# Patient Record
Sex: FEMALE | Race: WHITE | NOT HISPANIC OR LATINO | Employment: OTHER | URBAN - METROPOLITAN AREA
[De-identification: names, ages, dates, MRNs, and addresses within clinical notes are randomized per-mention and may not be internally consistent; named-entity substitution may affect disease eponyms.]

---

## 2017-01-04 ENCOUNTER — APPOINTMENT (OUTPATIENT)
Dept: LAB | Facility: CLINIC | Age: 62
End: 2017-01-04
Payer: MEDICARE

## 2017-01-04 DIAGNOSIS — E11.42 TYPE 2 DIABETES MELLITUS WITH DIABETIC POLYNEUROPATHY (HCC): ICD-10-CM

## 2017-01-04 LAB
EST. AVERAGE GLUCOSE BLD GHB EST-MCNC: 120 MG/DL
HBA1C MFR BLD: 5.8 % (ref 4.2–6.3)

## 2017-01-04 PROCEDURE — 36415 COLL VENOUS BLD VENIPUNCTURE: CPT

## 2017-01-04 PROCEDURE — 83036 HEMOGLOBIN GLYCOSYLATED A1C: CPT

## 2017-02-23 ENCOUNTER — ALLSCRIPTS OFFICE VISIT (OUTPATIENT)
Dept: OTHER | Facility: OTHER | Age: 62
End: 2017-02-23

## 2017-04-03 ENCOUNTER — ALLSCRIPTS OFFICE VISIT (OUTPATIENT)
Dept: OTHER | Facility: OTHER | Age: 62
End: 2017-04-03

## 2017-05-23 ENCOUNTER — ALLSCRIPTS OFFICE VISIT (OUTPATIENT)
Dept: OTHER | Facility: OTHER | Age: 62
End: 2017-05-23

## 2017-05-23 DIAGNOSIS — M16.0 PRIMARY OSTEOARTHRITIS OF BOTH HIPS: ICD-10-CM

## 2017-05-25 ENCOUNTER — HOSPITAL ENCOUNTER (OUTPATIENT)
Dept: RADIOLOGY | Facility: HOSPITAL | Age: 62
Discharge: HOME | End: 2017-05-25
Attending: ORTHOPAEDIC SURGERY
Payer: MEDICARE

## 2017-05-25 DIAGNOSIS — M16.0 PRIMARY OSTEOARTHRITIS OF BOTH HIPS: ICD-10-CM

## 2017-05-25 PROCEDURE — 73700 CT LOWER EXTREMITY W/O DYE: CPT

## 2017-07-10 ENCOUNTER — GENERIC CONVERSION - ENCOUNTER (OUTPATIENT)
Dept: OTHER | Facility: OTHER | Age: 62
End: 2017-07-10

## 2017-08-02 ENCOUNTER — ALLSCRIPTS OFFICE VISIT (OUTPATIENT)
Dept: OTHER | Facility: OTHER | Age: 62
End: 2017-08-02

## 2017-08-04 ENCOUNTER — ALLSCRIPTS OFFICE VISIT (OUTPATIENT)
Dept: OTHER | Facility: OTHER | Age: 62
End: 2017-08-04

## 2017-08-04 DIAGNOSIS — K91.2 POSTSURGICAL MALABSORPTION, NOT ELSEWHERE CLASSIFIED: ICD-10-CM

## 2017-08-04 DIAGNOSIS — Z12.31 ENCOUNTER FOR SCREENING MAMMOGRAM FOR MALIGNANT NEOPLASM OF BREAST: ICD-10-CM

## 2017-08-04 DIAGNOSIS — D64.9 ANEMIA: ICD-10-CM

## 2017-08-09 ENCOUNTER — HOSPITAL ENCOUNTER (OUTPATIENT)
Dept: RADIOLOGY | Facility: HOSPITAL | Age: 62
Discharge: HOME | End: 2017-08-09
Payer: MEDICARE

## 2017-08-09 ENCOUNTER — TRANSCRIBE ORDERS (OUTPATIENT)
Dept: ADMINISTRATIVE | Facility: HOSPITAL | Age: 62
End: 2017-08-09

## 2017-08-09 ENCOUNTER — APPOINTMENT (OUTPATIENT)
Dept: LAB | Facility: HOSPITAL | Age: 62
End: 2017-08-09
Payer: MEDICARE

## 2017-08-09 DIAGNOSIS — Z01.818 ENCOUNTER FOR PREADMISSION TESTING: Primary | ICD-10-CM

## 2017-08-09 DIAGNOSIS — K91.2 POSTSURGICAL MALABSORPTION, NOT ELSEWHERE CLASSIFIED: ICD-10-CM

## 2017-08-09 DIAGNOSIS — Z01.818 ENCOUNTER FOR PREADMISSION TESTING: ICD-10-CM

## 2017-08-09 LAB
25(OH)D3 SERPL-MCNC: 32.2 NG/ML (ref 30–100)
BACTERIA UR QL AUTO: ABNORMAL /HPF
BILIRUB UR QL STRIP: NEGATIVE
CHOLEST SERPL-MCNC: 163 MG/DL (ref 50–200)
CLARITY UR: CLEAR
COLOR UR: YELLOW
EST. AVERAGE GLUCOSE BLD GHB EST-MCNC: 97 MG/DL
GLUCOSE UR STRIP-MCNC: NEGATIVE MG/DL
HBA1C MFR BLD: 5 % (ref 4.2–6.3)
HDLC SERPL-MCNC: 46 MG/DL (ref 40–60)
HGB UR QL STRIP.AUTO: ABNORMAL
KETONES UR STRIP-MCNC: NEGATIVE MG/DL
LDLC SERPL CALC-MCNC: 99 MG/DL (ref 0–100)
LEUKOCYTE ESTERASE UR QL STRIP: ABNORMAL
NITRITE UR QL STRIP: NEGATIVE
NON-SQ EPI CELLS URNS QL MICRO: ABNORMAL /HPF
PH UR STRIP.AUTO: 5.5 [PH] (ref 5–9)
PROT UR STRIP-MCNC: NEGATIVE MG/DL
RBC #/AREA URNS AUTO: ABNORMAL /HPF
SP GR UR STRIP.AUTO: 1.01 (ref 1–1.03)
TRIGL SERPL-MCNC: 91 MG/DL
UROBILINOGEN UR QL STRIP.AUTO: 0.2 E.U./DL
VIT B12 SERPL-MCNC: 413 PG/ML (ref 100–900)
WBC #/AREA URNS AUTO: ABNORMAL /HPF

## 2017-08-09 PROCEDURE — 82607 VITAMIN B-12: CPT

## 2017-08-09 PROCEDURE — 81001 URINALYSIS AUTO W/SCOPE: CPT | Performed by: INTERNAL MEDICINE

## 2017-08-09 PROCEDURE — 82306 VITAMIN D 25 HYDROXY: CPT

## 2017-08-09 PROCEDURE — 36415 COLL VENOUS BLD VENIPUNCTURE: CPT

## 2017-08-09 PROCEDURE — 83036 HEMOGLOBIN GLYCOSYLATED A1C: CPT

## 2017-08-09 PROCEDURE — 71020 HB CHEST X-RAY 2VW FRONTAL&LATL: CPT

## 2017-08-09 PROCEDURE — 80061 LIPID PANEL: CPT

## 2017-08-09 PROCEDURE — 84590 ASSAY OF VITAMIN A: CPT

## 2017-08-09 PROCEDURE — 84425 ASSAY OF VITAMIN B-1: CPT

## 2017-08-10 ENCOUNTER — GENERIC CONVERSION - ENCOUNTER (OUTPATIENT)
Dept: OTHER | Facility: OTHER | Age: 62
End: 2017-08-10

## 2017-08-11 LAB — VIT B1 BLD-SCNC: 187.4 NMOL/L (ref 66.5–200)

## 2017-08-13 LAB — VIT A SERPL-MCNC: 68 UG/DL (ref 26–82)

## 2017-08-15 ENCOUNTER — GENERIC CONVERSION - ENCOUNTER (OUTPATIENT)
Dept: OTHER | Facility: OTHER | Age: 62
End: 2017-08-15

## 2017-08-19 ENCOUNTER — LAB REQUISITION (OUTPATIENT)
Dept: LAB | Facility: HOSPITAL | Age: 62
End: 2017-08-19
Payer: MEDICARE

## 2017-08-19 DIAGNOSIS — N18.9 CHRONIC KIDNEY DISEASE: ICD-10-CM

## 2017-08-19 LAB
BASOPHILS # BLD AUTO: 0 THOUSANDS/ΜL (ref 0–0.1)
BASOPHILS NFR BLD AUTO: 0 % (ref 0–1)
EOSINOPHIL # BLD AUTO: 0.2 THOUSAND/ΜL (ref 0–0.61)
EOSINOPHIL NFR BLD AUTO: 3 % (ref 0–6)
ERYTHROCYTE [DISTWIDTH] IN BLOOD BY AUTOMATED COUNT: 14.6 % (ref 11.6–15.1)
HCT VFR BLD AUTO: 24.5 % (ref 37–47)
HGB BLD-MCNC: 8.1 G/DL (ref 12–16)
LYMPHOCYTES # BLD AUTO: 1.6 THOUSANDS/ΜL (ref 0.6–4.47)
LYMPHOCYTES NFR BLD AUTO: 28 % (ref 14–44)
MCH RBC QN AUTO: 28.4 PG (ref 27–31)
MCHC RBC AUTO-ENTMCNC: 33.1 G/DL (ref 31.4–37.4)
MCV RBC AUTO: 86 FL (ref 82–98)
MONOCYTES # BLD AUTO: 0.5 THOUSAND/ΜL (ref 0.17–1.22)
MONOCYTES NFR BLD AUTO: 8 % (ref 4–12)
NEUTROPHILS # BLD AUTO: 3.6 THOUSANDS/ΜL (ref 1.85–7.62)
NEUTS SEG NFR BLD AUTO: 61 % (ref 43–75)
NRBC BLD AUTO-RTO: 0 /100 WBCS
PLATELET # BLD AUTO: 202 THOUSANDS/UL (ref 130–400)
PMV BLD AUTO: 8.2 FL (ref 8.9–12.7)
RBC # BLD AUTO: 2.85 MILLION/UL (ref 4.2–5.4)
WBC # BLD AUTO: 5.9 THOUSAND/UL (ref 4.8–10.8)

## 2017-08-19 PROCEDURE — 85025 COMPLETE CBC W/AUTO DIFF WBC: CPT | Performed by: FAMILY MEDICINE

## 2017-08-31 ENCOUNTER — GENERIC CONVERSION - ENCOUNTER (OUTPATIENT)
Dept: OTHER | Facility: OTHER | Age: 62
End: 2017-08-31

## 2017-09-28 ENCOUNTER — GENERIC CONVERSION - ENCOUNTER (OUTPATIENT)
Dept: OTHER | Facility: OTHER | Age: 62
End: 2017-09-28

## 2017-09-29 ENCOUNTER — GENERIC CONVERSION - ENCOUNTER (OUTPATIENT)
Dept: OTHER | Facility: OTHER | Age: 62
End: 2017-09-29

## 2017-10-02 ENCOUNTER — GENERIC CONVERSION - ENCOUNTER (OUTPATIENT)
Dept: OTHER | Facility: OTHER | Age: 62
End: 2017-10-02

## 2017-10-06 ENCOUNTER — ALLSCRIPTS OFFICE VISIT (OUTPATIENT)
Dept: OTHER | Facility: OTHER | Age: 62
End: 2017-10-06

## 2017-10-10 ENCOUNTER — GENERIC CONVERSION - ENCOUNTER (OUTPATIENT)
Dept: OTHER | Facility: OTHER | Age: 62
End: 2017-10-10

## 2017-10-25 ENCOUNTER — GENERIC CONVERSION - ENCOUNTER (OUTPATIENT)
Dept: OTHER | Facility: OTHER | Age: 62
End: 2017-10-25

## 2017-11-08 ENCOUNTER — GENERIC CONVERSION - ENCOUNTER (OUTPATIENT)
Dept: OTHER | Facility: OTHER | Age: 62
End: 2017-11-08

## 2017-11-22 ENCOUNTER — GENERIC CONVERSION - ENCOUNTER (OUTPATIENT)
Dept: OTHER | Facility: OTHER | Age: 62
End: 2017-11-22

## 2017-12-13 ENCOUNTER — ALLSCRIPTS OFFICE VISIT (OUTPATIENT)
Dept: OTHER | Facility: OTHER | Age: 62
End: 2017-12-13

## 2017-12-15 NOTE — PROGRESS NOTES
Assessment  1  Atherosclerotic peripheral vascular disease (440 20) (I70 209)   2  Onychomycosis (110 1) (B35 1)   3  Pain in both feet (729 5) (M79 671,M79 672)    Plan   · Follow-up visit in 9 weeks Evaluation and Treatment  Follow-up  Status: Hold For -Scheduling  Requested for: 79Oxw4669   · Jetrochellehia Hoops your feet and legs daily if you have vascular disease ; Status:Complete;   Done:06Iky1963   Follow-up visit in 9 weeks Evaluation and Treatment  Follow-up  Status: Hold For - Scheduling  Requested for: 36Anx1472 Ordered; For: Onychomycosis; Ordered By: Simon Ayala  Performed:   Due: 07YRZ0403   Discussion/Summary    Discussed with patient proper foot care  Suggest emollient use daily  Daily foot checks monitor for signs of infection  The patient was counseled regarding instructions for management,-- patient and family education,-- risks and benefits of treatment options,-- importance of compliance with treatment  Chief Complaint  patient is seen on housecall patient is seen in assisted living facility  Has trouble Walking long distances and rarely leaves the facility  patient has chief complaint of thick painful nails that hurt when walking and wearing shoes  Active Problems  1  Age related osteoporosis (733 01) (M81 0)   2  Anemia (285 9) (D64 9)   3  Anxiety (300 00) (F41 9)   4  Aortic stenosis (424 1) (I35 0)   5  Atherosclerotic peripheral vascular disease (440 20) (I70 209)   6  Benign essential hypertension (401 1) (I10)   7  BMI 36 0-36 9,adult (V85 36) (Z68 36)   8  BMI 45 0-49 9, adult (V85 42) (Z68 42)   9  Breast cancer screening (V76 10) (Z12 39)   10  Candidiasis, intertrigo (112 3) (B37 2)   11  Chronic low back pain (724 2,338 29) (M54 5,G89 29)   12  Diet-controlled diabetes mellitus (250 00) (E11 9)   13  Difficulty walking (719 7) (R26 2)   14  Edema (782 3) (R60 9)   15  Encounter for preventive health examination (V70 0) (Z00 00)   16   Encounter for screening mammogram for breast cancer (V76 12) (Z12 31)   17  Hyperkalemia (276 7) (E87 5)   18  Hyperlipidemia (272 4) (E78 5)   19  Left hip pain (719 45) (M25 552)   20  Leg swelling (729 81) (M79 89)   21  Morbid or severe obesity due to excess calories (278 01) (E66 01)   22  Obesity (278 00) (E66 9)   23  Onychomycosis (110 1) (B35 1)   24  MAUREEN (obstructive sleep apnea) (327 23) (G47 33)   25  Osteoarthritis of both hips, unspecified osteoarthritis type (715 95) (M16 0)   26  Pain in both feet (729 5) (M79 671,M79 672)   27  Postgastrectomy malabsorption (579 3) (K91 2,Z90 3)   28  Post-operative pain (338 18) (G89 18)   29  Preop examination (V72 84) (Z01 818)   30  Primary osteoarthritis of left hip (715 15) (M16 12)   31  Risk for falls (V15 88) (Z91 81)   32  S/P hip replacement, left (V43 64) (Z96 642)   33  Screening for colon cancer (V76 51) (Z12 11)   34  Screening for depression (V79 0) (Z13 89)   35  Spinal stenosis (724 00) (M48 00)   36  Status post gastric bypass for obesity (V45 86) (Z98 84)   37  Systolic murmur (049 5) (M56 2)   38  Visit for screening mammogram (V76 12) (Z12 31)    Surgical History   · History of Abdominal Surgery   · History of  Section   · History of Gastric Surgery For Morbid Obesity Bypass With Juan-en-Y   · History of Tubal Ligation    The surgical history was reviewed and updated today  Family History  Mother    · Family history of Leukemia (V16 6)  Father    · Family history of Coronary Artery Disease (V17 49)   · Family history of Diabetes Mellitus (V18 0)    The family history was reviewed and updated today  Social History   · Former smoker (E76 79) (Z93 951)   · Former smoker (W58 46) (J90 724)   · Never Drank Alcohol   · Never Used Drugs  The social history was reviewed and updated today  Current Meds   1  Bariatric Fusion Oral Tablet Chewable; Therapy: (Recorded:54Ica0280) to Recorded   2  Calcium /Vitamin D TABS; Therapy: (Recorded:47Ljm3912) to Recorded   3   Ferrous Sulfate 325 (65 Fe) MG Oral Tablet; TAKE 1 TABLET DAILY WITH FOOD; Therapy: 84Iqa4089 to (Evaluate:97Mhy9964)  Requested for: 46Xuz4138; Last Rx:92Jbe8220 Ordered   4  Meloxicam 7 5 MG Oral Tablet; TAKE 1 TABLET DAILY  Requested for: 36HMO3285; Last Rx:06Oct2017 Ordered   5  Nystatin 962064 UNIT/GM External Ointment; APPLY 2-3 TIMES DAILY TO AFFECTED AREA(S); Therapy: 13SFZ6271 to (Last Rx:57Hfk3162)  Requested for: 36Cuq3747 Ordered   6  Omeprazole 20 MG Oral Capsule Delayed Release; take one capsule once daily; Therapy: 04LUG0056 to (Evaluate:30Oct2017)  Requested for: 04Apr2017; Last Rx:88Fas1700 Ordered   7  OneTouch Ultra Blue In [employer]; USE 1 STRIP TWICE DAILY; Therapy: 67OVU3491 to (Jose Valle)  Requested for: 10Few1676; Last Rx:25Gyv9527 Ordered   8  OxyCODONE HCl - 5 MG Oral Tablet; TAKE 1 TO 2 TABLETS EVERY 4 TO 6 HOURS AS NEEDED FOR PAIN; Therapy: 14Dgl5603 to (Evaluate:22Vgj2618); Last Rx:41Wjk8340 Ordered   9  Percocet TABS; Therapy: (Recorded:01Uxy5712) to Recorded    The medication list was reviewed and updated today  Allergies  1  Tramadol   2  TraMADol HCl TABS  3  No Known Latex Allergies    Physical Exam   Constitutional: no acute distress, well appearing and well nourished  Cardiovascular: abnormal dorsalis pedis pulse,-- abnormal posterior tibialis pulse,-- dependence rubor-- and-- abnormal capillary refill  Orthopedic/Biomechanical: abnormal foot type,-- hammertoe(s)-- and-- ingrown nails  Left Foot: Appearance: Normal except as noted: excessive pronation-- and-- pes planus  Second toe deformities include hammer toe  Third toe deformities include hammer toe  Forth toe deformities include hammer toe  Fifth toe deformities include hammer toe  All nails thick discolored dystrophic, positive subungual debris, positive pain on palpation  Special Tests: semirigid hammertoes 2 through 5 bilateral  Negative ulceration negative maceration negative signs of infection     Right Foot: Appearance: Normal except as noted: excessive pronation-- and-- pes planus  Second toe deformities include hammer toe  Third toe deformities include hammer toe  Forth toe deformities include hammer toe  Fifth toe deformities include hammer toe  Negative ulceration, negative maceration, negative signs of infection  Special Tests: Skin is hairless and atrophic  Plus one edema bilateral  Moderate venous stasis  No signs of infection  Neurological Exam: performed  Light touch was intact bilaterally  Vibratory sensation was intact bilaterally  Vascular Exam: performed Dorsalis pedis pulses were 1/4 bilaterally  Posterior tibial pulses were absent bilaterally  Capillary refill time was between 1-3 seconds bilaterally  Toenails: All of the toenails were elongated,-- hypertrophied,-- discolored,-- shown to have subungual debris-- and-- tender  Hyperkeratosis: present on both first toes-- and-- present on both first sub metatarsals  Procedure  Aseptic debridement and planing of nails Ã10, with nail nipper and nail , no complications      Signatures   Electronically signed by :  Kostas Ramirez DPM; Dec 13 2017  3:44PM EST                       (Author)

## 2018-01-03 ENCOUNTER — GENERIC CONVERSION - ENCOUNTER (OUTPATIENT)
Dept: PODIATRY | Facility: CLINIC | Age: 63
End: 2018-01-03

## 2018-01-05 ENCOUNTER — GENERIC CONVERSION - ENCOUNTER (OUTPATIENT)
Dept: OTHER | Facility: OTHER | Age: 63
End: 2018-01-05

## 2018-01-10 NOTE — PROGRESS NOTES
Message  Outreach phone call; no response but left message if patient would like to reschedule with RD and JENNI GONZALEZ      Active Problems    1  Acute CHF (428 0) (I50 9)   2  Anxiety (300 00) (F41 9)   3  Benign essential hypertension (401 1) (I10)   4  Chronic low back pain (724 2,338 29) (M54 5,G89 29)   5  Edema (782 3) (R60 9)   6  Hyperkalemia (276 7) (E87 5)   7  Hyperlipidemia (272 4) (E78 5)   8  Morbid or severe obesity due to excess calories (278 01) (E66 01)   9  MAUREEN (obstructive sleep apnea) (327 23) (G47 33)   10  Preop examination (V72 84) (Z01 818)   11  Screening for cardiovascular condition (V81 2) (Z13 6)   12  Screening for diabetes mellitus (V77 1) (Z13 1)   13  Systolic murmur (438 1) (E25)   14  Type 2 diabetes mellitus (250 00) (E11 9)    Current Meds   1  BD Insulin Syringe 30G X 1/2" 0 5 ML Miscellaneous; use as directed; Therapy: 14WXL8088 to (Evaluate:26Zpd7776); Last Rx:05Jan2016 Ordered   2  Clotrimazole-Betamethasone 1-0 05 % External Cream (Lotrisone); APPLY SPARINGLY   TO AFFECTED AREA(S) 3 TIMES A DAY; Therapy: 16NXD3194 to (Evaluate:58Rgh8290); Last Rx:25Ysi4083 Ordered   3  Enalapril Maleate 10 MG Oral Tablet; TAKE 1 TABLET DAILY  Requested for: 60CHX8987; Last Rx:30Sep2015 Ordered   4  FreeStyle InsuLinx Test In Vitro Strip; TEST 4 TIMES DAILY; Therapy: 49DPI4308 to (Evaluate:81Xjd9895); Last Rx:05Jan2016 Ordered   5  Furosemide 40 MG Oral Tablet; TAKE 1 TABLET DAILY AS DIRECTED; Therapy: 41QIK7293 to (Paulett Lipa)  Requested for: 06ASH2160; Last   Rx:30Sep2015 Ordered   6  Lantus 100 UNIT/ML Subcutaneous Solution; INJECT 65 UNITS SUBCUTANEOUSLY   DAILY AS    DIRECTED; Therapy: 49PAK1442 to (Evaluate:84Zgz2290)  Requested for: 15AUA0465; Last   Rx:05Jan2016 Ordered   7  Meloxicam 15 MG Oral Tablet; TAKE ONE tablet(s) DAILY; Therapy: 86SAD1878 to (Evaluate:21Jan2016)  Requested for: 22Dec2015; Last   Rx:22Dec2015 Ordered   8   MetFORMIN HCl - 500 MG Oral Tablet; take 1 tablet by mouth 2 times a day; Therapy: 77TQS4866 to (Last Rx:16Hzw3376)  Requested for: 78Hmk1941 Ordered   9  Nateglinide 120 MG Oral Tablet (Starlix); TAKE 1/2 TABLET IN THE AM,1/2 TABLET AT   NOON AND 1 TABLET AT BEDTIME; Therapy: 13RIL6943 to (Last Rx:27Luk1166)  Requested for: 91XEQ1060 Ordered   10  OneTouch Ultra Blue In Vitro Strip; TEST 4 TIMES DAILY; Therapy: 52FPS2244 to (Evaluate:19Frh5085); Last Rx:11Npq7851 Ordered   11  Percocet  MG Oral Tablet (Oxycodone-Acetaminophen); TAKE 1 TABLET 4 TIMES    DAILY AS NEEDED FOR PAIN;    Therapy: (Recorded:99Wkn9027) to Recorded    Allergies    1  Tramadol    2   No Known Latex Allergies    Signatures   Electronically signed by : ASHLYN Reyes; Jan 14 2016 10:50AM EST                       (Author)

## 2018-01-10 NOTE — PROGRESS NOTES
Discussion/Summary  Discussion Summary:   Reviewed post-operative pureed and soft foods diet with pt  Discussed gradual diet advancement and portion size progression  Discussed adequate hydration, signs and symptoms of dehydration  Discussed recommended post-operative vitamin supplementation and protein supplements  Discussed importance of regular physical activity  Provided pt with contact information for future questions/concerns  Active Problems    1  Acute CHF (428 0) (I50 9)   2  Anxiety (300 00) (F41 9)   3  Benign essential hypertension (401 1) (I10)   4  BMI 50 0-59 9, adult (V85 43) (Z68 43)   5  Chronic low back pain (724 2,338 29) (M54 5,G89 29)   6  Edema (782 3) (R60 9)   7  Hyperkalemia (276 7) (E87 5)   8  Hyperlipidemia (272 4) (E78 5)   9  Morbid or severe obesity due to excess calories (278 01) (E66 01)   10  Obesity (278 00) (E66 9)   11  MAUREEN (obstructive sleep apnea) (327 23) (G47 33)   12  Postgastrectomy malabsorption (579 3) (K91 2,Z90 3)   13  Preop examination (V72 84) (Z01 818)   14  Screening for cardiovascular condition (V81 2) (Z13 6)   15  Screening for diabetes mellitus (V77 1) (Z13 1)   16  Status post gastric bypass for obesity (V45 86) (Z98 84)   17  Systolic murmur (003 6) (Q03)   18  Type 2 diabetes mellitus (250 00) (E11 9)    Past Medical History    1  History of spinal stenosis (V13 59) (Z87 39)    Surgical History    1  History of Abdominal Surgery   2  History of  Section   3  History of Gastric Surgery For Morbid Obesity Bypass With Juan-en-Y   4  History of Tubal Ligation    Family History  Mother    1  Family history of Leukemia (V16 6)  Father    2  Family history of Coronary Artery Disease (V17 49)   3  Family history of Diabetes Mellitus (V18 0)    Social History    · Former smoker (R36 37) (V26 274)   · Never Drank Alcohol   · Never Used Drugs    Current Meds   1  Bariatric Fusion Oral Tablet Chewable; Therapy: (Recorded:64Yzr9804) to Recorded   2  Calcium /Vitamin D TABS; Therapy: (Recorded:00Xej6207) to Recorded   3  Furosemide 40 MG Oral Tablet; TAKE 1 TABLET DAILY AS DIRECTED; Therapy: 24HNH9181 to (Clearjoya Roberts)  Requested for: 00OJM5820; Last   Rx:94Eoj7860 Ordered   4  Omeprazole 20 MG Oral Capsule Delayed Release; TAKE 1 CAPSULE DAILY; Therapy: 57ARJ4623 to (Philipp Velazquez)  Requested for: 83JYP4147; Last   Rx:51Tbj3963 Ordered   5  Percocet  MG Oral Tablet (Oxycodone-Acetaminophen); TAKE 1 TABLET 4 TIMES   DAILY AS NEEDED FOR PAIN;   Therapy: (Recorded:47Yfx4665) to Recorded    Allergies    1  Tramadol    2  No Known Latex Allergies    Future Appointments    Date/Time Provider Specialty Site   08/02/2016 01:30 PM Ramón Spencer [facility] General Surgery [facility]   09/27/2016 08:30 AM Sácnhez Catherine [facility] General Surgery [facility]     Signatures   Electronically signed by : HERBERT Burciaga; Jul 28 2016 12:57PM EST                       (Author)    Electronically signed by :  JOSLYN Byrd ; Aug  4 2016 10:13AM EST

## 2018-01-10 NOTE — MISCELLANEOUS
Provider Comments  Provider Comments:     Dear Tarik Leal had a scheduled appointment at our office for 07/13/2017 that you called to cancel but did not reschedule for another day  It is very important that you follow up with us so that we can assess your physical and nutritional safety after your surgery  Please call our office at [phone] to reschedule your appointment       Sincerely,     [facility]            Signatures   Electronically signed by : Carol Bustamante, ; Jul 11 2017  8:38AM EST                       (Author)

## 2018-01-10 NOTE — PROGRESS NOTES
Message  Outreach phone  How is patient coming along with pre-op weight loss? any? Kavitha Morfin She said she spoke with RD on 7/11/2016 and her ? were answered  Remind patient to continue to work on weight loss and if she has any ? to please reach out to staff for support  [place]      Active Problems    1  Acute CHF (428 0) (I50 9)   2  Anxiety (300 00) (F41 9)   3  Benign essential hypertension (401 1) (I10)   4  BMI 50 0-59 9, adult (V85 43) (Z68 43)   5  Chronic low back pain (724 2,338 29) (M54 5,G89 29)   6  Edema (782 3) (R60 9)   7  Hyperkalemia (276 7) (E87 5)   8  Hyperlipidemia (272 4) (E78 5)   9  Morbid or severe obesity due to excess calories (278 01) (E66 01)   10  Obesity (278 00) (E66 9)   11  MAUREEN (obstructive sleep apnea) (327 23) (G47 33)   12  Preop examination (V72 84) (Z01 818)   13  Screening for cardiovascular condition (V81 2) (Z13 6)   14  Screening for diabetes mellitus (V77 1) (Z13 1)   15  Systolic murmur (899 8) (N00)   16  Type 2 diabetes mellitus (250 00) (E11 9)    Current Meds   1  Atorvastatin Calcium 40 MG Oral Tablet (Lipitor); TAKE 1 TABLET AT BEDTIME; Therapy: 32APF6465 to (Evaluate:54Rgo2107); Last Rx:02Cnu6071 Ordered   2  Clotrimazole-Betamethasone 1-0 05 % External Cream (Lotrisone); APPLY SPARINGLY   TO AFFECTED AREA(S) 3 TIMES A DAY; Therapy: 11NCB4196 to (Evaluate:86Yfz3889); Last Rx:17Kvh6187 Ordered   3  Enalapril Maleate 10 MG Oral Tablet; TAKE 1 TABLET DAILY  Requested for: 27VLQ9822;   Last TX:55KVI9653 Ordered   4  FreeStyle InsuLinx Test In Vitro Strip; TEST 4 TIMES DAILY; Therapy: 00YZI0813 to (Evaluate:38Ghj2194)  Requested for: 85HTO4684; Last   Rx:17Rkf9385 Ordered   5  Furosemide 40 MG Oral Tablet; TAKE 1 TABLET DAILY AS DIRECTED; Therapy: 90AMX5045 to (Yvonne Reyes)  Requested for: 48PEI0147; Last   Rx:85Oso2129 Ordered   6  Lantus 100 UNIT/ML Subcutaneous Solution; INJECT 65 UNITS SUBCUTANEOUSLY   DAILY AS    DIRECTED;    Therapy: 31HVV4578 to (Evaluate:59Czi8772)  Requested for: 96WXN1497; Last   Rx:05Ije5484 Ordered   7  Meloxicam 15 MG Oral Tablet; TAKE ONE tablet(s) DAILY; Therapy: 55AAD4532 to (Evaluate:21Jun2016)  Requested for: 21Ukt5001; Last   Rx:22Apr2016 Ordered   8  MetFORMIN HCl - 500 MG Oral Tablet; take 1 tablet by mouth 2 times a day; Therapy: 31HUO7890 to 21 )  Requested for: 76COY5861; Last   :00ACN3447 Ordered   9  Nateglinide 120 MG Oral Tablet (Starlix); TAKE 1/2 TABLET IN THE AM,1/2 TABLET AT   NOON AND 1 TABLET AT BEDTIME; Therapy: 08IAL2892 to (Last Rx:25Mar2016)  Requested for: 25Mar2016 Ordered   10  Omeprazole 20 MG Oral Capsule Delayed Release; TAKE 1 CAPSULE DAILY; Therapy: 31KQN1764 to ([facility])  Requested for: 05XJG0232; Last    Rx:03Ppd9892; Status: ACTIVE - Retrospective By Protocol Authorization Ordered   11  Percocet  MG Oral Tablet (Oxycodone-Acetaminophen); TAKE 1 TABLET 4 TIMES    DAILY AS NEEDED FOR PAIN;    Therapy: (Recorded:59Tar4188) to Recorded   12  Toprol XL 25 MG Oral Tablet Extended Release 24 Hour (Metoprolol Succinate ER);    TAKE 1 TABLET DAILY; Therapy: 94ZMM1864 to Recorded    Allergies    1  Tramadol    2   No Known Latex Allergies    Signatures   Electronically signed by : ASHLYN Lorenzana; Jul 12 2016 11:35AM EST                       (Author)

## 2018-01-11 NOTE — RESULT NOTES
Message   Recommend that patient review her Cholesterol/lipids with her PCP as well/otherwise mail results, blood sugar was a little high but hgA1c -report card of blood sugar control over 3 months is in the "pre-diabetic" range which has improved     Verified Results  (1) CBC/ PLT (NO DIFF) 03Oct2016 09:14AM Kenna Fregoso Order Number: [id number]     Test Name Result Flag Reference   HEMATOCRIT 44 0 %  34 8-46 1   HEMOGLOBIN 14 2 g/dL  11 5-15 4   MCHC 32 3 g/dL  31 4-37 4   MCH 26 8 pg  26 8-34 3   MCV 83 fL  82-98   PLATELET COUNT 957 Thousands/uL  149-390   RBC COUNT 5 29 Million/uL H 3 81-5 12   RDW 14 9 %  11 6-15 1   WBC COUNT 5 62 Thousand/uL  4 31-10 16   MPV 11 5 fL  8 9-12 7       Discussion/Summary  10/3/2016 labs reviewed  your total cholesterol is high at 230  Your  LDL âor bad cholesterolâ is high  at 143 and is worse than prior level of 108  Continued weight loss (IF NEEDED) can sometimes improve this level  Limiting total fats, cholesterol and saturated fats in the diet can help  Heredity also plays a part in this level  Please review these levels with your PCP    Your HDL level  or "good cholesterol" level is low at 37 and is worse than prior level of 43  This level should be higher  Exercise can sometimes improve this level  Exercise as tolerated  Hereditary factors can also cause the level to be low  PLEASE  review this level with your PCP at a routine office visit  your blood sugar, if it was fasting that day was a little high at 130-continued weight loss can help improve this as well  Your hemoglobin A 1c is a report card of your blood sugar control over the past 3 months  Your hemoglobin is in the âpre-diabetic rangeâat 5 9%-this has improved from 7 3% when it was in the diabetic range  *this new level is GOOD) Continued weight loss (IF NEEDED) can help improve this level further    Avoiding simple sugars and having more âcomplex carbohydratesâ in the diet -like vegetables, fruits and whole grains as your diet allows can also help  â   You should also review this with your PCP/Endocrinologist at a routine office visit  Your tryglicerides-which are also a fat in the blood are also high at 248-following the same advice regarding your hgA1c as above can help with this as wekk, your triglyceride level has improved from prior leve-this is GOOD  PLEASE review these levels with your PCP also      Your vitamin D level is low at 27 5    Both vitamin D and calcium are important for bone health  You need to add up the TOTAL  amount of vitamin D that is in your multivitamin/mineral (s) and what is in your calcium citrate with D together  The TOTAL  should add up to between 3487-8738 IU of vitamin D per day  You may need to add additional vitamin D3  to get to this level  Vitamin D 3  can be found over -the-counter  Your TOTAL  calcium amount for the day -which includes the calcium in  all your supplements ADDED TOGETHER should equal  1500 mg per day (up  to 1900 mg may be ok  )This includes the calcium that is in your Multivitamin/mineral(s) and what is in your calcium citrate with D   Calcium is best absorbed in divided doses of 500 mg each  Also it is best spaced 2 hours apart from any iron  Iron and calcium taken at the same time âfightâ to be absorbed, so do not take them at the same time  Please keep your routine office visit  If you do not have a scheduled routine appointment, please call the office to schedule it  Our office number is [phone]  If you have questions about your results, this will be discussed with you at your upcoming  visit  If you have gotten your most recent labs after your recent visit and have questions, please call the office  I look forward to seeing you at your next visit                            Signatures   Electronically signed by : ОЛЕГ Devlin; Oct 20 2016  5:09PM EST (Author)

## 2018-01-12 NOTE — MISCELLANEOUS
Provider Comments  Provider Comments:     Dear Lior Hadley had a scheduled appointment at our office for 11/29/2016 that you called to cancel but did not reschedule for another day  It is very important that you follow up with us so that we can assess your physical and nutritional safety after your surgery  Please call our office at [phone] to reschedule your appointment       Sincerely,     [facility]            Signatures   Electronically signed by : Robert Mcdaniel, ; Nov 28 2016  8:05AM EST                       (Author)

## 2018-01-12 NOTE — MISCELLANEOUS
Message   Recorded as Task   Date: 10/20/2016 04:59 PM, Created By: Thu Catherine   Task Name: Call Patient with results   Assigned To: Thu Catherine   Regarding Patient: Koffi Casarez, Status: Active   Comment:    Thu Catherine - 20 Oct 2016 4:59 PM     Patient Phone: [phone]      Recommend that patient review her Cholesterol/lipids with her PCP as well/otherwise mail results, blood sugar was a little high but hgA1c -report card of blood sugar control over 3 months is in the "pre-diabetic" range which has improved    Called pt with this message as per Yunier Vance      Active Problems    1  Anxiety (300 00) (F41 9)   2  Benign essential hypertension (401 1) (I10)   3  BMI 45 0-49 9, adult (V85 42) (Z68 42)   4  Breast cancer screening (V76 10) (Z12 39)   5  Chronic low back pain (724 2,338 29) (M54 5,G89 29)   6  Difficulty walking (719 7) (R26 2)   7  Edema (782 3) (R60 9)   8  Encounter for screening mammogram for breast cancer (V76 12) (Z12 31)   9  Hyperkalemia (276 7) (E87 5)   10  Hyperlipidemia (272 4) (E78 5)   11  Leg swelling (729 81) (M79 89)   12  Morbid or severe obesity due to excess calories (278 01) (E66 01)   13  Obesity (278 00) (E66 9)   14  Onychomycosis (110 1) (B35 1)   15  MAUREEN (obstructive sleep apnea) (327 23) (G47 33)   16  Postgastrectomy malabsorption (579 3) (K91 2,Z90 3)   17  Spinal stenosis (724 00) (M48 00)   18  Status post gastric bypass for obesity (V45 86) (Z98 84)   19  Systolic murmur (528 6) (Y14 5)   20  Type 2 diabetes mellitus with diabetic polyneuropathy (250 60,357 2) (E11 42)   21  Uncontrolled type 2 diabetes mellitus with hyperglycemia, without long-term current use    of insulin (250 02) (E11 65)    Current Meds   1  Bariatric Fusion Oral Tablet Chewable; Therapy: (Recorded:35Arf2109) to Recorded   2  Calcium /Vitamin D TABS; Therapy: (Recorded:12Eff1204) to Recorded   3  Omeprazole 20 MG Oral Capsule Delayed Release; TAKE 1 CAPSULE DAILY;    Therapy: 95MTA9368 to (Guzman Edwards)  Requested for: 33FBG7892; Last   Rx:34Yum5724 Ordered   4  Percocet  MG Oral Tablet (Oxycodone-Acetaminophen); TAKE 1 TABLET 4 TIMES   DAILY AS NEEDED FOR PAIN;   Therapy: (Recorded:56Fvy7272) to Recorded    Allergies    1  Tramadol   2  TraMADol HCl TABS    3   No Known Latex Allergies    Signatures   Electronically signed by : Oscar Butler LPN; Oct 25 5631 27:38SU EST                       (Author)

## 2018-01-12 NOTE — PROGRESS NOTES
Active Problems    1  Acute CHF (428 0) (I50 9)   2  Anxiety (300 00) (F41 9)   3  Benign essential hypertension (401 1) (I10)   4  Chronic low back pain (724 2,338 29) (M54 5,G89 29)   5  Edema (782 3) (R60 9)   6  Hyperkalemia (276 7) (E87 5)   7  Hyperlipidemia (272 4) (E78 5)   8  Morbid or severe obesity due to excess calories (278 01) (E66 01)   9  MAUREEN (obstructive sleep apnea) (327 23) (G47 33)   10  Preop examination (V72 84) (Z01 818)   11  Screening for cardiovascular condition (V81 2) (Z13 6)   12  Screening for diabetes mellitus (V77 1) (Z13 1)   13  Systolic murmur (342 3) (F17)   14  Type 2 diabetes mellitus (250 00) (E11 9)    Past Medical History    1  History of spinal stenosis (V13 59) (Z87 39)    Surgical History    1  History of Abdominal Surgery   2  History of  Section   3  History of Tubal Ligation    Family History  Mother    1  Family history of Leukemia (V16 6)  Father    2  Family history of Coronary Artery Disease (V17 49)   3  Family history of Diabetes Mellitus (V18 0)    Social History    · Former smoker (H27 33) (U01 784)   · Never Drank Alcohol   · Never Used Drugs    Current Meds   1  Atorvastatin Calcium 40 MG Oral Tablet (Lipitor); TAKE 1 TABLET AT BEDTIME; Therapy: 16MAT6141 to (Evaluate:18Vvp9989); Last Rx:08Owq4348 Ordered   2  BD Insulin Syringe 30G X 1/2" 0 5 ML Miscellaneous; use as directed; Therapy: 71DQM2954 to (Evaluate:76Llo2683); Last Rx:2016 Ordered   3  Clotrimazole-Betamethasone 1-0 05 % External Cream (Lotrisone); APPLY SPARINGLY   TO AFFECTED AREA(S) 3 TIMES A DAY; Therapy: 95IGM3507 to (Evaluate:34Yjq4432); Last Rx:33Bsz6925 Ordered   4  Enalapril Maleate 10 MG Oral Tablet; TAKE 1 TABLET DAILY  Requested for: 19PUG8448;   Last BW:40RMB1494 Ordered   5  FreeStyle InsuLinx Test In Vitro Strip; TEST 4 TIMES DAILY; Therapy:  to (Evaluate:2016); Last Rx:2016 Ordered   6   Furosemide 40 MG Oral Tablet; TAKE 1 TABLET DAILY AS DIRECTED; Therapy: 56BUS1364 to (Monserrat Felling)  Requested for: 57MFU5215; Last   Rx:59Nys2303 Ordered   7  Lantus 100 UNIT/ML Subcutaneous Solution; INJECT 65 UNITS SUBCUTANEOUSLY   DAILY AS    DIRECTED; Therapy: 05SCD7884 to (Merril Cone)  Requested for: 13SQX9319; Last   Rx:04Mar2016 Ordered   8  Meloxicam 15 MG Oral Tablet; TAKE ONE tablet(s) DAILY; Therapy: 80RZC5267 to (Evaluate:21Jun2016)  Requested for: 11Ugl4174; Last   Rx:40Tvb9214 Ordered   9  MetFORMIN HCl - 500 MG Oral Tablet; take 1 tablet by mouth 2 times a day; Therapy: 49LHT5573 to [phone])  Requested for: 79SUG8289; Last   ED:48UBA1147 Ordered   10  Nateglinide 120 MG Oral Tablet (Starlix); TAKE 1/2 TABLET IN THE AM,1/2 TABLET AT    NOON AND 1 TABLET AT BEDTIME; Therapy: 83FGW8116 to (Last Rx:25Mar2016)  Requested for: 25Mar2016 Ordered   11  OneTouch Ultra Blue In Vitro Strip; TEST 4 TIMES DAILY; Therapy: 05PSB3961 to (Evaluate:40Fcz0352); Last Rx:87Xxh9319 Ordered   12  Percocet  MG Oral Tablet (Oxycodone-Acetaminophen); TAKE 1 TABLET 4 TIMES    DAILY AS NEEDED FOR PAIN;    Therapy: (Recorded:33Iyq2254) to Recorded   13  Toprol XL 25 MG Oral Tablet Extended Release 24 Hour (Metoprolol Succinate ER); TAKE    1 TABLET DAILY; Therapy: 67JFQ5135 to Recorded    Allergies    1  Tramadol    2  No Known Latex Allergies     Note   Note:   Met with patient to review weight loss progress( patient lost weight)  Session focused on the following she was upset after last weigh in and on the way home thought of eating out but did not  She did not give in and took it as the opportunity to reach her weight goal  She is diligent in using phone armando to record food  In addition is making better food choices when she eats out ( frequently eats out with friends and children) and chooses foods lower in calories   I suggested she suggest to friends and family that she prefers to do something other than eating  For example, a movie, a walk at the mall  She has supportive sons who want her to lose weight and get healthy  Patient to continue to food journal with phone armando and pause to review calorie intake when eating out  NV      Signatures   Electronically signed by : ASHLYN Munoz; May 13 2016 11:44AM EST                       (Author)    Electronically signed by :  JOSLYN Munoz ; May 19 2016  1:34PM EST

## 2018-01-12 NOTE — PROGRESS NOTES
Message  Patient called and said she wants to resume  She said many personal issues kept her from proceeding  Patient will meet with JENNI and RD on 3/25/16 @10:00 and bring a 3 day food log  NV      Active Problems    1  Acute CHF (428 0) (I50 9)   2  Anxiety (300 00) (F41 9)   3  Benign essential hypertension (401 1) (I10)   4  Chronic low back pain (724 2,338 29) (M54 5,G89 29)   5  Edema (782 3) (R60 9)   6  Hyperkalemia (276 7) (E87 5)   7  Hyperlipidemia (272 4) (E78 5)   8  Morbid or severe obesity due to excess calories (278 01) (E66 01)   9  MAUREEN (obstructive sleep apnea) (327 23) (G47 33)   10  Preop examination (V72 84) (Z01 818)   11  Screening for cardiovascular condition (V81 2) (Z13 6)   12  Screening for diabetes mellitus (V77 1) (Z13 1)   13  Systolic murmur (718 4) (R31)   14  Type 2 diabetes mellitus (250 00) (E11 9)    Current Meds   1  Atorvastatin Calcium 40 MG Oral Tablet (Lipitor); TAKE 1 TABLET AT BEDTIME; Therapy: 38VUV3459 to (Evaluate:27Bzz8458); Last Rx:82Mfq8228 Ordered   2  BD Insulin Syringe 30G X 1/2" 0 5 ML Miscellaneous; use as directed; Therapy: 19OLJ1544 to (Evaluate:85Pwc4985); Last Rx:05Jan2016 Ordered   3  Clotrimazole-Betamethasone 1-0 05 % External Cream (Lotrisone); APPLY SPARINGLY   TO AFFECTED AREA(S) 3 TIMES A DAY; Therapy: 94KBL6666 to (Evaluate:97Dph7481); Last Rx:58Ohm1249 Ordered   4  Enalapril Maleate 10 MG Oral Tablet; TAKE 1 TABLET DAILY  Requested for: 30PMT4535; Last Rx:15Uaj8717 Ordered   5  FreeStyle InsuLinx Test In Vitro Strip; TEST 4 TIMES DAILY; Therapy: 51QRC9647 to (Evaluate:93Ptg4706); Last Rx:86Xca6634 Ordered   6  Furosemide 40 MG Oral Tablet; TAKE 1 TABLET DAILY AS DIRECTED; Therapy: 94OCE1109 to (Dartha Mealy)  Requested for: 50NVG2727; Last   Rx:95Niv0360 Ordered   7  Lantus 100 UNIT/ML Subcutaneous Solution; INJECT 65 UNITS SUBCUTANEOUSLY   DAILY AS    DIRECTED;    Therapy: 49OYL2831 to (Evaluate:21Rwh1318)  Requested for: 36TRC8602; Last   Rx:83Cwo2571 Ordered   8  Meloxicam 15 MG Oral Tablet; TAKE ONE tablet(s) DAILY; Therapy: 69BHV5086 to (Evaluate:24Mar2016)  Requested for: 78Dwc5703; Last   Rx:35Bji7359 Ordered   9  MetFORMIN HCl - 500 MG Oral Tablet; take 1 tablet by mouth 2 times a day; Therapy: 89ILG3096 to (Last Rx:48Cnb5623)  Requested for: 60Eze9629 Ordered   10  Nateglinide 120 MG Oral Tablet (Starlix); TAKE 1/2 TABLET IN THE AM,1/2 TABLET AT    NOON AND 1 TABLET AT BEDTIME; Therapy: 06ONV2730 to (Last Rx:59Yxi7942)  Requested for: 59Tae3023 Ordered   11  OneTouch Ultra Blue In Vitro Strip; TEST 4 TIMES DAILY; Therapy: 06MAD5943 to (Evaluate:60Zkj5196); Last Rx:24Pzv9759 Ordered   12  Percocet  MG Oral Tablet (Oxycodone-Acetaminophen); TAKE 1 TABLET 4 TIMES    DAILY AS NEEDED FOR PAIN;    Therapy: (Recorded:19Pwm0987) to Recorded   13  Toprol XL 25 MG Oral Tablet Extended Release 24 Hour (Metoprolol Succinate ER);    TAKE 1 TABLET DAILY; Therapy: 46ZMC4434 to Recorded    Allergies    1  Tramadol    2   No Known Latex Allergies    Signatures   Electronically signed by : ASHLYN Zamorano; Feb 29 2016  2:30PM EST                       (Author)

## 2018-01-13 VITALS
SYSTOLIC BLOOD PRESSURE: 132 MMHG | HEIGHT: 65 IN | HEART RATE: 74 BPM | DIASTOLIC BLOOD PRESSURE: 82 MMHG | BODY MASS INDEX: 38.74 KG/M2 | WEIGHT: 232.5 LBS

## 2018-01-13 VITALS
HEART RATE: 73 BPM | HEIGHT: 65 IN | WEIGHT: 221 LBS | DIASTOLIC BLOOD PRESSURE: 84 MMHG | BODY MASS INDEX: 36.82 KG/M2 | SYSTOLIC BLOOD PRESSURE: 140 MMHG

## 2018-01-13 VITALS
SYSTOLIC BLOOD PRESSURE: 128 MMHG | HEIGHT: 65 IN | WEIGHT: 229 LBS | OXYGEN SATURATION: 98 % | RESPIRATION RATE: 20 BRPM | DIASTOLIC BLOOD PRESSURE: 80 MMHG | TEMPERATURE: 97 F | BODY MASS INDEX: 38.15 KG/M2 | HEART RATE: 96 BPM

## 2018-01-13 NOTE — PROGRESS NOTES
Active Problems    1  Acute CHF (428 0) (I50 9)   2  Anxiety (300 00) (F41 9)   3  Benign essential hypertension (401 1) (I10)   4  BMI 50 0-59 9, adult (V85 43) (Z68 43)   5  Chronic low back pain (724 2,338 29) (M54 5,G89 29)   6  Edema (782 3) (R60 9)   7  Hyperkalemia (276 7) (E87 5)   8  Hyperlipidemia (272 4) (E78 5)   9  Morbid or severe obesity due to excess calories (278 01) (E66 01)   10  Obesity (278 00) (E66 9)   11  MAUREEN (obstructive sleep apnea) (327 23) (G47 33)   12  Preop examination (V72 84) (Z01 818)   13  Screening for cardiovascular condition (V81 2) (Z13 6)   14  Screening for diabetes mellitus (V77 1) (Z13 1)   15  Systolic murmur (101 1) (Z56)   16  Type 2 diabetes mellitus (250 00) (E11 9)    Current Meds   1  Atorvastatin Calcium 40 MG Oral Tablet (Lipitor); TAKE 1 TABLET AT BEDTIME; Therapy: 68VEO5749 to (Evaluate:68Fui8270); Last Rx:53Zuv6689 Ordered   2  Clotrimazole-Betamethasone 1-0 05 % External Cream (Lotrisone); APPLY SPARINGLY   TO AFFECTED AREA(S) 3 TIMES A DAY; Therapy: 00ARP2350 to (Evaluate:35Ruj4661); Last Rx:46Bbo1696 Ordered   3  Enalapril Maleate 10 MG Oral Tablet; TAKE 1 TABLET DAILY  Requested for: 59GGU5806;   Last BB:73ONR6129 Ordered   4  FreeStyle InsuLinx Test In Vitro Strip; TEST 4 TIMES DAILY; Therapy: 91UXL6955 to (Evaluate:40Dvc4633)  Requested for: 94BEB8315; Last   Rx:32Awg8142 Ordered   5  Furosemide 40 MG Oral Tablet; TAKE 1 TABLET DAILY AS DIRECTED; Therapy: 46DXZ9533 to (Londell [place]son)  Requested for: 58LSG0150; Last   Rx:46Edq1376 Ordered   6  Lantus 100 UNIT/ML Subcutaneous Solution; INJECT 65 UNITS SUBCUTANEOUSLY   DAILY AS    DIRECTED; Therapy: 51USA3146 to (Evaluate:61Dsk1009)  Requested for: 31AWE3126; Last   Rx:43Aiv3105 Ordered   7  Meloxicam 15 MG Oral Tablet; TAKE ONE tablet(s) DAILY; Therapy: 55NYF1924 to (Evaluate:21Jun2016)  Requested for: 22Apr2016; Last   Rx:22Apr2016 Ordered   8   MetFORMIN HCl - 500 MG Oral Tablet; take 1 tablet by mouth 2 times a day; Therapy: 80DYK5991 to [phone])  Requested for: 76WCY0741; Last   MY:14JDR1853 Ordered   9  Nateglinide 120 MG Oral Tablet (Starlix); TAKE 1/2 TABLET IN THE AM,1/2 TABLET AT   NOON AND 1 TABLET AT BEDTIME; Therapy: 31KZU3454 to (Last Rx:25Mar2016)  Requested for: 25Mar2016 Ordered   10  Omeprazole 20 MG Oral Capsule Delayed Release; TAKE 1 CAPSULE DAILY; Therapy: 05KDG4900 to (Amelia Sin)  Requested for: 75GHE3003; Last    Rx:37Fxh7175; Status: ACTIVE - Retrospective By Protocol Authorization Ordered   11  Percocet  MG Oral Tablet (Oxycodone-Acetaminophen); TAKE 1 TABLET 4 TIMES    DAILY AS NEEDED FOR PAIN;    Therapy: (Recorded:91Vtq0712) to Recorded   12  Toprol XL 25 MG Oral Tablet Extended Release 24 Hour (Metoprolol Succinate ER);    TAKE 1 TABLET DAILY; Therapy: 02REZ6809 to Recorded    Allergies    1  Tramadol    2  No Known Latex Allergies    Discussion/Summary    Pt called to review pre-op weight loss goal  Pre-op goal gxactp=031#  Pt states she weighed 320# this morning  Pt is sticking strictly to the total liquid diet: three protein drinks with 1% milk daily  Instructed pt that she can use the two cups of vegetables to help with c/o hunger  Pt with no other questions at this time        Signatures   Electronically signed by : TAVON Branham RD; Jul 11 2016  1:44PM EST                       (Author)

## 2018-01-13 NOTE — RESULT NOTES
Dear Sarita Martinez,   Your test results have returned and are listed below:      Discussion/Summary  Your results have some minor abnormalities, but nothing that is concerning  Please keep your regularly scheduled follow-up appointment  If you do not have a follow-up scheduled, please call the office to schedule a follow-up visit  If you have any questions, please don't hesitate to call the office  Sincerely,      Signatures   Electronically signed by : HERBERT Quarles; Mar 31 2016  4:21PM EST                       (Author)    Electronically signed by :  JOSLYN Garcia ; Apr 6 2016 11:51AM EST

## 2018-01-14 VITALS
HEIGHT: 65 IN | WEIGHT: 222.06 LBS | TEMPERATURE: 98.5 F | HEART RATE: 84 BPM | RESPIRATION RATE: 20 BRPM | SYSTOLIC BLOOD PRESSURE: 104 MMHG | BODY MASS INDEX: 37 KG/M2 | DIASTOLIC BLOOD PRESSURE: 60 MMHG | OXYGEN SATURATION: 98 %

## 2018-01-14 NOTE — PROGRESS NOTES
Message  Outreach phone call; what happened with today's' appointment? I forgot patient said  Appointment with RD and SW rescheduled for [email or web address]  NV      Active Problems    1  Acute CHF (428 0) (I50 9)   2  Anxiety (300 00) (F41 9)   3  Benign essential hypertension (401 1) (I10)   4  Chronic low back pain (724 2,338 29) (M54 5,G89 29)   5  Edema (782 3) (R60 9)   6  Hyperkalemia (276 7) (E87 5)   7  Hyperlipidemia (272 4) (E78 5)   8  Morbid or severe obesity due to excess calories (278 01) (E66 01)   9  MAUREEN (obstructive sleep apnea) (327 23) (G47 33)   10  Preop examination (V72 84) (Z01 818)   11  Screening for cardiovascular condition (V81 2) (Z13 6)   12  Screening for diabetes mellitus (V77 1) (Z13 1)   13  Systolic murmur (243 1) (A82)   14  Type 2 diabetes mellitus (250 00) (E11 9)    Current Meds   1  Atorvastatin Calcium 40 MG Oral Tablet (Lipitor); TAKE 1 TABLET AT BEDTIME; Therapy: 72ALW7214 to (Evaluate:22Rcg0864); Last Rx:13Mzq9284 Ordered   2  BD Insulin Syringe 30G X 1/2" 0 5 ML Miscellaneous; use as directed; Therapy: 35ZLL9864 to (Evaluate:04Apr2016); Last Rx:05Jan2016 Ordered   3  Clotrimazole-Betamethasone 1-0 05 % External Cream (Lotrisone); APPLY SPARINGLY   TO AFFECTED AREA(S) 3 TIMES A DAY; Therapy: 58ECI7444 to (Evaluate:45Qtq0968); Last Rx:72Ygx9269 Ordered   4  Enalapril Maleate 10 MG Oral Tablet; TAKE 1 TABLET DAILY  Requested for: 64HIM1869; Last Rx:53Qlw5594 Ordered   5  FreeStyle InsuLinx Test In Vitro Strip; TEST 4 TIMES DAILY; Therapy: 64TNS6996 to (Evaluate:10Aug2016); Last Rx:31Vuf1647 Ordered   6  Furosemide 40 MG Oral Tablet; TAKE 1 TABLET DAILY AS DIRECTED; Therapy: 08UDH3417 to (Cyrus Bear)  Requested for: 30YIA5554; Last   Rx:30Sep2015 Ordered   7  Lantus 100 UNIT/ML Subcutaneous Solution; INJECT 65 UNITS SUBCUTANEOUSLY   DAILY AS    DIRECTED; Therapy: 34YJJ7627 to (Nano Recio)  Requested for: 63EAJ1219; Last   Rx:04Mar2016 Ordered   8  Meloxicam 15 MG Oral Tablet; TAKE ONE tablet(s) DAILY; Therapy: 63BGZ1383 to (Evaluate:24Mar2016)  Requested for: 65Tcg4070; Last   Rx:86Iex8014 Ordered   9  MetFORMIN HCl - 500 MG Oral Tablet; take 1 tablet by mouth 2 times a day; Therapy: 59QXZ4106 to (Evaluate:18Ifo5961)  Requested for: 28Mar2016; Last   Rx:28Mar2016 Ordered   10  Nateglinide 120 MG Oral Tablet (Starlix); TAKE 1/2 TABLET IN THE AM,1/2 TABLET AT    NOON AND 1 TABLET AT BEDTIME; Therapy: 74QKM2926 to (Last Rx:25Mar2016)  Requested for: 25Mar2016 Ordered   11  OneTouch Ultra Blue In Vitro Strip; TEST 4 TIMES DAILY; Therapy: 86UEI9214 to (Evaluate:73Fqn7646); Last Rx:46Kug8254 Ordered   12  Percocet  MG Oral Tablet (Oxycodone-Acetaminophen); TAKE 1 TABLET 4 TIMES    DAILY AS NEEDED FOR PAIN;    Therapy: (Recorded:01Azt3767) to Recorded   13  Toprol XL 25 MG Oral Tablet Extended Release 24 Hour (Metoprolol Succinate ER);    TAKE 1 TABLET DAILY; Therapy: 43ZUF5199 to Recorded    Allergies    1  Tramadol    2   No Known Latex Allergies    Signatures   Electronically signed by : ASHLYN Betancourt; Apr 15 2016  2:43PM EST                       (Author)

## 2018-01-14 NOTE — MISCELLANEOUS
Message  patient called requesting lab slip prior to her visit  Advised her on new policy to give the lab slip at her visit  I explained rationale  Patient verbalized understanding  She plans to see me at her visit this week  CR      Signatures   Electronically signed by : ОЛЕГ Aguilar; Jul 10 2017 10:29AM EST                       (Author)

## 2018-01-14 NOTE — PROGRESS NOTES
Discussion/Summary  Discussion Summary:   Assess: 1# wt loss from previous visit  Net 4 9# total wt loss  Pt needs additional 30 1# wt loss yet prior to surgery  No changes in Dx or Rx noted  Pt states she has been consistently measuring all foods and journaling all foods and beverages in [employer] with a calorie goal of 1600 kcals per day  Pt is disappointed in lack of weight loss  Pt is still mostly sedentary, cannot walk long distanced due to back pain  Pt would like to go to the [facility] to swim with her friend  Diagnose: Overweight/Obesity related to positive energy balance as evidenced by BMI >30 and pt's self-reported energy intake  (Continued)  Intervene: Reviewed [employer] food journals and how to use the armando with pt  Adjusted pt's calorie/macronutrient goals for pt  Discussed partial liquid diet and protein supplement recommendations  Provided pt with samples of protein powders to try  Pt will drink a protein drink for two meals per day and eat one 400 to 500 calorie meal  Provided pt with 5 days of sample 1200 calorie menus to use as guides for her meal  Pt also c/o some leg pains when lying down at night  Provided pt with lists of foods with potassium and magnesium to try incorporating more of  Reviewed workflow with pt: Pt needs PCP letter and additional 30# wt loss  Pt verbalized understanding, no further questions at present, expect good compliance  Monitor/Evaluate: Will monitor food journals, weight, pt's reported compliance with goals at next appointment  Follow-up scheduled for: Friday, 5/13/16 at 11:00am       Active Problems    1  Acute CHF (428 0) (I50 9)   2  Anxiety (300 00) (F41 9)   3  Benign essential hypertension (401 1) (I10)   4  Chronic low back pain (724 2,338 29) (M54 5,G89 29)   5  Edema (782 3) (R60 9)   6  Hyperkalemia (276 7) (E87 5)   7  Hyperlipidemia (272 4) (E78 5)   8  Morbid or severe obesity due to excess calories (278 01) (E66 01)   9   MAUREEN (obstructive sleep apnea) (327 23) (G47 33)   10  Preop examination (V72 84) (Z01 818)   11  Screening for cardiovascular condition (V81 2) (Z13 6)   12  Screening for diabetes mellitus (V77 1) (Z13 1)   13  Systolic murmur (978 2) (P18)   14  Type 2 diabetes mellitus (250 00) (E11 9)    Past Medical History    1  History of spinal stenosis (V13 59) (Z87 39)    Surgical History    1  History of Abdominal Surgery   2  History of  Section   3  History of Tubal Ligation    Family History  Mother    1  Family history of Leukemia (V16 6)  Father    2  Family history of Coronary Artery Disease (V17 49)   3  Family history of Diabetes Mellitus (V18 0)    Social History    · Former smoker (V78 27) (X33 485)   · Never Drank Alcohol   · Never Used Drugs    Current Meds   1  Atorvastatin Calcium 40 MG Oral Tablet; TAKE 1 TABLET AT BEDTIME; Therapy: 23THJ2525 to (Evaluate:92Rwz3973); Last Rx:76Kdp2615 Ordered   2  BD Insulin Syringe 30G X 1/2" 0 5 ML Miscellaneous; use as directed; Therapy: 77NKQ9856 to (Evaluate:2016); Last Rx:2016 Ordered   3  Clotrimazole-Betamethasone 1-0 05 % External Cream; APPLY SPARINGLY TO   AFFECTED AREA(S) 3 TIMES A DAY; Therapy: 95TLW1702 to (Evaluate:38Jpe0398); Last Rx:19Iwo5463 Ordered   4  Enalapril Maleate 10 MG Oral Tablet; TAKE 1 TABLET DAILY  Requested for: 24TKM6413; Last Rx:99Egc6051 Ordered   5  FreeStyle InsuLinx Test In Vitro Strip; TEST 4 TIMES DAILY; Therapy: 46OCH0431 to (Evaluate:72Kvl7122); Last Rx:06Jqx8360 Ordered   6  Furosemide 40 MG Oral Tablet; TAKE 1 TABLET DAILY AS DIRECTED; Therapy: 34KMH6316 to (Elizabeth Mahajan)  Requested for: 79HTD5716; Last   Rx:39Atz9223 Ordered   7  Lantus 100 UNIT/ML Subcutaneous Solution; INJECT 65 UNITS SUBCUTANEOUSLY   DAILY AS    DIRECTED; Therapy: 09ZBC0977 to (Frankey Greaves)  Requested for: 39AJJ5779; Last   Rx:2016 Ordered   8  Meloxicam 15 MG Oral Tablet; TAKE ONE tablet(s) DAILY;    Therapy: 33RRG6699 to (Evaluate:21Jun2016)  Requested for: 22Apr2016; Last   Rx:22Apr2016 Ordered   9  MetFORMIN HCl - 500 MG Oral Tablet; take 1 tablet by mouth 2 times a day; Therapy: 64RZE2797 to (Evaluate:23Dec2016)  Requested for: 28Mar2016; Last   Rx:28Mar2016 Ordered   10  Nateglinide 120 MG Oral Tablet; TAKE 1/2 TABLET IN THE AM,1/2 TABLET AT NOON AND    1 TABLET AT BEDTIME; Therapy: 21SWN5763 to (Last Rx:25Mar2016)  Requested for: 25Mar2016 Ordered   11  OneTouch Ultra Blue In Vitro Strip; TEST 4 TIMES DAILY; Therapy: 03XMX2158 to (Evaluate:82Fmq6885); Last Rx:22Dec2015 Ordered   12  Percocet  MG Oral Tablet; TAKE 1 TABLET 4 TIMES DAILY AS NEEDED FOR PAIN;    Therapy: (Recorded:45Fkv0568) to Recorded   13  Toprol XL 25 MG Oral Tablet Extended Release 24 Hour; TAKE 1 TABLET DAILY; Therapy: 03MAV0399 to Recorded    Allergies    1  Tramadol    2  No Known Latex Allergies    Vitals  Signs [Data Includes: Current Encounter]   Recorded: 28Apr2016 04:32PM   Height: 5 ft 5 in  Weight: 348 lb 1 6 oz  BMI Calculated: 57 93  BSA Calculated: 2 5    Signatures   Electronically signed by : TAVON Parr RD; Apr 28 2016  4:32PM EST                       (Author)    Electronically signed by :  JOSLYN Hayward ; May  5 2016  1:23PM EST

## 2018-01-14 NOTE — PROGRESS NOTES
Discussion/Summary  Discussion Summary:   Assess: 7 2# wt loss since previous appointment  Net 12  1# wt loss  5 4# wt loss needed yet to get intermediate down for scheduling, 22 9# total wt loss needed yet prior to surgery  Pt states her insulin has decreased from 35 units per day down to 18 units per day  Pt spoke to her PCP who told her to adjust her insulin herself based on her blood sugar numbers  Pt states he blood sugars have been steady  Pt is using her hand pedlar machine daily  Pt is tracking all foods and beverages [facility]  Pt is drinknig two protein drinks per day and eating 1-2 meals per day  Pt has eliminated all soda, candy, pastries, desserts  Diagnose: Overweight/Obesity related to positive energy balance as evidenced by BMI >30 and pt's self-reported energy intake  (Continued-Improving)  Intervene: Reviewed pt's food journals and discussed macronutrient composition and goals  Discussed calorie density as well as nutrient density of foods  Discussed gradual increases in physical activity  Discussed other lean protein options to try  Goals: Pt will continue to journal all foods and beverages consumed  Pt will continue 2 protein drinks and 1-2 meals of lean protein and vegetables  Pt will begin to watch podcasts in [place]  Materials provided: Provided ot with post-operative nutrient goals sheet  Reviewed workflow with pt: Pt needs note from PCP (States she has an appointment this upcoming week), and 23# wt loss  Pt verbalized understanding, no further questions at present, expect good compliance  Monitor/Evaluate: Will monitor food journals, weight, pt's reported compliance with goals at next appointment  Follow-up scheduled for: Thursday, 6/2/16 at 11:00am       Active Problems    1  Acute CHF (428 0) (I50 9)   2  Anxiety (300 00) (F41 9)   3  Benign essential hypertension (401 1) (I10)   4  Chronic low back pain (724 2,338 29) (M54 5,G89 29)   5  Edema (782 3) (R60 9)   6  Hyperkalemia (276 7) (E87 5)   7  Hyperlipidemia (272 4) (E78 5)   8  Morbid or severe obesity due to excess calories (278 01) (E66 01)   9  MAUREEN (obstructive sleep apnea) (327 23) (G47 33)   10  Preop examination (V72 84) (Z01 818)   11  Screening for cardiovascular condition (V81 2) (Z13 6)   12  Screening for diabetes mellitus (V77 1) (Z13 1)   13  Systolic murmur (641 7) (S59)   14  Type 2 diabetes mellitus (250 00) (E11 9)    Past Medical History    1  History of spinal stenosis (V13 59) (Z87 39)    Surgical History    1  History of Abdominal Surgery   2  History of  Section   3  History of Tubal Ligation    Family History  Mother    1  Family history of Leukemia (V16 6)  Father    2  Family history of Coronary Artery Disease (V17 49)   3  Family history of Diabetes Mellitus (V18 0)    Social History    · Former smoker (P77 75) (H08 782)   · Never Drank Alcohol   · Never Used Drugs    Current Meds   1  Atorvastatin Calcium 40 MG Oral Tablet; TAKE 1 TABLET AT BEDTIME; Therapy: 94FIV3230 to (Evaluate:67Jnb5736); Last Rx:52Ual3216 Ordered   2  BD Insulin Syringe 30G X 1/2" 0 5 ML Miscellaneous; use as directed; Therapy: 60WQR9730 to (Evaluate:2016); Last Rx:2016 Ordered   3  Clotrimazole-Betamethasone 1-0 05 % External Cream; APPLY SPARINGLY TO   AFFECTED AREA(S) 3 TIMES A DAY; Therapy: 26LQU1156 to (Evaluate:66Ixd1852); Last Rx:41Skj6956 Ordered   4  Enalapril Maleate 10 MG Oral Tablet; TAKE 1 TABLET DAILY  Requested for: 26ERP4928;   Last CP:31FXV5725 Ordered   5  FreeStyle InsuLinx Test In Vitro Strip; TEST 4 TIMES DAILY; Therapy: 72EJU0168 to (Evaluate:2016); Last Rx:82Zir0122 Ordered   6  Furosemide 40 MG Oral Tablet; TAKE 1 TABLET DAILY AS DIRECTED; Therapy: 69SFS2891 to (Lucia Prajapati)  Requested for: 71RFS4853; Last   Rx:82Uli5368 Ordered   7   Lantus 100 UNIT/ML Subcutaneous Solution; INJECT 65 UNITS SUBCUTANEOUSLY   DAILY AS    DIRECTED; Therapy: 65XXM2518 to (Kayla Martinez)  Requested for: 78LDR2613; Last   Rx:04Mar2016 Ordered   8  Meloxicam 15 MG Oral Tablet; TAKE ONE tablet(s) DAILY; Therapy: 49GVC2376 to (Evaluate:21Jun2016)  Requested for: 22Apr2016; Last   Rx:22Apr2016 Ordered   9  MetFORMIN HCl - 500 MG Oral Tablet; take 1 tablet by mouth 2 times a day; Therapy: 93FVO6423 to [phone])  Requested for: 46RBB3429; Last   :39WLV8911 Ordered   10  Nateglinide 120 MG Oral Tablet; TAKE 1/2 TABLET IN THE AM,1/2 TABLET AT NOON AND    1 TABLET AT BEDTIME; Therapy: 59IIG3708 to (Last Rx:25Mar2016)  Requested for: 25Mar2016 Ordered   11  OneTouch Ultra Blue In Vitro Strip; TEST 4 TIMES DAILY; Therapy: 63CAR0910 to (Evaluate:19Wce7796); Last Rx:78Ntf0036 Ordered   12  Percocet  MG Oral Tablet; TAKE 1 TABLET 4 TIMES DAILY AS NEEDED FOR PAIN;    Therapy: (Recorded:79Zfx4084) to Recorded   13  Toprol XL 25 MG Oral Tablet Extended Release 24 Hour; TAKE 1 TABLET DAILY; Therapy: 06UIK1353 to Recorded    Allergies    1  Tramadol    2  No Known Latex Allergies    Vitals  Signs [Data Includes: Current Encounter]   Recorded: 68OIZ2315 12:04PM   Weight: 340 lb 14 4 oz  BMI Calculated: 56 73  BSA Calculated: 2 48    Future Appointments    Date/Time Provider Specialty Site   05/25/2016 10:30 AM JOSLYN Bonilla  [facility]     Signatures   Electronically signed by : TAVON Briscoe RD; May 13 2016 12:05PM EST                       (Author)    Electronically signed by :  JOSLYN Pace ; May 19 2016  1:34PM EST

## 2018-01-14 NOTE — MISCELLANEOUS
Message  7/22/2016 @ 1200 - post op follow up phone call completed  Pt is sipping liquids, using IS as instructed, reinforced importance of using IS to help prevent pneumonia  Ambulating about home without difficulty  Minimal pain, not using Percocet  Reaffirmed examples of liquid diet over the next week  Pt stated understanding about discharge instructions and medication adjustments  Pt educated on [address] [facility]  Follow up appt with surgeon scheduled for next week  Instructed to call with any additional questions or concerns  Active Problems    1  Acute CHF (428 0) (I50 9)   2  Anxiety (300 00) (F41 9)   3  Benign essential hypertension (401 1) (I10)   4  BMI 50 0-59 9, adult (V85 43) (Z68 43)   5  Chronic low back pain (724 2,338 29) (M54 5,G89 29)   6  Edema (782 3) (R60 9)   7  Hyperkalemia (276 7) (E87 5)   8  Hyperlipidemia (272 4) (E78 5)   9  Morbid or severe obesity due to excess calories (278 01) (E66 01)   10  Obesity (278 00) (E66 9)   11  MAUREEN (obstructive sleep apnea) (327 23) (G47 33)   12  Preop examination (V72 84) (Z01 818)   13  Screening for cardiovascular condition (V81 2) (Z13 6)   14  Screening for diabetes mellitus (V77 1) (Z13 1)   15  Systolic murmur (671 5) (O91)   16  Type 2 diabetes mellitus (250 00) (E11 9)    Current Meds   1  Atorvastatin Calcium 40 MG Oral Tablet (Lipitor); TAKE 1 TABLET AT BEDTIME; Therapy: 56MOY0997 to (Evaluate:89Zro6645); Last Rx:12Xcc1472 Ordered   2  Clotrimazole-Betamethasone 1-0 05 % External Cream (Lotrisone); APPLY SPARINGLY   TO AFFECTED AREA(S) 3 TIMES A DAY; Therapy: 16YGB6869 to (Evaluate:82Scp8279); Last Rx:03Vuz9389 Ordered   3  Enalapril Maleate 10 MG Oral Tablet; TAKE 1 TABLET DAILY  Requested for: 60GBG8012;   Last QS:33DHU3766 Ordered   4  FreeStyle InsuLinx Test In Vitro Strip; TEST 4 TIMES DAILY; Therapy: 98QNL6546 to (Evaluate:16Nov2016)  Requested for: 96MWU5947; Last   Rx:66Cup1544 Ordered   5   Furosemide 40 MG Oral Tablet; TAKE 1 TABLET DAILY AS DIRECTED; Therapy: 26SUQ7023 to (Dierdre Moots)  Requested for: 34ZGB6865; Last   Rx:71Bty9833 Ordered   6  Lantus 100 UNIT/ML Subcutaneous Solution; INJECT 65 UNITS SUBCUTANEOUSLY   DAILY AS    DIRECTED; Therapy: 62VJK8917 to (Evaluate:52Cat8509)  Requested for: 10EPE8380; Last   Rx:01Nmg5791 Ordered   7  Meloxicam 15 MG Oral Tablet; TAKE ONE tablet(s) DAILY; Therapy: 45SET1349 to (Evaluate:21Jun2016)  Requested for: 22Apr2016; Last   Rx:31Tcn3755 Ordered   8  MetFORMIN HCl - 500 MG Oral Tablet; take 1 tablet by mouth 2 times a day; Therapy: 09QCE5343 to [phone])  Requested for: 45ADC2580; Last   QP:17GEX6062 Ordered   9  Nateglinide 120 MG Oral Tablet (Starlix); TAKE 1/2 TABLET IN THE AM,1/2 TABLET AT   NOON AND 1 TABLET AT BEDTIME; Therapy: 38HTM8939 to (Last Rx:25Mar2016)  Requested for: 25Mar2016 Ordered   10  Omeprazole 20 MG Oral Capsule Delayed Release; TAKE 1 CAPSULE DAILY; Therapy: 53VCQ7189 to (Namita Carty)  Requested for: 21NEY1123; Last    Rx:27Vvp2911 Ordered   11  Percocet  MG Oral Tablet (Oxycodone-Acetaminophen); TAKE 1 TABLET 4 TIMES    DAILY AS NEEDED FOR PAIN;    Therapy: (Recorded:75Wup2575) to Recorded   12  Toprol XL 25 MG Oral Tablet Extended Release 24 Hour (Metoprolol Succinate ER);    TAKE 1 TABLET DAILY; Therapy: 56BMU0590 to Recorded    Allergies    1  Tramadol    2   No Known Latex Allergies    Signatures   Electronically signed by : Marlyse Romberg, ; Jul 22 2016 12:08PM EST                       (Author)

## 2018-01-15 NOTE — PROGRESS NOTES
Discussion/Summary  Discussion Summary:   Assess: 4 5# wt loss from previous visit x 2 weeks  Net total 16 5# wt loss since start of program  Pt needs additional 18 4# wt loss yet prior to surgery  Pt reports her insulin dosage has again been decreased to a total of 10 units daily  Pt reports her blood sugars have been consistently in the 120's  Pt is using [employer] smartphone [employer] to journal all foods and beverages eaten daily  Pt is also regain nutrition facts labels and nutrition information for favorite restaurants prior to ordering  Pt has eliminated most refined carbohydrates and increase fruits and vegetables  Pt typically drinks a protein drink for breakfast and eats a lunch and inner with lean proteins and vegetables  Diagnose: Overweight/Obesity related to positive energy balance as evidenced by BMI >30 and pt's self-reported energy intake  (Continued-Improving)  Intervene: Answered pre-operative questions for pt regarding protein supplements, vitamins, and post-op diet  Reviewed pt's food logs and macronutrient intake with pt  Also discussed the importance of regular physical activity and resistance training  Goals: 18 5# wt loss prior to surgery  Continue to food journal and read nutrition labels daily  Increase activity and resistance training  Pt verbalized understanding, no further questions at present, expect good compliance  Monitor/Evaluate: Will monitor food journals, weight, pt's reported compliance with goals at next appointment  Pt is scheduled for surgery  Pt will call if future questions/concerns arise  Active Problems    1  Acute CHF (428 0) (I50 9)   2  Anxiety (300 00) (F41 9)   3  Benign essential hypertension (401 1) (I10)   4  BMI 50 0-59 9, adult (V85 43) (Z68 43)   5  Chronic low back pain (724 2,338 29) (M54 5,G89 29)   6  Edema (782 3) (R60 9)   7  Hyperkalemia (276 7) (E87 5)   8  Hyperlipidemia (272 4) (E78 5)   9   Morbid or severe obesity due to excess calories (278 01) (E66 01)   10  MAUREEN (obstructive sleep apnea) (327 23) (G47 33)   11  Preop examination (V72 84) (Z01 818)   12  Screening for cardiovascular condition (V81 2) (Z13 6)   13  Screening for diabetes mellitus (V77 1) (Z13 1)   14  Systolic murmur (153 2) (S40)   15  Type 2 diabetes mellitus (250 00) (E11 9)    Past Medical History    1  History of spinal stenosis (V13 59) (Z87 39)    Surgical History    1  History of Abdominal Surgery   2  History of  Section   3  History of Tubal Ligation    Family History  Mother    1  Family history of Leukemia (V16 6)  Father    2  Family history of Coronary Artery Disease (V17 49)   3  Family history of Diabetes Mellitus (V18 0)    Social History    · Former smoker (P23 50) (N95 737)   · Never Drank Alcohol   · Never Used Drugs    Current Meds   1  Atorvastatin Calcium 40 MG Oral Tablet; TAKE 1 TABLET AT BEDTIME; Therapy: 75QKG9147 to (Evaluate:16Zqn9317); Last Rx:84Aaf7803 Ordered   2  Clotrimazole-Betamethasone 1-0 05 % External Cream; APPLY SPARINGLY TO   AFFECTED AREA(S) 3 TIMES A DAY; Therapy: 43NGC9007 to (Evaluate:16Tha4770); Last Rx:60Tjd6693 Ordered   3  Enalapril Maleate 10 MG Oral Tablet; TAKE 1 TABLET DAILY  Requested for: 31KRW7197;   Last DF:44ZXR1065 Ordered   4  FreeStyle InsuLinx Test In Vitro Strip; TEST 4 TIMES DAILY; Therapy: 45EUF1182 to (Evaluate:2016)  Requested for: 99EXC9899; Last   Rx:38Wso0025 Ordered   5  Furosemide 40 MG Oral Tablet; TAKE 1 TABLET DAILY AS DIRECTED; Therapy: 29RMX2863 to (Lucas Gandhi)  Requested for: 11LOF0752; Last   Rx:20Fhb7439 Ordered   6  Lantus 100 UNIT/ML Subcutaneous Solution; INJECT 65 UNITS SUBCUTANEOUSLY   DAILY AS    DIRECTED; Therapy: 46ONV7913 to (Evaluate:88Ndj9857)  Requested for: 83PKB6161; Last   Rx:61Tqk1578 Ordered   7  Meloxicam 15 MG Oral Tablet; TAKE ONE tablet(s) DAILY; Therapy: 91CRT0292 to (Evaluate:2016)  Requested for: 2016; Last   Rx:2016 Ordered   8  MetFORMIN HCl - 500 MG Oral Tablet; take 1 tablet by mouth 2 times a day; Therapy: 95EEX2099 to )  Requested for: 36WEU8170; Last   BM:91MFN4575 Ordered   9  Nateglinide 120 MG Oral Tablet; TAKE 1/2 TABLET IN THE AM,1/2 TABLET AT NOON AND   1 TABLET AT BEDTIME; Therapy: 81WWJ5223 to (Last Rx:25Mar2016)  Requested for: 25Mar2016 Ordered   10  Percocet  MG Oral Tablet; TAKE 1 TABLET 4 TIMES DAILY AS NEEDED FOR PAIN;    Therapy: (Recorded:57Eeo2592) to Recorded   11  Toprol XL 25 MG Oral Tablet Extended Release 24 Hour; TAKE 1 TABLET DAILY; Therapy: 95PKF1678 to Recorded    Allergies    1  Tramadol    2  No Known Latex Allergies    Vitals  Signs [Data Includes: Current Encounter]   Recorded: 02Jun2016 12:33PM   Height: 5 ft 5 in  Weight: 336 lb 4 oz  BMI Calculated: 55 96  BSA Calculated: 2 47    Future Appointments    Date/Time Provider Specialty Site   06/30/2016 01:45 PM JOSLYN Contreras  [facility]   07/28/2016 09:00 AM JOSLYN Contreras  [facility]     Signatures   Electronically signed by : TAVON Upton RD; Jun 2 2016 12:33PM EST                       (Author)    Electronically signed by :  JOSLYN Villanueva ; Kv[place]  3 2016 11:16AM EST

## 2018-01-15 NOTE — MISCELLANEOUS
Message  patient called to ask about taking vimovo (which is Naproxen and pepcid combination and is ER) Advised that since it is extended release and contains NSAID I would recommend that she avoid this medication  She is taking percocet twice daily  Advised that she should not drive when taking this  She has tried alternating tylenol with the percocet but notes she is not having improvement with her arthritis pain  She is seeing Rheumatologist later this week  I encouraged her to review her arthritis pain with him/her  Advised ideally she should avoid ER medication as absorption may be effected after bypass surgery and preferrably she should not use NSAID  Advised occasional use of NSAID while taking her ppi twice daily would be ok, but wouldn't advise this daily with risk of developing an ulcer   CR      Signatures   Electronically signed by : ОЛЕГ Patiño; Oct 24 2016 10:52AM EST                       (Author)

## 2018-01-16 NOTE — PROGRESS NOTES
Active Problems    1  Acute CHF (428 0) (I50 9)   2  Anxiety (300 00) (F41 9)   3  Benign essential hypertension (401 1) (I10)   4  Chronic low back pain (724 2,338 29) (M54 5,G89 29)   5  Edema (782 3) (R60 9)   6  Hyperkalemia (276 7) (E87 5)   7  Hyperlipidemia (272 4) (E78 5)   8  Morbid or severe obesity due to excess calories (278 01) (E66 01)   9  MAUREEN (obstructive sleep apnea) (327 23) (G47 33)   10  Preop examination (V72 84) (Z01 818)   11  Screening for cardiovascular condition (V81 2) (Z13 6)   12  Screening for diabetes mellitus (V77 1) (Z13 1)   13  Systolic murmur (453 4) (L95)   14  Type 2 diabetes mellitus (250 00) (E11 9)    Past Medical History    1  History of spinal stenosis (V13 59) (Z87 39)    Surgical History    1  History of Abdominal Surgery   2  History of  Section   3  History of Tubal Ligation    Family History    1  Family history of Leukemia (V16 6)    2  Family history of Coronary Artery Disease (V17 49)   3  Family history of Diabetes Mellitus (V18 0)    Social History    · Former smoker (A03 89) (M38 348)   · Never Drank Alcohol   · Never Used Drugs    Current Meds   1  Atorvastatin Calcium 40 MG Oral Tablet (Lipitor); TAKE 1 TABLET AT BEDTIME; Therapy: 73PCM2080 to (Evaluate:75Ost0600); Last Rx:51Msf6967 Ordered   2  BD Insulin Syringe 30G X 1/2" 0 5 ML Miscellaneous; use as directed; Therapy: 20TLE6466 to (Evaluate:20Qkk0415); Last Rx:2016 Ordered   3  Clotrimazole-Betamethasone 1-0 05 % External Cream (Lotrisone); APPLY SPARINGLY   TO AFFECTED AREA(S) 3 TIMES A DAY; Therapy: 49VVE5696 to (Evaluate:86Qvc0149); Last Rx:95Lgl3051 Ordered   4  Enalapril Maleate 10 MG Oral Tablet; TAKE 1 TABLET DAILY  Requested for: 02EOZ1157; Last Rx:43Jqw9353 Ordered   5  FreeStyle InsuLinx Test In Vitro Strip; TEST 4 TIMES DAILY; Therapy: 68RDD5888 to (Evaluate:2016); Last Rx:23Dfb8012 Ordered   6   Furosemide 40 MG Oral Tablet; TAKE 1 TABLET DAILY AS DIRECTED; Therapy: 94AZI5973 to (Edgar Cookie)  Requested for: 79GHY5042; Last   Rx:51Dxh4124 Ordered   7  Lantus 100 UNIT/ML Subcutaneous Solution; INJECT 65 UNITS SUBCUTANEOUSLY   DAILY AS    DIRECTED; Therapy: 49AEX6765 to ([place])  Requested for: 37UKF5093; Last   Rx:04Mar2016 Ordered   8  Meloxicam 15 MG Oral Tablet; TAKE ONE tablet(s) DAILY; Therapy: 64SUK8312 to (Evaluate:24Mar2016)  Requested for: 48Ivl2262; Last   Rx:95Bef7988 Ordered   9  MetFORMIN HCl - 500 MG Oral Tablet; take 1 tablet by mouth 2 times a day; Therapy: 75FUQ4771 to (Last Rx:89Ghf9373)  Requested for: 02Ltm0346 Ordered   10  Nateglinide 120 MG Oral Tablet (Starlix); TAKE 1/2 TABLET IN THE AM,1/2 TABLET AT    NOON AND 1 TABLET AT BEDTIME; Therapy: 23HLM5981 to (Last Rx:22Gil6798)  Requested for: 74Nhm4809 Ordered   11  OneTouch Ultra Blue In Vitro Strip; TEST 4 TIMES DAILY; Therapy: 80CEO4722 to (Evaluate:33Vaw8875); Last Rx:48Sji4212 Ordered   12  Percocet  MG Oral Tablet (Oxycodone-Acetaminophen); TAKE 1 TABLET 4 TIMES    DAILY AS NEEDED FOR PAIN;    Therapy: (Recorded:44Zyd4460) to Recorded   13  Toprol XL 25 MG Oral Tablet Extended Release 24 Hour (Metoprolol Succinate ER); TAKE    1 TABLET DAILY; Therapy: 98UAE3655 to Recorded    Allergies    1  Tramadol    2  No Known Latex Allergies     Note   Note:   Met with patient and discussed the following; 4 deaths since Dec  2015 including a nephew( drug over dose), and patient managed to maintin close to same weight alothough she did gain 2lbs   She was congratulated for making positive choices during this period in her life  Emphasized the importance of movement whatever ability she can and get back on track with dietary recommendations provided by MARYCHUY GONZALEZ        Signatures   Electronically signed by : ASHLYN Lim; Mar 25 2016 11:18AM EST                       (Author)    Electronically signed by :  JOSLYN Arthur ; Apr 6 2016 11:51AM EST

## 2018-01-16 NOTE — PROGRESS NOTES
Active Problems    1  Acute CHF (428 0) (I50 9)   2  Anxiety (300 00) (F41 9)   3  Benign essential hypertension (401 1) (I10)   4  BMI 50 0-59 9, adult (V85 43) (Z68 43)   5  Chronic low back pain (724 2,338 29) (M54 5,G89 29)   6  Edema (782 3) (R60 9)   7  Hyperkalemia (276 7) (E87 5)   8  Hyperlipidemia (272 4) (E78 5)   9  Morbid or severe obesity due to excess calories (278 01) (E66 01)   10  MAUREEN (obstructive sleep apnea) (327 23) (G47 33)   11  Preop examination (V72 84) (Z01 818)   12  Screening for cardiovascular condition (V81 2) (Z13 6)   13  Screening for diabetes mellitus (V77 1) (Z13 1)   14  Systolic murmur (074 7) (D04)   15  Type 2 diabetes mellitus (250 00) (E11 9)    Past Medical History    1  History of spinal stenosis (V13 59) (Z87 39)    Surgical History    1  History of Abdominal Surgery   2  History of  Section   3  History of Tubal Ligation    Family History  Mother    1  Family history of Leukemia (V16 6)  Father    2  Family history of Coronary Artery Disease (V17 49)   3  Family history of Diabetes Mellitus (V18 0)    Social History    · Former smoker (S28 28) (N85 702)   · Never Drank Alcohol   · Never Used Drugs    Current Meds   1  Atorvastatin Calcium 40 MG Oral Tablet (Lipitor); TAKE 1 TABLET AT BEDTIME; Therapy: 77KCL1664 to (Evaluate:79Eih7388); Last Rx:45Dup1668 Ordered   2  Clotrimazole-Betamethasone 1-0 05 % External Cream (Lotrisone); APPLY SPARINGLY   TO AFFECTED AREA(S) 3 TIMES A DAY; Therapy: 22NDX1310 to (Evaluate:89Vsp5279); Last Rx:21Vxi6453 Ordered   3  Enalapril Maleate 10 MG Oral Tablet; TAKE 1 TABLET DAILY  Requested for: 71FMJ3026;   Last FB:27ZRU6228 Ordered   4  FreeStyle InsuLinx Test In Vitro Strip; TEST 4 TIMES DAILY; Therapy: 50BJS3583 to (Evaluate:2016)  Requested for: 05TQY8410; Last   Rx:60Rrj5295 Ordered   5  Furosemide 40 MG Oral Tablet; TAKE 1 TABLET DAILY AS DIRECTED;    Therapy: 62DOU3440 to (Evaluate:2016)  Requested for: 21RKB1670; Last   Rx:29Eqa7235 Ordered   6  Lantus 100 UNIT/ML Subcutaneous Solution; INJECT 65 UNITS SUBCUTANEOUSLY   DAILY AS    DIRECTED; Therapy: 58NAX1056 to (Evaluate:96Zzu2790)  Requested for: 12MDL8538; Last   Rx:15Egy0066 Ordered   7  Meloxicam 15 MG Oral Tablet; TAKE ONE tablet(s) DAILY; Therapy: 41DMT4991 to (Evaluate:21Jun2016)  Requested for: 52Vwe4225; Last   Rx:35Cii9466 Ordered   8  MetFORMIN HCl - 500 MG Oral Tablet; take 1 tablet by mouth 2 times a day; Therapy: 09QVT2931 to [phone])  Requested for: 39KED8536; Last   SX:30SMV3655 Ordered   9  Nateglinide 120 MG Oral Tablet (Starlix); TAKE 1/2 TABLET IN THE AM,1/2 TABLET AT   NOON AND 1 TABLET AT BEDTIME; Therapy: 73JYD1529 to (Last Rx:25Mar2016)  Requested for: 25Mar2016 Ordered   10  Percocet  MG Oral Tablet (Oxycodone-Acetaminophen); TAKE 1 TABLET 4 TIMES    DAILY AS NEEDED FOR PAIN;    Therapy: (Recorded:42Iag8712) to Recorded   11  Toprol XL 25 MG Oral Tablet Extended Release 24 Hour (Metoprolol Succinate ER); TAKE    1 TABLET DAILY; Therapy: 96PAT1102 to Recorded    Allergies    1  Tramadol    2  No Known Latex Allergies    Vitals  Signs [Data Includes: Current Encounter]   Recorded: 02Jun2016 10:20AM   Height: 5 ft 5 in  Weight: 336 lb 4 oz  BMI Calculated: 55 96  BSA Calculated: 2 47     Note   Note:   Patient losing wait making dietary and behavioral changes  She will meet with insurance pre-cert to discuss surgery date  [place]      Signatures   Electronically signed by : ASHLYN Chase; Jun 2 2016 10:32AM EST                       (Author)    Electronically signed by :  JOSLYN Acevedo ; Jun 2 2016 11:36AM EST

## 2018-01-16 NOTE — MISCELLANEOUS
Message  Pt called office today because she is retaining fluid and her feet are very swollen  She was on a "water pill" prior to surgery and they had her stop it  She reports the name as Furosemide and says it is 40mgs  I spoke to Dr Tarun Bains and he said she should go back on it and start with 20mgs  He wants her to follow up with her PCP  She has her post op scheduled in our office tomorrow  Advised pt on what Dr Tarun Bains said and she said she would  She also mentioned she had other concerns and but she would address those with him tomorrow  Active Problems    1  Acute CHF (428 0) (I50 9)   2  Anxiety (300 00) (F41 9)   3  Benign essential hypertension (401 1) (I10)   4  BMI 50 0-59 9, adult (V85 43) (Z68 43)   5  Chronic low back pain (724 2,338 29) (M54 5,G89 29)   6  Edema (782 3) (R60 9)   7  Hyperkalemia (276 7) (E87 5)   8  Hyperlipidemia (272 4) (E78 5)   9  Morbid or severe obesity due to excess calories (278 01) (E66 01)   10  Obesity (278 00) (E66 9)   11  MAUREEN (obstructive sleep apnea) (327 23) (G47 33)   12  Preop examination (V72 84) (Z01 818)   13  Screening for cardiovascular condition (V81 2) (Z13 6)   14  Screening for diabetes mellitus (V77 1) (Z13 1)   15  Systolic murmur (098 2) (K29)   16  Type 2 diabetes mellitus (250 00) (E11 9)    Current Meds   1  Atorvastatin Calcium 40 MG Oral Tablet (Lipitor); TAKE 1 TABLET AT BEDTIME; Therapy: 81ACG4095 to (Evaluate:10Wes5844); Last Rx:69Lql1741 Ordered   2  Clotrimazole-Betamethasone 1-0 05 % External Cream (Lotrisone); APPLY SPARINGLY   TO AFFECTED AREA(S) 3 TIMES A DAY; Therapy: 08YTY7116 to (Evaluate:91Kpk7286); Last Rx:12Sgw5405 Ordered   3  Enalapril Maleate 10 MG Oral Tablet; TAKE 1 TABLET DAILY  Requested for: 78TEO7175;   Last RP:06SAL3325 Ordered   4  FreeStyle InsuLinx Test In Vitro Strip; TEST 4 TIMES DAILY; Therapy: 61TJX3318 to (Evaluate:34Jtb9881)  Requested for: 45LVQ9144; Last   Rx:68Ytp0564 Ordered   5   Furosemide 40 MG Oral Tablet; TAKE 1 TABLET DAILY AS DIRECTED; Therapy: 81ZHG0169 to (Delilah Chatterjee)  Requested for: 71CDW4619; Last   Rx:03Dow2917 Ordered   6  Lantus 100 UNIT/ML Subcutaneous Solution; INJECT 65 UNITS SUBCUTANEOUSLY   DAILY AS    DIRECTED; Therapy: 58WCJ5233 to (Evaluate:56Hzh5998)  Requested for: 40QPN4938; Last   Rx:90Amf6671 Ordered   7  Meloxicam 15 MG Oral Tablet; TAKE ONE tablet(s) DAILY; Therapy: 79WIY2810 to (Evaluate:21Jun2016)  Requested for: 59Yjq6318; Last   Rx:43Ydj5004 Ordered   8  MetFORMIN HCl - 500 MG Oral Tablet; take 1 tablet by mouth 2 times a day; Therapy: 11JZV9263 to 21 )  Requested for: 08OXY1952; Last   WI:76SEB9826 Ordered   9  Nateglinide 120 MG Oral Tablet (Starlix); TAKE 1/2 TABLET IN THE AM,1/2 TABLET AT   NOON AND 1 TABLET AT BEDTIME; Therapy: 04RWE1066 to (Last Rx:25Mar2016)  Requested for: 25Mar2016 Ordered   10  Omeprazole 20 MG Oral Capsule Delayed Release; TAKE 1 CAPSULE DAILY; Therapy: 41YXT5737 to (Carte Blanche)  Requested for: 03MKJ3871; Last    Rx:28Ltk6575 Ordered   11  Percocet  MG Oral Tablet (Oxycodone-Acetaminophen); TAKE 1 TABLET 4 TIMES    DAILY AS NEEDED FOR PAIN;    Therapy: (Recorded:83Lsv6670) to Recorded   12  Toprol XL 25 MG Oral Tablet Extended Release 24 Hour (Metoprolol Succinate ER);    TAKE 1 TABLET DAILY; Therapy: 82AUK2989 to Recorded    Allergies    1  Tramadol    2   No Known Latex Allergies    Signatures   Electronically signed by : Elaine Colorado LPN; Jul 27 9342  0:72TY EST                       (Author)

## 2018-01-17 NOTE — PROGRESS NOTES
Discussion/Summary  Discussion Summary:   Assess: Pt states she has not been active in our program for several months due to several deaths in her family  Pt was tearful during session  1 9# wt loss from previous office visit  Net 4# total wt loss  31# additional wt loss needed yet prior to surgery  No changes in Dx or Rx noted  Pt wants to increase activity, but reports significant joint pain  Diagnose: Overweight/Obesity related to positive energy balance as evidenced by BMI >30 and pt's self-reported energy intake  (Continued)  Intervene: Discussed dietary goals, pre-operative goals checklist  Discussed importance of physical activity and suggested pt purchase hand pedlar and/or resistance bands  Resistance band total body workout handout provided to pt  Discussed importance of measuring foods and adequate protein  Goals: Measure foods  Journal all foods and beverages eaten  Begin hand pedlar or resistance bands  5# wt loss by next visit  Pt verbalized understanding, no further questions at present, expect good compliance  Monitor/Evaluate: Will monitor food journals, weight, pt's reported compliance with goals at next appointment  Follow-up scheduled for: April 15 at 11:00am       Active Problems    1  Acute CHF (428 0) (I50 9)   2  Anxiety (300 00) (F41 9)   3  Benign essential hypertension (401 1) (I10)   4  Chronic low back pain (724 2,338 29) (M54 5,G89 29)   5  Edema (782 3) (R60 9)   6  Hyperkalemia (276 7) (E87 5)   7  Hyperlipidemia (272 4) (E78 5)   8  Morbid or severe obesity due to excess calories (278 01) (E66 01)   9  MAUREEN (obstructive sleep apnea) (327 23) (G47 33)   10  Preop examination (V72 84) (Z01 818)   11  Screening for cardiovascular condition (V81 2) (Z13 6)   12  Screening for diabetes mellitus (V77 1) (Z13 1)   13  Systolic murmur (125 9) (W94)   14  Type 2 diabetes mellitus (250 00) (E11 9)    Past Medical History    1   History of spinal stenosis (V13 59) (Z87 39)    Surgical History    1  History of Abdominal Surgery   2  History of  Section   3  History of Tubal Ligation    Family History    1  Family history of Leukemia (V16 6)    2  Family history of Coronary Artery Disease (V17 49)   3  Family history of Diabetes Mellitus (V18 0)    Social History    · Former smoker (Y78 61) (W56 876)   · Never Drank Alcohol   · Never Used Drugs    Current Meds   1  Atorvastatin Calcium 40 MG Oral Tablet; TAKE 1 TABLET AT BEDTIME; Therapy: 01PLS4790 to (Evaluate:68Mjk9198); Last Rx:86Dzs9335 Ordered   2  BD Insulin Syringe 30G X 1/2" 0 5 ML Miscellaneous; use as directed; Therapy: 40RLQ9393 to (Evaluate:2016); Last Rx:2016 Ordered   3  Clotrimazole-Betamethasone 1-0 05 % External Cream; APPLY SPARINGLY TO   AFFECTED AREA(S) 3 TIMES A DAY; Therapy: 34GZG6699 to (Evaluate:45Izp3530); Last Rx:38Yqr4054 Ordered   4  Enalapril Maleate 10 MG Oral Tablet; TAKE 1 TABLET DAILY  Requested for: 00GWH7038; Last Rx:93Ylg5024 Ordered   5  FreeStyle InsuLinx Test In Vitro Strip; TEST 4 TIMES DAILY; Therapy: 87LCA1633 to (Evaluate:2016); Last Rx:32Gob3404 Ordered   6  Furosemide 40 MG Oral Tablet; TAKE 1 TABLET DAILY AS DIRECTED; Therapy: 48FEE1623 to (Inessa Anaya)  Requested for: 68WOZ1822; Last   Rx:42Glz3921 Ordered   7  Lantus 100 UNIT/ML Subcutaneous Solution; INJECT 65 UNITS SUBCUTANEOUSLY   DAILY AS    DIRECTED; Therapy: 86MDA6394 to (Juma Randhawa)  Requested for: 11SKQ1707; Last   Rx:2016 Ordered   8  Meloxicam 15 MG Oral Tablet; TAKE ONE tablet(s) DAILY; Therapy: 12DTV5981 to (Evaluate:2016)  Requested for: 27Tzx6208; Last   Rx:34Dqk2950 Ordered   9  MetFORMIN HCl - 500 MG Oral Tablet; take 1 tablet by mouth 2 times a day; Therapy: 60UAE7554 to (Last Rx:25Dgw6447)  Requested for: 90Oso0908 Ordered   10  Nateglinide 120 MG Oral Tablet; TAKE 1/2 TABLET IN THE AM,1/2 TABLET AT NOON AND    1 TABLET AT BEDTIME;     Therapy: 17VHO3419 to (Last Rx:40Rhh1558)  Requested for: 27ZAW3646 Ordered   11  OneTouch Ultra Blue In Vitro Strip; TEST 4 TIMES DAILY; Therapy: 94GVD6876 to (Evaluate:12Rrj3417); Last Rx:26Eot3778 Ordered   12  Percocet  MG Oral Tablet; TAKE 1 TABLET 4 TIMES DAILY AS NEEDED FOR PAIN;    Therapy: (Recorded:73Vuq1995) to Recorded   13  Toprol XL 25 MG Oral Tablet Extended Release 24 Hour; TAKE 1 TABLET DAILY; Therapy: 86LQX7134 to Recorded    Allergies    1  Tramadol    2  No Known Latex Allergies    Vitals  Signs [Data Includes: Current Encounter]   Recorded: 26BCP6455 02:46PM   Height: 5 ft 5 in  Weight: 349 lb 1 6 oz  BMI Calculated: 58 09  BSA Calculated: 2 51    Signatures   Electronically signed by : TAVON Parr RD; Mar 25 2016  2:47PM EST                       (Author)    Electronically signed by :  JOSLYN Hayward ; Apr 6 2016 11:51AM EST

## 2018-01-17 NOTE — MISCELLANEOUS
Message  Pt called office today in tears  She said she is unable to do anything because of the pain from her arthritis  She said prior to sx she took Meloxicam and hasn't been able to now and is unable to walk, exercise, get in and out of the car, or do anything  She said her Dr gave her Gabapentin and it did nothing  She tried PT and they gave her one exercise to do and she couldn't do it  She saw pain management and they gave her a shot that lasted only 3 hours  She wanted to know if she was ever able to take Meloxicam again  I told her it is contradicted due to the fact that it can cause an ulcer  She asked about liquid Meloxicam  I explained to her he is the same thing just a different form  She asked about a natural alternative of Tumeric I explained I was unfamiliar with that  She was wondering if she could take the Meloxicam and be monitored closely and if they see signs of an ulcer she will stop  I told her that isn't always possible to know and that is taking a risk  I told pt to possibly seek advise from a Rheumatologists and they may have a better solution for her  Pt is also upset because she hasn't lost any weight lately  She is eating like she is supposed to and she is still the same weight  I suggested she follow up with the dietician for help  Active Problems    1  Anxiety (300 00) (F41 9)   2  Benign essential hypertension (401 1) (I10)   3  BMI 45 0-49 9, adult (V85 42) (Z68 42)   4  Breast cancer screening (V76 10) (Z12 39)   5  Chronic low back pain (724 2,338 29) (M54 5,G89 29)   6  Difficulty walking (719 7) (R26 2)   7  Edema (782 3) (R60 9)   8  Encounter for screening mammogram for breast cancer (V76 12) (Z12 31)   9  Hyperkalemia (276 7) (E87 5)   10  Hyperlipidemia (272 4) (E78 5)   11  Leg swelling (729 81) (M79 89)   12  Morbid or severe obesity due to excess calories (278 01) (E66 01)   13  Obesity (278 00) (E66 9)   14  Onychomycosis (110 1) (B35 1)   15   MAUREEN (obstructive sleep apnea) (327 23) (G47 33)   16  Postgastrectomy malabsorption (579 3) (K91 2,Z90 3)   17  Spinal stenosis (724 00) (M48 00)   18  Status post gastric bypass for obesity (V45 86) (Z98 84)   19  Systolic murmur (775 8) (X53)   20  Type 2 diabetes mellitus with diabetic polyneuropathy (250 60,357 2) (E11 42)   21  Uncontrolled type 2 diabetes mellitus with hyperglycemia, without long-term current use    of insulin (250 02) (E11 65)    Current Meds   1  Bariatric Fusion Oral Tablet Chewable; Therapy: (Recorded:75Ahn0671) to Recorded   2  Calcium /Vitamin D TABS; Therapy: (Recorded:10Aml2150) to Recorded   3  Gabapentin 300 MG Oral Capsule; TAKE 1 CAPSULE 3 TIMES DAILY; Therapy: 15AWW6408 to (Tina López)  Requested for: 66Aqa7406; Last   Rx:37Edy8447 Ordered   4  Omeprazole 20 MG Oral Capsule Delayed Release; TAKE 1 CAPSULE DAILY; Therapy: 80WJZ9925 to (Melissa Knight)  Requested for: 27TPG7758; Last   Rx:31Xyo8251 Ordered   5  Percocet  MG Oral Tablet (Oxycodone-Acetaminophen); TAKE 1 TABLET 4 TIMES   DAILY AS NEEDED FOR PAIN;   Therapy: (Recorded:95Yzn9241) to Recorded    Allergies    1  Tramadol   2  TraMADol HCl TABS    3   No Known Latex Allergies    Signatures   Electronically signed by : Jonelle Bailon LPN; Sep 22 0223  4:87TX EST                       (Author)

## 2018-01-17 NOTE — MISCELLANEOUS
Provider Comments  Provider Comments:   No-Show for 11:00am RD+SW F/U today  Signatures   Electronically signed by : TAVON Briscoe RD; Apr 15 2016 11:50AM EST                       (Author)    Electronically signed by :  JOSLYN Pace ; Apr 21 2016  1:31PM EST

## 2018-01-17 NOTE — MISCELLANEOUS
Message   Recorded as Task   Date: 11/08/2017 11:15 AM, Created By: Bess Felix   Task Name: Follow Up   Assigned To: Micki Carrera   Regarding Patient: Priscila Chen, Status: Active   Comment:    Anita Harris - 08 Nov 2017 11:15 AM     TASK CREATED  Please call patient to schedule 1 year f/u appointment with office  Thank you  Per assigned task, I left message for [facility] patient about scheduling an appointment at our office since patient is overdue for a follow up  Active Problems    1  Age related osteoporosis (733 01) (M81 0)   2  Anemia (285 9) (D64 9)   3  Anxiety (300 00) (F41 9)   4  Aortic stenosis (424 1) (I35 0)   5  Atherosclerotic peripheral vascular disease (440 20) (I70 209)   6  Benign essential hypertension (401 1) (I10)   7  BMI 36 0-36 9,adult (V85 36) (Z68 36)   8  BMI 45 0-49 9, adult (V85 42) (Z68 42)   9  Breast cancer screening (V76 10) (Z12 39)   10  Candidiasis, intertrigo (112 3) (B37 2)   11  Chronic low back pain (724 2,338 29) (M54 5,G89 29)   12  Diet-controlled diabetes mellitus (250 00) (E11 9)   13  Difficulty walking (719 7) (R26 2)   14  Edema (782 3) (R60 9)   15  Encounter for preventive health examination (V70 0) (Z00 00)   16  Encounter for screening mammogram for breast cancer (V76 12) (Z12 31)   17  Hyperkalemia (276 7) (E87 5)   18  Hyperlipidemia (272 4) (E78 5)   19  Left hip pain (719 45) (M25 552)   20  Leg swelling (729 81) (M79 89)   21  Morbid or severe obesity due to excess calories (278 01) (E66 01)   22  Obesity (278 00) (E66 9)   23  Onychomycosis (110 1) (B35 1)   24  MAUREEN (obstructive sleep apnea) (327 23) (G47 33)   25  Osteoarthritis of both hips, unspecified osteoarthritis type (715 95) (M16 0)   26  Pain in both feet (729 5) (M79 671,M79 672)   27  Postgastrectomy malabsorption (579 3) (K91 2,Z90 3)   28  Post-operative pain (338 18) (G89 18)   29  Preop examination (V72 84) (Z01 818)   30   Primary osteoarthritis of left hip (715 15) (M16 12)   31  Risk for falls (V15 88) (Z91 81)   32  S/P hip replacement, left (V43 64) (Z96 642)   33  Screening for colon cancer (V76 51) (Z12 11)   34  Screening for depression (V79 0) (Z13 89)   35  Spinal stenosis (724 00) (M48 00)   36  Status post gastric bypass for obesity (V45 86) (Z98 84)   37  Systolic murmur (845 0) (U63 4)   38  Visit for screening mammogram (V76 12) (Z12 31)    Current Meds   1  Bariatric Fusion Oral Tablet Chewable; Therapy: (Recorded:66Cjg8564) to Recorded   2  Calcium /Vitamin D TABS; Therapy: (Recorded:70Yzs6123) to Recorded   3  Ferrous Sulfate 325 (65 Fe) MG Oral Tablet; TAKE 1 TABLET DAILY WITH FOOD; Therapy: 40Uvs4693 to (Evaluate:78Hgs2899)  Requested for: 05Hit8761; Last   Rx:58Rzi1442 Ordered   4  Meloxicam 7 5 MG Oral Tablet; TAKE 1 TABLET DAILY  Requested for: 72JJC2331; Last   Rx:06Oct2017 Ordered   5  Nystatin 268913 UNIT/GM External Ointment; APPLY 2-3 TIMES DAILY TO AFFECTED   AREA(S); Therapy: 41JUM7392 to (Last Rx:39Nox5238)  Requested for: 89Wvp6956 Ordered   6  Omeprazole 20 MG Oral Capsule Delayed Release; take one capsule once daily; Therapy: 81WIW2148 to (Evaluate:30Oct2017)  Requested for: 07Ljm5676; Last   Rx:61Gcc5217 Ordered   7  OneTouch Ultra Blue In [employer]; USE 1 STRIP TWICE DAILY; Therapy: 37KWX7481 to (468 34 429)  Requested for: 60Wic0685; Last   Rx:50Jee1070 Ordered   8  OxyCODONE HCl - 5 MG Oral Tablet; TAKE 1 TO 2 TABLETS EVERY 4 TO 6 HOURS AS   NEEDED FOR PAIN;   Therapy: 80Dql0177 to (Evaluate:37Lqn3467); Last Rx:81Ncy6313 Ordered   9  Percocet TABS (Oxycodone-Acetaminophen); Therapy: (Recorded:41Txm4151) to Recorded    Allergies    1  Tramadol   2  TraMADol HCl TABS    3   No Known Latex Allergies    Signatures   Electronically signed by : Sheron Padilla, ; Nov 8 2017 11:56AM EST                       (Author)

## 2018-01-18 NOTE — CONSULTS
Chief Complaint  clearance for surgery hip replacement on 10/10/2017 declined flu vaccine      History of Present Illness  Pre-Op Visit (Brief): The patient is being seen for a preoperative visit and RIght total hip replacement on 10/10/17  Surgical Risk Assessment:   Prior Anesthesia: She had prior anesthesia and no prior adverse reaction to general anesthesia  Pertinent Past Medical History: MAUREEN, low serum albumin and obesity, but CAD without prior MI, no CHF, no chronic liver disease, no coagulation delay, no diabetes, does not use insulin, no thyroid disease, no seizure disorder and no renal disease  Exercise Capacity: able to walk two flights of stairs without symptoms  Lifestyle Factors: denies alcohol use, denies tobacco use and denies illegal drug use  Symptoms: no easy bleeding, no easy bruising, no heavy menses, no frequent nosebleeds, no chest pain, no cough, no dyspnea, no edema, no palpitations and no wheezing  Pertinent Family History: no pertinent family history  Living Situation: home is secure and supportive and no post-op concerns with her living situation  Review of Systems    Constitutional: no fever and no chills  Eyes: no eyesight problems  Cardiovascular: no chest pain and no palpitations  Respiratory: no cough and no shortness of breath during exertion  Gastrointestinal: no abdominal pain  Genitourinary: no dysuria  Musculoskeletal: no joint swelling  Integumentary: no rashes  Neurological: no headache, no confusion and no convulsions  Endocrine: no muscle weakness  Hematologic/Lymphatic: no tendency for easy bleeding and no tendency for easy bruising  ROS reviewed  Active Problems    1  Age related osteoporosis (733 01) (M81 0)   2  Anemia (285 9) (D64 9)   3  Anxiety (300 00) (F41 9)   4  Atherosclerotic peripheral vascular disease (440 20) (I70 209)   5  Benign essential hypertension (401 1) (I10)   6  BMI 36 0-36 9,adult (V85 36) (Z68 36)   7   BMI 45 0-49 9, adult (V85 42) (Z68 42)   8  Breast cancer screening (V76 10) (Z12 39)   9  Candidiasis, intertrigo (112 3) (B37 2)   10  Chronic low back pain (724 2,338 29) (M54 5,G89 29)   11  Diet-controlled diabetes mellitus (250 00) (E11 9)   12  Difficulty walking (719 7) (R26 2)   13  Edema (782 3) (R60 9)   14  Encounter for preventive health examination (V70 0) (Z00 00)   15  Encounter for screening mammogram for breast cancer (V76 12) (Z12 31)   16  Hyperkalemia (276 7) (E87 5)   17  Hyperlipidemia (272 4) (E78 5)   18  Left hip pain (719 45) (M25 552)   19  Leg swelling (729 81) (M79 89)   20  Morbid or severe obesity due to excess calories (278 01) (E66 01)   21  Obesity (278 00) (E66 9)   22  Onychomycosis (110 1) (B35 1)   23  MAUREEN (obstructive sleep apnea) (327 23) (G47 33)   24  Osteoarthritis of both hips, unspecified osteoarthritis type (715 95) (M16 0)   25  Pain in both feet (729 5) (M79 671,M79 672)   26  Postgastrectomy malabsorption (579 3) (K91 2,Z90 3)   27  Post-operative pain (338 18) (G89 18)   28  Preop examination (V72 84) (Z01 818)   29  Primary osteoarthritis of left hip (715 15) (M16 12)   30  Risk for falls (V15 88) (Z91 81)   31  S/P hip replacement, left (V43 64) (Z96 642)   32  Screening for colon cancer (V76 51) (Z12 11)   33  Screening for depression (V79 0) (Z13 89)   34  Spinal stenosis (724 00) (M48 00)   35  Status post gastric bypass for obesity (V45 86) (Z98 84)   36  Systolic murmur (311 4) (R18 4)   37  Visit for screening mammogram (C45 87) (Z12 31)    Past Medical History    The active problems and past medical history were reviewed and updated today  Surgical History    · History of Abdominal Surgery   · History of  Section   · History of Gastric Surgery For Morbid Obesity Bypass With Juan-en-Y   · History of Tubal Ligation    The surgical history was reviewed and updated today         Family History    · Family history of Leukemia (V16 6)    · Family history of Coronary Artery Disease (V17 49)   · Family history of Diabetes Mellitus (V18 0)    The family history was reviewed and updated today  Social History    · Former smoker (D20 04) (P91 228)   · Former smoker (D70 18) (A19 044)   · Never Drank Alcohol   · Never Used Drugs  The social history was reviewed and is unchanged  Current Meds   1  Bariatric Fusion Oral Tablet Chewable; Therapy: (Recorded:60Ojq9709) to Recorded   2  Calcium /Vitamin D TABS; Therapy: (Recorded:97Lxr2585) to Recorded   3  Ferrous Sulfate 325 (65 Fe) MG Oral Tablet; TAKE 1 TABLET DAILY WITH FOOD; Therapy: 81Kgr9024 to (Evaluate:83Ptu1657)  Requested for: 22Aop8953; Last   Rx:50Ipq2313 Ordered   4  Meloxicam 15 MG Oral Tablet; take 1 tablet by mouth once daily with food  Requested for:   22Fje7802; Last Rx:51Qkj5042 Ordered   5  Nystatin 455127 UNIT/GM External Ointment; APPLY 2-3 TIMES DAILY TO AFFECTED   AREA(S); Therapy: 71OCX6714 to (Last Rx:97Lwl0840)  Requested for: 21Jui7760 Ordered   6  Omeprazole 20 MG Oral Capsule Delayed Release; take one capsule once daily; Therapy: 15DLH2384 to (Evaluate:30Oct2017)  Requested for: 04Apr2017; Last   Rx:14Hnf8263 Ordered   7  OneTouch Ultra Blue In [employer]; USE 1 STRIP TWICE DAILY; Therapy: 16HFN8677 to (Liane Bowen)  Requested for: 87Kbg0494; Last   Rx:93Uzi8242 Ordered   8  OxyCODONE HCl - 5 MG Oral Tablet; TAKE 1 TO 2 TABLETS EVERY 4 TO 6 HOURS AS   NEEDED FOR PAIN;   Therapy: 83Gzm2409 to (Evaluate:02Fnd3941); Last Rx:87Ybk8778 Ordered   9  Percocet TABS; Therapy: (Recorded:39Lgn4465) to Recorded    The medication list was reviewed and updated today  Allergies    1  Tramadol   2  TraMADol HCl TABS    3   No Known Latex Allergies    Vitals  Signs    Heart Rate: 92  Respiration: 20  Systolic: 707  Diastolic: 60  Height: 5 ft 5 in  Weight: 224 lb   BMI Calculated: 37 28  BSA Calculated: 2 08  O2 Saturation: 99    Physical Exam    Constitutional   General appearance: No acute distress, well appearing and well nourished  Head and Face   Head and face: Normal     Palpation of the face and sinuses: No sinus tenderness  Eyes   Conjunctiva and lids: No swelling, erythema or discharge  Ears, Nose, Mouth, and Throat   Oropharynx: Normal with no erythema, edema, exudate or lesions  Neck   Neck: Supple, symmetric, trachea midline, no masses  Thyroid: Normal, no thyromegaly  Pulmonary   Respiratory effort: No increased work of breathing or signs of respiratory distress  Percussion of chest: Normal     Auscultation of lungs: Clear to auscultation  Cardiovascular   Palpation of heart: Normal PMI, no thrills  Auscultation of heart: Normal rate and rhythm, normal S1 and S2, no murmurs  Examination of extremities for edema and/or varicosities: Normal     Abdomen   Abdomen: Non-tender, no masses  Liver and spleen: No hepatomegaly or splenomegaly  Lymphatic   Palpation of lymph nodes in neck: No lymphadenopathy  Musculoskeletal using walker  Digits and nails: Normal without clubbing or cyanosis  Skin   Skin and subcutaneous tissue: Normal without rashes or lesions  Psychiatric   Judgment and insight: Normal     Mood and affect: Normal        Assessment    1  Preop examination (V72 84) (U94 305)    Discussion/Summary  Surgical Clearance: She is at a LOW risk from a cardiovascular standpoint at this time without any additional cardiac testing  Reevaluation needed, if she should present with symptoms prior to surgery/procedure  Surgical clearance faxed to Dr Radha Joseph   Patient had recent cardiology work up through [facility] and was considered cleared for surgery  I have advised patient to bring those records to our office so we can have the updated information in our charts  Possible side effects of new medications were reviewed with the patient/guardian today  The treatment plan was reviewed with the patient/guardian   The patient/guardian understands and agrees with the treatment plan      End of Encounter Meds    1  Ferrous Sulfate 325 (65 Fe) MG Oral Tablet; TAKE 1 TABLET DAILY WITH FOOD; Therapy: 04Rks0483 to (Evaluate:30Cqf0378)  Requested for: 59Eos4951; Last   Rx:35Fou6121 Ordered    2  Nystatin 541094 UNIT/GM External Ointment; APPLY 2-3 TIMES DAILY TO AFFECTED   AREA(S); Therapy: 93QMY8122 to (Last Rx:96Xnv3041)  Requested for: 76Dgk5477 Ordered    3  Meloxicam 15 MG Oral Tablet; take 1 tablet by mouth once daily with food  Requested for:   46Zcp1217; Last Rx:50Ucm5700 Ordered    4  Omeprazole 20 MG Oral Capsule Delayed Release; take one capsule once daily; Therapy: 04TYH0777 to (Evaluate:30Oct2017)  Requested for: 04Apr2017; Last   Rx:55Wuz0631 Ordered    5  OneTouch Ultra Blue In [employer]; USE 1 STRIP TWICE DAILY; Therapy: 45MLR1492 to (Sade Wynn)  Requested for: 87Pgh8598; Last   Rx:81Kug4769 Ordered    6  OxyCODONE HCl - 5 MG Oral Tablet; TAKE 1 TO 2 TABLETS EVERY 4 TO 6 HOURS AS   NEEDED FOR PAIN;   Therapy: 26Tvg3621 to (Evaluate:75Nbz5063); Last Rx:55Ejk0119 Ordered    7  Bariatric Fusion Oral Tablet Chewable; Therapy: (Recorded:45Axd1728) to Recorded   8  Calcium /Vitamin D TABS; Therapy: (Recorded:24Aiv4009) to Recorded   9  Percocet TABS (Oxycodone-Acetaminophen);    Therapy: (Recorded:45Vbm1325) to Recorded    Signatures   Electronically signed by : JOSLYN Shukla ; Oct  9 2017  9:56AM EST                       (Author)    Electronically signed by : JOSLYN [place] ; Oct  9 2017  1:16PM EST                       (Co-author)

## 2018-01-18 NOTE — PROGRESS NOTES
Active Problems    1  Acute CHF (428 0) (I50 9)   2  Anxiety (300 00) (F41 9)   3  Benign essential hypertension (401 1) (I10)   4  Chronic low back pain (724 2,338 29) (M54 5,G89 29)   5  Edema (782 3) (R60 9)   6  Hyperkalemia (276 7) (E87 5)   7  Hyperlipidemia (272 4) (E78 5)   8  Morbid or severe obesity due to excess calories (278 01) (E66 01)   9  MAUREEN (obstructive sleep apnea) (327 23) (G47 33)   10  Preop examination (V72 84) (Z01 818)   11  Screening for cardiovascular condition (V81 2) (Z13 6)   12  Screening for diabetes mellitus (V77 1) (Z13 1)   13  Systolic murmur (228 9) (R44)   14  Type 2 diabetes mellitus (250 00) (E11 9)    Past Medical History    1  History of spinal stenosis (V13 59) (Z87 39)    Surgical History    1  History of Abdominal Surgery   2  History of  Section   3  History of Tubal Ligation    Family History  Mother    1  Family history of Leukemia (V16 6)  Father    2  Family history of Coronary Artery Disease (V17 49)   3  Family history of Diabetes Mellitus (V18 0)    Social History    · Former smoker (O83 28) (O97 019)   · Never Drank Alcohol   · Never Used Drugs    Current Meds   1  Atorvastatin Calcium 40 MG Oral Tablet (Lipitor); TAKE 1 TABLET AT BEDTIME; Therapy: 81BJB6353 to (Evaluate:90Aaa0531); Last Rx:71Qph0595 Ordered   2  BD Insulin Syringe 30G X 1/2" 0 5 ML Miscellaneous; use as directed; Therapy: 11NRB5879 to (Evaluate:70Aqa7851); Last Rx:2016 Ordered   3  Clotrimazole-Betamethasone 1-0 05 % External Cream (Lotrisone); APPLY SPARINGLY   TO AFFECTED AREA(S) 3 TIMES A DAY; Therapy: 12FQW2160 to (Evaluate:13Jwy0340); Last Rx:59Xzo3762 Ordered   4  Enalapril Maleate 10 MG Oral Tablet; TAKE 1 TABLET DAILY  Requested for: 83HJB2190; Last Rx:99Ybm5669 Ordered   5  FreeStyle InsuLinx Test In Vitro Strip; TEST 4 TIMES DAILY; Therapy: 22CBN9523 to (Evaluate:2016); Last Rx:37Fnb2787 Ordered   6   Furosemide 40 MG Oral Tablet; TAKE 1 TABLET DAILY AS DIRECTED; Therapy: 29EHM5883 to (Irving Monge)  Requested for: 26LGH3185; Last   Rx:95Fcv3483 Ordered   7  Lantus 100 UNIT/ML Subcutaneous Solution; INJECT 65 UNITS SUBCUTANEOUSLY   DAILY AS    DIRECTED; Therapy: 55VFH2797 to (Lamona Kim)  Requested for: 85EPV9397; Last   Rx:04Mar2016 Ordered   8  Meloxicam 15 MG Oral Tablet; TAKE ONE tablet(s) DAILY; Therapy: 52CDH5021 to (Evaluate:21Jun2016)  Requested for: 22Apr2016; Last   Rx:25Agp6942 Ordered   9  MetFORMIN HCl - 500 MG Oral Tablet; take 1 tablet by mouth 2 times a day; Therapy: 64SFX5894 to (Evaluate:28Upt0966)  Requested for: 28Mar2016; Last   Rx:28Mar2016 Ordered   10  Nateglinide 120 MG Oral Tablet (Starlix); TAKE 1/2 TABLET IN THE AM,1/2 TABLET AT    NOON AND 1 TABLET AT BEDTIME; Therapy: 71NEY9800 to (Last Rx:25Mar2016)  Requested for: 25Mar2016 Ordered   11  OneTouch Ultra Blue In Vitro Strip; TEST 4 TIMES DAILY; Therapy: 45VQS8438 to (Evaluate:76Asq6115); Last Rx:89Ovv4278 Ordered   12  Percocet  MG Oral Tablet (Oxycodone-Acetaminophen); TAKE 1 TABLET 4 TIMES    DAILY AS NEEDED FOR PAIN;    Therapy: (Recorded:85Isb6306) to Recorded   13  Toprol XL 25 MG Oral Tablet Extended Release 24 Hour (Metoprolol Succinate ER); TAKE    1 TABLET DAILY; Therapy: 27LLX4358 to Recorded    Allergies    1  Tramadol    2  No Known Latex Allergies     Note   Note:   Met with patient to review progress with weight loss; no weight loss  Patient frustrated with inability to lose weight( cried)  I validated patient did not gain weight and acknowledged her efforts to make behavioral changes ( using myfitness pal)  Patient wants to get to surgery date and is willing to consider one meal replacement; patient will discuss with MARYCHUY GONZALEZ      Signatures   Electronically signed by : ASHLYN [place]; Apr 28 2016  2:14PM EST                       (Author)    Electronically signed by :  JOSLYN Alvarez ; May  5 2016  1:22PM EST

## 2018-01-19 ENCOUNTER — ANESTHESIA (OUTPATIENT)
Dept: PERIOP | Facility: AMBULARY SURGERY CENTER | Age: 63
End: 2018-01-19

## 2018-01-19 ENCOUNTER — ANESTHESIA EVENT (OUTPATIENT)
Dept: PERIOP | Facility: AMBULARY SURGERY CENTER | Age: 63
End: 2018-01-19

## 2018-01-19 NOTE — ANESTHESIA PREPROCEDURE EVALUATION
Review of Systems/Medical History  Patient summary reviewed  Chart reviewed      Cardiovascular  Hyperlipidemia, Hypertension , CHF ,    Pulmonary       GI/Hepatic            Endo/Other  Diabetes ,      GYN       Hematology   Musculoskeletal       Neurology   Psychology           Physical Exam    Airway    Mallampati score: II  TM Distance: >3 FB  Neck ROM: full     Dental       Cardiovascular  Rhythm: regular, Rate: normal,     Pulmonary  Breath sounds clear to auscultation,     Other Findings        Anesthesia Plan  ASA Score- 3     Anesthesia Type- IV sedation with anesthesia with ASA Monitors  Additional Monitors:   Airway Plan:         Plan Factors-    Induction- intravenous      Postoperative Plan-     Informed Consent-

## 2018-01-22 VITALS
HEIGHT: 65 IN | WEIGHT: 224 LBS | SYSTOLIC BLOOD PRESSURE: 100 MMHG | BODY MASS INDEX: 37.32 KG/M2 | RESPIRATION RATE: 20 BRPM | HEART RATE: 92 BPM | OXYGEN SATURATION: 99 % | DIASTOLIC BLOOD PRESSURE: 60 MMHG

## 2018-01-23 NOTE — MISCELLANEOUS
Message  GI Reminder Recall [facility]:   Date: 01/05/2018   Dear Ulises Pimentel:     Review of our records shows you are due for the following: Consult  Please call the following office to schedule your appointment:   [address] [place], [place], [place] [phone]  We look forward to hearing from you!      Sincerely,     [facility]      Signatures   Electronically signed by : Emanuel Hamilton, ; Jan 5 2018 10:41AM EST                       (Author)

## 2018-01-27 ENCOUNTER — APPOINTMENT (EMERGENCY)
Dept: RADIOLOGY | Facility: HOSPITAL | Age: 63
End: 2018-01-27
Payer: MEDICARE

## 2018-01-27 ENCOUNTER — HOSPITAL ENCOUNTER (EMERGENCY)
Facility: HOSPITAL | Age: 63
Discharge: HOME | End: 2018-01-27
Attending: EMERGENCY MEDICINE | Admitting: EMERGENCY MEDICINE
Payer: MEDICARE

## 2018-01-27 VITALS
SYSTOLIC BLOOD PRESSURE: 125 MMHG | OXYGEN SATURATION: 98 % | HEART RATE: 82 BPM | HEIGHT: 65 IN | TEMPERATURE: 99 F | DIASTOLIC BLOOD PRESSURE: 63 MMHG | RESPIRATION RATE: 18 BRPM | WEIGHT: 225 LBS | BODY MASS INDEX: 37.49 KG/M2

## 2018-01-27 DIAGNOSIS — R10.9 ABDOMINAL PAIN: Primary | ICD-10-CM

## 2018-01-27 LAB
ALBUMIN SERPL BCP-MCNC: 2.9 G/DL (ref 3.5–5)
ALP SERPL-CCNC: 66 U/L (ref 46–116)
ALT SERPL W P-5'-P-CCNC: 11 U/L (ref 12–78)
ANION GAP SERPL CALCULATED.3IONS-SCNC: 9 MMOL/L (ref 4–13)
APTT PPP: 30 SECONDS (ref 24–33)
AST SERPL W P-5'-P-CCNC: 12 U/L (ref 5–45)
BASOPHILS # BLD AUTO: 0 THOUSANDS/ΜL (ref 0–0.1)
BASOPHILS NFR BLD AUTO: 0 % (ref 0–1)
BILIRUB SERPL-MCNC: 0.5 MG/DL (ref 0.2–1)
BUN SERPL-MCNC: 20 MG/DL (ref 5–25)
CALCIUM SERPL-MCNC: 9.5 MG/DL (ref 8.3–10.1)
CHLORIDE SERPL-SCNC: 102 MMOL/L (ref 100–108)
CO2 SERPL-SCNC: 29 MMOL/L (ref 21–32)
CREAT SERPL-MCNC: 0.75 MG/DL (ref 0.6–1.3)
EOSINOPHIL # BLD AUTO: 0.1 THOUSAND/ΜL (ref 0–0.61)
EOSINOPHIL NFR BLD AUTO: 1 % (ref 0–6)
ERYTHROCYTE [DISTWIDTH] IN BLOOD BY AUTOMATED COUNT: 16.5 % (ref 11.6–15.1)
GFR SERPL CREATININE-BSD FRML MDRD: 86 ML/MIN/1.73SQ M
GLUCOSE SERPL-MCNC: 124 MG/DL (ref 65–140)
HCT VFR BLD AUTO: 37.3 % (ref 37–47)
HGB BLD-MCNC: 12 G/DL (ref 12–16)
INR PPP: 1.03 (ref 0.86–1.16)
LACTATE SERPL-SCNC: 0.8 MMOL/L (ref 0.5–2)
LIPASE SERPL-CCNC: 108 U/L (ref 73–393)
LYMPHOCYTES # BLD AUTO: 1.8 THOUSANDS/ΜL (ref 0.6–4.47)
LYMPHOCYTES NFR BLD AUTO: 17 % (ref 14–44)
MCH RBC QN AUTO: 23.9 PG (ref 27–31)
MCHC RBC AUTO-ENTMCNC: 32.1 G/DL (ref 31.4–37.4)
MCV RBC AUTO: 74 FL (ref 82–98)
MONOCYTES # BLD AUTO: 0.8 THOUSAND/ΜL (ref 0.17–1.22)
MONOCYTES NFR BLD AUTO: 8 % (ref 4–12)
NEUTROPHILS # BLD AUTO: 7.8 THOUSANDS/ΜL (ref 1.85–7.62)
NEUTS SEG NFR BLD AUTO: 74 % (ref 43–75)
NRBC BLD AUTO-RTO: 0 /100 WBCS
PLATELET # BLD AUTO: 330 THOUSANDS/UL (ref 130–400)
PMV BLD AUTO: 7.8 FL (ref 8.9–12.7)
POTASSIUM SERPL-SCNC: 3.9 MMOL/L (ref 3.5–5.3)
PROT SERPL-MCNC: 7.3 G/DL (ref 6.4–8.2)
PROTHROMBIN TIME: 10.8 SECONDS (ref 9.4–11.7)
RBC # BLD AUTO: 5.01 MILLION/UL (ref 4.2–5.4)
SODIUM SERPL-SCNC: 140 MMOL/L (ref 136–145)
WBC # BLD AUTO: 10.5 THOUSAND/UL (ref 4.8–10.8)

## 2018-01-27 PROCEDURE — 74177 CT ABD & PELVIS W/CONTRAST: CPT

## 2018-01-27 PROCEDURE — 96361 HYDRATE IV INFUSION ADD-ON: CPT

## 2018-01-27 PROCEDURE — 85730 THROMBOPLASTIN TIME PARTIAL: CPT | Performed by: EMERGENCY MEDICINE

## 2018-01-27 PROCEDURE — 80053 COMPREHEN METABOLIC PANEL: CPT | Performed by: EMERGENCY MEDICINE

## 2018-01-27 PROCEDURE — 83690 ASSAY OF LIPASE: CPT | Performed by: EMERGENCY MEDICINE

## 2018-01-27 PROCEDURE — 99284 EMERGENCY DEPT VISIT MOD MDM: CPT

## 2018-01-27 PROCEDURE — 36415 COLL VENOUS BLD VENIPUNCTURE: CPT | Performed by: EMERGENCY MEDICINE

## 2018-01-27 PROCEDURE — 85610 PROTHROMBIN TIME: CPT | Performed by: EMERGENCY MEDICINE

## 2018-01-27 PROCEDURE — 85025 COMPLETE CBC W/AUTO DIFF WBC: CPT | Performed by: EMERGENCY MEDICINE

## 2018-01-27 PROCEDURE — 83605 ASSAY OF LACTIC ACID: CPT | Performed by: EMERGENCY MEDICINE

## 2018-01-27 PROCEDURE — 96375 TX/PRO/DX INJ NEW DRUG ADDON: CPT

## 2018-01-27 PROCEDURE — 96374 THER/PROPH/DIAG INJ IV PUSH: CPT

## 2018-01-27 RX ORDER — ONDANSETRON 2 MG/ML
4 INJECTION INTRAMUSCULAR; INTRAVENOUS ONCE
Status: COMPLETED | OUTPATIENT
Start: 2018-01-27 | End: 2018-01-27

## 2018-01-27 RX ORDER — MORPHINE SULFATE 2 MG/ML
2 INJECTION, SOLUTION INTRAMUSCULAR; INTRAVENOUS ONCE
Status: COMPLETED | OUTPATIENT
Start: 2018-01-27 | End: 2018-01-27

## 2018-01-27 RX ADMIN — IOHEXOL 100 ML: 350 INJECTION, SOLUTION INTRAVENOUS at 20:56

## 2018-01-27 RX ADMIN — IOHEXOL 50 ML: 240 INJECTION, SOLUTION INTRATHECAL; INTRAVASCULAR; INTRAVENOUS; ORAL at 20:56

## 2018-01-27 RX ADMIN — MORPHINE SULFATE 2 MG: 2 INJECTION, SOLUTION INTRAMUSCULAR; INTRAVENOUS at 18:38

## 2018-01-27 RX ADMIN — ONDANSETRON 4 MG: 2 INJECTION INTRAMUSCULAR; INTRAVENOUS at 18:38

## 2018-01-27 RX ADMIN — SODIUM CHLORIDE 1000 ML: 0.9 INJECTION, SOLUTION INTRAVENOUS at 18:40

## 2018-01-27 NOTE — ED PROVIDER NOTES
History  Chief Complaint   Patient presents with    Abdominal Pain     Pt had gastric bipass in 2016  Patient states that every time she eats for the past two weeks she get severe abd  pain shortly after eating  Pt states its been much worse the past two days  Pt states that she has only eated yogurt the past to days  History provided by:  Patient  Abdominal Pain   Pain location:  Epigastric and periumbilical  Pain quality: aching, cramping and gnawing    Pain radiates to:  Does not radiate  Pain severity:  Moderate  Onset quality:  Gradual  Timing:  Constant  Progression:  Worsening  Chronicity:  New  Relieved by:  Nothing  Worsened by:  Nothing  Ineffective treatments:  None tried  Associated symptoms: constipation and nausea    Associated symptoms: no chest pain, no chills, no cough, no diarrhea, no dysuria, no fever, no shortness of breath, no sore throat and no vomiting    Risk factors: obesity    Risk factors comment:  Hx of gastric bypass      Prior to Admission Medications   Prescriptions Last Dose Informant Patient Reported? Taking?   oxyCODONE-acetaminophen (PERCOCET)  mg per tablet 2018 at 0800 Self Yes Yes   Sig: Take 1 tablet by mouth every 6 (six) hours as needed for moderate pain  Facility-Administered Medications: None       Past Medical History:   Diagnosis Date    Anxiety     CHF (congestive heart failure) (HCC)     Chronic lower back pain     CPAP (continuous positive airway pressure) dependence     uses nightly      Diabetes mellitus ([facility] [address] )     Type II    Hyperlipidemia     Hypertension     Morbid obesity (HCC)     Shortness of breath     with exerction - subsides quickly with rest     Sleep apnea     Spinal stenosis     Systolic murmur     Unsteady gait     uses walker    Wears dentures     Wears glasses        Past Surgical History:   Procedure Laterality Date    ABSCESS DRAINAGE      abd, (R) leg, (L) leg     SECTION      ESOPHAGOGASTRODUODENOSCOPY N/A 7/18/2016    Procedure: ESOPHAGOGASTRODUODENOSCOPY (EGD); Surgeon: Martín Kong MD;  [facility];  Service:     ESOPHAGOGASTRODUODENOSCOPY N/A 3/30/2016    Procedure: ESOPHAGOGASTRODUODENOSCOPY (EGD); Surgeon: Martín Kong MD;  [facility]; Service:     EYE SURGERY      laser eye surgery    [place] LAP GASTRIC BYPASS/SOLEDAD-EN-Y N/A 7/18/2016    Procedure: BYPASS GASTRIC  SOLEDAD-EN-Y LAPAROSCOPIC;  Surgeon: Martín Kong MD;  [facility];  Service: Bariatrics    TUBAL LIGATION         History reviewed  No pertinent family history  I have reviewed and agree with the history as documented  Social History   Substance Use Topics    Smoking status: Former Smoker     Packs/day: 1 00     Years: 25 00     Quit date: 3/30/2006    Smokeless tobacco: Never Used    Alcohol use No        Review of Systems   Constitutional: Negative for chills and fever  HENT: Negative for rhinorrhea, sore throat and trouble swallowing  Eyes: Negative for pain  Respiratory: Negative for cough, shortness of breath, wheezing and stridor  Cardiovascular: Negative for chest pain and leg swelling  Gastrointestinal: Positive for abdominal pain, constipation and nausea  Negative for diarrhea and vomiting  Endocrine: Negative for polyuria  Genitourinary: Negative for dysuria, flank pain and urgency  Musculoskeletal: Negative for joint swelling, myalgias and neck stiffness  Skin: Negative for rash  Allergic/Immunologic: Negative for immunocompromised state  Neurological: Negative for dizziness, syncope, weakness, numbness and headaches  Psychiatric/Behavioral: Negative for confusion and suicidal ideas  All other systems reviewed and are negative        Physical Exam  ED Triage Vitals [01/27/18 1829]   Temperature Pulse Respirations Blood Pressure SpO2   99 °F (37 2 °C) 92 20 114/56 97 %      Temp Source Heart Rate Source Patient Position - Orthostatic VS BP Location FiO2 (%)   Oral Monitor Lying Right arm --      Pain Score       9           Orthostatic Vital Signs  Vitals:    01/27/18 1829 01/27/18 2001   BP: 114/56 125/63   Pulse: 92 82   Patient Position - Orthostatic VS: Lying Sitting       Physical Exam   Constitutional: She is oriented to person, place, and time  She appears well-developed and well-nourished  HENT:   Head: Normocephalic and atraumatic  Eyes: EOM are normal  Pupils are equal, round, and reactive to light  Neck: Normal range of motion  Neck supple  Cardiovascular: Normal rate and regular rhythm  Exam reveals no friction rub  No murmur heard  Pulmonary/Chest: Breath sounds normal  No respiratory distress  She has no wheezes  She has no rales  Abdominal: Soft  Bowel sounds are normal  She exhibits no distension  There is tenderness in the epigastric area and periumbilical area  Musculoskeletal: Normal range of motion  She exhibits no edema or tenderness  Neurological: She is alert and oriented to person, place, and time  Skin: Skin is warm  No rash noted  Psychiatric: She has a normal mood and affect  Nursing note and vitals reviewed        ED Medications  Medications   sodium chloride 0 9 % bolus 1,000 mL (0 mL Intravenous Stopped 1/27/18 2001)   morphine injection 2 mg (2 mg Intravenous Given 1/27/18 1838)   ondansetron (ZOFRAN) injection 4 mg (4 mg Intravenous Given 1/27/18 1838)   iohexol (OMNIPAQUE) 240 MG/ML solution 50 mL (50 mL Oral Given 1/27/18 2056)   iohexol (OMNIPAQUE) 350 MG/ML injection (MULTI-DOSE) 100 mL (100 mL Intravenous Given 1/27/18 2056)       Diagnostic Studies  Results Reviewed     Procedure Component Value Units Date/Time    Protime-INR [[id number]]  (Normal) Collected:  01/27/18 1838    Lab Status:  Final result Specimen:  Blood from Arm, Left Updated:  01/27/18 1958     Protime 10 8 seconds      INR 1 03    APTT [[id number]]  (Normal) Collected:  01/27/18 1838    Lab Status:  Final result Specimen: Blood from Arm, Left Updated:  01/27/18 1958     PTT 30 seconds     Narrative: Therapeutic Heparin Range = 60-90 seconds    Lactic acid, plasma [[id number]]  (Normal) Collected:  01/27/18 1838    Lab Status:  Final result Specimen:  Blood from Arm, Left Updated:  01/27/18 1929     LACTIC ACID 0 8 mmol/L     Narrative:         Result may be elevated if tourniquet was used during collection  Comprehensive metabolic panel [[id number]]  (Abnormal) Collected:  01/27/18 1838    Lab Status:  Final result Specimen:  Blood from Arm, Left Updated:  01/27/18 1926     Sodium 140 mmol/L      Potassium 3 9 mmol/L      Chloride 102 mmol/L      CO2 29 mmol/L      Anion Gap 9 mmol/L      BUN 20 mg/dL      Creatinine 0 75 mg/dL      Glucose 124 mg/dL      Calcium 9 5 mg/dL      AST 12 U/L      ALT 11 (L) U/L      Alkaline Phosphatase 66 U/L      Total Protein 7 3 g/dL      Albumin 2 9 (L) g/dL      Total Bilirubin 0 50 mg/dL      eGFR 86 ml/min/1 73sq m     Narrative:         National Kidney Disease Education Program recommendations are as follows:  GFR calculation is accurate only with a steady state creatinine  Chronic Kidney disease less than 60 ml/min/1 73 sq  meters  Kidney failure less than 15 ml/min/1 73 sq  meters      Lipase [[id number]]  (Normal) Collected:  01/27/18 1838    Lab Status:  Final result Specimen:  Blood from Arm, Left Updated:  01/27/18 1926     Lipase 108 u/L     CBC and differential [[id number]]  (Abnormal) Collected:  01/27/18 1838    Lab Status:  Final result Specimen:  Blood from Arm, Left Updated:  01/27/18 1851     WBC 10 50 Thousand/uL      RBC 5 01 Million/uL      Hemoglobin 12 0 g/dL      Hematocrit 37 3 %      MCV 74 (L) fL      MCH 23 9 (L) pg      MCHC 32 1 g/dL      RDW 16 5 (H) %      MPV 7 8 (L) fL      Platelets 737 Thousands/uL      nRBC 0 /100 WBCs      Neutrophils Relative 74 %      Lymphocytes Relative 17 %      Monocytes Relative 8 %      Eosinophils Relative 1 %      Basophils Relative 0 %      Neutrophils Absolute 7 80 (H) Thousands/µL      Lymphocytes Absolute 1 80 Thousands/µL      Monocytes Absolute 0 80 Thousand/µL      Eosinophils Absolute 0 10 Thousand/µL      Basophils Absolute 0 00 Thousands/µL                  CT abdomen pelvis with contrast   Final Result by Luna Butler MD (01/27 2128)      1  Prior Juan-en-Y gastric bypass with distention of the excluded stomach  2   No evidence of acute abnormality in the abdomen or pelvis  Workstation performed: [id number]                    Procedures  Procedures       Phone Contacts  ED Phone Contact    ED Course  ED Course                                MDM  Number of Diagnoses or Management Options  Abdominal pain: new and requires workup  Diagnosis management comments: 22-year-old female presents emergency department history of gastric bypass past in 2006 at [facility]   The patient presents emergency department with noted epigastric pain and periumbilical pain differential diagnosis includes pancreatitis, gallbladder disease, cholecystitis, cholelithiasis, bowel obstruction, anastomosis issues with gastric bypass  Blood work is unremarkable at this point time pending CT scan with p  o  and IV contrast signed out to oncoming provider for further management evaluation  Amount and/or Complexity of Data Reviewed  Clinical lab tests: reviewed and ordered  Review and summarize past medical records: yes      CritCare Time    Disposition  Final diagnoses:   Abdominal pain     Time reflects when diagnosis was documented in both MDM as applicable and the Disposition within this note     Time User Action Codes Description Comment    1/27/2018 10:55 PM Court Cuello Add [R10 9] Abdominal pain       ED Disposition     ED Disposition Condition Comment    Discharge  [address] discharge to home/self care      Condition at discharge: Stable        Follow-up Information    None       Discharge Medication List as of 1/27/2018 10:37 PM      CONTINUE these medications which have NOT CHANGED    Details   oxyCODONE-acetaminophen (PERCOCET)  mg per tablet Take 1 tablet by mouth every 6 (six) hours as needed for moderate pain , Until Discontinued, Historical Med           No discharge procedures on file      ED Provider  Electronically Signed by           Julia Regalado DO  01/28/18 2143

## 2018-01-30 ENCOUNTER — OFFICE VISIT (OUTPATIENT)
Dept: FAMILY MEDICINE CLINIC | Facility: CLINIC | Age: 63
End: 2018-01-30
Payer: MEDICARE

## 2018-01-30 ENCOUNTER — OFFICE VISIT (OUTPATIENT)
Dept: GASTROENTEROLOGY | Facility: CLINIC | Age: 63
End: 2018-01-30
Payer: MEDICARE

## 2018-01-30 VITALS
WEIGHT: 225 LBS | SYSTOLIC BLOOD PRESSURE: 110 MMHG | OXYGEN SATURATION: 96 % | BODY MASS INDEX: 37.44 KG/M2 | DIASTOLIC BLOOD PRESSURE: 70 MMHG | HEART RATE: 91 BPM | RESPIRATION RATE: 20 BRPM

## 2018-01-30 VITALS
TEMPERATURE: 98.4 F | RESPIRATION RATE: 18 BRPM | SYSTOLIC BLOOD PRESSURE: 108 MMHG | HEIGHT: 65 IN | DIASTOLIC BLOOD PRESSURE: 82 MMHG | BODY MASS INDEX: 37.49 KG/M2 | WEIGHT: 225 LBS | HEART RATE: 73 BPM

## 2018-01-30 DIAGNOSIS — R10.13 EPIGASTRIC PAIN: Primary | ICD-10-CM

## 2018-01-30 DIAGNOSIS — Z12.11 SCREENING FOR COLON CANCER: ICD-10-CM

## 2018-01-30 PROCEDURE — 99213 OFFICE O/P EST LOW 20 MIN: CPT | Performed by: FAMILY MEDICINE

## 2018-01-30 PROCEDURE — 99214 OFFICE O/P EST MOD 30 MIN: CPT | Performed by: INTERNAL MEDICINE

## 2018-01-30 RX ORDER — LIDOCAINE 50 MG/G
PATCH TOPICAL
Refills: 0 | COMMUNITY
Start: 2018-01-15 | End: 2018-08-03 | Stop reason: ALTCHOICE

## 2018-01-30 RX ORDER — OXYCODONE HYDROCHLORIDE 10 MG/1
TABLET ORAL
COMMUNITY
Start: 2018-01-24 | End: 2018-01-31

## 2018-01-30 RX ORDER — NYSTATIN 100000 U/G
OINTMENT TOPICAL 3 TIMES DAILY
COMMUNITY
Start: 2017-08-04 | End: 2018-04-25 | Stop reason: ALTCHOICE

## 2018-01-30 RX ORDER — SUCRALFATE 1 G/1
1 TABLET ORAL 4 TIMES DAILY
Qty: 120 TABLET | Refills: 0 | Status: SHIPPED | OUTPATIENT
Start: 2018-01-30 | End: 2018-01-31

## 2018-01-30 RX ORDER — RANITIDINE 150 MG/1
150 CAPSULE ORAL 2 TIMES DAILY
Qty: 30 CAPSULE | Refills: 0 | Status: ON HOLD | OUTPATIENT
Start: 2018-01-30 | End: 2018-02-02 | Stop reason: ALTCHOICE

## 2018-01-30 NOTE — LETTER
January 30, 2018     [address]  [address] [facility] [address]    Patient: Alysa Merritt   YOB: 1955   Date of Visit: 1/30/2018       Dear Dr Radha Mroeno: Thank you for referring Luzma Bermudez to me for evaluation  Below are my notes for this consultation  If you have questions, please do not hesitate to call me  I look forward to following your patient along with you  Sincerely,        Velma Mixon MD        CC: No Recipients  Velma Mixon MD  1/30/2018  4:11 PM  Sign at close encounter  Consultation - [facility]  Hernandezwilliam Lucero Matthias [facility] 1955 58 y o  female         Chief Complaint:  Abdominal pain    HPI:  70-year-old female with history of diabetes mellitus, hypertension, chronic back pain reports having abdominal pain for about a week  Her pain is mostly in epigastric area which has been getting worse in the past few days  She went to the emergency room last week and had a CT scan and lab work done which were reported to be normal  Denies any heartburn acid reflux  She was just started on ranitidine twice daily  She denies any use of NSAIDs but normally takes Percocet for back pain from spinal stenosis  She has regular bowel movements and denies any blood or mucus in the stool  She never had colonoscopy in the past     REVIEW OF SYSTEMS: Review of Systems   Constitutional: Negative for activity change, appetite change, chills, diaphoresis, fatigue, fever and unexpected weight change  HENT: Negative for ear discharge, ear pain, facial swelling, hearing loss, nosebleeds, sore throat, tinnitus and voice change  Eyes: Negative for pain, discharge, redness, itching and visual disturbance  Respiratory: Negative for apnea, cough, chest tightness, shortness of breath and wheezing  Cardiovascular: Negative for chest pain and palpitations     Gastrointestinal:        As noted in HPI   Endocrine: Negative for cold intolerance, heat intolerance and polyuria  Genitourinary: Negative for difficulty urinating, dysuria, flank pain, hematuria and urgency  Musculoskeletal: Positive for arthralgias, back pain and gait problem  Negative for joint swelling and myalgias  Skin: Negative for rash and wound  Neurological: Negative for dizziness, tremors, seizures, speech difficulty, light-headedness, numbness and headaches  Hematological: Negative for adenopathy  Does not bruise/bleed easily  Psychiatric/Behavioral: Negative for agitation, behavioral problems and confusion  The patient is not nervous/anxious  Past Medical History:   Diagnosis Date    Anxiety     CHF (congestive heart failure) (HCC)     Chronic lower back pain     CPAP (continuous positive airway pressure) dependence     uses nightly   Diabetes mellitus ([facility] [address] )     Type II    Hyperlipidemia     Hypertension     Morbid obesity (HCC)     Shortness of breath     with exerction - subsides quickly with rest     Sleep apnea     Spinal stenosis     Systolic murmur     Unsteady gait     uses walker    Wears dentures     Wears glasses       Past Surgical History:   Procedure Laterality Date    ABSCESS DRAINAGE      abd, (R) leg, (L) leg     SECTION      ESOPHAGOGASTRODUODENOSCOPY N/A 2016    Procedure: ESOPHAGOGASTRODUODENOSCOPY (EGD); Surgeon: Abhi Martinez MD;  [facility];  Service:     ESOPHAGOGASTRODUODENOSCOPY N/A 3/30/2016    Procedure: ESOPHAGOGASTRODUODENOSCOPY (EGD); Surgeon: Abhi Martinez MD;  [facility];   Service:     EYE SURGERY      laser eye surgery    [place] LAP GASTRIC BYPASS/SOLEDAD-EN-Y N/A 2016    Procedure: BYPASS GASTRIC  SOLEDAD-EN-Y LAPAROSCOPIC;  Surgeon: Abhi Martinez MD;  [facility];  Service: Bariatrics    TUBAL LIGATION       Social History     Social History    Marital status: Single     Spouse name: N/A    Number of children: N/A    Years of education: N/A     Occupational History  Not on file  Social History Main Topics    Smoking status: Former Smoker     Packs/day: 1 00     Years: 25 00     Quit date: 3/30/2006    Smokeless tobacco: Never Used    Alcohol use No    Drug use: No      Comment: Percocet for lower back pain prn    Sexual activity: Not on file     Other Topics Concern    Not on file     Social History Narrative    No narrative on file     History reviewed  No pertinent family history  Tramadol  Current Outpatient Prescriptions   Medication Sig Dispense Refill    Calcium-Vitamin D 500-125 MG-UNIT TABS Take by mouth      lidocaine (LIDODERM) 5 % APPLY 1 PATCH TO AFFECTED AREA(S) OF LUMBAR SPINE AND RIGHT HIP EVERY 12 HOURS  0    Multiple Vitamins-Minerals (BARIATRIC FUSION) CHEW Chew      nystatin (MYCOSTATIN) ointment Apply topically 3 (three) times a day      oxyCODONE (ROXICODONE) 10 MG TABS       ranitidine (ZANTAC) 150 MG capsule Take 1 capsule (150 mg total) by mouth 2 (two) times a day 30 capsule 0    bisacodyl (DULCOLAX) 5 mg EC tablet Take 2 tablets (10 mg total) by mouth once for 1 dose 1 tablet 0    oxyCODONE-acetaminophen (PERCOCET)  mg per tablet Take 1 tablet by mouth every 6 (six) hours as needed for moderate pain   polyethylene glycol (GOLYTELY) 4000 mL solution Take 4,000 mL by mouth once for 1 dose 4000 mL 0    sucralfate (CARAFATE) 1 g tablet Take 1 tablet (1 g total) by mouth 4 (four) times a day 120 tablet 0     No current facility-administered medications for this visit  Blood pressure 108/82, pulse 73, temperature 98 4 °F (36 9 °C), temperature source Tympanic, resp  rate 18, height 5' 5" (1 651 m), weight 102 kg (225 lb), not currently breastfeeding  PHYSICAL EXAM: Physical Exam   Constitutional: She is oriented to person, place, and time  She appears well-developed and well-nourished  HENT:   Head: Normocephalic and atraumatic     Nose: Nose normal    Mouth/Throat: Oropharynx is clear and moist    Eyes: Conjunctivae are normal  Right eye exhibits no discharge  Left eye exhibits no discharge  No scleral icterus  Neck: Neck supple  No JVD present  No tracheal deviation present  No thyromegaly present  Cardiovascular: Normal rate, regular rhythm and normal heart sounds  Exam reveals no gallop and no friction rub  No murmur heard  Pulmonary/Chest: Effort normal and breath sounds normal  No respiratory distress  She has no wheezes  She has no rales  She exhibits no tenderness  Abdominal: Soft  Bowel sounds are normal  She exhibits no distension and no mass  There is tenderness  There is no rebound and no guarding  No hernia  Musculoskeletal: She exhibits no edema, tenderness or deformity  Lymphadenopathy:     She has no cervical adenopathy  Neurological: She is alert and oriented to person, place, and time  Skin: Skin is warm and dry  No rash noted  No erythema  Psychiatric: She has a normal mood and affect  Her behavior is normal  Thought content normal         Lab Results   Component Value Date    WBC 10 50 01/27/2018    HGB 12 0 01/27/2018    HCT 37 3 01/27/2018    MCV 74 (L) 01/27/2018     01/27/2018     Lab Results   Component Value Date    GLUCOSE 124 01/27/2018    CALCIUM 9 5 01/27/2018     01/27/2018    K 3 9 01/27/2018    CO2 29 01/27/2018     01/27/2018    BUN 20 01/27/2018    CREATININE 0 75 01/27/2018     Lab Results   Component Value Date    ALT 11 (L) 01/27/2018    AST 12 01/27/2018    ALKPHOS 66 01/27/2018    BILITOT 0 50 01/27/2018     Lab Results   Component Value Date    INR 1 03 01/27/2018    PROTIME 10 8 01/27/2018       Ct Abdomen Pelvis With Contrast    Result Date: 1/27/2018  Impression: 1  Prior Juan-en-Y gastric bypass with distention of the excluded stomach  2   No evidence of acute abnormality in the abdomen or pelvis   Workstation performed: [id number]       ASSESSMENT & PLAN:    Epigastric pain  Possible from peptic ulcer disease at the anastomotic area   Also consider biliary pathology  -schedule for upper endoscopy this week    -continue ranitidine twice daily and add Carafate for now  Depending on the endoscopy findings will consider proton pump inhibitors     -Patient was explained about the lifestyle and dietary modifications  Advised to avoid fatty foods, chocolates, caffeine, alcohol and any other triggering foods  Avoid eating for at least 3 hours before going to bed         -check right upper quadrant ultrasound    Screening for colon cancer  Patient never had colonoscopy in the past   She is at average risk for colon cancer screening  1   Schedule for colonoscopy  2   High-fiber diet  3   Patient was given instructions about the colonoscopy prep  4   Patient was explained about  the risks and benefits of the procedure  Risks including but not limited to bleeding, infection, perforation were explained in detail  Also explained about less than 100% sensitivity with the exam and other alternatives

## 2018-01-30 NOTE — PROGRESS NOTES
Assessment/Plan:    Epigastric abdominal pain  Symptoms likely secondary to gastritis vs peptic ulcer disease vs GERD  Physical exam showed mild epigastric tenderness, no guarding or rebound tenderness  Given patient's refusal to take Protonix, will start on Zantac 150 milligrams b i d , patient was also advised to take OTC Pepto-Bismol or Maalox for stomach discomfort/upset  Given patient's history of Juan-en-Y gastric bypass about a year and half ago, will send to GI for further workup to rule out strictures or ulcer  Patient was given a requisite for the GI referral during this visit  Diagnoses and all orders for this visit:    Epigastric pain  -     Ambulatory referral to Gastroenterology; Future  -     ranitidine (ZANTAC) 150 MG capsule; Take 1 capsule (150 mg total) by mouth 2 (two) times a day    Other orders  -     Multiple Vitamins-Minerals (BARIATRIC FUSION) CHEW; Chew  -     Calcium-Vitamin D 500-125 MG-UNIT TABS; Take by mouth  -     lidocaine (LIDODERM) 5 %; APPLY 1 PATCH TO AFFECTED AREA(S) OF LUMBAR SPINE AND RIGHT HIP EVERY 12 HOURS  -     nystatin (MYCOSTATIN) ointment; Apply topically 3 (three) times a day  -     oxyCODONE (ROXICODONE) 10 MG TABS; Earliest Fill Date: 1/24/18           Subjective:      Patient ID: Keli Gutierrez is a 58 y o  female  Patient is a 51-year-old female here for abdominal pain x7 days  Patient stated that pain is episodic, mostly located in epigastric area and sharp in nature  No precipitating factors it sometimes comes when she is hungry or after she has eaten  She feels like she has trapped gas which gave her an uncomfortable feeling  She called the office and was prescribed milk of magnesia which helped with the abdominal pain however caused her diarrhea and she stopped taking it  Patient has a history of Juan-en-Y gastric bypass in July 2016    She is worried that she might have an ulcer because she was advised not to take NSAIDs however she started taking meloxicam over a year ago and has been taking it ever since then for her severe arthritis  Patient was also on Protonix for GERD however she read that there are many side effects including dementia, so she stopped taking it  She is able to tolerate p o  diet  Denies any fever, nausea, vomiting, diarrhea  The following portions of the patient's history were reviewed and updated as appropriate: allergies, current medications, past family history, past medical history, past social history, past surgical history and problem list     Review of Systems   Constitutional: Negative  Negative for appetite change and fever  HENT: Negative  Eyes: Negative  Respiratory: Negative  Cardiovascular: Negative  Gastrointestinal: Positive for abdominal pain  Negative for abdominal distention, blood in stool, constipation, diarrhea, nausea and vomiting  Endocrine: Negative  Genitourinary: Negative  Musculoskeletal: Negative  Skin: Negative  Neurological: Negative  Psychiatric/Behavioral: Negative  Objective:     Physical Exam   Constitutional: She is oriented to person, place, and time  She appears well-developed and well-nourished  No distress  HENT:   Head: Normocephalic and atraumatic  Mouth/Throat: Oropharynx is clear and moist  No oropharyngeal exudate  Eyes: Conjunctivae and EOM are normal  Pupils are equal, round, and reactive to light  Neck: Normal range of motion  Neck supple  No thyromegaly present  Cardiovascular: Normal rate, regular rhythm and normal heart sounds  No murmur heard  Pulmonary/Chest: Effort normal  No respiratory distress  She has no wheezes  She has no rales  Abdominal: Soft  Bowel sounds are normal  She exhibits no distension and no mass  There is no tenderness  There is no rebound and no guarding  Mild tenderness in in epigastric region  Musculoskeletal: Normal range of motion  She exhibits no edema, tenderness or deformity  Neurological: She is alert and oriented to person, place, and time  She has normal reflexes  Skin: Skin is warm and dry  No rash noted  No erythema  No pallor  Psychiatric: She has a normal mood and affect

## 2018-01-30 NOTE — ASSESSMENT & PLAN NOTE
Patient never had colonoscopy in the past   She is at average risk for colon cancer screening  1   Schedule for colonoscopy  2   High-fiber diet  3   Patient was given instructions about the colonoscopy prep  4   Patient was explained about  the risks and benefits of the procedure  Risks including but not limited to bleeding, infection, perforation were explained in detail  Also explained about less than 100% sensitivity with the exam and other alternatives

## 2018-01-30 NOTE — PROGRESS NOTES
Consultation - [facility]  Lisa Jones 1955 58 y o  female         Chief Complaint:  Abdominal pain    HPI:  28-year-old female with history of diabetes mellitus, hypertension, chronic back pain reports having abdominal pain for about a week  Her pain is mostly in epigastric area which has been getting worse in the past few days  She went to the emergency room last week and had a CT scan and lab work done which were reported to be normal  Denies any heartburn acid reflux  She was just started on ranitidine twice daily  She denies any use of NSAIDs but normally takes Percocet for back pain from spinal stenosis  She has regular bowel movements and denies any blood or mucus in the stool  She never had colonoscopy in the past     REVIEW OF SYSTEMS: Review of Systems   Constitutional: Negative for activity change, appetite change, chills, diaphoresis, fatigue, fever and unexpected weight change  HENT: Negative for ear discharge, ear pain, facial swelling, hearing loss, nosebleeds, sore throat, tinnitus and voice change  Eyes: Negative for pain, discharge, redness, itching and visual disturbance  Respiratory: Negative for apnea, cough, chest tightness, shortness of breath and wheezing  Cardiovascular: Negative for chest pain and palpitations  Gastrointestinal:        As noted in HPI   Endocrine: Negative for cold intolerance, heat intolerance and polyuria  Genitourinary: Negative for difficulty urinating, dysuria, flank pain, hematuria and urgency  Musculoskeletal: Positive for arthralgias, back pain and gait problem  Negative for joint swelling and myalgias  Skin: Negative for rash and wound  Neurological: Negative for dizziness, tremors, seizures, speech difficulty, light-headedness, numbness and headaches  Hematological: Negative for adenopathy  Does not bruise/bleed easily  Psychiatric/Behavioral: Negative for agitation, behavioral problems and confusion   The patient is not nervous/anxious  Past Medical History:   Diagnosis Date    Anxiety     CHF (congestive heart failure) (HCC)     Chronic lower back pain     CPAP (continuous positive airway pressure) dependence     uses nightly   Diabetes mellitus ([address] )     Type II    Hyperlipidemia     Hypertension     Morbid obesity (HCC)     Shortness of breath     with exerction - subsides quickly with rest     Sleep apnea     Spinal stenosis     Systolic murmur     Unsteady gait     uses walker    Wears dentures     Wears glasses       Past Surgical History:   Procedure Laterality Date    ABSCESS DRAINAGE      abd, (R) leg, (L) leg     SECTION      ESOPHAGOGASTRODUODENOSCOPY N/A 2016    Procedure: ESOPHAGOGASTRODUODENOSCOPY (EGD); Surgeon: Patrick Montanez MD;  Location: [facility];  Service:     ESOPHAGOGASTRODUODENOSCOPY N/A 3/30/2016    Procedure: ESOPHAGOGASTRODUODENOSCOPY (EGD); Surgeon: Patrick Montanez MD;  Location[facility]; Service:     EYE SURGERY      laser eye surgery    MI LAP GASTRIC BYPASS/SOLEDAD-EN-Y N/A 2016    Procedure: BYPASS GASTRIC  SOLEDAD-EN-Y LAPAROSCOPIC;  Surgeon: Patrick Montanez MD;  [facility];  Service: Bariatrics    TUBAL LIGATION       Social History     Social History    Marital status: Single     Spouse name: N/A    Number of children: N/A    Years of education: N/A     Occupational History    Not on file  Social History Main Topics    Smoking status: Former Smoker     Packs/day: 1 00     Years: 25 00     Quit date: 3/30/2006    Smokeless tobacco: Never Used    Alcohol use No    Drug use: No      Comment: Percocet for lower back pain prn    Sexual activity: Not on file     Other Topics Concern    Not on file     Social History Narrative    No narrative on file     History reviewed  No pertinent family history    Tramadol  Current Outpatient Prescriptions   Medication Sig Dispense Refill    Calcium-Vitamin D 500-125 MG-UNIT TABS Take by mouth      lidocaine (LIDODERM) 5 % APPLY 1 PATCH TO AFFECTED AREA(S) OF LUMBAR SPINE AND RIGHT HIP EVERY 12 HOURS  0    Multiple Vitamins-Minerals (BARIATRIC FUSION) CHEW Chew      nystatin (MYCOSTATIN) ointment Apply topically 3 (three) times a day      oxyCODONE (ROXICODONE) 10 MG TABS       ranitidine (ZANTAC) 150 MG capsule Take 1 capsule (150 mg total) by mouth 2 (two) times a day 30 capsule 0    bisacodyl (DULCOLAX) 5 mg EC tablet Take 2 tablets (10 mg total) by mouth once for 1 dose 1 tablet 0    oxyCODONE-acetaminophen (PERCOCET)  mg per tablet Take 1 tablet by mouth every 6 (six) hours as needed for moderate pain   polyethylene glycol (GOLYTELY) 4000 mL solution Take 4,000 mL by mouth once for 1 dose 4000 mL 0    sucralfate (CARAFATE) 1 g tablet Take 1 tablet (1 g total) by mouth 4 (four) times a day 120 tablet 0     No current facility-administered medications for this visit  Blood pressure 108/82, pulse 73, temperature 98 4 °F (36 9 °C), temperature source Tympanic, resp  rate 18, height 5' 5" (1 651 m), weight 102 kg (225 lb), not currently breastfeeding  PHYSICAL EXAM: Physical Exam   Constitutional: She is oriented to person, place, and time  She appears well-developed and well-nourished  HENT:   Head: Normocephalic and atraumatic  Nose: Nose normal    Mouth/Throat: Oropharynx is clear and moist    Eyes: Conjunctivae are normal  Right eye exhibits no discharge  Left eye exhibits no discharge  No scleral icterus  Neck: Neck supple  No JVD present  No tracheal deviation present  No thyromegaly present  Cardiovascular: Normal rate, regular rhythm and normal heart sounds  Exam reveals no gallop and no friction rub  No murmur heard  Pulmonary/Chest: Effort normal and breath sounds normal  No respiratory distress  She has no wheezes  She has no rales  She exhibits no tenderness  Abdominal: Soft   Bowel sounds are normal  She exhibits no distension and no mass  There is tenderness  There is no rebound and no guarding  No hernia  Musculoskeletal: She exhibits no edema, tenderness or deformity  Lymphadenopathy:     She has no cervical adenopathy  Neurological: She is alert and oriented to person, place, and time  Skin: Skin is warm and dry  No rash noted  No erythema  Psychiatric: She has a normal mood and affect  Her behavior is normal  Thought content normal         Lab Results   Component Value Date    WBC 10 50 01/27/2018    HGB 12 0 01/27/2018    HCT 37 3 01/27/2018    MCV 74 (L) 01/27/2018     01/27/2018     Lab Results   Component Value Date    GLUCOSE 124 01/27/2018    CALCIUM 9 5 01/27/2018     01/27/2018    K 3 9 01/27/2018    CO2 29 01/27/2018     01/27/2018    BUN 20 01/27/2018    CREATININE 0 75 01/27/2018     Lab Results   Component Value Date    ALT 11 (L) 01/27/2018    AST 12 01/27/2018    ALKPHOS 66 01/27/2018    BILITOT 0 50 01/27/2018     Lab Results   Component Value Date    INR 1 03 01/27/2018    PROTIME 10 8 01/27/2018       Ct Abdomen Pelvis With Contrast    Result Date: 1/27/2018  Impression: 1  Prior Juan-en-Y gastric bypass with distention of the excluded stomach  2   No evidence of acute abnormality in the abdomen or pelvis  Workstation performed: [id number]       ASSESSMENT & PLAN:    Epigastric pain  Possible from peptic ulcer disease at the anastomotic area  Also consider biliary pathology  -schedule for upper endoscopy this week    -continue ranitidine twice daily and add Carafate for now  Depending on the endoscopy findings will consider proton pump inhibitors     -Patient was explained about the lifestyle and dietary modifications  Advised to avoid fatty foods, chocolates, caffeine, alcohol and any other triggering foods    Avoid eating for at least 3 hours before going to bed         -check right upper quadrant ultrasound    Screening for colon cancer  Patient never had colonoscopy in the past  She is at average risk for colon cancer screening  1   Schedule for colonoscopy  2   High-fiber diet  3   Patient was given instructions about the colonoscopy prep  4   Patient was explained about  the risks and benefits of the procedure  Risks including but not limited to bleeding, infection, perforation were explained in detail  Also explained about less than 100% sensitivity with the exam and other alternatives

## 2018-01-30 NOTE — ASSESSMENT & PLAN NOTE
Possible from peptic ulcer disease at the anastomotic area  Also consider biliary pathology  -schedule for upper endoscopy this week    -continue ranitidine twice daily and add Carafate for now  Depending on the endoscopy findings will consider proton pump inhibitors     -Patient was explained about the lifestyle and dietary modifications  Advised to avoid fatty foods, chocolates, caffeine, alcohol and any other triggering foods    Avoid eating for at least 3 hours before going to bed         -check right upper quadrant ultrasound

## 2018-01-30 NOTE — PATIENT INSTRUCTIONS
Abdominal Pain   AMBULATORY CARE:   Abdominal pain  can be dull, achy, or sharp  You may have pain in one area of your abdomen, or in your entire abdomen  Your pain may be caused by a condition such as constipation, food sensitivity or poisoning, infection, or a blockage  Abdominal pain can also be from a hernia, appendicitis, or an ulcer  Liver, gallbladder, or kidney conditions can also cause abdominal pain  The cause of your abdominal pain may be unknown  Seek care immediately if:   · You have new chest pain or shortness of breath  · You have pulsing pain in your upper abdomen or lower back that suddenly becomes constant  · Your pain is in the right lower abdominal area and worsens with movement  · You have a fever over 100 4°F (38°C) or shaking chills  · You are vomiting and cannot keep food or liquids down  · Your pain does not improve or gets worse over the next 8 to 12 hours  · You see blood in your vomit or bowel movements, or they look black and tarry  · Your skin or the whites of your eyes turn yellow  · You are a woman and have a large amount of vaginal bleeding that is not your monthly period  Contact your healthcare provider if:   · You have pain in your lower back  · You are a man and have pain in your testicles  · You have pain when you urinate  · You have questions or concerns about your condition or care  Treatment for abdominal pain  may include medicine to calm your stomach, prevent vomiting, or decrease pain  Follow up with your healthcare provider as directed:  Write down your questions so you remember to ask them during your visits  © 2017 [address]  Information is for End User's use only and may not be sold, redistributed or otherwise used for commercial purposes  All illustrations and images included in CareNotes® are the copyrighted property of [employer]  or Arnal[employer]    The above information is an educational aid only  It is not intended as medical advice for individual conditions or treatments  Talk to your doctor, nurse or pharmacist before following any medical regimen to see if it is safe and effective for you

## 2018-01-31 ENCOUNTER — HOSPITAL ENCOUNTER (OUTPATIENT)
Dept: RADIOLOGY | Facility: HOSPITAL | Age: 63
Discharge: HOME | End: 2018-01-31
Attending: INTERNAL MEDICINE
Payer: MEDICARE

## 2018-01-31 DIAGNOSIS — R10.13 EPIGASTRIC PAIN: ICD-10-CM

## 2018-01-31 PROCEDURE — 76705 ECHO EXAM OF ABDOMEN: CPT

## 2018-01-31 NOTE — ASSESSMENT & PLAN NOTE
Symptoms likely secondary to gastritis vs peptic ulcer disease vs GERD  Physical exam showed mild epigastric tenderness, no guarding or rebound tenderness  Given patient's refusal to take Protonix, will start on Zantac 150 milligrams b i d , patient was also advised to take OTC Pepto-Bismol or Maalox for stomach discomfort/upset  Given patient's history of Juan-en-Y gastric bypass about a year and half ago, will send to GI for further workup to rule out strictures or ulcer  Patient was given a requisite for the GI referral during this visit

## 2018-01-31 NOTE — PRE-PROCEDURE INSTRUCTIONS
Pre-Surgery Instructions:   Medication Instructions    Calcium-Vitamin D 500-125 MG-UNIT TABS Patient was instructed by Physician and understands   lidocaine (LIDODERM) 5 % Patient was instructed by Physician and understands   Multiple Vitamins-Minerals (BARIATRIC FUSION) CHEW Patient was instructed by Physician and understands   nystatin (MYCOSTATIN) ointment Patient was instructed by Physician and understands   oxyCODONE-acetaminophen (PERCOCET)  mg per tablet Patient was instructed by Physician and understands   ranitidine (ZANTAC) 150 MG capsule Patient was instructed by Physician and understands

## 2018-02-01 DIAGNOSIS — R10.13 EPIGASTRIC PAIN: Primary | ICD-10-CM

## 2018-02-01 RX ORDER — OMEPRAZOLE 20 MG/1
CAPSULE, DELAYED RELEASE ORAL
Qty: 60 CAPSULE | Refills: 0 | Status: SHIPPED | OUTPATIENT
Start: 2018-02-01 | End: 2018-02-02 | Stop reason: HOSPADM

## 2018-02-01 RX ORDER — SUCRALFATE 1 G/1
1 TABLET ORAL 4 TIMES DAILY
Qty: 120 TABLET | Refills: 0 | Status: ON HOLD | OUTPATIENT
Start: 2018-02-01 | End: 2018-03-09

## 2018-02-02 ENCOUNTER — HOSPITAL ENCOUNTER (OUTPATIENT)
Facility: AMBULARY SURGERY CENTER | Age: 63
Setting detail: OUTPATIENT SURGERY
Discharge: HOME | End: 2018-02-02
Attending: INTERNAL MEDICINE | Admitting: INTERNAL MEDICINE
Payer: MEDICARE

## 2018-02-02 ENCOUNTER — ANESTHESIA EVENT (OUTPATIENT)
Dept: GASTROENTEROLOGY | Facility: AMBULARY SURGERY CENTER | Age: 63
End: 2018-02-02
Payer: MEDICARE

## 2018-02-02 ENCOUNTER — TELEPHONE (OUTPATIENT)
Dept: BARIATRICS | Facility: CLINIC | Age: 63
End: 2018-02-02

## 2018-02-02 VITALS
TEMPERATURE: 98.6 F | RESPIRATION RATE: 18 BRPM | OXYGEN SATURATION: 99 % | HEART RATE: 62 BPM | DIASTOLIC BLOOD PRESSURE: 89 MMHG | SYSTOLIC BLOOD PRESSURE: 135 MMHG

## 2018-02-02 DIAGNOSIS — K28.9 ULCER AT SITE OF SURGICAL ANASTOMOSIS FOLLOWING BYPASS OF STOMACH: Primary | ICD-10-CM

## 2018-02-02 DIAGNOSIS — T85.898A ULCER AT SITE OF SURGICAL ANASTOMOSIS FOLLOWING BYPASS OF STOMACH: Primary | ICD-10-CM

## 2018-02-02 DIAGNOSIS — R10.13 EPIGASTRIC ABDOMINAL PAIN: ICD-10-CM

## 2018-02-02 PROCEDURE — 88341 IMHCHEM/IMCYTCHM EA ADD ANTB: CPT | Performed by: PATHOLOGY

## 2018-02-02 PROCEDURE — 88305 TISSUE EXAM BY PATHOLOGIST: CPT | Performed by: INTERNAL MEDICINE

## 2018-02-02 PROCEDURE — 88342 IMHCHEM/IMCYTCHM 1ST ANTB: CPT | Performed by: INTERNAL MEDICINE

## 2018-02-02 PROCEDURE — 88342 IMHCHEM/IMCYTCHM 1ST ANTB: CPT | Performed by: PATHOLOGY

## 2018-02-02 PROCEDURE — 43239 EGD BIOPSY SINGLE/MULTIPLE: CPT | Performed by: INTERNAL MEDICINE

## 2018-02-02 PROCEDURE — 88341 IMHCHEM/IMCYTCHM EA ADD ANTB: CPT | Performed by: INTERNAL MEDICINE

## 2018-02-02 PROCEDURE — 88305 TISSUE EXAM BY PATHOLOGIST: CPT | Performed by: PATHOLOGY

## 2018-02-02 RX ORDER — PANTOPRAZOLE SODIUM 40 MG/1
40 TABLET, DELAYED RELEASE ORAL 2 TIMES DAILY
Qty: 60 TABLET | Refills: 11 | Status: SHIPPED | OUTPATIENT
Start: 2018-02-02 | End: 2018-02-09 | Stop reason: ALTCHOICE

## 2018-02-02 RX ORDER — SODIUM CHLORIDE, SODIUM LACTATE, POTASSIUM CHLORIDE, CALCIUM CHLORIDE 600; 310; 30; 20 MG/100ML; MG/100ML; MG/100ML; MG/100ML
100 INJECTION, SOLUTION INTRAVENOUS CONTINUOUS
Status: DISCONTINUED | OUTPATIENT
Start: 2018-02-02 | End: 2018-02-02 | Stop reason: HOSPADM

## 2018-02-02 RX ORDER — PROPOFOL 10 MG/ML
INJECTION, EMULSION INTRAVENOUS AS NEEDED
Status: DISCONTINUED | OUTPATIENT
Start: 2018-02-02 | End: 2018-02-02 | Stop reason: SURG

## 2018-02-02 RX ADMIN — PROPOFOL 50 MG: 10 INJECTION, EMULSION INTRAVENOUS at 11:27

## 2018-02-02 RX ADMIN — SODIUM CHLORIDE, SODIUM LACTATE, POTASSIUM CHLORIDE, AND CALCIUM CHLORIDE 100 ML/HR: .6; .31; .03; .02 INJECTION, SOLUTION INTRAVENOUS at 11:00

## 2018-02-02 RX ADMIN — PROPOFOL 20 MG: 10 INJECTION, EMULSION INTRAVENOUS at 11:29

## 2018-02-02 RX ADMIN — PROPOFOL 20 MG: 10 INJECTION, EMULSION INTRAVENOUS at 11:28

## 2018-02-02 RX ADMIN — PROPOFOL 20 MG: 10 INJECTION, EMULSION INTRAVENOUS at 11:31

## 2018-02-02 RX ADMIN — PROPOFOL 20 MG: 10 INJECTION, EMULSION INTRAVENOUS at 11:33

## 2018-02-02 NOTE — OP NOTE
ESOPHAGOGASTRODUODENOSCOPY    PROCEDURE: EGD/ Biopsy    INDICATIONS: Abdominal pain, Epigastric    POST-OP DIAGNOSIS: See the impression below    SEDATION: Monitored anesthesia care, check anesthesia records    PHYSICAL EXAM:    Vitals:    02/02/18 1047   BP: 122/70   Pulse: 64   Resp: 20   Temp: 98 6 °F (37 °C)   SpO2: 98%    There is no height or weight on file to calculate BMI  General: NAD  Heart: S1 & S2 normal, RRR  Lungs: CTA, No rales or rhonchi  Abdomen: Soft, nontender, nondistended, good bowel sounds    CONSENT:  Informed consent was obtained for the procedure, including sedation after explaining the risks and benefits of the procedure  Risks including but not limited to bleeding, perforation, infection, aspiration were discussed in detail  Also explained about less than 100% sensitivity with the exam and other alternatives  PREPARATION:   EKG tracing, pulse oximetry, blood pressure were monitored throughout the procedure  Patient was identified by myself both verbally and by visual inspection of ID band  DESCRIPTION:   Patient was placed in the left lateral decubitus position and was sedated with the above medication  The gastroscope was introduced in to the oropharynx and the esophagus was intubated under direct visualization  Scope was passed down the esophagus, via gastroenteric anastomosis into the small bowel  A careful inspection was made as the gastroscope was withdrawn, including a retroflexed view of the stomach; findings and interventions are described below  FINDINGS:    #1  Esophagus and GEJ- stat small hiatal hernia was noted  #2  Stomach- status post gastric bypass surgery  Erythema was noted in the gastric body  Biopsies done to check for a H pylori  #3   Gastric enteric anastomosis- very large deep ulcer with necrotic appearing base was noted along with superficial ulcerations in the adjacent area      #4   Small intestine-normal mucosa         IMPRESSIONS:      As above    RECOMMENDATIONS:     Check pathology    Pantoprazole twice daily along with Carafate q i d     EGD in 3 months    COMPLICATIONS:  None; patient tolerated the procedure well            DISPOSITION: PACU           CONDITION: Stable

## 2018-02-02 NOTE — ANESTHESIA PREPROCEDURE EVALUATION
Review of Systems/Medical History      No history of anesthetic complications     Cardiovascular  No angina ,   Comment: " HX of leaky valve"  ,  Pulmonary  No shortness of breath, No recent URI ,        GI/Hepatic            Endo/Other    Obesity  morbid obesity   GYN       Hematology   Musculoskeletal  Back pain , lumbar pain,   Arthritis     Neurology   Psychology           Physical Exam    Airway    Mallampati score: II  TM Distance: >3 FB  Neck ROM: full     Dental   upper dentures,     Cardiovascular  Rhythm: regular, Rate: normal, Murmur,     Pulmonary  Breath sounds clear to auscultation,     Other Findings        Anesthesia Plan  ASA Score- 4     Anesthesia Type- IV sedation with anesthesia with ASA Monitors  Additional Monitors:   Airway Plan:         Plan Factors-    Induction- intravenous  Postoperative Plan-     Informed Consent- Anesthetic plan and risks discussed with patient              Morbid obesity (HCC)    Systolic murmur    Chronic lower back pain    Spinal stenosis    Unsteady gait uses walker   Wears dentures    Wears glasses    Arthritis    Chronic pain disorder

## 2018-02-02 NOTE — TELEPHONE ENCOUNTER
Pt called because she had an EGD today with Gi  She said "they found a large ulcer  The biggest he has ever seen " She said she was started on Protonix and Carafate  She was tols to follow up wit our office  Offered pt an appointment today but she couldn't come because she has no   She is scheduled for next week  She has not been seen in the office for follow up since 2016

## 2018-02-02 NOTE — H&P
History and Physical - [facility]  Johnny Felder 58 y o  female MRN: [id number]        HPI: 60-year-old female with history of diabetes mellitus, hypertension, chronic back pain reports having abdominal pain for about a week  Her pain is mostly in epigastric area which has been getting worse in the past few days  She went to the emergency room last week and had a CT scan and lab work done which were reported to be normal  Denies any heartburn acid reflux  She was just started on ranitidine twice daily  She denies any use of NSAIDs but normally takes Percocet for back pain from spinal stenosis  She has regular bowel movements and denies any blood or mucus in the stool  She never had colonoscopy in the past     Historical Information   Past Medical History:   Diagnosis Date    Arthritis     Chronic lower back pain     Chronic pain disorder     back pain    Morbid obesity ([address] )     Spinal stenosis     Systolic murmur     Unsteady gait     uses walker    Wears dentures     Wears glasses      Past Surgical History:   Procedure Laterality Date    ABSCESS DRAINAGE      abd, (R) leg, (L) leg     SECTION      ESOPHAGOGASTRODUODENOSCOPY N/A 2016    Procedure: ESOPHAGOGASTRODUODENOSCOPY (EGD); Surgeon: Gwendolyn Green MD;  [facility];  Service:     ESOPHAGOGASTRODUODENOSCOPY N/A 3/30/2016    Procedure: ESOPHAGOGASTRODUODENOSCOPY (EGD); Surgeon: Gwendolyn Green MD;  [facility];   Service:     EYE SURGERY      laser eye surgery    JOINT REPLACEMENT Left 2017    hip    JOINT REPLACEMENT Right 2017    hip    MO LAP GASTRIC BYPASS/SOLEDAD-EN-Y N/A 2016    Procedure: BYPASS GASTRIC  SOLEDAD-EN-Y LAPAROSCOPIC;  Surgeon: Gwendolyn Green MD;  [facility];  Service: Bariatrics    TUBAL LIGATION       Social History   History   Alcohol Use No     History   Drug Use No     Comment: Percocet for lower back pain prn     History   Smoking Status    Former Smoker    Packs/day: 1 00    Years: 25 00    Quit date: 3/30/2006   Smokeless Tobacco    Never Used     Family History   Problem Relation Age of Onset    Adopted: Yes    Leukemia Mother     Heart disease Father     No Known Problems Sister     No Known Problems Brother     Diabetes Son     No Known Problems Brother     No Known Problems Brother     No Known Problems Sister     No Known Problems Sister        Meds/Allergies     Prescriptions Prior to Admission   Medication    Calcium-Vitamin D 500-125 MG-UNIT TABS    lidocaine (LIDODERM) 5 %    Multiple Vitamins-Minerals (BARIATRIC FUSION) CHEW    nystatin (MYCOSTATIN) ointment    omeprazole (PriLOSEC) 20 mg delayed release capsule    oxyCODONE-acetaminophen (PERCOCET)  mg per tablet    sucralfate (CARAFATE) 1 g tablet       Allergies   Allergen Reactions    Tramadol Other (See Comments)     Dizzy         Objective     Blood pressure 122/70, pulse 64, temperature 98 6 °F (37 °C), temperature source Tympanic, resp  rate 20, SpO2 98 %, not currently breastfeeding      PHYSICAL EXAM:    Gen: NAD  CV: S1 & S2 normal, RRR  CHEST: Clear to auscultate  ABD: soft, NT/ND, good bowel sounds  EXT: no edema    ASSESSMENT:     Abdominal pain    PLAN:    EGD

## 2018-02-09 ENCOUNTER — OFFICE VISIT (OUTPATIENT)
Dept: BARIATRICS | Facility: CLINIC | Age: 63
End: 2018-02-09
Payer: MEDICARE

## 2018-02-09 VITALS
DIASTOLIC BLOOD PRESSURE: 88 MMHG | WEIGHT: 230.5 LBS | BODY MASS INDEX: 39.35 KG/M2 | SYSTOLIC BLOOD PRESSURE: 140 MMHG | TEMPERATURE: 98.5 F | RESPIRATION RATE: 18 BRPM | HEIGHT: 64 IN | HEART RATE: 88 BPM

## 2018-02-09 DIAGNOSIS — Z90.3 POSTGASTRECTOMY MALABSORPTION: ICD-10-CM

## 2018-02-09 DIAGNOSIS — K28.9 MARGINAL ULCER: ICD-10-CM

## 2018-02-09 DIAGNOSIS — E66.01 MORBID OBESITY DUE TO EXCESS CALORIES (HCC): Primary | ICD-10-CM

## 2018-02-09 DIAGNOSIS — Z98.84 S/P GASTRIC BYPASS: ICD-10-CM

## 2018-02-09 DIAGNOSIS — K91.2 POSTGASTRECTOMY MALABSORPTION: ICD-10-CM

## 2018-02-09 PROBLEM — R10.13 EPIGASTRIC ABDOMINAL PAIN: Status: RESOLVED | Noted: 2018-01-30 | Resolved: 2018-02-09

## 2018-02-09 PROBLEM — R10.13 EPIGASTRIC PAIN: Status: RESOLVED | Noted: 2018-01-30 | Resolved: 2018-02-09

## 2018-02-09 PROCEDURE — 99213 OFFICE O/P EST LOW 20 MIN: CPT | Performed by: SURGERY

## 2018-02-09 RX ORDER — OMEPRAZOLE 20 MG/1
20 CAPSULE, DELAYED RELEASE ORAL 2 TIMES DAILY
Qty: 60 CAPSULE | Refills: 2 | Status: SHIPPED | OUTPATIENT
Start: 2018-02-09 | End: 2018-02-20

## 2018-02-09 NOTE — PROGRESS NOTES
Assessment/Plan:      Diagnoses and all orders for this visit:    Morbid obesity due to excess calories (HCC)  -     CBC and differential; Future  -     Comprehensive metabolic panel; Future  -     Ferritin; Future  -     Folate RBC; Future  -     Lipid panel; Future  -     Vitamin B12; Future  -     Vitamin D 25 hydroxy; Future  -     Vitamin B1 (Thiamine), Serum/Plasma, LC/MS/MS; Future  -     Vitamin A; Future    Postgastrectomy malabsorption  -     CBC and differential; Future  -     Comprehensive metabolic panel; Future  -     Ferritin; Future  -     Folate RBC; Future  -     Lipid panel; Future  -     Vitamin B12; Future  -     Vitamin D 25 hydroxy; Future  -     Vitamin B1 (Thiamine), Serum/Plasma, LC/MS/MS; Future  -     Vitamin A; Future    S/P gastric bypass    Marginal ulcer  -     omeprazole (PriLOSEC) 20 mg delayed release capsule; Take 1 capsule (20 mg total) by mouth 2 (two) times a day        Patient is status post Juan-en-Y gastric bypass in July of 2016  She did pretty well thereafter, however she had weight regain due to immobility following bilateral hip replacements  She is considerably more mobile since these procedures  Notably, roughly 3 weeks ago she developed intense burning postprandial epigastric pain  She underwent an endoscopy with her gastroenterologist, Dr Sang Brizuela, who discovered a large marginal ulcer  Upon calling this office, the writer recommended the patient resume omeprazole with opening the capsule  She stated that this intervention produced immediate relief and that she has been able to tolerate most foods albeit with some discomfort since then  We will continue on twice a day omeprazole as well as subtle protection with Carafate and, if it continues to produce mild diarrhea, the patient may take Pepto-Bismol and its place   As the patient's symptoms are well controlled at this time, we will defer further follow-up until an upper endoscopy in 4 months to reassess the state of her ulcer  Additionally, since the patient has not had bariatric follow-up in some time, I will order bariatric labs at this point  Subjective:  Patient with marginal ulcer status post Juan-en-Y gastric bypass diagnosed 2 weeks ago  Symptoms presently controlled with medication  Patient ID: Robin Brown is a 58 y o  female      HPI  Per above    Review of Systems    No fever/chills  No hematemesis/dark stools  No abdominal pain presently  Good weight loss (EWL 60%)  Improved mobility and functionality following bilateral hip replacement last year    Objective:  Vitals:    02/09/18 0928   BP: 140/88   Pulse: 88   Resp: 18   Temp: 98 5 °F (36 9 °C)      Physical Exam    NAD, alert  No pallor  Normal inspiratory effort  Abdomen soft, non-tender, non-distended

## 2018-02-12 ENCOUNTER — APPOINTMENT (OUTPATIENT)
Dept: LAB | Facility: CLINIC | Age: 63
End: 2018-02-12
Payer: MEDICARE

## 2018-02-12 DIAGNOSIS — K91.2 POSTGASTRECTOMY MALABSORPTION: ICD-10-CM

## 2018-02-12 DIAGNOSIS — E66.01 MORBID OBESITY DUE TO EXCESS CALORIES (HCC): ICD-10-CM

## 2018-02-12 DIAGNOSIS — Z90.3 POSTGASTRECTOMY MALABSORPTION: ICD-10-CM

## 2018-02-12 LAB
25(OH)D3 SERPL-MCNC: 23.4 NG/ML (ref 30–100)
ALBUMIN SERPL BCP-MCNC: 2.9 G/DL (ref 3.5–5)
ALP SERPL-CCNC: 57 U/L (ref 46–116)
ALT SERPL W P-5'-P-CCNC: 13 U/L (ref 12–78)
ANION GAP SERPL CALCULATED.3IONS-SCNC: 7 MMOL/L (ref 4–13)
AST SERPL W P-5'-P-CCNC: 10 U/L (ref 5–45)
BASOPHILS # BLD AUTO: 0.02 THOUSANDS/ΜL (ref 0–0.1)
BASOPHILS NFR BLD AUTO: 0 % (ref 0–1)
BILIRUB SERPL-MCNC: 0.43 MG/DL (ref 0.2–1)
BUN SERPL-MCNC: 16 MG/DL (ref 5–25)
CALCIUM SERPL-MCNC: 9 MG/DL (ref 8.3–10.1)
CHLORIDE SERPL-SCNC: 104 MMOL/L (ref 100–108)
CHOLEST SERPL-MCNC: 195 MG/DL (ref 50–200)
CO2 SERPL-SCNC: 31 MMOL/L (ref 21–32)
CREAT SERPL-MCNC: 0.68 MG/DL (ref 0.6–1.3)
EOSINOPHIL # BLD AUTO: 0.17 THOUSAND/ΜL (ref 0–0.61)
EOSINOPHIL NFR BLD AUTO: 3 % (ref 0–6)
ERYTHROCYTE [DISTWIDTH] IN BLOOD BY AUTOMATED COUNT: 16.4 % (ref 11.6–15.1)
FERRITIN SERPL-MCNC: 23 NG/ML (ref 8–388)
GFR SERPL CREATININE-BSD FRML MDRD: 94 ML/MIN/1.73SQ M
GLUCOSE P FAST SERPL-MCNC: 85 MG/DL (ref 65–99)
HCT VFR BLD AUTO: 36.8 % (ref 34.8–46.1)
HDLC SERPL-MCNC: 53 MG/DL (ref 40–60)
HGB BLD-MCNC: 11.5 G/DL (ref 11.5–15.4)
LDLC SERPL CALC-MCNC: 114 MG/DL (ref 0–100)
LYMPHOCYTES # BLD AUTO: 1.73 THOUSANDS/ΜL (ref 0.6–4.47)
LYMPHOCYTES NFR BLD AUTO: 30 % (ref 14–44)
MCH RBC QN AUTO: 23.8 PG (ref 26.8–34.3)
MCHC RBC AUTO-ENTMCNC: 31.3 G/DL (ref 31.4–37.4)
MCV RBC AUTO: 76 FL (ref 82–98)
MONOCYTES # BLD AUTO: 0.4 THOUSAND/ΜL (ref 0.17–1.22)
MONOCYTES NFR BLD AUTO: 7 % (ref 4–12)
NEUTROPHILS # BLD AUTO: 3.38 THOUSANDS/ΜL (ref 1.85–7.62)
NEUTS SEG NFR BLD AUTO: 60 % (ref 43–75)
NRBC BLD AUTO-RTO: 0 /100 WBCS
PLATELET # BLD AUTO: 233 THOUSANDS/UL (ref 149–390)
PMV BLD AUTO: 9.7 FL (ref 8.9–12.7)
POTASSIUM SERPL-SCNC: 4.5 MMOL/L (ref 3.5–5.3)
PROT SERPL-MCNC: 6.8 G/DL (ref 6.4–8.2)
RBC # BLD AUTO: 4.84 MILLION/UL (ref 3.81–5.12)
SODIUM SERPL-SCNC: 142 MMOL/L (ref 136–145)
TRIGL SERPL-MCNC: 141 MG/DL
VIT B12 SERPL-MCNC: 619 PG/ML (ref 100–900)
WBC # BLD AUTO: 5.73 THOUSAND/UL (ref 4.31–10.16)

## 2018-02-12 PROCEDURE — 82306 VITAMIN D 25 HYDROXY: CPT

## 2018-02-12 PROCEDURE — 84590 ASSAY OF VITAMIN A: CPT

## 2018-02-12 PROCEDURE — 82607 VITAMIN B-12: CPT

## 2018-02-12 PROCEDURE — 84425 ASSAY OF VITAMIN B-1: CPT

## 2018-02-12 PROCEDURE — 80053 COMPREHEN METABOLIC PANEL: CPT

## 2018-02-12 PROCEDURE — 80061 LIPID PANEL: CPT

## 2018-02-12 PROCEDURE — 85025 COMPLETE CBC W/AUTO DIFF WBC: CPT

## 2018-02-12 PROCEDURE — 82747 ASSAY OF FOLIC ACID RBC: CPT

## 2018-02-12 PROCEDURE — 36415 COLL VENOUS BLD VENIPUNCTURE: CPT

## 2018-02-12 PROCEDURE — 82728 ASSAY OF FERRITIN: CPT

## 2018-02-14 LAB
FOLATE BLD-MCNC: 589.6 NG/ML
FOLATE RBC-MCNC: 1620 NG/ML
HCT VFR BLD AUTO: 36.4 % (ref 34–46.6)

## 2018-02-15 LAB
VIT A SERPL-MCNC: 88 UG/DL (ref 26–82)
VIT B1 BLD-SCNC: 95.5 NMOL/L (ref 66.5–200)

## 2018-02-20 DIAGNOSIS — K28.9 MARGINAL ULCER: Primary | ICD-10-CM

## 2018-02-22 RX ORDER — OMEPRAZOLE 20 MG/1
20 CAPSULE, DELAYED RELEASE ORAL 2 TIMES DAILY
Qty: 90 CAPSULE | Refills: 3 | Status: SHIPPED | OUTPATIENT
Start: 2018-02-22 | End: 2018-04-25 | Stop reason: SDUPTHER

## 2018-02-23 ENCOUNTER — TELEPHONE (OUTPATIENT)
Dept: GASTROENTEROLOGY | Facility: CLINIC | Age: 63
End: 2018-02-23

## 2018-03-06 NOTE — PRE-PROCEDURE INSTRUCTIONS
Pre-Surgery Instructions:   Medication Instructions    Calcium-Vitamin D 500-125 MG-UNIT TABS Patient was instructed by Physician and understands   lidocaine (LIDODERM) 5 % Patient was instructed by Physician and understands   Multiple Vitamins-Minerals (BARIATRIC FUSION) CHEW Patient was instructed by Physician and understands   nystatin (MYCOSTATIN) ointment Patient was instructed by Physician and understands   omeprazole (PriLOSEC) 20 mg delayed release capsule Patient was instructed by Physician and understands   oxyCODONE-acetaminophen (PERCOCET)  mg per tablet Patient was instructed by Physician and understands   sucralfate (CARAFATE) 1 g tablet Patient was instructed by Physician and understands

## 2018-03-08 ENCOUNTER — ANESTHESIA EVENT (OUTPATIENT)
Dept: GASTROENTEROLOGY | Facility: AMBULARY SURGERY CENTER | Age: 63
End: 2018-03-08
Payer: MEDICARE

## 2018-03-08 NOTE — ANESTHESIA PREPROCEDURE EVALUATION
Review of Systems/Medical History  Patient summary reviewed  Chart reviewed  No history of anesthetic complications     Cardiovascular  Valvular heart disease , aortic valve stenosis,    Pulmonary  Smoker (quit 2006) ex-smoker  ,        GI/Hepatic    GERD ,   Comment: S/p gastric bypass 2016          Endo/Other    Obesity  morbid obesity   GYN      Comment: S/p tubal     Hematology   Musculoskeletal  Back pain (chronic pain) , spinal stenosis,   Comment: S/p bilateral hip replacement Arthritis     Neurology   Psychology           Physical Exam    Airway    Mallampati score: III  TM Distance: >3 FB  Neck ROM: full     Dental   upper dentures,     Cardiovascular  Rhythm: regular, Rate: normal,     Pulmonary  Breath sounds clear to auscultation, Rales: Full upper denture and partial lower denture ,     Other Findings        Anesthesia Plan  ASA Score- 3     Anesthesia Type- IV sedation with anesthesia with ASA Monitors  Additional Monitors:   Airway Plan:         Plan Factors-    Induction- intravenous  Postoperative Plan-     Informed Consent- Anesthetic plan and risks discussed with patient  I personally reviewed this patient with the CRNA  Discussed and agreed on the Anesthesia Plan with the CRNA  Any Saldivar

## 2018-03-09 ENCOUNTER — HOSPITAL ENCOUNTER (OUTPATIENT)
Facility: AMBULARY SURGERY CENTER | Age: 63
Setting detail: OUTPATIENT SURGERY
Discharge: HOME | End: 2018-03-09
Attending: INTERNAL MEDICINE | Admitting: INTERNAL MEDICINE
Payer: MEDICARE

## 2018-03-09 ENCOUNTER — ANESTHESIA (OUTPATIENT)
Dept: GASTROENTEROLOGY | Facility: AMBULARY SURGERY CENTER | Age: 63
End: 2018-03-09
Payer: MEDICARE

## 2018-03-09 VITALS
RESPIRATION RATE: 18 BRPM | DIASTOLIC BLOOD PRESSURE: 62 MMHG | HEART RATE: 66 BPM | SYSTOLIC BLOOD PRESSURE: 103 MMHG | OXYGEN SATURATION: 96 % | TEMPERATURE: 98.1 F

## 2018-03-09 DIAGNOSIS — Z12.11 SCREEN FOR COLON CANCER: ICD-10-CM

## 2018-03-09 DIAGNOSIS — R10.13 EPIGASTRIC PAIN: ICD-10-CM

## 2018-03-09 PROCEDURE — 88305 TISSUE EXAM BY PATHOLOGIST: CPT | Performed by: PATHOLOGY

## 2018-03-09 PROCEDURE — 45380 COLONOSCOPY AND BIOPSY: CPT | Performed by: INTERNAL MEDICINE

## 2018-03-09 PROCEDURE — 45385 COLONOSCOPY W/LESION REMOVAL: CPT | Performed by: INTERNAL MEDICINE

## 2018-03-09 RX ORDER — PROPOFOL 10 MG/ML
INJECTION, EMULSION INTRAVENOUS AS NEEDED
Status: DISCONTINUED | OUTPATIENT
Start: 2018-03-09 | End: 2018-03-09 | Stop reason: SURG

## 2018-03-09 RX ORDER — SUCRALFATE 1 G/1
1 TABLET ORAL 4 TIMES DAILY
Qty: 120 TABLET | Refills: 6 | Status: SHIPPED | OUTPATIENT
Start: 2018-03-09 | End: 2018-04-26

## 2018-03-09 RX ORDER — SODIUM CHLORIDE 9 MG/ML
75 INJECTION, SOLUTION INTRAVENOUS CONTINUOUS
Status: DISCONTINUED | OUTPATIENT
Start: 2018-03-09 | End: 2018-03-09 | Stop reason: HOSPADM

## 2018-03-09 RX ADMIN — SODIUM CHLORIDE: 0.9 INJECTION, SOLUTION INTRAVENOUS at 08:37

## 2018-03-09 RX ADMIN — PROPOFOL 30 MG: 10 INJECTION, EMULSION INTRAVENOUS at 09:07

## 2018-03-09 RX ADMIN — PROPOFOL 20 MG: 10 INJECTION, EMULSION INTRAVENOUS at 09:01

## 2018-03-09 RX ADMIN — PROPOFOL 100 MG: 10 INJECTION, EMULSION INTRAVENOUS at 08:50

## 2018-03-09 RX ADMIN — PROPOFOL 50 MG: 10 INJECTION, EMULSION INTRAVENOUS at 09:04

## 2018-03-09 RX ADMIN — PROPOFOL 30 MG: 10 INJECTION, EMULSION INTRAVENOUS at 08:52

## 2018-03-09 RX ADMIN — SODIUM CHLORIDE 75 ML/HR: 0.9 INJECTION, SOLUTION INTRAVENOUS at 08:48

## 2018-03-09 RX ADMIN — PROPOFOL 30 MG: 10 INJECTION, EMULSION INTRAVENOUS at 09:00

## 2018-03-09 RX ADMIN — PROPOFOL 40 MG: 10 INJECTION, EMULSION INTRAVENOUS at 08:56

## 2018-03-09 RX ADMIN — PROPOFOL 30 MG: 10 INJECTION, EMULSION INTRAVENOUS at 09:06

## 2018-03-09 NOTE — ANESTHESIA POSTPROCEDURE EVALUATION
Post-Op Assessment Note      CV Status:  Stable    Mental Status:  Awake    Hydration Status:  Stable    PONV Controlled:  None    Airway Patency:  Patent    Post Op Vitals Reviewed: Yes          Staff: Anesthesiologist           BP 95/60 (03/09/18 0924)    Temp      Pulse 66 (03/09/18 0924)   Resp 18 (03/09/18 0924)    SpO2 98 % (03/09/18 0924)

## 2018-03-09 NOTE — OP NOTE
COLONOSCOPY    PROCEDURE: Colonoscopy/ Biopsy  Polypectomy (Cold Snare)    INDICATIONS: Screening for Colon Cancer    POST-OP DIAGNOSIS: See the impression below    SEDATION: Monitored anesthesia care, check anesthesia records    PHYSICAL EXAM:    BP (!) 77/48   Pulse 64   Temp 98 1 °F (36 7 °C) (Tympanic)   Resp 18   SpO2 94%   There is no height or weight on file to calculate BMI  General: NAD  Heart: S1 & S2 normal, RRR  Lungs: CTA, No rales or rhonchi  Abdomen: Soft, nontender, nondistended, good bowel sounds    CONSENT:  Informed consent was obtained for the procedure, including sedation after explaining the risks and benefits of the procedure  Risks including but not limited to bleeding, perforation, infection, aspiration were discussed in detail  Also explained about less than 100%$ sensitivity with the exam and other alternatives  PREPARATION:   EKG tracing, pulse oximetry, blood pressure were monitored throughout the procedure  Patient was identified by myself both verbally and by visual inspection of ID band  DESCRIPTION:   Patient was placed in the left lateral decubitus position and was sedated with the above medication  Digital rectal examination was performed  The colonoscope was introduced in to the anal canal and advanced up to cecum, which was identified by the appendiceal orifice and IC valve  A careful inspection was made as the colonoscope was withdrawn, including a retroflexed view of the rectum; findings and interventions are described below  Appropriate photodocumentation was obtained  The quality of the colonic preparation was adequate  FINDINGS:    1  Cecum and ileocecal valve-normal mucosa    2  Splenic flexure-5 mm polyp was removed with cold snare    3  Sigmoid colon-few diverticuli seen    4    Distal rectum-just above the dentate line there is a large semicircumferential ulcerated mass status post biopsies         IMPRESSIONS:      As above    RECOMMENDATIONS:    Check pathology    Check CEA    Surgery and oncology consult    Patient had a CT scan abdomen and pelvis done at the end of January  COMPLICATIONS:  None; patient tolerated the procedure well      DISPOSITION: PACU           CONDITION: Stable

## 2018-03-09 NOTE — H&P
History and Physical - [facility]  Johnny Felder 58 y o  female MRN: [id number]        HPI:  80-year-old female with history of gastric bypass surgery, large anastomotic ulcer came in for colonoscopy  She never had colonoscopy in the past   She reports having some fresh bleeding from the rectum at times  Good appetite, no recent weight loss  Historical Information   Past Medical History:   Diagnosis Date    Arthritis     Chronic lower back pain     Chronic pain disorder     back pain    GERD (gastroesophageal reflux disease)     Morbid obesity ([address] )     Spinal stenosis     Systolic murmur     Unsteady gait     uses walker    Wears dentures     Wears glasses      Past Surgical History:   Procedure Laterality Date    ABSCESS DRAINAGE      abd, (R) leg, (L) leg     SECTION      ESOPHAGOGASTRODUODENOSCOPY N/A 2016    Procedure: ESOPHAGOGASTRODUODENOSCOPY (EGD); Surgeon: Gwendolyn Green MD;  [facility];  Service:     ESOPHAGOGASTRODUODENOSCOPY N/A 3/30/2016    Procedure: ESOPHAGOGASTRODUODENOSCOPY (EGD); Surgeon: Gwendolyn Green MD;  [facility]; Service:     EYE SURGERY      laser eye surgery    JOINT REPLACEMENT Left 2017    hip    JOINT REPLACEMENT Right 2017    hip    MD ESOPHAGOGASTRODUODENOSCOPY TRANSORAL DIAGNOSTIC N/A 2018    Procedure: ESOPHAGOGASTRODUODENOSCOPY (EGD); Surgeon: Cruz Hammonds MD;  [facility]: [facility];   Service: Gastroenterology    MD LAP GASTRIC BYPASS/SOLEDAD-EN-Y N/A 2016    Procedure: BYPASS GASTRIC  SOLEDAD-EN-Y LAPAROSCOPIC;  Surgeon: Gwendolyn Green MD;  [facility];  Service: Bariatrics    TUBAL LIGATION       Social History   History   Alcohol Use No     History   Drug Use No     Comment: Percocet for lower back pain prn     History   Smoking Status    Former Smoker    Packs/day: 1 00    Years: 25 00    Quit date: 3/30/2006   Smokeless Tobacco    Never Used     Family History   Problem Relation Age of Onset    Adopted: Yes    Leukemia Mother     Heart disease Father     No Known Problems Sister     No Known Problems Brother     Diabetes Son     No Known Problems Brother     No Known Problems Brother     No Known Problems Sister     No Known Problems Sister        Meds/Allergies     Prescriptions Prior to Admission   Medication    Calcium-Vitamin D 500-125 MG-UNIT TABS    Multiple Vitamins-Minerals (BARIATRIC FUSION) CHEW    omeprazole (PriLOSEC) 20 mg delayed release capsule    oxyCODONE-acetaminophen (PERCOCET)  mg per tablet    sucralfate (CARAFATE) 1 g tablet    lidocaine (LIDODERM) 5 %    nystatin (MYCOSTATIN) ointment       Allergies   Allergen Reactions    Tramadol Other (See Comments)     Dizzy         Objective     Blood pressure 124/67, pulse 78, temperature 98 1 °F (36 7 °C), temperature source Tympanic, resp  rate 18, SpO2 98 %, not currently breastfeeding      PHYSICAL EXAM:    Gen: NAD  CV: S1 & S2 normal, RRR  CHEST: Clear to auscultate  ABD: soft, NT/ND, good bowel sounds  EXT: no edema    ASSESSMENT:     Screening for colon cancer    PLAN:    Colonoscopy

## 2018-03-12 ENCOUNTER — TRANSCRIBE ORDERS (OUTPATIENT)
Dept: LAB | Facility: CLINIC | Age: 63
End: 2018-03-12

## 2018-03-12 ENCOUNTER — APPOINTMENT (OUTPATIENT)
Dept: LAB | Facility: CLINIC | Age: 63
End: 2018-03-12
Payer: MEDICARE

## 2018-03-12 DIAGNOSIS — K62.89: Primary | ICD-10-CM

## 2018-03-12 LAB — CEA SERPL-MCNC: 22.6 NG/ML (ref 0–3)

## 2018-03-12 PROCEDURE — 36415 COLL VENOUS BLD VENIPUNCTURE: CPT

## 2018-03-12 PROCEDURE — 82378 CARCINOEMBRYONIC ANTIGEN: CPT

## 2018-03-13 ENCOUNTER — TELEPHONE (OUTPATIENT)
Dept: GASTROENTEROLOGY | Facility: AMBULARY SURGERY CENTER | Age: 63
End: 2018-03-13

## 2018-03-14 ENCOUNTER — DOCUMENTATION (OUTPATIENT)
Dept: PODIATRY | Facility: CLINIC | Age: 63
End: 2018-03-14

## 2018-03-14 DIAGNOSIS — L60.0 INGROWN TOENAIL: ICD-10-CM

## 2018-03-14 DIAGNOSIS — M79.89 LEG SWELLING: ICD-10-CM

## 2018-03-14 DIAGNOSIS — B35.1 ONYCHOMYCOSIS: Primary | ICD-10-CM

## 2018-03-14 DIAGNOSIS — E11.9 DIET-CONTROLLED DIABETES MELLITUS (HCC): ICD-10-CM

## 2018-03-14 DIAGNOSIS — M79.672 PAIN IN BOTH FEET: ICD-10-CM

## 2018-03-14 DIAGNOSIS — B35.1 ONYCHOMYCOSIS: ICD-10-CM

## 2018-03-14 DIAGNOSIS — M79.671 PAIN IN BOTH FEET: ICD-10-CM

## 2018-03-14 DIAGNOSIS — I70.209 ATHEROSCLEROTIC PERIPHERAL VASCULAR DISEASE (HCC): ICD-10-CM

## 2018-03-14 DIAGNOSIS — I70.209 ATHEROSCLEROTIC PERIPHERAL VASCULAR DISEASE (HCC): Primary | ICD-10-CM

## 2018-03-14 PROBLEM — M79.673 PAIN IN FOOT: Status: ACTIVE | Noted: 2018-03-14

## 2018-03-14 NOTE — PROGRESS NOTES
Assessment/Plan:  Wedge resection of ingrown nail right hallux tibial border applied silver nitrate and dressing  Aseptic debridement and planning of nails x10 and manually and mechanically  Discussed proper diabetic foot care daily foot checks     Diagnoses and all orders for this visit:    Onychomycosis    Atherosclerotic peripheral vascular disease ([facility] [address] )    Pain in both feet          Subjective:      Patient ID: Chela Humphrey is a 58 y o  female  patient is seen on [facility] patient is seen in assisted living facility  Has trouble Walking long distances and rarely leaves the facility  patient has recurrent ingrown nail right hallux  There has been some swelling but patient has not noticed any redness or drainage  Has been painful for the past week  Rates pain 4/10 pain scale  Patient does have occasional burning and numbness in feet mostly at night  The following portions of the patient's history were reviewed and updated as appropriate: allergies, current medications, past family history, past medical history, past social history, past surgical history and problem list     Review of Systems   Constitutional: Negative  HENT: Negative  Eyes: Negative  Respiratory: Negative  Cardiovascular: Negative  Gastrointestinal: Negative  Endocrine: Negative  Genitourinary: Negative  Musculoskeletal: Negative  Skin: Negative  Allergic/Immunologic: Negative  Neurological: Negative  Hematological: Negative  Psychiatric/Behavioral: Negative  Objective: There were no vitals taken for this visit  Physical Exam   Cardiovascular:   Pulses:       Dorsalis pedis pulses are 1+ on the right side, and 1+ on the left side  Posterior tibial pulses are 0 on the right side, and 0 on the left side  Feet:   Right Foot:   Skin Integrity: Positive for callus  Left Foot:   Skin Integrity: Positive for callus           Right Foot/Ankle   Right Foot Inspection  Skin Exam: callus and callus                            Sensory   Vibration: absent  Proprioception: intact   Monofilament testing: diminished  Vascular  Capillary refills: elevated  The right DP pulse is 1+  The right PT pulse is 0  Left Foot/Ankle  Left Foot Inspection  Skin Exam: callus                                         Sensory   Vibration: absent  Proprioception: intact  Monofilament: diminished  Vascular  Capillary refills: elevated  The left DP pulse is 1+  The left PT pulse is 0  Constitutional: no acute distress, well appearing and well nourished  Cardiovascular: abnormal dorsalis pedis pulse,-- abnormal posterior tibialis pulse,-- dependence rubor-- and-- abnormal capillary refill  Orthopedic/Biomechanical: abnormal foot type,-- hammertoe(s)-- and-- ingrown nails  Left Foot: Appearance: Normal except as noted: excessive pronation-- and-- pes planus  Second toe deformities include hammer toe  Third toe deformities include hammer toe  Forth toe deformities include hammer toe  Fifth toe deformities include hammer toe  All nails thick discolored dystrophic, positive subungual debris, positive pain on palpation  Special Tests: semirigid hammertoes 2 through 5 bilateral  Negative ulceration negative maceration negative signs of infection  Right Foot: Appearance: Normal except as noted: excessive pronation-- and-- pes planus  Second toe deformities include hammer toe  Third toe deformities include hammer toe  Forth toe deformities include hammer toe  Fifth toe deformities include hammer toe  Negative ulceration, negative maceration, negative signs of infection  Special Tests: Skin is hairless and atrophic  Plus one edema bilateral  Moderate venous stasis  No signs of infection  Neurological Exam: performed  Light touch was intact bilaterally  Vibratory sensation was intact bilaterally  Vascular Exam: performed Dorsalis pedis pulses were 1/4 bilaterally   Posterior tibial pulses were absent bilaterally  Capillary refill time was between 1-3 seconds bilaterally  Toenails: All of the toenails were elongated,-- hypertrophied,-- discolored,-- shown to have subungual debris-- and-- tender  Hyperkeratosis: present on both first toes-- and-- present on both first sub metatarsals  Ingrown right hallux tibial border  Mild paronychia  Negative erythema no exudate no signs of infection moderate swelling  Plus one edema bilateral moderate venous stasis    Decreased vibratory sensation bilateral

## 2018-03-16 ENCOUNTER — TELEPHONE (OUTPATIENT)
Dept: SURGICAL ONCOLOGY | Facility: CLINIC | Age: 63
End: 2018-03-16

## 2018-03-22 ENCOUNTER — OFFICE VISIT (OUTPATIENT)
Dept: HEMATOLOGY ONCOLOGY | Facility: MEDICAL CENTER | Age: 63
End: 2018-03-22
Payer: MEDICARE

## 2018-03-22 VITALS
SYSTOLIC BLOOD PRESSURE: 124 MMHG | TEMPERATURE: 98.7 F | HEIGHT: 65 IN | BODY MASS INDEX: 37.62 KG/M2 | RESPIRATION RATE: 18 BRPM | DIASTOLIC BLOOD PRESSURE: 76 MMHG | WEIGHT: 225.8 LBS | HEART RATE: 73 BPM

## 2018-03-22 DIAGNOSIS — C20 RECTAL CANCER (HCC): Primary | ICD-10-CM

## 2018-03-22 PROCEDURE — 99214 OFFICE O/P EST MOD 30 MIN: CPT | Performed by: INTERNAL MEDICINE

## 2018-03-22 NOTE — PROGRESS NOTES
Fiona Blanton  1955  [address] [facility] [place] [id number]  HEMATOLOGY/ONCOLOGY CONSULTATION REPORT    DISCUSSION/SUMMARY:    51-year-old female recently found to have high-grade dysplasia/intramucosal lesion arising in a tubulovillous adenoma  Patient feels relatively well and clinically there are no troubling signs  A CT scan of the abdomen/pelvis for approximately 2 months ago did not demonstrate any abnormality  The CEA level is significantly higher than what 1 would think a CEA level would be with an intramucosal lesion  Possibly the previous CEA result was spurious and should be repeated  We discussed options  Patient is to be seen by Dr Lulu Knox next week  Patient is to return here in 3 weeks but this may change depending upon the above  Patient knows to call the hematology/oncology office if there are any other questions or concerns  The below information is the NCCN guidelines version 1 2018 for pedunculated polyp or sessile polyp with invasive cancer  Adjuvant treatment options will depend upon the final pathology results  Carefully review your medication list and verify that the list is accurate and up-to-date  Please call the hematology/oncology office if there are medications missing from the list, medications on the list that you are not currently taking or if there is a dosage or instruction that is different from how you're taking that medication  Patient goals and areas of care: Follow-up with surgery  Patient is able to self-care   ______________________________________________________________________________________    Chief Complaint   Patient presents with    Consult     Recent colonoscopy demonstrating questionable intramucosal neoplasia     History of Present Illness: This is a 51-year-old female referred for the above  Previously Mrs Andrew Galvez had gastric bypass    Patient was recently seen by GI because of blood in the stools  Additional workup included a colonoscopy which demonstrated a rectal lesion  Presently patient states feeling okay but Mrs Archana Birmingham is still having irregular bowel movements (sometimes constipation, sometimes diarrhea)  Patient also sees blood in her stools occasionally  Appetite is good, weight is stable  No  or GYN issues  No fevers, chills or sweats  No headaches, blurred vision or dizziness  Generalized body aches are the same as before  Review of Systems   Constitutional: Negative  HENT: Negative  Eyes: Negative  Respiratory: Negative  Cardiovascular: Negative  Gastrointestinal: Positive for blood in stool, constipation and diarrhea  Endocrine: Negative  Genitourinary: Negative  Musculoskeletal: Positive for arthralgias  Skin: Negative  Allergic/Immunologic: Negative  Neurological: Negative  Hematological: Negative  Psychiatric/Behavioral: Negative  All other systems reviewed and are negative       Patient Active Problem List   Diagnosis    Morbid obesity due to excess calories ([facility] [address] )    Screening for colon cancer    Screen for colon cancer    Postgastrectomy malabsorption    S/P gastric bypass    Marginal ulcer    Status post bariatric surgery    Atherosclerotic peripheral vascular disease ([facility] [address] )    Diet-controlled diabetes mellitus ([facility] [address] )    Onychomycosis    Pain in foot    Rectal cancer (HCC)     Past Medical History:   Diagnosis Date    Arthritis     Chronic lower back pain     Chronic pain disorder     back pain    GERD (gastroesophageal reflux disease)     Morbid obesity ([address] )     Spinal stenosis     Systolic murmur     Unsteady gait     uses walker    Wears dentures     Wears glasses      Ob/gyn:  No recent mammogram -patient's choice, no post menopausal bleeding    Past Surgical History:   Procedure Laterality Date    ABSCESS DRAINAGE      abd, (R) leg, (L) leg     SECTION      ESOPHAGOGASTRODUODENOSCOPY N/A 2016    Procedure: ESOPHAGOGASTRODUODENOSCOPY (EGD); Surgeon: Peter Mac MD;  [facility];  Service:     ESOPHAGOGASTRODUODENOSCOPY N/A 3/30/2016    Procedure: ESOPHAGOGASTRODUODENOSCOPY (EGD); Surgeon: Peter Mac MD;  [facility]; Service:     EYE SURGERY      laser eye surgery    JOINT REPLACEMENT Left 2017    hip    JOINT REPLACEMENT Right 2017    hip    [place] COLONOSCOPY FLX DX W/COLLJ SPEC WHEN PFRMD N/A 3/9/2018    Procedure: COLONOSCOPY;  Surgeon: Alvin Madrid MD;  [facility]; Service: Gastroenterology    [place] ESOPHAGOGASTRODUODENOSCOPY TRANSORAL DIAGNOSTIC N/A 2018    Procedure: ESOPHAGOGASTRODUODENOSCOPY (EGD); Surgeon: Alvin Madrid MD;  Location: [facility]; Service: Gastroenterology    [place] LAP GASTRIC BYPASS/SOLEDAD-EN-Y N/A 2016    Procedure: BYPASS GASTRIC  SOLEDAD-EN-Y LAPAROSCOPIC;  Surgeon: Peter Mac MD;  [facility];  Service: Bariatrics    TUBAL LIGATION       Family History   Problem Relation Age of Onset    Adopted: Yes    Leukemia Mother     Heart disease Father     No Known Problems Sister     No Known Problems Brother     Diabetes Son     No Known Problems Brother     No Known Problems Brother     No Known Problems Sister     No Known Problems Sister     Family history: Mother  of leukemia (NOS), father  from heart disease (NOS), 2 children 1 with diabetes, no known familial or genetic diseases, no family history of GI malignancies    Social History     Social History    Marital status: Single     Spouse name: N/A    Number of children: N/A    Years of education: N/A     Occupational History    Not on file       Social History Main Topics    Smoking status: Former Smoker     Packs/day: 1      Years: 25 00     Quit date: 3/30/2006    Smokeless tobacco: Never Used    Alcohol use No    Drug use: No      Comment: Percocet for lower back pain prn    Sexual activity: Not on file     Other Topics Concern    Not on file     Social History Narrative    No narrative on file       Current Outpatient Prescriptions:     Calcium-Vitamin D 500-125 MG-UNIT TABS, Take by mouth, Disp: , Rfl:     lidocaine (LIDODERM) 5 %, APPLY 1 PATCH TO AFFECTED AREA(S) OF LUMBAR SPINE AND RIGHT HIP EVERY 12 HOURS, Disp: , Rfl: 0    Multiple Vitamins-Minerals (BARIATRIC FUSION) CHEW, Chew, Disp: , Rfl:     omeprazole (PriLOSEC) 20 mg delayed release capsule, Take 1 capsule (20 mg total) by mouth 2 (two) times a day, Disp: 90 capsule, Rfl: 3    oxyCODONE-acetaminophen (PERCOCET)  mg per tablet, Take 1 tablet by mouth every 6 (six) hours as needed for moderate pain , Disp: , Rfl:     sucralfate (CARAFATE) 1 g tablet, Take 1 tablet (1 g total) by mouth 4 (four) times a day for 30 days, Disp: 120 tablet, Rfl: 6    nystatin (MYCOSTATIN) ointment, Apply topically 3 (three) times a day, Disp: , Rfl:     Allergies   Allergen Reactions    Tramadol Other (See Comments)     Dizzy         Vitals:    03/22/18 1429   BP: 124/76   Pulse: 73   Resp: 18   Temp: 98 7 °F (37 1 °C)     Physical Exam   Constitutional: She is oriented to person, place, and time  She appears well-developed and well-nourished  HENT:   Head: Normocephalic and atraumatic  Right Ear: External ear normal    Left Ear: External ear normal    Nose: Nose normal    Mouth/Throat: Oropharynx is clear and moist    Eyes: Conjunctivae and EOM are normal  Pupils are equal, round, and reactive to light  Neck: Normal range of motion  Neck supple  Cardiovascular: Normal rate, regular rhythm, normal heart sounds and intact distal pulses  Pulmonary/Chest: Effort normal and breath sounds normal    Abdominal: Soft   Bowel sounds are normal    Abdomen is soft, nontender, +bowel sounds, obese, cannot palpate liver or spleen   Musculoskeletal:   Decreased range of motion in hips and knees bilaterally   Neurological: She is alert and oriented to person, place, and time  She has normal reflexes  Skin: Skin is warm  Psychiatric: She has a normal mood and affect  Her behavior is normal  Judgment and thought content normal    Extremities: 0-1+ bilateral lower extremity edema, no cords, pulses are 1+  Lymphatics:  No adenopathy in the neck, supraclavicular region, axilla and groin bilaterally    Labs:    03/12/2018 CEA = 22 6 = high  02/12/2018 BUN = 16 creatinine = 0 68 alkaline phosphatase = 57 total protein = 6 8 albumin = 2 9 total bilirubin = 0 43 AST = 10 ALT = 13 WBC = 5 7 hemoglobin = 11 5 hematocrit = 36 8 MCV = 76 RDW = 16 4 platelet = 805 neutrophil = 60% lymphocytes = 30% monocyte = 7%    Imaging    01/31/2018 ultrasound right upper quadrant    1  Layering gallbladder sludge  No acute cholecystitis or biliary ductal obstruction  2   Hepatomegaly  3   Fluid-filled stomach  1/27/18 CT scan of the abdomen/pelvis    1  Prior Juan-en-Y gastric bypass with distention of the excluded stomach  2   No evidence of acute abnormality in the abdomen or pelvis      Pathology    Case Report   Surgical Pathology Report                         Case: [id number]                                    Authorizing Provider: Anastasiia Ayala MD          Collected:           03/09/2018 0902               Ordering Location:     [facility]   Received:            03/12/2018 0904                                      Center                                                                        Pathologist:           Becky Mulligan DO                                                             Specimens:   A) - Colon, polyp splenic flexure/cold snare                                                         B) - Colon, bx distal rectal mass                                                          Final Diagnosis   A  Colon, splenic flexure polyp, biopsy:  - Tubulovillous adenoma, negative for high-grade dysplasia      B   Rectum, mass, biopsy:  - At least 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

## 2018-03-28 PROCEDURE — 88305 TISSUE EXAM BY PATHOLOGIST: CPT | Performed by: PATHOLOGY

## 2018-03-29 ENCOUNTER — LAB REQUISITION (OUTPATIENT)
Dept: LAB | Facility: HOSPITAL | Age: 63
End: 2018-03-29
Payer: MEDICARE

## 2018-03-29 ENCOUNTER — TRANSCRIBE ORDERS (OUTPATIENT)
Dept: ADMINISTRATIVE | Facility: HOSPITAL | Age: 63
End: 2018-03-29

## 2018-03-29 DIAGNOSIS — C20 MALIGNANT NEOPLASM OF RECTUM (HCC): ICD-10-CM

## 2018-03-29 DIAGNOSIS — C20 MALIGNANT NEOPLASM OF RECTUM (HCC): Primary | ICD-10-CM

## 2018-04-03 ENCOUNTER — OFFICE VISIT (OUTPATIENT)
Dept: HEMATOLOGY ONCOLOGY | Facility: MEDICAL CENTER | Age: 63
End: 2018-04-03
Payer: MEDICARE

## 2018-04-03 VITALS
HEART RATE: 74 BPM | OXYGEN SATURATION: 98 % | TEMPERATURE: 99.3 F | WEIGHT: 230.4 LBS | HEIGHT: 65 IN | SYSTOLIC BLOOD PRESSURE: 118 MMHG | BODY MASS INDEX: 38.39 KG/M2 | RESPIRATION RATE: 16 BRPM | DIASTOLIC BLOOD PRESSURE: 84 MMHG

## 2018-04-03 DIAGNOSIS — C20 RECTAL CANCER (HCC): Primary | ICD-10-CM

## 2018-04-03 PROCEDURE — 99214 OFFICE O/P EST MOD 30 MIN: CPT | Performed by: INTERNAL MEDICINE

## 2018-04-03 NOTE — PROGRESS NOTES
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  Patient feels relatively well and clinically there are no troubling signs  A CT scan of the abdomen/pelvis for approximately 2 5 months ago did not demonstrate any abnormality  The CEA level is elevated  Patient was recently seen by Dr Siddharth Garza, colorectal surgery  The plan is for patient to undergo repeat CT scans, pelvic MRI and port placement  Patient will be referred to RT for evaluation of neoadjuvant chemotherapy + radiotherapy  The treatment sequence would likely include a rest period after neoadjuvant treatment, surgery and then postoperative chemotherapy  Patient's family wishes for patient to get a 2nd opinion  Mrs Alan Blancas would like to go to the [address] [place] [address]  Finding the 2nd opinion is in process  Patient is to return in 4 weeks but this may change depending upon the above  Carefully review your medication list and verify that the list is accurate and up-to-date  Please call the hematology/oncology office if there are medications missing from the list, medications on the list that you are not currently taking or if there is a dosage or instruction that is different from how you're taking that medication  Patient goals and areas of care:  Radiation oncology evaluation  Patient is able to self-care   ______________________________________________________________________________________    Chief Complaint   Patient presents with    Follow-up     Recent diagnosis of rectal cancer, follow-up     History of Present Illness:    71-year-old female recently referred for the above  Previously Mrs Mynor Noel had gastric bypass    Patient was recently seen by GI because of blood in the stools  Additional workup included a colonoscopy which demonstrated a rectal lesion  Presently patient states feeling okay but Mrs Hussein Bunn is still having irregular bowel movements (sometimes constipation, sometimes diarrhea)  Patient also sees blood in her stools occasionally  Appetite is good, weight is stable  No  or GYN issues  No fevers, chills or sweats  No headaches, blurred vision or dizziness  Generalized body aches are the same as before  Mrs Lou Wick was told that even with neoadjuvant treatment, she will likely require an APR  Patient has been discussing issues with her family  Family wishes for patient to obtain a 2nd opinion before beginning treatment  Review of Systems   Constitutional: Negative  HENT: Negative  Eyes: Negative  Respiratory: Negative  Cardiovascular: Negative  Gastrointestinal: Positive for blood in stool, constipation and diarrhea  Endocrine: Negative  Genitourinary: Negative  Musculoskeletal: Positive for arthralgias  Skin: Negative  Allergic/Immunologic: Negative  Neurological: Negative  Hematological: Negative  Psychiatric/Behavioral: Negative  All other systems reviewed and are negative       Patient Active Problem List   Diagnosis    Morbid obesity due to excess calories ([facility] )    Screening for colon cancer    Screen for colon cancer    Postgastrectomy malabsorption    S/P gastric bypass    Marginal ulcer    Status post bariatric surgery    Atherosclerotic peripheral vascular disease ([address]    Diet-controlled diabetes mellitus ([address]    Onychomycosis    Pain in foot    Rectal cancer ([address] )     Past Medical History:   Diagnosis Date    Arthritis     Chronic lower back pain     Chronic pain disorder     back pain    GERD (gastroesophageal reflux disease)     Morbid obesity ([address]     Spinal stenosis     Systolic murmur     Unsteady gait     uses walker    Wears dentures     Wears glasses      Ob/gyn:  No recent mammogram -patient's choice, no post menopausal bleeding    Past Surgical History:   Procedure Laterality Date    ABSCESS DRAINAGE      abd, (R) leg, (L) leg     SECTION      ESOPHAGOGASTRODUODENOSCOPY N/A 2016    Procedure: ESOPHAGOGASTRODUODENOSCOPY (EGD); Surgeon: Gianni Yeung MD;  Location: [facility];  Service:     ESOPHAGOGASTRODUODENOSCOPY N/A 3/30/2016    Procedure: ESOPHAGOGASTRODUODENOSCOPY (EGD); Surgeon: Gianni Yeung MD;  Location: [facility]; Service:     EYE SURGERY      laser eye surgery    JOINT REPLACEMENT Left 2017    hip    JOINT REPLACEMENT Right 2017    hip    NH COLONOSCOPY FLX DX W/COLLJ SPEC WHEN PFRMD N/A 3/9/2018    Procedure: COLONOSCOPY;  Surgeon: Pedro Christopher MD;  Location: [place] [address] GI LAB; Service: Gastroenterology    NH ESOPHAGOGASTRODUODENOSCOPY TRANSORAL DIAGNOSTIC N/A 2018    Procedure: ESOPHAGOGASTRODUODENOSCOPY (EGD); Surgeon: Pedro Christopher MD;  Location: [facility]; Service: Gastroenterology    NH LAP GASTRIC BYPASS/SOLEDAD-EN-Y N/A 2016    Procedure: BYPASS GASTRIC  SOLEDAD-EN-Y LAPAROSCOPIC;  Surgeon: Gianni Yeung MD;  Location: [facility];  Service: Bariatrics    TUBAL LIGATION       Family History   Problem Relation Age of Onset    Adopted: Yes    Leukemia Mother     Heart disease Father     No Known Problems Sister     No Known Problems Brother     Diabetes Son     No Known Problems Brother     No Known Problems Brother     No Known Problems Sister     No Known Problems Sister     Family history: Mother  of leukemia (NOS), father  from heart disease (NOS), 2 children 1 with diabetes, no known familial or genetic diseases, no family history of GI malignancies    Social History     Social History    Marital status: Single     Spouse name: N/A    Number of children: N/A    Years of education: N/A     Occupational History    Not on file       Social History Main Topics    Smoking status: Former Smoker     Packs/day: 1 00     Years: 25 00     Quit date: 3/30/2006    Smokeless tobacco: Never Used    Alcohol use No    Drug use: No      Comment: Percocet for lower back pain prn    Sexual activity: Not on file     Other Topics Concern    Not on file     Social History Narrative    No narrative on file       Current Outpatient Prescriptions:     Calcium-Vitamin D 500-125 MG-UNIT TABS, Take by mouth, Disp: , Rfl:     lidocaine (LIDODERM) 5 %, APPLY 1 PATCH TO AFFECTED AREA(S) OF LUMBAR SPINE AND RIGHT HIP EVERY 12 HOURS, Disp: , Rfl: 0    Multiple Vitamins-Minerals (BARIATRIC FUSION) CHEW, Chew, Disp: , Rfl:     nystatin (MYCOSTATIN) ointment, Apply topically 3 (three) times a day, Disp: , Rfl:     omeprazole (PriLOSEC) 20 mg delayed release capsule, Take 1 capsule (20 mg total) by mouth 2 (two) times a day, Disp: 90 capsule, Rfl: 3    oxyCODONE-acetaminophen (PERCOCET)  mg per tablet, Take 1 tablet by mouth every 6 (six) hours as needed for moderate pain , Disp: , Rfl:     sucralfate (CARAFATE) 1 g tablet, Take 1 tablet (1 g total) by mouth 4 (four) times a day for 30 days, Disp: 120 tablet, Rfl: 6    Allergies   Allergen Reactions    Tramadol Other (See Comments)     Dizzy         Vitals:    04/03/18 1341   BP: 118/84   Pulse: 74   Resp: 16   Temp: 99 3 °F (37 4 °C)   SpO2: 98%     Physical Exam   Constitutional: She is oriented to person, place, and time  She appears well-developed and well-nourished  HENT:   Head: Normocephalic and atraumatic  Right Ear: External ear normal    Left Ear: External ear normal    Nose: Nose normal    Mouth/Throat: Oropharynx is clear and moist    Eyes: Conjunctivae and EOM are normal  Pupils are equal, round, and reactive to light  Neck: Normal range of motion  Neck supple  Cardiovascular: Normal rate, regular rhythm, normal heart sounds and intact distal pulses      Pulmonary/Chest: Effort normal and breath sounds normal    Abdominal: Soft  Bowel sounds are normal    Abdomen is soft, nontender, +bowel sounds, obese, cannot palpate liver or spleen   Musculoskeletal:   Decreased range of motion in hips and knees bilaterally   Neurological: She is alert and oriented to person, place, and time  She has normal reflexes  Skin: Skin is warm  Psychiatric: She has a normal mood and affect  Her behavior is normal  Judgment and thought content normal    Extremities: 0-1+ bilateral lower extremity edema, no cords, pulses are 1+  Lymphatics:  No adenopathy in the neck, supraclavicular region, axilla and groin bilaterally    Labs:    03/12/2018 CEA = 22 6 = high  02/12/2018 BUN = 16 creatinine = 0 68 alkaline phosphatase = 57 total protein = 6 8 albumin = 2 9 total bilirubin = 0 43 AST = 10 ALT = 13 WBC = 5 7 hemoglobin = 11 5 hematocrit = 36 8 MCV = 76 RDW = 16 4 platelet = 125 neutrophil = 60% lymphocytes = 30% monocyte = 7%    Imaging    01/31/2018 ultrasound right upper quadrant    1  Layering gallbladder sludge  No acute cholecystitis or biliary ductal obstruction  2   Hepatomegaly  3   Fluid-filled stomach  1/27/18 CT scan of the abdomen/pelvis    1  Prior Juan-en-Y gastric bypass with distention of the excluded stomach  2   No evidence of acute abnormality in the abdomen or pelvis      Pathology    Case Report   Surgical Pathology Report                         Case: [id number]                                    Authorizing Provider: Sebastian Iniguez MD      Collected:           03/28/2018 1130               Pathologist:           Clay Hammer MD             Received:            03/29/2018 0942               Specimen:    Rectum, Rectal cancer/mass biopsies                                                        Final Diagnosis   A   Rectal mass (biopsy):  - 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      Comment:   - In the context of a rectal mass, repeat biopsy and/or excision may be indicated to exclude a higher grade lesion  Electronically signed by Sami Trinidad MD on 3/30/2018 at  2:36 PM         Case Report   Surgical Pathology Report                         Case: [id number]                                    Authorizing Provider: Juan Pablo Villanueva MD          Collected:           03/09/2018 0902               Ordering Location:     [facility]   Received:            03/12/2018 0904                                      Center                                                                        Pathologist:           Becky Mulligan DO                                                             Specimens:   A) - Colon, polyp splenic flexure/cold snare                                                         B) - Colon, bx distal rectal mass                                                          Final Diagnosis   A  Colon, splenic flexure polyp, biopsy:  - Tubulovillous adenoma, negative for high-grade dysplasia      B  Rectum, mass, biopsy:  - At least high grade dysplasia/intramucosal carcinoma arising in a tubulovillous adenoma, suspicious for invasion       Intradepartmental consultation is in agreement with the above diagnosis (AT)     Interpretation performed at [facility], [address] [place] [id number]  Report faxed to Dr Lashawn Pineda on 3/13/18 @ 10:55 AM   Electronically signed by Trae Mcfarland DO on 3/13/2018 at 10:54 AM

## 2018-04-06 ENCOUNTER — HOSPITAL ENCOUNTER (OUTPATIENT)
Dept: RADIOLOGY | Facility: HOSPITAL | Age: 63
Discharge: HOME | End: 2018-04-06
Attending: COLON & RECTAL SURGERY
Payer: MEDICARE

## 2018-04-06 DIAGNOSIS — C20 MALIGNANT NEOPLASM OF RECTUM (HCC): ICD-10-CM

## 2018-04-06 PROCEDURE — 71260 CT THORAX DX C+: CPT

## 2018-04-06 PROCEDURE — 72195 MRI PELVIS W/O DYE: CPT

## 2018-04-06 RX ADMIN — IOHEXOL 85 ML: 350 INJECTION, SOLUTION INTRAVENOUS at 09:16

## 2018-04-11 ENCOUNTER — RADIATION ONCOLOGY CONSULT (OUTPATIENT)
Dept: RADIATION ONCOLOGY | Facility: HOSPITAL | Age: 63
End: 2018-04-11

## 2018-04-11 ENCOUNTER — APPOINTMENT (OUTPATIENT)
Dept: RADIATION ONCOLOGY | Facility: HOSPITAL | Age: 63
End: 2018-04-11
Attending: RADIOLOGY
Payer: MEDICARE

## 2018-04-11 VITALS
DIASTOLIC BLOOD PRESSURE: 80 MMHG | BODY MASS INDEX: 38.02 KG/M2 | HEART RATE: 85 BPM | TEMPERATURE: 98.4 F | WEIGHT: 228.2 LBS | RESPIRATION RATE: 16 BRPM | SYSTOLIC BLOOD PRESSURE: 120 MMHG | HEIGHT: 65 IN

## 2018-04-11 DIAGNOSIS — C20 RECTAL CANCER (HCC): Primary | ICD-10-CM

## 2018-04-11 PROCEDURE — 99215 OFFICE O/P EST HI 40 MIN: CPT | Performed by: RADIOLOGY

## 2018-04-11 NOTE — PROGRESS NOTES
Consultation - Radiation Oncology     EFN:[id number] : 1955  Encounter: [id number]  Patient Information: Alanna  Chief Complaint   Patient presents with    Consult     Rectal Cancer     Rectal cancer [facility])    Staging form: Colon and Rectum, AJCC 8th Edition    - Clinical: Stage IIIB (cT3, cN1a, cM0) - Signed by Lorena Llamas MD on 2018         HPI: Hussein Russ is a 58y o  year old female with past medical history significant for gastric bypass who presented with 1 month of abdominal pain associated with bright red blood per rectum  EGD demonstrated ulcer that was treated medically with resolution of symptoms  A follow-up colonoscopy on 3/9/2018 demonstrated distal rectal, largest or semi circumferential ulcerated mass  Biopsies demonstrated at least high-grade dysplasia/intramucosal carcinoma arising in a tubulovillous adenoma, suspicious for invasion  Repeat biopsy on 3/28/2018 again demonstrated at least high-grade dysplasia with focus suspicious for intramucosal carcinoma  MRI demonstrated a 5cm low rectal cancer, circumferential around the rectal wall  Anteriorly, there were multiple areas where there is transmural tumor extension into the mesorectal fat making this at least a T3c lesion  Tumor was immediately adjacent to the vagina, therefore there was concern regarding a T4 lesion  There were 2 high risk perirectal nodes, and 1 low risk perirectal nodes      Currently, the patient complains of anal leakage of thin discharge sometimes with blood  She is wearing a diaper because of this  She notes that she actually has constipation which was relieved with senna tea  She has bleeding every day, but states that this is small volume and bright red  She denies lightheadedness or dizziness  She denies any pain  She has chronic lower extremity bilateral edema at the ankles, which is stable  She denies any weight loss, nausea, or vomiting    She denies any vaginal discharge or bleeding  She denies any significant urinary symptoms including hematuria, dysuria, or urinary incontinence  She states that she still has a large abdomen from her history of morbid obesity  She notes a rash in the right groin which she is treating with clotrimazole cream and nystatin powder  She denies any other areas of rashes  She presents today for consideration for neoadjuvant concurrent chemotherapy and radiation  Historical Information      Rectal cancer ([place] [address] )    3/9/2018 Biopsy     Rectum, mass, biopsy:  - At least high grade dysplasia/intramucosal carcinoma arising in a tubulovillous adenoma, suspicious for    invasion  3/9/2018 Initial Diagnosis     Rectal cancer ([facility] )         3/28/2018 Biopsy     Final Diagnosis   A  Rectal mass (biopsy):  - 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                    Past Medical History:   Diagnosis Date    Arthritis     Chronic lower back pain     Chronic pain disorder     back pain    GERD (gastroesophageal reflux disease)     Morbid obesity ([place] [address] )     Spinal stenosis     Systolic murmur     Unsteady gait     uses walker    Wears dentures     Wears glasses      Past Surgical History:   Procedure Laterality Date    ABSCESS DRAINAGE      abd, (R) leg, (L) leg     SECTION      ESOPHAGOGASTRODUODENOSCOPY N/A 2016    Procedure: ESOPHAGOGASTRODUODENOSCOPY (EGD); Surgeon: Tad Villagomez MD;  [facility];  Service:     ESOPHAGOGASTRODUODENOSCOPY N/A 3/30/2016    Procedure: ESOPHAGOGASTRODUODENOSCOPY (EGD); Surgeon: Tad Villagomez MD;  [facility]; Service:     EYE SURGERY      laser eye surgery    JOINT REPLACEMENT Left 2017    hip    JOINT REPLACEMENT Right 2017    hip    NC COLONOSCOPY FLX DX W/COLLJ SPEC WHEN PFRMD N/A 3/9/2018    Procedure: COLONOSCOPY;  Surgeon: Juan Razo MD;  Location: [facility] GI LAB;   Service: Gastroenterology    NC ESOPHAGOGASTRODUODENOSCOPY TRANSORAL DIAGNOSTIC N/A 2/2/2018    Procedure: ESOPHAGOGASTRODUODENOSCOPY (EGD); Surgeon: Alondra Madden MD;  Location: [facility] GI LAB; Service: Gastroenterology    [place] LAP GASTRIC BYPASS/SOLEDAD-EN-Y N/A 7/18/2016    Procedure: BYPASS GASTRIC  SOLEDAD-EN-Y LAPAROSCOPIC;  Surgeon: Lyn Burris MD;  [facility];  Service: Bariatrics    TUBAL LIGATION         Family History   Problem Relation Age of Onset    Adopted:  Yes    Leukemia Mother     Heart disease Father     No Known Problems Sister     No Known Problems Brother     Diabetes Son     No Known Problems Brother     No Known Problems Brother     No Known Problems Sister     No Known Problems Sister        Social History   History   Alcohol Use No     History   Drug Use No     Comment: Percocet for lower back pain prn     History   Smoking Status    Former Smoker    Packs/day: 1 00    Years: 25 00    Quit date: 3/30/2006   Smokeless Tobacco    Never Used         Meds/Allergies     Current Outpatient Prescriptions:     Calcium-Vitamin D 500-125 MG-UNIT TABS, Take by mouth, Disp: , Rfl:     lidocaine (LIDODERM) 5 %, APPLY 1 PATCH TO AFFECTED AREA(S) OF LUMBAR SPINE AND RIGHT HIP EVERY 12 HOURS, Disp: , Rfl: 0    Multiple Vitamins-Minerals (BARIATRIC FUSION) CHEW, Chew, Disp: , Rfl:     nystatin (MYCOSTATIN) ointment, Apply topically 3 (three) times a day, Disp: , Rfl:     omeprazole (PriLOSEC) 20 mg delayed release capsule, Take 1 capsule (20 mg total) by mouth 2 (two) times a day (Patient taking differently: Take 20 mg by mouth daily  ), Disp: 90 capsule, Rfl: 3    oxyCODONE-acetaminophen (PERCOCET)  mg per tablet, Take 1 tablet by mouth every 6 (six) hours as needed for moderate pain , Disp: , Rfl:     sucralfate (CARAFATE) 1 g tablet, Take 1 tablet (1 g total) by mouth 4 (four) times a day for 30 days (Patient taking differently: Take 1 g by mouth 2 (two) times a day  ), Disp: 120 tablet, Rfl: 6  Allergies   Allergen Reactions    Tramadol Other (See Comments)     Dizzy         Review of Systems:  Review of Systems   Constitutional: Negative  HENT: Negative  Eyes: Negative  Respiratory: Negative  Cardiovascular: Negative  Gastrointestinal: Positive for anal bleeding, blood in stool and diarrhea  Leaking stools  Endocrine: Negative  Genitourinary: Negative  Nocturia 1x/night   Musculoskeletal: Positive for back pain (Spinal stenosis) and gait problem (Unsteady)  Skin: Positive for rash (Right groin area)  Allergic/Immunologic: Negative  Neurological: Positive for numbness (Bilateral fingertips)  Hematological: Bruises/bleeds easily  Psychiatric/Behavioral: Negative  Review of Systems      OBJECTIVE:   /80 (BP Location: Right arm, Patient Position: Sitting, Cuff Size: Standard)   Pulse 85   Temp 98 4 °F (36 9 °C) (Temporal)   Resp 16   Ht 5' 5" (1 651 m)   Wt 104 kg (228 lb 3 2 oz)   BMI 37 97 kg/m²   Pain Assessment:  0  Performance Status: Karnofsky: 70 - Cares for self; unable to carry on normal activity or do normal work     Physical Exam:   Physical Exam   Constitutional: She is oriented to person, place, and time  She appears well-developed and well-nourished  No distress  HENT:   Head: Normocephalic and atraumatic  Eyes: Conjunctivae are normal  Left eye exhibits no discharge  No scleral icterus  Cardiovascular: Normal rate and regular rhythm  Exam reveals no friction rub  Murmur heard  Holosystolic murmur auscultated throughout precordium   Pulmonary/Chest: Effort normal and breath sounds normal  No respiratory distress  She has no wheezes  She has no rales  Abdominal: Soft  She exhibits no distension  There is no hepatosplenomegaly  There is no tenderness  There is no CVA tenderness  Genitourinary: No erythema or bleeding in the vagina     Genitourinary Comments: Pelvic examination demonstrates redundant tissue, but normal external female genitalia  There were no suspicious lesions noted in the vagina  Recto-vaginal septum intact  Digital rectal exam demonstrates large, villous palpable mass anterior rectal wall extending from about 1 5-2cm from anal verge  Tumor was at least 5cm in length  Sphincter tone was intact  No blood on examiner's finger  Musculoskeletal: She exhibits no edema  Lymphadenopathy:     She has no cervical adenopathy  Right: No inguinal adenopathy present  Left: No inguinal adenopathy present  Neurological: She is alert and oriented to person, place, and time  Skin: Skin is warm and dry  Rash noted  Groin erythematous rash   Psychiatric: She has a normal mood and affect  Her behavior is normal    Nursing note and vitals reviewed  RESULTS    Pathology:   Recent Results (from the past 672 hour(s))   Tissue Exam    Collection Time: 03/28/18 11:30 AM   Result Value Ref Range    Case Report       Surgical Pathology Report                         Case: [id number]                                   Authorizing Provider:  Richard Gilmore MD      Collected:           03/28/2018 1130              Pathologist:           Isabela Herbert MD             Received:            03/29/2018 0923              Specimen:    Rectum, Rectal cancer/mass biopsies                                                        Final Diagnosis       A  Rectal mass (biopsy):  - 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     Comment:   - In the context of a rectal mass, repeat biopsy and/or excision may be indicated to exclude a higher grade lesion  Additional Information       All controls performed with the immunohistochemical stains reported above reacted appropriately  These tests were developed and their performance characteristics determined by [facility] or [facility]  They may not be cleared or approved by the [place]   Food and Drug Administration  The FDA has determined that such clearance or approval is not necessary  These tests are used for clinical purposes  They should not be regarded as investigational or for research  This laboratory has been approved by CLIA 88, designated as a high-complexity laboratory and is qualified to perform these tests  Gross Description       A  The specimen is received in formalin, labeled with the patient's name and medical record number, and is designated "rectal cancer/mass biopsies  The specimen consists of 2 tan soft tissue fragments measuring 0 2 and 0 3 cm  Entirely submitted  One cassette  Note: The estimated total formalin fixation time based upon information provided by the submitting clinician and the standard processing schedule is under 72 hours  MCrites       Clinical Information No colonoscopy impression available        Imaging Studies:Ct Chest W Contrast    Result Date: 4/10/2018  Narrative: CT CHEST WITH IV CONTRAST INDICATION:   C20: Malignant neoplasm of rectum  COMPARISON: None  TECHNIQUE: CT examination of the chest was performed  Axial, sagittal, and coronal 2D reformatted images were created from the source data and submitted for interpretation  Radiation dose length product (DLP) for this visit:  594 45 mGy-cm   This examination, like all CT scans performed in the [facility], was performed utilizing techniques to minimize radiation dose exposure, including the use of iterative  reconstruction and automated exposure control  IV Contrast:  85 mL of iohexol (OMNIPAQUE) FINDINGS: LUNGS:  Lungs are clear  There is no tracheal or endobronchial lesion  PLEURA:  Unremarkable  HEART/GREAT VESSELS:  There is what appears to be severe calcification of the aortic valve leaflets and calcification of the mitral annulus and heavy calcification of the coronary artery walls  The heart is normal in size  There is no pericardial effusion    The ascending thoracic aorta is 4 cm diameter which is borderline aneurysmal   This could potentially be secondary to aortic valvular stenosis given the presence of the calcium  Recommend echocardiography for further workup  MEDIASTINUM AND ILIANA:  Unremarkable  CHEST WALL AND LOWER NECK:   Unremarkable  VISUALIZED STRUCTURES IN THE UPPER ABDOMEN:  There are postop changes at the stomach and GE junction suggesting bariatric surgery  No acute upper abdominal pathology is seen  OSSEOUS STRUCTURES:  There appears to be small joint effusion at the right shoulder which might be secondary to osteoarthritis  There is also osteoarthritis and joint effusion visible at the left shoulder which is more substantial      Impression: No evidence of lung nodules or metastatic disease in the chest  Prominent aortic valvular calcification with borderline ascending thoracic aortic aneurysm  Recommend follow-up echocardiogram for further workup of suspected aortic valvular stenosis  Bilateral left greater than right degenerative arthritis of the shoulders with joint effusions noted  Evidence of prior bariatric surgery  Workstation performed: [id number]         ASSESSMENT  1  Rectal cancer [facility])  Radiation Simulation Treatment     Rectal cancer [facility])    Staging form: Colon and Rectum, AJCC 8th Edition    - Clinical: Stage IIIB (cT3, cN1a, cM0) - Signed by Lorena Llamas MD on 4/11/2018    PLAN  Orders Placed This Encounter   Procedures    Radiation Simulation Treatment          DISCUSSION/SUMMARY  Hussein Russ is a 58y o  year old female with past medical history significant for gastric bypass and gastric ulcer who was recently diagnosed with a low lying rectal tumor  Although pathology demonstrates only high-grade dysplasia/intramucosal carcinoma arising in a tubulovillous adenoma suspicious for invasion, based on clinical presentation this is more consistent with a large invasive rectal carcinoma    Therefore, I agree with recommendation to proceed with neoadjuvant concurrent chemotherapy and radiation  We would plan for a total dose of 50 4 Gy to the primary tumor  I feel that this would offer the patient significant benefit in terms of improved local regional control and possible improved probability of negative resection margins  The patient has already been informed that an APR will be performed followed by permanent colostomy  The rationale and potential benefits, as well as the risks and acute and late side effects and potential toxicities of radiation were discussed with the patient at length  Side effects discussed included, but were not limited to: Fatigue, skin erythema, hyperpigmentation, desquamation, irritative urinary symptoms, diarrhea, bowel injury, and bone marrow suppression  The patient was given the opportunity to ask questions and all questions were answered to her satisfaction  We have tentatively scheduled the patient for CT simulation this Friday  We will coordinate with Dr Darwin Diaz this office to begin treatment soon thereafter  Regarding her groin rash which is likely candidiasis, I agree with the current treatment plan which is to continue with nystatin powder  Thank you for allowing us to participate in the care of this pleasant lady  Lorena Llamas MD  8/48/5229,6:40 PM      Portions of the record may have been created with voice recognition software   Occasional wrong word or "sound a like" substitutions may have occurred due to the inherent limitations of voice recognition software   Read the chart carefully and recognize, using context, where substitutions have occurred

## 2018-04-11 NOTE — PROGRESS NOTES
Jojo Casey  1955  Ms Ashly Carpio is a 58 y o  female    Chief Complaint   Patient presents with    Consult     Rectal Cancer       No matching staging information was found for the patient  Oncology History    Previously had gastric bypass  Patient had  blood in the stools  Additional workup included a colonoscopy which demonstrated a rectal lesion  4/3/18 F/U with Dr Duarte hTorpe: Fidelia Monk is for patient to undergo repeat CT scans, pelvic MRI and port placement  Patient will be referred to RT for evaluation of neoadjuvant chemotherapy +radiotherapy  4/6/18   CT chest  MRI pelvis            Rectal cancer ([address] )    3/9/2018 Biopsy     Rectum, mass, biopsy:  - At least high grade dysplasia/intramucosal carcinoma arising in a tubulovillous adenoma, suspicious for    invasion  3/9/2018 Initial Diagnosis     Rectal cancer ([address] )         3/28/2018 Biopsy     Final Diagnosis   A   Rectal mass (biopsy):  - 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                Clinical Trial: no    Health Maintenance   Topic Date Due    HIV SCREENING  1955    Hepatitis C Screening  1955    Depression Screening PHQ-9  09/17/1967    DTaP,Tdap,and Td Vaccines (1 - Tdap) 09/17/1976    COLONOSCOPY  03/09/2028    INFLUENZA VACCINE  Addressed       Patient Active Problem List   Diagnosis    Morbid obesity due to excess calories ([address] )    Screening for colon cancer    Screen for colon cancer    Postgastrectomy malabsorption    S/P gastric bypass    Marginal ulcer    Status post bariatric surgery    Atherosclerotic peripheral vascular disease ([address] )    Diet-controlled diabetes mellitus ([address] )    Onychomycosis    Pain in foot    Rectal cancer (HCC)     Past Medical History:   Diagnosis Date    Arthritis     Chronic lower back pain     Chronic pain disorder     back pain    GERD (gastroesophageal reflux disease)     Morbid obesity ([address] )     Spinal stenosis     Systolic murmur     Unsteady gait     uses walker    Wears dentures     Wears glasses      Past Surgical History:   Procedure Laterality Date    ABSCESS DRAINAGE      abd, (R) leg, (L) leg     SECTION      ESOPHAGOGASTRODUODENOSCOPY N/A 2016    Procedure: ESOPHAGOGASTRODUODENOSCOPY (EGD); Surgeon: Jesica Baxter MD;  [facility];  Service:     ESOPHAGOGASTRODUODENOSCOPY N/A 3/30/2016    Procedure: ESOPHAGOGASTRODUODENOSCOPY (EGD); Surgeon: Jesica Baxter MD;  [facility]; Service:     EYE SURGERY      laser eye surgery    JOINT REPLACEMENT Left 2017    hip    JOINT REPLACEMENT Right 2017    hip    [place] COLONOSCOPY FLX DX W/COLLJ SPEC WHEN PFRMD N/A 3/9/2018    Procedure: COLONOSCOPY;  Surgeon: Quang Del Cid MD;  Location: [facility]; Service: Gastroenterology    [place] ESOPHAGOGASTRODUODENOSCOPY TRANSORAL DIAGNOSTIC N/A 2018    Procedure: ESOPHAGOGASTRODUODENOSCOPY (EGD); Surgeon: Quang Del Cid MD;  Location: [facility]; Service: Gastroenterology    [place] LAP GASTRIC BYPASS/SOLEDAD-EN-Y N/A 2016    Procedure: BYPASS GASTRIC  SOLEDAD-EN-Y LAPAROSCOPIC;  Surgeon: Jesica Baxter MD;  [facility];  Service: Bariatrics    TUBAL LIGATION       Family History   Problem Relation Age of Onset    Adopted: Yes    Leukemia Mother     Heart disease Father     No Known Problems Sister     No Known Problems Brother     Diabetes Son     No Known Problems Brother     No Known Problems Brother     No Known Problems Sister     No Known Problems Sister      Social History     Social History    Marital status: Single     Spouse name: N/A    Number of children: 2    Years of education: N/A     Occupational History    Not on file       Social History Main Topics    Smoking status: Former Smoker     Packs/day: 1 00     Years: 25 00     Quit date: 3/30/2006    Smokeless tobacco: Never Used    Alcohol use No    Drug use: No      Comment: Percocet for lower back pain prn    Sexual activity: Not on file     Other Topics Concern    Not on file     Social History Narrative    No narrative on file       Current Outpatient Prescriptions:     Calcium-Vitamin D 500-125 MG-UNIT TABS, Take by mouth, Disp: , Rfl:     lidocaine (LIDODERM) 5 %, APPLY 1 PATCH TO AFFECTED AREA(S) OF LUMBAR SPINE AND RIGHT HIP EVERY 12 HOURS, Disp: , Rfl: 0    Multiple Vitamins-Minerals (BARIATRIC FUSION) CHEW, Chew, Disp: , Rfl:     nystatin (MYCOSTATIN) ointment, Apply topically 3 (three) times a day, Disp: , Rfl:     omeprazole (PriLOSEC) 20 mg delayed release capsule, Take 1 capsule (20 mg total) by mouth 2 (two) times a day (Patient taking differently: Take 20 mg by mouth daily  ), Disp: 90 capsule, Rfl: 3    oxyCODONE-acetaminophen (PERCOCET)  mg per tablet, Take 1 tablet by mouth every 6 (six) hours as needed for moderate pain , Disp: , Rfl:     sucralfate (CARAFATE) 1 g tablet, Take 1 tablet (1 g total) by mouth 4 (four) times a day for 30 days (Patient taking differently: Take 1 g by mouth 2 (two) times a day  ), Disp: 120 tablet, Rfl: 6    Allergies   Allergen Reactions    Tramadol Other (See Comments)     Dizzy         Review of Systems:  Review of Systems   Constitutional: Negative  HENT: Negative  Eyes: Negative  Respiratory: Negative  Cardiovascular: Negative  Gastrointestinal: Positive for anal bleeding, blood in stool and diarrhea  Leaking stools  Endocrine: Negative  Genitourinary: Negative  Nocturia 1x/night   Musculoskeletal: Positive for back pain (Spinal stenosis) and gait problem (Unsteady)  Skin: Positive for rash (Right groin area)  Allergic/Immunologic: Negative  Neurological: Positive for numbness (Bilateral fingertips)  Hematological: Bruises/bleeds easily  Psychiatric/Behavioral: Negative          Vitals:    04/11/18 1320   BP: 120/80   BP Location: Right arm   Patient Position: Sitting   Cuff Size: Standard Pulse: 85   Resp: 16   Temp: 98 4 °F (36 9 °C)   TempSrc: Temporal   Weight: 104 kg (228 lb 3 2 oz)   Height: 5' 5" (1 651 m)         Imaging:Ct Chest W Contrast    Result Date: 4/10/2018  Narrative: CT CHEST WITH IV CONTRAST INDICATION:   C20: Malignant neoplasm of rectum  COMPARISON: None  TECHNIQUE: CT examination of the chest was performed  Axial, sagittal, and coronal 2D reformatted images were created from the source data and submitted for interpretation  Radiation dose length product (DLP) for this visit:  594 45 mGy-cm   This examination, like all CT scans performed in the [facility], was performed utilizing techniques to minimize radiation dose exposure, including the use of iterative  reconstruction and automated exposure control  IV Contrast:  85 mL of iohexol (OMNIPAQUE) FINDINGS: LUNGS:  Lungs are clear  There is no tracheal or endobronchial lesion  PLEURA:  Unremarkable  HEART/GREAT VESSELS:  There is what appears to be severe calcification of the aortic valve leaflets and calcification of the mitral annulus and heavy calcification of the coronary artery walls  The heart is normal in size  There is no pericardial effusion  The ascending thoracic aorta is 4 cm diameter which is borderline aneurysmal   This could potentially be secondary to aortic valvular stenosis given the presence of the calcium  Recommend echocardiography for further workup  MEDIASTINUM AND ILIANA:  Unremarkable  CHEST WALL AND LOWER NECK:   Unremarkable  VISUALIZED STRUCTURES IN THE UPPER ABDOMEN:  There are postop changes at the stomach and GE junction suggesting bariatric surgery  No acute upper abdominal pathology is seen  OSSEOUS STRUCTURES:  There appears to be small joint effusion at the right shoulder which might be secondary to osteoarthritis    There is also osteoarthritis and joint effusion visible at the left shoulder which is more substantial      Impression: No evidence of lung nodules or metastatic disease in the chest  Prominent aortic valvular calcification with borderline ascending thoracic aortic aneurysm  Recommend follow-up echocardiogram for further workup of suspected aortic valvular stenosis  Bilateral left greater than right degenerative arthritis of the shoulders with joint effusions noted  Evidence of prior bariatric surgery  Workstation performed: [id number]       Teaching NCI packet  RT to pelvis

## 2018-04-13 ENCOUNTER — APPOINTMENT (OUTPATIENT)
Dept: RADIATION ONCOLOGY | Facility: HOSPITAL | Age: 63
End: 2018-04-13
Attending: RADIOLOGY
Payer: MEDICARE

## 2018-04-13 ENCOUNTER — TELEPHONE (OUTPATIENT)
Dept: HEMATOLOGY ONCOLOGY | Facility: MEDICAL CENTER | Age: 63
End: 2018-04-13

## 2018-04-13 PROCEDURE — 77290 THER RAD SIMULAJ FIELD CPLX: CPT | Performed by: RADIOLOGY

## 2018-04-13 PROCEDURE — 77470 SPECIAL RADIATION TREATMENT: CPT | Performed by: RADIOLOGY

## 2018-04-13 PROCEDURE — 77334 RADIATION TREATMENT AID(S): CPT | Performed by: RADIOLOGY

## 2018-04-13 PROCEDURE — 77370 RADIATION PHYSICS CONSULT: CPT | Performed by: RADIOLOGY

## 2018-04-16 DIAGNOSIS — K28.9 MARGINAL ULCER: ICD-10-CM

## 2018-04-16 NOTE — TELEPHONE ENCOUNTER
Patient's family wanted a 2nd opinion 1st   I have asked Roxane Valadez to begin the process of getting the 2nd opinion from the surgeon in [facility]  Patient needs to understand that she she needs to make a decision  Either start treatment or wait until the 2nd opinion

## 2018-04-17 ENCOUNTER — DOCUMENTATION (OUTPATIENT)
Dept: INFUSION CENTER | Facility: CLINIC | Age: 63
End: 2018-04-17

## 2018-04-17 NOTE — SOCIAL WORK
MSW receied a Distress Thermometer dated 4/13/18, where the pt scored herself at a 0  Pt checked off nervousness and worry as problem areas  There is no additional need for MSW follow up at this time  MSW will follow up as needed or referred

## 2018-04-24 RX ORDER — OMEPRAZOLE 20 MG/1
20 CAPSULE, DELAYED RELEASE ORAL DAILY
Qty: 90 CAPSULE | Refills: 0 | OUTPATIENT
Start: 2018-04-24

## 2018-04-24 RX ORDER — OMEPRAZOLE 20 MG/1
20 CAPSULE, DELAYED RELEASE ORAL 2 TIMES DAILY
Qty: 90 CAPSULE | Refills: 0 | OUTPATIENT
Start: 2018-04-24

## 2018-04-25 ENCOUNTER — OFFICE VISIT (OUTPATIENT)
Dept: FAMILY MEDICINE CLINIC | Facility: CLINIC | Age: 63
End: 2018-04-25
Payer: MEDICARE

## 2018-04-25 VITALS
HEART RATE: 96 BPM | BODY MASS INDEX: 38.15 KG/M2 | HEIGHT: 65 IN | OXYGEN SATURATION: 98 % | DIASTOLIC BLOOD PRESSURE: 78 MMHG | TEMPERATURE: 97.8 F | RESPIRATION RATE: 18 BRPM | SYSTOLIC BLOOD PRESSURE: 110 MMHG | WEIGHT: 229 LBS

## 2018-04-25 DIAGNOSIS — C20 RECTAL CANCER (HCC): Primary | ICD-10-CM

## 2018-04-25 DIAGNOSIS — Z12.31 SCREENING MAMMOGRAM, ENCOUNTER FOR: ICD-10-CM

## 2018-04-25 DIAGNOSIS — K28.9 MARGINAL ULCER: ICD-10-CM

## 2018-04-25 DIAGNOSIS — B37.2 CANDIDIASIS, INTERTRIGO: ICD-10-CM

## 2018-04-25 DIAGNOSIS — Z23 NEED FOR INFLUENZA VACCINATION: ICD-10-CM

## 2018-04-25 DIAGNOSIS — E66.01 CLASS 2 SEVERE OBESITY DUE TO EXCESS CALORIES WITH SERIOUS COMORBIDITY AND BODY MASS INDEX (BMI) OF 38.0 TO 38.9 IN ADULT (HCC): ICD-10-CM

## 2018-04-25 PROCEDURE — 90686 IIV4 VACC NO PRSV 0.5 ML IM: CPT | Performed by: FAMILY MEDICINE

## 2018-04-25 PROCEDURE — 99214 OFFICE O/P EST MOD 30 MIN: CPT | Performed by: FAMILY MEDICINE

## 2018-04-25 PROCEDURE — G0009 ADMIN PNEUMOCOCCAL VACCINE: HCPCS | Performed by: FAMILY MEDICINE

## 2018-04-25 RX ORDER — OMEPRAZOLE 20 MG/1
20 CAPSULE, DELAYED RELEASE ORAL DAILY
Qty: 90 CAPSULE | Refills: 3 | Status: SHIPPED | OUTPATIENT
Start: 2018-04-25 | End: 2019-02-06 | Stop reason: SDUPTHER

## 2018-04-25 RX ORDER — NYSTATIN 100000 U/G
CREAM TOPICAL 2 TIMES DAILY
Qty: 30 G | Refills: 1 | Status: SHIPPED | OUTPATIENT
Start: 2018-04-25 | End: 2018-08-29

## 2018-04-25 NOTE — PROGRESS NOTES
Assessment/Plan:     Diagnoses and all orders for this visit:    Rectal cancer ([facility] )    Class 2 severe obesity due to excess calories with serious comorbidity and body mass index (BMI) of 38 0 to 38 9 in adult ([facility] )    Marginal ulcer  -     omeprazole (PriLOSEC) 20 mg delayed release capsule; Take 1 capsule (20 mg total) by mouth daily    Candidiasis, intertrigo  -     nystatin (MYCOSTATIN) cream; Apply topically 2 (two) times a day    Screening mammogram, encounter for  -     Mammo screening bilateral w cad; Future    Need for influenza vaccination  -     Cancel: FLU VACCINE GREATER THAN OR EQUAL TO 2YO PRESERVATIVE FREE IM  -     Flu Vaccine Quadrivalent greater than or equal to 4yo Preservative Free IM      I have counseled patient on compliance with medication/screening regimen and to keep scheduled visit with Dr Renate Ceballos on 4/26/18  Refills gives on prilosec and nystatin  Requisition given for mammogram  Patient will return to see me in 3 weeks  Counseled on warning signs that prompt immediate medical attention  Subjective:      Patient ID: Alyssa Guzman is a 58 y o  female  59 yo F with recent diagnosis of rectal cancer presents for follow up  Patient states she no longer wishes to seek second opinion for surgery and would like to start the chemotherapy as soon as possible  She states she is following up with Dr Renate Ceballos on 4/26/18  She continues to have loose stools with mucous and blood  She is tolerating diet  Denies weight loss, fever, chills, nausea/vomiting, anhedonia, sleep disturbances  She is coping well  Her family is very supportive  Requesting refills on prilosec and nystatin cream  Needs requisition for mammogram again, promises she will schedule appointment           The following portions of the patient's history were reviewed and updated as appropriate: allergies, current medications, past family history, past medical history, past social history, past surgical history and problem list     Review of Systems   Constitutional: Negative for chills, fatigue, fever and unexpected weight change  HENT: Negative for sore throat  Eyes: Negative for visual disturbance  Respiratory: Negative for shortness of breath  Cardiovascular: Positive for leg swelling  Negative for chest pain and palpitations  Gastrointestinal: Positive for blood in stool and diarrhea  Negative for abdominal distention, abdominal pain, anal bleeding, constipation, nausea, rectal pain and vomiting  Genitourinary: Negative for hematuria  Skin: Positive for rash  (Chronic)breast folds and inguinal canal     Neurological: Negative for dizziness, syncope, weakness and light-headedness  Hematological: Negative for adenopathy  Does not bruise/bleed easily  Psychiatric/Behavioral: Negative for confusion, dysphoric mood, self-injury, sleep disturbance and suicidal ideas  The patient is nervous/anxious  Objective:      /78   Pulse 96   Temp 97 8 °F (36 6 °C)   Resp 18   Ht 5' 5" (1 651 m)   Wt 104 kg (229 lb)   SpO2 98%   BMI 38 11 kg/m²          Physical Exam   Constitutional: She is oriented to person, place, and time  She appears well-developed and well-nourished  HENT:   Head: Normocephalic and atraumatic  Eyes: Pupils are equal, round, and reactive to light  No scleral icterus  Neck: Normal range of motion  Neck supple  No thyromegaly present  Cardiovascular: Normal rate, regular rhythm and intact distal pulses  Murmur heard  Pulmonary/Chest: Effort normal and breath sounds normal    Abdominal: Soft  Bowel sounds are normal  She exhibits no distension  There is no tenderness  Musculoskeletal: Normal range of motion  Trace edema bilateral lower extremities  Pulses 1+  No cords  Neurological: She is alert and oriented to person, place, and time  Skin: Skin is warm and dry  Rash noted     Bilateral inguinal canals: moist red patches, non-tender   Nursing note and vitals reviewed

## 2018-04-25 NOTE — TELEPHONE ENCOUNTER
Patient follows up with bariatric physician, Dr Olive Carrera, who saw patient in February 2018 and prescribed omeprazole 20 mg b i d  with 90 pills and 3 refills for marginal ulcers  Patient is not due for another refill at this time

## 2018-04-26 ENCOUNTER — OFFICE VISIT (OUTPATIENT)
Dept: HEMATOLOGY ONCOLOGY | Facility: MEDICAL CENTER | Age: 63
End: 2018-04-26
Payer: MEDICARE

## 2018-04-26 ENCOUNTER — DOCUMENTATION (OUTPATIENT)
Dept: HEMATOLOGY ONCOLOGY | Facility: CLINIC | Age: 63
End: 2018-04-26

## 2018-04-26 VITALS
DIASTOLIC BLOOD PRESSURE: 70 MMHG | SYSTOLIC BLOOD PRESSURE: 108 MMHG | TEMPERATURE: 98.7 F | BODY MASS INDEX: 38.49 KG/M2 | RESPIRATION RATE: 18 BRPM | HEIGHT: 65 IN | WEIGHT: 231 LBS | OXYGEN SATURATION: 94 % | HEART RATE: 92 BPM

## 2018-04-26 DIAGNOSIS — C20 RECTAL CANCER (HCC): Primary | ICD-10-CM

## 2018-04-26 PROCEDURE — 99214 OFFICE O/P EST MOD 30 MIN: CPT | Performed by: INTERNAL MEDICINE

## 2018-04-26 RX ORDER — SUCRALFATE 1 G/1
1 TABLET ORAL 4 TIMES DAILY
COMMUNITY
End: 2018-08-29

## 2018-04-26 RX ORDER — CAPECITABINE 150 MG/1
TABLET, FILM COATED ORAL
Qty: 20 TABLET | Refills: 1 | Status: SHIPPED | OUTPATIENT
Start: 2018-04-26 | End: 2018-08-03 | Stop reason: ALTCHOICE

## 2018-04-26 RX ORDER — CAPECITABINE 500 MG/1
TABLET, FILM COATED ORAL
Qty: 60 TABLET | Refills: 1 | Status: SHIPPED | OUTPATIENT
Start: 2018-04-26 | End: 2018-08-03 | Stop reason: ALTCHOICE

## 2018-04-26 NOTE — PROGRESS NOTES
Received notification from clinical the pt will be starting capecitabine  Her rx card was not in the system  Called pt obtained her rx information  [facility] id #[id number]    Bin [id number]     pcn meddprime    Group   [email or web address]  Submitted for auth through cover my meds  Received notification that this may not be covered under her plan  Called [facility] at 2:36 ([phone]) spoke with Nelly Reyna who stated that this drug is not covered  It may go through her part b    Notified clinical, [facility], and financial team   Received notification from [facility] that they sent it to [facility]

## 2018-04-26 NOTE — PROGRESS NOTES
[place]  1955  [address] [facility] [place] [phone]  HEMATOLOGY/ONCOLOGY CONSULTATION REPORT    DISCUSSION/SUMMARY:    58-year-old female recently found to have high-grade dysplasia/intramucosal lesion arising in a tubulovillous adenoma  Patient feels relatively well and clinically there are no troubling signs  A CT scan of the abdomen/pelvis for approximately 2 5 months ago did not demonstrate any abnormality  The CEA level is elevated  Recent MRI of the pelvis demonstrated stage IIIC (cT3 cN1) disease  We rediscussed options  Patient is to begin concurrent neoadjuvant chemotherapy + radiotherapy  NCCN guidelines 1 2018 states that for T3 N any with a clear circumferential margin by MRI, primary treatment includes chemotherapy + radiotherapy  This office is looking into securing capecitabine for the patient  Patient has already been seen by radiation oncology - just needs to be simulated  Treatment can begin as soon as RT is ready  The treatment sequence will likely include a rest period after neoadjuvant treatment, surgery and then postoperative chemotherapy  Patient's family wishes for patient to get a 2nd opinion  Mrs Rashmi Blevins would like to go to the [address] [place] [address] -this is pending for next week  Patient does not wish for any additional time to go by - wishes  to start treatment as soon as possible  Patient is to return in 2 weeks but this will likely change depending upon the above  Carefully review your medication list and verify that the list is accurate and up-to-date  Please call the hematology/oncology office if there are medications missing from the list, medications on the list that you are not currently taking or if there is a dosage or instruction that is different from how you're taking that medication      Patient goals and areas of care:  Radiation oncology follow-up  Patient is able to self-care   ______________________________________________________________________________________    Chief Complaint   Patient presents with    Follow-up     Recent diagnosis of rectal cancer     History of Present Illness:    70-year-old female recently referred for the above  Previously Mrs Gurinder Simmons had gastric bypass  Patient was recently seen by GI because of blood in the stools  Additional workup included a colonoscopy which demonstrated a rectal lesion  Presently patient states feeling okay but Mrs Gurinder Simmons is still having irregular bowel movements (sometimes constipation, sometimes diarrhea) - same as before  Patient also sees blood in her stools occasionally -same as before  Appetite is good, weight is stable  No  or GYN issues  No fevers, chills or sweats  No headaches, blurred vision or dizziness  Generalized body aches are the same as before  Mrs Geovanni Hwang was told that even with neoadjuvant treatment, she will likely require an APR  Patient has been discussing issues with her family  Family wishes for patient to obtain a 2nd opinion before beginning treatment  Review of Systems   Constitutional: Negative  HENT: Negative  Eyes: Negative  Respiratory: Negative  Cardiovascular: Negative  Gastrointestinal: Positive for blood in stool, constipation and diarrhea  Endocrine: Negative  Genitourinary: Negative  Musculoskeletal: Positive for arthralgias  Skin: Negative  Allergic/Immunologic: Negative  Neurological: Negative  Hematological: Negative  Psychiatric/Behavioral: Negative  All other systems reviewed and are negative       Patient Active Problem List   Diagnosis    Morbid obesity due to excess calories ([address] )    Screening for colon cancer    Screen for colon cancer    Postgastrectomy malabsorption    S/P gastric bypass    Marginal ulcer    Status post bariatric surgery    Atherosclerotic peripheral vascular disease ([facility] )    Diet-controlled diabetes mellitus ([facility] )    Onychomycosis    Pain in foot    Rectal cancer (HCC)     Past Medical History:   Diagnosis Date    Arthritis     Chronic lower back pain     Chronic pain disorder     back pain    GERD (gastroesophageal reflux disease)     Morbid obesity ([facility] )     Spinal stenosis     Systolic murmur     Unsteady gait     uses walker    Wears dentures     Wears glasses      Ob/gyn:  No recent mammogram -patient's choice, no post menopausal bleeding    Past Surgical History:   Procedure Laterality Date    ABSCESS DRAINAGE      abd, (R) leg, (L) leg     SECTION      ESOPHAGOGASTRODUODENOSCOPY N/A 2016    Procedure: ESOPHAGOGASTRODUODENOSCOPY (EGD); Surgeon: Samina Taylor MD;  Location: [facility];  Service:     ESOPHAGOGASTRODUODENOSCOPY N/A 3/30/2016    Procedure: ESOPHAGOGASTRODUODENOSCOPY (EGD); Surgeon: Samina Taylor MD;  Location: [facility]; Service:     EYE SURGERY      laser eye surgery    JOINT REPLACEMENT Left 2017    hip    JOINT REPLACEMENT Right 2017    hip    WI COLONOSCOPY FLX DX W/COLLJ SPEC WHEN PFRMD N/A 3/9/2018    Procedure: COLONOSCOPY;  Surgeon: Adolfo Oropeza MD;  Location: [place] [address] GI LAB; Service: Gastroenterology    WI ESOPHAGOGASTRODUODENOSCOPY TRANSORAL DIAGNOSTIC N/A 2018    Procedure: ESOPHAGOGASTRODUODENOSCOPY (EGD); Surgeon: Adolfo Oropeza MD;  Location[facility]; Service: Gastroenterology    WI LAP GASTRIC BYPASS/SOLEDAD-EN-Y N/A 2016    Procedure: BYPASS GASTRIC  SOLEDAD-EN-Y LAPAROSCOPIC;  Surgeon: Samina Taylor MD;  [facility]: [facility];  Service: Bariatrics    TUBAL LIGATION       Family History   Problem Relation Age of Onset    Adopted:  Yes    Leukemia Mother     Heart disease Father     No Known Problems Sister     No Known Problems Brother     Diabetes Son     No Known Problems Brother     No Known Problems Brother     No Known Problems Sister     No Known Problems Sister Family history: Mother  of leukemia (NOS), father  from heart disease (NOS), 2 children 1 with diabetes, no known familial or genetic diseases, no family history of GI malignancies    Social History     Social History    Marital status: Single     Spouse name: N/A    Number of children: 2    Years of education: N/A     Occupational History    Not on file  Social History Main Topics    Smoking status: Former Smoker     Packs/day: 1      Years:  00     Quit date: 3/30/2006    Smokeless tobacco: Never Used    Alcohol use No    Drug use: No      Comment: Percocet for lower back pain prn    Sexual activity: Not on file     Other Topics Concern    Not on file     Social History Narrative    No narrative on file       Current Outpatient Prescriptions:     Calcium-Vitamin D 500-125 MG-UNIT TABS, Take by mouth, Disp: , Rfl:     lidocaine (LIDODERM) 5 %, APPLY 1 PATCH TO AFFECTED AREA(S) OF LUMBAR SPINE AND RIGHT HIP EVERY 12 HOURS, Disp: , Rfl: 0    Multiple Vitamins-Minerals (BARIATRIC FUSION) CHEW, Chew, Disp: , Rfl:     nystatin (MYCOSTATIN) cream, Apply topically 2 (two) times a day, Disp: 30 g, Rfl: 1    omeprazole (PriLOSEC) 20 mg delayed release capsule, Take 1 capsule (20 mg total) by mouth daily, Disp: 90 capsule, Rfl: 3    oxyCODONE-acetaminophen (PERCOCET)  mg per tablet, Take 1 tablet by mouth every 6 (six) hours as needed for moderate pain , Disp: , Rfl:     sucralfate (CARAFATE) 1 g tablet, Take 1 g by mouth 4 (four) times a day, Disp: , Rfl:     capecitabine (XELODA) 150 MG tablet, Take one tablet daily days 1-5 along with 3 tablets of capecitabine 500mg (total dose 1650mg) twice a day, Disp: 20 tablet, Rfl: 1    capecitabine (XELODA) 500 MG tablet, Take 3 tablets daily days 1-5 along with one Capecitabine 150mg tablet (total dose 1650mg) twice daily  , Disp: 60 tablet, Rfl: 1    Allergies   Allergen Reactions    Tramadol Other (See Comments)     Dizzy Vitals:    04/26/18 1000   BP: 108/70   Pulse: 92   Resp: 18   Temp: 98 7 °F (37 1 °C)   SpO2: 94%     Physical Exam   Constitutional: She is oriented to person, place, and time  She appears well-developed and well-nourished  HENT:   Head: Normocephalic and atraumatic  Right Ear: External ear normal    Left Ear: External ear normal    Nose: Nose normal    Mouth/Throat: Oropharynx is clear and moist    Eyes: Conjunctivae and EOM are normal  Pupils are equal, round, and reactive to light  Neck: Normal range of motion  Neck supple  Cardiovascular: Normal rate, regular rhythm, normal heart sounds and intact distal pulses  Pulmonary/Chest: Effort normal and breath sounds normal    Abdominal: Soft  Bowel sounds are normal    Abdomen is soft, nontender, +bowel sounds, obese, cannot palpate liver or spleen   Musculoskeletal:   Decreased range of motion in hips and knees bilaterally   Neurological: She is alert and oriented to person, place, and time  She has normal reflexes  Skin: Skin is warm  Psychiatric: She has a normal mood and affect  Her behavior is normal  Judgment and thought content normal    Extremities: 0-1+ bilateral lower extremity edema, no cords, pulses are 1+  Lymphatics:  No adenopathy in the neck, supraclavicular region, axilla and groin bilaterally    Labs:    03/12/2018 CEA = 22 6 = high  02/12/2018 BUN = 16 creatinine = 0 68 alkaline phosphatase = 57 total protein = 6 8 albumin = 2 9 total bilirubin = 0 43 AST = 10 ALT = 13 WBC = 5 7 hemoglobin = 11 5 hematocrit = 36 8 MCV = 76 RDW = 16 4 platelet = 932 neutrophil = 60% lymphocytes = 30% monocyte = 7%    Imaging    4/6/18 MRI pelvis rectal cancer staging    Low rectal cancer, 5 cm in length, circumferential around the rectal wall  (Series 8 images 19 through 33 )  Anteriorly, there are multiple areas where there is transmural tumor extension into the mesorectal fat making this at least a T3c lesion   On Series 8 image 31, tumor also abuts the mesorectal fascia making this CRM positive  At this point it is also   immediately adjacent to the vagina, therefore this may be a T4 lesion  There are 2 high risk perirectal nodes, and 1 low risk perirectal nodes  01/31/2018 ultrasound right upper quadrant    1  Layering gallbladder sludge  No acute cholecystitis or biliary ductal obstruction  2   Hepatomegaly  3   Fluid-filled stomach  1/27/18 CT scan of the abdomen/pelvis    1  Prior Juan-en-Y gastric bypass with distention of the excluded stomach  2   No evidence of acute abnormality in the abdomen or pelvis      Pathology    Case Report   Surgical Pathology Report                         Case: [id number]                                    Authorizing Provider: Colonel Duane MD      Collected:           03/28/2018 1130               Pathologist:           Ailyn Kilpatrick MD             Received:            03/29/2018 0942               Specimen:    Rectum, Rectal cancer/mass biopsies                                                        Final Diagnosis   A  Rectal mass (biopsy):  - 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      Comment:   - In the context of a rectal mass, repeat biopsy and/or excision may be indicated to exclude a higher grade lesion      Electronically signed by Ailyn Kilpatrick MD on 3/30/2018 at  2:36 PM         Case Report   Surgical Pathology Report                         Case: [id number]                                    Authorizing Provider: Emperatriz Sullivan MD          Collected:           03/09/2018 0902               Ordering Location:     [facility]   Received:            03/12/2018 0904                                      Center                                                                        Pathologist:           Becky Mulligan DO                                                             Specimens:   A) - Colon, polyp splenic flexure/cold snare                                                         B) - Colon, bx distal rectal mass                                                          Final Diagnosis   A  Colon, splenic flexure polyp, biopsy:  - Tubulovillous adenoma, negative for high-grade dysplasia      B  Rectum, mass, biopsy:  - At least high grade dysplasia/intramucosal carcinoma arising in a tubulovillous adenoma, suspicious for invasion       Intradepartmental consultation is in agreement with the above diagnosis (AT)     Interpretation performed at [facility], [address] [address]  Report faxed to Dr Florian De La Torre on 3/13/18 @ 10:55 AM   Electronically signed by Jay Crystal DO on 3/13/2018 at 10:54 AM

## 2018-04-27 ENCOUNTER — TELEPHONE (OUTPATIENT)
Dept: HEMATOLOGY ONCOLOGY | Facility: MEDICAL CENTER | Age: 63
End: 2018-04-27

## 2018-05-01 ENCOUNTER — TELEPHONE (OUTPATIENT)
Dept: HEMATOLOGY ONCOLOGY | Facility: MEDICAL CENTER | Age: 63
End: 2018-05-01

## 2018-05-01 ENCOUNTER — APPOINTMENT (OUTPATIENT)
Dept: RADIATION ONCOLOGY | Facility: HOSPITAL | Age: 63
End: 2018-05-01
Attending: RADIOLOGY
Payer: MEDICARE

## 2018-05-01 PROCEDURE — 77300 RADIATION THERAPY DOSE PLAN: CPT | Performed by: RADIOLOGY

## 2018-05-01 PROCEDURE — 77334 RADIATION TREATMENT AID(S): CPT | Performed by: RADIOLOGY

## 2018-05-01 PROCEDURE — 77295 3-D RADIOTHERAPY PLAN: CPT | Performed by: RADIOLOGY

## 2018-05-02 NOTE — TELEPHONE ENCOUNTER
Returned call to pt  RX was sent from [address] [place] [address] Rosemarie Fletcher to [facility] Otto on 4/26  Medicare part-D plan gave a rejection this needed to be billed through Part B  Actively being worked on by Clifton Otto and financial auth team  Pt is aware, will call her with update

## 2018-05-08 ENCOUNTER — APPOINTMENT (OUTPATIENT)
Dept: LAB | Facility: CLINIC | Age: 63
End: 2018-05-08
Payer: MEDICARE

## 2018-05-08 ENCOUNTER — TRANSCRIBE ORDERS (OUTPATIENT)
Dept: HEMATOLOGY ONCOLOGY | Facility: MEDICAL CENTER | Age: 63
End: 2018-05-08

## 2018-05-08 DIAGNOSIS — D64.9 ANEMIA: ICD-10-CM

## 2018-05-08 DIAGNOSIS — C20 RECTAL CANCER (HCC): Primary | ICD-10-CM

## 2018-05-08 LAB
ALBUMIN SERPL BCP-MCNC: 3.1 G/DL (ref 3.5–5)
ALP SERPL-CCNC: 65 U/L (ref 46–116)
ALT SERPL W P-5'-P-CCNC: 13 U/L (ref 12–78)
ANION GAP SERPL CALCULATED.3IONS-SCNC: 5 MMOL/L (ref 4–13)
AST SERPL W P-5'-P-CCNC: 12 U/L (ref 5–45)
BASOPHILS # BLD AUTO: 0.02 THOUSANDS/ΜL (ref 0–0.1)
BASOPHILS NFR BLD AUTO: 0 % (ref 0–1)
BILIRUB SERPL-MCNC: 0.41 MG/DL (ref 0.2–1)
BUN SERPL-MCNC: 17 MG/DL (ref 5–25)
CALCIUM SERPL-MCNC: 9 MG/DL (ref 8.3–10.1)
CHLORIDE SERPL-SCNC: 106 MMOL/L (ref 100–108)
CO2 SERPL-SCNC: 29 MMOL/L (ref 21–32)
CREAT SERPL-MCNC: 0.62 MG/DL (ref 0.6–1.3)
EOSINOPHIL # BLD AUTO: 0.23 THOUSAND/ΜL (ref 0–0.61)
EOSINOPHIL NFR BLD AUTO: 3 % (ref 0–6)
ERYTHROCYTE [DISTWIDTH] IN BLOOD BY AUTOMATED COUNT: 15.3 % (ref 11.6–15.1)
FERRITIN SERPL-MCNC: 8 NG/ML (ref 8–388)
GFR SERPL CREATININE-BSD FRML MDRD: 97 ML/MIN/1.73SQ M
GLUCOSE SERPL-MCNC: 83 MG/DL (ref 65–140)
HCT VFR BLD AUTO: 34.9 % (ref 34.8–46.1)
HGB BLD-MCNC: 10.7 G/DL (ref 11.5–15.4)
IRON SATN MFR SERPL: 5 %
IRON SERPL-MCNC: 23 UG/DL (ref 50–170)
LYMPHOCYTES # BLD AUTO: 2.04 THOUSANDS/ΜL (ref 0.6–4.47)
LYMPHOCYTES NFR BLD AUTO: 30 % (ref 14–44)
MCH RBC QN AUTO: 22.9 PG (ref 26.8–34.3)
MCHC RBC AUTO-ENTMCNC: 30.7 G/DL (ref 31.4–37.4)
MCV RBC AUTO: 75 FL (ref 82–98)
MONOCYTES # BLD AUTO: 0.66 THOUSAND/ΜL (ref 0.17–1.22)
MONOCYTES NFR BLD AUTO: 10 % (ref 4–12)
NEUTROPHILS # BLD AUTO: 3.77 THOUSANDS/ΜL (ref 1.85–7.62)
NEUTS SEG NFR BLD AUTO: 57 % (ref 43–75)
NRBC BLD AUTO-RTO: 0 /100 WBCS
PLATELET # BLD AUTO: 208 THOUSANDS/UL (ref 149–390)
PMV BLD AUTO: 10.6 FL (ref 8.9–12.7)
POTASSIUM SERPL-SCNC: 4.3 MMOL/L (ref 3.5–5.3)
PROT SERPL-MCNC: 6.7 G/DL (ref 6.4–8.2)
RBC # BLD AUTO: 4.68 MILLION/UL (ref 3.81–5.12)
SODIUM SERPL-SCNC: 140 MMOL/L (ref 136–145)
TIBC SERPL-MCNC: 426 UG/DL (ref 250–450)
WBC # BLD AUTO: 6.74 THOUSAND/UL (ref 4.31–10.16)

## 2018-05-08 PROCEDURE — 77280 THER RAD SIMULAJ FIELD SMPL: CPT | Performed by: RADIOLOGY

## 2018-05-08 PROCEDURE — 82728 ASSAY OF FERRITIN: CPT

## 2018-05-08 PROCEDURE — 80053 COMPREHEN METABOLIC PANEL: CPT

## 2018-05-08 PROCEDURE — 85025 COMPLETE CBC W/AUTO DIFF WBC: CPT

## 2018-05-08 PROCEDURE — 83540 ASSAY OF IRON: CPT

## 2018-05-08 PROCEDURE — 83550 IRON BINDING TEST: CPT

## 2018-05-08 PROCEDURE — 36415 COLL VENOUS BLD VENIPUNCTURE: CPT

## 2018-05-10 ENCOUNTER — OFFICE VISIT (OUTPATIENT)
Dept: HEMATOLOGY ONCOLOGY | Facility: MEDICAL CENTER | Age: 63
End: 2018-05-10
Payer: MEDICARE

## 2018-05-10 VITALS
DIASTOLIC BLOOD PRESSURE: 70 MMHG | TEMPERATURE: 98.5 F | HEIGHT: 65 IN | RESPIRATION RATE: 18 BRPM | HEART RATE: 90 BPM | OXYGEN SATURATION: 99 % | BODY MASS INDEX: 38.15 KG/M2 | SYSTOLIC BLOOD PRESSURE: 110 MMHG | WEIGHT: 229 LBS

## 2018-05-10 DIAGNOSIS — C20 RECTAL CANCER (HCC): Primary | ICD-10-CM

## 2018-05-10 DIAGNOSIS — C20 MALIGNANT NEOPLASM OF RECTUM (HCC): Primary | ICD-10-CM

## 2018-05-10 PROCEDURE — 77387 GUIDANCE FOR RADJ TX DLVR: CPT | Performed by: RADIOLOGY

## 2018-05-10 PROCEDURE — 99213 OFFICE O/P EST LOW 20 MIN: CPT | Performed by: INTERNAL MEDICINE

## 2018-05-10 PROCEDURE — 77412 RADIATION TX DELIVERY LVL 3: CPT | Performed by: RADIOLOGY

## 2018-05-10 NOTE — PROGRESS NOTES
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  Patient feels relatively well and clinically there are no troubling signs  A CT scan of the abdomen/pelvis for approximately 2 5 months ago did not demonstrate any abnormality  The CEA level is elevated  Recent MRI of the pelvis demonstrated stage IIIC (cT3 cN1) disease  Patient is to begin concurrent neoadjuvant chemotherapy + radiotherapy  First radiation dose was given this morning  Patient is spending time with nursing staff today going over the specifics of capecitabine  Second opinion at [address] [place] [address] still pending  Regimen  Capecitabine 825 mg/m2 by mouth twice daily Monday through Friday (off the weekends) x 5-6 weeks with RT (dose for this patient is 1650 mg twice a day)    NCCN guidelines 1 2018 states that for T3 N any with a clear circumferential margin by MRI, primary treatment includes chemotherapy + radiotherapy  As discussed previously, the treatment sequence will likely include a rest period after neoadjuvant treatment, surgery and then postoperative chemotherapy  Mrs Rashmi Blevins is to take a dedicated iron pill monitoring for GI side effects  Hemoglobin level needs to be monitored  Patient is to return in 1 week but this will likely change depending upon the above  Carefully review your medication list and verify that the list is accurate and up-to-date  Please call the hematology/oncology office if there are medications missing from the list, medications on the list that you are not currently taking or if there is a dosage or instruction that is different from how you're taking that medication      Patient goals and areas of care: Begin concurrent treatment  Patient is able to self-care   ______________________________________________________________________________________    Chief Complaint   Patient presents with    Follow-up     Rectal cancer     History of Present Illness:    40-year-old female recently referred for the above  Previously Mrs Eh Brown had gastric bypass  Patient was recently seen by GI because of blood in the stools  Additional workup included a colonoscopy which demonstrated a rectal lesion  Presently patient states feeling okay but Mrs Eh Brown is still having irregular bowel movements (sometimes constipation, sometimes diarrhea) - same as before  Patient also sees blood in her stools occasionally -same as before  Appetite is good, weight is stable  No  or GYN issues  No fevers, chills or sweats  No headaches, blurred vision or dizziness  Generalized body aches are the same as before  Mrs Armida Rodriguez was told that even with neoadjuvant treatment, she will likely require an APR  Patient has been discussing issues with her family  Family wishes for patient to obtain a 2nd opinion before beginning treatment  Review of Systems   Constitutional: Negative  HENT: Negative  Eyes: Negative  Respiratory: Negative  Cardiovascular: Negative  Gastrointestinal: Positive for blood in stool, constipation and diarrhea  Endocrine: Negative  Genitourinary: Negative  Musculoskeletal: Positive for arthralgias  Skin: Negative  Allergic/Immunologic: Negative  Neurological: Negative  Hematological: Negative  Psychiatric/Behavioral: Negative  All other systems reviewed and are negative       Patient Active Problem List   Diagnosis    Morbid obesity due to excess calories ([facility] )    Screening for colon cancer    Screen for colon cancer    Postgastrectomy malabsorption    S/P gastric bypass    Marginal ulcer    Status post bariatric surgery    [facility]rosclerotic peripheral vascular disease ([facility] )    [facility] diabetes mellitus [facility]    Onychomycosis    Pain in foot    Rectal cancer (HCC)     Past Medical History:   Diagnosis Date    Arthritis     Chronic lower back pain     Chronic pain disorder     back pain    GERD (gastroesophageal reflux disease)     Morbid obesity ([facility]     Spinal stenosis     Systolic murmur     Unsteady gait     uses walker    Wears dentures     Wears glasses      Ob/gyn:  No recent mammogram -patient's choice, no post menopausal bleeding    Past Surgical History:   Procedure Laterality Date    ABSCESS DRAINAGE      abd, (R) leg, (L) leg     SECTION      ESOPHAGOGASTRODUODENOSCOPY N/A 2016    Procedure: ESOPHAGOGASTRODUODENOSCOPY (EGD); Surgeon: Tad Villagomez MD;  Location: [facility];  Service:     ESOPHAGOGASTRODUODENOSCOPY N/A 3/30/2016    Procedure: ESOPHAGOGASTRODUODENOSCOPY (EGD); Surgeon: Tad Villagomez MD;  Location: [facility]; Service:     EYE SURGERY      laser eye surgery    JOINT REPLACEMENT Left 2017    hip    JOINT REPLACEMENT Right 2017    hip    WY COLONOSCOPY FLX DX W/COLLJ SPEC WHEN PFRMD N/A 3/9/2018    Procedure: COLONOSCOPY;  Surgeon: Juan Razo MD;  Location: [facility]; Service: Gastroenterology    WY ESOPHAGOGASTRODUODENOSCOPY TRANSORAL DIAGNOSTIC N/A 2018    Procedure: ESOPHAGOGASTRODUODENOSCOPY (EGD); Surgeon: Juan Razo MD;  Location: [facility]; Service: Gastroenterology    WY LAP GASTRIC BYPASS/SOLEDAD-EN-Y N/A 2016    Procedure: BYPASS GASTRIC  SOLEDAD-EN-Y LAPAROSCOPIC;  Surgeon: Tad Villagomez MD;  Location[facility];  Service: Bariatrics    TUBAL LIGATION       Family History   Problem Relation Age of Onset    Adopted:  Yes    Leukemia Mother     Heart disease Father     No Known Problems Sister     No Known Problems Brother     Diabetes Son     No Known Problems Brother     No Known Problems Brother     No Known Problems Sister     No Known Problems Sister Family history: Mother  of leukemia (NOS), father  from heart disease (NOS), 2 children 1 with diabetes, no known familial or genetic diseases, no family history of GI malignancies    Social History     Social History    Marital status: Single     Spouse name: N/A    Number of children: 2    Years of education: N/A     Occupational History    Not on file  Social History Main Topics    Smoking status: Former Smoker     Packs/day: 1      Years: 25 00     Quit date: 3/30/2006    Smokeless tobacco: Never Used    Alcohol use No    Drug use: No      Comment: Percocet for lower back pain prn    Sexual activity: Not on file     Other Topics Concern    Not on file     Social History Narrative    No narrative on file       Current Outpatient Prescriptions:     Calcium-Vitamin D 500-125 MG-UNIT TABS, Take by mouth, Disp: , Rfl:     capecitabine (XELODA) 150 MG tablet, Take one tablet daily days 1-5 along with 3 tablets of capecitabine 500mg (total dose 1650mg) twice a day, Disp: 20 tablet, Rfl: 1    capecitabine (XELODA) 500 MG tablet, Take 3 tablets daily days 1-5 along with one Capecitabine 150mg tablet (total dose 1650mg) twice daily  , Disp: 60 tablet, Rfl: 1    lidocaine (LIDODERM) 5 %, APPLY 1 PATCH TO AFFECTED AREA(S) OF LUMBAR SPINE AND RIGHT HIP EVERY 12 HOURS, Disp: , Rfl: 0    Multiple Vitamins-Minerals (BARIATRIC FUSION) CHEW, Chew, Disp: , Rfl:     nystatin (MYCOSTATIN) cream, Apply topically 2 (two) times a day, Disp: 30 g, Rfl: 1    omeprazole (PriLOSEC) 20 mg delayed release capsule, Take 1 capsule (20 mg total) by mouth daily, Disp: 90 capsule, Rfl: 3    oxyCODONE-acetaminophen (PERCOCET)  mg per tablet, Take 1 tablet by mouth every 6 (six) hours as needed for moderate pain , Disp: , Rfl:     sucralfate (CARAFATE) 1 g tablet, Take 1 g by mouth 4 (four) times a day, Disp: , Rfl:     Allergies   Allergen Reactions    Tramadol Other (See Comments)     Dizzy Vitals:    05/10/18 0907   BP: 110/70   Pulse: 90   Resp: 18   Temp: 98 5 °F (36 9 °C)   SpO2: 99%     Physical Exam   Constitutional: She is oriented to person, place, and time  She appears well-developed and well-nourished  HENT:   Head: Normocephalic and atraumatic  Right Ear: External ear normal    Left Ear: External ear normal    Nose: Nose normal    Mouth/Throat: Oropharynx is clear and moist    Eyes: Conjunctivae and EOM are normal  Pupils are equal, round, and reactive to light  Neck: Normal range of motion  Neck supple  Cardiovascular: Normal rate, regular rhythm, normal heart sounds and intact distal pulses  Pulmonary/Chest: Effort normal and breath sounds normal    Abdominal: Soft  Bowel sounds are normal    Abdomen is soft, nontender, +bowel sounds, obese, cannot palpate liver or spleen   Musculoskeletal:   Decreased range of motion in hips and knees bilaterally   Neurological: She is alert and oriented to person, place, and time  She has normal reflexes  Skin: Skin is warm  Psychiatric: She has a normal mood and affect  Her behavior is normal  Judgment and thought content normal    Extremities: 0-1+ bilateral lower extremity edema, no cords, pulses are 1+  Lymphatics:  No adenopathy in the neck, supraclavicular region, axilla and groin bilaterally    Labs:    05/08/2018 WBC = 6 7 hemoglobin = 10 7 hematocrit = 35 MCV = 75 platelet = 110 BUN = 17 creatinine = 0 62 LFTs WNL  03/12/2018 CEA = 22 6 = high  02/12/2018 BUN = 16 creatinine = 0 68 alkaline phosphatase = 57 total protein = 6 8 albumin = 2 9 total bilirubin = 0 43 AST = 10 ALT = 13 WBC = 5 7 hemoglobin = 11 5 hematocrit = 36 8 MCV = 76 RDW = 16 4 platelet = 503 neutrophil = 60% lymphocytes = 30% monocyte = 7%    Imaging    4/6/18 MRI pelvis rectal cancer staging    Low rectal cancer, 5 cm in length, circumferential around the rectal wall   (Series 8 images 19 through 33 )  Anteriorly, there are multiple areas where there is transmural tumor extension into the mesorectal fat making this at least a T3c lesion  On Series 8 image 31, tumor also abuts the mesorectal fascia making this CRM positive  At this point it is also   immediately adjacent to the vagina, therefore this may be a T4 lesion  There are 2 high risk perirectal nodes, and 1 low risk perirectal nodes  01/31/2018 ultrasound right upper quadrant    1  Layering gallbladder sludge  No acute cholecystitis or biliary ductal obstruction  2   Hepatomegaly  3   Fluid-filled stomach  1/27/18 CT scan of the abdomen/pelvis    1  Prior Juan-en-Y gastric bypass with distention of the excluded stomach  2   No evidence of acute abnormality in the abdomen or pelvis      Pathology    Case Report   Surgical Pathology Report                         Case: [id number]                                    Authorizing Provider: Alexis Schmid MD      Collected:           03/28/2018 1130               Pathologist:           Deuce Méndez MD             Received:            03/29/2018 0942               Specimen:    Rectum, Rectal cancer/mass biopsies                                                        Final Diagnosis   A  Rectal mass (biopsy):  - 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      Comment:   - In the context of a rectal mass, repeat biopsy and/or excision may be indicated to exclude a higher grade lesion      Electronically signed by Deuce Méndez MD on 3/30/2018 at  2:36 PM         Case Report   Surgical Pathology Report                         Case: [id number]                                    Authorizing Provider: Bubba Velásquez MD          Collected:           03/09/2018 0902               Ordering Location:     [facility]   Received:            03/12/2018 0904                                      Center                                                                        Pathologist:           Azalea Butler DO                                                             Specimens:   A) - Colon, polyp splenic flexure/cold snare                                                         B) - Colon, bx distal rectal mass                                                          Final Diagnosis   A  Colon, splenic flexure polyp, biopsy:  - Tubulovillous adenoma, negative for high-grade dysplasia      B  Rectum, mass, biopsy:  - At least high grade dysplasia/intramucosal carcinoma arising in a tubulovillous adenoma, suspicious for invasion       Intradepartmental consultation is in agreement with the above diagnosis (AT)     Interpretation performed at [facility], [facility] [place] [id number]  Report faxed to Dr Krit Huff on 3/13/18 @ 10:55 AM   Electronically signed by Charly Levine DO on 3/13/2018 at 10:54 AM

## 2018-05-11 PROCEDURE — 77387 GUIDANCE FOR RADJ TX DLVR: CPT | Performed by: RADIOLOGY

## 2018-05-11 PROCEDURE — 77412 RADIATION TX DELIVERY LVL 3: CPT | Performed by: RADIOLOGY

## 2018-05-14 PROCEDURE — 77387 GUIDANCE FOR RADJ TX DLVR: CPT | Performed by: RADIOLOGY

## 2018-05-14 PROCEDURE — 77412 RADIATION TX DELIVERY LVL 3: CPT | Performed by: RADIOLOGY

## 2018-05-15 PROCEDURE — 77412 RADIATION TX DELIVERY LVL 3: CPT | Performed by: RADIOLOGY

## 2018-05-15 PROCEDURE — 77387 GUIDANCE FOR RADJ TX DLVR: CPT | Performed by: RADIOLOGY

## 2018-05-16 PROCEDURE — 77412 RADIATION TX DELIVERY LVL 3: CPT | Performed by: RADIOLOGY

## 2018-05-16 PROCEDURE — 77336 RADIATION PHYSICS CONSULT: CPT | Performed by: RADIOLOGY

## 2018-05-16 PROCEDURE — 77387 GUIDANCE FOR RADJ TX DLVR: CPT | Performed by: RADIOLOGY

## 2018-05-16 PROCEDURE — 77417 THER RADIOLOGY PORT IMAGE(S): CPT | Performed by: RADIOLOGY

## 2018-05-17 ENCOUNTER — APPOINTMENT (OUTPATIENT)
Dept: LAB | Facility: CLINIC | Age: 63
End: 2018-05-17
Payer: MEDICARE

## 2018-05-17 ENCOUNTER — OFFICE VISIT (OUTPATIENT)
Dept: HEMATOLOGY ONCOLOGY | Facility: MEDICAL CENTER | Age: 63
End: 2018-05-17
Payer: MEDICARE

## 2018-05-17 VITALS
HEART RATE: 83 BPM | RESPIRATION RATE: 18 BRPM | BODY MASS INDEX: 38.49 KG/M2 | WEIGHT: 231 LBS | TEMPERATURE: 98.8 F | SYSTOLIC BLOOD PRESSURE: 106 MMHG | OXYGEN SATURATION: 99 % | HEIGHT: 65 IN | DIASTOLIC BLOOD PRESSURE: 72 MMHG

## 2018-05-17 DIAGNOSIS — C20 RECTAL CANCER (HCC): Primary | ICD-10-CM

## 2018-05-17 DIAGNOSIS — C20 MALIGNANT NEOPLASM OF RECTUM (HCC): ICD-10-CM

## 2018-05-17 LAB
ALBUMIN SERPL BCP-MCNC: 3 G/DL (ref 3.5–5)
ALP SERPL-CCNC: 54 U/L (ref 46–116)
ALT SERPL W P-5'-P-CCNC: 13 U/L (ref 12–78)
ANION GAP SERPL CALCULATED.3IONS-SCNC: 5 MMOL/L (ref 4–13)
AST SERPL W P-5'-P-CCNC: 11 U/L (ref 5–45)
BASOPHILS # BLD AUTO: 0.01 THOUSANDS/ΜL (ref 0–0.1)
BASOPHILS NFR BLD AUTO: 0 % (ref 0–1)
BILIRUB SERPL-MCNC: 0.46 MG/DL (ref 0.2–1)
BUN SERPL-MCNC: 19 MG/DL (ref 5–25)
CALCIUM SERPL-MCNC: 9 MG/DL (ref 8.3–10.1)
CHLORIDE SERPL-SCNC: 106 MMOL/L (ref 100–108)
CO2 SERPL-SCNC: 29 MMOL/L (ref 21–32)
CREAT SERPL-MCNC: 0.65 MG/DL (ref 0.6–1.3)
EOSINOPHIL # BLD AUTO: 0.18 THOUSAND/ΜL (ref 0–0.61)
EOSINOPHIL NFR BLD AUTO: 4 % (ref 0–6)
ERYTHROCYTE [DISTWIDTH] IN BLOOD BY AUTOMATED COUNT: 15.4 % (ref 11.6–15.1)
GFR SERPL CREATININE-BSD FRML MDRD: 95 ML/MIN/1.73SQ M
GLUCOSE P FAST SERPL-MCNC: 84 MG/DL (ref 65–99)
HCT VFR BLD AUTO: 34.4 % (ref 34.8–46.1)
HGB BLD-MCNC: 10.6 G/DL (ref 11.5–15.4)
IMM GRANULOCYTES # BLD AUTO: 0.02 THOUSAND/UL (ref 0–0.2)
IMM GRANULOCYTES NFR BLD AUTO: 0 % (ref 0–2)
LYMPHOCYTES # BLD AUTO: 0.93 THOUSANDS/ΜL (ref 0.6–4.47)
LYMPHOCYTES NFR BLD AUTO: 21 % (ref 14–44)
MCH RBC QN AUTO: 23 PG (ref 26.8–34.3)
MCHC RBC AUTO-ENTMCNC: 30.8 G/DL (ref 31.4–37.4)
MCV RBC AUTO: 75 FL (ref 82–98)
MONOCYTES # BLD AUTO: 0.37 THOUSAND/ΜL (ref 0.17–1.22)
MONOCYTES NFR BLD AUTO: 8 % (ref 4–12)
NEUTROPHILS # BLD AUTO: 2.98 THOUSANDS/ΜL (ref 1.85–7.62)
NEUTS SEG NFR BLD AUTO: 67 % (ref 43–75)
NRBC BLD AUTO-RTO: 0 /100 WBCS
PLATELET # BLD AUTO: 181 THOUSANDS/UL (ref 149–390)
PMV BLD AUTO: 10.4 FL (ref 8.9–12.7)
POTASSIUM SERPL-SCNC: 4.1 MMOL/L (ref 3.5–5.3)
PROT SERPL-MCNC: 6.7 G/DL (ref 6.4–8.2)
RBC # BLD AUTO: 4.6 MILLION/UL (ref 3.81–5.12)
SODIUM SERPL-SCNC: 140 MMOL/L (ref 136–145)
WBC # BLD AUTO: 4.49 THOUSAND/UL (ref 4.31–10.16)

## 2018-05-17 PROCEDURE — 36415 COLL VENOUS BLD VENIPUNCTURE: CPT

## 2018-05-17 PROCEDURE — 99214 OFFICE O/P EST MOD 30 MIN: CPT | Performed by: INTERNAL MEDICINE

## 2018-05-17 PROCEDURE — 77387 GUIDANCE FOR RADJ TX DLVR: CPT | Performed by: RADIOLOGY

## 2018-05-17 PROCEDURE — 77412 RADIATION TX DELIVERY LVL 3: CPT | Performed by: RADIOLOGY

## 2018-05-17 PROCEDURE — 80053 COMPREHEN METABOLIC PANEL: CPT

## 2018-05-17 PROCEDURE — 85025 COMPLETE CBC W/AUTO DIFF WBC: CPT

## 2018-05-17 NOTE — PROGRESS NOTES
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  Patient feels relatively well and clinically there are no troubling signs  A CT scan of the abdomen/pelvis for approximately 2 5 months ago did not demonstrate any abnormality  The CEA level is elevated  Recent MRI of the pelvis demonstrated stage IIIC (cT3 cN1) disease  Patient is on neoadjuvant chemotherapy + radiotherapy - tolerating treatments fairly well  We rediscussed what to monitor for as far side effects  Regimen  Capecitabine 825 mg/m2 by mouth twice daily Monday through Friday (off the weekends) x 5-6 weeks with RT (dose for this patient is 1650 mg twice a day)    NCCN guidelines 1 2018 states that for T3 N any with a clear circumferential margin by MRI, primary treatment includes chemotherapy + radiotherapy  As discussed previously, the treatment sequence will likely include a rest period after neoadjuvant treatment, surgery and then postoperative chemotherapy  Mrs Armida Rodriguez is to continue with the iron supplement monitoring for GI side effects  Patient is to return in 1 week  Routine blood work is ongoing  Carefully review your medication list and verify that the list is accurate and up-to-date  Please call the hematology/oncology office if there are medications missing from the list, medications on the list that you are not currently taking or if there is a dosage or instruction that is different from how you're taking that medication      Patient goals and areas of care:  Begin concurrent treatment  Patient is able to self-care   ______________________________________________________________________________________    Chief Complaint   Patient presents with    Follow-up     Rectal cancer on neoadjuvant treatment     History of Present Illness:    77-year-old female recently referred for the above  Previously Mrs Noelle Turner had gastric bypass  Patient was recently seen by GI because of blood in the stools  Additional workup included a colonoscopy which demonstrated a rectal lesion  Patient started concurrent chemotherapy (Xeloda) with radiation this week  Presently patient states feeling okay but Mrs Noelle Turner states that the bowel movements are more regular with less bleeding  No pain, no  issues  Appetite is good, weight is stable  No fevers, chills or sweats  Generalized body aches are the same as before  Activities are the same as before  Mrs Hortencia Haq was told that even with neoadjuvant treatment, she will likely require an APR  Patient has been discussing issues with her family  Family wishes for patient to obtain a 2nd opinion before beginning treatment  Review of Systems   Constitutional: Negative  HENT: Negative  Eyes: Negative  Respiratory: Negative  Cardiovascular: Negative  Gastrointestinal: Positive for blood in stool, constipation and diarrhea  Endocrine: Negative  Genitourinary: Negative  Musculoskeletal: Positive for arthralgias  Skin: Negative  Allergic/Immunologic: Negative  Neurological: Negative  Hematological: Negative  Psychiatric/Behavioral: Negative  All other systems reviewed and are negative       Patient Active Problem List   Diagnosis    Morbid obesity due to excess calories ([address] )    Screening for colon cancer    Screen for colon cancer    Postgastrectomy malabsorption    S/P gastric bypass    Marginal ulcer    Status post bariatric surgery    Atherosclerotic peripheral vascular disease ([address] )    Diet-controlled diabetes mellitus ([address] )    Onychomycosis    Pain in foot    Rectal cancer ([address] )     Past Medical History:   Diagnosis Date    Arthritis     Chronic lower back pain     Chronic pain disorder     back pain    GERD (gastroesophageal reflux disease)     Morbid obesity ([address] )     Spinal stenosis     Systolic murmur     Unsteady gait     uses walker    Wears dentures     Wears glasses      Ob/gyn:  No recent mammogram -patient's choice, no post menopausal bleeding    Past Surgical History:   Procedure Laterality Date    ABSCESS DRAINAGE      abd, (R) leg, (L) leg     SECTION      ESOPHAGOGASTRODUODENOSCOPY N/A 2016    Procedure: ESOPHAGOGASTRODUODENOSCOPY (EGD); Surgeon: Margaux Iniguez MD;  Location: [facility];  Service:     ESOPHAGOGASTRODUODENOSCOPY N/A 3/30/2016    Procedure: ESOPHAGOGASTRODUODENOSCOPY (EGD); Surgeon: Margaux Iniguez MD;  Location: [facility]; Service:     EYE SURGERY      laser eye surgery    JOINT REPLACEMENT Left 2017    hip    JOINT REPLACEMENT Right 2017    hip    PA COLONOSCOPY FLX DX W/COLLJ SPEC WHEN PFRMD N/A 3/9/2018    Procedure: COLONOSCOPY;  Surgeon: Emperatriz Sullivan MD;  Location[facility]; Service: Gastroenterology    PA ESOPHAGOGASTRODUODENOSCOPY TRANSORAL DIAGNOSTIC N/A 2018    Procedure: ESOPHAGOGASTRODUODENOSCOPY (EGD); Surgeon: Emperatriz Sullivan MD;  Location: [facility]; Service: Gastroenterology    PA LAP GASTRIC BYPASS/SOLEDAD-EN-Y N/A 2016    Procedure: BYPASS GASTRIC  SOLEDAD-EN-Y LAPAROSCOPIC;  Surgeon: Margaux Iniguez MD;  Location: [facility];  Service: Bariatrics    TUBAL LIGATION       Family History   Problem Relation Age of Onset    Adopted: Yes    Leukemia Mother     Heart disease Father     No Known Problems Sister     No Known Problems Brother     Diabetes Son     No Known Problems Brother     No Known Problems Brother     No Known Problems Sister     No Known Problems Sister     Family history:   Mother  of leukemia (NOS), father  from heart disease (NOS), 2 children 1 with diabetes, no known familial or genetic diseases, no family history of GI malignancies    Social History     Social History    Marital status: Single     Spouse name: N/A    Number of children: 2    Years of education: N/A     Occupational History    Not on file  Social History Main Topics    Smoking status: Former Smoker     Packs/day: 1 00     Years: 25 00     Quit date: 3/30/2006    Smokeless tobacco: Never Used    Alcohol use No    Drug use: No      Comment: Percocet for lower back pain prn    Sexual activity: Not on file     Other Topics Concern    Not on file     Social History Narrative    No narrative on file       Current Outpatient Prescriptions:     Calcium-Vitamin D 500-125 MG-UNIT TABS, Take by mouth, Disp: , Rfl:     capecitabine (XELODA) 150 MG tablet, Take one tablet daily days 1-5 along with 3 tablets of capecitabine 500mg (total dose 1650mg) twice a day, Disp: 20 tablet, Rfl: 1    capecitabine (XELODA) 500 MG tablet, Take 3 tablets daily days 1-5 along with one Capecitabine 150mg tablet (total dose 1650mg) twice daily  , Disp: 60 tablet, Rfl: 1    diclofenac sodium (VOLTAREN) 1 %, diclofenac 1 % topical gel, Disp: , Rfl:     lidocaine (LIDODERM) 5 %, APPLY 1 PATCH TO AFFECTED AREA(S) OF LUMBAR SPINE AND RIGHT HIP EVERY 12 HOURS, Disp: , Rfl: 0    Multiple Vitamins-Minerals (BARIATRIC FUSION) CHEW, Chew, Disp: , Rfl:     nystatin (MYCOSTATIN) cream, Apply topically 2 (two) times a day, Disp: 30 g, Rfl: 1    omeprazole (PriLOSEC) 20 mg delayed release capsule, Take 1 capsule (20 mg total) by mouth daily, Disp: 90 capsule, Rfl: 3    oxyCODONE-acetaminophen (PERCOCET)  mg per tablet, Take 1 tablet by mouth every 6 (six) hours as needed for moderate pain , Disp: , Rfl:     sucralfate (CARAFATE) 1 g tablet, Take 1 g by mouth 4 (four) times a day, Disp: , Rfl:     Allergies   Allergen Reactions    Tramadol Other (See Comments)     Dizzy         Vitals:    05/17/18 1603   BP: 106/72   Pulse: 83   Resp: 18   Temp: 98 8 °F (37 1 °C)   SpO2: 99% Physical Exam   Constitutional: She is oriented to person, place, and time  She appears well-developed and well-nourished  HENT:   Head: Normocephalic and atraumatic  Right Ear: External ear normal    Left Ear: External ear normal    Nose: Nose normal    Mouth/Throat: Oropharynx is clear and moist    Eyes: Conjunctivae and EOM are normal  Pupils are equal, round, and reactive to light  Neck: Normal range of motion  Neck supple  Cardiovascular: Normal rate, regular rhythm, normal heart sounds and intact distal pulses  Pulmonary/Chest: Effort normal and breath sounds normal    Abdominal: Soft  Bowel sounds are normal    Abdomen is soft, nontender, +bowel sounds, obese, cannot palpate liver or spleen   Musculoskeletal:   Decreased range of motion in hips and knees bilaterally   Neurological: She is alert and oriented to person, place, and time  She has normal reflexes  Skin: Skin is warm  Psychiatric: She has a normal mood and affect  Her behavior is normal  Judgment and thought content normal    Extremities: 0-1+ bilateral lower extremity edema, no cords, pulses are 1+  Lymphatics:  No adenopathy in the neck, supraclavicular region, axilla and groin bilaterally    Labs:    05/17/2018 WBC = 4 4 hemoglobin = 10 6 hematocrit = 34 MCV = 75 platelet = 311 neutrophil = 67% BUN = 19 creatinine = 0 65 LFTs WNL  05/08/2018 WBC = 6 7 hemoglobin = 10 7 hematocrit = 35 MCV = 75 platelet = 341 BUN = 17 creatinine = 0 62 LFTs WNL  03/12/2018 CEA = 22 6 = high  02/12/2018 BUN = 16 creatinine = 0 68 alkaline phosphatase = 57 total protein = 6 8 albumin = 2 9 total bilirubin = 0 43 AST = 10 ALT = 13 WBC = 5 7 hemoglobin = 11 5 hematocrit = 36 8 MCV = 76 RDW = 16 4 platelet = 993 neutrophil = 60% lymphocytes = 30% monocyte = 7%    Imaging    4/6/18 MRI pelvis rectal cancer staging    Low rectal cancer, 5 cm in length, circumferential around the rectal wall   (Series 8 images 19 through 33 )  Anteriorly, there are multiple areas where there is transmural tumor extension into the mesorectal fat making this at least a T3c lesion  On Series 8 image 31, tumor also abuts the mesorectal fascia making this CRM positive  At this point it is also   immediately adjacent to the vagina, therefore this may be a T4 lesion  There are 2 high risk perirectal nodes, and 1 low risk perirectal nodes  01/31/2018 ultrasound right upper quadrant    1  Layering gallbladder sludge  No acute cholecystitis or biliary ductal obstruction  2   Hepatomegaly  3   Fluid-filled stomach  1/27/18 CT scan of the abdomen/pelvis    1  Prior Juan-en-Y gastric bypass with distention of the excluded stomach  2   No evidence of acute abnormality in the abdomen or pelvis      Pathology    Case Report   Surgical Pathology Report                         Case: [id number]                                    Authorizing Provider: Kory Reynolds MD      Collected:           03/28/2018 1130               Pathologist:           Alea Urias MD             Received:            03/29/2018 0942               Specimen:    Rectum, Rectal cancer/mass biopsies                                                        Final Diagnosis   A  Rectal mass (biopsy):  - 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      Comment:   - In the context of a rectal mass, repeat biopsy and/or excision may be indicated to exclude a higher grade lesion      Electronically signed by Alea Urias MD on 3/30/2018 at  2:36 PM         Case Report   Surgical Pathology Report                         Case: [id number]                                    Authorizing Provider: Franky Willingham MD          Collected:           03/09/2018 0902               Ordering Location:     [facility]   Received:            03/12/2018 0904                                      Center                                                                        Pathologist:           Becky Mulligan DO                                                             Specimens:   A) - Colon, polyp splenic flexure/cold snare                                                         B) - Colon, bx distal rectal mass                                                          Final Diagnosis   A  Colon, splenic flexure polyp, biopsy:  - Tubulovillous adenoma, negative for high-grade dysplasia      B  Rectum, mass, biopsy:  - At least high grade dysplasia/intramucosal carcinoma arising in a tubulovillous adenoma, suspicious for invasion       Intradepartmental consultation is in agreement with the above diagnosis (AT)     Interpretation performed at [facility], [phone] [id number]  Report faxed to Dr Kendall Shabazz on 3/13/18 @ 10:55 AM   Electronically signed by Corey Donahue DO on 3/13/2018 at 10:54 AM

## 2018-05-18 PROCEDURE — 77412 RADIATION TX DELIVERY LVL 3: CPT | Performed by: RADIOLOGY

## 2018-05-18 PROCEDURE — 77387 GUIDANCE FOR RADJ TX DLVR: CPT | Performed by: RADIOLOGY

## 2018-05-21 PROCEDURE — 77412 RADIATION TX DELIVERY LVL 3: CPT | Performed by: RADIOLOGY

## 2018-05-21 PROCEDURE — 77387 GUIDANCE FOR RADJ TX DLVR: CPT | Performed by: RADIOLOGY

## 2018-05-22 PROCEDURE — 77334 RADIATION TREATMENT AID(S): CPT | Performed by: RADIOLOGY

## 2018-05-22 PROCEDURE — 77412 RADIATION TX DELIVERY LVL 3: CPT | Performed by: RADIOLOGY

## 2018-05-22 PROCEDURE — 77307 TELETHX ISODOSE PLAN CPLX: CPT | Performed by: RADIOLOGY

## 2018-05-22 PROCEDURE — 77387 GUIDANCE FOR RADJ TX DLVR: CPT | Performed by: RADIOLOGY

## 2018-05-23 PROCEDURE — 77387 GUIDANCE FOR RADJ TX DLVR: CPT | Performed by: RADIOLOGY

## 2018-05-23 PROCEDURE — 77412 RADIATION TX DELIVERY LVL 3: CPT | Performed by: RADIOLOGY

## 2018-05-23 PROCEDURE — 77336 RADIATION PHYSICS CONSULT: CPT | Performed by: RADIOLOGY

## 2018-05-24 PROCEDURE — 77387 GUIDANCE FOR RADJ TX DLVR: CPT | Performed by: RADIOLOGY

## 2018-05-24 PROCEDURE — 77412 RADIATION TX DELIVERY LVL 3: CPT | Performed by: RADIOLOGY

## 2018-05-29 PROCEDURE — 77387 GUIDANCE FOR RADJ TX DLVR: CPT | Performed by: RADIOLOGY

## 2018-05-29 PROCEDURE — 77412 RADIATION TX DELIVERY LVL 3: CPT | Performed by: RADIOLOGY

## 2018-05-30 ENCOUNTER — APPOINTMENT (OUTPATIENT)
Dept: LAB | Facility: CLINIC | Age: 63
End: 2018-05-30
Payer: MEDICARE

## 2018-05-30 PROCEDURE — 77412 RADIATION TX DELIVERY LVL 3: CPT | Performed by: RADIOLOGY

## 2018-05-30 PROCEDURE — 77387 GUIDANCE FOR RADJ TX DLVR: CPT | Performed by: RADIOLOGY

## 2018-05-31 PROCEDURE — 77387 GUIDANCE FOR RADJ TX DLVR: CPT | Performed by: RADIOLOGY

## 2018-05-31 PROCEDURE — 77412 RADIATION TX DELIVERY LVL 3: CPT | Performed by: RADIOLOGY

## 2018-06-01 ENCOUNTER — OFFICE VISIT (OUTPATIENT)
Dept: HEMATOLOGY ONCOLOGY | Facility: MEDICAL CENTER | Age: 63
End: 2018-06-01
Payer: MEDICARE

## 2018-06-01 ENCOUNTER — APPOINTMENT (OUTPATIENT)
Dept: RADIATION ONCOLOGY | Facility: HOSPITAL | Age: 63
End: 2018-06-01
Attending: RADIOLOGY
Payer: MEDICARE

## 2018-06-01 VITALS
HEIGHT: 65 IN | RESPIRATION RATE: 18 BRPM | HEART RATE: 97 BPM | SYSTOLIC BLOOD PRESSURE: 114 MMHG | OXYGEN SATURATION: 95 % | DIASTOLIC BLOOD PRESSURE: 62 MMHG | BODY MASS INDEX: 37.99 KG/M2 | WEIGHT: 228 LBS | TEMPERATURE: 98.7 F

## 2018-06-01 DIAGNOSIS — C20 RECTAL CANCER (HCC): Primary | ICD-10-CM

## 2018-06-01 PROCEDURE — 77387 GUIDANCE FOR RADJ TX DLVR: CPT | Performed by: RADIOLOGY

## 2018-06-01 PROCEDURE — 77336 RADIATION PHYSICS CONSULT: CPT | Performed by: RADIOLOGY

## 2018-06-01 PROCEDURE — 99213 OFFICE O/P EST LOW 20 MIN: CPT | Performed by: INTERNAL MEDICINE

## 2018-06-01 PROCEDURE — 77412 RADIATION TX DELIVERY LVL 3: CPT | Performed by: RADIOLOGY

## 2018-06-01 NOTE — PROGRESS NOTES
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  Patient feels relatively well and clinically there are no troubling signs  A CT scan of the abdomen/pelvis for approximately 2 5 months ago did not demonstrate any abnormality  The CEA level is elevated  Recent MRI of the pelvis demonstrated stage IIIC (cT3 cN1) disease  Issues:    1  Rectal cancer  Patient is on neoadjuvant chemotherapy + radiotherapy - tolerating treatments fairly well (completed 15 days)  We rediscussed what to monitor for as far side effects  Patient has Imodium for the diarrhea; will continue to monitor for significant GI bleeding  Regimen  Capecitabine 825 mg/m2 by mouth twice daily Monday through Friday (off the weekends) x 5-6 weeks with RT (dose for this patient is 1650 mg twice a day)    NCCN guidelines 1 2018 states that for T3 N any with a clear circumferential margin by MRI, primary treatment includes chemotherapy + radiotherapy  As discussed previously, the treatment sequence will likely include a rest period after neoadjuvant treatment, surgery and then postoperative chemotherapy  2   Anemia  Mrs Zonia Knox is to continue with the iron supplement monitoring for GI side effects  Patient is to return in 1 week  Routine blood work is ongoing  3  Second opinion  Patient has not decided what she wants to do about the 2nd opinion in [place]  Patient is to return in 1 week  Mrs Zonia Knox knows to call if she has any other oncology questions or concerns  Carefully review your medication list and verify that the list is accurate and up-to-date   Please call the hematology/oncology office if there are medications missing from the list, medications on the list that you are not currently taking or if there is a dosage or instruction that is different from how you're taking that medication  Patient goals and areas of care:  Begin concurrent treatment  Patient is able to self-care   ______________________________________________________________________________________    Chief Complaint   Patient presents with    Follow-up     Rectal cancer on concurrent treatment     History of Present Illness:    44-year-old female recently referred for the above  Previously Mrs Asad Mckay had gastric bypass  Patient was recently seen by GI because of blood in the stools  Additional workup included a colonoscopy which demonstrated a rectal lesion  Patient is on concurrent chemotherapy (Xeloda) with radiation; has completed 15 days  Presently patient states feeling okay, about the same as before  Mrs Asad Mckay has diarrhea but states that the GI bleeding is less/better than before  At times, stools are slightly more formed  Appetite is good, weight is stable  Activities are baseline  No urinary symptoms  Generalized body aches are the same as before  No fevers, chills or sweats  Mrs Leandra Stoddard was told that even with neoadjuvant treatment, she will likely require an APR  Patient has been discussing issues with her family  Family wishes for patient to obtain a 2nd opinion before beginning treatment  Review of Systems   Constitutional: Negative  HENT: Negative  Eyes: Negative  Respiratory: Negative  Cardiovascular: Negative  Gastrointestinal: Positive for blood in stool and diarrhea  Negative for constipation  Endocrine: Negative  Genitourinary: Negative  Musculoskeletal: Positive for arthralgias  Skin: Negative  Allergic/Immunologic: Negative  Neurological: Negative  Hematological: Negative  Psychiatric/Behavioral: Negative  All other systems reviewed and are negative       Patient Active Problem List Diagnosis    Morbid obesity due to excess calories ([facility] [address] )    Screening for colon cancer    Screen for colon cancer    Postgastrectomy malabsorption    S/P gastric bypass    Marginal ulcer    Status post bariatric surgery    Atherosclerotic peripheral vascular disease ([facility] [address] )    Diet-controlled diabetes mellitus (HCC)    Onychomycosis    Pain in foot    Rectal cancer (HCC)     Past Medical History:   Diagnosis Date    Arthritis     Chronic lower back pain     Chronic pain disorder     back pain    GERD (gastroesophageal reflux disease)     Morbid obesity (HCC)     Spinal stenosis     Systolic murmur     Unsteady gait     uses walker    Wears dentures     Wears glasses      Ob/gyn:  No recent mammogram -patient's choice, no post menopausal bleeding    Past Surgical History:   Procedure Laterality Date    ABSCESS DRAINAGE      abd, (R) leg, (L) leg     SECTION      ESOPHAGOGASTRODUODENOSCOPY N/A 2016    Procedure: ESOPHAGOGASTRODUODENOSCOPY (EGD); Surgeon: Genie Cai MD;  Location: [facility];  Service:     ESOPHAGOGASTRODUODENOSCOPY N/A 3/30/2016    Procedure: ESOPHAGOGASTRODUODENOSCOPY (EGD); Surgeon: Genie Cai MD;  Location: [facility]; Service:     EYE SURGERY      laser eye surgery    JOINT REPLACEMENT Left 2017    hip    JOINT REPLACEMENT Right 2017    hip    ND COLONOSCOPY FLX DX W/COLLJ SPEC WHEN PFRMD N/A 3/9/2018    Procedure: COLONOSCOPY;  Surgeon: Skylar Mcmanus MD;  Location: [place] [address] GI LAB; Service: Gastroenterology    ND ESOPHAGOGASTRODUODENOSCOPY TRANSORAL DIAGNOSTIC N/A 2018    Procedure: ESOPHAGOGASTRODUODENOSCOPY (EGD); Surgeon: Skylar Mcmanus MD;  Location: [facility];   Service: Gastroenterology    ND LAP GASTRIC BYPASS/SOLEDAD-EN-Y N/A 2016    Procedure: BYPASS GASTRIC  SOLEDAD-EN-Y LAPAROSCOPIC;  Surgeon: Genie Cai MD;  Location: [facility];  Service: Bariatrics    TUBAL LIGATION       Family History   Problem Relation Age of Onset    Adopted: Yes    Leukemia Mother     Heart disease Father     No Known Problems Sister     No Known Problems Brother     Diabetes Son     No Known Problems Brother     No Known Problems Brother     No Known Problems Sister     No Known Problems Sister     Family history: Mother  of leukemia (NOS), father  from heart disease (NOS), 2 children 1 with diabetes, no known familial or genetic diseases, no family history of GI malignancies    Social History     Social History    Marital status: Single     Spouse name: N/A    Number of children: 2    Years of education: N/A     Occupational History    Not on file  Social History Main Topics    Smoking status: Former Smoker     Packs/day:      Years:      Quit date: 3/30/2006    Smokeless tobacco: Never Used    Alcohol use No    Drug use: No      Comment: Percocet for lower back pain prn    Sexual activity: Not on file     Other Topics Concern    Not on file     Social History Narrative    No narrative on file       Current Outpatient Prescriptions:     Calcium-Vitamin D 500-125 MG-UNIT TABS, Take by mouth, Disp: , Rfl:     capecitabine (XELODA) 150 MG tablet, Take one tablet daily days 1-5 along with 3 tablets of capecitabine 500mg (total dose 1650mg) twice a day, Disp: 20 tablet, Rfl: 1    capecitabine (XELODA) 500 MG tablet, Take 3 tablets daily days 1-5 along with one Capecitabine 150mg tablet (total dose 1650mg) twice daily  , Disp: 60 tablet, Rfl: 1    diclofenac sodium (VOLTAREN) 1 %, diclofenac 1 % topical gel, Disp: , Rfl:     lidocaine (LIDODERM) 5 %, APPLY 1 PATCH TO AFFECTED AREA(S) OF LUMBAR SPINE AND RIGHT HIP EVERY 12 HOURS, Disp: , Rfl: 0    Multiple Vitamins-Minerals (BARIATRIC FUSION) CHEW, Chew, Disp: , Rfl:     nystatin (MYCOSTATIN) cream, Apply topically 2 (two) times a day, Disp: 30 g, Rfl: 1    omeprazole (PriLOSEC) 20 mg delayed release capsule, Take 1 capsule (20 mg total) by mouth daily, Disp: 90 capsule, Rfl: 3    oxyCODONE-acetaminophen (PERCOCET)  mg per tablet, Take 1 tablet by mouth every 6 (six) hours as needed for moderate pain , Disp: , Rfl:     sucralfate (CARAFATE) 1 g tablet, Take 1 g by mouth 4 (four) times a day, Disp: , Rfl:     Allergies   Allergen Reactions    Tramadol Other (See Comments)     Dizzy         Vitals:    06/01/18 1224   BP: 114/62   Pulse: 97   Resp: 18   Temp: 98 7 °F (37 1 °C)   SpO2: 95%     Physical Exam   Constitutional: She is oriented to person, place, and time  She appears well-developed and well-nourished  HENT:   Head: Normocephalic and atraumatic  Right Ear: External ear normal    Left Ear: External ear normal    Nose: Nose normal    Mouth/Throat: Oropharynx is clear and moist    Eyes: Conjunctivae and EOM are normal  Pupils are equal, round, and reactive to light  Neck: Normal range of motion  Neck supple  Cardiovascular: Normal rate, regular rhythm, normal heart sounds and intact distal pulses  Pulmonary/Chest: Effort normal and breath sounds normal    Abdominal: Soft  Bowel sounds are normal    Abdomen is soft, nontender, +bowel sounds, obese, cannot palpate liver or spleen   Musculoskeletal:   Decreased range of motion in hips and knees bilaterally - same as before   Neurological: She is alert and oriented to person, place, and time  She has normal reflexes  Skin: Skin is warm  Psychiatric: She has a normal mood and affect   Her behavior is normal  Judgment and thought content normal    Extremities: 0-1+ bilateral lower extremity edema, no cords, pulses are 1+  Lymphatics:  No adenopathy in the neck, supraclavicular region, axilla and groin bilaterally    Labs:    05/30/2018 BUN = 11 creatinine = 0 70 LFTs WNL WBC = 4 97 hemoglobin = 10 1 hematocrit = 34 MCV = 80 RDW = 18 6 platelet = 293 neutrophil = 68%  05/17/2018 WBC = 4 4 hemoglobin = 10 6 hematocrit = 34 MCV = 75 platelet = 938 neutrophil = 67% BUN = 19 creatinine = 0 65 LFTs WNL  05/08/2018 WBC = 6 7 hemoglobin = 10 7 hematocrit = 35 MCV = 75 platelet = 105 BUN = 17 creatinine = 0 62 LFTs WNL  03/12/2018 CEA = 22 6 = high  02/12/2018 BUN = 16 creatinine = 0 68 alkaline phosphatase = 57 total protein = 6 8 albumin = 2 9 total bilirubin = 0 43 AST = 10 ALT = 13 WBC = 5 7 hemoglobin = 11 5 hematocrit = 36 8 MCV = 76 RDW = 16 4 platelet = 697 neutrophil = 60% lymphocytes = 30% monocyte = 7%    Imaging    4/6/18 MRI pelvis rectal cancer staging    Low rectal cancer, 5 cm in length, circumferential around the rectal wall  (Series 8 images 19 through 33 )  Anteriorly, there are multiple areas where there is transmural tumor extension into the mesorectal fat making this at least a T3c lesion  On Series 8 image 31, tumor also abuts the mesorectal fascia making this CRM positive  At this point it is also   immediately adjacent to the vagina, therefore this may be a T4 lesion  There are 2 high risk perirectal nodes, and 1 low risk perirectal nodes  01/31/2018 ultrasound right upper quadrant    1  Layering gallbladder sludge  No acute cholecystitis or biliary ductal obstruction  2   Hepatomegaly  3   Fluid-filled stomach  1/27/18 CT scan of the abdomen/pelvis    1  Prior Juan-en-Y gastric bypass with distention of the excluded stomach  2   No evidence of acute abnormality in the abdomen or pelvis      Pathology    Case Report   Surgical Pathology Report                         Case: [id number]                                    Authorizing Provider: Jesusita Martinez MD      Collected:           03/28/2018 1130               Pathologist:           John Grace MD             Received:            03/29/2018 0942               Specimen:    Rectum, Rectal cancer/mass biopsies                                                        Final Diagnosis   A   Rectal mass (biopsy):  - 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      Comment:   - In the context of a rectal mass, repeat biopsy and/or excision may be indicated to exclude a higher grade lesion  Electronically signed by Giuliano Van MD on 3/30/2018 at  2:36 PM         Case Report   Surgical Pathology Report                         Case: [id number]                                    Authorizing Provider: Rj Robles MD          Collected:           03/09/2018 0902               Ordering Location:     [facility]   Received:            03/12/2018 0904                                      Center                                                                        Pathologist:           Becky Mulligan DO                                                             Specimens:   A) - Colon, polyp splenic flexure/cold snare                                                         B) - Colon, bx distal rectal mass                                                          Final Diagnosis   A  Colon, splenic flexure polyp, biopsy:  - Tubulovillous adenoma, negative for high-grade dysplasia      B  Rectum, mass, biopsy:  - At least high grade dysplasia/intramucosal carcinoma arising in a tubulovillous adenoma, suspicious for invasion       Intradepartmental consultation is in agreement with the above diagnosis (AT)     Interpretation performed at [facility], [address] [address]  Report faxed to Dr Efrain Harper on 3/13/18 @ 10:55 AM   Electronically signed by Katie Layne DO on 3/13/2018 at 10:54 AM

## 2018-06-04 PROCEDURE — 77387 GUIDANCE FOR RADJ TX DLVR: CPT | Performed by: RADIOLOGY

## 2018-06-04 PROCEDURE — 77412 RADIATION TX DELIVERY LVL 3: CPT | Performed by: RADIOLOGY

## 2018-06-05 PROCEDURE — 77387 GUIDANCE FOR RADJ TX DLVR: CPT | Performed by: RADIOLOGY

## 2018-06-05 PROCEDURE — 77412 RADIATION TX DELIVERY LVL 3: CPT | Performed by: RADIOLOGY

## 2018-06-06 PROCEDURE — 77387 GUIDANCE FOR RADJ TX DLVR: CPT | Performed by: RADIOLOGY

## 2018-06-06 PROCEDURE — 77412 RADIATION TX DELIVERY LVL 3: CPT | Performed by: RADIOLOGY

## 2018-06-07 ENCOUNTER — OFFICE VISIT (OUTPATIENT)
Dept: HEMATOLOGY ONCOLOGY | Facility: MEDICAL CENTER | Age: 63
End: 2018-06-07
Payer: MEDICARE

## 2018-06-07 ENCOUNTER — APPOINTMENT (OUTPATIENT)
Dept: LAB | Facility: CLINIC | Age: 63
End: 2018-06-07
Payer: MEDICARE

## 2018-06-07 VITALS
WEIGHT: 231 LBS | OXYGEN SATURATION: 99 % | HEART RATE: 82 BPM | SYSTOLIC BLOOD PRESSURE: 92 MMHG | DIASTOLIC BLOOD PRESSURE: 60 MMHG | TEMPERATURE: 97.7 F | BODY MASS INDEX: 38.49 KG/M2 | RESPIRATION RATE: 18 BRPM | HEIGHT: 65 IN

## 2018-06-07 DIAGNOSIS — C20 RECTAL CANCER (HCC): Primary | ICD-10-CM

## 2018-06-07 PROCEDURE — 77412 RADIATION TX DELIVERY LVL 3: CPT | Performed by: RADIOLOGY

## 2018-06-07 PROCEDURE — 77387 GUIDANCE FOR RADJ TX DLVR: CPT | Performed by: RADIOLOGY

## 2018-06-07 PROCEDURE — 99213 OFFICE O/P EST LOW 20 MIN: CPT | Performed by: INTERNAL MEDICINE

## 2018-06-07 RX ORDER — OXYCODONE HYDROCHLORIDE 10 MG/1
TABLET ORAL
COMMUNITY
Start: 2018-05-21 | End: 2018-08-03 | Stop reason: ALTCHOICE

## 2018-06-07 NOTE — PROGRESS NOTES
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  Patient feels relatively well and clinically there are no troubling signs  A CT scan of the abdomen/pelvis for approximately 2 5 months ago did not demonstrate any abnormality  The CEA level is elevated  Recent MRI of the pelvis demonstrated stage IIIC (cT3 cN1) disease  Issues:    1  Rectal cancer  Patient is on neoadjuvant chemotherapy + radiotherapy - tolerating treatments fairly well (has 12 days left)  We rediscussed what to monitor for as far side effects  Patient has Imodium for the diarrhea; will continue to monitor for significant GI bleeding  Regimen  Capecitabine 825 mg/m2 by mouth twice daily Monday through Friday (off the weekends) x 5-6 weeks with RT (dose for this patient is 1650 mg twice a day)    NCCN guidelines 1 2018 states that for T3 N any with a clear circumferential margin by MRI, primary treatment includes chemotherapy + radiotherapy  As discussed previously, the treatment sequence will likely include a rest period after neoadjuvant treatment, surgery and then postoperative chemotherapy  2   Anemia  Mrs Beena Taylor is to continue with the iron supplement monitoring for GI side effects  Patient is to return in 1 week  Routine blood work is ongoing  3  Second opinion  Patient has not decided what she wants to do about the 2nd opinion in [place]  We rediscussed this  Patient needs to make a decision relatively quickly as she is moving closer to the surgical phase of her treatment  Patient is to return in 2 weeks  Mrs Beena Taylor knows to call if she has any other oncology questions or concerns      Carefully review your medication list and verify that the list is accurate and up-to-date  Please call the hematology/oncology office if there are medications missing from the list, medications on the list that you are not currently taking or if there is a dosage or instruction that is different from how you're taking that medication  Patient goals and areas of care:  Continue with concurrent treatment  Patient is able to self-care   ______________________________________________________________________________________    Chief Complaint   Patient presents with    Follow-up     Rectal cancer on neoadjuvant concurrent treatment     History of Present Illness:    43-year-old female recently referred for the above  Previously Mrs Mark Lara had gastric bypass  Patient was recently seen by GI because of blood in the stools  Additional workup included a colonoscopy which demonstrated a rectal lesion  Patient is on concurrent chemotherapy (Xeloda) with radiation; has completed 15 days  Presently patient states feeling okay, about the same as before  Mrs Mark Lara has diarrhea but states that the GI bleeding is better than before  At times, stools are slightly more formed  Appetite is good, weight is stable  Activities are baseline  No urinary symptoms  Generalized body aches are the same as before  No fevers, chills or sweats  Mrs Ashly Carpio was told that even with neoadjuvant treatment, she will likely require an APR  Patient has been discussing issues with her family  Family wishes for patient to obtain a 2nd opinion before beginning treatment  Review of Systems   Constitutional: Negative  HENT: Negative  Eyes: Negative  Respiratory: Negative  Cardiovascular: Negative  Gastrointestinal: Positive for blood in stool and diarrhea  Negative for constipation  Endocrine: Negative  Genitourinary: Negative  Musculoskeletal: Positive for arthralgias  Skin: Negative  Allergic/Immunologic: Negative  Neurological: Negative  Hematological: Negative  Psychiatric/Behavioral: Negative  All other systems reviewed and are negative  Patient Active Problem List   Diagnosis    Morbid obesity due to excess calories ([facility] [place] [facility]    Screening for colon cancer    Screen for colon cancer    Postgastrectomy malabsorption    S/P gastric bypass    Marginal ulcer    Status post bariatric surgery    Atherosclerotic peripheral vascular disease ([facility] [place] [facility]    Diet-controlled diabetes mellitus ([facility] [place] [facility] )    Onychomycosis    Pain in foot    Rectal cancer (HCC)     Past Medical History:   Diagnosis Date    Anemia     Arthritis     Cancer ([facility] [place] [facility]     rectal    Chronic lower back pain     Chronic pain disorder     back pain    Diabetes mellitus ([facility] [place] [facility]     GERD (gastroesophageal reflux disease)     Morbid obesity ([facility] [place] [facility]     Spinal stenosis     Systolic murmur     Unsteady gait     uses walker    Wears dentures     Wears glasses      Ob/gyn:  No recent mammogram -patient's choice, no post menopausal bleeding    Past Surgical History:   Procedure Laterality Date    ABSCESS DRAINAGE      abd, (R) leg, (L) leg     SECTION      ESOPHAGOGASTRODUODENOSCOPY N/A 2016    Procedure: ESOPHAGOGASTRODUODENOSCOPY (EGD); Surgeon: Jeb Neil MD;  Location: [facility];  Service:     ESOPHAGOGASTRODUODENOSCOPY N/A 3/30/2016    Procedure: ESOPHAGOGASTRODUODENOSCOPY (EGD); Surgeon: Jeb Neil MD;  Location: [facility]; Service:     EYE SURGERY      laser eye surgery    JOINT REPLACEMENT Left 2017    hip    JOINT REPLACEMENT Right 2017    hip    MD COLONOSCOPY FLX DX W/COLLJ SPEC WHEN PFRMD N/A 3/9/2018    Procedure: COLONOSCOPY;  Surgeon: Amos Henriquez MD;  Location: [facility]; Service: Gastroenterology    MD ESOPHAGOGASTRODUODENOSCOPY TRANSORAL DIAGNOSTIC N/A 2018    Procedure: ESOPHAGOGASTRODUODENOSCOPY (EGD); Surgeon: Amos Henriquez MD;  Location: [facility];   Service: Gastroenterology    MD LAP GASTRIC BYPASS/SOLEDAD-EN-Y N/A 2016    Procedure: BYPASS GASTRIC  SOLEDAD-EN-Y LAPAROSCOPIC;  Surgeon: Yazmin Lal MD;  Location: [facility];  Service: Bariatrics    TUBAL LIGATION       Family History   Problem Relation Age of Onset    Adopted: Yes    Leukemia Mother     Heart disease Father     No Known Problems Sister     No Known Problems Brother     Diabetes Son     No Known Problems Brother     No Known Problems Brother     No Known Problems Sister     No Known Problems Sister     Family history: Mother  of leukemia (NOS), father  from heart disease (NOS), 2 children 1 with diabetes, no known familial or genetic diseases, no family history of GI malignancies    Social History     Social History    Marital status: Single     Spouse name: N/A    Number of children: 2    Years of education: N/A     Occupational History    Not on file  Social History Main Topics    Smoking status: Former Smoker     Packs/day:      Years:      Quit date: 3/30/2006    Smokeless tobacco: Never Used    Alcohol use No    Drug use: No      Comment: Percocet for lower back pain prn    Sexual activity: Not on file     Other Topics Concern    Not on file     Social History Narrative    No narrative on file       Current Outpatient Prescriptions:     Calcium-Vitamin D 500-125 MG-UNIT TABS, Take by mouth, Disp: , Rfl:     capecitabine (XELODA) 150 MG tablet, Take one tablet daily days 1-5 along with 3 tablets of capecitabine 500mg (total dose 1650mg) twice a day, Disp: 20 tablet, Rfl: 1    capecitabine (XELODA) 500 MG tablet, Take 3 tablets daily days 1-5 along with one Capecitabine 150mg tablet (total dose 1650mg) twice daily  , Disp: 60 tablet, Rfl: 1    diclofenac sodium (VOLTAREN) 1 %, diclofenac 1 % topical gel, Disp: , Rfl:     lidocaine (LIDODERM) 5 %, APPLY 1 PATCH TO AFFECTED AREA(S) OF LUMBAR SPINE AND RIGHT HIP EVERY 12 HOURS, Disp: , Rfl: 0    Multiple Vitamins-Minerals (BARIATRIC FUSION) CHEW, Chew, Disp: , Rfl:     nystatin (MYCOSTATIN) cream, Apply topically 2 (two) times a day, Disp: 30 g, Rfl: 1    omeprazole (PriLOSEC) 20 mg delayed release capsule, Take 1 capsule (20 mg total) by mouth daily, Disp: 90 capsule, Rfl: 3    oxyCODONE (ROXICODONE) 10 MG TABS, , Disp: , Rfl:     oxyCODONE-acetaminophen (PERCOCET)  mg per tablet, Take 1 tablet by mouth every 6 (six) hours as needed for moderate pain , Disp: , Rfl:     sucralfate (CARAFATE) 1 g tablet, Take 1 g by mouth 4 (four) times a day, Disp: , Rfl:     Allergies   Allergen Reactions    Tramadol Other (See Comments)     Dizzy         Vitals:    06/07/18 1043   BP: 92/60   Pulse: 82   Resp: 18   Temp: 97 7 °F (36 5 °C)   SpO2: 99%     Physical Exam   Constitutional: She is oriented to person, place, and time  She appears well-developed and well-nourished  HENT:   Head: Normocephalic and atraumatic  Right Ear: External ear normal    Left Ear: External ear normal    Nose: Nose normal    Mouth/Throat: Oropharynx is clear and moist    Eyes: Conjunctivae and EOM are normal  Pupils are equal, round, and reactive to light  Neck: Normal range of motion  Neck supple  Cardiovascular: Normal rate, regular rhythm, normal heart sounds and intact distal pulses  Pulmonary/Chest: Effort normal and breath sounds normal    Abdominal: Soft  Bowel sounds are normal    Abdomen is soft, nontender, +bowel sounds, obese, cannot palpate liver or spleen   Musculoskeletal:   Decreased range of motion in hips and knees bilaterally - same as before   Neurological: She is alert and oriented to person, place, and time  She has normal reflexes  Skin: Skin is warm  Psychiatric: She has a normal mood and affect   Her behavior is normal  Judgment and thought content normal    Extremities: 0-1+ bilateral lower extremity edema, no cords, pulses are 1+  Lymphatics:  No adenopathy in the neck, supraclavicular region, axilla and groin bilaterally    Labs:    05/30/2018 BUN = 11 creatinine = 0 70 LFTs WNL WBC = 4 97 hemoglobin = 10 1 hematocrit = 34 MCV = 80 RDW = 18 6 platelet = 623 neutrophil = 68%  05/17/2018 WBC = 4 4 hemoglobin = 10 6 hematocrit = 34 MCV = 75 platelet = 810 neutrophil = 67% BUN = 19 creatinine = 0 65 LFTs WNL  05/08/2018 WBC = 6 7 hemoglobin = 10 7 hematocrit = 35 MCV = 75 platelet = 506 BUN = 17 creatinine = 0 62 LFTs WNL  03/12/2018 CEA = 22 6 = high  02/12/2018 BUN = 16 creatinine = 0 68 alkaline phosphatase = 57 total protein = 6 8 albumin = 2 9 total bilirubin = 0 43 AST = 10 ALT = 13 WBC = 5 7 hemoglobin = 11 5 hematocrit = 36 8 MCV = 76 RDW = 16 4 platelet = 527 neutrophil = 60% lymphocytes = 30% monocyte = 7%    Imaging    4/6/18 MRI pelvis rectal cancer staging    Low rectal cancer, 5 cm in length, circumferential around the rectal wall  (Series 8 images 19 through 33 )  Anteriorly, there are multiple areas where there is transmural tumor extension into the mesorectal fat making this at least a T3c lesion  On Series 8 image 31, tumor also abuts the mesorectal fascia making this CRM positive  At this point it is also   immediately adjacent to the vagina, therefore this may be a T4 lesion  There are 2 high risk perirectal nodes, and 1 low risk perirectal nodes  01/31/2018 ultrasound right upper quadrant    1  Layering gallbladder sludge  No acute cholecystitis or biliary ductal obstruction  2   Hepatomegaly  3   Fluid-filled stomach  1/27/18 CT scan of the abdomen/pelvis    1  Prior Juan-en-Y gastric bypass with distention of the excluded stomach    2   No evidence of acute abnormality in the abdomen or pelvis      Pathology    Case Report   Surgical Pathology Report                         Case: [id number]                                    Authorizing Provider: Li Trinidad MD      Collected:           03/28/2018 1130               Pathologist:           Juice Reddy MD             Received:            03/29/2018 0942               Specimen:    Rectum, Rectal cancer/mass biopsies                                                        Final Diagnosis   A  Rectal mass (biopsy):  - 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      Comment:   - In the context of a rectal mass, repeat biopsy and/or excision may be indicated to exclude a higher grade lesion  Electronically signed by Juan Morris MD on 3/30/2018 at  2:36 PM         Case Report   Surgical Pathology Report                         Case: [id number]                                    Authorizing Provider: Adolfo Oropeza MD          Collected:           03/09/2018 0902               Ordering Location:     [facility]   Received:            03/12/2018 0904                                      Center                                                                        Pathologist:           Becky Mulligan DO                                                             Specimens:   A) - Colon, polyp splenic flexure/cold snare                                                         B) - Colon, bx distal rectal mass                                                          Final Diagnosis   A  Colon, splenic flexure polyp, biopsy:  - Tubulovillous adenoma, negative for high-grade dysplasia      B  Rectum, mass, biopsy:  - At least high grade dysplasia/intramucosal carcinoma arising in a tubulovillous adenoma, suspicious for invasion       Intradepartmental consultation is in agreement with the above diagnosis (AT)     Interpretation performed at [facility], [id number]  Report faxed to Dr Marisel Stover on 3/13/18 @ 10:55 AM   Electronically signed by Swetha Sylvester DO on 3/13/2018 at 10:54 AM

## 2018-06-08 PROCEDURE — 77336 RADIATION PHYSICS CONSULT: CPT | Performed by: RADIOLOGY

## 2018-06-08 PROCEDURE — 77412 RADIATION TX DELIVERY LVL 3: CPT | Performed by: RADIOLOGY

## 2018-06-08 PROCEDURE — 77387 GUIDANCE FOR RADJ TX DLVR: CPT | Performed by: RADIOLOGY

## 2018-06-11 PROCEDURE — 77412 RADIATION TX DELIVERY LVL 3: CPT | Performed by: RADIOLOGY

## 2018-06-11 PROCEDURE — 77387 GUIDANCE FOR RADJ TX DLVR: CPT | Performed by: RADIOLOGY

## 2018-06-12 DIAGNOSIS — C20 RECTAL CANCER (HCC): Primary | ICD-10-CM

## 2018-06-12 PROCEDURE — 77412 RADIATION TX DELIVERY LVL 3: CPT | Performed by: RADIOLOGY

## 2018-06-12 PROCEDURE — 77387 GUIDANCE FOR RADJ TX DLVR: CPT | Performed by: RADIOLOGY

## 2018-06-13 ENCOUNTER — APPOINTMENT (OUTPATIENT)
Dept: LAB | Facility: CLINIC | Age: 63
End: 2018-06-13
Payer: MEDICARE

## 2018-06-13 DIAGNOSIS — C20 RECTAL CANCER (HCC): ICD-10-CM

## 2018-06-13 DIAGNOSIS — C20 MALIGNANT NEOPLASM OF RECTUM (HCC): Primary | ICD-10-CM

## 2018-06-13 LAB
ALBUMIN SERPL BCP-MCNC: 3.1 G/DL (ref 3.5–5)
ALP SERPL-CCNC: 58 U/L (ref 46–116)
ALT SERPL W P-5'-P-CCNC: 14 U/L (ref 12–78)
ANION GAP SERPL CALCULATED.3IONS-SCNC: 6 MMOL/L (ref 4–13)
AST SERPL W P-5'-P-CCNC: 13 U/L (ref 5–45)
BACTERIA UR QL AUTO: ABNORMAL /HPF
BASOPHILS # BLD AUTO: 0.01 THOUSANDS/ΜL (ref 0–0.1)
BASOPHILS NFR BLD AUTO: 0 % (ref 0–1)
BILIRUB SERPL-MCNC: 0.5 MG/DL (ref 0.2–1)
BILIRUB UR QL STRIP: NEGATIVE
BUN SERPL-MCNC: 13 MG/DL (ref 5–25)
CALCIUM SERPL-MCNC: 9 MG/DL (ref 8.3–10.1)
CHLORIDE SERPL-SCNC: 107 MMOL/L (ref 100–108)
CLARITY UR: CLEAR
CO2 SERPL-SCNC: 28 MMOL/L (ref 21–32)
COLOR UR: YELLOW
CREAT SERPL-MCNC: 0.76 MG/DL (ref 0.6–1.3)
EOSINOPHIL # BLD AUTO: 0.19 THOUSAND/ΜL (ref 0–0.61)
EOSINOPHIL NFR BLD AUTO: 4 % (ref 0–6)
ERYTHROCYTE [DISTWIDTH] IN BLOOD BY AUTOMATED COUNT: 21.3 % (ref 11.6–15.1)
GFR SERPL CREATININE-BSD FRML MDRD: 84 ML/MIN/1.73SQ M
GLUCOSE SERPL-MCNC: 69 MG/DL (ref 65–140)
GLUCOSE UR STRIP-MCNC: NEGATIVE MG/DL
HCT VFR BLD AUTO: 34.8 % (ref 34.8–46.1)
HGB BLD-MCNC: 10.4 G/DL (ref 11.5–15.4)
HGB UR QL STRIP.AUTO: NEGATIVE
HYALINE CASTS #/AREA URNS LPF: ABNORMAL /LPF
IMM GRANULOCYTES # BLD AUTO: 0.03 THOUSAND/UL (ref 0–0.2)
IMM GRANULOCYTES NFR BLD AUTO: 1 % (ref 0–2)
KETONES UR STRIP-MCNC: NEGATIVE MG/DL
LEUKOCYTE ESTERASE UR QL STRIP: ABNORMAL
LYMPHOCYTES # BLD AUTO: 0.5 THOUSANDS/ΜL (ref 0.6–4.47)
LYMPHOCYTES NFR BLD AUTO: 9 % (ref 14–44)
MCH RBC QN AUTO: 24 PG (ref 26.8–34.3)
MCHC RBC AUTO-ENTMCNC: 29.9 G/DL (ref 31.4–37.4)
MCV RBC AUTO: 80 FL (ref 82–98)
MONOCYTES # BLD AUTO: 0.42 THOUSAND/ΜL (ref 0.17–1.22)
MONOCYTES NFR BLD AUTO: 8 % (ref 4–12)
NEUTROPHILS # BLD AUTO: 4.24 THOUSANDS/ΜL (ref 1.85–7.62)
NEUTS SEG NFR BLD AUTO: 78 % (ref 43–75)
NITRITE UR QL STRIP: NEGATIVE
NON-SQ EPI CELLS URNS QL MICRO: ABNORMAL /HPF
NRBC BLD AUTO-RTO: 0 /100 WBCS
PH UR STRIP.AUTO: 6 [PH] (ref 4.5–8)
PLATELET # BLD AUTO: 162 THOUSANDS/UL (ref 149–390)
PMV BLD AUTO: 9.7 FL (ref 8.9–12.7)
POTASSIUM SERPL-SCNC: 4.4 MMOL/L (ref 3.5–5.3)
PROT SERPL-MCNC: 6.7 G/DL (ref 6.4–8.2)
PROT UR STRIP-MCNC: NEGATIVE MG/DL
RBC # BLD AUTO: 4.33 MILLION/UL (ref 3.81–5.12)
RBC #/AREA URNS AUTO: ABNORMAL /HPF
SODIUM SERPL-SCNC: 141 MMOL/L (ref 136–145)
SP GR UR STRIP.AUTO: 1.01 (ref 1–1.03)
UROBILINOGEN UR QL STRIP.AUTO: 0.2 E.U./DL
WBC # BLD AUTO: 5.39 THOUSAND/UL (ref 4.31–10.16)
WBC #/AREA URNS AUTO: ABNORMAL /HPF

## 2018-06-13 PROCEDURE — 77412 RADIATION TX DELIVERY LVL 3: CPT | Performed by: RADIOLOGY

## 2018-06-13 PROCEDURE — 36415 COLL VENOUS BLD VENIPUNCTURE: CPT

## 2018-06-13 PROCEDURE — 80053 COMPREHEN METABOLIC PANEL: CPT

## 2018-06-13 PROCEDURE — 77387 GUIDANCE FOR RADJ TX DLVR: CPT | Performed by: RADIOLOGY

## 2018-06-13 PROCEDURE — 81001 URINALYSIS AUTO W/SCOPE: CPT

## 2018-06-13 PROCEDURE — 85025 COMPLETE CBC W/AUTO DIFF WBC: CPT

## 2018-06-14 PROCEDURE — 77387 GUIDANCE FOR RADJ TX DLVR: CPT | Performed by: RADIOLOGY

## 2018-06-14 PROCEDURE — 77412 RADIATION TX DELIVERY LVL 3: CPT | Performed by: RADIOLOGY

## 2018-06-15 PROCEDURE — 77387 GUIDANCE FOR RADJ TX DLVR: CPT | Performed by: RADIOLOGY

## 2018-06-15 PROCEDURE — 77412 RADIATION TX DELIVERY LVL 3: CPT | Performed by: RADIOLOGY

## 2018-06-18 PROCEDURE — 77331 SPECIAL RADIATION DOSIMETRY: CPT | Performed by: RADIOLOGY

## 2018-06-18 PROCEDURE — 77412 RADIATION TX DELIVERY LVL 3: CPT | Performed by: RADIOLOGY

## 2018-06-18 PROCEDURE — 77336 RADIATION PHYSICS CONSULT: CPT | Performed by: RADIOLOGY

## 2018-06-18 PROCEDURE — 77280 THER RAD SIMULAJ FIELD SMPL: CPT | Performed by: RADIOLOGY

## 2018-06-19 PROCEDURE — 77412 RADIATION TX DELIVERY LVL 3: CPT | Performed by: RADIOLOGY

## 2018-06-19 PROCEDURE — 77387 GUIDANCE FOR RADJ TX DLVR: CPT | Performed by: RADIOLOGY

## 2018-06-20 ENCOUNTER — RADIATION THERAPY TREATMENT (OUTPATIENT)
Dept: RADIATION ONCOLOGY | Facility: HOSPITAL | Age: 63
End: 2018-06-20

## 2018-06-20 PROCEDURE — 77336 RADIATION PHYSICS CONSULT: CPT | Performed by: RADIOLOGY

## 2018-06-20 PROCEDURE — 77387 GUIDANCE FOR RADJ TX DLVR: CPT | Performed by: RADIOLOGY

## 2018-06-20 PROCEDURE — 77412 RADIATION TX DELIVERY LVL 3: CPT | Performed by: RADIOLOGY

## 2018-07-05 ENCOUNTER — OFFICE VISIT (OUTPATIENT)
Dept: HEMATOLOGY ONCOLOGY | Facility: MEDICAL CENTER | Age: 63
End: 2018-07-05
Payer: MEDICARE

## 2018-07-05 VITALS
SYSTOLIC BLOOD PRESSURE: 122 MMHG | BODY MASS INDEX: 37.49 KG/M2 | HEIGHT: 65 IN | RESPIRATION RATE: 18 BRPM | DIASTOLIC BLOOD PRESSURE: 70 MMHG | TEMPERATURE: 97.8 F | HEART RATE: 82 BPM | WEIGHT: 225 LBS | OXYGEN SATURATION: 96 %

## 2018-07-05 DIAGNOSIS — C20 RECTAL CANCER (HCC): Primary | ICD-10-CM

## 2018-07-05 PROCEDURE — 99214 OFFICE O/P EST MOD 30 MIN: CPT | Performed by: INTERNAL MEDICINE

## 2018-07-05 NOTE — PROGRESS NOTES
Milton Patel  1955  [address] [facility] [place] [id number]  HEMATOLOGY/ONCOLOGY CONSULTATION REPORT    DISCUSSION/SUMMARY:    70-year-old female recently found to have high-grade dysplasia/intramucosal lesion arising in a tubulovillous adenoma  Patient feels relatively well and clinically there are no troubling signs  A CT scan of the abdomen/pelvis for approximately 2 5 months ago did not demonstrate any abnormality  The CEA level is elevated  Recent MRI of the pelvis demonstrated stage IIIC (cT3 cN1) disease  Issues:    1  Rectal cancer  Patient completed neoadjuvant chemotherapy + radiotherapy - tolerated treatments very well (completed 2 weeks ago)  Bowel movements are now more normal, no bleeding  Patient will continue to monitor  Radiation oncology follow-up is scheduled for the end of July  I have also asked the patient to make a follow-up appointment with colorectal surgery  Regimen (completed)  Capecitabine 825 mg/m2 by mouth twice daily Monday through Friday (off the weekends) x 5-6 weeks with RT (dose for this patient is 1650 mg twice a day)    NCCN guidelines 1 2018 states that for T3 N any with a clear circumferential margin by MRI, primary treatment includes chemotherapy + radiotherapy  As discussed previously, the treatment sequence will likely include a rest period after neoadjuvant treatment, surgery and then postoperative chemotherapy  2   Anemia  Mrs Key Edgar is to continue with a multivitamin with a small amount of iron  We discussed what to monitor for in regard to progressive anemia  Patient will call if she sees any blood in her stools  Patient is to return in 2 months  Mrs Key Edgar knows to call if she has any other oncology questions or concerns  Carefully review your medication list and verify that the list is accurate and up-to-date   Please call the hematology/oncology office if there are medications missing from the list, medications on the list that you are not currently taking or if there is a dosage or instruction that is different from how you're taking that medication  Patient goals and areas of care:  Continue healing then proceed to surgery  Patient is able to self-care   ______________________________________________________________________________________    Chief Complaint   Patient presents with    Follow-up     Rectal adenocarcinoma recently completing neoadjuvant concurrent treatment     History of Present Illness:    60-year-old female recently referred for the above  Previously Mrs Tubbs Age had gastric bypass  Patient was recently seen by GI because of blood in the stools  Additional workup included a colonoscopy which demonstrated a rectal lesion  Patient is on concurrent chemotherapy (Xeloda) with radiation; has completed 15 days  Presently patient states feeling okay, close to baseline  Patient completed treatment 2 weeks ago  No GI bleeding, bowel movements are more formed  No diarrhea  No abdominal pain  No fevers, chills or sweats  Appetite is good  Activities have improved  Fatigue is less/better than before  Mrs Darya Ocasio was told that even with neoadjuvant treatment, she will likely require an APR  Patient has been discussing issues with her family  Family wishes for patient to obtain a 2nd opinion before beginning treatment  Review of Systems   Constitutional: Positive for fatigue  HENT: Negative  Eyes: Negative  Respiratory: Negative  Cardiovascular: Negative  Gastrointestinal: Negative for blood in stool, constipation and diarrhea  Endocrine: Negative  Genitourinary: Negative  Musculoskeletal: Positive for arthralgias  Skin: Negative  Allergic/Immunologic: Negative  Neurological: Negative  Hematological: Negative  Psychiatric/Behavioral: Negative  All other systems reviewed and are negative  Patient Active Problem List   Diagnosis    Morbid obesity due to excess calories ([facility] )    Screening for colon cancer    Screen for colon cancer    Postgastrectomy malabsorption    S/P gastric bypass    Marginal ulcer    Status post bariatric surgery    [facility]    Diet-controlled diabetes mellitus ([facility]    Onychomycosis    Pain in foot    Rectal cancer (HCC)     Past Medical History:   Diagnosis Date    Anemia     Arthritis     Cancer [facility] )     rectal    Chronic lower back pain     Chronic pain disorder     back pain    Diabetes mellitus [facility]     GERD (gastroesophageal reflux disease)     Morbid obesity ([facility] )     Spinal stenosis     Systolic murmur     Unsteady gait     uses walker    Wears dentures     Wears glasses      Ob/gyn:  No recent mammogram -patient's choice, no post menopausal bleeding    Past Surgical History:   Procedure Laterality Date    ABDOMINAL SURGERY      abscess removed from abdomen and right thigh, a hole in thigh closed by plastic surgeon    ABSCESS DRAINAGE      abd, (R) leg, (L) leg     SECTION      ESOPHAGOGASTRODUODENOSCOPY N/A 2016    Procedure: ESOPHAGOGASTRODUODENOSCOPY (EGD); Surgeon: Kirti Pierson MD;  [facility];  Service:     ESOPHAGOGASTRODUODENOSCOPY N/A 3/30/2016    Procedure: ESOPHAGOGASTRODUODENOSCOPY (EGD); Surgeon: Kirti Pierson MD;  [facility]; Service:     EYE SURGERY      laser eye surgery    JOINT REPLACEMENT Left 2017    hip    JOINT REPLACEMENT Right 2017    hip    [place] COLONOSCOPY FLX DX W/COLLJ SPEC WHEN PFRMD N/A 3/9/2018    Procedure: COLONOSCOPY;  Surgeon: Van Cai MD;  Location: [facility] GI LAB; Service: Gastroenterology    [place] ESOPHAGOGASTRODUODENOSCOPY TRANSORAL DIAGNOSTIC N/A 2018    Procedure: ESOPHAGOGASTRODUODENOSCOPY (EGD); Surgeon: Van Cai MD;  Location: [facility] GI LAB;   Service: Gastroenterology    [place] LAP GASTRIC BYPASS/SOELDAD-EN-Y N/A 2016    Procedure: BYPASS GASTRIC  SOLEDAD-EN-Y LAPAROSCOPIC;  Surgeon: Genie Cai MD;  [facility];  Service: Bariatrics    TUBAL LIGATION       Family History   Problem Relation Age of Onset    Adopted: Yes    Leukemia Mother     Heart disease Father     Coronary artery disease Father     Diabetes Father     No Known Problems Sister     No Known Problems Brother     Diabetes Son     No Known Problems Brother     No Known Problems Brother     No Known Problems Sister     No Known Problems Sister     Family history: Mother  of leukemia (NOS), father  from heart disease (NOS), 2 children 1 with diabetes, no known familial or genetic diseases, no family history of GI malignancies    Social History     Social History    Marital status: Single     Spouse name: N/A    Number of children: 2    Years of education: N/A     Occupational History    Not on file       Social History Main Topics    Smoking status: Former Smoker     Packs/day:      Years:      Quit date: 3/30/2006    Smokeless tobacco: Never Used    Alcohol use No    Drug use: No      Comment: Percocet for lower back pain prn    Sexual activity: Not on file     Other Topics Concern    Not on file     Social History Narrative    No narrative on file       Current Outpatient Prescriptions:     Calcium-Vitamin D 500-125 MG-UNIT TABS, Take by mouth, Disp: , Rfl:     diclofenac sodium (VOLTAREN) 1 %, diclofenac 1 % topical gel, Disp: , Rfl:     lidocaine (LIDODERM) 5 %, APPLY 1 PATCH TO AFFECTED AREA(S) OF LUMBAR SPINE AND RIGHT HIP EVERY 12 HOURS, Disp: , Rfl: 0    Multiple Vitamins-Minerals (BARIATRIC FUSION) CHEW, Chew, Disp: , Rfl:     nystatin (MYCOSTATIN) cream, Apply topically 2 (two) times a day, Disp: 30 g, Rfl: 1    omeprazole (PriLOSEC) 20 mg delayed release capsule, Take 1 capsule (20 mg total) by mouth daily, Disp: 90 capsule, Rfl: 3    oxyCODONE (ROXICODONE) 10 MG TABS, , Disp: , Rfl:     oxyCODONE-acetaminophen (PERCOCET)  mg per tablet, Take 1 tablet by mouth every 6 (six) hours as needed for moderate pain , Disp: , Rfl:     sucralfate (CARAFATE) 1 g tablet, Take 1 g by mouth 4 (four) times a day, Disp: , Rfl:     capecitabine (XELODA) 150 MG tablet, Take one tablet daily days 1-5 along with 3 tablets of capecitabine 500mg (total dose 1650mg) twice a day, Disp: 20 tablet, Rfl: 1    capecitabine (XELODA) 500 MG tablet, Take 3 tablets daily days 1-5 along with one Capecitabine 150mg tablet (total dose 1650mg) twice daily  , Disp: 60 tablet, Rfl: 1    Allergies   Allergen Reactions    Tramadol Other (See Comments)     Dizzy         Vitals:    07/05/18 1248   BP: 122/70   Pulse: 82   Resp: 18   Temp: 97 8 °F (36 6 °C)   SpO2: 96%     Physical Exam   Constitutional: She is oriented to person, place, and time  She appears well-developed and well-nourished  HENT:   Head: Normocephalic and atraumatic  Right Ear: External ear normal    Left Ear: External ear normal    Nose: Nose normal    Mouth/Throat: Oropharynx is clear and moist    Eyes: Conjunctivae and EOM are normal  Pupils are equal, round, and reactive to light  Neck: Normal range of motion  Neck supple  Cardiovascular: Normal rate, regular rhythm, normal heart sounds and intact distal pulses  Pulmonary/Chest: Effort normal and breath sounds normal    Abdominal: Soft  Bowel sounds are normal    Abdomen is soft, nontender, +bowel sounds, obese, cannot palpate liver or spleen   Musculoskeletal:   Decreased range of motion in hips and knees bilaterally - same as before   Neurological: She is alert and oriented to person, place, and time  She has normal reflexes  Skin: Skin is warm  Psychiatric: She has a normal mood and affect   Her behavior is normal  Judgment and thought content normal    Extremities: 0-1+ bilateral lower extremity edema, no cords, pulses are 1+  Lymphatics:  No adenopathy in the neck, supraclavicular region, axilla and groin bilaterally    Labs:    06/13/2018 WBC = 5 39 hemoglobin = 10 4 hematocrit = 35 MCV = 80 platelet = 839 BUN = 13 creatinine = 0 76 [facility]  05/30/2018 BUN = 11 creatinine = 0 70 [facility] WBC = 4 97 hemoglobin = 10 1 hematocrit = 34 MCV = 80 RDW = 18 6 platelet = 009 neutrophil = 68%  05/17/2018 WBC = 4 4 hemoglobin = 10 6 hematocrit = 34 MCV = 75 platelet = 358 neutrophil = 67% BUN = 19 creatinine = 0 65 [facility]  05/08/2018 WBC = 6 7 hemoglobin = 10 7 hematocrit = 35 MCV = 75 platelet = 296 BUN = 17 creatinine = 0 62 [facility]  03/12/2018 CEA = 22 6 = high  02/12/2018 BUN = 16 creatinine = 0 68 alkaline phosphatase = 57 total protein = 6 8 albumin = 2 9 total bilirubin = 0 43 AST = 10 ALT = 13 WBC = 5 7 hemoglobin = 11 5 hematocrit = 36 8 MCV = 76 RDW = 16 4 platelet = 584 neutrophil = 60% lymphocytes = 30% monocyte = 7%    Imaging    4/6/18 MRI pelvis rectal cancer staging    Low rectal cancer, 5 cm in length, circumferential around the rectal wall  (Series 8 images 19 through 33 )  Anteriorly, there are multiple areas where there is transmural tumor extension into the mesorectal fat making this at least a T3c lesion  On Series 8 image 31, tumor also abuts the mesorectal fascia making this CRM positive  At this point it is also   immediately adjacent to the vagina, therefore this may be a T4 lesion  There are 2 high risk perirectal nodes, and 1 low risk perirectal nodes  01/31/2018 ultrasound right upper quadrant    1  Layering gallbladder sludge  No acute cholecystitis or biliary ductal obstruction  2   Hepatomegaly  3   Fluid-filled stomach  1/27/18 CT scan of the abdomen/pelvis    1  Prior Juan-en-Y gastric bypass with distention of the excluded stomach    2   No evidence of acute abnormality in the abdomen or pelvis      Pathology    Case Report   Surgical Pathology Report                         Case: [id number]                                    Authorizing Provider: Kori Alcantara MD      Collected:           03/28/2018 1130               Pathologist:           Quin Farmer MD             Received:            03/29/2018 0942               Specimen:    Rectum, Rectal cancer/mass biopsies                                                        Final Diagnosis   A  Rectal mass (biopsy):  - 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      Comment:   - In the context of a rectal mass, repeat biopsy and/or excision may be indicated to exclude a higher grade lesion  Electronically signed by Quin Farmer MD on 3/30/2018 at  2:36 PM         Case Report   Surgical Pathology Report                         Case: [id number]                                    Authorizing Provider: Valentin Boas, MD          Collected:           03/09/2018 0902               Ordering Location:     [facility]   Received:            03/12/2018 0904                                      Center                                                                        Pathologist:           Becky Mulligan DO                                                             Specimens:   A) - Colon, polyp splenic flexure/cold snare                                                         B) - Colon, bx distal rectal mass                                                          Final Diagnosis   A  Colon, splenic flexure polyp, biopsy:  - Tubulovillous adenoma, negative for high-grade dysplasia      B  Rectum, mass, biopsy:  - At least high grade dysplasia/intramucosal carcinoma arising in a tubulovillous adenoma, suspicious for invasion       Intradepartmental consultation is in agreement with the above diagnosis (AT)     Interpretation performed at [facility], [address] [place] [address] [address] [address]  Report faxed to Dr Nevaeh Vivar on 3/13/18 @ 10:55 AM   Electronically signed by Jacquelyn Whatley DO on 3/13/2018 at 10:54 AM

## 2018-07-17 ENCOUNTER — TELEPHONE (OUTPATIENT)
Dept: OTHER | Facility: HOSPITAL | Age: 63
End: 2018-07-17

## 2018-07-17 NOTE — TELEPHONE ENCOUNTER
I received message from Dr Minnie Viveros regarding patient  Dr Vanna Garcia told him patient was seeing colorectal surgeon at [facility]  I do not see any recent visits in [facility]  Dr Vanna Garcia told her to make appointment at her office visit here in July  Please call patient and ask her when and who she is seeing for surgery  If she wants to stay local, we can arrange to see Dr Sophie Jones

## 2018-07-18 NOTE — TELEPHONE ENCOUNTER
I confirmed with Dr Ruperto Troncoso office that patient is not set up for a follow up  I attempted to scheduled the appointment for her but was told that she has had multiple cancellations and they would like to schedule the appointment directly with the patient  I left a message asking that Lenda Sermon call us and their office so she can be scheduled

## 2018-07-19 NOTE — TELEPHONE ENCOUNTER
LMOM asking that patient call me to schedule an appt with Dr Anabella Chávez or [facility]

## 2018-08-03 ENCOUNTER — RADIATION ONCOLOGY FOLLOW-UP (OUTPATIENT)
Dept: RADIATION ONCOLOGY | Facility: HOSPITAL | Age: 63
End: 2018-08-03
Attending: RADIOLOGY
Payer: MEDICARE

## 2018-08-03 ENCOUNTER — CLINICAL SUPPORT (OUTPATIENT)
Dept: RADIATION ONCOLOGY | Facility: HOSPITAL | Age: 63
End: 2018-08-03
Payer: MEDICARE

## 2018-08-03 VITALS
DIASTOLIC BLOOD PRESSURE: 64 MMHG | OXYGEN SATURATION: 97 % | HEART RATE: 68 BPM | WEIGHT: 228.4 LBS | TEMPERATURE: 97.6 F | SYSTOLIC BLOOD PRESSURE: 104 MMHG | RESPIRATION RATE: 18 BRPM | HEIGHT: 65 IN | BODY MASS INDEX: 38.05 KG/M2

## 2018-08-03 DIAGNOSIS — C20 RECTAL CANCER (HCC): Primary | ICD-10-CM

## 2018-08-03 PROCEDURE — 99215 OFFICE O/P EST HI 40 MIN: CPT | Performed by: RADIOLOGY

## 2018-08-03 NOTE — PROGRESS NOTES
Follow-up - Radiation Oncology   Martha Sharpe 1955 58 y o  female [id number]      History of Present Illness   Cancer Staging  Rectal cancer [facility])  Staging form: Colon and Rectum, AJCC 8th Edition  - Clinical: Stage IIIB (cT3, cN1, cM0) - Signed by Kathya Marquez MD on 4/11/2018      Martha Sharpe returns today for routine scheduled follow-up visit approximately 1 month status post completion neoadjuvant chemoradiation therapy for her stage IIIB rectal cancer  Overall she feels well  Her bowel movements have normalized for the most part  She denies any tenesmus  No further rectal bleeding  No dysuria  As she has been Re introducing fiber and roughage to her diet she does still have bouts of loose stool but much much improved relative to the end of treatment  No discomfort whatsoever  She is scheduled follow-up with Surgical Oncology next week  Historical Information      Rectal [facility] ([facility] )    3/9/2018 Biopsy     Rectum, mass, biopsy:  - At least high grade dysplasia/intramucosal carcinoma arising in a tubulovillous adenoma, suspicious for    invasion  3/9/2018 Initial Diagnosis     Rectal cancer ([facility] )         3/28/2018 Biopsy     Final Diagnosis   A   Rectal mass (biopsy):  - 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               5/10/2018 - 6/20/2018 Radiation     Course: C1    Plan ID Energy Fractions Dose per Fraction (cGy) Dose Correction (cGy) Total Dose Delivered (cGy) Elapsed Days   CD Pelvis 10X 3 / 3 180 0 540 2   Whole Pelvis 10X 25 / 25 180 0 4,500 36      Treatment dates:  C1: 5/10/2018 - 6/20/2018 5/2018 - 6/2018 Chemotherapy     Capecitabine 825 mg/m2 by mouth twice daily Monday through Friday (off the weekends) x 5-6 weeks with RT (dose for this patient is 1650 mg twice a day)            Past Medical History:   Diagnosis Date    Anemia     Arthritis     Cancer ([facility] )     rectal    Chronic lower back pain     Chronic pain disorder     back pain    Diabetes mellitus ([facility]     GERD (gastroesophageal reflux disease)     Morbid obesity ([facility]     Spinal stenosis     Systolic murmur     Unsteady gait     uses walker    Wears dentures     Wears glasses      Past Surgical History:   Procedure Laterality Date    ABDOMINAL SURGERY      abscess removed from abdomen and right thigh, a hole in thigh closed by plastic surgeon    ABSCESS DRAINAGE      abd, (R) leg, (L) leg     SECTION      ESOPHAGOGASTRODUODENOSCOPY N/A 2016    Procedure: ESOPHAGOGASTRODUODENOSCOPY (EGD); Surgeon: Cipriano Tolentino MD;  [facility];  Service:     ESOPHAGOGASTRODUODENOSCOPY N/A 3/30/2016    Procedure: ESOPHAGOGASTRODUODENOSCOPY (EGD); Surgeon: Cipriano Tolentino MD;  [facility]; Service:     EYE SURGERY      laser eye surgery    JOINT REPLACEMENT Left 2017    hip    JOINT REPLACEMENT Right 2017    hip    [place] COLONOSCOPY FLX DX W/COLLJ SPEC WHEN PFRMD N/A 3/9/2018    Procedure: COLONOSCOPY;  Surgeon: Erica Myles MD;  Location: [place] [address] GI LAB; Service: Gastroenterology    [place] ESOPHAGOGASTRODUODENOSCOPY TRANSORAL DIAGNOSTIC N/A 2018    Procedure: ESOPHAGOGASTRODUODENOSCOPY (EGD); Surgeon: Erica Myles MD;  Location: [facility] GI LAB;   Service: Gastroenterology    [place] LAP GASTRIC BYPASS/SOLEDAD-EN-Y N/A 2016    Procedure: BYPASS GASTRIC  SOLEDAD-EN-Y LAPAROSCOPIC;  Surgeon: Cipriano Tolentino MD;  [facility];  Service: Bariatrics    TUBAL LIGATION         Social History   History   Alcohol Use No     History   Drug Use No     Comment: Percocet for lower back pain prn     History   Smoking Status    Former Smoker    Packs/day: 1 00    Years: 25 00    Quit date: 3/30/2006   Smokeless Tobacco    Never Used         Meds/Allergies     Current Outpatient Prescriptions:     Calcium-Vitamin D 500-125 MG-UNIT TABS, Take by mouth, Disp: , Rfl:     diclofenac sodium (VOLTAREN) 1 %, diclofenac 1 % topical gel, Disp: , Rfl:     Multiple Vitamins-Minerals (BARIATRIC FUSION) CHEW, Chew, Disp: , Rfl:     nystatin (MYCOSTATIN) cream, Apply topically 2 (two) times a day, Disp: 30 g, Rfl: 1    omeprazole (PriLOSEC) 20 mg delayed release capsule, Take 1 capsule (20 mg total) by mouth daily, Disp: 90 capsule, Rfl: 3    oxyCODONE-acetaminophen (PERCOCET)  mg per tablet, Take 1 tablet by mouth every 6 (six) hours as needed for moderate pain , Disp: , Rfl:     sucralfate (CARAFATE) 1 g tablet, Take 1 g by mouth 4 (four) times a day, Disp: , Rfl:   Allergies   Allergen Reactions    Tramadol Other (See Comments)     Dizzy           Review of Systems   Review of Systems   Constitutional: Positive for fatigue (Mild)  HENT: Negative  Eyes: Negative  Respiratory: Negative  Cardiovascular: Positive for leg swelling  Gastrointestinal: Positive for diarrhea (Intermittent  Some leakage  )  Negative for abdominal distention, abdominal pain, anal bleeding, blood in stool, constipation, nausea, rectal pain and vomiting  Endocrine: Negative  Genitourinary: Negative  Musculoskeletal: Positive for arthralgias and gait problem (Uses cane to ambulate)  Skin: Positive for rash (Yeast infection in right groin)  Allergic/Immunologic: Negative  Hematological: Negative  Psychiatric/Behavioral: Negative  Screening  Tobacco  Current tobacco user: no  If yes, brief counseling provided: No    Hypertension  Hypertension screening performed: yes  Normotensive:  yes  If no, referred to PCP: n/a    Depression Screening  Screened for depression using PHQ-2: yes    Screened for depression using PHQ-9:  no  Screening positive or negative:  negative  If score >4, was any of the following actions taken?    Additional evaluation for depression, suicide risk assesment, referral to PCP or psychiatry, medication started:  n/a    Advanced Care Planning for Patients >65 years  Advanced Care Planning Discussed:  n/a  Patient named surrogate decision maker or care plan in chart: n/a        OBJECTIVE:   /64 (BP Location: Right arm, Patient Position: Sitting, Cuff Size: Large)   Pulse 68   Temp 97 6 °F (36 4 °C) (Temporal)   Resp 18   Ht 5' 5" (1 651 m)   Wt 104 kg (228 lb 6 4 oz)   SpO2 97%   BMI 38 01 kg/m²   Pain Assessment:  0  Karnofsky: 90 - Able to carry on normal activity; minor signs or symptoms of disease     Physical Exam   The patient presents today no apparent distress  Sclera anicteric  Normal respiratory effort  Normal speech  Normal affect  RESULTS    Lab Results: No results found for this or any previous visit (from the past 672 hour(s))  Imaging Studies:No results found  Assessment/Plan:  No orders of the defined types were placed in this encounter  Val Campos has done well in follow-up  She has recovered well from her recent course of chemoradiation  She will be following up with Surgical Oncology next week and will likely proceed with APR  She will potentially receive additional chemo following her surgery  She will return to our department in 6 months for routine follow-up  Tosha Lopez MD  8/3/2018,11:58 AM    Portions of the record may have been created with voice recognition software   Occasional wrong word or "sound a like" substitutions may have occurred due to the inherent limitations of voice recognition software   Read the chart carefully and recognize, using context, where substitutions have occurred

## 2018-08-03 NOTE — PROGRESS NOTES
Angie Khan  1955   Ms Noemi Doyle is a 58 y o  female       Chief Complaint   Patient presents with    Follow-up     Rectal Cancer   Cancer Staging  Rectal cancer [facility])  Staging form: Colon and Rectum, AJCC 8th Edition  - Clinical: Stage IIIB (cT3, cN1, cM0) - Signed by Lisa Cruz MD on 4/11/2018      Oncology History    7/5/18 Elva Lindsay:     Patient completed neoadjuvant chemotherapy + radiotherapy - tolerated treatments very well   Bowel movements are now more normal, no bleeding  Patient will continue to monitor  Radiation oncology follow-up is scheduled for the end of July  I have also asked the patient to make a follow-up appointment with colorectal surgery  [facility] has left several messages regarding f/u with colorectal surgery  As of now, patient is not scheduled withDr  Quintin Leos or at [facility]  Rectal cancer ([facility] )    3/9/2018 Biopsy     Rectum, mass, biopsy:  - At least high grade dysplasia/intramucosal carcinoma arising in a tubulovillous adenoma, suspicious for    invasion  3/9/2018 Initial Diagnosis     Rectal cancer ([facility] )         3/28/2018 Biopsy     Final Diagnosis   A  Rectal mass (biopsy):  - 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               5/10/2018 - 6/20/2018 Radiation     Course: C1    Plan ID Energy Fractions Dose per Fraction (cGy) Dose Correction (cGy) Total Dose Delivered (cGy) Elapsed Days   CD Pelvis 10X 3 / 3 180 0 540 2   Whole Pelvis 10X 25 / 25 180 0 4,500 36      Treatment dates:  C1: 5/10/2018 - 6/20/2018            Chemotherapy     Capecitabine 825 mg/m2 by mouth twice daily Monday through Friday (off the weekends) x 5-6 weeks with RT (dose for this patient is 1650 mg twice a day)        Clinical Trial: no    Interval History  7/5/18 Elva Lindsay:     Patient completed neoadjuvant chemotherapy + radiotherapy - tolerated treatments very well   Bowel movements are now more normal, no bleeding        8/6/18 F/U with Dr Crystal Vaughan    Screening  Tobacco  Current tobacco user: no  If yes, brief counseling provided: No    Hypertension  Hypertension screening performed: yes  Normotensive:  yes  If no, referred to PCP: n/a    Depression Screening  Screened for depression using PHQ-2: yes    Screened for depression using PHQ-9:  no  Screening positive or negative:  negative  If score >4, was any of the following actions taken?    Additional evaluation for depression, suicide risk assesment, referral to PCP or psychiatry, medication started:  [id number] [facility] for Patients >65 years  Advanced Care Planning Discussed:  n/a  Patient named surrogate decision maker or care plan in chart: n/a    [unfilled]  Health Maintenance   Topic Date Due    HIV SCREENING  1955    Hepatitis C Screening  1955    Depression Screening PHQ-9  1955    DTaP,Tdap,and Td Vaccines (1 - Tdap) 09/17/1976    Diabetic Foot Exam  07/14/2017    URINE MICROALBUMIN  08/26/2017    DM Eye Exam  09/28/2017    INFLUENZA VACCINE  09/01/2018    CRC Screening: Colonoscopy  03/09/2028       Patient Active Problem List   Diagnosis    Morbid obesity due to excess calories ([address] )    Screening for colon cancer    Screen for colon cancer    Postgastrectomy malabsorption    S/P gastric bypass    Marginal ulcer    Status post bariatric surgery    Atherosclerotic peripheral vascular disease ([address]    Diet-controlled diabetes mellitus ([address]    Onychomycosis    Pain in foot    Rectal cancer ([address] )     Past Medical History:   Diagnosis Date    Anemia     Arthritis     Cancer ([address] )     rectal    Chronic lower back pain     Chronic pain disorder     back pain    Diabetes mellitus ([address]     GERD (gastroesophageal reflux disease)     Morbid obesity ([address]     Spinal stenosis     Systolic murmur     Unsteady gait     uses walker    Wears dentures     Wears glasses      Past Surgical History:   Procedure Laterality Date  ABDOMINAL SURGERY      abscess removed from abdomen and right thigh, a hole in thigh closed by plastic surgeon    ABSCESS DRAINAGE      abd, (R) leg, (L) leg     SECTION      ESOPHAGOGASTRODUODENOSCOPY N/A 2016    Procedure: ESOPHAGOGASTRODUODENOSCOPY (EGD); Surgeon: Margaux Iniguez MD;  [facility];  Service:     ESOPHAGOGASTRODUODENOSCOPY N/A 3/30/2016    Procedure: ESOPHAGOGASTRODUODENOSCOPY (EGD); Surgeon: Margaux Iniguez MD;  [facility]; Service:     EYE SURGERY      laser eye surgery    JOINT REPLACEMENT Left 2017    hip    JOINT REPLACEMENT Right 2017    hip    NV COLONOSCOPY FLX DX W/COLLJ SPEC WHEN PFRMD N/A 3/9/2018    Procedure: COLONOSCOPY;  Surgeon: Emperatriz Sullivan MD;  Location: [facility] GI LAB; Service: Gastroenterology    NV ESOPHAGOGASTRODUODENOSCOPY TRANSORAL DIAGNOSTIC N/A 2018    Procedure: ESOPHAGOGASTRODUODENOSCOPY (EGD); Surgeon: Emperatriz Sullivan MD;  Location: [facility] GI LAB; Service: Gastroenterology    NV LAP GASTRIC BYPASS/SOLEDAD-EN-Y N/A 2016    Procedure: BYPASS GASTRIC  SOLEDAD-EN-Y LAPAROSCOPIC;  Surgeon: aMrgaux Iniguez MD;  [facility];  Service: Bariatrics    TUBAL LIGATION       Family History   Problem Relation Age of Onset    Adopted: Yes    Leukemia Mother     Heart disease Father     Coronary artery disease Father     Diabetes Father     No Known Problems Sister     No Known Problems Brother     Diabetes Son     No Known Problems Brother     No Known Problems Brother     No Known Problems Sister     No Known Problems Sister      Social History     Social History    Marital status: Single     Spouse name: N/A    Number of children: 2    Years of education: N/A     Occupational History    Not on file  Social History Main Topics    Smoking status: Former Smoker     Packs/day: 1 00     Years: 25 00     Quit date: 3/30/2006    Smokeless tobacco: Never Used    Alcohol use No    Drug use:  No Comment: Percocet for lower back pain prn    Sexual activity: Not on file     Other Topics Concern    Not on file     Social History Narrative    No narrative on file       Current Outpatient Prescriptions:     Calcium-Vitamin D 500-125 MG-UNIT TABS, Take by mouth, Disp: , Rfl:     diclofenac sodium (VOLTAREN) 1 %, diclofenac 1 % topical gel, Disp: , Rfl:     Multiple Vitamins-Minerals (BARIATRIC FUSION) CHEW, Chew, Disp: , Rfl:     nystatin (MYCOSTATIN) cream, Apply topically 2 (two) times a day, Disp: 30 g, Rfl: 1    omeprazole (PriLOSEC) 20 mg delayed release capsule, Take 1 capsule (20 mg total) by mouth daily, Disp: 90 capsule, Rfl: 3    oxyCODONE-acetaminophen (PERCOCET)  mg per tablet, Take 1 tablet by mouth every 6 (six) hours as needed for moderate pain , Disp: , Rfl:     sucralfate (CARAFATE) 1 g tablet, Take 1 g by mouth 4 (four) times a day, Disp: , Rfl:   Allergies   Allergen Reactions    Tramadol Other (See Comments)     Dizzy         Review of Systems:  Review of Systems   Constitutional: Positive for fatigue (Mild)  HENT: Negative  Eyes: Negative  Respiratory: Negative  Cardiovascular: Positive for leg swelling  Gastrointestinal: Positive for diarrhea (Intermittent  Some leakage  )  Negative for abdominal distention, abdominal pain, anal bleeding, blood in stool, constipation, nausea, rectal pain and vomiting  Endocrine: Negative  Genitourinary: Negative  Musculoskeletal: Positive for arthralgias and gait problem (Uses cane to ambulate)  Skin: Positive for rash (Yeast infection in right groin)  Allergic/Immunologic: Negative  Hematological: Negative  Psychiatric/Behavioral: Negative          Vitals:    08/03/18 1030   BP: 104/64   BP Location: Right arm   Patient Position: Sitting   Cuff Size: Large   Pulse: 68   Resp: 18   Temp: 97 6 °F (36 4 °C)   TempSrc: Temporal   SpO2: 97%   Weight: 104 kg (228 lb 6 4 oz)   Height: 5' 5" (1 651 m)       Pain Score: 0-No pain    Imaging:No results found

## 2018-08-06 ENCOUNTER — TRANSCRIBE ORDERS (OUTPATIENT)
Dept: ADMINISTRATIVE | Facility: HOSPITAL | Age: 63
End: 2018-08-06

## 2018-08-06 DIAGNOSIS — C20 MALIGNANT NEOPLASM OF RECTUM (HCC): Primary | ICD-10-CM

## 2018-08-07 DIAGNOSIS — I35.0 NONRHEUMATIC AORTIC VALVE STENOSIS: Primary | ICD-10-CM

## 2018-08-15 ENCOUNTER — HOSPITAL ENCOUNTER (OUTPATIENT)
Dept: RADIOLOGY | Facility: HOSPITAL | Age: 63
Discharge: HOME | End: 2018-08-15
Attending: COLON & RECTAL SURGERY
Payer: MEDICARE

## 2018-08-15 DIAGNOSIS — C20 MALIGNANT NEOPLASM OF RECTUM (HCC): ICD-10-CM

## 2018-08-15 PROCEDURE — 72195 MRI PELVIS W/O DYE: CPT

## 2018-08-28 ENCOUNTER — ANESTHESIA EVENT (OUTPATIENT)
Dept: PERIOP | Facility: HOSPITAL | Age: 63
DRG: 333 | End: 2018-08-28
Payer: MEDICARE

## 2018-08-29 ENCOUNTER — APPOINTMENT (OUTPATIENT)
Dept: LAB | Facility: CLINIC | Age: 63
End: 2018-08-29
Payer: MEDICARE

## 2018-08-29 ENCOUNTER — TRANSCRIBE ORDERS (OUTPATIENT)
Dept: LAB | Facility: CLINIC | Age: 63
End: 2018-08-29

## 2018-08-29 ENCOUNTER — LAB REQUISITION (OUTPATIENT)
Dept: LAB | Facility: HOSPITAL | Age: 63
End: 2018-08-29
Payer: MEDICARE

## 2018-08-29 DIAGNOSIS — I50.9 HEART FAILURE, UNSPECIFIED HF CHRONICITY, UNSPECIFIED HEART FAILURE TYPE (HCC): Primary | ICD-10-CM

## 2018-08-29 DIAGNOSIS — K57.90 DIVERTICULOSIS OF INTESTINE WITHOUT BLEEDING, UNSPECIFIED INTESTINAL TRACT LOCATION: ICD-10-CM

## 2018-08-29 DIAGNOSIS — C20 MALIGNANT NEOPLASM OF RECTUM (HCC): ICD-10-CM

## 2018-08-29 DIAGNOSIS — K28.9 ANASTOMOTIC ULCER: ICD-10-CM

## 2018-08-29 DIAGNOSIS — E13.8 DIABETES MELLITUS OF OTHER TYPE WITH COMPLICATION, UNSPECIFIED WHETHER LONG TERM INSULIN USE: ICD-10-CM

## 2018-08-29 DIAGNOSIS — K21.9 GASTROESOPHAGEAL REFLUX DISEASE, ESOPHAGITIS PRESENCE NOT SPECIFIED: ICD-10-CM

## 2018-08-29 DIAGNOSIS — G47.30 INSOMNIA WITH SLEEP APNEA: ICD-10-CM

## 2018-08-29 DIAGNOSIS — K44.9 HIATAL HERNIA: ICD-10-CM

## 2018-08-29 DIAGNOSIS — B35.1 ONYCHOMYCOSIS: ICD-10-CM

## 2018-08-29 DIAGNOSIS — G47.00 INSOMNIA WITH SLEEP APNEA: ICD-10-CM

## 2018-08-29 LAB
ABO GROUP BLD: NORMAL
BLD GP AB SCN SERPL QL: NEGATIVE
EST. AVERAGE GLUCOSE BLD GHB EST-MCNC: 105 MG/DL
HBA1C MFR BLD: 5.3 % (ref 4.2–6.3)
RH BLD: POSITIVE
SPECIMEN EXPIRATION DATE: NORMAL

## 2018-08-29 PROCEDURE — 86850 RBC ANTIBODY SCREEN: CPT | Performed by: COLON & RECTAL SURGERY

## 2018-08-29 PROCEDURE — 86900 BLOOD TYPING SEROLOGIC ABO: CPT | Performed by: COLON & RECTAL SURGERY

## 2018-08-29 PROCEDURE — 86901 BLOOD TYPING SEROLOGIC RH(D): CPT | Performed by: COLON & RECTAL SURGERY

## 2018-08-29 PROCEDURE — 83036 HEMOGLOBIN GLYCOSYLATED A1C: CPT

## 2018-08-29 PROCEDURE — 36415 COLL VENOUS BLD VENIPUNCTURE: CPT

## 2018-08-29 NOTE — PRE-PROCEDURE INSTRUCTIONS
Pre-Surgery Instructions:   Medication Instructions    Calcium-Vitamin D 500-125 MG-UNIT TABS Instructed patient per Anesthesia Guidelines   diclofenac sodium (VOLTAREN) 1 % Instructed patient per Anesthesia Guidelines   Multiple Vitamins-Minerals (BARIATRIC FUSION) CHEW Instructed patient per Anesthesia Guidelines   omeprazole (PriLOSEC) 20 mg delayed release capsule Instructed patient per Anesthesia Guidelines   oxyCODONE-acetaminophen (PERCOCET)  mg per tablet Instructed patient per Anesthesia Guidelines

## 2018-08-29 NOTE — PRE-PROCEDURE INSTRUCTIONS
Pre-Surgery Instructions:   Medication Instructions    Calcium-Vitamin D 500-125 MG-UNIT TABS Instructed patient per Anesthesia Guidelines   diclofenac sodium (VOLTAREN) 1 % Instructed patient per Anesthesia Guidelines   Multiple Vitamins-Minerals (BARIATRIC FUSION) CHEW Instructed patient per Anesthesia Guidelines   omeprazole (PriLOSEC) 20 mg delayed release capsule Instructed patient per Anesthesia Guidelines   oxyCODONE-acetaminophen (PERCOCET)  mg per tablet Instructed patient per Anesthesia Guidelines  Acetaminophen Med Class     Continue to take this medication on your normal schedule  If this is an oral medication and you take it in the morning, then you may take this medicine with a sip of water  Opioid Med Class     Continue to take this medication on your normal schedule  If this is an oral medication and you take it in the morning, then you may take this medicine with a sip of water

## 2018-08-31 ENCOUNTER — APPOINTMENT (OUTPATIENT)
Dept: LAB | Facility: CLINIC | Age: 63
End: 2018-08-31
Payer: MEDICARE

## 2018-08-31 ENCOUNTER — TRANSCRIBE ORDERS (OUTPATIENT)
Dept: LAB | Facility: CLINIC | Age: 63
End: 2018-08-31

## 2018-08-31 ENCOUNTER — OFFICE VISIT (OUTPATIENT)
Dept: CARDIOLOGY CLINIC | Facility: CLINIC | Age: 63
End: 2018-08-31
Payer: MEDICARE

## 2018-08-31 ENCOUNTER — HOSPITAL ENCOUNTER (OUTPATIENT)
Dept: NON INVASIVE DIAGNOSTICS | Facility: CLINIC | Age: 63
Discharge: HOME | End: 2018-08-31
Payer: MEDICARE

## 2018-08-31 VITALS
HEIGHT: 65 IN | DIASTOLIC BLOOD PRESSURE: 64 MMHG | HEART RATE: 67 BPM | WEIGHT: 230.9 LBS | SYSTOLIC BLOOD PRESSURE: 108 MMHG | BODY MASS INDEX: 38.47 KG/M2

## 2018-08-31 DIAGNOSIS — I35.0 NONRHEUMATIC AORTIC VALVE STENOSIS: ICD-10-CM

## 2018-08-31 DIAGNOSIS — Z98.84 S/P GASTRIC BYPASS: ICD-10-CM

## 2018-08-31 DIAGNOSIS — Z01.810 PREOP CARDIOVASCULAR EXAM: ICD-10-CM

## 2018-08-31 DIAGNOSIS — Z01.818 PRE-OP EXAM: Primary | ICD-10-CM

## 2018-08-31 DIAGNOSIS — I70.209 ATHEROSCLEROTIC PERIPHERAL VASCULAR DISEASE (HCC): ICD-10-CM

## 2018-08-31 PROCEDURE — 93306 TTE W/DOPPLER COMPLETE: CPT | Performed by: INTERNAL MEDICINE

## 2018-08-31 PROCEDURE — 93000 ELECTROCARDIOGRAM COMPLETE: CPT | Performed by: INTERNAL MEDICINE

## 2018-08-31 PROCEDURE — 93306 TTE W/DOPPLER COMPLETE: CPT

## 2018-08-31 PROCEDURE — 99204 OFFICE O/P NEW MOD 45 MIN: CPT | Performed by: INTERNAL MEDICINE

## 2018-08-31 NOTE — PROGRESS NOTES
John Montoya  1955  [address]  [address] [address] [phone]    1  Pre-op exam  POCT ECG   2  Atherosclerotic peripheral vascular disease ([address] )     3  Preop cardiovascular exam     4  S/P gastric bypass         Discussion/Summary: the patient needs surgery for rectal cancer  She has moderate to severe aortic stenosis which is stable per prior echocardiogram reports from [facility] she also has moderate mitral stenosis  She will be at moderate to high risk for postop congestive heart failure  No further testing or management will alter her cardiac risk prior to surgery  Would recommend consideration of cardiac anesthesia for the case  Also watch volume status very closely in the postoperative period  She tells me she had 2 left heart catheterizations which showed minimal coronary plaque and no obstructive lesions  She will follow up post surgery to talk about her valvular disease and surveillance  Functional capacity is about 4 Mets    Interval History:  49-year-old female with newly found rectal cancer needs to have surgery next week presents for preoperative cardiovascular risk assessment  Ambulation mainly limited by lower extremity arthritis  She denies any exertional chest pain, shortness of breath, palpitations, lightheadedness, dizziness, or syncope  She can do moderate housework she is able to walk a block to with the assistance of a cane functional capacity about 4 Mets  She has a history of 2 cardiac catheterizations that did not show obstructive coronary disease she has moderate aortic stenosis prior valve area 1 0 centimeter squared she also has moderate mitral stenosis seen on an echocardiogram this morning      Problem List     Morbid obesity due to excess calories [facility])    Screening for colon cancer    Screen for colon cancer    Overview Signed 1/30/2018  4:19 PM by Prasad Oneil     Added automatically from request for surgery [id number]         Postgastrectomy malabsorption    S/P gastric bypass    Marginal ulcer    Status post bariatric surgery    Overview Signed 2/9/2018  1:26 PM by Tracee Brewer     Added automatically from request for surgery [id number]         Atherosclerotic peripheral vascular disease ([facility] )    Diet-controlled diabetes mellitus ([facility] )    Onychomycosis    Pain in foot    Rect[facility] cancer ([facility] )    Preop cardiovascular exam        Past Medical History:   Diagnosis Date    Anemia     Arthritis     Cancer ([facility] )     rectal    Chronic lower back pain     Chronic pain disorder     back pain    Diabetes mellitus ([facility]     GERD (gastroesophageal reflux disease)     Morbid obesity ([facility] )     Spinal stenosis     Systolic murmur     Unsteady gait     uses walker    Wears dentures     Wears glasses      Social History     Social History    Marital status: Single     Spouse name: N/A    Number of children: 2    Years of education: N/A     Occupational History    Not on file  Social History Main Topics    Smoking status: Former Smoker     Packs/day: 1 00     Years: 25 00     Quit date: 3/30/2006    Smokeless tobacco: Never Used    Alcohol use No    Drug use: No      Comment: Percocet for lower back pain prn    Sexual activity: Not on file     Other Topics Concern    Not on file     Social History Narrative    No narrative on file      Family History   Problem Relation Age of Onset    Adopted:  Yes    Leukemia Mother     Heart disease Father     Coronary artery disease Father     Diabetes Father     No Known Problems Sister     No Known Problems Brother     Diabetes Son     No Known Problems Brother     No Known Problems Brother     No Known Problems Sister     No Known Problems Sister      Past Surgical History:   Procedure Laterality Date    ABDOMINAL SURGERY      abscess removed from abdomen and right thigh, a hole in thigh closed by plastic surgeon    ABSCESS DRAINAGE abd, (R) leg, (L) leg     SECTION      ESOPHAGOGASTRODUODENOSCOPY N/A 2016    Procedure: ESOPHAGOGASTRODUODENOSCOPY (EGD); Surgeon: Jesica Baxter MD;  [facility];  Service:     ESOPHAGOGASTRODUODENOSCOPY N/A 3/30/2016    Procedure: ESOPHAGOGASTRODUODENOSCOPY (EGD); Surgeon: Jesica Baxter MD;  [facility]; Service:     EYE SURGERY      laser eye surgery    JOINT REPLACEMENT Left 2017    hip    JOINT REPLACEMENT Right 2017    hip    AR COLONOSCOPY FLX DX W/COLLJ SPEC WHEN PFRMD N/A 3/9/2018    Procedure: COLONOSCOPY;  Surgeon: Quang Del Cid MD;  Location: [facility] GI LAB; Service: Gastroenterology    AR ESOPHAGOGASTRODUODENOSCOPY TRANSORAL DIAGNOSTIC N/A 2018    Procedure: ESOPHAGOGASTRODUODENOSCOPY (EGD); Surgeon: Quang Del Cid MD;  Location: [facility] GI LAB;   Service: Gastroenterology    AR LAP GASTRIC BYPASS/SOLEDAD-EN-Y N/A 2016    Procedure: BYPASS GASTRIC  SOLEDAD-EN-Y LAPAROSCOPIC;  Surgeon: Jesica Baxter MD;  [facility];  Service: Bariatrics    TUBAL LIGATION         Current Outpatient Prescriptions:     Calcium-Vitamin D 500-125 MG-UNIT TABS, Take by mouth daily  , Disp: , Rfl:     Multiple Vitamins-Minerals (BARIATRIC FUSION) CHEW, Chew daily  , Disp: , Rfl:     omeprazole (PriLOSEC) 20 mg delayed release capsule, Take 1 capsule (20 mg total) by mouth daily, Disp: 90 capsule, Rfl: 3    oxyCODONE-acetaminophen (PERCOCET)  mg per tablet, Take 1 tablet by mouth every 6 (six) hours as needed for moderate pain , Disp: , Rfl:   Allergies   Allergen Reactions    Tramadol Other (See Comments)     Dizzy         Labs:     Chemistry        Component Value Date/Time     2018 0808     10/16/2015 0910    K 4 4 2018 0808    K 4 7 10/16/2015 0910     2018 0808     10/16/2015 0910    CO2 28 2018 0808    CO2 27 10/16/2015 0910    BUN 13 2018 0808    BUN 29 (H) 10/16/2015 0910    CREATININE 0 76 06/13/2018 0808    CREATININE 1 1 10/16/2015 0910        Component Value Date/Time    CALCIUM 9 0 06/13/2018 0808    CALCIUM 9 9 10/16/2015 0910    ALKPHOS 58 06/13/2018 0808    AST 13 06/13/2018 0808    ALT 14 06/13/2018 0808            Lab Results   Component Value Date    CHOL 159 03/26/2014     Lab Results   Component Value Date    HDL 53 02/12/2018    HDL 46 08/09/2017    HDL 37 (L) 10/03/2016     Lab Results   Component Value Date    LDLCALC 114 (H) 02/12/2018    LDLCALC 99 08/09/2017    LDLCALC 143 (H) 10/03/2016     Lab Results   Component Value Date    TRIG 141 02/12/2018    TRIG 91 08/09/2017    TRIG 248 (H) 10/03/2016     No components found for: CHOLHDL    Imaging: Mri Pelvis Rectal Cancer Staging Wo Contrast    Result Date: 8/20/2018  Narrative: MRI PELVIS - WITHOUT CONTRAST (RECTAL CANCER STAGING) INDICATION:  Rectal cancer status post radiation treatment  Based on Dr Josh Guillen note from 8/3/2018, patient is approximately 1 month status post completion of neoadjuvant chemotherapy for stage III rectal cancer  COMPARISON: MRI of the pelvis from 4/6/2018  TECHNIQUE: The following pulse sequences were obtained on a 1 5 T scanner:  Sagittal 2D FIESTA fat sat,  High resolution Coronal, Sagittal, and Axial FSE T2 weighted images of the rectum, and large field of view axial T1 weighted images of the pelvis  An additional small field of view, axial oblique T2-weighted sequence was performed through the tumor, perpendicular to the long axis of the rectum at that level  IV contrast was not given  FINDINGS: TUMOR LOCATION: There has been definite tumor response to treatment of the previously seen low rectal cancer, which had been 5 cm in length and circumferential around the rectal wall  The intraluminal portions of tumor is much less bulky   However there is is still residual viable tumor extension anteriorly, with a 1 cm nodular lesion anterior to the rectum, still contacting the mesorectal fascia and possibly the vagina  (Series 6 image 28 and 29 )  T-STAGING: Based on the above, this is still at least T3c, CRM positive lesion, and if the vaginal is involved, a T4 lesion  CIRCUMFERENTIAL RESECTION MARGIN (CRM): As above, there is a region of the rectal tumor abutment of the anterior mesorectal fascia on series 6 images 28 and 29  In addition, a left anterior lateral high risk node described above also abuts the anterior mesorectal fascia (series 6 image 11 )  This continues to be a CRM positive lesion  PERIRECTAL NODES: All lymph nodes are described on series 6: High risk nodes: Image 9, right of rectum, this no has decreased in size, currently 8 mm, previously 10 mm, also has become hypointense on T2-weighted sequences, consistent with treated tumor  Image 11, left anterolateral of rectum, 14 mm currently, which had appeared smaller on axial images of previous study, but this is likely due to differences in slice selection  This is unchanged in size and it does contact the anterior mesorectal fascia  Low risk nodes: None  LEVATOR ANI, PUBORECTALIS, ANAL SPHINCTERS: Stage I (Tumor confined to bowel wall and intact outer muscle coat )            REPRODUCTIVE STRUCTURES: Age-appropriate  BLADDER:  Normal  PELVIC CAVITY:  There is trace ascites in the pelvis  OTHER BOWEL LOOPS: Unremarkable MRI appearance  OSSEOUS STRUCTURES: There are bilateral hip replacements  VASCULAR STRUCTURES:  Visualized vasculature is patent  PELVIC WALL:  Unremarkable  Impression: Although patient's low rectal cancer has demonstrated definite treatment response in terms of much decreased bulkiness of the intraluminal tumor, the extraluminal components have persisted  In particular there is still viable tumor extension anteriorly, with a 1 cm nodular lesion anterior to the rectum, still contacting the mesorectal fascia and possibly the vagina    (Series 6 image 28 and 29 )  A high risk left anterolateral perirectal lymph node also persists, contacting the mesorectal fascia (series 6 image 11 )  Therefore this is still a T3c, CRM positive lesion, and possibly T4 lesion if vaginal is still involved  Workstation performed: [id number]       ECG:    Normal sinus rhythm unremarkable tracing      Review of Systems   Constitution: Negative  HENT: Negative  Eyes: Negative  Cardiovascular: Negative  Respiratory: Negative  Endocrine: Negative  Hematologic/Lymphatic: Negative  Skin: Negative  Musculoskeletal: Negative  Gastrointestinal: Negative  Genitourinary: Negative  Neurological: Negative  Psychiatric/Behavioral: Negative  Vitals:    08/31/18 0811   BP: 108/64   Pulse: 67     Vitals:    08/31/18 0811   Weight: 105 kg (230 lb 14 4 oz)     Height: 5' 5" (165 1 cm)   Body mass index is 38 42 kg/m²      Physical Exam:  Vital signs reviewed  General appearance:  Appears stated age, alert, well appearing and in no distress  HEENT:  PERRLA, EOMI, no scleral icterus, no conjunctival pallor  NECK:  Supple, No elevated JVP, no thyromegaly, no carotid bruits  HEART:  Regular rate and rhythm, normal S1/S2, no S3/S4, no murmur or rub  LUNGS:  Clear to auscultation bilaterally  ABDOMEN:  Soft, non-tender, positive bowel sounds, no rebound or guarding, no organomegaly   EXTREMITIES:  No edema  VASCULAR:  Normal pedal pulses   SKIN: No lesions or rashes on exposed skin  NEURO:  CN II-XII intact, no focal deficits

## 2018-09-05 ENCOUNTER — ANESTHESIA EVENT (OUTPATIENT)
Dept: GASTROENTEROLOGY | Facility: HOSPITAL | Age: 63
DRG: 333 | End: 2018-09-05
Payer: MEDICARE

## 2018-09-05 ENCOUNTER — ANESTHESIA (OUTPATIENT)
Dept: GASTROENTEROLOGY | Facility: HOSPITAL | Age: 63
DRG: 333 | End: 2018-09-05
Payer: MEDICARE

## 2018-09-05 ENCOUNTER — HOSPITAL ENCOUNTER (OUTPATIENT)
Facility: HOSPITAL | Age: 63
Setting detail: OUTPATIENT SURGERY
Discharge: HOME | DRG: 333 | End: 2018-09-05
Attending: COLON & RECTAL SURGERY | Admitting: COLON & RECTAL SURGERY
Payer: MEDICARE

## 2018-09-05 ENCOUNTER — TELEPHONE (OUTPATIENT)
Dept: PREADMISSION TESTING | Facility: HOSPITAL | Age: 63
End: 2018-09-05

## 2018-09-05 VITALS
TEMPERATURE: 98.5 F | RESPIRATION RATE: 18 BRPM | HEIGHT: 65 IN | OXYGEN SATURATION: 100 % | SYSTOLIC BLOOD PRESSURE: 120 MMHG | DIASTOLIC BLOOD PRESSURE: 59 MMHG | WEIGHT: 230 LBS | HEART RATE: 66 BPM | BODY MASS INDEX: 38.32 KG/M2

## 2018-09-05 PROCEDURE — 0DJD8ZZ INSPECTION OF LOWER INTESTINAL TRACT, VIA NATURAL OR ARTIFICIAL OPENING ENDOSCOPIC: ICD-10-PCS | Performed by: COLON & RECTAL SURGERY

## 2018-09-05 RX ORDER — PROPOFOL 10 MG/ML
INJECTION, EMULSION INTRAVENOUS AS NEEDED
Status: DISCONTINUED | OUTPATIENT
Start: 2018-09-05 | End: 2018-09-05 | Stop reason: SURG

## 2018-09-05 RX ORDER — SODIUM CHLORIDE 9 MG/ML
125 INJECTION, SOLUTION INTRAVENOUS CONTINUOUS
Status: DISCONTINUED | OUTPATIENT
Start: 2018-09-05 | End: 2018-09-05 | Stop reason: HOSPADM

## 2018-09-05 RX ORDER — PROPOFOL 10 MG/ML
INJECTION, EMULSION INTRAVENOUS CONTINUOUS PRN
Status: DISCONTINUED | OUTPATIENT
Start: 2018-09-05 | End: 2018-09-05 | Stop reason: SURG

## 2018-09-05 RX ADMIN — PROPOFOL 130 MCG/KG/MIN: 10 INJECTION, EMULSION INTRAVENOUS at 12:21

## 2018-09-05 RX ADMIN — PROPOFOL 150 MG: 10 INJECTION, EMULSION INTRAVENOUS at 12:21

## 2018-09-05 RX ADMIN — SODIUM CHLORIDE 125 ML/HR: 0.9 INJECTION, SOLUTION INTRAVENOUS at 11:37

## 2018-09-05 RX ADMIN — SODIUM CHLORIDE: 0.9 INJECTION, SOLUTION INTRAVENOUS at 12:19

## 2018-09-05 NOTE — OP NOTE
**** GI/ENDOSCOPY REPORT ****     PATIENT NAME: Zoie Burden ------ VISIT ID:  Patient ID: [id number]   YOB: 1955     INTRODUCTION: Colonoscopy - A 58 female patient presents for an outpatient   Colonoscopy at [address] [place] [address]  PREVIOUS COLONOSCOPY: 2018     INDICATIONS: Surveillance, rectal cancer, postradiation, preoperative   resection  CONSENT:  The benefits, risks, and alternatives to the procedure were   discussed and informed consent was obtained from the patient  PREPARATION: EKG, pulse, pulse oximetry and blood pressure were monitored   throughout the procedure  The patient was identified by myself both   verbally and by visual inspection of ID band  MEDICATIONS: Anesthesia-check records     PROCEDURE:  The endoscope was passed without difficulty through the anus   under direct visualization and advanced to the cecum, confirmed by   appendiceal orifice and ileocecal valve  The scope was withdrawn and the   mucosa was carefully examined  The quality of the preparation was adequate   prep  Cecal Intubation Time: 5 minutes(s) Scope Withdrawal Time: 15   minutes(s)     RECTAL EXAM: Anterior based ulcerated mass 1-2cm from dentate with   possible vaginal wall involvement  FINDINGS:  Anterior based ulcerated mass 1-2cm from denate, mild radiation   changes, no major regression of tumor  Mild left sided diverticulosis  COMPLICATIONS: There were no complications  IMPRESSIONS: Anterior based ulcerated mass 1-2cm from denate, mild   radiation changes, no major regression of tumor  Mild left sided   diverticulosis  RECOMMENDATIONS: Colonoscopy recommended in 1 year  Clears, light   diet(toast/eggs) until NPO for surgery planned tomorrow       ESTIMATED BLOOD LOSS:     PATHOLOGY SPECIMENS:     PROCEDURE CODES: Colonoscopy     ICD-9 Codes:     ICD-10 Codes:     PERFORMED BY: JOSLYN Jordan  on 09/05/2018  Version 1, electronically signed by JOSLYN Eden  on   09/05/2018 at 12:54

## 2018-09-05 NOTE — H&P (VIEW-ONLY)
Aba Hilliard  1955  [address]  [address] [facility] [id number] [phone]    1  Pre-op exam  POCT ECG   2  Atherosclerotic peripheral vascular disease ([address] )     3  Preop cardiovascular exam     4  S/P gastric bypass         Discussion/Summary: the patient needs surgery for rectal cancer  She has moderate to severe aortic stenosis which is stable per prior echocardiogram reports from [facility] she also has moderate mitral stenosis  She will be at moderate to high risk for postop congestive heart failure  No further testing or management will alter her cardiac risk prior to surgery  Would recommend consideration of cardiac anesthesia for the case  Also watch volume status very closely in the postoperative period  She tells me she had 2 left heart catheterizations which showed minimal coronary plaque and no obstructive lesions  She will follow up post surgery to talk about her valvular disease and surveillance  Functional capacity is about 4 Mets    Interval History:  26-year-old female with newly found rectal cancer needs to have surgery next week presents for preoperative cardiovascular risk assessment  Ambulation mainly limited by lower extremity arthritis  She denies any exertional chest pain, shortness of breath, palpitations, lightheadedness, dizziness, or syncope  She can do moderate housework she is able to walk a block to with the assistance of a cane functional capacity about 4 Mets  She has a history of 2 cardiac catheterizations that did not show obstructive coronary disease she has moderate aortic stenosis prior valve area 1 0 centimeter squared she also has moderate mitral stenosis seen on an echocardiogram this morning      Problem List     Morbid obesity due to excess calories [facility])    Screening for colon cancer    Screen for colon cancer    Overview Signed 1/30/2018  4:19 PM by Elena Sorto     Added automatically from request for surgery [id number]         Postgastrectomy malabsorption    S/P gastric bypass    Marginal ulcer    Status post bariatric surgery    Overview Signed 2/9/2018  1:26 PM by Hailee Hines     Added automatically from request for surgery [id number]         Atherosclerotic peripheral vascular disease ([facility] )    Diet-controlled diabetes mellitus ([facility] )    Onychomycosis    Pain in foot    Rectal cancer ([facility] )    Preop cardiovascular exam        Past Medical History:   Diagnosis Date    Anemia     Arthritis     Cancer ([facility] )     rectal    Chronic lower back pain     Chronic pain disorder     back pain    Diabetes mellitus ([facility] )     GERD (gastroesophageal reflux disease)     Morbid obesity ([facility] )     Spinal stenosis     Systolic murmur     Unsteady gait     uses walker    Wears dentures     Wears glasses      Social History     Social History    Marital status: Single     Spouse name: N/A    Number of children: 2    Years of education: N/A     Occupational History    Not on file  Social History Main Topics    Smoking status: Former Smoker     Packs/day: 1 00     Years: 25 00     Quit date: 3/30/2006    Smokeless tobacco: Never Used    Alcohol use No    Drug use: No      Comment: Percocet for lower back pain prn    Sexual activity: Not on file     Other Topics Concern    Not on file     Social History Narrative    No narrative on file      Family History   Problem Relation Age of Onset    Adopted:  Yes    Leukemia Mother     Heart disease Father     Coronary artery disease Father     Diabetes Father     No Known Problems Sister     No Known Problems Brother     Diabetes Son     No Known Problems Brother     No Known Problems Brother     No Known Problems Sister     No Known Problems Sister      Past Surgical History:   Procedure Laterality Date    ABDOMINAL SURGERY      abscess removed from abdomen and right thigh, a hole in thigh closed by plastic surgeon    ABSCESS DRAINAGE abd, (R) leg, (L) leg     SECTION      ESOPHAGOGASTRODUODENOSCOPY N/A 2016    Procedure: ESOPHAGOGASTRODUODENOSCOPY (EGD); Surgeon: Mert Chiu MD;  Location: [facility];  Service:     ESOPHAGOGASTRODUODENOSCOPY N/A 3/30/2016    Procedure: ESOPHAGOGASTRODUODENOSCOPY (EGD); Surgeon: Mert Chiu MD;  Location: [facility]; Service:     EYE SURGERY      laser eye surgery    JOINT REPLACEMENT Left 2017    hip    JOINT REPLACEMENT Right 2017    hip    ID COLONOSCOPY FLX DX W/COLLJ SPEC WHEN PFRMD N/A 3/9/2018    Procedure: COLONOSCOPY;  Surgeon: Eliza Kan MD;  Location: [place] [address] GI LAB; Service: Gastroenterology    ID ESOPHAGOGASTRODUODENOSCOPY TRANSORAL DIAGNOSTIC N/A 2018    Procedure: ESOPHAGOGASTRODUODENOSCOPY (EGD); Surgeon: Eliza Kan MD;  Location: [facility] GI LAB;   Service: Gastroenterology    ID LAP GASTRIC BYPASS/SOLEDAD-EN-Y N/A 2016    Procedure: BYPASS GASTRIC  SOLEDAD-EN-Y LAPAROSCOPIC;  Surgeon: Mert Chiu MD;  Location: [facility];  Service: Bariatrics    TUBAL LIGATION         Current Outpatient Prescriptions:     Calcium-Vitamin D 500-125 MG-UNIT TABS, Take by mouth daily  , Disp: , Rfl:     Multiple Vitamins-Minerals (BARIATRIC FUSION) CHEW, Chew daily  , Disp: , Rfl:     omeprazole (PriLOSEC) 20 mg delayed release capsule, Take 1 capsule (20 mg total) by mouth daily, Disp: 90 capsule, Rfl: 3    oxyCODONE-acetaminophen (PERCOCET)  mg per tablet, Take 1 tablet by mouth every 6 (six) hours as needed for moderate pain , Disp: , Rfl:   Allergies   Allergen Reactions    Tramadol Other (See Comments)     Dizzy         Labs:     Chemistry        Component Value Date/Time     2018 0808     10/16/2015 0910    K 4 4 2018 0808    K 4 7 10/16/2015 0910     2018 0808     10/16/2015 0910    CO2 28 2018 0808    CO2 27 10/16/2015 0910    BUN 13 2018 0808    BUN 29 (H) 10/16/2015 0910    CREATININE 0 76 06/13/2018 0808    CREATININE 1 1 10/16/2015 0910        Component Value Date/Time    CALCIUM 9 0 06/13/2018 0808    CALCIUM 9 9 10/16/2015 0910    ALKPHOS 58 06/13/2018 0808    AST 13 06/13/2018 0808    ALT 14 06/13/2018 0808            Lab Results   Component Value Date    CHOL 159 03/26/2014     Lab Results   Component Value Date    HDL 53 02/12/2018    HDL 46 08/09/2017    HDL 37 (L) 10/03/2016     Lab Results   Component Value Date    LDLCALC 114 (H) 02/12/2018    LDLCALC 99 08/09/2017    LDLCALC 143 (H) 10/03/2016     Lab Results   Component Value Date    TRIG 141 02/12/2018    TRIG 91 08/09/2017    TRIG 248 (H) 10/03/2016     No components found for: CHOLHDL    Imaging: Mri Pelvis Rectal Cancer Staging Wo Contrast    Result Date: 8/20/2018  Narrative: MRI PELVIS - WITHOUT CONTRAST (RECTAL CANCER STAGING) INDICATION:  Rectal cancer status post radiation treatment  Based on Dr Ciarra Barney note from 8/3/2018, patient is approximately 1 month status post completion of neoadjuvant chemotherapy for stage III rectal cancer  COMPARISON: MRI of the pelvis from 4/6/2018  TECHNIQUE: The following pulse sequences were obtained on a 1 5 T scanner:  Sagittal 2D FIESTA fat sat,  High resolution Coronal, Sagittal, and Axial FSE T2 weighted images of the rectum, and large field of view axial T1 weighted images of the pelvis  An additional small field of view, axial oblique T2-weighted sequence was performed through the tumor, perpendicular to the long axis of the rectum at that level  IV contrast was not given  FINDINGS: TUMOR LOCATION: There has been definite tumor response to treatment of the previously seen low rectal cancer, which had been 5 cm in length and circumferential around the rectal wall  The intraluminal portions of tumor is much less bulky   However there is is still residual viable tumor extension anteriorly, with a 1 cm nodular lesion anterior to the rectum, still contacting the mesorectal fascia and possibly the vagina  (Series 6 image 28 and 29 )  T-STAGING: Based on the above, this is still at least T3c, CRM positive lesion, and if the vaginal is involved, a T4 lesion  CIRCUMFERENTIAL RESECTION MARGIN (CRM): As above, there is a region of the rectal tumor abutment of the anterior mesorectal fascia on series 6 images 28 and 29  In addition, a left anterior lateral high risk node described above also abuts the anterior mesorectal fascia (series 6 image 11 )  This continues to be a CRM positive lesion  PERIRECTAL NODES: All lymph nodes are described on series 6: High risk nodes: Image 9, right of rectum, this no has decreased in size, currently 8 mm, previously 10 mm, also has become hypointense on T2-weighted sequences, consistent with treated tumor  Image 11, left anterolateral of rectum, 14 mm currently, which had appeared smaller on axial images of previous study, but this is likely due to differences in slice selection  This is unchanged in size and it does contact the anterior mesorectal fascia  Low risk nodes: None  LEVATOR ANI, PUBORECTALIS, ANAL SPHINCTERS: Stage I (Tumor confined to bowel wall and intact outer muscle coat )            REPRODUCTIVE STRUCTURES: Age-appropriate  BLADDER:  Normal  PELVIC CAVITY:  There is trace ascites in the pelvis  OTHER BOWEL LOOPS: Unremarkable MRI appearance  OSSEOUS STRUCTURES: There are bilateral hip replacements  VASCULAR STRUCTURES:  Visualized vasculature is patent  PELVIC WALL:  Unremarkable  Impression: Although patient's low rectal cancer has demonstrated definite treatment response in terms of much decreased bulkiness of the intraluminal tumor, the extraluminal components have persisted  In particular there is still viable tumor extension anteriorly, with a 1 cm nodular lesion anterior to the rectum, still contacting the mesorectal fascia and possibly the vagina    (Series 6 image 28 and 29 )  A high risk left anterolateral perirectal lymph node also persists, contacting the mesorectal fascia (series 6 image 11 )  Therefore this is still a T3c, CRM positive lesion, and possibly T4 lesion if vaginal is still involved  Workstation performed: [id number]       ECG:    Normal sinus rhythm unremarkable tracing      Review of Systems   Constitution: Negative  HENT: Negative  Eyes: Negative  Cardiovascular: Negative  Respiratory: Negative  Endocrine: Negative  Hematologic/Lymphatic: Negative  Skin: Negative  Musculoskeletal: Negative  Gastrointestinal: Negative  Genitourinary: Negative  Neurological: Negative  Psychiatric/Behavioral: Negative  Vitals:    08/31/18 0811   BP: 108/64   Pulse: 67     Vitals:    08/31/18 0811   Weight: 105 kg (230 lb 14 4 oz)     Height: 5' 5" (165 1 cm)   Body mass index is 38 42 kg/m²      Physical Exam:  Vital signs reviewed  General appearance:  Appears stated age, alert, well appearing and in no distress  HEENT:  PERRLA, EOMI, no scleral icterus, no conjunctival pallor  NECK:  Supple, No elevated JVP, no thyromegaly, no carotid bruits  HEART:  Regular rate and rhythm, normal S1/S2, no S3/S4, no murmur or rub  LUNGS:  Clear to auscultation bilaterally  ABDOMEN:  Soft, non-tender, positive bowel sounds, no rebound or guarding, no organomegaly   EXTREMITIES:  No edema  VASCULAR:  Normal pedal pulses   SKIN: No lesions or rashes on exposed skin  NEURO:  CN II-XII intact, no focal deficits

## 2018-09-05 NOTE — ANESTHESIA POSTPROCEDURE EVALUATION
Post-Op Assessment Note      CV Status:  Stable    Hydration Status:  Euvolemic    PONV Controlled:  Controlled    Airway Patency:  Patent    Post Op Vitals Reviewed: Yes          Staff: CRNA, Anesthesiologist           BP (!) 81/49 (09/05/18 1247) (61)   Temp      Pulse 64 (09/05/18 1247)   Resp   12   SpO2 98 % (09/05/18 1247)

## 2018-09-05 NOTE — ANESTHESIA PREPROCEDURE EVALUATION
Review of Systems/Medical History  Patient summary reviewed  Chart reviewed      Cardiovascular  Valvular heart disease , aortic valve stenosis and mitral valve stenosis,    Pulmonary  Negative pulmonary ROS        GI/Hepatic    GERD , GI malignancy, Bowel prep  Comment: S/p gastric bypass     Negative  ROS        Endo/Other  Diabetes ,      GYN  Negative gynecology ROS          Hematology  Anemia ,     Musculoskeletal    Comment: S/p b/l THR Arthritis     Neurology  Negative neurology ROS      Psychology   Negative psychology ROS              Physical Exam    Airway    Mallampati score: II  TM Distance: >3 FB  Neck ROM: full     Dental   upper dentures and lower dentures,     Cardiovascular      Pulmonary  Pulmonary exam normal     Other Findings        Anesthesia Plan  ASA Score- 3     Anesthesia Type- IV sedation with anesthesia with ASA Monitors  Additional Monitors:   Airway Plan:         Plan Factors-  Patient did not smoke on day of surgery  Induction- intravenous  Postoperative Plan-     Informed Consent- Anesthetic plan and risks discussed with patient  I personally reviewed this patient with the CRNA  Discussed and agreed on the Anesthesia Plan with the CRNA  Ayesha Gordon

## 2018-09-05 NOTE — ANESTHESIA PREPROCEDURE EVALUATION
Review of Systems/Medical History  Patient summary reviewed  Chart reviewed  No history of anesthetic complications     Cardiovascular  EKG reviewed, Exercise comment: ET is 4 mets, limited by lower extremity athrititis, no exertional CP, SOB, syncope Valvular heart disease , aortic valve stenosis and mitral valve stenosis,   Comment: Aortic Stenosis severe by CHARBEL (0 97 cm2) and moderate by MG (28 mmHG) on recent TTE  Mitral Stenosis mild by MG (5 6 mmHG), no comment on valve area  Left atrial enlargement and normal BiV fxn  ,  Pulmonary  Negative pulmonary ROS   Comment: Hx of MAUREEN prior to gastric bypass     GI/Hepatic    GI bleeding , history of, PUD, GERD well controlled, GI malignancy, Bowel prep  Comment: Hx of Gastric Bypass          Endo/Other  Diabetes well controlled type 2 Diet controlled,      GYN  Negative gynecology ROS          Hematology  Anemia iron deficiency anemia,     Musculoskeletal  Negative musculoskeletal ROS Back pain , spinal stenosis,   Comment: Ambulates with cane Arthritis     Neurology  Negative neurology ROS      Psychology   Negative psychology ROS              Physical Exam    Airway    Mallampati score: II  TM Distance: >3 FB  Neck ROM: full     Dental   upper dentures and lower dentures,     Cardiovascular  Murmur,     Pulmonary      Other Findings  Front lower permanent teeth secure      Anesthesia Plan  ASA Score- 3     Anesthesia Type- general with ASA Monitors  Additional Monitors: arterial line  Airway Plan: ETT  Comment: Last EK18    Last TTE: 18     Last Cath (): normal coronaries    Preop Labs: 18  H/H: 10 6/36 2  BUN/Cr: 18/0 71  With aortic and mitral stenosis, patient high risk for intraoperative myocardial ischemia with significant hypotension or hypertension  Will require tight blood pressure control to maintain BP close to baseline, maintenance of euvolemia (hypervolemia can trigger CHF or atrial arrhythmia or pulmonary edema)      Wilmar Allen Plan Factors- Instructed to abstain from smoking on day of procedure: non-smoker    Patient did not smoke on day of surgery  Induction- intravenous  Postoperative Plan- Plan for postoperative opioid use  Planned trial extubation    Informed Consent- Anesthetic plan and risks discussed with patient

## 2018-09-06 ENCOUNTER — HOSPITAL ENCOUNTER (INPATIENT)
Facility: HOSPITAL | Age: 63
LOS: 6 days | Discharge: HOME HEALTH | DRG: 333 | End: 2018-09-12
Attending: COLON & RECTAL SURGERY | Admitting: COLON & RECTAL SURGERY
Payer: MEDICARE

## 2018-09-06 ENCOUNTER — ANESTHESIA (OUTPATIENT)
Dept: PERIOP | Facility: HOSPITAL | Age: 63
DRG: 333 | End: 2018-09-06
Payer: MEDICARE

## 2018-09-06 DIAGNOSIS — Z43.3 COLOSTOMY CARE (HCC): ICD-10-CM

## 2018-09-06 DIAGNOSIS — I35.0 AORTIC VALVE STENOSIS, ETIOLOGY OF CARDIAC VALVE DISEASE UNSPECIFIED: Primary | ICD-10-CM

## 2018-09-06 DIAGNOSIS — C20 RECTAL CANCER (HCC): ICD-10-CM

## 2018-09-06 DIAGNOSIS — C20 MALIGNANT NEOPLASM OF RECTUM (HCC): ICD-10-CM

## 2018-09-06 PROBLEM — Z12.11 SCREENING FOR COLON CANCER: Status: RESOLVED | Noted: 2018-01-30 | Resolved: 2018-09-06

## 2018-09-06 PROBLEM — Z01.810 PREOP CARDIOVASCULAR EXAM: Status: RESOLVED | Noted: 2018-08-31 | Resolved: 2018-09-06

## 2018-09-06 PROBLEM — Z12.11 SCREEN FOR COLON CANCER: Status: RESOLVED | Noted: 2018-01-30 | Resolved: 2018-09-06

## 2018-09-06 LAB
ABO GROUP BLD: NORMAL
ANION GAP SERPL CALCULATED.3IONS-SCNC: 9 MMOL/L (ref 4–13)
ANION GAP SERPL CALCULATED.3IONS-SCNC: 9 MMOL/L (ref 4–13)
ARTERIAL PATENCY WRIST A: YES
BASE EXCESS BLDA CALC-SCNC: -3.4 MMOL/L
BASE EXCESS BLDA CALC-SCNC: -5.4 MMOL/L
BLD GP AB SCN SERPL QL: NEGATIVE
BODY TEMPERATURE: 99.2 DEGREES FEHRENHEIT
BUN SERPL-MCNC: 12 MG/DL (ref 5–25)
BUN SERPL-MCNC: 13 MG/DL (ref 5–25)
CALCIUM SERPL-MCNC: 7.6 MG/DL (ref 8.3–10.1)
CALCIUM SERPL-MCNC: 7.7 MG/DL (ref 8.3–10.1)
CEA SERPL-MCNC: 1 NG/ML (ref 0–3)
CHLORIDE SERPL-SCNC: 110 MMOL/L (ref 100–108)
CHLORIDE SERPL-SCNC: 110 MMOL/L (ref 100–108)
CO2 SERPL-SCNC: 22 MMOL/L (ref 21–32)
CO2 SERPL-SCNC: 24 MMOL/L (ref 21–32)
CREAT SERPL-MCNC: 0.62 MG/DL (ref 0.6–1.3)
CREAT SERPL-MCNC: 0.76 MG/DL (ref 0.6–1.3)
ERYTHROCYTE [DISTWIDTH] IN BLOOD BY AUTOMATED COUNT: 15.9 % (ref 11.6–15.1)
GFR SERPL CREATININE-BSD FRML MDRD: 84 ML/MIN/1.73SQ M
GFR SERPL CREATININE-BSD FRML MDRD: 97 ML/MIN/1.73SQ M
GLUCOSE SERPL-MCNC: 172 MG/DL (ref 65–140)
GLUCOSE SERPL-MCNC: 194 MG/DL (ref 65–140)
GLUCOSE SERPL-MCNC: 210 MG/DL (ref 65–140)
GLUCOSE SERPL-MCNC: 210 MG/DL (ref 65–140)
HCO3 BLDA-SCNC: 20.2 MMOL/L (ref 22–28)
HCO3 BLDA-SCNC: 22.7 MMOL/L (ref 22–28)
HCT VFR BLD AUTO: 17.4 % (ref 34.8–46.1)
HCT VFR BLD AUTO: 28.4 % (ref 34.8–46.1)
HCT VFR BLD AUTO: 29.9 % (ref 34.8–46.1)
HGB BLD-MCNC: 5 G/DL (ref 11.5–15.4)
HGB BLD-MCNC: 8.6 G/DL (ref 11.5–15.4)
HGB BLD-MCNC: 8.9 G/DL (ref 11.5–15.4)
LACTATE SERPL-SCNC: 1.2 MMOL/L (ref 0.5–2)
MCH RBC QN AUTO: 24.7 PG (ref 26.8–34.3)
MCHC RBC AUTO-ENTMCNC: 30.3 G/DL (ref 31.4–37.4)
MCV RBC AUTO: 82 FL (ref 82–98)
NASAL CANNULA: 2
NASAL CANNULA: 2
O2 CT BLDA-SCNC: 12.9 ML/DL (ref 16–23)
O2 CT BLDA-SCNC: 13.2 ML/DL (ref 16–23)
OXYHGB MFR BLDA: 96.8 % (ref 94–97)
OXYHGB MFR BLDA: 96.9 % (ref 94–97)
PCO2 BLDA: 40.2 MM HG (ref 36–44)
PCO2 BLDA: 45.5 MM HG (ref 36–44)
PCO2 TEMP ADJ BLDA: 40.7 MM HG (ref 36–44)
PH BLD: 7.32 [PH] (ref 7.35–7.45)
PH BLDA: 7.32 [PH] (ref 7.35–7.45)
PH BLDA: 7.32 [PH] (ref 7.35–7.45)
PLATELET # BLD AUTO: 188 THOUSANDS/UL (ref 149–390)
PLATELET # BLD AUTO: 218 THOUSANDS/UL (ref 149–390)
PMV BLD AUTO: 10.5 FL (ref 8.9–12.7)
PMV BLD AUTO: 10.6 FL (ref 8.9–12.7)
PO2 BLD: 134 MM HG (ref 75–129)
PO2 BLDA: 130.3 MM HG (ref 75–129)
PO2 BLDA: 132.1 MM HG (ref 75–129)
POTASSIUM SERPL-SCNC: 4 MMOL/L (ref 3.5–5.3)
POTASSIUM SERPL-SCNC: 4 MMOL/L (ref 3.5–5.3)
RBC # BLD AUTO: 3.48 MILLION/UL (ref 3.81–5.12)
RH BLD: POSITIVE
SODIUM SERPL-SCNC: 141 MMOL/L (ref 136–145)
SODIUM SERPL-SCNC: 143 MMOL/L (ref 136–145)
SPECIMEN EXPIRATION DATE: NORMAL
SPECIMEN SOURCE: ABNORMAL
SPECIMEN SOURCE: ABNORMAL
WBC # BLD AUTO: 7.3 THOUSAND/UL (ref 4.31–10.16)

## 2018-09-06 PROCEDURE — 88342 IMHCHEM/IMCYTCHM 1ST ANTB: CPT | Performed by: PATHOLOGY

## 2018-09-06 PROCEDURE — 86923 COMPATIBILITY TEST ELECTRIC: CPT

## 2018-09-06 PROCEDURE — 0UT94ZZ RESECTION OF UTERUS, PERCUTANEOUS ENDOSCOPIC APPROACH: ICD-10-PCS | Performed by: COLON & RECTAL SURGERY

## 2018-09-06 PROCEDURE — 82378 CARCINOEMBRYONIC ANTIGEN: CPT | Performed by: COLON & RECTAL SURGERY

## 2018-09-06 PROCEDURE — 82805 BLOOD GASES W/O2 SATURATION: CPT | Performed by: EMERGENCY MEDICINE

## 2018-09-06 PROCEDURE — 88309 TISSUE EXAM BY PATHOLOGIST: CPT | Performed by: PATHOLOGY

## 2018-09-06 PROCEDURE — 99221 1ST HOSP IP/OBS SF/LOW 40: CPT | Performed by: SURGERY

## 2018-09-06 PROCEDURE — 88305 TISSUE EXAM BY PATHOLOGIST: CPT | Performed by: PATHOLOGY

## 2018-09-06 PROCEDURE — 85027 COMPLETE CBC AUTOMATED: CPT | Performed by: ORTHOPAEDIC SURGERY

## 2018-09-06 PROCEDURE — 82948 REAGENT STRIP/BLOOD GLUCOSE: CPT

## 2018-09-06 PROCEDURE — 80048 BASIC METABOLIC PNL TOTAL CA: CPT | Performed by: ORTHOPAEDIC SURGERY

## 2018-09-06 PROCEDURE — 58200 EXTENSIVE HYSTERECTOMY: CPT | Performed by: OBSTETRICS & GYNECOLOGY

## 2018-09-06 PROCEDURE — 88341 IMHCHEM/IMCYTCHM EA ADD ANTB: CPT | Performed by: PATHOLOGY

## 2018-09-06 PROCEDURE — 86901 BLOOD TYPING SEROLOGIC RH(D): CPT | Performed by: COLON & RECTAL SURGERY

## 2018-09-06 PROCEDURE — 85027 COMPLETE CBC AUTOMATED: CPT | Performed by: SURGERY

## 2018-09-06 PROCEDURE — 86900 BLOOD TYPING SEROLOGIC ABO: CPT | Performed by: COLON & RECTAL SURGERY

## 2018-09-06 PROCEDURE — 85014 HEMATOCRIT: CPT | Performed by: EMERGENCY MEDICINE

## 2018-09-06 PROCEDURE — 86850 RBC ANTIBODY SCREEN: CPT | Performed by: COLON & RECTAL SURGERY

## 2018-09-06 PROCEDURE — 85049 AUTOMATED PLATELET COUNT: CPT | Performed by: ORTHOPAEDIC SURGERY

## 2018-09-06 PROCEDURE — 85007 BL SMEAR W/DIFF WBC COUNT: CPT | Performed by: SURGERY

## 2018-09-06 PROCEDURE — 80048 BASIC METABOLIC PNL TOTAL CA: CPT | Performed by: EMERGENCY MEDICINE

## 2018-09-06 PROCEDURE — 85018 HEMOGLOBIN: CPT | Performed by: EMERGENCY MEDICINE

## 2018-09-06 PROCEDURE — 83605 ASSAY OF LACTIC ACID: CPT | Performed by: EMERGENCY MEDICINE

## 2018-09-06 PROCEDURE — 52332 CYSTOSCOPY AND TREATMENT: CPT | Performed by: OBSTETRICS & GYNECOLOGY

## 2018-09-06 PROCEDURE — 0DBP4ZZ EXCISION OF RECTUM, PERCUTANEOUS ENDOSCOPIC APPROACH: ICD-10-PCS | Performed by: COLON & RECTAL SURGERY

## 2018-09-06 PROCEDURE — 0UT90ZL RESECTION OF UTERUS, SUPRACERVICAL, OPEN APPROACH: ICD-10-PCS | Performed by: COLON & RECTAL SURGERY

## 2018-09-06 RX ORDER — DIPHENHYDRAMINE HYDROCHLORIDE 50 MG/ML
25 INJECTION INTRAMUSCULAR; INTRAVENOUS EVERY 6 HOURS PRN
Status: DISCONTINUED | OUTPATIENT
Start: 2018-09-06 | End: 2018-09-12 | Stop reason: HOSPADM

## 2018-09-06 RX ORDER — SODIUM CHLORIDE, SODIUM LACTATE, POTASSIUM CHLORIDE, CALCIUM CHLORIDE 600; 310; 30; 20 MG/100ML; MG/100ML; MG/100ML; MG/100ML
INJECTION, SOLUTION INTRAVENOUS CONTINUOUS PRN
Status: DISCONTINUED | OUTPATIENT
Start: 2018-09-06 | End: 2018-09-06 | Stop reason: SURG

## 2018-09-06 RX ORDER — ONDANSETRON 2 MG/ML
4 INJECTION INTRAMUSCULAR; INTRAVENOUS EVERY 4 HOURS PRN
Status: DISCONTINUED | OUTPATIENT
Start: 2018-09-06 | End: 2018-09-12 | Stop reason: HOSPADM

## 2018-09-06 RX ORDER — SODIUM CHLORIDE 9 MG/ML
INJECTION, SOLUTION INTRAVENOUS CONTINUOUS PRN
Status: DISCONTINUED | OUTPATIENT
Start: 2018-09-06 | End: 2018-09-06 | Stop reason: SURG

## 2018-09-06 RX ORDER — SODIUM CHLORIDE, SODIUM LACTATE, POTASSIUM CHLORIDE, CALCIUM CHLORIDE 600; 310; 30; 20 MG/100ML; MG/100ML; MG/100ML; MG/100ML
75 INJECTION, SOLUTION INTRAVENOUS CONTINUOUS
Status: DISCONTINUED | OUTPATIENT
Start: 2018-09-06 | End: 2018-09-07

## 2018-09-06 RX ORDER — MEPERIDINE HYDROCHLORIDE 25 MG/ML
12.5 INJECTION INTRAMUSCULAR; INTRAVENOUS; SUBCUTANEOUS
Status: DISCONTINUED | OUTPATIENT
Start: 2018-09-06 | End: 2018-09-06 | Stop reason: HOSPADM

## 2018-09-06 RX ORDER — DIPHENHYDRAMINE HYDROCHLORIDE 50 MG/ML
12.5 INJECTION INTRAMUSCULAR; INTRAVENOUS ONCE AS NEEDED
Status: DISCONTINUED | OUTPATIENT
Start: 2018-09-06 | End: 2018-09-06

## 2018-09-06 RX ORDER — HEPARIN SODIUM 5000 [USP'U]/ML
5000 INJECTION, SOLUTION INTRAVENOUS; SUBCUTANEOUS ONCE
Status: COMPLETED | OUTPATIENT
Start: 2018-09-06 | End: 2018-09-06

## 2018-09-06 RX ORDER — MAGNESIUM HYDROXIDE 1200 MG/15ML
LIQUID ORAL AS NEEDED
Status: DISCONTINUED | OUTPATIENT
Start: 2018-09-06 | End: 2018-09-06 | Stop reason: HOSPADM

## 2018-09-06 RX ORDER — ALBUMIN, HUMAN INJ 5% 5 %
SOLUTION INTRAVENOUS CONTINUOUS PRN
Status: DISCONTINUED | OUTPATIENT
Start: 2018-09-06 | End: 2018-09-06 | Stop reason: SURG

## 2018-09-06 RX ORDER — SODIUM CHLORIDE, SODIUM LACTATE, POTASSIUM CHLORIDE, CALCIUM CHLORIDE 600; 310; 30; 20 MG/100ML; MG/100ML; MG/100ML; MG/100ML
125 INJECTION, SOLUTION INTRAVENOUS CONTINUOUS
Status: DISCONTINUED | OUTPATIENT
Start: 2018-09-06 | End: 2018-09-07

## 2018-09-06 RX ORDER — PANTOPRAZOLE SODIUM 40 MG/1
40 TABLET, DELAYED RELEASE ORAL
Status: DISCONTINUED | OUTPATIENT
Start: 2018-09-07 | End: 2018-09-12 | Stop reason: HOSPADM

## 2018-09-06 RX ORDER — ONDANSETRON 2 MG/ML
INJECTION INTRAMUSCULAR; INTRAVENOUS AS NEEDED
Status: DISCONTINUED | OUTPATIENT
Start: 2018-09-06 | End: 2018-09-06 | Stop reason: SURG

## 2018-09-06 RX ORDER — SODIUM CHLORIDE, SODIUM GLUCONATE, SODIUM ACETATE, POTASSIUM CHLORIDE, MAGNESIUM CHLORIDE, SODIUM PHOSPHATE, DIBASIC, AND POTASSIUM PHOSPHATE .53; .5; .37; .037; .03; .012; .00082 G/100ML; G/100ML; G/100ML; G/100ML; G/100ML; G/100ML; G/100ML
500 INJECTION, SOLUTION INTRAVENOUS ONCE
Status: COMPLETED | OUTPATIENT
Start: 2018-09-06 | End: 2018-09-07

## 2018-09-06 RX ORDER — METOCLOPRAMIDE HYDROCHLORIDE 5 MG/ML
10 INJECTION INTRAMUSCULAR; INTRAVENOUS EVERY 6 HOURS PRN
Status: DISCONTINUED | OUTPATIENT
Start: 2018-09-06 | End: 2018-09-12 | Stop reason: HOSPADM

## 2018-09-06 RX ORDER — LABETALOL HYDROCHLORIDE 5 MG/ML
INJECTION, SOLUTION INTRAVENOUS AS NEEDED
Status: DISCONTINUED | OUTPATIENT
Start: 2018-09-06 | End: 2018-09-06 | Stop reason: SURG

## 2018-09-06 RX ORDER — LIDOCAINE HYDROCHLORIDE 10 MG/ML
INJECTION, SOLUTION INFILTRATION; PERINEURAL AS NEEDED
Status: DISCONTINUED | OUTPATIENT
Start: 2018-09-06 | End: 2018-09-06 | Stop reason: SURG

## 2018-09-06 RX ORDER — ROCURONIUM BROMIDE 10 MG/ML
INJECTION, SOLUTION INTRAVENOUS AS NEEDED
Status: DISCONTINUED | OUTPATIENT
Start: 2018-09-06 | End: 2018-09-06 | Stop reason: SURG

## 2018-09-06 RX ORDER — PROPOFOL 10 MG/ML
INJECTION, EMULSION INTRAVENOUS AS NEEDED
Status: DISCONTINUED | OUTPATIENT
Start: 2018-09-06 | End: 2018-09-06 | Stop reason: SURG

## 2018-09-06 RX ORDER — MIDAZOLAM HYDROCHLORIDE 1 MG/ML
INJECTION INTRAMUSCULAR; INTRAVENOUS AS NEEDED
Status: DISCONTINUED | OUTPATIENT
Start: 2018-09-06 | End: 2018-09-06 | Stop reason: SURG

## 2018-09-06 RX ORDER — SUCCINYLCHOLINE CHLORIDE 20 MG/ML
INJECTION INTRAMUSCULAR; INTRAVENOUS AS NEEDED
Status: DISCONTINUED | OUTPATIENT
Start: 2018-09-06 | End: 2018-09-06 | Stop reason: SURG

## 2018-09-06 RX ORDER — GLYCOPYRROLATE 0.2 MG/ML
INJECTION INTRAMUSCULAR; INTRAVENOUS AS NEEDED
Status: DISCONTINUED | OUTPATIENT
Start: 2018-09-06 | End: 2018-09-06 | Stop reason: SURG

## 2018-09-06 RX ORDER — HEPARIN SODIUM 5000 [USP'U]/ML
5000 INJECTION, SOLUTION INTRAVENOUS; SUBCUTANEOUS EVERY 8 HOURS SCHEDULED
Status: DISCONTINUED | OUTPATIENT
Start: 2018-09-06 | End: 2018-09-07

## 2018-09-06 RX ORDER — DIPHENHYDRAMINE HYDROCHLORIDE 50 MG/ML
INJECTION INTRAMUSCULAR; INTRAVENOUS AS NEEDED
Status: DISCONTINUED | OUTPATIENT
Start: 2018-09-06 | End: 2018-09-06 | Stop reason: SURG

## 2018-09-06 RX ORDER — FENTANYL CITRATE 50 UG/ML
INJECTION, SOLUTION INTRAMUSCULAR; INTRAVENOUS AS NEEDED
Status: DISCONTINUED | OUTPATIENT
Start: 2018-09-06 | End: 2018-09-06 | Stop reason: SURG

## 2018-09-06 RX ORDER — ONDANSETRON 2 MG/ML
4 INJECTION INTRAMUSCULAR; INTRAVENOUS ONCE AS NEEDED
Status: DISCONTINUED | OUTPATIENT
Start: 2018-09-06 | End: 2018-09-06

## 2018-09-06 RX ADMIN — HEPARIN SODIUM 5000 UNITS: 5000 INJECTION, SOLUTION INTRAVENOUS; SUBCUTANEOUS at 22:10

## 2018-09-06 RX ADMIN — FENTANYL CITRATE 25 MCG: 50 INJECTION, SOLUTION INTRAMUSCULAR; INTRAVENOUS at 13:59

## 2018-09-06 RX ADMIN — ROCURONIUM BROMIDE 20 MG: 10 INJECTION INTRAVENOUS at 09:45

## 2018-09-06 RX ADMIN — ONDANSETRON 4 MG: 2 INJECTION INTRAMUSCULAR; INTRAVENOUS at 13:42

## 2018-09-06 RX ADMIN — HYDROMORPHONE HYDROCHLORIDE 0.2 MG: 1 INJECTION, SOLUTION INTRAMUSCULAR; INTRAVENOUS; SUBCUTANEOUS at 15:36

## 2018-09-06 RX ADMIN — SODIUM CHLORIDE: 0.9 INJECTION, SOLUTION INTRAVENOUS at 08:20

## 2018-09-06 RX ADMIN — SODIUM CHLORIDE: 0.9 INJECTION, SOLUTION INTRAVENOUS at 11:15

## 2018-09-06 RX ADMIN — LABETALOL HYDROCHLORIDE 2.5 MG: 5 INJECTION, SOLUTION INTRAVENOUS at 10:36

## 2018-09-06 RX ADMIN — DIPHENHYDRAMINE HYDROCHLORIDE 12.5 MG: 50 INJECTION, SOLUTION INTRAMUSCULAR; INTRAVENOUS at 13:03

## 2018-09-06 RX ADMIN — SODIUM CHLORIDE, SODIUM GLUCONATE, SODIUM ACETATE, POTASSIUM CHLORIDE, MAGNESIUM CHLORIDE, SODIUM PHOSPHATE, DIBASIC, AND POTASSIUM PHOSPHATE 500 ML: .53; .5; .37; .037; .03; .012; .00082 INJECTION, SOLUTION INTRAVENOUS at 22:34

## 2018-09-06 RX ADMIN — ROCURONIUM BROMIDE 10 MG: 10 INJECTION INTRAVENOUS at 12:17

## 2018-09-06 RX ADMIN — METRONIDAZOLE 500 MG: 500 INJECTION, SOLUTION INTRAVENOUS at 09:00

## 2018-09-06 RX ADMIN — GLYCOPYRROLATE 0.4 MG: 0.2 INJECTION, SOLUTION INTRAMUSCULAR; INTRAVENOUS at 13:53

## 2018-09-06 RX ADMIN — ROCURONIUM BROMIDE 20 MG: 10 INJECTION INTRAVENOUS at 10:09

## 2018-09-06 RX ADMIN — HEPARIN SODIUM 5000 UNITS: 5000 INJECTION, SOLUTION INTRAVENOUS; SUBCUTANEOUS at 09:03

## 2018-09-06 RX ADMIN — SODIUM CHLORIDE, SODIUM LACTATE, POTASSIUM CHLORIDE, AND CALCIUM CHLORIDE: .6; .31; .03; .02 INJECTION, SOLUTION INTRAVENOUS at 07:55

## 2018-09-06 RX ADMIN — HEPARIN SODIUM 244808 UNITS: 5000 INJECTION, SOLUTION INTRAVENOUS; SUBCUTANEOUS at 09:44

## 2018-09-06 RX ADMIN — CEFAZOLIN SODIUM 2000 MG: 2 SOLUTION INTRAVENOUS at 08:31

## 2018-09-06 RX ADMIN — MIDAZOLAM 2 MG: 1 INJECTION INTRAMUSCULAR; INTRAVENOUS at 08:06

## 2018-09-06 RX ADMIN — HYDROMORPHONE HYDROCHLORIDE 1 MG: 1 INJECTION, SOLUTION INTRAMUSCULAR; INTRAVENOUS; SUBCUTANEOUS at 10:04

## 2018-09-06 RX ADMIN — ONDANSETRON 4 MG: 2 INJECTION INTRAMUSCULAR; INTRAVENOUS at 20:27

## 2018-09-06 RX ADMIN — PROPOFOL 120 MG: 10 INJECTION, EMULSION INTRAVENOUS at 08:14

## 2018-09-06 RX ADMIN — HYDROMORPHONE HYDROCHLORIDE 0.2 MG: 1 INJECTION, SOLUTION INTRAMUSCULAR; INTRAVENOUS; SUBCUTANEOUS at 15:51

## 2018-09-06 RX ADMIN — ALBUMIN (HUMAN): 12.5 SOLUTION INTRAVENOUS at 12:50

## 2018-09-06 RX ADMIN — ONDANSETRON 4 MG: 2 INJECTION INTRAMUSCULAR; INTRAVENOUS at 14:59

## 2018-09-06 RX ADMIN — SUCCINYLCHOLINE CHLORIDE 100 MG: 20 INJECTION, SOLUTION INTRAMUSCULAR; INTRAVENOUS at 08:14

## 2018-09-06 RX ADMIN — METOCLOPRAMIDE 10 MG: 5 INJECTION, SOLUTION INTRAMUSCULAR; INTRAVENOUS at 21:12

## 2018-09-06 RX ADMIN — NEOSTIGMINE METHYLSULFATE 5 MG: 1 INJECTION, SOLUTION INTRAMUSCULAR; INTRAVENOUS; SUBCUTANEOUS at 13:53

## 2018-09-06 RX ADMIN — SODIUM CHLORIDE, SODIUM LACTATE, POTASSIUM CHLORIDE, AND CALCIUM CHLORIDE: .6; .31; .03; .02 INJECTION, SOLUTION INTRAVENOUS at 12:31

## 2018-09-06 RX ADMIN — SODIUM CHLORIDE, SODIUM LACTATE, POTASSIUM CHLORIDE, AND CALCIUM CHLORIDE 125 ML/HR: .6; .31; .03; .02 INJECTION, SOLUTION INTRAVENOUS at 23:38

## 2018-09-06 RX ADMIN — FENTANYL CITRATE 25 MCG: 50 INJECTION, SOLUTION INTRAMUSCULAR; INTRAVENOUS at 14:33

## 2018-09-06 RX ADMIN — FENTANYL CITRATE 50 MCG: 50 INJECTION, SOLUTION INTRAMUSCULAR; INTRAVENOUS at 10:20

## 2018-09-06 RX ADMIN — HEPARIN SODIUM 5000 UNITS: 5000 INJECTION, SOLUTION INTRAVENOUS; SUBCUTANEOUS at 17:17

## 2018-09-06 RX ADMIN — FENTANYL CITRATE 100 MCG: 50 INJECTION, SOLUTION INTRAMUSCULAR; INTRAVENOUS at 08:13

## 2018-09-06 RX ADMIN — ROCURONIUM BROMIDE 30 MG: 10 INJECTION INTRAVENOUS at 08:55

## 2018-09-06 RX ADMIN — LABETALOL HYDROCHLORIDE 2.5 MG: 5 INJECTION, SOLUTION INTRAVENOUS at 09:42

## 2018-09-06 RX ADMIN — Medication: at 16:01

## 2018-09-06 RX ADMIN — LIDOCAINE HYDROCHLORIDE 70 MG: 10 INJECTION, SOLUTION INFILTRATION; PERINEURAL at 08:13

## 2018-09-06 RX ADMIN — FENTANYL CITRATE 50 MCG: 50 INJECTION, SOLUTION INTRAMUSCULAR; INTRAVENOUS at 09:23

## 2018-09-06 RX ADMIN — SODIUM CHLORIDE, SODIUM LACTATE, POTASSIUM CHLORIDE, AND CALCIUM CHLORIDE 125 ML/HR: .6; .31; .03; .02 INJECTION, SOLUTION INTRAVENOUS at 16:04

## 2018-09-06 RX ADMIN — HYDROMORPHONE HYDROCHLORIDE 0.2 MG: 1 INJECTION, SOLUTION INTRAMUSCULAR; INTRAVENOUS; SUBCUTANEOUS at 16:42

## 2018-09-06 RX ADMIN — HYDROMORPHONE HYDROCHLORIDE 0.2 MG: 1 INJECTION, SOLUTION INTRAMUSCULAR; INTRAVENOUS; SUBCUTANEOUS at 21:12

## 2018-09-06 RX ADMIN — CEFAZOLIN SODIUM 2000 MG: 2 SOLUTION INTRAVENOUS at 12:30

## 2018-09-06 RX ADMIN — FENTANYL CITRATE 50 MCG: 50 INJECTION, SOLUTION INTRAMUSCULAR; INTRAVENOUS at 09:19

## 2018-09-06 RX ADMIN — PHENYLEPHRINE HYDROCHLORIDE 60 MCG/MIN: 10 INJECTION INTRAVENOUS at 15:21

## 2018-09-06 RX ADMIN — PHENYLEPHRINE HYDROCHLORIDE 10 MCG/MIN: 10 INJECTION INTRAVENOUS at 08:25

## 2018-09-06 RX ADMIN — ROCURONIUM BROMIDE 30 MG: 10 INJECTION INTRAVENOUS at 10:55

## 2018-09-06 NOTE — OP NOTE
OPERATIVE REPORT  PATIENT NAME: Aba Hilliard    :  1955  MRN: [id number]  Pt Location: [facility] OR ROOM 09    SURGERY DATE: 2018    Surgeon(s) and Role:  Panel 1:     * Therese Mo MD - Primary     * Yaz Meredith MD - Assisting     * Boyd Leon MD -Cosurgeon    Panel 2:     * Justine Alves MD - Primary    Preop Diagnosis:  Malignant neoplasm of rectum ([address] ) [C20]    Post-Op Diagnosis Codes:     * Malignant neoplasm of rectum ([address] ) [C20]    Procedure(s) (LRB):  PROCTECTOMY (N/A)  PERMANENT END COLOSTOMY (N/A)  LAPAROSCOPIC HAND ASSIST ABDOMINOPERINEAL RESECTION,  POSTERIOR VAGINECTOMY, OMENTECTOMY (N/A)  RADICAL HYSTERECTOMY TOTAL ABDOMINAL (ALEXANDRA)  BSO (N/A)  CYSTOSCOPY (N/A)  Diagnostic laparoscopy    Specimen(s):  ID Type Source Tests Collected by Time Destination   1 : EPIPLOIC NODULE Tissue Soft Tissue, Other TISSUE EXAM Therese Mo MD 2018 1010    2 : enbloc rectum, sigmoid, anus, uterus, bso, cervix, posterior vaginal wall Tissue Rectum TISSUE EXAM Therese Mo MD 2018 1206    3 :  Tissue Omentum TISSUE EXAM Therese Mo MD 2018 1326        Estimated Blood Loss:   150 mL    Drains:  Closed/Suction Drain Left Abdomen Bulb 19 Fr  (Active)   Number of days: 0       Colostomy Descending/sigmoid LLQ (Active)   Number of days: 0       Urethral Catheter Non-latex 18 Fr  (Active)   Number of days: 0       [REMOVED] Urethral Catheter Non-latex 16 Fr  (Removed)   Number of days: 0     Anesthesia Type:   General    Operative Indications:  Malignant neoplasm of rectum ([address] ) [C20]    Operative Findings:  -Abdominoperineal resection, laparoscopic hand assist, end colostomy  -Cul de sac nodules requiring ALEXANDRA/BSO for clearance, epiploic nodule and omental nodules taken with omentectomy suspect Stage IV disease pending pathology      Complications:   None     Procedure and Technique:  Artis Alpers is a 65yo female with locally advanced rectal cancer within 1-2cm of dentate line with anterior vaginal circumferential radial margin + on postradiation MRI, status post neoadjuvant chemoradiation  We discussed laparoscopic hand assist abdominoperineal resection, possible posterior vaginectomy, and indicated procedures, permanent end colostomy, and risks including not limited to bleeding, infection, risks of anesthesia, open surgery, DVT/PE, heart attack, stroke, death, damage to local structures including ureter and duodenum, reoperation, perineal wound complications requiring return to OR for further procedures  She understood these risks, signed informed consent, wished to proceed  She was brought to the operating room where general endotracheal anesthesia was induced without event  Judge was placed under sterile conditions  She was placed in modified lithotomy position with attention to joints and bony extremities  Arms were tucked with foam  She received 5000 units of subcutaneous heparin prior to operation, Venodynes were on and running throughout the procedure  She received cefazolin and Flagyl prior to skin incision  She was chlorhexidine sterile prepped abdomen and betadine prepped perineum and after appropriate drying time Ioban and sterile draped  After timeout was taken per protocol procedure began with RUQ Opti View 5 mm laparoscopic trocar placement, visualizing abdominal wall structures until peritoneal cavity was entered and 15 mm CO2 pneumoperitoneum introduced  Diagnostic laparoscopy revealed normal visualized liver, peritoneal nodularity in cul de sac     3 additional 5 mm trocars were placed in the right lower quadrant, LLQ, infraumbilical midline  Laparoscopic medial lateral dissection was begun with taking superior rectal artery between triple burns of the EnSeal energy device after ensuring no ureter involvement, presacral space was entered and posterior dissection was begun   Laterally there was some dense adhesion to the left sided pelvic wall near the adnexa, there were nodules in the cul de sac  At this point the midline trocar was incised for 8 cm for GelPort placement to continue with hand assistance given these findings  Fascia was incised and through midline rectus posterior fascia was approached and entered  Jae wound retractor portion of GelPort was immediately placed to protect the wound  Sigmoid colon was then mobilized off the white line of Toldt and up to the descending colon and splenic flexure junction  Left ureter was visualized in its length after dissecting free from the pelvic sidewall fibrosis  The entire colon was medialized in this fashion with Enseal energy device with hand assistance  The sigmoid colon was raised cephalad showing the pedicle  This was taken in a high ligation at the inferior mesenteric artery takeoff between triple burns of the EnSeal energy device, 5 mm Ligaclip was used for small branch perforators  I proceeded with the presacral dissection in the areolar plane  Enseal energy device was used to create the dissection in the pelvis and continue in the presacral plane to the levator plate  Laterally this was also joined  All this dissection was undertaken in the "Holy" areolar plane avoiding posterior nerves and sacral vessels while completely excising the mesorectum in standard fashion  Given the cul de sac nodules with concern for disease and involvement of adnexa, I asked Dr Leon London to perform ALEXANDRA/BSO for removal with specimen as he has dictated  At this point the GelPort was removed and the colon was delivered into the field  [address] blue load stapler was taken across the descending sigmoid junction      Dr Rosalinda Pinto in a separately dictated procedure was required as co-surgeon for skills relevant to specialty for the perineal portion as dictated,this met Dr Love Mahmood dissection from below where the additional lateral attachments were taken until completely free and he was able to remove the specimen from the perineal side prior to closure  The pelvis was hemostatic after 2 small bleeders were cauterized in the midline  Given bloody urine at end of procedure Dr Mariam Brannon performed cysto/stents to show continuity of ureters on visualization/palpation  While he completed his perineal portion of the procedure and closure after removing the anal canal with the abdominal perineal excision I continued with stoma formation  The previously marked site was incised with circular paddle of skin  The abdominal wall was straightened and this was incised through the subcutaneous tissues  Arm[employer] retractors were used to expose the anterior fascia which was incised in a cruciate fashion  Rectus muscle was split and posterior fascia was entered similarly  This allowed 2 fingerbreadths  Jean Sicilian was used to grasp the descending colon insuring no twist and easily brought through without tension  Remaining 5mm LLQ site was utilized to place a 19 round drain with assistance of a laparoscopic grasper and secured with a 3-0 nylon  This drain was placed deep into the presacral dissection in the pelvis and on attempts to cover with omentum in the pelvis omental nodules were discovered suspicious for metastatic disease, and omentectomy was performed with EnSeal energy device, Ligaclips used for any hemostasis  Irrigation was undertaken through the abdomen and perineum  Gloves were changed and fascia was closed with #1 PDS from both sides  Skin was irrigated after additional glove change and closed with 4-0 Monocryl and Histoacryl glue  Colostomy was then matured in standard fashion with Soniya colostomy technique with 4 3-0 Vicryl quadrant sutures followed by multiple interrupted in between sutures to create a full seal  This flipped with a nice bud and stoma appliance was placed  Patient was awakened/extubated and returned to PACU in stable condition      All sponge needle instrument/RF Wand counts were correct I was present and scrubbed for the entirety of the procedure    I was present for the entire procedure and A co-surgeon was required because of skills and techniques relevant to speciality    Patient Disposition:  PACU     SIGNATURE: Ben Hall MD  DATE: September 6, 2018  TIME: 2:39 PM

## 2018-09-06 NOTE — ANESTHESIA POSTPROCEDURE EVALUATION
Post-Op Assessment Note      CV Status:  Stable    Mental Status:  Alert and awake    Hydration Status:  Euvolemic    PONV Controlled:  Controlled    Airway Patency:  Patent    Post Op Vitals Reviewed: Yes          Staff: Anesthesiologist       Comments: requiring phenylephrine drip at 60 mcg/min to maintain MAPS in 80s          BP   120/61   Temp (!) (P) 97 1 °F (36 2 °C) (09/06/18 1443)    Pulse (P) 71 (09/06/18 1443)   Resp (P) 20 (09/06/18 1443)    SpO2

## 2018-09-06 NOTE — OP NOTE
OPERATIVE REPORT  PATIENT NAME: Sandy Delvalle    :  1955  MRN: [id number]  Pt Location: [facility] OR ROOM 09    SURGERY DATE: 2018    Surgeon(s) and Role:  Panel 1:     * Mony Sorenson MD - Primary     * Fabiola Nolan MD - Assisting     * Lonnie Estrella MD - Assisting    Panel 2:     * Pradeep Zimmer MD - Primary    Preop Diagnosis:  Malignant neoplasm of rectum ([facility] ) [C20]    Post-Op Diagnosis Codes:     * Malignant neoplasm of rectum ([address] ) [C20]    Procedure(s) (LRB):  PROCTECTOMY (N/A)  PERMANENT END COLOSTOMY (N/A)  LAPAROSCOPIC HYBRID ABDOMINOPERINEAL RESECTION, POSSIBLE POSTERIOR VAGINECTOMY, AND OTHE INDICATED PROCEDURE (N/A)  HYSTERECTOMY LAPAROSCOPIC TOTAL ([address]) (N/A)    Specimen(s):  ID Type Source Tests Collected by Time Destination   1 : EPIPLOIC NODULE Tissue Soft Tissue, Other TISSUE EXAM Mony Sorenson MD 2018 1010    2 : enbloc rectum, sigmoid, anus, uterus, bso, cervix, posterior vaginal wall Tissue Rectum TISSUE EXAM Mony Sorenson MD 2018 1206        Estimated Blood Loss:   50 mL    Drains:  Urethral Catheter Non-latex 16 Fr  (Active)   Number of days: 0       Anesthesia Type:   General    Operative Indications:  Malignant neoplasm of rectum ([facility] ) [C20]      Operative Findings:    Complications:   None    Procedure and Technique:  Patient under surgery by Dr Mony Sorenson undergoing laparoscopic abdominal perineal resection with EN bloc hysterectomy per post for perineal dissection with posterior vaginal wall EN bloc resection by myself  The abdominal part was completed  Perineal part was prepped by Betadine solution and prepared for resection  She was draped in sterile usual manner  Elliptical incision bilaterally for cylindrical excision of the anal canal to the rectum was made  This incision was carried down into the skin rectal fossa and continued up into the pelvic area to the levator muscles laterally and posteriorly    Anteriorly the incision was not connected but rather extended into the vagina with excision of the posterior wall of the vagina EN bloc with the rectum due to knowledge of the extension by MRI prior and after the radiation therapy  The levator muscles were transected with electrocautery and the at the pelvis was entered posteriorly anterior to the coccyx and through this opening into the pelvis as the dissection was completed from abdominal part the levator muscle was transected laterally on both sides completely as the anterior segment of the resection was reached  By transecting the posterior vaginal wall to mid vagina EN bloc with the the tract transection was completed and the 2 lines of incision were joined together in the proximal vagina  Pelvic area was entered and the resection was completed  The rectum sigmoid colon anus EN bloc with the uterus as well with the posterior vaginal wall attached to the rectum were removed through the perineum  The pelvic cavity was irrigated clean with saline solution through the abdomen draining through the perineal opening  Hemostasis was well affected  The levator muscle were closed using 0 Vicryl suture, as well the posterior vaginal wall was repaired using 0 Vicryl suture to the fourchette  The perineum was closed using 2 0 Vicryl suture     A co-surgeon was required because of skills and techniques relevant to speciality    Patient Disposition:  PACU     SIGNATURE: Julio Cesar Waldron MD  DATE: September 6, 2018  TIME: 12:23 PM

## 2018-09-06 NOTE — INTERVAL H&P NOTE
H&P reviewed  After examining the patient I find no changes in the patients condition since the H&P had been written  As discussed at office visit, prepped for abdominoperineal resection, possible posterior vaginectomy/indicated procedures, risks reviewed, all questions answered

## 2018-09-06 NOTE — PROGRESS NOTES
Transfer Acceptance- Critical Care   Lisa Garcia 58 y o  female MRN: [id number]  Unit/Bed#: OR [place] Encounter: [id number]    Assessment: Patient is 58year old female pmh aortic stenosis, mitral stenosis, and gastric bypass presenting for decreased urine output following APR with colostomy placement, hysterectomy, and bilateral oopherectomy for rectal cancer       Plan:          Neuro:  Patient AAOx3, no acute findings                 CV:   -Aortic and mitral stenosis: patient denies exertional chest pain, stable preop based on echocardiogram, risk for post-op CHF  -Patient likely volume depleted, will resuscitate gently under monitoring                 Lung:  -no respiratory distress                 GI:   -Rectal cancer-s/p laproscopic APR, colostomy in place, will monitor for bowel function  -NPO, will progress to clears tomorrow  -Drain in place, output 70 cc sanguinis fluid, will continue to monitor, return to OR if excessive bleeding                 FEN:   - 0 4 cc/kg/hr                :   -Judge in place, moderate hematuria, UOP 0 4 cc/kg/hr  -Continue to monitor while resuscitating                 ID:   -Ancef for surgery, otherwise no abnormalities                 Heme:   -Hb 8 6 postop, 10 6 on 8/31  -Continue to trend H/H                 Endo:  -Hyperglycemic to 194, continue to monitor, consider sliding scale to control below 180 if persistently elevated                            Msk/Skin:   -Drain in place in left abdomen, laparoscopic ports closed                 Disposition: Level 1 Stepdown      Physical Exam:     Neuro: AAOx3, full upper and lower extremity strength and sensation    Cards: Systolic murmur in right second interspace, RRR, extremities warm and well-perfused, no PADMINI    Pulm: No pulm distress, patient on 2L NC sat'ing 100 percent, CTAB    Abd: Diffusely tender, drain in place in left abdomen, laparascopic port closed    Musk/Sk: Full strength and sensation in upper and lower extremities, no PADMINI     Vitals:    18 1545 18 1600 18 1615 18 1630   BP: 95/60 105/59  114/68   Pulse: 76 72 74 78   Resp: (!) 11 16 12 20   Temp:       TempSrc:       SpO2: 100% 100% 100% 100%   Weight:       Height:         Arterial Line BP: 120/58  Arterial Line MAP (mmHg): 80 mmHg    Temperature:   Temp (24hrs), Av 5 °F (36 4 °C), Min:97 1 °F (36 2 °C), Max:97 9 °F (36 6 °C)    Current: Temperature: (!) 97 1 °F (36 2 °C)    Weights:   IBW: 57 kg    Body mass index is 38 27 kg/m²  Weight (last 2 days)     Date/Time   Weight    18 0650  104 (230)              Hemodynamic Monitoring:  Arterial line in place    Non-Invasive/Invasive Ventilation Settings:  Respiratory    Lab Data (Last 4 hours)    None         O2/Vent Data (Last 4 hours)    None              No results found for: PHART, BKO3IQR, PO2ART, WBQ2RAJ, O1XCBTNR, BEART, SOURCE  SpO2: SpO2: 100 %    Intake and Outputs:  I/O       701 -  07 07 -  07 07 -  0700    I V  (mL/kg)   3400 (32 7)    IV Piggyback   250    Total Intake(mL/kg)   3650 (35 1)    Urine (mL/kg/hr)   500 (0 5)    Drains   80    Blood   150    Total Output     730    Net     +2920               UOP: 0 4cc/kg/hour   Nutrition:        Diet Orders            Start     Ordered    18 0000  Diet NPO  Diet effective midnight     Question:  Diet Type  Answer:  NPO    18 0624    18 1636  Diet NPO  Diet effective now     Question Answer Comment   Diet Type NPO    RD to adjust diet per protocol?  Yes        18 1635          Labs:     Results from last 7 days  Lab Units 18  1506 18  1224   WBC Thousand/uL 7 30 4 65   HEMOGLOBIN g/dL 8 6* 10 6*   HEMATOCRIT % 28 4* 36 2   PLATELETS Thousands/uL 218 167   NEUTROS PCT %  --  67   MONOS PCT %  --  11      Results from last 7 days  Lab Units 18  1506 18  1224   SODIUM mmol/L 141 140   POTASSIUM mmol/L 4 0 4 5   CHLORIDE mmol/L 110* 104 CO2 mmol/L 22 29   BUN mg/dL 12 18   CREATININE mg/dL 0 76 0 71   CALCIUM mg/dL 7 6* 9 4   ALK PHOS U/L  --  60   ALT U/L  --  14   AST U/L  --  13                        0  Lab Value Date/Time   TROPONINI <0 04 03/26/2014 0527       Micro:  No results found for: Arman Deems, WOUNDCULT, SPUTUMCULTUR    Allergies:    Allergies   Allergen Reactions    Tramadol Other (See Comments)     Dizzy         Medications:   Scheduled Meds:  Current Facility-Administered Medications:  diphenhydrAMINE 25 mg Intravenous Q6H PRN Shannan Wall MD    heparin (porcine) 5,000 Units Subcutaneous [facility] Shannan Wall MD    HYDROmorphone  Intravenous Continuous Shannan Wall MD    HYDROmorphone 0 2 mg Intravenous Q10 Min PRN Erin Lan MD    HYDROmorphone 0 4 mg Intravenous Q10 Min PRN Erin Lan MD    lactated ringers 125 mL/hr Intravenous Continuous Maulik Kaufman MD    lactated ringers 75 mL/hr Intravenous Continuous Erin Lan MD    lactated ringers 125 mL/hr Intravenous Continuous Shannan Wall MD Last Rate: 125 mL/hr (09/06/18 1604)   meperidine 12 5 mg Intravenous Q10 Min PRN Erin Lan MD    naloxone 0 04 mg Intravenous Q1MIN PRN Shannan Wall MD    ondansetron 4 mg Intravenous Q4H PRN Shannan Wall MD    phenylephrine (LURDES-SYNEPHRINE) 50 mg (STANDARD CONCENTRATION) in sodium chloride 0 9% 250 mL  mcg/min Intravenous Titrated Erin Lan MD Last Rate: 70 mcg/min (09/06/18 1549)     Continuous Infusions:  HYDROmorphone     lactated ringers 125 mL/hr    lactated ringers 75 mL/hr    lactated ringers 125 mL/hr Last Rate: 125 mL/hr (09/06/18 1604)   phenylephrine (LURDES-SYNEPHRINE) 50 mg (STANDARD CONCENTRATION) in sodium chloride 0 9% 250 mL  mcg/min Last Rate: 70 mcg/min (09/06/18 1549)     PRN Meds:    diphenhydrAMINE 25 mg Q6H PRN   HYDROmorphone 0 2 mg Q10 Min PRN   HYDROmorphone 0 4 mg Q10 Min PRN   meperidine 12 5 mg Q10 Min PRN   naloxone 0 04 mg Q1MIN PRN   ondansetron 4 mg Q4H PRN       VTE Pharmacologic Prophylaxis: held to monitor for post-op bleeding  VTE Mechanical Prophylaxis: sequential compression device    Invasive lines and devices: Invasive Devices     Peripheral Intravenous Line            Peripheral IV 09/06/18 Left Hand less than 1 day    Peripheral IV 09/06/18 Right Hand less than 1 day          Arterial Line            Arterial Line 09/06/18 Left Radial less than 1 day          Drain            Closed/Suction Drain Left Abdomen Bulb 19 Fr  less than 1 day    Colostomy Descending/sigmoid LLQ less than 1 day    Urethral Catheter Non-latex 18 Fr  less than 1 day                   Counseling / Coordination of Care  Total Critical Care time spent 30 minutes excluding procedures, teaching and family updates  Code Status: Level 1 - Full Code     Portions of the record may have been created with voice recognition software  Occasional wrong word or "sound a like" substitutions may have occurred due to the inherent limitations of voice recognition software  Read the chart carefully and recognize, using context, where substitutions have occurred       Bebe Gannon MD

## 2018-09-06 NOTE — H&P (VIEW-ONLY)
Norris Rios  1955  [address]  [address] [facility] [address] [phone]    1  Pre-op exam  POCT ECG   2  Atherosclerotic peripheral vascular disease ([address] )     3  Preop cardiovascular exam     4  S/P gastric bypass         Discussion/Summary: the patient needs surgery for rectal cancer  She has moderate to severe aortic stenosis which is stable per prior echocardiogram reports from [facility] she also has moderate mitral stenosis  She will be at moderate to high risk for postop congestive heart failure  No further testing or management will alter her cardiac risk prior to surgery  Would recommend consideration of cardiac anesthesia for the case  Also watch volume status very closely in the postoperative period  She tells me she had 2 left heart catheterizations which showed minimal coronary plaque and no obstructive lesions  She will follow up post surgery to talk about her valvular disease and surveillance  Functional capacity is about 4 Mets    Interval History:  22-year-old female with newly found rectal cancer needs to have surgery next week presents for preoperative cardiovascular risk assessment  Ambulation mainly limited by lower extremity arthritis  She denies any exertional chest pain, shortness of breath, palpitations, lightheadedness, dizziness, or syncope  She can do moderate housework she is able to walk a block to with the assistance of a cane functional capacity about 4 Mets  She has a history of 2 cardiac catheterizations that did not show obstructive coronary disease she has moderate aortic stenosis prior valve area 1 0 centimeter squared she also has moderate mitral stenosis seen on an echocardiogram this morning      Problem List     Morbid obesity due to excess calories [facility])    Screening for colon cancer    Screen for colon cancer    Overview Signed 1/30/2018  4:19 PM by Erica Fernandez     Added automatically from request for surgery [id number]         Postgastrectomy malabsorption    S/P gastric bypass    Marginal ulcer    Status post bariatric surgery    Overview Signed 2/9/2018  1:26 PM by Gaston Jones     Added automatically from request for surgery [id number]         Atherosclerotic peripheral vascular disease ([facility] )    Diet-controlled diabetes mellitus ([facility] )    Onychomycosis    Pain in foot    Rectal cancer ([facility] )    Preop cardiovascular exam        Past Medical History:   Diagnosis Date    Anemia     Arthritis     Cancer ([facility] )     rectal    Chronic lower back pain     Chronic pain disorder     back pain    Diabetes mellitus ([facility] )     GERD (gastroesophageal reflux disease)     Morbid obesity ([facility] )     Spinal stenosis     Systolic murmur     Unsteady gait     uses walker    Wears dentures     Wears glasses      Social History     Social History    Marital status: Single     Spouse name: N/A    Number of children: 2    Years of education: N/A     Occupational History    Not on file  Social History Main Topics    Smoking status: Former Smoker     Packs/day: 1 00     Years: 25 00     Quit date: 3/30/2006    Smokeless tobacco: Never Used    Alcohol use No    Drug use: No      Comment: Percocet for lower back pain prn    Sexual activity: Not on file     Other Topics Concern    Not on file     Social History Narrative    No narrative on file      Family History   Problem Relation Age of Onset    Adopted:  Yes    Leukemia Mother     Heart disease Father     Coronary artery disease Father     Diabetes Father     No Known Problems Sister     No Known Problems Brother     Diabetes Son     No Known Problems Brother     No Known Problems Brother     No Known Problems Sister     No Known Problems Sister      Past Surgical History:   Procedure Laterality Date    ABDOMINAL SURGERY      abscess removed from abdomen and right thigh, a hole in thigh closed by plastic surgeon    ABSCESS DRAINAGE abd, (R) leg, (L) leg     SECTION      ESOPHAGOGASTRODUODENOSCOPY N/A 2016    Procedure: ESOPHAGOGASTRODUODENOSCOPY (EGD); Surgeon: Jesica Baxter MD;  Location: [facility];  Service:     ESOPHAGOGASTRODUODENOSCOPY N/A 3/30/2016    Procedure: ESOPHAGOGASTRODUODENOSCOPY (EGD); Surgeon: Jesica Baxter MD;  Location: [facility]; Service:     EYE SURGERY      laser eye surgery    JOINT REPLACEMENT Left 2017    hip    JOINT REPLACEMENT Right 2017    hip    IN COLONOSCOPY FLX DX W/COLLJ SPEC WHEN PFRMD N/A 3/9/2018    Procedure: COLONOSCOPY;  Surgeon: Quang Del Cid MD;  Location: [facility] GI LAB; Service: Gastroenterology    IN ESOPHAGOGASTRODUODENOSCOPY TRANSORAL DIAGNOSTIC N/A 2018    Procedure: ESOPHAGOGASTRODUODENOSCOPY (EGD); Surgeon: Quang Del Cid MD;  Location: [facility] GI LAB;   Service: Gastroenterology    IN LAP GASTRIC BYPASS/SOLEDAD-EN-Y N/A 2016    Procedure: BYPASS GASTRIC  SOLEDAD-EN-Y LAPAROSCOPIC;  Surgeon: Jesica Baxter MD;  Location: [facility];  Service: Bariatrics    TUBAL LIGATION         Current Outpatient Prescriptions:     Calcium-Vitamin D 500-125 MG-UNIT TABS, Take by mouth daily  , Disp: , Rfl:     Multiple Vitamins-Minerals (BARIATRIC FUSION) CHEW, Chew daily  , Disp: , Rfl:     omeprazole (PriLOSEC) 20 mg delayed release capsule, Take 1 capsule (20 mg total) by mouth daily, Disp: 90 capsule, Rfl: 3    oxyCODONE-acetaminophen (PERCOCET)  mg per tablet, Take 1 tablet by mouth every 6 (six) hours as needed for moderate pain , Disp: , Rfl:   Allergies   Allergen Reactions    Tramadol Other (See Comments)     Dizzy         Labs:     Chemistry        Component Value Date/Time     2018 0808     10/16/2015 0910    K 4 4 2018 0808    K 4 7 10/16/2015 0910     2018 0808     10/16/2015 0910    CO2 28 2018 0808    CO2 27 10/16/2015 0910    BUN 13 2018 0808    BUN 29 (H) 10/16/2015 0910    CREATININE 0 76 06/13/2018 0808    CREATININE 1 1 10/16/2015 0910        Component Value Date/Time    CALCIUM 9 0 06/13/2018 0808    CALCIUM 9 9 10/16/2015 0910    ALKPHOS 58 06/13/2018 0808    AST 13 06/13/2018 0808    ALT 14 06/13/2018 0808            Lab Results   Component Value Date    CHOL 159 03/26/2014     Lab Results   Component Value Date    HDL 53 02/12/2018    HDL 46 08/09/2017    HDL 37 (L) 10/03/2016     Lab Results   Component Value Date    LDLCALC 114 (H) 02/12/2018    LDLCALC 99 08/09/2017    LDLCALC 143 (H) 10/03/2016     Lab Results   Component Value Date    TRIG 141 02/12/2018    TRIG 91 08/09/2017    TRIG 248 (H) 10/03/2016     No components found for: CHOLHDL    Imaging: Mri Pelvis Rectal Cancer Staging Wo Contrast    Result Date: 8/20/2018  Narrative: MRI PELVIS - WITHOUT CONTRAST (RECTAL CANCER STAGING) INDICATION:  Rectal cancer status post radiation treatment  Based on Dr Yousif Belle note from 8/3/2018, patient is approximately 1 month status post completion of neoadjuvant chemotherapy for stage III rectal cancer  COMPARISON: MRI of the pelvis from 4/6/2018  TECHNIQUE: The following pulse sequences were obtained on a 1 5 T scanner:  Sagittal 2D FIESTA fat sat,  High resolution Coronal, Sagittal, and Axial FSE T2 weighted images of the rectum, and large field of view axial T1 weighted images of the pelvis  An additional small field of view, axial oblique T2-weighted sequence was performed through the tumor, perpendicular to the long axis of the rectum at that level  IV contrast was not given  FINDINGS: TUMOR LOCATION: There has been definite tumor response to treatment of the previously seen low rectal cancer, which had been 5 cm in length and circumferential around the rectal wall  The intraluminal portions of tumor is much less bulky   However there is is still residual viable tumor extension anteriorly, with a 1 cm nodular lesion anterior to the rectum, still contacting the mesorectal fascia and possibly the vagina  (Series 6 image 28 and 29 )  T-STAGING: Based on the above, this is still at least T3c, CRM positive lesion, and if the vaginal is involved, a T4 lesion  CIRCUMFERENTIAL RESECTION MARGIN (CRM): As above, there is a region of the rectal tumor abutment of the anterior mesorectal fascia on series 6 images 28 and 29  In addition, a left anterior lateral high risk node described above also abuts the anterior mesorectal fascia (series 6 image 11 )  This continues to be a CRM positive lesion  PERIRECTAL NODES: All lymph nodes are described on series 6: High risk nodes: Image 9, right of rectum, this no has decreased in size, currently 8 mm, previously 10 mm, also has become hypointense on T2-weighted sequences, consistent with treated tumor  Image 11, left anterolateral of rectum, 14 mm currently, which had appeared smaller on axial images of previous study, but this is likely due to differences in slice selection  This is unchanged in size and it does contact the anterior mesorectal fascia  Low risk nodes: None  LEVATOR ANI, PUBORECTALIS, ANAL SPHINCTERS: Stage I (Tumor confined to bowel wall and intact outer muscle coat )            REPRODUCTIVE STRUCTURES: Age-appropriate  BLADDER:  Normal  PELVIC CAVITY:  There is trace ascites in the pelvis  OTHER BOWEL LOOPS: Unremarkable MRI appearance  OSSEOUS STRUCTURES: There are bilateral hip replacements  VASCULAR STRUCTURES:  Visualized vasculature is patent  PELVIC WALL:  Unremarkable  Impression: Although patient's low rectal cancer has demonstrated definite treatment response in terms of much decreased bulkiness of the intraluminal tumor, the extraluminal components have persisted  In particular there is still viable tumor extension anteriorly, with a 1 cm nodular lesion anterior to the rectum, still contacting the mesorectal fascia and possibly the vagina    (Series 6 image 28 and 29 )  A high risk left anterolateral perirectal lymph node also persists, contacting the mesorectal fascia (series 6 image 11 )  Therefore this is still a T3c, CRM positive lesion, and possibly T4 lesion if vaginal is still involved  Workstation performed: [id number]       ECG:    Normal sinus rhythm unremarkable tracing      Review of Systems   Constitution: Negative  HENT: Negative  Eyes: Negative  Cardiovascular: Negative  Respiratory: Negative  Endocrine: Negative  Hematologic/Lymphatic: Negative  Skin: Negative  Musculoskeletal: Negative  Gastrointestinal: Negative  Genitourinary: Negative  Neurological: Negative  Psychiatric/Behavioral: Negative  Vitals:    08/31/18 0811   BP: 108/64   Pulse: 67     Vitals:    08/31/18 0811   Weight: 105 kg (230 lb 14 4 oz)     Height: 5' 5" (165 1 cm)   Body mass index is 38 42 kg/m²      Physical Exam:  Vital signs reviewed  General appearance:  Appears stated age, alert, well appearing and in no distress  HEENT:  PERRLA, EOMI, no scleral icterus, no conjunctival pallor  NECK:  Supple, No elevated JVP, no thyromegaly, no carotid bruits  HEART:  Regular rate and rhythm, normal S1/S2, no S3/S4, no murmur or rub  LUNGS:  Clear to auscultation bilaterally  ABDOMEN:  Soft, non-tender, positive bowel sounds, no rebound or guarding, no organomegaly   EXTREMITIES:  No edema  VASCULAR:  Normal pedal pulses   SKIN: No lesions or rashes on exposed skin  NEURO:  CN II-XII intact, no focal deficits

## 2018-09-07 ENCOUNTER — APPOINTMENT (INPATIENT)
Dept: NON INVASIVE DIAGNOSTICS | Facility: HOSPITAL | Age: 63
DRG: 333 | End: 2018-09-07
Payer: MEDICARE

## 2018-09-07 LAB
ANION GAP SERPL CALCULATED.3IONS-SCNC: 5 MMOL/L (ref 4–13)
ANION GAP SERPL CALCULATED.3IONS-SCNC: 5 MMOL/L (ref 4–13)
ATRIAL RATE: 89 BPM
BASOPHILS # BLD AUTO: 0.01 THOUSANDS/ΜL (ref 0–0.1)
BASOPHILS # BLD AUTO: 0.01 THOUSANDS/ΜL (ref 0–0.1)
BASOPHILS # BLD MANUAL: 0 THOUSAND/UL (ref 0–0.1)
BASOPHILS NFR BLD AUTO: 0 % (ref 0–1)
BASOPHILS NFR BLD AUTO: 0 % (ref 0–1)
BASOPHILS NFR MAR MANUAL: 0 % (ref 0–1)
BUN SERPL-MCNC: 13 MG/DL (ref 5–25)
BUN SERPL-MCNC: 13 MG/DL (ref 5–25)
CALCIUM SERPL-MCNC: 7.6 MG/DL (ref 8.3–10.1)
CALCIUM SERPL-MCNC: 8.1 MG/DL (ref 8.3–10.1)
CHLORIDE SERPL-SCNC: 106 MMOL/L (ref 100–108)
CHLORIDE SERPL-SCNC: 110 MMOL/L (ref 100–108)
CO2 SERPL-SCNC: 25 MMOL/L (ref 21–32)
CO2 SERPL-SCNC: 25 MMOL/L (ref 21–32)
CREAT SERPL-MCNC: 0.56 MG/DL (ref 0.6–1.3)
CREAT SERPL-MCNC: 0.68 MG/DL (ref 0.6–1.3)
EOSINOPHIL # BLD AUTO: 0 THOUSAND/ΜL (ref 0–0.61)
EOSINOPHIL # BLD AUTO: 0.01 THOUSAND/ΜL (ref 0–0.61)
EOSINOPHIL # BLD MANUAL: 0 THOUSAND/UL (ref 0–0.4)
EOSINOPHIL NFR BLD AUTO: 0 % (ref 0–6)
EOSINOPHIL NFR BLD AUTO: 0 % (ref 0–6)
EOSINOPHIL NFR BLD MANUAL: 0 % (ref 0–6)
ERYTHROCYTE [DISTWIDTH] IN BLOOD BY AUTOMATED COUNT: 15.9 % (ref 11.6–15.1)
ERYTHROCYTE [DISTWIDTH] IN BLOOD BY AUTOMATED COUNT: 16.2 % (ref 11.6–15.1)
GFR SERPL CREATININE-BSD FRML MDRD: 100 ML/MIN/1.73SQ M
GFR SERPL CREATININE-BSD FRML MDRD: 94 ML/MIN/1.73SQ M
GIANT PLATELETS BLD QL SMEAR: PRESENT
GLUCOSE SERPL-MCNC: 142 MG/DL (ref 65–140)
GLUCOSE SERPL-MCNC: 171 MG/DL (ref 65–140)
GLUCOSE SERPL-MCNC: 173 MG/DL (ref 65–140)
HCT VFR BLD AUTO: 20.9 % (ref 34.8–46.1)
HCT VFR BLD AUTO: 25.7 % (ref 34.8–46.1)
HCT VFR BLD AUTO: 25.8 % (ref 34.8–46.1)
HCT VFR BLD AUTO: 27 % (ref 34.8–46.1)
HGB BLD-MCNC: 6.2 G/DL (ref 11.5–15.4)
HGB BLD-MCNC: 7.5 G/DL (ref 11.5–15.4)
HGB BLD-MCNC: 7.8 G/DL (ref 11.5–15.4)
HGB BLD-MCNC: 8.1 G/DL (ref 11.5–15.4)
IMM GRANULOCYTES # BLD AUTO: 0.02 THOUSAND/UL (ref 0–0.2)
IMM GRANULOCYTES # BLD AUTO: 0.03 THOUSAND/UL (ref 0–0.2)
IMM GRANULOCYTES NFR BLD AUTO: 0 % (ref 0–2)
IMM GRANULOCYTES NFR BLD AUTO: 1 % (ref 0–2)
INR PPP: 1.24 (ref 0.86–1.17)
LYMPHOCYTES # BLD AUTO: 0.27 THOUSAND/UL (ref 0.6–4.47)
LYMPHOCYTES # BLD AUTO: 0.46 THOUSANDS/ΜL (ref 0.6–4.47)
LYMPHOCYTES # BLD AUTO: 0.52 THOUSANDS/ΜL (ref 0.6–4.47)
LYMPHOCYTES # BLD AUTO: 3 % (ref 14–44)
LYMPHOCYTES NFR BLD AUTO: 4 % (ref 14–44)
LYMPHOCYTES NFR BLD AUTO: 9 % (ref 14–44)
MAGNESIUM SERPL-MCNC: 1.8 MG/DL (ref 1.6–2.6)
MCH RBC QN AUTO: 23.9 PG (ref 26.8–34.3)
MCH RBC QN AUTO: 24.1 PG (ref 26.8–34.3)
MCH RBC QN AUTO: 24.5 PG (ref 26.8–34.3)
MCH RBC QN AUTO: 24.7 PG (ref 26.8–34.3)
MCHC RBC AUTO-ENTMCNC: 29.2 G/DL (ref 31.4–37.4)
MCHC RBC AUTO-ENTMCNC: 29.7 G/DL (ref 31.4–37.4)
MCHC RBC AUTO-ENTMCNC: 30 G/DL (ref 31.4–37.4)
MCHC RBC AUTO-ENTMCNC: 30.2 G/DL (ref 31.4–37.4)
MCV RBC AUTO: 81 FL (ref 82–98)
MCV RBC AUTO: 82 FL (ref 82–98)
METAMYELOCYTES NFR BLD MANUAL: 1 % (ref 0–1)
MONOCYTES # BLD AUTO: 0.27 THOUSAND/UL (ref 0–1.22)
MONOCYTES # BLD AUTO: 0.51 THOUSAND/ΜL (ref 0.17–1.22)
MONOCYTES # BLD AUTO: 0.71 THOUSAND/ΜL (ref 0.17–1.22)
MONOCYTES NFR BLD AUTO: 7 % (ref 4–12)
MONOCYTES NFR BLD AUTO: 9 % (ref 4–12)
MONOCYTES NFR BLD: 3 % (ref 4–12)
NEUTROPHILS # BLD AUTO: 4.62 THOUSANDS/ΜL (ref 1.85–7.62)
NEUTROPHILS # BLD AUTO: 9.24 THOUSANDS/ΜL (ref 1.85–7.62)
NEUTROPHILS # BLD MANUAL: 8.32 THOUSAND/UL (ref 1.85–7.62)
NEUTS BAND NFR BLD MANUAL: 4 % (ref 0–8)
NEUTS SEG NFR BLD AUTO: 81 % (ref 43–75)
NEUTS SEG NFR BLD AUTO: 89 % (ref 43–75)
NEUTS SEG NFR BLD AUTO: 89 % (ref 43–75)
NRBC BLD AUTO-RTO: 0 /100 WBCS
P AXIS: 46 DEGREES
PLATELET # BLD AUTO: 113 THOUSANDS/UL (ref 149–390)
PLATELET # BLD AUTO: 160 THOUSANDS/UL (ref 149–390)
PLATELET # BLD AUTO: 162 THOUSANDS/UL (ref 149–390)
PLATELET # BLD AUTO: 185 THOUSANDS/UL (ref 149–390)
PLATELET BLD QL SMEAR: ADEQUATE
PMV BLD AUTO: 10.2 FL (ref 8.9–12.7)
PMV BLD AUTO: 10.4 FL (ref 8.9–12.7)
PMV BLD AUTO: 10.8 FL (ref 8.9–12.7)
PMV BLD AUTO: 11 FL (ref 8.9–12.7)
POTASSIUM SERPL-SCNC: 3.9 MMOL/L (ref 3.5–5.3)
POTASSIUM SERPL-SCNC: 4.3 MMOL/L (ref 3.5–5.3)
PR INTERVAL: 154 MS
PROTHROMBIN TIME: 15.7 SECONDS (ref 11.8–14.2)
QRS AXIS: -3 DEGREES
QRSD INTERVAL: 94 MS
QT INTERVAL: 346 MS
QTC INTERVAL: 420 MS
RBC # BLD AUTO: 2.57 MILLION/UL (ref 3.81–5.12)
RBC # BLD AUTO: 3.14 MILLION/UL (ref 3.81–5.12)
RBC # BLD AUTO: 3.16 MILLION/UL (ref 3.81–5.12)
RBC # BLD AUTO: 3.31 MILLION/UL (ref 3.81–5.12)
SODIUM SERPL-SCNC: 136 MMOL/L (ref 136–145)
SODIUM SERPL-SCNC: 140 MMOL/L (ref 136–145)
T WAVE AXIS: 54 DEGREES
VENTRICULAR RATE: 89 BPM
WBC # BLD AUTO: 10.44 THOUSAND/UL (ref 4.31–10.16)
WBC # BLD AUTO: 11.02 THOUSAND/UL (ref 4.31–10.16)
WBC # BLD AUTO: 5.7 THOUSAND/UL (ref 4.31–10.16)
WBC # BLD AUTO: 8.95 THOUSAND/UL (ref 4.31–10.16)

## 2018-09-07 PROCEDURE — 93308 TTE F-UP OR LMTD: CPT | Performed by: INTERNAL MEDICINE

## 2018-09-07 PROCEDURE — 93321 DOPPLER ECHO F-UP/LMTD STD: CPT | Performed by: INTERNAL MEDICINE

## 2018-09-07 PROCEDURE — 93005 ELECTROCARDIOGRAM TRACING: CPT

## 2018-09-07 PROCEDURE — 85027 COMPLETE CBC AUTOMATED: CPT | Performed by: ORTHOPAEDIC SURGERY

## 2018-09-07 PROCEDURE — 85025 COMPLETE CBC W/AUTO DIFF WBC: CPT | Performed by: SURGERY

## 2018-09-07 PROCEDURE — 85610 PROTHROMBIN TIME: CPT | Performed by: SURGERY

## 2018-09-07 PROCEDURE — 93325 DOPPLER ECHO COLOR FLOW MAPG: CPT | Performed by: INTERNAL MEDICINE

## 2018-09-07 PROCEDURE — P9021 RED BLOOD CELLS UNIT: HCPCS

## 2018-09-07 PROCEDURE — 82948 REAGENT STRIP/BLOOD GLUCOSE: CPT

## 2018-09-07 PROCEDURE — 99222 1ST HOSP IP/OBS MODERATE 55: CPT | Performed by: INTERNAL MEDICINE

## 2018-09-07 PROCEDURE — 84260 ASSAY OF SEROTONIN: CPT | Performed by: SURGERY

## 2018-09-07 PROCEDURE — 99024 POSTOP FOLLOW-UP VISIT: CPT | Performed by: OBSTETRICS & GYNECOLOGY

## 2018-09-07 PROCEDURE — 80048 BASIC METABOLIC PNL TOTAL CA: CPT | Performed by: ORTHOPAEDIC SURGERY

## 2018-09-07 PROCEDURE — 82542 COL CHROMOTOGRAPHY QUAL/QUAN: CPT | Performed by: SURGERY

## 2018-09-07 PROCEDURE — 83735 ASSAY OF MAGNESIUM: CPT | Performed by: ORTHOPAEDIC SURGERY

## 2018-09-07 PROCEDURE — 80048 BASIC METABOLIC PNL TOTAL CA: CPT | Performed by: SURGERY

## 2018-09-07 PROCEDURE — 93010 ELECTROCARDIOGRAM REPORT: CPT | Performed by: INTERNAL MEDICINE

## 2018-09-07 PROCEDURE — 86022 PLATELET ANTIBODIES: CPT | Performed by: SURGERY

## 2018-09-07 PROCEDURE — 93306 TTE W/DOPPLER COMPLETE: CPT

## 2018-09-07 RX ORDER — SODIUM CHLORIDE, SODIUM GLUCONATE, SODIUM ACETATE, POTASSIUM CHLORIDE, MAGNESIUM CHLORIDE, SODIUM PHOSPHATE, DIBASIC, AND POTASSIUM PHOSPHATE .53; .5; .37; .037; .03; .012; .00082 G/100ML; G/100ML; G/100ML; G/100ML; G/100ML; G/100ML; G/100ML
500 INJECTION, SOLUTION INTRAVENOUS ONCE
Status: DISCONTINUED | OUTPATIENT
Start: 2018-09-07 | End: 2018-09-12 | Stop reason: HOSPADM

## 2018-09-07 RX ORDER — MAGNESIUM SULFATE HEPTAHYDRATE 40 MG/ML
2 INJECTION, SOLUTION INTRAVENOUS ONCE
Status: COMPLETED | OUTPATIENT
Start: 2018-09-07 | End: 2018-09-07

## 2018-09-07 RX ORDER — SODIUM CHLORIDE, SODIUM GLUCONATE, SODIUM ACETATE, POTASSIUM CHLORIDE, MAGNESIUM CHLORIDE, SODIUM PHOSPHATE, DIBASIC, AND POTASSIUM PHOSPHATE .53; .5; .37; .037; .03; .012; .00082 G/100ML; G/100ML; G/100ML; G/100ML; G/100ML; G/100ML; G/100ML
500 INJECTION, SOLUTION INTRAVENOUS ONCE
Status: COMPLETED | OUTPATIENT
Start: 2018-09-07 | End: 2018-09-07

## 2018-09-07 RX ORDER — FUROSEMIDE 10 MG/ML
20 INJECTION INTRAMUSCULAR; INTRAVENOUS ONCE
Status: DISCONTINUED | OUTPATIENT
Start: 2018-09-07 | End: 2018-09-07

## 2018-09-07 RX ADMIN — METOCLOPRAMIDE 10 MG: 5 INJECTION, SOLUTION INTRAMUSCULAR; INTRAVENOUS at 08:53

## 2018-09-07 RX ADMIN — POTASSIUM CHLORIDE 20 MEQ: 149 INJECTION, SOLUTION, CONCENTRATE INTRAVENOUS at 13:38

## 2018-09-07 RX ADMIN — PHENYLEPHRINE HYDROCHLORIDE 80 MCG/MIN: 10 INJECTION INTRAVENOUS at 03:37

## 2018-09-07 RX ADMIN — HEPARIN SODIUM 5000 UNITS: 5000 INJECTION, SOLUTION INTRAVENOUS; SUBCUTANEOUS at 05:21

## 2018-09-07 RX ADMIN — SODIUM CHLORIDE, SODIUM LACTATE, POTASSIUM CHLORIDE, AND CALCIUM CHLORIDE 125 ML/HR: .6; .31; .03; .02 INJECTION, SOLUTION INTRAVENOUS at 07:53

## 2018-09-07 RX ADMIN — PANTOPRAZOLE SODIUM 40 MG: 40 TABLET, DELAYED RELEASE ORAL at 05:21

## 2018-09-07 RX ADMIN — SODIUM CHLORIDE, SODIUM GLUCONATE, SODIUM ACETATE, POTASSIUM CHLORIDE, MAGNESIUM CHLORIDE, SODIUM PHOSPHATE, DIBASIC, AND POTASSIUM PHOSPHATE 500 ML: .53; .5; .37; .037; .03; .012; .00082 INJECTION, SOLUTION INTRAVENOUS at 13:38

## 2018-09-07 RX ADMIN — POTASSIUM CHLORIDE 20 MEQ: 149 INJECTION, SOLUTION, CONCENTRATE INTRAVENOUS at 11:05

## 2018-09-07 RX ADMIN — MAGNESIUM SULFATE HEPTAHYDRATE 2 G: 40 INJECTION, SOLUTION INTRAVENOUS at 11:04

## 2018-09-07 RX ADMIN — SODIUM CHLORIDE 1000 ML: 0.9 INJECTION, SOLUTION INTRAVENOUS at 02:01

## 2018-09-07 RX ADMIN — SODIUM CHLORIDE, SODIUM GLUCONATE, SODIUM ACETATE, POTASSIUM CHLORIDE, MAGNESIUM CHLORIDE, SODIUM PHOSPHATE, DIBASIC, AND POTASSIUM PHOSPHATE 500 ML: .53; .5; .37; .037; .03; .012; .00082 INJECTION, SOLUTION INTRAVENOUS at 08:45

## 2018-09-07 NOTE — PROGRESS NOTES
Called by RN after Hgb level drawn at 10:43pm came back 5 0 from 8 9 level 3 hours earlier  STAT CBC ordered to re-evaluate Hgb level  2u PRBC ordered and put on hold if needed for transfusion  STAT CBC was drawn at 11:28pm with Hgb of 8 1  Will order 3am CBC and continue to monitor pt's hemodynamic status throughout the night       Pa Ramirez MD  PGY-1, General Surgery

## 2018-09-07 NOTE — PROGRESS NOTES
GYN ONCOLOGY POSTOPERATIVE FOLLOW UP    I saw patient this afternoon and discussed with her intraoperative findings as well as my involvement in her surgery yesterday  I discussed indication and procedure in detail  All questions were answered to her satisfaction  Postoperative follow up and oncologic management per Colorectal Surgery team      I will remain available as needed for questions or issues related to the gynecologic portion of the surgery      Jose Manuel Mcguire MD, Anders Mcgraw Dose  9/7/2018  2:41 PM

## 2018-09-07 NOTE — SOCIAL WORK
CM met with pt to discuss DCP and cm role  Pt lives alone in 1st floor apt  No jaziel  Prior to admission pt used a rollator with ambulation and was independent with ADLS  No prior hx of VNA, hx of rehab at [facility] and [facility] ([facility])    Pt's preference for pharmacy is [facility] in [place]  Pt has Medicare A&B no rx coverage  Pt denies any hx of drug/ETOH or inpt psych stays  Patient/caregiver received discharge checklist  Content reviewed  Patient/caregiver encouraged to participate in discharge plan of care prior to discharge home  CM reviewed d/c planning process including the following: identifying help at home, patient preference for d/c planning needs, Discharge Lounge, [facility] Meds to Bed program, availability of treatment team to discuss questions or concerns patient and/or family may have regarding understanding medications and recognizing signs and symptoms once discharged  CM also encouraged patient to follow up with all recommended appointments after discharge  Patient advised of importance for patient and family to participate in managing patients medical well being

## 2018-09-07 NOTE — CONSULTS
Consultation - Cardiology   Lisa rAora Mention 58 y o  female MRN: [id number]  Unit/Bed#: [facility] 521-01 Encounter: [id number]  09/07/18  4:05 PM      Assessment:  Principal Problem:    Rectal cancer ([facility] )  Active Problems: Morbid obesity due to excess calories (HCC)    S/P gastric bypass    Status post bariatric surgery    Diet-controlled diabetes mellitus ([facility] )      Plan:    1  Postoperative hypotension  Likely combination of perioperative fluid loss, relative anemia (Hgb dropped from 10 6 preop to currently 7 5), and use of pain medications (dilaudid PCA) to adequately control pain  She is not showing any signs of end organ hypoperfusion  Extremities are warm to touch, she is mentating appropriately, and urinating, and creatinine normal  Her BP when seen in office by Dr Lidia Joe 8/31/18 was 108/64, so she is not far from her baseline  I do not think pressors are required at this time, continue IVF today, if creatinine remains stable and she is eating adequately, may be able to stop IVF 9/8      2  Moderate MS/AS  Normal LV function  Following with Dr Lidia Joe  Was not symptomatic at baseline, but does not do much activity due to arthritis to assess  May need some diuresis after IVF stopped if develops significant LE swelling or SOB, but currently appears euvolemic despite IVF  About net 6 L positive currently  3  DM  Diet controlled as outpt  History of Present Illness   Physician Requesting Consult: Kori Alcantara MD  Reason for Consult / Principal Problem: Hypotension    HPI: Catrachita Pavon is a 58 y  o female with a known history of moderate AS/moderate MS, morbid obesity s/p bariatric surgery, and DM, who was admitted electively for laparascopic abdominoperineal resection, radical hysterectomy/BSO, end colostomy on 9/6/18 for rectal cancer  Postoperatively she was hypotensive requiring phenylephrine drip at 60 mcg/min to maintain MAPs in 80s  This was able to be weaned off   However, BP remains in 80-90s systolic mostly by Angela  She denies any current CP or SOB  Repeat echo done this admission shows preserved LV function with moderate MS/AS, no change from one done 8/31/18  She is on IVF, but reports she is tolerating clear liquid diet thus far  Making adequate urine  On Dilaudid PCA  She reports in past after surgeries she has been pretty swollen due to IVF  Consults    Review of Systems:    Review of Systems   Constitution: Negative for chills, decreased appetite, diaphoresis, fever, weakness, malaise/fatigue, night sweats, weight gain and weight loss  HENT: Negative for ear pain, hearing loss, hoarse voice, nosebleeds, sore throat and tinnitus  Eyes: Negative for blurred vision and pain  Cardiovascular: Negative  Negative for chest pain, claudication, cyanosis, dyspnea on exertion, irregular heartbeat, leg swelling, near-syncope, orthopnea, palpitations, paroxysmal nocturnal dyspnea and syncope  Respiratory: Negative for cough, hemoptysis, shortness of breath, sleep disturbances due to breathing, snoring, sputum production and wheezing  Hematologic/Lymphatic: Negative for adenopathy and bleeding problem  Does not bruise/bleed easily  Skin: Negative for color change, dry skin, flushing, itching, poor wound healing and rash  Musculoskeletal: Positive for back pain  Negative for arthritis, falls, joint pain, muscle cramps, muscle weakness, myalgias and neck pain  Gastrointestinal: Negative for abdominal pain, constipation, diarrhea, dysphagia, heartburn, hematemesis, hematochezia, melena, nausea and vomiting  Genitourinary: Negative for dysuria, frequency, hematuria, hesitancy, non-menstrual bleeding and urgency  Neurological: Negative for excessive daytime sleepiness, dizziness, focal weakness, headaches, light-headedness, loss of balance, numbness, paresthesias, tremors and vertigo  Psychiatric/Behavioral: Negative for altered mental status, depression and memory loss   The patient does not have insomnia and is not nervous/anxious  Allergic/Immunologic: Negative for environmental allergies and persistent infections  Historical Information   Past Medical History:   Diagnosis Date    Anemia     Arthritis     [facility]     rectal    Chronic lower back pain     Chronic pain disorder     back pain    Diabetes mellitus ([facility] )     GERD (gastroesophageal reflux disease)     Morbid obesity ([facility] )     Spinal stenosis     Systolic murmur     Unsteady gait     uses walker    Wears dentures     Wears glasses      Past Surgical History:   Procedure Laterality Date    ABDOMINAL PERINEAL BOWEL RESECTION W/ ILEOANAL POUCH N/A 2018    Procedure: LAPAROSCOPIC HAND ASSIST ABDOMINOPERINEAL RESECTION,  POSTERIOR VAGINECTOMY, OMENTECTOMY;  Surgeon: Li Trinidad MD;  [facility];  Service: Colorectal    ABDOMINAL SURGERY      abscess removed from abdomen and right thigh, a hole in thigh closed by plastic surgeon    ABSCESS DRAINAGE      abd, (R) leg, (L) leg     SECTION       SECTION      CYSTOSCOPY N/A 2018    Procedure: CYSTOSCOPY;  Surgeon: Darris Sandhoff, MD;  [facility];  Service: Gynecology Oncology    ESOPHAGOGASTRODUODENOSCOPY N/A 2016    Procedure: ESOPHAGOGASTRODUODENOSCOPY (EGD); Surgeon: Jil Puri MD;  [facility];  Service:     ESOPHAGOGASTRODUODENOSCOPY N/A 3/30/2016    Procedure: ESOPHAGOGASTRODUODENOSCOPY (EGD); Surgeon: Jil Puri MD;  [facility]; Service:     EYE SURGERY      laser eye surgery    HYSTERECTOMY N/A 2018    Procedure: RADICAL HYSTERECTOMY TOTAL ABDOMINAL (ALEXANDRA)   BSO;  Surgeon: Darris Sandhoff, MD;  [facility];  Service: Gynecology Oncology    JOINT REPLACEMENT Left 2017    hip    JOINT REPLACEMENT Right 2017    hip    IN COLONOSCOPY FLX DX W/COLLJ SPEC WHEN PFRMD N/A 3/9/2018    Procedure: COLONOSCOPY;  Surgeon: Leesa Barrow MD;  Location: [facility] GI LAB; Service: Gastroenterology    AR COLONOSCOPY FLX DX W/COLLJ [address] WHEN PFRMD N/A 9/5/2018    Procedure: COLONOSCOPY;  Surgeon: Jonelle Alcaraz MD;  [facility]; Service: Colorectal    AR ESOPHAGOGASTRODUODENOSCOPY TRANSORAL DIAGNOSTIC N/A 2/2/2018    Procedure: ESOPHAGOGASTRODUODENOSCOPY (EGD); Surgeon: Mendoza Castillo MD;  Location: [facility]; Service: Gastroenterology    AR LAP GASTRIC BYPASS/SOLEDAD-EN-Y N/A 7/18/2016    Procedure: BYPASS GASTRIC  SOLEDAD-EN-Y LAPAROSCOPIC;  Surgeon: Josie Booth MD;  [facility];  Service: Bariatrics    AR LAP, SURG COLOSTOMY N/A 9/6/2018    Procedure: PERMANENT END COLOSTOMY;  Surgeon: Jonelle Alcaraz MD;  [facility];  Service: Colorectal    AR LAP, SURG PROCTECTOMY W COLOSTOMY N/A 9/6/2018    Procedure: PROCTECTOMY;  Surgeon: Jonelle Alcaraz MD;  [facility];  Service: Colorectal    TUBAL LIGATION       History   Alcohol Use No     History   Drug Use    Types: Oxycodone     Comment: Percocet for lower back pain prn     History   Smoking Status    Former Smoker    Packs/day: 1 00    Years: 25 00    Quit date: 3/30/2006   Smokeless Tobacco    Never Used       Family History:   Family History   Problem Relation Age of Onset    Adopted: Yes    Leukemia Mother     Heart disease Father     Coronary artery disease Father     Diabetes Father     No Known Problems Sister     No Known Problems Brother     Diabetes Son     No Known Problems Brother     No Known Problems Brother     No Known Problems Sister     No Known Problems Sister        Meds/Allergies   PTA meds:   Prior to Admission Medications   Prescriptions Last Dose Informant Patient Reported? Taking?    Calcium-Vitamin D 500-125 MG-UNIT TABS 8/30/2018 Self Yes Yes   Sig: Take by mouth daily     Multiple Vitamins-Minerals (BARIATRIC FUSION) CHEW 8/30/2018 Self Yes Yes   Sig: Chew daily     omeprazole (PriLOSEC) 20 mg delayed release capsule 9/4/2018 Self No Yes   Sig: Take 1 capsule (20 mg total) by mouth daily   oxyCODONE-acetaminophen (PERCOCET)  mg per tablet 9/6/2018 at 0600 Self Yes Yes   Sig: Take 1 tablet by mouth every 6 (six) hours as needed for moderate pain  Facility-Administered Medications: None     Allergies   Allergen Reactions    Tramadol Other (See Comments)     Dizzy         Objective   Vitals: Blood pressure 98/60, pulse 91, temperature 98 8 °F (37 1 °C), temperature source Oral, resp  rate 18, height 5' 5" (1 651 m), weight 105 kg (231 lb 14 8 oz), SpO2 95 %, not currently breastfeeding , Body mass index is 38 59 kg/m² , Orthostatic Blood Pressures      Most Recent Value   Blood Pressure  98/60 filed at 09/07/2018 1500   Patient Position - Orthostatic VS  Lying filed at 09/07/2018 4520          Systolic (61PNW), VBI:526 , Min:86 , JXF:077     Diastolic (69VEK), PFH:24, Min:44, Max:82        Intake/Output Summary (Last 24 hours) at 09/07/18 1605  Last data filed at 09/07/18 1500   Gross per 24 hour   Intake          4606 78 ml   Output             1670 ml   Net          2936 78 ml       Invasive Devices     Peripheral Intravenous Line            Peripheral IV 09/06/18 Left Hand 1 day    Peripheral IV 09/06/18 Right Hand 1 day          Arterial Line            Arterial Line 09/06/18 Left Radial 1 day          Drain            Closed/Suction Drain Left Abdomen Bulb 19 Fr  1 day    Colostomy Descending/sigmoid LLQ 1 day    Urethral Catheter Non-latex 18 Fr  1 day                    Physical Exam:  GEN: Awake and alert, in no acute distress  HEENT: Sclera anicteric, conjunctivae pink, mucous membranes moist   NECK: Supple, no carotid bruits, no significant JVD  HEART: Regular rhythm, III/VI systolic murmur, no clicks, gallops or rubs  LUNGS: Clear to auscultation anteriorly bilaterally; no wheezes, rales, or rhonchi   ABDOMEN: distended, mildly tender, decreased bowel sounds     EXTREMITIES: Skin warm and well perfused, no clubbing, cyanosis, or edema  NEURO: No focal findings  SKIN: Normal without suspicious lesions on exposed skin      Lab Results:     Troponins:       CBC with diff:   Results from last 7 days  Lab Units 18  0439 18  0248 18  2220 18  1957 18  1506   WBC Thousand/uL 11 02* 10 44* 8 95  --   --   --  7 30   HEMOGLOBIN g/dL 7 5* 7 8* 8 1* 5 0*  --  8 9* 8 6*   HEMATOCRIT % 25 7* 25 8* 27 0* 17 4*  --  29 9* 28 4*   MCV fL 82 82 82  --   --   --  82   PLATELETS Thousands/uL 185 160 162  --  188  --  218   MCH pg 23 9* 24 7* 24 5*  --   --   --  24 7*   MCHC g/dL 29 2* 30 2* 30 0*  --   --   --  30 3*   RDW % 15 9* 15 9* 15 9*  --   --   --  15 9*   MPV fL 11 0 10 2 10 4  --  10 6  --  10 5   NRBC AUTO /100 WBCs  --  0 0  --   --   --   --          CMP:   Results from last 7 days  Lab Units 18  1506   SODIUM mmol/L 140 143 141   POTASSIUM mmol/L 3 9 4 0 4 0   CHLORIDE mmol/L 110* 110* 110*   CO2 mmol/L 25 24 22   BUN mg/dL 13 13 12   CREATININE mg/dL 0 68 0 62 0 76   CALCIUM mg/dL 8 1* 7 7* 7 6*   EGFR ml/min/1 73sq m 94 97 84       Magnesium:   Results from last 7 days  Lab Units 18  0439   MAGNESIUM mg/dL 1 8       Coags:       TSH:       Lipid Profile:       Hgb A1c:       Cardiac testing:   Results for orders placed during the hospital encounter of 18   Echo complete with contrast if indicated    Narrative [address]  [facility] [address] [facility]  [phone]    Transthoracic Echocardiogram  Limited 2D, M-mode, Doppler, and Color Doppler    Study date:  07-Sep-2018    Patient: Iva Alejandre  MR number: [id number]  Account number: [de-identified]  : [id number]  Age: 58 years  Gender: Female  Status: Inpatient  Location: Bedside  Height: 65 in  Weight: 231 lb  BP: 88/ 44 mmHg    Indications: Limited for Function    Diagnoses: I95 9 - Hypotension, unspecified    Sonographer:  MAEVE Hampton  Primary Physician:  Danielle Greenberg MD  Group:  [facility]  Cardiology Fellow:  Asim Campos MD  Interpreting Physician:  Lucila Whalen MD    SUMMARY    PROCEDURE INFORMATION:  This was a technically difficult study  LEFT VENTRICLE:  Systolic function was normal  Ejection fraction was estimated to be 65 %  RIGHT VENTRICLE:  The size was normal   Systolic function was normal     LEFT ATRIUM:  The atrium was dilated  RIGHT ATRIUM:  The atrium was dilated  MITRAL VALVE:  There was moderate to marked annular calcification  There was moderate calcification of the valve  Transmitral velocity was increased due to valvular stenosis  There was moderate stenosis related to calcification  There was mild regurgitation  Mean transmitral gradient was 7 mmHg  Mitral valve area by pressure half-time was 1 2 cm squared  AORTIC VALVE:  The valve was trileaflet  Leaflets exhibited marked calcification and markedly reduced cuspal separation  COMPARISONS:  There has been no significant interval change  Comparison was made with the previous study of 31-Aug-2018  HISTORY: PRIOR HISTORY: AS, MS, Obesity, Cancer, Anemia    PROCEDURE: The procedure was performed at the bedside  This was a stat study  The transthoracic approach was used  The study included limited 2D imaging, M-mode, limited spectral Doppler, and color Doppler  The heart rate was 83 bpm, at  the start of the study  Images were obtained from the parasternal, apical, and subcostal acoustic windows  Echocardiographic views were limited due to poor acoustic window availability and lung interference  This was a technically  difficult study  LEFT VENTRICLE: Size was normal  Systolic function was normal  Ejection fraction was estimated to be 65 %   Although no diagnostic regional wall motion abnormality was identified, this possibility cannot be completely excluded on the basis  of this study  DOPPLER: The study was not technically sufficient to allow evaluation of LV diastolic function  RIGHT VENTRICLE: The size was normal  Systolic function was normal     LEFT ATRIUM: The atrium was dilated  RIGHT ATRIUM: The atrium was dilated  MITRAL VALVE: There was moderate to marked annular calcification  There was moderate calcification of the valve  There was normal leaflet separation  DOPPLER: Transmitral velocity was increased due to valvular stenosis  There was moderate  stenosis related to calcification  There was mild regurgitation  AORTIC VALVE: The valve was trileaflet  Leaflets exhibited marked calcification and markedly reduced cuspal separation  PERICARDIUM: There was no pericardial effusion  The pericardium was normal in appearance  MEASUREMENT TABLES    DOPPLER MEASUREMENTS  Mitral valve   (Reference normals)  Peak gradient   18 mmHg   (--)  Mean gradient   7 mmHg   (--)  Pressure half-time   184 ms   (--)  Valve area, PHT   1 2 cm squared   (--)    SYSTEM MEASUREMENT TABLES    2D mode  LVEDV MOD BP: 81 9 cm3  LVESV MOD (BP): 29 5 cm3  Left Ventricular Ejection Fraction; Method of Disks, Biplane; 2D mode;: 64 %  SV (BP): 52 4 cm3    Apical four chamber  LV MOD Diam; Recent value; End Diastole (A4C): 3 29 cm  LV MOD Diam; Recent value; End Diastole (A4C): 4 88 cm  LV MOD Diam; Recent value; End Diastole (A4C): 4 95 cm  LV MOD Diam; Recent value; End Diastole (A4C): 4 98 cm  LV MOD Diam; Recent value; End Diastole (A4C): 4 22 cm  LV MOD Diam; Recent value; End Diastole (A4C): 4 32 cm  LV MOD Diam; Recent value; End Diastole (A4C): 4 34 cm  LV MOD Diam; Recent value; End Diastole (A4C): 4 05 cm  LV MOD Diam; Recent value; End Diastole (A4C): 3 68 cm  LV MOD Diam; Recent value; End Diastole (A4C): 1 96 cm  LV MOD Diam; Recent value; End Diastole (A4C): 2 58 cm  LV MOD Diam; Recent value; End Diastole (A4C): 3 04 cm  LV MOD Diam; Recent value;  End Diastole (A4C): 3 46 cm  LV MOD Diam; Recent value; End Diastole (A4C): 3 88 cm  LV MOD Diam; Recent value; End Diastole (A4C): 4 19 cm  LV MOD Diam; Recent value; End Diastole (A4C): 4 42 cm  LV MOD Diam; Recent value; End Diastole (A4C): 4 19 cm  LV MOD Diam; Recent value; End Diastole (A4C): 4 34 cm  LV MOD Diam; Recent value; End Diastole (A4C): 4 69 cm  LV MOD Diam; Recent value; End Diastole (A4C): 4 91 cm  LV MOD Diam; Recent value; End Systole (A4C): 2 18 cm  LV MOD Diam; Recent value; End Systole (A4C): 3 8 cm  LV MOD Diam; Recent value; End Systole (A4C): 3 63 cm  LV MOD Diam; Recent value; End Systole (A4C): 2 82 cm  LV MOD Diam; Recent value; End Systole (A4C): 2 33 cm  LV MOD Diam; Recent value; End Systole (A4C): 1 01 cm  LV MOD Diam; Recent value; End Systole (A4C): 1 35 cm  LV MOD Diam; Recent value; End Systole (A4C): 1 59 cm  LV MOD Diam; Recent value; End Systole (A4C): 1 76 cm  LV MOD Diam; Recent value; End Systole (A4C): 1 94 cm  LV MOD Diam; Recent value; End Systole (A4C): 2 08 cm  LV MOD Diam; Recent value; End Systole (A4C): 2 11 cm  LV MOD Diam; Recent value; End Systole (A4C): 2 23 cm  LV MOD Diam; Recent value; End Systole (A4C): 2 48 cm  LV MOD Diam; Recent value; End Systole (A4C): 3 33 cm  LV MOD Diam; Recent value; End Systole (A4C): 3 77 cm  LV MOD Diam; Recent value; End Systole (A4C): 2 01 cm  LV MOD Diam; Recent value; End Systole (A4C): 2 21 cm  LV MOD Diam; Recent value; End Systole (A4C): 2 21 cm  LV MOD Diam; Recent value; End Systole (A4C): 2 21 cm  LVEF MOD A4C: 70 8 %  Left Ventricle diastolic major axis; Most recent value chosen; Method of Disks, Single Plane; 2D mode; Apical four chamber;: 7 73 cm  Left Ventricle systolic major axis; Most recent value chosen; Method of Disks, Single Plane; 2D mode; Apical four chamber;: 6 22 cm  Left Ventricular Diastolic Area; Most recent value chosen; Method of Disks, Single Plane; 2D mode;  Apical four chamber;: 3090 mm2  Left Ventricular End Diastolic Volume; Most recent value chosen; Method of Disks, Single Plane; 2D mode; Apical four chamber;: 102 cm3  Left Ventricular End Systolic Volume; Most recent value chosen; Method of Disks, Single Plane; 2D mode; Apical four chamber;: 29 8 cm3  Left Ventricular Systolic Area; Most recent value chosen; Method of Disks, Single Plane; 2D mode; Apical four chamber;: 1480 mm2  SV MOD A4C: 72 2 cm3    Apical two chamber  LV MOD Diam; Recent value; End Diastole (A2C): 2 08 cm  LV MOD Diam; Recent value; End Diastole (A2C): 4 36 cm  LV MOD Diam; Recent value; End Diastole (A2C): 4 36 cm  LV MOD Diam; Recent value; End Diastole (A2C): 3 94 cm  LV MOD Diam; Recent value; End Diastole (A2C): 3 59 cm  LV MOD Diam; Recent value; End Diastole (A2C): 3 49 cm  LV MOD Diam; Recent value; End Diastole (A2C): 1 5 cm  LV MOD Diam; Recent value; End Diastole (A2C): 1 92 cm  LV MOD Diam; Recent value; End Diastole (A2C): 2 34 cm  LV MOD Diam; Recent value; End Diastole (A2C): 2 69 cm  LV MOD Diam; Recent value; End Diastole (A2C): 2 95 cm  LV MOD Diam; Recent value; End Diastole (A2C): 3 21 cm  LV MOD Diam; Recent value; End Diastole (A2C): 3 3 cm  LV MOD Diam; Recent value; End Diastole (A2C): 3 4 cm  LV MOD Diam; Recent value; End Diastole (A2C): 3 43 cm  LV MOD Diam; Recent value; End Diastole (A2C): 1 03 cm  LV MOD Diam; Recent value; End Diastole (A2C): 3 52 cm  LV MOD Diam; Recent value; End Diastole (A2C): 3 75 cm  LV MOD Diam; Recent value; End Diastole (A2C): 4 2 cm  LV MOD Diam; Recent value; End Diastole (A2C): 4 39 cm  LV MOD Diam; Recent value; End Systole (A2C): 1 52 cm  LV MOD Diam; Recent value; End Systole (A2C): 3 13 cm  LV MOD Diam; Recent value; End Systole (A2C): 2 88 cm  LV MOD Diam; Recent value; End Systole (A2C): 0 76 cm  LV MOD Diam; Recent value; End Systole (A2C): 1 08 cm  LV MOD Diam; Recent value; End Systole (A2C): 1 3 cm  LV MOD Diam; Recent value;  End Systole (A2C): 1 46 cm  LV MOD Diam; Recent value; End Systole (A2C): 1 61 cm  LV MOD Diam; Recent value; End Systole (A2C): 1 74 cm  LV MOD Diam; Recent value; End Systole (A2C): 1 87 cm  LV MOD Diam; Recent value; End Systole (A2C): 1 9 cm  LV MOD Diam; Recent value; End Systole (A2C): 2 03 cm  LV MOD Diam; Recent value; End Systole (A2C): 2 15 cm  LV MOD Diam; Recent value; End Systole (A2C): 2 34 cm  LV MOD Diam; Recent value; End Systole (A2C): 2 53 cm  LV MOD Diam; Recent value; End Systole (A2C): 2 69 cm  LV MOD Diam; Recent value; End Systole (A2C): 3 04 cm  LV MOD Diam; Recent value; End Systole (A2C): 3 2 cm  LV MOD Diam; Recent value; End Systole (A2C): 3 1 cm  LV MOD Diam; Recent value; End Systole (A2C): 3 2 cm  LVEF MOD A2C: 57 3 %  Left Ventricle diastolic major axis; Most recent value chosen; Method of Disks, Single Plane; 2D mode; Apical two chamber;: 7 56 cm  Left Ventricle systolic major axis; Most recent value chosen; Method of Disks, Single Plane; 2D mode; Apical two chamber;: 6 68 cm  Left Ventricular Diastolic Area; Most recent value chosen; Method of Disks, Single Plane; 2D mode; Apical two chamber;: 2410 mm2  Left Ventricular End Diastolic Volume; Most recent value chosen; Method of Disks, Single Plane; 2D mode; Apical two chamber;: 65 1 cm3  Left Ventricular End Systolic Volume; Most recent value chosen; Method of Disks, Single Plane; 2D mode; Apical two chamber;: 27 7 cm3  Left Ventricular Systolic Area; Most recent value chosen; Method of Disks, Single Plane; 2D mode; Apical two chamber;: 1460 mm2  SV MOD A2C: 37 3 cm3    M mode  Tricuspid Annular Plane Systolic Excursion; Mean; Mean value chosen; Tricuspid Annulus; M mode;: 3 43 cm  Tricuspid Annular Plane Systolic Excursion; Tricuspid Annulus; M mode;: 3 43 cm    Unspecified Scan Mode  DT; Antegrade Flow: 408 ms  DT; Mean; Antegrade Flow: 408 ms  MV A Noman: 1880 mm/s  MV E Noman: 1560 mm/s  MV E/A Ratio: 0 8  MV Peak A Noman: 1880 mm/s  MV Peak E Noman; Mean;  Antegrade Flow: 1560 mm/s    [facility]    Prepared and electronically signed by    Raysa Jaramillo MD  Signed 07-Sep-2018 15:38:39             EKG: personally reviewed, SR, HR in 80s, nonspecific T wave changes

## 2018-09-07 NOTE — PROGRESS NOTES
Progress Note - General Surgery   Shital Gibbs 58 y o  female MRN: [id number]  Unit/Bed#: [facility] 521-01 Encounter: [id number]    Assessment:  59 yo female with h/o Morbid Obesity s/p RNY GB, Chronic Back Pain, GERD, Aortic Stenosis, who presented with rectal cancer now 1 Day Post-Op s/p Lap hand assisted, APR, ALEXANDRA-BSO, posterior vaginectomy, and end colostomy  Plan:  - D/C del real  - Consider CLD  - Hold SQH, continue SCDs  - Pain control with PCA  - OOBTC  - Dispo: Pending return of bowel function     Subjective/Objective   Chief Complaint: Abd Pain, IV site pain    Subjective: Overnight patient had a misleading hgb of 5 0 and recheck was 8 1  Patient having some pain in her abdomen that is fairly well controlled with the PCA  Denies chest pain or SOB  Denies nausea  Objective:   Blood pressure 113/58, pulse 68, temperature 98 °F (36 7 °C), temperature source Oral, resp  rate 18, height 5' 5" (1 651 m), weight 105 kg (231 lb 14 8 oz), SpO2 100 %, not currently breastfeeding  ,Body mass index is 38 59 kg/m²        Intake/Output Summary (Last 24 hours) at 09/07/18 8254  Last data filed at 09/07/18 3315   Gross per 24 hour   Intake          7838 16 ml   Output             1935 ml   Net          5903 16 ml       Invasive Devices     Peripheral Intravenous Line            Peripheral IV 09/06/18 Left Hand less than 1 day    Peripheral IV 09/06/18 Right Hand less than 1 day          Arterial Line            Arterial Line 09/06/18 Left Radial less than 1 day          Drain            Closed/Suction Drain Left Abdomen Bulb 19 Fr  less than 1 day    Colostomy Descending/sigmoid LLQ less than 1 day    Urethral Catheter Non-latex 18 Fr  less than 1 day                Physical Exam:  Gen: NAD, A&O, Comfortable in Bed  Chest: Normal work of breathing, no resp distress  Abd: S, ND, approp ttp, drain sanguinous, incisions CDI, ostomy slightly dusky but appears viable, no gas in bag  Ext: No edema  Skin: warm, dry, intact      Lab, Imaging and other studies:  CBC:   Lab Results   Component Value Date    WBC 11 02 (H) 09/07/2018    HGB 7 5 (L) 09/07/2018    HCT 25 7 (L) 09/07/2018    MCV 82 09/07/2018     09/07/2018    MCH 23 9 (L) 09/07/2018    MCHC 29 2 (L) 09/07/2018    RDW 15 9 (H) 09/07/2018    MPV 11 0 09/07/2018    NRBC 0 09/07/2018     VTE Pharmacologic Prophylaxis: Sequential compression device (Venodyne)   VTE Mechanical Prophylaxis: sequential compression device

## 2018-09-07 NOTE — PLAN OF CARE
Problem: Potential for Falls  Goal: Patient will remain free of falls  INTERVENTIONS:  - Assess patient frequently for physical needs  -  Identify cognitive and physical deficits and behaviors that affect risk of falls    -  [place] fall precautions as indicated by assessment   - Educate patient/family on patient safety including physical limitations  - Instruct patient to call for assistance with activity based on assessment  - Modify environment to reduce risk of injury  - Consider OT/PT consult to assist with strengthening/mobility   Outcome: Progressing      Problem: Prexisting or High Potential for Compromised Skin Integrity  Goal: Skin integrity is maintained or improved  INTERVENTIONS:  - Identify patients at risk for skin breakdown  - Assess and monitor skin integrity  - Assess and monitor nutrition and hydration status  - Monitor labs (i e  albumin)  - Assess for incontinence   - Turn and reposition patient  - Assist with mobility/ambulation  - Relieve pressure over bony prominences  - Avoid friction and shearing  - Provide appropriate hygiene as needed including keeping skin clean and dry  - Evaluate need for skin moisturizer/barrier cream  - Collaborate with interdisciplinary team (i e  Nutrition, Rehabilitation, etc )   - Patient/family teaching   Outcome: Progressing      Problem: PAIN - ADULT  Goal: Verbalizes/displays adequate comfort level or baseline comfort level  Interventions:  - Encourage patient to monitor pain and request assistance  - Assess pain using appropriate pain scale  - Administer analgesics based on type and severity of pain and evaluate response  - Implement non-pharmacological measures as appropriate and evaluate response  - Consider cultural and social influences on pain and pain management  - Notify physician/advanced practitioner if interventions unsuccessful or patient reports new pain  Outcome: Progressing      Problem: INFECTION - ADULT  Goal: Absence or prevention of progression during hospitalization  INTERVENTIONS:  - Assess and monitor for signs and symptoms of infection  - Monitor lab/diagnostic results  - Monitor all insertion sites, i e  indwelling lines, tubes, and drains  - Monitor endotracheal (as able) and nasal secretions for changes in amount and color  - [place] appropriate cooling/warming therapies per order  - Administer medications as ordered  - Instruct and encourage patient and family to use good hand hygiene technique  - Identify and instruct in appropriate isolation precautions for identified infection/condition  Outcome: Progressing    Goal: Absence of fever/infection during neutropenic period  INTERVENTIONS:  - Monitor WBC  - Implement neutropenic guidelines  Outcome: Progressing      Problem: GASTROINTESTINAL - ADULT  Goal: Minimal or absence of nausea and/or vomiting  INTERVENTIONS:  - Administer IV fluids as ordered to ensure adequate hydration  - Maintain NPO status until nausea and vomiting are resolved  - Nasogastric tube as ordered  - Administer ordered antiemetic medications as needed  - Provide nonpharmacologic comfort measures as appropriate  - Advance diet as tolerated, if ordered  - Nutrition services referral to assist patient with adequate nutrition and appropriate food choices  Outcome: Progressing    Goal: Establish and maintain optimal ostomy function  INTERVENTIONS:  - Assess bowel function  - Encourage oral fluids to ensure adequate hydration  - Administer IV fluids as ordered to ensure adequate hydration  - Administer ordered medications as needed  - Encourage mobilization and activity  - Nutrition services referral to assist patient with appropriate food choices  - Assess stoma site  Outcome: Progressing      Problem: GENITOURINARY - ADULT  Goal: Urinary catheter remains patent  INTERVENTIONS:  - Assess patency of urinary catheter  - If patient has a chronic del real, consider changing catheter if non-functioning  - Follow guidelines for intermittent irrigation of non-functioning urinary catheter  Outcome: Progressing      Problem: SKIN/TISSUE INTEGRITY - ADULT  Goal: Skin integrity remains intact  INTERVENTIONS  - Identify patients at risk for skin breakdown  - Assess and monitor skin integrity  - Assess and monitor nutrition and hydration status  - Monitor labs (i e  albumin)  - Assess for incontinence   - Turn and reposition patient  - Assist with mobility/ambulation  - Relieve pressure over bony prominences  - Avoid friction and shearing  - Provide appropriate hygiene as needed including keeping skin clean and dry  - Evaluate need for skin moisturizer/barrier cream  - Collaborate with interdisciplinary team (i e  Nutrition, Rehabilitation, etc )   - Patient/family teaching  Outcome: Progressing    Goal: Incision(s), wounds(s) or drain site(s) healing without S/S of infection  INTERVENTIONS  - Assess and document risk factors for skin impairment   - Assess and document dressing, incision, wound bed, drain sites and surrounding tissue  - Initiate Nutrition services consult and/or wound management as needed  Outcome: Progressing      Problem: HEMATOLOGIC - ADULT  Goal: Maintains hematologic stability  INTERVENTIONS  - Assess for signs and symptoms of bleeding or hemorrhage  - Monitor labs  - Administer supportive blood products/factors as ordered and appropriate  Outcome: Progressing

## 2018-09-07 NOTE — PROGRESS NOTES
Post op check - Colorectal Surgery  Eleni Griffiths 58 y o  female MRN: [id number]  Unit/Bed#: [facility] 521-01 Encounter: [id number]    Assessment:  59 y/o F w/ hx of rectal cancer, s/p proctectomy, permanent end colostomy, Laparoscopic hand assist abdominoperineal resection, posterior vaginectomy, omentectomy, ALEXANDRA/BSO, cystoscopy    Plan:  --NPO  --Monitor hemodynamic status  --Follow H/H   --Dilaudid PCA  --IS  --OOB, ambulate  --SQH, SCDs    Subjective/Objective     Subjective:     Post-operatively, pt with estimated 410cc of dark red output through L HAL  Hgb being monitored closely, with 2u  PRBC on hold for transfusion if necessary  Pt completely asymptomatic, denies any complaints  Objective:     Vitals: Blood pressure 113/60, pulse 101, temperature 98 1 °F (36 7 °C), temperature source Oral, resp  rate 18, height 5' 5" (1 651 m), weight 110 kg (242 lb 15 2 oz), SpO2 100 %, not currently breastfeeding  ,Body mass index is 40 43 kg/m²  I/O       09/05 0701 - 09/06 0700 09/06 0701 - 09/07 0700    I V  (mL/kg)  5745 3 (52 2)    IV Piggyback  250    Total Intake(mL/kg)  5995 3 (54 5)    Urine (mL/kg/hr)  810 (0 3)    Drains  640    Blood  150    Total Output   1600    Net   +4395 3                Physical Exam:  GEN: NAD  HEENT: MMM  CV: RRR  Lung: normal effort  Ab: Soft, + incisional tenderness, incision sites c/d/i, ostomy site pink, healthy, w/ blood-tinged output; L HAL drain w/ dark red output  Extrem: No CCE  Neuro:  A+Ox3, motor and sensation grossly intact      Lab, Imaging and other studies:   CBC with diff:   Lab Results   Component Value Date    WBC 8 95 09/06/2018    HGB 8 1 (L) 09/06/2018    HCT 27 0 (L) 09/06/2018    MCV 82 09/06/2018     09/06/2018    MCH 24 5 (L) 09/06/2018    MCHC 30 0 (L) 09/06/2018    RDW 15 9 (H) 09/06/2018    MPV 10 4 09/06/2018    NRBC 0 09/06/2018   , BMP/CMP:   Lab Results   Component Value Date     09/06/2018    K 4 0 09/06/2018     (H) 09/06/2018    CO2 24 09/06/2018    BUN 13 09/06/2018    CREATININE 0 62 09/06/2018    CALCIUM 7 7 (L) 09/06/2018    EGFR 97 09/06/2018   , Magnesium: No results found for: MAG, Coags: No results found for: PT, PTT, INR, Blood Culture: No results found for: BLOODCX, Urine Culture: No results found for: URINECX, Wound Culure: No results found for: WOUNDCULT  VTE Pharmacologic Prophylaxis: Heparin  VTE Mechanical Prophylaxis: sequential compression device

## 2018-09-07 NOTE — PLAN OF CARE
Problem: Potential for Falls  Goal: Patient will remain free of falls  INTERVENTIONS:  - Assess patient frequently for physical needs  -  Identify cognitive and physical deficits and behaviors that affect risk of falls    -  [place] fall precautions as indicated by assessment   - Educate patient/family on patient safety including physical limitations  - Instruct patient to call for assistance with activity based on assessment  - Modify environment to reduce risk of injury  - Consider OT/PT consult to assist with strengthening/mobility   Outcome: Progressing      Problem: Prexisting or High Potential for Compromised Skin Integrity  Goal: Skin integrity is maintained or improved  INTERVENTIONS:  - Identify patients at risk for skin breakdown  - Assess and monitor skin integrity  - Assess and monitor nutrition and hydration status  - Monitor labs (i e  albumin)  - Assess for incontinence   - Turn and reposition patient  - Assist with mobility/ambulation  - Relieve pressure over bony prominences  - Avoid friction and shearing  - Provide appropriate hygiene as needed including keeping skin clean and dry  - Evaluate need for skin moisturizer/barrier cream  - Collaborate with interdisciplinary team (i e  Nutrition, Rehabilitation, etc )   - Patient/family teaching   Outcome: Progressing      Problem: PAIN - ADULT  Goal: Verbalizes/displays adequate comfort level or baseline comfort level  Interventions:  - Encourage patient to monitor pain and request assistance  - Assess pain using appropriate pain scale  - Administer analgesics based on type and severity of pain and evaluate response  - Implement non-pharmacological measures as appropriate and evaluate response  - Consider cultural and social influences on pain and pain management  - Notify physician/advanced practitioner if interventions unsuccessful or patient reports new pain   Outcome: Progressing      Problem: INFECTION - ADULT  Goal: Absence or prevention of progression during hospitalization  INTERVENTIONS:  - Assess and monitor for signs and symptoms of infection  - Monitor lab/diagnostic results  - Monitor all insertion sites, i e  indwelling lines, tubes, and drains  - Monitor endotracheal (as able) and nasal secretions for changes in amount and color  - [place] appropriate cooling/warming therapies per order  - Administer medications as ordered  - Instruct and encourage patient and family to use good hand hygiene technique  - Identify and instruct in appropriate isolation precautions for identified infection/condition   Outcome: Progressing    Goal: Absence of fever/infection during neutropenic period  INTERVENTIONS:  - Monitor WBC  - Implement neutropenic guidelines   Outcome: Progressing      Problem: GASTROINTESTINAL - ADULT  Goal: Minimal or absence of nausea and/or vomiting  INTERVENTIONS:  - Administer IV fluids as ordered to ensure adequate hydration  - Maintain NPO status until nausea and vomiting are resolved  - Nasogastric tube as ordered  - Administer ordered antiemetic medications as needed  - Provide nonpharmacologic comfort measures as appropriate  - Advance diet as tolerated, if ordered  - Nutrition services referral to assist patient with adequate nutrition and appropriate food choices   Outcome: Progressing    Goal: Establish and maintain optimal ostomy function  INTERVENTIONS:  - Assess bowel function  - Encourage oral fluids to ensure adequate hydration  - Administer IV fluids as ordered to ensure adequate hydration  - Administer ordered medications as needed  - Encourage mobilization and activity  - Nutrition services referral to assist patient with appropriate food choices  - Assess stoma site   Outcome: Progressing      Problem: GENITOURINARY - ADULT  Goal: Urinary catheter remains patent  INTERVENTIONS:  - Assess patency of urinary catheter  - If patient has a chronic del real, consider changing catheter if non-functioning  - Follow guidelines for intermittent irrigation of non-functioning urinary catheter   Outcome: Progressing      Problem: SKIN/TISSUE INTEGRITY - ADULT  Goal: Skin integrity remains intact  INTERVENTIONS  - Identify patients at risk for skin breakdown  - Assess and monitor skin integrity  - Assess and monitor nutrition and hydration status  - Monitor labs (i e  albumin)  - Assess for incontinence   - Turn and reposition patient  - Assist with mobility/ambulation  - Relieve pressure over bony prominences  - Avoid friction and shearing  - Provide appropriate hygiene as needed including keeping skin clean and dry  - Evaluate need for skin moisturizer/barrier cream  - Collaborate with interdisciplinary team (i e  Nutrition, Rehabilitation, etc )   - Patient/family teaching   Outcome: Progressing    Goal: Incision(s), wounds(s) or drain site(s) healing without S/S of infection  INTERVENTIONS  - Assess and document risk factors for skin impairment   - Assess and document dressing, incision, wound bed, drain sites and surrounding tissue  - Initiate Nutrition services consult and/or wound management as needed   Outcome: Progressing      Problem: HEMATOLOGIC - ADULT  Goal: Maintains hematologic stability  INTERVENTIONS  - Assess for signs and symptoms of bleeding or hemorrhage  - Monitor labs  - Administer supportive blood products/factors as ordered and appropriate   Outcome: Progressing

## 2018-09-07 NOTE — OP NOTE
OPERATIVE REPORT  PATIENT NAME: Christoph Hendricks    :  1955  MRN: [id number]  Pt Location: BE OR ROOM 09    SURGERY DATE: 2018    Surgeon(s) and Role:  Panel 1:     * Keaton Ribeiro MD - Primary     * Sue Leonard MD - Kati Billy MD - Kati Billy MD - Assisting    Panel 2:     * Andrés Hunt MD - Primary    Preop Diagnosis:  Malignant neoplasm of rectum ([address] ) [C20]    Post-Op Diagnosis Codes:     * Malignant neoplasm of rectum ([address] ) [C20]    Procedure(s) (LRB):  PROCTECTOMY (N/A)  PERMANENT END COLOSTOMY (N/A)  LAPAROSCOPIC HAND ASSIST ABDOMINOPERINEAL RESECTION,  POSTERIOR VAGINECTOMY, OMENTECTOMY (N/A)  RADICAL HYSTERECTOMY TOTAL ABDOMINAL (ALEXANDRA)  BSO (N/A)  CYSTOSCOPY (N/A)    Specimen(s):  ID Type Source Tests Collected by Time Destination   1 : EPIPLOIC NODULE Tissue Soft Tissue, Other TISSUE EXAM Keaton Ribeiro MD 2018 1010    2 : enbloc rectum, sigmoid, anus, uterus, bso, cervix, posterior vaginal wall Tissue Rectum TISSUE EXAM Keaton Ribeiro MD 2018 1206    3 :  Tissue Omentum TISSUE EXAM Keaton Ribeiro MD 2018 1326        Estimated Blood Loss:   150 mL    Drains:  Closed/Suction Drain Left Abdomen Bulb 19 Fr  (Active)   Site Description Unable to view 2018  8:10 PM   Dressing Status Clean;Dry; Intact 2018  8:10 PM   Drainage Appearance Bloody 2018  8:10 PM   Status To bulb suction 2018  8:10 PM   Output (mL) 30 mL 2018  8:10 PM   Number of days: 0       Colostomy Descending/sigmoid LLQ (Active)   Stomal Appliance 1 piece 2018  8:10 PM   Stoma Assessment Budded 2018  8:10 PM   Stoma Shape Round 2018  8:10 PM   Peristomal Assessment Clean; Intact 2018  8:10 PM   Number of days: 0       Urethral Catheter Non-latex 18 Fr   (Active)   Site Assessment Clean;Skin intact 2018  8:10 PM   Collection Container Standard drainage bag 2018  8:10 PM   Securement Method Securing device (Describe) 9/6/2018  8:10 PM   Output (mL) 75 mL 9/6/2018  8:10 PM   Number of days: 0       [REMOVED] Urethral Catheter Non-latex 16 Fr  (Removed)   Number of days: 0       Anesthesia Type:   General    Operative Indications:    Intraoperative consultation obtained from colorectal service to assist in completion of and bloc radical hysterectomy bilateral salpingo-oophorectomy for excision of all gross visible posterior cul-de-sac/posterior uterine tumor in this patient with rectal cancer status post neoadjuvant radiotherapy now undergoing definitive abdominoperineal resection  Operative Findings:    1  Posterior cul-de-sac small tumor implants involving bilateral   uterosacral ligaments  2  Grossly normal right fallopian tube and ovary  3  Enlarged left ovary with dense adhesions to left pelvic sidewall peritoneum and retroperitoneal fat  4  Gross hematuria noted during the case  Cystoscopy performed and demonstrated normal bladder epithelium with no lesions or injuries  Bilateral ureteral orifices cannulated with 5 [place] open-ended ureteral catheter which is advanced in a retrograde fashion to 20 cm on both sides  Simultaneous abdominal examination  Confirmed localization ofureteral catheters into proximal ureters which were in continuity and with no evidence of lesions or injuries  Complications:   None    Procedure and Technique:   I was consulted by the colorectal service intraoperatively due to above-mentioned findings  Informed consent had been obtained by their service  Prior to my arrival the splenic flexure had been mobilized and the descending colon divided  The mesorectum had been completely mobilized, the ureters identified and the perineal phase of the operation initiated by the colorectal service prior to my arrival      we extended the midline hand port site inferiorly to obtain good exposure  An Jae wound retractor was placed    The uterus was grabbed at the cornua and kept under traction throughout the procedure  The peritoneum lateral to the ovarian vessels on both sides was divided and the retroperitoneal spaces entered  The ureters were identified  The ovarian vessels were skeletonized cauterized and divided with the EnSeal device  Adhesions from the left ovary to the left pelvic sidewall were divided sharply  Dense adhesions from the bladder peritoneal reflection to the anterior aspect of the uterus, presumably associated with prior history of  sections and recent radiotherapy were identified and divided sharply  The round ligaments were cauterized and divided  The parametria medial to the ureter was sharply skeletonized cauterized and divided with the EnSeal device  At this point the sponge stick was placed in the vagina and anterior colpotomy was made distal to the level of the cervix  In a retrograde fashion the base of the parametria was clamped divided and suture ligated using 0 Vicryl keeping the ureters lateralized out of harm's ways  At this point a posterior colpotomy was made and the rectovaginal septum was entered  The uterus, cervix, bilateral tubes and ovaries, parametria and posterior cul-de-sac peritoneum were excised EN bloc  At this point the vaginal cuff was closed using several figure-of-eight stitches of 0 Vicryl  The colorectal surgeons proceeded with complete mobilization of the distal rectum and perirectal attachments and the abdominal perineal resection was completed and the specimen delivered  at this point in given report from the anesthesia team about gross hematuria I performed cystoscopy  Using a 25 Citizen of [place] cystoscope with 30 degree lens the bladder was examined and the above-mentioned findings were noted  Five [place] ureteral catheter was passed in a retrograde fashion through the ureteral orifice on either side to approximately 20 cm    See multi needs abdominal palpation revealed the catheter to be in good position and the ureters in good continuity without disruptions, lesions or injuries  At the completion of cystoscopy the bladder was drained with a new indwelling 18 [place] catheter  The procedure was now turned to the colorectal surgery team for completion and closure  Please see their note for details  Final count was performed by the colorectal service prior to closure       I was present for the entire rad hysterectomy, BSO and cystoscopy portions of the procedure    Patient Disposition:  PACU     SIGNATURE: Rosendo Kramer MD, Ferol Gamble  DATE: September 6, 2018  TIME: 9:00 PM

## 2018-09-07 NOTE — CASE MANAGEMENT
Initial Clinical Review  SCHEDULED INPATIENT PROCEDURE 18 / Ester Reilly [id number] /  MEDICARE IS PRIMARY    Age/Sex: 58 y o  female    OPERATIVE REPORT  PATIENT NAME: Rob Faith    :  1955  MRN: [id number]  Pt Location: [facility] OR ROOM 09     SURGERY DATE: 2018     Preop Diagnosis:  Malignant neoplasm of rectum ([address] ) [C20]     Post-Op Diagnosis Codes:     * Malignant neoplasm of rectum (HCC) [C20]     Procedure(s) (LRB):  PROCTECTOMY (N/A)  PERMANENT END COLOSTOMY (N/A)  LAPAROSCOPIC HAND ASSIST ABDOMINOPERINEAL RESECTION,  POSTERIOR VAGINECTOMY, OMENTECTOMY (N/A)  RADICAL HYSTERECTOMY TOTAL ABDOMINAL (ALEXANDRA)  BSO (N/A)  CYSTOSCOPY (N/A)  Diagnostic laparoscopy     Anesthesia Type: General     Operative Indications:  Malignant neoplasm of rectum ([facility] ) [C20]     Operative Findings:  -Abdominoperineal resection, laparoscopic hand assist, end colostomy  -Cul de sac nodules requiring ALEXANDRA/BSO for clearance, epiploic nodule and omental nodules taken with omentectomy suspect Stage IV disease pending pathology      Complications: None           Admission Orders: Date/Time/Statement:   18 AT 1448 LEVEL 1 STEPDOWN POST OP    18 1447  Inpatient Admission Once     Transfer Service: Oncology-Surgical       Question Answer Comment   Admitting Physician Amadeo Mcclure    Level of Care Level 1 Stepdown    Estimated length of stay More than 2 Midnights    Certification I certify that inpatient services are medically necessary for this patient for a duration of greater than two midnights  See H&P and MD Progress Notes for additional information about the patient's course of treatment  18 1448             Vital Signs: BP (!) 101/48   Pulse 86   Temp 98 4 °F (36 9 °C) (Oral)   Resp 18   Ht 5' 5" (1 651 m)   Wt 105 kg (231 lb 14 8 oz)   SpO2 99%   BMI 38 59 kg/m²        Diet:   NPO POST OP       Diet Orders            Start     Ordered    18 0958  Diet Surgical; Clear Liquid;  No Carbonation Diet effective now     Question Answer Comment   Diet Type Surgical    Surgical Clear Liquid    Other Restriction(s): No Carbonation    RD to adjust diet per protocol? Yes        09/07/18 1006        Continuous IV Infusions:  IVF lactated ringers infusion  at 125 cc/hr   HYDROmorphone   PCA 0 2 mg q5mins prn given x 2       Mobility:   Up as Tolerated    DVT Prophylaxis:  SCD      Scheduled Meds:  Current Facility-Administered Medications:  diphenhydrAMINE 25 mg Intravenous Q6H PRN Fabiola Nolan MD    HYDROmorphone  Intravenous Continuous Fabiola Nolan MD    HYDROmorphone 0 2 mg Intravenous Q3H PRN Candance Netter, MD    metoclopramide 10 mg Intravenous Q6H PRN Candance Netter, MD    naloxone 0 04 mg Intravenous Q1MIN PRN Fabiola Nolan MD    ondansetron 4 mg Intravenous Q4H PRN Fabiola Nolan MD    pantoprazole 40 mg Oral Early Morning Fabiola Nolan MD    potassium chloride 20 mEq Intravenous Q2H Hiwot Balderas PA-C Last Rate: 20 mEq (09/07/18 1105)       PRN Meds:    diphenhydrAMINE    IV HYDROmorphone 0 2 mg q3hrs prn given x 1    IV metoclopramide 10 mg q6hrs prn given x 2    naloxone    IV tondansetron 4 mg q4hrs prn given x 2           Colorectal  Progress Notes  POD # 1 Date of Service: 9/7/2018  6:23 AM     Assessment:  57 yo female with h/o Morbid Obesity s/p RNY GB, Chronic Back Pain, GERD, Aortic Stenosis, who presented with rectal cancer now 1 Day Post-Op s/p Lap hand assisted, APR, ALEXANDRA-BSO, posterior vaginectomy, and end colostomy      Plan:  - D/C del real  - Consider CLD  - Hold SQH, continue SCDs  - Pain control with PCA  - OOBTC  - Dispo: Pending return of bowel function         Subjective/Objective     Chief Complaint: Abd Pain, IV site pain     Subjective: Overnight patient had a misleading hgb of 5 0 and recheck was 8 1  Patient having some pain in her abdomen that is fairly well controlled with the PCA  Denies chest pain or SOB   Denies nausea      Objective:   Blood pressure 113/58, pulse 68, temperature 98 °F (36 7 °C), temperature source Oral, resp  rate 18, height 5' 5" (1 651 m), weight 105 kg (231 lb 14 8 oz), SpO2 100 %, not currently breastfeeding  ,Body mass index is 38 59 kg/m²         Intake/Output Summary (Last 24 hours) at 09/07/18 1579  Last data filed at 09/07/18 4700    Gross per 24 hour   Intake          7838 16 ml   Output             1935 ml   Net          5903 16 ml             Invasive Devices            Peripheral Intravenous Line                     Peripheral IV 09/06/18 Left Hand less than 1 day      Peripheral IV 09/06/18 Right Hand less than 1 day                   Arterial Line               Arterial Line 09/06/18 Left Radial less than 1 day                   Drain                     Closed/Suction Drain Left Abdomen Bulb 19 Fr  less than 1 day      Colostomy Descending/sigmoid LLQ less than 1 day      Urethral Catheter Non-latex 18 Fr  less than 1 day                     Physical Exam:  Gen: NAD, A&O, Comfortable in Bed  Chest: Normal work of breathing, no resp distress  Abd: S, ND, approp ttp, drain sanguinous, incisions CDI, ostomy slightly dusky but appears viable, no gas in bag  Ext: No edema  Skin: warm, dry, intact      Lab, Imaging and other studies:  CBC:         Lab Results   Component Value Date     WBC 11 02 (H) 09/07/2018     HGB 7 5 (L) 09/07/2018     HCT 25 7 (L) 09/07/2018     MCV 82 09/07/2018      09/07/2018     MCH 23 9 (L) 09/07/2018     MCHC 29 2 (L) 09/07/2018     RDW 15 9 (H) 09/07/2018     MPV 11 0 09/07/2018     NRBC 0 09/07/2018     VTE Pharmacologic Prophylaxis: Sequential compression device (Venodyne)   VTE Mechanical Prophylaxis: sequential compression device

## 2018-09-07 NOTE — PROGRESS NOTES
Spoke with René regarding the pt's blood pressure  Lalo gtt stopped at approx 0930  SBP 86 on A-line and 88 manually  Will evaluate at bedside to determine plan of care

## 2018-09-07 NOTE — CONSULTS
Assessment --Patient seen post-op day #1   The patient is in the bed for the assessment of the stoma   The stoma is on the left quadrant with 1 piece appliance in place   Noted creased areas in the semi  sitting position   Patient had gastric bypass in the past and has some loose skin in this area   Noted bloody drainage in the bag with moist red budded stoma measuring 1 3/4 inch by 1 5/8 inch   No bag change to be done today   Placed ostomy supplies in the patient 's room for teaching for ostomy care   The patient was given colostomy literature pre-op   Plan -- Continue with ostomy care and teaching   Will follow                                    Janette Elizabeth RN BSN Jennifer Chávez

## 2018-09-08 PROBLEM — D62 ACUTE BLOOD LOSS ANEMIA: Status: ACTIVE | Noted: 2018-09-08

## 2018-09-08 PROBLEM — G89.18 POSTOPERATIVE PAIN: Status: ACTIVE | Noted: 2018-09-08

## 2018-09-08 LAB
ABO GROUP BLD BPU: NORMAL
ABO GROUP BLD BPU: NORMAL
ANION GAP SERPL CALCULATED.3IONS-SCNC: 6 MMOL/L (ref 4–13)
BASOPHILS # BLD AUTO: 0.01 THOUSANDS/ΜL (ref 0–0.1)
BASOPHILS NFR BLD AUTO: 0 % (ref 0–1)
BPU ID: NORMAL
BPU ID: NORMAL
BUN SERPL-MCNC: 9 MG/DL (ref 5–25)
CALCIUM SERPL-MCNC: 7.9 MG/DL (ref 8.3–10.1)
CHLORIDE SERPL-SCNC: 106 MMOL/L (ref 100–108)
CO2 SERPL-SCNC: 26 MMOL/L (ref 21–32)
CREAT SERPL-MCNC: 0.45 MG/DL (ref 0.6–1.3)
EOSINOPHIL # BLD AUTO: 0.06 THOUSAND/ΜL (ref 0–0.61)
EOSINOPHIL NFR BLD AUTO: 1 % (ref 0–6)
ERYTHROCYTE [DISTWIDTH] IN BLOOD BY AUTOMATED COUNT: 15.3 % (ref 11.6–15.1)
GFR SERPL CREATININE-BSD FRML MDRD: 108 ML/MIN/1.73SQ M
GLUCOSE SERPL-MCNC: 96 MG/DL (ref 65–140)
HCT VFR BLD AUTO: 24.5 % (ref 34.8–46.1)
HCT VFR BLD AUTO: 24.9 % (ref 34.8–46.1)
HGB BLD-MCNC: 7.7 G/DL (ref 11.5–15.4)
HGB BLD-MCNC: 7.8 G/DL (ref 11.5–15.4)
IMM GRANULOCYTES # BLD AUTO: 0.02 THOUSAND/UL (ref 0–0.2)
IMM GRANULOCYTES NFR BLD AUTO: 0 % (ref 0–2)
LYMPHOCYTES # BLD AUTO: 0.36 THOUSANDS/ΜL (ref 0.6–4.47)
LYMPHOCYTES NFR BLD AUTO: 7 % (ref 14–44)
MAGNESIUM SERPL-MCNC: 2.1 MG/DL (ref 1.6–2.6)
MCH RBC QN AUTO: 25.2 PG (ref 26.8–34.3)
MCHC RBC AUTO-ENTMCNC: 30.9 G/DL (ref 31.4–37.4)
MCV RBC AUTO: 81 FL (ref 82–98)
MONOCYTES # BLD AUTO: 0.39 THOUSAND/ΜL (ref 0.17–1.22)
MONOCYTES NFR BLD AUTO: 8 % (ref 4–12)
NEUTROPHILS # BLD AUTO: 4.28 THOUSANDS/ΜL (ref 1.85–7.62)
NEUTS SEG NFR BLD AUTO: 84 % (ref 43–75)
NRBC BLD AUTO-RTO: 0 /100 WBCS
PHOSPHATE SERPL-MCNC: 1.7 MG/DL (ref 2.3–4.1)
PLATELET # BLD AUTO: 107 THOUSANDS/UL (ref 149–390)
PMV BLD AUTO: 10.7 FL (ref 8.9–12.7)
POTASSIUM SERPL-SCNC: 3.9 MMOL/L (ref 3.5–5.3)
RBC # BLD AUTO: 3.06 MILLION/UL (ref 3.81–5.12)
SODIUM SERPL-SCNC: 138 MMOL/L (ref 136–145)
UNIT DISPENSE STATUS: NORMAL
UNIT DISPENSE STATUS: NORMAL
UNIT PRODUCT CODE: NORMAL
UNIT PRODUCT CODE: NORMAL
UNIT RH: NORMAL
UNIT RH: NORMAL
WBC # BLD AUTO: 5.12 THOUSAND/UL (ref 4.31–10.16)

## 2018-09-08 PROCEDURE — 85025 COMPLETE CBC W/AUTO DIFF WBC: CPT | Performed by: PHYSICIAN ASSISTANT

## 2018-09-08 PROCEDURE — 83735 ASSAY OF MAGNESIUM: CPT | Performed by: PHYSICIAN ASSISTANT

## 2018-09-08 PROCEDURE — 85014 HEMATOCRIT: CPT | Performed by: SURGERY

## 2018-09-08 PROCEDURE — 84100 ASSAY OF PHOSPHORUS: CPT | Performed by: PHYSICIAN ASSISTANT

## 2018-09-08 PROCEDURE — G8979 MOBILITY GOAL STATUS: HCPCS

## 2018-09-08 PROCEDURE — 97163 PT EVAL HIGH COMPLEX 45 MIN: CPT

## 2018-09-08 PROCEDURE — 99232 SBSQ HOSP IP/OBS MODERATE 35: CPT | Performed by: INTERNAL MEDICINE

## 2018-09-08 PROCEDURE — 87081 CULTURE SCREEN ONLY: CPT | Performed by: COLON & RECTAL SURGERY

## 2018-09-08 PROCEDURE — 80048 BASIC METABOLIC PNL TOTAL CA: CPT | Performed by: PHYSICIAN ASSISTANT

## 2018-09-08 PROCEDURE — 85018 HEMOGLOBIN: CPT | Performed by: SURGERY

## 2018-09-08 PROCEDURE — G8978 MOBILITY CURRENT STATUS: HCPCS

## 2018-09-08 RX ORDER — DEXTROSE, SODIUM CHLORIDE, AND POTASSIUM CHLORIDE 5; .45; .15 G/100ML; G/100ML; G/100ML
75 INJECTION INTRAVENOUS CONTINUOUS
Status: DISCONTINUED | OUTPATIENT
Start: 2018-09-08 | End: 2018-09-09

## 2018-09-08 RX ADMIN — DEXTROSE, SODIUM CHLORIDE, AND POTASSIUM CHLORIDE 75 ML/HR: 5; .45; .15 INJECTION INTRAVENOUS at 08:54

## 2018-09-08 RX ADMIN — DEXTROSE, SODIUM CHLORIDE, AND POTASSIUM CHLORIDE 75 ML/HR: 5; .45; .15 INJECTION INTRAVENOUS at 23:22

## 2018-09-08 RX ADMIN — POTASSIUM CHLORIDE 20 MEQ: 149 INJECTION, SOLUTION, CONCENTRATE INTRAVENOUS at 12:04

## 2018-09-08 RX ADMIN — METOCLOPRAMIDE 10 MG: 5 INJECTION, SOLUTION INTRAMUSCULAR; INTRAVENOUS at 13:06

## 2018-09-08 RX ADMIN — PANTOPRAZOLE SODIUM 40 MG: 40 TABLET, DELAYED RELEASE ORAL at 05:15

## 2018-09-08 RX ADMIN — Medication: at 09:30

## 2018-09-08 RX ADMIN — POTASSIUM CHLORIDE 20 MEQ: 149 INJECTION, SOLUTION, CONCENTRATE INTRAVENOUS at 09:15

## 2018-09-08 NOTE — PROGRESS NOTES
Spoke with Ken Sx Resident Slime Rosado, pt DTV at [phone], pt unable to go, bladder scan was 204ml but difficult to obtain so unsure of accuracy  Instruction to wait two hours and see if she is able to void  Will continue to monitor/assess

## 2018-09-08 NOTE — PHYSICAL THERAPY NOTE
Physical Therapy Evaluation:    2 forms of pt ID verified:name,birthdate and pt ID lorrie    Patient's Name: Sunshine Villela    Admitting Diagnosis  Malignant neoplasm of rectum ([facility] ) [C20]    Problem List  Patient Active Problem List   Diagnosis    Morbid obesity due to excess calories ([address] )    Postgastrectomy malabsorption    S/P gastric bypass    Marginal ulcer    Status post bariatric surgery    Atherosclerotic peripheral vascular disease ([facility] )    Diet-controlled diabetes mellitus ([facility] )    Onychomycosis    Pain in foot    Rectal cancer ([address] )    Postoperative pain    Acute blood loss anemia       Past Medical History  Past Medical History:   Diagnosis Date    Anemia     Arthritis     Cancer ([address] )     rectal    Chronic lower back pain     Chronic pain disorder     back pain    Diabetes mellitus ([address] )     GERD (gastroesophageal reflux disease)     Morbid obesity ([facility] )     Spinal stenosis     Systolic murmur     Unsteady gait     uses walker    Wears dentures     Wears glasses        Past Surgical History  Past Surgical History:   Procedure Laterality Date    ABDOMINAL PERINEAL BOWEL RESECTION W/ ILEOANAL POUCH N/A 2018    Procedure: LAPAROSCOPIC HAND ASSIST ABDOMINOPERINEAL RESECTION,  POSTERIOR VAGINECTOMY, OMENTECTOMY;  Surgeon: Devyn Johnson MD;  Location: [facility] MAIN OR;  Service: Colorectal    ABDOMINAL SURGERY      abscess removed from abdomen and right thigh, a hole in thigh closed by plastic surgeon    ABSCESS DRAINAGE      abd, (R) leg, (L) leg     SECTION       SECTION      CYSTOSCOPY N/A 2018    Procedure: CYSTOSCOPY;  Surgeon: Julio Purvis MD;  Location: [facility];  Service: Gynecology Oncology    ESOPHAGOGASTRODUODENOSCOPY N/A 2016    Procedure: ESOPHAGOGASTRODUODENOSCOPY (EGD);   Surgeon: Arcelia Burris MD;  Location: [facility];  Service:     ESOPHAGOGASTRODUODENOSCOPY N/A 3/30/2016    Procedure: ESOPHAGOGASTRODUODENOSCOPY (EGD); Surgeon: Jesica Baxter MD;  Location: [facility]; Service:     EYE SURGERY      laser eye surgery    HYSTERECTOMY N/A 9/6/2018    Procedure: RADICAL HYSTERECTOMY TOTAL ABDOMINAL (ALEXANDRA)  BSO;  Surgeon: Emerita Hall MD;  Location: [facility];  Service: Gynecology Oncology    JOINT REPLACEMENT Left 2017    hip    JOINT REPLACEMENT Right 2017    hip    NM COLONOSCOPY FLX DX W/COLLJ SPEC WHEN PFRMD N/A 3/9/2018    Procedure: COLONOSCOPY;  Surgeon: Quang Del Cid MD;  Location: [facility]; Service: Gastroenterology    NM COLONOSCOPY FLX DX W/COLLJ [facility] WHEN PFRMD N/A 9/5/2018    Procedure: COLONOSCOPY;  Surgeon: Arabella Betancourt MD;  Location: [facility] GI LAB; Service: Colorectal    NM ESOPHAGOGASTRODUODENOSCOPY TRANSORAL DIAGNOSTIC N/A 2/2/2018    Procedure: ESOPHAGOGASTRODUODENOSCOPY (EGD); Surgeon: Quang Del Cid MD;  Location: [facility] GI LAB; Service: Gastroenterology    NM LAP GASTRIC BYPASS/SOLEDAD-EN-Y N/A 7/18/2016    Procedure: BYPASS GASTRIC  SOLEDAD-EN-Y LAPAROSCOPIC;  Surgeon: Jesica Baxter MD;  [facility];  Service: Bariatrics    NM LAP, SURG COLOSTOMY N/A 9/6/2018    Procedure: PERMANENT END COLOSTOMY;  Surgeon: Arabella Betancourt MD;  Location: [facility];  Service: Colorectal    NM LAP, SURG PROCTECTOMY W COLOSTOMY N/A 9/6/2018    Procedure: PROCTECTOMY;  Surgeon: Arabella Betancourt MD;  Location: [facility];  Service: Colorectal    TUBAL LIGATION            09/08/18 1010   Note Type   Note type Eval only   Pain Assessment   Pain Assessment 0-10   Pain Score 6   Pain Type Acute pain;Surgical pain   Pain Location Abdomen;Back   Pain Orientation Mid;Lower;Bilateral   Hospital Pain Intervention(s) Repositioned; Ambulation/increased activity; Elevated; Emotional support; Environmental changes; Rest   Home Living   Type of Home Apartment   Home Layout One level;Elevator   Home Equipment Cane;Walker  (rollator,SPC;mainly use of [facility] for mobility)   Additional Comments pt reports being completely I PTA,lives alone in 1st floor apt,use of personal DME PTA,(+)drive,pt reports being completely I PTA   Prior Function   Level of [place] Independent with ADLs and functional mobility  (per pt PTA)   Lives With Alone   Receives Help From Friend(s)  (as needed per pt PTA)   ADL Assistance Independent   IADLs Independent   Falls in the last 6 months 0   Restrictions/Precautions   Other Precautions Pain; Fall Risk;O2;Telemetry;Multiple lines  (large body habitus)   General   Additional Pertinent History malignant neoplasm of rectum,rectal CA;s/p hysterectomy, resection   Family/Caregiver Present No   Cognition   Overall Cognitive Status WFL   Arousal/Participation Cooperative   Orientation Level Oriented X4   Following Commands Follows one step commands with increased time or repetition  (2* slow mobility,larger body habitus and pain)   RLE Assessment   RLE Assessment (3+/5 grossly throughout)   LLE Assessment   LLE Assessment (3+/5 grossly throughout)   Coordination   Movements are Fluid and Coordinated 0   Coordination and Movement Description slow mobility,pain,dec BLE step length,unsteady and ataxic gait pattern   Sensation WFL   Light Touch   RLE Light Touch Grossly intact   LLE Light Touch Grossly intact   Bed Mobility   Supine to Sit 3  Moderate assistance   Additional items Assist x 1;HOB elevated; Bedrails; Increased time required;Verbal cues;LE management   Transfers   Sit to Stand 3  Moderate assistance   Additional items Assist x 1;Bedrails; Increased time required;Verbal cues   Stand to Sit 2  Maximal assistance   Additional items Assist x 1; Armrests; Increased time required;Verbal cues  (for safety,education and control descent)   Stand pivot 3  Moderate assistance   Additional items Assist x 1; Increased time required;Verbal cues   Ambulation/Elevation   Gait pattern Poor UE support; Improper Weight shift; Antalgic; Forward Flexion; Wide NANCY;Shuffling; Inconsistent asuncion; Foward flexed; Short stride; Ataxia; Step to;Excessively slow   Gait Assistance 3  Moderate assist   Additional items Assist x 1;Verbal cues; Tactile cues   Assistive Device Rolling walker   Distance 5 steps bed->chair with use of RW on tile surface;limitd mobility and gait distance 2* inc pain,reports of dizziness and weakness   Balance   Static Sitting (at EOB fair and in chair good)   Dynamic Sitting Poor   Static Standing Poor   Dynamic Standing Poor   Ambulatory Poor   Endurance Deficit   Endurance Deficit Yes   Endurance Deficit Description weakness,pain,recent surgical procedure,fatigue   Activity Tolerance   Activity Tolerance Patient limited by fatigue;Patient limited by pain  (poor)   Nurse Made Aware yes (chinedu and Jon)   Assessment   Prognosis Good   Problem List Decreased strength; Impaired balance;Decreased endurance;Decreased mobility;Obesity; Decreased skin integrity;Pain   Assessment Pt is a 57 y/o female admitted to [facility] 2* rectal CA and malignant neoplasm of rectum, s/p resection and hysterectomy  Pt lives alone in 1st floor apt,no GALDINO,use of personal DME PTA,reports being completely I PTA,(+)drive  pt currently is not at functional mobility baseline,multiple lines,cont telemetry monitoring,pain,IV medicine management,ongoing medical care,recent surgical procedure and needs Ax1 for mobility  Pt demonstrates maximal to moderate deficits during functional mobility and gait including dec endurance,dec balance,dec BLE strength,inc pain,slow mobility and asuncion,ataxic and unsteady gait pattern and needs maxAx1 for transfers,modAx1 for gait and BM  pt would cont to benefit from skilled inpt PT services to maximize functional independence     Barriers to Discharge Decreased caregiver support  (lives alone)   Goals   Patient Goals to sit in a better chair   STG Expiration Date 09/18/18   Short Term Goal #1 in 7-10 days:pt will be able to ambulate >150 feet with use of least restrictive DME on various surfaces minAx1 and chair follow to A to return to PLOF,activity tolerance;45mins/45mins,inc balance 1/2 grade to dec fall risk,inc BLE strength 1/2 grade to A to inc balance,strength,endurance,mobility and dec pain,BM and transfers S level of A to and from various surfaces to A to return to PLOF,I with BLE ther ex HEP in various positions to A to inc balance,strength and endurance   Treatment Day 0   Plan   Treatment/Interventions Functional transfer training;LE strengthening/ROM; Therapeutic exercise; Endurance training;Patient/family training;Equipment eval/education; Bed mobility;Gait training;Spoke to nursing   PT Frequency (3-5x/week)   Recommendation   Recommendation Other (Comment)  (inpt rhb recommended upon D/C)   Equipment Recommended Walker  (currently use of RW for mobility)   Barthel Index   Feeding 10   Bathing 0   Grooming Score 0   Dressing Score 0   Bladder Score 10   Bowels Score 10   Toilet Use Score 5   Transfers (Bed/Chair) Score 5   Mobility (Level Surface) Score 0   Stairs Score 0   Barthel Index Score 40   Skilled PT recommended while in hospital and upon DC to progress pt toward treatment goals       Everett Piña, PT, DPT

## 2018-09-08 NOTE — PLAN OF CARE
Problem: Potential for Falls  Goal: Patient will remain free of falls  INTERVENTIONS:  - Assess patient frequently for physical needs  -  Identify cognitive and physical deficits and behaviors that affect risk of falls    -  [place] fall precautions as indicated by assessment   - Educate patient/family on patient safety including physical limitations  - Instruct patient to call for assistance with activity based on assessment  - Modify environment to reduce risk of injury  - Consider OT/PT consult to assist with strengthening/mobility   Outcome: Progressing      Problem: Prexisting or High Potential for Compromised Skin Integrity  Goal: Skin integrity is maintained or improved  INTERVENTIONS:  - Identify patients at risk for skin breakdown  - Assess and monitor skin integrity  - Assess and monitor nutrition and hydration status  - Monitor labs (i e  albumin)  - Assess for incontinence   - Turn and reposition patient  - Assist with mobility/ambulation  - Relieve pressure over bony prominences  - Avoid friction and shearing  - Provide appropriate hygiene as needed including keeping skin clean and dry  - Evaluate need for skin moisturizer/barrier cream  - Collaborate with interdisciplinary team (i e  Nutrition, Rehabilitation, etc )   - Patient/family teaching   Outcome: Progressing      Problem: PAIN - ADULT  Goal: Verbalizes/displays adequate comfort level or baseline comfort level  Interventions:  - Encourage patient to monitor pain and request assistance  - Assess pain using appropriate pain scale  - Administer analgesics based on type and severity of pain and evaluate response  - Implement non-pharmacological measures as appropriate and evaluate response  - Consider cultural and social influences on pain and pain management  - Notify physician/advanced practitioner if interventions unsuccessful or patient reports new pain   Outcome: Progressing      Problem: INFECTION - ADULT  Goal: Absence or prevention of progression during hospitalization  INTERVENTIONS:  - Assess and monitor for signs and symptoms of infection  - Monitor lab/diagnostic results  - Monitor all insertion sites, i e  indwelling lines, tubes, and drains  - Monitor endotracheal (as able) and nasal secretions for changes in amount and color  - [place] appropriate cooling/warming therapies per order  - Administer medications as ordered  - Instruct and encourage patient and family to use good hand hygiene technique  - Identify and instruct in appropriate isolation precautions for identified infection/condition   Outcome: Progressing    Goal: Absence of fever/infection during neutropenic period  INTERVENTIONS:  - Monitor WBC  - Implement neutropenic guidelines   Outcome: Progressing      Problem: GASTROINTESTINAL - ADULT  Goal: Minimal or absence of nausea and/or vomiting  INTERVENTIONS:  - Administer IV fluids as ordered to ensure adequate hydration  - Maintain NPO status until nausea and vomiting are resolved  - Nasogastric tube as ordered  - Administer ordered antiemetic medications as needed  - Provide nonpharmacologic comfort measures as appropriate  - Advance diet as tolerated, if ordered  - Nutrition services referral to assist patient with adequate nutrition and appropriate food choices   Outcome: Progressing    Goal: Establish and maintain optimal ostomy function  INTERVENTIONS:  - Assess bowel function  - Encourage oral fluids to ensure adequate hydration  - Administer IV fluids as ordered to ensure adequate hydration  - Administer ordered medications as needed  - Encourage mobilization and activity  - Nutrition services referral to assist patient with appropriate food choices  - Assess stoma site   Outcome: Progressing      Problem: GENITOURINARY - ADULT  Goal: Urinary catheter remains patent  INTERVENTIONS:  - Assess patency of urinary catheter  - If patient has a chronic del real, consider changing catheter if non-functioning  - Follow guidelines for intermittent irrigation of non-functioning urinary catheter   Outcome: Progressing      Problem: SKIN/TISSUE INTEGRITY - ADULT  Goal: Skin integrity remains intact  INTERVENTIONS  - Identify patients at risk for skin breakdown  - Assess and monitor skin integrity  - Assess and monitor nutrition and hydration status  - Monitor labs (i e  albumin)  - Assess for incontinence   - Turn and reposition patient  - Assist with mobility/ambulation  - Relieve pressure over bony prominences  - Avoid friction and shearing  - Provide appropriate hygiene as needed including keeping skin clean and dry  - Evaluate need for skin moisturizer/barrier cream  - Collaborate with interdisciplinary team (i e  Nutrition, Rehabilitation, etc )   - Patient/family teaching   Outcome: Progressing    Goal: Incision(s), wounds(s) or drain site(s) healing without S/S of infection  INTERVENTIONS  - Assess and document risk factors for skin impairment   - Assess and document dressing, incision, wound bed, drain sites and surrounding tissue  - Initiate Nutrition services consult and/or wound management as needed   Outcome: Progressing      Problem: HEMATOLOGIC - ADULT  Goal: Maintains hematologic stability  INTERVENTIONS  - Assess for signs and symptoms of bleeding or hemorrhage  - Monitor labs  - Administer supportive blood products/factors as ordered and appropriate   Outcome: Progressing      Problem: DISCHARGE PLANNING - CARE MANAGEMENT  Goal: Discharge to post-acute care or home with appropriate resources  INTERVENTIONS:  - Conduct assessment to determine patient/family and health care team treatment goals, and need for post-acute services based on payer coverage, community resources, and patient preferences, and barriers to discharge  - Address psychosocial, clinical, and financial barriers to discharge as identified in assessment in conjunction with the patient/family and health care team  - Arrange appropriate level of post-acute services according to patient's   needs and preference and payer coverage in collaboration with the physician and health care team  - Communicate with and update the patient/family, physician, and health care team regarding progress on the discharge plan  - Arrange appropriate transportation to post-acute venues   Outcome: Progressing

## 2018-09-08 NOTE — PROGRESS NOTES
Progress Note - Cardiology   Lacie Quezada 58 y o  female MRN: [id number]  Encounter: [id number]  09/08/18  11:48 AM        Assessment/Plan:    1  Postoperative hypotension  Likely combination of perioperative fluid loss, relative anemia, and use of pain medications (dilaudid PCA) to adequately control pain  She is not showing any signs of end organ hypoperfusion  Extremities are warm to touch, she is mentating appropriately, and urinating, and creatinine normal  Her BP when seen in office by Dr Lisa Constantino 8/31/18 was 108/64, so she is not far from her baseline  Jeffery Blend from cardiac standpoint to d/c A line if appropriate from surgical standpoint       2  Moderate MS/AS  Normal LV function  Following with Dr Lisa Constantino  Was not symptomatic at baseline, but does not do much activity due to arthritis to assess  May need some diuresis after IVF stopped if develops significant LE swelling or SOB, but currently appears euvolemic despite IVF  About net 5 L positive currently, and weight about 7 lbs above baseline       3  DM  Diet controlled as outpt  Subjective/Objective   Chief Complaint: No chief complaint on file  Subjective: 58 y  o female with a known history of moderate AS/moderate MS, morbid obesity s/p bariatric surgery, and DM, who was admitted electively for laparascopic abdominoperineal resection, radical hysterectomy/BSO, end colostomy on 9/6/18 for rectal cancer  Postoperatively she was hypotensive requiring phenylephrine drip at 60 mcg/min to maintain MAPs in 80s  This was able to be weaned off  However, BP remains in 81-03F systolic mostly by [place]  Repeat echo done this admission shows preserved LV function with moderate MS/AS, no change from one done 8/31/18  She was transfused 2 U PRBCs 9/7 for postop acute blood loss anemia       She denies any current CP or SOB  She is on IVF at lower dose than previously, but reports she is tolerating clear liquid diet thus far  Making adequate urine   On Dilaudid PCA  Having a lot of back pain and discomfort in perineal area while sitting in chair  She reports in past after surgeries she has been pretty swollen due to IVF, but thus far has not noted any significant swelling         Patient Active Problem List   Diagnosis    Morbid obesity due to excess calories (HCC)    Postgastrectomy malabsorption    S/P gastric bypass    Marginal ulcer    Status post bariatric surgery    [facility]    Diet-controlled diabetes mellitus ([facility]    Onychomycosis    Pain in foot    Rectal cancer (HCC)    Postoperative pain    Acute blood loss anemia     Past Medical History:   Diagnosis Date    Anemia     Arthritis     Cancer (HCC)     rectal    Chronic lower back pain     Chronic pain disorder     back pain    Diabetes mellitus ([facility]     GERD (gastroesophageal reflux disease)     Morbid obesity ([facility]     Spinal stenosis     Systolic murmur     Unsteady gait     uses walker    Wears dentures     Wears glasses        Allergies   Allergen Reactions    Tramadol Other (See Comments)     Dizzy         Current Facility-Administered Medications   Medication Dose Route Frequency Provider Last Rate Last Dose    dextrose 5 % and sodium chloride 0 45 % with KCl 20 mEq/L infusion  75 mL/hr Intravenous Continuous Melanie Holt 75 mL/hr at 09/08/18 0854 75 mL/hr at 09/08/18 0854    diphenhydrAMINE (BENADRYL) injection 25 mg  25 mg Intravenous Q6H PRN Yolande Black MD        HYDROmorphone (DILAUDID) 1 mg/mL 50 mL PCA   Intravenous Continuous Yolande Black MD        HYDROmorphone (DILAUDID) injection 0 2 mg  0 2 mg Intravenous Q3H PRN Maurilio Earl MD   0 2 mg at 09/06/18 2112    metoclopramide (REGLAN) injection 10 mg  10 mg Intravenous Q6H PRN Maurilio Earl MD   10 mg at 09/07/18 0853    multi-electrolyte (ISOLYTE-S PH 7 4) bolus 500 mL  500 mL Intravenous Once Minor Lucero MD        naloxone [place] [facility]) 0 04 mg/mL syringe 0 04 mg  0 04 mg Intravenous Q1MIN PRN Law Samuel MD        ondansetron [facility]) injection 4 mg  4 mg Intravenous Q4H PRN Law Samuel MD   4 mg at 09/06/18 2027    pantoprazole (PROTONIX) EC tablet 40 mg  40 mg Oral Early Morning Law Samuel MD   40 mg at 09/08/18 0515    potassium chloride 20 mEq in dextrose 5 % 100 mL IVPB  20 mEq Intravenous Q2H Nick Jacobsen MD 40 mL/hr at 09/08/18 0947 20 mEq at 09/08/18 0947       Vitals: BP (!) 86/48 (BP Location: Right arm)   Pulse 75   Temp 97 7 °F (36 5 °C) (Oral)   Resp 20   Ht 5' 5" (1 651 m)   Wt 108 kg (237 lb 14 oz)   SpO2 95%   BMI 39 58 kg/m²     Intake/Output Summary (Last 24 hours) at 09/08/18 1148  Last data filed at 09/08/18 0930   Gross per 24 hour   Intake             1744 ml   Output             2785 ml   Net            -1041 ml     Wt Readings from Last 3 Encounters:   09/08/18 108 kg (237 lb 14 oz)   09/05/18 104 kg (230 lb)   08/31/18 105 kg (230 lb 14 4 oz)       Body mass index is 39 58 kg/m²  ,     Vitals:    09/08/18 0212 09/08/18 0346 09/08/18 0703 09/08/18 1139   BP: 92/53 (!) 98/44 99/58 (!) 86/48   Pulse: 76 78 76 75   Patient Position - Orthostatic VS: Lying  Lying Sitting       Physical Exam:     GEN: Awake, alert, mildly uncomfortable and fatigued, in no acute distress  HEENT: Sclera anicteric, conjunctivae pink, mucous membranes moist   NECK: Supple, no carotid bruits, no significant JVD  HEART: Regular rhythm, normal S1 and S2, no murmurs, clicks, gallops or rubs  LUNGS: Clear to auscultation anteriorly bilaterally; no wheezes, rales, or rhonchi   ABDOMEN: Mildly distended, mildly decreased bowel sounds  EXTREMITIES: Skin warm and well perfused, no clubbing, cyanosis, or edema  NEURO: No focal findings  SKIN: Normal without suspicious lesions on exposed skin        Lab Results:     BMP:  Results from last 7 days  Lab Units 09/08/18  0514 09/07/18  1537 09/07/18  0439 09/06/18  2243 09/06/18  1506   SODIUM mmol/L 138 136 140 143 141   POTASSIUM mmol/L 3 9 4 3 3 9 4 0 4 0   CHLORIDE mmol/L 106 106 110* 110* 110*   CO2 mmol/L 26 25 25 24 22   BUN mg/dL 9 13 13 13 12   CREATININE mg/dL 0 45* 0 56* 0 68 0 62 0 76   CALCIUM mg/dL 7 9* 7 6* 8 1* 7 7* 7 6*       CBC:   Results from last 7 days  Lab Units 09/08/18  0514 09/08/18  0235 09/07/18  1537 09/07/18  0439 09/07/18  0248 09/06/18  2328 09/06/18  2243 09/06/18  2220  09/06/18  1506   WBC Thousand/uL 5 12  --  5 70 11 02* 10 44* 8 95  --   --   --  7 30   HEMOGLOBIN g/dL 7 7* 7 8* 6 2* 7 5* 7 8* 8 1* 5 0*  --   < > 8 6*   HEMATOCRIT % 24 9* 24 5* 20 9* 25 7* 25 8* 27 0* 17 4*  --   < > 28 4*   MCV fL 81*  --  81* 82 82 82  --   --   --  82   PLATELETS Thousands/uL 107*  --  113* 185 160 162  --  188  --  218   MCH pg 25 2*  --  24 1* 23 9* 24 7* 24 5*  --   --   --  24 7*   MCHC g/dL 30 9*  --  29 7* 29 2* 30 2* 30 0*  --   --   --  30 3*   RDW % 15 3*  --  16 2* 15 9* 15 9* 15 9*  --   --   --  15 9*   MPV fL 10 7  --  10 8 11 0 10 2 10 4  --  10 6  --  10 5   NRBC AUTO /100 WBCs 0  --  0  --  0 0  --   --   --   --    < > = values in this interval not displayed  Results from last 7 days  Lab Units 09/08/18 0514 09/07/18  0439   MAGNESIUM mg/dL 2 1 1 8       INR:     Results from last 7 days  Lab Units 09/07/18  1537   INR  1 24*       Lipid Profile:   Lab Results   Component Value Date    CHOL 159 03/26/2014     Lab Results   Component Value Date    HDL 53 02/12/2018    HDL 46 08/09/2017    HDL 37 (L) 10/03/2016     Lab Results   Component Value Date    LDLCALC 114 (H) 02/12/2018    LDLCALC 99 08/09/2017    LDLCALC 143 (H) 10/03/2016     Lab Results   Component Value Date    TRIG 141 02/12/2018    TRIG 91 08/09/2017    TRIG 248 (H) 10/03/2016         Hgb A1c:         Telemetry: personally reviewed, SR, no events

## 2018-09-08 NOTE — ASSESSMENT & PLAN NOTE
S/p lap hand assisted APR, ALEXANDRA/BSO, posterior vaginectomy, end colostomy  Continue ostomy care  Continue drain on discharge  Dry gauze dressing to perineal wound

## 2018-09-08 NOTE — PROGRESS NOTES
Progress Note - Colorectal Surgery   [facility] 58 y o  female MRN: [id number]  Unit/Bed#: [facility] 521-01 Encounter: [id number]    Assessment/Plan:  58 y o  female with rectal Ca     Acute blood loss anemia   Assessment & Plan    S/p 2 u pRBC 9/8  Trend hemoglobin  Maintain drain        Postoperative pain   Assessment & Plan    Continue PCA  OOB today        * Rectal cancer ([address] )   Assessment & Plan    S/p lap hand assisted APR, ALEXANDRA/BSO, posterior vaginectomy, end colostomy  Await return of bowel function  Continue ostomy care            Subjective/Objective     Subjective: Patient reports dizziness since surgery  Reports pain at sacrum and posterior incision site  Has trouble getting out of bed  Objective:     Vitals: Blood pressure 92/53, pulse 76, temperature (!) 97 3 °F (36 3 °C), temperature source Oral, resp  rate 18, height 5' 5" (1 651 m), weight 108 kg (237 lb 14 oz), SpO2 99 %, not currently breastfeeding  ,Body mass index is 39 58 kg/m²  I/O       09/06 0701 - 09/07 0700 09/07 0701 - 09/08 0700    P  O   720    I V  (mL/kg) 6588 2 (62 7) 1633 8 (15 1)    IV Piggyback 1250     Total Intake(mL/kg) 7838 2 (74 6) 2353 8 (21 8)    Urine (mL/kg/hr) 1035 (0 4) 2325 (0 9)    Drains 800 235    Stool  0    Blood 150     Total Output 1985 2560    Net +5853 2 -206 2                Physical Exam:  GEN: NAD  HEENT: MMM  CV: RRR  Lung: Normal effort  Ab: Soft, ostomy pink with minimal liquid output, HAL serosang, bottom wound C/D/I  Extrem: No CCE  Neuro:  A+Ox3    Lab, Imaging and other studies:   CBC with diff:   Lab Results   Component Value Date    WBC 5 12 09/08/2018    HGB 7 7 (L) 09/08/2018    HCT 24 9 (L) 09/08/2018    MCV 81 (L) 09/08/2018     (L) 09/08/2018    MCH 25 2 (L) 09/08/2018    MCHC 30 9 (L) 09/08/2018    RDW 15 3 (H) 09/08/2018    MPV 10 7 09/08/2018    NRBC 0 09/08/2018   , BMP/CMP:   Lab Results   Component Value Date     09/08/2018    K 3 9 09/08/2018     09/08/2018    CO2 26 09/08/2018    BUN 9 09/08/2018    CREATININE 0 45 (L) 09/08/2018    CALCIUM 7 9 (L) 09/08/2018    EGFR 108 09/08/2018   , Magnesium: No results found for: MAG  VTE Pharmacologic Prophylaxis: Sequential compression device (Venodyne)   VTE Mechanical Prophylaxis: sequential compression device

## 2018-09-08 NOTE — PLAN OF CARE
Problem: PHYSICAL THERAPY ADULT  Goal: Performs mobility at highest level of function for planned discharge setting  See evaluation for individualized goals  Treatment/Interventions: Functional transfer training, LE strengthening/ROM, Therapeutic exercise, Endurance training, Patient/family training, Equipment eval/education, Bed mobility, Gait training, Spoke to nursing  Equipment Recommended: [place] Raisin (currently use of RW for mobility)       See flowsheet documentation for full assessment, interventions and recommendations  Prognosis: Good  Problem List: Decreased strength, Impaired balance, Decreased endurance, Decreased mobility, Obesity, Decreased skin integrity, Pain  Assessment: Pt is a 57 y/o female admitted to [facility]* rectal CA and malignant neoplasm of rectum, s/p resection and hysterectomy  Pt lives alone in 1st floor apt,no GALDINO,use of personal DME PTA,reports being completely I PTA,(+)drive  pt currently is not at functional mobility baseline,multiple lines,cont telemetry monitoring,pain,IV medicine management,ongoing medical care,recent surgical procedure and needs Ax1 for mobility  Pt demonstrates maximal to moderate deficits during functional mobility and gait including dec endurance,dec balance,dec BLE strength,inc pain,slow mobility and asuncion,ataxic and unsteady gait pattern and needs maxAx1 for transfers,modAx1 for gait and BM  pt would cont to benefit from skilled inpt PT services to maximize functional independence  Barriers to Discharge: Decreased caregiver support (lives alone)     Recommendation: Other (Comment) (inpt rhb recommended upon D/C)          See flowsheet documentation for full assessment

## 2018-09-09 LAB
ANION GAP SERPL CALCULATED.3IONS-SCNC: 7 MMOL/L (ref 4–13)
BASOPHILS # BLD AUTO: 0.01 THOUSANDS/ΜL (ref 0–0.1)
BASOPHILS NFR BLD AUTO: 0 % (ref 0–1)
BUN SERPL-MCNC: 7 MG/DL (ref 5–25)
CALCIUM SERPL-MCNC: 7.9 MG/DL (ref 8.3–10.1)
CHLORIDE SERPL-SCNC: 105 MMOL/L (ref 100–108)
CO2 SERPL-SCNC: 24 MMOL/L (ref 21–32)
CREAT SERPL-MCNC: 0.47 MG/DL (ref 0.6–1.3)
EOSINOPHIL # BLD AUTO: 0.12 THOUSAND/ΜL (ref 0–0.61)
EOSINOPHIL NFR BLD AUTO: 2 % (ref 0–6)
ERYTHROCYTE [DISTWIDTH] IN BLOOD BY AUTOMATED COUNT: 15.4 % (ref 11.6–15.1)
GFR SERPL CREATININE-BSD FRML MDRD: 106 ML/MIN/1.73SQ M
GLUCOSE SERPL-MCNC: 123 MG/DL (ref 65–140)
HCT VFR BLD AUTO: 28 % (ref 34.8–46.1)
HGB BLD-MCNC: 8.4 G/DL (ref 11.5–15.4)
IMM GRANULOCYTES # BLD AUTO: 0.04 THOUSAND/UL (ref 0–0.2)
IMM GRANULOCYTES NFR BLD AUTO: 1 % (ref 0–2)
LYMPHOCYTES # BLD AUTO: 0.48 THOUSANDS/ΜL (ref 0.6–4.47)
LYMPHOCYTES NFR BLD AUTO: 9 % (ref 14–44)
MAGNESIUM SERPL-MCNC: 1.9 MG/DL (ref 1.6–2.6)
MCH RBC QN AUTO: 24.8 PG (ref 26.8–34.3)
MCHC RBC AUTO-ENTMCNC: 30 G/DL (ref 31.4–37.4)
MCV RBC AUTO: 83 FL (ref 82–98)
MONOCYTES # BLD AUTO: 0.4 THOUSAND/ΜL (ref 0.17–1.22)
MONOCYTES NFR BLD AUTO: 7 % (ref 4–12)
MRSA NOSE QL CULT: NORMAL
NEUTROPHILS # BLD AUTO: 4.53 THOUSANDS/ΜL (ref 1.85–7.62)
NEUTS SEG NFR BLD AUTO: 81 % (ref 43–75)
NRBC BLD AUTO-RTO: 0 /100 WBCS
PLATELET # BLD AUTO: 136 THOUSANDS/UL (ref 149–390)
PMV BLD AUTO: 11 FL (ref 8.9–12.7)
POTASSIUM SERPL-SCNC: 4.1 MMOL/L (ref 3.5–5.3)
RBC # BLD AUTO: 3.39 MILLION/UL (ref 3.81–5.12)
SODIUM SERPL-SCNC: 136 MMOL/L (ref 136–145)
WBC # BLD AUTO: 5.58 THOUSAND/UL (ref 4.31–10.16)

## 2018-09-09 PROCEDURE — G8987 SELF CARE CURRENT STATUS: HCPCS

## 2018-09-09 PROCEDURE — G8988 SELF CARE GOAL STATUS: HCPCS

## 2018-09-09 PROCEDURE — 97166 OT EVAL MOD COMPLEX 45 MIN: CPT

## 2018-09-09 PROCEDURE — 83735 ASSAY OF MAGNESIUM: CPT | Performed by: SURGERY

## 2018-09-09 PROCEDURE — 99232 SBSQ HOSP IP/OBS MODERATE 35: CPT | Performed by: INTERNAL MEDICINE

## 2018-09-09 PROCEDURE — 85025 COMPLETE CBC W/AUTO DIFF WBC: CPT | Performed by: SURGERY

## 2018-09-09 PROCEDURE — 80048 BASIC METABOLIC PNL TOTAL CA: CPT | Performed by: SURGERY

## 2018-09-09 RX ORDER — HEPARIN SODIUM 5000 [USP'U]/ML
5000 INJECTION, SOLUTION INTRAVENOUS; SUBCUTANEOUS EVERY 8 HOURS SCHEDULED
Status: DISCONTINUED | OUTPATIENT
Start: 2018-09-09 | End: 2018-09-12 | Stop reason: HOSPADM

## 2018-09-09 RX ORDER — OXYCODONE HYDROCHLORIDE 10 MG/1
10 TABLET ORAL EVERY 4 HOURS PRN
Status: DISCONTINUED | OUTPATIENT
Start: 2018-09-09 | End: 2018-09-10

## 2018-09-09 RX ORDER — OXYCODONE HYDROCHLORIDE 5 MG/1
5 TABLET ORAL EVERY 4 HOURS PRN
Status: DISCONTINUED | OUTPATIENT
Start: 2018-09-09 | End: 2018-09-10

## 2018-09-09 RX ADMIN — PANTOPRAZOLE SODIUM 40 MG: 40 TABLET, DELAYED RELEASE ORAL at 05:12

## 2018-09-09 RX ADMIN — HEPARIN SODIUM 5000 UNITS: 5000 INJECTION, SOLUTION INTRAVENOUS; SUBCUTANEOUS at 14:19

## 2018-09-09 RX ADMIN — OXYCODONE HYDROCHLORIDE 10 MG: 10 TABLET ORAL at 21:16

## 2018-09-09 RX ADMIN — HEPARIN SODIUM 5000 UNITS: 5000 INJECTION, SOLUTION INTRAVENOUS; SUBCUTANEOUS at 08:07

## 2018-09-09 RX ADMIN — HEPARIN SODIUM 5000 UNITS: 5000 INJECTION, SOLUTION INTRAVENOUS; SUBCUTANEOUS at 21:16

## 2018-09-09 RX ADMIN — OXYCODONE HYDROCHLORIDE 10 MG: 10 TABLET ORAL at 16:56

## 2018-09-09 RX ADMIN — OXYCODONE HYDROCHLORIDE 10 MG: 10 TABLET ORAL at 12:32

## 2018-09-09 RX ADMIN — OXYCODONE HYDROCHLORIDE 10 MG: 10 TABLET ORAL at 08:07

## 2018-09-09 NOTE — OCCUPATIONAL THERAPY NOTE
[address] Evaluation     Patient Name: William Chaves Date: 2018  Problem List  Patient Active Problem List   Diagnosis    Morbid obesity due to excess calories [facility]    Postgastrectomy malabsorption    S/P gastric bypass    Marginal ulcer    Status post bariatric surgery    Atherosclerotic peripheral vascular disease ([facility]    Diet-controlled diabetes mellitus ([facility]    Onychomycosis    Pain in foot    Rectal cancer ([facility]    Postoperative pain    Acute blood loss anemia     Past Medical History  Past Medical History:   Diagnosis Date    Anemia     Arthritis     Cancer ([facility] )     rectal    Chronic lower back pain     Chronic pain disorder     back pain    Diabetes mellitus ([facility]     GERD (gastroesophageal reflux disease)     Morbid obesity ([facility]     Spinal stenosis     Systolic murmur     Unsteady gait     uses walker    Wears dentures     Wears glasses      Past Surgical History  Past Surgical History:   Procedure Laterality Date    ABDOMINAL PERINEAL BOWEL RESECTION W/ ILEOANAL POUCH N/A 2018    Procedure: LAPAROSCOPIC HAND ASSIST ABDOMINOPERINEAL RESECTION,  POSTERIOR VAGINECTOMY, OMENTECTOMY;  Surgeon: Greg Longoria MD;  [facility];  Service: Colorectal    ABDOMINAL SURGERY      abscess removed from abdomen and right thigh, a hole in thigh closed by plastic surgeon    ABSCESS DRAINAGE      abd, (R) leg, (L) leg     SECTION       SECTION      CYSTOSCOPY N/A 2018    Procedure: CYSTOSCOPY;  Surgeon: Patria Alegria MD;  [facility];  Service: Gynecology Oncology    ESOPHAGOGASTRODUODENOSCOPY N/A 2016    Procedure: ESOPHAGOGASTRODUODENOSCOPY (EGD); Surgeon: Jatin Buck MD;  [facility];  Service:     ESOPHAGOGASTRODUODENOSCOPY N/A 3/30/2016    Procedure: ESOPHAGOGASTRODUODENOSCOPY (EGD); Surgeon: Jatin Buck MD;  [facility];   Service:     EYE SURGERY      laser eye surgery    HYSTERECTOMY N/A 9/6/2018    Procedure: RADICAL HYSTERECTOMY TOTAL ABDOMINAL (ALEXANDRA)  BSO;  Surgeon: Kenton Tinajero MD;  [facility];  Service: Gynecology Oncology    JOINT REPLACEMENT Left 2017    hip    JOINT REPLACEMENT Right 2017    hip    [place] COLONOSCOPY FLX DX W/COLLJ SPEC WHEN PFRMD N/A 3/9/2018    Procedure: COLONOSCOPY;  Surgeon: Alex Quiñonez MD;  Location: [facility] GI LAB; Service: Gastroenterology    [place] COLONOSCOPY FLX DX W/COLLJ [facility] WHEN PFRMD N/A 9/5/2018    Procedure: COLONOSCOPY;  Surgeon: Yobani Gottlieb MD;  Location:  GI LAB; Service: Colorectal    [place] ESOPHAGOGASTRODUODENOSCOPY TRANSORAL DIAGNOSTIC N/A 2/2/2018    Procedure: ESOPHAGOGASTRODUODENOSCOPY (EGD); Surgeon: Alex Quiñonez MD;  Location: [facility] GI LAB; Service: Gastroenterology    [place] LAP GASTRIC BYPASS/SOLEDAD-EN-Y N/A 7/18/2016    Procedure: BYPASS GASTRIC  SOLEDAD-EN-Y LAPAROSCOPIC;  Surgeon: Vicente Reddy MD;  [facility];  Service: Bariatrics    [place] LAP, SURG COLOSTOMY N/A 9/6/2018    Procedure: PERMANENT END COLOSTOMY;  Surgeon: Yobani Gottlieb MD;  [facility];  Service: Colorectal    [place] LAP, SURG PROCTECTOMY W COLOSTOMY N/A 9/6/2018    Procedure: PROCTECTOMY;  Surgeon: Yobani Gottlieb MD;  [facility];  Service: Colorectal    TUBAL LIGATION           09/09/18 0952   Note Type   Note type Eval/Treat   Restrictions/Precautions   Weight Bearing Precautions Per Order No   Other Precautions Pain; Fall Risk   Pain Assessment   Pain Assessment 0-10   Pain Score 5   Hospital Pain Intervention(s) Ambulation/increased activity   Response to Interventions tolerated   Home Living   Type of Home Apartment   Home Layout One level   Bathroom Shower/Tub Walk-in shower   Bathroom Toilet Raised   Bathroom Equipment Built-in shower seat;Grab bars in shower;Grab bars around toilet   Bathroom Accessibility Accessible via wheelchair   [address] aid;Reacher  (Rollator) Additional Comments Pt lives in a handicap accessible apt building for disabled and elderly   Prior Function   Level of [place] Independent with ADLs and functional mobility   Lives With Alone   Receives Help From Friend(s)   ADL Assistance Independent   IADLs Independent   Falls in the last 6 months 0   Lifestyle   Autonomy Pt I w/ ADLs and IADLs  Uses RW or SPC for mobility  (+)    Reciprocal Relationships Pt lives alone, however her friends and family can assist if needed   Service to Others Not working   Intrinsic Gratification Pt active PTA- enjoys assisting elederly in her building and spending time w/ grandchildren   Psychosocial   Psychosocial (WDL) WDL   ADL   Where Assessed Chair   Eating Assistance 5  Supervision/Setup   Eating Deficit Setup   Grooming Assistance 5  Supervision/Setup   UB Bathing Assistance 5  Supervision/Setup   LB Bathing Assistance 3  Moderate Assistance   [address] 5  Supervision/Setup    [facility] 3  Moderate [address]  5  Supervision/Setup   Toileting Deficit Bedside commode   Functional Assistance 4  Minimal Assistance   Bed Mobility   Additional Comments Pt seated OOB in [address] to Stand 4  Minimal assistance   Additional items Armrests   Stand to Sit 4  Minimal assistance   Additional items Armrests   Functional Mobility   Additional items Rolling walker   Balance   Static Sitting Good   Dynamic Sitting Fair +   Static Standing Fair   Dynamic Standing Fair   Activity Tolerance   Activity Tolerance Patient tolerated treatment well;Patient limited by fatigue   Nurse Made Aware Okay to see per RN, Orin Angelucci Assessment   RUE Assessment WFL   LUE Assessment   LUE Assessment WFL   Hand Function   Gross Motor Coordination Functional   Fine Motor Coordination Functional   Cognition   Overall Cognitive Status WFL   Arousal/Participation Alert; Responsive; Cooperative   Attention Within functional limits Orientation Level Oriented X4   Memory Within functional limits   Following Commands Follows all commands and directions without difficulty   Comments Pt pleasant and cooperative  She has good insight and safety awareness   Assessment   Limitation Decreased ADL status; Decreased endurance;Decreased self-care trans;Decreased high-level ADLs   Prognosis Good   Assessment Pt is a 58 y o  female who was admitted to [facility] on 9/6/2018 with Rectal cancer ([facility] )  Pt is s/p "Lap hand assisted, APR, ALEXANDRA-BSO, posterior vaginectomy, and end colostomy " Pt's comorbidities include h/o of B/L hip replacements, anemia, arthritis, chronic low back pain, DM, GERD, obesity, and spinal stenosis  At baseline pt was I w/ ADLs and IADLs  She uses a Rollator or cane from mobility  (+)   Pt lives alone in a handicap accessible apt complex  Her son lives close by  Currently pt requires S for UB ADLs, mod A for LB ADLs, and S-min A for functional mobility/transfers w/ RW  Pt currently presents with impairments in the following categories -limited home support, difficulty performing ADLS, difficulty performing IADLS, activity tolerance, endurance, standing balance/tolerance and sitting balance/tolerance  These impairments, as well as pt's fatigue, pain, decreased caregiver support and risk for falls  limit pt's ability to safely engage in all baseline areas of occupation, including bathing, dressing, toileting, functional mobility/transfers and leisure activities  From OT standpoint, recommend home w/ increased support and home OT vs STR pending progress upon D/C  Pt denying need for rehab and wants to d/c home  OT will continue to follow to address the below stated goals  Goals   Patient Goals to go home   LTG Time Frame 7-10   Long Term Goal see below goals    Plan   Treatment Interventions ADL retraining;Functional transfer training; Endurance training;Patient/family training;Equipment evaluation/education; Compensatory technique education; Energy conservation; Activityengagement   Goal Expiration Date 09/19/18   OT Frequency 3-5x/wk   Recommendation   OT Discharge Recommendation Home OT  (vs STR pending progress)   Barthel Index   Feeding 10   Bathing 0   Grooming Score 5   Dressing Score 5   Bladder Score 0  (del real)   Bowels Score 10   Toilet Use Score 5   Transfers (Bed/Chair) Score 10   Mobility (Level Surface) Score 10   Stairs Score 0   Barthel Index Score 55   Modified [place] Scale   Modified [place] Scale 4     LTG (7-10 DAYS)  Pt will perform all ADL's at mod I level with G balance and DME/AE as needed      Pt will perform functional transfers, including toilet transfer, at mod I level w/ use of DME/AE as needed       Pt will perform functional mobility at a mod I level w/ use of DME and G balance      Pt will demonstrate good carry over of RW safety and energy conservation techniques      Pt will demonstrate good carry over of pt/family education and training w/ 100% attention to task to assist w/ safe d/c planning      Pt will improve activity tolerance to G for 30 min treatment session      Pt will improve standing balance to G for 8-10 minutes of purposeful activity w/ G endurance          Ap Goel OTR/L

## 2018-09-09 NOTE — PROGRESS NOTES
Progress Note - General Surgery   Rob Faith 58 y o  female MRN: [id number]  Unit/Bed#: [facility] 521-01 Encounter: [id number]    Assessment:  59 yo F w/ rectal Ca s/p Lap hand assisted, APR, ALEXANDRA-BSO, posterior vaginectomy, and end colostomy  + Flatus  Hb 8 4 s/p 2 u pRBC  Pressures stable and WNL over past 24 hrs    Plan:  Regular diet  D/C IVF  Monitor ostomy for stool output  Will downgrade from SD1  D/C Angela- pressures stable  Place del real for urinary retention  Start PO pain meds, wean PCA  Will discuss restarting SQH - s/p 2 units transfusion    Subjective/Objective   Chief Complaint: None    Subjective: Only complaint is her urinary problems- feels like she has to go but can't  Was just bladder scanned for 400, was straight cathed twice overnight for >500 each time  Some lower abdominal pain present but well controlled w/ PCA  No N/V  Ostomy passing flatus  Ate pudding and crackers yesterday  Objective:    Blood pressure 106/64, pulse 84, temperature 98 8 °F (37 1 °C), resp  rate 19, height 5' 5" (1 651 m), weight 109 kg (239 lb 6 7 oz), SpO2 95 %, not currently breastfeeding  ,Body mass index is 39 84 kg/m²  Intake/Output Summary (Last 24 hours) at 09/09/18 0635  Last data filed at 09/09/18 3026   Gross per 24 hour   Intake          1228 35 ml   Output             3315 ml   Net         -2086 65 ml       Invasive Devices     Peripheral Intravenous Line            Peripheral IV 09/07/18 Left Antecubital 1 day          Arterial Line            Arterial Line 09/06/18 Left Radial 2 days          Drain            Closed/Suction Drain Left Abdomen Bulb 19 Fr  2 days    Colostomy Descending/sigmoid LLQ 2 days                Physical Exam:   Gen: A&O, NAD  Cardio: RRR  Lungs: CTAB  Abd: Soft, non distended, minimally tender in lower abdomen  HAL serosanginous  Ostomy w/ gas, no stool  Pink stoma  Rectum: Perineal incision c/d/i, no drainage or fluctuance   Some tenderness to palpation      Lab, Imaging and other studies:  I have personally reviewed pertinent lab results    , CBC:   Lab Results   Component Value Date    WBC 5 58 09/09/2018    HGB 8 4 (L) 09/09/2018    HCT 28 0 (L) 09/09/2018    MCV 83 09/09/2018     (L) 09/09/2018    MCH 24 8 (L) 09/09/2018    MCHC 30 0 (L) 09/09/2018    RDW 15 4 (H) 09/09/2018    MPV 11 0 09/09/2018    NRBC 0 09/09/2018   , CMP: No results found for: NA, K, CL, CO2, ANIONGAP, BUN, CREATININE, GLUCOSE, CALCIUM, AST, ALT, ALKPHOS, PROT, ALBUMIN, BILITOT, EGFR, Coagulation: No results found for: PT, INR, APTT  VTE Pharmacologic Prophylaxis: SQH  VTE Mechanical Prophylaxis: sequential compression device

## 2018-09-09 NOTE — PROGRESS NOTES
MD/ RN rounding completed with White Sx Dr Laura Mcdaniel  All questions and concerns addressed  Plan of care discussed

## 2018-09-09 NOTE — PLAN OF CARE
Problem: OCCUPATIONAL THERAPY ADULT  Goal: Performs self-care activities at highest level of function for planned discharge setting  See evaluation for individualized goals  Treatment Interventions: ADL retraining, Functional transfer training, Endurance training, Patient/family training, Equipment evaluation/education, Compensatory technique education, Energy conservation, Activityengagement          See flowsheet documentation for full assessment, interventions and recommendations  Limitation: Decreased ADL status, Decreased endurance, Decreased self-care trans, Decreased high-level ADLs  Prognosis: Good  Assessment: Pt is a 58 y o  female who was admitted to [facility] on 9/6/2018 with Rectal cancer ([facility] [address] )  Pt is s/p "Lap hand assisted, APR, ALEXANDRA-BSO, posterior vaginectomy, and end colostomy " Pt's comorbidities include h/o of B/L hip replacements, anemia, arthritis, chronic low back pain, DM, GERD, obesity, and spinal stenosis  At baseline pt was I w/ ADLs and IADLs  She uses a Rollator or cane from mobility  (+)   Pt lives alone in a handicap accessible apt complex  Her son lives close by  Currently pt requires S for UB ADLs, mod A for LB ADLs, and S-min A for functional mobility/transfers w/ RW  Pt currently presents with impairments in the following categories -limited home support, difficulty performing ADLS, difficulty performing IADLS, activity tolerance, endurance, standing balance/tolerance and sitting balance/tolerance  These impairments, as well as pt's fatigue, pain, decreased caregiver support and risk for falls  limit pt's ability to safely engage in all baseline areas of occupation, including bathing, dressing, toileting, functional mobility/transfers and leisure activities  From OT standpoint, recommend home w/ increased support and home OT vs STR pending progress upon D/C  Pt denying need for rehab and wants to d/c home   OT will continue to follow to address the below stated goals       OT Discharge Recommendation: Home OT (vs STR pending progress)

## 2018-09-09 NOTE — PROGRESS NOTES
Progress Note - Cardiology   Nellie Clinchco 58 y o  female MRN: [id number]  Encounter: [id number]  09/09/18  10:25 AM        Assessment/Plan:    1  Postoperative hypotension  Likely combination of perioperative fluid loss, relative anemia, and use of pain medications  She is not showing any signs of end organ hypoperfusion  Extremities are warm to touch, she is mentating appropriately, and urinating, and creatinine normal  Her BP when seen in office by Dr Camryn Jaimes 8/31/18 was 108/64  Now off Dilaudid PCA and A line removed  IVF also stopped, which I agree with       2  Moderate MS/AS  Normal LV function  Following with Dr Camryn Jaimes  Was not symptomatic at baseline, but does not do much activity due to arthritis to assess  May need some diuresis if develops significant LE swelling or SOB, but currently denies any significant symptoms, continue to monitor  About net 4 L positive currently, and weight about 9 lbs above baseline       3  DM  Diet controlled as outpt  Subjective/Objective   Chief Complaint: No chief complaint on file  Subjective: 58 y  o female with a known history of moderate AS/moderate MS, morbid obesity s/p bariatric surgery, and DM, who was admitted electively for laparascopic abdominoperineal resection, radical hysterectomy/BSO, end colostomy on 9/6/18 for rectal cancer  Postoperatively she was hypotensive requiring phenylephrine drip at 60 mcg/min to maintain MAPs in 80s  This was able to be weaned off  However, BP remains in 94-65E systolic mostly by [place]  Repeat echo done this admission shows preserved LV function with moderate MS/AS, no change from one done 8/31/18  She was transfused 2 U PRBCs 9/7 for postop acute blood loss anemia       She denies any current CP or SOB  IVF and Dilaudid PCA have been discontinued  She reports she was having issues with urinary retention, now Judge catheter has been placed  She is tolerating regular diet now   She reports in past after surgeries she has been pretty swollen due to IVF, but thus far has not noted any significant swelling         Patient Active Problem List   Diagnosis    Morbid obesity due to excess calories (HCC)    Postgastrectomy malabsorption    S/P gastric bypass    Marginal ulcer    Status post bariatric surgery    Atherosclerotic peripheral vascular disease ([facility] [address] )    Diet-controlled diabetes mellitus ([facility] [address] )    Onychomycosis    Pain in foot    Rectal cancer (HCC)    Postoperative pain    Acute blood loss anemia     Past Medical History:   Diagnosis Date    Anemia     Arthritis     Cancer (HCC)     rectal    Chronic lower back pain     Chronic pain disorder     back pain    Diabetes mellitus ([facility] [address] )     GERD (gastroesophageal reflux disease)     Morbid obesity ([facility] [address] )     Spinal stenosis     Systolic murmur     Unsteady gait     uses walker    Wears dentures     Wears glasses        Allergies   Allergen Reactions    Tramadol Other (See Comments)     Dizzy         Current Facility-Administered Medications   Medication Dose Route Frequency Provider Last Rate Last Dose    diphenhydrAMINE (BENADRYL) injection 25 mg  25 mg Intravenous Q6H PRN Sue Leonard MD        heparin (porcine) subcutaneous injection 5,000 Units  5,000 Units Subcutaneous Q8H [address] Melanie Holt   5,000 Units at 09/09/18 0807    HYDROmorphone (DILAUDID) injection 0 5 mg  0 5 mg Intravenous Q3H PRN Sharan Quispe MD        metoclopramide (REGLAN) injection 10 mg  10 mg Intravenous Q6H PRN Betty Saleem MD   10 mg at 09/08/18 1306    multi-electrolyte (ISOLYTE-S PH 7 4) bolus 500 mL  500 mL Intravenous Once Francisco Hicks MD        naloxone [facility]) 0 04 mg/mL syringe 0 04 mg  0 04 mg Intravenous Q1MIN PRN Sue Leonard MD        ondansetron [facility]) injection 4 mg  4 mg Intravenous Q4H PRN Sue Leonard MD   4 mg at 09/06/18 2027    oxyCODONE (ROXICODONE) immediate release tablet 10 mg  10 mg Oral Q4H PRN Sharan Quispe MD   10 mg at 09/09/18 0807    oxyCODONE (ROXICODONE) IR tablet 5 mg  5 mg Oral Q4H PRN Brunilda Cruz MD        pantoprazole (PROTONIX) EC tablet 40 mg  40 mg Oral Early Morning Fabiolanita Nolan MD   40 mg at 09/09/18 4194       Vitals: /62   Pulse 86   Temp 98 4 °F (36 9 °C) (Oral)   Resp 18   Ht 5' 5" (1 651 m)   Wt 109 kg (239 lb 6 7 oz)   SpO2 99%   BMI 39 84 kg/m²     Intake/Output Summary (Last 24 hours) at 09/09/18 1025  Last data filed at 09/09/18 0901   Gross per 24 hour   Intake          1441 65 ml   Output             3240 ml   Net         -1798 35 ml     Wt Readings from Last 3 Encounters:   09/09/18 109 kg (239 lb 6 7 oz)   09/05/18 104 kg (230 lb)   08/31/18 105 kg (230 lb 14 4 oz)       Body mass index is 39 84 kg/m²  ,     Vitals:    09/08/18 2252 09/09/18 0227 09/09/18 0807 09/09/18 0900   BP: 117/59 106/64 (!) 82/58 102/62   Pulse: 86 84 86    Patient Position - Orthostatic VS:  Lying         Physical Exam:     GEN: Awake, alert, in no acute distress  HEENT: Sclera anicteric, conjunctivae pink, mucous membranes moist   NECK: Supple, no carotid bruits, no significant JVD  HEART: Regular rhythm, normal S1 and S2, II/VI systolic murmur, no clicks, gallops or rubs  LUNGS: Clear to auscultation anteriorly bilaterally; no wheezes, rales, or rhonchi   ABDOMEN: Softer, with bowel sounds present, nontender  Slightly distended  EXTREMITIES: Skin warm and well perfused, no clubbing, cyanosis, trace pretibial edema  NEURO: No focal findings  SKIN: Normal without suspicious lesions on exposed skin        Lab Results:     BMP:    Results from last 7 days  Lab Units 09/09/18  0535 09/08/18  0514 09/07/18  1537 09/07/18  0439 09/06/18  2243 09/06/18  1506   SODIUM mmol/L 136 138 136 140 143 141   POTASSIUM mmol/L 4 1 3 9 4 3 3 9 4 0 4 0   CHLORIDE mmol/L 105 106 106 110* 110* 110*   CO2 mmol/L 24 26 25 25 24 22   BUN mg/dL 7 9 13 13 13 12   CREATININE mg/dL 0 47* 0 45* 0 56* 0 68 0 62 0 76   CALCIUM mg/dL 7 9* 7 9* 7 6* 8 1* 7 7* 7 6*       CBC:   Results from last 7 days  Lab Units 09/09/18  0535 09/08/18  0514 09/08/18  0235 09/07/18  1537 09/07/18  0439 09/07/18  0248 09/06/18  2328  09/06/18  2220  09/06/18  1506   WBC Thousand/uL 5 58 5 12  --  5 70 11 02* 10 44* 8 95  --   --   --  7 30   HEMOGLOBIN g/dL 8 4* 7 7* 7 8* 6 2* 7 5* 7 8* 8 1*  < >  --   < > 8 6*   HEMATOCRIT % 28 0* 24 9* 24 5* 20 9* 25 7* 25 8* 27 0*  < >  --   < > 28 4*   MCV fL 83 81*  --  81* 82 82 82  --   --   --  82   PLATELETS Thousands/uL 136* 107*  --  113* 185 160 162  --  188  --  218   MCH pg 24 8* 25 2*  --  24 1* 23 9* 24 7* 24 5*  --   --   --  24 7*   MCHC g/dL 30 0* 30 9*  --  29 7* 29 2* 30 2* 30 0*  --   --   --  30 3*   RDW % 15 4* 15 3*  --  16 2* 15 9* 15 9* 15 9*  --   --   --  15 9*   MPV fL 11 0 10 7  --  10 8 11 0 10 2 10 4  --  10 6  --  10 5   NRBC AUTO /100 WBCs 0 0  --  0  --  0 0  --   --   --   --    < > = values in this interval not displayed             Results from last 7 days  Lab Units 09/09/18  0535 09/08/18  0514 09/07/18  0439   MAGNESIUM mg/dL 1 9 2 1 1 8       INR:     Results from last 7 days  Lab Units 09/07/18  1537   INR  1 24*       Lipid Profile:   Lab Results   Component Value Date    CHOL 159 03/26/2014     Lab Results   Component Value Date    HDL 53 02/12/2018    HDL 46 08/09/2017    HDL 37 (L) 10/03/2016     Lab Results   Component Value Date    LDLCALC 114 (H) 02/12/2018    LDLCALC 99 08/09/2017    LDLCALC 143 (H) 10/03/2016     Lab Results   Component Value Date    TRIG 141 02/12/2018    TRIG 91 08/09/2017    TRIG 248 (H) 10/03/2016         Hgb A1c:         Telemetry: not on tele

## 2018-09-09 NOTE — PROGRESS NOTES
Pt very uncomfortable, feeling as though she needs to void but unable to  To be her 3rd straight cath  Ana Maria Rose doing rounds stated she'll place order for del real instead

## 2018-09-10 LAB
ABO GROUP BLD BPU: NORMAL
ABO GROUP BLD BPU: NORMAL
ANION GAP SERPL CALCULATED.3IONS-SCNC: 6 MMOL/L (ref 4–13)
BASOPHILS # BLD AUTO: 0.02 THOUSANDS/ΜL (ref 0–0.1)
BASOPHILS NFR BLD AUTO: 1 % (ref 0–1)
BPU ID: NORMAL
BPU ID: NORMAL
BUN SERPL-MCNC: 5 MG/DL (ref 5–25)
CALCIUM SERPL-MCNC: 8.1 MG/DL (ref 8.3–10.1)
CHLORIDE SERPL-SCNC: 105 MMOL/L (ref 100–108)
CO2 SERPL-SCNC: 27 MMOL/L (ref 21–32)
CREAT SERPL-MCNC: 0.49 MG/DL (ref 0.6–1.3)
EOSINOPHIL # BLD AUTO: 0.13 THOUSAND/ΜL (ref 0–0.61)
EOSINOPHIL NFR BLD AUTO: 3 % (ref 0–6)
ERYTHROCYTE [DISTWIDTH] IN BLOOD BY AUTOMATED COUNT: 15.4 % (ref 11.6–15.1)
GFR SERPL CREATININE-BSD FRML MDRD: 105 ML/MIN/1.73SQ M
GLUCOSE SERPL-MCNC: 81 MG/DL (ref 65–140)
HCT VFR BLD AUTO: 27.3 % (ref 34.8–46.1)
HGB BLD-MCNC: 8.1 G/DL (ref 11.5–15.4)
IMM GRANULOCYTES # BLD AUTO: 0.06 THOUSAND/UL (ref 0–0.2)
IMM GRANULOCYTES NFR BLD AUTO: 1 % (ref 0–2)
LYMPHOCYTES # BLD AUTO: 0.39 THOUSANDS/ΜL (ref 0.6–4.47)
LYMPHOCYTES NFR BLD AUTO: 9 % (ref 14–44)
MCH RBC QN AUTO: 24.6 PG (ref 26.8–34.3)
MCHC RBC AUTO-ENTMCNC: 29.7 G/DL (ref 31.4–37.4)
MCV RBC AUTO: 83 FL (ref 82–98)
MONOCYTES # BLD AUTO: 0.52 THOUSAND/ΜL (ref 0.17–1.22)
MONOCYTES NFR BLD AUTO: 12 % (ref 4–12)
NEUTROPHILS # BLD AUTO: 3.06 THOUSANDS/ΜL (ref 1.85–7.62)
NEUTS SEG NFR BLD AUTO: 74 % (ref 43–75)
NRBC BLD AUTO-RTO: 0 /100 WBCS
PF4 HEPARIN CMPLX AB SER-ACNC: 0.13 OD (ref 0–0.4)
PLATELET # BLD AUTO: 142 THOUSANDS/UL (ref 149–390)
PMV BLD AUTO: 10.7 FL (ref 8.9–12.7)
POTASSIUM SERPL-SCNC: 3.9 MMOL/L (ref 3.5–5.3)
RBC # BLD AUTO: 3.29 MILLION/UL (ref 3.81–5.12)
SODIUM SERPL-SCNC: 138 MMOL/L (ref 136–145)
UNIT DISPENSE STATUS: NORMAL
UNIT DISPENSE STATUS: NORMAL
UNIT PRODUCT CODE: NORMAL
UNIT PRODUCT CODE: NORMAL
UNIT RH: NORMAL
UNIT RH: NORMAL
WBC # BLD AUTO: 4.18 THOUSAND/UL (ref 4.31–10.16)

## 2018-09-10 PROCEDURE — 85025 COMPLETE CBC W/AUTO DIFF WBC: CPT | Performed by: SURGERY

## 2018-09-10 PROCEDURE — 97110 THERAPEUTIC EXERCISES: CPT

## 2018-09-10 PROCEDURE — 97116 GAIT TRAINING THERAPY: CPT

## 2018-09-10 PROCEDURE — 97530 THERAPEUTIC ACTIVITIES: CPT

## 2018-09-10 PROCEDURE — 99232 SBSQ HOSP IP/OBS MODERATE 35: CPT | Performed by: INTERNAL MEDICINE

## 2018-09-10 PROCEDURE — 80048 BASIC METABOLIC PNL TOTAL CA: CPT | Performed by: SURGERY

## 2018-09-10 PROCEDURE — 97535 SELF CARE MNGMENT TRAINING: CPT

## 2018-09-10 RX ORDER — POTASSIUM CHLORIDE 20 MEQ/1
40 TABLET, EXTENDED RELEASE ORAL ONCE
Status: COMPLETED | OUTPATIENT
Start: 2018-09-10 | End: 2018-09-10

## 2018-09-10 RX ORDER — TAMSULOSIN HYDROCHLORIDE 0.4 MG/1
0.4 CAPSULE ORAL
Status: DISCONTINUED | OUTPATIENT
Start: 2018-09-10 | End: 2018-09-11

## 2018-09-10 RX ORDER — ACETAMINOPHEN 325 MG/1
650 TABLET ORAL EVERY 8 HOURS SCHEDULED
Status: DISCONTINUED | OUTPATIENT
Start: 2018-09-10 | End: 2018-09-12 | Stop reason: HOSPADM

## 2018-09-10 RX ORDER — OXYCODONE HYDROCHLORIDE 10 MG/1
10 TABLET ORAL EVERY 4 HOURS PRN
Status: DISCONTINUED | OUTPATIENT
Start: 2018-09-10 | End: 2018-09-12 | Stop reason: HOSPADM

## 2018-09-10 RX ADMIN — HEPARIN SODIUM 5000 UNITS: 5000 INJECTION, SOLUTION INTRAVENOUS; SUBCUTANEOUS at 13:15

## 2018-09-10 RX ADMIN — ACETAMINOPHEN 650 MG: 325 TABLET, FILM COATED ORAL at 22:15

## 2018-09-10 RX ADMIN — OXYCODONE HYDROCHLORIDE 10 MG: 10 TABLET ORAL at 15:09

## 2018-09-10 RX ADMIN — ACETAMINOPHEN 650 MG: 325 TABLET, FILM COATED ORAL at 13:15

## 2018-09-10 RX ADMIN — TAMSULOSIN HYDROCHLORIDE 0.4 MG: 0.4 CAPSULE ORAL at 17:36

## 2018-09-10 RX ADMIN — METOCLOPRAMIDE 10 MG: 5 INJECTION, SOLUTION INTRAMUSCULAR; INTRAVENOUS at 13:15

## 2018-09-10 RX ADMIN — HEPARIN SODIUM 5000 UNITS: 5000 INJECTION, SOLUTION INTRAVENOUS; SUBCUTANEOUS at 22:15

## 2018-09-10 RX ADMIN — OXYCODONE HYDROCHLORIDE 10 MG: 10 TABLET ORAL at 23:31

## 2018-09-10 RX ADMIN — HEPARIN SODIUM 5000 UNITS: 5000 INJECTION, SOLUTION INTRAVENOUS; SUBCUTANEOUS at 05:04

## 2018-09-10 RX ADMIN — OXYCODONE HYDROCHLORIDE 10 MG: 10 TABLET ORAL at 05:08

## 2018-09-10 RX ADMIN — PANTOPRAZOLE SODIUM 40 MG: 40 TABLET, DELAYED RELEASE ORAL at 05:08

## 2018-09-10 RX ADMIN — OXYCODONE HYDROCHLORIDE 10 MG: 10 TABLET ORAL at 09:47

## 2018-09-10 RX ADMIN — HYDROMORPHONE HYDROCHLORIDE 0.5 MG: 1 INJECTION, SOLUTION INTRAMUSCULAR; INTRAVENOUS; SUBCUTANEOUS at 07:31

## 2018-09-10 RX ADMIN — POTASSIUM CHLORIDE 40 MEQ: 1500 TABLET, EXTENDED RELEASE ORAL at 09:43

## 2018-09-10 RX ADMIN — ACETAMINOPHEN 650 MG: 325 TABLET, FILM COATED ORAL at 07:32

## 2018-09-10 NOTE — PLAN OF CARE
DISCHARGE PLANNING - CARE MANAGEMENT     Discharge to post-acute care or home with appropriate resources Progressing        GASTROINTESTINAL - ADULT     Minimal or absence of nausea and/or vomiting Progressing     Establish and maintain optimal ostomy function Progressing        GENITOURINARY - ADULT     Urinary catheter remains patent Progressing        HEMATOLOGIC - ADULT     Maintains hematologic stability Progressing        INFECTION - ADULT     Absence or prevention of progression during hospitalization Progressing     Absence of fever/infection during neutropenic period Progressing        PAIN - ADULT     Verbalizes/displays adequate comfort level or baseline comfort level Progressing        Potential for Falls     Patient will remain free of falls Progressing        Prexisting or High Potential for Compromised Skin Integrity     Skin integrity is maintained or improved Progressing        SKIN/TISSUE INTEGRITY - ADULT     Skin integrity remains intact Progressing     Incision(s), wounds(s) or drain site(s) healing without S/S of infection Progressing

## 2018-09-10 NOTE — CASE MANAGEMENT
Continued Stay Review    Date/POD#: 9/10/18    Vital Signs: BP 90/54   Pulse 70   Temp 98 °F (36 7 °C) (Oral)   Resp 18   Ht 5' 5" (1 651 m)   Wt 106 kg (233 lb 11 oz)   SpO2 100%   BMI 38 89 kg/m²     Medication:   Scheduled Meds:   Current Facility-Administered Medications:  acetaminophen 650 mg Oral Q8H ANSON   diphenhydrAMINE 25 mg Intravenous Q6H PRN   heparin (porcine) 5,000 Units Subcutaneous Q8H [facility]   HYDROmorphone 1 mg Intravenous Q3H PRN   metoclopramide 10 mg Intravenous Q6H PRN   multi-electrolyte 500 mL Intravenous Once   naloxone 0 04 mg Intravenous Q1MIN PRN   ondansetron 4 mg Intravenous Q4H PRN   oxyCODONE 10 mg Oral Q4H PRN   oxyCODONE 15 mg Oral Q4H PRN   pantoprazole 40 mg Oral Early Morning   tamsulosin 0 4 mg Oral Daily With Dinner     PRN Meds: diphenhydrAMINE    HYDROmorphone x1 today     Metoclopramide x1    naloxone    ondansetron    oxyCODONE x3 today     Abnormal Labs/Diagnostic Results:     Creat 0 49  Rashad 8 1  WBC 4 18  H/H 8 1/27 3  Platelets 617    Age/Sex: 58 y o  female     Assessment/Plan:     9/10  Progress Note - Regular diet, monitor ostomy for output, cont del real, had 2 u PRBCs on 9/8  Pt needs to ambulate and participate with PT/OT  Nu out 9/11  Start Flomax today       Discharge Plan: TBD

## 2018-09-10 NOTE — PROGRESS NOTES
Cardiology Progress Note - Case Sheikh 58 y o  female MRN: [id number]    Unit/Bed#: [facility] 521-01 Encounter: [id number]      Assessment:  Principal Problem:    Rectal cancer [facility])  Active Problems: Morbid obesity due to excess calories (HCC)    S/P gastric bypass    Status post bariatric surgery    Diet-controlled diabetes mellitus ([address] )    Postoperative pain    Acute blood loss anemia      Plan:  Patient is comfortable this morning  She has no chest pain or significant dyspnea  She is lying flat in bed comfortably  Patient's BMP today is comparable to yesterday's value  Her hemoglobin is 8 1  Blood pressure is on the low side but apparently is like this at baseline  I have made no change in her medical regimen  Subjective:   Patient seen and examined  No significant events overnight   negative  Objective:     Vitals: Blood pressure 103/58, pulse 80, temperature 98 1 °F (36 7 °C), temperature source Oral, resp   rate 20, height 5' 5" (1 651 m), weight 106 kg (233 lb 11 oz), SpO2 96 %, not currently breastfeeding , Body mass index is 38 89 kg/m² , Orthostatic Blood Pressures      Most Recent Value   Blood Pressure  103/58 filed at 09/09/2018 2249   Patient Position - Orthostatic VS  Lying filed at 09/09/2018 2249      ,      Intake/Output Summary (Last 24 hours) at 09/10/18 0740  Last data filed at 09/10/18 0500   Gross per 24 hour   Intake           1242 2 ml   Output             3655 ml   Net          -2412 8 ml           Physical Exam:    GEN: Case Sheikh appears well, alert and oriented x 3, pleasant and cooperative   NECK: supple, no carotid bruits, no JVD or HJR  HEART: normal rate, regular rhythm, normal S1 and reduced S2 with a mid peaking systolic murmur heard at the right upper sternal border  LUNGS: clear to auscultation bilaterally; no wheezes, rales, or rhonchi     EXTREMITIES: peripheral pulses normal; no clubbing, cyanosis, or edema  SKIN: warm and well perfused, no suspicious lesions on exposed skin    Labs & Results:    Admission on 09/06/2018   No results displayed because visit has over 200 results  Mri Pelvis Rectal Cancer Staging Wo Contrast    Result Date: 8/20/2018  Narrative: MRI PELVIS - WITHOUT CONTRAST (RECTAL CANCER STAGING) INDICATION:  Rectal cancer status post radiation treatment  Based on Dr Bipin Gibbs note from 8/3/2018, patient is approximately 1 month status post completion of neoadjuvant chemotherapy for stage III rectal cancer  COMPARISON: MRI of the pelvis from 4/6/2018  TECHNIQUE: The following pulse sequences were obtained on a 1 5 T scanner:  Sagittal 2D FIESTA fat sat,  High resolution Coronal, Sagittal, and Axial FSE T2 weighted images of the rectum, and large field of view axial T1 weighted images of the pelvis  An additional small field of view, axial oblique T2-weighted sequence was performed through the tumor, perpendicular to the long axis of the rectum at that level  IV contrast was not given  FINDINGS: TUMOR LOCATION: There has been definite tumor response to treatment of the previously seen low rectal cancer, which had been 5 cm in length and circumferential around the rectal wall  The intraluminal portions of tumor is much less bulky  However there is is still residual viable tumor extension anteriorly, with a 1 cm nodular lesion anterior to the rectum, still contacting the mesorectal fascia and possibly the vagina  (Series 6 image 28 and 29 )  T-STAGING: Based on the above, this is still at least T3c, CRM positive lesion, and if the vaginal is involved, a T4 lesion  CIRCUMFERENTIAL RESECTION MARGIN (CRM): As above, there is a region of the rectal tumor abutment of the anterior mesorectal fascia on series 6 images 28 and 29  In addition, a left anterior lateral high risk node described above also abuts the anterior mesorectal fascia (series 6 image 11 )  This continues to be a CRM positive lesion   PERIRECTAL NODES: All lymph nodes are described on series 6: High risk nodes: Image 9, right of rectum, this no has decreased in size, currently 8 mm, previously 10 mm, also has become hypointense on T2-weighted sequences, consistent with treated tumor  Image 11, left anterolateral of rectum, 14 mm currently, which had appeared smaller on axial images of previous study, but this is likely due to differences in slice selection  This is unchanged in size and it does contact the anterior mesorectal fascia  Low risk nodes: None  LEVATOR ANI, PUBORECTALIS, ANAL SPHINCTERS: Stage I (Tumor confined to bowel wall and intact outer muscle coat )            REPRODUCTIVE STRUCTURES: Age-appropriate  BLADDER:  Normal  PELVIC CAVITY:  There is trace ascites in the pelvis  OTHER BOWEL LOOPS: Unremarkable MRI appearance  OSSEOUS STRUCTURES: There are bilateral hip replacements  VASCULAR STRUCTURES:  Visualized vasculature is patent  PELVIC WALL:  Unremarkable  Impression: Although patient's low rectal cancer has demonstrated definite treatment response in terms of much decreased bulkiness of the intraluminal tumor, the extraluminal components have persisted  In particular there is still viable tumor extension anteriorly, with a 1 cm nodular lesion anterior to the rectum, still contacting the mesorectal fascia and possibly the vagina  (Series 6 image 28 and 29 )  A high risk left anterolateral perirectal lymph node also persists, contacting the mesorectal fascia (series 6 image 11 )  Therefore this is still a T3c, CRM positive lesion, and possibly T4 lesion if vaginal is still involved  Workstation performed: [id number]       EKG personally reviewed by Louisa Mckeon MD      Counseling / Coordination of Care  Total floor / unit time spent today 30 minutes  Greater than 50% of total time was spent with the patient and / or family counseling and / or coordination of care

## 2018-09-10 NOTE — PLAN OF CARE
Problem: OCCUPATIONAL THERAPY ADULT  Goal: Performs self-care activities at highest level of function for planned discharge setting  See evaluation for individualized goals  Treatment Interventions: ADL retraining, Functional transfer training, Endurance training, Patient/family training, Equipment evaluation/education, Compensatory technique education, Energy conservation, Activityengagement          See flowsheet documentation for full assessment, interventions and recommendations  Outcome: Progressing  Limitation: Decreased ADL status, Decreased endurance, Decreased self-care trans, Decreased high-level ADLs  Prognosis: Good  Assessment: Patient participated in skilled OT with focus on ADL skills, dressing, bathing, transfer skills, activity endurance  Patient presents seated EOB motivated to engage in OT  Patient performed func mob from EOB to sink within room area to wash her hair  Patient was able to complete with supervision to [address]  Patient stood approx 5-6 minutes for this task with an increase in amount of complaints related to being "nauseous"  Patient required intermittent vc's to insure use of RW even for short distances and to plan steps of task to decrease impulsivity  Patient reports that her set up at home is "handicap accessible"  Patient performed at levels of assist as documented secondary to decreased endurance  Patient would benefit from Home OT vs STR depending on continued progress        OT Discharge Recommendation: Home OT (vs STR pending progress)  OT - OK to Discharge:  (when medically cleared)   [facility]

## 2018-09-10 NOTE — PROGRESS NOTES
Patient needs to walk and work with PT/OT    Patient needs to be OOB and ambulating    Will start Flomax 04 mg orally tonight    To remove del real catheter  @ 0600 tomorrow 9/11/18    Spoke with Shahab figueroa, for possible discharge tomorrow vs Wednesday

## 2018-09-10 NOTE — PROGRESS NOTES
Progress Note - General Surgery   Case Sheikh 58 y o  female MRN: [id number]  Unit/Bed#: [facility] 521-01 Encounter: [id number]    Assessment:  57 yo F w/ rectal Ca s/p Lap hand assisted, APR, ALEXANDRA-BSO, posterior vaginectomy, and end colostomy  Plan:  Regular diet  Monitor ostomy for stool/gas output  Continue del real for urinary retention  Pain control as needed  Continue SQH  Trend Daily HGB   - s/p 2 units pRBC 9/8     Subjective/Objective   Chief Complaint: None    Subjective: Nothing acute overnight  Patient felt "terrible" this morning  Having increased pain since her PCA was discontinued over the weekend  Asked if she could use a donut to sit on because of how tender her sutures are  Tolerated diet yesterday but states her appetite is not great and she just eats little amounts now that she is s/p RNY GB  Afebrile    Objective:  Blood pressure 103/58, pulse 80, temperature 98 1 °F (36 7 °C), temperature source Oral, resp  rate 20, height 5' 5" (1 651 m), weight 106 kg (233 lb 11 oz), SpO2 96 %, not currently breastfeeding  ,Body mass index is 38 89 kg/m²  Intake/Output Summary (Last 24 hours) at 09/10/18 6064  Last data filed at 09/10/18 0500   Gross per 24 hour   Intake           1242 2 ml   Output             4575 ml   Net          -3332 8 ml       Invasive Devices     Peripheral Intravenous Line            Peripheral IV 09/07/18 Left Antecubital 2 days          Drain            Closed/Suction Drain Left Abdomen Bulb 19 Fr  3 days    Colostomy Descending/sigmoid LLQ 3 days    Urethral Catheter 18 Fr  1 day                Physical Exam:   Gen: A&O, NAD  Cardio: RRR  Lungs: CTAB  Abd: Soft, non distended, non tender  HAL serosanginous  Ostomy w/o gas, no stool  Pink stoma  Rectum: Perineal incision c/d/i, no drainage or fluctuance  Some tenderness to palpation      Lab, Imaging and other studies:  I have personally reviewed pertinent lab results    , CBC:   No results found for: WBC, HGB, HCT, MCV, PLT, ADJUSTEDWBC, MCH, MCHC, RDW, MPV, NRBC, CMP: No results found for: NA, K, CL, CO2, ANIONGAP, BUN, CREATININE, GLUCOSE, CALCIUM, AST, ALT, ALKPHOS, PROT, ALBUMIN, BILITOT, EGFR, Coagulation: No results found for: PT, INR, APTT  VTE Pharmacologic Prophylaxis: SQH  VTE Mechanical Prophylaxis: sequential compression device

## 2018-09-10 NOTE — PHYSICAL THERAPY NOTE
PHYSICAL THERAPY TREATMENT     09/10/18 1347   Pain Assessment   Pain Assessment No/denies pain   Pain Score No Pain  (REPORTS "NAUSEA ONLY" )   Restrictions/Precautions   Weight Bearing Precautions Per Order No   Other Precautions Pain;O2;Multiple lines  (KELLEY AND HAL)   General   Chart Reviewed Yes   Response to Previous Treatment Patient reporting fatigue but able to participate  Family/Caregiver Present (friend prsent for conclusion of session )   Cognition   Overall Cognitive Status WFL   Arousal/Participation Alert   Attention Within functional limits   Orientation Level Oriented X4   Memory Within functional limits   Following Commands Follows all commands and directions without difficulty   Subjective   Subjective "Im not going to rehab- I dont care what you or the doctor says"   Bed Mobility   Supine to Sit 5  Supervision   Additional items Increased time required   Sit to Supine 5  Supervision   Additional items Increased time required   Transfers   Sit to Stand 5  Supervision   Stand to Sit 5  Supervision   Stand pivot 5  Supervision  (using rollator )   Ambulation/Elevation   Gait pattern Short stride; Foward flexed   Gait Assistance 5  Supervision   Additional items Verbal cues   Assistive Device (rollator )   Distance 250'x2   Balance   Static Sitting Good   Dynamic Sitting Good   Static Standing Fair +   Dynamic Standing Fair +   Ambulatory Fair +  (w/ UE support on rollator)   Endurance Deficit   Endurance Deficit Yes   Activity Tolerance   Activity Tolerance Patient limited by fatigue   Nurse Made Aware yes- cleared for session    Exercises   Knee AROM Long Arc Quad Sitting;15 reps;Right;Left   Ankle Pumps Sitting;25 reps;Bilateral  (x2)   Marching Standing;15 reps   Balance training  sit< stands x 4 from varied surf in room w/ S- pt noted to lock breaks appropriately    Assessment   Prognosis Good   Problem List Decreased strength;Decreased endurance; Impaired balance;Obesity;Pain;Decreased skin integrity   Assessment Pt was seen for extended session of PT w/ emphasis on functional mobility training/ gait progression/ safety education in prep for d/c home  Pt verbalizing "I'm not going to rehab and you cannot make me"   Pt fully aware that she will need to demo indep levels of mobility for safe d/c to home alone and was motivated to "show PT what I can do" and "I'll prove those doctors wrong "  Pt was able to demonstrated bed skills including supine<>sit from flat bed surface w/ S and increased time; Sit<>stands from varied surfaces w/ S (cues for slowing down); and S ambulation >250' x2 w/ rollator and w/o LOB- pt demonstrates safe locking of brakes on rollator and places rollator against walls for safety and stabiltiy w/o need for cues for PT  Pt friend who was present for conclusion of session reports pt will have assist of many friends on d/c for groceries and transport  Pt toelrates therex well and w/o complain of pain  Pt up in chair post session and was encouraged to cont ambulation w/ therapies and nursing/ restorative staff at min 3x daily  PT is recommending home w/ PT w/ ongoing use of rollator on d/c (changed from IE); increased assit of friends on d/c- pt again verbalizing "would have refused to go anywhere but home anyway "  PT to continue to follow while in acute setting for goals as stated  Barriers to Discharge Inaccessible home environment;Decreased caregiver support   Goals   Patient Goals go home    STG Expiration Date 09/18/18  (goals updated )   Short Term Goal #2 ambualtion >350' w/ rollator and MI for safe home and community ambulation; all functional xfers w/ MI for safe return to home environment; S w/ curb step negotation for community access  PT for ongoing d/c planning and DME needs to facilitate safe d/c to home environment    Plan   Treatment/Interventions Functional transfer training;LE strengthening/ROM; Elevations; Therapeutic exercise; Endurance training;Patient/family training;Equipment eval/education; Bed mobility;Gait training;Spoke to nursing;Spoke to case management   PT Frequency (3-5x/wk )   Recommendation   Recommendation Home PT  (changed from IE- CM made aware of same )   Equipment Recommended (has rollator for d/c home )   PT - OK to Discharge Yes  (when medically cleared)       Lashay Leone, PT

## 2018-09-10 NOTE — PROGRESS NOTES
Lm REYES notified that patient prefers to stay on [facility] instead of transferring to [facility]  She will inform Dr Brad Correa

## 2018-09-10 NOTE — PLAN OF CARE
Problem: PHYSICAL THERAPY ADULT  Goal: Performs mobility at highest level of function for planned discharge setting  See evaluation for individualized goals  Treatment/Interventions: Functional transfer training, LE strengthening/ROM, Therapeutic exercise, Endurance training, Patient/family training, Equipment eval/education, Bed mobility, Gait training, Spoke to nursing  Equipment Recommended: Demetris Haq (currently use of RW for mobility)       See flowsheet documentation for full assessment, interventions and recommendations  Outcome: Progressing  Prognosis: Good  Problem List: Decreased strength, Decreased endurance, Impaired balance, Obesity, Pain, Decreased skin integrity  Assessment: Pt was seen for extended session of PT w/ emphasis on functional mobility training/ gait progression/ safety education in prep for d/c home  Pt verbalizing "I'm not going to rehab and you cannot make me"   Pt fully aware that she will need to demo indep levels of mobility for safe d/c to home alone and was motivated to "show PT what I can do" and "I'll prove those doctors wrong "  Pt was able to demonstrated bed skills including supine<>sit from flat bed surface w/ S and increased time; Sit<>stands from varied surfaces w/ S (cues for slowing down); and S ambulation >250' x2 w/ rollator and w/o LOB- pt demonstrates safe locking of brakes on rollator and places rollator against walls for safety and stabiltiy w/o need for cues for PT  Pt friend who was present for conclusion of session reports pt will have assist of many friends on d/c for groceries and transport  Pt toelrates therex well and w/o complain of pain  Pt up in chair post session and was encouraged to cont ambulation w/ therapies and nursing/ restorative staff at min 3x daily   PT is recommending home w/ PT w/ ongoing use of rollator on d/c (changed from IE); increased assit of friends on d/c- pt again verbalizing "would have refused to go anywhere but home anyway "  PT to continue to follow while in acute setting for goals as stated  Barriers to Discharge: Inaccessible home environment, Decreased caregiver support     Recommendation: (S) Home PT (changed from IE- CM made aware of same )     PT - OK to Discharge: Yes (when medically cleared)    See flowsheet documentation for full assessment

## 2018-09-10 NOTE — OCCUPATIONAL THERAPY NOTE
Occupational Therapy Treatment Note:       09/10/18 1149   Pain Assessment   Pain Assessment 0-10   Pain Score 5   Pain Type Surgical pain   Pain Location Abdomen;Rectum   ADL   Where Assessed Other (Comment)  (EOB, standing at sink, sitting in recliner)   Eating Assistance 6  Modified independent   Eating Deficit Setup   Grooming Assistance 5  Supervision/Setup   Grooming Deficit Setup; Increased time to complete;Wash/dry hands; Wash/dry face;Brushing hair   UB Bathing Assistance 4  Minimal Assistance   UB Bathing Deficit Setup; Increased time to complete   UB Bathing Comments (assist with back area)   LB Bathing Assistance 4  Minimal Assistance   LB Bathing Deficit Setup; Increased time to complete; Buttocks   UB Dressing Assistance 5  Supervision/Setup   UB Dressing Deficit Setup; Increased time to complete   LB Dressing Assistance 5  Supervision/Setup   LB Dressing Deficit Setup; Increased time to complete   Transfers   Sit to Stand 5  Supervision   Additional items Assist x 1; Armrests; Increased time required;Verbal cues  (CGA)   Stand to Sit 5  Supervision   Additional items Assist x 1; Armrests; Increased time required;Verbal cues  (CGA )   Functional Mobility   Functional Mobility 5  Supervision   Additional Comments (within room bed <>sink)   Additional items Rolling walker   Cognition   Overall Cognitive Status WFL   Arousal/Participation Alert; Cooperative   Attention Within functional limits   Orientation Level Oriented X4   Memory Within functional limits   Following Commands Follows all commands and directions without difficulty   Comments intermittent vc's to plan, min impulsivity   Activity Tolerance   Activity Tolerance Patient limited by fatigue  (and "nausea")   Medical Staff Made Aware ok to see per RN [facility]   Assessment   Assessment Patient participated in skilled OT with focus on ADL skills, dressing, bathing, transfer skills, activity endurance  Patient presents seated EOB motivated to engage in OT  Patient performed func mob from EOB to sink within room area to wash her hair  Patient was able to complete with supervision to Michelet Saldana  Patient stood approx 5-6 minutes for this task with an increase in amount of complaints related to being "nauseous"  Patient required intermittent vc's to insure use of RW even for short distances and to plan steps of task to decrease impulsivity  Patient reports that her set up at home is "handicap accessible"  Patient performed at levels of assist as documented secondary to decreased endurance  Patient would benefit from Home OT vs STR depending on continued progress  Plan   Treatment Interventions ADL retraining;Functional transfer training; Endurance training   Goal Expiration Date 09/19/18   Treatment Day 1   OT Frequency 3-5x/wk   Recommendation   OT Discharge Recommendation Home OT  (vs STR pending progress)   OT - OK to Discharge (when medically cleared)   Barthel Index   Feeding 10   Bathing 0   Grooming Score 5   Dressing Score 5   Bladder Score 0   Bowels Score 10   Toilet Use Score 5   Transfers (Bed/Chair) Score 10   Mobility (Level Surface) Score 10   Stairs Score 0   Barthel Index Score 55   Modified [place] Scale   Modified [place] Scale 4   BUSHRA eMdrano

## 2018-09-11 ENCOUNTER — TELEPHONE (OUTPATIENT)
Dept: FAMILY MEDICINE CLINIC | Facility: CLINIC | Age: 63
End: 2018-09-11

## 2018-09-11 LAB
ANION GAP SERPL CALCULATED.3IONS-SCNC: 8 MMOL/L (ref 4–13)
BASOPHILS # BLD AUTO: 0.02 THOUSANDS/ΜL (ref 0–0.1)
BASOPHILS NFR BLD AUTO: 0 % (ref 0–1)
BUN SERPL-MCNC: 7 MG/DL (ref 5–25)
CALCIUM SERPL-MCNC: 8.4 MG/DL (ref 8.3–10.1)
CHLORIDE SERPL-SCNC: 103 MMOL/L (ref 100–108)
CO2 SERPL-SCNC: 27 MMOL/L (ref 21–32)
CREAT SERPL-MCNC: 0.5 MG/DL (ref 0.6–1.3)
EOSINOPHIL # BLD AUTO: 0.13 THOUSAND/ΜL (ref 0–0.61)
EOSINOPHIL NFR BLD AUTO: 2 % (ref 0–6)
ERYTHROCYTE [DISTWIDTH] IN BLOOD BY AUTOMATED COUNT: 15.4 % (ref 11.6–15.1)
GFR SERPL CREATININE-BSD FRML MDRD: 104 ML/MIN/1.73SQ M
GLUCOSE SERPL-MCNC: 96 MG/DL (ref 65–140)
HCT VFR BLD AUTO: 25.4 % (ref 34.8–46.1)
HGB BLD-MCNC: 7.7 G/DL (ref 11.5–15.4)
IMM GRANULOCYTES # BLD AUTO: 0.1 THOUSAND/UL (ref 0–0.2)
IMM GRANULOCYTES NFR BLD AUTO: 2 % (ref 0–2)
LYMPHOCYTES # BLD AUTO: 0.46 THOUSANDS/ΜL (ref 0.6–4.47)
LYMPHOCYTES NFR BLD AUTO: 8 % (ref 14–44)
MAGNESIUM SERPL-MCNC: 1.8 MG/DL (ref 1.6–2.6)
MCH RBC QN AUTO: 25.1 PG (ref 26.8–34.3)
MCHC RBC AUTO-ENTMCNC: 30.3 G/DL (ref 31.4–37.4)
MCV RBC AUTO: 83 FL (ref 82–98)
MONOCYTES # BLD AUTO: 0.8 THOUSAND/ΜL (ref 0.17–1.22)
MONOCYTES NFR BLD AUTO: 14 % (ref 4–12)
NEUTROPHILS # BLD AUTO: 4.17 THOUSANDS/ΜL (ref 1.85–7.62)
NEUTS SEG NFR BLD AUTO: 74 % (ref 43–75)
NRBC BLD AUTO-RTO: 0 /100 WBCS
PLATELET # BLD AUTO: 163 THOUSANDS/UL (ref 149–390)
PMV BLD AUTO: 10.6 FL (ref 8.9–12.7)
POTASSIUM SERPL-SCNC: 4.2 MMOL/L (ref 3.5–5.3)
RBC # BLD AUTO: 3.07 MILLION/UL (ref 3.81–5.12)
SODIUM SERPL-SCNC: 138 MMOL/L (ref 136–145)
SRA .2 IU/ML UFH SER-ACNC: <1 % (ref 0–20)
SRA 100IU/ML UFH SER-ACNC: <1 % (ref 0–20)
SRA UFH SER-IMP: NORMAL
WBC # BLD AUTO: 5.68 THOUSAND/UL (ref 4.31–10.16)

## 2018-09-11 PROCEDURE — 97116 GAIT TRAINING THERAPY: CPT

## 2018-09-11 PROCEDURE — 97530 THERAPEUTIC ACTIVITIES: CPT

## 2018-09-11 PROCEDURE — 99232 SBSQ HOSP IP/OBS MODERATE 35: CPT | Performed by: INTERNAL MEDICINE

## 2018-09-11 PROCEDURE — 85025 COMPLETE CBC W/AUTO DIFF WBC: CPT | Performed by: SURGERY

## 2018-09-11 PROCEDURE — 80048 BASIC METABOLIC PNL TOTAL CA: CPT | Performed by: SURGERY

## 2018-09-11 PROCEDURE — 83735 ASSAY OF MAGNESIUM: CPT | Performed by: PHYSICIAN ASSISTANT

## 2018-09-11 PROCEDURE — G8980 MOBILITY D/C STATUS: HCPCS

## 2018-09-11 RX ORDER — MAGNESIUM SULFATE 1 G/100ML
1 INJECTION INTRAVENOUS ONCE
Status: COMPLETED | OUTPATIENT
Start: 2018-09-11 | End: 2018-09-11

## 2018-09-11 RX ADMIN — OXYCODONE HYDROCHLORIDE 10 MG: 10 TABLET ORAL at 22:02

## 2018-09-11 RX ADMIN — ACETAMINOPHEN 650 MG: 325 TABLET, FILM COATED ORAL at 06:21

## 2018-09-11 RX ADMIN — HEPARIN SODIUM 5000 UNITS: 5000 INJECTION, SOLUTION INTRAVENOUS; SUBCUTANEOUS at 13:56

## 2018-09-11 RX ADMIN — HEPARIN SODIUM 5000 UNITS: 5000 INJECTION, SOLUTION INTRAVENOUS; SUBCUTANEOUS at 06:21

## 2018-09-11 RX ADMIN — OXYCODONE HYDROCHLORIDE 10 MG: 10 TABLET ORAL at 08:35

## 2018-09-11 RX ADMIN — OXYCODONE HYDROCHLORIDE 10 MG: 10 TABLET ORAL at 04:09

## 2018-09-11 RX ADMIN — OXYCODONE HYDROCHLORIDE 10 MG: 10 TABLET ORAL at 12:09

## 2018-09-11 RX ADMIN — ACETAMINOPHEN 650 MG: 325 TABLET, FILM COATED ORAL at 13:56

## 2018-09-11 RX ADMIN — MAGNESIUM SULFATE HEPTAHYDRATE 1 G: 1 INJECTION, SOLUTION INTRAVENOUS at 09:44

## 2018-09-11 RX ADMIN — HEPARIN SODIUM 5000 UNITS: 5000 INJECTION, SOLUTION INTRAVENOUS; SUBCUTANEOUS at 21:41

## 2018-09-11 RX ADMIN — PANTOPRAZOLE SODIUM 40 MG: 40 TABLET, DELAYED RELEASE ORAL at 06:20

## 2018-09-11 RX ADMIN — ACETAMINOPHEN 650 MG: 325 TABLET, FILM COATED ORAL at 21:41

## 2018-09-11 NOTE — PROGRESS NOTES
Cardiology Progress Note - Rob Faith 58 y o  female MRN: [id number]    Unit/Bed#: [facility] 521-01 Encounter: [id number]      Assessment:  Principal Problem:    Rectal cancer [facility])  Active Problems: Morbid obesity due to excess calories (HCC)    S/P gastric bypass    Status post bariatric surgery    Diet-controlled diabetes mellitus ([address] )    Postoperative pain    Acute blood loss anemia      Plan:  Patient is comfortable this morning  She has no chest pain or significant dyspnea  Her renal function is stable  Her potassium is 4 2  She is postop day five  She is being considered for physical therapy at the time of discharge planning  I have made no change in her medical regimen  Subjective:   Patient seen and examined  No significant events overnight   negative  Objective:     Vitals: Blood pressure 128/83, pulse 84, temperature 98 8 °F (37 1 °C), temperature source Oral, resp   rate 20, height 5' 5" (1 651 m), weight 106 kg (233 lb 11 oz), SpO2 96 %, not currently breastfeeding , Body mass index is 38 89 kg/m² , Orthostatic Blood Pressures      Most Recent Value   Blood Pressure  128/83 filed at 09/10/2018 2234   Patient Position - Orthostatic VS  Sitting filed at 09/10/2018 2234      ,      Intake/Output Summary (Last 24 hours) at 09/11/18 0716  Last data filed at 09/11/18 5475   Gross per 24 hour   Intake             1710 ml   Output             3166 ml   Net            -1456 ml             Physical Exam:    GEN: Rob Faith appears well, alert and oriented x 3, pleasant and cooperative   NECK: supple, no carotid bruits, no JVD or HJR  HEART: normal rate, regular rhythm, normal S1 and reduced S2 with a mid peaking systolic murmur heard at the right upper sternal border  LUNGS: clear to auscultation bilaterally; no wheezes, rales, or rhonchi   ABDOMEN: normal bowel sounds, soft, no tenderness, no distention  EXTREMITIES: peripheral pulses normal; no clubbing, cyanosis, or edema  SKIN: warm and well perfused, no suspicious lesions on exposed skin    Labs & Results:    Admission on 09/06/2018   No results displayed because visit has over 200 results  Mri Pelvis Rectal Cancer Staging Wo Contrast    Result Date: 8/20/2018  Narrative: MRI PELVIS - WITHOUT CONTRAST (RECTAL CANCER STAGING) INDICATION:  Rectal cancer status post radiation treatment  Based on Dr Veronica Espinosa note from 8/3/2018, patient is approximately 1 month status post completion of neoadjuvant chemotherapy for stage III rectal cancer  COMPARISON: MRI of the pelvis from 4/6/2018  TECHNIQUE: The following pulse sequences were obtained on a 1 5 T scanner:  Sagittal 2D FIESTA fat sat,  High resolution Coronal, Sagittal, and Axial FSE T2 weighted images of the rectum, and large field of view axial T1 weighted images of the pelvis  An additional small field of view, axial oblique T2-weighted sequence was performed through the tumor, perpendicular to the long axis of the rectum at that level  IV contrast was not given  FINDINGS: TUMOR LOCATION: There has been definite tumor response to treatment of the previously seen low rectal cancer, which had been 5 cm in length and circumferential around the rectal wall  The intraluminal portions of tumor is much less bulky  However there is is still residual viable tumor extension anteriorly, with a 1 cm nodular lesion anterior to the rectum, still contacting the mesorectal fascia and possibly the vagina  (Series 6 image 28 and 29 )  T-STAGING: Based on the above, this is still at least T3c, CRM positive lesion, and if the vaginal is involved, a T4 lesion  CIRCUMFERENTIAL RESECTION MARGIN (CRM): As above, there is a region of the rectal tumor abutment of the anterior mesorectal fascia on series 6 images 28 and 29  In addition, a left anterior lateral high risk node described above also abuts the anterior mesorectal fascia (series 6 image 11 )  This continues to be a CRM positive lesion   PERIRECTAL NODES: All lymph nodes are described on series 6: High risk nodes: Image 9, right of rectum, this no has decreased in size, currently 8 mm, previously 10 mm, also has become hypointense on T2-weighted sequences, consistent with treated tumor  Image 11, left anterolateral of rectum, 14 mm currently, which had appeared smaller on axial images of previous study, but this is likely due to differences in slice selection  This is unchanged in size and it does contact the anterior mesorectal fascia  Low risk nodes: None  LEVATOR ANI, PUBORECTALIS, ANAL SPHINCTERS: Stage I (Tumor confined to bowel wall and intact outer muscle coat )            REPRODUCTIVE STRUCTURES: Age-appropriate  BLADDER:  Normal  PELVIC CAVITY:  There is trace ascites in the pelvis  OTHER BOWEL LOOPS: Unremarkable MRI appearance  OSSEOUS STRUCTURES: There are bilateral hip replacements  VASCULAR STRUCTURES:  Visualized vasculature is patent  PELVIC WALL:  Unremarkable  Impression: Although patient's low rectal cancer has demonstrated definite treatment response in terms of much decreased bulkiness of the intraluminal tumor, the extraluminal components have persisted  In particular there is still viable tumor extension anteriorly, with a 1 cm nodular lesion anterior to the rectum, still contacting the mesorectal fascia and possibly the vagina  (Series 6 image 28 and 29 )  A high risk left anterolateral perirectal lymph node also persists, contacting the mesorectal fascia (series 6 image 11 )  Therefore this is still a T3c, CRM positive lesion, and possibly T4 lesion if vaginal is still involved  Workstation performed: [id number]       EKG personally reviewed by Jennifer Ramirez MD      Counseling / Coordination of Care  Total floor / unit time spent today 30 minutes  Greater than 50% of total time was spent with the patient and / or family counseling and / or coordination of care

## 2018-09-11 NOTE — PROGRESS NOTES
Progress Note - General Surgery   Aba Hilliard 58 y o  female MRN: [id number]  Unit/Bed#: [facility] 521-01 Encounter: [id number]    Assessment:  59 yo F w/ rectal Ca s/p Lap hand assisted, APR, ALEXANDRA-BSO, posterior vaginectomy, and end colostomy  Plan:  Regular diet  Monitor ostomy for stool/gas output  Continue del real for urinary retention  Pain control as needed  Continue SQH  Trend Daily HGB   - s/p 2 units pRBC 9/8   PT/OT: Recommending home PT/OT    Dispo: Pending return of bowel function      Subjective/Objective   Chief Complaint: None    Subjective: Urine retention yesterday  Flowmax started and del real replaced  Del Real came out overnight and patient has been voiding spontaneously multiple times without issue  Objective:  Blood pressure 128/83, pulse 84, temperature 98 8 °F (37 1 °C), temperature source Oral, resp  rate 20, height 5' 5" (1 651 m), weight 106 kg (233 lb 11 oz), SpO2 96 %, not currently breastfeeding  ,Body mass index is 38 89 kg/m²  Intake/Output Summary (Last 24 hours) at 09/11/18 0543  Last data filed at 09/11/18 0452   Gross per 24 hour   Intake             1510 ml   Output             2706 ml   Net            -1196 ml       Invasive Devices     Peripheral Intravenous Line            Peripheral IV 09/10/18 Left Wrist less than 1 day          Drain            Closed/Suction Drain Left Abdomen Bulb 19 Fr  4 days    Colostomy Descending/sigmoid LLQ 4 days                Physical Exam:   Gen: A&O, NAD  Cardio: RRR  Lungs: CTAB  Abd: Soft, non distended, non tender  HAL serosanginous  Ostomy w/o gas, no stool  Pink stoma  Rectum: Perineal incision c/d/i, no drainage or fluctuance  Some tenderness to palpation      Lab, Imaging and other studies:  I have personally reviewed pertinent lab results    , CBC:   No results found for: WBC, HGB, HCT, MCV, PLT, ADJUSTEDWBC, MCH, MCHC, RDW, MPV, NRBC, CMP: No results found for: NA, K, CL, CO2, ANIONGAP, BUN, CREATININE, GLUCOSE, CALCIUM, AST, ALT, ALKPHOS, PROT, ALBUMIN, BILITOT, EGFR, Coagulation: No results found for: PT, INR, APTT  VTE Pharmacologic Prophylaxis: SQH  VTE Mechanical Prophylaxis: sequential compression device

## 2018-09-11 NOTE — TELEPHONE ENCOUNTER
/DR Yvette Brown [facility] VERBAL ORDERS FOR PT TO HAVE IN HOME NURSING  SHE IS BEING DISCHARGED FROM HOSPITAL TOMORROW

## 2018-09-11 NOTE — PLAN OF CARE
Problem: PAIN - ADULT  Goal: Verbalizes/displays adequate comfort level or baseline comfort level  Interventions:  - Encourage patient to monitor pain and request assistance  - Assess pain using appropriate pain scale  - Administer analgesics based on type and severity of pain and evaluate response  - Implement non-pharmacological measures as appropriate and evaluate response  - Consider cultural and social influences on pain and pain management  - Notify physician/advanced practitioner if interventions unsuccessful or patient reports new pain   Outcome: Progressing      Problem: INFECTION - ADULT  Goal: Absence or prevention of progression during hospitalization  INTERVENTIONS:  - Assess and monitor for signs and symptoms of infection  - Monitor lab/diagnostic results  - Monitor all insertion sites, i e  indwelling lines, tubes, and drains  - Monitor endotracheal (as able) and nasal secretions for changes in amount and color  - [place] appropriate cooling/warming therapies per order  - Administer medications as ordered  - Instruct and encourage patient and family to use good hand hygiene technique  - Identify and instruct in appropriate isolation precautions for identified infection/condition   Outcome: Progressing    Goal: Absence of fever/infection during neutropenic period  INTERVENTIONS:  - Monitor WBC  - Implement neutropenic guidelines   Outcome: Progressing      Problem: GASTROINTESTINAL - ADULT  Goal: Minimal or absence of nausea and/or vomiting  INTERVENTIONS:  - Administer IV fluids as ordered to ensure adequate hydration  - Maintain NPO status until nausea and vomiting are resolved  - Nasogastric tube as ordered  - Administer ordered antiemetic medications as needed  - Provide nonpharmacologic comfort measures as appropriate  - Advance diet as tolerated, if ordered  - Nutrition services referral to assist patient with adequate nutrition and appropriate food choices   Outcome: Progressing    Goal: Establish and maintain optimal ostomy function  INTERVENTIONS:  - Assess bowel function  - Encourage oral fluids to ensure adequate hydration  - Administer IV fluids as ordered to ensure adequate hydration  - Administer ordered medications as needed  - Encourage mobilization and activity  - Nutrition services referral to assist patient with appropriate food choices  - Assess stoma site   Outcome: Progressing      Problem: GENITOURINARY - ADULT  Goal: Urinary catheter remains patent  INTERVENTIONS:  - Assess patency of urinary catheter  - If patient has a chronic del real, consider changing catheter if non-functioning  - Follow guidelines for intermittent irrigation of non-functioning urinary catheter   Outcome: Progressing      Problem: SKIN/TISSUE INTEGRITY - ADULT  Goal: Skin integrity remains intact  INTERVENTIONS  - Identify patients at risk for skin breakdown  - Assess and monitor skin integrity  - Assess and monitor nutrition and hydration status  - Monitor labs (i e  albumin)  - Assess for incontinence   - Turn and reposition patient  - Assist with mobility/ambulation  - Relieve pressure over bony prominences  - Avoid friction and shearing  - Provide appropriate hygiene as needed including keeping skin clean and dry  - Evaluate need for skin moisturizer/barrier cream  - Collaborate with interdisciplinary team (i e  Nutrition, Rehabilitation, etc )   - Patient/family teaching   Outcome: Progressing    Goal: Incision(s), wounds(s) or drain site(s) healing without S/S of infection  INTERVENTIONS  - Assess and document risk factors for skin impairment   - Assess and document dressing, incision, wound bed, drain sites and surrounding tissue  - Initiate Nutrition services consult and/or wound management as needed   Outcome: Progressing      Problem: HEMATOLOGIC - ADULT  Goal: Maintains hematologic stability  INTERVENTIONS  - Assess for signs and symptoms of bleeding or hemorrhage  - Monitor labs  - Administer supportive blood products/factors as ordered and appropriate   Outcome: Progressing      Problem: DISCHARGE PLANNING - CARE MANAGEMENT  Goal: Discharge to post-acute care or home with appropriate resources  INTERVENTIONS:  - Conduct assessment to determine patient/family and health care team treatment goals, and need for post-acute services based on payer coverage, community resources, and patient preferences, and barriers to discharge  - Address psychosocial, clinical, and financial barriers to discharge as identified in assessment in conjunction with the patient/family and health care team  - Arrange appropriate level of post-acute services according to patient's   needs and preference and payer coverage in collaboration with the physician and health care team  - Communicate with and update the patient/family, physician, and health care team regarding progress on the discharge plan  - Arrange appropriate transportation to post-acute venues   Outcome: Progressing

## 2018-09-11 NOTE — PLAN OF CARE
Problem: PHYSICAL THERAPY ADULT  Goal: Performs mobility at highest level of function for planned discharge setting  See evaluation for individualized goals  Treatment/Interventions: Functional transfer training, LE strengthening/ROM, Therapeutic exercise, Endurance training, Patient/family training, Equipment eval/education, Bed mobility, Gait training, Spoke to nursing  Equipment Recommended: Gabriela Galicia (currently use of RW for mobility)       See flowsheet documentation for full assessment, interventions and recommendations  Outcome: Adequate for Discharge  Prognosis: Good  Problem List: Decreased endurance, Impaired balance, Obesity, Pain, Decreased skin integrity  Assessment: Pt very pleasant and cooperative  She reports she has been mobilizing well in her room  Pt mod I sit<>stand and ambulation of 150 ft, 100 ft, and 50 ft  She requires seated rest breaks on rollator seat  Encouraged pt to continue ambulating with nursing and restorative staff  She does not require acute skilled PT at this time if she continues mobilizing to maintain current functional status -- pt verbalizes understanding  D/C acute skilled PT at this time  Pt may benefit from therapy after d/c: she stated she might prefer OP PT if she is allowed to drive, otherwise HHPT services  Barriers to Discharge: Inaccessible home environment, Decreased caregiver support     Recommendation: Other (Comment) (HHPT vs OPPT (pt unsure if she will be allowed to drive))     PT - OK to Discharge: Yes (when medically stable)    See flowsheet documentation for full assessment

## 2018-09-11 NOTE — PHYSICAL THERAPY NOTE
PHYSICAL THERAPY NOTE      Patient Name: Norris Rios  OMYCQ'L Date: 9/11/2018 09/11/18 1841   Pain Assessment   Pain Assessment 0-10   Pain Score 6   Pain Type Surgical pain   Pain Location Rectum   Restrictions/Precautions   Weight Bearing Precautions Per Order No   Other Precautions Pain; Fall Risk   General   Chart Reviewed Yes   Response to Previous Treatment Patient with no complaints from previous session  Family/Caregiver Present No   Cognition   Overall Cognitive Status WFL   Arousal/Participation Alert; Cooperative;Responsive   Attention Within functional limits   Orientation Level Oriented X4   Memory Within functional limits   Following Commands Follows all commands and directions without difficulty   Subjective   Subjective "I'm doing okay "   Bed Mobility   Additional Comments Pt was found seated on EOB at start of visit   Transfers   Sit to Stand 6  Modified independent   Additional items Increased time required   Stand to Sit 6  Modified independent   Additional items Increased time required   Ambulation/Elevation   Gait pattern Forward Flexion; Short stride   Gait Assistance 6  Modified independent   Assistive Device Other (Comment)  (Rollator)   Distance 150', 100', 50'  (seated rest breaks on seat of rollator)   Stair Management Assistance Not tested  (pt reports she avoids stairs and curbs)   Balance   Static Sitting Normal   Dynamic Sitting Good   Static Standing Fair +   Dynamic Standing Fair   Ambulatory Fair   Endurance Deficit   Endurance Deficit Yes   Endurance Deficit Description pain, deconditioning   Activity Tolerance   Activity Tolerance Patient limited by pain; Patient limited by fatigue   Nurse Made Aware yes   Assessment   Prognosis Good   Problem List Decreased endurance; Impaired balance;Obesity;Pain;Decreased skin integrity   Assessment Pt very pleasant and cooperative  She reports she has been mobilizing well in her room   Pt mod I sit<>stand and ambulation of 150 ft, 100 ft, and 50 ft  She requires seated rest breaks on rollator seat  Encouraged pt to continue ambulating with nursing and restorative staff  She does not require acute skilled PT at this time if she continues mobilizing to maintain current functional status -- pt verbalizes understanding  D/C acute skilled PT at this time  Pt may benefit from therapy after d/c: she stated she might prefer OP PT if she is allowed to drive, otherwise HHPT services      Goals   Patient Goals to go home   STG Expiration Date 09/18/18   Treatment Day 2   Plan   Treatment/Interventions (d/c PT)   Progress Discontinue PT   PT Frequency (d/c PT)   Recommendation   Recommendation Other (Comment)  (HHPT vs OPPT (pt unsure if she will be allowed to drive))   PT - OK to Discharge Yes  (when medically stable)     Tamie Dorado, PT

## 2018-09-11 NOTE — WOUND OSTOMY CARE
Progress Note- General Climes 58 y o  female  [id number]  [facility] 521-01        Assessment:  Patient seen for ostomy care and teaching this morning, she awake, alert, sitting up in bed with no complaints of signs of discomfort  Leaky two piece pouch in place, (+) flatus and effluent note in pouch, patient very receptive to teaching and states observing nurse closely during change for learning  Stoma is budded, red, moist and oval measuring 1 1/2 in  X 2 in with intact mucocutaneous junction and peristomal skin  Stoma sits within a a concaved area making pouching complex and challenging  Teaching started with showing how to empty and clean pouch, cleaning stoma and skin, measuring stoma and rationale, healing process and how to complete full change pouch  Teaching not only verbal but hands using sample pouch and step by step explanation while completing full pouch change  Patient asking questions and emotional at time, breaking down tearfully and expressing how overwhelmed she feels  Therapeutic communication provided, patients' concerns validated and encouraged verbalizing her concerns, fear, needs  Pouch changed using one piece pouching system after filling empty (concaved area) with strip paste  Pouch noted with good seal, patient encouraged in participating emptying pouch as soon as her mobility improves  Patient provided with ostomy supplies for discharge home, we will order samples for home delivery once discharge  Plan: We will continue following patient with ostomy care and teaching  Vitals:    09/10/18 2234   BP: 128/83   Pulse: 84   Resp: 20   Temp: 98 8 °F (37 1 °C)   SpO2: 96%     Incision 09/06/18 Abdomen Other (Comment); Mid (Active)   Incision Description Clean;Dry; Intact 9/10/2018  7:45 PM   Kely-wound Assessment Clean;Dry; Intact 9/10/2018  7:45 PM   Closure Hystocryl 9/10/2018  7:45 PM   Drainage Amount None 9/10/2018  7:45 PM   Dressing Open to air 9/10/2018  7:45 PM Patient Tolerance Tolerated well 9/9/2018 12:00 AM   Number of days: 5     Closed/Suction Drain Left Abdomen Bulb 19 Fr  (Active)   Site Description Healing 9/10/2018  7:20 AM   Dressing Status Clean;Dry; Intact 9/10/2018  7:20 AM   Drainage Appearance Serosanguineous 9/10/2018 11:17 AM   Status To bulb suction 9/10/2018  7:20 AM   Output (mL) 10 mL 9/11/2018  6:29 AM   Number of days: 5       Colostomy Descending/sigmoid LLQ (Active)   Stomal Appliance 1 piece 9/10/2018  7:20 AM   Stoma Assessment Budded;Pink;Red 9/10/2018  1:28 PM   Stoma Shape Oval 9/10/2018  1:28 PM   Peristomal Assessment Clean; Intact 9/10/2018  1:28 PM   Output (mL) 0 mL 9/10/2018  1:28 PM   Number of days: 5         Wound care to continue following, please call ext [phone] with questions or concerns      Cuca Whatley, RN, BSN, [employer]

## 2018-09-11 NOTE — SOCIAL WORK
Met with the patient to discuss discharge needs and [facility] followup  She has never had [facility] before  [facility] referral made to [facility] as patient choice

## 2018-09-12 VITALS
TEMPERATURE: 98.4 F | WEIGHT: 231.48 LBS | BODY MASS INDEX: 38.57 KG/M2 | HEIGHT: 65 IN | RESPIRATION RATE: 16 BRPM | SYSTOLIC BLOOD PRESSURE: 110 MMHG | DIASTOLIC BLOOD PRESSURE: 57 MMHG | HEART RATE: 80 BPM | OXYGEN SATURATION: 97 %

## 2018-09-12 PROBLEM — Z43.3 COLOSTOMY CARE (HCC): Status: ACTIVE | Noted: 2018-09-12

## 2018-09-12 LAB — SEROTONIN PLAS-MCNC: 51 NG/ML (ref 0–420)

## 2018-09-12 PROCEDURE — 99232 SBSQ HOSP IP/OBS MODERATE 35: CPT | Performed by: INTERNAL MEDICINE

## 2018-09-12 RX ADMIN — PANTOPRAZOLE SODIUM 40 MG: 40 TABLET, DELAYED RELEASE ORAL at 06:38

## 2018-09-12 RX ADMIN — ACETAMINOPHEN 650 MG: 325 TABLET, FILM COATED ORAL at 06:37

## 2018-09-12 RX ADMIN — HEPARIN SODIUM 5000 UNITS: 5000 INJECTION, SOLUTION INTRAVENOUS; SUBCUTANEOUS at 06:38

## 2018-09-12 RX ADMIN — OXYCODONE HYDROCHLORIDE 15 MG: 10 TABLET ORAL at 06:44

## 2018-09-12 RX ADMIN — OXYCODONE HYDROCHLORIDE 15 MG: 10 TABLET ORAL at 02:30

## 2018-09-12 NOTE — PROGRESS NOTES
Progress Note - Colorectal surgery   [place] Mcadams 58 y o  female MRN: [id number]  Unit/Bed#: [facility] 521-01 Encounter: [id number]    Assessment/Plan:  58 y o  female with rectal Ca     Acute blood loss anemia   Assessment & Plan    S/p 2 u pRBC 9/8  Hgb remains 7 7-8 1        Postoperative pain   Assessment & Plan    Controlled  OOB, ambulate        * Rectal cancer ([address] )   Assessment & Plan    S/p lap hand assisted APR, ALEXANDRA/BSO, posterior vaginectomy, end colostomy  Continue ostomy care  Continue drain on discharge  Dry gauze dressing to perineal wound            Subjective/Objective     Subjective: Patient reports continued perineal pain  Would like more ostomy teaching  Has been ambulating  Objective:     Vitals: Blood pressure 90/54, pulse 82, temperature 98 3 °F (36 8 °C), resp  rate 18, height 5' 5" (1 651 m), weight 105 kg (231 lb 7 7 oz), SpO2 92 %, not currently breastfeeding  ,Body mass index is 38 52 kg/m²  I/O       09/10 0701 - 09/11 0700 09/11 0701 - 09/12 0700    P  O  1710 1410    Total Intake(mL/kg) 1710 (16 1) 1410 (13 4)    Urine (mL/kg/hr) 2850 (1 1) 2000 (0 8)    Drains 316 20    Stool 0 0    Total Output 3166 2020    Net -1456 -705                Physical Exam:  GEN: NAD  HEENT: MMM  CV: RRR  Lung: Normal effort  Ab: Soft, NT/ND, HAL serosang  Perineal wound with moderate serous drainage from posterior aspect  Extrem: No CCE  Neuro:  A+Ox3    Lab, Imaging and other studies: CBC with diff: No results found for: WBC, HGB, HCT, MCV, PLT, ADJUSTEDWBC, MCH, MCHC, RDW, MPV, NRBC, BMP/CMP: No results found for: NA, K, CL, CO2, ANIONGAP, BUN, CREATININE, GLUCOSE, CALCIUM, AST, ALT, ALKPHOS, PROT, ALBUMIN, BILITOT, EGFR, Magnesium: No results found for: MAG  VTE Pharmacologic Prophylaxis: Heparin  VTE Mechanical Prophylaxis: sequential compression device

## 2018-09-12 NOTE — DISCHARGE INSTRUCTIONS
1  Routine colostomy care/teaching  2  PT/OT eval and treat  3  Patient to keep perineal wound clean and dry  4  Patient is not to use perineal dressing for urinary incontinence  5  Dry perineal abdominal pad at all times  6   To be ambulating throughout the day

## 2018-09-12 NOTE — DISCHARGE SUMMARY
Discharge Summary - Colorectal Surgery   Lisa Cervantes 58 y o  female MRN: [id number]  Unit/Bed#: [address] 521-01 Encounter: [id number]        Admitting Diagnosis: Rectal cancer    Admit Date: 9/6/18    Discharge Diagnosis: Same    Discharge Date: 9/12/18    HPI: This is a 59 yo woman found to have rectal cancer after being worked up for bloody bowel movements  Patient has received nereyda adjuvant chemo therapy prior to this surgical procedure  Patient was cleared by her cardiologist for the procedure  Her abdomen has been marked for the end colostomy  Patient has aortic stenosis, s/p gastric bypass, chronic back pain, morbid obesity  Procedures Performed:   9/6/18 Laparoscopic hand assisted abdominal perineal resection, total abdominal hysterectomy, bilateral salpingo oophorectomy, posterior vaginectomy, end colostomy - Dr Taqueria Rausch, Dr Alvin Andino Course: Patient has done well post operatively  She typically has a low blood pressure and was on a nereyda drip post operatively  [facility] cardiology was consult to assist us for post operative care  Patient was able to start a clear liquid diet post operative and slowly progress to a house diet that she is on at home  Pain control was an issue and Oxycodone 10 was ordered for moderate pain and 15 mg for severe pain  Her del real was removed by post operative day 2 and patient was unable to void on her own  Flomax was started and a del real was reinserted after one straight cath since the urinary retention did not resolve  After another day, the del real was removed and patient was able to urinate well on her own and complained the flomax was causing her to become urge incontinent  Flomax was then stopped  Patient has had colostomy teaching teaching and care  Her colostomy was changed just prior to discharge  The latanya garces drain was also removed prior to discharge  Patient's abdominal wound is clean and dry, her perineal wound has some serosanguinous drainage  The sutures are all intact  Patient was just prescribed Oxycodone 10 mg by her pain doctor and 120 pills were just dispensed on 8/17/18  Patient has been an in patient here for 6 days  Patient has a script in her bag for another 120 pills of Oxycodone 10 mg to be filled 9/15/18  Patient states she has no pain pills remaining of the 120 just dispensed  Explained to her that she should have some and she said she will get her pain doctor to prescribe her more  No scripts were given on discharge  Patient was cleared by PT/OT for home with [facility]/nursing/PT/OT  Case mgt has set this up for the patient  Patient will see Dr Bev Johnson in 2 weeks for a post operative check and perineal wound check  She will follow with Dr Junie Fernandez , her cardiologist in 1 week  Discharge instructions were given to the patient  Pathology pending    Complications: None      Condition at Discharge: good     Discharge instructions/Information to patient and family:   See after visit summary for information provided to patient and family  Provisions for Follow-Up Care:  See after visit summary for information related to follow-up care and any pertinent home health orders  Disposition: Home with [facility]NA    Planned Readmission: No    Discharge Statement   I spent 60 minutes discharging the patient  This time was spent on the day of discharge  I had direct contact with the patient on the day of discharge  Additional documentation is required if more than 30 minutes were spent on discharge  Discharge Medications:  See after visit summary for reconciled discharge medications provided to patient and family

## 2018-09-12 NOTE — NURSING NOTE
Pt  Seen and discharged by surgery  Discharge instructions and medication list reviewed with pt  Verbalized understanding of same  Wound care completed ostomy education with pt , given appropriate supplies to take home   Will f/u with [facility]NA

## 2018-09-12 NOTE — SOCIAL WORK
Cm spoke with [facility] from [facility] they will be able to accept pt for nursing and home PT

## 2018-09-12 NOTE — PROGRESS NOTES
Cardiology Progress Note - Juli Ferrara 58 y o  female MRN: [id number]    Unit/Bed#: [facility] 521-01 Encounter: [id number]      Assessment:  Principal Problem:    Rectal cancer [facility])  Active Problems: Morbid obesity due to excess calories (HCC)    S/P gastric bypass    Status post bariatric surgery    Diet-controlled diabetes mellitus ([address] )    Postoperative pain    Acute blood loss anemia      Plan:  Patient is comfortable this morning  She has no chest pain or significant dyspnea  She is in the process of discharge planning per the patient  She intends to follow in our office with Dr Yue Lira in reference to her mitral and aortic valve disease  I agree with her current medical regimen  Subjective:   Patient seen and examined  No significant events overnight   negative  Objective:     Vitals: Blood pressure 110/57, pulse 80, temperature 98 4 °F (36 9 °C), resp   rate 16, height 5' 5" (1 651 m), weight 105 kg (231 lb 7 7 oz), SpO2 97 %, not currently breastfeeding , Body mass index is 38 52 kg/m² , Orthostatic Blood Pressures      Most Recent Value   Blood Pressure  110/57 filed at 09/12/2018 0701   Patient Position - Orthostatic VS  Lying filed at 09/11/2018 2300      ,      Intake/Output Summary (Last 24 hours) at 09/12/18 0853  Last data filed at 09/12/18 0600   Gross per 24 hour   Intake             1410 ml   Output             2020 ml   Net             -610 ml             Physical Exam:    GEN: Juli Ferrara appears well, alert and oriented x 3, pleasant and cooperative   NECK: supple, no carotid bruits, no JVD or HJR  HEART: normal rate, regular rhythm, normal S1 and reduced S2 with a mid to late peaking systolic murmur heard at the right upper sternal border   LUNGS: clear to auscultation bilaterally; no wheezes, rales, or rhonchi   ABDOMEN: normal bowel sounds, soft, no tenderness, no distention  EXTREMITIES: peripheral pulses normal; no clubbing, cyanosis, or edema  SKIN: warm and well perfused, no suspicious lesions on exposed skin    Labs & Results:    Admission on 09/06/2018   No results displayed because visit has over 200 results  Mri Pelvis Rectal Cancer Staging Wo Contrast    Result Date: 8/20/2018  Narrative: MRI PELVIS - WITHOUT CONTRAST (RECTAL CANCER STAGING) INDICATION:  Rectal cancer status post radiation treatment  Based on Dr Miri Landaverde note from 8/3/2018, patient is approximately 1 month status post completion of neoadjuvant chemotherapy for stage III rectal cancer  COMPARISON: MRI of the pelvis from 4/6/2018  TECHNIQUE: The following pulse sequences were obtained on a 1 5 T scanner:  Sagittal 2D FIESTA fat sat,  High resolution Coronal, Sagittal, and Axial FSE T2 weighted images of the rectum, and large field of view axial T1 weighted images of the pelvis  An additional small field of view, axial oblique T2-weighted sequence was performed through the tumor, perpendicular to the long axis of the rectum at that level  IV contrast was not given  FINDINGS: TUMOR LOCATION: There has been definite tumor response to treatment of the previously seen low rectal cancer, which had been 5 cm in length and circumferential around the rectal wall  The intraluminal portions of tumor is much less bulky  However there is is still residual viable tumor extension anteriorly, with a 1 cm nodular lesion anterior to the rectum, still contacting the mesorectal fascia and possibly the vagina  (Series 6 image 28 and 29 )  T-STAGING: Based on the above, this is still at least T3c, CRM positive lesion, and if the vaginal is involved, a T4 lesion  CIRCUMFERENTIAL RESECTION MARGIN (CRM): As above, there is a region of the rectal tumor abutment of the anterior mesorectal fascia on series 6 images 28 and 29  In addition, a left anterior lateral high risk node described above also abuts the anterior mesorectal fascia (series 6 image 11 )  This continues to be a CRM positive lesion   PERIRECTAL NODES: All lymph nodes are described on series 6: High risk nodes: Image 9, right of rectum, this no has decreased in size, currently 8 mm, previously 10 mm, also has become hypointense on T2-weighted sequences, consistent with treated tumor  Image 11, left anterolateral of rectum, 14 mm currently, which had appeared smaller on axial images of previous study, but this is likely due to differences in slice selection  This is unchanged in size and it does contact the anterior mesorectal fascia  Low risk nodes: None  LEVATOR ANI, PUBORECTALIS, ANAL SPHINCTERS: Stage I (Tumor confined to bowel wall and intact outer muscle coat )            REPRODUCTIVE STRUCTURES: Age-appropriate  BLADDER:  Normal  PELVIC CAVITY:  There is trace ascites in the pelvis  OTHER BOWEL LOOPS: Unremarkable MRI appearance  OSSEOUS STRUCTURES: There are bilateral hip replacements  VASCULAR STRUCTURES:  Visualized vasculature is patent  PELVIC WALL:  Unremarkable  Impression: Although patient's low rectal cancer has demonstrated definite treatment response in terms of much decreased bulkiness of the intraluminal tumor, the extraluminal components have persisted  In particular there is still viable tumor extension anteriorly, with a 1 cm nodular lesion anterior to the rectum, still contacting the mesorectal fascia and possibly the vagina  (Series 6 image 28 and 29 )  A high risk left anterolateral perirectal lymph node also persists, contacting the mesorectal fascia (series 6 image 11 )  Therefore this is still a T3c, CRM positive lesion, and possibly T4 lesion if vaginal is still involved  Workstation performed: [id number]       EKG personally reviewed by Wilda Ramos MD      Counseling / Coordination of Care  Total floor / unit time spent today 30 minutes  Greater than 50% of total time was spent with the patient and / or family counseling and / or coordination of care

## 2018-09-12 NOTE — WOUND OSTOMY CARE
Progress Note- Ostomy  Martha Dell 58 y o  female  [id number]  [facility] 521-01        Assessment: Patient was seen today for ostomy care and teaching   The patient was sitting at the edge of the bed for the teaching   The patient was able to remove the 1 piece appliance and cleanse the bruce stomal skin , prep the peristomal skin with 3 m no sting and assist with applying the 1 piece bag with a 4 inch ophelia on the bag   She is able to open and close the bag and demonstrate how to empty   Reviewed measuring of the stoma   Stoma moist red budded with lateral crease at 3 and 9 o'clock   Stoma is measuring 1 3/4 inch top to bottom and 2 inch side to side   The 4 inch ophelia was placed on the entire back of the 1 piece for adhesion and to fill in the 3 and 9 o'clock crease   Reviewed the literature that was given and reviewed the 2 piece moldable with a belt   Patient given supplies for discharge and contact information   Offered information on the ostomy support group held at [facility]        Plan:   Patient being discharged to home with VNA services to follow   Objective:      Vitals:    09/12/18 0701   BP: 110/57   Pulse: 80   Resp: 16   Temp: 98 4 °F (36 9 °C)   SpO2: 97%             Colostomy Descending/sigmoid LLQ (Active)   Stomal Appliance 1 piece 9/11/2018  9:00 PM   Stoma Assessment Pink;Budded 9/11/2018  9:00 PM   Stoma Shape Budded 9/11/2018  9:00 PM   Peristomal Assessment Clean; Intact 9/11/2018  9:00 PM   Treatment Bag change 9/11/2018  9:00 PM   Output (mL) 0 mL 9/11/2018  6:20 PM   Number of days: 2018 [facility]

## 2018-09-19 ENCOUNTER — OFFICE VISIT (OUTPATIENT)
Dept: HEMATOLOGY ONCOLOGY | Facility: MEDICAL CENTER | Age: 63
End: 2018-09-19
Payer: MEDICARE

## 2018-09-19 VITALS
SYSTOLIC BLOOD PRESSURE: 130 MMHG | WEIGHT: 224 LBS | OXYGEN SATURATION: 98 % | HEART RATE: 89 BPM | HEIGHT: 65 IN | DIASTOLIC BLOOD PRESSURE: 80 MMHG | TEMPERATURE: 99.4 F | BODY MASS INDEX: 37.32 KG/M2 | RESPIRATION RATE: 18 BRPM

## 2018-09-19 DIAGNOSIS — C20 RECTAL CANCER (HCC): Primary | ICD-10-CM

## 2018-09-19 PROCEDURE — 99215 OFFICE O/P EST HI 40 MIN: CPT | Performed by: INTERNAL MEDICINE

## 2018-09-19 RX ORDER — OXYCODONE HYDROCHLORIDE 10 MG/1
10 TABLET ORAL EVERY 6 HOURS PRN
COMMUNITY
Start: 2018-09-15

## 2018-09-19 RX ORDER — METRONIDAZOLE 500 MG/1
TABLET ORAL
COMMUNITY
Start: 2018-08-09 | End: 2018-12-17 | Stop reason: ALTCHOICE

## 2018-09-19 NOTE — PROGRESS NOTES
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  Patient feels relatively well and clinically there are no troubling signs  A CT scan of the abdomen/pelvis for approximately 2 5 months ago did not demonstrate any abnormality  The CEA level was elevated  Recent MRI of the pelvis demonstrated stage IIIC (cT3 cN1) disease  Issues:    1  Rectal cancer  Patient completed neoadjuvant chemotherapy + radiotherapy - tolerated treatments very well  Patient also underwent APR recently and is healing well  Surgical follow-up is pending  Patient will also follow up with GYN Oncology once (specifics regarding the need for ALEXANDRA/BSO are not presently available)  Final pathology results are still pending also  Patient is being followed by an ostomy nurse; no pain control issues  Regimen (completed)  Capecitabine 825 mg/m2 by mouth twice daily Monday through Friday (off the weekends) x 5-6 weeks with RT (dose for this patient is 1650 mg twice a day)    NCCN guidelines 1 2018 states that for T3 N any with a clear circumferential margin by MRI, primary treatment includes chemotherapy + radiotherapy  As discussed previously, the treatment sequence will likely include a rest period after surgery and then complete the 6 months of perioperative treatment  2   Anemia  Mrs Tammy Carnes can continue with a multivitamin with iron monitoring for GI side effects  A repeat CBC has been requested  We rediscussed what to monitor for in regard to progressive anemia  3  Pain control  Not an issue at this time  4  Cardiac issues  Patient has valvular problems, specifics are not presently available  Patient should return to her cardiologist and discuss treatment options    Oncology can work around Cardiology if an invasive procedure is needed  Patient is to return in 2 weeks  Mrs Bob Chapman knows to call if she has any other oncology questions or concerns  Carefully review your medication list and verify that the list is accurate and up-to-date  Please call the hematology/oncology office if there are medications missing from the list, medications on the list that you are not currently taking or if there is a dosage or instruction that is different from how you're taking that medication  Patient goals and areas of care: Follow up with surgery  Patient is able to self-care   ______________________________________________________________________________________    Chief Complaint   Patient presents with    Follow-up     Rectal cancer status post neoadjuvant concurrent treatment and status post APR     History of Present Illness:    51-year-old female previously referred for the above  Previously Mrs Lucio Lange had gastric bypass  Patient was recently seen by GI because of blood in the stools  Additional workup included a colonoscopy which demonstrated a rectal lesion  Patient recently completed concurrent chemotherapy (Xeloda) with radiation  Mrs Bob Chapman subsequently went onto an APR approximately 2 weeks ago  Although the specifics are not entirely clear, patient required ALEXANDRA/BSO with the procedure  Final pathology results are still pending  Presently patient states feeling okay, actually pretty good from major surgery 2 weeks ago  No problems with the colostomy  Appetite is good, no GI or  bleeding  No pain control issues  No fevers, chills or sweats  Activities is still limited, patient has walk with a cane for years  Fatigue is improving every day  Review of Systems   Constitutional: Positive for fatigue  HENT: Negative  Eyes: Negative  Respiratory: Negative  Cardiovascular: Negative      Gastrointestinal: Negative for blood in stool, constipation and diarrhea  Endocrine: Negative  Genitourinary: Negative  Musculoskeletal: Positive for arthralgias  Skin: Negative  Allergic/Immunologic: Negative  Neurological: Negative  Hematological: Negative  Psychiatric/Behavioral: Negative  All other systems reviewed and are negative       Patient Active Problem List   Diagnosis    Morbid obesity due to excess calories (HCC)    Postgastrectomy malabsorption    S/P gastric bypass    Marginal ulcer    Status post bariatric surgery    [facility]rosclerotic peripheral vascular disease [facility]    Diet-controlled diabetes mellitus ([facility]    Onychomycosis    Pain in foot    Rectal cancer (HCC)    Postoperative pain    Acute blood loss anemia    Colostomy care [facility])     Past Medical History:   Diagnosis Date    Anemia     Arthritis     Cancer (HCC)     rectal    Chronic lower back pain     Chronic pain disorder     back pain    Diabetes mellitus [facility]     GERD (gastroesophageal reflux disease)     History of MRSA infection     Morbid obesity ([facility] )     Spinal stenosis     Systolic murmur     Unsteady gait     uses walker    Wears dentures     Wears glasses      Ob/gyn:  No recent mammogram -patient's choice, no post menopausal bleeding    Past Surgical History:   Procedure Laterality Date    ABDOMINAL PERINEAL BOWEL RESECTION W/ ILEOANAL POUCH N/A 2018    Procedure: LAPAROSCOPIC HAND ASSIST ABDOMINOPERINEAL RESECTION,  POSTERIOR VAGINECTOMY, OMENTECTOMY;  Surgeon: Li Trinidad MD;  [facility];  Service: Colorectal    ABDOMINAL SURGERY      abscess removed from abdomen and right thigh, a hole in thigh closed by plastic surgeon    ABSCESS DRAINAGE      abd, (R) leg, (L) leg     SECTION       SECTION      CYSTOSCOPY N/A 2018    Procedure: CYSTOSCOPY;  Surgeon: Darris Sandhoff, MD;  [facility];  Service: Gynecology Oncology    ESOPHAGOGASTRODUODENOSCOPY N/A 2016    Procedure: ESOPHAGOGASTRODUODENOSCOPY (EGD); Surgeon: Sonam Keene MD;  [facility];  Service:     ESOPHAGOGASTRODUODENOSCOPY N/A 3/30/2016    Procedure: ESOPHAGOGASTRODUODENOSCOPY (EGD); Surgeon: Sonam Keene MD;  [facility]; Service:     EYE SURGERY      laser eye surgery    HYSTERECTOMY N/A 9/6/2018    Procedure: RADICAL HYSTERECTOMY TOTAL ABDOMINAL (ALEXANDRA)  BSO;  Surgeon: Justine Alves MD;  [facility];  Service: Gynecology Oncology    JOINT REPLACEMENT Left 2017    hip    JOINT REPLACEMENT Right 2017    hip    NV COLONOSCOPY FLX DX W/COLLJ SPEC WHEN PFRMD N/A 3/9/2018    Procedure: COLONOSCOPY;  Surgeon: Rj Robles MD;  Location: [facility]; Service: Gastroenterology    NV COLONOSCOPY FLX DX W/COLLJ [facility] WHEN PFRMD N/A 9/5/2018    Procedure: COLONOSCOPY;  Surgeon: Therese Mo MD;  Location:  GI LAB; Service: Colorectal    NV ESOPHAGOGASTRODUODENOSCOPY TRANSORAL DIAGNOSTIC N/A 2/2/2018    Procedure: ESOPHAGOGASTRODUODENOSCOPY (EGD); Surgeon: Rj Robles MD;  Location: [facility] GI LAB; Service: Gastroenterology    NV LAP GASTRIC BYPASS/SOLEDAD-EN-Y N/A 7/18/2016    Procedure: BYPASS GASTRIC  SOLEDAD-EN-Y LAPAROSCOPIC;  Surgeon: Sonam Keene MD;  [facility];  Service: Bariatrics    NV LAP, SURG COLOSTOMY N/A 9/6/2018    Procedure: PERMANENT END COLOSTOMY;  Surgeon: Therese Mo MD;  [facility];  Service: Colorectal    NV LAP, SURG PROCTECTOMY W COLOSTOMY N/A 9/6/2018    Procedure: PROCTECTOMY;  Surgeon: Therese Mo MD;  [facility];  Service: Colorectal    TUBAL LIGATION       Family History   Problem Relation Age of Onset    Adopted:  Yes    Leukemia Mother     Heart disease Father     Coronary artery disease Father     Diabetes Father     No Known Problems Sister     No Known Problems Brother     Diabetes Son     No Known Problems Brother     No Known Problems Brother     No Known Problems Sister     No Known Problems Sister     Family history: Mother  of leukemia (NOS), father  from heart disease (NOS), 2 children 1 with diabetes, no known familial or genetic diseases, no family history of GI malignancies    Social History     Social History    Marital status: Single     Spouse name: N/A    Number of children: 2    Years of education: N/A     Occupational History    Not on file  Social History Main Topics    Smoking status: Former Smoker     Packs/day: 1 00     Years: 25 00     Quit date: 3/30/2006    Smokeless tobacco: Never Used    Alcohol use No    Drug use: Yes     Types: Oxycodone      Comment: Percocet for lower back pain prn    Sexual activity: Not on file     Other Topics Concern    Not on file     Social History Narrative    No narrative on file       Current Outpatient Prescriptions:     Calcium-Vitamin D 500-125 MG-UNIT TABS, Take by mouth daily  , Disp: , Rfl:     Multiple Vitamins-Minerals (BARIATRIC FUSION) CHEW, Chew daily  , Disp: , Rfl:     omeprazole (PriLOSEC) 20 mg delayed release capsule, Take 1 capsule (20 mg total) by mouth daily, Disp: 90 capsule, Rfl: 3    oxyCODONE (ROXICODONE) 10 MG TABS, , Disp: , Rfl:     metroNIDAZOLE (FLAGYL) 500 mg tablet, , Disp: , Rfl:     oxyCODONE-acetaminophen (PERCOCET)  mg per tablet, Take 1 tablet by mouth every 6 (six) hours as needed for moderate pain , Disp: , Rfl:     Allergies   Allergen Reactions    Tramadol Other (See Comments)     Dizzy         Vitals:    18 0926   BP: 130/80   Pulse: 89   Resp: 18   Temp: 99 4 °F (37 4 °C)   SpO2: 98%     Physical Exam   Constitutional: She is oriented to person, place, and time  She appears well-developed and well-nourished  HENT:   Head: Normocephalic and atraumatic     Right Ear: External ear normal    Left Ear: External ear normal    Nose: Nose normal    Mouth/Throat: Oropharynx is clear and moist    Eyes: Conjunctivae and EOM are normal  Pupils are equal, round, and reactive to light  Neck: Normal range of motion  Neck supple  Cardiovascular: Normal rate, regular rhythm, normal heart sounds and intact distal pulses  Pulmonary/Chest: Effort normal and breath sounds normal    Abdominal: Soft  Bowel sounds are normal    Abdomen is soft, nontender, +bowel sounds, obese, cannot palpate liver or spleen, new left lower quadrant colostomy in place   Musculoskeletal:   Decreased range of motion in hips and knees bilaterally - same as before   Neurological: She is alert and oriented to person, place, and time  She has normal reflexes  Skin: Skin is warm  Warm, moist, slightly pale, scattered upper extremity purpura, no petechiae or ecchymoses   Psychiatric: She has a normal mood and affect  Her behavior is normal  Judgment and thought content normal    Extremities: 0-1+ bilateral lower extremity edema, no cords, pulses are 1+  Lymphatics:  No adenopathy in the neck, supraclavicular region, axilla and groin bilaterally    Labs:    09/11/2018 WBC = 5 6 hemoglobin = 7 7 hematocrit = 25 4 MCV = 83 platelet = 118 BUN = 7 creatinine = 0 50    06/13/2018 WBC = 5 39 hemoglobin = 10 4 hematocrit = 35 MCV = 80 platelet = 123 BUN = 13 creatinine = 0 76 [facility]  05/30/2018 BUN = 11 creatinine = 0 70 [facility] WBC = 4 97 hemoglobin = 10 1 hematocrit = 34 MCV = 80 RDW = 18 6 platelet = 747 neutrophil = 68%  05/17/2018 WBC = 4 4 hemoglobin = 10 6 hematocrit = 34 MCV = 75 platelet = 137 neutrophil = 67% BUN = 19 creatinine = 0 65 [facility]  05/08/2018 WBC = 6 7 hemoglobin = 10 7 hematocrit = 35 MCV = 75 platelet = 934 BUN = 17 creatinine = 0 62 [facility]  03/12/2018 CEA = 22 6 = high    Imaging    4/6/18 MRI pelvis rectal cancer staging    Low rectal cancer, 5 cm in length, circumferential around the rectal wall  (Series 8 images 19 through 33 )  Anteriorly, there are multiple areas where there is transmural tumor extension into the mesorectal fat making this at least a T3c lesion   On Series 8 image 31, tumor also abuts the mesorectal fascia making this CRM positive  At this point it is also   immediately adjacent to the vagina, therefore this may be a T4 lesion  There are 2 high risk perirectal nodes, and 1 low risk perirectal nodes  01/31/2018 ultrasound right upper quadrant    1  Layering gallbladder sludge  No acute cholecystitis or biliary ductal obstruction  2   Hepatomegaly  3   Fluid-filled stomach  1/27/18 CT scan of the abdomen/pelvis    1  Prior Juan-en-Y gastric bypass with distention of the excluded stomach  2   No evidence of acute abnormality in the abdomen or pelvis      Pathology    09/06/2018 final pathology report from the APR is still pending  Report is only preliminary  Case Report   Surgical Pathology Report                         Case: [id number]                                    Authorizing Provider: Therese Mo MD      Collected:           03/28/2018 1130               Pathologist:           Giuliano Van MD             Received:            03/29/2018 0942               Specimen:    Rectum, Rectal cancer/mass biopsies                                                        Final Diagnosis   A  Rectal mass (biopsy):  - 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      Comment:   - In the context of a rectal mass, repeat biopsy and/or excision may be indicated to exclude a higher grade lesion      Electronically signed by Giuliano Van MD on 3/30/2018 at  2:36 PM         Case Report   Surgical Pathology Report                         Case: [id number]                                    Authorizing Provider: Rj Robles MD          Collected:           03/09/2018 0902               Ordering Location:     [facility]   Received:            03/12/2018 0904                                      Center                                                                        Pathologist:           Katie Layne DO                                                             Specimens:   A) - Colon, polyp splenic flexure/cold snare                                                         B) - Colon, bx distal rectal mass                                                          Final Diagnosis   A  Colon, splenic flexure polyp, biopsy:  - Tubulovillous adenoma, negative for high-grade dysplasia      B  Rectum, mass, biopsy:  - At least high grade dysplasia/intramucosal carcinoma arising in a tubulovillous adenoma, suspicious for invasion       Intradepartmental consultation is in agreement with the above diagnosis (AT)     Interpretation performed at [facility], [address] [facility] [address]  Report faxed to Dr Felisha Rosales on 3/13/18 @ 10:55 AM   Electronically signed by Jer Almanza DO on 3/13/2018 at 10:54 AM

## 2018-09-20 NOTE — PROGRESS NOTES
Please call Sindi Bryson, encouraging pathology result, negative lymph nodes and margins(after chemoradiation therapy), the additional nodules that were found are being sent for additional studies  I have copied Dr Hope Oviedo her oncologist as well as Dilcia [facility] oncology nurse navigator on this note as she will need post treatment tumor board discussion

## 2018-09-27 ENCOUNTER — DOCUMENTATION (OUTPATIENT)
Dept: HEMATOLOGY ONCOLOGY | Facility: CLINIC | Age: 63
End: 2018-09-27

## 2018-09-27 NOTE — PROGRESS NOTES
GI Oncology Nurse Navigator Note    Spoke to Sindi Bryson, set up an appointment for her with Dr Luzma Banegas prior to her appointment with Dr Francis Castillo, asked her if this appointment would work for her and she confirmed it would, gave her date, time and location of appointment (10/3, 12:00, [address], [address] [place]), pt verbalized understanding, she asked about having some urine leakage, informed her Dr Luzma Banegas will be able to answer questions about that for her, also stated she is having some leakage from her rectum, instructed her to direct that question for Dr Melanie Vazquez at her appointment with him tomorrow, asked her if it would be alright if I joined her for that appointment to meet her, she stated that would be fine, will continue to follow up with patient        Assessment:    Potential Barriers:    Care Coordination:reviewed    Communication/Education:reviewed    Cultural/Congregation/Spiritual:    Health Promotion:reviewed    Insurance/Medical Costs:    Logistical:    Psychosocial/Distress/Behavioral:    Work/School:    Interventions:    Referrals:  Dr Luzma Banegas      Comments:   Will continue to follow up with patient

## 2018-09-28 ENCOUNTER — DOCUMENTATION (OUTPATIENT)
Dept: HEMATOLOGY ONCOLOGY | Facility: CLINIC | Age: 63
End: 2018-09-28

## 2018-09-28 NOTE — PROGRESS NOTES
GI Oncology Nurse Navigator Visit    Met Lisa at her visit with Dr José Cochran, introduced myself and gave her my card, she was tearful and upset as she explained to me that she was having issues with her colostomy and the appliance, she said it does not adhere appropriately, leaks often and she said she is changing it every day and sometimes twice per day, she said the ostomy nurse comes once a week but she feels it is not enough, she showed me the site and it was slightly leaking, there are fatty folds in the area, suggested if she lays flat and puts the appliance on while she is flat so there is no skin folds it may help the appliance stick, she is willing to try this, also suggested asking the home nurse to help her apply it until they can find a way to make it work better, she is also agreeable to this, suggested calling the company she deals with and asking if they would be willing to send her samples of different bags so she can try them and maybe find one that works better for her, she said she would do this as well, told her I will call her next week to see if any of these suggestions worked and we can look into other options if not, offered emotional support as she spoke of some of her feelings, instructed her to call with any questions or concerns, will help to coordinate her care     Assessment:        Potential Barriers:    Care Coordination:in progress    Communication/Education:colostomy care, teaching    Cultural/Baptist/Spiritual:    Health Promotion:reviewed    Insurance/Medical Costs:    Logistical:    Psychosocial/Distress/Behavioral: emotional support    Work/School:    Interventions:    Referrals:  Wound care      Comments:   Will continue to follow up with patient

## 2018-10-01 ENCOUNTER — TELEPHONE (OUTPATIENT)
Dept: HEMATOLOGY ONCOLOGY | Facility: CLINIC | Age: 63
End: 2018-10-01

## 2018-10-02 ENCOUNTER — DOCUMENTATION (OUTPATIENT)
Dept: HEMATOLOGY ONCOLOGY | Facility: CLINIC | Age: 63
End: 2018-10-02

## 2018-10-03 ENCOUNTER — OFFICE VISIT (OUTPATIENT)
Dept: GYNECOLOGIC ONCOLOGY | Facility: CLINIC | Age: 63
End: 2018-10-03
Payer: MEDICARE

## 2018-10-03 ENCOUNTER — APPOINTMENT (OUTPATIENT)
Dept: LAB | Facility: CLINIC | Age: 63
End: 2018-10-03
Payer: MEDICARE

## 2018-10-03 ENCOUNTER — DOCUMENTATION (OUTPATIENT)
Dept: HEMATOLOGY ONCOLOGY | Facility: CLINIC | Age: 63
End: 2018-10-03

## 2018-10-03 ENCOUNTER — TREATMENT (OUTPATIENT)
Dept: PODIATRY | Facility: CLINIC | Age: 63
End: 2018-10-03
Payer: MEDICARE

## 2018-10-03 VITALS
HEIGHT: 67 IN | DIASTOLIC BLOOD PRESSURE: 62 MMHG | WEIGHT: 217 LBS | TEMPERATURE: 98.7 F | BODY MASS INDEX: 34.06 KG/M2 | RESPIRATION RATE: 17 BRPM | SYSTOLIC BLOOD PRESSURE: 104 MMHG | HEART RATE: 70 BPM

## 2018-10-03 DIAGNOSIS — M79.672 PAIN IN BOTH FEET: ICD-10-CM

## 2018-10-03 DIAGNOSIS — C56.9 MALIGNANT NEOPLASM OF OVARY, UNSPECIFIED LATERALITY (HCC): ICD-10-CM

## 2018-10-03 DIAGNOSIS — B35.1 ONYCHOMYCOSIS: Primary | ICD-10-CM

## 2018-10-03 DIAGNOSIS — M79.671 PAIN IN BOTH FEET: ICD-10-CM

## 2018-10-03 DIAGNOSIS — I70.209 ATHEROSCLEROTIC PERIPHERAL VASCULAR DISEASE (HCC): ICD-10-CM

## 2018-10-03 DIAGNOSIS — C56.9 MALIGNANT NEOPLASM OF OVARY, UNSPECIFIED LATERALITY (HCC): Primary | ICD-10-CM

## 2018-10-03 DIAGNOSIS — C20 RECTAL CANCER (HCC): ICD-10-CM

## 2018-10-03 DIAGNOSIS — E11.9 DIET-CONTROLLED DIABETES MELLITUS (HCC): ICD-10-CM

## 2018-10-03 LAB
BASOPHILS # BLD AUTO: 0.02 THOUSANDS/ΜL (ref 0–0.1)
BASOPHILS NFR BLD AUTO: 0 % (ref 0–1)
EOSINOPHIL # BLD AUTO: 0.15 THOUSAND/ΜL (ref 0–0.61)
EOSINOPHIL NFR BLD AUTO: 3 % (ref 0–6)
ERYTHROCYTE [DISTWIDTH] IN BLOOD BY AUTOMATED COUNT: 15.7 % (ref 11.6–15.1)
HCT VFR BLD AUTO: 30.7 % (ref 34.8–46.1)
HGB BLD-MCNC: 8.7 G/DL (ref 11.5–15.4)
IMM GRANULOCYTES # BLD AUTO: 0.11 THOUSAND/UL (ref 0–0.2)
IMM GRANULOCYTES NFR BLD AUTO: 2 % (ref 0–2)
LYMPHOCYTES # BLD AUTO: 0.7 THOUSANDS/ΜL (ref 0.6–4.47)
LYMPHOCYTES NFR BLD AUTO: 12 % (ref 14–44)
MCH RBC QN AUTO: 22.8 PG (ref 26.8–34.3)
MCHC RBC AUTO-ENTMCNC: 28.3 G/DL (ref 31.4–37.4)
MCV RBC AUTO: 80 FL (ref 82–98)
MONOCYTES # BLD AUTO: 0.59 THOUSAND/ΜL (ref 0.17–1.22)
MONOCYTES NFR BLD AUTO: 10 % (ref 4–12)
NEUTROPHILS # BLD AUTO: 4.29 THOUSANDS/ΜL (ref 1.85–7.62)
NEUTS SEG NFR BLD AUTO: 73 % (ref 43–75)
NRBC BLD AUTO-RTO: 0 /100 WBCS
PLATELET # BLD AUTO: 306 THOUSANDS/UL (ref 149–390)
PMV BLD AUTO: 10 FL (ref 8.9–12.7)
RBC # BLD AUTO: 3.82 MILLION/UL (ref 3.81–5.12)
WBC # BLD AUTO: 5.86 THOUSAND/UL (ref 4.31–10.16)

## 2018-10-03 PROCEDURE — 99215 OFFICE O/P EST HI 40 MIN: CPT | Performed by: OBSTETRICS & GYNECOLOGY

## 2018-10-03 PROCEDURE — 36415 COLL VENOUS BLD VENIPUNCTURE: CPT

## 2018-10-03 PROCEDURE — 11721 DEBRIDE NAIL 6 OR MORE: CPT | Performed by: PODIATRIST

## 2018-10-03 PROCEDURE — 86304 IMMUNOASSAY TUMOR CA 125: CPT

## 2018-10-03 PROCEDURE — 85025 COMPLETE CBC W/AUTO DIFF WBC: CPT

## 2018-10-03 RX ORDER — ANASTROZOLE 1 MG/1
1 TABLET ORAL DAILY
Qty: 90 TABLET | Refills: 1 | Status: SHIPPED | OUTPATIENT
Start: 2018-10-03 | End: 2019-05-14 | Stop reason: SDUPTHER

## 2018-10-03 NOTE — PATIENT INSTRUCTIONS
-   Initiate anastrozole if okayed by Dr Nicolás Ayers appointment for CT scan and laboratory testing

## 2018-10-03 NOTE — ASSESSMENT & PLAN NOTE
-   Status post neoadjuvant chemo radiation followed by complete resection via abdominal perineal resection with end colostomy  Patient is scheduled to see medical oncology (Dr Magan Humphrey) in the near future  Management per medical oncology and colorectal surgery

## 2018-10-03 NOTE — ASSESSMENT & PLAN NOTE
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, grossly visible)  This was identified at the time of joint surgery for resection of rectal cancer  Patient underwent radical hysterectomy, bilateral salpingo-oophorectomy, omentectomy with no evidence of gross visible residual disease  I have discussed with patient natural history and biologic implications of low-grade nature of this tumor  Cytotoxic chemotherapy is not that effective and in the setting of her rectal cancer will not be recommended at this time  Alternatively, I discussed with her benefits of aromatase inhibitors  I have recommended   Anastrozole 1 mg p  O  Daily to be initiated as soon as possible if okayed by her medical oncologist as patient will also be receiving cytotoxic chemotherapy directed to her rectal cancer  I will order a CT scan of the chest, abdomen and pelvis to obtain a baseline at this time  Will check  now prior to initiation of treatment  I plan to see her back in 6 months with pre visit

## 2018-10-03 NOTE — PROGRESS NOTES
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, grossly visible)  This was identified at the time of joint surgery for resection of rectal cancer  Patient underwent radical hysterectomy, bilateral salpingo-oophorectomy, omentectomy with no evidence of gross visible residual disease  I have discussed with patient natural history and biologic implications of low-grade nature of this tumor  Cytotoxic chemotherapy is not that effective and in the setting of her rectal cancer will not be recommended at this time  Alternatively, I discussed with her benefits of aromatase inhibitors  I have recommended   Anastrozole 1 mg p  O  Daily to be initiated as soon as possible if okayed by her medical oncologist as patient will also be receiving cytotoxic chemotherapy directed to her rectal cancer  I will order a CT scan of the chest, abdomen and pelvis to obtain a baseline at this time  Will check  now prior to initiation of treatment  I plan to see her back in 6 months with pre visit   I spent approximately 45 minute in the care of this patient  More than 50% of time was devoted to face-to-face counseling regarding surgical pathology findings, biology of low-grade serous carcinoma of the ovary, coordination of care and treatment recommendations  CHIEF COMPLAINT:       Postoperative follow-up, counseling regarding low-grade serous ovarian cancer and treatment recommendations        Problem:  Cancer Staging  Ovarian cancer [facility])  Staging form: Ovary, Fallopian Tube, Primary Peritoneal, AJCC 8th Edition  - Clinical stage from 9/6/2018: FIGO Stage IIIC, calculated as Stage Unknown (cT3c(m), cNX, cM0) - Signed by Calvin Montejo MD on 10/3/2018    Rectal cancer [facility])  Staging form: Colon and Rectum, AJCC 8th Edition  - Clinical: Stage IIIB (cT3, cN1, cM0) - Signed by Hanna Sumner MD on 4/11/2018      Previous therapy:     Rectal cancer ([facility] [address] )    3/9/2018 Biopsy     Rectum, mass, biopsy:  - At least high grade dysplasia/intramucosal carcinoma arising in a tubulovillous adenoma, suspicious for    invasion  3/9/2018 Initial Diagnosis     Rectal cancer ([facility] [address] )         3/28/2018 Biopsy     Final Diagnosis   A  Rectal mass (biopsy):  - 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               5/10/2018 - 6/20/2018 Radiation     Course: C1    Plan ID Energy Fractions Dose per Fraction (cGy) Dose Correction (cGy) Total Dose Delivered (cGy) Elapsed Days   CD Pelvis 10X 3 / 3 180 0 540 2   Whole Pelvis 10X 25 / 25 180 0 4,500 36      Treatment dates:  C1: 5/10/2018 - 6/20/2018 5/2018 - 6/2018 Chemotherapy     Capecitabine 825 mg/m2 by mouth twice daily Monday through Friday (off the weekends) x 5-6 weeks with RT (dose for this patient is 1650 mg twice a day)           Ovarian cancer ([facility] [address] )    9/6/2018 Initial Diagnosis     Ovarian cancer ([facility] [address] ): Incidental diagnosis at the time of laparotomy for abdominal perineal resection/proctectomy due to rectal cancer  Stage IIIC  No gross residual disease noted at the time of surgery  Low-grade serous histologic type  Patient ID: Nan Cali is a 61 y o  female  HPI    Patient known to me from her surgery for rectal cancer performed on September 6, 2018    At that time, was consulted to assist colorectal surgery with resection of uterus, cervix, posterior cul-de-sac peritoneum and bilateral tubes and ovaries due to involvement from suspected metastatic rectal cancer  I proceeded with EN bloc radical hysterectomy and bilateral salpingo-oophorectomy  At the completion of the surgery, colorectal surgeon noted omental nodules and perform a gastrocolic omentectomy  This was all thought at the time to represent metastatic rectal cancer  Final pathology demonstrated low-grade serous carcinoma of the ovary/peritoneum  Patient presents today for consultation and management recommendations  She had neoadjuvant chemo radiation for rectal cancer and is scheduled to see medical oncology tomorrow for consideration of postoperative adjuvant chemotherapy  Denies vaginal bleeding, drainage or discharge  Not sexually active  Having some issues with colostomy (difficulty maintaining bag sealed)  She has some degree of alopecia  No neuropathy  Normal bladder function  Some ongoing wound healing issues at the perineum  Review of Systems    As above  Twelve point review of systems is otherwise unremarkable  Current Outpatient Prescriptions   Medication Sig Dispense Refill    Calcium-Vitamin D 500-125 MG-UNIT TABS Take by mouth daily        metroNIDAZOLE (FLAGYL) 500 mg tablet       Multiple Vitamins-Minerals (BARIATRIC FUSION) CHEW Chew daily        omeprazole (PriLOSEC) 20 mg delayed release capsule Take 1 capsule (20 mg total) by mouth daily 90 capsule 3    oxyCODONE (ROXICODONE) 10 MG TABS       oxyCODONE-acetaminophen (PERCOCET)  mg per tablet Take 1 tablet by mouth every 6 (six) hours as needed for moderate pain  No current facility-administered medications for this visit          Allergies   Allergen Reactions    Tramadol Other (See Comments)     Dizzy         Past Medical History:   Diagnosis Date    Anemia     Arthritis     Cancer ([facility] [address] )     rectal    Chronic lower back pain     Chronic pain disorder     back pain    Diabetes mellitus ([facility] [address] )     GERD (gastroesophageal reflux disease)     History of MRSA infection     Morbid obesity ([facility] [address] )     Spinal stenosis     Systolic murmur     Unsteady gait     uses walker    Wears dentures     Wears glasses        Past Surgical History:   Procedure Laterality Date    ABDOMINAL PERINEAL BOWEL RESECTION W/ ILEOANAL POUCH N/A 2018    Procedure: LAPAROSCOPIC HAND ASSIST ABDOMINOPERINEAL RESECTION,  POSTERIOR VAGINECTOMY, OMENTECTOMY;  Surgeon: Rory Vargas MD;  [facility];  Service: Colorectal    ABDOMINAL SURGERY      abscess removed from abdomen and right thigh, a hole in thigh closed by plastic surgeon    ABSCESS DRAINAGE      abd, (R) leg, (L) leg     SECTION       SECTION      CYSTOSCOPY N/A 2018    Procedure: CYSTOSCOPY;  Surgeon: Jasmine Estrella MD;  [facility];  Service: Gynecology Oncology    ESOPHAGOGASTRODUODENOSCOPY N/A 2016    Procedure: ESOPHAGOGASTRODUODENOSCOPY (EGD); Surgeon: Emery Garcia MD;  [facility];  Service:     ESOPHAGOGASTRODUODENOSCOPY N/A 3/30/2016    Procedure: ESOPHAGOGASTRODUODENOSCOPY (EGD); Surgeon: Emery Garcia MD;  [facility]; Service:     EYE SURGERY      laser eye surgery    HYSTERECTOMY N/A 2018    Procedure: RADICAL HYSTERECTOMY TOTAL ABDOMINAL (ALEXANDRA)  BSO;  Surgeon: Jasmine Estrella MD;  [facility];  Service: Gynecology Oncology    JOINT REPLACEMENT Left 2017    hip    JOINT REPLACEMENT Right 2017    hip    NM COLONOSCOPY FLX DX W/COLLJ SPEC WHEN PFRMD N/A 3/9/2018    Procedure: COLONOSCOPY;  Surgeon: Allie Kraus MD;  Location: [place] [address] GI LAB; Service: Gastroenterology    NM COLONOSCOPY FLX DX W/COLLJ [facility] WHEN PFRMD N/A 2018    Procedure: COLONOSCOPY;  Surgeon: Rory Vargas MD;  [facility]; Service: Colorectal    NM ESOPHAGOGASTRODUODENOSCOPY TRANSORAL DIAGNOSTIC N/A 2018    Procedure: ESOPHAGOGASTRODUODENOSCOPY (EGD);   Surgeon: Allie Kraus MD;  Location: [facility] GI LAB;  Service: Gastroenterology    IA LAP GASTRIC BYPASS/SOLEDAD-EN-Y N/A 7/18/2016    Procedure: BYPASS GASTRIC  SOLEDAD-EN-Y LAPAROSCOPIC;  Surgeon: Sarita Queen MD;  [facility];  Service: Bariatrics    IA LAP, SURG COLOSTOMY N/A 9/6/2018    Procedure: PERMANENT END COLOSTOMY;  Surgeon: Enrique Celis MD;  [facility];  Service: Colorectal    IA LAP, SURG PROCTECTOMY W COLOSTOMY N/A 9/6/2018    Procedure: PROCTECTOMY;  Surgeon: Enrique Celis MD;  [facility];  Service: Colorectal    TUBAL LIGATION         OB History     No data available          Family History   Problem Relation Age of Onset    Adopted: Yes    Leukemia Mother     Heart disease Father     Coronary artery disease Father     Diabetes Father     No Known Problems Sister     No Known Problems Brother     Diabetes Son     No Known Problems Brother     No Known Problems Brother     No Known Problems Sister     No Known Problems Sister        The following portions of the patient's history were reviewed and updated as appropriate: allergies, current medications, past family history, past medical history, past social history, past surgical history and problem list       Objective:    Blood pressure 104/62, pulse 70, temperature 98 7 °F (37 1 °C), temperature source Oral, resp  rate 17, height 5' 7" (1 702 m), weight 98 4 kg (217 lb), not currently breastfeeding  Body mass index is 33 99 kg/m²  Physical Exam   Constitutional: She is oriented to person, place, and time  She appears well-developed and well-nourished  HENT:   Head: Normocephalic and atraumatic  Neck: Normal range of motion  Neck supple  No thyromegaly present  Cardiovascular: Normal rate and regular rhythm  Murmur (  Harsh holosystolic murmur) heard  Pulmonary/Chest: Effort normal and breath sounds normal  No respiratory distress  She has no wheezes  Abdominal: Soft  She exhibits no distension and no mass  There is no rebound  Left lower quadrant colostomy bag with yellow soft stool  No hernias  Infraumbilical laparotomy scar and laparoscopy scars well-healed  Genitourinary:   Genitourinary Comments:  Deferred  Musculoskeletal: Normal range of motion  She exhibits no edema  Lymphadenopathy:     She has no cervical adenopathy  Neurological: She is alert and oriented to person, place, and time  Skin: Skin is warm and dry  No rash noted  Psychiatric: She has a normal mood and affect  Her behavior is normal    Vitals reviewed  Case Report     Case Report   Surgical Pathology Report                         Case: [id number]                                    Authorizing Provider: Elaine Parham MD      Collected:           09/06/2018 1010               Ordering Location:     [address]      Received:            09/06/2018 [facility]                                      Hospital Operating Room                                                       Pathologist:           Fatuma Celis MD                                                         Specimens:   A) - Soft Tissue, Other, EPIPLOIC NODULE                                                             B) - Rectum, enbloc rectum, sigmoid, anus, uterus, bso, cervix, posterior vaginal                    wall                                                                                                 C) - Omentum                                                                               Addendum   Additional immunohistochemical stains performed with appropriate controls on a selected portion of serous carcinoma involving omental tissue (block C1) show the following results:  Estrogen receptor: Positive  Progesterone receptor: Negative   Addendum electronically signed by Fatuma Celis MD on 9/27/2018 at  1:40 PM   Final Diagnosis   A    Soft tissue, epiploic nodule, biopsy:  -  Portion of nodular fibroadipose tissue with fat necrosis and dystrophic calcifications  -  Negative for metastatic carcinoma  -  Immunohistochemical stain performed with appropriate control for keratin AE1/3 is negative for epithelial elements, supporting the diagnosis      B  Rectum, sigmoid colon, anus, uterus,cervix, bilateral ovaries and fallopian tubes and posterior vaginal wall; en bloc resection:  -  Invasive moderately differentiated adenocarcinoma of rectum (see first synoptic report)  -  Low grade serous carcinoma involving left ovary, left fallopian tube, uterine serosa and colonic serosa (see second synoptic report)  -  Separate portion of colon (consistent with sigmoid) with diverticulosis coli showing focal serosal implant of low-grade serous carcinoma  -  Uterus showing benign inactive/atrophic endometrium with metaplastic change, benign endometrial polyp and rare cytologic atypia consistent with radiation effect  -  Cervix with mild glandular atypia, favor reactive (see note)  -  Benign uterine myometrium with one intramural benign leiomyoma, negative for atypia or malignancy  -  Benign anal skin and dentate line with mild reactive change, negative for involvement by carcinoma  -  Benign vaginal wall mucosa with thinning, chronic inflammation, and surface erosion most suggestive of prior therapy effect, negative for involvement by carcinoma  -  27 benign pericolorectal lymph nodes identified, negative for metastatic carcinoma      C  Omentum, omentectomy:  -   Low grade serous carcinoma         COLORECTAL CARCINOMA TUMOR STAGING SUMMARY (includes specimen A-C of this case):  1  Specimen identification:     - Specimen:  Rectum, sigmoid colon, anus, uterus,cervix, bilateral ovaries and fallopian tubes and posterior vaginal wall   2   Tumor:     - Tumor site:  Rectum     - Tumor size:  Up to 3 3 cm     - Macroscopic tumor perforation:  Not identified     - Tumor location:  Below the peritoneal reflection        - Macroscopic intactness of mesorectum:  Intact - Histologic Type:   Adenocarcinoma      - Histologic Grade: Moderately differentiated (G2)     - Microscopic Tumor Extension (ypT3): Tumor invades through muscularis propria into pericolorectal soft tissues     - Tumor budding (only needed in polyps, Stage I or II cancers):  Not identified   3  Margins (specify distance for nearest radial margin on RECTAL tumors only):     - Proximal Margin:  Negative for carcinoma     - Distal Margin:  Negative for carcinoma     - Circumferential (Radial) or Mesenteric Margin:  Negative for carcinoma (0 6cm)     - Other Margins:  Vaginal margin negative for carcinoma   4  Treatment effect:  Present; Residual cancer with evident tumor regression, but more than single cells or rare small groups of cancer cells (partial response, score 2)   5  Lymph-vascular invasion:  Not identified   6  Perineural invasion:  Not identified   7  Tumor deposits: Not identified   8  Type of polyp in which invasive carcinoma arose:  Tubular adenoma   9  Regional lymph nodes (pN0):  27 benign pericolorectal lymph node sample, negative for metastatic carcinoma  10  Additional pathologic findings:  Diverticulosis coli  11  Ancillary Studies:  *  Immunohistochemical stains performed with appropriate controls show the primary rectal tumor to be positive for CK20 and CDX-2 and negative for CK 7, PAX-8, p53 (wild type expression), ER and WT-1, supporting a diagnosis of primary colorectal origin  12  8th Ed AJCC Stage:  at least Stage  IIA - ypT3, pN0, G2         Primary Tumor of the Ovary/Fallopian Tube/Peritoneum, Staging Summary (includes Specimen A to C of this case):  1  Procedure:  Hysterectomy, bilateral salpingo-oophorectomy, omentectomy  2  Hysterectomy type:  Radical hysterectomy  3  Specimen integrity:      - Right ovary:  Intact      - Left ovary:  Intact       - Right fallopian tube: Intact      - Left fallopian tube: Intact  4   Tumor site:  Ovarian and fallopian tube surfaces and uterine and colonic serosa  5  Ovarian surface involvement:  Present  6  Fallopian tube surface involvement:  Present  7  Tumor size:  Largest focus measures 1 8 cm (uterine serosa) (see note)  8  Histologic type:  Low-grade serous carcinoma   9  Histologic grade:  Low grade  10  Implant:  Present  11  Other tissue/organ involvement:  Bilateral ovaries and fallopian tubes, uterine serosa, sigmoid colon serosa, and omentum  12  Largest extrapelvic peritoneal focus: 2 5 cm (macroscopically identidfied)    - Specify site:  Omentum  13  Peritoneal/ascetic fluid:  Not performed   14  Pleural fluid:  Not performed  15  Treatment effect:  No known prior therapy  16  Regional lymph nodes:    - No regional uterine lymph nodes submitted or found    - 27 benign pericolorectal lymph nodes sampled, negative for metastatic carcinoma  17  Pathologic stage classification (pTNM, AJCC 8th edition): pT3c, pNX, Low Grade  18  FIGO stage (2015 FIGO cancer report): IIIC  19  Additional pathologic findings:  N/A  20  Ancillary studies:  Immunohistochemical stains performed with appropriate controls show the tumor cells to be positive for CK7, PAX-8, ER, and WT-1 with wild-type expression of p53 and negative for CK20 and CDX-2 supporting the diagnosis  Electronically signed by Ronal Shah MD on 9/25/2018 at 12:54 PM   Preliminary result electronically signed by Ronal Shah MD on 9/13/2018 at 11:02 AM   Note   The bilateral ovaries and fallopian tubes, uterine serosa, sigmoid colon serosa, and omentum show involvement by low grade serous carcinoma with prominent psammomatous calcifications  Immunohistochemical stains performed with appropriate controls show the epithelium to be positive for CK7,  PAX-8, ER and WT-1 with wild type p53 expression and negative for CK20 and CDX-2    The morphologic and immunophenotypic findings are distinctly different from the patient's primary rectal carcinoma and are most consistent with a serous carcinoma of mullerian/peritoneal origin  The presence of tumor foci in multiple serosal surface is and multiple small lesions on ovarian and fallopian tube sections without a dominant nodule may suggest a primary peritoneal origin, though dissemination from a small adnexal primary cannot be entirely excluded      External expert consultation was obtained to assist in further classification of this process and is included below      The preliminary findings were communicated to Glory with Dr Jose Hammond office on 9/13/18 at  [id number]  The final findings were communicated to Glory with Dr Jose Hammond office on 9/25/18 at [address]  Diagnosis:  1  ENBLOC Rectum  Sigmoid, Anus, Uterus, BSO, Cervix, Posterior Vaginal Wall ([id number]/A54, B77, B78, B85, B88, B89, B91, 9/6/2018, 7 H&E and 7 Immuno slides): - Low grade serous carcinoma involving left ovary, left fallopian tube, uterine serosa and colonic serosa (see note)  - Cervix with glandular and squamous atypia, consistent with reactive changes  - Unremarkable colonic mucosa  2  Omentum ([id number]/C2, C3, 9/6/2018, 2 H&E slides): - Low grade serous carcinoma with extensive psammomatous calcifications  Note: The tumor is composed of extensive psammomatous calcifications associated with a low grade serous neoplasm that shows invasion of underlying structures  The tumor in the omentum shows broad inverted macropapillary pattern of invasion (see reference)  Submitted immunohistochemical stains show that the serous carcinoma is positive for CK7, WT1 (focal), ER, TX and PAX8,while negative for CK20 and CDX2  The findings support the diagnosis  Reference:  Ivy Ocampo AK, Maicol RICCI, Skye TA, Low-grade Serous Carcinomas of the Ovary: Clinicopathologic Analysis of 52 Invasive Cases and Identification of a Possible Noninvasive Intermediate Lesion, Am J Surg pathol, 2016 Sep;40(9):1165-76      Mandy Balderas MD Additional Information   All controls performed with the immunohistochemical stains reported above reacted appropriately  These tests were developed and their performance characteristics determined by [facility] or [facility]  They may not be cleared or approved by the [place]  Food and Drug Administration  The FDA has determined that such clearance or approval is not necessary  These tests are used for clinical purposes  They should not be regarded as investigational or for research  This laboratory has been approved by [place] [address], designated as a high-complexity laboratory and is qualified to perform these tests  Interpretation performed at [facility], [address] [id number]         Gross Description   A  The specimen is received in formalin, labeled with the patient's name and hospital number, and is designated "epiploic nodule  It consists of an 8 0 x 4 0 x 1 0 cm irregular portion of tan-yellow, lobulated fatty tissue that is centrally rubbery, slightly firm and bulging  The central, bulging area is inked black, and the specimen is sectioned revealing a 2 1 x 2 0 x 1 6 cm well-circumscribed, gray yellow, partially cystic nodule  The cystic spaces contain pale yellow, transparent watery fluid  The almost encapsulated portion of the outer surface of the nodule comes within less than 0 1 cm of the black inked outer surface  The remaining cut surfaces are yellow and coarsely lobulated, with no additional nodules or lesions noted  A representative section is submitted in cassette A1      B  The specimen is received in formalin, labeled with the patient's name and hospital number, and is designated "on block rectum, sigmoid, anus, uterus, BSO, cervix    It consists of a 22 0 cm in length by 4 0 cm in average diameter portion of rectum, and a 3 0 cm in length by 3 0 cm in diameter portion of sigmoid colon stapled shut at the proximal resection margin, and ending at the anal orifice  A blue surgical suture is present, closing the anal orifice  A circumferential ring of tan-gray, wrinkled anal skin is present, ranging from 1 0-1 5 cm in width  The tan-yellow, lobulated mesorectum is nearly complete, and a 6 5 cm in length by 1 5 cm in average width strip of perineal to vaginal mucosa is noted on the anterior surface of the distal rectum, extending from the anal skin area  The perineal and vaginal mucosa is tan to pink gray, smooth to wrinkled, and glistening  The serosa of the proximal rectum is tan-pink, smooth, glistening and unremarkable  The anterior mesorectum is inked orange, the posterior mesorectum inked black, and the peritonealized rectal surface green  The specimen is opened revealing a 3 3 x 2 5 cm very ill-defined, tan pink, slightly depressed, flattened, indurated lesion at the anterior to posterior rectum, below the peritoneal reflection line, 2 9 cm from the dentate line, and 5 9 cm from the nearest anal skin margin  The mass is greater than 15 0 cm from both the proximal and vascular pedicle margins  Upon sectioning, the lesion appears to remain superficial, with the cut surface of the underlying rectal wall displaying focal microcystic areas  The lesion comes within 1 8 cm of the nearest black inked posterior and 1 5 cm of the nearest orange inked anterior circumferential rectal margins, and within 2 5 cm of the vaginal mucosa  The vaginal mucosa does not grossly appear involved by the mass, however, the underlying fibrous tissue is somewhat dense and rubbery  The dentate line, anal mucosa, and anal skin also appear free from the lesion  The remaining rectal mucosa is tan to pink purple, largely flat with focal irregular folds  The dentate line is distinct, and undulating  The anal mucosa is glistening, pink-white, with multiple glistening, polypoid projections measuring up to 1 5 x 0 8 x 0 2 cm       The proximal half of the rectum and sigmoid colon display a wall measuring up to 1 1 cm thick, lined by tan-pink, glistening mucosa with the usual folds  A probe patent diverticulum is noted  No areas of perforation or additional lesions are identified  A thorough search of the attached fatty, focally hemorrhagic tissue yields a small amount of possible lymph nodes in the mesorectum, measuring up to 0 7 cm in greatest dimension  Two roughly well-circumscribed, gray-white, calcified, nodular areas are noted at the periphery of the mesorectum, measuring 0 4 and 0 9 cm in greatest dimension  Additional possible lymph nodes measuring up to 0 4 cm in greatest dimension are noted in the fatty tissue further away from the lesion      Also in the specimen container is an 11 0 cm in length by 3 0 cm in average diameter, unoriented portion of apparent large bowel stapled shut at both resection margins, and displaying a moderate to large amount of attached yellow, lobulated fatty tissue  The serosa is tan-pink, smooth and glistening  The the specimen is opened revealing a wall measuring up to 0 7 cm thick, lined by tan-pink, glistening mucosa with the usual folds  Multiple probe patent diverticuli measuring up to 1 0 cm in greatest dimension beneath the muscularis layer are noted  No areas of perforation or apparent lesions are identified  A 0 8 cm in greatest dimension gray-white, calcified nodule is noted outside the muscularis layer, but apparently beneath the serosal membrane  A small number of possible lymph nodes measuring up to 0 5 cm in greatest dimension are noted within the fatty tissue  The largest of these is at the periphery of the fat, and markedly calcified      Also in the specimen container is a 140 g (fixed weight) uterus with attached cervix and bilateral adnexa    Irregular and ragged portions of tan-yellow, lobulated fatty tissue are attached to the uterus, cervix, and bilateral adnexa, measuring up to 5 5 cm in greatest dimension  The uterus is vaguely heart shaped, with a smooth, glistening serosa posteriorly  The anterior serosa displays fibrous adhesions and a small (2 0 cm) area of attached fatty tissue near the right fundus  A 1 0 x 0 8 x 0 3 cm gray-white, glistening, polypoid area is noted at the lateral aspect of the right anterior lower uterine segment  An up to 2 5 cm in diameter area of tan-pink, glistening, smooth to wrinkled ectocervical tissue surrounds a patent os  The anterior half is inked blue and the posterior half black  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 lined by a tan-pink, glistening endometrium averaging 0 1 cm thick  The left and right cornus average 3 0 cm in length by 0 8 cm in width  A 1 3 x 0 6 x 0 2 cm tan-pink, glistening, superficial polyp is identified at the end of the left cornu, with possible attachment sites on both the anterior and posterior halves  The endocervical canal is 4 0 cm in length, striated, with a 0 3 cm in greatest dimension, colorless, translucent mucinous filled polypoid projection on the anterior half  Sectioning the cervix reveals focal cystic spaces measuring up to 0 4 cm in greatest dimension, filled with colorless, translucent gelatinous material   Sectioning the nodular area previously described on the lateral, right anterior uterine body reveals a partially calcified, apparent tortuous blood vessel  Sectioning the area of attached fatty tissue and fibrous adhesions at the right anterior fundus revealed a 1 1 cm in greatest dimension irregular, gray-pink, gritty, calcified nodule  A 0 8 cm in greatest dimension similar appearing, partially calcified nodule is noted within the tissue attached near the right posterior cervix  The tan-pink, moderately trabeculated myometrium measures up to 2 1 cm thick, and contains a 0 3 cm in greatest dimension well-circumscribed, white, whorled, rubbery intramural nodule    The 3 9 x 2 0 x 1 9 cm tan to pink white, apparent intact left ovary is partially obscured by adherent fatty and fibrous tissue, and a 7 5 cm in length segment of tortuous, fimbriated, pink purple fallopian tube displaying a small amount of unilocular, clear fluid-filled paratubal cystic structures measuring up to 0 6 cm in greatest dimension, near the fimbriated end  Multifocal gray-white, gritty, granular to multinodular, partially calcified areas measuring up to 1 8 cm in greatest dimension are noted on the surface of the ovary and attached tissue  The ovary, with the adherent fallopian tube and soft tissue is inked black  Sectioning the ovary reveals a tan-white to gray-pink, slightly irregular cut surface with focal unilocular, smooth lined cystic spaces measuring up to 0 6 cm in greatest dimension  The fallopian tube, which is closely associated with the ovary, measures up to 0 8 cm in diameter and displays an unremarkable cut surface  The 3 8 x 1 7 x 1 5 cm intact, tan-white, smooth to bulging, glistening right ovary is sectioned revealing a dense, pale tan white, largely homogeneous cut surface  A small amount of unilocular, clear fluid-filled cystic structures are noted within the attached soft tissue, measuring up to 0 6 cm in greatest dimension  A 6 5 cm in length portion of pink purple, glistening, fimbriated fallopian tube ranging from 0 5 x 1 0 cm in diameter is closely associated with the ovary  The fimbriae are vaguely enlarged and swollen  The fallopian tube displays an unremarkable cut surface  Also in the specimen container is a 1 0 x 0 6 x 0 5 cm irregular and partially ragged portion of a gray-white, bilobed, gritty, calcified nodule with a moderate amount of attached yellow, lobulated fatty tissue     Gross photographs are taken, and representative sections are submitted as follows:     B1:  Shaved proximal margin  B2:  Shaved vascular pedicle margin  B3-B4:  Nearest lesion to vaginal mucosa  B5-B20:  Remainder of rectal lesion  B21-B44:  Remainder of vaginal mucosa (B21-B35:  perpendicular sections, B36-B 44:  Longitudinal sections)  B45:  Polypoid area of anal mucosa  B46-B47:  Unremarkable dentate line  B48:  Diverticulum  B49-B50:  Unremarkable rectum and sigmoid  B51-B53:  Bisected mesorectal lymph nodes (one node per block)  B54:  Intact calcified nodule from periphery of mesorectum following decalcification in Decal II  B55:  Sectioned calcified nodule from periphery of mesorectum following decalcification in Decal II  B56-B57:  Multiple intact possible lymph nodes per block  B58-B65:  Sectioned possible lymph nodes (one node per block)     B66-B67:  Shaved margins of additional segment of bowel  B68-B69:  Diverticuli from additional segment of bowel  B70:  Unremarkable additional segment of bowel  B71:  Calcified nodule at serosa of additional segment of bowel following decalcification in Decal II  B72:  Calcified possible lymph node from additional segment of bowel following decalcification in Decal II  B73-B76:  Sectioned lymph nodes from additional segment of bowel (one node per block)     B77:   Anterior cervix with polypoid projection  B78:  Posterior cervix  B79:  Partially calcified tortuous blood vessel from lateral, right anterior uterine body  B80:  Anterior and posterior full-thickness endomyometrium from right cornu  B81:  Possible polyp attachment site with detached portion of polyp from left anterior cornu  B82:  Myometrium and serosa underlying tissue in B81  B83:  Full-thickness posterior endomyometrium from left cornu with possible polyp attachment site  B84:  Full-thickness endomyometrium at junction of left and right cornu, posterior half  B85:  Calcified nodule in soft tissue adherent to the right anterior uterine fundus following decalcification in Decal II  B86:  Intramural nodule  B87:  Calcified nodule from right posterior paracervical tissue  B88-B90:  Left ovary with associated left fallopian tube  B91:  Left fallopian tube fimbriae and nodular area on surface of left ovary  B92-B93:  Right ovary with associated fallopian tube  B94:  Right fallopian tube  B95:  Bilobed, calcified nodule from separate tissue following decalcification in Decal II     At the request of the pathologist, the specimen is re-examined for possible additional lymph nodes  15 densities suggestive of lymph nodes ranging in size from 0 1-0 4 cm each are identified upon re-examination  The densities are entirely submitted for histologic examination as follows:    [id number]:  2 densities suggestive of lymph nodes in each cassette  B103:  Single density suggestive of lymph node     Note: The estimated total formalin fixation time based upon information provided by the submitting clinician and the standard processing schedule is over 72 hours  MCrites                                                                                                                                                                          C  The specimen is received in formalin, labeled with the patient's name and hospital number, and is designated "omentum  It consists of a 29 0 x 14 0 x 2 0 cm sheet like portion of tan-yellow, lobulated fatty tissue consistent with portion of omentum  Multiple locations are palpably firm and indurated  These regions are inked black  Sectioning revealed multiple ill-defined, bright white-yellow multi focally gray-pink, friable, gritty and partially calcified nodules ranging from 0 5-2 5 cm in greatest dimension  These areas come within less than 0 1 cm of the black inked outer surface  The remaining cut surfaces are yellow, coarsely lobular, focally dense with numerous vascular structures    Representative sections of three different nodular areas are submitted in cassette C1 through C3 with C3 following decalcification in Decal II      Note: The estimated total formalin fixation time based uposhouldn information provided by the submitting clinician and the standard processing schedule is under 72 hours      Mohini Shore MD, [place], [facility]  10/3/2018  1:16 PM

## 2018-10-03 NOTE — LETTER
October 3, 2018     Sunshine Anderson MD  [address] [place] [id number]    Patient: Narciso Meigs   YOB: 1955   Date of Visit: 10/3/2018       Dear Dr Tru Barksdale: Thank you for referring Priscilla Diaz to me for evaluation  Below are my notes for this consultation  If you have questions, please do not hesitate to call me  I look forward to following your patient along with you  Sincerely,        Eduardo Grove MD        CC: MD Ronal Castillo MD Jenita Silber, MD  10/3/2018  1:16 PM  Sign at close encounter  Assessment/Plan:    Problem List Items Addressed This Visit        Digestive    Rectal cancer [facility]) - Primary     -   Status post neoadjuvant chemo radiation followed by complete resection via abdominal perineal resection with end colostomy  Patient is scheduled to see medical oncology (Dr Magan Humphrey) in the near future  Management per medical oncology and colorectal surgery  Genitourinary    Ovarian cancer ([facility] [address] )     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, grossly visible)  This was identified at the time of joint surgery for resection of rectal cancer  Patient underwent radical hysterectomy, bilateral salpingo-oophorectomy, omentectomy with no evidence of gross visible residual disease  I have discussed with patient natural history and biologic implications of low-grade nature of this tumor  Cytotoxic chemotherapy is not that effective and in the setting of her rectal cancer will not be recommended at this time  Alternatively, I discussed with her benefits of aromatase inhibitors  I have recommended   Anastrozole 1 mg p  O  Daily to be initiated as soon as possible if okayed by her medical oncologist as patient will also be receiving cytotoxic chemotherapy directed to her rectal cancer    I will order a CT scan of the chest, abdomen and pelvis to obtain a baseline at this time   Will check  now prior to initiation of treatment  I plan to see her back in 6 months with pre visit   CHIEF COMPLAINT:       Postoperative follow-up, counseling regarding low-grade serous ovarian cancer and treatment recommendations  Problem:  Cancer Staging  Ovarian cancer [facility])  Staging form: Ovary, Fallopian Tube, Primary Peritoneal, AJCC 8th Edition  - Clinical stage from 9/6/2018: FIGO Stage IIIC, calculated as Stage Unknown (cT3c(m), cNX, cM0) - Signed by Lisa Sánchez MD on 10/3/2018    Rectal cancer [facility])  Staging form: Colon and Rectum, AJCC 8th Edition  - Clinical: Stage IIIB (cT3, cN1, cM0) - Signed by Andrea Khan MD on 4/11/2018      Previous therapy:     Rectal cancer ([facility] [address] )    3/9/2018 Biopsy     Rectum, mass, biopsy:  - At least high grade dysplasia/intramucosal carcinoma arising in a tubulovillous adenoma, suspicious for    invasion  3/9/2018 Initial Diagnosis     Rectal cancer ([facility] [address] )         3/28/2018 Biopsy     Final Diagnosis   A  Rectal mass (biopsy):  - 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               5/10/2018 - 6/20/2018 Radiation     Course: C1    Plan ID Energy Fractions Dose per Fraction (cGy) Dose Correction (cGy) Total Dose Delivered (cGy) Elapsed Days   CD Pelvis 10X 3 / 3 180 0 540 2   Whole Pelvis 10X 25 / 25 180 0 4,500 36      Treatment dates:  C1: 5/10/2018 - 6/20/2018 5/2018 - 6/2018 Chemotherapy     Capecitabine 825 mg/m2 by mouth twice daily Monday through Friday (off the weekends) x 5-6 weeks with RT (dose for this patient is 1650 mg twice a day)           Ovarian cancer ([address] )    9/6/2018 Initial Diagnosis     Ovarian cancer ([address] ): Incidental diagnosis at the time of laparotomy for abdominal perineal resection/proctectomy due to rectal cancer  Stage IIIC  No gross residual disease noted at the time of surgery  Low-grade serous histologic type                Patient ID: Tra Trinh is a 61 y o  female  HPI    Patient known to me from her surgery for rectal cancer performed on September 6, 2018  At that time, was consulted to assist colorectal surgery with resection of uterus, cervix, posterior cul-de-sac peritoneum and bilateral tubes and ovaries due to involvement from suspected metastatic rectal cancer  I proceeded with EN bloc radical hysterectomy and bilateral salpingo-oophorectomy  At the completion of the surgery, colorectal surgeon noted omental nodules and perform a gastrocolic omentectomy  This was all thought at the time to represent metastatic rectal cancer  Final pathology demonstrated low-grade serous carcinoma of the ovary/peritoneum  Patient presents today for consultation and management recommendations  She had neoadjuvant chemo radiation for rectal cancer and is scheduled to see medical oncology tomorrow for consideration of postoperative adjuvant chemotherapy  Denies vaginal bleeding, drainage or discharge  Not sexually active  Having some issues with colostomy (difficulty maintaining bag sealed)  She has some degree of alopecia  No neuropathy  Normal bladder function  Some ongoing wound healing issues at the perineum  Review of Systems    As above  Twelve point review of systems is otherwise unremarkable  Current Outpatient Prescriptions   Medication Sig Dispense Refill    Calcium-Vitamin D 500-125 MG-UNIT TABS Take by mouth daily        metroNIDAZOLE (FLAGYL) 500 mg tablet       Multiple Vitamins-Minerals (BARIATRIC FUSION) CHEW Chew daily        omeprazole (PriLOSEC) 20 mg delayed release capsule Take 1 capsule (20 mg total) by mouth daily 90 capsule 3    oxyCODONE (ROXICODONE) 10 MG TABS       oxyCODONE-acetaminophen (PERCOCET)  mg per tablet Take 1 tablet by mouth every 6 (six) hours as needed for moderate pain  No current facility-administered medications for this visit          Allergies   Allergen Reactions  Tramadol Other (See Comments)     Dizzy         Past Medical History:   Diagnosis Date    Anemia     Arthritis     Cancer ([facility] )     rectal    Chronic lower back pain     Chronic pain disorder     back pain    Diabetes mellitus ([facility] )     GERD (gastroesophageal reflux disease)     History of MRSA infection     Morbid obesity ([facility] )     Spinal stenosis     Systolic murmur     Unsteady gait     uses walker    Wears dentures     Wears glasses        Past Surgical History:   Procedure Laterality Date    ABDOMINAL PERINEAL BOWEL RESECTION W/ ILEOANAL POUCH N/A 2018    Procedure: LAPAROSCOPIC HAND ASSIST ABDOMINOPERINEAL RESECTION,  POSTERIOR VAGINECTOMY, OMENTECTOMY;  Surgeon: Radha Alonzo MD;  [facility];  Service: Colorectal    ABDOMINAL SURGERY      abscess removed from abdomen and right thigh, a hole in thigh closed by plastic surgeon    ABSCESS DRAINAGE      abd, (R) leg, (L) leg     SECTION       SECTION      CYSTOSCOPY N/A 2018    Procedure: CYSTOSCOPY;  Surgeon: Reginald Esparza MD;  [facility];  Service: Gynecology Oncology    ESOPHAGOGASTRODUODENOSCOPY N/A 2016    Procedure: ESOPHAGOGASTRODUODENOSCOPY (EGD); Surgeon: Jane Butler MD;  [facility];  Service:     ESOPHAGOGASTRODUODENOSCOPY N/A 3/30/2016    Procedure: ESOPHAGOGASTRODUODENOSCOPY (EGD); Surgeon: Jane Butler MD;  [facility]; Service:     EYE SURGERY      laser eye surgery    HYSTERECTOMY N/A 2018    Procedure: RADICAL HYSTERECTOMY TOTAL ABDOMINAL (ALEXANDRA)  BSO;  Surgeon: Reginald Esparza MD;  [facility];  Service: Gynecology Oncology    JOINT REPLACEMENT Left 2017    hip    JOINT REPLACEMENT Right 2017    hip    AR COLONOSCOPY FLX DX W/COLLJ SPEC WHEN PFRMD N/A 3/9/2018    Procedure: COLONOSCOPY;  Surgeon: Lucio Han MD;  Location: [place] [address] GI LAB;   Service: Gastroenterology    AR COLONOSCOPY FLX DX W/COLLJ SPEC WHEN PFRMD N/A 9/5/2018    Procedure: COLONOSCOPY;  Surgeon: Sunshine Anderson MD;  [facility]; Service: Colorectal    NH ESOPHAGOGASTRODUODENOSCOPY TRANSORAL DIAGNOSTIC N/A 2/2/2018    Procedure: ESOPHAGOGASTRODUODENOSCOPY (EGD); Surgeon: Blanka Rodríguez MD;  Location: [facility] GI LAB; Service: Gastroenterology    NH LAP GASTRIC BYPASS/SOLEDAD-EN-Y N/A 7/18/2016    Procedure: BYPASS GASTRIC  SOLEDAD-EN-Y LAPAROSCOPIC;  Surgeon: Dominique Lan MD;  [facility];  Service: Bariatrics    NH LAP, SURG COLOSTOMY N/A 9/6/2018    Procedure: PERMANENT END COLOSTOMY;  Surgeon: Sunshine Anderson MD;  [facility];  Service: Colorectal    NH LAP, SURG PROCTECTOMY W COLOSTOMY N/A 9/6/2018    Procedure: PROCTECTOMY;  Surgeon: Sunshine Anderson MD;  [facility];  Service: Colorectal    TUBAL LIGATION         OB History     No data available          Family History   Problem Relation Age of Onset    Adopted: Yes    Leukemia Mother     Heart disease Father     Coronary artery disease Father     Diabetes Father     No Known Problems Sister     No Known Problems Brother     Diabetes Son     No Known Problems Brother     No Known Problems Brother     No Known Problems Sister     No Known Problems Sister        The following portions of the patient's history were reviewed and updated as appropriate: allergies, current medications, past family history, past medical history, past social history, past surgical history and problem list       Objective:    Blood pressure 104/62, pulse 70, temperature 98 7 °F (37 1 °C), temperature source Oral, resp  rate 17, height 5' 7" (1 702 m), weight 98 4 kg (217 lb), not currently breastfeeding  Body mass index is 33 99 kg/m²  Physical Exam   Constitutional: She is oriented to person, place, and time  She appears well-developed and well-nourished  HENT:   Head: Normocephalic and atraumatic  Neck: Normal range of motion  Neck supple   No thyromegaly present  Cardiovascular: Normal rate and regular rhythm  Murmur (  Harsh holosystolic murmur) heard  Pulmonary/Chest: Effort normal and breath sounds normal  No respiratory distress  She has no wheezes  Abdominal: Soft  She exhibits no distension and no mass  There is no rebound  Genitourinary:   Genitourinary Comments:  Deferred  Musculoskeletal: Normal range of motion  She exhibits no edema  Lymphadenopathy:     She has no cervical adenopathy  Neurological: She is alert and oriented to person, place, and time  Skin: Skin is warm and dry  No rash noted  Psychiatric: She has a normal mood and affect  Her behavior is normal    Vitals reviewed      Case Report     Case Report   Surgical Pathology Report                         Case: [id number]                                    Authorizing Provider: Nigel Walker MD      Collected:           09/06/2018 1010               Ordering Location:     [address]      Received:            09/06/2018 [facility]                                      Hospital Operating Room                                                       Pathologist:           Fatuma Celis MD                                                         Specimens:   A) - Soft Tissue, Other, EPIPLOIC NODULE                                                             B) - Rectum, enbloc rectum, sigmoid, anus, uterus, bso, cervix, posterior vaginal                    wall                                                                                                 C) - Omentum                                                                               Addendum   Additional immunohistochemical stains performed with appropriate controls on a selected portion of serous carcinoma involving omental tissue (block C1) show the following results:  Estrogen receptor: Positive  Progesterone receptor: Negative   Addendum electronically signed by Fatuma Celis MD on 9/27/2018 at  1:40 PM   Final Diagnosis   A  Soft tissue, epiploic nodule, biopsy:  -  Portion of nodular fibroadipose tissue with fat necrosis and dystrophic calcifications  -  Negative for metastatic carcinoma  -  Immunohistochemical stain performed with appropriate control for keratin AE1/3 is negative for epithelial elements, supporting the diagnosis      B  Rectum, sigmoid colon, anus, uterus,cervix, bilateral ovaries and fallopian tubes and posterior vaginal wall; en bloc resection:  -  Invasive moderately differentiated adenocarcinoma of rectum (see first synoptic report)  -  Low grade serous carcinoma involving left ovary, left fallopian tube, uterine serosa and colonic serosa (see second synoptic report)  -  Separate portion of colon (consistent with sigmoid) with diverticulosis coli showing focal serosal implant of low-grade serous carcinoma  -  Uterus showing benign inactive/atrophic endometrium with metaplastic change, benign endometrial polyp and rare cytologic atypia consistent with radiation effect  -  Cervix with mild glandular atypia, favor reactive (see note)  -  Benign uterine myometrium with one intramural benign leiomyoma, negative for atypia or malignancy  -  Benign anal skin and dentate line with mild reactive change, negative for involvement by carcinoma  -  Benign vaginal wall mucosa with thinning, chronic inflammation, and surface erosion most suggestive of prior therapy effect, negative for involvement by carcinoma  -  27 benign pericolorectal lymph nodes identified, negative for metastatic carcinoma      C  Omentum, omentectomy:  -   Low grade serous carcinoma         COLORECTAL CARCINOMA TUMOR STAGING SUMMARY (includes specimen A-C of this case):  1  Specimen identification:     - Specimen:  Rectum, sigmoid colon, anus, uterus,cervix, bilateral ovaries and fallopian tubes and posterior vaginal wall   2   Tumor:     - Tumor site:  Rectum     - Tumor size:  Up to 3 3 cm     - Macroscopic tumor perforation:  Not identified     - Tumor location:  Below the peritoneal reflection        - Macroscopic intactness of mesorectum:  Intact     - Histologic Type:   Adenocarcinoma      - Histologic Grade: Moderately differentiated (G2)     - Microscopic Tumor Extension (ypT3): Tumor invades through muscularis propria into pericolorectal soft tissues     - Tumor budding (only needed in polyps, Stage I or II cancers):  Not identified   3  Margins (specify distance for nearest radial margin on RECTAL tumors only):     - Proximal Margin:  Negative for carcinoma     - Distal Margin:  Negative for carcinoma     - Circumferential (Radial) or Mesenteric Margin:  Negative for carcinoma (0 6cm)     - Other Margins:  Vaginal margin negative for carcinoma   4  Treatment effect:  Present; Residual cancer with evident tumor regression, but more than single cells or rare small groups of cancer cells (partial response, score 2)   5  Lymph-vascular invasion:  Not identified   6  Perineural invasion:  Not identified   7  Tumor deposits: Not identified   8  Type of polyp in which invasive carcinoma arose:  Tubular adenoma   9  Regional lymph nodes (pN0):  27 benign pericolorectal lymph node sample, negative for metastatic carcinoma  10  Additional pathologic findings:  Diverticulosis coli  11  Ancillary Studies:  *  Immunohistochemical stains performed with appropriate controls show the primary rectal tumor to be positive for CK20 and CDX-2 and negative for CK 7, PAX-8, p53 (wild type expression), ER and WT-1, supporting a diagnosis of primary colorectal origin  12  8th Ed AJCC Stage:  at least Stage  IIA - ypT3, pN0, G2         Primary Tumor of the Ovary/Fallopian Tube/Peritoneum, Staging Summary (includes Specimen A to C of this case):  1  Procedure:  Hysterectomy, bilateral salpingo-oophorectomy, omentectomy  2  Hysterectomy type:  Radical hysterectomy  3   Specimen integrity:      - Right ovary:  Intact      - Left ovary:  Intact       - Right fallopian tube: Intact      - Left fallopian tube: Intact  4  Tumor site:  Ovarian and fallopian tube surfaces and uterine and colonic serosa  5  Ovarian surface involvement:  Present  6  Fallopian tube surface involvement:  Present  7  Tumor size:  Largest focus measures 1 8 cm (uterine serosa) (see note)  8  Histologic type:  Low-grade serous carcinoma   9  Histologic grade:  Low grade  10  Implant:  Present  11  Other tissue/organ involvement:  Bilateral ovaries and fallopian tubes, uterine serosa, sigmoid colon serosa, and omentum  12  Largest extrapelvic peritoneal focus: 2 5 cm (macroscopically identidfied)    - Specify site:  Omentum  13  Peritoneal/ascetic fluid:  Not performed   14  Pleural fluid:  Not performed  15  Treatment effect:  No known prior therapy  16  Regional lymph nodes:    - No regional uterine lymph nodes submitted or found    - 27 benign pericolorectal lymph nodes sampled, negative for metastatic carcinoma  17  Pathologic stage classification (pTNM, AJCC 8th edition): pT3c, pNX, Low Grade  18  FIGO stage (2015 FIGO cancer report): IIIC  19  Additional pathologic findings:  N/A  20  Ancillary studies:  Immunohistochemical stains performed with appropriate controls show the tumor cells to be positive for CK7, PAX-8, ER, and WT-1 with wild-type expression of p53 and negative for CK20 and CDX-2 supporting the diagnosis  Electronically signed by Leigh Williamson MD on 9/25/2018 at 12:54 PM   Preliminary result electronically signed by Leigh Williamson MD on 9/13/2018 at 11:02 AM   Note   The bilateral ovaries and fallopian tubes, uterine serosa, sigmoid colon serosa, and omentum show involvement by low grade serous carcinoma with prominent psammomatous calcifications  Immunohistochemical stains performed with appropriate controls show the epithelium to be positive for CK7,  PAX-8, ER and WT-1 with wild type p53 expression and negative for CK20 and CDX-2  The morphologic and immunophenotypic findings are distinctly different from the patient's primary rectal carcinoma and are most consistent with a serous carcinoma of mullerian/peritoneal origin  The presence of tumor foci in multiple serosal surface is and multiple small lesions on ovarian and fallopian tube sections without a dominant nodule may suggest a primary peritoneal origin, though dissemination from a small adnexal primary cannot be entirely excluded      External expert consultation was obtained to assist in further classification of this process and is included below      The preliminary findings were communicated to [facility] with Dr Shannen Boyd office on 9/13/18 at  [id number]  The final findings were communicated to [facility] with Dr Shannen Boyd office on 9/25/18 at [address]  Diagnosis:  1  ENBLOC Rectum  Sigmoid, Anus, Uterus, BSO, Cervix, Posterior Vaginal Wall ([id number], B77, B78, B85, B88, B89, B91, 9/6/2018, 7 H&E and 7 Immuno slides): - Low grade serous carcinoma involving left ovary, left fallopian tube, uterine serosa and colonic serosa (see note)  - Cervix with glandular and squamous atypia, consistent with reactive changes  - Unremarkable colonic mucosa  2  Omentum ([id number]/C2, C3, 9/6/2018, 2 H&E slides): - Low grade serous carcinoma with extensive psammomatous calcifications  Note: The tumor is composed of extensive psammomatous calcifications associated with a low grade serous neoplasm that shows invasion of underlying structures  The tumor in the omentum shows broad inverted macropapillary pattern of invasion (see reference)  Submitted immunohistochemical stains show that the serous carcinoma is positive for CK7, WT1 (focal), ER, [place] and PAX8,while negative for CK20 and CDX2  The findings support the diagnosis  Reference:  Aden Marquez AK, Maicol MISHRAK, Skye TA, Low-grade Serous Carcinomas of the Ovary: Clinicopathologic Analysis of 52 Invasive Cases and Identification of a Possible Noninvasive Intermediate Lesion, Am J Surg pathol, 2016 Sep;40(9):4510-02      Sarah Patel MD      Additional Information   All controls performed with the immunohistochemical stains reported above reacted appropriately  These tests were developed and their performance characteristics determined by [facility] or [facility]  They may not be cleared or approved by the [place]  Food and Drug Administration  The FDA has determined that such clearance or approval is not necessary  These tests are used for clinical purposes  They should not be regarded as investigational or for research  This laboratory has been approved by [place] 88, designated as a high-complexity laboratory and is qualified to perform these tests  Interpretation performed at [facility], [place] [id number]         Gross Description   A  The specimen is received in formalin, labeled with the patient's name and hospital number, and is designated "epiploic nodule  It consists of an 8 0 x 4 0 x 1 0 cm irregular portion of tan-yellow, lobulated fatty tissue that is centrally rubbery, slightly firm and bulging  The central, bulging area is inked black, and the specimen is sectioned revealing a 2 1 x 2 0 x 1 6 cm well-circumscribed, gray yellow, partially cystic nodule  The cystic spaces contain pale yellow, transparent watery fluid  The almost encapsulated portion of the outer surface of the nodule comes within less than 0 1 cm of the black inked outer surface  The remaining cut surfaces are yellow and coarsely lobulated, with no additional nodules or lesions noted  A representative section is submitted in cassette A1      B  The specimen is received in formalin, labeled with the patient's name and hospital number, and is designated "on block rectum, sigmoid, anus, uterus, BSO, cervix    It consists of a 22 0 cm in length by 4 0 cm in average diameter portion of rectum, and a 3 0 cm in length by 3 0 cm in diameter portion of sigmoid colon stapled shut at the proximal resection margin, and ending at the anal orifice  A blue surgical suture is present, closing the anal orifice  A circumferential ring of tan-gray, wrinkled anal skin is present, ranging from 1 0-1 5 cm in width  The tan-yellow, lobulated mesorectum is nearly complete, and a 6 5 cm in length by 1 5 cm in average width strip of perineal to vaginal mucosa is noted on the anterior surface of the distal rectum, extending from the anal skin area  The perineal and vaginal mucosa is tan to pink gray, smooth to wrinkled, and glistening  The serosa of the proximal rectum is tan-pink, smooth, glistening and unremarkable  The anterior mesorectum is inked orange, the posterior mesorectum inked black, and the peritonealized rectal surface green  The specimen is opened revealing a 3 3 x 2 5 cm very ill-defined, tan pink, slightly depressed, flattened, indurated lesion at the anterior to posterior rectum, below the peritoneal reflection line, 2 9 cm from the dentate line, and 5 9 cm from the nearest anal skin margin  The mass is greater than 15 0 cm from both the proximal and vascular pedicle margins  Upon sectioning, the lesion appears to remain superficial, with the cut surface of the underlying rectal wall displaying focal microcystic areas  The lesion comes within 1 8 cm of the nearest black inked posterior and 1 5 cm of the nearest orange inked anterior circumferential rectal margins, and within 2 5 cm of the vaginal mucosa  The vaginal mucosa does not grossly appear involved by the mass, however, the underlying fibrous tissue is somewhat dense and rubbery  The dentate line, anal mucosa, and anal skin also appear free from the lesion  The remaining rectal mucosa is tan to pink purple, largely flat with focal irregular folds  The dentate line is distinct, and undulating    The anal mucosa is glistening, pink-white, with multiple glistening, polypoid projections measuring up to 1 5 x 0 8 x 0 2 cm  The proximal half of the rectum and sigmoid colon display a wall measuring up to 1 1 cm thick, lined by tan-pink, glistening mucosa with the usual folds  A probe patent diverticulum is noted  No areas of perforation or additional lesions are identified  A thorough search of the attached fatty, focally hemorrhagic tissue yields a small amount of possible lymph nodes in the mesorectum, measuring up to 0 7 cm in greatest dimension  Two roughly well-circumscribed, gray-white, calcified, nodular areas are noted at the periphery of the mesorectum, measuring 0 4 and 0 9 cm in greatest dimension  Additional possible lymph nodes measuring up to 0 4 cm in greatest dimension are noted in the fatty tissue further away from the lesion      Also in the specimen container is an 11 0 cm in length by 3 0 cm in average diameter, unoriented portion of apparent large bowel stapled shut at both resection margins, and displaying a moderate to large amount of attached yellow, lobulated fatty tissue  The serosa is tan-pink, smooth and glistening  The the specimen is opened revealing a wall measuring up to 0 7 cm thick, lined by tan-pink, glistening mucosa with the usual folds  Multiple probe patent diverticuli measuring up to 1 0 cm in greatest dimension beneath the muscularis layer are noted  No areas of perforation or apparent lesions are identified  A 0 8 cm in greatest dimension gray-white, calcified nodule is noted outside the muscularis layer, but apparently beneath the serosal membrane  A small number of possible lymph nodes measuring up to 0 5 cm in greatest dimension are noted within the fatty tissue  The largest of these is at the periphery of the fat, and markedly calcified      Also in the specimen container is a 140 g (fixed weight) uterus with attached cervix and bilateral adnexa    Irregular and ragged portions of tan-yellow, lobulated fatty tissue are attached to the uterus, cervix, and bilateral adnexa, measuring up to 5 5 cm in greatest dimension  The uterus is vaguely heart shaped, with a smooth, glistening serosa posteriorly  The anterior serosa displays fibrous adhesions and a small (2 0 cm) area of attached fatty tissue near the right fundus  A 1 0 x 0 8 x 0 3 cm gray-white, glistening, polypoid area is noted at the lateral aspect of the right anterior lower uterine segment  An up to 2 5 cm in diameter area of tan-pink, glistening, smooth to wrinkled ectocervical tissue surrounds a patent os  The anterior half is inked blue and the posterior half black  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 lined by a tan-pink, glistening endometrium averaging 0 1 cm thick  The left and right cornus average 3 0 cm in length by 0 8 cm in width  A 1 3 x 0 6 x 0 2 cm tan-pink, glistening, superficial polyp is identified at the end of the left cornu, with possible attachment sites on both the anterior and posterior halves  The endocervical canal is 4 0 cm in length, striated, with a 0 3 cm in greatest dimension, colorless, translucent mucinous filled polypoid projection on the anterior half  Sectioning the cervix reveals focal cystic spaces measuring up to 0 4 cm in greatest dimension, filled with colorless, translucent gelatinous material   Sectioning the nodular area previously described on the lateral, right anterior uterine body reveals a partially calcified, apparent tortuous blood vessel  Sectioning the area of attached fatty tissue and fibrous adhesions at the right anterior fundus revealed a 1 1 cm in greatest dimension irregular, gray-pink, gritty, calcified nodule  A 0 8 cm in greatest dimension similar appearing, partially calcified nodule is noted within the tissue attached near the right posterior cervix   The tan-pink, moderately trabeculated myometrium measures up to 2 1 cm thick, and contains a 0 3 cm in greatest dimension well-circumscribed, white, whorled, rubbery intramural nodule  The 3 9 x 2 0 x 1 9 cm tan to pink white, apparent intact left ovary is partially obscured by adherent fatty and fibrous tissue, and a 7 5 cm in length segment of tortuous, fimbriated, pink purple fallopian tube displaying a small amount of unilocular, clear fluid-filled paratubal cystic structures measuring up to 0 6 cm in greatest dimension, near the fimbriated end  Multifocal gray-white, gritty, granular to multinodular, partially calcified areas measuring up to 1 8 cm in greatest dimension are noted on the surface of the ovary and attached tissue  The ovary, with the adherent fallopian tube and soft tissue is inked black  Sectioning the ovary reveals a tan-white to gray-pink, slightly irregular cut surface with focal unilocular, smooth lined cystic spaces measuring up to 0 6 cm in greatest dimension  The fallopian tube, which is closely associated with the ovary, measures up to 0 8 cm in diameter and displays an unremarkable cut surface  The 3 8 x 1 7 x 1 5 cm intact, tan-white, smooth to bulging, glistening right ovary is sectioned revealing a dense, pale tan white, largely homogeneous cut surface  A small amount of unilocular, clear fluid-filled cystic structures are noted within the attached soft tissue, measuring up to 0 6 cm in greatest dimension  A 6 5 cm in length portion of pink purple, glistening, fimbriated fallopian tube ranging from 0 5 x 1 0 cm in diameter is closely associated with the ovary  The fimbriae are vaguely enlarged and swollen  The fallopian tube displays an unremarkable cut surface  Also in the specimen container is a 1 0 x 0 6 x 0 5 cm irregular and partially ragged portion of a gray-white, bilobed, gritty, calcified nodule with a moderate amount of attached yellow, lobulated fatty tissue     Gross photographs are taken, and representative sections are submitted as follows:     B1:  Shaved proximal margin  B2:  Shaved vascular pedicle margin  B3-B4:  Nearest lesion to vaginal mucosa  B5-B20:  Remainder of rectal lesion  B21-B44:  Remainder of vaginal mucosa (B21-B35:  perpendicular sections, B36-B 44:  Longitudinal sections)  B45:  Polypoid area of anal mucosa  B46-B47:  Unremarkable dentate line  B48:  Diverticulum  B49-B50:  Unremarkable rectum and sigmoid  B51-B53:  Bisected mesorectal lymph nodes (one node per block)  B54:  Intact calcified nodule from periphery of mesorectum following decalcification in Decal II  B55:  Sectioned calcified nodule from periphery of mesorectum following decalcification in Decal II  B56-B57:  Multiple intact possible lymph nodes per block  B58-B65:  Sectioned possible lymph nodes (one node per block)     B66-B67:  Shaved margins of additional segment of bowel  B68-B69:  Diverticuli from additional segment of bowel  B70:  Unremarkable additional segment of bowel  B71:  Calcified nodule at serosa of additional segment of bowel following decalcification in Decal II  B72:  Calcified possible lymph node from additional segment of bowel following decalcification in Decal II  B73-B76:  Sectioned lymph nodes from additional segment of bowel (one node per block)     B77:   Anterior cervix with polypoid projection  B78:  Posterior cervix  B79:  Partially calcified tortuous blood vessel from lateral, right anterior uterine body  B80:  Anterior and posterior full-thickness endomyometrium from right cornu  B81:  Possible polyp attachment site with detached portion of polyp from left anterior cornu  B82:  Myometrium and serosa underlying tissue in B81  B83:  Full-thickness posterior endomyometrium from left cornu with possible polyp attachment site  B84:  Full-thickness endomyometrium at junction of left and right cornu, posterior half  B85:  Calcified nodule in soft tissue adherent to the right anterior uterine fundus following decalcification in Decal II  B86:  Intramural nodule  B87:  Calcified nodule from right posterior paracervical tissue  B88-B90:  Left ovary with associated left fallopian tube  B91:  Left fallopian tube fimbriae and nodular area on surface of left ovary  B92-B93:  Right ovary with associated fallopian tube  B94:  Right fallopian tube  B95:  Bilobed, calcified nodule from separate tissue following decalcification in Decal II     At the request of the pathologist, the specimen is re-examined for possible additional lymph nodes  15 densities suggestive of lymph nodes ranging in size from 0 1-0 4 cm each are identified upon re-examination  The densities are entirely submitted for histologic examination as follows:    [id number]:  2 densities suggestive of lymph nodes in each cassette  B103:  Single density suggestive of lymph node     Note: The estimated total formalin fixation time based upon information provided by the submitting clinician and the standard processing schedule is over 72 hours  MCrites                                                                                                                                                                          C  The specimen is received in formalin, labeled with the patient's name and hospital number, and is designated "omentum  It consists of a 29 0 x 14 0 x 2 0 cm sheet like portion of tan-yellow, lobulated fatty tissue consistent with portion of omentum  Multiple locations are palpably firm and indurated  These regions are inked black  Sectioning revealed multiple ill-defined, bright white-yellow multi focally gray-pink, friable, gritty and partially calcified nodules ranging from 0 5-2 5 cm in greatest dimension  These areas come within less than 0 1 cm of the black inked outer surface  The remaining cut surfaces are yellow, coarsely lobular, focally dense with numerous vascular structures    Representative sections of three different nodular areas are submitted in cassette C1 through C3 with C3 following decalcification in Decal II      Note: The estimated total formalin fixation time based uposhouldn information provided by the submitting clinician and the standard processing schedule is under 72 hours      Mohini Metzger MD, [address] [place] [address], [facility]  10/3/2018  1:16 PM

## 2018-10-03 NOTE — PROGRESS NOTES
GI Oncology Nurse Navigator Note    Spoke to [employer] today regarding colostomy concerns, asked her if there was any improvement when she applied her appliance while she was laying flat and she said not really, she said she received things in the mail from the company to better adhere the bags that she will try, also told her to call the [email or web address] 800 number and inform them of the problem she is having and see if they have sample bags they can offer to help solve this problem she is having, they offer support to the patients and may be able to help her, informed her if they cannot help to call back and let me know and I have another person I can reach out to and will see if they can help, she was satisfied with this answer and stated she will call them and see what they have to offer, instructed her to call with any other questions or concerns, will continue to follow up with her to ensure her issues are resolved

## 2018-10-04 ENCOUNTER — APPOINTMENT (OUTPATIENT)
Dept: WOUND CARE | Facility: HOSPITAL | Age: 63
End: 2018-10-04
Payer: MEDICARE

## 2018-10-04 LAB — CANCER AG125 SERPL-ACNC: 9.6 U/ML (ref 0–30)

## 2018-10-04 PROCEDURE — 99213 OFFICE O/P EST LOW 20 MIN: CPT

## 2018-10-05 ENCOUNTER — OFFICE VISIT (OUTPATIENT)
Dept: HEMATOLOGY ONCOLOGY | Facility: MEDICAL CENTER | Age: 63
End: 2018-10-05
Payer: MEDICARE

## 2018-10-05 VITALS
SYSTOLIC BLOOD PRESSURE: 130 MMHG | OXYGEN SATURATION: 97 % | HEART RATE: 60 BPM | WEIGHT: 218 LBS | HEIGHT: 67 IN | DIASTOLIC BLOOD PRESSURE: 80 MMHG | BODY MASS INDEX: 34.21 KG/M2 | RESPIRATION RATE: 18 BRPM | TEMPERATURE: 97.8 F

## 2018-10-05 DIAGNOSIS — C20 RECTAL CANCER (HCC): Primary | ICD-10-CM

## 2018-10-05 PROCEDURE — 99215 OFFICE O/P EST HI 40 MIN: CPT | Performed by: INTERNAL MEDICINE

## 2018-10-05 NOTE — PROGRESS NOTES
Jimmy Jackson  1955  [facility] [address] [place] [id number]    DISCUSSION/SUMMARY:    45-year-old female recently found to have high-grade dysplasia/intramucosal lesion arising in a tubulovillous adenoma  Patient feels +/-  Issues:    1  Rectal cancer  Final stage is IIA (ypT3 pN0 G2)  Patient now has wound issues  Wound care is ongoing, patient is working on finding someone who can change the packing every day  NCCN guidelines 3 2018 state that for patients with ypT3 lesions, sequence is neoadjuvant concurrent treatment (completed), primary treatment/surgery (completed) and then adjuvant treatment for total of 6 months of perioperative treatment  Because of the wound, postoperative treatment is on hold  When patient is better and ready and cleared by surgery, we will discuss FOLFOX vs  CAPOX  2  Ovarian cancer, IIIC  Patient was found to have an incidental low-grade serous ovarian carcinoma with omental nodules greater than 2 cm grossly visible  Patient has been seen by Dr Tahira Russell and is to begin anastrozole (NCCN 2 56395 low-grade serous, stage II-IV tumors, treatment options include primary hormonal therapy (category 2B) - includes anastrozole  From an oncology standpoint, patient can start now  Gyn oncology follow-up is scheduled    3  Pain control  Patient is taking a lot of Percocet  Mrs Romain Gallego is followed by pain    Patient is to return to specialist   Apparently in the past, there was consideration to starting OxyContin  This may be a good time to try long-acting oxycodone  4   Anemia in the past   Mrs Romain Gallego can continue with a multivitamin with iron monitoring for GI side effects  We rediscussed what to monitor for in regard to progressive anemia  A CBC will be reordered on the next office  5  Cardiac issues    Patient has valvular problems, specifics are not presently available  Patient should return to her cardiologist and discuss treatment options  Invasive cardiac procedures now become more complicated as patient has this wound care issue  Patient is to return in 4 weeks  Mrs Vanessa Cristobal knows to call if she has any other oncology questions or concerns  Carefully review your medication list and verify that the list is accurate and up-to-date  Please call the hematology/oncology office if there are medications missing from the list, medications on the list that you are not currently taking or if there is a dosage or instruction that is different from how you're taking that medication  Patient goals and areas of care: Follow up with surgery  Patient is able to self-care   ______________________________________________________________________________________    Chief Complaint   Patient presents with    Follow-up     Rectal cancer     History of Present Illness:    22-year-old female previously referred for the above  Previously Mrs Hermleindo Unger had gastric bypass  Patient was recently seen by GI because of blood in the stools  Additional workup included a colonoscopy which demonstrated a rectal lesion  Patient recently completed concurrent chemotherapy (Xeloda) with radiation  Mrs Vanessa Cristobal subsequently went onto an APR approximately 2 weeks ago  Final pathology results are listed below; patient was also found to have IIIC low-grade serous ovarian carcinoma  Patient now has wound care issues  Packing is being performed each day  Mrs Vanessa Cristobal is taking a lot of Percocet  No fevers, chills or sweats  No urinary issues  Patient states that she is having some issues with the colostomy bag, leaking  Appetite is okay, activities are limited  Review of Systems   Constitutional: Positive for fatigue  HENT: Negative  Eyes: Negative  Respiratory: Negative  Cardiovascular: Negative      Gastrointestinal: Negative for blood in stool, constipation and diarrhea  Endocrine: Negative  Genitourinary: Negative  Musculoskeletal: Positive for arthralgias  Skin: Negative  Allergic/Immunologic: Negative  Neurological: Negative  Hematological: Negative  Psychiatric/Behavioral: Negative  All other systems reviewed and are negative       Patient Active Problem List   Diagnosis    Morbid obesity due to excess calories (HCC)    Postgastrectomy malabsorption    S/P gastric bypass    Marginal ulcer    Status post bariatric surgery    Atherosclerotic peripheral vascular disease ([place] [address] )    Diet-controlled diabetes mellitus ([facility])    Onychomycosis    Pain in foot    Rectal cancer ([facility])    Postoperative pain    Acute blood loss anemia    Colostomy care ([place] [address] )    Ovarian cancer ([place] [address] )     Past Medical History:   Diagnosis Date    Anemia     Arthritis     Cancer (HCC)     rectal    Chronic lower back pain     Chronic pain disorder     back pain    Diabetes mellitus ([place] [address] )     GERD (gastroesophageal reflux disease)     History of MRSA infection     Morbid obesity ([place] [address] )     Spinal stenosis     Systolic murmur     Unsteady gait     uses walker    Wears dentures     Wears glasses      Ob/gyn:  No recent mammogram -patient's choice, no post menopausal bleeding    Past Surgical History:   Procedure Laterality Date    ABDOMINAL PERINEAL BOWEL RESECTION W/ ILEOANAL POUCH N/A 2018    Procedure: LAPAROSCOPIC HAND ASSIST ABDOMINOPERINEAL RESECTION,  POSTERIOR VAGINECTOMY, OMENTECTOMY;  Surgeon: Tutu Tierney MD;  [facility];  Service: Colorectal    ABDOMINAL SURGERY      abscess removed from abdomen and right thigh, a hole in thigh closed by plastic surgeon    ABSCESS DRAINAGE      abd, (R) leg, (L) leg     SECTION       SECTION      CYSTOSCOPY N/A 2018    Procedure: CYSTOSCOPY;  Surgeon: Jarad Davila MD;  [facility];  Service: Gynecology Oncology    ESOPHAGOGASTRODUODENOSCOPY N/A 7/18/2016    Procedure: ESOPHAGOGASTRODUODENOSCOPY (EGD); Surgeon: Natalie Mijares MD;  [facility];  Service:     ESOPHAGOGASTRODUODENOSCOPY N/A 3/30/2016    Procedure: ESOPHAGOGASTRODUODENOSCOPY (EGD); Surgeon: Natalie Mijares MD;  [facility]; Service:     EYE SURGERY      laser eye surgery    HYSTERECTOMY N/A 9/6/2018    Procedure: RADICAL HYSTERECTOMY TOTAL ABDOMINAL (ALEXANDRA)  BSO;  Surgeon: Jace Sidhu MD;  [facility];  Service: Gynecology Oncology    JOINT REPLACEMENT Left 2017    hip    JOINT REPLACEMENT Right 2017    hip    NE COLONOSCOPY FLX DX W/COLLJ SPEC WHEN PFRMD N/A 3/9/2018    Procedure: COLONOSCOPY;  Surgeon: Beryl Harden MD;  Location: [place] [address] GI LAB; Service: Gastroenterology    NE COLONOSCOPY FLX DX W/COLLJ [facility] WHEN PFRMD N/A 9/5/2018    Procedure: COLONOSCOPY;  Surgeon: Helena Tapia MD;  Location:  GI LAB; Service: Colorectal    NE ESOPHAGOGASTRODUODENOSCOPY TRANSORAL DIAGNOSTIC N/A 2/2/2018    Procedure: ESOPHAGOGASTRODUODENOSCOPY (EGD); Surgeon: Beryl Harden MD;  Location: [facility] GI LAB; Service: Gastroenterology    NE LAP GASTRIC BYPASS/SOLEDAD-EN-Y N/A 7/18/2016    Procedure: BYPASS GASTRIC  SOLEDAD-EN-Y LAPAROSCOPIC;  Surgeon: Natalie Mijares MD;  [facility];  Service: Bariatrics    NE LAP, SURG COLOSTOMY N/A 9/6/2018    Procedure: PERMANENT END COLOSTOMY;  Surgeon: Helena Tapia MD;  [facility];  Service: Colorectal    NE LAP, SURG PROCTECTOMY W COLOSTOMY N/A 9/6/2018    Procedure: PROCTECTOMY;  Surgeon: Helena Tapia MD;  [facility];  Service: Colorectal    TUBAL LIGATION       Family History   Problem Relation Age of Onset    Adopted:  Yes    Leukemia Mother     Heart disease Father     Coronary artery disease Father     Diabetes Father     No Known Problems Sister     No Known Problems Brother     Diabetes Son     No Known Problems Brother     No Known Problems Brother     No Known Problems Sister     No Known Problems Sister     Family history: Mother  of leukemia (NOS), father  from heart disease (NOS), 2 children 1 with diabetes, no known familial or genetic diseases, no family history of GI malignancies    Social History     Social History    Marital status: Single     Spouse name: N/A    Number of children: 2    Years of education: N/A     Occupational History    Not on file  Social History Main Topics    Smoking status: Former Smoker     Packs/day: 1 00     Years: 25 00     Quit date: 3/30/2006    Smokeless tobacco: Never Used    Alcohol use No    Drug use: Yes     Types: Oxycodone      Comment: Percocet for lower back pain prn    Sexual activity: Not on file     Other Topics Concern    Not on file     Social History Narrative    No narrative on file       Current Outpatient Prescriptions:     anastrozole (ARIMIDEX) 1 mg tablet, Take 1 tablet (1 mg total) by mouth daily, Disp: 90 tablet, Rfl: 1    Calcium-Vitamin D 500-125 MG-UNIT TABS, Take by mouth daily  , Disp: , Rfl:     metroNIDAZOLE (FLAGYL) 500 mg tablet, , Disp: , Rfl:     Multiple Vitamins-Minerals (BARIATRIC FUSION) CHEW, Chew daily  , Disp: , Rfl:     omeprazole (PriLOSEC) 20 mg delayed release capsule, Take 1 capsule (20 mg total) by mouth daily, Disp: 90 capsule, Rfl: 3    oxyCODONE (ROXICODONE) 10 MG TABS, , Disp: , Rfl:     oxyCODONE-acetaminophen (PERCOCET)  mg per tablet, Take 1 tablet by mouth every 6 (six) hours as needed for moderate pain , Disp: , Rfl:     Allergies   Allergen Reactions    Tramadol Other (See Comments)     Dizzy         Vitals:    10/05/18 0832   BP: 130/80   Pulse: 60   Resp: 18   Temp: 97 8 °F (36 6 °C)   SpO2: 97%     Physical Exam   Constitutional: She is oriented to person, place, and time  She appears well-developed and well-nourished  HENT:   Head: Normocephalic and atraumatic     Right Ear: External ear normal    Left Ear: External ear normal  Nose: Nose normal    Mouth/Throat: Oropharynx is clear and moist    Eyes: Pupils are equal, round, and reactive to light  Conjunctivae and EOM are normal    Neck: Normal range of motion  Neck supple  Cardiovascular: Normal rate, regular rhythm, normal heart sounds and intact distal pulses  Pulmonary/Chest: Effort normal and breath sounds normal    Abdominal: Soft  Bowel sounds are normal    Abdomen is soft, nontender, +bowel sounds, obese, cannot palpate liver or spleen, new left lower quadrant colostomy in place   Musculoskeletal:   Decreased range of motion in hips and knees bilaterally - same as before   Neurological: She is alert and oriented to person, place, and time  She has normal reflexes  Skin: Skin is warm  Warm, moist, slightly pale, scattered upper extremity purpura, no petechiae or ecchymoses   Psychiatric: She has a normal mood and affect   Her behavior is normal  Judgment and thought content normal    Extremities: 0-1+ bilateral lower extremity edema, no cords, pulses are 1+  Lymphatics:  No adenopathy in the neck, supraclavicular region, axilla and groin bilaterally  Rectal wound deferred    Labs:    10/03/2018 WBC = 5 8 hemoglobin = 8 7 hematocrit = 31 MCV = 80 platelet = 334 CA -576 = 9 6    09/11/2018 WBC = 5 6 hemoglobin = 7 7 hematocrit = 25 4 MCV = 83 platelet = 176 BUN = 7 creatinine = 0 50  06/13/2018 WBC = 5 39 hemoglobin = 10 4 hematocrit = 35 MCV = 80 platelet = 887 BUN = 13 creatinine = 0 76 [facility]  05/30/2018 BUN = 11 creatinine = 0 70 [facility] WBC = 4 97 hemoglobin = 10 1 hematocrit = 34 MCV = 80 RDW = 18 6 platelet = 586 neutrophil = 68%  05/17/2018 WBC = 4 4 hemoglobin = 10 6 hematocrit = 34 MCV = 75 platelet = 961 neutrophil = 67% BUN = 19 creatinine = 0 65 [facility]  05/08/2018 WBC = 6 7 hemoglobin = 10 7 hematocrit = 35 MCV = 75 platelet = 395 BUN = 17 creatinine = 0 62 [facility]  03/12/2018 CEA = 22 6 = high    Imaging    4/6/18 MRI pelvis rectal cancer staging    Low rectal cancer, 5 cm in length, circumferential around the rectal wall  (Series 8 images 19 through 33 )  Anteriorly, there are multiple areas where there is transmural tumor extension into the mesorectal fat making this at least a T3c lesion  On Series 8 image 31, tumor also abuts the mesorectal fascia making this CRM positive  At this point it is also   immediately adjacent to the vagina, therefore this may be a T4 lesion  There are 2 high risk perirectal nodes, and 1 low risk perirectal nodes  01/31/2018 ultrasound right upper quadrant    1  Layering gallbladder sludge  No acute cholecystitis or biliary ductal obstruction  2   Hepatomegaly  3   Fluid-filled stomach  1/27/18 CT scan of the abdomen/pelvis    1  Prior Juan-en-Y gastric bypass with distention of the excluded stomach    2   No evidence of acute abnormality in the abdomen or pelvis      Pathology    Case Report   Surgical Pathology Report                         Case: [id number]                                    Authorizing Provider: Musa Dubose MD      Collected:           09/06/2018 1010               Ordering Location:     [address]      Received:            09/06/2018 [facility]                                      Hospital Operating Room                                                       Pathologist:           Ronal Shah MD                                                         Specimens:   A) - Soft Tissue, Other, EPIPLOIC NODULE                                                             B) - Rectum, enbloc rectum, sigmoid, anus, uterus, bso, cervix, posterior vaginal                    wall                                                                                                 C) - Omentum                                                                               Addendum   Additional immunohistochemical stains performed with appropriate controls on a selected portion of serous carcinoma involving omental tissue (block C1) show the following results:  Estrogen receptor: Positive  Progesterone receptor: Negative   Addendum electronically signed by Ranjit Ellis MD on 9/27/2018 at  1:40 PM   Final Diagnosis   A  Soft tissue, epiploic nodule, biopsy:  -  Portion of nodular fibroadipose tissue with fat necrosis and dystrophic calcifications  -  Negative for metastatic carcinoma  -  Immunohistochemical stain performed with appropriate control for keratin AE1/3 is negative for epithelial elements, supporting the diagnosis      B  Rectum, sigmoid colon, anus, uterus,cervix, bilateral ovaries and fallopian tubes and posterior vaginal wall; en bloc resection:  -  Invasive moderately differentiated adenocarcinoma of rectum (see first synoptic report)  -  Low grade serous carcinoma involving left ovary, left fallopian tube, uterine serosa and colonic serosa (see second synoptic report)  -  Separate portion of colon (consistent with sigmoid) with diverticulosis coli showing focal serosal implant of low-grade serous carcinoma  -  Uterus showing benign inactive/atrophic endometrium with metaplastic change, benign endometrial polyp and rare cytologic atypia consistent with radiation effect  -  Cervix with mild glandular atypia, favor reactive (see note)  -  Benign uterine myometrium with one intramural benign leiomyoma, negative for atypia or malignancy  -  Benign anal skin and dentate line with mild reactive change, negative for involvement by carcinoma  -  Benign vaginal wall mucosa with thinning, chronic inflammation, and surface erosion most suggestive of prior therapy effect, negative for involvement by carcinoma  -  27 benign pericolorectal lymph nodes identified, negative for metastatic carcinoma      C  Omentum, omentectomy:  -   Low grade serous carcinoma         COLORECTAL CARCINOMA TUMOR STAGING SUMMARY (includes specimen A-C of this case):  1   Specimen identification:     - Specimen:  Rectum, sigmoid colon, anus, uterus,cervix, bilateral ovaries and fallopian tubes and posterior vaginal wall   2  Tumor:     - Tumor site:  Rectum     - Tumor size:  Up to 3 3 cm     - Macroscopic tumor perforation:  Not identified     - Tumor location:  Below the peritoneal reflection        - Macroscopic intactness of mesorectum:  Intact     - Histologic Type:   Adenocarcinoma      - Histologic Grade: Moderately differentiated (G2)     - Microscopic Tumor Extension (ypT3): Tumor invades through muscularis propria into pericolorectal soft tissues     - Tumor budding (only needed in polyps, Stage I or II cancers):  Not identified   3  Margins (specify distance for nearest radial margin on RECTAL tumors only):     - Proximal Margin:  Negative for carcinoma     - Distal Margin:  Negative for carcinoma     - Circumferential (Radial) or Mesenteric Margin:  Negative for carcinoma (0 6cm)     - Other Margins:  Vaginal margin negative for carcinoma   4  Treatment effect:  Present; Residual cancer with evident tumor regression, but more than single cells or rare small groups of cancer cells (partial response, score 2)   5  Lymph-vascular invasion:  Not identified   6  Perineural invasion:  Not identified   7  Tumor deposits: Not identified   8  Type of polyp in which invasive carcinoma arose:  Tubular adenoma   9  Regional lymph nodes (pN0):  27 benign pericolorectal lymph node sample, negative for metastatic carcinoma  10  Additional pathologic findings:  Diverticulosis coli  11  Ancillary Studies:  *  Immunohistochemical stains performed with appropriate controls show the primary rectal tumor to be positive for CK20 and CDX-2 and negative for CK 7, PAX-8, p53 (wild type expression), ER and WT-1, supporting a diagnosis of primary colorectal origin    12  8th Ed AJCC Stage:  at least Stage  IIA - ypT3, pN0, G2         Primary Tumor of the Ovary/Fallopian Tube/Peritoneum, Staging Summary (includes Specimen A to C of this case): 1  Procedure:  Hysterectomy, bilateral salpingo-oophorectomy, omentectomy  2  Hysterectomy type:  Radical hysterectomy  3  Specimen integrity:      - Right ovary:  Intact      - Left ovary:  Intact       - Right fallopian tube: Intact      - Left fallopian tube: Intact  4  Tumor site:  Ovarian and fallopian tube surfaces and uterine and colonic serosa  5  Ovarian surface involvement:  Present  6  Fallopian tube surface involvement:  Present  7  Tumor size:  Largest focus measures 1 8 cm (uterine serosa) (see note)  8  Histologic type:  Low-grade serous carcinoma   9  Histologic grade:  Low grade  10  Implant:  Present  11  Other tissue/organ involvement:  Bilateral ovaries and fallopian tubes, uterine serosa, sigmoid colon serosa, and omentum  12  Largest extrapelvic peritoneal focus: 2 5 cm (macroscopically identidfied)    - Specify site:  Omentum  13  Peritoneal/ascetic fluid:  Not performed   14  Pleural fluid:  Not performed  15  Treatment effect:  No known prior therapy  16  Regional lymph nodes:    - No regional uterine lymph nodes submitted or found    - 27 benign pericolorectal lymph nodes sampled, negative for metastatic carcinoma  17  Pathologic stage classification (pTNM, AJCC 8th edition): pT3c, pNX, Low Grade  18  FIGO stage (2015 FIGO cancer report): IIIC  19  Additional pathologic findings:  N/A  20  Ancillary studies:  Immunohistochemical stains performed with appropriate controls show the tumor cells to be positive for CK7, PAX-8, ER, and WT-1 with wild-type expression of p53 and negative for CK20 and CDX-2 supporting the diagnosis     Electronically signed by Nolan Dallas MD on 9/25/2018 at 12:54 PM                  Case Report   Surgical Pathology Report                         Case: [id number]                                    Authorizing Provider: Kareem Quinteros MD      Collected:           03/28/2018 1130               Pathologist:           Jeb Pierce MD             Received:            03/29/2018 0942               Specimen:    Rectum, Rectal cancer/mass biopsies                                                        Final Diagnosis   A  Rectal mass (biopsy):  - 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      Comment:   - In the context of a rectal mass, repeat biopsy and/or excision may be indicated to exclude a higher grade lesion  Electronically signed by Jackie Hager MD on 3/30/2018 at  2:36 PM         Case Report   Surgical Pathology Report                         Case: [id number]                                    Authorizing Provider: Marlon Zavala MD          Collected:           03/09/2018 0902               Ordering Location:     [facility]   Received:            03/12/2018 0904                                      Center                                                                        Pathologist:           Becky Mulligan DO                                                             Specimens:   A) - Colon, polyp splenic flexure/cold snare                                                         B) - Colon, bx distal rectal mass                                                          Final Diagnosis   A  Colon, splenic flexure polyp, biopsy:  - Tubulovillous adenoma, negative for high-grade dysplasia      B  Rectum, mass, biopsy:  - At least high grade dysplasia/intramucosal carcinoma arising in a tubulovillous adenoma, suspicious for invasion       Intradepartmental consultation is in agreement with the above diagnosis (AT)     Interpretation performed at [facility], [address] [place] [id number]  Report faxed to Dr Peter Lay on 3/13/18 @ 10:55 AM   Electronically signed by Alix Richmond DO on 3/13/2018 at 10:54 AM

## 2018-10-11 ENCOUNTER — APPOINTMENT (OUTPATIENT)
Dept: WOUND CARE | Facility: HOSPITAL | Age: 63
End: 2018-10-11
Payer: MEDICARE

## 2018-10-11 PROCEDURE — 99213 OFFICE O/P EST LOW 20 MIN: CPT

## 2018-10-22 ENCOUNTER — APPOINTMENT (OUTPATIENT)
Dept: WOUND CARE | Facility: HOSPITAL | Age: 63
End: 2018-10-22
Payer: MEDICARE

## 2018-10-22 PROCEDURE — 99212 OFFICE O/P EST SF 10 MIN: CPT

## 2018-11-01 ENCOUNTER — TRANSCRIBE ORDERS (OUTPATIENT)
Dept: ADMINISTRATIVE | Facility: HOSPITAL | Age: 63
End: 2018-11-01

## 2018-11-01 ENCOUNTER — HOSPITAL ENCOUNTER (OUTPATIENT)
Dept: RADIOLOGY | Facility: HOSPITAL | Age: 63
Discharge: HOME | End: 2018-11-01
Attending: OBSTETRICS & GYNECOLOGY
Payer: MEDICARE

## 2018-11-01 DIAGNOSIS — C56.9 MALIGNANT NEOPLASM OF OVARY, UNSPECIFIED LATERALITY (HCC): ICD-10-CM

## 2018-11-01 PROCEDURE — 74177 CT ABD & PELVIS W/CONTRAST: CPT

## 2018-11-01 PROCEDURE — 71260 CT THORAX DX C+: CPT

## 2018-11-01 RX ADMIN — IOHEXOL 100 ML: 350 INJECTION, SOLUTION INTRAVENOUS at 07:53

## 2018-11-02 ENCOUNTER — OFFICE VISIT (OUTPATIENT)
Dept: HEMATOLOGY ONCOLOGY | Facility: MEDICAL CENTER | Age: 63
End: 2018-11-02
Payer: MEDICARE

## 2018-11-02 VITALS
WEIGHT: 218 LBS | HEART RATE: 91 BPM | TEMPERATURE: 97.9 F | DIASTOLIC BLOOD PRESSURE: 60 MMHG | OXYGEN SATURATION: 97 % | HEIGHT: 67 IN | SYSTOLIC BLOOD PRESSURE: 100 MMHG | RESPIRATION RATE: 18 BRPM | BODY MASS INDEX: 34.21 KG/M2

## 2018-11-02 DIAGNOSIS — C20 RECTAL CANCER (HCC): Primary | ICD-10-CM

## 2018-11-02 PROCEDURE — 99214 OFFICE O/P EST MOD 30 MIN: CPT | Performed by: INTERNAL MEDICINE

## 2018-11-02 RX ORDER — GABAPENTIN 100 MG/1
CAPSULE ORAL
COMMUNITY
Start: 2018-10-17 | End: 2018-12-17 | Stop reason: ALTCHOICE

## 2018-11-02 NOTE — PROGRESS NOTES
Celeste Phillips  1955  [facility] [place] [id number]    DISCUSSION/SUMMARY:    79-year-old female recently found to have high-grade dysplasia/intramucosal lesion arising in a tubulovillous adenoma  Patient feels +/-  Issues:    1  Rectal cancer  Final stage is IIA (ypT3 pN0 G2)  Patient now has wound issues  Wound care is ongoing  I discussed the case with surgery earlier this week  Patient needs approximately another month of wound care/rest   Postoperative treatment can begin afterwards - CAPOX (patient does not want a port)  NCCN guidelines 3 2018 state that for patients with ypT3 lesions, sequence is neoadjuvant concurrent treatment (completed), primary treatment/surgery (completed) and then adjuvant treatment for total of 6 months of perioperative treatment  Because of the wound, postoperative treatment is on hold  2  Ovarian cancer, IIIC  Patient was found to have an incidental low-grade serous ovarian carcinoma with omental nodules greater than 2 cm grossly visible  Patient has been seen by Dr Kojo Omer and is on anastrozole (NCCN 2 33959 low-grade serous, stage II-IV tumors, treatment options include primary hormonal therapy (category 2B) - includes anastrozole  Gyn oncology follow-up is scheduled    3  Pain control  Mrs Sincere Reyez is followed by pain    Patient is to return to specialist   Gabapentin was recently started  4   Anemia in the past   Mrs Sincere Reyez can continue with a multivitamin with iron monitoring for GI side effects  We rediscussed what to monitor for in regard to progressive anemia  A CBC will be reordered on the next office  5  Cardiac issues  Patient has valvular problems, specifics are not presently available  Patient should return to her cardiologist and discuss treatment options     Invasive cardiac procedures now become more complicated as patient has this wound care issue  Patient is to return in 3 weeks  Mrs Mono Velazquez knows to call if she has any other oncology questions or concerns  Carefully review your medication list and verify that the list is accurate and up-to-date  Please call the hematology/oncology office if there are medications missing from the list, medications on the list that you are not currently taking or if there is a dosage or instruction that is different from how you're taking that medication  Patient goals and areas of care: Follow up with surgery  Patient is able to self-care   ______________________________________________________________________________________    Chief Complaint   Patient presents with    Follow-up     Rectal cancer     History of Present Illness:    60-year-old female previously referred for the above  Previously Mrs Paul Caruso had gastric bypass  Patient was recently seen by GI because of blood in the stools  Additional workup included a colonoscopy which demonstrated a rectal lesion  Patient recently completed concurrent chemotherapy (Xeloda) with radiation  Mrs Mono Velazquez subsequently went onto an APR approximately 2 weeks ago  Final pathology results are listed below; patient was also found to have IIIC low-grade serous ovarian carcinoma  Patient Is continue with wound care  Patient has more fatigue after beginning the Arimidex  Appetite is okay, no GI bleeding  No urinary issues  Pain is controlled; patient is seeing a pain specialist   No fevers, chills or sweats  Activities are less than before  Review of Systems   Constitutional: Positive for fatigue  HENT: Negative  Eyes: Negative  Respiratory: Negative  Cardiovascular: Negative  Gastrointestinal: Negative for blood in stool, constipation and diarrhea  Endocrine: Negative  Genitourinary: Negative  Musculoskeletal: Positive for arthralgias  Skin: Negative  Allergic/Immunologic: Negative  Neurological: Negative  Hematological: Negative  Psychiatric/Behavioral: Negative  All other systems reviewed and are negative  Patient Active Problem List   Diagnosis    Morbid obesity due to excess calories (HCC)    Postgastrectomy malabsorption    S/P gastric bypass    Marginal ulcer    Status post bariatric surgery    Atherosclerotic peripheral vascular disease ([place] [address] )    Diet-controlled diabetes mellitus (HCC)    Onychomycosis    Pain in foot    Rectal cancer (HCC)    Postoperative pain    Acute blood loss anemia    Colostomy care ([place] [address] )    Ovarian cancer ([place] [address] )     Past Medical History:   Diagnosis Date    Anemia     Arthritis     Cancer (HCC)     rectal    Chronic lower back pain     Chronic pain disorder     back pain    Diabetes mellitus ([place] [address] )     GERD (gastroesophageal reflux disease)     History of MRSA infection     Morbid obesity ([place] [address] )     Spinal stenosis     Systolic murmur     Unsteady gait     uses walker    Wears dentures     Wears glasses      Ob/gyn:  No recent mammogram -patient's choice, no post menopausal bleeding    Past Surgical History:   Procedure Laterality Date    ABDOMINAL PERINEAL BOWEL RESECTION W/ ILEOANAL POUCH N/A 2018    Procedure: LAPAROSCOPIC HAND ASSIST ABDOMINOPERINEAL RESECTION,  POSTERIOR VAGINECTOMY, OMENTECTOMY;  Surgeon: Pierce Stone MD;  [facility];  Service: Colorectal    ABDOMINAL SURGERY      abscess removed from abdomen and right thigh, a hole in thigh closed by plastic surgeon    ABSCESS DRAINAGE      abd, (R) leg, (L) leg     SECTION       SECTION      CYSTOSCOPY N/A 2018    Procedure: CYSTOSCOPY;  Surgeon: Rinku Caputo MD;  [facility];  Service: Gynecology Oncology    ESOPHAGOGASTRODUODENOSCOPY N/A 2016    Procedure: ESOPHAGOGASTRODUODENOSCOPY (EGD);   Surgeon: Twan James MD;  [facility];  Service:     ESOPHAGOGASTRODUODENOSCOPY N/A 3/30/2016    Procedure: ESOPHAGOGASTRODUODENOSCOPY (EGD); Surgeon: Anshul Burkett MD;  [facility]; Service:     EYE SURGERY      laser eye surgery    HYSTERECTOMY N/A 2018    Procedure: RADICAL HYSTERECTOMY TOTAL ABDOMINAL (ALEXANDRA)  BSO;  Surgeon: Bharath Ford MD;  [facility];  Service: Gynecology Oncology    JOINT REPLACEMENT Left 2017    hip    JOINT REPLACEMENT Right 2017    hip    NV COLONOSCOPY FLX DX W/COLLJ SPEC WHEN PFRMD N/A 3/9/2018    Procedure: COLONOSCOPY;  Surgeon: Gonsalo Ferris MD;  Location: [place] [address] GI LAB; Service: Gastroenterology    NV COLONOSCOPY FLX DX W/COLLJ [facility] WHEN PFRMD N/A 2018    Procedure: COLONOSCOPY;  Surgeon: Stephy Jin MD;  Location:  GI LAB; Service: Colorectal    NV ESOPHAGOGASTRODUODENOSCOPY TRANSORAL DIAGNOSTIC N/A 2018    Procedure: ESOPHAGOGASTRODUODENOSCOPY (EGD); Surgeon: Gonsalo Ferris MD;  [facility]; Service: Gastroenterology    NV LAP GASTRIC BYPASS/SOLEDAD-EN-Y N/A 2016    Procedure: BYPASS GASTRIC  SOLEDAD-EN-Y LAPAROSCOPIC;  Surgeon: Anshul Burkett MD;  [facility];  Service: Bariatrics    NV LAP, SURG COLOSTOMY N/A 2018    Procedure: PERMANENT END COLOSTOMY;  Surgeon: Stephy Jin MD;  [facility];  Service: Colorectal    NV LAP, SURG PROCTECTOMY W COLOSTOMY N/A 2018    Procedure: PROCTECTOMY;  Surgeon: Stephy Jin MD;  [facility];  Service: Colorectal    TUBAL LIGATION       Family History   Problem Relation Age of Onset    Adopted: Yes    Leukemia Mother     Heart disease Father     Coronary artery disease Father     Diabetes Father     No Known Problems Sister     No Known Problems Brother     Diabetes Son     No Known Problems Brother     No Known Problems Brother     No Known Problems Sister     No Known Problems Sister     Family history:   Mother  of leukemia (NOS), father  from heart disease (NOS), 2 children 1 with diabetes, no known familial or genetic diseases, no family history of GI malignancies    Social History     Social History    Marital status: Single     Spouse name: N/A    Number of children: 2    Years of education: N/A     Occupational History    Not on file  Social History Main Topics    Smoking status: Former Smoker     Packs/day: 1 00     Years: 25 00     Quit date: 3/30/2006    Smokeless tobacco: Never Used    Alcohol use No    Drug use: Yes     Types: Oxycodone      Comment: Percocet for lower back pain prn    Sexual activity: Not on file     Other Topics Concern    Not on file     Social History Narrative    No narrative on file       Current Outpatient Prescriptions:     anastrozole (ARIMIDEX) 1 mg tablet, Take 1 tablet (1 mg total) by mouth daily, Disp: 90 tablet, Rfl: 1    Calcium-Vitamin D 500-125 MG-UNIT TABS, Take by mouth daily  , Disp: , Rfl:     gabapentin (NEURONTIN) 100 mg capsule, , Disp: , Rfl:     Multiple Vitamins-Minerals (BARIATRIC FUSION) CHEW, Chew daily  , Disp: , Rfl:     omeprazole (PriLOSEC) 20 mg delayed release capsule, Take 1 capsule (20 mg total) by mouth daily, Disp: 90 capsule, Rfl: 3    oxyCODONE (ROXICODONE) 10 MG TABS, , Disp: , Rfl:     metroNIDAZOLE (FLAGYL) 500 mg tablet, , Disp: , Rfl:     oxyCODONE-acetaminophen (PERCOCET)  mg per tablet, Take 1 tablet by mouth every 6 (six) hours as needed for moderate pain , Disp: , Rfl:   No current facility-administered medications for this visit  Allergies   Allergen Reactions    Tramadol Other (See Comments)     Dizzy         Vitals:    11/02/18 0843   BP: 100/60   Pulse: 91   Resp: 18   Temp: 97 9 °F (36 6 °C)   SpO2: 97%     Physical Exam   Constitutional: She is oriented to person, place, and time  She appears well-developed and well-nourished  HENT:   Head: Normocephalic and atraumatic     Right Ear: External ear normal    Left Ear: External ear normal    Nose: Nose normal    Mouth/Throat: Oropharynx is clear and moist    Eyes: Pupils are equal, round, and reactive to light  Conjunctivae and EOM are normal    Neck: Normal range of motion  Neck supple  Cardiovascular: Normal rate, regular rhythm, normal heart sounds and intact distal pulses  Pulmonary/Chest: Effort normal and breath sounds normal    Abdominal: Soft  Bowel sounds are normal    Abdomen is soft, nontender, +bowel sounds, obese, cannot palpate liver or spleen, new left lower quadrant colostomy in place   Musculoskeletal:   Decreased range of motion in hips and knees bilaterally - same as before   Neurological: She is alert and oriented to person, place, and time  She has normal reflexes  Skin: Skin is warm  Warm, moist, slightly pale, scattered upper extremity purpura, no petechiae or ecchymoses   Psychiatric: She has a normal mood and affect   Her behavior is normal  Judgment and thought content normal    Extremities: 0-1+ bilateral lower extremity edema, no cords, pulses are 1+  Lymphatics:  No adenopathy in the neck, supraclavicular region, axilla and groin bilaterally  Rectal wound deferred    Labs:    10/03/2018 WBC = 5 8 hemoglobin = 8 7 hematocrit = 31 MCV = 80 platelet = 102 CA -285 = 9 6    09/11/2018 WBC = 5 6 hemoglobin = 7 7 hematocrit = 25 4 MCV = 83 platelet = 205 BUN = 7 creatinine = 0 50  06/13/2018 WBC = 5 39 hemoglobin = 10 4 hematocrit = 35 MCV = 80 platelet = 308 BUN = 13 creatinine = 0 76 [facility]  05/30/2018 BUN = 11 creatinine = 0 70 [facility] WBC = 4 97 hemoglobin = 10 1 hematocrit = 34 MCV = 80 RDW = 18 6 platelet = 630 neutrophil = 68%  05/17/2018 WBC = 4 4 hemoglobin = 10 6 hematocrit = 34 MCV = 75 platelet = 926 neutrophil = 67% BUN = 19 creatinine = 0 65 [facility]  05/08/2018 WBC = 6 7 hemoglobin = 10 7 hematocrit = 35 MCV = 75 platelet = 848 BUN = 17 creatinine = 0 62 [facility]  03/12/2018 CEA = 22 6 = high    Imaging    4/6/18 MRI pelvis rectal cancer staging    Low rectal cancer, 5 cm in length, circumferential around the rectal wall  (Series 8 images 19 through 33 )  Anteriorly, there are multiple areas where there is transmural tumor extension into the mesorectal fat making this at least a T3c lesion  On Series 8 image 31, tumor also abuts the mesorectal fascia making this CRM positive  At this point it is also   immediately adjacent to the vagina, therefore this may be a T4 lesion  There are 2 high risk perirectal nodes, and 1 low risk perirectal nodes  01/31/2018 ultrasound right upper quadrant    1  Layering gallbladder sludge  No acute cholecystitis or biliary ductal obstruction  2   Hepatomegaly  3   Fluid-filled stomach  1/27/18 CT scan of the abdomen/pelvis    1  Prior Juan-en-Y gastric bypass with distention of the excluded stomach    2   No evidence of acute abnormality in the abdomen or pelvis      Pathology    Case Report   Surgical Pathology Report                         Case: [id number]                                    Authorizing Provider: Tutu Tierney MD      Collected:           09/06/2018 1010               Ordering Location:     [address]      Received:            09/06/2018 [facility]                                      Hospital Operating Room                                                       Pathologist:           Joss Adams MD                                                         Specimens:   A) - Soft Tissue, Other, EPIPLOIC NODULE                                                             B) - Rectum, enbloc rectum, sigmoid, anus, uterus, bso, cervix, posterior vaginal                    wall                                                                                                 C) - Omentum                                                                               Addendum   Additional immunohistochemical stains performed with appropriate controls on a selected portion of serous carcinoma involving omental tissue (block C1) show the following results:  Estrogen receptor: Positive  Progesterone receptor: Negative   Addendum electronically signed by Tammy Wills MD on 9/27/2018 at  1:40 PM   Final Diagnosis   A  Soft tissue, epiploic nodule, biopsy:  -  Portion of nodular fibroadipose tissue with fat necrosis and dystrophic calcifications  -  Negative for metastatic carcinoma  -  Immunohistochemical stain performed with appropriate control for keratin AE1/3 is negative for epithelial elements, supporting the diagnosis      B  Rectum, sigmoid colon, anus, uterus,cervix, bilateral ovaries and fallopian tubes and posterior vaginal wall; en bloc resection:  -  Invasive moderately differentiated adenocarcinoma of rectum (see first synoptic report)  -  Low grade serous carcinoma involving left ovary, left fallopian tube, uterine serosa and colonic serosa (see second synoptic report)  -  Separate portion of colon (consistent with sigmoid) with diverticulosis coli showing focal serosal implant of low-grade serous carcinoma  -  Uterus showing benign inactive/atrophic endometrium with metaplastic change, benign endometrial polyp and rare cytologic atypia consistent with radiation effect  -  Cervix with mild glandular atypia, favor reactive (see note)  -  Benign uterine myometrium with one intramural benign leiomyoma, negative for atypia or malignancy  -  Benign anal skin and dentate line with mild reactive change, negative for involvement by carcinoma  -  Benign vaginal wall mucosa with thinning, chronic inflammation, and surface erosion most suggestive of prior therapy effect, negative for involvement by carcinoma  -  27 benign pericolorectal lymph nodes identified, negative for metastatic carcinoma      C  Omentum, omentectomy:  -   Low grade serous carcinoma         COLORECTAL CARCINOMA TUMOR STAGING SUMMARY (includes specimen A-C of this case):  1   Specimen identification:     - Specimen:  Rectum, sigmoid colon, anus, uterus,cervix, bilateral ovaries and fallopian tubes and posterior vaginal wall   2  Tumor:     - Tumor site:  Rectum     - Tumor size:  Up to 3 3 cm     - Macroscopic tumor perforation:  Not identified     - Tumor location:  Below the peritoneal reflection        - Macroscopic intactness of mesorectum:  Intact     - Histologic Type:   Adenocarcinoma      - Histologic Grade: Moderately differentiated (G2)     - Microscopic Tumor Extension (ypT3): Tumor invades through muscularis propria into pericolorectal soft tissues     - Tumor budding (only needed in polyps, Stage I or II cancers):  Not identified   3  Margins (specify distance for nearest radial margin on RECTAL tumors only):     - Proximal Margin:  Negative for carcinoma     - Distal Margin:  Negative for carcinoma     - Circumferential (Radial) or Mesenteric Margin:  Negative for carcinoma (0 6cm)     - Other Margins:  Vaginal margin negative for carcinoma   4  Treatment effect:  Present; Residual cancer with evident tumor regression, but more than single cells or rare small groups of cancer cells (partial response, score 2)   5  Lymph-vascular invasion:  Not identified   6  Perineural invasion:  Not identified   7  Tumor deposits: Not identified   8  Type of polyp in which invasive carcinoma arose:  Tubular adenoma   9  Regional lymph nodes (pN0):  27 benign pericolorectal lymph node sample, negative for metastatic carcinoma  10  Additional pathologic findings:  Diverticulosis coli  11  Ancillary Studies:  *  Immunohistochemical stains performed with appropriate controls show the primary rectal tumor to be positive for CK20 and CDX-2 and negative for CK 7, PAX-8, p53 (wild type expression), ER and WT-1, supporting a diagnosis of primary colorectal origin  12  8th Ed AJCC Stage:  at least Stage  IIA - ypT3, pN0, G2         Primary Tumor of the Ovary/Fallopian Tube/Peritoneum, Staging Summary (includes Specimen A to C of this case):  1   Procedure: Hysterectomy, bilateral salpingo-oophorectomy, omentectomy  2  Hysterectomy type:  Radical hysterectomy  3  Specimen integrity:      - Right ovary:  Intact      - Left ovary:  Intact       - Right fallopian tube: Intact      - Left fallopian tube: Intact  4  Tumor site:  Ovarian and fallopian tube surfaces and uterine and colonic serosa  5  Ovarian surface involvement:  Present  6  Fallopian tube surface involvement:  Present  7  Tumor size:  Largest focus measures 1 8 cm (uterine serosa) (see note)  8  Histologic type:  Low-grade serous carcinoma   9  Histologic grade:  Low grade  10  Implant:  Present  11  Other tissue/organ involvement:  Bilateral ovaries and fallopian tubes, uterine serosa, sigmoid colon serosa, and omentum  12  Largest extrapelvic peritoneal focus: 2 5 cm (macroscopically identidfied)    - Specify site:  Omentum  13  Peritoneal/ascetic fluid:  Not performed   14  Pleural fluid:  Not performed  15  Treatment effect:  No known prior therapy  16  Regional lymph nodes:    - No regional uterine lymph nodes submitted or found    - 27 benign pericolorectal lymph nodes sampled, negative for metastatic carcinoma  17  Pathologic stage classification (pTNM, AJCC 8th edition): pT3c, pNX, Low Grade  18  FIGO stage (2015 FIGO cancer report): IIIC  19  Additional pathologic findings:  N/A  20  Ancillary studies:  Immunohistochemical stains performed with appropriate controls show the tumor cells to be positive for CK7, PAX-8, ER, and WT-1 with wild-type expression of p53 and negative for CK20 and CDX-2 supporting the diagnosis     Electronically signed by Gema Corona MD on 9/25/2018 at 12:54 PM                  Case Report   Surgical Pathology Report                         Case: [id number]                                    Authorizing Provider: Jeanine Groves MD      Collected:           03/28/2018 1130               Pathologist:           Juan José Cross MD             Received:            03/29/2018 0942               Specimen:    Rectum, Rectal cancer/mass biopsies                                                        Final Diagnosis   A  Rectal mass (biopsy):  - 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      Comment:   - In the context of a rectal mass, repeat biopsy and/or excision may be indicated to exclude a higher grade lesion  Electronically signed by Romeo Villagran MD on 3/30/2018 at  2:36 PM         Case Report   Surgical Pathology Report                         Case: [id number]                                    Authorizing Provider: Anastasiia Ayala MD          Collected:           03/09/2018 0902               Ordering Location:     [facility]   Received:            03/12/2018 0904                                      Center                                                                        Pathologist:           Becky Mulligan DO                                                             Specimens:   A) - Colon, polyp splenic flexure/cold snare                                                         B) - Colon, bx distal rectal mass                                                          Final Diagnosis   A  Colon, splenic flexure polyp, biopsy:  - Tubulovillous adenoma, negative for high-grade dysplasia      B  Rectum, mass, biopsy:  - At least high grade dysplasia/intramucosal carcinoma arising in a tubulovillous adenoma, suspicious for invasion       Intradepartmental consultation is in agreement with the above diagnosis (AT)     Interpretation performed at [facility], [address] [address]  Report faxed to Dr Swetha Kerr on 3/13/18 @ 10:55 AM   Electronically signed by Matthew Becerril DO on 3/13/2018 at 10:54 AM

## 2018-11-05 ENCOUNTER — OFFICE VISIT (OUTPATIENT)
Dept: FAMILY MEDICINE CLINIC | Facility: CLINIC | Age: 63
End: 2018-11-05
Payer: MEDICARE

## 2018-11-05 VITALS
TEMPERATURE: 98.9 F | HEIGHT: 67 IN | DIASTOLIC BLOOD PRESSURE: 70 MMHG | WEIGHT: 220 LBS | SYSTOLIC BLOOD PRESSURE: 108 MMHG | OXYGEN SATURATION: 98 % | HEART RATE: 105 BPM | BODY MASS INDEX: 34.53 KG/M2

## 2018-11-05 DIAGNOSIS — I35.0 SEVERE AORTIC STENOSIS BY PRIOR ECHOCARDIOGRAM: ICD-10-CM

## 2018-11-05 DIAGNOSIS — I05.0 MODERATE MITRAL STENOSIS: ICD-10-CM

## 2018-11-05 DIAGNOSIS — R53.83 FATIGUE, UNSPECIFIED TYPE: ICD-10-CM

## 2018-11-05 DIAGNOSIS — E61.1 LOW SERUM IRON: ICD-10-CM

## 2018-11-05 DIAGNOSIS — D50.9 IRON DEFICIENCY ANEMIA, UNSPECIFIED IRON DEFICIENCY ANEMIA TYPE: ICD-10-CM

## 2018-11-05 DIAGNOSIS — R60.0 BILATERAL LOWER EXTREMITY EDEMA: Primary | ICD-10-CM

## 2018-11-05 PROCEDURE — 99213 OFFICE O/P EST LOW 20 MIN: CPT | Performed by: FAMILY MEDICINE

## 2018-11-05 RX ORDER — FERROUS SULFATE TAB EC 324 MG (65 MG FE EQUIVALENT) 324 (65 FE) MG
324 TABLET DELAYED RESPONSE ORAL
Qty: 90 TABLET | Refills: 0 | Status: ON HOLD | OUTPATIENT
Start: 2018-11-05 | End: 2021-08-21 | Stop reason: SDUPTHER

## 2018-11-05 RX ORDER — AMILORIDE HYDROCHLORIDE 5 MG/1
5 TABLET ORAL DAILY
Qty: 90 TABLET | Refills: 0 | Status: SHIPPED | OUTPATIENT
Start: 2018-11-05 | End: 2018-12-17 | Stop reason: ALTCHOICE

## 2018-11-05 NOTE — PROGRESS NOTES
Zoe Menjivar is a 61 y o  female who presents for evaluation of edema in both ankles and feet  The edema has been moderate  Onset of symptoms was 2 weeks ago, and patient reports symptoms have gradually worsened since that time  The edema is present all day  The patient states she has experienced similar symptoms years ago in the past, but they had resolved after her gastric bypass and subsequent weight loss  The swelling has been aggravated by nothing that the patient can identigy  The swelling has been relieved by elevation of involved area  Associated factors include: venous insufficiency  Cardiac risk factors: obesity (BMI >= 30 kg/m2) and sedentary lifestyle  History of both colorectal cancer and ovarian cancer  She recently underwent colorectal surgery for that cancer  She states that she initially felt surprisingly well after surgery  However, for the past 2 weeks she has been feeling fatigued  Was told by her surgeon that with chemo / radiation and major surgery it would be normal to feel fatigued  Patient was previously diagnosed with hypertension, diabetes, and sleep apnea  However, she no longer has these conditions after successful weight loss after her gastric bypass procedure some time ago  She was on diuretics in the past, years ago  This was prescribed just for LE edema, as she has no known history of CHF or liver disease  Of note, the patient does have a history of nonspecific "valvular problems" as per prior notes  TTE 9/7/18 significant for normal EF of 65%, dilation of left and right atria with moderate mitral stenosis, and marked aortic stenosis  Aortic stenosis is stable as per serial echocardiograms  Has a history of 2 prior cardiac catheterizations which did not show obstruction CAD  Also has history of peripheral vascular disease  Patient is also noted to have a history of anemia which may be contributing to her fatigue symptoms   Last Hb has been in the 8 range, and it has stayed in that range for at least several months now  Patient had an iron panel done in May of this year, which shows normal TIBC and Ferritin but low Iron  The patient otherwise feels well  Denies any wheezing or wetness in the lungs  Denies shortness of breath, chest pain, palpitations, headache, visual changes, dizziness    The following portions of the patient's history were reviewed and updated as appropriate: allergies, current medications, past family history, past medical history, past social history, past surgical history and problem list     Review of Systems  Pertinent items are noted in HPI       Objective     /70   Pulse 105   Temp 98 9 °F (37 2 °C)   Ht 5' 7" (1 702 m)   Wt 99 8 kg (220 lb)   SpO2 98%   BMI 34 46 kg/m²   General appearance: alert and oriented, in no acute distress and cooperative  Neck: no carotid bruit, no JVD and supple, symmetrical, trachea midline  Lungs: clear to auscultation bilaterally  Heart: regular rate and rhythm, S1, S2 normal and (+) Murmur  Abdomen: soft, non-tender; bowel sounds normal; no masses,  no organomegaly  Extremities: edema 2+ pitting bilaterally  Pulses: 2+ and symmetric  Skin: Skin color, texture, turgor normal  No rashes or lesions  Neurologic: Grossly normal         Assessment/Plan     Lower Extremity Edema - Etiology Unclear    · Patient does have history of severe aortic stenosis and moderate mitral stenosis, however recent echo shows no evidence of either systolic or diastolic heart failure  Lung sounds are also completely clear on exam    · Possibly related to patient's peripheral vascular disease  · Will prescribe low dose amiloride 5 mg po qd for now  · Follow up with cardiology at patient's earliest convenience  · Recommendations: decrease sodium in the diet, elevate feet above the level of the heart whenever possible, use of compression stockings, weight loss and start amiloride as noted above    · The patient was also instructed to call IMMEDIATELY (i e , day or night) if any cardiopulmonary symptoms occur, especially chest pain, shortness of breath, dyspnea on exertion, paroxysmal nocturnal dyspnea, or orthopnea, and these were explained  Fatigue  Low serum Iron  Anemia - Iron deficiency vs chronic disease    · Etiology of patient's fatigue is likely multifactorial  Patient did recently have surgery and has undergone chemotherapy and radiation  Patient also noted to be anemic, likely due to chronic disease, however patient also had iron panel done in May of this year which showed low serum iron, though other values were normal   · Will start diuretic therapy for now, as noted above  · Will start ferrous sulfate supplements for low serum iron  · Follow up with cardiology as noted above  Follow up in office 4-6 weeks  At that time we can repeat Hb and possibly iron panels  All patient questions & concerns were addressed  The patient agrees with her treatment plan  RTO as above  Melodyino Friday, DO 11/05/18  5:02 PM    Some portions of this record may have been generated with voice recognition software  There may be translation, syntax, or grammatical errors  Occasional wrong word or "sound-a-like" substitutions may have occurred due to the inherent limitations of the voice recognition software  Read the chart carefully and recognize, using context, where substations may have occurred   If you have any questions, please contact the dictating provider for clarification or correction, as needed

## 2018-11-08 ENCOUNTER — APPOINTMENT (OUTPATIENT)
Dept: WOUND CARE | Facility: HOSPITAL | Age: 63
End: 2018-11-08
Payer: MEDICARE

## 2018-11-08 PROCEDURE — 99213 OFFICE O/P EST LOW 20 MIN: CPT

## 2018-11-09 ENCOUNTER — TELEPHONE (OUTPATIENT)
Dept: FAMILY MEDICINE CLINIC | Facility: CLINIC | Age: 63
End: 2018-11-09

## 2018-11-21 NOTE — PROGRESS NOTES
Assessment/Plan:    Aseptic debridement and planning of nails x10 and manually and mechanically    Discussed proper diabetic foot care daily foot checks     Diagnoses and all orders for this visit:    Onychomycosis    Atherosclerotic peripheral vascular disease ([facility] [address] )    Pain in both feet    Diet-controlled diabetes mellitus ([facility] [address] )          Subjective:      Patient ID: Jimmy Jackson is a 61 y o  female  patient is seen on housecall patient is seen in assisted living facility  Has trouble Walking long distances and rarely leaves the facility  patient has thick painful nails that hurt when walking wearing shoes  Recurrent ingrown nails bilateral great toes  Patient has not noticed any redness or signs of infection  The following portions of the patient's history were reviewed and updated as appropriate: allergies, current medications, past family history, past medical history, past social history, past surgical history and problem list     Review of Systems   Constitutional: Negative  HENT: Negative  Eyes: Negative  Respiratory: Negative  Cardiovascular: Negative  Gastrointestinal: Negative  Endocrine: Negative  Genitourinary: Negative  Musculoskeletal: Negative  Skin: Negative  Allergic/Immunologic: Negative  Neurological: Negative  Hematological: Negative  Psychiatric/Behavioral: Negative  Objective: There were no vitals taken for this visit  Physical Exam   Cardiovascular:   Pulses:       Dorsalis pedis pulses are 1+ on the right side, and 1+ on the left side  Posterior tibial pulses are 0 on the right side, and 0 on the left side  Feet:   Right Foot:   Skin Integrity: Positive for callus  Left Foot:   Skin Integrity: Positive for callus           Right Foot/Ankle   Right Foot Inspection  Skin Exam: callus and callus                            Sensory   Vibration: absent  Proprioception: intact   Monofilament testing: diminished  Vascular  Capillary refills: elevated  The right DP pulse is 1+  The right PT pulse is 0  Left Foot/Ankle  Left Foot Inspection  Skin Exam: callus                                         Sensory   Vibration: absent  Proprioception: intact  Monofilament: diminished  Vascular  Capillary refills: elevated  The left DP pulse is 1+  The left PT pulse is 0  Constitutional: no acute distress, well appearing and well nourished  Cardiovascular: abnormal dorsalis pedis pulse,-- abnormal posterior tibialis pulse,-- dependence rubor-- and-- abnormal capillary refill  Orthopedic/Biomechanical: abnormal foot type,-- hammertoe(s)-- and-- ingrown nails  Left Foot: Appearance: Normal except as noted: excessive pronation-- and-- pes planus  Second toe deformities include hammer toe  Third toe deformities include hammer toe  Forth toe deformities include hammer toe  Fifth toe deformities include hammer toe  All nails thick discolored dystrophic, positive subungual debris, positive pain on palpation  Special Tests: semirigid hammertoes 2 through 5 bilateral  Negative ulceration negative maceration negative signs of infection  Right Foot: Appearance: Normal except as noted: excessive pronation-- and-- pes planus  Second toe deformities include hammer toe  Third toe deformities include hammer toe  Forth toe deformities include hammer toe  Fifth toe deformities include hammer toe  Negative ulceration, negative maceration, negative signs of infection  Special Tests: Skin is hairless and atrophic  Plus one edema bilateral  Moderate venous stasis  No signs of infection  Neurological Exam: performed  Light touch was intact bilaterally  Vibratory sensation was intact bilaterally  Vascular Exam: performed Dorsalis pedis pulses were 1/4 bilaterally  Posterior tibial pulses were absent bilaterally  Capillary refill time was between 1-3 seconds bilaterally         All nails thick discolored dystrophic positive subungual debris positive pain on palpation  Ingrown nail bilateral hallux  No erythema no signs of infection  Plus one edema bilateral feet and ankles moderate venous stasis    Negative erythema no signs of open wound

## 2018-11-26 ENCOUNTER — APPOINTMENT (OUTPATIENT)
Dept: WOUND CARE | Facility: HOSPITAL | Age: 63
End: 2018-11-26
Payer: MEDICARE

## 2018-11-26 PROCEDURE — 99213 OFFICE O/P EST LOW 20 MIN: CPT

## 2018-11-27 ENCOUNTER — OFFICE VISIT (OUTPATIENT)
Dept: HEMATOLOGY ONCOLOGY | Facility: MEDICAL CENTER | Age: 63
End: 2018-11-27
Payer: MEDICARE

## 2018-11-27 VITALS
SYSTOLIC BLOOD PRESSURE: 112 MMHG | TEMPERATURE: 98.2 F | WEIGHT: 221 LBS | DIASTOLIC BLOOD PRESSURE: 60 MMHG | HEART RATE: 75 BPM | OXYGEN SATURATION: 96 % | BODY MASS INDEX: 34.69 KG/M2 | RESPIRATION RATE: 18 BRPM | HEIGHT: 67 IN

## 2018-11-27 DIAGNOSIS — C20 RECTAL CANCER (HCC): Primary | ICD-10-CM

## 2018-11-27 PROCEDURE — 99214 OFFICE O/P EST MOD 30 MIN: CPT | Performed by: INTERNAL MEDICINE

## 2018-11-27 NOTE — PROGRESS NOTES
Sharyn Khanna  1955  [facility]  [address] [place] [id number]    DISCUSSION/SUMMARY:    77-year-old female recently found to have high-grade dysplasia/intramucosal lesion arising in a tubulovillous adenoma  Patient feels +/-  Issues:    1  Rectal cancer  Final stage is IIA (ypT3 pN0 G2)  Recently patient has been receiving wound care treatment  Wound is better but not completely healed (examination deferred)  Patient has an appointment with Dr Ju Yip next week, December 5th  If he feels that the wound is healed sufficiently, patient can begin chemo  If not, the postoperative chemotherapy will likely be canceled (window has closed)  Apparently patient was recently offered HBO - Mrs Marilyn Weber refused secondary to claustrophobia  If patient does begin postoperative treatment, she will be treated with CAPOX (patient does not want a port)  NCCN guidelines 3 2018 state that for patients with ypT3 lesions, sequence is neoadjuvant concurrent treatment (completed), primary treatment/surgery (completed) and then adjuvant treatment for total of 6 months of perioperative treatment  Because of the wound, postoperative treatment is on hold  2  Ovarian cancer, IIIC  As discussed previously, patient was found to have an incidental low-grade serous ovarian carcinoma with omental nodules greater than 2 cm grossly visible  Patient is followed by GYN Oncology and is on anastrozole (NCCN 2 75794 low-grade serous, stage II-IV tumors, treatment options include primary hormonal therapy (category 2B)  3  Pain control  Mrs Marilyn Weber states that her pain has improved/lessened over the past few weeks  Patient self discontinued the gabapentin  Patient has Percocet at home if needed  4   Anemia in the past   Mrs Marilyn Weber can continue with a multivitamin with iron monitoring for GI side effects   We rediscussed what to monitor for in regard to progressive anemia  Repeat blood work has been ordered  5  Cardiac issues  Patient has valvular problems, specifics are not presently available  Patient should return to her cardiologist and discuss treatment options  Invasive cardiac procedures now become more complicated as patient has this wound care issue  Patient is to return in 2 weeks  Mrs Mervin Severe knows to call if she has any other oncology questions or concerns  Carefully review your medication list and verify that the list is accurate and up-to-date  Please call the hematology/oncology office if there are medications missing from the list, medications on the list that you are not currently taking or if there is a dosage or instruction that is different from how you're taking that medication  Patient goals and areas of care: Follow up with surgery  Barriers to care: Slow healing wound  Patient is able to self-care   ______________________________________________________________________________________    Chief Complaint   Patient presents with    Follow-up     Rectal cancer     History of Present Illness:    66-year-old female previously referred for the above  Previously Mrs Brittnee Baker had gastric bypass  Patient was recently seen by GI because of blood in the stools  Additional workup included a colonoscopy which demonstrated a rectal lesion  Patient recently completed concurrent chemotherapy (Xeloda) with radiation  Mrs Mervin Severe subsequently went onto an APR  Final pathology results are listed below; patient was also found to have IIIC low-grade serous ovarian carcinoma  Postoperatively, patient had issues with slow wound closure  Mrs Mervin Severe states feeling okay, better than before  Fatigue is less/better than before  Pain is less/better, patient self discontinued the Neurontin  Patient continues with wound care  Wound has not completely healed    Patient apparently was offered hyperbaric oxygen but she refused because of claustrophobia  Patient is better tolerating the Arimidex  Appetite is okay, no colostomy issues or  issues  No GYN issues  No fevers, chills or sweats  Activities are closer to baseline  Review of Systems   Constitutional: Positive for fatigue  HENT: Negative  Eyes: Negative  Respiratory: Negative  Cardiovascular: Negative  Gastrointestinal: Negative for blood in stool, constipation and diarrhea  Endocrine: Negative  Genitourinary: Negative  Musculoskeletal: Positive for arthralgias  Skin: Negative  Allergic/Immunologic: Negative  Neurological: Negative  Hematological: Negative  Psychiatric/Behavioral: Negative  All other systems reviewed and are negative       Patient Active Problem List   Diagnosis    Morbid obesity due to excess calories (HCC)    Postgastrectomy malabsorption    S/P gastric bypass    Marginal ulcer    Status post bariatric surgery    [facility]rosclerotic peripheral vascular disease ([facility] )    Diet-controlled diabetes mellitus ([facility]    Onychomycosis    Pain in foot    [facility]al cancer ([facility]    Postoperative pain    Acute blood loss anemia    Colostomy care ([facility]    Ovarian cancer ([facility] )     Past Medical History:   Diagnosis Date    Anemia     Arthritis     Cancer ([facility] )     rectal    Chronic lower back pain     Chronic pain disorder     back pain    Diabetes mellitus ([facility]     GERD (gastroesophageal reflux disease)     History of MRSA infection 0719/2016    Morbid obesity ([facility] )     Spinal stenosis     Systolic murmur     Unsteady gait     uses walker    Wears dentures     Wears glasses      Ob/gyn:  No recent mammogram -patient's choice, no post menopausal bleeding    Past Surgical History:   Procedure Laterality Date    ABDOMINAL PERINEAL BOWEL RESECTION W/ ILEOANAL POUCH N/A 9/6/2018    Procedure: LAPAROSCOPIC HAND ASSIST ABDOMINOPERINEAL RESECTION,  POSTERIOR VAGINECTOMY, OMENTECTOMY;  Surgeon: Pasha Vickers Mar Pantoja MD;  [facility];  Service: Colorectal    ABDOMINAL SURGERY      abscess removed from abdomen and right thigh, a hole in thigh closed by plastic surgeon    ABSCESS DRAINAGE      abd, (R) leg, (L) leg     SECTION       SECTION      CYSTOSCOPY N/A 2018    Procedure: Nery Cavanaugh;  Surgeon: Ariane Villa MD;  [facility];  Service: Gynecology Oncology    ESOPHAGOGASTRODUODENOSCOPY N/A 2016    Procedure: ESOPHAGOGASTRODUODENOSCOPY (EGD); Surgeon: Ransom Lanes, MD;  [facility];  Service:     ESOPHAGOGASTRODUODENOSCOPY N/A 3/30/2016    Procedure: ESOPHAGOGASTRODUODENOSCOPY (EGD); Surgeon: Ransom Lanes, MD;  [facility]; Service:     EYE SURGERY      laser eye surgery    HYSTERECTOMY N/A 2018    Procedure: RADICAL HYSTERECTOMY TOTAL ABDOMINAL (ALEXANDRA)  BSO;  Surgeon: Ariane Villa MD;  [facility];  Service: Gynecology Oncology    JOINT REPLACEMENT Left 2017    hip    JOINT REPLACEMENT Right 2017    hip    MA COLONOSCOPY FLX DX W/COLLJ SPEC WHEN PFRMD N/A 3/9/2018    Procedure: COLONOSCOPY;  Surgeon: Samantha Valenzuela MD;  Location: [place] [address] GI LAB; Service: Gastroenterology    MA COLONOSCOPY FLX DX W/COLLJ [facility] WHEN PFRMD N/A 2018    Procedure: COLONOSCOPY;  Surgeon: Todd Carrera MD;  [facility]; Service: Colorectal    MA ESOPHAGOGASTRODUODENOSCOPY TRANSORAL DIAGNOSTIC N/A 2018    Procedure: ESOPHAGOGASTRODUODENOSCOPY (EGD); Surgeon: Samantha Valenzuela MD;  Location: [facility];   Service: Gastroenterology    MA LAP GASTRIC BYPASS/SOLEDAD-EN-Y N/A 2016    Procedure: BYPASS GASTRIC  SOLEDAD-EN-Y LAPAROSCOPIC;  Surgeon: Ransom Lanes, MD;  [facility];  Service: Bariatrics    MA LAP, SURG COLOSTOMY N/A 2018    Procedure: PERMANENT END COLOSTOMY;  Surgeon: Todd Carrera MD;  [facility];  Service: Colorectal    MA LAP, SURG PROCTECTOMY W COLOSTOMY N/A 2018 Procedure: PROCTECTOMY;  Surgeon: Dwight Lara MD;  [facility];  Service: Colorectal    TUBAL LIGATION       Family History   Problem Relation Age of Onset    Adopted: Yes    Leukemia Mother     Heart disease Father     Coronary artery disease Father     Diabetes Father     No Known Problems Sister     No Known Problems Brother     Diabetes Son     No Known Problems Brother     No Known Problems Brother     No Known Problems Sister     No Known Problems Sister     Family history: Mother  of leukemia (NOS), father  from heart disease (NOS), 2 children 1 with diabetes, no known familial or genetic diseases, no family history of GI malignancies    Social History     Social History    Marital status: Single     Spouse name: N/A    Number of children: 2    Years of education: N/A     Occupational History    Not on file       Social History Main Topics    Smoking status: Former Smoker     Packs/day:      Years:      Quit date: 3/30/2006    Smokeless tobacco: Never Used    Alcohol use No    Drug use: Yes     Types: Oxycodone      Comment: Percocet for lower back pain prn    Sexual activity: Not on file     Other Topics Concern    Not on file     Social History Narrative    No narrative on file       Current Outpatient Prescriptions:     AMILoride 5 mg tablet, Take 1 tablet (5 mg total) by mouth daily, Disp: 90 tablet, Rfl: 0    anastrozole (ARIMIDEX) 1 mg tablet, Take 1 tablet (1 mg total) by mouth daily, Disp: 90 tablet, Rfl: 1    Calcium-Vitamin D 500-125 MG-UNIT TABS, Take by mouth daily  , Disp: , Rfl:     ferrous sulfate 324 (65 Fe) mg, Take 1 tablet (324 mg total) by mouth daily before breakfast, Disp: 90 tablet, Rfl: 0    gabapentin (NEURONTIN) 100 mg capsule, , Disp: , Rfl:     metroNIDAZOLE (FLAGYL) 500 mg tablet, , Disp: , Rfl:     Multiple Vitamins-Minerals (BARIATRIC FUSION) CHEW, Chew daily  , Disp: , Rfl:     omeprazole (PriLOSEC) 20 mg delayed release capsule, Take 1 capsule (20 mg total) by mouth daily, Disp: 90 capsule, Rfl: 3    oxyCODONE (ROXICODONE) 10 MG TABS, , Disp: , Rfl:     oxyCODONE-acetaminophen (PERCOCET)  mg per tablet, Take 1 tablet by mouth every 6 (six) hours as needed for moderate pain , Disp: , Rfl:     Allergies   Allergen Reactions    Tramadol Other (See Comments)     Dizzy         Vitals:    11/27/18 0835   BP: 112/60   Pulse: 75   Resp: 18   Temp: 98 2 °F (36 8 °C)   SpO2: 96%     Physical Exam   Constitutional: She is oriented to person, place, and time  She appears well-developed and well-nourished  Well-nourished female, no respiratory distress   HENT:   Head: Normocephalic and atraumatic  Right Ear: External ear normal    Left Ear: External ear normal    Nose: Nose normal    Mouth/Throat: Oropharynx is clear and moist    Eyes: Pupils are equal, round, and reactive to light  Conjunctivae and EOM are normal    Neck: Normal range of motion  Neck supple  Supple, no JVD   Cardiovascular: Normal rate, regular rhythm, normal heart sounds and intact distal pulses  Pulmonary/Chest: Effort normal and breath sounds normal    Good air entry bilaterally, clear   Abdominal: Soft  Bowel sounds are normal    Abdomen is soft, nontender, +bowel sounds, obese, cannot palpate liver or spleen, left lower quadrant colostomy in place   Musculoskeletal:   Upper extremity motor and sensory equal bilaterally, good range of motion, lower extremities with decreased motor in the hips and knees, same as before   Neurological: She is alert and oriented to person, place, and time  She has normal reflexes  Skin: Skin is warm  Relatively good color, warm, moist, no petechiae or ecchymoses   Psychiatric: She has a normal mood and affect   Her behavior is normal  Judgment and thought content normal    Extremities: 0-1+ bilateral lower extremity edema, no cords, pulses are 1+  Lymphatics:  No adenopathy in the neck, supraclavicular region, axilla and groin bilaterally  Rectal wound deferred    Labs:    10/03/2018 WBC = 5 8 hemoglobin = 8 7 hematocrit = 31 MCV = 80 platelet = 593 CA -387 = 9 6    09/11/2018 WBC = 5 6 hemoglobin = 7 7 hematocrit = 25 4 MCV = 83 platelet = 733 BUN = 7 creatinine = 0 50  06/13/2018 WBC = 5 39 hemoglobin = 10 4 hematocrit = 35 MCV = 80 platelet = 405 BUN = 13 creatinine = 0 76 [facility]  05/30/2018 BUN = 11 creatinine = 0 70 [facility] WBC = 4 97 hemoglobin = 10 1 hematocrit = 34 MCV = 80 RDW = 18 6 platelet = 684 neutrophil = 68%  05/17/2018 WBC = 4 4 hemoglobin = 10 6 hematocrit = 34 MCV = 75 platelet = 321 neutrophil = 67% BUN = 19 creatinine = 0 65 [facility]  05/08/2018 WBC = 6 7 hemoglobin = 10 7 hematocrit = 35 MCV = 75 platelet = 362 BUN = 17 creatinine = 0 62 [facility]  03/12/2018 CEA = 22 6 = high    Imaging    4/6/18 MRI pelvis rectal cancer staging    Low rectal cancer, 5 cm in length, circumferential around the rectal wall  (Series 8 images 19 through 33 )  Anteriorly, there are multiple areas where there is transmural tumor extension into the mesorectal fat making this at least a T3c lesion  On Series 8 image 31, tumor also abuts the mesorectal fascia making this CRM positive  At this point it is also   immediately adjacent to the vagina, therefore this may be a T4 lesion  There are 2 high risk perirectal nodes, and 1 low risk perirectal nodes  01/31/2018 ultrasound right upper quadrant    1  Layering gallbladder sludge  No acute cholecystitis or biliary ductal obstruction  2   Hepatomegaly  3   Fluid-filled stomach  1/27/18 CT scan of the abdomen/pelvis    1  Prior Juan-en-Y gastric bypass with distention of the excluded stomach    2   No evidence of acute abnormality in the abdomen or pelvis      Pathology    Case Report   Surgical Pathology Report                         Case: [id number]                                    Authorizing Provider: Jeanien Groves MD      Collected:           09/06/2018 1010               Ordering Location:     [address] [place] [address] Road      Received:            09/06/2018 1448                                      Hospital Operating Room                                                       Pathologist:           Tammy Wills MD                                                         Specimens:   A) - Soft Tissue, Other, EPIPLOIC NODULE                                                             B) - Rectum, enbloc rectum, sigmoid, anus, uterus, bso, cervix, posterior vaginal                    wall                                                                                                 C) - Omentum                                                                               Addendum   Additional immunohistochemical stains performed with appropriate controls on a selected portion of serous carcinoma involving omental tissue (block C1) show the following results:  Estrogen receptor: Positive  Progesterone receptor: Negative   Addendum electronically signed by Tammy Wills MD on 9/27/2018 at  1:40 PM   Final Diagnosis   A  Soft tissue, epiploic nodule, biopsy:  -  Portion of nodular fibroadipose tissue with fat necrosis and dystrophic calcifications  -  Negative for metastatic carcinoma  -  Immunohistochemical stain performed with appropriate control for keratin AE1/3 is negative for epithelial elements, supporting the diagnosis      B  Rectum, sigmoid colon, anus, uterus,cervix, bilateral ovaries and fallopian tubes and posterior vaginal wall; en bloc resection:  -  Invasive moderately differentiated adenocarcinoma of rectum (see first synoptic report)  -  Low grade serous carcinoma involving left ovary, left fallopian tube, uterine serosa and colonic serosa (see second synoptic report)  -  Separate portion of colon (consistent with sigmoid) with diverticulosis coli showing focal serosal implant of low-grade serous carcinoma    -  Uterus showing benign inactive/atrophic endometrium with metaplastic change, benign endometrial polyp and rare cytologic atypia consistent with radiation effect  -  Cervix with mild glandular atypia, favor reactive (see note)  -  Benign uterine myometrium with one intramural benign leiomyoma, negative for atypia or malignancy  -  Benign anal skin and dentate line with mild reactive change, negative for involvement by carcinoma  -  Benign vaginal wall mucosa with thinning, chronic inflammation, and surface erosion most suggestive of prior therapy effect, negative for involvement by carcinoma  -  27 benign pericolorectal lymph nodes identified, negative for metastatic carcinoma      C  Omentum, omentectomy:  -   Low grade serous carcinoma         COLORECTAL CARCINOMA TUMOR STAGING SUMMARY (includes specimen A-C of this case):  1  Specimen identification:     - Specimen:  Rectum, sigmoid colon, anus, uterus,cervix, bilateral ovaries and fallopian tubes and posterior vaginal wall   2  Tumor:     - Tumor site:  Rectum     - Tumor size:  Up to 3 3 cm     - Macroscopic tumor perforation:  Not identified     - Tumor location:  Below the peritoneal reflection        - Macroscopic intactness of mesorectum:  Intact     - Histologic Type:   Adenocarcinoma      - Histologic Grade: Moderately differentiated (G2)     - Microscopic Tumor Extension (ypT3): Tumor invades through muscularis propria into pericolorectal soft tissues     - Tumor budding (only needed in polyps, Stage I or II cancers):  Not identified   3  Margins (specify distance for nearest radial margin on RECTAL tumors only):     - Proximal Margin:  Negative for carcinoma     - Distal Margin:  Negative for carcinoma     - Circumferential (Radial) or Mesenteric Margin:  Negative for carcinoma (0 6cm)     - Other Margins:  Vaginal margin negative for carcinoma   4  Treatment effect:  Present;   Residual cancer with evident tumor regression, but more than single cells or rare small groups of cancer cells (partial response, score 2)   5  Lymph-vascular invasion:  Not identified   6  Perineural invasion:  Not identified   7  Tumor deposits: Not identified   8  Type of polyp in which invasive carcinoma arose:  Tubular adenoma   9  Regional lymph nodes (pN0):  27 benign pericolorectal lymph node sample, negative for metastatic carcinoma  10  Additional pathologic findings:  Diverticulosis coli  11  Ancillary Studies:  *  Immunohistochemical stains performed with appropriate controls show the primary rectal tumor to be positive for CK20 and CDX-2 and negative for CK 7, PAX-8, p53 (wild type expression), ER and WT-1, supporting a diagnosis of primary colorectal origin  12  8th Ed AJCC Stage:  at least Stage  IIA - ypT3, pN0, G2         Primary Tumor of the Ovary/Fallopian Tube/Peritoneum, Staging Summary (includes Specimen A to C of this case):  1  Procedure:  Hysterectomy, bilateral salpingo-oophorectomy, omentectomy  2  Hysterectomy type:  Radical hysterectomy  3  Specimen integrity:      - Right ovary:  Intact      - Left ovary:  Intact       - Right fallopian tube: Intact      - Left fallopian tube: Intact  4  Tumor site:  Ovarian and fallopian tube surfaces and uterine and colonic serosa  5  Ovarian surface involvement:  Present  6  Fallopian tube surface involvement:  Present  7  Tumor size:  Largest focus measures 1 8 cm (uterine serosa) (see note)  8  Histologic type:  Low-grade serous carcinoma   9  Histologic grade:  Low grade  10  Implant:  Present  11  Other tissue/organ involvement:  Bilateral ovaries and fallopian tubes, uterine serosa, sigmoid colon serosa, and omentum  12  Largest extrapelvic peritoneal focus: 2 5 cm (macroscopically identidfied)    - Specify site:  Omentum  13  Peritoneal/ascetic fluid:  Not performed   14  Pleural fluid:  Not performed  15  Treatment effect:  No known prior therapy  16   Regional lymph nodes:    - No regional uterine lymph nodes submitted or found    - 27 benign pericolorectal lymph nodes sampled, negative for metastatic carcinoma  17  Pathologic stage classification (pTNM, AJCC 8th edition): pT3c, pNX, Low Grade  18  FIGO stage (2015 FIGO cancer report): IIIC  19  Additional pathologic findings:  N/A  20  Ancillary studies:  Immunohistochemical stains performed with appropriate controls show the tumor cells to be positive for CK7, PAX-8, ER, and WT-1 with wild-type expression of p53 and negative for CK20 and CDX-2 supporting the diagnosis  Electronically signed by Rhona Lebron MD on 9/25/2018 at 12:54 PM                  Case Report   Surgical Pathology Report                         Case: [id number]                                    Authorizing Provider: Mckenna Villalobos MD      Collected:           03/28/2018 1130               Pathologist:           Lulu Pandey MD             Received:            03/29/2018 0942               Specimen:    Rectum, Rectal cancer/mass biopsies                                                        Final Diagnosis   A  Rectal mass (biopsy):  - 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      Comment:   - In the context of a rectal mass, repeat biopsy and/or excision may be indicated to exclude a higher grade lesion      Electronically signed by Lulu Pandey MD on 3/30/2018 at  2:36 PM         Case Report   Surgical Pathology Report                         Case: [id number]                                    Authorizing Provider: Yvon San MD          Collected:           03/09/2018 0902               Ordering Location:     [facility]   Received:            03/12/2018 0904                                      Center                                                                        Pathologist:           Becky Mulligan DO                                                             Specimens:   A) - Colon, polyp splenic flexure/cold snare                                                         B) - Colon, bx distal rectal mass                                                          Final Diagnosis   A  Colon, splenic flexure polyp, biopsy:  - Tubulovillous adenoma, negative for high-grade dysplasia      B  Rectum, mass, biopsy:  - At least high grade dysplasia/intramucosal carcinoma arising in a tubulovillous adenoma, suspicious for invasion       Intradepartmental consultation is in agreement with the above diagnosis (AT)     Interpretation performed at [facility], [address] [facility] [address]  Report faxed to Dr Janet Mejias on 3/13/18 @ 10:55 AM   Electronically signed by Reginald Maldonado DO on 3/13/2018 at 10:54 AM

## 2018-12-06 DIAGNOSIS — C20 RECTAL CANCER (HCC): Primary | ICD-10-CM

## 2018-12-06 RX ORDER — CAPECITABINE 500 MG/1
TABLET, FILM COATED ORAL
Qty: 180 TABLET | Refills: 2 | Status: SHIPPED | OUTPATIENT
Start: 2018-12-06 | End: 2019-02-25 | Stop reason: ALTCHOICE

## 2018-12-11 ENCOUNTER — APPOINTMENT (OUTPATIENT)
Dept: LAB | Facility: CLINIC | Age: 63
End: 2018-12-11
Payer: MEDICARE

## 2018-12-11 DIAGNOSIS — C20 RECTAL CANCER (HCC): ICD-10-CM

## 2018-12-11 LAB
ALBUMIN SERPL BCP-MCNC: 2.9 G/DL (ref 3.5–5)
ALP SERPL-CCNC: 66 U/L (ref 46–116)
ALT SERPL W P-5'-P-CCNC: 12 U/L (ref 12–78)
ANION GAP SERPL CALCULATED.3IONS-SCNC: 5 MMOL/L (ref 4–13)
AST SERPL W P-5'-P-CCNC: 10 U/L (ref 5–45)
BASOPHILS # BLD AUTO: 0.04 THOUSANDS/ΜL (ref 0–0.1)
BASOPHILS NFR BLD AUTO: 1 % (ref 0–1)
BILIRUB SERPL-MCNC: 0.36 MG/DL (ref 0.2–1)
BUN SERPL-MCNC: 13 MG/DL (ref 5–25)
CALCIUM SERPL-MCNC: 9.6 MG/DL (ref 8.3–10.1)
CHLORIDE SERPL-SCNC: 104 MMOL/L (ref 100–108)
CO2 SERPL-SCNC: 28 MMOL/L (ref 21–32)
CREAT SERPL-MCNC: 0.69 MG/DL (ref 0.6–1.3)
EOSINOPHIL # BLD AUTO: 0.12 THOUSAND/ΜL (ref 0–0.61)
EOSINOPHIL NFR BLD AUTO: 2 % (ref 0–6)
ERYTHROCYTE [DISTWIDTH] IN BLOOD BY AUTOMATED COUNT: 17.2 % (ref 11.6–15.1)
GFR SERPL CREATININE-BSD FRML MDRD: 93 ML/MIN/1.73SQ M
GLUCOSE SERPL-MCNC: 129 MG/DL (ref 65–140)
HCT VFR BLD AUTO: 34.3 % (ref 34.8–46.1)
HGB BLD-MCNC: 9.7 G/DL (ref 11.5–15.4)
IMM GRANULOCYTES # BLD AUTO: 0.02 THOUSAND/UL (ref 0–0.2)
IMM GRANULOCYTES NFR BLD AUTO: 0 % (ref 0–2)
LYMPHOCYTES # BLD AUTO: 1.12 THOUSANDS/ΜL (ref 0.6–4.47)
LYMPHOCYTES NFR BLD AUTO: 21 % (ref 14–44)
MCH RBC QN AUTO: 21 PG (ref 26.8–34.3)
MCHC RBC AUTO-ENTMCNC: 28.3 G/DL (ref 31.4–37.4)
MCV RBC AUTO: 74 FL (ref 82–98)
MONOCYTES # BLD AUTO: 0.45 THOUSAND/ΜL (ref 0.17–1.22)
MONOCYTES NFR BLD AUTO: 9 % (ref 4–12)
NEUTROPHILS # BLD AUTO: 3.48 THOUSANDS/ΜL (ref 1.85–7.62)
NEUTS SEG NFR BLD AUTO: 67 % (ref 43–75)
NRBC BLD AUTO-RTO: 0 /100 WBCS
PLATELET # BLD AUTO: 205 THOUSANDS/UL (ref 149–390)
PMV BLD AUTO: 10.6 FL (ref 8.9–12.7)
POTASSIUM SERPL-SCNC: 4.2 MMOL/L (ref 3.5–5.3)
PROT SERPL-MCNC: 6.7 G/DL (ref 6.4–8.2)
RBC # BLD AUTO: 4.63 MILLION/UL (ref 3.81–5.12)
SODIUM SERPL-SCNC: 137 MMOL/L (ref 136–145)
WBC # BLD AUTO: 5.23 THOUSAND/UL (ref 4.31–10.16)

## 2018-12-11 PROCEDURE — 85025 COMPLETE CBC W/AUTO DIFF WBC: CPT

## 2018-12-11 PROCEDURE — 80053 COMPREHEN METABOLIC PANEL: CPT

## 2018-12-11 PROCEDURE — 36415 COLL VENOUS BLD VENIPUNCTURE: CPT

## 2018-12-12 ENCOUNTER — OFFICE VISIT (OUTPATIENT)
Dept: PODIATRY | Facility: CLINIC | Age: 63
End: 2018-12-12
Payer: MEDICARE

## 2018-12-12 ENCOUNTER — OFFICE VISIT (OUTPATIENT)
Dept: HEMATOLOGY ONCOLOGY | Facility: MEDICAL CENTER | Age: 63
End: 2018-12-12
Payer: MEDICARE

## 2018-12-12 VITALS
OXYGEN SATURATION: 98 % | TEMPERATURE: 98.8 F | RESPIRATION RATE: 18 BRPM | BODY MASS INDEX: 34.69 KG/M2 | HEART RATE: 91 BPM | WEIGHT: 221 LBS | HEIGHT: 67 IN | DIASTOLIC BLOOD PRESSURE: 74 MMHG | SYSTOLIC BLOOD PRESSURE: 110 MMHG

## 2018-12-12 DIAGNOSIS — M79.671 PAIN IN BOTH FEET: ICD-10-CM

## 2018-12-12 DIAGNOSIS — L60.0 INGROWN TOENAIL: ICD-10-CM

## 2018-12-12 DIAGNOSIS — M79.672 PAIN IN BOTH FEET: ICD-10-CM

## 2018-12-12 DIAGNOSIS — M20.42 HAMMER TOES OF BOTH FEET: ICD-10-CM

## 2018-12-12 DIAGNOSIS — I70.209 ATHEROSCLEROTIC PERIPHERAL VASCULAR DISEASE (HCC): ICD-10-CM

## 2018-12-12 DIAGNOSIS — C20 RECTAL CANCER (HCC): Primary | ICD-10-CM

## 2018-12-12 DIAGNOSIS — M20.41 HAMMER TOES OF BOTH FEET: ICD-10-CM

## 2018-12-12 DIAGNOSIS — B35.1 ONYCHOMYCOSIS: Primary | ICD-10-CM

## 2018-12-12 PROCEDURE — 11721 DEBRIDE NAIL 6 OR MORE: CPT | Performed by: PODIATRIST

## 2018-12-12 PROCEDURE — 99214 OFFICE O/P EST MOD 30 MIN: CPT | Performed by: INTERNAL MEDICINE

## 2018-12-12 NOTE — PROGRESS NOTES
Meagan Lee  1955  [address] [facility] [address] [facility] [address]    DISCUSSION/SUMMARY:    28-year-old female recently found to have high-grade dysplasia/intramucosal lesion arising in a tubulovillous adenoma  Patient feels +/-  Issues:    1  Rectal cancer  Final stage is IIA (ypT3 pN0 G2)  Presently Mrs Jesus Snow feels okay, clinically there are no troubling signs other than the wound  Wound care is ongoing  Patient will follow up with Dr Jeannine Grewal as directed  Patient understands that the wound needs to be monitored clearly now that she use beginning chemotherapy  Apparently patient was recently offered HBO - Mrs Jesus Snow refused secondary to claustrophobia  NCCN guidelines 3 2018 state that for patients with ypT3 lesions, sequence is neoadjuvant concurrent treatment (completed), primary treatment/surgery (completed) and then adjuvant treatment for total of 6 months of perioperative treatment  [place] Regimen  Oxaliplatin 130 mg/m2 IV (75% on first cycle)  Xeloda 1000 mg/m2 p o  twice a day  Cycle = 3 weeks    Cari RODRIGUEZ, Elma T, Nico J, et al  Phase III trial of capecitabine plus oxaliplatin as adjuvant therapy for stage III colon cancer: a planned safety analysis in 1,864 patients  J Clin Oncol  9933;88:343-142  Sina MISHRA, Malissa Abraham J, et al  Capecitabine Plus Oxaliplatin Compared With Fluorouracil and Folinic Acid As Adjuvant Therapy for Stage III Colon Cancer  J Clin Oncol  5985;11:9229-6332      2  Ovarian cancer, IIIC  As discussed previously, patient was found to have an incidental low-grade serous ovarian carcinoma with omental nodules greater than 2 cm grossly visible  Patient is followed by GYN Oncology and is on anastrozole (NCCN 2 05854 low-grade serous, stage II-IV tumors, treatment options include primary hormonal therapy (category 2B)  3  Pain control    Patient denies any pain control issues at this time  Mrs Fausto Zhong has Percocet at home if needed  4  Anemia  Etiology is most likely multifactorial (prior chemotherapy, wound, etc)  Hemoglobin level is +/-; this needs to be monitored especially now that patient is to begin chemotherapy  The low MCV and high RDW would be consistent with iron deficiency anemia  Mrs Fausto Zhong can continue with a multivitamin with iron monitoring for GI side effects  We rediscussed what to monitor for in regard to progressive anemia  Possibly IV iron will be needed in the future, iron panel will be rechecked in the future  5  Cardiac issues  Patient has valvular problems, specifics are not presently available  Patient should return to her cardiologist and discuss treatment options  Invasive cardiac procedures now become more complicated as patient has this wound care issue  Patient is to return in 3 weeks  Mrs Fausto Zhong knows to call if she has any other oncology questions or concerns  Carefully review your medication list and verify that the list is accurate and up-to-date  Please call the hematology/oncology office if there are medications missing from the list, medications on the list that you are not currently taking or if there is a dosage or instruction that is different from how you're taking that medication  Patient goals and areas of care:   Begin chemotherapy, monitor hemoglobin level  Barriers to care: Slow healing wound  Patient is able to self-care   ___________________________________________________________________________________________________    Chief Complaint   Patient presents with    Follow-up     Rectal cancer     History of Present Illness:    26-year-old female previously referred for the above  Previously Mrs Aylin Hartman had gastric bypass  Patient was recently seen by GI because of blood in the stools  Additional workup included a colonoscopy which demonstrated a rectal lesion    Patient recently completed concurrent chemotherapy (Xeloda) with radiation  Mrs Evelia Rios subsequently went onto an APR  Final pathology results are listed below; patient was also found to have IIIC low-grade serous ovarian carcinoma  Postoperatively, patient had issues with slow wound closure  Mrs Evelia Rios states feeling okay, about the same as before  Fatigue is slightly less/better than before  Activities are slightly better  Pain is controlled  Patient continues with wound care  No bleeding  No other GI or  bleeding  No problems with the colostomy  Appetite is good, weight is stable  No pain control issues  No shortness of breath or dyspnea on exertion  No headaches or dizziness  No problems with the Arimidex  No fevers or signs of infection  Review of Systems   Constitutional: Positive for fatigue  HENT: Negative  Eyes: Negative  Respiratory: Negative  Cardiovascular: Negative  Gastrointestinal: Negative for blood in stool, constipation and diarrhea  Endocrine: Negative  Genitourinary: Negative  Musculoskeletal: Positive for arthralgias  Skin: Negative  Allergic/Immunologic: Negative  Neurological: Negative  Hematological: Negative  Psychiatric/Behavioral: Negative  All other systems reviewed and are negative       Patient Active Problem List   Diagnosis    Morbid obesity due to excess calories (HCC)    Postgastrectomy malabsorption    S/P gastric bypass    Marginal ulcer    Status post bariatric surgery    Atherosclerotic peripheral vascular disease [facility]    Diet-controlled diabetes mellitus ([facility]    Onychomycosis    Pain in foot    Rectal cancer (HCC)    Postoperative pain    Acute blood loss anemia    Colostomy care ([facility]    Ovarian cancer ([facility] )     Past Medical History:   Diagnosis Date    Anemia     Arthritis     Cancer ([facility]     rectal    Chronic lower back pain     Chronic pain disorder     back pain    Diabetes mellitus ([facility] [facility]     GERD (gastroesophageal reflux disease)     History of MRSA infection     Morbid obesity ([address] )     Spinal stenosis     Systolic murmur     Unsteady gait     uses walker    Wears dentures     Wears glasses      Ob/gyn:  No recent mammogram -patient's choice, no post menopausal bleeding    Past Surgical History:   Procedure Laterality Date    ABDOMINAL PERINEAL BOWEL RESECTION W/ ILEOANAL POUCH N/A 2018    Procedure: LAPAROSCOPIC HAND ASSIST ABDOMINOPERINEAL RESECTION,  POSTERIOR VAGINECTOMY, OMENTECTOMY;  Surgeon: Helena Tapia MD;  [facility];  Service: Colorectal    ABDOMINAL SURGERY      abscess removed from abdomen and right thigh, a hole in thigh closed by plastic surgeon    ABSCESS DRAINAGE      abd, (R) leg, (L) leg     SECTION       SECTION      CYSTOSCOPY N/A 2018    Procedure: CYSTOSCOPY;  Surgeon: Jace Sidhu MD;  [facility];  Service: Gynecology Oncology    ESOPHAGOGASTRODUODENOSCOPY N/A 2016    Procedure: ESOPHAGOGASTRODUODENOSCOPY (EGD); Surgeon: Natalie Mijares MD;  [facility];  Service:     ESOPHAGOGASTRODUODENOSCOPY N/A 3/30/2016    Procedure: ESOPHAGOGASTRODUODENOSCOPY (EGD); Surgeon: Natalie Mijares MD;  [facility]; Service:     EYE SURGERY      laser eye surgery    HYSTERECTOMY N/A 2018    Procedure: RADICAL HYSTERECTOMY TOTAL ABDOMINAL (ALEXANDRA)  BSO;  Surgeon: Jace Sidhu MD;  [facility];  Service: Gynecology Oncology    JOINT REPLACEMENT Left 2017    hip    JOINT REPLACEMENT Right 2017    hip    ID COLONOSCOPY FLX DX W/COLLJ SPEC WHEN PFRMD N/A 3/9/2018    Procedure: COLONOSCOPY;  Surgeon: Beryl Harden MD;  Location: [facility]; Service: Gastroenterology    ID COLONOSCOPY FLX DX W/COLLJ [facility] WHEN PFRMD N/A 2018    Procedure: COLONOSCOPY;  Surgeon: Helena Tapia MD;  [facility];   Service: Colorectal    ID ESOPHAGOGASTRODUODENOSCOPY TRANSORAL DIAGNOSTIC N/A 2018 Procedure: ESOPHAGOGASTRODUODENOSCOPY (EGD); Surgeon: Gonsalo Ferris MD;  Location: [facility] GI LAB; Service: Gastroenterology    SC LAP GASTRIC BYPASS/SOLEDAD-EN-Y N/A 2016    Procedure: BYPASS GASTRIC  SOLEDAD-EN-Y LAPAROSCOPIC;  Surgeon: Anshul Burkett MD;  [facility];  Service: Bariatrics    SC LAP, SURG COLOSTOMY N/A 2018    Procedure: PERMANENT END COLOSTOMY;  Surgeon: Stephy Jin MD;  [facility];  Service: Colorectal    SC LAP, SURG PROCTECTOMY W COLOSTOMY N/A 2018    Procedure: PROCTECTOMY;  Surgeon: Stephy Jin MD;  [facility];  Service: Colorectal    TUBAL LIGATION       Family History   Problem Relation Age of Onset    Adopted: Yes    Leukemia Mother     Heart disease Father     Coronary artery disease Father     Diabetes Father     No Known Problems Sister     No Known Problems Brother     Diabetes Son     No Known Problems Brother     No Known Problems Brother     No Known Problems Sister     No Known Problems Sister     Family history: Mother  of leukemia (NOS), father  from heart disease (NOS), 2 children 1 with diabetes, no known familial or genetic diseases, no family history of GI malignancies    Social History     Social History    Marital status: Single     Spouse name: N/A    Number of children: 2    Years of education: N/A     Occupational History    Not on file       Social History Main Topics    Smoking status: Former Smoker     Packs/day: 1 00     Years: 25 00     Quit date: 3/30/2006    Smokeless tobacco: Never Used    Alcohol use No    Drug use: Yes     Types: Oxycodone      Comment: Percocet for lower back pain prn    Sexual activity: Not on file     Other Topics Concern    Not on file     Social History Narrative    No narrative on file       Current Outpatient Prescriptions:     anastrozole (ARIMIDEX) 1 mg tablet, Take 1 tablet (1 mg total) by mouth daily, Disp: 90 tablet, Rfl: 1   Calcium-Vitamin D 500-125 MG-UNIT TABS, Take by mouth daily  , Disp: , Rfl:     capecitabine (XELODA) 500 MG tablet, Take 3 tablets (total dose of 1500mg) twice a day, Disp: 180 tablet, Rfl: 2    ferrous sulfate 324 (65 Fe) mg, Take 1 tablet (324 mg total) by mouth daily before breakfast, Disp: 90 tablet, Rfl: 0    Multiple Vitamins-Minerals (BARIATRIC FUSION) CHEW, Chew daily  , Disp: , Rfl:     omeprazole (PriLOSEC) 20 mg delayed release capsule, Take 1 capsule (20 mg total) by mouth daily, Disp: 90 capsule, Rfl: 3    oxyCODONE (ROXICODONE) 10 MG TABS, , Disp: , Rfl:     AMILoride 5 mg tablet, Take 1 tablet (5 mg total) by mouth daily (Patient not taking: Reported on 12/12/2018 ), Disp: 90 tablet, Rfl: 0    gabapentin (NEURONTIN) 100 mg capsule, , Disp: , Rfl:     metroNIDAZOLE (FLAGYL) 500 mg tablet, , Disp: , Rfl:     oxyCODONE-acetaminophen (PERCOCET)  mg per tablet, Take 1 tablet by mouth every 6 (six) hours as needed for moderate pain , Disp: , Rfl:     Allergies   Allergen Reactions    Tramadol Other (See Comments)     Dizzy         Vitals:    12/12/18 1058   BP: 110/74   Pulse: 91   Resp: 18   Temp: 98 8 °F (37 1 °C)   SpO2: 98%     Physical Exam   Constitutional: She is oriented to person, place, and time  She appears well-developed and well-nourished  Well-nourished female, no respiratory distress   HENT:   Head: Normocephalic and atraumatic  Right Ear: External ear normal    Left Ear: External ear normal    Nose: Nose normal    Mouth/Throat: Oropharynx is clear and moist    Eyes: Pupils are equal, round, and reactive to light  Conjunctivae and EOM are normal    Neck: Normal range of motion  Neck supple  Cardiovascular: Normal rate, regular rhythm, normal heart sounds and intact distal pulses  Pulmonary/Chest: Effort normal and breath sounds normal    Few scattered rhonchi, otherwise good air entry bilaterally   Abdominal: Soft   Bowel sounds are normal    Left lower quadrant colostomy in place  Stool in back, nontender, +bowel sounds, well-healed suture lines, obese, cannot palpate liver or spleen   Musculoskeletal:   No pain or tenderness with palpation of joints, muscles or bones, good range of motion in upper extremities, decreased range of motion in lower extremities but equal   Neurological: She is alert and oriented to person, place, and time  She has normal reflexes  Skin: Skin is warm  Warm, moist, good color, no petechiae or ecchymoses   Psychiatric: She has a normal mood and affect  Her behavior is normal  Judgment and thought content normal    Extremities:   No lower extremity edema, no cords, pulses are 1+  Lymphatics:  No adenopathy in the neck, supraclavicular region, axilla and groin bilaterally  Rectal wound deferred    Labs:    12/11/2018 WBC = 5 2 hemoglobin = 9 7 hematocrit = 34 MCV = 74 RDW = 17 2 platelet = 670    43/85/5583 WBC = 5 8 hemoglobin = 8 7 hematocrit = 31 MCV = 80 platelet = 948 CA -752 = 9 6  09/11/2018 WBC = 5 6 hemoglobin = 7 7 hematocrit = 25 4 MCV = 83 platelet = 286 BUN = 7 creatinine = 0 50  06/13/2018 WBC = 5 39 hemoglobin = 10 4 hematocrit = 35 MCV = 80 platelet = 099 BUN = 13 creatinine = 0 76 [facility]  05/30/2018 BUN = 11 creatinine = 0 70 [facility] WBC = 4 97 hemoglobin = 10 1 hematocrit = 34 MCV = 80 RDW = 18 6 platelet = 464 neutrophil = 68%  05/17/2018 WBC = 4 4 hemoglobin = 10 6 hematocrit = 34 MCV = 75 platelet = 828 neutrophil = 67% BUN = 19 creatinine = 0 65 [facility]  05/08/2018 WBC = 6 7 hemoglobin = 10 7 hematocrit = 35 MCV = 75 platelet = 486 BUN = 17 creatinine = 0 62 [facility]  03/12/2018 CEA = 22 6 = high    Imaging    4/6/18 MRI pelvis rectal cancer staging    Low rectal cancer, 5 cm in length, circumferential around the rectal wall  (Series 8 images 19 through 33 )  Anteriorly, there are multiple areas where there is transmural tumor extension into the mesorectal fat making this at least a T3c lesion   On Series 8 image 31, tumor also abuts the mesorectal fascia making this CRM positive  At this point it is also   immediately adjacent to the vagina, therefore this may be a T4 lesion  There are 2 high risk perirectal nodes, and 1 low risk perirectal nodes  01/31/2018 ultrasound right upper quadrant    1  Layering gallbladder sludge  No acute cholecystitis or biliary ductal obstruction  2   Hepatomegaly  3   Fluid-filled stomach  1/27/18 CT scan of the abdomen/pelvis    1  Prior Juan-en-Y gastric bypass with distention of the excluded stomach  2   No evidence of acute abnormality in the abdomen or pelvis      Pathology    Case Report   Surgical Pathology Report                         Case: [id number]                                    Authorizing Provider: Oswald Payton MD      Collected:           09/06/2018 1010               Ordering Location:     [address]      Received:            09/06/2018 [facility]                                      Hospital Operating Room                                                       Pathologist:           Julia Doty MD                                                         Specimens:   A) - Soft Tissue, Other, EPIPLOIC NODULE                                                             B) - Rectum, enbloc rectum, sigmoid, anus, uterus, bso, cervix, posterior vaginal                    wall                                                                                                 C) - Omentum                                                                               Addendum   Additional immunohistochemical stains performed with appropriate controls on a selected portion of serous carcinoma involving omental tissue (block C1) show the following results:  Estrogen receptor: Positive  Progesterone receptor: Negative   Addendum electronically signed by Julia Doty MD on 9/27/2018 at  1:40 PM   Final Diagnosis   A    Soft tissue, epiploic nodule, biopsy:  - Portion of nodular fibroadipose tissue with fat necrosis and dystrophic calcifications  -  Negative for metastatic carcinoma  -  Immunohistochemical stain performed with appropriate control for keratin AE1/3 is negative for epithelial elements, supporting the diagnosis      B  Rectum, sigmoid colon, anus, uterus,cervix, bilateral ovaries and fallopian tubes and posterior vaginal wall; en bloc resection:  -  Invasive moderately differentiated adenocarcinoma of rectum (see first synoptic report)  -  Low grade serous carcinoma involving left ovary, left fallopian tube, uterine serosa and colonic serosa (see second synoptic report)  -  Separate portion of colon (consistent with sigmoid) with diverticulosis coli showing focal serosal implant of low-grade serous carcinoma  -  Uterus showing benign inactive/atrophic endometrium with metaplastic change, benign endometrial polyp and rare cytologic atypia consistent with radiation effect  -  Cervix with mild glandular atypia, favor reactive (see note)  -  Benign uterine myometrium with one intramural benign leiomyoma, negative for atypia or malignancy  -  Benign anal skin and dentate line with mild reactive change, negative for involvement by carcinoma  -  Benign vaginal wall mucosa with thinning, chronic inflammation, and surface erosion most suggestive of prior therapy effect, negative for involvement by carcinoma  -  27 benign pericolorectal lymph nodes identified, negative for metastatic carcinoma      C  Omentum, omentectomy:  -   Low grade serous carcinoma         COLORECTAL CARCINOMA TUMOR STAGING SUMMARY (includes specimen A-C of this case):  1  Specimen identification:     - Specimen:  Rectum, sigmoid colon, anus, uterus,cervix, bilateral ovaries and fallopian tubes and posterior vaginal wall   2   Tumor:     - Tumor site:  Rectum     - Tumor size:  Up to 3 3 cm     - Macroscopic tumor perforation:  Not identified     - Tumor location:  Below the peritoneal reflection        - Macroscopic intactness of mesorectum:  Intact     - Histologic Type:   Adenocarcinoma      - Histologic Grade: Moderately differentiated (G2)     - Microscopic Tumor Extension (ypT3): Tumor invades through muscularis propria into pericolorectal soft tissues     - Tumor budding (only needed in polyps, Stage I or II cancers):  Not identified   3  Margins (specify distance for nearest radial margin on RECTAL tumors only):     - Proximal Margin:  Negative for carcinoma     - Distal Margin:  Negative for carcinoma     - Circumferential (Radial) or Mesenteric Margin:  Negative for carcinoma (0 6cm)     - Other Margins:  Vaginal margin negative for carcinoma   4  Treatment effect:  Present; Residual cancer with evident tumor regression, but more than single cells or rare small groups of cancer cells (partial response, score 2)   5  Lymph-vascular invasion:  Not identified   6  Perineural invasion:  Not identified   7  Tumor deposits: Not identified   8  Type of polyp in which invasive carcinoma arose:  Tubular adenoma   9  Regional lymph nodes (pN0):  27 benign pericolorectal lymph node sample, negative for metastatic carcinoma  10  Additional pathologic findings:  Diverticulosis coli  11  Ancillary Studies:  *  Immunohistochemical stains performed with appropriate controls show the primary rectal tumor to be positive for CK20 and CDX-2 and negative for CK 7, PAX-8, p53 (wild type expression), ER and WT-1, supporting a diagnosis of primary colorectal origin  12  8th Ed AJCC Stage:  at least Stage  IIA - ypT3, pN0, G2         Primary Tumor of the Ovary/Fallopian Tube/Peritoneum, Staging Summary (includes Specimen A to C of this case):  1  Procedure:  Hysterectomy, bilateral salpingo-oophorectomy, omentectomy  2  Hysterectomy type:  Radical hysterectomy  3  Specimen integrity:      - Right ovary:  Intact      - Left ovary:  Intact       - Right fallopian tube:   Intact      - Left fallopian tube:  Intact  4  Tumor site:  Ovarian and fallopian tube surfaces and uterine and colonic serosa  5  Ovarian surface involvement:  Present  6  Fallopian tube surface involvement:  Present  7  Tumor size:  Largest focus measures 1 8 cm (uterine serosa) (see note)  8  Histologic type:  Low-grade serous carcinoma   9  Histologic grade:  Low grade  10  Implant:  Present  11  Other tissue/organ involvement:  Bilateral ovaries and fallopian tubes, uterine serosa, sigmoid colon serosa, and omentum  12  Largest extrapelvic peritoneal focus: 2 5 cm (macroscopically identidfied)    - Specify site:  Omentum  13  Peritoneal/ascetic fluid:  Not performed   14  Pleural fluid:  Not performed  15  Treatment effect:  No known prior therapy  16  Regional lymph nodes:    - No regional uterine lymph nodes submitted or found    - 27 benign pericolorectal lymph nodes sampled, negative for metastatic carcinoma  17  Pathologic stage classification (pTNM, AJCC 8th edition): pT3c, pNX, Low Grade  18  FIGO stage (2015 FIGO cancer report): IIIC  19  Additional pathologic findings:  N/A  20  Ancillary studies:  Immunohistochemical stains performed with appropriate controls show the tumor cells to be positive for CK7, PAX-8, ER, and WT-1 with wild-type expression of p53 and negative for CK20 and CDX-2 supporting the diagnosis  Electronically signed by Jami Apgar, MD on 2018 at 12:54 PM                  Case Report   Surgical Pathology Report                         Case: [id number]                                    Authorizing Provider: Enrique Celis MD      Collected:           2018 1130               Pathologist:           Wu Blakely MD             Received:            2018 0942               Specimen:    Rectum, Rectal cancer/mass biopsies                                                        Final Diagnosis   A   Rectal mass (biopsy):  - 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      Comment:   - In the context of a rectal mass, repeat biopsy and/or excision may be indicated to exclude a higher grade lesion  Electronically signed by Jeb Pierce MD on 3/30/2018 at  2:36 PM         Case Report   Surgical Pathology Report                         Case: [id number]                                    Authorizing Provider: Earl Fritz MD          Collected:           03/09/2018 0902               Ordering Location:     [facility]   Received:            03/12/2018 0904                                      Center                                                                        Pathologist:           Becky Mulligan DO                                                             Specimens:   A) - Colon, polyp splenic flexure/cold snare                                                         B) - Colon, bx distal rectal mass                                                          Final Diagnosis   A  Colon, splenic flexure polyp, biopsy:  - Tubulovillous adenoma, negative for high-grade dysplasia      B  Rectum, mass, biopsy:  - At least high grade dysplasia/intramucosal carcinoma arising in a tubulovillous adenoma, suspicious for invasion       Intradepartmental consultation is in agreement with the above diagnosis (AT)     Interpretation performed at [facility], [place] [id number]  Report faxed to Dr Suzi Recio on 3/13/18 @ 10:55 AM   Electronically signed by Uriah Lundy DO on 3/13/2018 at 10:54 AM

## 2018-12-14 RX ORDER — SODIUM CHLORIDE 9 MG/ML
20 INJECTION, SOLUTION INTRAVENOUS ONCE
Status: DISCONTINUED | OUTPATIENT
Start: 2018-12-17 | End: 2018-12-17

## 2018-12-17 ENCOUNTER — HOSPITAL ENCOUNTER (OUTPATIENT)
Dept: INFUSION CENTER | Facility: HOSPITAL | Age: 63
Discharge: HOME | End: 2018-12-17
Payer: MEDICARE

## 2018-12-17 VITALS
HEIGHT: 67 IN | SYSTOLIC BLOOD PRESSURE: 117 MMHG | HEART RATE: 80 BPM | OXYGEN SATURATION: 100 % | DIASTOLIC BLOOD PRESSURE: 68 MMHG | TEMPERATURE: 98.6 F | WEIGHT: 220.02 LBS | RESPIRATION RATE: 18 BRPM | BODY MASS INDEX: 34.53 KG/M2

## 2018-12-17 DIAGNOSIS — R11.0 NAUSEA: Primary | ICD-10-CM

## 2018-12-17 PROCEDURE — 96367 TX/PROPH/DG ADDL SEQ IV INF: CPT

## 2018-12-17 PROCEDURE — 96415 CHEMO IV INFUSION ADDL HR: CPT

## 2018-12-17 PROCEDURE — 96413 CHEMO IV INFUSION 1 HR: CPT

## 2018-12-17 RX ORDER — ONDANSETRON 8 MG/1
8 TABLET, ORALLY DISINTEGRATING ORAL EVERY 8 HOURS PRN
Qty: 30 TABLET | Refills: 0 | Status: SHIPPED | OUTPATIENT
Start: 2018-12-17 | End: 2019-05-29 | Stop reason: ALTCHOICE

## 2018-12-17 RX ORDER — DEXTROSE MONOHYDRATE 50 MG/ML
20 INJECTION, SOLUTION INTRAVENOUS ONCE
Status: COMPLETED | OUTPATIENT
Start: 2018-12-17 | End: 2018-12-17

## 2018-12-17 RX ADMIN — DEXTROSE 20 ML/HR: 50 INJECTION, SOLUTION INTRAVENOUS at 08:21

## 2018-12-17 RX ADMIN — DEXAMETHASONE SODIUM PHOSPHATE: 10 INJECTION, SOLUTION INTRAMUSCULAR; INTRAVENOUS at 08:23

## 2018-12-17 RX ADMIN — OXALIPLATIN 205 MG: 5 INJECTION, SOLUTION, CONCENTRATE INTRAVENOUS at 09:22

## 2018-12-19 NOTE — PROGRESS NOTES
Assessment/Plan:    Aseptic debridement and planning of nails x10 and manually and mechanically    Discussed proper diabetic foot care daily foot checks     Diagnoses and all orders for this visit:    Onychomycosis    Pain in both feet    Atherosclerotic peripheral vascular disease (HCC)    Ingrown toenail    Hammer toes of both feet          Subjective:      Patient ID: Chela Humphrey is a 61 y o  female  patient is seen on housecall patient is seen in assisted living facility  Has trouble Walking long distances and rarely leaves the facility    patient has been under going chemotherapy has ingrown nail of bilateral great toes  Has not noticed any redness or signs of infection  Nails are extremely long  Patient does painful contracted hammertoes bilateral   Pain when walking standing  Patient has not noticed any redness or drainage        The following portions of the patient's history were reviewed and updated as appropriate: allergies, current medications, past family history, past medical history, past social history, past surgical history and problem list     Review of Systems   Constitutional: Negative  HENT: Negative  Eyes: Negative  Respiratory: Negative  Cardiovascular: Negative  Gastrointestinal: Negative  Endocrine: Negative  Genitourinary: Negative  Musculoskeletal: Negative  Skin: Negative  Allergic/Immunologic: Negative  Neurological: Negative  Hematological: Negative  Psychiatric/Behavioral: Negative  Objective: There were no vitals taken for this visit  Physical Exam   Cardiovascular:   Pulses:       Dorsalis pedis pulses are 1+ on the right side, and 1+ on the left side  Posterior tibial pulses are 0 on the right side, and 0 on the left side  Feet:   Right Foot:   Skin Integrity: Positive for callus  Left Foot:   Skin Integrity: Positive for callus  Patient's shoes and socks removed  Right Foot/Ankle   Right Foot Inspection  Skin Exam: callus and callus                            Sensory   Vibration: absent  Proprioception: intact   Monofilament testing: diminished  Vascular  Capillary refills: elevated  The right DP pulse is 1+  The right PT pulse is 0  Left Foot/Ankle  Left Foot Inspection  Skin Exam: callus                                         Sensory   Vibration: absent  Proprioception: intact  Monofilament: diminished  Vascular  Capillary refills: elevated  The left DP pulse is 1+  The left PT pulse is 0  Assign Risk Category:  Deformity present; Loss of protective sensation; Constitutional: no acute distress, well appearing and well nourished  Cardiovascular: abnormal dorsalis pedis pulse,-- abnormal posterior tibialis pulse,-- dependence rubor-- and-- abnormal capillary refill  Orthopedic/Biomechanical: abnormal foot type,-- hammertoe(s)-- and-- ingrown nails  Left Foot: Appearance: Normal except as noted: excessive pronation-- and-- pes planus  Second toe deformities include hammer toe  Third toe deformities include hammer toe  Forth toe deformities include hammer toe  Fifth toe deformities include hammer toe  All nails thick discolored dystrophic, positive subungual debris, positive pain on palpation  Special Tests: semirigid hammertoes 2 through 5 bilateral  Negative ulceration negative maceration negative signs of infection  Right Foot: Appearance: Normal except as noted: excessive pronation-- and-- pes planus  Second toe deformities include hammer toe  Third toe deformities include hammer toe  Forth toe deformities include hammer toe  Fifth toe deformities include hammer toe  Negative ulceration, negative maceration, negative signs of infection  Special Tests: Skin is hairless and atrophic  Plus one edema bilateral  Moderate venous stasis  No signs of infection  Neurological Exam: performed  Light touch was intact bilaterally  Vibratory sensation was intact bilaterally     Vascular Exam: performed Dorsalis pedis pulses were 1/4 bilaterally  Posterior tibial pulses were absent bilaterally  Capillary refill time was between 1-3 seconds bilaterally  All nails thick discolored dystrophic positive subungual debris positive pain on palpation  Ingrown nail bilateral hallux  Negative erythema no signs of infection    Rigidly contracted hammertoes bilateral   Decreased vibratory sensation bilateral  Negative Tinel sign tarsal tunnel

## 2018-12-20 ENCOUNTER — TELEPHONE (OUTPATIENT)
Dept: HEMATOLOGY ONCOLOGY | Facility: MEDICAL CENTER | Age: 63
End: 2018-12-20

## 2018-12-20 NOTE — TELEPHONE ENCOUNTER
I called and explained to Paty Reynolds that the numbness in her fingers is a side effect of the Oxaliplatin and it is aggravated by the cold  She stated that she is extremely fatigued and it is worrisome for her  I discussed her symptoms with Dr Jessica Ocampo  Patient is to hold Xeloda until Monday and call the office with an update on how she is feeling  She will then restart the [place] and finish her cycle

## 2018-12-24 ENCOUNTER — TELEPHONE (OUTPATIENT)
Dept: HEMATOLOGY ONCOLOGY | Facility: MEDICAL CENTER | Age: 63
End: 2018-12-24

## 2018-12-24 NOTE — TELEPHONE ENCOUNTER
Spoke with patient  She states they of infusion, last Monday, she did not sleep for the whole night  Then Tuesday night she had difficulty sleeping  She states that her mind is racing and she began to felt delirious  Diarrhea also had started Tuesday evening  For the 1st few days she was taking up to 8 Imodium per day as instructed by Dr Jessica Ocampo  She states that today her diarrhea is much more controlled  Also most recent days she had only been taking up to 4 per day  She also states that on Tuesday she began to have some chest discomfort in the middle to left area of her chest   When she discontinued medication on Thursday her above symptoms of delirium and chest discomfort subsided  They are now are not present  Diarrhea is much better  She is afraid to restart medication  As instructed by Dr Jessica Ocampo, patient hold medication until Wednesday  She will call us back on Wednesday to provide enough day  Discussed that likely she will need dose reduction

## 2018-12-26 ENCOUNTER — TELEPHONE (OUTPATIENT)
Dept: HEMATOLOGY ONCOLOGY | Facility: MEDICAL CENTER | Age: 63
End: 2018-12-26

## 2018-12-26 NOTE — TELEPHONE ENCOUNTER
I called the patient to make sure that she is doing okay  She is feeling well she said  She is aware that she can call us if she is not feeling okay

## 2018-12-27 NOTE — TELEPHONE ENCOUNTER
Spoke with patient  She was agreeable to try 1000 mg BID instead of 1500 mg BID  She will restart tomorrow, 12/28/18  She will discontinue and call if she experiences adverse effects  She is scheduled for follow up next week with Dr Dayana Payne

## 2018-12-27 NOTE — TELEPHONE ENCOUNTER
Patient reports that she is feeling much better  She states that she is still having loose stool and has been taking one imodium a day  She is not sure if it is diarrhea or if this will be her new "normal" with the colostomy being in place now  She is apprehensive about restarting the Xeloda  Should she start again or wait to discuss it with Dr Priyanka Ovalles at her follow up visit on 01/02/19?

## 2019-01-02 ENCOUNTER — OFFICE VISIT (OUTPATIENT)
Dept: HEMATOLOGY ONCOLOGY | Facility: MEDICAL CENTER | Age: 64
End: 2019-01-02
Payer: MEDICARE

## 2019-01-02 VITALS
BODY MASS INDEX: 37.15 KG/M2 | DIASTOLIC BLOOD PRESSURE: 68 MMHG | WEIGHT: 223 LBS | SYSTOLIC BLOOD PRESSURE: 122 MMHG | OXYGEN SATURATION: 96 % | TEMPERATURE: 97.7 F | HEART RATE: 88 BPM | RESPIRATION RATE: 18 BRPM | HEIGHT: 65 IN

## 2019-01-02 DIAGNOSIS — C20 RECTAL CANCER (HCC): Primary | ICD-10-CM

## 2019-01-02 PROCEDURE — 99214 OFFICE O/P EST MOD 30 MIN: CPT | Performed by: INTERNAL MEDICINE

## 2019-01-02 NOTE — PROGRESS NOTES
Cammy Maciel  1955  [facility] [place] [id number]    DISCUSSION/SUMMARY:    60-year-old female recently found to have high-grade dysplasia/intramucosal lesion arising in a tubulovillous adenoma  Issues:    1  Rectal cancer  Final stage is IIA (ypT3 pN0 G2)  Patient completed approximately 50% of the 1st post operative dose of CapeOX - delays from chemotherapy related diarrhea, nausea, chest pains  Rectal wound has also not healed  Mrs Angelia Mckenzie does not wish to continue with chemotherapy  We discussed this at length including the fact that the adjuvant treatments may be the difference between cure and eventual recurrence  Patient states that she understands this very clearly but believes that her quality of life at this time is not worth continue with the chemotherapy  The plan is for patient to return in 6 weeks with blood work before and to repeat scans in 3 months  We discussed what to monitor for in regard to GI issues, bleeding, ostomy issues, weight loss, worsening rectal discharge etc    Patient will follow up with Colorectal surgery and Radiation Oncology as directed  NCCN guidelines 3 2018 state that for patients with ypT3 lesions, sequence is neoadjuvant concurrent treatment (completed), primary treatment/surgery (completed) and then adjuvant treatment for total of 6 months of perioperative treatment  CapeOX Regimen (discontinued)  Oxaliplatin 130 mg/m2 IV   Xeloda 1000 mg/m2 p o  twice a day  Cycle = 3 weeks    Cari RODRIGUEZ, Elma T, Nico J, et al  Phase III trial of capecitabine plus oxaliplatin as adjuvant therapy for stage III colon cancer: a planned safety analysis in 1,864 patients  J Clin Oncol  7943;41:650-812  Sina MISHRA, Johny MISHRA, et al  Capecitabine Plus Oxaliplatin Compared With Fluorouracil and Folinic Acid As Adjuvant Therapy for Stage III Colon Cancer   J Clin Oncol  2011;29:6649-2497      2  Ovarian cancer, IIIC  As discussed previously, patient was found to have an incidental low-grade serous ovarian carcinoma with omental nodules greater than 2 cm grossly visible  Patient is followed by GYN Oncology and is on anastrozole (NCCN 2 [id number] low-grade serous, stage II-IV tumors, treatment options include primary hormonal therapy (category 2B)  3  Pain control  Patient denies any pain control issues at this time  Mrs Ramon Enrique has Percocet at home if needed  4  Anemia  Etiology for the anemia is likely multifactorial but clearly is due to the chemotherapy, wounds, prior treatments, radiation etc   As above, repeat blood work has been requested  We discussed what to monitor for in regard to progressive anemia  5  Cardiac issues  Patient has valvular problems, specifics are not presently available  Because of the recent issue of palpitations during the chemotherapy, I have asked Mrs Reardon to return to her cardiologist for evaluation  Patient is to return in 6 weeks  Mrs Ramon Enrique knows to call if she has any other oncology questions or concerns  Carefully review your medication list and verify that the list is accurate and up-to-date  Please call the hematology/oncology office if there are medications missing from the list, medications on the list that you are not currently taking or if there is a dosage or instruction that is different from how you're taking that medication  Patient goals and areas of care:  Rectal cancer surveillance  Barriers to care:   None  Patient is able to self-care   ___________________________________________________________________________________________________    Chief Complaint   Patient presents with    Follow-up     Rectal cancer     History of Present Illness:    77-year-old female previously referred for the above  Previously Mrs Andrew Galvez had gastric bypass    Patient was recently seen by GI because of blood in the stools  Additional workup included a colonoscopy which demonstrated a rectal lesion  Patient recently completed concurrent chemotherapy (Xeloda) with radiation  Mrs Love aHdley subsequently went onto an APR  Final pathology results are listed below; patient was also found to have IIIC low-grade serous ovarian carcinoma  Postoperatively, patient had issues with slow wound closure  Mrs Love Hadley recently started post surgery CapeOX  Patient had significant issues with diarrhea, fatigue, decreased appetite and chest palpitations/uncomfortable feeling while on the Xeloda  The chemotherapy was held at 1 time and then reduced  When patient resumed, the issues returns  Patient has been off treatment for approximately 1 +week  Mrs Love Hadley states feeling okay, better than before  No rectal discharge or bleeding  No pain control issues  Appetite is returning  Weight is about the same as before  Activities are limited but slightly better than before  No GI issues including bleeding or diarrhea  No abdominal pain  No numbness or tingling  The prior uncomfortable feeling in the chest while taking the Xeloda has gone  No fevers, chills or sweats  No headaches, blurred vision or dizziness  Review of Systems   Constitutional: Positive for fatigue  HENT: Negative  Eyes: Negative  Respiratory: Negative  Cardiovascular: Negative  Gastrointestinal: Negative for blood in stool, constipation and diarrhea  Endocrine: Negative  Genitourinary: Negative  Musculoskeletal: Positive for arthralgias  Skin: Negative  Allergic/Immunologic: Negative  Neurological: Negative  Hematological: Negative  Psychiatric/Behavioral: Negative  All other systems reviewed and are negative       Patient Active Problem List   Diagnosis    Morbid obesity due to excess calories (HCC)    Postgastrectomy malabsorption    S/P gastric bypass    Marginal ulcer    Status post bariatric surgery    Atherosclerotic peripheral vascular disease ([place] [address] )    Diet-controlled diabetes mellitus (HCC)    Onychomycosis    Pain in foot    Rectal cancer (HCC)    Postoperative pain    Acute blood loss anemia    Colostomy care ([place] [address] )    Ovarian cancer ([place] [address] )     Past Medical History:   Diagnosis Date    Anemia     Arthritis     Cancer ([place] [address] )     rectal    Chronic lower back pain     Chronic pain disorder     back pain    Diabetes mellitus ([place] [address] )     GERD (gastroesophageal reflux disease)     History of MRSA infection     Morbid obesity ([place] [address] )     Spinal stenosis     Systolic murmur     Unsteady gait     uses walker    Wears dentures     Wears glasses      Ob/gyn:  No recent mammogram -patient's choice, no post menopausal bleeding    Past Surgical History:   Procedure Laterality Date    ABDOMINAL PERINEAL BOWEL RESECTION W/ ILEOANAL POUCH N/A 2018    Procedure: LAPAROSCOPIC HAND ASSIST ABDOMINOPERINEAL RESECTION,  POSTERIOR VAGINECTOMY, OMENTECTOMY;  Surgeon: Mckenna Villalobos MD;  [facility];  Service: Colorectal    ABDOMINAL SURGERY      abscess removed from abdomen and right thigh, a hole in thigh closed by plastic surgeon    ABSCESS DRAINAGE      abd, (R) leg, (L) leg     SECTION       SECTION      CYSTOSCOPY N/A 2018    Procedure: CYSTOSCOPY;  Surgeon: Amanda Johnson MD;  [facility];  Service: Gynecology Oncology    ESOPHAGOGASTRODUODENOSCOPY N/A 2016    Procedure: ESOPHAGOGASTRODUODENOSCOPY (EGD); Surgeon: Carlos Eduardo Bennett MD;  [facility];  Service:     ESOPHAGOGASTRODUODENOSCOPY N/A 3/30/2016    Procedure: ESOPHAGOGASTRODUODENOSCOPY (EGD); Surgeon: Carlos Eduardo Bennett MD;  [facility]; Service:     EYE SURGERY      laser eye surgery    HYSTERECTOMY N/A 2018    Procedure: RADICAL HYSTERECTOMY TOTAL ABDOMINAL (ALEXANDRA)   BSO;  Surgeon: Amanda Johnson MD;  [facility];  Service: Gynecology Oncology    JOINT REPLACEMENT Left 2017    hip    JOINT REPLACEMENT Right 2017    hip    NV COLONOSCOPY FLX DX W/COLLJ SPEC WHEN PFRMD N/A 3/9/2018    Procedure: COLONOSCOPY;  Surgeon: Gonsalo Ferris MD;  [facility]; Service: Gastroenterology    NV COLONOSCOPY FLX DX W/COLLJ [facility] WHEN PFRMD N/A 2018    Procedure: COLONOSCOPY;  Surgeon: Stephy Jin MD;  [facility]; Service: Colorectal    NV ESOPHAGOGASTRODUODENOSCOPY TRANSORAL DIAGNOSTIC N/A 2018    Procedure: ESOPHAGOGASTRODUODENOSCOPY (EGD); Surgeon: Gonsalo Ferris MD;  Location: [facility]; Service: Gastroenterology    NV LAP GASTRIC BYPASS/SOLEDAD-EN-Y N/A 2016    Procedure: BYPASS GASTRIC  SOLEDAD-EN-Y LAPAROSCOPIC;  Surgeon: Anshul Burkett MD;  [facility];  Service: Bariatrics    NV LAP, SURG COLOSTOMY N/A 2018    Procedure: PERMANENT END COLOSTOMY;  Surgeon: Stephy Jin MD;  [facility];  Service: Colorectal    NV LAP, SURG PROCTECTOMY W COLOSTOMY N/A 2018    Procedure: PROCTECTOMY;  Surgeon: Stephy Jin MD;  [facility];  Service: Colorectal    TUBAL LIGATION       Family History   Problem Relation Age of Onset    Adopted: Yes    Leukemia Mother     Heart disease Father     Coronary artery disease Father     Diabetes Father     No Known Problems Sister     No Known Problems Brother     Diabetes Son     No Known Problems Brother     No Known Problems Brother     No Known Problems Sister     No Known Problems Sister     Family history: Mother  of leukemia (NOS), father  from heart disease (NOS), 2 children 1 with diabetes, no known familial or genetic diseases, no family history of GI malignancies    Social History     Social History    Marital status: Single     Spouse name: N/A    Number of children: 2    Years of education: N/A     Occupational History    Not on file       Social History Main Topics    Smoking status: Former Smoker     Packs/day: 1 00 Years: 25 00     Quit date: 3/30/2006    Smokeless tobacco: Never Used    Alcohol use No    Drug use: Yes     Types: Oxycodone      Comment: Percocet for lower back pain prn    Sexual activity: Not on file     Other Topics Concern    Not on file     Social History Narrative    No narrative on file       Current Outpatient Prescriptions:     anastrozole (ARIMIDEX) 1 mg tablet, Take 1 tablet (1 mg total) by mouth daily, Disp: 90 tablet, Rfl: 1    Calcium-Vitamin D 500-125 MG-UNIT TABS, Take by mouth daily  , Disp: , Rfl:     capecitabine (XELODA) 500 MG tablet, Take 3 tablets (total dose of 1500mg) twice a day, Disp: 180 tablet, Rfl: 2    ferrous sulfate 324 (65 Fe) mg, Take 1 tablet (324 mg total) by mouth daily before breakfast, Disp: 90 tablet, Rfl: 0    Multiple Vitamins-Minerals (BARIATRIC FUSION) CHEW, Chew daily  , Disp: , Rfl:     omeprazole (PriLOSEC) 20 mg delayed release capsule, Take 1 capsule (20 mg total) by mouth daily, Disp: 90 capsule, Rfl: 3    ondansetron (ZOFRAN-ODT) 8 mg disintegrating tablet, Take 1 tablet (8 mg total) by mouth every 8 (eight) hours as needed for nausea or vomiting, Disp: 30 tablet, Rfl: 0    oxyCODONE (ROXICODONE) 10 MG TABS, , Disp: , Rfl:     Allergies   Allergen Reactions    Tramadol Other (See Comments)     Dizzy         Vitals:    01/02/19 0930   BP: 122/68   Pulse: 88   Resp: 18   Temp: 97 7 °F (36 5 °C)   SpO2: 96%     Physical Exam   Constitutional: She is oriented to person, place, and time  She appears well-developed and well-nourished  Well-nourished female, no respiratory distress   HENT:   Head: Normocephalic and atraumatic  Right Ear: External ear normal    Left Ear: External ear normal    Nose: Nose normal    Mouth/Throat: Oropharynx is clear and moist    Eyes: Pupils are equal, round, and reactive to light  Conjunctivae and EOM are normal    Neck: Normal range of motion  Neck supple     Supple, no JVD   Cardiovascular: Normal rate, regular rhythm, normal heart sounds and intact distal pulses  S1, S2 regular rate and rhythm   Pulmonary/Chest: Effort normal and breath sounds normal    Clear bilaterally, good air entry bilaterally   Abdominal: Soft  Bowel sounds are normal    Left lower colostomy bag in place, nontender, +bowel sounds, no rigidity or rebound, cannot palpate liver or spleen   Musculoskeletal:   Decreased range of motion in hips, unchanged from before, upper extremities with good range of motion, no pain or tenderness with palpation of joints, muscles or bones   Neurological: She is alert and oriented to person, place, and time  She has normal reflexes  Skin: Skin is warm  Warm, moist, good color, no petechiae or ecchymoses   Psychiatric: She has a normal mood and affect   Her behavior is normal  Judgment and thought content normal    Extremities:   No lower extremity edema, no cords, pulses are 1+  Lymphatics:  No adenopathy in the neck, supraclavicular region, axilla and groin bilaterally  Rectal wound deferred    Labs    12/11/2018 WBC = 5 2 hemoglobin = 9 7 hematocrit = 34 MCV = 74 RDW = 17 2 platelet = 570  25/77/4539 WBC = 5 8 hemoglobin = 8 7 hematocrit = 31 MCV = 80 platelet = 913 CA -880 = 9 6  09/11/2018 WBC = 5 6 hemoglobin = 7 7 hematocrit = 25 4 MCV = 83 platelet = 960 BUN = 7 creatinine = 0 50  06/13/2018 WBC = 5 39 hemoglobin = 10 4 hematocrit = 35 MCV = 80 platelet = 787 BUN = 13 creatinine = 0 76 Ts [facility]  05/30/2018 BUN = 11 creatinine = 0 70 [facility] WBC = 4 97 hemoglobin = 10 1 hematocrit = 34 MCV = 80 RDW = 18 6 platelet = 200 neutrophil = 68%  05/17/2018 WBC = 4 4 hemoglobin = 10 6 hematocrit = 34 MCV = 75 platelet = 058 neutrophil = 67% BUN = 19 creatinine = 0 65 LFTs [facility]  05/08/2018 WBC = 6 7 hemoglobin = 10 7 hematocrit = 35 MCV = 75 platelet = 756 BUN = 17 creatinine = 0 62 [facility]  03/12/2018 CEA = 22 6 = high    Imaging    4/6/18 MRI pelvis rectal cancer staging    Low rectal cancer, 5 cm in length, circumferential around the rectal wall  (Series 8 images 19 through 33 )  Anteriorly, there are multiple areas where there is transmural tumor extension into the mesorectal fat making this at least a T3c lesion  On Series 8 image 31, tumor also abuts the mesorectal fascia making this CRM positive  At this point it is also   immediately adjacent to the vagina, therefore this may be a T4 lesion  There are 2 high risk perirectal nodes, and 1 low risk perirectal nodes  01/31/2018 ultrasound right upper quadrant    1  Layering gallbladder sludge  No acute cholecystitis or biliary ductal obstruction  2   Hepatomegaly  3   Fluid-filled stomach  1/27/18 CT scan of the abdomen/pelvis    1  Prior Juan-en-Y gastric bypass with distention of the excluded stomach    2   No evidence of acute abnormality in the abdomen or pelvis      Pathology    Case Report   Surgical Pathology Report                         Case: [id number]                                    Authorizing Provider: Tutu Tierney MD      Collected:           09/06/2018 1010               Ordering Location:     [address]      Received:            09/06/2018 [facility]                                      Hospital Operating Room                                                       Pathologist:           Joss Adams MD                                                         Specimens:   A) - Soft Tissue, Other, EPIPLOIC NODULE                                                             B) - Rectum, enbloc rectum, sigmoid, anus, uterus, bso, cervix, posterior vaginal                    wall                                                                                                 C) - Omentum                                                                               Addendum   Additional immunohistochemical stains performed with appropriate controls on a selected portion of serous carcinoma involving omental tissue (block C1) show the following results:  Estrogen receptor: Positive  Progesterone receptor: Negative   Addendum electronically signed by Rhona Lebron MD on 9/27/2018 at  1:40 PM   Final Diagnosis   A  Soft tissue, epiploic nodule, biopsy:  -  Portion of nodular fibroadipose tissue with fat necrosis and dystrophic calcifications  -  Negative for metastatic carcinoma  -  Immunohistochemical stain performed with appropriate control for keratin AE1/3 is negative for epithelial elements, supporting the diagnosis      B  Rectum, sigmoid colon, anus, uterus,cervix, bilateral ovaries and fallopian tubes and posterior vaginal wall; en bloc resection:  -  Invasive moderately differentiated adenocarcinoma of rectum (see first synoptic report)  -  Low grade serous carcinoma involving left ovary, left fallopian tube, uterine serosa and colonic serosa (see second synoptic report)  -  Separate portion of colon (consistent with sigmoid) with diverticulosis coli showing focal serosal implant of low-grade serous carcinoma  -  Uterus showing benign inactive/atrophic endometrium with metaplastic change, benign endometrial polyp and rare cytologic atypia consistent with radiation effect  -  Cervix with mild glandular atypia, favor reactive (see note)  -  Benign uterine myometrium with one intramural benign leiomyoma, negative for atypia or malignancy  -  Benign anal skin and dentate line with mild reactive change, negative for involvement by carcinoma  -  Benign vaginal wall mucosa with thinning, chronic inflammation, and surface erosion most suggestive of prior therapy effect, negative for involvement by carcinoma  -  27 benign pericolorectal lymph nodes identified, negative for metastatic carcinoma      C  Omentum, omentectomy:  -   Low grade serous carcinoma         COLORECTAL CARCINOMA TUMOR STAGING SUMMARY (includes specimen A-C of this case):  1   Specimen identification:     - Specimen:  Rectum, sigmoid colon, anus, uterus,cervix, bilateral ovaries and fallopian tubes and posterior vaginal wall   2  Tumor:     - Tumor site:  Rectum     - Tumor size:  Up to 3 3 cm     - Macroscopic tumor perforation:  Not identified     - Tumor location:  Below the peritoneal reflection        - Macroscopic intactness of mesorectum:  Intact     - Histologic Type:   Adenocarcinoma      - Histologic Grade: Moderately differentiated (G2)     - Microscopic Tumor Extension (ypT3): Tumor invades through muscularis propria into pericolorectal soft tissues     - Tumor budding (only needed in polyps, Stage I or II cancers):  Not identified   3  Margins (specify distance for nearest radial margin on RECTAL tumors only):     - Proximal Margin:  Negative for carcinoma     - Distal Margin:  Negative for carcinoma     - Circumferential (Radial) or Mesenteric Margin:  Negative for carcinoma (0 6cm)     - Other Margins:  Vaginal margin negative for carcinoma   4  Treatment effect:  Present; Residual cancer with evident tumor regression, but more than single cells or rare small groups of cancer cells (partial response, score 2)   5  Lymph-vascular invasion:  Not identified   6  Perineural invasion:  Not identified   7  Tumor deposits: Not identified   8  Type of polyp in which invasive carcinoma arose:  Tubular adenoma   9  Regional lymph nodes (pN0):  27 benign pericolorectal lymph node sample, negative for metastatic carcinoma  10  Additional pathologic findings:  Diverticulosis coli  11  Ancillary Studies:  *  Immunohistochemical stains performed with appropriate controls show the primary rectal tumor to be positive for CK20 and CDX-2 and negative for CK 7, PAX-8, p53 (wild type expression), ER and WT-1, supporting a diagnosis of primary colorectal origin  12  8th Ed AJCC Stage:  at least Stage  IIA - ypT3, pN0, G2         Primary Tumor of the Ovary/Fallopian Tube/Peritoneum, Staging Summary (includes Specimen A to C of this case):  1   Procedure: Hysterectomy, bilateral salpingo-oophorectomy, omentectomy  2  Hysterectomy type:  Radical hysterectomy  3  Specimen integrity:      - Right ovary:  Intact      - Left ovary:  Intact       - Right fallopian tube: Intact      - Left fallopian tube: Intact  4  Tumor site:  Ovarian and fallopian tube surfaces and uterine and colonic serosa  5  Ovarian surface involvement:  Present  6  Fallopian tube surface involvement:  Present  7  Tumor size:  Largest focus measures 1 8 cm (uterine serosa) (see note)  8  Histologic type:  Low-grade serous carcinoma   9  Histologic grade:  Low grade  10  Implant:  Present  11  Other tissue/organ involvement:  Bilateral ovaries and fallopian tubes, uterine serosa, sigmoid colon serosa, and omentum  12  Largest extrapelvic peritoneal focus: 2 5 cm (macroscopically identidfied)    - Specify site:  Omentum  13  Peritoneal/ascetic fluid:  Not performed   14  Pleural fluid:  Not performed  15  Treatment effect:  No known prior therapy  16  Regional lymph nodes:    - No regional uterine lymph nodes submitted or found    - 27 benign pericolorectal lymph nodes sampled, negative for metastatic carcinoma  17  Pathologic stage classification (pTNM, AJCC 8th edition): pT3c, pNX, Low Grade  18  FIGO stage (2015 FIGO cancer report): IIIC  19  Additional pathologic findings:  N/A  20  Ancillary studies:  Immunohistochemical stains performed with appropriate controls show the tumor cells to be positive for CK7, PAX-8, ER, and WT-1 with wild-type expression of p53 and negative for CK20 and CDX-2 supporting the diagnosis     Electronically signed by Ailin Blue MD on 9/25/2018 at 12:54 PM                  Case Report   Surgical Pathology Report                         Case: [id number]                                    Authorizing Provider: Omar Orourke MD      Collected:           03/28/2018 1130               Pathologist:           Theron Mas MD             Received:            03/29/2018 0942               Specimen:    Rectum, Rectal cancer/mass biopsies                                                        Final Diagnosis   A  Rectal mass (biopsy):  - 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      Comment:   - In the context of a rectal mass, repeat biopsy and/or excision may be indicated to exclude a higher grade lesion  Electronically signed by Angelina Peña MD on 3/30/2018 at  2:36 PM         Case Report   Surgical Pathology Report                         Case: [id number]                                    Authorizing Provider: Marylin Hill MD          Collected:           03/09/2018 0902               Ordering Location:     [facility]   Received:            03/12/2018 0904                                      Center                                                                        Pathologist:           Becky Mulligan DO                                                             Specimens:   A) - Colon, polyp splenic flexure/cold snare                                                         B) - Colon, bx distal rectal mass                                                          Final Diagnosis   A  Colon, splenic flexure polyp, biopsy:  - Tubulovillous adenoma, negative for high-grade dysplasia      B  Rectum, mass, biopsy:  - At least high grade dysplasia/intramucosal carcinoma arising in a tubulovillous adenoma, suspicious for invasion       Intradepartmental consultation is in agreement with the above diagnosis (AT)     Interpretation performed at [facility], [place] [id number]  Report faxed to Dr Dorene Avila on 3/13/18 @ 10:55 AM   Electronically signed by Tra Huynh DO on 3/13/2018 at 10:54 AM

## 2019-02-05 PROBLEM — Z98.84 STATUS POST BARIATRIC SURGERY: Status: RESOLVED | Noted: 2018-02-09 | Resolved: 2019-02-05

## 2019-02-05 NOTE — ASSESSMENT & PLAN NOTE
-s/p posterior vaginal resection, ALEXANDRA, BSO in September 2018  -On anastrozole   -Closely monitored by Hem and GYN Onc

## 2019-02-05 NOTE — ASSESSMENT & PLAN NOTE
-At risk for malabsorption of vitamins/minerals secondary to malabsorption and restriction of intake from bariatric surgery  -NOT Currently taking adequate postop bariatric surgery vitamin supplementation: Bariatric Fusion x1 daily, calcium citrate unknown amount with Vitamin D, iron 65mg iron, biotin   -Last set of bariatric labs completed February 2018 and showed mildly elevated vitamin A, low Vitamin D (23 4)  -Next set of bariatric labs ordered for approximately 2 weeks   -Advised the patient that she is not taking the appropriate vitamin supplements but we will discuss changes after labs and ultimately will defer to oncology for supplement regimen safety  -Patient received education about the importance of adhering to a lifelong supplementation regimen to avoid vitamin/mineral deficiencies

## 2019-02-05 NOTE — PROGRESS NOTES
Assessment/Plan:    S/P gastric bypass  -s/p Juan-En-Y Gastric Bypass with Dr Terese High on 07/18/16     -EWL is 61% - she lost 123 7lbs s/p surgery; noted the patient is 1lbs less than her last visit with us 1 year ago  She reports losing about 15lbs s/p chemo/radiation and colorectal surgery but has since regained this weight  Per diet recall she is struggling with meal pattern, drinks excessive diet soda an limited water, and grazes on unhealthy choices  She is ready to get back on track with healthy eating and agrees to keep food records and work on some dietary changes before f/u with RD  Lengthy dietary education today  · Tolerating a regular diet-no, intolerance to certain foods, especially raw vegetables d/t colostomy   · Eating at least 60 grams of protein per day-yes, most days - see diet recall  · Following 30/60 minute rule with liquids-no  · Drinking at least 64 ounces of fluid per day-no  · Drinking carbonated beverages-yes  · Sufficient exercise-no very limited mobility   · Using NSAIDs regularly-no  · Using nicotine-no  · Using alcohol-no      Marginal ulcer  -Was last seen by our practice on 02/09/18 by Dr Eric Thompson after an EGD with her GI doctor Dr Lis Lima and found to have large marginal ulcer   This was related to her taking high dose NSAIDS at the time for hip pain before her BL THR - she no longer uses NSAIDS, no nicotine, or alcohol use  -Resolved with PPI BID and carafate  -Still takes omeprazole 20mg daily and reports some mild epigastric irritation if misses too many days without the PPI   -Advised she slowly wean off PPI if able and use H2 blocker prn    Postgastrectomy malabsorption  -At risk for malabsorption of vitamins/minerals secondary to malabsorption and restriction of intake from bariatric surgery  -NOT Currently taking adequate postop bariatric surgery vitamin supplementation: Bariatric Fusion x1 daily, calcium citrate unknown amount with Vitamin D, iron 65mg iron, biotin -Last set of bariatric labs completed February 2018 and showed mildly elevated vitamin A, low Vitamin D (23 4)  -Next set of bariatric labs ordered for approximately 2 weeks   -Advised the patient that she is not taking the appropriate vitamin supplements but we will discuss changes after labs and ultimately will defer to oncology for supplement regimen safety  -Patient received education about the importance of adhering to a lifelong supplementation regimen to avoid vitamin/mineral deficiencies     Rectal cancer ([facility] [address] )  -s/p complete resection via abdominal perineal resection with end colostomy and posterior vaginectomy September 2018  -Per last oncology note she finished 50% of the first post-op dose and stopped d/t side effects    Ovarian cancer ([facility] [address] )  -s/p posterior vaginal resection, ALEXANDRA, BSO in September 2018  -On anastrozole   -Closely monitored by Hem and GYN Onc       Diagnoses and all orders for this visit:    S/P gastric bypass  -     Copper Level; Future  -     Ferritin; Future  -     Folate; Future  -     Iron; Future  -     Iron Saturation %; Future  -     PTH, intact; Future  -     Vitamin A; Future  -     Vitamin B1, whole blood; Future  -     Vitamin B12; Future  -     Vitamin D 25 hydroxy; Future  -     Zinc; Future  -     Lipid panel; Future    Marginal ulcer  -     omeprazole (PriLOSEC) 20 mg delayed release capsule; Take 1 capsule (20 mg total) by mouth daily    Postgastrectomy malabsorption  -     Copper Level; Future  -     Ferritin; Future  -     Folate; Future  -     Iron; Future  -     Iron Saturation %; Future  -     PTH, intact; Future  -     Vitamin A; Future  -     Vitamin B1, whole blood; Future  -     Vitamin B12; Future  -     Vitamin D 25 hydroxy; Future  -     Zinc; Future  -     Lipid panel; Future    Malignant neoplasm of ovary, unspecified laterality (HCC)    Rectal cancer (HCC)          Subjective:      Patient ID: Gato Klein is a 61 y o  female      -s/p Juan-En-Y Gastric Bypass with Dr Juan Shepard on 07/18/16  Was last seen in the office February 2018 for marginal ulcer - at that visit she had lost 122 5 total pounds s/p surgery  Presents to the office today for routine follow up and wanting to get back on track with healthy eating s/p extensive colorectal and GYN surgery d/t cancer  No longer on chemotherapy  Initial: 353lbs (58 7 BMI)  Current: 229 4lbs  EWL: 61%  John: About 214lbs per patient report  Current BMI is Body mass index is 39 38 kg/m²  Diet Recall:   B - Protein shake with milk and coffee  Snack - apple and 2TBS PB  L - vegetable and chicken or ground turkey soup, crackers  Snacks rest of afternoon - cheese and crackers, tuna fish and crackers, cookies, chips, sometimes shrimp  D - protein shake (whey protein isolate - 20g protein)    Fluids: diet soda - about 12-24oz  Daily, water- 32oz , milk - 2cups Fairlife milk daily     The following portions of the patient's history were reviewed and updated as appropriate: allergies, current medications, past family history, past medical history, past social history, past surgical history and problem list     Review of Systems   Constitutional: Positive for unexpected weight change (reports about 15lbs weight gain in last few months)  Negative for chills and fever  HENT: Negative for trouble swallowing  Respiratory: Negative for cough and shortness of breath  Cardiovascular: Positive for leg swelling (if she drinks soda)  Negative for chest pain and palpitations  Gastrointestinal: Positive for nausea (if she tries to eat eggs or salad or spinach)  Negative for abdominal pain, constipation, diarrhea and vomiting  Ostomy bag drains loose stool   Musculoskeletal: Positive for gait problem (walks with walker)  Neurological: Negative for dizziness     Psychiatric/Behavioral:        Denies anxiety and depression         Objective:      /80 (BP Location: Left arm, Patient Position: Sitting, Cuff Size: Large)   Pulse 79   Temp 98 8 °F (37 1 °C) (Tympanic)   Resp 16   Ht 5' 4" (1 626 m)   Wt 104 kg (229 lb 6 4 oz) Comment: pt was not able to take shoes off  BMI 39 38 kg/m²          Physical Exam   Constitutional: She is oriented to person, place, and time  She appears well-developed and well-nourished  No distress  HENT:   Head: Normocephalic and atraumatic  Eyes: Pupils are equal, round, and reactive to light  No scleral icterus  Cardiovascular: Normal rate and regular rhythm  Murmur heard  Pulmonary/Chest: Effort normal and breath sounds normal  No respiratory distress  Abdominal: Soft  Bowel sounds are normal  She exhibits no distension  There is no tenderness  No incisional hernias appreciated  Ostomy back intact and draining with scant soft light brown stool   Musculoskeletal: She exhibits edema (BL LE trace)  Antalgic gait, walks with walker assistance     Neurological: She is alert and oriented to person, place, and time  Skin: Skin is warm and dry  Tenting on back of hands   Psychiatric: She has a normal mood and affect  Nursing note and vitals reviewed  BARRIERS: limited mobility, walks with walker, colostomy bag and limited tolerance of high fiber foods    GOALS:   · Continued/Maintain healthy weight loss with good nutrition intakes  · Adequate hydration with at least 64oz  fluid intake  · Normal vitamin and mineral levels  · Exercise as tolerated  · Stop taking biotin  · Try slowly weaning omeprazole and alternating omeprazole with over the counter Zantac  · Decrease and eliminate when able all soda and increase water intake  · Keep food records    · Follow-up in 3 months  We kindly ask that your arrive 15 minutes before your scheduled appointment time with your provider to allow our staff to room you, get your vital signs and update your chart  · Follow diet as discussed  · Get lab work done in the next 2 weeks  You have been given a lab slip today  Please call the office if you need a replacement  It is recommended to check with your insurance BEFORE getting labs done to make sure they are covered by your policy  Also, please check with your PCP and other providers before getting labs to avoid duplicate labs  Make sure to HOLD any multivitamins that may contain biotin and any biotin supplements FOR 5 DAYS before any labs since it can affect the results  · Follow vitamin and mineral recommendations as reviewed with you  · Call our office if you have any problems with abdominal pain especially associated with fever, chills, nausea, vomiting or any other concerns  · All  Post-bariatric surgery patients should be aware that very small quantities of any alcohol can cause impairment and it is very possible not to feel the effect  The effect can be in the system for several hours  It is also a stomach irritant  · It is advised to AVOID alcohol, Nonsteroidal antiinflammatory drugs (NSAIDS) and nicotine of all forms   Any of these can cause stomach irritation/pain

## 2019-02-05 NOTE — ASSESSMENT & PLAN NOTE
-Was last seen by our practice on 02/09/18 by Dr Nerissa Ornelas after an EGD with her GI doctor Dr Viridiana Whatley and found to have large marginal ulcer   This was related to her taking high dose NSAIDS at the time for hip pain before her BL THR - she no longer uses NSAIDS, no nicotine, or alcohol use  -Resolved with PPI BID and carafate  -Still takes omeprazole 20mg daily and reports some mild epigastric irritation if misses too many days without the PPI   -Advised she slowly wean off PPI if able and use H2 blocker prn

## 2019-02-05 NOTE — ASSESSMENT & PLAN NOTE
-s/p Juan-En-Y Gastric Bypass with Dr Jodee Larson on 07/18/16     -EWL is 61% - she lost 123 7lbs s/p surgery; noted the patient is 1lbs less than her last visit with us 1 year ago  She reports losing about 15lbs s/p chemo/radiation and colorectal surgery but has since regained this weight  Per diet recall she is struggling with meal pattern, drinks excessive diet soda an limited water, and grazes on unhealthy choices  She is ready to get back on track with healthy eating and agrees to keep food records and work on some dietary changes before f/u with RD  Lengthy dietary education today       · Tolerating a regular diet-no, intolerance to certain foods, especially raw vegetables d/t colostomy   · Eating at least 60 grams of protein per day-yes, most days - see diet recall  · Following 30/60 minute rule with liquids-no  · Drinking at least 64 ounces of fluid per day-no  · Drinking carbonated beverages-yes  · Sufficient exercise-no very limited mobility   · Using NSAIDs regularly-no  · Using nicotine-no  · Using alcohol-no

## 2019-02-05 NOTE — ASSESSMENT & PLAN NOTE
-s/p complete resection via abdominal perineal resection with end colostomy and posterior vaginectomy September 2018  -Per last oncology note she finished 50% of the first post-op dose and stopped d/t side effects

## 2019-02-06 ENCOUNTER — OFFICE VISIT (OUTPATIENT)
Dept: BARIATRICS | Facility: CLINIC | Age: 64
End: 2019-02-06
Payer: MEDICARE

## 2019-02-06 VITALS
HEIGHT: 64 IN | BODY MASS INDEX: 39.16 KG/M2 | WEIGHT: 229.4 LBS | HEART RATE: 79 BPM | DIASTOLIC BLOOD PRESSURE: 80 MMHG | TEMPERATURE: 98.8 F | SYSTOLIC BLOOD PRESSURE: 120 MMHG | RESPIRATION RATE: 16 BRPM

## 2019-02-06 DIAGNOSIS — Z98.84 S/P GASTRIC BYPASS: Primary | ICD-10-CM

## 2019-02-06 DIAGNOSIS — K28.9 MARGINAL ULCER: ICD-10-CM

## 2019-02-06 DIAGNOSIS — Z90.3 POSTGASTRECTOMY MALABSORPTION: ICD-10-CM

## 2019-02-06 DIAGNOSIS — C56.9 MALIGNANT NEOPLASM OF OVARY, UNSPECIFIED LATERALITY (HCC): ICD-10-CM

## 2019-02-06 DIAGNOSIS — C20 RECTAL CANCER (HCC): ICD-10-CM

## 2019-02-06 DIAGNOSIS — K91.2 POSTGASTRECTOMY MALABSORPTION: ICD-10-CM

## 2019-02-06 PROCEDURE — 99214 OFFICE O/P EST MOD 30 MIN: CPT | Performed by: PHYSICIAN ASSISTANT

## 2019-02-06 RX ORDER — OMEPRAZOLE 20 MG/1
20 CAPSULE, DELAYED RELEASE ORAL DAILY
Qty: 30 CAPSULE | Refills: 3 | Status: SHIPPED | OUTPATIENT
Start: 2019-02-06 | End: 2019-07-01 | Stop reason: SDUPTHER

## 2019-02-06 NOTE — PATIENT INSTRUCTIONS
GOALS:   · Continued/Maintain healthy weight loss with good nutrition intakes  · Adequate hydration with at least 64oz  fluid intake  · Normal vitamin and mineral levels  · Exercise as tolerated  · Stop taking biotin  · Try slowly weaning omeprazole and alternating omeprazole with over the counter Zantac  · Decrease and eliminate when able all soda and increase water intake    · Follow-up in 3 months  We kindly ask that your arrive 15 minutes before your scheduled appointment time with your provider to allow our staff to room you, get your vital signs and update your chart  · Follow diet as discussed  · Get lab work done in the next 2 weeks  You have been given a lab slip today  Please call the office if you need a replacement  It is recommended to check with your insurance BEFORE getting labs done to make sure they are covered by your policy  Also, please check with your PCP and other providers before getting labs to avoid duplicate labs  Make sure to HOLD any multivitamins that may contain biotin and any biotin supplements FOR 5 DAYS before any labs since it can affect the results  · Follow vitamin and mineral recommendations as reviewed with you  · Call our office if you have any problems with abdominal pain especially associated with fever, chills, nausea, vomiting or any other concerns  · All  Post-bariatric surgery patients should be aware that very small quantities of any alcohol can cause impairment and it is very possible not to feel the effect  The effect can be in the system for several hours  It is also a stomach irritant  · It is advised to AVOID alcohol, Nonsteroidal antiinflammatory drugs (NSAIDS) and nicotine of all forms   Any of these can cause stomach irritation/pain  Pt's BP is 140/84 HR 73 on recheck. Will continue to monitor closely.

## 2019-02-08 ENCOUNTER — APPOINTMENT (OUTPATIENT)
Dept: LAB | Facility: CLINIC | Age: 64
End: 2019-02-08
Payer: MEDICARE

## 2019-02-08 DIAGNOSIS — C20 RECTAL CANCER (HCC): ICD-10-CM

## 2019-02-08 DIAGNOSIS — Z90.3 POSTGASTRECTOMY MALABSORPTION: ICD-10-CM

## 2019-02-08 DIAGNOSIS — K91.2 POSTGASTRECTOMY MALABSORPTION: ICD-10-CM

## 2019-02-08 DIAGNOSIS — Z98.84 S/P GASTRIC BYPASS: ICD-10-CM

## 2019-02-08 LAB
25(OH)D3 SERPL-MCNC: 31 NG/ML (ref 30–100)
CHOLEST SERPL-MCNC: 198 MG/DL (ref 50–200)
FERRITIN SERPL-MCNC: 13 NG/ML (ref 8–388)
FOLATE SERPL-MCNC: >20 NG/ML (ref 3.1–17.5)
HDLC SERPL-MCNC: 54 MG/DL (ref 40–60)
IRON SATN MFR SERPL: 8 %
IRON SERPL-MCNC: 36 UG/DL (ref 50–170)
LDLC SERPL CALC-MCNC: 117 MG/DL (ref 0–100)
NONHDLC SERPL-MCNC: 144 MG/DL
PTH-INTACT SERPL-MCNC: 62.1 PG/ML (ref 18.4–80.1)
TIBC SERPL-MCNC: 438 UG/DL (ref 250–450)
TRIGL SERPL-MCNC: 134 MG/DL
VIT B12 SERPL-MCNC: 390 PG/ML (ref 100–900)

## 2019-02-08 PROCEDURE — 82746 ASSAY OF FOLIC ACID SERUM: CPT

## 2019-02-08 PROCEDURE — 83540 ASSAY OF IRON: CPT

## 2019-02-08 PROCEDURE — 83550 IRON BINDING TEST: CPT

## 2019-02-08 PROCEDURE — 82607 VITAMIN B-12: CPT

## 2019-02-08 PROCEDURE — 82306 VITAMIN D 25 HYDROXY: CPT

## 2019-02-08 PROCEDURE — 83970 ASSAY OF PARATHORMONE: CPT

## 2019-02-08 PROCEDURE — 82525 ASSAY OF COPPER: CPT

## 2019-02-08 PROCEDURE — 80061 LIPID PANEL: CPT

## 2019-02-08 PROCEDURE — 84425 ASSAY OF VITAMIN B-1: CPT

## 2019-02-08 PROCEDURE — 84630 ASSAY OF ZINC: CPT

## 2019-02-08 PROCEDURE — 84590 ASSAY OF VITAMIN A: CPT

## 2019-02-08 PROCEDURE — 82728 ASSAY OF FERRITIN: CPT

## 2019-02-12 LAB
COPPER SERPL-MCNC: 109 UG/DL (ref 72–166)
ZINC SERPL-MCNC: 66 UG/DL (ref 56–134)

## 2019-02-13 LAB
VIT A SERPL-MCNC: 55.1 UG/DL (ref 22–69.5)
VIT B1 BLD-SCNC: 136.7 NMOL/L (ref 66.5–200)

## 2019-02-19 ENCOUNTER — OFFICE VISIT (OUTPATIENT)
Dept: BARIATRICS | Facility: CLINIC | Age: 64
End: 2019-02-19

## 2019-02-19 VITALS — BODY MASS INDEX: 39.27 KG/M2 | WEIGHT: 230 LBS | HEIGHT: 64 IN

## 2019-02-19 DIAGNOSIS — E66.9 OBESITY, CLASS II, BMI 35-39.9: Primary | ICD-10-CM

## 2019-02-19 PROCEDURE — RECHECK

## 2019-02-19 NOTE — PROGRESS NOTES
Bariatric Follow Up Nutrition Note      Type of surgery  Gastric bypass: laparoscopic  Surgery Date: 16  2 5 years post-op  Surgeon: Dr Phylicia Flowers  61 y o   female  Height 5' 4" (1 626 m), weight 104 kg (230 lb), not currently breastfeeding  Body mass index is 39 48 kg/m²  Weight on Day of Weight Loss Surgery: 309 4  Initial weight:  353lbs  Weight in (lb) to have BMI = 25: 150lbs  Pre-Op Excess Wt: 203lbs from initial  Post-Op Wt Loss: 123#/ 60 5% EBWL from initial weight    Review of History and Medications   Past Medical History:   Diagnosis Date    Anemia     Arthritis     Cancer ([facility] )     rectal    Chronic lower back pain     Chronic pain disorder     back pain    Diabetes mellitus ([place] [address] )     GERD (gastroesophageal reflux disease)     History of MRSA infection     Morbid obesity ([facility] )     Spinal stenosis     Systolic murmur     Unsteady gait     uses walker    Wears dentures     Wears glasses      Past Surgical History:   Procedure Laterality Date    ABDOMINAL PERINEAL BOWEL RESECTION W/ ILEOANAL POUCH N/A 2018    Procedure: LAPAROSCOPIC HAND ASSIST ABDOMINOPERINEAL RESECTION,  POSTERIOR VAGINECTOMY, OMENTECTOMY;  Surgeon: Alina Lopez MD;  [facility];  Service: Colorectal    ABDOMINAL SURGERY      abscess removed from abdomen and right thigh, a hole in thigh closed by plastic surgeon    ABSCESS DRAINAGE      abd, (R) leg, (L) leg     SECTION       SECTION      CYSTOSCOPY N/A 2018    Procedure: CYSTOSCOPY;  Surgeon: Hermelindo Roy MD;  [facility];  Service: Gynecology Oncology    ESOPHAGOGASTRODUODENOSCOPY N/A 2016    Procedure: ESOPHAGOGASTRODUODENOSCOPY (EGD); Surgeon: Kati Hurtado MD;  [facility];  Service:     ESOPHAGOGASTRODUODENOSCOPY N/A 3/30/2016    Procedure: ESOPHAGOGASTRODUODENOSCOPY (EGD);   Surgeon: Kati Hurtado MD;  [facility]; Service:     EYE SURGERY      laser eye surgery    HYSTERECTOMY N/A 9/6/2018    Procedure: RADICAL HYSTERECTOMY TOTAL ABDOMINAL (ALEXANDRA)  BSO;  Surgeon: Lele Shaver MD;  [facility];  Service: Gynecology Oncology    JOINT REPLACEMENT Left 2017    hip    JOINT REPLACEMENT Right 2017    hip    [place] COLONOSCOPY FLX DX W/COLLJ SPEC WHEN PFRMD N/A 3/9/2018    Procedure: COLONOSCOPY;  Surgeon: Amber Ray MD;  Location: [facility]; Service: Gastroenterology    [place] COLONOSCOPY FLX DX W/COLLJ [facility] WHEN PFRMD N/A 9/5/2018    Procedure: COLONOSCOPY;  Surgeon: Garo Figueroa MD;  [facility]; Service: Colorectal    [place] ESOPHAGOGASTRODUODENOSCOPY TRANSORAL DIAGNOSTIC N/A 2/2/2018    Procedure: ESOPHAGOGASTRODUODENOSCOPY (EGD); Surgeon: Amber Ray MD;  Location: [facility] GI [facility];   Service: Gastroenterology    [place] LAP GASTRIC BYPASS/SOLEDAD-EN-Y N/A 7/18/2016    Procedure: BYPASS GASTRIC  SOLEDAD-EN-Y LAPAROSCOPIC;  Surgeon: Indiana Payton MD;  [facility];  Service: Bariatrics    [place] LAP, SURG COLOSTOMY N/A 9/6/2018    Procedure: PERMANENT END COLOSTOMY;  Surgeon: Garo Figueroa MD;  [facility];  Service: Colorectal    [place] LAP, SURG PROCTECTOMY W COLOSTOMY N/A 9/6/2018    Procedure: PROCTECTOMY;  Surgeon: Garo Figueroa MD;  [facility];  Service: Colorectal    TUBAL LIGATION       Social History     Socioeconomic History    Marital status: Single     Spouse name: Not on file    Number of children: 2    Years of education: Not on file    Highest education level: Not on file   Occupational History    Not on file   Social Needs    Financial resource strain: Not on file    Food insecurity:     Worry: Not on file     Inability: Not on file    Transportation needs:     Medical: Not on file     Non-medical: Not on file   Tobacco Use    Smoking status: Former Smoker     Packs/day: 1 00     Years: 25 00     Pack years: 25 00     Last attempt to quit: 3/30/2006     Years since quittin 9    Smokeless tobacco: Never Used   Substance and Sexual Activity    Alcohol use: No    Drug use: Yes     Types: Oxycodone     Comment: Percocet for lower back pain prn    Sexual activity: Not on file   Lifestyle    Physical activity:     Days per week: Not on file     Minutes per session: Not on file    Stress: Not on file   Relationships    Social connections:     Talks on phone: Not on file     Gets together: Not on file     Attends [employer] service: Not on file     Active member of club or organization: Not on file     Attends meetings of clubs or organizations: Not on file     Relationship status: Not on file    Intimate partner violence:     Fear of current or ex partner: Not on file     Emotionally abused: Not on file     Physically abused: Not on file     Forced sexual activity: Not on file   Other Topics Concern    Not on file   Social History Narrative    Not on file       Current Outpatient Medications:     anastrozole (ARIMIDEX) 1 mg tablet, Take 1 tablet (1 mg total) by mouth daily, Disp: 90 tablet, Rfl: 1    Calcium-Vitamin D 500-125 MG-UNIT TABS, Take 1 tablet by mouth 2 (two) times a day  , Disp: , Rfl:     capecitabine (XELODA) 500 MG tablet, Take 3 tablets (total dose of 1500mg) twice a day (Patient not taking: Reported on 2019 ), Disp: 180 tablet, Rfl: 2    ferrous sulfate 324 (65 Fe) mg, Take 1 tablet (324 mg total) by mouth daily before breakfast, Disp: 90 tablet, Rfl: 0    Multiple Vitamins-Minerals (BARIATRIC FUSION) CHEW, Chew 1 tablet daily  , Disp: , Rfl:     omeprazole (PriLOSEC) 20 mg delayed release capsule, Take 1 capsule (20 mg total) by mouth daily, Disp: 30 capsule, Rfl: 3    ondansetron (ZOFRAN-ODT) 8 mg disintegrating tablet, Take 1 tablet (8 mg total) by mouth every 8 (eight) hours as needed for nausea or vomiting, Disp: 30 tablet, Rfl: 0    oxyCODONE (ROXICODONE) 10 MG TABS, Take 10 mg by mouth every 6 (six) hours as needed  , Disp: , Rfl:     Food Intake and Lifestyle Assessment   Food Intake Assessment completed via 24 hour recall  Breakfast: 8am-Decaf coffee with 6oz fairlife milk and 1 scoop protein powder  Snack: -   Lunch: grilled cheese (2 slices of cheese) and 2 slices of thin bread with butter to grill it  Snack: apple and 2tbsp PB, pretzels (10 mini), garlic ring bologna 2 slices with 10 crackers  Dinner: 8 pieces of penne and 2 mini meatballs  Snack: varies  Admits to a lot of mindless eating, especially of sweets  Beverage intake: water, skim milk and decaf coffee/tea  Diet texture/stage: regular  Protein supplement: [employer] whey protein isolate 1 scoop = 25gm protein  Estimated protein intake per day: 55gm  Estimated fluid intake per day: 20oz decaf coffee, 16oz water  Meals eaten away from home: not often  Typical meal pattern: 2-3 meals per day and grazes on snacks through the day  Eating Behaviors: Frequent snacking/ grazing, Mindless eating and Craves sweet foods, not meeting protein needs    Food allergies or intolerances: none  Cultural or Mosque considerations: none    Physical Assessment  Nutrition Related Findings  Pt now with ostomy therefore has some food intolerances related to that:  Raw veggies, broccoli cooked any way, high fat foods    Physical Activity  Types of exercise: None  Not assessed this visit  Current physical limitations: walks with a walking when walking longer periods, but typically walks around the house without it  Psychosocial Assessment   Support systems: children  Socioeconomic factors: none    Nutrition Diagnosis  Diagnosis: Overweight / Obesity (NC-3 3)  Related to: Physical inactivity and Excessive energy intake  As Evidenced by: BMI >25     Interventions and Teaching   Patient educated on post-op nutrition guidelines  Patient educated and handouts provided    Adequate hydration  Exercise  Protein supplements  Possible problems with poor eating habits  Intuitive eating  Techniques for self monitoring and keeping daily food journal  Vitamin / Mineral supplementation of Multivitamin with minerals, Calcium and Iron:  -Pt is taking 1 Bariatric Fusion chewable multi per day  Advised her the recommendation is to take 4 per day, but would like her to review it with her oncologist before increasing     -Taking 2 chewable Bariatric Advantage calcium citrate per day  Provided her with samples of the chewy bites which are more palatable  Advised to take 1 3x/day for a total of 1500mg calcium per day  -Pt taking 324mg Ferrous Sulfate    Reviewed blood work with pt and advised her that her Albumin was 3 0, but the goal is between 3 5-5  Stressed the importance of protein  Also her iron was low  With the increase in her multi to 4 chewables per day she would get 45mg of iron and she is also taking 324mg of ferrous sulfate  Advised, again, to review with oncologist before making the change  Education provided to: patient    Barriers to learning: No barriers identified    Readiness to change: preparation    Comprehension: demonstrated understanding     Expected Compliance: good    Evaluation / Monitoring  Eating pattern as discussed Tolerance of nutrition prescription Body weight Physical activity     Pt is 2 5 years s/p RYGB with Dr Sherren Kinds on 07/18/16  Was last seen in the [facility] office February 2018 for marginal ulcer - at that visit she had lost 122 5 total pounds s/p surgery  Pt in 2/6/2019 to [facility] office for routine follow up and wanting to get back on track with healthy eating s/p extensive colorectal and GYN surgery d/t cancer  No longer on chemotherapy  Pt now with a colostomy  Pt admits that she "ate whatever she wanted during chemo"  Pt is still in the habit of snacking, particularly on sweets, is not meeting protein needs and is no longer keeping a food log  Since pt's Albumin was low focused on meeting 80-90gm of protein per day    She does have a scoop of protien powder with 6oz of fairlife milk in her decaf coffee every morning which starts her out with 33gm of protein per day  Did some menu planning suggesting to have a little something with protein about every 3hrs   (8am, 11am, 2pm, 5pm, evening snack)  Provided with a handout of suggested protein snacks and a list of protein foods to refer to    Pt also plans on going back to keeping a food log             Goals  Food journal  Eat 3 meals per day and 1-2 protein based snacks per day to help her meet 80-90gm of protein  Eliminate mindless snacking and adhere to suggested meal schedule     Time Spent:   1 Hour

## 2019-02-21 ENCOUNTER — TELEPHONE (OUTPATIENT)
Dept: BARIATRICS | Facility: CLINIC | Age: 64
End: 2019-02-21

## 2019-02-21 DIAGNOSIS — E60 LOW ZINC LEVEL: ICD-10-CM

## 2019-02-21 DIAGNOSIS — E61.1 LOW IRON: ICD-10-CM

## 2019-02-21 DIAGNOSIS — K91.2 POSTSURGICAL MALABSORPTION: ICD-10-CM

## 2019-02-21 DIAGNOSIS — E53.8 VITAMIN B12 DEFICIENCY: Primary | ICD-10-CM

## 2019-02-21 DIAGNOSIS — R79.0 LOW IRON STORES: ICD-10-CM

## 2019-02-21 NOTE — TELEPHONE ENCOUNTER
Spoke with the patient and she will confirm any supplement changes with her oncologist, especially Zinc and iron  See lab letter and RD notes for full detail

## 2019-02-25 ENCOUNTER — RADIATION ONCOLOGY FOLLOW-UP (OUTPATIENT)
Dept: RADIATION ONCOLOGY | Facility: HOSPITAL | Age: 64
End: 2019-02-25
Attending: RADIOLOGY
Payer: MEDICARE

## 2019-02-25 ENCOUNTER — CLINICAL SUPPORT (OUTPATIENT)
Dept: RADIATION ONCOLOGY | Facility: HOSPITAL | Age: 64
End: 2019-02-25
Attending: RADIOLOGY

## 2019-02-25 VITALS
RESPIRATION RATE: 16 BRPM | OXYGEN SATURATION: 98 % | SYSTOLIC BLOOD PRESSURE: 110 MMHG | HEIGHT: 64 IN | WEIGHT: 232.6 LBS | DIASTOLIC BLOOD PRESSURE: 60 MMHG | BODY MASS INDEX: 39.71 KG/M2 | HEART RATE: 69 BPM | TEMPERATURE: 98.3 F

## 2019-02-25 DIAGNOSIS — C20 RECTAL CANCER (HCC): Primary | ICD-10-CM

## 2019-02-25 PROCEDURE — 99215 OFFICE O/P EST HI 40 MIN: CPT | Performed by: RADIOLOGY

## 2019-02-25 NOTE — PROGRESS NOTES
[facility]  1955   Ms Vanessa Cristobal is a 61 y o  female       Chief Complaint   Patient presents with    Follow-up    Rectal Cancer       Cancer Staging  Ovarian cancer ([facility] [address] )  Staging form: Ovary, Fallopian Tube, Primary Peritoneal, AJCC 8th Edition  - Clinical stage from 9/6/2018: FIGO Stage IIIC, calculated as Stage Unknown (cT3c(m), cNX, cM0) - Signed by Ilan Ramesh MD on 10/3/2018    Rectal cancer [facility])  Staging form: Colon and Rectum, AJCC 8th Edition  - Clinical: Stage IIIB (cT3, cN1, cM0) - Signed by Sang Gary MD on 4/11/2018      Oncology History    Last seen on 8/3/18 post pelvic/rectal radiation completed on 6/20/18 9/6/18  Proctectomy, permanent end colostomy, AP resection, posterior vaginectomy, omentectomy, radical hysterectomy, BSO, cyctoscopy  Tumor site:  Rectum     - Tumor size:  Up to 3 3 cm     - Histologic Type:   Adenocarcinoma      - Histologic Grade: Moderately differentiated (G2)     - Microscopic Tumor Extension (ypT3): Tumor invades through muscularis propria into pericolorectal soft tissues  Margins (specify distance for nearest radial margin on RECTAL tumors only):     - Proximal Margin:  Negative for carcinoma     - Distal Margin:  Negative for carcinoma     - Circumferential (Radial) or Mesenteric Margin:  Negative for carcinoma (0 6cm)     - Other Margins:  Vaginal margin negative for carcinoma  Treatment effect:  Present;   Residual cancer with evident tumor regression, but more than single cells or rare small groups of cancer cells (partial response, score 2)    AND  ALEXANDRA, BSO  Tumor site:  Ovarian and fallopian tube surfaces and uterine and colonic serosa  Ovarian surface involvement:  Present  Fallopian tube surface involvement:  Present  Tumor size:  Largest focus measures 1 8 cm (uterine serosa) (see note)   Histologic type:  Low-grade serous carcinoma   Implant:  Present  Other tissue/organ involvement:  Bilateral ovaries and fallopian tubes, uterine serosa, sigmoid colon serosa, and omentum  Largest extrapelvic peritoneal focus: 2 5 cm (macroscopically identidfied)    - Specify site:  Omentum    10/3/19 Dr Sveta Johnson  Start anastrozole daily    12/5/18 Dr Shara Guadarrama  -Wound with good healthy/granulation, nearly healed     PLAN:  -Discussed with patient and by phone with Dr Ju Morales, as she in at end of window of timing we both agreed reasonable to attempt chemo start as this an open sinus tract at this point  12/12/18 Dr Ju Morales  CapeOX Regimen  Oxaliplatin 130 mg/m2 IV (75% on first cycle)  Xeloda 1000 mg/m2 p o  twice a day  Cycle = 3 weeks    12/17/18 Chemotherapy initiated    1/2/19 Dr Samina Chaparro   Patient completed approximately 50% of the 1st post operative dose of CapeOX - delays from chemotherapy related diarrhea, nausea, chest pains  Rectal wound has also not healed  Mrs Lambert Gains does not wish to continue with chemotherapy  1/16/19 Jackelyn Whitney follow up  PLAN:  -To re see wound care for possible VAC therapy to sinus tract  -Continue oncology followup for surveillance labs/imaging  -MUST see her cardiologist in followup to valvular disease especially with chest pains on chemotherapy requiring discontinuation and dizziness she complains of   -3months followup also needs CT scheduled  3/14/19  Cardiology FU    4/10/19  Indy Sanders (GYN ONC)          Rectal cancer [facility])    3/9/2018 Initial Diagnosis     Rectal cancer ([facility] [address] )         3/9/2018 Biopsy     Rectum, mass, biopsy:  - At least high grade dysplasia/intramucosal carcinoma arising in a tubulovillous adenoma, suspicious for    invasion  3/28/2018 Biopsy     Final Diagnosis   A  Rectal mass (biopsy):  - 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               5/10/2018 - 6/20/2018 Radiation      a dose of 4500 cGy in 25 fractions to the pelvis  An additional 540 cGy in 3 fractions was delivered to the primary rectal tumor with margin      Dr Deni Miller 5/2018 - 6/2018 Chemotherapy     Capecitabine 825 mg/m2 by mouth twice daily Monday through Friday (off the weekends) x 5-6 weeks with RT (dose for this patient is 1650 mg twice a day)         9/6/2018 Surgery     Proctectomy, permanent end colostomy, AP resection, posterior vaginectomy, omentectomy, radical hysterectomy, BSO, cyctoscopy  Tumor site:  Rectum     - Tumor size:  Up to 3 3 cm     - Histologic Type:   Adenocarcinoma      - Histologic Grade: Moderately differentiated (G2)     - Microscopic Tumor Extension (ypT3): Tumor invades through muscularis propria into pericolorectal soft tissues  Margins (specify distance for nearest radial margin on RECTAL tumors only):     - Proximal Margin:  Negative for carcinoma     - Distal Margin:  Negative for carcinoma     - Circumferential (Radial) or Mesenteric Margin:  Negative for carcinoma (0 6cm)     - Other Margins:  Vaginal margin negative for carcinoma  Treatment effect:  Present; Residual cancer with evident tumor regression, but more than single cells or rare small groups of cancer cells (partial response, score 2)         9/6/2018 -  Cancer Staged      Stage  IIA - ypT3, pN0, G2          12/17/2018 - 12/2018 Chemotherapy     PLAN:    CapeOX Regimen x 6 months  Oxaliplatin 130 mg/m2 IV (75% on first cycle)  Xeloda 1000 mg/m2 p o  twice a day  Cycle = 3 weeks    1/2/19 Patient completed approximately 50% of the 1st post operative dose of CapeOX - delays from chemotherapy related diarrhea, nausea, chest pains  Rectal wound has also not healed  Mrs Ashwin Vivas does not wish to continue with chemotherapy  Ovarian cancer ([facility] [address] )    9/6/2018 Initial Diagnosis     Ovarian cancer ([facility] [address] ): Incidental diagnosis at the time of laparotomy for abdominal perineal resection/proctectomy due to rectal cancer  Stage IIIC  No gross residual disease noted at the time of surgery  Low-grade serous histologic type           9/6/2018 Surgery     ALEXANDRA, BSO  Tumor site: Ovarian and fallopian tube surfaces and uterine and colonic serosa  Ovarian surface involvement:  Present  Fallopian tube surface involvement:  Present  Tumor size:  Largest focus measures 1 8 cm (uterine serosa) (see note)   Histologic type:  Low-grade serous carcinoma   Implant:  Present  Other tissue/organ involvement:  Bilateral ovaries and fallopian tubes, uterine serosa, sigmoid colon serosa, and omentum  Largest extrapelvic peritoneal focus: 2 5 cm (macroscopically identidfied)    - Specify site:  Omentum         9/6/2018 -  Cancer Staged     Pathologic stage classification (pTNM, AJCC 8th edition): pT3c, pNX, Low Grade  18  FIGO stage (2015 FIGO cancer report): IIIC          Hormone Therapy     anastrozole            Clinical Trial: no    Interval History:  Last seen on 8/3/18 post pelvic/rectal radiation completed on 6/20/18 9/6/18  Proctectomy, permanent end colostomy, AP resection, posterior vaginectomy, omentectomy, radical hysterectomy, BSO, cyctoscopy  Tumor site:  Rectum     - Tumor size:  Up to 3 3 cm     - Histologic Type:   Adenocarcinoma      - Histologic Grade: Moderately differentiated (G2)     - Microscopic Tumor Extension (ypT3): Tumor invades through muscularis propria into pericolorectal soft tissues  Margins (specify distance for nearest radial margin on RECTAL tumors only):     - Proximal Margin:  Negative for carcinoma     - Distal Margin:  Negative for carcinoma     - Circumferential (Radial) or Mesenteric Margin:  Negative for carcinoma (0 6cm)     - Other Margins:  Vaginal margin negative for carcinoma  Treatment effect:  Present;   Residual cancer with evident tumor regression, but more than single cells or rare small groups of cancer cells (partial response, score 2)    AND  ALEXANDRA, BSO  Tumor site:  Ovarian and fallopian tube surfaces and uterine and colonic serosa  Ovarian surface involvement:  Present  Fallopian tube surface involvement:  Present  Tumor size:  Largest focus measures 1 8 cm (uterine serosa) (see note)   Histologic type:  Low-grade serous carcinoma   Implant:  Present  Other tissue/organ involvement:  Bilateral ovaries and fallopian tubes, uterine serosa, sigmoid colon serosa, and omentum  Largest extrapelvic peritoneal focus: 2 5 cm (macroscopically identidfied)    - Specify site:  Omentum    10/3/19 Dr Santana Griffin  Start anastrozole daily    12/5/18 Dr Jimmy Gross  -Wound with good healthy/granulation, nearly healed     PLAN:  -Discussed with patient and by phone with Dr Braden Keyes, as she in at end of window of timing we both agreed reasonable to attempt chemo start as this an open sinus tract at this point  12/12/18 Dr Braden Keyes  CapeOX Regimen  Oxaliplatin 130 mg/m2 IV (75% on first cycle)  Xeloda 1000 mg/m2 p o  twice a day  Cycle = 3 weeks    12/17/18 Chemotherapy initiated    1/2/19 Dr Seng Benoit   Patient completed approximately 50% of the 1st post operative dose of CapeOX - delays from chemotherapy related diarrhea, nausea, chest pains  Rectal wound has also not healed  Mrs Nel Alejandre does not wish to continue with chemotherapy  1/16/19 Elaine Parham follow up  PLAN:  -To re see wound care for possible VAC therapy to sinus tract  -Continue oncology followup for surveillance labs/imaging  -MUST see her cardiologist in followup to valvular disease especially with chest pains on chemotherapy requiring discontinuation and dizziness she complains of   -3months followup also needs CT scheduled  3/14/19  Cardiology FU    4/10/19  Rena Mello (GYN ONC)    Screening  Tobacco  Current tobacco user: no  If yes, brief counseling provided: NA    Hypertension  Hypertension screening performed: yes  Normotensive:  no  If no, referred to PCP: no    Depression Screening  Screened for depression using PHQ-2: yes    Screened for depression using PHQ-9:  no  Screening positive or negative:  negative  If score >4, was any of the following actions taken?    Additional evaluation for depression, suicide risk assesment, referral to PCP or psychiatry, medication started:  [id number] [facility]65 [facility]  Advanced Care Planning Discussed:  no  Patient named surrogate decision maker or care plan in chart: yes    Health Maintenance   Topic Date Due    Hepatitis C Screening  1955    Medicare Annual Wellness Visit (AWV)  1955    BMI: Followup Plan  09/17/1973    HEPATITIS B VACCINES (1 of 3 - Risk 3-dose series) 09/17/1974    DTaP,Tdap,and Td Vaccines (1 - Tdap) 09/17/1976    Pneumococcal PPSV23 Highest Risk Adult (2 of 3 - PCV13) 08/12/2014    Diabetic Foot Exam  07/14/2017    URINE MICROALBUMIN  08/26/2017    DM Eye Exam  09/28/2017    INFLUENZA VACCINE  07/01/2018    HEMOGLOBIN A1C  02/28/2019    Depression Screening PHQ  08/03/2019    CRC Screening: Colonoscopy  09/05/2019    BMI: Adult  02/19/2020       Patient Active Problem List   Diagnosis    Morbid obesity due to excess calories (HCC)    Postgastrectomy malabsorption    S/P gastric bypass    Marginal ulcer    Atherosclerotic peripheral vascular disease ([address] )    Diet-controlled diabetes mellitus (HCC)    Onychomycosis    Pain in foot    Rectal cancer (HCC)    Postoperative pain    Acute blood loss anemia    Colostomy care ([facility] [address] )    Ovarian cancer ([address] )     Past Medical History:   Diagnosis Date    Anemia     Arthritis     Cancer ([facility] [address] )     rectal    Chronic lower back pain     Chronic pain disorder     back pain    Diabetes mellitus ([address] )     GERD (gastroesophageal reflux disease)     History of MRSA infection 0719/2016    Morbid obesity ([facility] [address] )     Spinal stenosis     Systolic murmur     Unsteady gait     uses walker    Wears dentures     Wears glasses      Past Surgical History:   Procedure Laterality Date    ABDOMINAL PERINEAL BOWEL RESECTION W/ ILEOANAL POUCH N/A 9/6/2018    Procedure: LAPAROSCOPIC HAND ASSIST ABDOMINOPERINEAL RESECTION,  POSTERIOR VAGINECTOMY, OMENTECTOMY;  Surgeon: Radha Alonzo MD;  [facility];  Service: Colorectal    ABDOMINAL SURGERY      abscess removed from abdomen and right thigh, a hole in thigh closed by plastic surgeon    ABSCESS DRAINAGE      abd, (R) leg, (L) leg     SECTION       SECTION      CYSTOSCOPY N/A 2018    Procedure: [facility];  Surgeon: Reginald Esparza MD;  [facility];  Service: Gynecology Oncology    ESOPHAGOGASTRODUODENOSCOPY N/A 2016    Procedure: ESOPHAGOGASTRODUODENOSCOPY (EGD); Surgeon: Jane Butler MD;  [facility];  Service:     ESOPHAGOGASTRODUODENOSCOPY N/A 3/30/2016    Procedure: ESOPHAGOGASTRODUODENOSCOPY (EGD); Surgeon: Jane Butler MD;  [facility]; Service:     EYE SURGERY      laser eye surgery    HYSTERECTOMY N/A 2018    Procedure: RADICAL HYSTERECTOMY TOTAL ABDOMINAL (ALEXANDRA)  BSO;  Surgeon: Reginald Esparza MD;  [facility];  Service: Gynecology Oncology    JOINT REPLACEMENT Left 2017    hip    JOINT REPLACEMENT Right 2017    hip    SC COLONOSCOPY FLX DX W/COLLJ SPEC WHEN PFRMD N/A 3/9/2018    Procedure: COLONOSCOPY;  Surgeon: Lucio Han MD;  Location: [facility] GI LAB; Service: Gastroenterology    SC COLONOSCOPY FLX DX W/COLLJ [facility] WHEN PFRMD N/A 2018    Procedure: COLONOSCOPY;  Surgeon: Radha Alonzo MD;  [facility]; Service: Colorectal    SC ESOPHAGOGASTRODUODENOSCOPY TRANSORAL DIAGNOSTIC N/A 2018    Procedure: ESOPHAGOGASTRODUODENOSCOPY (EGD); Surgeon: Lucio Han MD;  Location: [facility] GI LAB;   Service: Gastroenterology    SC LAP GASTRIC BYPASS/SOLEDAD-EN-Y N/A 2016    Procedure: BYPASS GASTRIC  SOLEDAD-EN-Y LAPAROSCOPIC;  Surgeon: Jane Butler MD;  [facility];  Service: Bariatrics    SC LAP, SURG COLOSTOMY N/A 2018    Procedure: PERMANENT END COLOSTOMY;  Surgeon: Radha Alonzo MD;  [facility];  Service: Colorectal    SC LAP, SURG PROCTECTOMY W COLOSTOMY N/A 2018    Procedure: PROCTECTOMY;  Surgeon: Stephy Jin MD;  [facility];  Service: Colorectal    TUBAL LIGATION       Family History   Adopted: Yes   Problem Relation Age of Onset    Leukemia Mother     Heart disease Father     Coronary artery disease Father     Diabetes Father     No Known Problems Sister     No Known Problems Brother     Diabetes Son     No Known Problems Brother     No Known Problems Brother     No Known Problems Sister     No Known Problems Sister      Social History     Socioeconomic History    Marital status: Single     Spouse name: Not on file    Number of children: 2    Years of education: Not on file    Highest education level: Not on file   Occupational History    Not on file   Social Needs    Financial resource strain: Not on file    Food insecurity:     Worry: Not on file     Inability: Not on file    Transportation needs:     Medical: Not on file     Non-medical: Not on file   Tobacco Use    Smoking status: Former Smoker     Packs/day: 1      Years: 25 00     Pack years: 25 00     Last attempt to quit: 3/30/2006     Years since quittin 9    Smokeless tobacco: Never Used   Substance and Sexual Activity    Alcohol use: No    Drug use: Yes     Types: Oxycodone     Comment: Percocet for lower back pain prn    Sexual activity: Not on file   Lifestyle    Physical activity:     Days per week: Not on file     Minutes per session: Not on file    Stress: Not on file   Relationships    Social connections:     Talks on phone: Not on file     Gets together: Not on file     Attends Bahai service: Not on file     Active member of club or organization: Not on file     Attends meetings of clubs or organizations: Not on file     Relationship status: Not on file    Intimate partner violence:     Fear of current or ex partner: Not on file     Emotionally abused: Not on file     Physically abused: Not on file     Forced sexual activity: Not on file   Other Topics Concern    Not on file   Social History Narrative    Not on file       Current Outpatient Medications:     anastrozole (ARIMIDEX) 1 mg tablet, Take 1 tablet (1 mg total) by mouth daily, Disp: 90 tablet, Rfl: 1    Calcium-Vitamin D 500-125 MG-UNIT TABS, Take 1 tablet by mouth 2 (two) times a day  , Disp: , Rfl:     ferrous sulfate 324 (65 Fe) mg, Take 1 tablet (324 mg total) by mouth daily before breakfast, Disp: 90 tablet, Rfl: 0    Multiple Vitamins-Minerals (BARIATRIC FUSION) CHEW, Chew 1 tablet daily  , Disp: , Rfl:     omeprazole (PriLOSEC) 20 mg delayed release capsule, Take 1 capsule (20 mg total) by mouth daily, Disp: 30 capsule, Rfl: 3    ondansetron (ZOFRAN-ODT) 8 mg disintegrating tablet, Take 1 tablet (8 mg total) by mouth every 8 (eight) hours as needed for nausea or vomiting, Disp: 30 tablet, Rfl: 0    oxyCODONE (ROXICODONE) 10 MG TABS, Take 10 mg by mouth every 6 (six) hours as needed  , Disp: , Rfl:   Allergies   Allergen Reactions    Tramadol Other (See Comments)     Dizzy         Review of Systems:  Review of Systems   Constitutional: Negative  HENT: Negative  Eyes: Negative  Respiratory: Negative  Cardiovascular: Negative  Gastrointestinal: Negative  Colostomy   Endocrine: Negative  Genitourinary: Positive for vaginal discharge (daily notices small amt light brown discharge)  Some incontinence since surgery; wears pad     Some urinary retention  Nocturia x2  Musculoskeletal: Positive for arthralgias and back pain  Allergic/Immunologic: Negative  Neurological: Negative  Hematological: Negative  Psychiatric/Behavioral: Negative  Vitals:    02/25/19 1126   BP: 110/60   Pulse: 69   Resp: 16   Temp: 98 3 °F (36 8 °C)   SpO2: 98%   Weight: 106 kg (232 lb 9 6 oz)   Height: 5' 4" (1 626 m)       Pain Score: 0-No pain    Imaging:No results found      Teaching

## 2019-02-25 NOTE — PROGRESS NOTES
Follow-up - Radiation Oncology   Lorena Menendez 1955 61 y o  female [id number]      History of Present Illness   Cancer Staging  Ovarian cancer [facility])  Staging form: Ovary, Fallopian Tube, Primary Peritoneal, AJCC 8th Edition  - Clinical stage from 9/6/2018: FIGO Stage IIIC, calculated as Stage Unknown (cT3c(m), cNX, cM0) - Signed by Sp Daniel MD on 10/3/2018    Rectal cancer [facility])  Staging form: Colon and Rectum, AJCC 8th Edition  - Clinical: Stage IIIB (cT3, cN1, cM0) - Signed by Maribel Shah MD on 4/11/2018      Lorena Menendez returns today for routine scheduled follow-up visit  Since the time of her last visit, she underwent surgical resection of her locally advanced rectal cancer  Intraoperatively it was apparent that she also had a stage IIIC ovarian low-grade serous carcinoma which was resected  She is following with Gynecologic Oncology and is being treated with Anastrozole  She attempted adjuvant chemotherapy with Medical Oncology but was unable to tolerate this  Overall she feels well, with her main complaint being a nonhealing perineal wound which is actively draining  She has not been seeing wound care  She was referred for hyperbaric oxygen but was hesitant to proceed with this as she is claustrophobic and has difficulty lying flat  Interval History:  Last seen on 8/3/18 post pelvic/rectal radiation completed on 6/20/18 9/6/18  Proctectomy, permanent end colostomy, AP resection, posterior vaginectomy, omentectomy, radical hysterectomy, BSO, cyctoscopy  Tumor site:  Rectum     - Tumor size:  Up to 3 3 cm     - Histologic Type:   Adenocarcinoma      - Histologic Grade: Moderately differentiated (G2)     - Microscopic Tumor Extension (ypT3):   Tumor invades through muscularis propria into pericolorectal soft tissues  Margins (specify distance for nearest radial margin on RECTAL tumors only):     - Proximal Margin:  Negative for carcinoma     - Distal Margin:  Negative for carcinoma - Circumferential (Radial) or Mesenteric Margin:  Negative for carcinoma (0 6cm)     - Other Margins:  Vaginal margin negative for carcinoma  Treatment effect:  Present; Residual cancer with evident tumor regression, but more than single cells or rare small groups of cancer cells (partial response, score 2)    AND  ALEXANDRA, BSO  Tumor site:  Ovarian and fallopian tube surfaces and uterine and colonic serosa  Ovarian surface involvement:  Present  Fallopian tube surface involvement:  Present  Tumor size:  Largest focus measures 1 8 cm (uterine serosa) (see note)   Histologic type:  Low-grade serous carcinoma   Implant:  Present  Other tissue/organ involvement:  Bilateral ovaries and fallopian tubes, uterine serosa, sigmoid colon serosa, and omentum  Largest extrapelvic peritoneal focus: 2 5 cm (macroscopically identidfied)    - Specify site:  Omentum    10/3/19 Dr Val Moncada  Start anastrozole daily    12/5/18 Dr Timmy Whitley  -Wound with good healthy/granulation, nearly healed     PLAN:  -Discussed with patient and by phone with Dr Melburn Canavan, as she in at end of window of timing we both agreed reasonable to attempt chemo start as this an open sinus tract at this point  12/12/18 Dr Melburn Canavan  CapeOX Regimen  Oxaliplatin 130 mg/m2 IV (75% on first cycle)  Xeloda 1000 mg/m2 p o  twice a day  Cycle = 3 weeks    12/17/18 Chemotherapy initiated    1/2/19 Dr Nena Menard   Patient completed approximately 50% of the 1st post operative dose of CapeOX - delays from chemotherapy related diarrhea, nausea, chest pains  Rectal wound has also not healed  Mrs Ramon Enrique does not wish to continue with chemotherapy        1/16/19 Nitin Sellers follow up  PLAN:  -To re see wound care for possible VAC therapy to sinus tract  -Continue oncology followup for surveillance labs/imaging  -MUST see her cardiologist in followup to valvular disease especially with chest pains on chemotherapy requiring discontinuation and dizziness she complains of   -3months followup also needs CT scheduled  3/14/19  Cardiology FU    4/10/19  Roseanna Chen (GYN ONC)    Screening  Tobacco  Current tobacco user: no  If yes, brief counseling provided: NA    Hypertension  Hypertension screening performed: yes  Normotensive:  no  If no, referred to PCP: no    Depression Screening  Screened for depression using PHQ-2: yes    Screened for depression using PHQ-9:  no  Screening positive or negative:  negative  If score >4, was any of the following actions taken? Additional evaluation for depression, suicide risk assesment, referral to PCP or psychiatry, medication started:  n/a    Advanced Care Planning for Patients >65 years  Advanced Care Planning Discussed:  no  Patient named surrogate decision maker or care plan in chart: yes        Historical Information      Rectal cancer ([facility] [facility] )    3/9/2018 Initial Diagnosis     Rectal cancer ([facility] [address] )         3/9/2018 Biopsy     Rectum, mass, biopsy:  - At least high grade dysplasia/intramucosal carcinoma arising in a tubulovillous adenoma, suspicious for    invasion  3/28/2018 Biopsy     Final Diagnosis   A  Rectal mass (biopsy):  - 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               5/10/2018 - 6/20/2018 Radiation      a dose of 4500 cGy in 25 fractions to the pelvis  An additional 540 cGy in 3 fractions was delivered to the primary rectal tumor with margin  Dr Katya Silva           5/2018 - 6/2018 Chemotherapy     Capecitabine 825 mg/m2 by mouth twice daily Monday through Friday (off the weekends) x 5-6 weeks with RT (dose for this patient is 1650 mg twice a day)         9/6/2018 Surgery     Proctectomy, permanent end colostomy, AP resection, posterior vaginectomy, omentectomy, radical hysterectomy, BSO, cyctoscopy  Tumor site:  Rectum     - Tumor size:  Up to 3 3 cm     - Histologic Type:   Adenocarcinoma      - Histologic Grade:   Moderately differentiated (G2)     - Microscopic Tumor Extension (ypT3): Tumor invades through muscularis propria into pericolorectal soft tissues  Margins (specify distance for nearest radial margin on RECTAL tumors only):     - Proximal Margin:  Negative for carcinoma     - Distal Margin:  Negative for carcinoma     - Circumferential (Radial) or Mesenteric Margin:  Negative for carcinoma (0 6cm)     - Other Margins:  Vaginal margin negative for carcinoma  Treatment effect:  Present; Residual cancer with evident tumor regression, but more than single cells or rare small groups of cancer cells (partial response, score 2)         9/6/2018 -  Cancer Staged      Stage  IIA - ypT3, pN0, G2          12/17/2018 - 12/2018 Chemotherapy     PLAN:    CapeOX Regimen x 6 months  Oxaliplatin 130 mg/m2 IV (75% on first cycle)  Xeloda 1000 mg/m2 p o  twice a day  Cycle = 3 weeks    1/2/19 Patient completed approximately 50% of the 1st post operative dose of CapeOX - delays from chemotherapy related diarrhea, nausea, chest pains  Rectal wound has also not healed  Mrs Renate Grove does not wish to continue with chemotherapy  Ovarian cancer ([facility] [address] )    9/6/2018 Initial Diagnosis     Ovarian cancer ([facility] [address] ): Incidental diagnosis at the time of laparotomy for abdominal perineal resection/proctectomy due to rectal cancer  Stage IIIC  No gross residual disease noted at the time of surgery  Low-grade serous histologic type           9/6/2018 Surgery     ALEXANDRA, BSO  Tumor site:  Ovarian and fallopian tube surfaces and uterine and colonic serosa  Ovarian surface involvement:  Present  Fallopian tube surface involvement:  Present  Tumor size:  Largest focus measures 1 8 cm (uterine serosa) (see note)   Histologic type:  Low-grade serous carcinoma   Implant:  Present  Other tissue/organ involvement:  Bilateral ovaries and fallopian tubes, uterine serosa, sigmoid colon serosa, and omentum  Largest extrapelvic peritoneal focus: 2 5 cm (macroscopically identidfied)    - Specify site: Omentum         2018 -  Cancer Staged     Pathologic stage classification (pTNM, AJCC 8th edition): pT3c, pNX, Low Grade  18  FIGO stage (2015 FIGO cancer report): IIIC          Hormone Therapy     anastrozole            Past Medical History:   Diagnosis Date    Anemia     Arthritis     Cancer ([facility] )     rectal    Chronic lower back pain     Chronic pain disorder     back pain    Diabetes mellitus ([facility] )     GERD (gastroesophageal reflux disease)     History of MRSA infection     Morbid obesity ([facility] )     Spinal stenosis     Systolic murmur     Unsteady gait     uses walker    Wears dentures     Wears glasses      Past Surgical History:   Procedure Laterality Date    ABDOMINAL PERINEAL BOWEL RESECTION W/ ILEOANAL POUCH N/A 2018    Procedure: LAPAROSCOPIC HAND ASSIST ABDOMINOPERINEAL RESECTION,  POSTERIOR VAGINECTOMY, OMENTECTOMY;  Surgeon: Stefan Hunt MD;  [facility];  Service: Colorectal    ABDOMINAL SURGERY      abscess removed from abdomen and right thigh, a hole in thigh closed by plastic surgeon    ABSCESS DRAINAGE      abd, (R) leg, (L) leg     SECTION       SECTION      CYSTOSCOPY N/A 2018    Procedure: CYSTOSCOPY;  Surgeon: Dominga Matt MD;  [facility];  Service: Gynecology Oncology    ESOPHAGOGASTRODUODENOSCOPY N/A 2016    Procedure: ESOPHAGOGASTRODUODENOSCOPY (EGD); Surgeon: Jocelyn Alexander MD;  [facility];  Service:     ESOPHAGOGASTRODUODENOSCOPY N/A 3/30/2016    Procedure: ESOPHAGOGASTRODUODENOSCOPY (EGD); Surgeon: Jocelyn Alexander MD;  [facility]; Service:     EYE SURGERY      laser eye surgery    HYSTERECTOMY N/A 2018    Procedure: RADICAL HYSTERECTOMY TOTAL ABDOMINAL (ALEXANDRA)   BSO;  Surgeon: Dominga Matt MD;  [facility];  Service: Gynecology Oncology    JOINT REPLACEMENT Left 2017    hip    JOINT REPLACEMENT Right 2017    hip    NC COLONOSCOPY FLX DX W/COLLJ SPEC WHEN PFRMD N/A 3/9/2018    Procedure: COLONOSCOPY;  Surgeon: Antoni Alexis MD;  Location: [facility]; Service: Gastroenterology    [place] COLONOSCOPY FLX DX W/[facility] WHEN PFRMD N/A 2018    Procedure: COLONOSCOPY;  Surgeon: Uli Phillips MD;  [facility]; Service: Colorectal    NJ ESOPHAGOGASTRODUODENOSCOPY TRANSORAL DIAGNOSTIC N/A 2018    Procedure: ESOPHAGOGASTRODUODENOSCOPY (EGD); Surgeon: Antoni Alexis MD;  Location: [facility];   Service: Gastroenterology    [place] LAP GASTRIC BYPASS/SOLEDAD-EN-Y N/A 2016    Procedure: BYPASS GASTRIC  SOLEDAD-EN-Y LAPAROSCOPIC;  Surgeon: Main Anaya MD;  [facility];  Service: Bariatrics    [place] LAP, SURG COLOSTOMY N/A 2018    Procedure: PERMANENT END COLOSTOMY;  Surgeon: Uli Phillips MD;  [facility];  Service: Colorectal    [place] LAP, SURG PROCTECTOMY W COLOSTOMY N/A 2018    Procedure: PROCTECTOMY;  Surgeon: Uli Phillips MD;  [facility];  Service: Colorectal    TUBAL LIGATION         Social History   Social History     Substance and Sexual Activity   Alcohol Use No     Social History     Substance and Sexual Activity   Drug Use Yes    Types: Oxycodone    Comment: Percocet for lower back pain prn     Social History     Tobacco Use   Smoking Status Former Smoker    Packs/day: 1 00    Years: 25 00    Pack years: 25 00    Last attempt to quit: 3/30/2006    Years since quittin 9   Smokeless Tobacco Never Used         Meds/Allergies     Current Outpatient Medications:     anastrozole (ARIMIDEX) 1 mg tablet, Take 1 tablet (1 mg total) by mouth daily, Disp: 90 tablet, Rfl: 1    Calcium-Vitamin D 500-125 MG-UNIT TABS, Take 1 tablet by mouth 2 (two) times a day  , Disp: , Rfl:     ferrous sulfate 324 (65 Fe) mg, Take 1 tablet (324 mg total) by mouth daily before breakfast, Disp: 90 tablet, Rfl: 0    Multiple Vitamins-Minerals (BARIATRIC FUSION) CHEW, Chew 1 tablet daily  , Disp: , Rfl:    omeprazole (PriLOSEC) 20 mg delayed release capsule, Take 1 capsule (20 mg total) by mouth daily, Disp: 30 capsule, Rfl: 3    ondansetron (ZOFRAN-ODT) 8 mg disintegrating tablet, Take 1 tablet (8 mg total) by mouth every 8 (eight) hours as needed for nausea or vomiting, Disp: 30 tablet, Rfl: 0    oxyCODONE (ROXICODONE) 10 MG TABS, Take 10 mg by mouth every 6 (six) hours as needed  , Disp: , Rfl:   Allergies   Allergen Reactions    Tramadol Other (See Comments)     Dizzy           Review of Systems   Review of Systems   Constitutional: Negative  HENT: Negative  Eyes: Negative  Respiratory: Negative  Cardiovascular: Negative  Gastrointestinal: Negative  Colostomy   Endocrine: Negative  Genitourinary: Positive for vaginal discharge (daily notices small amt light brown discharge)  Some incontinence since surgery; wears pad     Some urinary retention  Nocturia x2  Musculoskeletal: Positive for arthralgias and back pain  Allergic/Immunologic: Negative  Neurological: Negative  Hematological: Negative  Psychiatric/Behavioral: Negative  OBJECTIVE:   /60   Pulse 69   Temp 98 3 °F (36 8 °C)   Resp 16   Ht 5' 4" (1 626 m)   Wt 106 kg (232 lb 9 6 oz)   SpO2 98%   BMI 39 93 kg/m²   Pain Assessment:  0  Karnofsky: 90 - Able to carry on normal activity; minor signs or symptoms of disease     Physical Exam   The patient presents today in no apparent distress  Sclera anicteric  No cervical or supraclavicular lymphadenopathy  Normal respiratory effort  A colostomy is in place and functioning well  The patient does have a perineal wound measuring approximately 2 x 2 cm appearing rather deep been actively draining  No signs of infection  No significant swelling of the lower extremities          RESULTS    Lab Results:   Recent Results (from the past 672 hour(s))   CBC and differential    Collection Time: 02/08/19  8:41 AM   Result Value Ref Range    WBC 3 87 (L) 4 31 - 10 16 Thousand/uL    RBC 4 93 3 81 - 5 12 Million/uL    Hemoglobin 11 4 (L) 11 5 - 15 4 g/dL    Hematocrit 38 8 34 8 - 46 1 %    MCV 79 (L) 82 - 98 fL    MCH 23 1 (L) 26 8 - 34 3 pg    MCHC 29 4 (L) 31 4 - 37 4 g/dL    RDW 22 8 (H) 11 6 - 15 1 %    MPV 10 3 8 9 - 12 7 fL    Platelets 992 479 - 974 Thousands/uL    nRBC 0 /100 WBCs    Neutrophils Relative 62 43 - 75 %    Immat GRANS % 1 0 - 2 %    Lymphocytes Relative 23 14 - 44 %    Monocytes Relative 10 4 - 12 %    Eosinophils Relative 3 0 - 6 %    Basophils Relative 1 0 - 1 %    Neutrophils Absolute 2 44 1 85 - 7 62 Thousands/µL    Immature Grans Absolute 0 02 0 00 - 0 20 Thousand/uL    Lymphocytes Absolute 0 89 0 60 - 4 47 Thousands/µL    Monocytes Absolute 0 38 0 17 - 1 22 Thousand/µL    Eosinophils Absolute 0 11 0 00 - 0 61 Thousand/µL    Basophils Absolute 0 03 0 00 - 0 10 Thousands/µL   Comprehensive metabolic panel    Collection Time: 02/08/19  8:41 AM   Result Value Ref Range    Sodium 140 136 - 145 mmol/L    Potassium 4 1 3 5 - 5 3 mmol/L    Chloride 106 100 - 108 mmol/L    CO2 29 21 - 32 mmol/L    ANION GAP 5 4 - 13 mmol/L    BUN 14 5 - 25 mg/dL    Creatinine 0 61 0 60 - 1 30 mg/dL    Glucose, Fasting 91 65 - 99 mg/dL    Calcium 9 0 8 3 - 10 1 mg/dL    AST 12 5 - 45 U/L    ALT 12 12 - 78 U/L    Alkaline Phosphatase 67 46 - 116 U/L    Total Protein 6 8 6 4 - 8 2 g/dL    Albumin 3 0 (L) 3 5 - 5 0 g/dL    Total Bilirubin 0 40 0 20 - 1 00 mg/dL    eGFR 97 ml/min/1 73sq m   Copper Level    Collection Time: 02/08/19  8:41 AM   Result Value Ref Range    Copper 109 72 - 166 ug/dL   Ferritin    Collection Time: 02/08/19  8:41 AM   Result Value Ref Range    Ferritin 13 8 - 388 ng/mL   Folate    Collection Time: 02/08/19  8:41 AM   Result Value Ref Range    Folate >20 0 (H) 3 1 - 17 5 ng/mL   Iron Saturation %    Collection Time: 02/08/19  8:41 AM   Result Value Ref Range    Iron Saturation 8 %    TIBC 438 250 - 450 ug/dL    Iron 36 (L) 50 - 170 ug/dL PTH, intact    Collection Time: 02/08/19  8:41 AM   Result Value Ref Range    PTH 62 1 18 4 - 80 1 pg/mL   Vitamin A    Collection Time: 02/08/19  8:41 AM   Result Value Ref Range    Vitamin A 55 1 22 0 - 69 5 ug/dL   Vitamin B1, whole blood    Collection Time: 02/08/19  8:41 AM   Result Value Ref Range    Vitamin B1, Whole Blood 136 7 66 5 - 200 0 nmol/L   Vitamin B12    Collection Time: 02/08/19  8:41 AM   Result Value Ref Range    Vitamin B-12 390 100 - 900 pg/mL   Vitamin D 25 hydroxy    Collection Time: 02/08/19  8:41 AM   Result Value Ref Range    Vit D, 25-Hydroxy 31 0 30 0 - 100 0 ng/mL   Zinc    Collection Time: 02/08/19  8:41 AM   Result Value Ref Range    Zinc 66 56 - 134 ug/dL   Lipid panel    Collection Time: 02/08/19  8:41 AM   Result Value Ref Range    Cholesterol 198 50 - 200 mg/dL    Triglycerides 134 <=150 mg/dL    HDL, Direct 54 40 - 60 mg/dL    LDL Calculated 117 (H) 0 - 100 mg/dL    Non-HDL-Chol (CHOL-HDL) 144 mg/dl       Imaging Studies:No results found  Assessment/Plan:  No orders of the defined types were placed in this encounter  Semaj Mena returns today for routine follow-up having undergone proctectomy, permanent and colostomy, AP resection, posterior vaginectomy, omentectomy, radical hysterectomy, and BSO  The surgery was to address her known stage III rectal cancer, but intraoperatively she was also found to have 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  She remains on anastrozole under the care of Gynecologic Oncology, but unfortunately was unable to tolerate adjuvant systemic therapy  Her main issue at this time is a persistent perineal wound  Following discussion, the patient is willing to return to wound care and will also consider attempting hyperbaric oxygen therapy  We will help arrange the wound care appointment for her and she will return to our department in 6 months    She will continue to follow closely both Medical and Gynecologic Oncology  Sebas Rodríguez MD  2/25/2019,12:45 PM    Portions of the record may have been created with voice recognition software   Occasional wrong word or "sound a like" substitutions may have occurred due to the inherent limitations of voice recognition software   Read the chart carefully and recognize, using context, where substitutions have occurred

## 2019-03-04 PROBLEM — Z98.84 STATUS POST BARIATRIC SURGERY: Status: ACTIVE | Noted: 2018-02-09

## 2019-03-05 ENCOUNTER — APPOINTMENT (OUTPATIENT)
Dept: WOUND CARE | Facility: HOSPITAL | Age: 64
End: 2019-03-05
Payer: MEDICARE

## 2019-03-05 PROCEDURE — 99213 OFFICE O/P EST LOW 20 MIN: CPT

## 2019-03-06 ENCOUNTER — TELEPHONE (OUTPATIENT)
Dept: FAMILY MEDICINE CLINIC | Facility: CLINIC | Age: 64
End: 2019-03-06

## 2019-03-06 ENCOUNTER — OFFICE VISIT (OUTPATIENT)
Dept: FAMILY MEDICINE CLINIC | Facility: CLINIC | Age: 64
End: 2019-03-06
Payer: MEDICARE

## 2019-03-06 VITALS
DIASTOLIC BLOOD PRESSURE: 80 MMHG | TEMPERATURE: 101.9 F | HEART RATE: 105 BPM | BODY MASS INDEX: 39.78 KG/M2 | HEIGHT: 64 IN | WEIGHT: 233 LBS | SYSTOLIC BLOOD PRESSURE: 126 MMHG

## 2019-03-06 DIAGNOSIS — N12 PYELONEPHRITIS: Primary | ICD-10-CM

## 2019-03-06 PROBLEM — M79.673 PAIN IN FOOT: Status: RESOLVED | Noted: 2018-03-14 | Resolved: 2019-03-06

## 2019-03-06 PROBLEM — G89.18 POSTOPERATIVE PAIN: Status: RESOLVED | Noted: 2018-09-08 | Resolved: 2019-03-06

## 2019-03-06 PROBLEM — E11.9 DIET-CONTROLLED DIABETES MELLITUS (HCC): Status: RESOLVED | Noted: 2017-04-03 | Resolved: 2019-03-06

## 2019-03-06 PROBLEM — D62 ACUTE BLOOD LOSS ANEMIA: Status: RESOLVED | Noted: 2018-09-08 | Resolved: 2019-03-06

## 2019-03-06 LAB
SL AMB  POCT GLUCOSE, UA: ABNORMAL
SL AMB LEUKOCYTE ESTERASE,UA: 500
SL AMB POCT BILIRUBIN,UA: 1
SL AMB POCT BLOOD,UA: 250
SL AMB POCT CLARITY,UA: ABNORMAL
SL AMB POCT COLOR,UA: ABNORMAL
SL AMB POCT KETONES,UA: ABNORMAL
SL AMB POCT NITRITE,UA: ABNORMAL
SL AMB POCT PH,UA: 6
SL AMB POCT SPECIFIC GRAVITY,UA: ABNORMAL
SL AMB POCT URINE PROTEIN: ABNORMAL
SL AMB POCT UROBILINOGEN: 1

## 2019-03-06 PROCEDURE — 99213 OFFICE O/P EST LOW 20 MIN: CPT | Performed by: FAMILY MEDICINE

## 2019-03-06 PROCEDURE — 87077 CULTURE AEROBIC IDENTIFY: CPT | Performed by: STUDENT IN AN ORGANIZED HEALTH CARE EDUCATION/TRAINING PROGRAM

## 2019-03-06 PROCEDURE — 81003 URINALYSIS AUTO W/O SCOPE: CPT | Performed by: FAMILY MEDICINE

## 2019-03-06 PROCEDURE — 87186 SC STD MICRODIL/AGAR DIL: CPT | Performed by: STUDENT IN AN ORGANIZED HEALTH CARE EDUCATION/TRAINING PROGRAM

## 2019-03-06 PROCEDURE — 87086 URINE CULTURE/COLONY COUNT: CPT | Performed by: STUDENT IN AN ORGANIZED HEALTH CARE EDUCATION/TRAINING PROGRAM

## 2019-03-06 RX ORDER — CEPHALEXIN 500 MG/1
500 CAPSULE ORAL EVERY 12 HOURS SCHEDULED
Qty: 20 CAPSULE | Refills: 0 | Status: SHIPPED | OUTPATIENT
Start: 2019-03-06 | End: 2019-03-16

## 2019-03-06 NOTE — TELEPHONE ENCOUNTER
S/w Pt, C/o Chills, right sided back pain, urination in pants  This has been going on since last night  Pt does not have a thermometer so she is unable to take her temp, but thinks she has a fever and would like a call back       [phone]

## 2019-03-09 DIAGNOSIS — N39.0 E. COLI UTI: Primary | ICD-10-CM

## 2019-03-09 DIAGNOSIS — B96.20 E. COLI UTI: Primary | ICD-10-CM

## 2019-03-09 LAB — BACTERIA UR CULT: ABNORMAL

## 2019-03-09 RX ORDER — LEVOFLOXACIN 750 MG/1
750 TABLET ORAL EVERY 24 HOURS
Qty: 10 TABLET | Refills: 0 | Status: SHIPPED | OUTPATIENT
Start: 2019-03-09 | End: 2019-03-19

## 2019-03-11 PROBLEM — N12 PYELONEPHRITIS: Status: ACTIVE | Noted: 2019-03-11

## 2019-03-11 NOTE — ASSESSMENT & PLAN NOTE
Reviewed antibiotic gram for our area, will rx keflex pending urine culture results   Tylenol q4hr PRN fever

## 2019-03-13 ENCOUNTER — TELEPHONE (OUTPATIENT)
Dept: FAMILY MEDICINE CLINIC | Facility: CLINIC | Age: 64
End: 2019-03-13

## 2019-03-13 NOTE — TELEPHONE ENCOUNTER
S/w Pt, states she was seen in 3/6/19 for Kidney pain  Rx (Keflex 500mg) was called into the pharmacy, and then received a call from our office 3/9/19 and was told to stop the Rx  (Levaquin 750 mg) was called into the pharmacy and was told to take as prescribed  Pt states she is still having pain and would like a call back        [phone]

## 2019-03-13 NOTE — TELEPHONE ENCOUNTER
Explained to patient it may take a few days for antibiotics to work  She is drinking plenty of fluids and keeping watch for fevers  Advised OTC pain meds and to call us Friday AM if still not feeling well    Patient said she would give antibiotics a few more days

## 2019-03-26 ENCOUNTER — APPOINTMENT (OUTPATIENT)
Dept: LAB | Facility: CLINIC | Age: 64
End: 2019-03-26
Payer: MEDICARE

## 2019-03-26 LAB
ALBUMIN SERPL BCP-MCNC: 2.9 G/DL (ref 3.5–5)
ALP SERPL-CCNC: 69 U/L (ref 46–116)
ALT SERPL W P-5'-P-CCNC: 11 U/L (ref 12–78)
ANION GAP SERPL CALCULATED.3IONS-SCNC: 3 MMOL/L (ref 4–13)
AST SERPL W P-5'-P-CCNC: 11 U/L (ref 5–45)
BILIRUB SERPL-MCNC: 0.32 MG/DL (ref 0.2–1)
BUN SERPL-MCNC: 15 MG/DL (ref 5–25)
CALCIUM SERPL-MCNC: 8.9 MG/DL (ref 8.3–10.1)
CHLORIDE SERPL-SCNC: 105 MMOL/L (ref 100–108)
CO2 SERPL-SCNC: 29 MMOL/L (ref 21–32)
CREAT SERPL-MCNC: 0.76 MG/DL (ref 0.6–1.3)
GFR SERPL CREATININE-BSD FRML MDRD: 84 ML/MIN/1.73SQ M
GLUCOSE SERPL-MCNC: 107 MG/DL (ref 65–140)
POTASSIUM SERPL-SCNC: 4.6 MMOL/L (ref 3.5–5.3)
PROT SERPL-MCNC: 6.8 G/DL (ref 6.4–8.2)
SODIUM SERPL-SCNC: 137 MMOL/L (ref 136–145)

## 2019-03-26 PROCEDURE — 80053 COMPREHEN METABOLIC PANEL: CPT | Performed by: INTERNAL MEDICINE

## 2019-03-26 PROCEDURE — 36415 COLL VENOUS BLD VENIPUNCTURE: CPT | Performed by: INTERNAL MEDICINE

## 2019-03-29 ENCOUNTER — OFFICE VISIT (OUTPATIENT)
Dept: HEMATOLOGY ONCOLOGY | Facility: MEDICAL CENTER | Age: 64
End: 2019-03-29
Payer: MEDICARE

## 2019-03-29 VITALS
TEMPERATURE: 98.6 F | DIASTOLIC BLOOD PRESSURE: 70 MMHG | BODY MASS INDEX: 39.44 KG/M2 | HEIGHT: 64 IN | SYSTOLIC BLOOD PRESSURE: 110 MMHG | RESPIRATION RATE: 18 BRPM | WEIGHT: 231 LBS | OXYGEN SATURATION: 98 % | HEART RATE: 73 BPM

## 2019-03-29 DIAGNOSIS — C20 RECTAL CANCER (HCC): Primary | ICD-10-CM

## 2019-03-29 PROCEDURE — 99213 OFFICE O/P EST LOW 20 MIN: CPT | Performed by: INTERNAL MEDICINE

## 2019-03-29 NOTE — PROGRESS NOTES
Narciso Meigs  1955  [facility] [place] [id number]    DISCUSSION/SUMMARY:    27-year-old female recently found to have high-grade dysplasia/intramucosal lesion arising in a tubulovillous adenoma  Issues:    Rectal cancer  Final stage is IIA (ypT3 pN0 G2)  Patient received neoadjuvant concurrent treatment and then underwent resection  Mrs Ashwin Vivas had postoperative complications with wound healing and fistula formation  Mrs Ashwin Vivas states feeling okay, about the same as before  Patient continues with wound care, the fistula has not completely healed  As before, there is no plans for any additional adjuvant chemotherapy/treatment  Patient is to continue on surveillance  Mrs Ashwin Vivas is to go for repeat CT scans and blood work/tumor marker next week  At this time, patient is to return in 3 months but this may change depending upon the above  Fistula  Patient will continue with wound care and with Dr Tru Barksdale  As discussed previously, patient refused HBO - claustrophobia issues  Ovarian cancer, IIIC  As discussed previously, patient was found to have an incidental low-grade serous ovarian carcinoma with omental nodules greater than 2 cm grossly visible  Patient is followed by GYN Oncology and is on anastrozole (NCCN 2 [id number] low-grade serous, stage II-IV tumors, treatment options include primary hormonal therapy (category 2B)  Pain control - not an issue recently  Patient will continue to monitor  Anemia  Etiology is most likely multifactorial (prior chemotherapy, wound, etc)  Clinically the patient is color is okay/pretty good  Fatigue is less/better than before  Repeat CBC has been scheduled for next week  We rediscussed what patient needs to monitor for in regard to progressive anemia  Cardiac issues  Patient has valvular problems, specifics are not presently available    Patient is following with her cardiologist     Patient is to return in 3 months  Mrs Liliya Barlow knows to call if she has any other oncology questions or concerns  Carefully review your medication list and verify that the list is accurate and up-to-date  Please call the hematology/oncology office if there are medications missing from the list, medications on the list that you are not currently taking or if there is a dosage or instruction that is different from how you're taking that medication  Patient goals and areas of care:   CT scans, wound care  Barriers to care: Slow healing wound  Patient is able to self-care   ___________________________________________________________________________________________________    Chief Complaint   Patient presents with    Follow-up     Rectal cancer on surveillance     History of Present Illness:    59-year-old female previously referred for the above  Previously Mrs Alexia Mera had gastric bypass  Patient was recently seen by GI because of blood in the stools  Additional workup included a colonoscopy which demonstrated a rectal lesion  Patient recently completed concurrent chemotherapy (Xeloda) with radiation  Mrs Liliya Barlow subsequently went onto an APR  Final pathology results are listed below; patient was also found to have IIIC low-grade serous ovarian carcinoma  Postoperatively, patient had issues with slow wound closure  Mrs Liliya Barlow states feeling okay, about the same as before  Fatigue is slightly less/better than before  Activities are slightly better  Pain is controlled  Patient continues with wound care  No bleeding  No other GI or  bleeding  No problems with the colostomy  Appetite is good, weight is stable  No pain control issues  No shortness of breath or dyspnea on exertion  No headaches or dizziness  No problems with the Arimidex  No fevers or signs of infection  Review of Systems   Constitutional: Positive for fatigue  HENT: Negative      Eyes: Negative  Respiratory: Negative  Cardiovascular: Negative  Gastrointestinal: Negative for blood in stool, constipation and diarrhea  Endocrine: Negative  Genitourinary: Negative  Musculoskeletal: Positive for arthralgias  Skin: Negative  Allergic/Immunologic: Negative  Neurological: Negative  Hematological: Negative  Psychiatric/Behavioral: Negative  All other systems reviewed and are negative       Patient Active Problem List   Diagnosis    Morbid obesity due to excess calories (HCC)    Postgastrectomy malabsorption    S/P gastric bypass    Marginal ulcer    Atherosclerotic peripheral vascular disease ([place] [address] )    Onychomycosis    Rectal cancer ([place] [address] )    Colostomy care ([place] [address] )    Ovarian cancer ([place] [address] )    Status post bariatric surgery    Pyelonephritis     Past Medical History:   Diagnosis Date    Anemia     Arthritis     Cancer ([place] [address] )     rectal    Chronic lower back pain     Chronic pain disorder     back pain    Diabetes mellitus ([place] [address] )     GERD (gastroesophageal reflux disease)     History of MRSA infection     Morbid obesity ([place] [address] )     Spinal stenosis     Systolic murmur     Unsteady gait     uses walker    Wears dentures     Wears glasses      Ob/gyn:  No recent mammogram -patient's choice, no post menopausal bleeding    Past Surgical History:   Procedure Laterality Date    ABDOMINAL PERINEAL BOWEL RESECTION W/ ILEOANAL POUCH N/A 2018    Procedure: LAPAROSCOPIC HAND ASSIST ABDOMINOPERINEAL RESECTION,  POSTERIOR VAGINECTOMY, OMENTECTOMY;  Surgeon: Harika Gil MD;  [facility];  Service: Colorectal    ABDOMINAL SURGERY      abscess removed from abdomen and right thigh, a hole in thigh closed by plastic surgeon    ABSCESS DRAINAGE      abd, (R) leg, (L) leg     SECTION       SECTION      CYSTOSCOPY N/A 2018    Procedure: CYSTOSCOPY;  Surgeon: Dolores Kearns MD;  [facility];  Service: Gynecology Oncology    ESOPHAGOGASTRODUODENOSCOPY N/A 7/18/2016    Procedure: ESOPHAGOGASTRODUODENOSCOPY (EGD); Surgeon: Sarita Queen MD;  [facility];  Service:     ESOPHAGOGASTRODUODENOSCOPY N/A 3/30/2016    Procedure: ESOPHAGOGASTRODUODENOSCOPY (EGD); Surgeon: Sarita Queen MD;  [facility]; Service:     EYE SURGERY      laser eye surgery    HYSTERECTOMY N/A 9/6/2018    Procedure: RADICAL HYSTERECTOMY TOTAL ABDOMINAL (ALEXANDRA)  BSO;  Surgeon: Silverio Mendez MD;  [facility];  Service: Gynecology Oncology    JOINT REPLACEMENT Left 2017    hip    JOINT REPLACEMENT Right 2017    hip    PA COLONOSCOPY FLX DX W/COLLJ SPEC WHEN PFRMD N/A 3/9/2018    Procedure: COLONOSCOPY;  Surgeon: Andrei Vieira MD;  Location[facility]; Service: Gastroenterology    PA COLONOSCOPY FLX DX W/COLLJ [facility] WHEN PFRMD N/A 9/5/2018    Procedure: COLONOSCOPY;  Surgeon: Enrique Celis MD;  Location:  GI LAB; Service: Colorectal    PA ESOPHAGOGASTRODUODENOSCOPY TRANSORAL DIAGNOSTIC N/A 2/2/2018    Procedure: ESOPHAGOGASTRODUODENOSCOPY (EGD); Surgeon: Andrei Vieira MD;  Location[facility];   Service: Gastroenterology    PA LAP GASTRIC BYPASS/SOLEDAD-EN-Y N/A 7/18/2016    Procedure: BYPASS GASTRIC  SOLEDAD-EN-Y LAPAROSCOPIC;  Surgeon: Sarita Queen MD;  [facility];  Service: Bariatrics    PA LAP, SURG COLOSTOMY N/A 9/6/2018    Procedure: PERMANENT END COLOSTOMY;  Surgeon: Enrique Celis MD;  [facility];  Service: Colorectal    PA LAP, SURG PROCTECTOMY W COLOSTOMY N/A 9/6/2018    Procedure: PROCTECTOMY;  Surgeon: Enrique Celis MD;  [facility];  Service: Colorectal    TUBAL LIGATION       Family History   Adopted: Yes   Problem Relation Age of Onset    Leukemia Mother     Heart disease Father     Coronary artery disease Father     Diabetes Father     No Known Problems Sister     No Known Problems Brother     Diabetes Son     No Known Problems Brother  No Known Problems Brother     No Known Problems Sister     No Known Problems Sister     Family history:   Mother  of leukemia (NOS), father  from heart disease (NOS), 2 children 1 with diabetes, no known familial or genetic diseases, no family history of GI malignancies    Social History     Socioeconomic History    Marital status: Single     Spouse name: Not on file    Number of children: 2    Years of education: Not on file    Highest education level: Not on file   Occupational History    Not on file   Social Needs    Financial resource strain: Not on file    Food insecurity:     Worry: Not on file     Inability: Not on file    Transportation needs:     Medical: Not on file     Non-medical: Not on file   Tobacco Use    Smoking status: Former Smoker     Packs/day: 1 00     Years: 25 00     Pack years: 25 00     Last attempt to quit: 3/30/2006     Years since quittin 0    Smokeless tobacco: Never Used   Substance and Sexual Activity    Alcohol use: No    Drug use: Yes     Types: Oxycodone     Comment: Percocet for lower back pain prn    Sexual activity: Not on file   Lifestyle    Physical activity:     Days per week: Not on file     Minutes per session: Not on file    Stress: Not on file   Relationships    Social connections:     Talks on phone: Not on file     Gets together: Not on file     Attends Orthodoxy service: Not on file     Active member of club or organization: Not on file     Attends meetings of clubs or organizations: Not on file     Relationship status: Not on file    Intimate partner violence:     Fear of current or ex partner: Not on file     Emotionally abused: Not on file     Physically abused: Not on file     Forced sexual activity: Not on file   Other Topics Concern    Not on file   Social History Narrative    Not on file       Current Outpatient Medications:     anastrozole (ARIMIDEX) 1 mg tablet, Take 1 tablet (1 mg total) by mouth daily, Disp: 90 tablet, Rfl: 1    Calcium-Vitamin D 500-125 MG-UNIT TABS, Take 1 tablet by mouth 2 (two) times a day  , Disp: , Rfl:     ferrous sulfate 324 (65 Fe) mg, Take 1 tablet (324 mg total) by mouth daily before breakfast, Disp: 90 tablet, Rfl: 0    Multiple Vitamins-Minerals (BARIATRIC FUSION) CHEW, Chew 1 tablet daily  , Disp: , Rfl:     omeprazole (PriLOSEC) 20 mg delayed release capsule, Take 1 capsule (20 mg total) by mouth daily, Disp: 30 capsule, Rfl: 3    ondansetron (ZOFRAN-ODT) 8 mg disintegrating tablet, Take 1 tablet (8 mg total) by mouth every 8 (eight) hours as needed for nausea or vomiting, Disp: 30 tablet, Rfl: 0    oxyCODONE (ROXICODONE) 10 MG TABS, Take 10 mg by mouth every 6 (six) hours as needed  , Disp: , Rfl:     Allergies   Allergen Reactions    Tramadol Other (See Comments)     Dizzy         Vitals:    03/29/19 1154   BP: 110/70   Pulse: 73   Resp: 18   Temp: 98 6 °F (37 °C)   SpO2: 98%     Physical Exam   Constitutional: She is oriented to person, place, and time  She appears well-developed and well-nourished  Well-nourished female, no respiratory distress   HENT:   Head: Normocephalic and atraumatic  Right Ear: External ear normal    Left Ear: External ear normal    Nose: Nose normal    Mouth/Throat: Oropharynx is clear and moist    Eyes: Pupils are equal, round, and reactive to light  Conjunctivae and EOM are normal    Neck: Normal range of motion  Neck supple  Cardiovascular: Normal rate, regular rhythm, normal heart sounds and intact distal pulses  Pulmonary/Chest: Effort normal and breath sounds normal    Few scattered rhonchi, otherwise good air entry bilaterally   Abdominal: Soft  Bowel sounds are normal    Left lower quadrant colostomy in place    Stool in back, nontender, +bowel sounds, well-healed suture lines, obese, cannot palpate liver or spleen   Musculoskeletal:   No pain or tenderness with palpation of joints, muscles or bones, good range of motion in upper extremities, decreased range of motion in lower extremities but equal   Neurological: She is alert and oriented to person, place, and time  She has normal reflexes  Skin: Skin is warm  Warm, moist, good color, no petechiae or ecchymoses   Psychiatric: She has a normal mood and affect  Her behavior is normal  Judgment and thought content normal    Extremities:   No lower extremity edema, no cords, pulses are 1+  Lymphatics:  No adenopathy in the neck, supraclavicular region, axilla and groin bilaterally  Rectal wound deferred    Labs    03/26/2019 BUN = 15 creatinine = 0 76 calcium = 8 9 AST = 11 ALT = 11 alkaline phosphatase = 69 total bilirubin = 0 32    12/11/2018 WBC = 5 2 hemoglobin = 9 7 hematocrit = 34 MCV = 74 RDW = 17 2 platelet = 692  93/30/0070 WBC = 5 8 hemoglobin = 8 7 hematocrit = 31 MCV = 80 platelet = 253 CA -378 = 9 6  09/11/2018 WBC = 5 6 hemoglobin = 7 7 hematocrit = 25 4 MCV = 83 platelet = 483 BUN = 7 creatinine = 0 50  06/13/2018 WBC = 5 39 hemoglobin = 10 4 hematocrit = 35 MCV = 80 platelet = 889 BUN = 13 creatinine = 0 76 [facility]  05/30/2018 BUN = 11 creatinine = 0 70 [facility] WBC = 4 97 hemoglobin = 10 1 hematocrit = 34 MCV = 80 RDW = 18 6 platelet = 074 neutrophil = 68%  05/17/2018 WBC = 4 4 hemoglobin = 10 6 hematocrit = 34 MCV = 75 platelet = 608 neutrophil = 67% BUN = 19 creatinine = 0 65 [facility]  05/08/2018 WBC = 6 7 hemoglobin = 10 7 hematocrit = 35 MCV = 75 platelet = 904 BUN = 17 creatinine = 0 62 [facility]  03/12/2018 CEA = 22 6 = high    Imaging    4/6/18 MRI pelvis rectal cancer staging    Low rectal cancer, 5 cm in length, circumferential around the rectal wall  (Series 8 images 19 through 33 )  Anteriorly, there are multiple areas where there is transmural tumor extension into the mesorectal fat making this at least a T3c lesion  On Series 8 image 31, tumor also abuts the mesorectal fascia making this CRM positive    At this point it is also   immediately adjacent to the vagina, therefore this may be a T4 lesion  There are 2 high risk perirectal nodes, and 1 low risk perirectal nodes  01/31/2018 ultrasound right upper quadrant    1  Layering gallbladder sludge  No acute cholecystitis or biliary ductal obstruction  2   Hepatomegaly  3   Fluid-filled stomach  1/27/18 CT scan of the abdomen/pelvis    1  Prior Juan-en-Y gastric bypass with distention of the excluded stomach  2   No evidence of acute abnormality in the abdomen or pelvis      Pathology    Case Report   Surgical Pathology Report                         Case: [id number]                                    Authorizing Provider: Uli Phillips MD      Collected:           09/06/2018 1010               Ordering Location:     [address]      Received:            09/06/2018 [facility]                                      Hospital Operating Room                                                       Pathologist:           Ailin Blue MD                                                         Specimens:   A) - Soft Tissue, Other, EPIPLOIC NODULE                                                             B) - Rectum, enbloc rectum, sigmoid, anus, uterus, bso, cervix, posterior vaginal                    wall                                                                                                 C) - Omentum                                                                               Addendum   Additional immunohistochemical stains performed with appropriate controls on a selected portion of serous carcinoma involving omental tissue (block C1) show the following results:  Estrogen receptor: Positive  Progesterone receptor: Negative   Addendum electronically signed by Ailin Blue MD on 9/27/2018 at  1:40 PM   Final Diagnosis   A  Soft tissue, epiploic nodule, biopsy:  -  Portion of nodular fibroadipose tissue with fat necrosis and dystrophic calcifications  -  Negative for metastatic carcinoma    - Immunohistochemical stain performed with appropriate control for keratin AE1/3 is negative for epithelial elements, supporting the diagnosis      B  Rectum, sigmoid colon, anus, uterus,cervix, bilateral ovaries and fallopian tubes and posterior vaginal wall; en bloc resection:  -  Invasive moderately differentiated adenocarcinoma of rectum (see first synoptic report)  -  Low grade serous carcinoma involving left ovary, left fallopian tube, uterine serosa and colonic serosa (see second synoptic report)  -  Separate portion of colon (consistent with sigmoid) with diverticulosis coli showing focal serosal implant of low-grade serous carcinoma  -  Uterus showing benign inactive/atrophic endometrium with metaplastic change, benign endometrial polyp and rare cytologic atypia consistent with radiation effect  -  Cervix with mild glandular atypia, favor reactive (see note)  -  Benign uterine myometrium with one intramural benign leiomyoma, negative for atypia or malignancy  -  Benign anal skin and dentate line with mild reactive change, negative for involvement by carcinoma  -  Benign vaginal wall mucosa with thinning, chronic inflammation, and surface erosion most suggestive of prior therapy effect, negative for involvement by carcinoma  -  27 benign pericolorectal lymph nodes identified, negative for metastatic carcinoma      C  Omentum, omentectomy:  -   Low grade serous carcinoma         COLORECTAL CARCINOMA TUMOR STAGING SUMMARY (includes specimen A-C of this case):  1  Specimen identification:     - Specimen:  Rectum, sigmoid colon, anus, uterus,cervix, bilateral ovaries and fallopian tubes and posterior vaginal wall   2   Tumor:     - Tumor site:  Rectum     - Tumor size:  Up to 3 3 cm     - Macroscopic tumor perforation:  Not identified     - Tumor location:  Below the peritoneal reflection        - Macroscopic intactness of mesorectum:  Intact     - Histologic Type:   Adenocarcinoma      - Histologic Grade: Moderately differentiated (G2)     - Microscopic Tumor Extension (ypT3): Tumor invades through muscularis propria into pericolorectal soft tissues     - Tumor budding (only needed in polyps, Stage I or II cancers):  Not identified   3  Margins (specify distance for nearest radial margin on RECTAL tumors only):     - Proximal Margin:  Negative for carcinoma     - Distal Margin:  Negative for carcinoma     - Circumferential (Radial) or Mesenteric Margin:  Negative for carcinoma (0 6cm)     - Other Margins:  Vaginal margin negative for carcinoma   4  Treatment effect:  Present; Residual cancer with evident tumor regression, but more than single cells or rare small groups of cancer cells (partial response, score 2)   5  Lymph-vascular invasion:  Not identified   6  Perineural invasion:  Not identified   7  Tumor deposits: Not identified   8  Type of polyp in which invasive carcinoma arose:  Tubular adenoma   9  Regional lymph nodes (pN0):  27 benign pericolorectal lymph node sample, negative for metastatic carcinoma  10  Additional pathologic findings:  Diverticulosis coli  11  Ancillary Studies:  *  Immunohistochemical stains performed with appropriate controls show the primary rectal tumor to be positive for CK20 and CDX-2 and negative for CK 7, PAX-8, p53 (wild type expression), ER and WT-1, supporting a diagnosis of primary colorectal origin  12  8th Ed AJCC Stage:  at least Stage  IIA - ypT3, pN0, G2         Primary Tumor of the Ovary/Fallopian Tube/Peritoneum, Staging Summary (includes Specimen A to C of this case):  1  Procedure:  Hysterectomy, bilateral salpingo-oophorectomy, omentectomy  2  Hysterectomy type:  Radical hysterectomy  3  Specimen integrity:      - Right ovary:  Intact      - Left ovary:  Intact       - Right fallopian tube: Intact      - Left fallopian tube: Intact  4  Tumor site:  Ovarian and fallopian tube surfaces and uterine and colonic serosa  5   Ovarian surface involvement: Present  6  Fallopian tube surface involvement:  Present  7  Tumor size:  Largest focus measures 1 8 cm (uterine serosa) (see note)  8  Histologic type:  Low-grade serous carcinoma   9  Histologic grade:  Low grade  10  Implant:  Present  11  Other tissue/organ involvement:  Bilateral ovaries and fallopian tubes, uterine serosa, sigmoid colon serosa, and omentum  12  Largest extrapelvic peritoneal focus: 2 5 cm (macroscopically identidfied)    - Specify site:  Omentum  13  Peritoneal/ascetic fluid:  Not performed   14  Pleural fluid:  Not performed  15  Treatment effect:  No known prior therapy  16  Regional lymph nodes:    - No regional uterine lymph nodes submitted or found    - 27 benign pericolorectal lymph nodes sampled, negative for metastatic carcinoma  17  Pathologic stage classification (pTNM, AJCC 8th edition): pT3c, pNX, Low Grade  18  FIGO stage (2015 FIGO cancer report): IIIC  19  Additional pathologic findings:  N/A  20  Ancillary studies:  Immunohistochemical stains performed with appropriate controls show the tumor cells to be positive for CK7, PAX-8, ER, and WT-1 with wild-type expression of p53 and negative for CK20 and CDX-2 supporting the diagnosis  Electronically signed by Katina Melvin MD on 9/25/2018 at 12:54 PM                  Case Report   Surgical Pathology Report                         Case: [id number]                                    Authorizing Provider: Jackelyn Whitney MD      Collected:           03/28/2018 1130               Pathologist:           Angelina Peña MD             Received:            03/29/2018 0942               Specimen:    Rectum, Rectal cancer/mass biopsies                                                        Final Diagnosis   A   Rectal mass (biopsy):  - 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      Comment:   - In the context of a rectal mass, repeat biopsy and/or excision may be indicated to exclude a higher grade lesion  Electronically signed by Socrates Ford MD on 3/30/2018 at  2:36 PM         Case Report   Surgical Pathology Report                         Case: [id number]                                    Authorizing Provider: Yasemin Mejias MD          Collected:           03/09/2018 0902               Ordering Location:     [facility]   Received:            03/12/2018 0904                                      Center                                                                        Pathologist:           Becky Mulligan DO                                                             Specimens:   A) - Colon, polyp splenic flexure/cold snare                                                         B) - Colon, bx distal rectal mass                                                          Final Diagnosis   A  Colon, splenic flexure polyp, biopsy:  - Tubulovillous adenoma, negative for high-grade dysplasia      B  Rectum, mass, biopsy:  - At least high grade dysplasia/intramucosal carcinoma arising in a tubulovillous adenoma, suspicious for invasion       Intradepartmental consultation is in agreement with the above diagnosis (AT)     Interpretation performed at [facility], [facility] [place] [address] [id number]  Report faxed to Dr Jayda Cody on 3/13/18 @ 10:55 AM   Electronically signed by Jose Troncoso DO on 3/13/2018 at 10:54 AM

## 2019-04-01 ENCOUNTER — TRANSCRIBE ORDERS (OUTPATIENT)
Dept: ADMINISTRATIVE | Facility: HOSPITAL | Age: 64
End: 2019-04-01

## 2019-04-01 ENCOUNTER — HOSPITAL ENCOUNTER (OUTPATIENT)
Dept: RADIOLOGY | Facility: HOSPITAL | Age: 64
Discharge: HOME | End: 2019-04-01
Attending: INTERNAL MEDICINE
Payer: MEDICARE

## 2019-04-01 DIAGNOSIS — C20 RECTAL CANCER (HCC): ICD-10-CM

## 2019-04-01 PROCEDURE — 71260 CT THORAX DX C+: CPT

## 2019-04-01 PROCEDURE — 74177 CT ABD & PELVIS W/CONTRAST: CPT

## 2019-04-01 RX ADMIN — IOHEXOL 100 ML: 350 INJECTION, SOLUTION INTRAVENOUS at 08:36

## 2019-04-05 DIAGNOSIS — C56.9 MALIGNANT NEOPLASM OF OVARY, UNSPECIFIED LATERALITY (HCC): Primary | ICD-10-CM

## 2019-04-11 ENCOUNTER — TELEPHONE (OUTPATIENT)
Dept: GYNECOLOGIC ONCOLOGY | Facility: CLINIC | Age: 64
End: 2019-04-11

## 2019-05-09 ENCOUNTER — TELEPHONE (OUTPATIENT)
Dept: HEMATOLOGY ONCOLOGY | Facility: CLINIC | Age: 64
End: 2019-05-09

## 2019-05-14 DIAGNOSIS — C56.9 MALIGNANT NEOPLASM OF OVARY, UNSPECIFIED LATERALITY (HCC): ICD-10-CM

## 2019-05-15 RX ORDER — ANASTROZOLE 1 MG/1
1 TABLET ORAL DAILY
Qty: 90 TABLET | Refills: 1 | Status: SHIPPED | OUTPATIENT
Start: 2019-05-15 | End: 2019-08-24 | Stop reason: SDUPTHER

## 2019-05-22 ENCOUNTER — OFFICE VISIT (OUTPATIENT)
Dept: PODIATRY | Facility: CLINIC | Age: 64
End: 2019-05-22
Payer: MEDICARE

## 2019-05-22 DIAGNOSIS — M79.671 PAIN IN BOTH FEET: ICD-10-CM

## 2019-05-22 DIAGNOSIS — I70.209 ATHEROSCLEROTIC PERIPHERAL VASCULAR DISEASE (HCC): Primary | ICD-10-CM

## 2019-05-22 DIAGNOSIS — B35.1 ONYCHOMYCOSIS: ICD-10-CM

## 2019-05-22 DIAGNOSIS — M79.672 PAIN IN BOTH FEET: ICD-10-CM

## 2019-05-22 DIAGNOSIS — L60.0 INGROWN TOENAIL: ICD-10-CM

## 2019-05-22 PROCEDURE — 11721 DEBRIDE NAIL 6 OR MORE: CPT | Performed by: PODIATRIST

## 2019-05-28 ENCOUNTER — APPOINTMENT (OUTPATIENT)
Dept: LAB | Facility: CLINIC | Age: 64
End: 2019-05-28
Payer: MEDICARE

## 2019-05-28 DIAGNOSIS — C56.9 MALIGNANT NEOPLASM OF OVARY, UNSPECIFIED LATERALITY (HCC): ICD-10-CM

## 2019-05-28 LAB — CANCER AG125 SERPL-ACNC: 4.2 U/ML (ref 0–30)

## 2019-05-28 PROCEDURE — 36415 COLL VENOUS BLD VENIPUNCTURE: CPT

## 2019-05-28 PROCEDURE — 86304 IMMUNOASSAY TUMOR CA 125: CPT

## 2019-05-29 ENCOUNTER — OFFICE VISIT (OUTPATIENT)
Dept: GYNECOLOGIC ONCOLOGY | Facility: CLINIC | Age: 64
End: 2019-05-29
Payer: MEDICARE

## 2019-05-29 VITALS
HEIGHT: 64 IN | WEIGHT: 238 LBS | SYSTOLIC BLOOD PRESSURE: 132 MMHG | HEART RATE: 70 BPM | RESPIRATION RATE: 16 BRPM | BODY MASS INDEX: 40.63 KG/M2 | DIASTOLIC BLOOD PRESSURE: 78 MMHG

## 2019-05-29 DIAGNOSIS — Z12.39 ENCOUNTER FOR OTHER SCREENING FOR MALIGNANT NEOPLASM OF BREAST: ICD-10-CM

## 2019-05-29 DIAGNOSIS — Z85.43 ENCOUNTER FOR FOLLOW-UP SURVEILLANCE OF OVARIAN CANCER: ICD-10-CM

## 2019-05-29 DIAGNOSIS — N93.9 VAGINAL BLEEDING: Primary | ICD-10-CM

## 2019-05-29 DIAGNOSIS — Z08 ENCOUNTER FOR FOLLOW-UP SURVEILLANCE OF OVARIAN CANCER: ICD-10-CM

## 2019-05-29 DIAGNOSIS — Z85.048 HISTORY OF RECTAL CANCER: ICD-10-CM

## 2019-05-29 DIAGNOSIS — B37.2 CANDIDAL INTERTRIGO: ICD-10-CM

## 2019-05-29 PROCEDURE — 88305 TISSUE EXAM BY PATHOLOGIST: CPT | Performed by: PATHOLOGY

## 2019-05-29 PROCEDURE — 99214 OFFICE O/P EST MOD 30 MIN: CPT | Performed by: NURSE PRACTITIONER

## 2019-05-29 RX ORDER — NYSTATIN 100000 [USP'U]/G
POWDER TOPICAL 4 TIMES DAILY
Qty: 30 G | Refills: 1 | Status: SHIPPED | OUTPATIENT
Start: 2019-05-29 | End: 2019-11-25 | Stop reason: SDUPTHER

## 2019-06-05 ENCOUNTER — HOSPITAL ENCOUNTER (OUTPATIENT)
Dept: RADIOLOGY | Facility: HOSPITAL | Age: 64
Discharge: HOME | End: 2019-06-05
Payer: MEDICARE

## 2019-06-05 VITALS — HEIGHT: 64 IN | BODY MASS INDEX: 40.63 KG/M2 | WEIGHT: 238 LBS

## 2019-06-05 DIAGNOSIS — Z12.39 ENCOUNTER FOR OTHER SCREENING FOR MALIGNANT NEOPLASM OF BREAST: ICD-10-CM

## 2019-06-05 PROCEDURE — 77067 SCR MAMMO BI INCL CAD: CPT

## 2019-07-01 DIAGNOSIS — K28.9 MARGINAL ULCER: ICD-10-CM

## 2019-07-01 RX ORDER — OMEPRAZOLE 20 MG/1
20 CAPSULE, DELAYED RELEASE ORAL DAILY
Qty: 90 CAPSULE | Refills: 3 | Status: SHIPPED | OUTPATIENT
Start: 2019-07-01 | End: 2020-07-15

## 2019-07-02 ENCOUNTER — OFFICE VISIT (OUTPATIENT)
Dept: HEMATOLOGY ONCOLOGY | Facility: MEDICAL CENTER | Age: 64
End: 2019-07-02
Payer: MEDICARE

## 2019-07-02 VITALS
HEIGHT: 64 IN | SYSTOLIC BLOOD PRESSURE: 112 MMHG | HEART RATE: 77 BPM | TEMPERATURE: 98.5 F | RESPIRATION RATE: 18 BRPM | BODY MASS INDEX: 40.97 KG/M2 | DIASTOLIC BLOOD PRESSURE: 70 MMHG | WEIGHT: 240 LBS | OXYGEN SATURATION: 97 %

## 2019-07-02 DIAGNOSIS — Z85.048 HISTORY OF RECTAL CANCER: Primary | ICD-10-CM

## 2019-07-02 PROCEDURE — 99213 OFFICE O/P EST LOW 20 MIN: CPT | Performed by: INTERNAL MEDICINE

## 2019-07-02 NOTE — PROGRESS NOTES
Stuart Sandoval  1955  [facility] [id number]    DISCUSSION/SUMMARY:    69-year-old female previously found to have high-grade dysplasia/intramucosal lesion arising in a tubulovillous adenoma  Issues:    Rectal cancer  Final stage is IIA (ypT3 pN0 G2)  Patient received neoadjuvant concurrent treatment and then underwent resection  Mrs Tani Hicks had postoperative complications with wound healing and fistula formation - eventually patient improved  Presently Mrs Reardon states feeling okay, about the same as before  Recent CT scans did not demonstrate any evidence for disease progression/recurrence  The plan is to continue with surveillance  Patient is to return in 4 months with blood work before  Fistula  Fistula is less/better than before  Patient follows up with Colorectal surgery as directed  Ovarian cancer, IIIC  As discussed previously, at the time of surgery, patient was found to have an incidental low-grade serous ovarian carcinoma with omental nodules greater than 2 cm grossly visible  Patient is followed by GYN Oncology and was started on anastrozole (NCCN 2 [id number] low-grade serous, stage II-IV tumors, treatment options include primary hormonal therapy (category 2B)  Pain control - not an issue recently  Patient will continue to monitor  Anemia  Etiology is most likely multifactorial (prior chemotherapy, wound, etc)  Patient's color looks quite good presently  Repeat blood work has been ordered for the next office visit  Cardiac issues  Patient has valvular problems, specifics are not presently available  Patient is following with her cardiologist     Patient is to return in 4 months  Mrs Tani Hicks knows to call if she has any other oncology questions or concerns  Carefully review your medication list and verify that the list is accurate and up-to-date   Please call the hematology/oncology office if there are medications missing from the list, medications on the list that you are not currently taking or if there is a dosage or instruction that is different from how you're taking that medication  Patient goals and areas of care:   Rectal cancer surveillance  Barriers to care: Slow healing wound  Patient is able to self-care   ___________________________________________________________________________________________________    Chief Complaint   Patient presents with    Follow-up     Rectal cancer on surveillance     History of Present Illness:    63-year-old female previously referred for the above  Previously Mrs Gilbert Crockett had gastric bypass  Patient was recently seen by GI because of blood in the stools  Additional workup included a colonoscopy which demonstrated a rectal lesion  Patient recently completed concurrent chemotherapy (Xeloda) with radiation  Mrs Edmundo Recinos subsequently went onto an APR  Final pathology results are listed below; patient was also found to have IIIC low-grade serous ovarian carcinoma  Postoperatively, patient had issues with slow wound closure  Mrs Edmundo Recinos states feeling okay, about the same as before  Fatigue is basically gone  Activities are close to baseline  No pain control issues  No problems with the colostomy  No problems with the wound, very minimal discharge  No bleeding  No other GI,  or GYN issues  Appetite is good, weight is stable  No shortness of breath or dyspnea on exertion  No headaches or dizziness  No problems with the Arimidex  Generalized body aches are the same as before  Review of Systems   Constitutional: Positive for fatigue  HENT: Negative  Eyes: Negative  Respiratory: Negative  Cardiovascular: Negative  Gastrointestinal: Negative for blood in stool, constipation and diarrhea  Endocrine: Negative  Genitourinary: Negative  Musculoskeletal: Positive for arthralgias  Skin: Negative  Allergic/Immunologic: Negative  Neurological: Negative  Hematological: Negative  Psychiatric/Behavioral: Negative  All other systems reviewed and are negative       Patient Active Problem List   Diagnosis    Morbid obesity due to excess calories (HCC)    Postgastrectomy malabsorption    S/P gastric bypass    Marginal ulcer    Atherosclerotic peripheral vascular disease ([facility] )    Onychomycosis    History of rectal cancer    Colostomy care ([place] [address] )    History of ovarian cancer    Status post bariatric surgery    Pyelonephritis    History of ovarian cancer    Candidal intertrigo    Vaginal bleeding    Encounter for other screening for malignant neoplasm of breast     Past Medical History:   Diagnosis Date    Anemia     Arthritis     Cancer ([facility] )     rectal    Chronic lower back pain     Chronic pain disorder     back pain    Colon cancer ([place] [address] ) 2018    Diabetes mellitus ([place] [address] )     Endometrial cancer ([place] [address] ) 2018    GERD (gastroesophageal reflux disease)     History of chemotherapy     History of MRSA infection     Morbid obesity ([facility] )     Ovarian cancer ([facility] ) 2018    Rectal cancer ([place] [address] )     Spinal stenosis     Systolic murmur     Unsteady gait     uses walker    Wears dentures     Wears glasses      Ob/gyn:  No recent mammogram -patient's choice, no post menopausal bleeding    Past Surgical History:   Procedure Laterality Date    ABDOMINAL PERINEAL BOWEL RESECTION W/ ILEOANAL POUCH N/A 2018    Procedure: LAPAROSCOPIC HAND ASSIST ABDOMINOPERINEAL RESECTION,  POSTERIOR VAGINECTOMY, OMENTECTOMY;  Surgeon: Therese Mo MD;  [facility];  Service: Colorectal    ABDOMINAL SURGERY      abscess removed from abdomen and right thigh, a hole in thigh closed by plastic surgeon    ABSCESS DRAINAGE      abd, (R) leg, (L) leg     SECTION       SECTION      CYSTOSCOPY N/A 2018    Procedure: CYSTOSCOPY;  Surgeon: Justine Alves MD; [facility];  Service: Gynecology Oncology    ESOPHAGOGASTRODUODENOSCOPY N/A 7/18/2016    Procedure: ESOPHAGOGASTRODUODENOSCOPY (EGD); Surgeon: Kirti Pierson MD;  [facility];  Service:     ESOPHAGOGASTRODUODENOSCOPY N/A 3/30/2016    Procedure: ESOPHAGOGASTRODUODENOSCOPY (EGD); Surgeon: Kirti Pierson MD;  [facility]; Service:     EYE SURGERY      laser eye surgery    HYSTERECTOMY N/A 9/6/2018    Procedure: RADICAL HYSTERECTOMY TOTAL ABDOMINAL (ALEXANDRA)  BSO;  Surgeon: Alli Ray MD;  [facility];  Service: Gynecology Oncology    JOINT REPLACEMENT Left 2017    hip    JOINT REPLACEMENT Right 2017    hip    OOPHORECTOMY Bilateral     ME COLONOSCOPY FLX DX W/COLLJ SPEC WHEN PFRMD N/A 3/9/2018    Procedure: COLONOSCOPY;  Surgeon: Van Cai MD;  Location: [place] [address] GI LAB; Service: Gastroenterology    ME COLONOSCOPY FLX DX W/COLLJ [address] WHEN PFRMD N/A 9/5/2018    Procedure: COLONOSCOPY;  Surgeon: Mohini Parker MD;  [facility]; Service: Colorectal    ME ESOPHAGOGASTRODUODENOSCOPY TRANSORAL DIAGNOSTIC N/A 2/2/2018    Procedure: ESOPHAGOGASTRODUODENOSCOPY (EGD); Surgeon: Van Cai MD;  [facility];   Service: Gastroenterology    ME LAP GASTRIC BYPASS/SOLEDAD-EN-Y N/A 7/18/2016    Procedure: BYPASS GASTRIC  SOLEDAD-EN-Y LAPAROSCOPIC;  Surgeon: Kirti Pierson MD;  [facility];  Service: Bariatrics    ME LAP, SURG COLOSTOMY N/A 9/6/2018    Procedure: PERMANENT END COLOSTOMY;  Surgeon: Mohini Parker MD;  [facility];  Service: Colorectal    ME LAP, SURG PROCTECTOMY W COLOSTOMY N/A 9/6/2018    Procedure: PROCTECTOMY;  Surgeon: Mohini Parker MD;  [facility];  Service: Colorectal    TUBAL LIGATION       Family History   Adopted: Yes   Problem Relation Age of Onset    Leukemia Mother     Heart disease Father     Coronary artery disease Father     Diabetes Father     No Known Problems Sister     No Known Problems Brother     Diabetes Son     No Known Problems Brother     No Known Problems Brother     No Known Problems Sister     No Known Problems Sister     Family history:   Mother  of leukemia (NOS), father  from heart disease (NOS), 2 children 1 with diabetes, no known familial or genetic diseases, no family history of GI malignancies    Social History     Socioeconomic History    Marital status: Single     Spouse name: Not on file    Number of children: 2    Years of education: Not on file    Highest education level: Not on file   Occupational History    Not on file   Social Needs    Financial resource strain: Not on file    Food insecurity:     Worry: Not on file     Inability: Not on file    Transportation needs:     Medical: Not on file     Non-medical: Not on file   Tobacco Use    Smoking status: Former Smoker     Packs/day:      Years: 25      Pack years: 25      Last attempt to quit: 3/30/2006     Years since quittin 2    Smokeless tobacco: Never Used   Substance and Sexual Activity    Alcohol use: No    Drug use: Yes     Types: Oxycodone     Comment: Percocet for lower back pain prn    Sexual activity: Not on file   Lifestyle    Physical activity:     Days per week: Not on file     Minutes per session: Not on file    Stress: Not on file   Relationships    Social connections:     Talks on phone: Not on file     Gets together: Not on file     Attends Congregational service: Not on file     Active member of club or organization: Not on file     Attends meetings of clubs or organizations: Not on file     Relationship status: Not on file    Intimate partner violence:     Fear of current or ex partner: Not on file     Emotionally abused: Not on file     Physically abused: Not on file     Forced sexual activity: Not on file   Other Topics Concern    Not on file   Social History Narrative    Not on file       Current Outpatient Medications:     anastrozole (ARIMIDEX) 1 mg tablet, Take 1 tablet (1 mg total) by mouth daily, Disp: 90 tablet, Rfl: 1    Calcium-Vitamin D 500-125 MG-UNIT TABS, Take 1 tablet by mouth 2 (two) times a day  , Disp: , Rfl:     ferrous sulfate 324 (65 Fe) mg, Take 1 tablet (324 mg total) by mouth daily before breakfast, Disp: 90 tablet, Rfl: 0    Multiple Vitamins-Minerals (BARIATRIC FUSION) CHEW, Chew 1 tablet daily  , Disp: , Rfl:     nystatin (MYCOSTATIN) powder, Apply topically 4 (four) times a day, Disp: 30 g, Rfl: 1    omeprazole (PriLOSEC) 20 mg delayed release capsule, Take 1 capsule (20 mg total) by mouth daily, Disp: 90 capsule, Rfl: 3    oxyCODONE (ROXICODONE) 10 MG TABS, Take 10 mg by mouth every 6 (six) hours as needed  , Disp: , Rfl:     Allergies   Allergen Reactions    Tramadol Other (See Comments)     Dizzy         Vitals:    07/02/19 0838   BP: 112/70   Pulse: 77   Resp: 18   Temp: 98 5 °F (36 9 °C)   SpO2: 97%     Physical Exam   Constitutional: She is oriented to person, place, and time  She appears well-developed and well-nourished  Well-nourished female, no respiratory distress   HENT:   Head: Normocephalic and atraumatic  Right Ear: External ear normal    Left Ear: External ear normal    Nose: Nose normal    Mouth/Throat: Oropharynx is clear and moist    Eyes: Pupils are equal, round, and reactive to light  Conjunctivae and EOM are normal    Neck: Normal range of motion  Neck supple  Cardiovascular: Normal rate, regular rhythm, normal heart sounds and intact distal pulses  Pulmonary/Chest: Effort normal and breath sounds normal    Few scattered rhonchi, otherwise good air entry bilaterally   Abdominal: Soft  Bowel sounds are normal    Left lower quadrant colostomy in place    Stool in back, nontender, +bowel sounds, well-healed suture lines, obese, cannot palpate liver or spleen   Musculoskeletal:   No pain or tenderness with palpation of joints, muscles or bones, good range of motion in upper extremities, decreased range of motion in lower extremities but equal   Neurological: She is alert and oriented to person, place, and time  She has normal reflexes  Skin: Skin is warm  Warm, moist, good color, no petechiae or ecchymoses   Psychiatric: She has a normal mood and affect  Her behavior is normal  Judgment and thought content normal    Extremities:   0-1 +bilateral lower extremity edema, no cords, pulses are 1+  Lymphatics:  No adenopathy in the neck, supraclavicular region, axilla and groin bilaterally  Rectal wound deferred    Labs    05/28/2019  = 4 2    03/26/2019 BUN = 15 creatinine = 0 76 calcium = 8 9 AST = 11 ALT = 11 alkaline phosphatase = 69 total bilirubin = 0 32    12/11/2018 WBC = 5 2 hemoglobin = 9 7 hematocrit = 34 MCV = 74 RDW = 17 2 platelet = 283  45/18/2769 WBC = 5 8 hemoglobin = 8 7 hematocrit = 31 MCV = 80 platelet = 508 CA -609 = 9 6  09/11/2018 WBC = 5 6 hemoglobin = 7 7 hematocrit = 25 4 MCV = 83 platelet = 429 BUN = 7 creatinine = 0 50  06/13/2018 WBC = 5 39 hemoglobin = 10 4 hematocrit = 35 MCV = 80 platelet = 234 BUN = 13 creatinine = 0 76 [facility]  05/30/2018 BUN = 11 creatinine = 0 70 [facility] WBC = 4 97 hemoglobin = 10 1 hematocrit = 34 MCV = 80 RDW = 18 6 platelet = 954 neutrophil = 68%  05/17/2018 WBC = 4 4 hemoglobin = 10 6 hematocrit = 34 MCV = 75 platelet = 561 neutrophil = 67% BUN = 19 creatinine = 0 65 [facility]  05/08/2018 WBC = 6 7 hemoglobin = 10 7 hematocrit = 35 MCV = 75 platelet = 107 BUN = 17 creatinine = 0 62 [facility]  03/12/2018 CEA = 22 6 = high    Imaging    04/01/2019 CT scan of the chest and abdomen pelvis    No CT evidence of metastatic disease within the chest abdomen or pelvis  Stable postoperative changes  4/6/18 MRI pelvis rectal cancer staging    Low rectal cancer, 5 cm in length, circumferential around the rectal wall   (Series 8 images 19 through 33 )  Anteriorly, there are multiple areas where there is transmural tumor extension into the mesorectal fat making this at least a T3c lesion  On Series 8 image 31, tumor also abuts the mesorectal fascia making this CRM positive  At this point it is also   immediately adjacent to the vagina, therefore this may be a T4 lesion  There are 2 high risk perirectal nodes, and 1 low risk perirectal nodes  01/31/2018 ultrasound right upper quadrant    1  Layering gallbladder sludge  No acute cholecystitis or biliary ductal obstruction  2   Hepatomegaly  3   Fluid-filled stomach  1/27/18 CT scan of the abdomen/pelvis    1  Prior Juan-en-Y gastric bypass with distention of the excluded stomach  2   No evidence of acute abnormality in the abdomen or pelvis      Pathology    Case Report   Surgical Pathology Report                         Case: [id number]                                    Authorizing Provider: Meryle Armour, CRNP       Collected:           05/29/2019 0922               Ordering Location:     [facility] Received:            05/29/2019 0922                                      Oncology [place]                                                            Pathologist:           Sue Vang MD                                                                Specimen:    Vaginal, vaginal polyp                                                                     Final Diagnosis   A   Vagina, polyp (biopsy):  - Acutely and chronically inflamed granulation tissue    Electronically signed by Sue Vang MD on 5/30/2019 at  1:18 PM         Case Report   Surgical Pathology Report                         Case: [id number]                                    Authorizing Provider: Sindi Cary MD      Collected:           09/06/2018 1010               Ordering Location:     [address]      Received:            09/06/2018 [facility]                                      Hospital Operating Room                                                       Pathologist:           Osmar Last MD                                                         Specimens:   A) - Soft Tissue, Other, EPIPLOIC NODULE                                                             B) - Rectum, enbloc rectum, sigmoid, anus, uterus, bso, cervix, posterior vaginal                    wall                                                                                                 C) - Omentum                                                                               Addendum   Additional immunohistochemical stains performed with appropriate controls on a selected portion of serous carcinoma involving omental tissue (block C1) show the following results:  Estrogen receptor: Positive  Progesterone receptor: Negative   Addendum electronically signed by Stefania Colón MD on 9/27/2018 at  1:40 PM   Final Diagnosis   A  Soft tissue, epiploic nodule, biopsy:  -  Portion of nodular fibroadipose tissue with fat necrosis and dystrophic calcifications  -  Negative for metastatic carcinoma  -  Immunohistochemical stain performed with appropriate control for keratin AE1/3 is negative for epithelial elements, supporting the diagnosis      B  Rectum, sigmoid colon, anus, uterus,cervix, bilateral ovaries and fallopian tubes and posterior vaginal wall; en bloc resection:  -  Invasive moderately differentiated adenocarcinoma of rectum (see first synoptic report)  -  Low grade serous carcinoma involving left ovary, left fallopian tube, uterine serosa and colonic serosa (see second synoptic report)  -  Separate portion of colon (consistent with sigmoid) with diverticulosis coli showing focal serosal implant of low-grade serous carcinoma  -  Uterus showing benign inactive/atrophic endometrium with metaplastic change, benign endometrial polyp and rare cytologic atypia consistent with radiation effect  -  Cervix with mild glandular atypia, favor reactive (see note)    -  Benign uterine myometrium with one intramural benign leiomyoma, negative for atypia or malignancy  -  Benign anal skin and dentate line with mild reactive change, negative for involvement by carcinoma  -  Benign vaginal wall mucosa with thinning, chronic inflammation, and surface erosion most suggestive of prior therapy effect, negative for involvement by carcinoma  -  27 benign pericolorectal lymph nodes identified, negative for metastatic carcinoma      C  Omentum, omentectomy:  -   Low grade serous carcinoma         COLORECTAL CARCINOMA TUMOR STAGING SUMMARY (includes specimen A-C of this case):  1  Specimen identification:     - Specimen:  Rectum, sigmoid colon, anus, uterus,cervix, bilateral ovaries and fallopian tubes and posterior vaginal wall   2  Tumor:     - Tumor site:  Rectum     - Tumor size:  Up to 3 3 cm     - Macroscopic tumor perforation:  Not identified     - Tumor location:  Below the peritoneal reflection        - Macroscopic intactness of mesorectum:  Intact     - Histologic Type:   Adenocarcinoma      - Histologic Grade: Moderately differentiated (G2)     - Microscopic Tumor Extension (ypT3): Tumor invades through muscularis propria into pericolorectal soft tissues     - Tumor budding (only needed in polyps, Stage I or II cancers):  Not identified   3  Margins (specify distance for nearest radial margin on RECTAL tumors only):     - Proximal Margin:  Negative for carcinoma     - Distal Margin:  Negative for carcinoma     - Circumferential (Radial) or Mesenteric Margin:  Negative for carcinoma (0 6cm)     - Other Margins:  Vaginal margin negative for carcinoma   4  Treatment effect:  Present; Residual cancer with evident tumor regression, but more than single cells or rare small groups of cancer cells (partial response, score 2)   5  Lymph-vascular invasion:  Not identified   6  Perineural invasion:  Not identified   7  Tumor deposits: Not identified   8  Type of polyp in which invasive carcinoma arose:  Tubular adenoma   9   Regional lymph nodes (pN0):  27 benign pericolorectal lymph node sample, negative for metastatic carcinoma  10  Additional pathologic findings:  Diverticulosis coli  11  Ancillary Studies:  *  Immunohistochemical stains performed with appropriate controls show the primary rectal tumor to be positive for CK20 and CDX-2 and negative for CK 7, PAX-8, p53 (wild type expression), ER and WT-1, supporting a diagnosis of primary colorectal origin  12  8th Ed AJCC Stage:  at least Stage  IIA - ypT3, pN0, G2         Primary Tumor of the Ovary/Fallopian Tube/Peritoneum, Staging Summary (includes Specimen A to C of this case):  1  Procedure:  Hysterectomy, bilateral salpingo-oophorectomy, omentectomy  2  Hysterectomy type:  Radical hysterectomy  3  Specimen integrity:      - Right ovary:  Intact      - Left ovary:  Intact       - Right fallopian tube: Intact      - Left fallopian tube: Intact  4  Tumor site:  Ovarian and fallopian tube surfaces and uterine and colonic serosa  5  Ovarian surface involvement:  Present  6  Fallopian tube surface involvement:  Present  7  Tumor size:  Largest focus measures 1 8 cm (uterine serosa) (see note)  8  Histologic type:  Low-grade serous carcinoma   9  Histologic grade:  Low grade  10  Implant:  Present  11  Other tissue/organ involvement:  Bilateral ovaries and fallopian tubes, uterine serosa, sigmoid colon serosa, and omentum  12  Largest extrapelvic peritoneal focus: 2 5 cm (macroscopically identidfied)    - Specify site:  Omentum  13  Peritoneal/ascetic fluid:  Not performed   14  Pleural fluid:  Not performed  15  Treatment effect:  No known prior therapy  16  Regional lymph nodes:    - No regional uterine lymph nodes submitted or found    - 27 benign pericolorectal lymph nodes sampled, negative for metastatic carcinoma  17  Pathologic stage classification (pTNM, AJCC 8th edition): pT3c, pNX, Low Grade  18  FIGO stage (2015 FIGO cancer report): IIIC  19  Additional pathologic findings:  N/A  20   Ancillary studies: Immunohistochemical stains performed with appropriate controls show the tumor cells to be positive for CK7, PAX-8, ER, and WT-1 with wild-type expression of p53 and negative for CK20 and CDX-2 supporting the diagnosis  Electronically signed by Sarita Urena MD on 9/25/2018 at 12:54 PM                  Case Report   Surgical Pathology Report                         Case: [id number]                                    Authorizing Provider: Fransico Mcbride MD      Collected:           03/28/2018 1130               Pathologist:           Quin Farmer MD             Received:            03/29/2018 0942               Specimen:    Rectum, Rectal cancer/mass biopsies                                                        Final Diagnosis   A  Rectal mass (biopsy):  - 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      Comment:   - In the context of a rectal mass, repeat biopsy and/or excision may be indicated to exclude a higher grade lesion  Electronically signed by Quin Farmer MD on 3/30/2018 at  2:36 PM         Case Report   Surgical Pathology Report                         Case: [id number]                                    Authorizing Provider: Amos Henriquez MD          Collected:           03/09/2018 0902               Ordering Location:     [facility]   Received:            03/12/2018 0904                                      Center                                                                        Pathologist:           Becky Mulligan DO                                                             Specimens:   A) - Colon, polyp splenic flexure/cold snare                                                         B) - Colon, bx distal rectal mass                                                          Final Diagnosis   A  Colon, splenic flexure polyp, biopsy:  - Tubulovillous adenoma, negative for high-grade dysplasia      B   Rectum, mass, biopsy:  - At least high grade dysplasia/intramucosal carcinoma arising in a tubulovillous adenoma, suspicious for invasion       Intradepartmental consultation is in agreement with the above diagnosis (AT)     Interpretation performed at [facility], [address] [address]  Report faxed to Dr Vinita Bronson on 3/13/18 @ 10:55 AM   Electronically signed by Sigrid Sibley DO on 3/13/2018 at 10:54 AM

## 2019-08-24 DIAGNOSIS — C56.9 MALIGNANT NEOPLASM OF OVARY, UNSPECIFIED LATERALITY (HCC): ICD-10-CM

## 2019-08-26 RX ORDER — ANASTROZOLE 1 MG/1
TABLET ORAL
Qty: 90 TABLET | Refills: 2 | Status: SHIPPED | OUTPATIENT
Start: 2019-08-26 | End: 2019-12-12 | Stop reason: SDUPTHER

## 2019-09-03 ENCOUNTER — APPOINTMENT (EMERGENCY)
Dept: RADIOLOGY | Facility: HOSPITAL | Age: 64
End: 2019-09-03
Payer: MEDICARE

## 2019-09-03 ENCOUNTER — HOSPITAL ENCOUNTER (OUTPATIENT)
Facility: HOSPITAL | Age: 64
Setting detail: OBSERVATION
Discharge: HOME | End: 2019-09-03
Attending: EMERGENCY MEDICINE | Admitting: FAMILY MEDICINE
Payer: MEDICARE

## 2019-09-03 VITALS
DIASTOLIC BLOOD PRESSURE: 79 MMHG | WEIGHT: 244 LBS | SYSTOLIC BLOOD PRESSURE: 126 MMHG | RESPIRATION RATE: 22 BRPM | HEART RATE: 62 BPM | TEMPERATURE: 97.9 F | OXYGEN SATURATION: 97 % | BODY MASS INDEX: 41.88 KG/M2

## 2019-09-03 DIAGNOSIS — R07.9 CHEST PAIN: Primary | ICD-10-CM

## 2019-09-03 LAB
ALBUMIN SERPL BCP-MCNC: 3.1 G/DL (ref 3.5–5)
ALP SERPL-CCNC: 61 U/L (ref 46–116)
ALT SERPL W P-5'-P-CCNC: 11 U/L (ref 12–78)
ANION GAP SERPL CALCULATED.3IONS-SCNC: 8 MMOL/L (ref 4–13)
APTT PPP: 30 SECONDS (ref 25–32)
AST SERPL W P-5'-P-CCNC: 11 U/L (ref 5–45)
ATRIAL RATE: 63 BPM
BACTERIA UR QL AUTO: ABNORMAL /HPF
BASOPHILS # BLD AUTO: 0.03 THOUSANDS/ΜL (ref 0–0.1)
BASOPHILS NFR BLD AUTO: 1 % (ref 0–1)
BILIRUB SERPL-MCNC: 0.6 MG/DL (ref 0.2–1)
BILIRUB UR QL STRIP: NEGATIVE
BUN SERPL-MCNC: 19 MG/DL (ref 5–25)
CALCIUM SERPL-MCNC: 9.1 MG/DL (ref 8.3–10.1)
CHLORIDE SERPL-SCNC: 103 MMOL/L (ref 100–108)
CLARITY UR: CLEAR
CO2 SERPL-SCNC: 27 MMOL/L (ref 21–32)
COLOR UR: ABNORMAL
CREAT SERPL-MCNC: 0.81 MG/DL (ref 0.6–1.3)
DEPRECATED D DIMER PPP: 400 NG/ML (FEU) (ref 190–520)
EOSINOPHIL # BLD AUTO: 0.11 THOUSAND/ΜL (ref 0–0.61)
EOSINOPHIL NFR BLD AUTO: 2 % (ref 0–6)
ERYTHROCYTE [DISTWIDTH] IN BLOOD BY AUTOMATED COUNT: 13.6 % (ref 11.6–15.1)
GFR SERPL CREATININE-BSD FRML MDRD: 78 ML/MIN/1.73SQ M
GLUCOSE SERPL-MCNC: 89 MG/DL (ref 65–140)
GLUCOSE UR STRIP-MCNC: NEGATIVE MG/DL
HCT VFR BLD AUTO: 45.1 % (ref 34.8–46.1)
HGB BLD-MCNC: 14.1 G/DL (ref 11.5–15.4)
HGB UR QL STRIP.AUTO: NEGATIVE
IMM GRANULOCYTES # BLD AUTO: 0.04 THOUSAND/UL (ref 0–0.2)
IMM GRANULOCYTES NFR BLD AUTO: 1 % (ref 0–2)
INR PPP: 0.93 (ref 0.91–1.09)
KETONES UR STRIP-MCNC: NEGATIVE MG/DL
LEUKOCYTE ESTERASE UR QL STRIP: ABNORMAL
LYMPHOCYTES # BLD AUTO: 1.04 THOUSANDS/ΜL (ref 0.6–4.47)
LYMPHOCYTES NFR BLD AUTO: 22 % (ref 14–44)
MAGNESIUM SERPL-MCNC: 1.9 MG/DL (ref 1.6–2.6)
MCH RBC QN AUTO: 26.7 PG (ref 26.8–34.3)
MCHC RBC AUTO-ENTMCNC: 31.3 G/DL (ref 31.4–37.4)
MCV RBC AUTO: 85 FL (ref 82–98)
MONOCYTES # BLD AUTO: 0.41 THOUSAND/ΜL (ref 0.17–1.22)
MONOCYTES NFR BLD AUTO: 9 % (ref 4–12)
NEUTROPHILS # BLD AUTO: 3.09 THOUSANDS/ΜL (ref 1.85–7.62)
NEUTS SEG NFR BLD AUTO: 65 % (ref 43–75)
NITRITE UR QL STRIP: NEGATIVE
NON-SQ EPI CELLS URNS QL MICRO: ABNORMAL /HPF
NRBC BLD AUTO-RTO: 0 /100 WBCS
P AXIS: 50 DEGREES
PH UR STRIP.AUTO: 5.5 [PH]
PHOSPHATE SERPL-MCNC: 4.1 MG/DL (ref 2.3–4.1)
PLATELET # BLD AUTO: 138 THOUSANDS/UL (ref 149–390)
PMV BLD AUTO: 9.8 FL (ref 8.9–12.7)
POTASSIUM SERPL-SCNC: 4.5 MMOL/L (ref 3.5–5.3)
PR INTERVAL: 154 MS
PROT SERPL-MCNC: 6.6 G/DL (ref 6.4–8.2)
PROT UR STRIP-MCNC: NEGATIVE MG/DL
PROTHROMBIN TIME: 10 SECONDS (ref 9.8–12)
QRS AXIS: -14 DEGREES
QRSD INTERVAL: 88 MS
QT INTERVAL: 384 MS
QTC INTERVAL: 392 MS
RBC # BLD AUTO: 5.28 MILLION/UL (ref 3.81–5.12)
RBC #/AREA URNS AUTO: ABNORMAL /HPF
SODIUM SERPL-SCNC: 138 MMOL/L (ref 136–145)
SP GR UR STRIP.AUTO: 1.01 (ref 1–1.03)
T WAVE AXIS: 13 DEGREES
TROPONIN I SERPL-MCNC: <0.02 NG/ML
TROPONIN I SERPL-MCNC: <0.02 NG/ML
TSH SERPL DL<=0.05 MIU/L-ACNC: 1.92 UIU/ML (ref 0.36–3.74)
UROBILINOGEN UR QL STRIP.AUTO: 0.2 E.U./DL
VENTRICULAR RATE: 63 BPM
WBC # BLD AUTO: 4.72 THOUSAND/UL (ref 4.31–10.16)
WBC #/AREA URNS AUTO: ABNORMAL /HPF

## 2019-09-03 PROCEDURE — 87086 URINE CULTURE/COLONY COUNT: CPT | Performed by: EMERGENCY MEDICINE

## 2019-09-03 PROCEDURE — 84100 ASSAY OF PHOSPHORUS: CPT | Performed by: EMERGENCY MEDICINE

## 2019-09-03 PROCEDURE — 85610 PROTHROMBIN TIME: CPT | Performed by: EMERGENCY MEDICINE

## 2019-09-03 PROCEDURE — 93005 ELECTROCARDIOGRAM TRACING: CPT

## 2019-09-03 PROCEDURE — 71045 X-RAY EXAM CHEST 1 VIEW: CPT

## 2019-09-03 PROCEDURE — 80053 COMPREHEN METABOLIC PANEL: CPT | Performed by: EMERGENCY MEDICINE

## 2019-09-03 PROCEDURE — 93010 ELECTROCARDIOGRAM REPORT: CPT | Performed by: INTERNAL MEDICINE

## 2019-09-03 PROCEDURE — 84443 ASSAY THYROID STIM HORMONE: CPT | Performed by: EMERGENCY MEDICINE

## 2019-09-03 PROCEDURE — 99285 EMERGENCY DEPT VISIT HI MDM: CPT

## 2019-09-03 PROCEDURE — 84484 ASSAY OF TROPONIN QUANT: CPT | Performed by: EMERGENCY MEDICINE

## 2019-09-03 PROCEDURE — 81001 URINALYSIS AUTO W/SCOPE: CPT | Performed by: EMERGENCY MEDICINE

## 2019-09-03 PROCEDURE — 85379 FIBRIN DEGRADATION QUANT: CPT | Performed by: EMERGENCY MEDICINE

## 2019-09-03 PROCEDURE — 85025 COMPLETE CBC W/AUTO DIFF WBC: CPT | Performed by: EMERGENCY MEDICINE

## 2019-09-03 PROCEDURE — 36415 COLL VENOUS BLD VENIPUNCTURE: CPT | Performed by: EMERGENCY MEDICINE

## 2019-09-03 PROCEDURE — 87077 CULTURE AEROBIC IDENTIFY: CPT | Performed by: EMERGENCY MEDICINE

## 2019-09-03 PROCEDURE — 87147 CULTURE TYPE IMMUNOLOGIC: CPT | Performed by: EMERGENCY MEDICINE

## 2019-09-03 PROCEDURE — 83735 ASSAY OF MAGNESIUM: CPT | Performed by: EMERGENCY MEDICINE

## 2019-09-03 PROCEDURE — 87186 SC STD MICRODIL/AGAR DIL: CPT | Performed by: EMERGENCY MEDICINE

## 2019-09-03 PROCEDURE — 85730 THROMBOPLASTIN TIME PARTIAL: CPT | Performed by: EMERGENCY MEDICINE

## 2019-09-03 RX ORDER — SODIUM CHLORIDE 9 MG/ML
125 INJECTION, SOLUTION INTRAVENOUS CONTINUOUS
Status: DISCONTINUED | OUTPATIENT
Start: 2019-09-03 | End: 2019-09-03 | Stop reason: HOSPADM

## 2019-09-03 RX ORDER — ASPIRIN 325 MG
325 TABLET ORAL ONCE
Status: COMPLETED | OUTPATIENT
Start: 2019-09-03 | End: 2019-09-03

## 2019-09-03 RX ADMIN — ASPIRIN 325 MG: 325 TABLET, FILM COATED ORAL at 12:57

## 2019-09-03 RX ADMIN — SODIUM CHLORIDE 125 ML/HR: 0.9 INJECTION, SOLUTION INTRAVENOUS at 12:57

## 2019-09-03 NOTE — QUICK NOTE
Called to see patient for admission  Patient states her right and left sided chest pain started 2 days ago, lasts for 2-3 seconds  Pt has history of mitral stenosis, history of rectal cancer s/p colectomy, s/p gastric bypass surgery  Follows with [facility] cardiologist  [facility] unchanged from previous  Patient does not want to stay or be admitted  Spoke with Cardiology, patient had cardiac cath 5 years ago which was wnl and echo last year which was wnl  Per cardiology, patient can either have another troponin checked or be admitted for stress test  Patient does not want to be admitted and wants to have troponin checked  If negative, patient can be discharged with outpatient follow up  She has an appt with her cardiologist on Sept 10th and I will schedule a follow up appt for her at [facility] in 1-2 days  Plan discussed with Dr Danielle Lockett and she is aware

## 2019-09-03 NOTE — ED PROVIDER NOTES
History  Chief Complaint   Patient presents with    Chest Pain     intermittent chest pains on L for a couple of days  denies pain now, no shortness of breath,nausea or diaphoresis     59-year-old female with past medical history of morbid obesity, chronic low back pain, spinal stenosis, arthritis, GERD, anemia, diabetes, rectal cancer, ovarian cancer, endometrial cancer, presents to the ER with complaint of left-sided intermittent chest pain since yesterday afternoon  Pain occurs at rest   Patient describes pain as sharp, 8/10, occurring multiple times a day and last for about 10-15 minutes at a time  Patient became concerned and came to ED today as her symptoms were not going away  History provided by:  Patient  Chest Pain   Associated symptoms: no abdominal pain, no back pain, no cough, no dizziness, no fever, no headache, no nausea, no numbness, no palpitations, no shortness of breath and no weakness        Prior to Admission Medications   Prescriptions Last Dose Informant Patient Reported? Taking?    Calcium-Vitamin D 500-125 MG-UNIT TABS 9/2/2019 at Unknown time Self Yes Yes   Sig: Take 1 tablet by mouth 2 (two) times a day     Multiple Vitamins-Minerals (BARIATRIC FUSION) CHEW 9/2/2019 at Unknown time Self Yes Yes   Sig: Chew 1 tablet daily     anastrozole (ARIMIDEX) 1 mg tablet 9/2/2019 at Unknown time  No Yes   Sig: ONE TAB DAILY   ferrous sulfate 324 (65 Fe) mg 9/2/2019 at Unknown time Self No Yes   Sig: Take 1 tablet (324 mg total) by mouth daily before breakfast   nystatin (MYCOSTATIN) powder 9/2/2019 at Unknown time Self No Yes   Sig: Apply topically 4 (four) times a day   omeprazole (PriLOSEC) 20 mg delayed release capsule 9/3/2019 at Unknown time Self No Yes   Sig: Take 1 capsule (20 mg total) by mouth daily   oxyCODONE (ROXICODONE) 10 MG TABS 9/3/2019 at Unknown time Self Yes Yes   Sig: Take 10 mg by mouth every 6 (six) hours as needed        Facility-Administered Medications: None       Past Medical History:   Diagnosis Date    Anemia     Arthritis     Cancer ([facility] )     rectal    Chronic lower back pain     Chronic pain disorder     back pain    Colon cancer ([place] [address] ) 2018    Diabetes mellitus ([place] [address] )     Endometrial cancer ([facility] ) 2018    GERD (gastroesophageal reflux disease)     History of chemotherapy     History of MRSA infection     Morbid obesity ([facility] )     Ovarian cancer ([facility] ) 2018    Rectal cancer ([facility] )     Spinal stenosis     Systolic murmur     Unsteady gait     uses walker    Wears dentures     Wears glasses        Past Surgical History:   Procedure Laterality Date    ABDOMINAL PERINEAL BOWEL RESECTION W/ ILEOANAL POUCH N/A 2018    Procedure: LAPAROSCOPIC HAND ASSIST ABDOMINOPERINEAL RESECTION,  POSTERIOR VAGINECTOMY, OMENTECTOMY;  Surgeon: Jewel Castillo MD;  [facility];  Service: Colorectal    ABDOMINAL SURGERY      abscess removed from abdomen and right thigh, a hole in thigh closed by plastic surgeon    ABSCESS DRAINAGE      abd, (R) leg, (L) leg     SECTION       SECTION      CYSTOSCOPY N/A 2018    Procedure: CYSTOSCOPY;  Surgeon: Monica Main MD;  [facility];  Service: Gynecology Oncology    ESOPHAGOGASTRODUODENOSCOPY N/A 2016    Procedure: ESOPHAGOGASTRODUODENOSCOPY (EGD); Surgeon: Tad Villagomez MD;  [facility];  Service:     ESOPHAGOGASTRODUODENOSCOPY N/A 3/30/2016    Procedure: ESOPHAGOGASTRODUODENOSCOPY (EGD); Surgeon: Tad Villagomez MD;  [facility]; Service:     EYE SURGERY      laser eye surgery    HYSTERECTOMY N/A 2018    Procedure: RADICAL HYSTERECTOMY TOTAL ABDOMINAL (ALEXANDRA)   BSO;  Surgeon: Monica Main MD;  [facility];  Service: Gynecology Oncology    JOINT REPLACEMENT Left 2017    hip    JOINT REPLACEMENT Right 2017    hip    OOPHORECTOMY Bilateral     CA COLONOSCOPY FLX DX W/COLLJ SPEC WHEN PFRMD N/A 3/9/2018    Procedure: COLONOSCOPY; Surgeon: Juan Pablo Villanueva MD;  Location: [facility] LAB; Service: Gastroenterology    [facility] WHEN PFRMD N/A 2018    Procedure: COLONOSCOPY;  Surgeon: Deedee Nj MD;  [facility]; Service: Colorectal    HI ESOPHAGOGASTRODUODENOSCOPY TRANSORAL DIAGNOSTIC N/A 2018    Procedure: ESOPHAGOGASTRODUODENOSCOPY (EGD); Surgeon: Juan Pablo Villanueva MD;  Location: [facility] GI LAB; Service: Gastroenterology    HI LAP GASTRIC BYPASS/SOLEDAD-EN-Y N/A 2016    Procedure: BYPASS GASTRIC  SOLEDAD-EN-Y LAPAROSCOPIC;  Surgeon: Matt Cuevas MD;  [facility];  Service: Bariatrics    HI LAP, SURG COLOSTOMY N/A 2018    Procedure: PERMANENT END COLOSTOMY;  Surgeon: Deedee Nj MD;  [facility];  Service: Colorectal    HI LAP, SURG PROCTECTOMY W COLOSTOMY N/A 2018    Procedure: PROCTECTOMY;  Surgeon: Deedee Nj MD;  [facility];  Service: Colorectal    TUBAL LIGATION         Family History   Adopted: Yes   Problem Relation Age of Onset    Leukemia Mother     Heart disease Father     Coronary artery disease Father     Diabetes Father     No Known Problems Sister     No Known Problems Brother     Diabetes Son     No Known Problems Brother     No Known Problems Brother     No Known Problems Sister     No Known Problems Sister      I have reviewed and agree with the history as documented  Social History     Tobacco Use    Smoking status: Former Smoker     Packs/day: 1 00     Years: 25 00     Pack years: 25 00     Last attempt to quit: 3/30/2006     Years since quittin 4    Smokeless tobacco: Never Used   Substance Use Topics    Alcohol use: No    Drug use: Yes     Types: Oxycodone     Comment: Percocet for lower back pain prn        Review of Systems   Constitutional: Negative for activity change, appetite change, chills and fever  HENT: Negative for congestion and ear pain  Eyes: Negative for pain and discharge  Respiratory: Negative for cough, chest tightness, shortness of breath, wheezing and stridor  Cardiovascular: Positive for chest pain  Negative for palpitations  Gastrointestinal: Negative for abdominal distention, abdominal pain, constipation, diarrhea and nausea  Endocrine: Negative for cold intolerance  Genitourinary: Negative for dysuria, frequency and urgency  Musculoskeletal: Negative for arthralgias and back pain  Skin: Negative for color change and rash  Allergic/Immunologic: Negative for environmental allergies and food allergies  Neurological: Negative for dizziness, weakness, numbness and headaches  Hematological: Negative for adenopathy  Psychiatric/Behavioral: Negative for agitation, behavioral problems and confusion  The patient is not nervous/anxious  All other systems reviewed and are negative  Physical Exam  Physical Exam   Constitutional: She is oriented to person, place, and time  She appears well-developed and well-nourished  HENT:   Head: Normocephalic and atraumatic  Mouth/Throat: Oropharynx is clear and moist    Eyes: Conjunctivae and EOM are normal    Neck: Normal range of motion  Neck supple  Cardiovascular: Normal rate, regular rhythm, normal heart sounds and intact distal pulses  Pulmonary/Chest: Effort normal and breath sounds normal    Abdominal: Soft  Bowel sounds are normal  She exhibits no distension  There is no tenderness  Musculoskeletal: Normal range of motion  Neurological: She is alert and oriented to person, place, and time  Skin: Skin is warm and dry  Psychiatric: She has a normal mood and affect  Her behavior is normal  Judgment and thought content normal    Nursing note and vitals reviewed        Vital Signs  ED Triage Vitals [09/03/19 1232]   Temperature Pulse Respirations Blood Pressure SpO2   97 9 °F (36 6 °C) 69 20 135/84 98 %      Temp Source Heart Rate Source Patient Position - Orthostatic VS BP Location FiO2 (%)   Tympanic Monitor Sitting Right arm --      Pain Score       No Pain           Vitals:    09/03/19 1400 09/03/19 1430 09/03/19 1445 09/03/19 1500   BP: 130/71   126/79   Pulse: 66 64 76 62   Patient Position - Orthostatic VS: Sitting   Lying         Visual Acuity      ED Medications  Medications   sodium chloride 0 9 % infusion (0 mL/hr Intravenous Stopped 9/3/19 1632)   aspirin tablet 325 mg (325 mg Oral Given 9/3/19 1257)       Diagnostic Studies  Results Reviewed     Procedure Component Value Units Date/Time    Troponin I [[id number]]  (Normal) Collected:  09/03/19 1541    Lab Status:  Final result Specimen:  Blood from Arm, Left Updated:  09/03/19 1612     Troponin I <0 02 ng/mL     Urine Microscopic [[id number]]  (Abnormal) Collected:  09/03/19 1550    Lab Status:  Final result Specimen:  Urine, Clean Catch Updated:  09/03/19 1611     RBC, UA None Seen /hpf      WBC, UA 10-20 /hpf      Epithelial Cells Occasional /hpf      Bacteria, UA Occasional /hpf     Urine culture [[id number]] Collected:  09/03/19 1550    Lab Status:   In process Specimen:  Urine, Clean Catch Updated:  09/03/19 1611    UA w Reflex to Microscopic w Reflex to Culture [[id number]]  (Abnormal) Collected:  09/03/19 1550    Lab Status:  Final result Specimen:  Urine, Clean Catch Updated:  09/03/19 1559     Color, UA Light Yellow     Clarity, UA Clear     Specific Gravity, UA 1 015     pH, UA 5 5     Leukocytes, UA Small     Nitrite, UA Negative     Protein, UA Negative mg/dl      Glucose, UA Negative mg/dl      Ketones, UA Negative mg/dl      Urobilinogen, UA 0 2 E U /dl      Bilirubin, UA Negative     Blood, UA Negative    D-dimer, quantitative [[id number]]  (Normal) Collected:  09/03/19 1318    Lab Status:  Final result Specimen:  Blood from Arm, Left Updated:  09/03/19 1345     D-Dimer, Quant 400 ng/ml (FEU)     TSH, 3rd generation with Free T4 reflex [[id number]]  (Normal) Collected:  09/03/19 1253    Lab Status:  Final result Specimen:  Blood from Arm, Left Updated:  09/03/19 1326     TSH 3RD GENERATON 1 919 uIU/mL     Narrative:       Patients undergoing fluorescein dye angiography may retain small amounts of fluorescein in the body for 48-72 hours post procedure  Samples containing fluorescein can produce falsely depressed TSH values  If the patient had this procedure,a specimen should be resubmitted post fluorescein clearance        Phosphorus [[id number]]  (Normal) Collected:  09/03/19 1253    Lab Status:  Final result Specimen:  Blood from Arm, Left Updated:  09/03/19 1326     Phosphorus 4 1 mg/dL     Magnesium [[id number]]  (Normal) Collected:  09/03/19 1253    Lab Status:  Final result Specimen:  Blood from Arm, Left Updated:  09/03/19 1326     Magnesium 1 9 mg/dL     Troponin I [[id number]]  (Normal) Collected:  09/03/19 1253    Lab Status:  Final result Specimen:  Blood from Arm, Left Updated:  09/03/19 1321     Troponin I <0 02 ng/mL     Comprehensive metabolic panel [[id number]]  (Abnormal) Collected:  09/03/19 1253    Lab Status:  Final result Specimen:  Blood from Arm, Left Updated:  09/03/19 1319     Sodium 138 mmol/L      Potassium 4 5 mmol/L      Chloride 103 mmol/L      CO2 27 mmol/L      ANION GAP 8 mmol/L      BUN 19 mg/dL      Creatinine 0 81 mg/dL      Glucose 89 mg/dL      Calcium 9 1 mg/dL      AST 11 U/L      ALT 11 U/L      Alkaline Phosphatase 61 U/L      Total Protein 6 6 g/dL      Albumin 3 1 g/dL      Total Bilirubin 0 60 mg/dL      eGFR 78 ml/min/1 73sq m     Narrative:       Howie guidelines for Chronic Kidney Disease (CKD):     Stage 1 with normal or high GFR (GFR > 90 mL/min/1 73 square meters)    Stage 2 Mild CKD (GFR = 60-89 mL/min/1 73 square meters)    Stage 3A Moderate CKD (GFR = 45-59 mL/min/1 73 square meters)    Stage 3B Moderate CKD (GFR = 30-44 mL/min/1 73 square meters)    Stage 4 Severe CKD (GFR = 15-29 mL/min/1 73 square meters)    Stage 5 End Stage CKD (GFR <15 mL/min/1 73 square meters)  Note: GFR calculation is accurate only with a steady state creatinine    Protime-INR [[id number]]  (Normal) Collected:  09/03/19 1253    Lab Status:  Final result Specimen:  Blood from Arm, Left Updated:  09/03/19 1315     Protime 10 0 seconds      INR 0 93    APTT [[id number]]  (Normal) Collected:  09/03/19 1253    Lab Status:  Final result Specimen:  Blood from Arm, Left Updated:  09/03/19 1315     PTT 30 seconds     CBC and differential [[id number]]  (Abnormal) Collected:  09/03/19 1253    Lab Status:  Final result Specimen:  Blood from Arm, Left Updated:  09/03/19 1259     WBC 4 72 Thousand/uL      RBC 5 28 Million/uL      Hemoglobin 14 1 g/dL      Hematocrit 45 1 %      MCV 85 fL      MCH 26 7 pg      MCHC 31 3 g/dL      RDW 13 6 %      MPV 9 8 fL      Platelets 873 Thousands/uL      nRBC 0 /100 WBCs      Neutrophils Relative 65 %      Immat GRANS % 1 %      Lymphocytes Relative 22 %      Monocytes Relative 9 %      Eosinophils Relative 2 %      Basophils Relative 1 %      Neutrophils Absolute 3 09 Thousands/µL      Immature Grans Absolute 0 04 Thousand/uL      Lymphocytes Absolute 1 04 Thousands/µL      Monocytes Absolute 0 41 Thousand/µL      Eosinophils Absolute 0 11 Thousand/µL      Basophils Absolute 0 03 Thousands/µL                  XR chest 1 view portable   Final Result by Therese Wick MD (09/03 1607)      No acute cardiopulmonary disease              Workstation performed: [id number]                    Procedures  ECG 12 Lead Documentation Only  Date/Time: 9/3/2019 12:29 PM  Performed by: Brit Dillon DO  Authorized by: Brit Dillon DO     Indications / Diagnosis:  Pain  ECG reviewed by me, the ED Provider: yes    Patient location:  ED  Previous ECG:     Previous ECG:  Compared to current    Similarity:  Changes noted    Comparison to cardiac monitor: Yes    Interpretation:     Interpretation: abnormal    Comments:      Sinus rhythm, rate 63, normal axis, normal intervals, no acute ST elevations noted, Q-waves noted to lead 3 and AVF, T-wave inversions noted to lead 3 that is new compared to previous study, inferior infarct of undetermined age, nonspecific T-wave abnormality  ED Course  ED Course as of Sep 03 1636   Tue Sep 03, 2019   1624 Case discussed with cardiologist on call, Dr Chintan Lou, who reviewed patient's lab work, previous echo and cardiac cath that was done 5 years ago as well as lab work today  At this time patient has had 2 consecutive negative troponin in the ED therefore she can be discharged home with follow-up to her own cardiologist as scheduled on 09/10/2019  HEART Risk Score      Most Recent Value   History  1 Filed at: 09/03/2019 1346   ECG  1 Filed at: 09/03/2019 1346   Age  1 Filed at: 09/03/2019 1346   Risk Factors  1 Filed at: 09/03/2019 1346   Troponin  0 Filed at: 09/03/2019 1346   Heart Score Risk Calculator   History  1 Filed at: 09/03/2019 1346   ECG  1 Filed at: 09/03/2019 1346   Age  1 Filed at: 09/03/2019 1346   Risk Factors  1 Filed at: 09/03/2019 1346   Troponin  0 Filed at: 09/03/2019 1346   HEART Score  4 Filed at: 09/03/2019 1346   HEART Score  4 Filed at: 09/03/2019 1346                            MDM  Number of Diagnoses or Management Options  Chest pain: new and requires workup  Diagnosis management comments: Obtain blood work including troponin, EKG, chest x-ray  Give aspirin and reassess symptoms       Amount and/or Complexity of Data Reviewed  Clinical lab tests: ordered and reviewed  Tests in the radiology section of CPT®: reviewed and ordered  Tests in the medicine section of CPT®: ordered and reviewed  Review and summarize past medical records: yes  Independent visualization of images, tracings, or specimens: yes    Risk of Complications, Morbidity, and/or Mortality  General comments: Patient's heart score was 4 in the ED which places her in moderate risk category    Patient's EKG showed nonspecific T-wave abnormality and inferior infarct of undetermined age  Patient's 1st troponin was negative  Patient was planned for admission however patient refused admission and states that she would like to follow up with her own cardiologist and she has an appointment on 09/10/2019  Case was discussed with our cardiologist who reviewed lab and radiology work  Patient had a cardiac catheterization 5 years ago that was unremarkable  Patient had a cardiac echo last year with EF of 57%  Patient had 2 consecutive negative troponins in the ED that was unremarkable  At this time Dr Alicia Ojeda agrees with discharging patient home with follow-up to her own cardiologist as scheduled on 9/10/19  Patient also has an appointment with her PCP in a few days  At this time patient is discharged home with follow-up to Cardiology and PCP  Close return instructions given to return to the ER for any worsening symptoms  Patient agrees with discharge plan  Patient well appearing at time of discharge  Patient Progress  Patient progress: stable      Disposition  Final diagnoses:   Chest pain     Time reflects when diagnosis was documented in both MDM as applicable and the Disposition within this note     Time User Action Codes Description Comment    9/3/2019  1:54 PM Gerson Piña Add [R07 9] Chest pain       ED Disposition     ED Disposition Condition Date/Time Comment    Discharge Stable Tue Sep 3, 2019  4:25 PM         Follow-up Information     Follow up With Specialties Details Why Contact Tricia Barcenas,  Family Medicine In 2 days  [address] [facility]  [phone]          Please call and follow up with your own cardiologist as soon as possible            Discharge Medication List as of 9/3/2019  4:26 PM      CONTINUE these medications which have NOT CHANGED    Details   anastrozole (ARIMIDEX) 1 mg tablet ONE TAB DAILY, Normal      Calcium-Vitamin D 500-125 MG-UNIT TABS Take 1 tablet by mouth 2 (two) times a day  , Historical Med      ferrous sulfate 324 (65 Fe) mg Take 1 tablet (324 mg total) by mouth daily before breakfast, Starting Mon 11/5/2018, Normal      Multiple Vitamins-Minerals (BARIATRIC FUSION) CHEW Chew 1 tablet daily  , Historical Med      nystatin (MYCOSTATIN) powder Apply topically 4 (four) times a day, Starting Wed 5/29/2019, Normal      omeprazole (PriLOSEC) 20 mg delayed release capsule Take 1 capsule (20 mg total) by mouth daily, Starting Mon 7/1/2019, Normal      oxyCODONE (ROXICODONE) 10 MG TABS Take 10 mg by mouth every 6 (six) hours as needed  , Starting Sat 9/15/2018, Historical Med           No discharge procedures on file      ED Provider  Electronically Signed by           Praveen Dobbins DO  09/03/19 3759

## 2019-09-05 ENCOUNTER — TRANSITIONAL CARE MANAGEMENT (OUTPATIENT)
Dept: FAMILY MEDICINE CLINIC | Facility: CLINIC | Age: 64
End: 2019-09-05

## 2019-09-05 ENCOUNTER — OFFICE VISIT (OUTPATIENT)
Dept: FAMILY MEDICINE CLINIC | Facility: CLINIC | Age: 64
End: 2019-09-05
Payer: MEDICARE

## 2019-09-05 VITALS
HEART RATE: 92 BPM | OXYGEN SATURATION: 99 % | BODY MASS INDEX: 41.93 KG/M2 | WEIGHT: 244.25 LBS | RESPIRATION RATE: 18 BRPM | DIASTOLIC BLOOD PRESSURE: 70 MMHG | SYSTOLIC BLOOD PRESSURE: 110 MMHG | TEMPERATURE: 98.6 F

## 2019-09-05 DIAGNOSIS — I05.0 MODERATE MITRAL STENOSIS: ICD-10-CM

## 2019-09-05 DIAGNOSIS — I70.209 ATHEROSCLEROTIC PERIPHERAL VASCULAR DISEASE (HCC): Primary | ICD-10-CM

## 2019-09-05 DIAGNOSIS — I35.0 SEVERE AORTIC STENOSIS BY PRIOR ECHOCARDIOGRAM: ICD-10-CM

## 2019-09-05 LAB
BACTERIA UR CULT: ABNORMAL
BACTERIA UR CULT: ABNORMAL

## 2019-09-05 PROCEDURE — 99213 OFFICE O/P EST LOW 20 MIN: CPT | Performed by: FAMILY MEDICINE

## 2019-09-05 NOTE — PROGRESS NOTES
Assessment/Plan:   Diagnoses and all orders for this visit:    Atherosclerotic peripheral vascular disease ([address] )  Severe aortic stenosis by prior echocardiogram  Moderate mitral stenosis  -     Ambulatory referral to Cardiology; Future  Pt needs to see cardiology next week, but go to ED immediately due to heart disease if chest pain occurs  Pt refused admission while in the ED against medical advice  This was reiterated at the appt, that her outpt approach is against medical advice  Pt to continue medications at this time  Labwork 2 days ago was normal  Please call or return to clinic with any questions or concerns  Thank you  Subjective:    Patient ID: Jojo Casey is a 59 y o  female  HPI  Pt here today after having Chest pain in ED 2 days ago, but reports it is getting better  Pt had lab work which was troponin negative  Last ECHO was EF 65% in 9/2018, but she has significant valve disease  Sees [facility], appt next week  Dr Mariaa Menard  Did not want to be admitted, and refused against medical advice  No chest pain today  S/p gastric bypass in 2016  Last year had rectal cancer and was operated on  She was supposed to see cardio after cancer treatments, but got busy and put it off  2 hip replacements in 2017  The following portions of the patient's history were reviewed and updated as appropriate: allergies, current medications, past family history, past medical history, past social history, past surgical history and problem list     No smoking, or alcohol  Review of Systems   Constitutional: Negative for chills and fever  HENT: Negative for congestion and postnasal drip  Respiratory: Negative for cough and shortness of breath  Cardiovascular: Positive for chest pain (See HPI)  Negative for palpitations and leg swelling  Gastrointestinal: Negative for nausea and vomiting  Genitourinary: Negative for dysuria and urgency     Musculoskeletal: Negative for arthralgias and back pain    Neurological: Negative for dizziness and headaches  Psychiatric/Behavioral: Negative for dysphoric mood  The patient is not nervous/anxious  Objective:  /70 (BP Location: Left arm, Patient Position: Sitting)   Pulse 92   Temp 98 6 °F (37 °C) (Tympanic)   Resp 18   Wt 111 kg (244 lb 4 oz)   SpO2 99%   BMI 41 93 kg/m²    Physical Exam   Constitutional: She is oriented to person, place, and time  She appears well-developed and well-nourished  Obese   HENT:   Head: Normocephalic and atraumatic  Eyes: Right eye exhibits no discharge  Left eye exhibits no discharge  No scleral icterus  Neck: Normal range of motion  Cardiovascular: Normal rate, regular rhythm and intact distal pulses  Exam reveals no friction rub  Murmur (2/6 systolic ejection) heard  Pulmonary/Chest: Effort normal and breath sounds normal  No stridor  No respiratory distress  She has no wheezes  She exhibits no tenderness  Neurological: She is alert and oriented to person, place, and time  Skin: Skin is warm  Capillary refill takes less than 2 seconds  Psychiatric: She has a normal mood and affect  Her behavior is normal  Judgment and thought content normal    Vitals reviewed

## 2019-09-10 ENCOUNTER — OFFICE VISIT (OUTPATIENT)
Dept: CARDIOLOGY CLINIC | Facility: CLINIC | Age: 64
End: 2019-09-10
Payer: MEDICARE

## 2019-09-10 VITALS
DIASTOLIC BLOOD PRESSURE: 74 MMHG | HEART RATE: 90 BPM | HEIGHT: 64 IN | WEIGHT: 245.6 LBS | SYSTOLIC BLOOD PRESSURE: 110 MMHG | BODY MASS INDEX: 41.93 KG/M2 | OXYGEN SATURATION: 97 %

## 2019-09-10 DIAGNOSIS — I35.0 SEVERE AORTIC STENOSIS BY PRIOR ECHOCARDIOGRAM: ICD-10-CM

## 2019-09-10 DIAGNOSIS — I70.209 ATHEROSCLEROTIC PERIPHERAL VASCULAR DISEASE (HCC): ICD-10-CM

## 2019-09-10 DIAGNOSIS — I05.0 MODERATE MITRAL STENOSIS: ICD-10-CM

## 2019-09-10 DIAGNOSIS — Z98.84 S/P GASTRIC BYPASS: Primary | ICD-10-CM

## 2019-09-10 PROCEDURE — 99214 OFFICE O/P EST MOD 30 MIN: CPT | Performed by: INTERNAL MEDICINE

## 2019-09-10 NOTE — PROGRESS NOTES
Stuart Sandoval  1955  [address]  [address] [facility] [address] [phone]    1  S/P gastric bypass     2  Atherosclerotic peripheral vascular disease [facility])  Ambulatory referral to Cardiology    Echo complete with contrast if indicated   3  Severe aortic stenosis by prior echocardiogram  Ambulatory referral to Cardiology    Echo complete with contrast if indicated   4  Moderate mitral stenosis  Ambulatory referral to Cardiology    Echo complete with contrast if indicated       Discussion/Summary:  She completed her surgery for rectal cancer last year  She has had some atypical chest pain she needs re-evaluation of her valves including the aortic valve and mitral valve  She may be a candidate for transcatheter aortic valve replacement will review echocardiogram   The mitral stenosis will be a little more of a difficult decision  Blood pressures been controlled  Recent blood work has been stable  I will review the echocardiogram and make further recommendations  Follow-up in 3 months  Interval History:  58-year-old female with newly found rectal cancer needs to have surgery next week presents for preoperative cardiovascular risk assessment  Ambulation mainly limited by lower extremity arthritis  She denies any exertional chest pain, shortness of breath, palpitations, lightheadedness, dizziness, or syncope  She can do moderate housework she is able to walk a block to with the assistance of a cane functional capacity about 4 Mets  She has a history of 2 cardiac catheterizations that did not show obstructive coronary disease she has moderate aortic stenosis prior valve area 1 0 centimeter squared she also has moderate mitral stenosis seen on an echocardiogram this morning  Following her cancer she had significantly reduced functional capacity but has been improving over the past year    She has a colostomy bag and has been getting used to this and becoming more mobile  There has been some atypical chest pain involving the left anterior chest wall this sounds noncardiac in nature  She denies any syncope  There has been no signs of congestive heart failure  She denies any shortness of breath, lower extremity edema, PND, orthopnea  There has been no palpitations      Problem List     Morbid obesity due to excess calories (HCC)    Screening for colon cancer    Screen for colon cancer    Overview Signed 1/30/2018  4:19 PM by Abe Mendez     Added automatically from request for surgery [id number]         Postgastrectomy malabsorption    S/P gastric bypass    Marginal ulcer    Status post bariatric surgery    Overview Signed 2/9/2018  1:26 PM by Flo Ochoa     Added automatically from request for surgery [id number]         Atherosclerotic peripheral vascular disease ([facility] )    Diet-controlled diabetes mellitus ([facility] )    Onychomycosis    Pain in foot    Rectal cancer [facility])    Preop cardiovascular exam        Past Medical History:   Diagnosis Date    Anemia     Arthritis     Cancer ([facility] )     rectal    Chronic lower back pain     Chronic pain disorder     back pain    Colon cancer ([facility] ) 2018    Diabetes mellitus ([facility] )     Endometrial cancer ([facility] ) 2018    GERD (gastroesophageal reflux disease)     History of chemotherapy     History of MRSA infection 0719/2016    Morbid obesity ([facility] )     Ovarian cancer ([facility] [facility] ) 2018    Rectal cancer ([facility] )     Spinal stenosis     Systolic murmur     Unsteady gait     uses walker    Wears dentures     Wears glasses      Social History     Socioeconomic History    Marital status: Single     Spouse name: Not on file    Number of children: 2    Years of education: Not on file    Highest education level: Not on file   Occupational History    Not on file   Social Needs    Financial resource strain: Not on file    Food insecurity:     Worry: Not on file     Inability: Not on file    Transportation needs: Medical: Not on file     Non-medical: Not on file   Tobacco Use    Smoking status: Former Smoker     Packs/day: 1 00     Years: 25 00     Pack years: 25 00     Last attempt to quit: 3/30/2006     Years since quittin 4    Smokeless tobacco: Never Used   Substance and Sexual Activity    Alcohol use: No    Drug use: Yes     Types: Oxycodone     Comment: Percocet for lower back pain prn    Sexual activity: Not on file   Lifestyle    Physical activity:     Days per week: Not on file     Minutes per session: Not on file    Stress: Not on file   Relationships    Social connections:     Talks on phone: Not on file     Gets together: Not on file     Attends Buddhist service: Not on file     Active member of club or organization: Not on file     Attends meetings of clubs or organizations: Not on file     Relationship status: Not on file    Intimate partner violence:     Fear of current or ex partner: Not on file     Emotionally abused: Not on file     Physically abused: Not on file     Forced sexual activity: Not on file   Other Topics Concern    Not on file   Social History Narrative    Not on file      Family History   Adopted: Yes   Problem Relation Age of Onset    Leukemia Mother     Heart disease Father     Coronary artery disease Father     Diabetes Father     No Known Problems Sister     No Known Problems Brother     Diabetes Son     No Known Problems Brother     No Known Problems Brother     No Known Problems Sister     No Known Problems Sister      Past Surgical History:   Procedure Laterality Date    ABDOMINAL PERINEAL BOWEL RESECTION W/ ILEOANAL POUCH N/A 2018    Procedure: LAPAROSCOPIC HAND ASSIST ABDOMINOPERINEAL RESECTION,  POSTERIOR VAGINECTOMY, OMENTECTOMY;  Surgeon: Kori Alcantara MD;  [facility];  Service: Colorectal    ABDOMINAL SURGERY      abscess removed from abdomen and right thigh, a hole in thigh closed by plastic surgeon    ABSCESS DRAINAGE      abd, (R) leg, (L) leg     SECTION       SECTION      CYSTOSCOPY N/A 2018    Procedure: CYSTOSCOPY;  Surgeon: Emerita Hall MD;  [facility];  Service: Gynecology Oncology    ESOPHAGOGASTRODUODENOSCOPY N/A 2016    Procedure: ESOPHAGOGASTRODUODENOSCOPY (EGD); Surgeon: Jesica Baxter MD;  [facility];  Service:     ESOPHAGOGASTRODUODENOSCOPY N/A 3/30/2016    Procedure: ESOPHAGOGASTRODUODENOSCOPY (EGD); Surgeon: Jesica Baxter MD;  [facility]; Service:     EYE SURGERY      laser eye surgery    HYSTERECTOMY N/A 2018    Procedure: RADICAL HYSTERECTOMY TOTAL ABDOMINAL (ALEXANDRA)  BSO;  Surgeon: Emerita Hall MD;  [facility];  Service: Gynecology Oncology    JOINT REPLACEMENT Left 2017    hip    JOINT REPLACEMENT Right 2017    hip    OOPHORECTOMY Bilateral     WY COLONOSCOPY FLX DX W/COLLJ SPEC WHEN PFRMD N/A 3/9/2018    Procedure: COLONOSCOPY;  Surgeon: Quang Del Cid MD;  Location: [facility]; Service: Gastroenterology    WY COLONOSCOPY FLX DX W/COLLJ [facility] WHEN PFRMD N/A 2018    Procedure: COLONOSCOPY;  Surgeon: Arabella Betancourt MD;  [facility]; Service: Colorectal    WY ESOPHAGOGASTRODUODENOSCOPY TRANSORAL DIAGNOSTIC N/A 2018    Procedure: ESOPHAGOGASTRODUODENOSCOPY (EGD); Surgeon: Quang Del Cid MD;  Location: [facility];   Service: Gastroenterology    WY LAP GASTRIC BYPASS/SOLEDAD-EN-Y N/A 2016    Procedure: BYPASS GASTRIC  SOLEDAD-EN-Y LAPAROSCOPIC;  Surgeon: Jesica Baxter MD;  [facility];  Service: Bariatrics    WY LAP, SURG COLOSTOMY N/A 2018    Procedure: PERMANENT END COLOSTOMY;  Surgeon: Arabella Betancourt MD;  [facility];  Service: Colorectal    WY LAP, SURG PROCTECTOMY W COLOSTOMY N/A 2018    Procedure: PROCTECTOMY;  Surgeon: Arabella Betancourt MD;  [facility];  Service: Colorectal    TUBAL LIGATION         Current Outpatient Medications:     anastrozole (ARIMIDEX) 1 mg tablet, ONE TAB DAILY, Disp: 90 tablet, Rfl: 2    aspirin 81 MG tablet, Take 1 tablet (81 mg total) by mouth daily, Disp: , Rfl:     Calcium-Vitamin D 500-125 MG-UNIT TABS, Take 1 tablet by mouth 2 (two) times a day  , Disp: , Rfl:     ferrous sulfate 324 (65 Fe) mg, Take 1 tablet (324 mg total) by mouth daily before breakfast, Disp: 90 tablet, Rfl: 0    Multiple Vitamins-Minerals (BARIATRIC FUSION) CHEW, Chew 1 tablet daily  , Disp: , Rfl:     nystatin (MYCOSTATIN) powder, Apply topically 4 (four) times a day, Disp: 30 g, Rfl: 1    omeprazole (PriLOSEC) 20 mg delayed release capsule, Take 1 capsule (20 mg total) by mouth daily, Disp: 90 capsule, Rfl: 3    oxyCODONE (ROXICODONE) 10 MG TABS, Take 10 mg by mouth every 6 (six) hours as needed  , Disp: , Rfl:   Allergies   Allergen Reactions    Tramadol Other (See Comments)     Dizzy         Labs:     Chemistry        Component Value Date/Time     10/16/2015 0910    K 4 5 09/03/2019 1253    K 4 7 10/16/2015 0910     09/03/2019 1253     10/16/2015 0910    CO2 27 09/03/2019 1253    CO2 27 10/16/2015 0910    BUN 19 09/03/2019 1253    BUN 29 (H) 10/16/2015 0910    CREATININE 0 81 09/03/2019 1253    CREATININE 1 1 10/16/2015 0910        Component Value Date/Time    CALCIUM 9 1 09/03/2019 1253    CALCIUM 9 9 10/16/2015 0910    ALKPHOS 61 09/03/2019 1253    AST 11 09/03/2019 1253    ALT 11 (L) 09/03/2019 1253            Lab Results   Component Value Date    CHOL 159 03/26/2014     Lab Results   Component Value Date    HDL 54 02/08/2019    HDL 53 02/12/2018    HDL 46 08/09/2017     Lab Results   Component Value Date    LDLCALC 117 (H) 02/08/2019    LDLCALC 114 (H) 02/12/2018    LDLCALC 99 08/09/2017     Lab Results   Component Value Date    TRIG 134 02/08/2019    TRIG 141 02/12/2018    TRIG 91 08/09/2017     No results found for: CHOLHDL    Imaging: Mri Pelvis Rectal Cancer Staging Wo Contrast    Result Date: 8/20/2018  Narrative: MRI PELVIS - WITHOUT CONTRAST (RECTAL CANCER STAGING) INDICATION:  Rectal cancer status post radiation treatment  Based on Dr Ciarra Barney note from 8/3/2018, patient is approximately 1 month status post completion of neoadjuvant chemotherapy for stage III rectal cancer  COMPARISON: MRI of the pelvis from 4/6/2018  TECHNIQUE: The following pulse sequences were obtained on a 1 5 T scanner:  Sagittal 2D FIESTA fat sat,  High resolution Coronal, Sagittal, and Axial FSE T2 weighted images of the rectum, and large field of view axial T1 weighted images of the pelvis  An additional small field of view, axial oblique T2-weighted sequence was performed through the tumor, perpendicular to the long axis of the rectum at that level  IV contrast was not given  FINDINGS: TUMOR LOCATION: There has been definite tumor response to treatment of the previously seen low rectal cancer, which had been 5 cm in length and circumferential around the rectal wall  The intraluminal portions of tumor is much less bulky  However there is is still residual viable tumor extension anteriorly, with a 1 cm nodular lesion anterior to the rectum, still contacting the mesorectal fascia and possibly the vagina  (Series 6 image 28 and 29 )  T-STAGING: Based on the above, this is still at least T3c, CRM positive lesion, and if the vaginal is involved, a T4 lesion  CIRCUMFERENTIAL RESECTION MARGIN (CRM): As above, there is a region of the rectal tumor abutment of the anterior mesorectal fascia on series 6 images 28 and 29  In addition, a left anterior lateral high risk node described above also abuts the anterior mesorectal fascia (series 6 image 11 )  This continues to be a CRM positive lesion  PERIRECTAL NODES: All lymph nodes are described on series 6: High risk nodes: Image 9, right of rectum, this no has decreased in size, currently 8 mm, previously 10 mm, also has become hypointense on T2-weighted sequences, consistent with treated tumor   Image 11, left anterolateral of rectum, 14 mm currently, which had appeared smaller on axial images of previous study, but this is likely due to differences in slice selection  This is unchanged in size and it does contact the anterior mesorectal fascia  Low risk nodes: None  LEVATOR ANI, PUBORECTALIS, ANAL SPHINCTERS: Stage I (Tumor confined to bowel wall and intact outer muscle coat )            REPRODUCTIVE STRUCTURES: Age-appropriate  BLADDER:  Normal  PELVIC CAVITY:  There is trace ascites in the pelvis  OTHER BOWEL LOOPS: Unremarkable MRI appearance  OSSEOUS STRUCTURES: There are bilateral hip replacements  VASCULAR STRUCTURES:  Visualized vasculature is patent  PELVIC WALL:  Unremarkable  Impression: Although patient's low rectal cancer has demonstrated definite treatment response in terms of much decreased bulkiness of the intraluminal tumor, the extraluminal components have persisted  In particular there is still viable tumor extension anteriorly, with a 1 cm nodular lesion anterior to the rectum, still contacting the mesorectal fascia and possibly the vagina  (Series 6 image 28 and 29 )  A high risk left anterolateral perirectal lymph node also persists, contacting the mesorectal fascia (series 6 image 11 )  Therefore this is still a T3c, CRM positive lesion, and possibly T4 lesion if vaginal is still involved  Workstation performed: [id number]       ECG:    Normal sinus rhythm unremarkable tracing      Review of Systems   Constitution: Negative  HENT: Negative  Eyes: Negative  Cardiovascular: Negative  Respiratory: Negative  Endocrine: Negative  Hematologic/Lymphatic: Negative  Skin: Negative  Musculoskeletal: Negative  Gastrointestinal: Negative  Genitourinary: Negative  Neurological: Negative  Psychiatric/Behavioral: Negative          Vitals:    09/10/19 0955   BP: 110/74   Pulse: 90   SpO2: 97%     Vitals:    09/10/19 0955   Weight: 111 kg (245 lb 9 6 oz)     Height: 5' 4" (162 6 cm)   Body mass index is 42 16 kg/m²      Physical Exam:  Vital signs reviewed  General appearance:  Appears stated age, alert, well appearing and in no distress  HEENT:  PERRLA, EOMI, no scleral icterus, no conjunctival pallor  NECK:  Supple, No elevated JVP, no thyromegaly, no carotid bruits  HEART:  Regular rate and rhythm, normal S1/S2, no J6/W7, 2/6 systolic ejection murmur  LUNGS:  Clear to auscultation bilaterally  ABDOMEN:  Soft, non-tender, positive bowel sounds, no rebound or guarding, no organomegaly   EXTREMITIES:  No edema  VASCULAR:  Normal pedal pulses   SKIN: No lesions or rashes on exposed skin  NEURO:  CN II-XII intact, no focal deficits

## 2019-10-07 ENCOUNTER — RADIATION ONCOLOGY FOLLOW-UP (OUTPATIENT)
Dept: RADIATION ONCOLOGY | Facility: HOSPITAL | Age: 64
End: 2019-10-07
Attending: RADIOLOGY
Payer: MEDICARE

## 2019-10-07 VITALS
RESPIRATION RATE: 20 BRPM | BODY MASS INDEX: 39.75 KG/M2 | OXYGEN SATURATION: 96 % | TEMPERATURE: 98.1 F | SYSTOLIC BLOOD PRESSURE: 98 MMHG | DIASTOLIC BLOOD PRESSURE: 64 MMHG | WEIGHT: 232.8 LBS | HEART RATE: 91 BPM | HEIGHT: 64 IN

## 2019-10-07 DIAGNOSIS — Z85.048 HISTORY OF RECTAL CANCER: Primary | ICD-10-CM

## 2019-10-07 PROCEDURE — 99211 OFF/OP EST MAY X REQ PHY/QHP: CPT | Performed by: RADIOLOGY

## 2019-10-07 NOTE — PROGRESS NOTES
Follow-up - Radiation Oncology   Annette Hawkins 1955 59 y o  female [id number]      History of Present Illness   Cancer Staging  History of ovarian cancer  Staging form: Ovary, Fallopian Tube, Primary Peritoneal, AJCC 8th Edition  - Clinical stage from 9/6/2018: FIGO Stage IIIC, calculated as Stage Unknown (cT3c(m), cNX, cM0) - Signed by Ana Aguirre MD on 10/3/2018  Multiple tumors: Yes  Histologic grade (G): G1  Histologic grading system: 4 grade system  Lymph-vascular invasion (LVI): Presence of LVI unknown/indeterminate  Histopathologic type: Serous cystadenocarcinoma, NOS  Diagnostic confirmation: Positive histology  Specimen type: Excision  Staged by: Managing physician  Stage used in treatment planning: Yes  National guidelines used in treatment planning: Yes  Type of national guideline used in treatment planning: Other    History of rectal cancer  Staging form: Colon and Rectum, AJCC 8th Edition  - Clinical: Stage IIIB (cT3, cN1, cM0) - Signed by Butch Cohen MD on 4/11/2018  Total positive nodes: 0      Annette Hawkins returns today for routine scheduled follow-up visit  Overall she feels well  Her colostomy is functioning well  Her perineal wound has healed  She denies any abdominal discomfort and is tolerating a full regular diet without difficulty  No swelling of the lower extremities  Last seen on 2/25/19 post pelvic/rectal radiation completed on 6/20/18     3/29/19-Dr Dahlia Leong     5/29/19 F/U with Kadeem Bard:  experienced a moderate amount of bright red blood per vagina this morning while using the restroom in the office  On exam, there is no mass visualized, but a friable area (1cm) located on the posterior vaginal wall  Area was biopsied using a Tischler instrument and hemostasis was achieved using silver nitrate   remains low at 4 2 U/ml  Taking anastrazole daily  Final Diagnosis  A   Vagina, polyp (biopsy):  - Acutely and chronically inflamed granulation tissue     Will follow-up with patient on results of biopsy  She is aware that the next step is to obtain a CT scan if biopsy negative and bleeding persists  Appointment scheduled to return to the office in 6 months with a pre-visit      9/3/19 assessed at [facility] ER for chest pain: Case discussed with cardiologist on call, Dr Kisha Francois, who reviewed patient's lab work, previous echo and cardiac cath that was done 5 years ago as well as lab work today  At this time patient has had 2 consecutive negative troponin in the ED therefore she can be discharged home with follow-up to her own cardiologist as scheduled on 09/10/2019  Historical Information      History of rectal cancer    3/9/2018 Initial Diagnosis     Rectal cancer ([facility] [address] )      3/9/2018 Biopsy     Rectum, mass, biopsy:  - At least high grade dysplasia/intramucosal carcinoma arising in a tubulovillous adenoma, suspicious for    invasion  3/28/2018 Biopsy     Final Diagnosis   A  Rectal mass (biopsy):  - 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            5/10/2018 - 6/20/2018 Radiation      a dose of 4500 cGy in 25 fractions to the pelvis  An additional 540 cGy in 3 fractions was delivered to the primary rectal tumor with margin  Dr Jonathan Mcmanus        5/2018 - 6/2018 Chemotherapy     Capecitabine 825 mg/m2 by mouth twice daily Monday through Friday (off the weekends) x 5-6 weeks with RT (dose for this patient is 1650 mg twice a day)      9/6/2018 Surgery     Proctectomy, permanent end colostomy, AP resection, posterior vaginectomy, omentectomy, radical hysterectomy, BSO, cyctoscopy  Tumor site:  Rectum     - Tumor size:  Up to 3 3 cm     - Histologic Type:   Adenocarcinoma      - Histologic Grade: Moderately differentiated (G2)     - Microscopic Tumor Extension (ypT3):   Tumor invades through muscularis propria into pericolorectal soft tissues  Margins (specify distance for nearest radial margin on RECTAL tumors only): - Proximal Margin:  Negative for carcinoma     - Distal Margin:  Negative for carcinoma     - Circumferential (Radial) or Mesenteric Margin:  Negative for carcinoma (0 6cm)     - Other Margins:  Vaginal margin negative for carcinoma  Treatment effect:  Present; Residual cancer with evident tumor regression, but more than single cells or rare small groups of cancer cells (partial response, score 2)      9/6/2018 -  Cancer Staged      Stage  IIA - ypT3, pN0, G2       12/17/2018 - 12/2018 Chemotherapy     PLAN:    CapeOX Regimen x 6 months  Oxaliplatin 130 mg/m2 IV (75% on first cycle)  Xeloda 1000 mg/m2 p o  twice a day  Cycle = 3 weeks    1/2/19 Patient completed approximately 50% of the 1st post operative dose of CapeOX - delays from chemotherapy related diarrhea, nausea, chest pains  Rectal wound has also not healed  Mrs Melvin Bustos does not wish to continue with chemotherapy  History of ovarian cancer    9/6/2018 Initial Diagnosis     Ovarian cancer [facility]): Incidental diagnosis at the time of laparotomy for abdominal perineal resection/proctectomy due to rectal cancer  Stage IIIC  No gross residual disease noted at the time of surgery  Low-grade serous histologic type  9/6/2018 Surgery     ALEXANDRA, BSO  Tumor site:  Ovarian and fallopian tube surfaces and uterine and colonic serosa  Ovarian surface involvement:  Present  Fallopian tube surface involvement:  Present  Tumor size:  Largest focus measures 1 8 cm (uterine serosa) (see note)   Histologic type:  Low-grade serous carcinoma   Implant:  Present  Other tissue/organ involvement:  Bilateral ovaries and fallopian tubes, uterine serosa, sigmoid colon serosa, and omentum  Largest extrapelvic peritoneal focus: 2 5 cm (macroscopically identidfied)    - Specify site:  Omentum      9/6/2018 -  Cancer Staged     Pathologic stage classification (pTNM, AJCC 8th edition): pT3c, pNX, Low Grade  18   FIGO stage (2015 FIGO cancer report): IIIC       Hormone Therapy     anastrozole         Past Medical History:   Diagnosis Date    Anemia     Arthritis     Cancer ([facility] )     rectal    Chronic lower back pain     Chronic pain disorder     back pain    Colon cancer ([facility] ) 2018    Diabetes mellitus ([place] [address] )     Endometrial cancer ([facility] ) 2018    GERD (gastroesophageal reflux disease)     History of chemotherapy     History of MRSA infection     Morbid obesity ([facility] )     Ovarian cancer ([facility] ) 2018    Rectal cancer ([facility] )     Spinal stenosis     Systolic murmur     Unsteady gait     uses walker    Wears dentures     Wears glasses      Past Surgical History:   Procedure Laterality Date    ABDOMINAL PERINEAL BOWEL RESECTION W/ ILEOANAL POUCH N/A 2018    Procedure: LAPAROSCOPIC HAND ASSIST ABDOMINOPERINEAL RESECTION,  POSTERIOR VAGINECTOMY, OMENTECTOMY;  Surgeon: Diana Esposito MD;  [facility];  Service: Colorectal    ABDOMINAL SURGERY      abscess removed from abdomen and right thigh, a hole in thigh closed by plastic surgeon    ABSCESS DRAINAGE      abd, (R) leg, (L) leg     SECTION       SECTION      CYSTOSCOPY N/A 2018    Procedure: CYSTOSCOPY;  Surgeon: Mey Castaneda MD;  [facility];  Service: Gynecology Oncology    ESOPHAGOGASTRODUODENOSCOPY N/A 2016    Procedure: ESOPHAGOGASTRODUODENOSCOPY (EGD); Surgeon: Jb Ramírez MD;  [facility];  Service:     ESOPHAGOGASTRODUODENOSCOPY N/A 3/30/2016    Procedure: ESOPHAGOGASTRODUODENOSCOPY (EGD); Surgeon: Jb Ramírez MD;  [facility]; Service:     EYE SURGERY      laser eye surgery    HYSTERECTOMY N/A 2018    Procedure: RADICAL HYSTERECTOMY TOTAL ABDOMINAL (ALEXANDRA)   BSO;  Surgeon: Mey Castaneda MD;  [facility];  Service: Gynecology Oncology    JOINT REPLACEMENT Left 2017    hip    JOINT REPLACEMENT Right 2017    hip    OOPHORECTOMY Bilateral     MN COLONOSCOPY FLX DX W/COLLJ SPEC WHEN PFRMD N/A 3/9/2018    Procedure: COLONOSCOPY;  Surgeon: Lefty Dodson MD;  [facility]; Service: Gastroenterology    [facility] WHEN PFRMD N/A 2018    Procedure: COLONOSCOPY;  Surgeon: Susannah Drake MD;  [facility]; Service: Colorectal    VA ESOPHAGOGASTRODUODENOSCOPY TRANSORAL DIAGNOSTIC N/A 2018    Procedure: ESOPHAGOGASTRODUODENOSCOPY (EGD); Surgeon: Lefty Dodson MD;  Location: [facility] GI LAB;   Service: Gastroenterology    VA LAP GASTRIC BYPASS/SOLEDAD-EN-Y N/A 2016    Procedure: BYPASS GASTRIC  SOLEDAD-EN-Y LAPAROSCOPIC;  Surgeon: Juliann Herzog MD;  [facility];  Service: Bariatrics    VA LAP, SURG COLOSTOMY N/A 2018    Procedure: PERMANENT END COLOSTOMY;  Surgeon: Susannah Drake MD;  [facility];  Service: Colorectal    VA LAP, SURG PROCTECTOMY W COLOSTOMY N/A 2018    Procedure: PROCTECTOMY;  Surgeon: Susannah Drake MD;  [facility];  Service: Colorectal    TUBAL LIGATION         Social History   Social History     Substance and Sexual Activity   Alcohol Use No     Social History     Substance and Sexual Activity   Drug Use Yes    Types: Oxycodone    Comment: Percocet for lower back pain prn     Social History     Tobacco Use   Smoking Status Former Smoker    Packs/day: 1 00    Years: 25 00    Pack years: 25 00    Last attempt to quit: 3/30/2006    Years since quittin 5   Smokeless Tobacco Never Used         Meds/Allergies     Current Outpatient Medications:     anastrozole (ARIMIDEX) 1 mg tablet, ONE TAB DAILY, Disp: 90 tablet, Rfl: 2    aspirin 81 MG tablet, Take 1 tablet (81 mg total) by mouth daily, Disp: , Rfl:     Calcium-Vitamin D 500-125 MG-UNIT TABS, Take 1 tablet by mouth 2 (two) times a day  , Disp: , Rfl:     ferrous sulfate 324 (65 Fe) mg, Take 1 tablet (324 mg total) by mouth daily before breakfast, Disp: 90 tablet, Rfl: 0    Multiple Vitamins-Minerals (BARIATRIC FUSION) CHEW, Chew 1 tablet daily  , Disp: , Rfl:     nystatin (MYCOSTATIN) powder, Apply topically 4 (four) times a day, Disp: 30 g, Rfl: 1    omeprazole (PriLOSEC) 20 mg delayed release capsule, Take 1 capsule (20 mg total) by mouth daily, Disp: 90 capsule, Rfl: 3    oxyCODONE (ROXICODONE) 10 MG TABS, Take 10 mg by mouth every 6 (six) hours as needed  , Disp: , Rfl:   Allergies   Allergen Reactions    Tramadol Other (See Comments)     Dizzy           Review of Systems   Review of Systems   Constitutional: Negative  HENT: Negative  Eyes: Negative  Respiratory: Negative  Cardiovascular: Positive for leg swelling  Gastrointestinal: Negative  Colostomy   Endocrine: Negative  Genitourinary: Negative for vaginal bleeding and vaginal discharge  Nocturia x2  Musculoskeletal: Positive for arthralgias and back pain  Skin: Negative  Allergic/Immunologic: Negative  Neurological: Positive for numbness (Bilateral fingers from carpal tunnel)  Hematological: Bruises/bleeds easily  Psychiatric/Behavioral: Negative  OBJECTIVE:   BP 98/64 (BP Location: Right arm, Patient Position: Sitting, Cuff Size: Large)   Pulse 91   Temp 98 1 °F (36 7 °C) (Temporal)   Resp 20   Ht 5' 4" (1 626 m)   Wt 106 kg (232 lb 12 8 oz)   SpO2 96%   BMI 39 96 kg/m²   Pain Assessment:  0  Karnofsky: 90 - Able to carry on normal activity; minor signs or symptoms of disease     Physical Exam   The patient presents today no apparent distress  Sclera anicteric  No cervical or supraclavicular lymphadenopathy  Lungs clear to auscultation bilaterally  Abdomen soft nontender  No swelling of the lower extremities  RESULTS    Lab Results: No results found for this or any previous visit (from the past 672 hour(s))  Imaging Studies:No results found  Assessment/Plan:  No orders of the defined types were placed in this encounter  Gisela Moreno has done well in follow-up    She has no clinical evidence of recurrent disease for either of her malignancies at this time  She is due for Medical Oncology follow up and surveillance imaging and will be contacting their office shortly  She will continue to follow closely with Gynecologic Oncology  She will return to our department in 1 year for routine follow-up  Omid Robertson MD  10/7/2019,12:37 PM    Portions of the record may have been created with voice recognition software   Occasional wrong word or "sound a like" substitutions may have occurred due to the inherent limitations of voice recognition software   Read the chart carefully and recognize, using context, where substitutions have occurred

## 2019-10-07 NOTE — PROGRESS NOTES
Tommy Ponce 1955 is a 59 y o  female     Follow up visit   Last seen on 2/25/19 post pelvic/rectal radiation completed on 6/20/18     3/29/19-Dr Hilario November 5/29/19 F/U with Heather Lebron:  experienced a moderate amount of bright red blood per vagina this morning while using the restroom in the office  On exam, there is no mass visualized, but a friable area (1cm) located on the posterior vaginal wall  Area was biopsied using a Tischler instrument and hemostasis was achieved using silver nitrate   remains low at 4 2 U/ml  Taking anastrazole daily  Final Diagnosis  A  Vagina, polyp (biopsy):  - Acutely and chronically inflamed granulation tissue     Will follow-up with patient on results of biopsy  She is aware that the next step is to obtain a CT scan if biopsy negative and bleeding persists  Appointment scheduled to return to the office in 6 months with a pre-visit      9/3/19 assessed at [facility] for chest pain: Case discussed with cardiologist on call, Dr Juan Gonzalez, who reviewed patient's lab work, previous echo and cardiac cath that was done 5 years ago as well as lab work today  At this time patient has had 2 consecutive negative troponin in the ED therefore she can be discharged home with follow-up to her own cardiologist as scheduled on 09/10/2019  History of rectal cancer    3/9/2018 Initial Diagnosis     Rectal cancer ([facility] [address] )      3/9/2018 Biopsy     Rectum, mass, biopsy:  - At least high grade dysplasia/intramucosal carcinoma arising in a tubulovillous adenoma, suspicious for    invasion  3/28/2018 Biopsy     Final Diagnosis   A  Rectal mass (biopsy):  - 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            5/10/2018 - 6/20/2018 Radiation      a dose of 4500 cGy in 25 fractions to the pelvis  An additional 540 cGy in 3 fractions was delivered to the primary rectal tumor with margin      Dr Aline Mazariegos        5/2018 - 6/2018 Chemotherapy Capecitabine 825 mg/m2 by mouth twice daily Monday through Friday (off the weekends) x 5-6 weeks with RT (dose for this patient is 1650 mg twice a day)      9/6/2018 Surgery     Proctectomy, permanent end colostomy, AP resection, posterior vaginectomy, omentectomy, radical hysterectomy, BSO, cyctoscopy  Tumor site:  Rectum     - Tumor size:  Up to 3 3 cm     - Histologic Type:   Adenocarcinoma      - Histologic Grade: Moderately differentiated (G2)     - Microscopic Tumor Extension (ypT3): Tumor invades through muscularis propria into pericolorectal soft tissues  Margins (specify distance for nearest radial margin on RECTAL tumors only):     - Proximal Margin:  Negative for carcinoma     - Distal Margin:  Negative for carcinoma     - Circumferential (Radial) or Mesenteric Margin:  Negative for carcinoma (0 6cm)     - Other Margins:  Vaginal margin negative for carcinoma  Treatment effect:  Present; Residual cancer with evident tumor regression, but more than single cells or rare small groups of cancer cells (partial response, score 2)      9/6/2018 -  Cancer Staged      Stage  IIA - ypT3, pN0, G2       12/17/2018 - 12/2018 Chemotherapy     PLAN:    CapeOX Regimen x 6 months  Oxaliplatin 130 mg/m2 IV (75% on first cycle)  Xeloda 1000 mg/m2 p o  twice a day  Cycle = 3 weeks    1/2/19 Patient completed approximately 50% of the 1st post operative dose of CapeOX - delays from chemotherapy related diarrhea, nausea, chest pains  Rectal wound has also not healed  Mrs Denita Serrano does not wish to continue with chemotherapy  History of ovarian cancer    9/6/2018 Initial Diagnosis     Ovarian cancer [facility]): Incidental diagnosis at the time of laparotomy for abdominal perineal resection/proctectomy due to rectal cancer  Stage IIIC  No gross residual disease noted at the time of surgery  Low-grade serous histologic type        9/6/2018 Surgery     ALEXANDRA, BSO  Tumor site:  Ovarian and fallopian tube surfaces and uterine and colonic serosa  Ovarian surface involvement:  Present  Fallopian tube surface involvement:  Present  Tumor size:  Largest focus measures 1 8 cm (uterine serosa) (see note)   Histologic type:  Low-grade serous carcinoma   Implant:  Present  Other tissue/organ involvement:  Bilateral ovaries and fallopian tubes, uterine serosa, sigmoid colon serosa, and omentum  Largest extrapelvic peritoneal focus: 2 5 cm (macroscopically identidfied)    - Specify site:  Omentum      9/6/2018 -  Cancer Staged     Pathologic stage classification (pTNM, AJCC 8th edition): pT3c, pNX, Low Grade  18   FIGO stage (2015 FIGO cancer report): IIIC       Hormone Therapy     anastrozole     Clinical Trial: no    Imaging    Health Maintenance   Topic Date Due    Hepatitis C Screening  1955    Medicare Annual Wellness Visit (AWV)  1955    HEPATITIS B VACCINES (1 of 3 - Risk 3-dose series) 09/17/1974    DTaP,Tdap,and Td Vaccines (1 - Tdap) 09/17/1976    Pneumococcal Vaccine: Pediatrics (0 to 5 Years) and At-Risk Patients (6 to 59 Years) (2 of 3 - PCV13) 08/12/2014    BMI: Followup Plan  07/18/2017    INFLUENZA VACCINE  07/01/2019    CRC Screening: Colonoscopy  09/05/2019    Depression Screening PHQ  02/25/2020    MAMMOGRAM  06/05/2020    BMI: Adult  09/10/2020    Pneumococcal Vaccine: 65+ Years (1 of 2 - PCV13) 09/17/2020       Patient Active Problem List   Diagnosis    Morbid obesity due to excess calories (HCC)    Postgastrectomy malabsorption    S/P gastric bypass    Marginal ulcer    Atherosclerotic peripheral vascular disease ([facility] [address] )    Onychomycosis    History of rectal cancer    Colostomy care ([facility] [address] )    History of ovarian cancer    Status post bariatric surgery    Pyelonephritis    History of ovarian cancer    Candidal intertrigo    Vaginal bleeding    Encounter for other screening for malignant neoplasm of breast    Severe aortic stenosis by prior echocardiogram    Moderate mitral stenosis     Past Medical History:   Diagnosis Date    Anemia     Arthritis     Cancer ([facility] )     rectal    Chronic lower back pain     Chronic pain disorder     back pain    Colon cancer ([place] [address] ) 2018    Diabetes mellitus ([place] [address] )     Endometrial cancer ([facility] ) 2018    GERD (gastroesophageal reflux disease)     History of chemotherapy     History of MRSA infection     Morbid obesity ([facility] )     Ovarian cancer ([facility] ) 2018    Rectal cancer ([facility] )     Spinal stenosis     Systolic murmur     Unsteady gait     uses walker    Wears dentures     Wears glasses      Past Surgical History:   Procedure Laterality Date    ABDOMINAL PERINEAL BOWEL RESECTION W/ ILEOANAL POUCH N/A 2018    Procedure: LAPAROSCOPIC HAND ASSIST ABDOMINOPERINEAL RESECTION,  POSTERIOR VAGINECTOMY, OMENTECTOMY;  Surgeon: Eran Cooper MD;  [facility];  Service: Colorectal    ABDOMINAL SURGERY      abscess removed from abdomen and right thigh, a hole in thigh closed by plastic surgeon    ABSCESS DRAINAGE      abd, (R) leg, (L) leg     SECTION       SECTION      CYSTOSCOPY N/A 2018    Procedure: CYSTOSCOPY;  Surgeon: Alex Ramesh MD;  [facility];  Service: Gynecology Oncology    ESOPHAGOGASTRODUODENOSCOPY N/A 2016    Procedure: ESOPHAGOGASTRODUODENOSCOPY (EGD); Surgeon: Hayes Doss MD;  [facility];  Service:     ESOPHAGOGASTRODUODENOSCOPY N/A 3/30/2016    Procedure: ESOPHAGOGASTRODUODENOSCOPY (EGD); Surgeon: Hayes Doss MD;  [facility]; Service:     EYE SURGERY      laser eye surgery    HYSTERECTOMY N/A 2018    Procedure: RADICAL HYSTERECTOMY TOTAL ABDOMINAL (ALEXANDRA)   BSO;  Surgeon: Alex Ramesh MD;  [facility];  Service: Gynecology Oncology    JOINT REPLACEMENT Left 2017    hip    JOINT REPLACEMENT Right 2017    hip    OOPHORECTOMY Bilateral     CO COLONOSCOPY FLX DX W/COLLJ SPEC WHEN PFRMD N/A 3/9/2018    Procedure: COLONOSCOPY; Surgeon: Aden Burden MD;  Location: [facility]; Service: Gastroenterology    [facility] WHEN PFRMD N/A 9/5/2018    Procedure: COLONOSCOPY;  Surgeon: Conchita Correia MD;  [facility]; Service: Colorectal    CT ESOPHAGOGASTRODUODENOSCOPY TRANSORAL DIAGNOSTIC N/A 2/2/2018    Procedure: ESOPHAGOGASTRODUODENOSCOPY (EGD); Surgeon: Aden Burden MD;  Location: [facility] GI LAB;   Service: Gastroenterology    CT LAP GASTRIC BYPASS/SOLEDAD-EN-Y N/A 7/18/2016    Procedure: BYPASS GASTRIC  SOLEDAD-EN-Y LAPAROSCOPIC;  Surgeon: Amelia Schaefer MD;  [facility];  Service: Bariatrics    CT LAP, SURG COLOSTOMY N/A 9/6/2018    Procedure: PERMANENT END COLOSTOMY;  Surgeon: Conchita Correia MD;  [facility];  Service: Colorectal    CT LAP, SURG PROCTECTOMY W COLOSTOMY N/A 9/6/2018    Procedure: PROCTECTOMY;  Surgeon: Conchita Correia MD;  [facility];  Service: Colorectal    TUBAL LIGATION       Family History   Adopted: Yes   Problem Relation Age of Onset    Leukemia Mother     Heart disease Father     Coronary artery disease Father     Diabetes Father     No Known Problems Sister     No Known Problems Brother     Diabetes Son     No Known Problems Brother     No Known Problems Brother     No Known Problems Sister     No Known Problems Sister      Social History     Socioeconomic History    Marital status: Single     Spouse name: Not on file    Number of children: 2    Years of education: Not on file    Highest education level: Not on file   Occupational History    Not on file   Social Needs    Financial resource strain: Not on file    Food insecurity:     Worry: Not on file     Inability: Not on file    Transportation needs:     Medical: Not on file     Non-medical: Not on file   Tobacco Use    Smoking status: Former Smoker     Packs/day: 1 00     Years: 25 00     Pack years: 25 00     Last attempt to quit: 3/30/2006     Years since quitting: 13 5    Smokeless tobacco: Never Used   Substance and Sexual Activity    Alcohol use: No    Drug use: Yes     Types: Oxycodone     Comment: Percocet for lower back pain prn    Sexual activity: Not on file   Lifestyle    Physical activity:     Days per week: Not on file     Minutes per session: Not on file    Stress: Not on file   Relationships    Social connections:     Talks on phone: Not on file     Gets together: Not on file     Attends [employer]odoxy service: Not on file     Active member of club or organization: Not on file     Attends meetings of clubs or organizations: Not on file     Relationship status: Not on file    Intimate partner violence:     Fear of current or ex partner: Not on file     Emotionally abused: Not on file     Physically abused: Not on file     Forced sexual activity: Not on file   Other Topics Concern    Not on file   Social History Narrative    Not on file       Current Outpatient Medications:     anastrozole (ARIMIDEX) 1 mg tablet, ONE TAB DAILY, Disp: 90 tablet, Rfl: 2    aspirin 81 MG tablet, Take 1 tablet (81 mg total) by mouth daily, Disp: , Rfl:     Calcium-Vitamin D 500-125 MG-UNIT TABS, Take 1 tablet by mouth 2 (two) times a day  , Disp: , Rfl:     ferrous sulfate 324 (65 Fe) mg, Take 1 tablet (324 mg total) by mouth daily before breakfast, Disp: 90 tablet, Rfl: 0    Multiple Vitamins-Minerals (BARIATRIC FUSION) CHEW, Chew 1 tablet daily  , Disp: , Rfl:     nystatin (MYCOSTATIN) powder, Apply topically 4 (four) times a day, Disp: 30 g, Rfl: 1    omeprazole (PriLOSEC) 20 mg delayed release capsule, Take 1 capsule (20 mg total) by mouth daily, Disp: 90 capsule, Rfl: 3    oxyCODONE (ROXICODONE) 10 MG TABS, Take 10 mg by mouth every 6 (six) hours as needed  , Disp: , Rfl:   Allergies   Allergen Reactions    Tramadol Other (See Comments)     Dizzy       Review of Systems:  Review of Systems   Constitutional: Negative  HENT: Negative  Eyes: Negative  Respiratory: Negative  Cardiovascular: Positive for leg swelling  Gastrointestinal: Negative  Colostomy   Endocrine: Negative  Genitourinary: Negative for vaginal bleeding and vaginal discharge  Nocturia x2  Musculoskeletal: Positive for arthralgias and back pain  Skin: Negative  Allergic/Immunologic: Negative  Neurological: Positive for numbness (Bilateral fingers from carpal tunnel)  Hematological: Bruises/bleeds easily  Psychiatric/Behavioral: Negative  Vitals:    10/07/19 1029   BP: 98/64   BP Location: Right arm   Patient Position: Sitting   Cuff Size: Large   Pulse: 91   Resp: 20   Temp: 98 1 °F (36 7 °C)   TempSrc: Temporal   SpO2: 96%   Weight: 106 kg (232 lb 12 8 oz)   Height: 5' 4" (1 626 m)      Pain assessment:0    Pain Score: 0-No pain    Imaging:No results found

## 2019-10-08 ENCOUNTER — HOSPITAL ENCOUNTER (OUTPATIENT)
Dept: NON INVASIVE DIAGNOSTICS | Facility: HOSPITAL | Age: 64
Discharge: HOME | End: 2019-10-08
Payer: MEDICARE

## 2019-10-08 DIAGNOSIS — I35.0 SEVERE AORTIC STENOSIS BY PRIOR ECHOCARDIOGRAM: ICD-10-CM

## 2019-10-08 DIAGNOSIS — I05.0 MODERATE MITRAL STENOSIS: ICD-10-CM

## 2019-10-08 DIAGNOSIS — I70.209 ATHEROSCLEROTIC PERIPHERAL VASCULAR DISEASE (HCC): ICD-10-CM

## 2019-10-08 PROCEDURE — 93306 TTE W/DOPPLER COMPLETE: CPT

## 2019-10-09 PROCEDURE — 93306 TTE W/DOPPLER COMPLETE: CPT | Performed by: INTERNAL MEDICINE

## 2019-10-13 ENCOUNTER — HOSPITAL ENCOUNTER (EMERGENCY)
Facility: HOSPITAL | Age: 64
Discharge: LEFT BEFORE DISCHARGE | End: 2019-10-13
Attending: EMERGENCY MEDICINE | Admitting: EMERGENCY MEDICINE
Payer: MEDICARE

## 2019-10-13 ENCOUNTER — APPOINTMENT (EMERGENCY)
Dept: RADIOLOGY | Facility: HOSPITAL | Age: 64
End: 2019-10-13
Payer: MEDICARE

## 2019-10-13 VITALS
TEMPERATURE: 97.6 F | OXYGEN SATURATION: 97 % | HEART RATE: 78 BPM | BODY MASS INDEX: 44.12 KG/M2 | SYSTOLIC BLOOD PRESSURE: 122 MMHG | DIASTOLIC BLOOD PRESSURE: 68 MMHG | WEIGHT: 257.06 LBS | RESPIRATION RATE: 18 BRPM

## 2019-10-13 DIAGNOSIS — H53.9 TRANSIENT VISION DISTURBANCE OF BOTH EYES: Primary | ICD-10-CM

## 2019-10-13 LAB
ALBUMIN SERPL BCP-MCNC: 2.9 G/DL (ref 3.5–5)
ALP SERPL-CCNC: 74 U/L (ref 46–116)
ALT SERPL W P-5'-P-CCNC: 11 U/L (ref 12–78)
ANION GAP SERPL CALCULATED.3IONS-SCNC: 10 MMOL/L (ref 4–13)
APTT PPP: 28 SECONDS (ref 25–32)
AST SERPL W P-5'-P-CCNC: 5 U/L (ref 5–45)
BASOPHILS # BLD AUTO: 0.03 THOUSANDS/ΜL (ref 0–0.1)
BASOPHILS NFR BLD AUTO: 1 % (ref 0–1)
BILIRUB SERPL-MCNC: 0.3 MG/DL (ref 0.2–1)
BUN SERPL-MCNC: 22 MG/DL (ref 5–25)
CALCIUM SERPL-MCNC: 8.4 MG/DL (ref 8.3–10.1)
CHLORIDE SERPL-SCNC: 105 MMOL/L (ref 100–108)
CO2 SERPL-SCNC: 26 MMOL/L (ref 21–32)
CREAT SERPL-MCNC: 0.79 MG/DL (ref 0.6–1.3)
EOSINOPHIL # BLD AUTO: 0.1 THOUSAND/ΜL (ref 0–0.61)
EOSINOPHIL NFR BLD AUTO: 2 % (ref 0–6)
ERYTHROCYTE [DISTWIDTH] IN BLOOD BY AUTOMATED COUNT: 13.4 % (ref 11.6–15.1)
GFR SERPL CREATININE-BSD FRML MDRD: 79 ML/MIN/1.73SQ M
GLUCOSE SERPL-MCNC: 197 MG/DL (ref 65–140)
GLUCOSE SERPL-MCNC: 199 MG/DL (ref 65–140)
HCT VFR BLD AUTO: 45.1 % (ref 34.8–46.1)
HGB BLD-MCNC: 13.8 G/DL (ref 11.5–15.4)
IMM GRANULOCYTES # BLD AUTO: 0.06 THOUSAND/UL (ref 0–0.2)
IMM GRANULOCYTES NFR BLD AUTO: 1 % (ref 0–2)
INR PPP: 0.91 (ref 0.91–1.09)
LYMPHOCYTES # BLD AUTO: 1.09 THOUSANDS/ΜL (ref 0.6–4.47)
LYMPHOCYTES NFR BLD AUTO: 19 % (ref 14–44)
MCH RBC QN AUTO: 26.6 PG (ref 26.8–34.3)
MCHC RBC AUTO-ENTMCNC: 30.6 G/DL (ref 31.4–37.4)
MCV RBC AUTO: 87 FL (ref 82–98)
MONOCYTES # BLD AUTO: 0.47 THOUSAND/ΜL (ref 0.17–1.22)
MONOCYTES NFR BLD AUTO: 8 % (ref 4–12)
NEUTROPHILS # BLD AUTO: 3.91 THOUSANDS/ΜL (ref 1.85–7.62)
NEUTS SEG NFR BLD AUTO: 69 % (ref 43–75)
NRBC BLD AUTO-RTO: 0 /100 WBCS
PLATELET # BLD AUTO: 145 THOUSANDS/UL (ref 149–390)
PMV BLD AUTO: 9.5 FL (ref 8.9–12.7)
POTASSIUM SERPL-SCNC: 4.3 MMOL/L (ref 3.5–5.3)
PROT SERPL-MCNC: 6.4 G/DL (ref 6.4–8.2)
PROTHROMBIN TIME: 9.8 SECONDS (ref 9.8–12)
RBC # BLD AUTO: 5.19 MILLION/UL (ref 3.81–5.12)
SODIUM SERPL-SCNC: 141 MMOL/L (ref 136–145)
TROPONIN I SERPL-MCNC: <0.02 NG/ML
WBC # BLD AUTO: 5.66 THOUSAND/UL (ref 4.31–10.16)

## 2019-10-13 PROCEDURE — 82948 REAGENT STRIP/BLOOD GLUCOSE: CPT

## 2019-10-13 PROCEDURE — 93005 ELECTROCARDIOGRAM TRACING: CPT

## 2019-10-13 PROCEDURE — 71045 X-RAY EXAM CHEST 1 VIEW: CPT

## 2019-10-13 PROCEDURE — 70498 CT ANGIOGRAPHY NECK: CPT

## 2019-10-13 PROCEDURE — 70496 CT ANGIOGRAPHY HEAD: CPT

## 2019-10-13 PROCEDURE — 85025 COMPLETE CBC W/AUTO DIFF WBC: CPT | Performed by: EMERGENCY MEDICINE

## 2019-10-13 PROCEDURE — 99285 EMERGENCY DEPT VISIT HI MDM: CPT

## 2019-10-13 PROCEDURE — 84484 ASSAY OF TROPONIN QUANT: CPT | Performed by: EMERGENCY MEDICINE

## 2019-10-13 PROCEDURE — 80053 COMPREHEN METABOLIC PANEL: CPT | Performed by: EMERGENCY MEDICINE

## 2019-10-13 PROCEDURE — 85730 THROMBOPLASTIN TIME PARTIAL: CPT | Performed by: EMERGENCY MEDICINE

## 2019-10-13 PROCEDURE — 85610 PROTHROMBIN TIME: CPT | Performed by: EMERGENCY MEDICINE

## 2019-10-13 PROCEDURE — 36415 COLL VENOUS BLD VENIPUNCTURE: CPT | Performed by: EMERGENCY MEDICINE

## 2019-10-13 RX ADMIN — IOHEXOL 85 ML: 350 INJECTION, SOLUTION INTRAVENOUS at 14:57

## 2019-10-13 NOTE — ED NOTES
Dr Don Boyle at bedside evaluating patient for possible stroke alert     Malissa 63, RN  10/13/19 0528

## 2019-10-13 NOTE — STROKE DOCUMENTATION
Pt reports no difficulty seeing from either eye at this time  Pt able to see from all viual fields at this time

## 2019-10-13 NOTE — STROKE DOCUMENTATION
Pt reports vision is "weird again", reporting that vision to left upper/lateral vision feels like something is covering that area

## 2019-10-13 NOTE — STROKE DOCUMENTATION
Dr Negrita Bryant discusses with pt need to stay in hospital for observation and f/u MRI tomorrow  Pt verbalizes understanding, and refuses admission to hospital   Dr Negrita Bryant aware

## 2019-10-13 NOTE — ED PROVIDER NOTES
History  Chief Complaint   Patient presents with    Loss of Vision     patient states about 45 minutes prior to arrival patient states per peripheral vision (b/l but left on than right) is blurry and the upper perpipheral vision is gone b/l    STROKE Alert     HPI    This is a 22-year-old female that presents today with vision loss  Patient states about 45 minutes prior to arrival she had acute onset of blurry vision in her upper peripheral patient is gone  States no history of stroke in the past   States she did have risk factors but after gastric bypass surgery her diabetes, hypertension, hyperlipidemia improved  Patient states centrally her vision is fine  States also she is feeling dizzy  Denies any nausea vomiting  No headaches  No trauma  61 year female that presents today with acute onset of vision loss  Will speak with Neurology do a stroke alert  Prior to Admission Medications   Prescriptions Last Dose Informant Patient Reported? Taking?    Calcium-Vitamin D 500-125 MG-UNIT TABS  Self Yes No   Sig: Take 1 tablet by mouth 2 (two) times a day     Multiple Vitamins-Minerals (BARIATRIC FUSION) CHEW  Self Yes No   Sig: Chew 1 tablet daily     anastrozole (ARIMIDEX) 1 mg tablet  Self No No   Sig: ONE TAB DAILY   aspirin 81 MG tablet  Self No No   Sig: Take 1 tablet (81 mg total) by mouth daily   ferrous sulfate 324 (65 Fe) mg  Self No No   Sig: Take 1 tablet (324 mg total) by mouth daily before breakfast   nystatin (MYCOSTATIN) powder  Self No No   Sig: Apply topically 4 (four) times a day   omeprazole (PriLOSEC) 20 mg delayed release capsule  Self No No   Sig: Take 1 capsule (20 mg total) by mouth daily   oxyCODONE (ROXICODONE) 10 MG TABS  Self Yes No   Sig: Take 10 mg by mouth every 6 (six) hours as needed        Facility-Administered Medications: None       Past Medical History:   Diagnosis Date    Anemia     Arthritis     Cancer (HCC)     rectal    Chronic lower back pain     Chronic pain disorder     back pain    [facility] cancer ([facility] ) 2018    Diabetes mellitus ([place] [address] )     Endometrial cancer ([place] [address] ) 2018    GERD (gastroesophageal reflux disease)     History of chemotherapy     History of MRSA infection     Morbid obesity ([facility] )     Ovarian cancer ([facility] ) 2018    Rectal cancer ([facility] )     Spinal stenosis     Systolic murmur     Unsteady gait     uses walker    Wears dentures     Wears glasses        Past Surgical History:   Procedure Laterality Date    ABDOMINAL PERINEAL BOWEL RESECTION W/ ILEOANAL POUCH N/A 2018    Procedure: LAPAROSCOPIC HAND ASSIST ABDOMINOPERINEAL RESECTION,  POSTERIOR VAGINECTOMY, OMENTECTOMY;  Surgeon: Nicholas Baxter MD;  [facility];  Service: Colorectal    ABDOMINAL SURGERY      abscess removed from abdomen and right thigh, a hole in thigh closed by plastic surgeon    ABSCESS DRAINAGE      abd, (R) leg, (L) leg     SECTION       SECTION      CYSTOSCOPY N/A 2018    Procedure: CYSTOSCOPY;  Surgeon: Lorena Diop MD;  [facility];  Service: Gynecology Oncology    ESOPHAGOGASTRODUODENOSCOPY N/A 2016    Procedure: ESOPHAGOGASTRODUODENOSCOPY (EGD); Surgeon: Joelle Herzog MD;  [facility];  Service:     ESOPHAGOGASTRODUODENOSCOPY N/A 3/30/2016    Procedure: ESOPHAGOGASTRODUODENOSCOPY (EGD); Surgeon: Joelle Herzog MD;  [facility]; Service:     EYE SURGERY      laser eye surgery    HYSTERECTOMY N/A 2018    Procedure: RADICAL HYSTERECTOMY TOTAL ABDOMINAL (ALEXANDRA)  BSO;  Surgeon: Lorena Diop MD;  [facility];  Service: Gynecology Oncology    JOINT REPLACEMENT Left 2017    hip    JOINT REPLACEMENT Right 2017    hip    OOPHORECTOMY Bilateral     MI COLONOSCOPY FLX DX W/COLLJ SPEC WHEN PFRMD N/A 3/9/2018    Procedure: COLONOSCOPY;  Surgeon: Desiree Ochoa MD;  Location: [place] [address] GI LAB;   Service: Gastroenterology    MI COLONOSCOPY FLX DX W/COLLJ SPEC WHEN PFRMD N/A 2018    Procedure: COLONOSCOPY;  Surgeon: Yunier Aguilar MD;  [facility]; Service: Colorectal    LA ESOPHAGOGASTRODUODENOSCOPY TRANSORAL DIAGNOSTIC N/A 2018    Procedure: ESOPHAGOGASTRODUODENOSCOPY (EGD); Surgeon: Yadira Del iCd MD;  Location: [facility]; Service: Gastroenterology    LA LAP GASTRIC BYPASS/SOLEDAD-EN-Y N/A 2016    Procedure: BYPASS GASTRIC  SOLEDAD-EN-Y LAPAROSCOPIC;  Surgeon: Sherry Gillespie MD;  [facility];  Service: Bariatrics    LA LAP, SURG COLOSTOMY N/A 2018    Procedure: PERMANENT END COLOSTOMY;  Surgeon: Yunier Aguilar MD;  [facility];  Service: Colorectal    LA LAP, SURG PROCTECTOMY W COLOSTOMY N/A 2018    Procedure: PROCTECTOMY;  Surgeon: Yunier Aguilar MD;  [facility];  Service: Colorectal    TUBAL LIGATION         Family History   Adopted: Yes   Problem Relation Age of Onset    Leukemia Mother     Heart disease Father     Coronary artery disease Father     Diabetes Father     No Known Problems Sister     No Known Problems Brother     Diabetes Son     No Known Problems Brother     No Known Problems Brother     No Known Problems Sister     No Known Problems Sister      I have reviewed and agree with the history as documented  Social History     Tobacco Use    Smoking status: Former Smoker     Packs/day: 1 00     Years: 25 00     Pack years: 25 00     Last attempt to quit: 3/30/2006     Years since quittin 5    Smokeless tobacco: Never Used   Substance Use Topics    Alcohol use: No    Drug use: Yes     Types: Oxycodone     Comment: Percocet for lower back pain prn        Review of Systems   Constitutional: Negative  Negative for diaphoresis and fever  HENT: Negative  Eyes: Positive for visual disturbance  Respiratory: Negative  Negative for cough, shortness of breath and wheezing  Cardiovascular: Negative  Negative for chest pain, palpitations and leg swelling     Gastrointestinal: Negative for abdominal distention, abdominal pain, nausea and vomiting  Genitourinary: Negative  Musculoskeletal: Negative  Skin: Negative  Neurological: Positive for dizziness  Psychiatric/Behavioral: Negative  All other systems reviewed and are negative  Physical Exam  Physical Exam   Constitutional: She is oriented to person, place, and time  She appears well-developed and well-nourished  No distress  HENT:   Head: Normocephalic and atraumatic  Eyes: Pupils are equal, round, and reactive to light  EOM are normal    Neck: Normal range of motion  Neck supple  Cardiovascular: Normal rate, regular rhythm and normal heart sounds  No murmur heard  Pulmonary/Chest: Effort normal and breath sounds normal    Abdominal: Soft  Bowel sounds are normal  She exhibits no distension  There is no tenderness  There is no rebound and no guarding  Musculoskeletal: Normal range of motion  Neurological: She is alert and oriented to person, place, and time  Normal motor and sensory exam  Normal cranial nerve exam  Vision: bitemporal upper quadrant vision loss, bitemporal lower quadrant blurry vision    Skin: Skin is warm and dry  She is not diaphoretic  Psychiatric: She has a normal mood and affect  Vitals reviewed        Vital Signs  ED Triage Vitals [10/13/19 1433]   Temperature Pulse Respirations Blood Pressure SpO2   (!) 97 2 °F (36 2 °C) 94 18 123/96 98 %      Temp Source Heart Rate Source Patient Position - Orthostatic VS BP Location FiO2 (%)   Tympanic Monitor Sitting Right arm --      Pain Score       No Pain           Vitals:    10/13/19 1500 10/13/19 1515 10/13/19 1530 10/13/19 1545   BP: 136/71 114/60 124/64 122/68   Pulse: 81  75 78   Patient Position - Orthostatic VS: Lying Lying  Lying         Visual Acuity  Visual Acuity      Most Recent Value   L Pupil Size (mm)  2   R Pupil Size (mm)  2          ED Medications  Medications   iohexol (OMNIPAQUE) 350 MG/ML injection (MULTI-DOSE) 85 mL (85 mL Intravenous Given 10/13/19 1457)       Diagnostic Studies  Results Reviewed     Procedure Component Value Units Date/Time    Comprehensive metabolic panel [[id number]]  (Abnormal) Collected:  10/13/19 1438    Lab Status:  Final result Specimen:  Blood from Arm, Right Updated:  10/13/19 1526     Sodium 141 mmol/L      Potassium 4 3 mmol/L      Chloride 105 mmol/L      CO2 26 mmol/L      ANION GAP 10 mmol/L      BUN 22 mg/dL      Creatinine 0 79 mg/dL      Glucose 197 mg/dL      Calcium 8 4 mg/dL      AST 5 U/L      ALT 11 U/L      Alkaline Phosphatase 74 U/L      Total Protein 6 4 g/dL      Albumin 2 9 g/dL      Total Bilirubin 0 30 mg/dL      eGFR 79 ml/min/1 73sq m     Narrative:       [place] guidelines for Chronic Kidney Disease (CKD):     Stage 1 with normal or high GFR (GFR > 90 mL/min/1 73 square meters)    Stage 2 Mild CKD (GFR = 60-89 mL/min/1 73 square meters)    Stage 3A Moderate CKD (GFR = 45-59 mL/min/1 73 square meters)    Stage 3B Moderate CKD (GFR = 30-44 mL/min/1 73 square meters)    Stage 4 Severe CKD (GFR = 15-29 mL/min/1 73 square meters)    Stage 5 End Stage CKD (GFR <15 mL/min/1 73 square meters)  Note: GFR calculation is accurate only with a steady state creatinine    Troponin I [[id number]]  (Normal) Collected:  10/13/19 1438    Lab Status:  Final result Specimen:  Blood from Arm, Right Updated:  10/13/19 1503     Troponin I <0 02 ng/mL     Protime-INR [[id number]]  (Normal) Collected:  10/13/19 1438    Lab Status:  Final result Specimen:  Blood from Arm, Right Updated:  10/13/19 1500     Protime 9 8 seconds      INR 0 91    APTT [[id number]]  (Normal) Collected:  10/13/19 1438    Lab Status:  Final result Specimen:  Blood from Arm, Right Updated:  10/13/19 1500     PTT 28 seconds     CBC and differential [[id number]]  (Abnormal) Collected:  10/13/19 1438    Lab Status:  Final result Specimen:  Blood from Arm, Right Updated:  10/13/19 1444     WBC 5 66 Thousand/uL      RBC 5 19 Million/uL      Hemoglobin 13 8 g/dL      Hematocrit 45 1 %      MCV 87 fL      MCH 26 6 pg      MCHC 30 6 g/dL      RDW 13 4 %      MPV 9 5 fL      Platelets 838 Thousands/uL      nRBC 0 /100 WBCs      Neutrophils Relative 69 %      Immat GRANS % 1 %      Lymphocytes Relative 19 %      Monocytes Relative 8 %      Eosinophils Relative 2 %      Basophils Relative 1 %      Neutrophils Absolute 3 91 Thousands/µL      Immature Grans Absolute 0 06 Thousand/uL      Lymphocytes Absolute 1 09 Thousands/µL      Monocytes Absolute 0 47 Thousand/µL      Eosinophils Absolute 0 10 Thousand/µL      Basophils Absolute 0 03 Thousands/µL     Fingerstick Glucose (POCT) [[id number]]  (Abnormal) Collected:  10/13/19 1430    Lab Status:  Final result Updated:  10/13/19 1432     POC Glucose 199 mg/dl                  CTA stroke alert (head/neck)   Final Result by Gladys Jefferson MD (10/13 8596)      1  No intracranial large vessel occlusion/critical stenosis  Mild intracranial vasculature atherosclerotic disease  2   Possible tiny, 1-2 mm, aneurysm at the junction of A1 and A2 segments of right KEVAN      3   Bilateral cervical carotid bifurcation atherosclerotic disease without significant stenosis  Findings were directly discussed with Juan J Brennan on 10/13/2019 3:06 PM                      Workstation performed: [id number]         CT stroke alert brain   Final Result by Gladys Jefferson MD (10/13 1620)      No acute intracranial CT abnormality            Findings were directly discussed with Juan J Brennan on 10/13/2019 3:06 PM       Workstation performed: [id number]         XR chest 1 view    (Results Pending)              Procedures  Procedures       ED Course  ED Course as of Oct 13 1918   Sun Oct 13, 2019   1439 Just spoke with the neurologist who will call me back after looking at the images        1458 Patient vision came back       1459 Procedure Note: EKG  Date/Time: 10/13/19 3:01 PM Performed by: Valery Roberts by: Renard Rodriges   Indications / Diagnosis: stroke alert  ECG reviewed by me, the ED Provider: yes   The EKG demonstrates:  Rhythm: normal sinus  Intervals: normal intervals  Axis: normal axis  QRS/Blocks: normal QRS  ST Changes:No acute ST Changes, no STD/GALDINO         1505 Vision loss again bitemporal upper quadrant hemianopsia       1506 Radiologist states negative scan and possible small aneursym       1542 Neurology recommended MRI       3220 I had a lengthy discussion with patient about CT scan and incidental finding of possible aneursym  Discussed with patient the conversation with neurology, Dr Gayle Goins, and recommended MRi of brain and orbits  Patient states she does nto want to stay in the hospital  States she does not want to stay for MRI  Discussed risks with her and she is adamant she wants to go home  States she will come back if symptoms worsen  Currently symptms have resolved  No vision losss  Discussed return precautions  Advised to get MRI and ophthalmology                                  MDM  Number of Diagnoses or Management Options  Transient vision disturbance of both eyes:   Diagnosis management comments: 59 year female that presents with loss of vision  Stroke workup was done  Patient does not want stay  [facility]    patient is clinically sober, free from distracting injury and appears to have intact insight, judgment, and reason  In my opinion, this patient has the capacity to make decisions  The patient is presenting with vision loss  I am concerned that this may be stroke  The patient has verbalized understanding of my concerns  I have told the patient that she may have stroke  I have discussed the need for inpatient admission for further evaluation and work up  I have told the patient that if he/she leaves against medical advice, the patients condition may worsen, he/she may become critically ill, disabled and die       I have offered to give the patient admission  I have offered to admit the patient for vision loss  I have offered to perform imaging and Ct scan  Patient expresses verbal understanding of risks involved and is of sound mind to make informed medical decisions  I have asked the patient to return as soon as possible to complete his/her evaluation and to return right away if symptoms worsens  Disposition  Final diagnoses:   Transient vision disturbance of both eyes     Time reflects when diagnosis was documented in both MDM as applicable and the Disposition within this note     Time User Action Codes Description Comment    10/13/2019  3:52 PM Joce West Add [H53 9] Transient vision disturbance of both eyes       ED Disposition     ED Disposition Condition Date/Time Comment    [facility] Oct 13, 2019  3:53 PM Date: 10/13/2019  Patient: Augusta Brizuela  Admitted: 10/13/2019  2:27 PM  Attending Provider: Connor Gaming MD    Augusta Brizuela or her authorized caregiver has made the decision for the patient to leave the emergency department against the advice of  her attending physician  She or her authorized caregiver has been informed and understands the inherent risks, including death,   She or her authorized caregiver has decided to accept the responsibility for this decision  Augusta Brizuela and all [facility] parties have been advised that she may return for further evaluation or treatment  Her condition at time of discharge was stable    Augusta Brizuela had current vital signs as follows:  /68   Pulse 78   Temp 97 6 °F (36 4 °C) (Tympanic)    Resp 18   Wt 117 kg (257 lb 0 9 oz)         Follow-up Information     Follow up With Specialties Details Why [address]  Schedule an appointment as soon as possible for a visit   [address] [facility]    Darlene Smith MD Ophthalmology Schedule an appointment as soon as possible for a visit   [address] [place] [address] [address] [place] [address] Leighann Tavares  [phone]            Discharge Medication List as of 10/13/2019  3:53 PM      CONTINUE these medications which have NOT CHANGED    Details   anastrozole (ARIMIDEX) 1 mg tablet ONE TAB DAILY, Normal      aspirin 81 MG tablet Take 1 tablet (81 mg total) by mouth daily, Starting Thu 9/5/2019, OTC      Calcium-Vitamin D 500-125 MG-UNIT TABS Take 1 tablet by mouth 2 (two) times a day  , Historical Med      ferrous sulfate 324 (65 Fe) mg Take 1 tablet (324 mg total) by mouth daily before breakfast, Starting Mon 11/5/2018, Normal      Multiple Vitamins-Minerals (BARIATRIC FUSION) CHEW Chew 1 tablet daily  , Historical Med      nystatin (MYCOSTATIN) powder Apply topically 4 (four) times a day, Starting Wed 5/29/2019, Normal      omeprazole (PriLOSEC) 20 mg delayed release capsule Take 1 capsule (20 mg total) by mouth daily, Starting Mon 7/1/2019, Normal      oxyCODONE (ROXICODONE) 10 MG TABS Take 10 mg by mouth every 6 (six) hours as needed  , Starting Sat 9/15/2018, Historical Med           No discharge procedures on file      ED Provider  Electronically Signed by           Erika Young MD  10/13/19 4830

## 2019-10-13 NOTE — QUICK NOTE
Stroke Alert    Paged 2:35pm  Responded 2:37pm    58 y/o F with DM and ovarian cancer that presents with LKN 1:45pm of visual disturbance and vertigo  NIHSS 2 for the visual changes, which is described as bitemporal hemianopia  No weakness, numbness, speech changes, facial asymmetry, gaze deviation, or other deficit noted  HCT and CTA reviewed - no evidence of acute ischemia/hemorrhage and no significant vascular abnormality  tPA deferred due to low NIHSS and low suspicion for stroke given the distribution of her visual change, which localizes more to the optic chiasm and potentially to pituitary pathology  Recommend MRI brain and orbits with and without contrast to further clarify an underlying etiology for her visual changes  Consider ophthalmology consultation

## 2019-10-13 NOTE — DISCHARGE INSTRUCTIONS
You need to get MRI of the brain and orbits for the vision loss, also recommend see Ophthalmology appointment  Please return if any worsening symptoms

## 2019-10-13 NOTE — STROKE DOCUMENTATION
Pt reports vision is "coming and going" in rigth upper lateral and left upper lateral fields of vision  Pt reports L>R

## 2019-10-14 LAB
ATRIAL RATE: 82 BPM
P AXIS: 47 DEGREES
PR INTERVAL: 178 MS
QRS AXIS: -32 DEGREES
QRSD INTERVAL: 88 MS
QT INTERVAL: 356 MS
QTC INTERVAL: 415 MS
T WAVE AXIS: 39 DEGREES
VENTRICULAR RATE: 82 BPM

## 2019-10-14 PROCEDURE — 93010 ELECTROCARDIOGRAM REPORT: CPT | Performed by: INTERNAL MEDICINE

## 2019-10-15 ENCOUNTER — OFFICE VISIT (OUTPATIENT)
Dept: FAMILY MEDICINE CLINIC | Facility: CLINIC | Age: 64
End: 2019-10-15
Payer: MEDICARE

## 2019-10-15 VITALS
OXYGEN SATURATION: 98 % | TEMPERATURE: 98 F | WEIGHT: 248.31 LBS | DIASTOLIC BLOOD PRESSURE: 82 MMHG | SYSTOLIC BLOOD PRESSURE: 124 MMHG | BODY MASS INDEX: 42.62 KG/M2 | HEART RATE: 84 BPM

## 2019-10-15 DIAGNOSIS — H53.47 BILATERAL HEMIANOPIA: Primary | ICD-10-CM

## 2019-10-15 DIAGNOSIS — E66.01 MORBID OBESITY DUE TO EXCESS CALORIES (HCC): ICD-10-CM

## 2019-10-15 LAB — SL AMB POCT HEMOGLOBIN AIC: 5.1 (ref ?–6.5)

## 2019-10-15 PROCEDURE — 99213 OFFICE O/P EST LOW 20 MIN: CPT | Performed by: FAMILY MEDICINE

## 2019-10-15 PROCEDURE — 83036 HEMOGLOBIN GLYCOSYLATED A1C: CPT | Performed by: FAMILY MEDICINE

## 2019-10-15 NOTE — PROGRESS NOTES
Assessment/Plan:   Diagnoses and all orders for this visit:    Bilateral hemianopia  -     MRI brain and orbits wo and w contrast; Future  -     Ambulatory referral to Neurology; Future  -    Pt to have this f/u with by Neuro, but can also call her with results  Pt told specifically that we recommend she go to the Er for adm if this recurs  She agreed  She otherwise can have this study done to track what may have happened  Possibly in the optic chiasm  Morbid obesity due to excess calories (HCC)  -     POCT hemoglobin A1c    : 5 1 she has gained weight since her gastric surgery and had DM2 in the past  Pt needs new a1c to r/o DM2 reoccurrence  Please call or return to clinic with any questions or concerns  Thank you  Subjective:    Patient ID: Harris Ruelas is a 59 y o  female  HPI  Patient here today for follow-up of the emergency department  Patient was driven in by her son after having symptoms of blurred vision and dizziness  Her vision was blurry on the outside, while her medial lesion wounds normal who patient is not have his wound  She reports it resolved with lying flat in CT head in ED  Pt has had it resolve while standing also had she has no idea what caused this  Typically  her blood pressure is not irregular  In ED BP was 123/96, and in radiology dept last week less than 100 sbp  Patient was told to staying in the mid dominant runoff performed  Patient refused adamantly and understood her risks while leaving  She does not like to be in the hospital   She does wear glasses, and they are up to date  She was told to have an MRI performed  The following portions of the patient's history were reviewed and updated as appropriate: allergies, current medications, past family history, past medical history, past social history and past surgical history  Review of Systems   Constitutional: Negative for chills and fever  HENT: Negative for congestion and rhinorrhea      Eyes: Positive for photophobia and visual disturbance  Negative for pain and redness  Respiratory: Negative for cough and shortness of breath  Cardiovascular: Negative for chest pain and leg swelling  Gastrointestinal: Negative for diarrhea, nausea and vomiting  Neurological: Positive for dizziness  Negative for facial asymmetry, weakness, light-headedness, numbness and headaches  Objective:  /82 (BP Location: Left arm, Patient Position: Sitting, Cuff Size: Adult)   Pulse 84   Temp 98 °F (36 7 °C) (Tympanic)   Wt 113 kg (248 lb 5 oz)   SpO2 98%   BMI 42 62 kg/m²    Physical Exam   Constitutional: She is oriented to person, place, and time  She appears well-developed and well-nourished  No distress  HENT:   Head: Normocephalic and atraumatic  Right Ear: External ear normal    Left Ear: External ear normal    Nose: Nose normal    Mouth/Throat: Oropharynx is clear and moist  No oropharyngeal exudate  Eyes: Pupils are equal, round, and reactive to light  Conjunctivae and EOM are normal  Right eye exhibits no discharge  Left eye exhibits no discharge  No scleral icterus  Neck: Normal range of motion  Cardiovascular: Normal rate, regular rhythm, normal heart sounds and intact distal pulses  Exam reveals no friction rub  No murmur heard  Pulmonary/Chest: Effort normal and breath sounds normal  No stridor  No respiratory distress  Neurological: She is alert and oriented to person, place, and time  No cranial nerve deficit  Skin: Skin is warm  Psychiatric: She has a normal mood and affect  Thought content normal    Vitals reviewed  Portions of the record may have been created with voice recognition software  Occasional wrong word or "sound alike" substitutions may have occurred due to the inherent limitations of voice recognition software  Please review the chart carefully and recognize, using context, where substitutions/typographical errors may have occurred

## 2019-10-16 ENCOUNTER — TELEPHONE (OUTPATIENT)
Dept: NEUROLOGY | Facility: CLINIC | Age: 64
End: 2019-10-16

## 2019-11-04 ENCOUNTER — DOCUMENTATION (OUTPATIENT)
Dept: HEMATOLOGY ONCOLOGY | Facility: MEDICAL CENTER | Age: 64
End: 2019-11-04

## 2019-11-04 ENCOUNTER — APPOINTMENT (OUTPATIENT)
Dept: LAB | Facility: CLINIC | Age: 64
End: 2019-11-04
Payer: MEDICARE

## 2019-11-04 DIAGNOSIS — Z08 ENCOUNTER FOR FOLLOW-UP SURVEILLANCE OF OVARIAN CANCER: ICD-10-CM

## 2019-11-04 DIAGNOSIS — Z85.43 ENCOUNTER FOR FOLLOW-UP SURVEILLANCE OF OVARIAN CANCER: ICD-10-CM

## 2019-11-04 DIAGNOSIS — Z85.048 HISTORY OF RECTAL CANCER: ICD-10-CM

## 2019-11-04 LAB
ALBUMIN SERPL BCP-MCNC: 3.3 G/DL (ref 3.5–5)
ALP SERPL-CCNC: 70 U/L (ref 46–116)
ALT SERPL W P-5'-P-CCNC: 10 U/L (ref 12–78)
ANION GAP SERPL CALCULATED.3IONS-SCNC: 4 MMOL/L (ref 4–13)
AST SERPL W P-5'-P-CCNC: 9 U/L (ref 5–45)
BASOPHILS # BLD AUTO: 0.04 THOUSANDS/ΜL (ref 0–0.1)
BASOPHILS NFR BLD AUTO: 1 % (ref 0–1)
BILIRUB SERPL-MCNC: 0.53 MG/DL (ref 0.2–1)
BUN SERPL-MCNC: 17 MG/DL (ref 5–25)
CALCIUM SERPL-MCNC: 9.2 MG/DL (ref 8.3–10.1)
CANCER AG125 SERPL-ACNC: 3 U/ML (ref 0–30)
CEA SERPL-MCNC: <0.5 NG/ML (ref 0–3)
CHLORIDE SERPL-SCNC: 107 MMOL/L (ref 100–108)
CO2 SERPL-SCNC: 29 MMOL/L (ref 21–32)
CREAT SERPL-MCNC: 0.79 MG/DL (ref 0.6–1.3)
EOSINOPHIL # BLD AUTO: 0.11 THOUSAND/ΜL (ref 0–0.61)
EOSINOPHIL NFR BLD AUTO: 2 % (ref 0–6)
ERYTHROCYTE [DISTWIDTH] IN BLOOD BY AUTOMATED COUNT: 14 % (ref 11.6–15.1)
GFR SERPL CREATININE-BSD FRML MDRD: 79 ML/MIN/1.73SQ M
GLUCOSE P FAST SERPL-MCNC: 87 MG/DL (ref 65–99)
HCT VFR BLD AUTO: 43.9 % (ref 34.8–46.1)
HGB BLD-MCNC: 13.3 G/DL (ref 11.5–15.4)
IMM GRANULOCYTES # BLD AUTO: 0.06 THOUSAND/UL (ref 0–0.2)
IMM GRANULOCYTES NFR BLD AUTO: 1 % (ref 0–2)
LYMPHOCYTES # BLD AUTO: 0.79 THOUSANDS/ΜL (ref 0.6–4.47)
LYMPHOCYTES NFR BLD AUTO: 17 % (ref 14–44)
MCH RBC QN AUTO: 26.3 PG (ref 26.8–34.3)
MCHC RBC AUTO-ENTMCNC: 30.3 G/DL (ref 31.4–37.4)
MCV RBC AUTO: 87 FL (ref 82–98)
MONOCYTES # BLD AUTO: 0.57 THOUSAND/ΜL (ref 0.17–1.22)
MONOCYTES NFR BLD AUTO: 12 % (ref 4–12)
NEUTROPHILS # BLD AUTO: 3.22 THOUSANDS/ΜL (ref 1.85–7.62)
NEUTS SEG NFR BLD AUTO: 67 % (ref 43–75)
NRBC BLD AUTO-RTO: 0 /100 WBCS
PLATELET # BLD AUTO: 159 THOUSANDS/UL (ref 149–390)
PMV BLD AUTO: 9.6 FL (ref 8.9–12.7)
POTASSIUM SERPL-SCNC: 4.7 MMOL/L (ref 3.5–5.3)
PROT SERPL-MCNC: 6.9 G/DL (ref 6.4–8.2)
RBC # BLD AUTO: 5.06 MILLION/UL (ref 3.81–5.12)
SODIUM SERPL-SCNC: 140 MMOL/L (ref 136–145)
WBC # BLD AUTO: 4.79 THOUSAND/UL (ref 4.31–10.16)

## 2019-11-04 PROCEDURE — 85025 COMPLETE CBC W/AUTO DIFF WBC: CPT

## 2019-11-04 PROCEDURE — 36415 COLL VENOUS BLD VENIPUNCTURE: CPT

## 2019-11-04 PROCEDURE — 82378 CARCINOEMBRYONIC ANTIGEN: CPT

## 2019-11-04 PROCEDURE — 80053 COMPREHEN METABOLIC PANEL: CPT

## 2019-11-04 PROCEDURE — 86304 IMMUNOASSAY TUMOR CA 125: CPT

## 2019-11-05 ENCOUNTER — OFFICE VISIT (OUTPATIENT)
Dept: HEMATOLOGY ONCOLOGY | Facility: MEDICAL CENTER | Age: 64
End: 2019-11-05
Payer: MEDICARE

## 2019-11-05 VITALS
HEIGHT: 64 IN | TEMPERATURE: 99 F | WEIGHT: 252 LBS | RESPIRATION RATE: 16 BRPM | DIASTOLIC BLOOD PRESSURE: 78 MMHG | OXYGEN SATURATION: 98 % | BODY MASS INDEX: 43.02 KG/M2 | HEART RATE: 81 BPM | SYSTOLIC BLOOD PRESSURE: 120 MMHG

## 2019-11-05 DIAGNOSIS — Z85.048 HISTORY OF RECTAL CANCER: Primary | ICD-10-CM

## 2019-11-05 DIAGNOSIS — Z85.43 HISTORY OF OVARIAN CANCER: ICD-10-CM

## 2019-11-05 PROCEDURE — 99213 OFFICE O/P EST LOW 20 MIN: CPT | Performed by: INTERNAL MEDICINE

## 2019-11-05 NOTE — PROGRESS NOTES
Lety Parra  1955  [facility] [place] [id number] [phone]    DISCUSSION/SUMMARY:    70-year-old female previously found to have high-grade dysplasia/intramucosal lesion arising in a tubulovillous adenoma  Issues:    Rectal cancer  Final stage is IIA (ypT3 pN0 G2)  Patient received neoadjuvant concurrent treatment and then underwent resection  Mrs Sherri Orozco had postoperative complications with wound healing and fistula formation - eventually patient improved  Presently Mrs Reardon states feeling well, baseline; clinically there are no concerning findings  Recent blood work was good/acceptable; the last set of scans were also good  The plan is to continue with surveillance  Patient is to return in mid February 20/20 with repeat scans and blood work before  Fistula  Fistula is minimal, has healed almost entirely  Patient follows up with Colorectal surgery as directed  Ovarian cancer, IIIC  As discussed previously, at the time of surgery, patient was found to have an incidental low-grade serous ovarian carcinoma with omental nodules greater than 2 cm grossly visible  Patient is followed by GYN Oncology and is on anastrozole (NCCN 2 [id number] low-grade serous, stage II-IV tumors, treatment options include primary hormonal therapy (category 2B)  Cardiac issues  Patient has valvular problems, specifics are not presently available  Patient is following with her cardiologist     Temporary loss of peripheral vision  Patient is being evaluated by Neurology and Ophthalmology  Patient is to return in in approximately 4 months  Mrs Sherri Orozco knows to call if she has any other oncology questions or concerns  Carefully review your medication list and verify that the list is accurate and up-to-date   Please call the hematology/oncology office if there are medications missing from the list, medications on the list that you are not currently taking or if there is a dosage or instruction that is different from how you're taking that medication  Patient goals and areas of care:   Rectal cancer surveillance, follow-up with GYN Oncology  Barriers to care:  None  Patient is able to self-care   ___________________________________________________________________________________________________    Chief Complaint   Patient presents with    Follow-up     Rectal cancer, ovarian cancer     History of Present Illness:    71-year-old female previously referred for the above  Previously Mrs Regla Echols had gastric bypass  Patient was recently seen by GI because of blood in the stools  Additional workup included a colonoscopy which demonstrated a rectal lesion  Patient recently completed concurrent chemotherapy (Xeloda) with radiation  Mrs Rosalio Salvador subsequently went onto an APR  Final pathology results are listed below; patient was also found to have IIIC low-grade serous ovarian carcinoma  Postoperatively, patient had issues with slow wound closure  Mrs Rosalio Salvador states feeling well, baseline  Activities are baseline  No pain control issues  No problems with colostomy, no GI bleeding  No  or GYN issues  Patient occasionally sees a very small amount of discharge from the original wound  No chest pain or pressure  No shortness of breath or dyspnea on exertion  Generalized body aches are the same as before  Mrs Rosailo Salvador recently had approximately 1 week of loss of peripheral vision in both eyes, would come and go  Patient is presently being evaluated by Neurology and Ophthalmology  Review of Systems   Constitutional: Positive for fatigue  HENT: Negative  Eyes: Negative  Respiratory: Negative  Cardiovascular: Negative  Gastrointestinal: Negative for blood in stool, constipation and diarrhea  Endocrine: Negative  Genitourinary: Negative  Musculoskeletal: Positive for arthralgias  Skin: Negative  Allergic/Immunologic: Negative  Neurological: Negative  Hematological: Negative  Psychiatric/Behavioral: Negative  All other systems reviewed and are negative       Patient Active Problem List   Diagnosis    Morbid obesity due to excess calories (HCC)    Postgastrectomy malabsorption    S/P gastric bypass    Marginal ulcer    Atherosclerotic peripheral vascular disease ([facility] )    Onychomycosis    History of rectal cancer    Colostomy care ([place] [address] )    History of ovarian cancer    Status post bariatric surgery    Pyelonephritis    History of ovarian cancer    Candidal intertrigo    Vaginal bleeding    Encounter for other screening for malignant neoplasm of breast    Severe aortic stenosis by prior echocardiogram    Moderate mitral stenosis     Past Medical History:   Diagnosis Date    Anemia     Arthritis     Cancer ([facility] )     rectal    Chronic lower back pain     Chronic pain disorder     back pain    Colon cancer ([place] [address] ) 2018    Diabetes mellitus ([place] [address] )     Endometrial cancer ([place] [address] ) 2018    GERD (gastroesophageal reflux disease)     History of chemotherapy     History of MRSA infection     Morbid obesity ([facility] )     Ovarian cancer ([facility] ) 2018    Rectal cancer ([facility] )     Spinal stenosis     Systolic murmur     Unsteady gait     uses walker    Wears dentures     Wears glasses      Ob/gyn:  No recent mammogram -patient's choice, no post menopausal bleeding    Past Surgical History:   Procedure Laterality Date    ABDOMINAL PERINEAL BOWEL RESECTION W/ ILEOANAL POUCH N/A 2018    Procedure: LAPAROSCOPIC HAND ASSIST ABDOMINOPERINEAL RESECTION,  POSTERIOR VAGINECTOMY, OMENTECTOMY;  Surgeon: Jaiden Kramer MD;  [facility];  Service: Colorectal    ABDOMINAL SURGERY      abscess removed from abdomen and right thigh, a hole in thigh closed by plastic surgeon    ABSCESS DRAINAGE      abd, (R) leg, (L) leg     SECTION       SECTION      CYSTOSCOPY N/A 9/6/2018    Procedure: CYSTOSCOPY;  Surgeon: Beth Butt MD;  [facility];  Service: Gynecology Oncology    ESOPHAGOGASTRODUODENOSCOPY N/A 7/18/2016    Procedure: ESOPHAGOGASTRODUODENOSCOPY (EGD); Surgeon: Juliann Herzog MD;  [facility];  Service:     ESOPHAGOGASTRODUODENOSCOPY N/A 3/30/2016    Procedure: ESOPHAGOGASTRODUODENOSCOPY (EGD); Surgeon: Juliann Herzog MD;  [facility]; Service:     EYE SURGERY      laser eye surgery    HYSTERECTOMY N/A 9/6/2018    Procedure: RADICAL HYSTERECTOMY TOTAL ABDOMINAL (ALEXANDRA)  BSO;  Surgeon: Beth Butt MD;  [facility];  Service: Gynecology Oncology    JOINT REPLACEMENT Left 2017    hip    JOINT REPLACEMENT Right 2017    hip    OOPHORECTOMY Bilateral     FL COLONOSCOPY FLX DX W/COLLJ SPEC WHEN PFRMD N/A 3/9/2018    Procedure: COLONOSCOPY;  Surgeon: Lefty Dodson MD;  Location: [facility]; Service: Gastroenterology    FL COLONOSCOPY FLX DX W/COLLJ [facility] WHEN PFRMD N/A 9/5/2018    Procedure: COLONOSCOPY;  Surgeon: Susannah Drake MD;  [facility]; Service: Colorectal    FL ESOPHAGOGASTRODUODENOSCOPY TRANSORAL DIAGNOSTIC N/A 2/2/2018    Procedure: ESOPHAGOGASTRODUODENOSCOPY (EGD); Surgeon: Lefty Dodson MD;  Location: [facility];   Service: Gastroenterology    FL LAP GASTRIC BYPASS/SOLEDAD-EN-Y N/A 7/18/2016    Procedure: BYPASS GASTRIC  SOLEDAD-EN-Y LAPAROSCOPIC;  Surgeon: Juliann Herzog MD;  [facility];  Service: Bariatrics    FL LAP, SURG COLOSTOMY N/A 9/6/2018    Procedure: PERMANENT END COLOSTOMY;  Surgeon: Susannah Drake MD;  [facility];  Service: Colorectal    FL LAP, SURG PROCTECTOMY W COLOSTOMY N/A 9/6/2018    Procedure: PROCTECTOMY;  Surgeon: Susannah Drake MD;  [facility];  Service: Colorectal    TUBAL LIGATION       Family History   Adopted: Yes   Problem Relation Age of Onset    Leukemia Mother     Heart disease Father     Coronary artery disease Father     Diabetes Father     No Known Problems Sister     No Known Problems Brother     Diabetes Son     No Known Problems Brother     No Known Problems Brother     No Known Problems Sister     No Known Problems Sister     Family history:   Mother  of leukemia (NOS), father  from heart disease (NOS), 2 children 1 with diabetes, no known familial or genetic diseases, no family history of GI malignancies    Social History     Socioeconomic History    Marital status: Single     Spouse name: Not on file    Number of children: 2    Years of education: Not on file    Highest education level: Not on file   Occupational History    Not on file   Social Needs    Financial resource strain: Not on file    Food insecurity:     Worry: Not on file     Inability: Not on file    Transportation needs:     Medical: Not on file     Non-medical: Not on file   Tobacco Use    Smoking status: Former Smoker     Packs/day:      Years: 25      Pack years: 25      Last attempt to quit: 3/30/2006     Years since quittin 6    Smokeless tobacco: Never Used   Substance and Sexual Activity    Alcohol use: No    Drug use: Yes     Types: Oxycodone     Comment: Percocet for lower back pain prn    Sexual activity: Not on file   Lifestyle    Physical activity:     Days per week: Not on file     Minutes per session: Not on file    Stress: Not on file   Relationships    Social connections:     Talks on phone: Not on file     Gets together: Not on file     Attends Jew service: Not on file     Active member of club or organization: Not on file     Attends meetings of clubs or organizations: Not on file     Relationship status: Not on file    Intimate partner violence:     Fear of current or ex partner: Not on file     Emotionally abused: Not on file     Physically abused: Not on file     Forced sexual activity: Not on file   Other Topics Concern    Not on file   Social History Narrative    Not on file       Current Outpatient Medications:     anastrozole (ARIMIDEX) 1 mg tablet, ONE TAB DAILY, Disp: 90 tablet, Rfl: 2    aspirin 81 MG tablet, Take 1 tablet (81 mg total) by mouth daily, Disp: , Rfl:     Calcium-Vitamin D 500-125 MG-UNIT TABS, Take 1 tablet by mouth 2 (two) times a day  , Disp: , Rfl:     ferrous sulfate 324 (65 Fe) mg, Take 1 tablet (324 mg total) by mouth daily before breakfast, Disp: 90 tablet, Rfl: 0    Multiple Vitamins-Minerals (BARIATRIC FUSION) CHEW, Chew 1 tablet daily  , Disp: , Rfl:     nystatin (MYCOSTATIN) powder, Apply topically 4 (four) times a day, Disp: 30 g, Rfl: 1    omeprazole (PriLOSEC) 20 mg delayed release capsule, Take 1 capsule (20 mg total) by mouth daily, Disp: 90 capsule, Rfl: 3    oxyCODONE (ROXICODONE) 10 MG TABS, Take 10 mg by mouth every 6 (six) hours as needed  , Disp: , Rfl:     Allergies   Allergen Reactions    Tramadol Other (See Comments)     Dizzy         Vitals:    11/05/19 0845   BP: 120/78   Pulse: 81   Resp: 16   Temp: 99 °F (37 2 °C)   SpO2: 98%     Physical Exam   Constitutional: She is oriented to person, place, and time  She appears well-developed and well-nourished  Well-nourished female, no respiratory distress   HENT:   Head: Normocephalic and atraumatic  Right Ear: External ear normal    Left Ear: External ear normal    Nose: Nose normal    Mouth/Throat: Oropharynx is clear and moist    Eyes: Pupils are equal, round, and reactive to light  Conjunctivae and EOM are normal    Neck: Normal range of motion  Neck supple  Cardiovascular: Normal rate, regular rhythm, normal heart sounds and intact distal pulses  Pulmonary/Chest: Effort normal and breath sounds normal    Few scattered rhonchi, otherwise good air entry bilaterally   Abdominal: Soft  Bowel sounds are normal    Left lower quadrant colostomy in place    Stool in back, nontender, +bowel sounds, well-healed suture lines, obese, cannot palpate liver or spleen Musculoskeletal:   No pain or tenderness with palpation of joints, muscles or bones, good range of motion in upper extremities, decreased range of motion in lower extremities but equal   Neurological: She is alert and oriented to person, place, and time  She has normal reflexes  Skin: Skin is warm  Warm, moist, good color, no petechiae or ecchymoses   Psychiatric: She has a normal mood and affect   Her behavior is normal  Judgment and thought content normal    Extremities:  1 +bilateral lower extremity edema, no cords, pulses are 1+  Lymphatics:  No adenopathy in the neck, supraclavicular region, axilla and groin bilaterally  Rectal wound deferred    Labs    11/04/2019 WBC = 4 7 hemoglobin = 13 3 hematocrit = 43 MCV = 87 platelet = 585 neutrophil = 67% BUN = 17 creatinine = 0 79 [facility]  = 3 0 CEA < 0 5    05/28/2019  = 4 2  03/26/2019 BUN = 15 creatinine = 0 76 calcium = 8 9 AST = 11 ALT = 11 alkaline phosphatase = 69 total bilirubin = 0 32  12/11/2018 WBC = 5 2 hemoglobin = 9 7 hematocrit = 34 MCV = 74 RDW = 17 2 platelet = 808  59/35/9293 WBC = 5 8 hemoglobin = 8 7 hematocrit = 31 MCV = 80 platelet = 650 CA -634 = 9 6  09/11/2018 WBC = 5 6 hemoglobin = 7 7 hematocrit = 25 4 MCV = 83 platelet = 130 BUN = 7 creatinine = 0 50  06/13/2018 WBC = 5 39 hemoglobin = 10 4 hematocrit = 35 MCV = 80 platelet = 620 BUN = 13 creatinine = 0 76 [facility]  05/30/2018 BUN = 11 creatinine = 0 70 [facility] WBC = 4 97 hemoglobin = 10 1 hematocrit = 34 MCV = 80 RDW = 18 6 platelet = 843 neutrophil = 68%  05/17/2018 WBC = 4 4 hemoglobin = 10 6 hematocrit = 34 MCV = 75 platelet = 792 neutrophil = 67% BUN = 19 creatinine = 0 65 [facility]  05/08/2018 WBC = 6 7 hemoglobin = 10 7 hematocrit = 35 MCV = 75 platelet = 512 BUN = 17 creatinine = 0 62 [facility]  03/12/2018 CEA = 22 6 = high    Imaging    04/01/2019 CT scan of the chest and abdomen pelvis    No CT evidence of metastatic disease within the chest abdomen or pelvis  Stable postoperative changes  4/6/18 MRI pelvis rectal cancer staging    Low rectal cancer, 5 cm in length, circumferential around the rectal wall  (Series 8 images 19 through 33 )  Anteriorly, there are multiple areas where there is transmural tumor extension into the mesorectal fat making this at least a T3c lesion  On Series 8 image 31, tumor also abuts the mesorectal fascia making this CRM positive  At this point it is also   immediately adjacent to the vagina, therefore this may be a T4 lesion  There are 2 high risk perirectal nodes, and 1 low risk perirectal nodes  01/31/2018 ultrasound right upper quadrant    1  Layering gallbladder sludge  No acute cholecystitis or biliary ductal obstruction  2   Hepatomegaly  3   Fluid-filled stomach  1/27/18 CT scan of the abdomen/pelvis    1  Prior Juan-en-Y gastric bypass with distention of the excluded stomach  2   No evidence of acute abnormality in the abdomen or pelvis      Pathology    Case Report   Surgical Pathology Report                         Case: [id number]                                    Authorizing Provider: GEORGETTE Howard       Collected:           05/29/2019 0922               Ordering Location:     [facility] Received:            05/29/2019 0922                                      Oncology [place]                                                            Pathologist:           Suzy De Guzman MD                                                                Specimen:    Vaginal, vaginal polyp                                                                     Final Diagnosis   A   Vagina, polyp (biopsy):  - Acutely and chronically inflamed granulation tissue    Electronically signed by Suzy De Guzman MD on 5/30/2019 at  1:18 PM         Case Report   Surgical Pathology Report                         Case: [id number]                                    Authorizing Provider: Benjamin Avalos MD      Collected:           09/06/2018 1010               Ordering Location:     [address]      Received:            09/06/2018 [facility]                                      Hospital Operating Room                                                       Pathologist:           Chery Broussard MD                                                         Specimens:   A) - Soft Tissue, Other, EPIPLOIC NODULE                                                             B) - Rectum, enbloc rectum, sigmoid, anus, uterus, bso, cervix, posterior vaginal                    wall                                                                                                 C) - Omentum                                                                               Addendum   Additional immunohistochemical stains performed with appropriate controls on a selected portion of serous carcinoma involving omental tissue (block C1) show the following results:  Estrogen receptor: Positive  Progesterone receptor: Negative   Addendum electronically signed by Chery Broussard MD on 9/27/2018 at  1:40 PM   Final Diagnosis   A  Soft tissue, epiploic nodule, biopsy:  -  Portion of nodular fibroadipose tissue with fat necrosis and dystrophic calcifications  -  Negative for metastatic carcinoma  -  Immunohistochemical stain performed with appropriate control for keratin AE1/3 is negative for epithelial elements, supporting the diagnosis      B  Rectum, sigmoid colon, anus, uterus,cervix, bilateral ovaries and fallopian tubes and posterior vaginal wall; en bloc resection:  -  Invasive moderately differentiated adenocarcinoma of rectum (see first synoptic report)  -  Low grade serous carcinoma involving left ovary, left fallopian tube, uterine serosa and colonic serosa (see second synoptic report)  -  Separate portion of colon (consistent with sigmoid) with diverticulosis coli showing focal serosal implant of low-grade serous carcinoma    - Uterus showing benign inactive/atrophic endometrium with metaplastic change, benign endometrial polyp and rare cytologic atypia consistent with radiation effect  -  Cervix with mild glandular atypia, favor reactive (see note)  -  Benign uterine myometrium with one intramural benign leiomyoma, negative for atypia or malignancy  -  Benign anal skin and dentate line with mild reactive change, negative for involvement by carcinoma  -  Benign vaginal wall mucosa with thinning, chronic inflammation, and surface erosion most suggestive of prior therapy effect, negative for involvement by carcinoma  -  27 benign pericolorectal lymph nodes identified, negative for metastatic carcinoma      C  Omentum, omentectomy:  -   Low grade serous carcinoma         COLORECTAL CARCINOMA TUMOR STAGING SUMMARY (includes specimen A-C of this case):  1  Specimen identification:     - Specimen:  Rectum, sigmoid colon, anus, uterus,cervix, bilateral ovaries and fallopian tubes and posterior vaginal wall   2  Tumor:     - Tumor site:  Rectum     - Tumor size:  Up to 3 3 cm     - Macroscopic tumor perforation:  Not identified     - Tumor location:  Below the peritoneal reflection        - Macroscopic intactness of mesorectum:  Intact     - Histologic Type:   Adenocarcinoma      - Histologic Grade: Moderately differentiated (G2)     - Microscopic Tumor Extension (ypT3): Tumor invades through muscularis propria into pericolorectal soft tissues     - Tumor budding (only needed in polyps, Stage I or II cancers):  Not identified   3  Margins (specify distance for nearest radial margin on RECTAL tumors only):     - Proximal Margin:  Negative for carcinoma     - Distal Margin:  Negative for carcinoma     - Circumferential (Radial) or Mesenteric Margin:  Negative for carcinoma (0 6cm)     - Other Margins:  Vaginal margin negative for carcinoma   4  Treatment effect:  Present;   Residual cancer with evident tumor regression, but more than single cells or rare small groups of cancer cells (partial response, score 2)   5  Lymph-vascular invasion:  Not identified   6  Perineural invasion:  Not identified   7  Tumor deposits: Not identified   8  Type of polyp in which invasive carcinoma arose:  Tubular adenoma   9  Regional lymph nodes (pN0):  27 benign pericolorectal lymph node sample, negative for metastatic carcinoma  10  Additional pathologic findings:  Diverticulosis coli  11  Ancillary Studies:  *  Immunohistochemical stains performed with appropriate controls show the primary rectal tumor to be positive for CK20 and CDX-2 and negative for CK 7, PAX-8, p53 (wild type expression), ER and WT-1, supporting a diagnosis of primary colorectal origin  12  8th Ed AJCC Stage:  at least Stage  IIA - ypT3, pN0, G2         Primary Tumor of the Ovary/Fallopian Tube/Peritoneum, Staging Summary (includes Specimen A to C of this case):  1  Procedure:  Hysterectomy, bilateral salpingo-oophorectomy, omentectomy  2  Hysterectomy type:  Radical hysterectomy  3  Specimen integrity:      - Right ovary:  Intact      - Left ovary:  Intact       - Right fallopian tube: Intact      - Left fallopian tube: Intact  4  Tumor site:  Ovarian and fallopian tube surfaces and uterine and colonic serosa  5  Ovarian surface involvement:  Present  6  Fallopian tube surface involvement:  Present  7  Tumor size:  Largest focus measures 1 8 cm (uterine serosa) (see note)  8  Histologic type:  Low-grade serous carcinoma   9  Histologic grade:  Low grade  10  Implant:  Present  11  Other tissue/organ involvement:  Bilateral ovaries and fallopian tubes, uterine serosa, sigmoid colon serosa, and omentum  12  Largest extrapelvic peritoneal focus: 2 5 cm (macroscopically identidfied)    - Specify site:  Omentum  13  Peritoneal/ascetic fluid:  Not performed   14  Pleural fluid:  Not performed  15  Treatment effect:  No known prior therapy  16   Regional lymph nodes:    - No regional uterine lymph nodes submitted or found    - 27 benign pericolorectal lymph nodes sampled, negative for metastatic carcinoma  17  Pathologic stage classification (pTNM, AJCC 8th edition): pT3c, pNX, Low Grade  18  FIGO stage (2015 FIGO cancer report): IIIC  19  Additional pathologic findings:  N/A  20  Ancillary studies:  Immunohistochemical stains performed with appropriate controls show the tumor cells to be positive for CK7, PAX-8, ER, and WT-1 with wild-type expression of p53 and negative for CK20 and CDX-2 supporting the diagnosis  Electronically signed by Gisell Edwards MD on 9/25/2018 at 12:54 PM                  Case Report   Surgical Pathology Report                         Case: [id number]                                    Authorizing Provider: Jaiden Kramer MD      Collected:           03/28/2018 1130               Pathologist:           Kathleen Colunga MD             Received:            03/29/2018 0942               Specimen:    Rectum, Rectal cancer/mass biopsies                                                        Final Diagnosis   A  Rectal mass (biopsy):  - 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      Comment:   - In the context of a rectal mass, repeat biopsy and/or excision may be indicated to exclude a higher grade lesion      Electronically signed by Kathleen Colunga MD on 3/30/2018 at  2:36 PM         Case Report   Surgical Pathology Report                         Case: [id number]                                    Authorizing Provider: Dayana Sotomayor MD          Collected:           03/09/2018 0902               Ordering Location:     [facility]   Received:            03/12/2018 0904                                      Center                                                                        Pathologist:           Lauro Gray DO                                                             Specimens:   A) - Colon, polyp splenic flexure/cold snare                                                         B) - Colon, bx distal rectal mass                                                          Final Diagnosis   A  Colon, splenic flexure polyp, biopsy:  - Tubulovillous adenoma, negative for high-grade dysplasia      B  Rectum, mass, biopsy:  - At least high grade dysplasia/intramucosal carcinoma arising in a tubulovillous adenoma, suspicious for invasion       Intradepartmental consultation is in agreement with the above diagnosis (AT)     Interpretation performed at [facility], [address] [address]  Report faxed to Dr Mel Marin on 3/13/18 @ 10:55 AM   Electronically signed by Tim Castro DO on 3/13/2018 at 10:54 AM

## 2019-11-08 ENCOUNTER — OFFICE VISIT (OUTPATIENT)
Dept: FAMILY MEDICINE CLINIC | Facility: CLINIC | Age: 64
End: 2019-11-08
Payer: MEDICARE

## 2019-11-08 VITALS
WEIGHT: 253.3 LBS | DIASTOLIC BLOOD PRESSURE: 70 MMHG | RESPIRATION RATE: 18 BRPM | TEMPERATURE: 98 F | HEIGHT: 64 IN | HEART RATE: 92 BPM | SYSTOLIC BLOOD PRESSURE: 104 MMHG | OXYGEN SATURATION: 97 % | BODY MASS INDEX: 43.24 KG/M2

## 2019-11-08 DIAGNOSIS — R39.198 DIFFICULTY URINATING: Primary | ICD-10-CM

## 2019-11-08 LAB
SL AMB  POCT GLUCOSE, UA: ABNORMAL
SL AMB LEUKOCYTE ESTERASE,UA: 75
SL AMB POCT BILIRUBIN,UA: ABNORMAL
SL AMB POCT BLOOD,UA: ABNORMAL
SL AMB POCT CLARITY,UA: CLEAR
SL AMB POCT COLOR,UA: YELLOW
SL AMB POCT KETONES,UA: ABNORMAL
SL AMB POCT NITRITE,UA: ABNORMAL
SL AMB POCT PH,UA: 5
SL AMB POCT SPECIFIC GRAVITY,UA: 1.02
SL AMB POCT URINE PROTEIN: ABNORMAL
SL AMB POCT UROBILINOGEN: ABNORMAL

## 2019-11-08 PROCEDURE — 99213 OFFICE O/P EST LOW 20 MIN: CPT | Performed by: FAMILY MEDICINE

## 2019-11-08 PROCEDURE — 81002 URINALYSIS NONAUTO W/O SCOPE: CPT | Performed by: FAMILY MEDICINE

## 2019-11-08 PROCEDURE — 87086 URINE CULTURE/COLONY COUNT: CPT | Performed by: STUDENT IN AN ORGANIZED HEALTH CARE EDUCATION/TRAINING PROGRAM

## 2019-11-08 RX ORDER — SULFAMETHOXAZOLE AND TRIMETHOPRIM 800; 160 MG/1; MG/1
1 TABLET ORAL 2 TIMES DAILY
Qty: 6 TABLET | Refills: 0 | Status: SHIPPED | OUTPATIENT
Start: 2019-11-08 | End: 2019-11-11

## 2019-11-08 NOTE — PROGRESS NOTES
[address]  [place], [place], 19     Topher Benjamin MRN: [id number] : 1955 Age: 59 y o  Assessment/Plan        1  Difficulty urinating         Presents with urinary symptoms, some decreased output/hesitancy though she was able to urinate normally last night  Unable to provide specimen in clinic this morning, provided sterile sample cup and will have her return sample to office this afternoon for testing, with consideration for antibiotic treatment versus urology referral if there is concern for urinary retention  Strongly encouraged to have previously ordered MRI done though she states all her visual symptoms with concern for stroke/TIA have now resolved  She is agreeable and will reschedule the MRI  Topher Benjamin acknowledged understanding of treatment plan, all questions answered  Plan discussed with attending physician Dr Susi Herrera  Miles Mark is a 59 y o  female  Presents with two days of difficulty urinating  States that she has been drinking her normal amount of liquids (big coffee in the morning, 2-3 bottles of water a day), she feels some pressure like she has to go, but when she tries to urinate it's just a trickle, "maybe a tablespoon"  Last night when she got up to empty her colostomy bag was able to urinate a normal amount  Has not been able to urinate yet this morning  Prior kidney infection x1 earlier this year, was treated outpatient  That infection felt more weak, had a fall, has no symptoms like this today  History of colon resection and hysterectomy with colostomy last September  States she was in the hospital for about a week and did have some issues with urinary retention and required straight cath on a few occasions but did not have del real  Regarding recent ED visit for possible stroke, states her symptoms have completely resolved  She is somewhat claustrophobic but does plan to get it done        The following portions of the patient's history were reviewed and updated as appropriate: allergies, current medications, past family history, past medical history, past social history, past surgical history and problem list     Review of Systems   Constitutional: Negative for chills and fever  Gastrointestinal: Negative for abdominal pain, blood in stool, constipation, diarrhea, nausea and vomiting  Genitourinary: Positive for decreased urine volume and difficulty urinating  Negative for dysuria, flank pain, frequency, hematuria and urgency  Current Outpatient Medications   Medication Sig Dispense Refill    anastrozole (ARIMIDEX) 1 mg tablet ONE TAB DAILY 90 tablet 2    aspirin 81 MG tablet Take 1 tablet (81 mg total) by mouth daily      Calcium-Vitamin D 500-125 MG-UNIT TABS Take 1 tablet by mouth 2 (two) times a day        ferrous sulfate 324 (65 Fe) mg Take 1 tablet (324 mg total) by mouth daily before breakfast 90 tablet 0    Multiple Vitamins-Minerals (BARIATRIC FUSION) CHEW Chew 1 tablet daily        nystatin (MYCOSTATIN) powder Apply topically 4 (four) times a day 30 g 1    omeprazole (PriLOSEC) 20 mg delayed release capsule Take 1 capsule (20 mg total) by mouth daily 90 capsule 3    oxyCODONE (ROXICODONE) 10 MG TABS Take 10 mg by mouth every 6 (six) hours as needed         No current facility-administered medications for this visit  Objective      /70 (BP Location: Left arm, Patient Position: Sitting, Cuff Size: Adult)   Pulse 92   Temp 98 °F (36 7 °C) (Tympanic)   Resp 18   Ht 5' 4" (1 626 m)   Wt 115 kg (253 lb 4 8 oz)   SpO2 97%   BMI 43 48 kg/m²     Physical Exam   Constitutional: She is oriented to person, place, and time  She appears well-developed and well-nourished  HENT:   Head: Normocephalic and atraumatic  Eyes: Conjunctivae are normal    Cardiovascular: Normal rate and regular rhythm  Murmur heard    Pulmonary/Chest: Effort normal and breath sounds normal  No respiratory distress  Abdominal: Soft  Bowel sounds are normal  She exhibits no distension (obese)  There is no tenderness  Obese  Colostomy site with bag in place, small amount of liquid stool, no signs of increased irritation or infection around ostomy  Unable to palpate bladder, no apparent suprapubic tenderness  Neurological: She is alert and oriented to person, place, and time  Skin: Skin is warm and dry  Vitals reviewed  Some portions of this record may have been generated with voice recognition software  There may be translation, syntax, or grammatical errors  Occasional wrong word or "sound-a-like" substitutions may have occurred due to the inherent limitations of the voice recognition software  Read the chart carefully and recognize, using context, where substations may have occurred  If you have any questions, please contact the dictating provider for clarification or correction, as needed

## 2019-11-08 NOTE — PROGRESS NOTES
Follow up from visit this morning  Patient was able to urinate normally twice since visit  POCT UA +leuks, will send for culture and start on Bactrim empirically given her symptoms, follow up culture results and adjust as needed

## 2019-11-09 LAB — BACTERIA UR CULT: NORMAL

## 2019-11-25 DIAGNOSIS — B37.2 CANDIDAL INTERTRIGO: ICD-10-CM

## 2019-11-25 RX ORDER — NYSTATIN 100000 [USP'U]/G
POWDER TOPICAL
Qty: 30 G | Refills: 1 | Status: SHIPPED | OUTPATIENT
Start: 2019-11-25 | End: 2020-06-10 | Stop reason: SDUPTHER

## 2019-12-05 ENCOUNTER — TELEPHONE (OUTPATIENT)
Dept: GYNECOLOGIC ONCOLOGY | Facility: CLINIC | Age: 64
End: 2019-12-05

## 2019-12-05 NOTE — TELEPHONE ENCOUNTER
Patient cancelled 12/6/19 appt via automated reminder call  LM for patient to call back and reschedule   was done 11/4/19

## 2019-12-12 DIAGNOSIS — C56.9 MALIGNANT NEOPLASM OF OVARY, UNSPECIFIED LATERALITY (HCC): ICD-10-CM

## 2019-12-12 RX ORDER — ANASTROZOLE 1 MG/1
1 TABLET ORAL DAILY
Qty: 90 TABLET | Refills: 2 | Status: SHIPPED | OUTPATIENT
Start: 2019-12-12 | End: 2021-08-21 | Stop reason: HOSPADM

## 2020-01-06 ENCOUNTER — TELEPHONE (OUTPATIENT)
Dept: FAMILY MEDICINE CLINIC | Facility: CLINIC | Age: 65
End: 2020-01-06

## 2020-01-06 NOTE — TELEPHONE ENCOUNTER
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

## 2020-01-15 ENCOUNTER — TELEPHONE (OUTPATIENT)
Dept: GYNECOLOGIC ONCOLOGY | Facility: HOSPITAL | Age: 65
End: 2020-01-15

## 2020-01-27 ENCOUNTER — OFFICE VISIT (OUTPATIENT)
Dept: FAMILY MEDICINE CLINIC | Facility: CLINIC | Age: 65
End: 2020-01-27
Payer: MEDICARE

## 2020-01-27 ENCOUNTER — APPOINTMENT (OUTPATIENT)
Dept: LAB | Facility: CLINIC | Age: 65
End: 2020-01-27
Payer: MEDICARE

## 2020-01-27 VITALS
HEART RATE: 85 BPM | DIASTOLIC BLOOD PRESSURE: 80 MMHG | RESPIRATION RATE: 18 BRPM | OXYGEN SATURATION: 98 % | SYSTOLIC BLOOD PRESSURE: 128 MMHG | HEIGHT: 64 IN | WEIGHT: 257 LBS | BODY MASS INDEX: 43.87 KG/M2 | TEMPERATURE: 98.4 F

## 2020-01-27 DIAGNOSIS — R42 DIZZINESS: ICD-10-CM

## 2020-01-27 DIAGNOSIS — Z85.048 HISTORY OF RECTAL CANCER: ICD-10-CM

## 2020-01-27 DIAGNOSIS — R42 DIZZINESS: Primary | ICD-10-CM

## 2020-01-27 LAB
ALBUMIN SERPL BCP-MCNC: 3.2 G/DL (ref 3.5–5)
ALP SERPL-CCNC: 70 U/L (ref 46–116)
ALT SERPL W P-5'-P-CCNC: 15 U/L (ref 12–78)
ANION GAP SERPL CALCULATED.3IONS-SCNC: 5 MMOL/L (ref 4–13)
AST SERPL W P-5'-P-CCNC: 9 U/L (ref 5–45)
BASOPHILS # BLD AUTO: 0.04 THOUSANDS/ΜL (ref 0–0.1)
BASOPHILS NFR BLD AUTO: 1 % (ref 0–1)
BILIRUB SERPL-MCNC: 0.6 MG/DL (ref 0.2–1)
BUN SERPL-MCNC: 19 MG/DL (ref 5–25)
CALCIUM SERPL-MCNC: 9.5 MG/DL (ref 8.3–10.1)
CANCER AG125 SERPL-ACNC: 4.2 U/ML (ref 0–30)
CEA SERPL-MCNC: 0.8 NG/ML (ref 0–3)
CHLORIDE SERPL-SCNC: 107 MMOL/L (ref 100–108)
CO2 SERPL-SCNC: 29 MMOL/L (ref 21–32)
CREAT SERPL-MCNC: 0.76 MG/DL (ref 0.6–1.3)
EOSINOPHIL # BLD AUTO: 0.05 THOUSAND/ΜL (ref 0–0.61)
EOSINOPHIL NFR BLD AUTO: 1 % (ref 0–6)
ERYTHROCYTE [DISTWIDTH] IN BLOOD BY AUTOMATED COUNT: 14 % (ref 11.6–15.1)
GFR SERPL CREATININE-BSD FRML MDRD: 83 ML/MIN/1.73SQ M
GLUCOSE P FAST SERPL-MCNC: 92 MG/DL (ref 65–99)
HCT VFR BLD AUTO: 45.1 % (ref 34.8–46.1)
HGB BLD-MCNC: 13.8 G/DL (ref 11.5–15.4)
IMM GRANULOCYTES # BLD AUTO: 0.05 THOUSAND/UL (ref 0–0.2)
IMM GRANULOCYTES NFR BLD AUTO: 1 % (ref 0–2)
LYMPHOCYTES # BLD AUTO: 0.96 THOUSANDS/ΜL (ref 0.6–4.47)
LYMPHOCYTES NFR BLD AUTO: 16 % (ref 14–44)
MCH RBC QN AUTO: 26.1 PG (ref 26.8–34.3)
MCHC RBC AUTO-ENTMCNC: 30.6 G/DL (ref 31.4–37.4)
MCV RBC AUTO: 85 FL (ref 82–98)
MONOCYTES # BLD AUTO: 0.49 THOUSAND/ΜL (ref 0.17–1.22)
MONOCYTES NFR BLD AUTO: 8 % (ref 4–12)
NEUTROPHILS # BLD AUTO: 4.45 THOUSANDS/ΜL (ref 1.85–7.62)
NEUTS SEG NFR BLD AUTO: 73 % (ref 43–75)
NRBC BLD AUTO-RTO: 0 /100 WBCS
PLATELET # BLD AUTO: 165 THOUSANDS/UL (ref 149–390)
PMV BLD AUTO: 10.1 FL (ref 8.9–12.7)
POTASSIUM SERPL-SCNC: 4.6 MMOL/L (ref 3.5–5.3)
PROT SERPL-MCNC: 7.1 G/DL (ref 6.4–8.2)
RBC # BLD AUTO: 5.29 MILLION/UL (ref 3.81–5.12)
SL AMB POCT GLUCOSE BLD: 93
SL AMB POCT HGB: 15
SODIUM SERPL-SCNC: 141 MMOL/L (ref 136–145)
WBC # BLD AUTO: 6.04 THOUSAND/UL (ref 4.31–10.16)

## 2020-01-27 PROCEDURE — 99213 OFFICE O/P EST LOW 20 MIN: CPT | Performed by: NURSE PRACTITIONER

## 2020-01-27 PROCEDURE — 82948 REAGENT STRIP/BLOOD GLUCOSE: CPT | Performed by: NURSE PRACTITIONER

## 2020-01-27 PROCEDURE — 80053 COMPREHEN METABOLIC PANEL: CPT

## 2020-01-27 PROCEDURE — 86304 IMMUNOASSAY TUMOR CA 125: CPT

## 2020-01-27 PROCEDURE — 36415 COLL VENOUS BLD VENIPUNCTURE: CPT

## 2020-01-27 PROCEDURE — 82378 CARCINOEMBRYONIC ANTIGEN: CPT

## 2020-01-27 PROCEDURE — 85025 COMPLETE CBC W/AUTO DIFF WBC: CPT

## 2020-01-27 PROCEDURE — 85018 HEMOGLOBIN: CPT | Performed by: NURSE PRACTITIONER

## 2020-01-27 NOTE — PROGRESS NOTES
Assessment/Plan:  1  Patient verbalize that she will be following up making an appointment for to get an MRI as will see the neurologist this week  2  Follow-up on Friday for recheck  3  Follow-up condition changes or worsens  4  Encourage patient to drink enough fluid  Diagnoses and all orders for this visit:    Dizziness  -     CBC and differential; Future  -     Basic metabolic panel; Future  -     POCT blood glucose  -     POCT hemoglobin fingerstick          Subjective:      Patient ID: Harris Ruelas is a 59 y o  female  80-year-old female presents with dizziness on and off for couple months  Reports the dizziness happens occasionally when she is lying down  Denies getting up too fast   Reports that this happens at least twice a week  Patient has extensive cardiac history  Denies any shortness of breath/chest pain/palpitations  Is going to see the cardiologist next week  Reports that she had some vision changes a couple months ago and 1 was supposed to get MRI of the head and see the neurologist   Patient reports she has not been able to do that  Denies any unilateral weakness  Denies history of stroke  Patient reports that she did mention this dizziness to the GI doctor and he told her that she may be dehydrated  Patient reports that she does not drink enough fluid  Denies any URI symptoms  The following portions of the patient's history were reviewed and updated as appropriate: allergies and current medications  Review of Systems   Constitutional: Negative  Respiratory: Negative  Cardiovascular: Negative  Neurological: Positive for dizziness  Objective:      /80 (BP Location: Left arm, Patient Position: Sitting, Cuff Size: Standard)   Pulse 85   Temp 98 4 °F (36 9 °C) (Tympanic)   Resp 18   Ht 5' 4" (1 626 m)   Wt 117 kg (257 lb)   SpO2 98%   BMI 44 11 kg/m²          Physical Exam   Constitutional: She is oriented to person, place, and time   She appears well-developed and well-nourished  Cardiovascular: Normal rate and regular rhythm  Pulmonary/Chest: Effort normal and breath sounds normal    Neurological: She is alert and oriented to person, place, and time  No cranial nerve deficit     Negative Romberg

## 2020-01-29 ENCOUNTER — HOSPITAL ENCOUNTER (OUTPATIENT)
Dept: MRI IMAGING | Facility: HOSPITAL | Age: 65
Discharge: HOME | End: 2020-01-29
Payer: MEDICARE

## 2020-01-29 DIAGNOSIS — H53.47 BILATERAL HEMIANOPIA: ICD-10-CM

## 2020-01-29 PROCEDURE — 70540 MRI ORBIT/FACE/NECK W/O DYE: CPT

## 2020-01-29 PROCEDURE — 70551 MRI BRAIN STEM W/O DYE: CPT

## 2020-02-03 ENCOUNTER — OFFICE VISIT (OUTPATIENT)
Dept: CARDIOLOGY CLINIC | Facility: CLINIC | Age: 65
End: 2020-02-03
Payer: MEDICARE

## 2020-02-03 VITALS
OXYGEN SATURATION: 98 % | DIASTOLIC BLOOD PRESSURE: 80 MMHG | HEART RATE: 92 BPM | WEIGHT: 256 LBS | BODY MASS INDEX: 43.71 KG/M2 | HEIGHT: 64 IN | SYSTOLIC BLOOD PRESSURE: 132 MMHG

## 2020-02-03 DIAGNOSIS — I05.0 MODERATE MITRAL STENOSIS: ICD-10-CM

## 2020-02-03 DIAGNOSIS — E66.01 MORBID OBESITY DUE TO EXCESS CALORIES (HCC): ICD-10-CM

## 2020-02-03 DIAGNOSIS — I35.0 SEVERE AORTIC STENOSIS BY PRIOR ECHOCARDIOGRAM: Primary | ICD-10-CM

## 2020-02-03 DIAGNOSIS — I70.209 ATHEROSCLEROTIC PERIPHERAL VASCULAR DISEASE (HCC): ICD-10-CM

## 2020-02-03 DIAGNOSIS — Z98.84 S/P GASTRIC BYPASS: ICD-10-CM

## 2020-02-03 PROCEDURE — 1036F TOBACCO NON-USER: CPT | Performed by: INTERNAL MEDICINE

## 2020-02-03 PROCEDURE — 3079F DIAST BP 80-89 MM HG: CPT | Performed by: INTERNAL MEDICINE

## 2020-02-03 PROCEDURE — 3008F BODY MASS INDEX DOCD: CPT | Performed by: INTERNAL MEDICINE

## 2020-02-03 PROCEDURE — 3075F SYST BP GE 130 - 139MM HG: CPT | Performed by: INTERNAL MEDICINE

## 2020-02-03 PROCEDURE — 99214 OFFICE O/P EST MOD 30 MIN: CPT | Performed by: INTERNAL MEDICINE

## 2020-02-03 RX ORDER — ROSUVASTATIN CALCIUM 5 MG/1
5 TABLET, COATED ORAL DAILY
Qty: 30 TABLET | Refills: 11 | Status: SHIPPED | OUTPATIENT
Start: 2020-02-03 | End: 2021-04-08

## 2020-02-03 NOTE — PROGRESS NOTES
Melchor Sandhoff  1955  [address]  [address] [facility] [address] [phone]    1  Severe aortic stenosis by prior echocardiogram  Echo complete with contrast if indicated    rosuvastatin (CRESTOR) 5 mg tablet   2  Moderate mitral stenosis  Echo complete with contrast if indicated    rosuvastatin (CRESTOR) 5 mg tablet   3  Atherosclerotic peripheral vascular disease (HCC)  rosuvastatin (CRESTOR) 5 mg tablet   4  S/P gastric bypass     5  Morbid obesity due to excess calories ([address] )         Discussion/Summary:  Overall she has been doing well with no significant symptoms  Aortic valve has remained unchanged with a valve area close to 1 centimeter squared  Mitral stenosis is moderate  Will continue surveillance echoes every 6 months if the aortic valve progresses will consider evaluation for replacement  Blood pressure has been controlled  Lipids have been a little elevated I recommended she take Crestor 5 mg a day she had been on Lipitor in the past but stopped the medication  Continue with diet and exercise I will follow up with her in 6 months  Interval History:  22-year-old female with newly found rectal cancer needs to have surgery next week presents for preoperative cardiovascular risk assessment  Ambulation mainly limited by lower extremity arthritis  She denies any exertional chest pain, shortness of breath, palpitations, lightheadedness, dizziness, or syncope  She can do moderate housework she is able to walk a block to with the assistance of a cane functional capacity about 4 Mets  She has a history of 2 cardiac catheterizations that did not show obstructive coronary disease she has moderate aortic stenosis prior valve area 1 0 centimeter squared she also has moderate mitral stenosis seen on an echocardiogram this morning  Overall she has been doing well    Denies any chest pain, shortness of breath, palpitations, lightheadedness, dizziness, or syncope  There has been no lower extremity edema, PND, orthopnea  Overall she is quite active  There has been no lower extremity edema, PND, orthopnea      Problem List     Morbid obesity due to excess calories (HCC)    Screening for colon cancer    Screen for colon cancer    Overview Signed 1/30/2018  4:19 PM by Valorie Sagastume     Added automatically from request for surgery [id number]         Postgastrectomy malabsorption    S/P gastric bypass    Marginal ulcer    Status post bariatric surgery    Overview Signed 2/9/2018  1:26 PM by Coby Drake     Added automatically from request for surgery [id number]         Atherosclerotic peripheral vascular disease ([facility] )    Diet-controlled diabetes mellitus ([facility] )    Onychomycosis    Pain in foot    Rectal cancer [facility])    Preop cardiovascular exam        Past Medical History:   Diagnosis Date    Anemia     Arthritis     Cancer ([facility] )     rectal    Chronic lower back pain     Chronic pain disorder     back pain    Colon cancer ([facility] ) 2018    Diabetes mellitus ([facility] )     Endometrial cancer ([facility] ) 2018    GERD (gastroesophageal reflux disease)     History of chemotherapy     History of MRSA infection 0719/2016    Morbid obesity ([facility] )     Ovarian cancer ([address] ) 2018    Rectal cancer ([facility] )     Spinal stenosis     Systolic murmur     Unsteady gait     uses walker    Wears dentures     Wears glasses      Social History     Socioeconomic History    Marital status: Single     Spouse name: Not on file    Number of children: 2    Years of education: Not on file    Highest education level: Not on file   Occupational History    Not on file   Social Needs    Financial resource strain: Not on file    Food insecurity:     Worry: Not on file     Inability: Not on file    Transportation needs:     Medical: Not on file     Non-medical: Not on file   Tobacco Use    Smoking status: Former Smoker     Packs/day: 1 00     Years: 25 00     Pack years: 25 00     Last attempt to quit: 3/30/2006     Years since quittin 8    Smokeless tobacco: Never Used   Substance and Sexual Activity    Alcohol use: No    Drug use: Yes     Types: Oxycodone     Comment: Percocet for lower back pain prn    Sexual activity: Not on file   Lifestyle    Physical activity:     Days per week: Not on file     Minutes per session: Not on file    Stress: Not on file   Relationships    Social connections:     Talks on phone: Not on file     Gets together: Not on file     Attends Sabianism service: Not on file     Active member of club or organization: Not on file     Attends meetings of clubs or organizations: Not on file     Relationship status: Not on file    Intimate partner violence:     Fear of current or ex partner: Not on file     Emotionally abused: Not on file     Physically abused: Not on file     Forced sexual activity: Not on file   Other Topics Concern    Not on file   Social History Narrative    Not on file      Family History   Adopted: Yes   Problem Relation Age of Onset    Leukemia Mother     Heart disease Father     Coronary artery disease Father     Diabetes Father     No Known Problems Sister     No Known Problems Brother     Diabetes Son     No Known Problems Brother     No Known Problems Brother     No Known Problems Sister     No Known Problems Sister      Past Surgical History:   Procedure Laterality Date    ABDOMINAL PERINEAL BOWEL RESECTION W/ ILEOANAL POUCH N/A 2018    Procedure: LAPAROSCOPIC HAND ASSIST ABDOMINOPERINEAL RESECTION,  POSTERIOR VAGINECTOMY, OMENTECTOMY;  Surgeon: Shaina Horton MD;  Location:  MAIN OR;  Service: Colorectal    ABDOMINAL SURGERY      abscess removed from abdomen and right thigh, a hole in thigh closed by plastic surgeon    ABSCESS DRAINAGE      abd, (R) leg, (L) leg     SECTION       SECTION      CYSTOSCOPY N/A 2018    Procedure: CYSTOSCOPY;  Surgeon: Melodie Johns MD;  [facility];  Service: Gynecology Oncology    ESOPHAGOGASTRODUODENOSCOPY N/A 7/18/2016    Procedure: ESOPHAGOGASTRODUODENOSCOPY (EGD); Surgeon: Danielle Rice MD;  Location: [facility];  Service:     ESOPHAGOGASTRODUODENOSCOPY N/A 3/30/2016    Procedure: ESOPHAGOGASTRODUODENOSCOPY (EGD); Surgeon: Danielle Rice MD;  [facility]; Service:     EYE SURGERY      laser eye surgery    HYSTERECTOMY N/A 9/6/2018    Procedure: RADICAL HYSTERECTOMY TOTAL ABDOMINAL (ALEXANDRA)  BSO;  Surgeon: Ana Aguirre MD;  [facility];  Service: Gynecology Oncology    JOINT REPLACEMENT Left 2017    hip    JOINT REPLACEMENT Right 2017    hip    OOPHORECTOMY Bilateral     UT COLONOSCOPY FLX DX W/COLLJ SPEC WHEN PFRMD N/A 3/9/2018    Procedure: COLONOSCOPY;  Surgeon: Sam Matta MD;  Location: [facility] GI LAB; Service: Gastroenterology    UT COLONOSCOPY FLX DX W/COLLJ [facility] WHEN PFRMD N/A 9/5/2018    Procedure: COLONOSCOPY;  Surgeon: Andie Mason MD;  [facility]; Service: Colorectal    UT ESOPHAGOGASTRODUODENOSCOPY TRANSORAL DIAGNOSTIC N/A 2/2/2018    Procedure: ESOPHAGOGASTRODUODENOSCOPY (EGD); Surgeon: Sam Matta MD;  Location: [facility] GI LAB;   Service: Gastroenterology    UT LAP GASTRIC BYPASS/SOLEDAD-EN-Y N/A 7/18/2016    Procedure: BYPASS GASTRIC  SOLEDAD-EN-Y LAPAROSCOPIC;  Surgeon: Danielle Rice MD;  [facility];  Service: Bariatrics    UT LAP, SURG COLOSTOMY N/A 9/6/2018    Procedure: PERMANENT END COLOSTOMY;  Surgeon: Andie Mason MD;  [facility];  Service: Colorectal    UT LAP, SURG PROCTECTOMY W COLOSTOMY N/A 9/6/2018    Procedure: PROCTECTOMY;  Surgeon: Andie Mason MD;  [facility];  Service: Colorectal    TUBAL LIGATION         Current Outpatient Medications:     anastrozole (ARIMIDEX) 1 mg tablet, Take 1 tablet (1 mg total) by mouth daily, Disp: 90 tablet, Rfl: 2    aspirin 81 MG tablet, Take 1 tablet (81 mg total) by mouth daily, Disp: , Rfl:     Calcium-Vitamin D 500-125 MG-UNIT TABS, Take 1 tablet by mouth 2 (two) times a day  , Disp: , Rfl:     ferrous sulfate 324 (65 Fe) mg, Take 1 tablet (324 mg total) by mouth daily before breakfast, Disp: 90 tablet, Rfl: 0    Multiple Vitamins-Minerals (BARIATRIC FUSION) CHEW, Chew 1 tablet daily  , Disp: , Rfl:     nystatin (MYCOSTATIN) powder, APPLY TOPICALLY TO AFFECTED AREA(S) 4 TIMES DAILY AS DIRECTED, Disp: 30 g, Rfl: 1    omeprazole (PriLOSEC) 20 mg delayed release capsule, Take 1 capsule (20 mg total) by mouth daily, Disp: 90 capsule, Rfl: 3    oxyCODONE (ROXICODONE) 10 MG TABS, Take 10 mg by mouth every 6 (six) hours as needed  , Disp: , Rfl:     rosuvastatin (CRESTOR) 5 mg tablet, Take 1 tablet (5 mg total) by mouth daily, Disp: 30 tablet, Rfl: 11  Allergies   Allergen Reactions    Tramadol Other (See Comments)     Dizzy         Labs:     Chemistry        Component Value Date/Time     10/16/2015 0910    K 4 6 01/27/2020 1017    K 4 7 10/16/2015 0910     01/27/2020 1017     10/16/2015 0910    CO2 29 01/27/2020 1017    CO2 27 10/16/2015 0910    BUN 19 01/27/2020 1017    BUN 29 (H) 10/16/2015 0910    CREATININE 0 76 01/27/2020 1017    CREATININE 1 1 10/16/2015 0910        Component Value Date/Time    CALCIUM 9 5 01/27/2020 1017    CALCIUM 9 9 10/16/2015 0910    ALKPHOS 70 01/27/2020 1017    AST 9 01/27/2020 1017    ALT 15 01/27/2020 1017            Lab Results   Component Value Date    CHOL 159 03/26/2014     Lab Results   Component Value Date    HDL 54 02/08/2019    HDL 53 02/12/2018    HDL 46 08/09/2017     Lab Results   Component Value Date    LDLCALC 117 (H) 02/08/2019    LDLCALC 114 (H) 02/12/2018    LDLCALC 99 08/09/2017     Lab Results   Component Value Date    TRIG 134 02/08/2019    TRIG 141 02/12/2018    TRIG 91 08/09/2017     No results found for: CHOLHDL    Imaging: Mri Pelvis Rectal Cancer Staging Wo Contrast    Result Date: 8/20/2018  Narrative: MRI PELVIS - WITHOUT CONTRAST (RECTAL CANCER STAGING) INDICATION:  Rectal cancer status post radiation treatment  Based on Dr Helena Hernandez note from 8/3/2018, patient is approximately 1 month status post completion of neoadjuvant chemotherapy for stage III rectal cancer  COMPARISON: MRI of the pelvis from 4/6/2018  TECHNIQUE: The following pulse sequences were obtained on a 1 5 T scanner:  Sagittal 2D FIESTA fat sat,  High resolution Coronal, Sagittal, and Axial FSE T2 weighted images of the rectum, and large field of view axial T1 weighted images of the pelvis  An additional small field of view, axial oblique T2-weighted sequence was performed through the tumor, perpendicular to the long axis of the rectum at that level  IV contrast was not given  FINDINGS: TUMOR LOCATION: There has been definite tumor response to treatment of the previously seen low rectal cancer, which had been 5 cm in length and circumferential around the rectal wall  The intraluminal portions of tumor is much less bulky  However there is is still residual viable tumor extension anteriorly, with a 1 cm nodular lesion anterior to the rectum, still contacting the mesorectal fascia and possibly the vagina  (Series 6 image 28 and 29 )  T-STAGING: Based on the above, this is still at least T3c, CRM positive lesion, and if the vaginal is involved, a T4 lesion  CIRCUMFERENTIAL RESECTION MARGIN (CRM): As above, there is a region of the rectal tumor abutment of the anterior mesorectal fascia on series 6 images 28 and 29  In addition, a left anterior lateral high risk node described above also abuts the anterior mesorectal fascia (series 6 image 11 )  This continues to be a CRM positive lesion  PERIRECTAL NODES: All lymph nodes are described on series 6: High risk nodes: Image 9, right of rectum, this no has decreased in size, currently 8 mm, previously 10 mm, also has become hypointense on T2-weighted sequences, consistent with treated tumor   Image 11, left anterolateral of rectum, 14 mm currently, which had appeared smaller on axial images of previous study, but this is likely due to differences in slice selection  This is unchanged in size and it does contact the anterior mesorectal fascia  Low risk nodes: None  LEVATOR ANI, PUBORECTALIS, ANAL SPHINCTERS: Stage I (Tumor confined to bowel wall and intact outer muscle coat )            REPRODUCTIVE STRUCTURES: Age-appropriate  BLADDER:  Normal  PELVIC CAVITY:  There is trace ascites in the pelvis  OTHER BOWEL LOOPS: Unremarkable MRI appearance  OSSEOUS STRUCTURES: There are bilateral hip replacements  VASCULAR STRUCTURES:  Visualized vasculature is patent  PELVIC WALL:  Unremarkable  Impression: Although patient's low rectal cancer has demonstrated definite treatment response in terms of much decreased bulkiness of the intraluminal tumor, the extraluminal components have persisted  In particular there is still viable tumor extension anteriorly, with a 1 cm nodular lesion anterior to the rectum, still contacting the mesorectal fascia and possibly the vagina  (Series 6 image 28 and 29 )  A high risk left anterolateral perirectal lymph node also persists, contacting the mesorectal fascia (series 6 image 11 )  Therefore this is still a T3c, CRM positive lesion, and possibly T4 lesion if vaginal is still involved  Workstation performed: [id number]       ECG:    Normal sinus rhythm unremarkable tracing      Review of Systems   Constitution: Negative  HENT: Negative  Eyes: Negative  Cardiovascular: Negative  Respiratory: Negative  Endocrine: Negative  Hematologic/Lymphatic: Negative  Skin: Negative  Musculoskeletal: Negative  Gastrointestinal: Negative  Genitourinary: Negative  Neurological: Negative  Psychiatric/Behavioral: Negative          Vitals:    02/03/20 0906   BP: 132/80   Pulse: 92   SpO2: 98%     Vitals:    02/03/20 0906   Weight: 116 kg (256 lb)     Height: 5' 4" (162 6 cm) Body mass index is 43 94 kg/m²  Physical Exam:  Vital signs reviewed  General:  Alert and cooperative, appears stated age, no acute distress  HEENT:  PERRLA, EOMI, no scleral icterus, no conjunctival pallor  Neck:  No lymphadenopathy, no thyromegaly, no carotid bruits, no elevated JVP  Heart:  Regular rate and rhythm, normal S1/S2, no L0/R9, 2/6 systolic ejection murmur, rubs or gallops  PMI nondisplaced  Lungs:  Clear to auscultation bilaterally, no wheezes rales or rhonchi  Abdomen:  Soft, non-tender, positive bowel sounds, no rebound or guarding,   no organomegaly   Extremities:  Normal range of motion    No clubbing, cyanosis or edema   Vascular:  2+ pedal pulses  Skin:  No rashes or lesions on exposed skin  Neurologic:  Cranial nerves II-XII grossly intact without focal deficits

## 2020-02-06 ENCOUNTER — HOSPITAL ENCOUNTER (OUTPATIENT)
Dept: RADIOLOGY | Facility: HOSPITAL | Age: 65
Discharge: HOME | End: 2020-02-06
Attending: INTERNAL MEDICINE
Payer: MEDICARE

## 2020-02-06 DIAGNOSIS — Z85.048 HISTORY OF RECTAL CANCER: ICD-10-CM

## 2020-02-06 PROCEDURE — 71260 CT THORAX DX C+: CPT

## 2020-02-06 PROCEDURE — 74177 CT ABD & PELVIS W/CONTRAST: CPT

## 2020-02-06 RX ADMIN — IOHEXOL 100 ML: 350 INJECTION, SOLUTION INTRAVENOUS at 09:03

## 2020-02-13 ENCOUNTER — OFFICE VISIT (OUTPATIENT)
Dept: HEMATOLOGY ONCOLOGY | Facility: MEDICAL CENTER | Age: 65
End: 2020-02-13
Payer: MEDICARE

## 2020-02-13 VITALS
SYSTOLIC BLOOD PRESSURE: 132 MMHG | DIASTOLIC BLOOD PRESSURE: 72 MMHG | BODY MASS INDEX: 44.05 KG/M2 | TEMPERATURE: 98.1 F | RESPIRATION RATE: 18 BRPM | HEIGHT: 64 IN | HEART RATE: 81 BPM | WEIGHT: 258 LBS | OXYGEN SATURATION: 90 %

## 2020-02-13 DIAGNOSIS — Z85.048 HISTORY OF RECTAL CANCER: Primary | ICD-10-CM

## 2020-02-13 PROCEDURE — 3075F SYST BP GE 130 - 139MM HG: CPT | Performed by: INTERNAL MEDICINE

## 2020-02-13 PROCEDURE — 3078F DIAST BP <80 MM HG: CPT | Performed by: INTERNAL MEDICINE

## 2020-02-13 PROCEDURE — 99214 OFFICE O/P EST MOD 30 MIN: CPT | Performed by: INTERNAL MEDICINE

## 2020-02-13 PROCEDURE — 1036F TOBACCO NON-USER: CPT | Performed by: INTERNAL MEDICINE

## 2020-02-13 PROCEDURE — 3008F BODY MASS INDEX DOCD: CPT | Performed by: INTERNAL MEDICINE

## 2020-02-13 NOTE — PROGRESS NOTES
Palmer Soria  1955  [facility] [address] [place] [id number]    DISCUSSION/SUMMARY:    27-year-old female previously found to have high-grade dysplasia/intramucosal lesion arising in a tubulovillous adenoma  Issues:    Rectal cancer  Final stage is IIA (ypT3 pN0 G2)  Patient received neoadjuvant concurrent treatment and then underwent resection  Mrs Leopoldo Dane had postoperative complications with wound healing and fistula formation - eventually resolved  Presently Mrs Reardon states feeling well and clinically there are no concerning oncology findings  Recent blood work was good/acceptable, CEA level was within normal limits  The most recent CT scans are also good; no evidence of recurrence  The plan is to continue with surveillance  Patient is to return in 6 months with blood work before  Patient will also follow up with Colorectal surgery as directed  Ovarian cancer, IIIC  As discussed previously, at the time of surgery, patient was found to have an incidental low-grade serous ovarian carcinoma with omental nodules greater than 2 cm grossly visible  Patient is followed by GYN Oncology and is on anastrozole (NCCN 2 82059 low-grade serous, stage II-IV tumors, treatment options include primary hormonal therapy (category 2B)  Genetics  As above, patient was recently diagnosed with 2 malignancies  Patient is adopted but states that she knows some of her family members, many of which have had multiple malignancies including many GI malignancies  Patient has 2 sons in good health, 3 grandchildren  This office will look in to whether or not patient can have 1 of the genetic panels performed  Cardiac issues  Patient has valvular problems - being followed by Cardiology  Prior temporary loss of peripheral vision  This has not been an issue recently    Patient will follow up with Neurology and Ophthalmology as directed  Patient is to return in in approximately 6 months  Mrs Breann Herrera knows to call if she has any other oncology questions or concerns  Carefully review your medication list and verify that the list is accurate and up-to-date  Please call the hematology/oncology office if there are medications missing from the list, medications on the list that you are not currently taking or if there is a dosage or instruction that is different from how you're taking that medication  Patient goals and areas of care:   Rectal cancer surveillance, follow-up with GYN Oncology  Barriers to care:  None  Patient is able to self-care   ___________________________________________________________________________________________________    Chief Complaint   Patient presents with    Follow-up     Rectal cancer, on surveillance     History of Present Illness:    80-year-old female previously referred for the above  Previously Mrs Leah Meneses had gastric bypass  Patient was seen by GI (late summer 2018) because of blood in the stools  Additional workup included a colonoscopy which demonstrated a rectal lesion  Patient recently completed concurrent chemotherapy (Xeloda) with radiation  Mrs Breann Herrera subsequently went onto an APR  Final pathology results are listed below; patient was also found to have IIIC low-grade serous ovarian carcinoma  Postoperatively, patient had issues with slow wound closure  After treatment, patient has been on surveillance  Mrs Breann Herrera continues to feel well, baseline  No problems with the colostomy  No pain control issues  Appetite is good, weight is stable  No GI bleeding, diarrhea or constipation, no abdominal pain  No  or gyn issues  No chest pain or pressure, no headaches, blurred vision or dizziness  During the APR, patient was found to have incidental stage IIIC low-grade serous ovarian cancer (gyn oncology was called into the OR)    Patient is followed by Dr Yenny Kennedy and is on Arimidex  No problems with the Arimidex  Review of Systems   Constitutional: Negative for fatigue  HENT: Negative  Eyes: Negative  Respiratory: Negative  Cardiovascular: Negative  Gastrointestinal: Negative for blood in stool, constipation and diarrhea  Endocrine: Negative  Genitourinary: Negative  Musculoskeletal: Positive for arthralgias  Skin: Negative  Allergic/Immunologic: Negative  Neurological: Negative  Hematological: Negative  Psychiatric/Behavioral: Negative  All other systems reviewed and are negative       Patient Active Problem List   Diagnosis    Morbid obesity due to excess calories (HCC)    Postgastrectomy malabsorption    S/P gastric bypass    Marginal ulcer    Atherosclerotic peripheral vascular disease ([place] [address] )    Onychomycosis    History of rectal cancer    Colostomy care ([place] [address] )    History of ovarian cancer    Status post bariatric surgery    Pyelonephritis    History of ovarian cancer    Candidal intertrigo    Vaginal bleeding    Encounter for other screening for malignant neoplasm of breast    Severe aortic stenosis by prior echocardiogram    Moderate mitral stenosis     Past Medical History:   Diagnosis Date    Anemia     Arthritis     Cancer ([facility] )     rectal    Chronic lower back pain     Chronic pain disorder     back pain    Colon cancer ([place] [address] ) 2018    Diabetes mellitus ([place] [address] )     Endometrial cancer ([place] [address] ) 2018    GERD (gastroesophageal reflux disease)     History of chemotherapy     History of MRSA infection 0719/2016    Morbid obesity ([facility] )     Ovarian cancer ([facility] ) 2018    [facility]     Spinal stenosis     Systolic murmur     Unsteady gait     uses walker    Wears dentures     Wears glasses      Ob/gyn:  No recent mammogram -patient's choice, no post menopausal bleeding    Past Surgical History:   Procedure Laterality Date    ABDOMINAL PERINEAL BOWEL RESECTION W/ ILEOANAL POUCH N/A 9/6/2018    Procedure: LAPAROSCOPIC HAND ASSIST ABDOMINOPERINEAL RESECTION,  POSTERIOR VAGINECTOMY, OMENTECTOMY;  Surgeon: Froy Linder MD;  [facility];  Service: Colorectal    ABDOMINAL SURGERY      abscess removed from abdomen and right thigh, a hole in thigh closed by plastic surgeon    ABSCESS DRAINAGE      abd, (R) leg, (L) leg     SECTION       SECTION      CYSTOSCOPY N/A 2018    Procedure: Radha Orange;  Surgeon: Carole Reese MD;  [facility];  Service: Gynecology Oncology    ESOPHAGOGASTRODUODENOSCOPY N/A 2016    Procedure: ESOPHAGOGASTRODUODENOSCOPY (EGD); Surgeon: Gretta Cavazos MD;  [facility];  Service:     ESOPHAGOGASTRODUODENOSCOPY N/A 3/30/2016    Procedure: ESOPHAGOGASTRODUODENOSCOPY (EGD); Surgeon: Gretta Cavazos MD;  [facility]; Service:     EYE SURGERY      laser eye surgery    HYSTERECTOMY N/A 2018    Procedure: RADICAL HYSTERECTOMY TOTAL ABDOMINAL (ALEXANDRA)  BSO;  Surgeon: Carole Reese MD;  [facility];  Service: Gynecology Oncology    JOINT REPLACEMENT Left 2017    hip    JOINT REPLACEMENT Right 2017    hip    OOPHORECTOMY Bilateral     ND COLONOSCOPY FLX DX W/COLLJ SPEC WHEN PFRMD N/A 3/9/2018    Procedure: COLONOSCOPY;  Surgeon: Antionette Phillips MD;  Location: [place] [address] GI LAB; Service: Gastroenterology    ND COLONOSCOPY FLX DX W/COLLJ [facility] WHEN PFRMD N/A 2018    Procedure: COLONOSCOPY;  Surgeon: Froy Linder MD;  [facility]; Service: Colorectal    ND ESOPHAGOGASTRODUODENOSCOPY TRANSORAL DIAGNOSTIC N/A 2018    Procedure: ESOPHAGOGASTRODUODENOSCOPY (EGD); Surgeon: Antionette Phillips MD;  Location: [facility] LAB;   Service: Gastroenterology    ND LAP GASTRIC BYPASS/SOLEDAD-EN-Y N/A 2016    Procedure: BYPASS GASTRIC  SOLEDAD-EN-Y LAPAROSCOPIC;  Surgeon: Gretta Cavazos MD;  [facility];  Service: Bariatrics    ND LAP, SURG COLOSTOMY N/A 2018    Procedure: PERMANENT END COLOSTOMY; Surgeon: Sam Nicolas MD;  [facility];  Service: Colorectal    CA LAP, SURG PROCTECTOMY W COLOSTOMY N/A 2018    Procedure: PROCTECTOMY;  Surgeon: Sam Nicolas MD;  [facility];  Service: Colorectal    TUBAL LIGATION       Family History   Adopted: Yes   Problem Relation Age of Onset    Leukemia Mother     Heart disease Father     Coronary artery disease Father     Diabetes Father     No Known Problems Sister     No Known Problems Brother     Diabetes Son     No Known Problems Brother     No Known Problems Brother     No Known Problems Sister     No Known Problems Sister     Family history:   Mother  of leukemia (NOS), father  from heart disease (NOS), 2 children 1 with diabetes, no known familial or genetic diseases, no family history of GI malignancies    Social History     Socioeconomic History    Marital status: Single     Spouse name: Not on file    Number of children: 2    Years of education: Not on file    Highest education level: Not on file   Occupational History    Not on file   Social Needs    Financial resource strain: Not on file    Food insecurity:     Worry: Not on file     Inability: Not on file    Transportation needs:     Medical: Not on file     Non-medical: Not on file   Tobacco Use    Smoking status: Former Smoker     Packs/day: 1 00     Years: 25 00     Pack years: 25 00     Last attempt to quit: 3/30/2006     Years since quittin 8    Smokeless tobacco: Never Used   Substance and Sexual Activity    Alcohol use: No    Drug use: Yes     Types: Oxycodone     Comment: Percocet for lower back pain prn    Sexual activity: Not on file   Lifestyle    Physical activity:     Days per week: Not on file     Minutes per session: Not on file    Stress: Not on file   Relationships    Social connections:     Talks on phone: Not on file     Gets together: Not on file     Attends Confucianist service: Not on file     Active member of club or organization: Not on file     Attends meetings of clubs or organizations: Not on file     Relationship status: Not on file    Intimate partner violence:     Fear of current or ex partner: Not on file     Emotionally abused: Not on file     Physically abused: Not on file     Forced sexual activity: Not on file   Other Topics Concern    Not on file   Social History Narrative    Not on file       Current Outpatient Medications:     anastrozole (ARIMIDEX) 1 mg tablet, Take 1 tablet (1 mg total) by mouth daily, Disp: 90 tablet, Rfl: 2    aspirin 81 MG tablet, Take 1 tablet (81 mg total) by mouth daily, Disp: , Rfl:     Calcium-Vitamin D 500-125 MG-UNIT TABS, Take 1 tablet by mouth 2 (two) times a day  , Disp: , Rfl:     ferrous sulfate 324 (65 Fe) mg, Take 1 tablet (324 mg total) by mouth daily before breakfast, Disp: 90 tablet, Rfl: 0    Multiple Vitamins-Minerals (BARIATRIC FUSION) CHEW, Chew 1 tablet daily  , Disp: , Rfl:     nystatin (MYCOSTATIN) powder, APPLY TOPICALLY TO AFFECTED AREA(S) 4 TIMES DAILY AS DIRECTED, Disp: 30 g, Rfl: 1    omeprazole (PriLOSEC) 20 mg delayed release capsule, Take 1 capsule (20 mg total) by mouth daily, Disp: 90 capsule, Rfl: 3    oxyCODONE (ROXICODONE) 10 MG TABS, Take 10 mg by mouth every 6 (six) hours as needed  , Disp: , Rfl:     rosuvastatin (CRESTOR) 5 mg tablet, Take 1 tablet (5 mg total) by mouth daily, Disp: 30 tablet, Rfl: 11    Allergies   Allergen Reactions    Tramadol Other (See Comments)     Dizzy         Vitals:    02/13/20 0834   BP: 132/72   Pulse: 81   Resp: 18   Temp: 98 1 °F (36 7 °C)   SpO2: 90%     Physical Exam   Constitutional: She is oriented to person, place, and time  She appears well-developed and well-nourished  Well-nourished female, no respiratory distress   HENT:   Head: Normocephalic and atraumatic     Right Ear: External ear normal    Left Ear: External ear normal    Nose: Nose normal    Mouth/Throat: Oropharynx is clear and moist    Eyes: Pupils are equal, round, and reactive to light  Conjunctivae and EOM are normal    Neck: Normal range of motion  Neck supple  Cardiovascular: Normal rate, regular rhythm, normal heart sounds and intact distal pulses  Pulmonary/Chest: Effort normal and breath sounds normal    Few scattered rhonchi, otherwise good air entry bilaterally   Abdominal: Soft  Bowel sounds are normal    Left lower quadrant colostomy in place  Stool in back, nontender, +bowel sounds, well-healed suture lines, obese, cannot palpate liver or spleen   Musculoskeletal:   No pain or tenderness with palpation of joints, muscles or bones, good range of motion in upper extremities, decreased range of motion in lower extremities but equal   Neurological: She is alert and oriented to person, place, and time  She has normal reflexes  Skin: Skin is warm  Warm, moist, good color, no petechiae or ecchymoses   Psychiatric: She has a normal mood and affect   Her behavior is normal  Judgment and thought content normal    Extremities:  1 +bilateral lower extremity edema, no cords, pulses are 1+  Lymphatics:  No adenopathy in the neck, supraclavicular region, axilla and groin bilaterally  Rectal wound deferred    Labs    01/27/2020 WBC = 6 04 hemoglobin = 13 8 hematocrit = 45 platelet = 112 neutrophil = 73% BUN = 19 creatinine = 0 76 calcium = 9 5 LFTs WNL CEA = 0 8    11/04/2019 WBC = 4 7 hemoglobin = 13 3 hematocrit = 43 MCV = 87 platelet = 745 neutrophil = 67% BUN = 17 creatinine = 0 79 LFTs WNL  = 3 0 CEA < 0 5  05/28/2019  = 4 2  03/26/2019 BUN = 15 creatinine = 0 76 calcium = 8 9 AST = 11 ALT = 11 alkaline phosphatase = 69 total bilirubin = 0 32  12/11/2018 WBC = 5 2 hemoglobin = 9 7 hematocrit = 34 MCV = 74 RDW = 17 2 platelet = 822  41/31/0020 WBC = 5 8 hemoglobin = 8 7 hematocrit = 31 MCV = 80 platelet = 833 CA -139 = 9 6  09/11/2018 WBC = 5 6 hemoglobin = 7 7 hematocrit = 25 4 MCV = 83 platelet = 178 BUN = 7 creatinine = 0 50  03/12/2018 CEA = 22 6 = high    Imaging    02/06/2020 CT scan of the chest and abdomen pelvis  Impression stated no evidence of metastatic disease, large left lower quadrant parastomal hernia containing multiple nonobstructed bowel loops  04/01/2019 CT scan of the chest and abdomen pelvis    No CT evidence of metastatic disease within the chest abdomen or pelvis  Stable postoperative changes  4/6/18 MRI pelvis rectal cancer staging    Low rectal cancer, 5 cm in length, circumferential around the rectal wall  (Series 8 images 19 through 33 )  Anteriorly, there are multiple areas where there is transmural tumor extension into the mesorectal fat making this at least a T3c lesion  On Series 8 image 31, tumor also abuts the mesorectal fascia making this CRM positive  At this point it is also   immediately adjacent to the vagina, therefore this may be a T4 lesion  There are 2 high risk perirectal nodes, and 1 low risk perirectal nodes  01/31/2018 ultrasound right upper quadrant    1  Layering gallbladder sludge  No acute cholecystitis or biliary ductal obstruction  2   Hepatomegaly  3   Fluid-filled stomach  1/27/18 CT scan of the abdomen/pelvis    1  Prior Juan-en-Y gastric bypass with distention of the excluded stomach    2   No evidence of acute abnormality in the abdomen or pelvis      Pathology    Case Report   Surgical Pathology Report                         Case: [id number]                                    Authorizing Provider: GEORGETTE Quinonez       Collected:           05/29/2019 0922               Ordering Location:     [facility] Received:            05/29/2019 0922                                      [facility]                                                            Pathologist:           Jorge Farfan MD                                                                Specimen:    Vaginal, vaginal polyp                                                                     Final Diagnosis   A  Vagina, polyp (biopsy):  - Acutely and chronically inflamed granulation tissue    Electronically signed by David Pena MD on 5/30/2019 at  1:18 PM         Case Report   Surgical Pathology Report                         Case: [id number]                                    Authorizing Provider: Kortney Saleh MD      Collected:           09/06/2018 1010               Ordering Location:     [address]      Received:            09/06/2018 [facility]                                      Hospital Operating Room                                                       Pathologist:           Sherlyn Monsalve MD                                                         Specimens:   A) - Soft Tissue, Other, EPIPLOIC NODULE                                                             B) - Rectum, enbloc rectum, sigmoid, anus, uterus, bso, cervix, posterior vaginal                    wall                                                                                                 C) - Omentum                                                                               Addendum   Additional immunohistochemical stains performed with appropriate controls on a selected portion of serous carcinoma involving omental tissue (block C1) show the following results:  Estrogen receptor: Positive  Progesterone receptor: Negative   Addendum electronically signed by Sherlyn Monsalve MD on 9/27/2018 at  1:40 PM   Final Diagnosis   A  Soft tissue, epiploic nodule, biopsy:  -  Portion of nodular fibroadipose tissue with fat necrosis and dystrophic calcifications  -  Negative for metastatic carcinoma  -  Immunohistochemical stain performed with appropriate control for keratin AE1/3 is negative for epithelial elements, supporting the diagnosis      B    Rectum, sigmoid colon, anus, uterus,cervix, bilateral ovaries and fallopian tubes and posterior vaginal wall; en bloc resection:  -  Invasive moderately differentiated adenocarcinoma of rectum (see first synoptic report)  -  Low grade serous carcinoma involving left ovary, left fallopian tube, uterine serosa and colonic serosa (see second synoptic report)  -  Separate portion of colon (consistent with sigmoid) with diverticulosis coli showing focal serosal implant of low-grade serous carcinoma  -  Uterus showing benign inactive/atrophic endometrium with metaplastic change, benign endometrial polyp and rare cytologic atypia consistent with radiation effect  -  Cervix with mild glandular atypia, favor reactive (see note)  -  Benign uterine myometrium with one intramural benign leiomyoma, negative for atypia or malignancy  -  Benign anal skin and dentate line with mild reactive change, negative for involvement by carcinoma  -  Benign vaginal wall mucosa with thinning, chronic inflammation, and surface erosion most suggestive of prior therapy effect, negative for involvement by carcinoma  -  27 benign pericolorectal lymph nodes identified, negative for metastatic carcinoma      C  Omentum, omentectomy:  -   Low grade serous carcinoma         COLORECTAL CARCINOMA TUMOR STAGING SUMMARY (includes specimen A-C of this case):  1  Specimen identification:     - Specimen:  Rectum, sigmoid colon, anus, uterus,cervix, bilateral ovaries and fallopian tubes and posterior vaginal wall   2  Tumor:     - Tumor site:  Rectum     - Tumor size:  Up to 3 3 cm     - Macroscopic tumor perforation:  Not identified     - Tumor location:  Below the peritoneal reflection        - Macroscopic intactness of mesorectum:  Intact     - Histologic Type:   Adenocarcinoma      - Histologic Grade: Moderately differentiated (G2)     - Microscopic Tumor Extension (ypT3): Tumor invades through muscularis propria into pericolorectal soft tissues     - Tumor budding (only needed in polyps, Stage I or II cancers):  Not identified   3   Margins (specify distance for nearest radial margin on RECTAL tumors only):     - Proximal Margin:  Negative for carcinoma     - Distal Margin:  Negative for carcinoma     - Circumferential (Radial) or Mesenteric Margin:  Negative for carcinoma (0 6cm)     - Other Margins:  Vaginal margin negative for carcinoma   4  Treatment effect:  Present; Residual cancer with evident tumor regression, but more than single cells or rare small groups of cancer cells (partial response, score 2)   5  Lymph-vascular invasion:  Not identified   6  Perineural invasion:  Not identified   7  Tumor deposits: Not identified   8  Type of polyp in which invasive carcinoma arose:  Tubular adenoma   9  Regional lymph nodes (pN0):  27 benign pericolorectal lymph node sample, negative for metastatic carcinoma  10  Additional pathologic findings:  Diverticulosis coli  11  Ancillary Studies:  *  Immunohistochemical stains performed with appropriate controls show the primary rectal tumor to be positive for CK20 and CDX-2 and negative for CK 7, PAX-8, p53 (wild type expression), ER and WT-1, supporting a diagnosis of primary colorectal origin  12  8th Ed AJCC Stage:  at least Stage  IIA - ypT3, pN0, G2         Primary Tumor of the Ovary/Fallopian Tube/Peritoneum, Staging Summary (includes Specimen A to C of this case):  1  Procedure:  Hysterectomy, bilateral salpingo-oophorectomy, omentectomy  2  Hysterectomy type:  Radical hysterectomy  3  Specimen integrity:      - Right ovary:  Intact      - Left ovary:  Intact       - Right fallopian tube: Intact      - Left fallopian tube: Intact  4  Tumor site:  Ovarian and fallopian tube surfaces and uterine and colonic serosa  5  Ovarian surface involvement:  Present  6  Fallopian tube surface involvement:  Present  7  Tumor size:  Largest focus measures 1 8 cm (uterine serosa) (see note)  8  Histologic type:  Low-grade serous carcinoma   9  Histologic grade:  Low grade  10  Implant:  Present  11   Other tissue/organ involvement:  Bilateral ovaries and fallopian tubes, uterine serosa, sigmoid colon serosa, and omentum  12  Largest extrapelvic peritoneal focus: 2 5 cm (macroscopically identidfied)    - Specify site:  Omentum  13  Peritoneal/ascetic fluid:  Not performed   14  Pleural fluid:  Not performed  15  Treatment effect:  No known prior therapy  16  Regional lymph nodes:    - No regional uterine lymph nodes submitted or found    - 27 benign pericolorectal lymph nodes sampled, negative for metastatic carcinoma  17  Pathologic stage classification (pTNM, AJCC 8th edition): pT3c, pNX, Low Grade  18  FIGO stage (2015 FIGO cancer report): IIIC  19  Additional pathologic findings:  N/A  20  Ancillary studies:  Immunohistochemical stains performed with appropriate controls show the tumor cells to be positive for CK7, PAX-8, ER, and WT-1 with wild-type expression of p53 and negative for CK20 and CDX-2 supporting the diagnosis  Electronically signed by Benny Cheung MD on 9/25/2018 at 12:54 PM                  Case Report   Surgical Pathology Report                         Case: [id number]                                    Authorizing Provider: Chloe Lou MD      Collected:           03/28/2018 1130               Pathologist:           César Pendleton MD             Received:            03/29/2018 0942               Specimen:    Rectum, Rectal cancer/mass biopsies                                                        Final Diagnosis   A  Rectal mass (biopsy):  - 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      Comment:   - In the context of a rectal mass, repeat biopsy and/or excision may be indicated to exclude a higher grade lesion      Electronically signed by César Pendleton MD on 3/30/2018 at  2:36 PM         Case Report   Surgical Pathology Report                         Case: [id number]                                    Authorizing Provider: Steph Bhatt MD          Collected:           03/09/2018 0902             Ordering Location:     [facility]   Received:            03/12/2018 0904                                      Center                                                                        Pathologist:           Becky Mulligan DO                                                             Specimens:   A) - Colon, polyp splenic flexure/cold snare                                                         B) - Colon, bx distal rectal mass                                                          Final Diagnosis   A  Colon, splenic flexure polyp, biopsy:  - Tubulovillous adenoma, negative for high-grade dysplasia      B  Rectum, mass, biopsy:  - At least high grade dysplasia/intramucosal carcinoma arising in a tubulovillous adenoma, suspicious for invasion       Intradepartmental consultation is in agreement with the above diagnosis (AT)     Interpretation performed at [facility], [address] [place] [id number]  Report faxed to Dr Josue Bowser on 3/13/18 @ 10:55 AM   Electronically signed by Lore Farfan DO on 3/13/2018 at 10:54 AM

## 2020-02-24 ENCOUNTER — HOSPITAL ENCOUNTER (INPATIENT)
Facility: HOSPITAL | Age: 65
LOS: 1 days | Discharge: HOME | DRG: 309 | End: 2020-02-25
Attending: EMERGENCY MEDICINE | Admitting: FAMILY MEDICINE
Payer: MEDICARE

## 2020-02-24 ENCOUNTER — APPOINTMENT (INPATIENT)
Dept: NON INVASIVE DIAGNOSTICS | Facility: HOSPITAL | Age: 65
DRG: 309 | End: 2020-02-24
Payer: MEDICARE

## 2020-02-24 ENCOUNTER — APPOINTMENT (EMERGENCY)
Dept: RADIOLOGY | Facility: HOSPITAL | Age: 65
DRG: 309 | End: 2020-02-24
Payer: MEDICARE

## 2020-02-24 DIAGNOSIS — I48.91 ATRIAL FIBRILLATION WITH RAPID VENTRICULAR RESPONSE (HCC): Primary | ICD-10-CM

## 2020-02-24 PROBLEM — E78.5 HYPERLIPIDEMIA: Status: ACTIVE | Noted: 2020-02-24

## 2020-02-24 LAB
ANION GAP SERPL CALCULATED.3IONS-SCNC: 9 MMOL/L (ref 4–13)
BASOPHILS # BLD AUTO: 0.03 THOUSANDS/ΜL (ref 0–0.1)
BASOPHILS NFR BLD AUTO: 1 % (ref 0–1)
BUN SERPL-MCNC: 16 MG/DL (ref 5–25)
CALCIUM SERPL-MCNC: 9.3 MG/DL (ref 8.3–10.1)
CHLORIDE SERPL-SCNC: 105 MMOL/L (ref 100–108)
CO2 SERPL-SCNC: 27 MMOL/L (ref 21–32)
CREAT SERPL-MCNC: 0.74 MG/DL (ref 0.6–1.3)
EOSINOPHIL # BLD AUTO: 0.11 THOUSAND/ΜL (ref 0–0.61)
EOSINOPHIL NFR BLD AUTO: 2 % (ref 0–6)
ERYTHROCYTE [DISTWIDTH] IN BLOOD BY AUTOMATED COUNT: 14 % (ref 11.6–15.1)
GFR SERPL CREATININE-BSD FRML MDRD: 86 ML/MIN/1.73SQ M
GLUCOSE SERPL-MCNC: 114 MG/DL (ref 65–140)
HCT VFR BLD AUTO: 45.4 % (ref 34.8–46.1)
HGB BLD-MCNC: 14.3 G/DL (ref 11.5–15.4)
IMM GRANULOCYTES # BLD AUTO: 0.05 THOUSAND/UL (ref 0–0.2)
IMM GRANULOCYTES NFR BLD AUTO: 1 % (ref 0–2)
LYMPHOCYTES # BLD AUTO: 1 THOUSANDS/ΜL (ref 0.6–4.47)
LYMPHOCYTES NFR BLD AUTO: 20 % (ref 14–44)
MAGNESIUM SERPL-MCNC: 1.9 MG/DL (ref 1.6–2.6)
MCH RBC QN AUTO: 26.3 PG (ref 26.8–34.3)
MCHC RBC AUTO-ENTMCNC: 31.5 G/DL (ref 31.4–37.4)
MCV RBC AUTO: 84 FL (ref 82–98)
MONOCYTES # BLD AUTO: 0.4 THOUSAND/ΜL (ref 0.17–1.22)
MONOCYTES NFR BLD AUTO: 8 % (ref 4–12)
NEUTROPHILS # BLD AUTO: 3.45 THOUSANDS/ΜL (ref 1.85–7.62)
NEUTS SEG NFR BLD AUTO: 68 % (ref 43–75)
NRBC BLD AUTO-RTO: 0 /100 WBCS
NT-PROBNP SERPL-MCNC: 799 PG/ML
PLATELET # BLD AUTO: 162 THOUSANDS/UL (ref 149–390)
PMV BLD AUTO: 9.8 FL (ref 8.9–12.7)
POTASSIUM SERPL-SCNC: 4.2 MMOL/L (ref 3.5–5.3)
RBC # BLD AUTO: 5.43 MILLION/UL (ref 3.81–5.12)
SODIUM SERPL-SCNC: 141 MMOL/L (ref 136–145)
TROPONIN I SERPL-MCNC: <0.02 NG/ML
TSH SERPL DL<=0.05 MIU/L-ACNC: 1.4 UIU/ML (ref 0.36–3.74)
WBC # BLD AUTO: 5.04 THOUSAND/UL (ref 4.31–10.16)

## 2020-02-24 PROCEDURE — 99285 EMERGENCY DEPT VISIT HI MDM: CPT | Performed by: EMERGENCY MEDICINE

## 2020-02-24 PROCEDURE — 85025 COMPLETE CBC W/AUTO DIFF WBC: CPT | Performed by: EMERGENCY MEDICINE

## 2020-02-24 PROCEDURE — 99223 1ST HOSP IP/OBS HIGH 75: CPT | Performed by: FAMILY MEDICINE

## 2020-02-24 PROCEDURE — 80048 BASIC METABOLIC PNL TOTAL CA: CPT | Performed by: EMERGENCY MEDICINE

## 2020-02-24 PROCEDURE — 99285 EMERGENCY DEPT VISIT HI MDM: CPT

## 2020-02-24 PROCEDURE — 96375 TX/PRO/DX INJ NEW DRUG ADDON: CPT

## 2020-02-24 PROCEDURE — 93005 ELECTROCARDIOGRAM TRACING: CPT

## 2020-02-24 PROCEDURE — 83880 ASSAY OF NATRIURETIC PEPTIDE: CPT | Performed by: EMERGENCY MEDICINE

## 2020-02-24 PROCEDURE — 36415 COLL VENOUS BLD VENIPUNCTURE: CPT | Performed by: EMERGENCY MEDICINE

## 2020-02-24 PROCEDURE — 93306 TTE W/DOPPLER COMPLETE: CPT

## 2020-02-24 PROCEDURE — 87081 CULTURE SCREEN ONLY: CPT | Performed by: STUDENT IN AN ORGANIZED HEALTH CARE EDUCATION/TRAINING PROGRAM

## 2020-02-24 PROCEDURE — 83735 ASSAY OF MAGNESIUM: CPT | Performed by: EMERGENCY MEDICINE

## 2020-02-24 PROCEDURE — 84443 ASSAY THYROID STIM HORMONE: CPT | Performed by: EMERGENCY MEDICINE

## 2020-02-24 PROCEDURE — 96374 THER/PROPH/DIAG INJ IV PUSH: CPT

## 2020-02-24 PROCEDURE — 99222 1ST HOSP IP/OBS MODERATE 55: CPT | Performed by: INTERNAL MEDICINE

## 2020-02-24 PROCEDURE — 71045 X-RAY EXAM CHEST 1 VIEW: CPT

## 2020-02-24 PROCEDURE — 84484 ASSAY OF TROPONIN QUANT: CPT | Performed by: EMERGENCY MEDICINE

## 2020-02-24 RX ORDER — OXYCODONE HYDROCHLORIDE 10 MG/1
10 TABLET ORAL EVERY 6 HOURS PRN
Status: DISCONTINUED | OUTPATIENT
Start: 2020-02-24 | End: 2020-02-25

## 2020-02-24 RX ORDER — MULTIVIT WITH IRON,MINERALS
15 LIQUID (ML) ORAL DAILY
Status: DISCONTINUED | OUTPATIENT
Start: 2020-02-24 | End: 2020-02-25 | Stop reason: HOSPADM

## 2020-02-24 RX ORDER — FERROUS SULFATE 325(65) MG
325 TABLET ORAL
Status: DISCONTINUED | OUTPATIENT
Start: 2020-02-25 | End: 2020-02-25 | Stop reason: HOSPADM

## 2020-02-24 RX ORDER — DILTIAZEM HYDROCHLORIDE 5 MG/ML
15 INJECTION INTRAVENOUS ONCE
Status: COMPLETED | OUTPATIENT
Start: 2020-02-24 | End: 2020-02-24

## 2020-02-24 RX ORDER — ADENOSINE 3 MG/ML
6 INJECTION INTRAVENOUS ONCE
Status: DISCONTINUED | OUTPATIENT
Start: 2020-02-24 | End: 2020-02-24

## 2020-02-24 RX ORDER — ACETAMINOPHEN 325 MG/1
650 TABLET ORAL EVERY 6 HOURS PRN
Status: DISCONTINUED | OUTPATIENT
Start: 2020-02-24 | End: 2020-02-25 | Stop reason: HOSPADM

## 2020-02-24 RX ORDER — PRAVASTATIN SODIUM 40 MG
40 TABLET ORAL
Status: DISCONTINUED | OUTPATIENT
Start: 2020-02-24 | End: 2020-02-25 | Stop reason: HOSPADM

## 2020-02-24 RX ORDER — DILTIAZEM HYDROCHLORIDE 5 MG/ML
INJECTION INTRAVENOUS
Status: DISPENSED
Start: 2020-02-24 | End: 2020-02-24

## 2020-02-24 RX ORDER — NYSTATIN 100000 [USP'U]/G
POWDER TOPICAL 3 TIMES DAILY
Status: DISCONTINUED | OUTPATIENT
Start: 2020-02-24 | End: 2020-02-25 | Stop reason: HOSPADM

## 2020-02-24 RX ORDER — ADENOSINE 3 MG/ML
12 INJECTION INTRAVENOUS ONCE
Status: COMPLETED | OUTPATIENT
Start: 2020-02-24 | End: 2020-02-24

## 2020-02-24 RX ORDER — PANTOPRAZOLE SODIUM 40 MG/1
40 TABLET, DELAYED RELEASE ORAL
Status: DISCONTINUED | OUTPATIENT
Start: 2020-02-25 | End: 2020-02-25 | Stop reason: HOSPADM

## 2020-02-24 RX ORDER — CALCIUM CARBONATE 500(1250)
1 TABLET ORAL
Status: DISCONTINUED | OUTPATIENT
Start: 2020-02-25 | End: 2020-02-25 | Stop reason: HOSPADM

## 2020-02-24 RX ORDER — ANASTROZOLE 1 MG/1
1 TABLET ORAL DAILY
Status: DISCONTINUED | OUTPATIENT
Start: 2020-02-25 | End: 2020-02-25 | Stop reason: HOSPADM

## 2020-02-24 RX ORDER — ADENOSINE 3 MG/ML
6 INJECTION INTRAVENOUS ONCE
Status: COMPLETED | OUTPATIENT
Start: 2020-02-24 | End: 2020-02-24

## 2020-02-24 RX ADMIN — SODIUM CHLORIDE 250 ML: 0.9 INJECTION, SOLUTION INTRAVENOUS at 09:22

## 2020-02-24 RX ADMIN — MULTIVITAMIN 15 ML: LIQUID ORAL at 16:55

## 2020-02-24 RX ADMIN — NYSTATIN: 100000 POWDER TOPICAL at 16:57

## 2020-02-24 RX ADMIN — OXYCODONE HYDROCHLORIDE 10 MG: 10 TABLET ORAL at 21:34

## 2020-02-24 RX ADMIN — DEXTROSE 150 MG: 50 INJECTION, SOLUTION INTRAVENOUS at 12:12

## 2020-02-24 RX ADMIN — ADENOSINE 12 MG: 3 INJECTION, SOLUTION INTRAVENOUS at 09:31

## 2020-02-24 RX ADMIN — ENOXAPARIN SODIUM 120 MG: 120 INJECTION SUBCUTANEOUS at 23:26

## 2020-02-24 RX ADMIN — ADENOSINE 6 MG: 3 INJECTION INTRAVENOUS at 09:28

## 2020-02-24 RX ADMIN — SODIUM CHLORIDE 250 ML: 0.9 INJECTION, SOLUTION INTRAVENOUS at 09:55

## 2020-02-24 RX ADMIN — AMIODARONE HYDROCHLORIDE 1 MG/MIN: 50 INJECTION, SOLUTION INTRAVENOUS at 16:57

## 2020-02-24 RX ADMIN — ENOXAPARIN SODIUM 120 MG: 120 INJECTION SUBCUTANEOUS at 11:11

## 2020-02-24 RX ADMIN — PRAVASTATIN SODIUM 40 MG: 40 TABLET ORAL at 16:54

## 2020-02-24 RX ADMIN — AMIODARONE HYDROCHLORIDE 0.5 MG/MIN: 50 INJECTION, SOLUTION INTRAVENOUS at 22:02

## 2020-02-24 RX ADMIN — DILTIAZEM HYDROCHLORIDE 15 MG: 5 INJECTION INTRAVENOUS at 09:53

## 2020-02-24 NOTE — ED NOTES
Pt  BP 80 / doppler/ Dr Ochoa Sang aware   NSS infusing as ordered     Uli Schwartz, RN  02/24/20 6615

## 2020-02-24 NOTE — ASSESSMENT & PLAN NOTE
Last lipid panel 02/08/2019 below  - Continue home equivalent of Crestor 5 mg p o  Daily, pravastatin 40 mg p o  Daily   Ref   Range 2/8/2019 08:41   Cholesterol Latest Ref Range: 50 - 200 mg/dL 198   Triglycerides Latest Ref Range: <=150 mg/dL 134   HDL Latest Ref Range: 40 - 60 mg/dL 54   Non-HDL Cholesterol Latest Units: mg/dl 144   LDL Direct Latest Ref Range: 0 - 100 mg/dL 117 (H)

## 2020-02-24 NOTE — ASSESSMENT & PLAN NOTE
Patient with new onset AFib with RVR  Status post 18 mg IV adenosine, Cardizem 15 mg IV in ED    Spontaneously converted to sinus rhythm in ED    - Cardiology following  - Slow amiodarone load avoid hypotension  - S/p amiodarone 150 mg IV x1  - Therapeutic Lovenox 120 mg SQ q 12 hours  - Cardiac diet

## 2020-02-24 NOTE — ASSESSMENT & PLAN NOTE
Status post proctectomy; permanent end colostomy; laparoscopic hand assist abdominoperineal resection, posterior vaginectomy, omentectomy, radical total abdominal hysterectomy on 9/6/2018  Follows with Dr Yolande Yun and Dr Gina Montanez, gynecology oncology outpatient    Ostomy bag in place   - Rectal cancer -  Final stage is IIA (ypT3 pN0 G2)   --- Recent CT scan 02/06/2020 negative, CEA within normal limits

## 2020-02-24 NOTE — ASSESSMENT & PLAN NOTE
BMI 43 29 S/p Juan-En-Y Gastric Bypass with Dr Joanna Boast on 07/18/16   - Cardiac diet  - Nutrition consult

## 2020-02-24 NOTE — CONSULTS
ER CARDIOLOGY CONSULTATION  Topher Benjamin 59 y o  female MRN: [id number]  Unit/Bed#: ED CT1 Encounter: [id number]      History of Present Illness   PCP: Art Carrillo DO  Date of Admission:  2/24/2020  Date of Consultation: 02/24/20  Physician Requesting Consult: Noemi Loya MD    Reason for Consult / Principal Problem: Afib with RVR    Assessment/Plan   Afib with RVR- new onset  Spontaneous conversion in ER after given adenosine  Hx of moderate mitral stenosis + severe aortic stenosis  Recent TIA/stroke alert 10/2019  [id number] Dm2-1, prior TIA-2, women-1, age-1  Recommend anticoagulation initiation-therapeutic lovenox   Also plan for amiodarone load to suppress further afib occurrence- short term therapy  Given recent stroke like episode/afib plan for valve workup as outpatient  Consideration for TAVR assessment given multiple co-morbidities including prior rectal cancer? Previous hx of sleep disordered breathing not on cpap  Denies significant alcohol usage  Severe aortic stenosis/ moderate mitral stenosis- recheck echo 2d  Consideration for valve replaced as outpatient  Will discuss with outpatient cardiologist  We will need to avoid hypotension  Plan for slow amiodarone load  Discussed with patient the long-term risk with amiodarone of lung, liver, thyroid, pulmonary and corneal toxicity- given her her underlying significant valvular disease the benefits justify the risk  Diet-controlled diabetes mellitus    Mild cad- last cath  Currently on aspirin + rosuvastatin    Post gastrectomy malabsorption/history of gastric bypass    History of rectal cancer IIa s/p neoadjuvant therapy    Ovarian cancer IIIc s/p chemotherapy    MRI with chronic microangiopathic ischemic disease    MAUREEN- previous hx not on cpap  Consider sleep screen tonight      HPI: Topher Benjamin is a 59y o  year old female who presents with severe palpitations and dizziness on arrival found to be in SVT   She was given 6 and 12mg of adenosine noted to have underlying atrial fibrillation  No prior hx of atrial fibrillation  Reports recent hospital visit in October for TIA/loss of vision  Historical Information   Past Medical History:   Diagnosis Date    Anemia     Arthritis     Cancer ([facility] )     rectal    Chronic lower back pain     Chronic pain disorder     back pain    Colon cancer ([place] [address] ) 2018    Diabetes mellitus ([place] [address] )     Endometrial cancer ([place] [address] ) 2018    GERD (gastroesophageal reflux disease)     History of chemotherapy     History of MRSA infection     Morbid obesity ([place] [address] )     Ovarian cancer ([place] [address] ) 2018    Rectal cancer ([place] [address] )     Spinal stenosis     Systolic murmur     Unsteady gait     uses walker    Wears dentures     Wears glasses      Past Surgical History:   Procedure Laterality Date    ABDOMINAL PERINEAL BOWEL RESECTION W/ ILEOANAL POUCH N/A 2018    Procedure: LAPAROSCOPIC HAND ASSIST ABDOMINOPERINEAL RESECTION,  POSTERIOR VAGINECTOMY, OMENTECTOMY;  Surgeon: Jaiden Kramer MD;  [facility];  Service: Colorectal    ABDOMINAL SURGERY      abscess removed from abdomen and right thigh, a hole in thigh closed by plastic surgeon    ABSCESS DRAINAGE      abd, (R) leg, (L) leg     SECTION       SECTION      CYSTOSCOPY N/A 2018    Procedure: CYSTOSCOPY;  Surgeon: Florian Amaya MD;  [facility];  Service: Gynecology Oncology    ESOPHAGOGASTRODUODENOSCOPY N/A 2016    Procedure: ESOPHAGOGASTRODUODENOSCOPY (EGD); Surgeon: Miky Oliveira MD;  [facility];  Service:     ESOPHAGOGASTRODUODENOSCOPY N/A 3/30/2016    Procedure: ESOPHAGOGASTRODUODENOSCOPY (EGD); Surgeon: Miky Oliveira MD;  [facility]; Service:     EYE SURGERY      laser eye surgery    HYSTERECTOMY N/A 2018    Procedure: RADICAL HYSTERECTOMY TOTAL ABDOMINAL (ALEXANDRA)   BSO;  Surgeon: Florian Amaya MD;  [facility];  Service: Gynecology Oncology    JOINT REPLACEMENT Left 2017    hip    JOINT REPLACEMENT Right 2017    hip    OOPHORECTOMY Bilateral     KS COLONOSCOPY FLX DX W/COLLJ SPEC WHEN PFRMD N/A 3/9/2018    Procedure: COLONOSCOPY;  Surgeon: Tim Hernandez MD;  Location: [place] [address] GI LAB; Service: Gastroenterology    KS COLONOSCOPY FLX DX W/COLLJ [facility] WHEN PFRMD N/A 2018    Procedure: COLONOSCOPY;  Surgeon: Priscilla Beverly MD;  [facility]; Service: Colorectal    KS ESOPHAGOGASTRODUODENOSCOPY TRANSORAL DIAGNOSTIC N/A 2018    Procedure: ESOPHAGOGASTRODUODENOSCOPY (EGD); Surgeon: Tim Hernandez MD;  Location: [facility];   Service: Gastroenterology    KS LAP GASTRIC BYPASS/SOLEDAD-EN-Y N/A 2016    Procedure: BYPASS GASTRIC  SOLEDAD-EN-Y LAPAROSCOPIC;  Surgeon: Agueda Chakraborty MD;  [facility];  Service: Bariatrics    KS LAP, SURG COLOSTOMY N/A 2018    Procedure: PERMANENT END COLOSTOMY;  Surgeon: Priscilla Beverly MD;  [facility];  Service: Colorectal    KS LAP, SURG PROCTECTOMY W COLOSTOMY N/A 2018    Procedure: PROCTECTOMY;  Surgeon: Priscilla Bevelry MD;  [facility];  Service: Colorectal    TUBAL LIGATION       Social History     Substance and Sexual Activity   Alcohol Use No     Social History     Substance and Sexual Activity   Drug Use Yes    Types: Oxycodone    Comment: Percocet for lower back pain prn     Social History     Tobacco Use   Smoking Status Former Smoker    Packs/day: 1 00    Years: 25 00    Pack years: 25 00    Last attempt to quit: 3/30/2006    Years since quittin 9   Smokeless Tobacco Never Used     Family History   Adopted: Yes   Problem Relation Age of Onset    Leukemia Mother     Heart disease Father     Coronary artery disease Father     Diabetes Father     No Known Problems Sister     No Known Problems Brother     Diabetes Son     No Known Problems Brother     No Known Problems Brother     No Known Problems Sister     No Known Problems Sister Meds/Allergies   Prior to Admission medications    Medication Sig Start Date End Date Taking?  Authorizing Provider   anastrozole (ARIMIDEX) 1 mg tablet Take 1 tablet (1 mg total) by mouth daily 12/12/19  Yes Jorge Ryder,    Calcium-Vitamin D 500-125 MG-UNIT TABS Take 1 tablet by mouth 2 (two) times a day     Yes Historical Provider, MD   ferrous sulfate 324 (65 Fe) mg Take 1 tablet (324 mg total) by mouth daily before breakfast 11/5/18  Yes Berhane Duron,    Multiple Vitamins-Minerals (BARIATRIC FUSION) CHEW Chew 1 tablet daily     Yes Historical Provider, MD   nystatin (MYCOSTATIN) powder APPLY TOPICALLY TO AFFECTED AREA(S) 4 TIMES DAILY AS DIRECTED 11/25/19  Yes Tracy Dolan PA-C   omeprazole (PriLOSEC) 20 mg delayed release capsule Take 1 capsule (20 mg total) by mouth daily 7/1/19  Yes Kristofer Schmid MD   oxyCODONE (ROXICODONE) 10 MG TABS Take 10 mg by mouth every 6 (six) hours as needed   9/15/18  Yes Historical Provider, MD   rosuvastatin (CRESTOR) 5 mg tablet Take 1 tablet (5 mg total) by mouth daily 2/3/20  Yes Graeme Narvaez DO   aspirin 81 MG tablet Take 1 tablet (81 mg total) by mouth daily  Patient not taking: Reported on 2/24/2020 9/5/19   Jorge Ryder DO     Current Facility-Administered Medications   Medication Dose Route Frequency Provider Last Rate Last Dose    amiodarone 150 mg in dextrose 5 % 100 mL IV bolus  150 mg Intravenous Once Ines Lubin MD        enoxaparin (LOVENOX) subcutaneous injection 120 mg  1 mg/kg Subcutaneous Once Ines Lubin MD         Current Outpatient Medications   Medication Sig Dispense Refill    anastrozole (ARIMIDEX) 1 mg tablet Take 1 tablet (1 mg total) by mouth daily 90 tablet 2    Calcium-Vitamin D 500-125 MG-UNIT TABS Take 1 tablet by mouth 2 (two) times a day        ferrous sulfate 324 (65 Fe) mg Take 1 tablet (324 mg total) by mouth daily before breakfast 90 tablet 0    Multiple Vitamins-Minerals (BARIATRIC FUSION) CHEW Chew 1 tablet daily        nystatin (MYCOSTATIN) powder APPLY TOPICALLY TO AFFECTED AREA(S) 4 TIMES DAILY AS DIRECTED 30 g 1    omeprazole (PriLOSEC) 20 mg delayed release capsule Take 1 capsule (20 mg total) by mouth daily 90 capsule 3    oxyCODONE (ROXICODONE) 10 MG TABS Take 10 mg by mouth every 6 (six) hours as needed        rosuvastatin (CRESTOR) 5 mg tablet Take 1 tablet (5 mg total) by mouth daily 30 tablet 11    aspirin 81 MG tablet Take 1 tablet (81 mg total) by mouth daily (Patient not taking: Reported on 2/24/2020)       Allergies   Allergen Reactions    Tramadol Other (See Comments)     Dizzy         Review of systems  CONSTITUTIONAL:  As per hpi  HEENT:  Eyes:  No visual loss, blurred vision, double vision or yellow sclerae  Ears, Nose, Throat:  No hearing loss, sneezing, congestion, runny nose or sore throat  SKIN:  No rash or itching  CARDIOVASCULAR:  As per hpi  RESPIRATORY:  As per hpi  GASTROINTESTINAL:  No anorexia, nausea, vomiting or diarrhea  No abdominal pain or blood  GENITOURINARY:  Burning on urination  No flank pain  NEUROLOGICAL:  No headache, dizziness, syncope, paralysis, ataxia, numbness or tingling in the extremities  No change in bowel or bladder control  MUSCULOSKELETAL:  No muscle, back pain, joint pain or stiffness  HEMATOLOGIC:  No anemia, bleeding or bruising  LYMPHATICS:  No enlarged nodes  No history of splenectomy  PSYCHIATRIC:  No active suicidal or homicidal ideation  ENDOCRINOLOGIC:  No reports of sweating, cold or heat intolerance  No polyuria or polydipsia  ALLERGIES:  No history of asthma, hives, eczema or rhinitis  More than 10 systems reviewed and otherwise noncontributory  Objective   Vitals: Blood pressure 116/77, pulse 80, temperature 98 8 °F (37 1 °C), temperature source Oral, resp  rate 20, height 5' 5" (1 651 m), weight 118 kg (260 lb 2 3 oz), SpO2 100 %, not currently breastfeeding    Blood pressure 116/77, pulse 80, temperature 98 8 °F (37 1 °C), temperature source Oral, resp  rate 20, height 5' 5" (1 651 m), weight 118 kg (260 lb 2 3 oz), SpO2 100 %, not currently breastfeeding  Body mass index is 43 29 kg/m²  BP Readings from Last 3 Encounters:   02/24/20 116/77   02/13/20 132/72   02/03/20 132/80     Orthostatic Blood Pressures      Most Recent Value   Blood Pressure  116/77 filed at 02/24/2020 1045   Patient Position - Orthostatic VS  Lying filed at 02/24/2020 0490          Intake/Output Summary (Last 24 hours) at 2/24/2020 1103  Last data filed at 2/24/2020 1007  Gross per 24 hour   Intake 500 ml   Output    Net 500 ml     Invasive Devices     Peripheral Intravenous Line            Peripheral IV 02/24/20 Left Antecubital less than 1 day          Drain            Colostomy Descending/sigmoid  days                Physical Exam   Constitutional: She is oriented to person, place, and time  No distress  HENT:   Head: Normocephalic and atraumatic  Right Ear: External ear normal    Left Ear: External ear normal    Nose: Nose normal    Mouth/Throat: No oropharyngeal exudate  Eyes: Pupils are equal, round, and reactive to light  Conjunctivae are normal  Right eye exhibits no discharge  Left eye exhibits no discharge  No scleral icterus  Neck: Normal range of motion  No JVD present  No tracheal deviation present  No thyromegaly present  Cardiovascular: Normal rate, regular rhythm and intact distal pulses  Exam reveals gallop  Exam reveals no friction rub  Murmur heard  Pulmonary/Chest: Effort normal and breath sounds normal  No stridor  No respiratory distress  She has no wheezes  She has no rales  She exhibits no tenderness  Abdominal: Soft  Bowel sounds are normal  She exhibits distension  She exhibits no mass  There is no tenderness  There is no rebound and no guarding  Genitourinary:   Genitourinary Comments: No CVA tenderness   Musculoskeletal: She exhibits edema and deformity  She exhibits no tenderness     Neurological: She is alert and oriented to person, place, and time  She displays normal reflexes  She exhibits normal muscle tone  Skin: Skin is warm and dry  No rash noted  She is not diaphoretic  No erythema  Psychiatric: She has a normal mood and affect  Her behavior is normal  Judgment and thought content normal        Lab Results:  I Have Reviewed All Lab Data Below:  Results from last 7 days   Lab Units 02/24/20  0956   WBC Thousand/uL 5 04   HEMOGLOBIN g/dL 14 3   HEMATOCRIT % 45 4   PLATELETS Thousands/uL 162   NEUTROS PCT % 68   LYMPHS PCT % 20   MONOS PCT % 8   EOS PCT % 2     Results from last 7 days   Lab Units 02/24/20  0956   POTASSIUM mmol/L 4 2   CHLORIDE mmol/L 105   CO2 mmol/L 27   BUN mg/dL 16   CREATININE mg/dL 0 74   CALCIUM mg/dL 9 3     Troponins:   Results from last 7 days   Lab Units 02/24/20  0956   TROPONIN I ng/mL <0 02       CBC with diff:   Results from last 7 days   Lab Units 02/24/20  0956   WBC Thousand/uL 5 04   HEMOGLOBIN g/dL 14 3   HEMATOCRIT % 45 4   MCV fL 84   PLATELETS Thousands/uL 162   MCH pg 26 3*   MCHC g/dL 31 5   RDW % 14 0   MPV fL 9 8   NRBC AUTO /100 WBCs 0       CMP:   Results from last 7 days   Lab Units 02/24/20  0956   SODIUM mmol/L 141   POTASSIUM mmol/L 4 2   CHLORIDE mmol/L 105   CO2 mmol/L 27   ANION GAP mmol/L 9   BUN mg/dL 16   CREATININE mg/dL 0 74   CALCIUM mg/dL 9 3   EGFR ml/min/1 73sq m 86   GLUCOSE RANDOM mg/dL 114     Magnesium:   Results from last 7 days   Lab Units 02/24/20  0957   MAGNESIUM mg/dL 1 9       NT-proBNP:   Recent Labs     02/24/20  0956   NTBNP 799*        Coags:       Troponins:   Results from last 7 days   Lab Units 02/24/20  0956   TROPONIN I ng/mL <0 02       Lipid Profile:                              Lab Results   Component Value Date    CHOLESTEROL 198 02/08/2019    HDL 54 02/08/2019    LDLCALC 117 (H) 02/08/2019    TRIG 134 02/08/2019       TSH:    Results from last 7 days   Lab Units 02/24/20  0956   TSH 3RD GENERATON uIU/mL 1 397       Hgb A1c:       Imaging: I have personally reviewed pertinent films in PACS    Cardiac testing:   Results for orders placed during the hospital encounter of 10/08/19   Echo complete with contrast if indicated    Narrative [address]  [place] [address] [phone]    Transthoracic Echocardiogram  2D, M-mode, Doppler, and Color Doppler    Study date:  08-Oct-2019    Patient: Karlo French  MR number: [id number]  Account number: [de-identified]  : 1955  Age: 59 years  Gender: Female  Status: Outpatient  Location: Echo lab  Height: 64 in  Weight: 244 4 lb  BP: 93/ 63 mmHg    Indications: Aortic Stenosis and Mitral stenosis  Diagnoses: I73 9 - Peripheral vascular disease, unspecified    Sonographer:  MAEVE Gallego  Primary Physician:  Naheed Waterman MD  Referring Physician:  Jeanie Hernandes DO  Group:  [id number]  Interpreting Physician:  Melanie Mendoza MD    SUMMARY    LEFT VENTRICLE:  Systolic function was normal  Ejection fraction was estimated in the range of 55 % to 60 % to be 55 %  There were no regional wall motion abnormalities  Wall thickness was mildly increased  There was mild concentric hypertrophy  Doppler parameters were consistent with abnormal left ventricular relaxation (grade 1 diastolic dysfunction)  LEFT ATRIUM:  The atrium was moderately dilated  RIGHT ATRIUM:  The atrium was mildly dilated  MITRAL VALVE:  There was moderate to marked annular calcification  There was moderately reduced leaflet separation  There was moderately restricted mobility  Transmitral velocity was increased due to valvular stenosis  There was at least mild to moderate stenosis  mean gradient about 6 mm of hg  There was trace regurgitation  AORTIC VALVE:  The valve was trileaflet   Leaflets exhibited mildly to moderately increased thickness, mild to moderate calcification, and moderately reduced cuspal separation  Transaortic velocity was increased due to valvular stenosis  There was moderate to severe stenosis  CHARBEL 1 cm2  peak gradient 40 and mean 20 mm of hg    TRICUSPID VALVE:  There was mild regurgitation  Estimated peak PA pressure was 30 mmHg  PULMONIC VALVE:  There was trace regurgitation  COMPARISONS:  Comparison was made with the previous study of 07-Sep-2018  Ejection fraction has not changed  Aortic stenosis has worsened  Mitral stenosis has not changed  HISTORY: PRIOR HISTORY: Colon Cancer, Ovarian Cancer,Endometrial Cancer,Diabetes, Gastroesophageal Reflux Disease,Chemotherapy,MRSA Infection  PROCEDURE: The procedure was performed in the echo lab  This was a routine study  The transthoracic approach was used  The study included complete 2D imaging, M-mode, complete spectral Doppler, and color Doppler  The heart rate was 70 bpm,  at the start of the study  Images were obtained from the parasternal, apical, subcostal, and suprasternal notch acoustic windows  Echocardiographic views were limited due to restricted patient mobility, poor patient compliance, poor  acoustic window availability, and decreased penetration  This was a technically difficult study  LEFT VENTRICLE: Size was normal  Systolic function was normal  Ejection fraction was estimated in the range of 55 % to 60 % to be 55 %  There were no regional wall motion abnormalities  Wall thickness was mildly increased  There was mild  concentric hypertrophy  DOPPLER: Doppler parameters were consistent with abnormal left ventricular relaxation (grade 1 diastolic dysfunction)  RIGHT VENTRICLE: The size was normal  Systolic function was normal     LEFT ATRIUM: The atrium was moderately dilated  RIGHT ATRIUM: The atrium was mildly dilated  MITRAL VALVE: There was moderate to marked annular calcification  There was mild-moderate diffuse thickening  There was mild-moderate calcification   There was moderately reduced leaflet separation  There was moderately restricted mobility  DOPPLER: Transmitral velocity was increased due to valvular stenosis  There was at least mild to moderate stenosis  mean gradient about 6 mm of hg There was trace regurgitation  AORTIC VALVE: The valve was trileaflet  Leaflets exhibited mildly to moderately increased thickness, mild to moderate calcification, and moderately reduced cuspal separation  DOPPLER: Transaortic velocity was increased due to valvular  stenosis  There was moderate to severe stenosis  CHARBEL 1 cm2  peak gradient 40 and mean 20 mm of hg There was no significant regurgitation  TRICUSPID VALVE: DOPPLER: There was mild regurgitation  Estimated peak PA pressure was 30 mmHg  PULMONIC VALVE: DOPPLER: There was trace regurgitation  PERICARDIUM: There was no thickening or calcification  There was no pericardial effusion  AORTA: The root exhibited normal size  SYSTEMIC VEINS: IVC: The inferior vena cava was normal in size  Respirophasic changes were normal     SYSTEM MEASUREMENT TABLES    2D mode  AoR Diam 2D: 3 1 cm  LA Diam (2D): 4 4 cm  LA/Ao (2D): 1 42  FS (2D Teich): 20 6 %  IVSd (2D): 1 23 cm  LVDEV: 52 6 cmï¾³  LVEDV MOD BP: 140 cmï¾³  LVESV: 30 1 cmï¾³  LVIDd(2D): 3 55 cm  LVISd (2D): 2 82 cm  LVOT Area 2D: 3 14 cmï¾²  LVPWd (2D): 1 31 cm  SV (Teich): 22 5 cmï¾³    Apical four chamber  LVEF A4C: 37 %    Apical two chamber  LVEF A2C: 53 %    Unspecified Scan Mode  CHARBEL Cont Eq (Peak Noman): 0 98 cmï¾²  LVOT Diam : 2 cm  LVOT Vmax: 879 mm/s  LVOT Vmax; Mean: 923 mm/s  Peak Grad ; Mean: 3 mm[Hg]  Flow Radius; Antegrade Flow: 1 3 cm  Flow Radius; Mean; Antegrade Flow: 1 3 cm  MV Peak A Noman: 1550 mm/s  MV Peak E Noman   Mean: 1100 mm/s  MVA (PHT): 1 57 cmï¾²  PHT: 140 ms  Max P mm[Hg]  V Max: 2250 mm/s  Vmax: 2330 mm/s  RA Area: 22 8 cmï¾²  RA Volume: 72 cmï¾³  TAPSE: 2 9 cm    [employer] Accredited Echocardiography Laboratory    Prepared and electronically signed by    Ailyn Gaona MD  Signed 09-Oct-2019 10:10:04       EKG: afib converted to NSR    Counseling / Coordination of Care Time: 65 minutes  Greater than 50% of total time spent on patient counseling and coordination of care  Afib in setting of severe valvular disease  Code Status: Prior  Collaboration of Care: Were Recommendations Directly Discussed with ER Treatment Team? - Yes     Casper Fernando MD [facility]    "This note has been constructed using a voice recognition system  Therefore there may be syntax, spelling, and/or grammatical errors   Please call if you have any questions  "

## 2020-02-24 NOTE — ASSESSMENT & PLAN NOTE
Patient with echo in 10/1919 shown moderate to severe stenosis and mild-to-moderate mitral stenosis  Follows with Dr Carmela Husain, cardiology  Last seen on 02/03/2020  Current management is surveillance with echo q6 months with consideration of valve replacement      - Cardiology following  - Slow amiodarone load to avoid hypotension  - Patient with soft pressures overnight, low of 96/52 - stable this am at 116/71  - Echo read pending  - Consideration for possible valve replacement as an outpatient

## 2020-02-24 NOTE — ASSESSMENT & PLAN NOTE
Ovarian cancer, IIIC -  Low grade serous ovarian pT3c, pNX, Low Grade FIGO stage (2015 FIGO cancer report): IIIC  Discovered at the time of surgery listed above under rectal cancer, patient was found to have an incidental low-grade serous ovarian carcinoma with omental nodules greater than 2 cm grossly visible  Follows with Dr Mg Sykes outpatient  Currently asymptomatic  - Continue Arimidex 1 mg p o   Daily

## 2020-02-24 NOTE — H&P
H&P Exam - Elise Sor 59 y o  female MRN: [id number]    Unit/Bed#: [address] [place] [address] Encounter: [id number]     51-year-old female with past medical history of new onset atrial fibrillation, severe aortic stenosis and moderate mitral stenosis, rectal and ovarian cancer status post proctectomy; permanent end colostomy; laparoscopic hand assist abdominoperineal resection, posterior vaginectomy, omentectomy, radical total abdominal hysterectomy on 9/6/2018, obesity status post Juan-en-Y 07/18/2016 who presents to the ED with rapid heartbeat/heart palpitations  Patient will be admitted under the service of Dr Jerri Dillon as inpatient status  Length of stay anticipated to be greater than 2 midnights  Assessment:  * Atrial fibrillation with rapid ventricular response [facility])  Assessment & Plan  Patient with new onset AFib with RVR  Status post 18 mg IV Fidencio in ED  Spontaneously converted to sinus rhythm in ED     - Cardiology following  - Slow amiodarone load avoid hypotension  - S/p amiodarone 150 mg IV x1  - Therapeutic Lovenox 120 mg SQ q 12 hours  - Cardiac diet    Severe aortic stenosis by prior echocardiogram  Assessment & Plan  Patient with echo in 10/1919 shown moderate to severe stenosis and mild-to-moderate mitral stenosis  Follows with Dr Escobar Najera, cardiology  Last seen on 02/03/2020  Current management is surveillance with echo q6 months with consideration of valve replacement  - Cardiology following  - Echo ordered  - Consideration for possible valve replacement as an outpatient    Hyperlipidemia  Assessment & Plan  Last lipid panel 02/08/2019 below  - Continue home equivalent of Crestor 5 mg p o  Daily, pravastatin 40 mg p o  Daily   Ref   Range 2/8/2019 08:41   Cholesterol Latest Ref Range: 50 - 200 mg/dL 198   Triglycerides Latest Ref Range: <=150 mg/dL 134   HDL Latest Ref Range: 40 - 60 mg/dL 54   Non-HDL Cholesterol Latest Units: mg/dl 144   LDL Direct Latest Ref Range: 0 - 100 mg/dL 117 (H) Moderate mitral stenosis  Assessment & Plan  See above plan for aortic stenosis    Candidal intertrigo  Assessment & Plan  - Continue nystatin powder    History of ovarian cancer  Assessment & Plan  Ovarian cancer, IIIC -  Low grade serous ovarian pT3c, pNX, Low Grade FIGO stage (2015 FIGO cancer report): IIIC  Discovered at the time of surgery listed above under rectal cancer, patient was found to have an incidental low-grade serous ovarian carcinoma with omental nodules greater than 2 cm grossly visible  Follows with Dr Augustine Cortes outpatient  Currently asymptomatic  - Continue Arimidex 1 mg p o  Daily    History of rectal cancer  Assessment & Plan  Status post proctectomy; permanent end colostomy; laparoscopic hand assist abdominoperineal resection, posterior vaginectomy, omentectomy, radical total abdominal hysterectomy on 9/6/2018  Follows with Dr Severo Lerner and Dr Augustine Cortes, gynecology oncology outpatient  Ostomy bag in place   - Rectal cancer -  Final stage is IIA (ypT3 pN0 G2)   --- Recent CT scan 02/06/2020 negative, CEA within normal limits    S/P gastric bypass  Assessment & Plan  S/p Juan-En-Y Gastric Bypass with Dr Wiley Garcia on 07/18/16  - no NSAIDs  - Continue home supplements    Morbid obesity due to excess calories (HCC)  Assessment & Plan  BMI 43 29 S/p Juan-En-Y Gastric Bypass with Dr Wiley Garcia on 07/18/16   - Cardiac diet  - Nutrition consult    Global:  Cardiac diet / Therapeutic SQL / Replete electrolytes p r n      History of Present Illness   60-year-old female with past medical history of new onset atrial fibrillation, severe aortic stenosis and moderate mitral stenosis, rectal and ovarian cancer status post proctectomy; permanent end colostomy; laparoscopic hand assist abdominoperineal resection, posterior vaginectomy, omentectomy, radical total abdominal hysterectomy on 9/6/2018, obesity status post Juan-en-Y 07/18/2016 presents to the ED with rapid heartbeat/heart palpitations  She reports sitting in the car when she being as in his her heart beating fast   She reports she had cup of coffee that she thought may have been regular instead of her normal decaf  She states symptoms do not resolve so she subsequently presented to the emergency department  Denies any chest pain, shortness of breath, nausea, vomiting, diarrhea, numbness, tingling, blurry vision, headache  In the ED she was found to be in SVT with HR 130s to 160s  Vagal maneuver was tried by ED practitioner without relief  She was subsequent given 18 mg IV adenosine which did slower heart rate  Subsequent EKG showed atrial flutter and ultimately atrial fibrillation  Cardiology was consulted  Patient subsequently converted to normal sinus rhythm prior to being admitted to the floor  She received dose of amiodarone on the floor per cardiology recommendations  Currently stable  ED Course:  Normal saline 250 mL IV x2, adenosine 6 mg IV x1, adenosine 12 mg IV x1, Cardizem 50 mg IV x1, Lovenox 120 mg IV x1    Review of Systems   Constitutional: Negative for chills, fever and unexpected weight change  HENT: Negative for congestion, rhinorrhea and sore throat  Respiratory: Negative for cough, shortness of breath and wheezing  Cardiovascular: Positive for palpitations  Negative for chest pain  Gastrointestinal: Negative for abdominal pain, constipation, diarrhea, nausea and vomiting  Endocrine: Negative for polydipsia and polyuria  Genitourinary: Negative for difficulty urinating, dysuria, vaginal bleeding and vaginal discharge  Musculoskeletal: Negative for arthralgias and myalgias  Skin: Negative for rash  Neurological: Negative for dizziness, numbness and headaches  Psychiatric/Behavioral: Negative for dysphoric mood and sleep disturbance  The patient is not nervous/anxious          Historical Information   Past Medical History:   Diagnosis Date    Anemia     Arthritis     Cancer ([facility] [address] ) rectal    Chronic lower back pain     Chronic pain disorder     back pain    Colon cancer ([facility] ) 2018    Diabetes mellitus ([facility] )     Endometrial cancer ([facility] ) 2018    GERD (gastroesophageal reflux disease)     History of chemotherapy     History of MRSA infection     [facility]     Ovar[facility] cancer [facility] ) 2018    Rectal cancer ([facility] )     Spinal stenosis     Systolic murmur     Unsteady gait     uses walker    Wears dentures     Wears glasses      Past Surgical History:   Procedure Laterality Date    ABDOMINAL PERINEAL BOWEL RESECTION W/ ILEOANAL POUCH N/A 2018    Procedure: LAPAROSCOPIC HAND ASSIST ABDOMINOPERINEAL RESECTION,  POSTERIOR VAGINECTOMY, OMENTECTOMY;  Surgeon: Xavier Hudson MD;  [facility];  Service: Colorectal    ABDOMINAL SURGERY      abscess removed from abdomen and right thigh, a hole in thigh closed by plastic surgeon    ABSCESS DRAINAGE      abd, (R) leg, (L) leg     SECTION       SECTION      CYSTOSCOPY N/A 2018    Procedure: CYSTOSCOPY;  Surgeon: Min Menjivar MD;  [facility];  Service: Gynecology Oncology    ESOPHAGOGASTRODUODENOSCOPY N/A 2016    Procedure: ESOPHAGOGASTRODUODENOSCOPY (EGD); Surgeon: Chi Khanna MD;  [facility];  Service:     ESOPHAGOGASTRODUODENOSCOPY N/A 3/30/2016    Procedure: ESOPHAGOGASTRODUODENOSCOPY (EGD); Surgeon: Chi Khanna MD;  [facility]; Service:     EYE SURGERY      laser eye surgery    HYSTERECTOMY N/A 2018    Procedure: RADICAL HYSTERECTOMY TOTAL ABDOMINAL (ALEXANDRA)  BSO;  Surgeon: Min Menjivar MD;  [facility];  Service: Gynecology Oncology    JOINT REPLACEMENT Left 2017    hip    JOINT REPLACEMENT Right 2017    hip    OOPHORECTOMY Bilateral     SC COLONOSCOPY FLX DX W/COLLJ SPEC WHEN PFRMD N/A 3/9/2018    Procedure: COLONOSCOPY;  Surgeon: Darinel Gresham MD;  Location: [facility];   Service: Gastroenterology   Oriana Edwards NV COLONOSCOPY FLX DX W/COLLJ SPEC WHEN PFRMD N/A 2018    Procedure: COLONOSCOPY;  Surgeon: Cat Tamayo MD;  [facility]; Service: Colorectal    NV ESOPHAGOGASTRODUODENOSCOPY TRANSORAL DIAGNOSTIC N/A 2018    Procedure: ESOPHAGOGASTRODUODENOSCOPY (EGD); Surgeon: Angel Perez MD;  Location: [facility] GI LAB; Service: Gastroenterology    NV LAP GASTRIC BYPASS/SOLEDAD-EN-Y N/A 2016    Procedure: BYPASS GASTRIC  SOLEDAD-EN-Y LAPAROSCOPIC;  Surgeon: Zachary Garnica MD;  [facility];  Service: Bariatrics    NV LAP, SURG COLOSTOMY N/A 2018    Procedure: PERMANENT END COLOSTOMY;  Surgeon: Cat Tamayo MD;  [facility];  Service: Colorectal    NV LAP, SURG PROCTECTOMY W COLOSTOMY N/A 2018    Procedure: PROCTECTOMY;  Surgeon: Cat Tamayo MD;  [facility];  Service: Colorectal    TUBAL LIGATION       Social History   Social History     Substance and Sexual Activity   Alcohol Use No     Social History     Substance and Sexual Activity   Drug Use Yes    Types: Oxycodone    Comment: Percocet for lower back pain prn     Social History     Tobacco Use   Smoking Status Former Smoker    Packs/day: 1 00    Years: 25 00    Pack years: 25 00    Last attempt to quit: 3/30/2006    Years since quittin 9   Smokeless Tobacco Never Used     E-Cigarette Use: Never User     E-Cigarette/Vaping Substances       Family History:   Family History   Adopted: Yes   Problem Relation Age of Onset    Leukemia Mother     Heart disease Father     Coronary artery disease Father     Diabetes Father     No Known Problems Sister     No Known Problems Brother     Diabetes Son     No Known Problems Brother     No Known Problems Brother     No Known Problems Sister     No Known Problems Sister        Meds/Allergies   PTA meds:   Prior to Admission Medications   Prescriptions Last Dose Informant Patient Reported? Taking?    Calcium-Vitamin D 500-125 MG-UNIT TABS 2/23/2020 at Unknown time Self Yes Yes   Sig: Take 1 tablet by mouth 2 (two) times a day     Multiple Vitamins-Minerals (BARIATRIC FUSION) CHEW 2/23/2020 at Unknown time Self Yes Yes   Sig: Chew 1 tablet daily     anastrozole (ARIMIDEX) 1 mg tablet 2/24/2020 at Unknown time Self No Yes   Sig: Take 1 tablet (1 mg total) by mouth daily   aspirin 81 MG tablet Not Taking at Unknown time Self No No   Sig: Take 1 tablet (81 mg total) by mouth daily   Patient not taking: Reported on 2/24/2020   ferrous sulfate 324 (65 Fe) mg 2/23/2020 at Unknown time Self No Yes   Sig: Take 1 tablet (324 mg total) by mouth daily before breakfast   nystatin (MYCOSTATIN) powder 2/23/2020 at Unknown time Self No Yes   Sig: APPLY TOPICALLY TO AFFECTED AREA(S) 4 TIMES DAILY AS DIRECTED   omeprazole (PriLOSEC) 20 mg delayed release capsule 2/24/2020 at Unknown time Self No Yes   Sig: Take 1 capsule (20 mg total) by mouth daily   oxyCODONE (ROXICODONE) 10 MG TABS 2/23/2020 at Unknown time Self Yes Yes   Sig: Take 10 mg by mouth every 6 (six) hours as needed     rosuvastatin (CRESTOR) 5 mg tablet Past Week at Unknown time Self No Yes   Sig: Take 1 tablet (5 mg total) by mouth daily      Facility-Administered Medications: None     Allergies   Allergen Reactions    Tramadol Other (See Comments)     Dizzy         Objective   First Vitals:   Blood Pressure: 169/81 (02/24/20 0903)  Pulse: (!) 138 (02/24/20 0903)  Temperature: 98 8 °F (37 1 °C) (02/24/20 0903)  Temp Source: Oral (02/24/20 0903)  Respirations: 22 (02/24/20 0903)  Height: 5' 5" (165 1 cm) (02/24/20 0901)  Weight - Scale: 118 kg (260 lb 2 3 oz) (02/24/20 0901)  SpO2: 100 % (02/24/20 0903)    Current Vitals:   Blood Pressure: 106/60 (02/24/20 1218)  Pulse: 80 (02/24/20 1218)  Temperature: 98 8 °F (37 1 °C) (02/24/20 0903)  Temp Source: Oral (02/24/20 0903)  Respirations: 20 (02/24/20 1200)  Height: 5' 5" (165 1 cm) (02/24/20 0901)  Weight - Scale: 118 kg (260 lb 2 3 oz) (02/24/20 0901)  SpO2: 98 % (02/24/20 1100)      Intake/Output Summary (Last 24 hours) at 2/24/2020 1715  Last data filed at 2/24/2020 1007  Gross per 24 hour   Intake 500 ml   Output    Net 500 ml       Invasive Devices     Peripheral Intravenous Line            Peripheral IV 02/24/20 Left Antecubital less than 1 day          Drain            Colostomy Descending/sigmoid  days                Physical Exam   Constitutional: She is oriented to person, place, and time  She appears well-developed and well-nourished  HENT:   Head: Normocephalic and atraumatic  Right Ear: External ear normal    Left Ear: External ear normal    Eyes: Pupils are equal, round, and reactive to light  Conjunctivae and EOM are normal    Cardiovascular: Normal rate, regular rhythm and normal pulses  Murmur (3/6 SIOBHAN at [facility]) heard  Pulmonary/Chest: Effort normal and breath sounds normal  She has no wheezes  Abdominal: Soft  Bowel sounds are normal  She exhibits distension  There is no tenderness  Neurological: She is alert and oriented to person, place, and time  She has normal strength  No sensory deficit  Skin: Skin is warm and dry  Psychiatric: She has a normal mood and affect   Thought content normal        Lab Results:   Results for orders placed or performed during the hospital encounter of 02/24/20   CBC and differential   Result Value Ref Range    WBC 5 04 4 31 - 10 16 Thousand/uL    RBC 5 43 (H) 3 81 - 5 12 Million/uL    Hemoglobin 14 3 11 5 - 15 4 g/dL    Hematocrit 45 4 34 8 - 46 1 %    MCV 84 82 - 98 fL    MCH 26 3 (L) 26 8 - 34 3 pg    MCHC 31 5 31 4 - 37 4 g/dL    RDW 14 0 11 6 - 15 1 %    MPV 9 8 8 9 - 12 7 fL    Platelets 558 116 - 659 Thousands/uL    nRBC 0 /100 WBCs    Neutrophils Relative 68 43 - 75 %    Immat GRANS % 1 0 - 2 %    Lymphocytes Relative 20 14 - 44 %    Monocytes Relative 8 4 - 12 %    Eosinophils Relative 2 0 - 6 %    Basophils Relative 1 0 - 1 %    Neutrophils Absolute 3 45 1 85 - 7 62 Thousands/µL Immature Grans Absolute 0 05 0 00 - 0 20 Thousand/uL    Lymphocytes Absolute 1 00 0 60 - 4 47 Thousands/µL    Monocytes Absolute 0 40 0 17 - 1 22 Thousand/µL    Eosinophils Absolute 0 11 0 00 - 0 61 Thousand/µL    Basophils Absolute 0 03 0 00 - 0 10 Thousands/µL   Basic metabolic panel   Result Value Ref Range    Sodium 141 136 - 145 mmol/L    Potassium 4 2 3 5 - 5 3 mmol/L    Chloride 105 100 - 108 mmol/L    CO2 27 21 - 32 mmol/L    ANION GAP 9 4 - 13 mmol/L    BUN 16 5 - 25 mg/dL    Creatinine 0 74 0 60 - 1 30 mg/dL    Glucose 114 65 - 140 mg/dL    Calcium 9 3 8 3 - 10 1 mg/dL    eGFR 86 ml/min/1 73sq m   Troponin I   Result Value Ref Range    Troponin I <0 02 <=0 04 ng/mL   NT-BNP PRO   Result Value Ref Range    NT-proBNP 799 (H) <125 pg/mL   TSH, 3rd generation with Free T4 reflex   Result Value Ref Range    TSH 3RD GENERATON 1 397 0 358 - 3 740 uIU/mL   Magnesium   Result Value Ref Range    Magnesium 1 9 1 6 - 2 6 mg/dL     Imaging:   XR chest 1 view portable   Final Result      No acute cardiopulmonary disease  Workstation performed: [id number]           EKG, Pathology, and Other Studies:  SVT, Atrial flutter, atrial fibrillation converted to sinus rhythm    Code Status: Level 1 - Full Code  Advance Directive and Living Will:      Power of :    POLST:      Counseling / Coordination of Care: Total floor / unit time spent today 30 minutes  Discussed assessment and plan with senior resident and attending on call

## 2020-02-24 NOTE — ASSESSMENT & PLAN NOTE
S/p Juan-En-Y Gastric Bypass with Dr Haris Cowan on 07/18/16  - no NSAIDs  - Continue home supplements

## 2020-02-24 NOTE — ED PROVIDER NOTES
History  Chief Complaint   Patient presents with    Rapid Heart Rate     pt c/o feeling like "my heart is pounding & going fast" x 1 hour after she drank her coffee  HPI    This is a 59 year female that presents with rapid heart rate  Patient states this happened an hour prior to arrival   Patient states she drinks some caffeine and felt her heart pounding  Denies any chest pain or shortness of breath  No prior history of palpitations  Patient does have history of severe aortic stenosis  No fevers or chills  Patient is well-appearing currently  With normal pressure  Heart rate has been ranging between 130 to 160s  59 year female that presents with the tachycardia  Initial EKG suspicious for SVT  Tried vagal maneuver with no relief  Tried adenosine 6 mg and then 12 mg which did bring the heart rate down temporarily underlying rhythm is atrial flutter  Will give her Cardizem and reassessed in speak with Cardiology    Prior to Admission Medications   Prescriptions Last Dose Informant Patient Reported? Taking?    Calcium-Vitamin D 500-125 MG-UNIT TABS 2/23/2020 at Unknown time Self Yes Yes   Sig: Take 1 tablet by mouth 2 (two) times a day     Multiple Vitamins-Minerals (BARIATRIC FUSION) CHEW 2/23/2020 at Unknown time Self Yes Yes   Sig: Chew 1 tablet daily     anastrozole (ARIMIDEX) 1 mg tablet 2/24/2020 at Unknown time Self No Yes   Sig: Take 1 tablet (1 mg total) by mouth daily   aspirin 81 MG tablet Not Taking at Unknown time Self No No   Sig: Take 1 tablet (81 mg total) by mouth daily   Patient not taking: Reported on 2/24/2020   ferrous sulfate 324 (65 Fe) mg 2/23/2020 at Unknown time Self No Yes   Sig: Take 1 tablet (324 mg total) by mouth daily before breakfast   nystatin (MYCOSTATIN) powder 2/23/2020 at Unknown time Self No Yes   Sig: APPLY TOPICALLY TO AFFECTED AREA(S) 4 TIMES DAILY AS DIRECTED   omeprazole (PriLOSEC) 20 mg delayed release capsule 2/24/2020 at Unknown time Self No Yes   Sig: Take 1 capsule (20 mg total) by mouth daily   oxyCODONE (ROXICODONE) 10 MG TABS 2020 at Unknown time Self Yes Yes   Sig: Take 10 mg by mouth every 6 (six) hours as needed     rosuvastatin (CRESTOR) 5 mg tablet Past Week at Unknown time Self No Yes   Sig: Take 1 tablet (5 mg total) by mouth daily      Facility-Administered Medications: None       Past Medical History:   Diagnosis Date    Anemia     Arthritis     Cancer (HCC)     rectal    Chronic lower back pain     Chronic pain disorder     back pain    [place] [address] [place] 2018    Diabetes mellitus ([place] [address] )     Endometrial cancer ([place] [address] ) 2018    GERD (gastroesophageal reflux disease)     History of chemotherapy     History of MRSA infection     Morbid obesity ([place] [address] )     Ovarian cancer ([place] [address] ) 2018    Rectal cancer ([place] [address] )     Spinal stenosis     Systolic murmur     Unsteady gait     uses walker    Wears dentures     Wears glasses        Past Surgical History:   Procedure Laterality Date    ABDOMINAL PERINEAL BOWEL RESECTION W/ ILEOANAL POUCH N/A 2018    Procedure: LAPAROSCOPIC HAND ASSIST ABDOMINOPERINEAL RESECTION,  POSTERIOR VAGINECTOMY, OMENTECTOMY;  Surgeon: Sam Nicolas MD;  [facility];  Service: Colorectal    ABDOMINAL SURGERY      abscess removed from abdomen and right thigh, a hole in thigh closed by plastic surgeon    ABSCESS DRAINAGE      abd, (R) leg, (L) leg     SECTION       SECTION      CYSTOSCOPY N/A 2018    Procedure: CYSTOSCOPY;  Surgeon: Glenys Siddiqui MD;  [facility];  Service: Gynecology Oncology    ESOPHAGOGASTRODUODENOSCOPY N/A 2016    Procedure: ESOPHAGOGASTRODUODENOSCOPY (EGD); Surgeon: Suleman Govea MD;  [facility];  Service:     ESOPHAGOGASTRODUODENOSCOPY N/A 3/30/2016    Procedure: ESOPHAGOGASTRODUODENOSCOPY (EGD); Surgeon: Suleman Govea MD;  [facility];   Service:     EYE SURGERY      laser eye surgery    HYSTERECTOMY N/A 2018    Procedure: RADICAL HYSTERECTOMY TOTAL ABDOMINAL (ALEXANDRA)  BSO;  Surgeon: Samantha Song MD;  [facility];  Service: Gynecology Oncology    JOINT REPLACEMENT Left 2017    hip    JOINT REPLACEMENT Right 2017    hip    OOPHORECTOMY Bilateral     OK COLONOSCOPY FLX DX W/COLLJ SPEC WHEN PFRMD N/A 3/9/2018    Procedure: COLONOSCOPY;  Surgeon: Cynthia Wynn MD;  Location: [facility]; Service: Gastroenterology    OK COLONOSCOPY FLX DX W/COLLJ [facility] WHEN PFRMD N/A 2018    Procedure: COLONOSCOPY;  Surgeon: Barbara Starkey MD;  Location:  GI LAB; Service: Colorectal    OK ESOPHAGOGASTRODUODENOSCOPY TRANSORAL DIAGNOSTIC N/A 2018    Procedure: ESOPHAGOGASTRODUODENOSCOPY (EGD); Surgeon: Cynthia Wynn MD;  Location: [facility]; Service: Gastroenterology    OK LAP GASTRIC BYPASS/SOLEDAD-EN-Y N/A 2016    Procedure: BYPASS GASTRIC  SOLEDAD-EN-Y LAPAROSCOPIC;  Surgeon: Dee Crowe MD;  [facility];  Service: Bariatrics    OK LAP, SURG COLOSTOMY N/A 2018    Procedure: PERMANENT END COLOSTOMY;  Surgeon: Barbara Starkey MD;  [facility];  Service: Colorectal    OK LAP, SURG PROCTECTOMY W COLOSTOMY N/A 2018    Procedure: PROCTECTOMY;  Surgeon: Barbara Starkey MD;  [facility];  Service: Colorectal    TUBAL LIGATION         Family History   Adopted: Yes   Problem Relation Age of Onset    Leukemia Mother     Heart disease Father     Coronary artery disease Father     Diabetes Father     No Known Problems Sister     No Known Problems Brother     Diabetes Son     No Known Problems Brother     No Known Problems Brother     No Known Problems Sister     No Known Problems Sister      I have reviewed and agree with the history as documented      Social History     Tobacco Use    Smoking status: Former Smoker     Packs/day: 1 00     Years: 25 00     Pack years: 25 00     Last attempt to quit: 3/30/2006     Years since quittin 9    Smokeless tobacco: Never Used   Substance Use Topics    Alcohol use: Not Currently    Drug use: Not Currently     Types: Oxycodone     Comment: Percocet for lower back pain prn       Review of Systems   Constitutional: Negative  Negative for diaphoresis and fever  HENT: Negative  Respiratory: Negative  Negative for cough, shortness of breath and wheezing  Cardiovascular: Positive for palpitations  Negative for chest pain and leg swelling  Gastrointestinal: Negative for abdominal distention, abdominal pain, nausea and vomiting  Genitourinary: Negative  Musculoskeletal: Negative  Skin: Negative  Neurological: Negative  Psychiatric/Behavioral: Negative  All other systems reviewed and are negative  Physical Exam  Physical Exam   Constitutional: She is oriented to person, place, and time  She appears well-developed and well-nourished  No distress  HENT:   Head: Normocephalic and atraumatic  Eyes: Pupils are equal, round, and reactive to light  EOM are normal    Neck: Normal range of motion  Neck supple  Cardiovascular: Regular rhythm and normal heart sounds  No murmur heard  Regular tachycardia SVT    Pulmonary/Chest: Effort normal and breath sounds normal  No stridor  No respiratory distress  She has no wheezes  Abdominal: Soft  Bowel sounds are normal  She exhibits no distension  There is no tenderness  There is no rebound and no guarding  Musculoskeletal: Normal range of motion  Neurological: She is alert and oriented to person, place, and time  Skin: Skin is warm and dry  She is not diaphoretic  Psychiatric: She has a normal mood and affect  Vitals reviewed        Vital Signs  ED Triage Vitals   Temperature Pulse Respirations Blood Pressure SpO2   02/24/20 0903 02/24/20 0903 02/24/20 0903 02/24/20 0903 02/24/20 0903   98 8 °F (37 1 °C) (!) 138 22 169/81 100 %      Temp Source Heart Rate Source Patient Position - Orthostatic VS BP Location FiO2 (%)   02/24/20 5168 02/24/20 0903 02/24/20 0903 02/24/20 0903 --   Oral Monitor Lying Left arm       Pain Score       02/24/20 0901       No Pain           Vitals:    02/25/20 0323 02/25/20 0505 02/25/20 0835 02/25/20 1128   BP: 102/62 116/71 104/62 116/58   Pulse: 56 62 71 58   Patient Position - Orthostatic VS: Lying Lying Sitting Sitting         Visual Acuity      ED Medications  Medications   amiodarone (CORDARONE) 900 mg in dextrose 5 % 500 mL infusion (0 mg/min Intravenous Stopped 2/24/20 2201)   sodium chloride 0 9 % bolus 250 mL (0 mL Intravenous Stopped 2/24/20 0935)   adenosine (ADENOCARD) injection 6 mg (6 mg Intravenous Given 2/24/20 0928)   diltiazem (CARDIZEM) injection 15 mg (15 mg Intravenous Given 2/24/20 0953)   sodium chloride 0 9 % bolus 250 mL (0 mL Intravenous Stopped 2/24/20 1007)   adenosine (ADENOCARD) injection 12 mg (12 mg Intravenous Given 2/24/20 0931)   amiodarone 150 mg in dextrose 5 % 100 mL IV bolus (0 mg Intravenous Stopped 2/24/20 1222)   enoxaparin (LOVENOX) subcutaneous injection 120 mg (120 mg Subcutaneous Given 2/24/20 1111)       Diagnostic Studies  Results Reviewed     Procedure Component Value Units Date/Time    Basic metabolic panel [[id number]] Collected:  02/24/20 0956    Lab Status:  Final result Specimen:  Blood from Arm, Left Updated:  02/24/20 1028     Sodium 141 mmol/L      Potassium 4 2 mmol/L      Chloride 105 mmol/L      CO2 27 mmol/L      ANION GAP 9 mmol/L      BUN 16 mg/dL      Creatinine 0 74 mg/dL      Glucose 114 mg/dL      Calcium 9 3 mg/dL      eGFR 86 ml/min/1 73sq m     Narrative:       [place] guidelines for Chronic Kidney Disease (CKD):     Stage 1 with normal or high GFR (GFR > 90 mL/min/1 73 square meters)    Stage 2 Mild CKD (GFR = 60-89 mL/min/1 73 square meters)    Stage 3A Moderate CKD (GFR = 45-59 mL/min/1 73 square meters)    Stage 3B Moderate CKD (GFR = 30-44 mL/min/1 73 square meters)    Stage 4 Severe CKD (GFR = 15-29 mL/min/1 73 square meters)    Stage 5 End Stage CKD (GFR <15 mL/min/1 73 square meters)  Note: GFR calculation is accurate only with a steady state creatinine    NT-BNP PRO [[id number]]  (Abnormal) Collected:  02/24/20 0956    Lab Status:  Final result Specimen:  Blood from Arm, Left Updated:  02/24/20 1028     NT-proBNP 799 pg/mL     TSH, 3rd generation with Free T4 reflex [[id number]]  (Normal) Collected:  02/24/20 0956    Lab Status:  Final result Specimen:  Blood from Arm, Left Updated:  02/24/20 1028     TSH 3RD GENERATON 1 397 uIU/mL     Narrative:       Patients undergoing fluorescein dye angiography may retain small amounts of fluorescein in the body for 48-72 hours post procedure  Samples containing fluorescein can produce falsely depressed TSH values  If the patient had this procedure,a specimen should be resubmitted post fluorescein clearance        Troponin I [[id number]]  (Normal) Collected:  02/24/20 0956    Lab Status:  Final result Specimen:  Blood from Arm, Left Updated:  02/24/20 1025     Troponin I <0 02 ng/mL     Magnesium [[id number]]  (Normal) Collected:  02/24/20 0957    Lab Status:  Final result Specimen:  Blood from Arm, Left Updated:  02/24/20 1016     Magnesium 1 9 mg/dL     CBC and differential [[id number]]  (Abnormal) Collected:  02/24/20 0956    Lab Status:  Final result Specimen:  Blood from Arm, Left Updated:  02/24/20 1004     WBC 5 04 Thousand/uL      RBC 5 43 Million/uL      Hemoglobin 14 3 g/dL      Hematocrit 45 4 %      MCV 84 fL      MCH 26 3 pg      MCHC 31 5 g/dL      RDW 14 0 %      MPV 9 8 fL      Platelets 070 Thousands/uL      nRBC 0 /100 WBCs      Neutrophils Relative 68 %      Immat GRANS % 1 %      Lymphocytes Relative 20 %      Monocytes Relative 8 %      Eosinophils Relative 2 %      Basophils Relative 1 %      Neutrophils Absolute 3 45 Thousands/µL      Immature Grans Absolute 0 05 Thousand/uL      Lymphocytes Absolute 1 00 Thousands/µL      Monocytes Absolute 0 40 Thousand/µL      Eosinophils Absolute 0 11 Thousand/µL      Basophils Absolute 0 03 Thousands/µL                  XR chest 1 view portable   Final Result by Ladonna Adler MD (02/24 1036)      No acute cardiopulmonary disease  Workstation performed: [id number]                    Procedures  Procedures         ED Course  ED Course as of Feb 26 0944   Mon Feb 24, 2020   2815  initial adenosine given 6 mg with no effect  Repeat dose of 12 given which showed patient is underlying atrial flutter rhythm  Patient's heart rate still in the 140s to 150s  Will give her Cardizem  [id number] Spoke with Cardiology will be coming down to see the patient                                  MDM      Disposition  Final diagnoses:   Atrial fibrillation with rapid ventricular response ([address] )     Time reflects when diagnosis was documented in both MDM as applicable and the Disposition within this note     Time User Action Codes Description Comment    2/24/2020 10:59 AM Joce West Add [I48 [id number]] Atrial fibrillation with rapid ventricular response [facility])       ED Disposition     ED Disposition Condition Date/Time Comment    Admit Stable Mon Feb 24, 2020 10:59 AM Case was discussed with medicine and the patient's admission status was agreed to be Admission Status: inpatient status to the service of Dr Gunjan Murphy           Follow-up Information     Follow up With Specialties Details Why Contact Info Additional Obie Guzman 68, DO Cardiology, Multidisciplinary Schedule an appointment as soon as possible for a visit in 2 week(s) Hospital follow up PAF,  [facility]  [phone]       [id number] Final on 3/3/2020 At 11:15am w/ Dr Trevor Norton [facility]  [facility] [address] [address]  [address] [facility], [facility] [place], [place], [facility]          Discharge Medication List as of 2/25/2020  1:17 PM      START taking these medications    Details   !! amiodarone 200 mg tablet Take 1 tablet (200 mg total) by mouth 2 (two) times a day with meals for 14 days, Starting Tue 2/25/2020, Until Tue 3/10/2020, Normal      !! amiodarone 200 mg tablet Take 1 tablet (200 mg total) by mouth daily with breakfast, Starting Tue 2/25/2020, Normal      apixaban (ELIQUIS) 5 mg Take 1 tablet (5 mg total) by mouth 2 (two) times a day, Starting Tue 2/25/2020, Normal       !! - Potential duplicate medications found  Please discuss with provider  CONTINUE these medications which have NOT CHANGED    Details   anastrozole (ARIMIDEX) 1 mg tablet Take 1 tablet (1 mg total) by mouth daily, Starting Thu 12/12/2019, Normal      aspirin 81 MG tablet Take 1 tablet (81 mg total) by mouth daily, Starting Thu 9/5/2019, OTC      Calcium-Vitamin D 500-125 MG-UNIT TABS Take 1 tablet by mouth 2 (two) times a day  , Historical Med      ferrous sulfate 324 (65 Fe) mg Take 1 tablet (324 mg total) by mouth daily before breakfast, Starting Mon 11/5/2018, Normal      Multiple Vitamins-Minerals (BARIATRIC FUSION) CHEW Chew 1 tablet daily  , Historical Med      nystatin (MYCOSTATIN) powder APPLY TOPICALLY TO AFFECTED AREA(S) 4 TIMES DAILY AS DIRECTED, Normal      omeprazole (PriLOSEC) 20 mg delayed release capsule Take 1 capsule (20 mg total) by mouth daily, Starting Mon 7/1/2019, Normal      oxyCODONE (ROXICODONE) 10 MG TABS Take 10 mg by mouth every 6 (six) hours as needed  , Starting Sat 9/15/2018, Historical Med      rosuvastatin (CRESTOR) 5 mg tablet Take 1 tablet (5 mg total) by mouth daily, Starting Mon 2/3/2020, Normal           No discharge procedures on file      PDMP Review     None          ED Provider  Electronically Signed by           Kaia Quinn MD  02/26/20 0034

## 2020-02-25 ENCOUNTER — TRANSITIONAL CARE MANAGEMENT (OUTPATIENT)
Dept: FAMILY MEDICINE CLINIC | Facility: CLINIC | Age: 65
End: 2020-02-25

## 2020-02-25 VITALS
DIASTOLIC BLOOD PRESSURE: 58 MMHG | WEIGHT: 260.14 LBS | HEART RATE: 58 BPM | BODY MASS INDEX: 43.34 KG/M2 | OXYGEN SATURATION: 91 % | TEMPERATURE: 98.2 F | RESPIRATION RATE: 18 BRPM | SYSTOLIC BLOOD PRESSURE: 116 MMHG | HEIGHT: 65 IN

## 2020-02-25 LAB
ANION GAP SERPL CALCULATED.3IONS-SCNC: 5 MMOL/L (ref 4–13)
BASOPHILS # BLD AUTO: 0.03 THOUSANDS/ΜL (ref 0–0.1)
BASOPHILS NFR BLD AUTO: 1 % (ref 0–1)
BUN SERPL-MCNC: 17 MG/DL (ref 5–25)
CALCIUM SERPL-MCNC: 8.7 MG/DL (ref 8.3–10.1)
CHLORIDE SERPL-SCNC: 104 MMOL/L (ref 100–108)
CO2 SERPL-SCNC: 28 MMOL/L (ref 21–32)
CREAT SERPL-MCNC: 0.73 MG/DL (ref 0.6–1.3)
EOSINOPHIL # BLD AUTO: 0.14 THOUSAND/ΜL (ref 0–0.61)
EOSINOPHIL NFR BLD AUTO: 3 % (ref 0–6)
ERYTHROCYTE [DISTWIDTH] IN BLOOD BY AUTOMATED COUNT: 14 % (ref 11.6–15.1)
GFR SERPL CREATININE-BSD FRML MDRD: 87 ML/MIN/1.73SQ M
GLUCOSE SERPL-MCNC: 86 MG/DL (ref 65–140)
HCT VFR BLD AUTO: 40.1 % (ref 34.8–46.1)
HGB BLD-MCNC: 12.6 G/DL (ref 11.5–15.4)
IMM GRANULOCYTES # BLD AUTO: 0.06 THOUSAND/UL (ref 0–0.2)
IMM GRANULOCYTES NFR BLD AUTO: 1 % (ref 0–2)
LYMPHOCYTES # BLD AUTO: 0.9 THOUSANDS/ΜL (ref 0.6–4.47)
LYMPHOCYTES NFR BLD AUTO: 21 % (ref 14–44)
MAGNESIUM SERPL-MCNC: 2.1 MG/DL (ref 1.6–2.6)
MCH RBC QN AUTO: 26.5 PG (ref 26.8–34.3)
MCHC RBC AUTO-ENTMCNC: 31.4 G/DL (ref 31.4–37.4)
MCV RBC AUTO: 84 FL (ref 82–98)
MONOCYTES # BLD AUTO: 0.49 THOUSAND/ΜL (ref 0.17–1.22)
MONOCYTES NFR BLD AUTO: 12 % (ref 4–12)
NEUTROPHILS # BLD AUTO: 2.65 THOUSANDS/ΜL (ref 1.85–7.62)
NEUTS SEG NFR BLD AUTO: 62 % (ref 43–75)
NRBC BLD AUTO-RTO: 0 /100 WBCS
PHOSPHATE SERPL-MCNC: 4.3 MG/DL (ref 2.3–4.1)
PLATELET # BLD AUTO: 144 THOUSANDS/UL (ref 149–390)
PMV BLD AUTO: 9.1 FL (ref 8.9–12.7)
POTASSIUM SERPL-SCNC: 3.9 MMOL/L (ref 3.5–5.3)
RBC # BLD AUTO: 4.76 MILLION/UL (ref 3.81–5.12)
SODIUM SERPL-SCNC: 137 MMOL/L (ref 136–145)
WBC # BLD AUTO: 4.27 THOUSAND/UL (ref 4.31–10.16)

## 2020-02-25 PROCEDURE — 99238 HOSP IP/OBS DSCHRG MGMT 30/<: CPT | Performed by: FAMILY MEDICINE

## 2020-02-25 PROCEDURE — 83735 ASSAY OF MAGNESIUM: CPT | Performed by: STUDENT IN AN ORGANIZED HEALTH CARE EDUCATION/TRAINING PROGRAM

## 2020-02-25 PROCEDURE — 80048 BASIC METABOLIC PNL TOTAL CA: CPT | Performed by: STUDENT IN AN ORGANIZED HEALTH CARE EDUCATION/TRAINING PROGRAM

## 2020-02-25 PROCEDURE — 99232 SBSQ HOSP IP/OBS MODERATE 35: CPT | Performed by: INTERNAL MEDICINE

## 2020-02-25 PROCEDURE — 93306 TTE W/DOPPLER COMPLETE: CPT | Performed by: INTERNAL MEDICINE

## 2020-02-25 PROCEDURE — 84100 ASSAY OF PHOSPHORUS: CPT | Performed by: STUDENT IN AN ORGANIZED HEALTH CARE EDUCATION/TRAINING PROGRAM

## 2020-02-25 PROCEDURE — 85025 COMPLETE CBC W/AUTO DIFF WBC: CPT | Performed by: STUDENT IN AN ORGANIZED HEALTH CARE EDUCATION/TRAINING PROGRAM

## 2020-02-25 RX ORDER — AMIODARONE HYDROCHLORIDE 200 MG/1
200 TABLET ORAL
Qty: 90 EACH | Refills: 3 | Status: SHIPPED | OUTPATIENT
Start: 2020-02-25 | End: 2020-03-11 | Stop reason: SDUPTHER

## 2020-02-25 RX ORDER — AMIODARONE HYDROCHLORIDE 200 MG/1
200 TABLET ORAL 2 TIMES DAILY WITH MEALS
Status: DISCONTINUED | OUTPATIENT
Start: 2020-02-25 | End: 2020-02-25 | Stop reason: HOSPADM

## 2020-02-25 RX ORDER — AMIODARONE HYDROCHLORIDE 200 MG/1
200 TABLET ORAL
Status: DISCONTINUED | OUTPATIENT
Start: 2020-03-11 | End: 2020-02-25 | Stop reason: HOSPADM

## 2020-02-25 RX ORDER — AMIODARONE HYDROCHLORIDE 200 MG/1
200 TABLET ORAL 2 TIMES DAILY WITH MEALS
Qty: 28 TABLET | Refills: 0 | Status: SHIPPED | OUTPATIENT
Start: 2020-02-25 | End: 2020-02-28 | Stop reason: SDUPTHER

## 2020-02-25 RX ADMIN — APIXABAN 5 MG: 5 TABLET, FILM COATED ORAL at 12:38

## 2020-02-25 RX ADMIN — NYSTATIN: 100000 POWDER TOPICAL at 08:55

## 2020-02-25 RX ADMIN — ANASTROZOLE 1 MG: 1 TABLET, COATED ORAL at 08:52

## 2020-02-25 RX ADMIN — PANTOPRAZOLE SODIUM 40 MG: 40 TABLET, DELAYED RELEASE ORAL at 05:19

## 2020-02-25 RX ADMIN — MULTIVITAMIN 15 ML: LIQUID ORAL at 08:53

## 2020-02-25 RX ADMIN — FERROUS SULFATE TAB 325 MG (65 MG ELEMENTAL FE) 325 MG: 325 (65 FE) TAB at 08:51

## 2020-02-25 RX ADMIN — ENOXAPARIN SODIUM 120 MG: 120 INJECTION SUBCUTANEOUS at 08:55

## 2020-02-25 RX ADMIN — CALCIUM 1 TABLET: 500 TABLET ORAL at 08:51

## 2020-02-25 NOTE — DISCHARGE INSTRUCTIONS
Amiodarone: You got 2 different prescriptions  One is for 2 tabs daily for the next 14 days  The other is for 200 mg daily, which you will start after you complete the 14 day prescription  A-fib (Atrial Fibrillation)   WHAT YOU NEED TO KNOW:   A-fib may come and go, or it may be a long-term condition  A-fib can cause blood clots, stroke, or heart failure  These conditions may become life-threatening  It is important to treat and manage a-fib to help prevent a blood clot, stroke, or heart failure  DISCHARGE INSTRUCTIONS:   Call 911 for any of the following:   · You have any of the following signs of a heart attack:      ¨ Squeezing, pressure, or pain in your chest that lasts longer than 5 minutes or returns    ¨ Discomfort or pain in your back, neck, jaw, stomach, or arm     ¨ Trouble breathing    ¨ Nausea or vomiting    ¨ Lightheadedness or a sudden cold sweat, especially with chest pain or trouble breathing    · You have any of the following signs of a stroke:      ¨ Numbness or drooping on one side of your face     ¨ Weakness in an arm or leg    ¨ Confusion or difficulty speaking    ¨ Dizziness, a severe headache, or vision loss  Seek care immediately if:  You have any of the following signs of a blood clot:  · You feel lightheaded, are short of breath, and have chest pain  · You cough up blood  · You have swelling, redness, pain, or warmth in your arm or leg  Contact your cardiologist or healthcare provider if:   · Your heart rate is higher than your healthcare provider said it should be  · You have new or worsening swelling in your legs, feet, ankles, or abdomen  · You are short of breath, even at rest      · You have questions or concerns about your condition or care  Medicines: You may need any of the following:  · Heart medicines  help control your heart rate and rhythm  You may need more than one medicine to treat your symptoms  · Blood thinners    help prevent blood clots  Examples of blood thinners include heparin and warfarin  Clots can cause strokes, heart attacks, and death  The following are general safety guidelines to follow while you are taking a blood thinner:    ¨ Watch for bleeding and bruising while you take blood thinners  Watch for bleeding from your gums or nose  Watch for blood in your urine and bowel movements  Use a soft washcloth on your skin, and a soft toothbrush to brush your teeth  This can keep your skin and gums from bleeding  If you shave, use an electric shaver  Do not play contact sports  ¨ Tell your dentist and other healthcare providers that you take anticoagulants  Wear a bracelet or necklace that says you take this medicine  ¨ Do not start or stop any medicines unless your healthcare provider tells you to  Many medicines cannot be used with blood thinners  ¨ Tell your healthcare provider right away if you forget to take the medicine, or if you take too much  ¨ Warfarin  is a blood thinner that you may need to take  The following are things you should be aware of if you take warfarin  § Foods and medicines can affect the amount of warfarin in your blood  Do not make major changes to your diet while you take warfarin  Warfarin works best when you eat about the same amount of vitamin K every day  Vitamin K is found in green leafy vegetables and certain other foods  Ask for more information about what to eat when you are taking warfarin  § You will need to see your healthcare provider for follow-up visits when you are on warfarin  You will need regular blood tests  These tests are used to decide how much medicine you need  · Antiplatelets , such as aspirin, help prevent blood clots  Take your antiplatelet medicine exactly as directed  These medicines make it more likely for you to bleed or bruise  If you are told to take aspirin, do not take acetaminophen or ibuprofen instead  · Take your medicine as directed    Contact your healthcare provider if you think your medicine is not helping or if you have side effects  Tell him or her if you are allergic to any medicine  Keep a list of the medicines, vitamins, and herbs you take  Include the amounts, and when and why you take them  Bring the list or the pill bottles to follow-up visits  Carry your medicine list with you in case of an emergency  Follow up with your cardiologist as directed: You will need regular blood tests and monitoring  Write down your questions so you remember to ask them during your visits  Manage A-fib:   · Know your target heart rate  Learn how to take your pulse and monitor your heart rate  · Manage other health conditions  This includes high blood pressure, sleep apnea, thyroid disease, diabetes, and other heart conditions  Take medicine as directed and follow your treatment plan  · Limit or do not drink alcohol  Alcohol can make a-fib hard to manage  Ask your healthcare provider if it is safe for you to drink alcohol  A drink of alcohol is 12 ounces of beer, 5 ounces of wine, or 1½ ounces of liquor  · Do not smoke  Nicotine and other chemicals in cigarettes and cigars can cause heart and lung damage  Ask your healthcare provider for information if you currently smoke and need help to quit  E-cigarettes or smokeless tobacco still contain nicotine  Talk to your healthcare provider before you use these products  · Eat heart-healthy foods  Heart healthy foods will help keep your cholesterol low  These include fruits, vegetables, whole-grain breads, low-fat dairy products, beans, lean meats, and fish  Replace butter and margarine with heart-healthy oils such as olive oil and canola oil  · Maintain a healthy weight  Ask your healthcare provider how much you should weigh  Ask him to help you create a weight loss plan if you are overweight  · Exercise for 30 minutes  most days of the week   Ask your healthcare provider about the best exercise plan for you  © 2017 [facility] Information is for End User's use only and may not be sold, redistributed or otherwise used for commercial purposes  All illustrations and images included in CareNotes® are the copyrighted property of A D A M [employer] Inc  or [employer]  The above information is an  only  It is not intended as medical advice for individual conditions or treatments  Talk to your doctor, nurse or pharmacist before following any medical regimen to see if it is safe and effective for you

## 2020-02-25 NOTE — PROGRESS NOTES
Progress Note - Cardiology   [facility]     Katharine Abbasi 59 y o  female MRN: [id number]  : 1955  Unit/Bed#: [address] [place] [address] Encounter: [id number]    Assessment and Plan:   1  New onset atrial fibrillation with rapid ventricular response:  Patient converted in the emergency room after receiving adenosine  Admitted for amiodarone load  Recommend amiodarone 200 mg twice a day for 14 days and then 200 mg daily thereafter  CHADS2 Vasc score = 5 or 7 2% chance of stroke per year  Recommend Eliquis 5 mg b i d   Patient follows with Dr Cary Downing  Recommend following up with him regarding her severe aortic stenosis and paroxysmal atrial fibrillation  2  Severe aortic stenosis with moderate mitral valve stenosis:  Patient to follow-up with primary cardiologist for further evaluation  3  History of rectal an ovarian cancer    Subjective / Objective:   Patient seen and examined  Patient had overall has been maintaining sinus rhythm, bout of 8 beats of AFib noted last evening  No complaints of chest discomfort  Patient on IV amiodarone which load should complete at 4:00 p m  Milka Robles Patient upset wanting to go home  Discussed with Dr William Hawkins, will transition patient to oral amiodarone  Vitals: Blood pressure 104/62, pulse 71, temperature 99 °F (37 2 °C), temperature source Oral, resp  rate 18, height 5' 5" (1 651 m), weight 118 kg (260 lb 2 3 oz), SpO2 94 %, not currently breastfeeding  Vitals:    20 0901   Weight: 118 kg (260 lb 2 3 oz)     Body mass index is 43 29 kg/m²  BP Readings from Last 3 Encounters:   20 104/62   20 132/72   20 132/80     Orthostatic Blood Pressures      Most Recent Value   Blood Pressure  104/62 filed at 2020 0835   Patient Position - Orthostatic VS  Sitting filed at 2020 0835        I/O       701 -  07 -  07 -  0700    P  O   400     I V  (mL/kg)  10 (0 1)     IV Piggyback 500     Total Intake(mL/kg)  910 (7 7)     Urine (mL/kg/hr)  800     Total Output  800     Net  +110                Invasive Devices     Peripheral Intravenous Line            Peripheral IV 02/24/20 Left Antecubital 1 day          Drain            Colostomy Descending/sigmoid  days                  Intake/Output Summary (Last 24 hours) at 2/25/2020 1045  Last data filed at 2/25/2020 0607  Gross per 24 hour   Intake 410 ml   Output 800 ml   Net -390 ml         Physical Exam:   Physical Exam   Constitutional: She is oriented to person, place, and time  She appears well-developed and well-nourished  No distress  HENT:   Head: Normocephalic  Right Ear: External ear normal    Left Ear: External ear normal    Eyes: Pupils are equal, round, and reactive to light  Conjunctivae are normal  Right eye exhibits no discharge  Left eye exhibits no discharge  No scleral icterus  Neck: Normal range of motion  Neck supple  No JVD present  No thyromegaly present  Cardiovascular: Normal rate, regular rhythm, intact distal pulses and normal pulses  No extrasystoles are present  Murmur heard  Systolic murmur is present with a grade of 2/6  Holosystolic   Pulmonary/Chest: Effort normal and breath sounds normal  No respiratory distress  She has no wheezes  She has no rales  Abdominal: Soft  Bowel sounds are normal  She exhibits no distension and no mass  Musculoskeletal: She exhibits no edema  Neurological: She is alert and oriented to person, place, and time  Skin: Skin is warm and dry  Capillary refill takes less than 2 seconds  She is not diaphoretic  Psychiatric: She has a normal mood and affect  Her behavior is normal  Judgment and thought content normal    Nursing note and vitals reviewed                Medications/ Allergies:     Current Facility-Administered Medications:  acetaminophen 650 mg Oral Q6H PRN Slime Gaines DO   amiodarone 200 mg Oral BID With Meals GEORGETTE Seo   Followed by [START ON 3/11/2020] amiodarone 200 mg Oral Daily With Breakfast GEORGETTE Petty   anastrozole 1 mg Oral Daily Jenny Rich, DO   apixaban 5 mg Oral BID GEORGETTE Petty   calcium carbonate 1 tablet Oral Daily With Breakfast Jenny Rich, DO   ferrous sulfate 325 mg Oral Daily With Breakfast Jenny Rich, DO   multivitamin with iron-minerals 15 mL Oral Daily Jenny Rich, DO   nystatin  Topical TID Jenny Rich, DO   pantoprazole 40 mg Oral Early Morning Jenny Rich, DO   pravastatin 40 mg Oral Daily With Justine Severance, DO       acetaminophen 650 mg Q6H PRN     Allergies   Allergen Reactions    Tramadol Other (See Comments)     Dizzy         VTE Pharmacologic Prophylaxis:   Sequential compression device (Venodyne)     Labs:   Troponins:  Results from last 7 days   Lab Units 02/24/20  0956   TROPONIN I ng/mL <0 02     CBC with diff:  Results from last 7 days   Lab Units 02/25/20  0621 02/24/20  0956   WBC Thousand/uL 4 27* 5 04   HEMOGLOBIN g/dL 12 6 14 3   HEMATOCRIT % 40 1 45 4   MCV fL 84 84   PLATELETS Thousands/uL 144* 162   MCH pg 26 5* 26 3*   MCHC g/dL 31 4 31 5   RDW % 14 0 14 0   MPV fL 9 1 9 8   NRBC AUTO /100 WBCs 0 0     CMP:  Results from last 7 days   Lab Units 02/25/20  0621 02/24/20  0956   SODIUM mmol/L 137 141   POTASSIUM mmol/L 3 9 4 2   CHLORIDE mmol/L 104 105   CO2 mmol/L 28 27   ANION GAP mmol/L 5 9   BUN mg/dL 17 16   CREATININE mg/dL 0 73 0 74   CALCIUM mg/dL 8 7 9 3   EGFR ml/min/1 73sq m 87 86     Magnesium:  Results from last 7 days   Lab Units 02/25/20  0621 02/24/20  0957   MAGNESIUM mg/dL 2 1 1 9     TSH:  Results from last 7 days   Lab Units 02/24/20  0956   TSH 3RD GENERATON uIU/mL 1 397     NT-proBNP:   Recent Labs     02/24/20  0956   NTBNP 799*        Imaging & Testing   I have personally reviewed pertinent reports  Xr Chest 1 View Portable    Result Date: 2/24/2020  Narrative: CHEST INDICATION:   palpitations  COMPARISON:  Chest radiograph from 10/13/2019 and chest CT from 2020  EXAM PERFORMED/VIEWS:  XR CHEST PORTABLE FINDINGS: Widened mediastinum due to rotation and portable supine projection  The lungs are clear  No pneumothorax or pleural effusion  Osseous structures appear within normal limits for patient age  Impression: No acute cardiopulmonary disease  Workstation performed: [id number]        EKG / Monitor: Personally reviewed  Sinus rhythm    Cardiac testing:   Results for orders placed during the hospital encounter of 20   Echo complete with contrast if indicated    Nicholas Truong 39  [address] [facility]  [phone]    Transthoracic Echocardiogram  2D, M-mode, Doppler, and Color Doppler    Study date:  2020    Patient: Angelica Aldridge  MR number: [id number]  Account number: [de-identified]  : 1955  Age: 59 years  Gender: Female  Status: Inpatient  Location: Bedside  Height: 65 in  Weight: 259 4 lb  BP: 132/ 83 mmHg    Indications: AS, MS    Diagnoses: 394 9 - MITRAL VALVE DIS NEC/NOS, 424 1 - AORTIC VALVE DISORDER    Sonographer:  MAEVE Navarro  Primary Physician:  Jelly Palomo MD  Referring Physician:  Rolo Packer MD  Group:  [facility]  Interpreting Physician:  Rolo Packer MD    SUMMARY    SUMMARY:  Compared to prior echo, LVEF remains normal  aortic valve velocities were more prominent and aortic valve stenosis severe  mitral stenosis is mild  LEFT VENTRICLE:  Systolic function was normal  Ejection fraction was estimated in the range of 55 % to 60 % to be 55 %  There were no regional wall motion abnormalities  Wall thickness was mildly to moderately increased measuring 1 3cm at lateral and septal walls  LEFT ATRIUM:  The atrium was moderately dilated  RIGHT ATRIUM:  The atrium was mildly dilated  MITRAL VALVE:  There was moderate to marked annular calcification    There was mild stenosis with mean gradient near 4-5mmHg    AORTIC VALVE:  Leaflets exhibited moderately increased thickness, marked calcification, markedly reduced cuspal separation, and sclerosis  Transaortic velocity was increased due to valvular stenosis  There was severe stenosis by Doppler measurements at right sternal border    HISTORY: PRIOR HISTORY: Obesity, Gastric Bypass, Rectal Cancer, Ovarian Cancer    PROCEDURE: The procedure was performed at the bedside  This was a routine study  The transthoracic approach was used  The study included complete 2D imaging, M-mode, complete spectral Doppler, and color Doppler  The heart rate was 72 bpm,  at the start of the study  Echocardiographic views were limited due to poor patient compliance and poor acoustic window availability  This was a technically difficult study  LEFT VENTRICLE: Size was normal  Systolic function was normal  Ejection fraction was estimated in the range of 55 % to 60 % to be 55 %  There were no regional wall motion abnormalities  Wall thickness was mildly to moderately increased  measuring 1 3cm at lateral and septal walls  No evidence of apical thrombus  DOPPLER: The study was not technically sufficient to allow evaluation of LV diastolic function  RIGHT VENTRICLE: The size was normal  Systolic function was normal  Wall thickness was normal     LEFT ATRIUM: The atrium was moderately dilated  RIGHT ATRIUM: The atrium was mildly dilated  MITRAL VALVE: There was moderate to marked annular calcification  There was mildly reduced leaflet separation  DOPPLER: Transmitral velocity was minimally increased  There was mild stenosis with mean gradient near 4-5mmHg There was no  significant regurgitation  AORTIC VALVE: Leaflets exhibited moderately increased thickness, marked calcification, markedly reduced cuspal separation, and sclerosis  DOPPLER: Transaortic velocity was increased due to valvular stenosis   There was severe stenosis by  Doppler measurements at right sternal border There was no significant regurgitation  TRICUSPID VALVE: The valve structure was normal  There was normal leaflet separation  DOPPLER: The transtricuspid velocity was within the normal range  There was no evidence for stenosis  There was no significant regurgitation  PULMONIC VALVE: Leaflets exhibited normal thickness, no calcification, and normal cuspal separation  DOPPLER: The transpulmonic velocity was within the normal range  There was no significant regurgitation  PERICARDIUM: There was no pericardial effusion  The pericardium was normal in appearance  AORTA: The root exhibited normal size  SYSTEMIC VEINS: IVC: The inferior vena cava was normal in size  MEASUREMENT TABLES    DOPPLER MEASUREMENTS  Aortic valve   (Reference normals)  Peak noman   457 cm/s   (--)  Peak gradient   84 mmHg   (--)  Valve area   0 71 cmï¾²   (--)    SYSTEM MEASUREMENT TABLES    2D mode  AoR Diam 2D: 3 4 cm  LA Diam (2D): 4 cm  LA/Ao (2D): 1 18  FS (2D Teich): 27 2 %  IVSd (2D): 1 29 cm  LVDEV: 108 cmï¾³  LVESV: 50 9 cmï¾³  LVIDd(2D): 4 81 cm  LVISd (2D): 3 5 cm  LVOT Area 2D: 3 14 cmï¾²  LVPWd (2D): 1 33 cm  SV (Teich): 57 1 cmï¾³    Unspecified Scan Mode  CHARBEL Cont Eq (Peak Noman): 0 76 cmï¾²  CHARBEL Cont Eq (VTI): 0 76 cmï¾²  LVOT (VTI): 24 3 cm  LVOT Diam : 2 cm  LVOT Vmax: 1030 mm/s  LVOT Vmax; Mean: 1030 mm/s  Peak Grad ; Mean: 4 mm[Hg]  SV (LVOT): 76 cmï¾³  VTI;Mean: 2 mm[Hg]  CHARBEL Cont Eq (VTI): 1 38 cmï¾²  MV Peak A Noman: 1410 mm/s  MV Peak E Noman  Mean: 1190 mm/s  MVA (PHT): 1 63 cmï¾²  PHT: 132 ms  Max P mm[Hg]  V Max: 2570 mm/s  Vmax: 2570 mm/s  RA Area: 18 2 cmï¾²  RA Volume: 51 8 cmï¾³  TAPSE: 3 3 cm    [employer] Accredited Echocardiography Laboratory    Prepared and electronically signed by    Iris Guillen MD  Signed [id number] 10:29:59         GEORGETTE Seo        "This note has been constructed using a voice recognition system  Therefore there may be syntax, spelling, and/or grammatical errors   Please call if you have any questions  "

## 2020-02-25 NOTE — PLAN OF CARE
Problem: Potential for Falls  Goal: Patient will remain free of falls  Description  INTERVENTIONS:  - Assess patient frequently for physical needs  -  Identify cognitive and physical deficits and behaviors that affect risk of falls  -  [place] fall precautions as indicated by assessment   - Educate patient/family on patient safety including physical limitations  - Instruct patient to call for assistance with activity based on assessment  - Modify environment to reduce risk of injury     Outcome: Progressing     Problem: DISCHARGE PLANNING  Goal: Discharge to home or other facility with appropriate resources  Description  INTERVENTIONS:  - Identify barriers to discharge w/patient and caregiver  - Arrange for needed discharge resources and transportation as appropriate  - Identify discharge learning needs (meds, wound care, etc )  - Arrange for interpretive services to assist at discharge as needed     Outcome: Progressing     Problem: Knowledge Deficit  Goal: Patient/family/caregiver demonstrates understanding of disease process, treatment plan, medications, and discharge instructions  Description  Complete learning assessment and assess knowledge base    Interventions:  - Provide teaching at level of understanding  - Provide teaching via preferred learning methods  Outcome: Progressing     Problem: CARDIOVASCULAR - ADULT  Goal: Maintains optimal cardiac output and hemodynamic stability  Description  INTERVENTIONS:  - Monitor I/O, vital signs and rhythm  - Monitor for S/S and trends of decreased cardiac output  - Assess quality of pulses, skin color and temperature  - Assess for signs of decreased coronary artery perfusion  - Instruct patient to report change in severity of symptoms   Outcome: Progressing  Goal: Absence of cardiac dysrhythmias or at baseline rhythm  Description  INTERVENTIONS:  - Continuous cardiac monitoring, vital signs, obtain 12 lead EKG if ordered  - Administer antiarrhythmic and heart rate control medications as ordered  - Monitor electrolytes and administer replacement therapy as ordered  Outcome: Progressing     Problem: GASTROINTESTINAL - ADULT  Goal: Establish and maintain optimal ostomy function  Description  INTERVENTIONS:  - Assess bowel function  - Encourage oral fluids to ensure adequate hydration  - Administer IV fluids if ordered to ensure adequate hydration   - Encourage mobilization and activity  - Assess stoma site     Outcome: Progressing

## 2020-02-26 LAB
ATRIAL RATE: 150 BPM
ATRIAL RATE: 250 BPM
MRSA NOSE QL CULT: NORMAL
QRS AXIS: -24 DEGREES
QRS AXIS: -27 DEGREES
QRSD INTERVAL: 84 MS
QRSD INTERVAL: 88 MS
QT INTERVAL: 278 MS
QT INTERVAL: 332 MS
QTC INTERVAL: 417 MS
QTC INTERVAL: 431 MS
T WAVE AXIS: 38 DEGREES
T WAVE AXIS: 92 DEGREES
VENTRICULAR RATE: 145 BPM
VENTRICULAR RATE: 95 BPM

## 2020-02-26 PROCEDURE — 93010 ELECTROCARDIOGRAM REPORT: CPT | Performed by: INTERNAL MEDICINE

## 2020-02-26 NOTE — DISCHARGE SUMMARY
[facility] Discharge Summary - Medical Lisa Lopez 59 y o  female MRN: [id number]    [place] [address]  Room / Bed: [facility] Encounter: [id number]    BRIEF OVERVIEW  Admitting Provider: Jas Dc MD  Discharge Provider: Jas Dc MD    Discharge To: Home    Outpatient Follow-Up:   1  Vilma Gonzales, [address] [place] [address] Cardiology, Thursday Feb 27, 2020 3:00 PM  (Assume this is Dr Jaydon Bailey office, but double check)  2  DO James Rizvi CFP, March 3, 2020 11:15 a m    3  Schedule appointment with Dr Emily Velarde, cardiology    Things to address at first follow up visit:   1  Ask about palpitation, rapid heartbeat, suspected episodes of Afib  2  Compliance with Eliquis (5 mg daily) and amiodarone (200 mg bid x 14 days followed by 200 mg daily)  3  Follow-up with cardiology about aortic valve replacement    Labs and results pending at discharge:  None    Admission Date: 2/24/2020     Discharge Date: 2/25/2020  2:00 PM    Primary Discharge Diagnosis  Principal Problem:    Atrial fibrillation with rapid ventricular response (HCC)  Active Problems:    Severe aortic stenosis by prior echocardiogram    Morbid obesity due to excess calories (HCC)    S/P gastric bypass    History of rectal cancer    History of ovarian cancer    Candidal intertrigo    Moderate mitral stenosis    Hyperlipidemia  Resolved Problems:    * No resolved hospital problems   *      Secondary Discharge Diagnoses  None  Consulting Providers   Cardiology - Dr Juan Pablo Duvall    Procedures Performed/Pertinent Test results  Results for orders placed or performed during the hospital encounter of 02/24/20   MRSA culture   Result Value Ref Range    MRSA Culture Only       No Methicillin Resistant Staphlyococcus aureus (MRSA) isolated   CBC and differential   Result Value Ref Range    WBC 5 04 4 31 - 10 16 Thousand/uL    RBC 5 43 (H) 3 81 - 5 12 Million/uL    Hemoglobin 14 3 11 5 - 15 4 g/dL Hematocrit 45 4 34 8 - 46 1 %    MCV 84 82 - 98 fL    MCH 26 3 (L) 26 8 - 34 3 pg    MCHC 31 5 31 4 - 37 4 g/dL    RDW 14 0 11 6 - 15 1 %    MPV 9 8 8 9 - 12 7 fL    Platelets 974 913 - 541 Thousands/uL    nRBC 0 /100 WBCs    Neutrophils Relative 68 43 - 75 %    Immat GRANS % 1 0 - 2 %    Lymphocytes Relative 20 14 - 44 %    Monocytes Relative 8 4 - 12 %    Eosinophils Relative 2 0 - 6 %    Basophils Relative 1 0 - 1 %    Neutrophils Absolute 3 45 1 85 - 7 62 Thousands/µL    Immature Grans Absolute 0 05 0 00 - 0 20 Thousand/uL    Lymphocytes Absolute 1 00 0 60 - 4 47 Thousands/µL    Monocytes Absolute 0 40 0 17 - 1 22 Thousand/µL    Eosinophils Absolute 0 11 0 00 - 0 61 Thousand/µL    Basophils Absolute 0 03 0 00 - 0 10 Thousands/µL   Basic metabolic panel   Result Value Ref Range    Sodium 141 136 - 145 mmol/L    Potassium 4 2 3 5 - 5 3 mmol/L    Chloride 105 100 - 108 mmol/L    CO2 27 21 - 32 mmol/L    ANION GAP 9 4 - 13 mmol/L    BUN 16 5 - 25 mg/dL    Creatinine 0 74 0 60 - 1 30 mg/dL    Glucose 114 65 - 140 mg/dL    Calcium 9 3 8 3 - 10 1 mg/dL    eGFR 86 ml/min/1 73sq m   Troponin I   Result Value Ref Range    Troponin I <0 02 <=0 04 ng/mL   NT-BNP PRO   Result Value Ref Range    NT-proBNP 799 (H) <125 pg/mL   TSH, 3rd generation with Free T4 reflex   Result Value Ref Range    TSH 3RD GENERATON 1 397 0 358 - 3 740 uIU/mL   Magnesium   Result Value Ref Range    Magnesium 1 9 1 6 - 2 6 mg/dL   Basic metabolic panel   Result Value Ref Range    Sodium 137 136 - 145 mmol/L    Potassium 3 9 3 5 - 5 3 mmol/L    Chloride 104 100 - 108 mmol/L    CO2 28 21 - 32 mmol/L    ANION GAP 5 4 - 13 mmol/L    BUN 17 5 - 25 mg/dL    Creatinine 0 73 0 60 - 1 30 mg/dL    Glucose 86 65 - 140 mg/dL    Calcium 8 7 8 3 - 10 1 mg/dL    eGFR 87 ml/min/1 73sq m   Magnesium   Result Value Ref Range    Magnesium 2 1 1 6 - 2 6 mg/dL   Phosphorus   Result Value Ref Range    Phosphorus 4 3 (H) 2 3 - 4 1 mg/dL   CBC and differential   Result Value Ref Range    WBC 4 27 (L) 4 31 - 10 16 Thousand/uL    RBC 4 76 3 81 - 5 12 Million/uL    Hemoglobin 12 6 11 5 - 15 4 g/dL    Hematocrit 40 1 34 8 - 46 1 %    MCV 84 82 - 98 fL    MCH 26 5 (L) 26 8 - 34 3 pg    MCHC 31 4 31 4 - 37 4 g/dL    RDW 14 0 11 6 - 15 1 %    MPV 9 1 8 9 - 12 7 fL    Platelets 204 (L) 954 - 390 Thousands/uL    nRBC 0 /100 WBCs    Neutrophils Relative 62 43 - 75 %    Immat GRANS % 1 0 - 2 %    Lymphocytes Relative 21 14 - 44 %    Monocytes Relative 12 4 - 12 %    Eosinophils Relative 3 0 - 6 %    Basophils Relative 1 0 - 1 %    Neutrophils Absolute 2 65 1 85 - 7 62 Thousands/µL    Immature Grans Absolute 0 06 0 00 - 0 20 Thousand/uL    Lymphocytes Absolute 0 90 0 60 - 4 47 Thousands/µL    Monocytes Absolute 0 49 0 17 - 1 22 Thousand/µL    Eosinophils Absolute 0 14 0 00 - 0 61 Thousand/µL    Basophils Absolute 0 03 0 00 - 0 10 Thousands/µL   ECG 12 lead   Result Value Ref Range    Ventricular Rate 145 BPM    Atrial Rate 150 BPM    NJ Interval  ms    QRSD Interval 84 ms    QT Interval 278 ms    QTC Interval 431 ms    P Axis  degrees    QRS Axis -24 degrees    T Wave Axis 92 degrees   ECG 12 lead   Result Value Ref Range    Ventricular Rate 95 BPM    Atrial Rate 250 BPM    NJ Interval  ms    QRSD Interval 88 ms    QT Interval 332 ms    QTC Interval 417 ms    P Axis  degrees    QRS Axis -27 degrees    T Wave Axis 38 degrees     XR chest 1 view portable   Final Result      No acute cardiopulmonary disease              Workstation performed: [id number]              HPI  72-year-old female with past medical history of new onset atrial fibrillation, severe aortic stenosis and moderate mitral stenosis, rectal and ovarian cancer status post proctectomy; permanent end colostomy; laparoscopic hand assist abdominoperineal resection, posterior vaginectomy, omentectomy, radical total abdominal hysterectomy on 9/6/2018, obesity status post Juan-en-Y 07/18/2016 presents to the ED with rapid heartbeat/heart palpitations  She reports sitting in the car when she being as in his her heart beating fast   She reports she had cup of coffee that she thought may have been regular instead of her normal decaf  She states symptoms do not resolve so she subsequently presented to the emergency department  Denies any chest pain, shortness of breath, nausea, vomiting, diarrhea, numbness, tingling, blurry vision, headache  In the ED she was found to be in SVT with HR 130s to 160s  Vagal maneuver was tried by ED practitioner without relief  She was subsequent given 18 mg IV adenosine which did slower heart rate  Subsequent EKG showed atrial flutter and ultimately atrial fibrillation  Cardiology was consulted  Patient subsequently converted to normal sinus rhythm prior to being admitted to the floor  She received dose of amiodarone on the floor per cardiology recommendations  Currently stable  Hospital Course  Patient was admitted for new onset atrial fibrillation  Cardiology was consulted and patient was given bolus of amiodarone and then started on slow amiodarone load  She was placed on telemetry and monitor without any acute events and no recurrence of atrial fibrillation  On day 2  Of admission she was transition to p o  Amiodarone and discharged in stable condition  She was instructed to follow-up with her cardiologist, Dr Tiffany Cheung at [facility], for further discussion of possible aortic valve replacement in the setting of severe aortic stenosis  Also instructed to continue all home meds and start p o  Amiodarone 200 mg twice daily for 14 days followed by 200 mg daily and Eliquis 5 mg daily        Physical Exam at Discharge  General appearance: alert and oriented, in no acute distress  Lungs: clear to auscultation bilaterally  Heart: regular rate and rhythm, S1, S2 normal, no murmur, click, rub or gallop  Abdomen: soft, non-tender; bowel sounds normal; no masses,  no organomegaly  Extremities: extremities normal, warm and well-perfused; no cyanosis, clubbing, or edema    Medications    * amiodarone 200 mg tablet   Take 1 tablet (200 mg total) by mouth 2 (two) times a day with meals for 14 days              * amiodarone 200 mg tablet   Take 1 tablet (200 mg total) by mouth daily with breakfast   Refills: 3         anastrozole 1 mg tablet   Commonly known as: ARIMIDEX   Take 1 tablet (1 mg total) by mouth daily   Refills: 2         apixaban 5 mg   Commonly known as: ELIQUIS   Take 1 tablet (5 mg total) by mouth 2 (two) times a day   Refills: 3         aspirin 81 MG tablet   Take 1 tablet (81 mg total) by mouth daily   Refills: 0         Bariatric Fusion Chew   Chew 1 tablet daily   Refills: 0         Calcium-Vitamin D 500-125 MG-UNIT Tabs   Take 1 tablet by mouth 2 (two) times a day   Refills: 0         ferrous sulfate 324 (65 Fe) mg   Take 1 tablet (324 mg total) by mouth daily before breakfast   Refills: 0         nystatin powder   Commonly known as: MYCOSTATIN   APPLY TOPICALLY TO AFFECTED AREA(S) 4 TIMES DAILY AS DIRECTED   Refills: 1         omeprazole 20 mg delayed release capsule   Commonly known as: PriLOSEC   Take 1 capsule (20 mg total) by mouth daily   Refills: 3         oxyCODONE 10 MG Tabs   Commonly known as: ROXICODONE   Take 10 mg by mouth every 6 (six) hours as needed   Refills: 0         rosuvastatin 5 mg tablet   Commonly known as: CRESTOR   Take 1 tablet (5 mg total) by mouth daily   Refills: 11               Allergies  Allergies   Allergen Reactions    Tramadol Other (See Comments)     Dizzy         Diet restrictions: As tolerated  Activity restrictions: As tolerated  Code Status: Prior  Advance Directive and Living Will: <no information>  Power of :    POLST:      Discharge Condition: stable      Discharge  Statement   I spent 30 minutes discharging the patient  This time was spent on the day of discharge  I had direct contact with the patient on the day of discharge  Additional documentation is required if more than 30 minutes were spent on discharge

## 2020-02-27 NOTE — PHYSICIAN ADVISOR
Current patient class: Inpatient  The patient is currently on Hospital Day: 2 at [address] [place] [address]      The patient was admitted to the hospital  on 2/24/20 at [phone] for the following diagnosis:  Rapid heart rate [R00 0]  Atrial fibrillation with rapid ventricular response ([address] ) [I48 91]     After review of the relevant documentation, labs, vital signs and test results, the patient is a provider liable case and is most appropriate for OBSERVATION STATUS  In this particular case the patient was admitted to the hospital as an inpatient  The patient however fails to satisfy the 2 midnight benchmark and closer scrutiny of the case is warranted  After review of the patient presentation and relevant labs the patient was most appropriate for observation status on admission  Given that this patient has already been discharged prior to this review they become a provider liable case  Rationale is as follows: The patient is a 59 yrs   Female who presented to the ED at 2/24/2020  8:57 AM with a chief complaint of Rapid Heart Rate (pt c/o feeling like "my heart is pounding & going fast" x 1 hour after she drank her coffee )     Patient admitted with a report of rapid heart rate with palpitations  She was given 18 mg of adenosine which did slow her heart rate down and she was noted to be in new onset A  Fib  She was given IV amiodarone and then started on slow oral loading  She was evaluated by Cardiology and they agreed with the use of amiodarone and preferred to continue the IV route  However, the patient wished to go home and Cardiology was OK with oral amiodarone    With the patient being sufficiently stable to be discharged on oral amiodarone after a one night hospital stay, an OBSERVATION class would be considered appropriate    The patients vitals on arrival were ED Triage Vitals   Temperature Pulse Respirations Blood Pressure SpO2   02/24/20 0903 02/24/20 0903 02/24/20 0903 02/24/20 2065 20 09   98 8 °F (37 1 °C) (!) 138 22 169/81 100 %      Temp Source Heart Rate Source Patient Position - Orthostatic VS BP Location FiO2 (%)   20 0903 20 0903 20 0903 20 09 --   Oral Monitor Lying Left arm       Pain Score       20 0901       No Pain           Past Medical History:   Diagnosis Date    Anemia     Arthritis     Cancer (HCC)     rectal    Chronic lower back pain     Chronic pain disorder     back pain    Colon cancer ([facility] ) 2018    Diabetes mellitus ([place] [address] )     Endometrial cancer ([place] [address] ) 2018    GERD (gastroesophageal reflux disease)     History of chemotherapy     History of MRSA infection     Morbid obesity ([place] [address] )     Ovarian cancer ([place] [address] ) 2018    Rectal cancer ([place] [address] )     Spinal stenosis     Systolic murmur     Unsteady gait     uses walker    Wears dentures     Wears glasses      Past Surgical History:   Procedure Laterality Date    ABDOMINAL PERINEAL BOWEL RESECTION W/ ILEOANAL POUCH N/A 2018    Procedure: LAPAROSCOPIC HAND ASSIST ABDOMINOPERINEAL RESECTION,  POSTERIOR VAGINECTOMY, OMENTECTOMY;  Surgeon: Barbara Starkey MD;  [facility];  Service: Colorectal    ABDOMINAL SURGERY      abscess removed from abdomen and right thigh, a hole in thigh closed by plastic surgeon    ABSCESS DRAINAGE      abd, (R) leg, (L) leg     SECTION       SECTION      CYSTOSCOPY N/A 2018    Procedure: CYSTOSCOPY;  Surgeon: Samantha Song MD;  [facility];  Service: Gynecology Oncology    ESOPHAGOGASTRODUODENOSCOPY N/A 2016    Procedure: ESOPHAGOGASTRODUODENOSCOPY (EGD); Surgeon: Dee Crowe MD;  [facility];  Service:     ESOPHAGOGASTRODUODENOSCOPY N/A 3/30/2016    Procedure: ESOPHAGOGASTRODUODENOSCOPY (EGD); Surgeon: Dee Crowe MD;  [facility];   Service:     EYE SURGERY      laser eye surgery    HYSTERECTOMY N/A 2018    Procedure: RADICAL HYSTERECTOMY TOTAL ABDOMINAL (ALEXANDRA)  BSO;  Surgeon: Ej Escalera MD;  [facility];  Service: Gynecology Oncology    JOINT REPLACEMENT Left 2017    hip    JOINT REPLACEMENT Right 2017    hip    OOPHORECTOMY Bilateral     MO COLONOSCOPY FLX DX W/COLLJ SPEC WHEN PFRMD N/A 3/9/2018    Procedure: COLONOSCOPY;  Surgeon: Jacki Johnson MD;  Location: [facility] GI LAB; Service: Gastroenterology    MO COLONOSCOPY FLX DX W/COLLJ [facility] WHEN PFRMD N/A 9/5/2018    Procedure: COLONOSCOPY;  Surgeon: Freedom Oswald MD;  [facility]; Service: Colorectal    MO ESOPHAGOGASTRODUODENOSCOPY TRANSORAL DIAGNOSTIC N/A 2/2/2018    Procedure: ESOPHAGOGASTRODUODENOSCOPY (EGD); Surgeon: Jacki Johnson MD;  [facility]; Service: Gastroenterology    MO LAP GASTRIC BYPASS/SOLEDAD-EN-Y N/A 7/18/2016    Procedure: BYPASS GASTRIC  SOLEDAD-EN-Y LAPAROSCOPIC;  Surgeon: Lourdes Batista MD;  [facility];  Service: Bariatrics    MO LAP, SURG COLOSTOMY N/A 9/6/2018    Procedure: PERMANENT END COLOSTOMY;  Surgeon: Freedom Oswald MD;  [facility];  Service: Colorectal    MO LAP, SURG PROCTECTOMY W COLOSTOMY N/A 9/6/2018    Procedure: PROCTECTOMY;  Surgeon: Freedom Oswald MD;  [facility];  Service: Colorectal    TUBAL LIGATION             Consults have been placed to:   IP CONSULT TO CARDIOLOGY    Vitals:    02/25/20 0323 02/25/20 0505 02/25/20 0835 02/25/20 1128   BP: 102/62 116/71 104/62 116/58   BP Location: Right arm Right arm Right arm Right arm   Pulse: 56 62 71 58   Resp:  18 18 18   Temp:   99 °F (37 2 °C) 98 2 °F (36 8 °C)   TempSrc:   Oral Oral   SpO2:  95% 94% 91%   Weight:       Height:           Most recent labs:    Recent Labs     02/25/20  0621   WBC 4 27*   HGB 12 6   HCT 40 1   *   K 3 9   CALCIUM 8 7   BUN 17   CREATININE 0 73       Scheduled Meds:  Continuous Infusions:  No current facility-administered medications for this encounter  PRN Meds:      Surgical procedures (if appropriate):

## 2020-02-28 ENCOUNTER — OFFICE VISIT (OUTPATIENT)
Dept: CARDIOLOGY CLINIC | Facility: CLINIC | Age: 65
End: 2020-02-28
Payer: MEDICARE

## 2020-02-28 VITALS
HEART RATE: 70 BPM | BODY MASS INDEX: 43.15 KG/M2 | WEIGHT: 259 LBS | HEIGHT: 65 IN | OXYGEN SATURATION: 97 % | DIASTOLIC BLOOD PRESSURE: 64 MMHG | SYSTOLIC BLOOD PRESSURE: 108 MMHG

## 2020-02-28 DIAGNOSIS — I05.0 MODERATE MITRAL STENOSIS: ICD-10-CM

## 2020-02-28 DIAGNOSIS — I35.0 SEVERE AORTIC STENOSIS BY PRIOR ECHOCARDIOGRAM: ICD-10-CM

## 2020-02-28 DIAGNOSIS — I48.91 ATRIAL FIBRILLATION WITH RAPID VENTRICULAR RESPONSE (HCC): Primary | ICD-10-CM

## 2020-02-28 DIAGNOSIS — Z98.84 STATUS POST BARIATRIC SURGERY: ICD-10-CM

## 2020-02-28 DIAGNOSIS — E78.5 HYPERLIPIDEMIA, UNSPECIFIED HYPERLIPIDEMIA TYPE: ICD-10-CM

## 2020-02-28 DIAGNOSIS — E66.01 MORBID OBESITY DUE TO EXCESS CALORIES (HCC): ICD-10-CM

## 2020-02-28 DIAGNOSIS — I70.209 ATHEROSCLEROTIC PERIPHERAL VASCULAR DISEASE (HCC): ICD-10-CM

## 2020-02-28 PROCEDURE — 99214 OFFICE O/P EST MOD 30 MIN: CPT | Performed by: INTERNAL MEDICINE

## 2020-02-28 PROCEDURE — 1036F TOBACCO NON-USER: CPT | Performed by: INTERNAL MEDICINE

## 2020-02-28 PROCEDURE — 93000 ELECTROCARDIOGRAM COMPLETE: CPT | Performed by: INTERNAL MEDICINE

## 2020-02-28 PROCEDURE — 1111F DSCHRG MED/CURRENT MED MERGE: CPT | Performed by: INTERNAL MEDICINE

## 2020-02-28 PROCEDURE — 3008F BODY MASS INDEX DOCD: CPT | Performed by: INTERNAL MEDICINE

## 2020-02-28 PROCEDURE — 3074F SYST BP LT 130 MM HG: CPT | Performed by: INTERNAL MEDICINE

## 2020-02-28 PROCEDURE — 3078F DIAST BP <80 MM HG: CPT | Performed by: INTERNAL MEDICINE

## 2020-02-28 NOTE — PROGRESS NOTES
Viridiana Matt  1955  [address]  [address] [facility] [address] [phone]    1  Atrial fibrillation with rapid ventricular response (HCC)  POCT ECG   2  Severe aortic stenosis by prior echocardiogram  Ambulatory referral to Cardiovascular Surgery   3  Moderate mitral stenosis     4  Atherosclerotic peripheral vascular disease ([address] )     5  Hyperlipidemia, unspecified hyperlipidemia type     6  Status post bariatric surgery     7  Morbid obesity due to excess calories [facility])         Discussion/Summary:  Today as a post hospital visit  She had an episode of atrial fibrillation with spontaneous conversion back to sinus rhythm  Repeat echocardiogram in the hospital showed aortic stenosis has worsened to severe  I have recommended evaluation for transcatheter aortic valve replacement  She also has mild to moderate mitral stenosis  For now she shows no signs decompensated heart failure  She is maintaining sinus rhythm continue amiodarone for suppression  Continue Eliquis 5 mg b i d  For thromboembolic protection  I will follow up with her after her meeting with CT surgery  Interval History:  70-year-old female with newly found rectal cancer needs to have surgery next week presents for preoperative cardiovascular risk assessment  Ambulation mainly limited by lower extremity arthritis  She denies any exertional chest pain, shortness of breath, palpitations, lightheadedness, dizziness, or syncope  She can do moderate housework she is able to walk a block to with the assistance of a cane functional capacity about 4 Mets  She has a history of 2 cardiac catheterizations that did not show obstructive coronary disease she has moderate aortic stenosis prior valve area 1 0 centimeter squared she also has moderate mitral stenosis seen on an echocardiogram this morning  Following our last visit she developed an episode of atrial fibrillation    She was seen in the emergency department and had spontaneously converted  An echocardiogram which I had scheduled for March was done and confirmed that aortic stenosis is progressing and is now into the severe range  She denies any chest pain or anginal sounding symptoms  Breathing has been stable now that she is maintaining sinus rhythm  There has been no lower extremity edema, PND, orthopnea      Problem List     Morbid obesity due to excess calories (HCC)    Screening for colon cancer    Screen for colon cancer    Overview Signed 1/30/2018  4:19 PM by Fabiana Kamara     Added automatically from request for surgery [id number]         Postgastrectomy malabsorption    S/P gastric bypass    Marginal ulcer    Status post bariatric surgery    Overview Signed 2/9/2018  1:26 PM by Patria Barker     Added automatically from request for surgery [id number]         Atherosclerotic peripheral vascular disease ([facility] )    Diet-controlled diabetes mellitus ([facility] )    Onychomycosis    Pain in foot    Rectal cancer [facility])    Preop cardiovascular exam        Past Medical History:   Diagnosis Date    Anemia     Arthritis     Cancer ([facility] )     rectal    Chronic lower back pain     Chronic pain disorder     back pain    Colon cancer ([facility] ) 2018    Diabetes mellitus ([facility] )     Endometrial cancer ([address] ) 2018    GERD (gastroesophageal reflux disease)     History of chemotherapy     History of MRSA infection 0719/2016    Morbid obesity ([facility] )     Ovarian cancer ([facility] ) 2018    Rectal cancer ([facility] )     Spinal stenosis     Systolic murmur     Unsteady gait     uses walker    Wears dentures     Wears glasses      Social History     Socioeconomic History    Marital status: Single     Spouse name: Not on file    Number of children: 2    Years of education: Not on file    Highest education level: Not on file   Occupational History    Not on file   Social Needs    Financial resource strain: Not on file    Food insecurity:     Worry: Not on file Inability: Not on file    Transportation needs:     Medical: Not on file     Non-medical: Not on file   Tobacco Use    Smoking status: Former Smoker     Packs/day: 1 00     Years: 25 00     Pack years: 25 00     Last attempt to quit: 3/30/2006     Years since quittin 9    Smokeless tobacco: Never Used   Substance and Sexual Activity    Alcohol use: Not Currently    Drug use: Not Currently     Types: Oxycodone     Comment: Percocet for lower back pain prn    Sexual activity: Not Currently   Lifestyle    Physical activity:     Days per week: Not on file     Minutes per session: Not on file    Stress: Not on file   Relationships    Social connections:     Talks on phone: Not on file     Gets together: Not on file     Attends Quaker service: Not on file     Active member of club or organization: Not on file     Attends meetings of clubs or organizations: Not on file     Relationship status: Not on file    Intimate partner violence:     Fear of current or ex partner: Not on file     Emotionally abused: Not on file     Physically abused: Not on file     Forced sexual activity: Not on file   Other Topics Concern    Not on file   Social History Narrative    Not on file      Family History   Adopted: Yes   Problem Relation Age of Onset    Leukemia Mother     Heart disease Father     Coronary artery disease Father     Diabetes Father     No Known Problems Sister     No Known Problems Brother     Diabetes Son     No Known Problems Brother     No Known Problems Brother     No Known Problems Sister     No Known Problems Sister      Past Surgical History:   Procedure Laterality Date    ABDOMINAL PERINEAL BOWEL RESECTION W/ ILEOANAL POUCH N/A 2018    Procedure: LAPAROSCOPIC HAND ASSIST ABDOMINOPERINEAL RESECTION,  POSTERIOR VAGINECTOMY, OMENTECTOMY;  Surgeon: Jaiden Kramer MD;  [facility];  Service: Colorectal    ABDOMINAL SURGERY      abscess removed from abdomen and right thigh, a hole in thigh closed by plastic surgeon    ABSCESS DRAINAGE      abd, (R) leg, (L) leg     SECTION       SECTION      CYSTOSCOPY N/A 2018    Procedure: Sharon Pope;  Surgeon: Judie Enriquez MD;  [facility];  Service: Gynecology Oncology    ESOPHAGOGASTRODUODENOSCOPY N/A 2016    Procedure: ESOPHAGOGASTRODUODENOSCOPY (EGD); Surgeon: Gianni Sanders MD;  [facility];  Service:     ESOPHAGOGASTRODUODENOSCOPY N/A 3/30/2016    Procedure: ESOPHAGOGASTRODUODENOSCOPY (EGD); Surgeon: Gianni Sanders MD;  [facility]; Service:     EYE SURGERY      laser eye surgery    HYSTERECTOMY N/A 2018    Procedure: RADICAL HYSTERECTOMY TOTAL ABDOMINAL (ALEXANDRA)  BSO;  Surgeon: Judie Enriquez MD;  [facility];  Service: Gynecology Oncology    JOINT REPLACEMENT Left 2017    hip    JOINT REPLACEMENT Right 2017    hip    OOPHORECTOMY Bilateral     IN COLONOSCOPY FLX DX W/COLLJ SPEC WHEN PFRMD N/A 3/9/2018    Procedure: COLONOSCOPY;  Surgeon: Andrey Cowan MD;  Location: [place] [address] GI LAB; Service: Gastroenterology    IN COLONOSCOPY FLX DX W/COLLJ [facility] WHEN PFRMD N/A 2018    Procedure: COLONOSCOPY;  Surgeon: Jessica Talamantes MD;  [facility]; Service: Colorectal    IN ESOPHAGOGASTRODUODENOSCOPY TRANSORAL DIAGNOSTIC N/A 2018    Procedure: ESOPHAGOGASTRODUODENOSCOPY (EGD); Surgeon: Andrey Cowan MD;  Location: [facility] GI LAB;   Service: Gastroenterology    IN LAP GASTRIC BYPASS/SOLEDAD-EN-Y N/A 2016    Procedure: BYPASS GASTRIC  SOLEDAD-EN-Y LAPAROSCOPIC;  Surgeon: Gianni Sanders MD;  [facility];  Service: Bariatrics    IN LAP, SURG COLOSTOMY N/A 2018    Procedure: PERMANENT END COLOSTOMY;  Surgeon: Jessica Talamantes MD;  [facility];  Service: Colorectal    IN LAP, SURG PROCTECTOMY W COLOSTOMY N/A 2018    Procedure: PROCTECTOMY;  Surgeon: Jessica Talamantes MD;  [facility];  Service: Colorectal  TUBAL LIGATION         Current Outpatient Medications:     amiodarone 200 mg tablet, Take 1 tablet (200 mg total) by mouth daily with breakfast, Disp: 90 each, Rfl: 3    anastrozole (ARIMIDEX) 1 mg tablet, Take 1 tablet (1 mg total) by mouth daily, Disp: 90 tablet, Rfl: 2    apixaban (ELIQUIS) 5 mg, Take 1 tablet (5 mg total) by mouth 2 (two) times a day, Disp: 180 each, Rfl: 3    aspirin 81 MG tablet, Take 1 tablet (81 mg total) by mouth daily (Patient not taking: Reported on 2/24/2020), Disp: , Rfl:     Calcium-Vitamin D 500-125 MG-UNIT TABS, Take 1 tablet by mouth 2 (two) times a day  , Disp: , Rfl:     ferrous sulfate 324 (65 Fe) mg, Take 1 tablet (324 mg total) by mouth daily before breakfast, Disp: 90 tablet, Rfl: 0    Multiple Vitamins-Minerals (BARIATRIC FUSION) CHEW, Chew 1 tablet daily  , Disp: , Rfl:     nystatin (MYCOSTATIN) powder, APPLY TOPICALLY TO AFFECTED AREA(S) 4 TIMES DAILY AS DIRECTED, Disp: 30 g, Rfl: 1    omeprazole (PriLOSEC) 20 mg delayed release capsule, Take 1 capsule (20 mg total) by mouth daily, Disp: 90 capsule, Rfl: 3    oxyCODONE (ROXICODONE) 10 MG TABS, Take 10 mg by mouth every 6 (six) hours as needed  , Disp: , Rfl:     rosuvastatin (CRESTOR) 5 mg tablet, Take 1 tablet (5 mg total) by mouth daily, Disp: 30 tablet, Rfl: 11  Allergies   Allergen Reactions    Tramadol Other (See Comments)     Dizzy         Labs:     Chemistry        Component Value Date/Time     10/16/2015 0910    K 3 9 02/25/2020 0621    K 4 7 10/16/2015 0910     02/25/2020 0621     10/16/2015 0910    CO2 28 02/25/2020 0621    CO2 27 10/16/2015 0910    BUN 17 02/25/2020 0621    BUN 29 (H) 10/16/2015 0910    CREATININE 0 73 02/25/2020 0621    CREATININE 1 1 10/16/2015 0910        Component Value Date/Time    CALCIUM 8 7 02/25/2020 0621    CALCIUM 9 9 10/16/2015 0910    ALKPHOS 70 01/27/2020 1017    AST 9 01/27/2020 1017    ALT 15 01/27/2020 1017            Lab Results   Component Value Date    CHOL 159 03/26/2014     Lab Results   Component Value Date    HDL 54 02/08/2019    HDL 53 02/12/2018    HDL 46 08/09/2017     Lab Results   Component Value Date    LDLCALC 117 (H) 02/08/2019    LDLCALC 114 (H) 02/12/2018    LDLCALC 99 08/09/2017     Lab Results   Component Value Date    TRIG 134 02/08/2019    TRIG 141 02/12/2018    TRIG 91 08/09/2017     No results found for: CHOLHDL    Imaging: Mri Pelvis Rectal Cancer Staging Wo Contrast    Result Date: 8/20/2018  Narrative: MRI PELVIS - WITHOUT CONTRAST (RECTAL CANCER STAGING) INDICATION:  Rectal cancer status post radiation treatment  Based on Dr Peggy Cruz note from 8/3/2018, patient is approximately 1 month status post completion of neoadjuvant chemotherapy for stage III rectal cancer  COMPARISON: MRI of the pelvis from 4/6/2018  TECHNIQUE: The following pulse sequences were obtained on a 1 5 T scanner:  Sagittal 2D FIESTA fat sat,  High resolution Coronal, Sagittal, and Axial FSE T2 weighted images of the rectum, and large field of view axial T1 weighted images of the pelvis  An additional small field of view, axial oblique T2-weighted sequence was performed through the tumor, perpendicular to the long axis of the rectum at that level  IV contrast was not given  FINDINGS: TUMOR LOCATION: There has been definite tumor response to treatment of the previously seen low rectal cancer, which had been 5 cm in length and circumferential around the rectal wall  The intraluminal portions of tumor is much less bulky  However there is is still residual viable tumor extension anteriorly, with a 1 cm nodular lesion anterior to the rectum, still contacting the mesorectal fascia and possibly the vagina  (Series 6 image 28 and 29 )  T-STAGING: Based on the above, this is still at least T3c, CRM positive lesion, and if the vaginal is involved, a T4 lesion   CIRCUMFERENTIAL RESECTION MARGIN (CRM): As above, there is a region of the rectal tumor abutment of the anterior mesorectal fascia on series 6 images 28 and 29  In addition, a left anterior lateral high risk node described above also abuts the anterior mesorectal fascia (series 6 image 11 )  This continues to be a CRM positive lesion  PERIRECTAL NODES: All lymph nodes are described on series 6: High risk nodes: Image 9, right of rectum, this no has decreased in size, currently 8 mm, previously 10 mm, also has become hypointense on T2-weighted sequences, consistent with treated tumor  Image 11, left anterolateral of rectum, 14 mm currently, which had appeared smaller on axial images of previous study, but this is likely due to differences in slice selection  This is unchanged in size and it does contact the anterior mesorectal fascia  Low risk nodes: None  LEVATOR ANI, PUBORECTALIS, ANAL SPHINCTERS: Stage I (Tumor confined to bowel wall and intact outer muscle coat )            REPRODUCTIVE STRUCTURES: Age-appropriate  BLADDER:  Normal  PELVIC CAVITY:  There is trace ascites in the pelvis  OTHER BOWEL LOOPS: Unremarkable MRI appearance  OSSEOUS STRUCTURES: There are bilateral hip replacements  VASCULAR STRUCTURES:  Visualized vasculature is patent  PELVIC WALL:  Unremarkable  Impression: Although patient's low rectal cancer has demonstrated definite treatment response in terms of much decreased bulkiness of the intraluminal tumor, the extraluminal components have persisted  In particular there is still viable tumor extension anteriorly, with a 1 cm nodular lesion anterior to the rectum, still contacting the mesorectal fascia and possibly the vagina  (Series 6 image 28 and 29 )  A high risk left anterolateral perirectal lymph node also persists, contacting the mesorectal fascia (series 6 image 11 )  Therefore this is still a T3c, CRM positive lesion, and possibly T4 lesion if vaginal is still involved   Workstation performed: [id number]       ECG:   Normal sinus rhythm      Review of Systems   Constitution: Negative  HENT: Negative  Eyes: Negative  Cardiovascular: Negative  Respiratory: Negative  Endocrine: Negative  Hematologic/Lymphatic: Negative  Skin: Negative  Musculoskeletal: Negative  Gastrointestinal: Negative  Genitourinary: Negative  Neurological: Negative  Psychiatric/Behavioral: Negative  Vitals:    02/28/20 1542   BP: 108/64   Pulse: 70   SpO2: 97%     Vitals:    02/28/20 1542   Weight: 117 kg (259 lb)     Height: 5' 5" (165 1 cm)   Body mass index is 43 1 kg/m²  Physical Exam:  Vital signs reviewed  General:  Alert and cooperative, appears stated age, no acute distress  HEENT:  PERRLA, EOMI, no scleral icterus, no conjunctival pallor  Neck:  No lymphadenopathy, no thyromegaly, no carotid bruits, no elevated JVP  Heart:  Regular rate and rhythm, normal S1/S2, no O6/G2, 2/6 systolic ejection murmur, rubs or gallops  PMI nondisplaced  Lungs:  Clear to auscultation bilaterally, no wheezes rales or rhonchi  Abdomen:  Soft, non-tender, positive bowel sounds, no rebound or guarding,   no organomegaly   Extremities:  Normal range of motion    No clubbing, cyanosis or edema   Vascular:  2+ pedal pulses  Skin:  No rashes or lesions on exposed skin  Neurologic:  Cranial nerves II-XII grossly intact without focal deficits

## 2020-03-03 ENCOUNTER — OFFICE VISIT (OUTPATIENT)
Dept: FAMILY MEDICINE CLINIC | Facility: CLINIC | Age: 65
End: 2020-03-03
Payer: MEDICARE

## 2020-03-03 VITALS
DIASTOLIC BLOOD PRESSURE: 80 MMHG | BODY MASS INDEX: 43.49 KG/M2 | HEART RATE: 82 BPM | RESPIRATION RATE: 16 BRPM | TEMPERATURE: 98.2 F | SYSTOLIC BLOOD PRESSURE: 122 MMHG | OXYGEN SATURATION: 97 % | HEIGHT: 65 IN | WEIGHT: 261 LBS

## 2020-03-03 DIAGNOSIS — I48.91 ATRIAL FIBRILLATION WITH RAPID VENTRICULAR RESPONSE (HCC): ICD-10-CM

## 2020-03-03 DIAGNOSIS — I35.0 SEVERE AORTIC STENOSIS BY PRIOR ECHOCARDIOGRAM: Primary | ICD-10-CM

## 2020-03-03 PROCEDURE — 99495 TRANSJ CARE MGMT MOD F2F 14D: CPT | Performed by: FAMILY MEDICINE

## 2020-03-03 NOTE — PROGRESS NOTES
Assessment/Plan:  1  Severe aortic stenosis by prior echocardiogram  - while admitted at the hospital a new echocardiogram found that she had now severe aortic stenosis, which would require a TAVR procedure  Patient is looking further into this with the cardiac surgeons, and is thinking she will go ahead and have this done  Cardiology can clear her for this procedure as needed  2  Atrial fibrillation with rapid ventricular response ([address] )  - patient admitted to the hospital for AFib with RVR  Patient was then medically cardioverted with intravenous medications  She was discharged on amiodarone b i d  x2 weeks, before returning to daily dosing  She has a follow-up with cardiology tomorrow  Medications reviewed  _______________________________________________________  Subjective:    Patient ID: Luciano Diallo is a 59 y o  female  HPI  Luciano Diallo is a 79-year-old female with a history of atrial fibrillation who felt her Heart racing and went straight to ER  She was not feeling SOB, or BERG, but she felt palpitations  She has a longstanding history of aortic stenosis which was moderate  Aortic stenosis worse to the point of needing surgery  Meets with cardiac surgeons tomorrow  Considering TAVR  Past she was medically cardioverted, and has had No palpitations since  Taking amiodarone BID for 2 weeks, eliquis BID, crestor  No longer taking her ASA  TCM Call (since 2/6/2020)     Date and time call was made  2/25/2020  6:26 PM    Hospital care reviewed  Records reviewed    Patient was hospitialized at  [facility]    Date of Admission  02/24/20    Date of discharge  02/25/20    Diagnosis  AFIB    Disposition  Home    Were the patients medications reviewed and updated  Yes    Current Symptoms  None      TCM Call (since 2/6/2020)     Post hospital issues  None    Should patient be enrolled in anticoag monitoring? No    Scheduled for follow up?   Yes    Patients specialists  Cardiologist Cardiologist name  montes    Did you obtain your prescribed medications  Yes    Do you need help managing your prescriptions or medications  No    Is transportation to your appointment needed  No    I have advised the patient to call PCP with any new or worsening symptoms  Margie Cherelle lpn         The following portions of the patient's history were reviewed and updated as appropriate: allergies, current medications, past medical history and problem list     Review of Systems   Constitutional: Negative for appetite change, chills and fever  HENT: Negative for congestion and rhinorrhea  Respiratory: Negative for cough and shortness of breath  Cardiovascular: Negative for chest pain, palpitations and leg swelling  Gastrointestinal: Negative for constipation, diarrhea, nausea and vomiting  Musculoskeletal: Positive for back pain (Chronic)  Negative for gait problem  Objective:  Vitals:    03/03/20 1107   BP: 122/80   Pulse: 82   Resp: 16   Temp: 98 2 °F (36 8 °C)   SpO2: 97%      Physical Exam   Constitutional: She is oriented to person, place, and time  She appears well-developed and well-nourished  HENT:   Head: Normocephalic and atraumatic  Eyes: Right eye exhibits no discharge  Left eye exhibits no discharge  No scleral icterus  Neck: Normal range of motion  Neck supple  Cardiovascular: Normal rate, regular rhythm, normal heart sounds and intact distal pulses  Exam reveals no friction rub  No murmur heard  Pulmonary/Chest: Effort normal and breath sounds normal  No stridor  No respiratory distress  She has no wheezes  Neurological: She is alert and oriented to person, place, and time  Skin: Skin is warm  Psychiatric: She has a normal mood and affect  Thought content normal    Vitals reviewed  Portions of the record may have been created with voice recognition software   Occasional wrong word or "sound alike" substitutions may have occurred due to the inherent limitations of voice recognition software  Please review the chart carefully and recognize, using context, where substitutions/typographical errors may have occurred

## 2020-03-04 ENCOUNTER — OFFICE VISIT (OUTPATIENT)
Dept: CARDIAC SURGERY | Facility: CLINIC | Age: 65
End: 2020-03-04
Payer: MEDICARE

## 2020-03-04 VITALS
WEIGHT: 262 LBS | DIASTOLIC BLOOD PRESSURE: 70 MMHG | RESPIRATION RATE: 12 BRPM | HEIGHT: 65 IN | OXYGEN SATURATION: 99 % | HEART RATE: 64 BPM | SYSTOLIC BLOOD PRESSURE: 126 MMHG | BODY MASS INDEX: 43.65 KG/M2 | TEMPERATURE: 98.1 F

## 2020-03-04 DIAGNOSIS — I35.0 AORTIC STENOSIS, SEVERE: Primary | ICD-10-CM

## 2020-03-04 PROCEDURE — 99204 OFFICE O/P NEW MOD 45 MIN: CPT | Performed by: NURSE PRACTITIONER

## 2020-03-04 NOTE — PROGRESS NOTES
Consultation - Cardiothoracic Surgery   Lisa Walker 59 y o  female MRN: [id number]    Physician Requesting Consult: Dr Iva Keyes    Reason for Consult / Principal Problem: Aortic stenosis, Non-Rheumatic    History of Present Illness: Mylene Lazar is a 59y o  year old female who presents for initial outpatient surgical consultation for symptomatic severe aortic stenosis  PMHx is notable for mitral stenosis, HLD, PVD, morbid obesity s/p Juan-En-Y (2/2016), Rectal cancer (IIA), ovarian and uterine cancer (IIIC) s/p protectomy/permamnent colostomy/abdominal perineal resection/posterior vaginectomy/omentectomy/radical ALEXANDRA-BSO (9/2018) with adjuvant chemo, chronic rectal fistula, spinal stenosis & DJD s/p b/l THR's  Patient states she was told she had a heart murmur at the time of her bariatric stenosis  She has been followed by cardiology with serial echocardiograms and in 2018, echo revealed moderate to severe AS  At that time is when she was diagnosed with cancer and underwent surgical resection and chemo  She reports she was asymptomatic  More recently she was admitted to the hospital 2/24/20 with new onset Afib with RVR  Echocardiogram during that hospitalization demonstrated progression of her stenosis to a severe range with an CHARBEL 0 71 cm2 and peak velocity 457 cm/s  Upon interview today, patient denies BERG or SOB  She admits to intermittent lightheadedness and occasional chest heaviness  She denies weight gain, edema, PND or orthopnea  She lives in an appt and is independent with her ADL's using a walker or cane  She states she does not walk much due to her spinal stenosis and prior THRs  Patient quit smoking and drinking in 2005  She has a full upper denture and partial lower plate with a few remaining lower teeth  She has not been to the dentist in 6 years       Past Medical History:  Past Medical History:   Diagnosis Date    Anemia     Arthritis     Cancer ([address] )     rectal    Chronic lower back pain     Chronic pain disorder     back pain    Colon cancer ([facility] ) 2018    Diabetes mellitus ([place] [address] )     Endometrial cancer ([place] [address] ) 2018    GERD (gastroesophageal reflux disease)     History of chemotherapy     History of MRSA infection     Morbid obesity ([facility] )     Ovarian cancer ([facility] ) 2018    Rectal cancer ([place] [address] )     Spinal stenosis     Systolic murmur     Unsteady gait     uses walker    Wears dentures     Wears glasses          Past Surgical History:   Past Surgical History:   Procedure Laterality Date    ABDOMINAL PERINEAL BOWEL RESECTION W/ ILEOANAL POUCH N/A 2018    Procedure: LAPAROSCOPIC HAND ASSIST ABDOMINOPERINEAL RESECTION,  POSTERIOR VAGINECTOMY, OMENTECTOMY;  Surgeon: Melchor Marquez MD;  [facility];  Service: Colorectal    ABDOMINAL SURGERY      abscess removed from abdomen and right thigh, a hole in thigh closed by plastic surgeon    ABSCESS DRAINAGE      abd, (R) leg, (L) leg     SECTION       SECTION      CYSTOSCOPY N/A 2018    Procedure: CYSTOSCOPY;  Surgeon: Santa Prabhakar MD;  [facility];  Service: Gynecology Oncology    ESOPHAGOGASTRODUODENOSCOPY N/A 2016    Procedure: ESOPHAGOGASTRODUODENOSCOPY (EGD); Surgeon: Ryan Montgomery MD;  [facility];  Service:     ESOPHAGOGASTRODUODENOSCOPY N/A 3/30/2016    Procedure: ESOPHAGOGASTRODUODENOSCOPY (EGD); Surgeon: Ryan Montgomery MD;  [facility]; Service:     EYE SURGERY      laser eye surgery    HYSTERECTOMY N/A 2018    Procedure: RADICAL HYSTERECTOMY TOTAL ABDOMINAL (ALEXANDRA)  BSO;  Surgeon: Santa Prabhakar MD;  [facility];  Service: Gynecology Oncology    JOINT REPLACEMENT Left 2017    hip    JOINT REPLACEMENT Right 2017    hip    OOPHORECTOMY Bilateral     VA COLONOSCOPY FLX DX W/COLLJ SPEC WHEN PFRMD N/A 3/9/2018    Procedure: COLONOSCOPY;  Surgeon: Ash Judd MD;  Location: [facility];   Service: Gastroenterology    VA COLONOSCOPY FLX DX W/COLLJ SPEC WHEN PFRMD N/A 2018    Procedure: COLONOSCOPY;  Surgeon: Usama Toledo MD;  [facility]; Service: Colorectal    WA ESOPHAGOGASTRODUODENOSCOPY TRANSORAL DIAGNOSTIC N/A 2018    Procedure: ESOPHAGOGASTRODUODENOSCOPY (EGD); Surgeon: Evelyne Miller MD;  Location: [facility]; Service: Gastroenterology    WA LAP GASTRIC BYPASS/SOLEDAD-EN-Y N/A 2016    Procedure: BYPASS GASTRIC  SOLEDAD-EN-Y LAPAROSCOPIC;  Surgeon: Christie Hernandez MD;  [facility];  Service: Bariatrics    WA LAP, SURG COLOSTOMY N/A 2018    Procedure: PERMANENT END COLOSTOMY;  Surgeon: Usama Toledo MD;  [facility];  Service: Colorectal    WA LAP, SURG PROCTECTOMY W COLOSTOMY N/A 2018    Procedure: PROCTECTOMY;  Surgeon: Usama Toledo MD;  [facility];  Service: Colorectal    TUBAL LIGATION           Family History:  Family History   Adopted: Yes   Problem Relation Age of Onset    Leukemia Mother     Heart disease Father     Coronary artery disease Father     Diabetes Father     No Known Problems Sister     No Known Problems Brother     Diabetes Son     No Known Problems Brother     No Known Problems Brother     No Known Problems Sister     No Known Problems Sister          Social History:    Social History     Substance and Sexual Activity   Alcohol Use Not Currently     Social History     Substance and Sexual Activity   Drug Use Not Currently    Types: Oxycodone    Comment: Percocet for lower back pain prn     Social History     Tobacco Use   Smoking Status Former Smoker    Packs/day: 1 00    Years: 25 00    Pack years: 25 00    Last attempt to quit: 3/30/2006    Years since quittin 9   Smokeless Tobacco Never Used         Home Medications:   Prior to Admission medications    Medication Sig Start Date End Date Taking?  Authorizing Provider   amiodarone 200 mg tablet Take 1 tablet (200 mg total) by mouth daily with breakfast 2/25/20  Yes Reggie Green, DO   anastrozole (ARIMIDEX) 1 mg tablet Take 1 tablet (1 mg total) by mouth daily 12/12/19  Yes Keesha Cooper, DO   apixaban (ELIQUIS) 5 mg Take 1 tablet (5 mg total) by mouth 2 (two) times a day 2/25/20  Yes Reggie Green, DO   BIOTIN PO Take 1 tablet by mouth daily   Yes Historical Provider, MD   Calcium-Vitamin D 500-125 MG-UNIT TABS Take 1 tablet by mouth 2 (two) times a day     Yes Historical Provider, MD   ferrous sulfate 324 (65 Fe) mg Take 1 tablet (324 mg total) by mouth daily before breakfast 11/5/18  Yes Reyna Valentine, DO   Multiple Vitamins-Minerals (BARIATRIC FUSION) CHEW Chew 1 tablet daily     Yes Historical Provider, MD   nystatin (MYCOSTATIN) powder APPLY TOPICALLY TO AFFECTED AREA(S) 4 TIMES DAILY AS DIRECTED 11/25/19  Yes Emily Dolan PA-C   omeprazole (PriLOSEC) 20 mg delayed release capsule Take 1 capsule (20 mg total) by mouth daily 7/1/19  Yes Nadeen Cardona MD   oxyCODONE (ROXICODONE) 10 MG TABS Take 10 mg by mouth every 6 (six) hours as needed   9/15/18  Yes Historical Provider, MD   rosuvastatin (CRESTOR) 5 mg tablet Take 1 tablet (5 mg total) by mouth daily 2/3/20  Yes Graeme Narvaez DO       Allergies: Allergies   Allergen Reactions    Tramadol Other (See Comments)     Dizzy         Review of Systems:  Review of Systems - History obtained from chart review and the patient  General ROS: negative  Psychological ROS: negative  Ophthalmic ROS: positive for - uses glasses  ENT ROS: negative  Hematological and Lymphatic ROS: negative for - bleeding problems, blood clots, bruising or jaundice  Respiratory ROS: no cough, shortness of breath, or wheezing  Cardiovascular ROS: no chest pain or dyspnea on exertion  Gastrointestinal ROS: colostomy functioning normally  No black, tarry or bloody stool  No abdominal pain   Chronic rectal fistula with drainage  Genito-Urinary ROS: no dysuria, trouble voiding, or hematuria  Musculoskeletal ROS: positive for - gait disturbance, joint pain and back pain  Neurological ROS: positive for - lightheadedness  negative for - TIA or CVA symptoms  Dermatological ROS: negative    Vital Signs:     Vitals:    03/04/20 1400 03/04/20 1416   BP: 122/70 126/70   BP Location: Left arm Right arm   Cuff Size: Adult Adult   Pulse: 64    Resp: 12    Temp: 98 1 °F (36 7 °C)    TempSrc: Tympanic    SpO2: 99%    Weight: 119 kg (262 lb)    Height: 5' 5" (1 651 m)        Physical Exam:    General: Alert, oriented, well developed, no acute distress  HEENT/NECK:  PERRLA  No jugular venous distention  Cardiac:Irregular rhythm, III/VI harsh systolic murmur RUSB   Carotid arteries: 1+ pulses, delayed upstrokes  Pulmonary:  Breath sounds clear bilaterally  Abdomen:  Non-tender, Non-distended  Positive bowel sounds  Upper extremities: 2+ radial pulses; brisk capillary refill  Lower extremities: Extremities warm/dry  PT/DP pulses 1+ bilaterally  Trace edema B/L  Neuro: Alert and oriented X 3  Sensation is grossly intact  No focal deficits  Musculoskeletal: MAEE, ambulates with cane  Skin: Warm/Dry, without rashes or lesions  Lab Results:   Lab Results   Component Value Date    WBC 4 27 (L) 02/25/2020    HGB 12 6 02/25/2020    HCT 40 1 02/25/2020    MCV 84 02/25/2020     (L) 02/25/2020     Lab Results   Component Value Date    SODIUM 137 02/25/2020    K 3 9 02/25/2020     02/25/2020    CO2 28 02/25/2020    BUN 17 02/25/2020    CREATININE 0 73 02/25/2020    GLUC 86 02/25/2020    CALCIUM 8 7 02/25/2020     Lab Results   Component Value Date    HGBA1C 5 1 10/15/2019     Lab Results   Component Value Date    TROPONINI <0 02 02/24/2020       Imaging Studies:     Echocardiogram:2/24/20  LEFT VENTRICLE:  Systolic function was normal  Ejection fraction was estimated in the range of 55 % to 60 % to be 55 %  There were no regional wall motion abnormalities    Wall thickness was mildly to moderately increased measuring 1 3cm at lateral and septal walls      LEFT ATRIUM:  The atrium was moderately dilated      RIGHT ATRIUM:  The atrium was mildly dilated      MITRAL VALVE:  There was moderate to marked annular calcification  There was mild stenosis with mean gradient near 4-5mmHg     AORTIC VALVE:  Leaflets exhibited moderately increased thickness, marked calcification, markedly reduced cuspal separation, and sclerosis  Transaortic velocity was increased due to valvular stenosis  There was severe stenosis by Doppler measurements at right sternal border     I have personally reviewed pertinent films in PACS    TAVR evaluation Assessment:     [address]: II    5 Meter Walk: 6 sec, 6 sec, 7 sec    STS risk score (preliminary): 2 2%    KCCQ-12 completed    Assessment:  Patient Active Problem List    Diagnosis Date Noted    Atrial fibrillation with rapid ventricular response [facility] ) 02/24/2020    Hyperlipidemia 02/24/2020    Severe aortic stenosis by prior echocardiogram 09/10/2019    Moderate mitral stenosis 09/10/2019    History of ovarian cancer 05/29/2019    Candidal intertrigo 05/29/2019    Vaginal bleeding 05/29/2019    Encounter for other screening for malignant neoplasm of breast 05/29/2019    Pyelonephritis 03/11/2019    History of ovarian cancer 10/03/2018    Colostomy care ([facility] ) 09/12/2018    History of rectal cancer 03/22/2018    S/P gastric bypass 02/09/2018    Marginal ulcer 02/09/2018    Status post bariatric surgery 02/09/2018    Atherosclerotic peripheral vascular disease ([facility] ) 05/26/2017    Postgastrectomy malabsorption 07/27/2016    Morbid obesity due to excess calories ([facility] ) 07/19/2016    Onychomycosis 07/14/2016     Severe aortic stenosis; Ongoing TAVR workup    Plan:    Alejandra Johnson has severe symptomatic aortic stenosis   Based on her comobidities, she will undergo the following testing for transcatheter aortic valve replacement: Gated CTA of the chest/abdomen/pelvis, cardiac catheterization, dental clearance, pulmonary function tests with ABG, and carotid artery ultrasound  Once these studies have been completed, Harris Ruelas will follow up in our office to review the results and to be evaluated by a second surgeon to confirm the suitability of proceeding with transcatheter aortic valve replacment  Harris Ruelas was comfortable with our recommendations, and their questions were answered to their satisfaction  Thank you for allowing us to participate in the care of this patient  The patient recently had a screening colonoscopy in 2018  Therefore GI referral is not indicated at this time       SIGNATURE: GEORGETTE Stout  DATE: March 4, 2020  TIME: 2:57 PM

## 2020-03-04 NOTE — LETTER
March 4, 2020     Cleo Nicolas [address] [place] [address]    Patient: Alex Gamez   YOB: 1955   Date of Visit: 3/4/2020       Dear Dr Moran Part: Thank you for referring Jose Lindquist to me for evaluation  Below are my notes for this consultation  If you have questions, please do not hesitate to call me  I look forward to following your patient along with you  Sincerely,        Ezra Pryor DO        CC: DO Alok Byrnes, [facility]  3/4/2020  3:22 PM  Attested  Consultation - Cardiothoracic Surgery   Lisa Murillo 59 y o  female MRN: [id number]    Physician Requesting Consult: Dr Luisa Blanc    Reason for Consult / Principal Problem: Aortic stenosis, Non-Rheumatic    History of Present Illness: Alex Gamez is a 59y o  year old female who presents for initial outpatient surgical consultation for symptomatic severe aortic stenosis  PMHx is notable for mitral stenosis, HLD, PVD, morbid obesity s/p Juan-En-Y (2/2016), Rectal cancer (IIA), ovarian and uterine cancer (IIIC) s/p protectomy/permamnent colostomy/abdominal perineal resection/posterior vaginectomy/omentectomy/radical ALEXANDRA-BSO (9/2018) with adjuvant chemo, chronic rectal fistula, spinal stenosis & DJD s/p b/l THR's  Patient states she was told she had a heart murmur at the time of her bariatric stenosis  She has been followed by cardiology with serial echocardiograms and in 2018, echo revealed moderate to severe AS  At that time is when she was diagnosed with cancer and underwent surgical resection and chemo  She reports she was asymptomatic  More recently she was admitted to the hospital 2/24/20 with new onset Afib with RVR  Echocardiogram during that hospitalization demonstrated progression of her stenosis to a severe range with an CHARBEL 0 71 cm2 and peak velocity 457 cm/s  Upon interview today, patient denies BERG or SOB   She admits to intermittent lightheadedness and occasional chest heaviness  She denies weight gain, edema, PND or orthopnea  She lives in an appt and is independent with her ADL's using a walker or cane  She states she does not walk much due to her spinal stenosis and prior THRs  Patient quit smoking and drinking in   She has a full upper denture and partial lower plate with a few remaining lower teeth  She has not been to the dentist in 6 years  Past Medical History:  Past Medical History:   Diagnosis Date    Anemia     Arthritis     Cancer ([place] [address] )     rectal    Chronic lower back pain     Chronic pain disorder     back pain    Colon cancer ([place] [address] ) 2018    Diabetes mellitus ([place] [address] )     Endometrial cancer ([place] [address] ) 2018    GERD (gastroesophageal reflux disease)     History of chemotherapy     History of MRSA infection     Morbid obesity ([place] [address] )     Ovarian cancer ([place] [address] ) 2018    Rectal cancer ([place] [address] )     Spinal stenosis     Systolic murmur     Unsteady gait     uses walker    Wears dentures     Wears glasses          Past Surgical History:   Past Surgical History:   Procedure Laterality Date    ABDOMINAL PERINEAL BOWEL RESECTION W/ ILEOANAL POUCH N/A 2018    Procedure: LAPAROSCOPIC HAND ASSIST ABDOMINOPERINEAL RESECTION,  POSTERIOR VAGINECTOMY, OMENTECTOMY;  Surgeon: Nicholas Edwards MD;  [facility];  Service: Colorectal    ABDOMINAL SURGERY      abscess removed from abdomen and right thigh, a hole in thigh closed by plastic surgeon    ABSCESS DRAINAGE      abd, (R) leg, (L) leg     SECTION       SECTION      CYSTOSCOPY N/A 2018    Procedure: CYSTOSCOPY;  Surgeon: Harsh Lenz MD;  [facility];  Service: Gynecology Oncology    ESOPHAGOGASTRODUODENOSCOPY N/A 2016    Procedure: ESOPHAGOGASTRODUODENOSCOPY (EGD); Surgeon: Gregoria Pettit MD;  [facility];  Service:     ESOPHAGOGASTRODUODENOSCOPY N/A 3/30/2016    Procedure: ESOPHAGOGASTRODUODENOSCOPY (EGD);   Surgeon: Gregoria Pettit MD;  [facility]; Service:     EYE SURGERY      laser eye surgery    HYSTERECTOMY N/A 9/6/2018    Procedure: RADICAL HYSTERECTOMY TOTAL ABDOMINAL (ALEXANDRA)  BSO;  Surgeon: Joe Diego MD;  [facility];  Service: Gynecology Oncology    JOINT REPLACEMENT Left 2017    hip    JOINT REPLACEMENT Right 2017    hip    OOPHORECTOMY Bilateral     TX COLONOSCOPY FLX DX W/COLLJ SPEC WHEN PFRMD N/A 3/9/2018    Procedure: COLONOSCOPY;  Surgeon: Stella Graham MD;  Location: [place] [address] GI LAB; Service: Gastroenterology    TX COLONOSCOPY FLX DX W/COLLJ [facility] WHEN PFRMD N/A 9/5/2018    Procedure: COLONOSCOPY;  Surgeon: Nora Guerra MD;  Location:  GI LAB; Service: Colorectal    TX ESOPHAGOGASTRODUODENOSCOPY TRANSORAL DIAGNOSTIC N/A 2/2/2018    Procedure: ESOPHAGOGASTRODUODENOSCOPY (EGD); Surgeon: Stella Graham MD;  Location: [facility] GI LAB;   Service: Gastroenterology    TX LAP GASTRIC BYPASS/SOLEDAD-EN-Y N/A 7/18/2016    Procedure: BYPASS GASTRIC  SOLEDAD-EN-Y LAPAROSCOPIC;  Surgeon: Cheo Cortez MD;  [facility];  Service: Bariatrics    TX LAP, SURG COLOSTOMY N/A 9/6/2018    Procedure: PERMANENT END COLOSTOMY;  Surgeon: Nora Guerra MD;  [facility];  Service: Colorectal    TX LAP, SURG PROCTECTOMY W COLOSTOMY N/A 9/6/2018    Procedure: PROCTECTOMY;  Surgeon: Nora Guerra MD;  [facility];  Service: Colorectal    TUBAL LIGATION           Family History:  Family History   Adopted: Yes   Problem Relation Age of Onset    Leukemia Mother     Heart disease Father     Coronary artery disease Father     Diabetes Father     No Known Problems Sister     No Known Problems Brother     Diabetes Son     No Known Problems Brother     No Known Problems Brother     No Known Problems Sister     No Known Problems Sister          Social History:    Social History     Substance and Sexual Activity   Alcohol Use Not Currently     Social History     Substance and Sexual Activity   Drug Use Not Currently    Types: Oxycodone    Comment: Percocet for lower back pain prn     Social History     Tobacco Use   Smoking Status Former Smoker    Packs/day:     Years: 25 00    Pack years: 25     Last attempt to quit: 3/30/2006    Years since quittin 9   Smokeless Tobacco Never Used         Home Medications:   Prior to Admission medications    Medication Sig Start Date End Date Taking? Authorizing Provider   amiodarone 200 mg tablet Take 1 tablet (200 mg total) by mouth daily with breakfast 20  Yes Sharda Casey DO   anastrozole (ARIMIDEX) 1 mg tablet Take 1 tablet (1 mg total) by mouth daily 19  Yes Leana Owusu DO   apixaban (ELIQUIS) 5 mg Take 1 tablet (5 mg total) by mouth 2 (two) times a day 20  Yes Sharda Casey DO   BIOTIN PO Take 1 tablet by mouth daily   Yes Historical Provider, MD   Calcium-Vitamin D 500-125 MG-UNIT TABS Take 1 tablet by mouth 2 (two) times a day     Yes Historical Provider, MD   ferrous sulfate 324 (65 Fe) mg Take 1 tablet (324 mg total) by mouth daily before breakfast 18  Yes Eliazar Crew, DO   Multiple Vitamins-Minerals (BARIATRIC FUSION) CHEW Chew 1 tablet daily     Yes Historical Provider, MD   nystatin (MYCOSTATIN) powder APPLY TOPICALLY TO AFFECTED AREA(S) 4 TIMES DAILY AS DIRECTED 19  Yes Warren Dolan PA-C   omeprazole (PriLOSEC) 20 mg delayed release capsule Take 1 capsule (20 mg total) by mouth daily 19  Yes Jorge Gore MD   oxyCODONE (ROXICODONE) 10 MG TABS Take 10 mg by mouth every 6 (six) hours as needed   9/15/18  Yes Historical Provider, MD   rosuvastatin (CRESTOR) 5 mg tablet Take 1 tablet (5 mg total) by mouth daily 2/3/20  Yes Graeme Narvaez DO       Allergies:   Allergies   Allergen Reactions    Tramadol Other (See Comments)     Dizzy         Review of Systems:  Review of Systems - History obtained from chart review and the patient  General ROS: negative  Psychological ROS: negative  Ophthalmic ROS: positive for - uses glasses  ENT ROS: negative  Hematological and Lymphatic ROS: negative for - bleeding problems, blood clots, bruising or jaundice  Respiratory ROS: no cough, shortness of breath, or wheezing  Cardiovascular ROS: no chest pain or dyspnea on exertion  Gastrointestinal ROS: colostomy functioning normally  No black, tarry or bloody stool  No abdominal pain  Chronic rectal fistula with drainage  Genito-Urinary ROS: no dysuria, trouble voiding, or hematuria  Musculoskeletal ROS: positive for - gait disturbance, joint pain and back pain  Neurological ROS: positive for - lightheadedness  negative for - TIA or CVA symptoms  Dermatological ROS: negative    Vital Signs:     Vitals:    03/04/20 1400 03/04/20 1416   BP: 122/70 126/70   BP Location: Left arm Right arm   Cuff Size: Adult Adult   Pulse: 64    Resp: 12    Temp: 98 1 °F (36 7 °C)    TempSrc: Tympanic    SpO2: 99%    Weight: 119 kg (262 lb)    Height: 5' 5" (1 651 m)        Physical Exam:    General: Alert, oriented, well developed, no acute distress  HEENT/NECK:  PERRLA  No jugular venous distention  Cardiac:Irregular rhythm, III/VI harsh systolic murmur RUSB   Carotid arteries: 1+ pulses, delayed upstrokes  Pulmonary:  Breath sounds clear bilaterally  Abdomen:  Non-tender, Non-distended  Positive bowel sounds  Upper extremities: 2+ radial pulses; brisk capillary refill  Lower extremities: Extremities warm/dry  PT/DP pulses 1+ bilaterally  Trace edema B/L  Neuro: Alert and oriented X 3  Sensation is grossly intact  No focal deficits  Musculoskeletal: MAEE, ambulates with cane  Skin: Warm/Dry, without rashes or lesions        Lab Results:   Lab Results   Component Value Date    WBC 4 27 (L) 02/25/2020    HGB 12 6 02/25/2020    HCT 40 1 02/25/2020    MCV 84 02/25/2020     (L) 02/25/2020     Lab Results   Component Value Date    SODIUM 137 02/25/2020    K 3 9 02/25/2020     02/25/2020    CO2 28 02/25/2020    BUN 17 02/25/2020    CREATININE 0 73 02/25/2020    GLUC 86 02/25/2020    CALCIUM 8 7 02/25/2020     Lab Results   Component Value Date    HGBA1C 5 1 10/15/2019     Lab Results   Component Value Date    TROPONINI <0 02 02/24/2020       Imaging Studies:     Echocardiogram:2/24/20  LEFT VENTRICLE:  Systolic function was normal  Ejection fraction was estimated in the range of 55 % to 60 % to be 55 %  There were no regional wall motion abnormalities  Wall thickness was mildly to moderately increased measuring 1 3cm at lateral and septal walls      LEFT ATRIUM:  The atrium was moderately dilated      RIGHT ATRIUM:  The atrium was mildly dilated      MITRAL VALVE:  There was moderate to marked annular calcification  There was mild stenosis with mean gradient near 4-5mmHg     AORTIC VALVE:  Leaflets exhibited moderately increased thickness, marked calcification, markedly reduced cuspal separation, and sclerosis  Transaortic velocity was increased due to valvular stenosis    There was severe stenosis by Doppler measurements at right sternal border     I have personally reviewed pertinent films in PACS    TAVR evaluation Assessment:     Isabel Fairbanks 122: II    5 Meter Walk: 6 sec, 6 sec, 7 sec    STS risk score (preliminary): 2 2%    KCCQ-12 completed    Assessment:  Patient Active Problem List    Diagnosis Date Noted    Atrial fibrillation with rapid ventricular response ([facility] ) 02/24/2020    Hyperlipidemia 02/24/2020    Severe aortic stenosis by prior echocardiogram 09/10/2019    Moderate mitral stenosis 09/10/2019    History of ovarian cancer 05/29/2019    Candidal intertrigo 05/29/2019    Vaginal bleeding 05/29/2019    Encounter for other screening for malignant neoplasm of breast 05/29/2019    Pyelonephritis 03/11/2019    History of ovarian cancer 10/03/2018    Colostomy care ([facility] ) 09/12/2018    History of rectal cancer 03/22/2018    S/P gastric bypass 02/09/2018    Marginal ulcer 02/09/2018    Status post bariatric surgery 02/09/2018    Atherosclerotic peripheral vascular disease [facility] ) 05/26/2017    Postgastrectomy malabsorption 07/27/2016    Morbid obesity due to excess calories ([facility] ) 07/19/2016    Onychomycosis 07/14/2016     Severe aortic stenosis; Ongoing TAVR workup    Plan:    Mariela Maria has severe symptomatic aortic stenosis  Based on her comobidities, she will undergo the following testing for transcatheter aortic valve replacement: Gated CTA of the chest/abdomen/pelvis, cardiac catheterization, dental clearance, pulmonary function tests with ABG, and carotid artery ultrasound  Once these studies have been completed, Mariela Maria will follow up in our office to review the results and to be evaluated by a second surgeon to confirm the suitability of proceeding with transcatheter aortic valve replacment  Mariela Maria was comfortable with our recommendations, and their questions were answered to their satisfaction  Thank you for allowing us to participate in the care of this patient  The patient recently had a screening colonoscopy in 2018  Therefore GI referral is not indicated at this time  SIGNATURE: GEORGETTE Bassett  DATE: March 4, 2020  TIME: 2:57 PM  Attestation signed by Milagro Stover DO at 3/4/2020  3:42 PM:  The patient was seen and examined, and I agree with the midlevel's history, physical exam, assessment and plan with the following additions:    Cruz Mark was seen in the office today for evaluation of her severe aortic stenosis  She is symptomatic  Her echocardiogram was reviewed by myself personally and discussed further  I recommended transcatheter aortic valve replacement    The risks, benefits, and alternatives to TAVR been discussed in detail with her and she wishes to proceed with TAVR

## 2020-03-05 ENCOUNTER — TELEPHONE (OUTPATIENT)
Dept: CARDIOLOGY CLINIC | Facility: CLINIC | Age: 65
End: 2020-03-05

## 2020-03-05 NOTE — TELEPHONE ENCOUNTER
Pt is scheduled on 03/20/20 for a R+LHC with Dr Byron Li at [facility]  Pt aware of instructions  Pt has Medicare as the primary insurance

## 2020-03-11 DIAGNOSIS — I48.91 ATRIAL FIBRILLATION WITH RAPID VENTRICULAR RESPONSE (HCC): ICD-10-CM

## 2020-03-11 RX ORDER — AMIODARONE HYDROCHLORIDE 200 MG/1
200 TABLET ORAL
Qty: 90 TABLET | Refills: 3 | Status: SHIPPED | OUTPATIENT
Start: 2020-03-11 | End: 2020-10-07 | Stop reason: SDUPTHER

## 2020-03-16 ENCOUNTER — APPOINTMENT (OUTPATIENT)
Dept: PULMONOLOGY | Facility: HOSPITAL | Age: 65
End: 2020-03-16
Payer: MEDICARE

## 2020-03-16 ENCOUNTER — HOSPITAL ENCOUNTER (OUTPATIENT)
Dept: RADIOLOGY | Facility: HOSPITAL | Age: 65
Discharge: HOME | End: 2020-03-16
Payer: MEDICARE

## 2020-03-16 ENCOUNTER — HOSPITAL ENCOUNTER (OUTPATIENT)
Dept: PULMONOLOGY | Facility: HOSPITAL | Age: 65
Discharge: HOME | End: 2020-03-16
Payer: MEDICARE

## 2020-03-16 ENCOUNTER — HOSPITAL ENCOUNTER (OUTPATIENT)
Dept: NON INVASIVE DIAGNOSTICS | Facility: HOSPITAL | Age: 65
Discharge: HOME | End: 2020-03-16
Payer: MEDICARE

## 2020-03-16 ENCOUNTER — TRANSCRIBE ORDERS (OUTPATIENT)
Dept: RADIOLOGY | Facility: HOSPITAL | Age: 65
End: 2020-03-16

## 2020-03-16 DIAGNOSIS — I35.0 AORTIC STENOSIS, SEVERE: ICD-10-CM

## 2020-03-16 PROCEDURE — 75572 CT HRT W/3D IMAGE: CPT

## 2020-03-16 PROCEDURE — 94726 PLETHYSMOGRAPHY LUNG VOLUMES: CPT | Performed by: INTERNAL MEDICINE

## 2020-03-16 PROCEDURE — 94760 N-INVAS EAR/PLS OXIMETRY 1: CPT

## 2020-03-16 PROCEDURE — 94060 EVALUATION OF WHEEZING: CPT

## 2020-03-16 PROCEDURE — 94060 EVALUATION OF WHEEZING: CPT | Performed by: INTERNAL MEDICINE

## 2020-03-16 PROCEDURE — 94729 DIFFUSING CAPACITY: CPT

## 2020-03-16 PROCEDURE — 94726 PLETHYSMOGRAPHY LUNG VOLUMES: CPT

## 2020-03-16 PROCEDURE — 93880 EXTRACRANIAL BILAT STUDY: CPT

## 2020-03-16 PROCEDURE — 94729 DIFFUSING CAPACITY: CPT | Performed by: INTERNAL MEDICINE

## 2020-03-16 PROCEDURE — 74174 CTA ABD&PLVS W/CONTRAST: CPT

## 2020-03-16 PROCEDURE — 93880 EXTRACRANIAL BILAT STUDY: CPT | Performed by: SURGERY

## 2020-03-16 RX ORDER — ALBUTEROL SULFATE 2.5 MG/3ML
2.5 SOLUTION RESPIRATORY (INHALATION) ONCE
Status: DISCONTINUED | OUTPATIENT
Start: 2020-03-16 | End: 2020-03-20 | Stop reason: HOSPADM

## 2020-03-16 RX ADMIN — IODIXANOL 120 ML: 320 INJECTION, SOLUTION INTRAVASCULAR at 08:08

## 2020-03-19 RX ORDER — ASPIRIN 81 MG/1
324 TABLET, CHEWABLE ORAL ONCE
Status: CANCELLED | OUTPATIENT
Start: 2020-03-19 | End: 2020-03-19

## 2020-03-19 RX ORDER — SODIUM CHLORIDE 9 MG/ML
100 INJECTION, SOLUTION INTRAVENOUS CONTINUOUS
Status: CANCELLED | OUTPATIENT
Start: 2020-03-19

## 2020-03-19 RX ORDER — CLOPIDOGREL BISULFATE 75 MG/1
600 TABLET ORAL ONCE
Status: CANCELLED | OUTPATIENT
Start: 2020-03-19 | End: 2020-03-19

## 2020-03-20 ENCOUNTER — TELEPHONE (OUTPATIENT)
Dept: FAMILY MEDICINE CLINIC | Facility: CLINIC | Age: 65
End: 2020-03-20

## 2020-03-20 PROCEDURE — NC001 PR NO CHARGE

## 2020-03-20 NOTE — TELEPHONE ENCOUNTER
Do you have a fever of 100 5 and above?: today 98 6  Do you have a cough?: yes dry cough  Do you have shortness of breath?: denies    Have you visited [place]?: denies  [place]?: denies  [place]?: denies  [place] denies  [place] denies    Have you visited [place]?: denies  [place]?: denies  [place]?: denies    Have you visited [facility]:? denies  [place], [place]?: denies  [address] [facility]?: denies  [facility]?:denies     Have you been exposed to someone who has been diagnosed with coronavirus?: yes March 8th    Do you commute daily to and from [place] or [place] for work?: [place]      Patient can be reached at [phone]

## 2020-03-20 NOTE — TELEPHONE ENCOUNTER
COVID-19 Virtual Visit     This virtual check-in was done via telephone  Encounter provider Tiffani Chand LPN    Provider located at [address] [place] [id number]    Recent Visits  No visits were found meeting these conditions  Showing recent visits within past 7 days and meeting all other requirements     Today's Visits  Date Type Provider Dept   03/20/20 Telephone [facility] 26 today's visits and meeting all other requirements     Future Appointments  Date Type Provider Dept   03/20/20 Telephone [facility] 26 future appointments within next 150 days and meeting all other requirements        Patient agrees to participate in a virtual check in via telephone or video visit instead of presenting to the office to address urgent/immediate medical needs  Patient is aware this is a billable service  After connecting through telephone, the patient was identified by name and date of birth  Katharine Abbasi was informed that this was a telemedicine visit and that the exam was being conducted confidentially over secure lines  My office door was closed  No one else was in the room  Katharine Abbasi acknowledged consent and understanding of privacy and security of the telemedicine visit  I informed the patient that I have reviewed her record in Epic and presented the opportunity for her to ask any questions regarding the visit today  The patient agreed to participate  Katharine Abbasi is a 59 y o  female who is concerned about COVID-19  She reports cough  She has not traveled outside the [place]  within the last 14 days    She has had contact with a person who is under investigation for or who is positive for COVID-19 within the last 14 days  She has not been hospitalized recently for fever and/or lower respiratory symptoms      Past Medical History:   Diagnosis Date    Anemia     Arthritis     Cancer ([facility] )     rectal    Chronic lower back pain     Chronic pain disorder     back pain    Colon cancer ([place] [address] ) 2018    Diabetes mellitus ([place] [address] )     Endometrial cancer ([place] [address] ) 2018    GERD (gastroesophageal reflux disease)     History of chemotherapy     History of MRSA infection     Morbid obesity ([facility] )     Ovarian cancer ([facility] ) 2018    Rectal cancer ([place] [address] )     Spinal stenosis     Systolic murmur     Unsteady gait     uses walker    Wears dentures     Wears glasses        Past Surgical History:   Procedure Laterality Date    ABDOMINAL PERINEAL BOWEL RESECTION W/ ILEOANAL POUCH N/A 2018    Procedure: LAPAROSCOPIC HAND ASSIST ABDOMINOPERINEAL RESECTION,  POSTERIOR VAGINECTOMY, OMENTECTOMY;  Surgeon: Cat Tamayo MD;  [facility];  Service: Colorectal    ABDOMINAL SURGERY      abscess removed from abdomen and right thigh, a hole in thigh closed by plastic surgeon    ABSCESS DRAINAGE      abd, (R) leg, (L) leg     SECTION       SECTION      CYSTOSCOPY N/A 2018    Procedure: CYSTOSCOPY;  Surgeon: Per Simeon MD;  [facility];  Service: Gynecology Oncology    ESOPHAGOGASTRODUODENOSCOPY N/A 2016    Procedure: ESOPHAGOGASTRODUODENOSCOPY (EGD); Surgeon: Zachary Garnica MD;  [facility];  Service:     ESOPHAGOGASTRODUODENOSCOPY N/A 3/30/2016    Procedure: ESOPHAGOGASTRODUODENOSCOPY (EGD); Surgeon: Zachary Garnica MD;  [facility]; Service:     EYE SURGERY      laser eye surgery    HYSTERECTOMY N/A 2018    Procedure: RADICAL HYSTERECTOMY TOTAL ABDOMINAL (ALEXANDRA)   BSO;  Surgeon: Per Simeon MD;  [facility];  Service: Gynecology Oncology    JOINT REPLACEMENT Left 2017    hip    JOINT REPLACEMENT Right 2017    hip    OOPHORECTOMY Bilateral     MN COLONOSCOPY FLX DX W/COLLJ SPEC WHEN PFRMD N/A 3/9/2018    Procedure: COLONOSCOPY;  Surgeon: Angel Perez MD;  Location: [facility] GI LAB; Service: Gastroenterology    GA COLONOSCOPY FLX DX W/COLLJ [facility] WHEN PFRMD N/A 9/5/2018    Procedure: COLONOSCOPY;  Surgeon: Rohit Lopez MD;  [facility]; Service: Colorectal    GA ESOPHAGOGASTRODUODENOSCOPY TRANSORAL DIAGNOSTIC N/A 2/2/2018    Procedure: ESOPHAGOGASTRODUODENOSCOPY (EGD); Surgeon: Jose Mcfarland MD;  Location: [facility] GI LAB;   Service: Gastroenterology    GA LAP GASTRIC BYPASS/SOLEDAD-EN-Y N/A 7/18/2016    Procedure: BYPASS GASTRIC  SOLEDAD-EN-Y LAPAROSCOPIC;  Surgeon: Layne Bella MD;  [facility];  Service: Bariatrics    GA LAP, SURG COLOSTOMY N/A 9/6/2018    Procedure: PERMANENT END COLOSTOMY;  Surgeon: Rohit Lopez MD;  [facility];  Service: Colorectal    GA LAP, SURG PROCTECTOMY W COLOSTOMY N/A 9/6/2018    Procedure: PROCTECTOMY;  Surgeon: Rohit Lopez MD;  [facility];  Service: Colorectal    TUBAL LIGATION         Current Outpatient Medications   Medication Sig Dispense Refill    amiodarone 200 mg tablet Take 1 tablet (200 mg total) by mouth daily with breakfast 90 tablet 3    anastrozole (ARIMIDEX) 1 mg tablet Take 1 tablet (1 mg total) by mouth daily 90 tablet 2    apixaban (ELIQUIS) 5 mg Take 1 tablet (5 mg total) by mouth 2 (two) times a day 180 tablet 2    BIOTIN PO Take 1 tablet by mouth daily      Calcium-Vitamin D 500-125 MG-UNIT TABS Take 1 tablet by mouth 2 (two) times a day        ferrous sulfate 324 (65 Fe) mg Take 1 tablet (324 mg total) by mouth daily before breakfast 90 tablet 0    Multiple Vitamins-Minerals (BARIATRIC FUSION) CHEW Chew 1 tablet daily        nystatin (MYCOSTATIN) powder APPLY TOPICALLY TO AFFECTED AREA(S) 4 TIMES DAILY AS DIRECTED 30 g 1    omeprazole (PriLOSEC) 20 mg delayed release capsule Take 1 capsule (20 mg total) by mouth daily 90 capsule 3    oxyCODONE (ROXICODONE) 10 MG TABS Take 10 mg by mouth every 6 (six) hours as needed        rosuvastatin (CRESTOR) 5 mg tablet Take 1 tablet (5 mg total) by mouth daily 30 tablet 11     No current facility-administered medications for this visit  Allergies   Allergen Reactions    Tramadol Other (See Comments)     Dizzy         Video Exam    Not video     Disposition:      Patient only has dry cough told to treat symptoms stay at home and call if symptoms  Worsen   If she SOB or developes fever call

## 2020-04-06 ENCOUNTER — HOSPITAL ENCOUNTER (EMERGENCY)
Facility: HOSPITAL | Age: 65
Discharge: HOME | End: 2020-04-06
Attending: EMERGENCY MEDICINE | Admitting: EMERGENCY MEDICINE
Payer: MEDICARE

## 2020-04-06 ENCOUNTER — APPOINTMENT (EMERGENCY)
Dept: RADIOLOGY | Facility: HOSPITAL | Age: 65
End: 2020-04-06
Payer: MEDICARE

## 2020-04-06 VITALS
SYSTOLIC BLOOD PRESSURE: 126 MMHG | RESPIRATION RATE: 19 BRPM | BODY MASS INDEX: 42.6 KG/M2 | DIASTOLIC BLOOD PRESSURE: 70 MMHG | TEMPERATURE: 98.7 F | WEIGHT: 256 LBS | HEART RATE: 66 BPM | OXYGEN SATURATION: 96 %

## 2020-04-06 DIAGNOSIS — R07.9 CHEST PAIN: Primary | ICD-10-CM

## 2020-04-06 DIAGNOSIS — I35.0 AORTIC STENOSIS: ICD-10-CM

## 2020-04-06 LAB
ALBUMIN SERPL BCP-MCNC: 2.9 G/DL (ref 3.5–5)
ALP SERPL-CCNC: 56 U/L (ref 46–116)
ALT SERPL W P-5'-P-CCNC: 13 U/L (ref 12–78)
ANION GAP SERPL CALCULATED.3IONS-SCNC: 9 MMOL/L (ref 4–13)
APTT PPP: 30 SECONDS (ref 23–37)
AST SERPL W P-5'-P-CCNC: 14 U/L (ref 5–45)
BASOPHILS # BLD MANUAL: 0 THOUSAND/UL (ref 0–0.1)
BASOPHILS NFR MAR MANUAL: 0 % (ref 0–1)
BILIRUB SERPL-MCNC: 0.4 MG/DL (ref 0.2–1)
BUN SERPL-MCNC: 17 MG/DL (ref 5–25)
CALCIUM SERPL-MCNC: 9.1 MG/DL (ref 8.3–10.1)
CHLORIDE SERPL-SCNC: 102 MMOL/L (ref 100–108)
CO2 SERPL-SCNC: 25 MMOL/L (ref 21–32)
CREAT SERPL-MCNC: 0.89 MG/DL (ref 0.6–1.3)
EOSINOPHIL # BLD MANUAL: 0.06 THOUSAND/UL (ref 0–0.4)
EOSINOPHIL NFR BLD MANUAL: 1 % (ref 0–6)
ERYTHROCYTE [DISTWIDTH] IN BLOOD BY AUTOMATED COUNT: 13.7 % (ref 11.6–15.1)
GFR SERPL CREATININE-BSD FRML MDRD: 69 ML/MIN/1.73SQ M
GLUCOSE SERPL-MCNC: 86 MG/DL (ref 65–140)
HCT VFR BLD AUTO: 39.9 % (ref 34.8–46.1)
HGB BLD-MCNC: 12.3 G/DL (ref 11.5–15.4)
INR PPP: 1.02 (ref 0.84–1.19)
LYMPHOCYTES # BLD AUTO: 1.1 THOUSAND/UL (ref 0.6–4.47)
LYMPHOCYTES # BLD AUTO: 19 % (ref 14–44)
MCH RBC QN AUTO: 25.9 PG (ref 26.8–34.3)
MCHC RBC AUTO-ENTMCNC: 30.8 G/DL (ref 31.4–37.4)
MCV RBC AUTO: 84 FL (ref 82–98)
METAMYELOCYTES NFR BLD MANUAL: 2 % (ref 0–1)
MONOCYTES # BLD AUTO: 0.35 THOUSAND/UL (ref 0–1.22)
MONOCYTES NFR BLD: 6 % (ref 4–12)
NEUTROPHILS # BLD MANUAL: 4.18 THOUSAND/UL (ref 1.85–7.62)
NEUTS BAND NFR BLD MANUAL: 6 % (ref 0–8)
NEUTS SEG NFR BLD AUTO: 66 % (ref 43–75)
NRBC BLD AUTO-RTO: 0 /100 WBCS
NT-PROBNP SERPL-MCNC: 936 PG/ML
PLATELET # BLD AUTO: 240 THOUSANDS/UL (ref 149–390)
PLATELET BLD QL SMEAR: ADEQUATE
PMV BLD AUTO: 9.6 FL (ref 8.9–12.7)
POTASSIUM SERPL-SCNC: 4.6 MMOL/L (ref 3.5–5.3)
PROT SERPL-MCNC: 6.7 G/DL (ref 6.4–8.2)
PROTHROMBIN TIME: 13.8 SECONDS (ref 11.6–14.5)
RBC # BLD AUTO: 4.74 MILLION/UL (ref 3.81–5.12)
RBC MORPH BLD: NORMAL
SODIUM SERPL-SCNC: 136 MMOL/L (ref 136–145)
TOTAL CELLS COUNTED SPEC: 100
TROPONIN I SERPL-MCNC: <0.02 NG/ML
WBC # BLD AUTO: 5.8 THOUSAND/UL (ref 4.31–10.16)

## 2020-04-06 PROCEDURE — 71045 X-RAY EXAM CHEST 1 VIEW: CPT

## 2020-04-06 PROCEDURE — 99284 EMERGENCY DEPT VISIT MOD MDM: CPT | Performed by: EMERGENCY MEDICINE

## 2020-04-06 PROCEDURE — 84484 ASSAY OF TROPONIN QUANT: CPT | Performed by: EMERGENCY MEDICINE

## 2020-04-06 PROCEDURE — 85027 COMPLETE CBC AUTOMATED: CPT | Performed by: EMERGENCY MEDICINE

## 2020-04-06 PROCEDURE — 93005 ELECTROCARDIOGRAM TRACING: CPT

## 2020-04-06 PROCEDURE — 85610 PROTHROMBIN TIME: CPT | Performed by: EMERGENCY MEDICINE

## 2020-04-06 PROCEDURE — 85730 THROMBOPLASTIN TIME PARTIAL: CPT | Performed by: EMERGENCY MEDICINE

## 2020-04-06 PROCEDURE — 83880 ASSAY OF NATRIURETIC PEPTIDE: CPT | Performed by: EMERGENCY MEDICINE

## 2020-04-06 PROCEDURE — 36415 COLL VENOUS BLD VENIPUNCTURE: CPT | Performed by: EMERGENCY MEDICINE

## 2020-04-06 PROCEDURE — 80053 COMPREHEN METABOLIC PANEL: CPT | Performed by: EMERGENCY MEDICINE

## 2020-04-06 PROCEDURE — 99285 EMERGENCY DEPT VISIT HI MDM: CPT

## 2020-04-06 PROCEDURE — 85007 BL SMEAR W/DIFF WBC COUNT: CPT | Performed by: EMERGENCY MEDICINE

## 2020-04-08 LAB
ATRIAL RATE: 63 BPM
P AXIS: 49 DEGREES
PR INTERVAL: 168 MS
QRS AXIS: -23 DEGREES
QRSD INTERVAL: 88 MS
QT INTERVAL: 404 MS
QTC INTERVAL: 413 MS
T WAVE AXIS: 35 DEGREES
VENTRICULAR RATE: 63 BPM

## 2020-04-08 PROCEDURE — 93010 ELECTROCARDIOGRAM REPORT: CPT | Performed by: INTERNAL MEDICINE

## 2020-05-08 ENCOUNTER — TELEMEDICINE (OUTPATIENT)
Dept: FAMILY MEDICINE CLINIC | Facility: CLINIC | Age: 65
End: 2020-05-08
Payer: MEDICARE

## 2020-05-08 DIAGNOSIS — S13.9XXA NECK SPRAIN, INITIAL ENCOUNTER: ICD-10-CM

## 2020-05-08 DIAGNOSIS — S46.811A STRAIN OF LEVATOR SCAPULAE MUSCLE, RIGHT, INITIAL ENCOUNTER: Primary | ICD-10-CM

## 2020-05-08 PROCEDURE — 99214 OFFICE O/P EST MOD 30 MIN: CPT | Performed by: FAMILY MEDICINE

## 2020-05-08 RX ORDER — CYCLOBENZAPRINE HCL 5 MG
5 TABLET ORAL 3 TIMES DAILY PRN
Qty: 30 TABLET | Refills: 0 | Status: SHIPPED | OUTPATIENT
Start: 2020-05-08 | End: 2020-11-07

## 2020-06-10 DIAGNOSIS — B37.2 CANDIDAL INTERTRIGO: ICD-10-CM

## 2020-06-10 RX ORDER — NYSTATIN 100000 [USP'U]/G
POWDER TOPICAL 2 TIMES DAILY PRN
Qty: 30 G | Refills: 1 | Status: SHIPPED | OUTPATIENT
Start: 2020-06-10 | End: 2020-09-03 | Stop reason: HOSPADM

## 2020-07-08 DIAGNOSIS — K28.9 MARGINAL ULCER: ICD-10-CM

## 2020-07-15 ENCOUNTER — TELEPHONE (OUTPATIENT)
Dept: CARDIOLOGY CLINIC | Facility: CLINIC | Age: 65
End: 2020-07-15

## 2020-07-15 DIAGNOSIS — I35.0 AORTIC STENOSIS, SEVERE: Primary | ICD-10-CM

## 2020-07-15 NOTE — TELEPHONE ENCOUNTER
COVID Pre-Visit Screening     1  Is this a family member screening? NO  2  Have you traveled outside of your state in the past 2 weeks? No  3  Do you presently have a fever or flu-like symptoms? NO  4  Do you have symptoms of an upper respiratory infection like runny nose, sore throat, or cough? NO  5  Are you suffering from new headache that you have not had in the past?  NO  6  Do you have/have you experienced any new shortness of breath recently? NO  7  Do you have any new diarrhea, nausea or vomiting? /NO  8  Have you been in contact with anyone who has been sick or diagnosed with COVID-19? NO  9  Do you have any new loss of taste or smell? NO  10  Are you able to wear a mask without a valve for the entire visit?  NO

## 2020-07-15 NOTE — TELEPHONE ENCOUNTER
PT is scheduled on 07/27/20 for a R+LHC at [facility] with Dr Debbie Salcedo  Pt is aware of instructions  Pt has Medicare as the primary insurance

## 2020-07-16 RX ORDER — OMEPRAZOLE 20 MG/1
20 CAPSULE, DELAYED RELEASE ORAL DAILY
Qty: 90 CAPSULE | Refills: 0 | Status: SHIPPED | OUTPATIENT
Start: 2020-07-16 | End: 2020-10-13 | Stop reason: SDUPTHER

## 2020-07-23 ENCOUNTER — APPOINTMENT (OUTPATIENT)
Dept: LAB | Facility: CLINIC | Age: 65
End: 2020-07-23
Payer: MEDICARE

## 2020-07-23 DIAGNOSIS — I35.0 AORTIC STENOSIS, SEVERE: ICD-10-CM

## 2020-07-23 LAB
ALBUMIN SERPL BCP-MCNC: 3.1 G/DL (ref 3.5–5)
ALP SERPL-CCNC: 64 U/L (ref 46–116)
ALT SERPL W P-5'-P-CCNC: 13 U/L (ref 12–78)
ANION GAP SERPL CALCULATED.3IONS-SCNC: 6 MMOL/L (ref 4–13)
AST SERPL W P-5'-P-CCNC: 10 U/L (ref 5–45)
BASOPHILS # BLD AUTO: 0.03 THOUSANDS/ΜL (ref 0–0.1)
BASOPHILS NFR BLD AUTO: 1 % (ref 0–1)
BILIRUB SERPL-MCNC: 0.56 MG/DL (ref 0.2–1)
BUN SERPL-MCNC: 16 MG/DL (ref 5–25)
CALCIUM SERPL-MCNC: 9.5 MG/DL (ref 8.3–10.1)
CHLORIDE SERPL-SCNC: 108 MMOL/L (ref 100–108)
CO2 SERPL-SCNC: 29 MMOL/L (ref 21–32)
CREAT SERPL-MCNC: 0.84 MG/DL (ref 0.6–1.3)
EOSINOPHIL # BLD AUTO: 0.07 THOUSAND/ΜL (ref 0–0.61)
EOSINOPHIL NFR BLD AUTO: 2 % (ref 0–6)
ERYTHROCYTE [DISTWIDTH] IN BLOOD BY AUTOMATED COUNT: 14.6 % (ref 11.6–15.1)
GFR SERPL CREATININE-BSD FRML MDRD: 74 ML/MIN/1.73SQ M
GLUCOSE P FAST SERPL-MCNC: 93 MG/DL (ref 65–99)
HCT VFR BLD AUTO: 43.3 % (ref 34.8–46.1)
HGB BLD-MCNC: 13.3 G/DL (ref 11.5–15.4)
IMM GRANULOCYTES # BLD AUTO: 0.04 THOUSAND/UL (ref 0–0.2)
IMM GRANULOCYTES NFR BLD AUTO: 1 % (ref 0–2)
LYMPHOCYTES # BLD AUTO: 0.84 THOUSANDS/ΜL (ref 0.6–4.47)
LYMPHOCYTES NFR BLD AUTO: 20 % (ref 14–44)
MCH RBC QN AUTO: 25.9 PG (ref 26.8–34.3)
MCHC RBC AUTO-ENTMCNC: 30.7 G/DL (ref 31.4–37.4)
MCV RBC AUTO: 84 FL (ref 82–98)
MONOCYTES # BLD AUTO: 0.41 THOUSAND/ΜL (ref 0.17–1.22)
MONOCYTES NFR BLD AUTO: 10 % (ref 4–12)
NEUTROPHILS # BLD AUTO: 2.9 THOUSANDS/ΜL (ref 1.85–7.62)
NEUTS SEG NFR BLD AUTO: 66 % (ref 43–75)
NRBC BLD AUTO-RTO: 0 /100 WBCS
PLATELET # BLD AUTO: 148 THOUSANDS/UL (ref 149–390)
PMV BLD AUTO: 10.3 FL (ref 8.9–12.7)
POTASSIUM SERPL-SCNC: 4.5 MMOL/L (ref 3.5–5.3)
PROT SERPL-MCNC: 6.7 G/DL (ref 6.4–8.2)
RBC # BLD AUTO: 5.13 MILLION/UL (ref 3.81–5.12)
SODIUM SERPL-SCNC: 143 MMOL/L (ref 136–145)
WBC # BLD AUTO: 4.29 THOUSAND/UL (ref 4.31–10.16)

## 2020-07-23 PROCEDURE — 36415 COLL VENOUS BLD VENIPUNCTURE: CPT

## 2020-07-23 PROCEDURE — 80053 COMPREHEN METABOLIC PANEL: CPT

## 2020-07-23 PROCEDURE — 85025 COMPLETE CBC W/AUTO DIFF WBC: CPT

## 2020-07-24 ENCOUNTER — TELEPHONE (OUTPATIENT)
Dept: INPATIENT UNIT | Facility: HOSPITAL | Age: 65
End: 2020-07-24

## 2020-07-24 RX ORDER — SODIUM CHLORIDE 9 MG/ML
125 INJECTION, SOLUTION INTRAVENOUS CONTINUOUS
Status: CANCELLED | OUTPATIENT
Start: 2020-07-24

## 2020-07-24 RX ORDER — ASPIRIN 81 MG/1
324 TABLET, CHEWABLE ORAL ONCE
Status: CANCELLED | OUTPATIENT
Start: 2020-07-24 | End: 2020-07-24

## 2020-07-24 NOTE — H&P (VIEW-ONLY)
Cardiology   Lucio Healy 59 y o  female MRN: [id number]  Unit/Bed#:  Encounter: [id number]      Cardiologist:  Dr Zev Cazares  1  Symptomatic severe aortic stenosis   --Follows with CT surgery as an outpatient  --Currently undergoing preoperative workup for TAVR procedure  2  Mild mitral stenosis  3  Paroxysmal atrial fibrillation; on amiodarone 200 mg daily, and apixaban 5 mg b i d   4  Dyslipidemia; on rosuvastatin 5 mg daily  5  PVD  6  Iron deficiency; on ferrous sulfate 324 mg daily    Plan  -Proceed with left heart catheterization for preoperative workup      History of Present Illness     HPI:  Lucio Healy is a 59 y o  female who presents with a medical history of preserved LV function, symptomatic severe aortic stenosis, mild mitral stenosis, paroxysmal atrial fibrillation (on Eliquis), hyperlipidemia, PVD, morbid obesity status post Juan-en-Y, rectal CA, ovarian/uterine CA status post prox tech tummy/permanent colostomy/abdominal peroneal wrist section/posterior vaginectomy/omentectomy/radical TH A-BSO with adjunct chemotherapy, and former tobacco use  She follows with Dr Andrew Olivas with [facility]  She was diagnosed with a heart murmur dating back to her Juan-en-Y procedure in 2016  She had been followed by cardiology with sterile echocardiograms and in 2018 echo demonstrated moderate to severe AS, but was asymptomatic  More recently patient was hospitalized back in February of 2020 with new onset atrial fibrillation with RVR  A 2D TTE at that time demonstrated progression of her aortic stenosis to the severe range  She has been symptomatic with complaints intermittent lightheadedness, and occasional chest pressure/heaviness  She was recommended to undergo CT surgery evaluation for aortic valve surgery  After meeting with CT surgery as an outpatient back in March of 2020, it was decided to proceed with TAVR procedure      As part of preoperative workup, patient now presents to the [place] [id number] on 2020 for an elective [facility] procedure  Historical Information   Past Medical History:   Diagnosis Date    Anemia     Arthritis     Cancer ([facility] )     rectal    Chronic lower back pain     Chronic pain disorder     back pain    Colon cancer ([facility] ) 2018    Diabetes mellitus ([facility]     Endometrial cancer ([facility] ) 2018    GERD (gastroesophageal reflux disease)     History of chemotherapy     History of MRSA infection     Morbid obesity ([facility]     Ovarian cancer ([facility] ) 2018    Rectal cancer ([facility]     Spinal stenosis     Systolic murmur     Unsteady gait     uses walker    Wears dentures     Wears glasses      Past Surgical History:   Procedure Laterality Date    ABDOMINAL PERINEAL BOWEL RESECTION W/ ILEOANAL POUCH N/A 2018    Procedure: LAPAROSCOPIC HAND ASSIST ABDOMINOPERINEAL RESECTION,  POSTERIOR VAGINECTOMY, OMENTECTOMY;  Surgeon: Sunshine Anderson MD;  [facility];  Service: Colorectal    ABDOMINAL SURGERY      abscess removed from abdomen and right thigh, a hole in thigh closed by plastic surgeon    ABSCESS DRAINAGE      abd, (R) leg, (L) leg     SECTION       SECTION      CYSTOSCOPY N/A 2018    Procedure: CYSTOSCOPY;  Surgeon: Eduardo Grove MD;  [facility];  Service: Gynecology Oncology    ESOPHAGOGASTRODUODENOSCOPY N/A 2016    Procedure: ESOPHAGOGASTRODUODENOSCOPY (EGD); Surgeon: Dominique Lan MD;  [facility];  Service:     ESOPHAGOGASTRODUODENOSCOPY N/A 3/30/2016    Procedure: ESOPHAGOGASTRODUODENOSCOPY (EGD); Surgeon: Dominique Lan MD;  [facility]; Service:     EYE SURGERY      laser eye surgery    HYSTERECTOMY N/A 2018    Procedure: RADICAL HYSTERECTOMY TOTAL ABDOMINAL (ALEXANDRA)   BSO;  Surgeon: Eduardo Grove MD;  [facility];  Service: Gynecology Oncology    JOINT REPLACEMENT Left 2017    hip    JOINT REPLACEMENT Right 2017    hip  OOPHORECTOMY Bilateral     MS COLONOSCOPY FLX DX W/COLLJ SPEC WHEN PFRMD N/A 3/9/2018    Procedure: COLONOSCOPY;  Surgeon: Lucio Han MD;  Location: [facility] GI LAB; Service: Gastroenterology    MS COLONOSCOPY FLX DX W/COLLJ [facility] WHEN PFRMD N/A 2018    Procedure: COLONOSCOPY;  Surgeon: Radha Alonzo MD;  [facility]; Service: Colorectal    MS ESOPHAGOGASTRODUODENOSCOPY TRANSORAL DIAGNOSTIC N/A 2018    Procedure: ESOPHAGOGASTRODUODENOSCOPY (EGD); Surgeon: Lucio Han MD;  Location: [facility];   Service: Gastroenterology    MS LAP GASTRIC BYPASS/SOLEDAD-EN-Y N/A 2016    Procedure: BYPASS GASTRIC  SOLEDAD-EN-Y LAPAROSCOPIC;  Surgeon: Jane Butler MD;  [facility];  Service: Bariatrics    MS LAP, SURG COLOSTOMY N/A 2018    Procedure: PERMANENT END COLOSTOMY;  Surgeon: Radha Alonzo MD;  [facility];  Service: Colorectal    MS LAP, SURG PROCTECTOMY W COLOSTOMY N/A 2018    Procedure: PROCTECTOMY;  Surgeon: Radha Alonzo MD;  [facility];  Service: Colorectal    TUBAL LIGATION       Social History   Social History     Substance and Sexual Activity   Alcohol Use Not Currently     Social History     Substance and Sexual Activity   Drug Use Not Currently    Types: Oxycodone    Comment: Percocet for lower back pain prn     Social History     Tobacco Use   Smoking Status Former Smoker    Packs/day: 1 00    Years: 25 00    Pack years: 25 00    Last attempt to quit: 3/30/2006    Years since quittin 3   Smokeless Tobacco Never Used     Family History:   Family History   Adopted: Yes   Problem Relation Age of Onset    Leukemia Mother     Heart disease Father     Coronary artery disease Father     Diabetes Father     No Known Problems Sister     No Known Problems Brother     Diabetes Son     No Known Problems Brother     No Known Problems Brother     No Known Problems Sister     No Known Problems Sister Meds/Allergies   all medications and allergies reviewed  Allergies   Allergen Reactions    Tramadol Other (See Comments)     Dizzy         Objective   Vitals: not currently breastfeeding  [unfilled]    Invasive Devices     Drain            Colostomy Descending/sigmoid  days                Review of Systems:  Review of Systems   Constitutional: Negative for chills and fever  Respiratory: Negative for chest tightness and shortness of breath  Cardiovascular: Negative for chest pain, palpitations and leg swelling  Gastrointestinal: Negative for abdominal pain  Genitourinary: Negative for difficulty urinating and dysuria  Musculoskeletal: Negative for arthralgias and myalgias  Neurological: Negative for dizziness, light-headedness and headaches  All other systems reviewed and are negative  Physical Exam   Constitutional: She is oriented to person, place, and time  She appears well-developed and well-nourished  HENT:   Head: Normocephalic and atraumatic  Mouth/Throat: Oropharynx is clear and moist    Eyes: No scleral icterus  Neck: No JVD present  Cardiovascular: Normal rate, regular rhythm and intact distal pulses  Murmur heard  Pulmonary/Chest: Effort normal and breath sounds normal  She has no wheezes  She has no rales  Abdominal: Soft  Bowel sounds are normal  There is no tenderness  Musculoskeletal: Normal range of motion  She exhibits no edema  Neurological: She is alert and oriented to person, place, and time  Skin: Skin is warm and dry  Capillary refill takes less than 2 seconds  Psychiatric: She has a normal mood and affect  Nursing note and vitals reviewed  Lab Results: I have personally reviewed pertinent lab results  Imaging: I have personally reviewed pertinent reports        Code Status:LVL 1 Full Code    Epic/ Allscripts/Care Everywhere records reviewed: Yes    ** Please Note: Fluency DirectDictation voice to text software may have been used in the creation of this document   **

## 2020-07-24 NOTE — H&P
Cardiology   Sharyn Khanna 59 y o  female MRN: [id number]  Unit/Bed#:  Encounter: [id number]      Cardiologist:  Dr Michael Ledezma  1  Symptomatic severe aortic stenosis   --Follows with CT surgery as an outpatient  --Currently undergoing preoperative workup for TAVR procedure  2  Mild mitral stenosis  3  Paroxysmal atrial fibrillation; on amiodarone 200 mg daily, and apixaban 5 mg b i d   4  Dyslipidemia; on rosuvastatin 5 mg daily  5  PVD  6  Iron deficiency; on ferrous sulfate 324 mg daily    Plan  -Proceed with left heart catheterization for preoperative workup      History of Present Illness     HPI:  Sharyn Khanna is a 59 y o  female who presents with a medical history of preserved LV function, symptomatic severe aortic stenosis, mild mitral stenosis, paroxysmal atrial fibrillation (on Eliquis), hyperlipidemia, PVD, morbid obesity status post Juan-en-Y, rectal CA, ovarian/uterine CA status post prox tech tummy/permanent colostomy/abdominal peroneal wrist section/posterior vaginectomy/omentectomy/radical TH A-BSO with adjunct chemotherapy, and former tobacco use  She follows with Dr Kelsie Alvarado with [facility]  She was diagnosed with a heart murmur dating back to her Juan-en-Y procedure in 2016  She had been followed by cardiology with sterile echocardiograms and in 2018 echo demonstrated moderate to severe AS, but was asymptomatic  More recently patient was hospitalized back in February of 2020 with new onset atrial fibrillation with RVR  A 2D TTE at that time demonstrated progression of her aortic stenosis to the severe range  She has been symptomatic with complaints intermittent lightheadedness, and occasional chest pressure/heaviness  She was recommended to undergo CT surgery evaluation for aortic valve surgery  After meeting with CT surgery as an outpatient back in March of 2020, it was decided to proceed with TAVR procedure      As part of preoperative workup, patient now presents to the [place] [id number] on 2020 for an elective C procedure  Historical Information   Past Medical History:   Diagnosis Date    Anemia     Arthritis     Cancer [facility] )     rectal    Chronic lower back pain     Chronic pain disorder     back pain    Colon cancer ([facility] ) 2018    Diabetes mellitus ([facility] )     Endometrial cancer ([facility] ) 2018    GERD (gastroesophageal reflux disease)     History of chemotherapy     History of MRSA infection     Morbid obesity ([facility] )     Ovarian cancer ([facility] ) 2018    Rectal cancer ([facility] )     Spinal stenosis     Systolic murmur     Unsteady gait     uses walker    Wears dentures     Wears glasses      Past Surgical History:   Procedure Laterality Date    ABDOMINAL PERINEAL BOWEL RESECTION W/ ILEOANAL POUCH N/A 2018    Procedure: LAPAROSCOPIC HAND ASSIST ABDOMINOPERINEAL RESECTION,  POSTERIOR VAGINECTOMY, OMENTECTOMY;  Surgeon: Todd Carrera MD;  [facility];  Service: Colorectal    ABDOMINAL SURGERY      abscess removed from abdomen and right thigh, a hole in thigh closed by plastic surgeon    ABSCESS DRAINAGE      abd, (R) leg, (L) leg     SECTION       SECTION      CYSTOSCOPY N/A 2018    Procedure: CYSTOSCOPY;  Surgeon: Ariane Villa MD;  [facility];  Service: Gynecology Oncology    ESOPHAGOGASTRODUODENOSCOPY N/A 2016    Procedure: ESOPHAGOGASTRODUODENOSCOPY (EGD); Surgeon: Ransom Lanes, MD;  [facility];  Service:     ESOPHAGOGASTRODUODENOSCOPY N/A 3/30/2016    Procedure: ESOPHAGOGASTRODUODENOSCOPY (EGD); Surgeon: Ransom Lanes, MD;  [facility]; Service:     EYE SURGERY      laser eye surgery    HYSTERECTOMY N/A 2018    Procedure: RADICAL HYSTERECTOMY TOTAL ABDOMINAL (ALEXANDRA)   BSO;  Surgeon: Ariane Villa MD;  [facility];  Service: Gynecology Oncology    JOINT REPLACEMENT Left 2017    hip    JOINT REPLACEMENT Right 2017    hip  OOPHORECTOMY Bilateral     LA COLONOSCOPY FLX DX W/COLLJ SPEC WHEN PFRMD N/A 3/9/2018    Procedure: COLONOSCOPY;  Surgeon: Inna Acevedo MD;  Location: [facility] LAB; Service: Gastroenterology    LA COLONOSCOPY FLX DX W/COLLJ [facility] WHEN PFRMD N/A 2018    Procedure: COLONOSCOPY;  Surgeon: Rambo Brennan MD;  [facility]; Service: Colorectal    LA ESOPHAGOGASTRODUODENOSCOPY TRANSORAL DIAGNOSTIC N/A 2018    Procedure: ESOPHAGOGASTRODUODENOSCOPY (EGD); Surgeon: Inna Acevedo MD;  Location: [facility];   Service: Gastroenterology    LA LAP GASTRIC BYPASS/SOLEDAD-EN-Y N/A 2016    Procedure: BYPASS GASTRIC  SOLEDAD-EN-Y LAPAROSCOPIC;  Surgeon: Trevin Easton MD;  [facility];  Service: Bariatrics    LA LAP, SURG COLOSTOMY N/A 2018    Procedure: PERMANENT END COLOSTOMY;  Surgeon: Rambo Brennan MD;  [facility];  Service: Colorectal    LA LAP, SURG PROCTECTOMY W COLOSTOMY N/A 2018    Procedure: PROCTECTOMY;  Surgeon: Rambo Brennan MD;  [facility];  Service: Colorectal    TUBAL LIGATION       Social History   Social History     Substance and Sexual Activity   Alcohol Use Not Currently     Social History     Substance and Sexual Activity   Drug Use Not Currently    Types: Oxycodone    Comment: Percocet for lower back pain prn     Social History     Tobacco Use   Smoking Status Former Smoker    Packs/day: 1 00    Years: 25 00    Pack years: 25 00    Last attempt to quit: 3/30/2006    Years since quittin 3   Smokeless Tobacco Never Used     Family History:   Family History   Adopted: Yes   Problem Relation Age of Onset    Leukemia Mother     Heart disease Father     Coronary artery disease Father     Diabetes Father     No Known Problems Sister     No Known Problems Brother     Diabetes Son     No Known Problems Brother     No Known Problems Brother     No Known Problems Sister     No Known Problems Sister Meds/Allergies   all medications and allergies reviewed  Allergies   Allergen Reactions    Tramadol Other (See Comments)     Dizzy         Objective   Vitals: not currently breastfeeding  [unfilled]    Invasive Devices     Drain            Colostomy Descending/sigmoid  days                Review of Systems:  Review of Systems   Constitutional: Negative for chills and fever  Respiratory: Negative for chest tightness and shortness of breath  Cardiovascular: Negative for chest pain, palpitations and leg swelling  Gastrointestinal: Negative for abdominal pain  Genitourinary: Negative for difficulty urinating and dysuria  Musculoskeletal: Negative for arthralgias and myalgias  Neurological: Negative for dizziness, light-headedness and headaches  All other systems reviewed and are negative  Physical Exam   Constitutional: She is oriented to person, place, and time  She appears well-developed and well-nourished  HENT:   Head: Normocephalic and atraumatic  Mouth/Throat: Oropharynx is clear and moist    Eyes: No scleral icterus  Neck: No JVD present  Cardiovascular: Normal rate, regular rhythm and intact distal pulses  Murmur heard  Pulmonary/Chest: Effort normal and breath sounds normal  She has no wheezes  She has no rales  Abdominal: Soft  Bowel sounds are normal  There is no tenderness  Musculoskeletal: Normal range of motion  She exhibits no edema  Neurological: She is alert and oriented to person, place, and time  Skin: Skin is warm and dry  Capillary refill takes less than 2 seconds  Psychiatric: She has a normal mood and affect  Nursing note and vitals reviewed  Lab Results: I have personally reviewed pertinent lab results  Imaging: I have personally reviewed pertinent reports        Code Status:LVL 1 Full Code    Epic/ Allscripts/Care Everywhere records reviewed: Yes    ** Please Note: Fluency DirectDictation voice to text software may have been used in the creation of this document   **

## 2020-07-27 ENCOUNTER — HOSPITAL ENCOUNTER (OUTPATIENT)
Dept: NON INVASIVE DIAGNOSTICS | Facility: HOSPITAL | Age: 65
Discharge: HOME | End: 2020-07-27
Attending: INTERNAL MEDICINE | Admitting: INTERNAL MEDICINE
Payer: MEDICARE

## 2020-07-27 VITALS
RESPIRATION RATE: 18 BRPM | HEIGHT: 65 IN | SYSTOLIC BLOOD PRESSURE: 172 MMHG | BODY MASS INDEX: 43.32 KG/M2 | OXYGEN SATURATION: 98 % | DIASTOLIC BLOOD PRESSURE: 74 MMHG | HEART RATE: 64 BPM | TEMPERATURE: 97.9 F | WEIGHT: 260 LBS

## 2020-07-27 DIAGNOSIS — I35.0 AORTIC STENOSIS, SEVERE: ICD-10-CM

## 2020-07-27 LAB
ATRIAL RATE: 63 BPM
P AXIS: 62 DEGREES
PR INTERVAL: 170 MS
QRS AXIS: -33 DEGREES
QRSD INTERVAL: 94 MS
QT INTERVAL: 412 MS
QTC INTERVAL: 421 MS
T WAVE AXIS: 58 DEGREES
VENTRICULAR RATE: 63 BPM

## 2020-07-27 PROCEDURE — 93454 CORONARY ARTERY ANGIO S&I: CPT | Performed by: NURSE PRACTITIONER

## 2020-07-27 PROCEDURE — 99152 MOD SED SAME PHYS/QHP 5/>YRS: CPT | Performed by: INTERNAL MEDICINE

## 2020-07-27 PROCEDURE — C1894 INTRO/SHEATH, NON-LASER: HCPCS | Performed by: NURSE PRACTITIONER

## 2020-07-27 PROCEDURE — C1769 GUIDE WIRE: HCPCS | Performed by: NURSE PRACTITIONER

## 2020-07-27 PROCEDURE — 99153 MOD SED SAME PHYS/QHP EA: CPT | Performed by: NURSE PRACTITIONER

## 2020-07-27 PROCEDURE — 93005 ELECTROCARDIOGRAM TRACING: CPT

## 2020-07-27 PROCEDURE — 99152 MOD SED SAME PHYS/QHP 5/>YRS: CPT | Performed by: NURSE PRACTITIONER

## 2020-07-27 PROCEDURE — 93010 ELECTROCARDIOGRAM REPORT: CPT | Performed by: INTERNAL MEDICINE

## 2020-07-27 PROCEDURE — 93454 CORONARY ARTERY ANGIO S&I: CPT | Performed by: INTERNAL MEDICINE

## 2020-07-27 RX ORDER — HEPARIN SODIUM 1000 [USP'U]/ML
INJECTION, SOLUTION INTRAVENOUS; SUBCUTANEOUS CODE/TRAUMA/SEDATION MEDICATION
Status: COMPLETED | OUTPATIENT
Start: 2020-07-27 | End: 2020-07-27

## 2020-07-27 RX ORDER — ACETAMINOPHEN 325 MG/1
650 TABLET ORAL EVERY 4 HOURS PRN
Status: DISCONTINUED | OUTPATIENT
Start: 2020-07-27 | End: 2020-07-27 | Stop reason: HOSPADM

## 2020-07-27 RX ORDER — FENTANYL CITRATE 50 UG/ML
INJECTION, SOLUTION INTRAMUSCULAR; INTRAVENOUS CODE/TRAUMA/SEDATION MEDICATION
Status: COMPLETED | OUTPATIENT
Start: 2020-07-27 | End: 2020-07-27

## 2020-07-27 RX ORDER — ONDANSETRON 2 MG/ML
4 INJECTION INTRAMUSCULAR; INTRAVENOUS EVERY 6 HOURS PRN
Status: DISCONTINUED | OUTPATIENT
Start: 2020-07-27 | End: 2020-07-27 | Stop reason: HOSPADM

## 2020-07-27 RX ORDER — SODIUM CHLORIDE 9 MG/ML
100 INJECTION, SOLUTION INTRAVENOUS CONTINUOUS
Status: DISCONTINUED | OUTPATIENT
Start: 2020-07-27 | End: 2020-07-27 | Stop reason: HOSPADM

## 2020-07-27 RX ORDER — MIDAZOLAM HYDROCHLORIDE 2 MG/2ML
INJECTION, SOLUTION INTRAMUSCULAR; INTRAVENOUS CODE/TRAUMA/SEDATION MEDICATION
Status: COMPLETED | OUTPATIENT
Start: 2020-07-27 | End: 2020-07-27

## 2020-07-27 RX ORDER — LIDOCAINE HYDROCHLORIDE 10 MG/ML
INJECTION, SOLUTION EPIDURAL; INFILTRATION; INTRACAUDAL; PERINEURAL CODE/TRAUMA/SEDATION MEDICATION
Status: COMPLETED | OUTPATIENT
Start: 2020-07-27 | End: 2020-07-27

## 2020-07-27 RX ORDER — ASPIRIN 81 MG/1
324 TABLET, CHEWABLE ORAL ONCE
Status: COMPLETED | OUTPATIENT
Start: 2020-07-27 | End: 2020-07-27

## 2020-07-27 RX ORDER — VERAPAMIL HYDROCHLORIDE 2.5 MG/ML
INJECTION, SOLUTION INTRAVENOUS CODE/TRAUMA/SEDATION MEDICATION
Status: COMPLETED | OUTPATIENT
Start: 2020-07-27 | End: 2020-07-27

## 2020-07-27 RX ORDER — NITROGLYCERIN 20 MG/100ML
INJECTION INTRAVENOUS CODE/TRAUMA/SEDATION MEDICATION
Status: COMPLETED | OUTPATIENT
Start: 2020-07-27 | End: 2020-07-27

## 2020-07-27 RX ORDER — SODIUM CHLORIDE 9 MG/ML
125 INJECTION, SOLUTION INTRAVENOUS CONTINUOUS
Status: DISCONTINUED | OUTPATIENT
Start: 2020-07-27 | End: 2020-07-27 | Stop reason: HOSPADM

## 2020-07-27 RX ADMIN — IOHEXOL 80 ML: 350 INJECTION, SOLUTION INTRAVENOUS at 09:48

## 2020-07-27 RX ADMIN — MIDAZOLAM 1 MG: 1 INJECTION INTRAMUSCULAR; INTRAVENOUS at 09:32

## 2020-07-27 RX ADMIN — LIDOCAINE HYDROCHLORIDE 2 ML: 10 INJECTION, SOLUTION EPIDURAL; INFILTRATION; INTRACAUDAL; PERINEURAL at 09:32

## 2020-07-27 RX ADMIN — SODIUM CHLORIDE 125 ML/HR: 0.9 INJECTION, SOLUTION INTRAVENOUS at 08:20

## 2020-07-27 RX ADMIN — MIDAZOLAM 1 MG: 1 INJECTION INTRAMUSCULAR; INTRAVENOUS at 09:43

## 2020-07-27 RX ADMIN — FENTANYL CITRATE 50 MCG: 50 INJECTION, SOLUTION INTRAMUSCULAR; INTRAVENOUS at 09:42

## 2020-07-27 RX ADMIN — ASPIRIN 81 MG 324 MG: 81 TABLET ORAL at 08:20

## 2020-07-27 RX ADMIN — FENTANYL CITRATE 50 MCG: 50 INJECTION, SOLUTION INTRAMUSCULAR; INTRAVENOUS at 09:32

## 2020-07-27 RX ADMIN — Medication 200 MCG: at 09:37

## 2020-07-27 RX ADMIN — HEPARIN SODIUM 4000 UNITS: 1000 INJECTION INTRAVENOUS; SUBCUTANEOUS at 09:37

## 2020-07-27 RX ADMIN — VERAPAMIL HYDROCHLORIDE 2.5 MG: 2.5 INJECTION INTRAVENOUS at 09:37

## 2020-07-27 NOTE — PROGRESS NOTES
Assessment/Plan:    Pyelonephritis  Reviewed antibiotic gram for our area, will rx keflex pending urine culture results  Tylenol q4hr PRN fever       Diagnoses and all orders for this visit:    Pyelonephritis  -     cephalexin (KEFLEX) 500 mg capsule; Take 1 capsule (500 mg total) by mouth every 12 (twelve) hours for 10 days  -     CBC and differential; Future  -     Basic metabolic panel; Future  -     Cancel: Urine culture  -     POCT urine dip auto non-scope  -     Urine culture          Subjective:      Patient ID: Johnny Felder is a 61 y o  female  Pt here for complaints of fevers, chills, diaphoresis, bilateral low back pain x24 hours  Pt also having episodes of urge incontinence as she cannot get to the bathroom on time  No numbness or tingling noted in her legs  Has not taken anything for the fevers  Of note, patient has dx of rectal and ovarian cancer, not on treatment currently  Follows with Dr Braden Keyes  The following portions of the patient's history were reviewed and updated as appropriate: allergies, current medications, past family history, past medical history, past social history, past surgical history and problem list     Review of Systems   Constitutional: Positive for chills, diaphoresis, fatigue and fever  Genitourinary: Positive for dysuria, frequency and urgency  Musculoskeletal: Positive for back pain  Objective:      /80   Pulse 105   Temp (!) 101 9 °F (38 8 °C)   Ht 5' 4" (1 626 m)   Wt 106 kg (233 lb)   BMI 39 99 kg/m²          Physical Exam   Constitutional: She is oriented to person, place, and time  She appears well-developed and well-nourished  febrile   HENT:   Head: Normocephalic and atraumatic  Cardiovascular: Tachycardia present  Pulmonary/Chest: Effort normal and breath sounds normal    Abdominal: Soft  Bowel sounds are normal    Musculoskeletal:   + CVA bilaterally   Neurological: She is alert and oriented to person, place, and time     Skin: Patient reports she has been having chest heaviness and heartburn the last 2 nights.  She takes omeprazole 40 mg daily.  She states she has been following the cardiac diet and had not eaten anything spicy or fried.    /65 HR 76      Patient also requested refill on her metoprolol  mg daily.  Refill sent.   Skin is warm  She is diaphoretic

## 2020-07-27 NOTE — DISCHARGE INSTRUCTIONS
1  Please see the post cardiac catheterization dishcarge instructions  No heavy lifting, greater than 10 lbs  or strenuous  activity for 48 hrs  2 Remove band aid tomorrow  Shower and wash area- wrist gently with soap and water- beginning tomorrow  Rinse and pat dry  Apply new water seal band aid  Repeat this process for 5 days  No powders, creams lotions or antibiotic ointments  for 5 days  No tub baths, hot tubs or swimming for 5 days  3  Please call our office ([phone]) if you have any fever, redness, swelling, discharge from your wrist access site  4 No driving for 1 day    ================================================================================================================================================Left Heart Catheterization   WHAT YOU NEED TO KNOW:   A left heart catheterization is a procedure to look at your heart and its arteries  You may need this procedure if you have chest pain, heart disease, or your heart is not working as it should  DISCHARGE INSTRUCTIONS:   Follow up with your healthcare provider as directed:  Write down your questions so you remember to ask them during your visits  Limit activity as directed:   · Avoid unnecessary stair climbing for 48 hours, if a catheter was put in your groin  · Do not place pressure on your arm, hand, or wrist, if the catheter was placed in your wrist  Avoid pushing, pulling, or heavy lifting with that arm  · If you need to cough, support the area where the catheter was inserted with your hand  · Ask your healthcare provider how long you need to limit movement and avoid certain activities  · You may feel like resting more after your procedure  Slowly start to do more each day  Rest when you feel it is needed  Drink liquids as directed:  Liquids help flush the dye used for your procedure out of your body  Ask your healthcare provider how much liquid to drink each day, and which liquids to drink   Some foods, such as soup and fruit, also provide liquid  Wound care:  Ask your healthcare provider about how to care for your incision wound  Ask when you can get into a tub, shower, or pool  Contact your healthcare provider if:   · You have a fever  · The skin around your wound is red, swollen, or has pus coming from it  · You have trouble breathing, or your skin is itchy, swollen, or has a rash  · You have questions or concerns about your condition or care  Seek care immediately or call 911 if:   · The area where the catheter was placed is swollen and filled with blood or is bleeding  · The leg or arm used for the procedure becomes numb or turns white or blue  · You feel lightheaded, short of breath, and have chest pain  · You cough up blood  · You have any of the following signs of a heart attack:      ¨ Squeezing, pressure, or pain in your chest that lasts longer than 5 minutes or returns    ¨ Discomfort or pain in your back, neck, jaw, stomach, or arm     ¨ Trouble breathing    ¨ Nausea or vomiting    ¨ Lightheadedness or a sudden cold sweat, especially with chest pain or trouble breathing    · Your arm or leg feels warm, tender, and painful  It may look swollen and red  · You have any of the following signs of a stroke:     ¨ Part of your face droops or is numb    ¨ Weakness in an arm or leg    ¨ Confusion or difficulty speaking    ¨ Dizziness, a severe headache, or vision loss  © 2017 [employer] Information is for End User's use only and may not be sold, redistributed or otherwise used for commercial purposes  All illustrations and images included in CareNotes® are the copyrighted property of [employer]  or Arnaldo Carter  The above information is an  only  It is not intended as medical advice for individual conditions or treatments   Talk to your doctor, nurse or pharmacist before following any medical regimen to see if it is safe and effective for you

## 2020-07-27 NOTE — INTERVAL H&P NOTE
Update: (This section must be completed if the H&P was completed greater than 24 hrs to procedure or admission)    H&P reviewed  After examining the patient, I find no changed to the H&P since it had been written  Patient re-evaluated   Accept as history and physical     Margarito Aguilar MD/July 27, 2020/9:10 AM

## 2020-07-29 ENCOUNTER — OFFICE VISIT (OUTPATIENT)
Dept: CARDIAC SURGERY | Facility: CLINIC | Age: 65
End: 2020-07-29
Payer: MEDICARE

## 2020-07-29 VITALS
WEIGHT: 270 LBS | HEIGHT: 65 IN | SYSTOLIC BLOOD PRESSURE: 110 MMHG | BODY MASS INDEX: 44.98 KG/M2 | RESPIRATION RATE: 18 BRPM | DIASTOLIC BLOOD PRESSURE: 70 MMHG | HEART RATE: 71 BPM | TEMPERATURE: 97.9 F

## 2020-07-29 DIAGNOSIS — Z11.59 SCREENING FOR VIRAL DISEASE: ICD-10-CM

## 2020-07-29 DIAGNOSIS — I35.0 AORTIC STENOSIS, SEVERE: Primary | ICD-10-CM

## 2020-07-29 DIAGNOSIS — I35.0 AORTIC STENOSIS, SEVERE: ICD-10-CM

## 2020-07-29 DIAGNOSIS — Z01.818 PREOP TESTING: ICD-10-CM

## 2020-07-29 PROCEDURE — 3008F BODY MASS INDEX DOCD: CPT | Performed by: NURSE PRACTITIONER

## 2020-07-29 PROCEDURE — 3074F SYST BP LT 130 MM HG: CPT | Performed by: NURSE PRACTITIONER

## 2020-07-29 PROCEDURE — 3078F DIAST BP <80 MM HG: CPT | Performed by: NURSE PRACTITIONER

## 2020-07-29 PROCEDURE — 99215 OFFICE O/P EST HI 40 MIN: CPT | Performed by: NURSE PRACTITIONER

## 2020-07-29 PROCEDURE — U0003 INFECTIOUS AGENT DETECTION BY NUCLEIC ACID (DNA OR RNA); SEVERE ACUTE RESPIRATORY SYNDROME CORONAVIRUS 2 (SARS-COV-2) (CORONAVIRUS DISEASE [COVID-19]), AMPLIFIED PROBE TECHNIQUE, MAKING USE OF HIGH THROUGHPUT TECHNOLOGIES AS DESCRIBED BY CMS-2020-01-R: HCPCS | Performed by: NURSE PRACTITIONER

## 2020-07-29 PROCEDURE — 1036F TOBACCO NON-USER: CPT | Performed by: NURSE PRACTITIONER

## 2020-07-29 RX ORDER — CHLORHEXIDINE GLUCONATE 0.12 MG/ML
15 RINSE ORAL ONCE
Status: CANCELLED | OUTPATIENT
Start: 2020-07-29 | End: 2020-07-29

## 2020-07-29 NOTE — PROGRESS NOTES
Consultation - Cardiothoracic Surgery   Lisa Ward 59 y o  female MRN: [id number]    Physician Requesting Consult: Dr Rory Coe    Reason for Consult / Principal Problem: Aortic stenosis, Non-Rheumatic    History of Present Illness: Camym Maciel is a 59y o  year old female who was previously evaluated in our office by Francies Homans, D O  for transcatheter aortic valve replacement on 3/4/2020  During this initial consultation, arrangements were made for the following studies to be completed: Gated CTA of the chest/abdomen/pelvis, 3D THO, cardiac catheterization, dental clearance, pulmonary function tests with ABG, and carotid artery ultrasound  Cammy Maciel now presents to review the results of these tests to confirm the suitability of proceeding with transcatheter aortic valve replacment  She had waited to return to our office to schedule her procedure due to the [place] pandemic  In review, Cammy Maciel is a 59y o  year old female with symptomatic severe aortic stenosis  PMHx is notable for mitral stenosis, HLD, PVD, morbid obesity s/p Juan-En-Y (2/2016), Rectal cancer (IIA), ovarian and uterine cancer (IIIC) s/p protectomy/permamnent colostomy/abdominal perineal resection/posterior vaginectomy/omentectomy/radical ALEXANDRA-BSO (9/2018) with adjuvant chemo, chronic rectal fistula, spinal stenosis & DJD s/p b/l THR's  Patient states she was told she had a heart murmur at the time of her bariatric surgery  She has been followed by cardiology with serial echocardiograms and in 2018, echo revealed moderate to severe AS  At that time is when she was diagnosed with cancer and underwent surgical resection and chemo  She reports she was asymptomatic  She was admitted to the hospital 2/24/20 with new onset Afib with RVR   Echocardiogram during that hospitalization demonstrated progression of her stenosis to a severe range with an CHARBEL 0 71 cm2 and peak velocity 457 cm/s      Upon interview today, patient reports an increase in BERG  She denies chest pain,  Lightheadedness, weight gain, edema, PND or orthopnea  She lives in an appt and is independent with her ADL's using a walker or cane  She states she does not walk much due to her spinal stenosis and prior THRs  Patient quit smoking and drinking in   She has a full upper denture and partial lower plate with a few remaining lower teeth  She has no dental issues  Past Medical History:  Past Medical History:   Diagnosis Date    Anemia     Arthritis     Cancer ([place] [address] )     rectal    Chronic lower back pain     Chronic pain disorder     back pain    Colon cancer ([place] [address] ) 2018    Diabetes mellitus ([place] [address] )     Endometrial cancer ([place] [address] ) 2018    GERD (gastroesophageal reflux disease)     History of chemotherapy     History of MRSA infection     Morbid obesity ([place] [address] )     Ovarian cancer ([place] [address] ) 2018    Rectal cancer ([place] [address] )     Spinal stenosis     Systolic murmur     Unsteady gait     uses walker    Wears dentures     Wears glasses          Past Surgical History:   Past Surgical History:   Procedure Laterality Date    ABDOMINAL PERINEAL BOWEL RESECTION W/ ILEOANAL POUCH N/A 2018    Procedure: LAPAROSCOPIC HAND ASSIST ABDOMINOPERINEAL RESECTION,  POSTERIOR VAGINECTOMY, OMENTECTOMY;  Surgeon: Brooke Humphrey MD;  [facility];  Service: Colorectal    ABDOMINAL SURGERY      abscess removed from abdomen and right thigh, a hole in thigh closed by plastic surgeon    ABSCESS DRAINAGE      abd, (R) leg, (L) leg     SECTION       SECTION      CYSTOSCOPY N/A 2018    Procedure: CYSTOSCOPY;  Surgeon: Luciana Tavares MD;  [facility];  Service: Gynecology Oncology    ESOPHAGOGASTRODUODENOSCOPY N/A 2016    Procedure: ESOPHAGOGASTRODUODENOSCOPY (EGD);   Surgeon: Preet Phillips MD;  [facility];  Service:     ESOPHAGOGASTRODUODENOSCOPY N/A 3/30/2016    Procedure: ESOPHAGOGASTRODUODENOSCOPY (EGD); Surgeon: Emery Garcia MD;  [facility]; Service:     EYE SURGERY      laser eye surgery    HYSTERECTOMY N/A 9/6/2018    Procedure: RADICAL HYSTERECTOMY TOTAL ABDOMINAL (ALEXANDRA)  BSO;  Surgeon: Jasmine Estrella MD;  [facility];  Service: Gynecology Oncology    JOINT REPLACEMENT Left 2017    hip    JOINT REPLACEMENT Right 2017    hip    OOPHORECTOMY Bilateral     [place] COLONOSCOPY FLX DX W/COLLJ SPEC WHEN PFRMD N/A 3/9/2018    Procedure: COLONOSCOPY;  Surgeon: Allie Kraus MD;  [facility]; Service: Gastroenterology    [place] COLONOSCOPY FLX DX W/COLLJ [facility] WHEN PFRMD N/A 9/5/2018    Procedure: COLONOSCOPY;  Surgeon: Rory Vargas MD;  [facility]; Service: Colorectal    [place] ESOPHAGOGASTRODUODENOSCOPY TRANSORAL DIAGNOSTIC N/A 2/2/2018    Procedure: ESOPHAGOGASTRODUODENOSCOPY (EGD); Surgeon: Allie Kraus MD;  Location: [facility];   Service: Gastroenterology    [place] LAP GASTRIC BYPASS/SOLEDAD-EN-Y N/A 7/18/2016    Procedure: BYPASS GASTRIC  SOLEDAD-EN-Y LAPAROSCOPIC;  Surgeon: Emery Garcia MD;  [facility];  Service: Bariatrics    [place] LAP, SURG COLOSTOMY N/A 9/6/2018    Procedure: PERMANENT END COLOSTOMY;  Surgeon: Rory Vargas MD;  [facility];  Service: Colorectal    [place] LAP, SURG PROCTECTOMY W COLOSTOMY N/A 9/6/2018    Procedure: PROCTECTOMY;  Surgeon: Rory Vargas MD;  [facility];  Service: Colorectal    TUBAL LIGATION           Family History:  Family History   Adopted: Yes   Problem Relation Age of Onset    Leukemia Mother     Heart disease Father     Coronary artery disease Father     Diabetes Father     No Known Problems Sister     No Known Problems Brother     Diabetes Son     No Known Problems Brother     No Known Problems Brother     No Known Problems Sister     No Known Problems Sister          Social History:    Social History     Substance and Sexual Activity   Alcohol Use Not Currently     Social History     Substance and Sexual Activity   Drug Use Not Currently    Types: Oxycodone    Comment: Percocet for lower back pain prn     Social History     Tobacco Use   Smoking Status Former Smoker    Packs/day: 1 00    Years: 25 00    Pack years: 25 00    Last attempt to quit: 3/30/2006    Years since quittin 3   Smokeless Tobacco Never Used       Home Medications:   Prior to Admission medications    Medication Sig Start Date End Date Taking?  Authorizing Provider   amiodarone 200 mg tablet Take 1 tablet (200 mg total) by mouth daily with breakfast 3/11/20  Yes Graeme Narvaez DO   anastrozole (ARIMIDEX) 1 mg tablet Take 1 tablet (1 mg total) by mouth daily 19  Yes Chucky Bullard DO   apixaban (ELIQUIS) 5 mg Take 1 tablet (5 mg total) by mouth 2 (two) times a day 3/11/20  Yes Graeme Narvaez DO   BIOTIN PO Take 1 tablet by mouth daily   Yes Historical Provider, MD   Calcium-Vitamin D 500-125 MG-UNIT TABS Take 1 tablet by mouth 2 (two) times a day     Yes Historical Provider, MD   cyclobenzaprine (FLEXERIL) 5 mg tablet Take 1 tablet (5 mg total) by mouth 3 (three) times a day as needed for muscle spasms for up to 10 days Do not take before driving  Yes Mikal Jaeger MD   ferrous sulfate 324 (65 Fe) mg Take 1 tablet (324 mg total) by mouth daily before breakfast 18  Yes Iraj Meter, DO   Multiple Vitamins-Minerals (BARIATRIC FUSION) CHEW Chew 1 tablet daily     Yes Historical Provider, MD   nystatin (MYCOSTATIN) powder Apply topically 2 (two) times a day as needed (rash) 6/10/20  Yes Chucky Bullard DO   omeprazole (PriLOSEC) 20 mg delayed release capsule TAKE 1 CAPSULE (20 MG TOTAL) BY MOUTH DAILY 20  Yes Adelina Wells DO   oxyCODONE (ROXICODONE) 10 MG TABS Take 10 mg by mouth every 6 (six) hours as needed   9/15/18  Yes Historical Provider, MD   rosuvastatin (CRESTOR) 5 mg tablet Take 1 tablet (5 mg total) by mouth daily 2/3/20  Yes Tin Edwards DO   mupirocin (BACTROBAN) 2 % ointment Apply 1 application topically 2 (two) times a day for 5 days Apply to each nostril twice daily for five days before your operation  7/29/20 8/3/20  GEORGETTE Fowler       Allergies: Allergies   Allergen Reactions    Tramadol Other (See Comments)     Dizzy         Review of Systems:  Review of Systems - History obtained from chart review and the patient  General ROS: negative  Psychological ROS: negative  Ophthalmic ROS: positive for - uses glasses  ENT ROS: negative  Allergy and Immunology ROS: negative  Hematological and Lymphatic ROS: negative for - bleeding problems, blood clots, bruising or jaundice  Endocrine ROS: negative  Respiratory ROS: no cough, shortness of breath, or wheezing  Cardiovascular ROS: positive for - murmur and dyspnea on exertion  negative for - chest pain, edema, loss of consciousness, orthopnea or paroxysmal nocturnal dyspnea  Gastrointestinal ROS: no abdominal pain, colostomy functioning normally  No black or bloody stools  Chronic rectal fistula with drainage  Genito-Urinary ROS: no dysuria, trouble voiding, or hematuria  Musculoskeletal ROS: positive for gait disturbance, joint pain and back pain  Neurological ROS: no TIA or stroke symptoms  Dermatological ROS: negative    Vital Signs:     Vitals:    07/29/20 1500   BP: 110/70   BP Location: Left arm   Patient Position: Sitting   Cuff Size: Large   Pulse: 71   Resp: 18   Temp: 97 9 °F (36 6 °C)   TempSrc: Tympanic   Weight: 122 kg (270 lb)   Height: 5' 5" (1 651 m)       Physical Exam:    General: Alert, oriented, obese, no acute distress  HEENT/NECK:  PERRLA  No jugular venous distention  Cardiac:Regular rate and rhythm, III/VI harsh systolic murmur RUSB  Carotids: 1+ pulses, delayed upstrokes, cardiac murmur radiates to neck   Pulmonary:  Breath sounds clear bilaterally  Abdomen:  Non-tender, Non-distended  Positive bowel sounds    Upper extremities: 2+ radial pulses; brisk capillary refill  Lower extremities: Extremities warm/dry  2+ femoral pulses; PT/DP pulses 1+ bilaterally  Trace edema B/L  Neuro: Alert and oriented X 3  Sensation is grossly intact  No focal deficits  Musculoskeletal: MAEE, stable gait  Skin: Warm/Dry, without rashes or lesions  Lab Results:     Results from last 7 days   Lab Units 07/23/20  0950   WBC Thousand/uL 4 29*   HEMOGLOBIN g/dL 13 3   HEMATOCRIT % 43 3   PLATELETS Thousands/uL 148*     Results from last 7 days   Lab Units 07/23/20  0950   POTASSIUM mmol/L 4 5   CHLORIDE mmol/L 108   CO2 mmol/L 29   BUN mg/dL 16   CREATININE mg/dL 0 84   CALCIUM mg/dL 9 5         Lab Results   Component Value Date    HGBA1C 5 1 10/15/2019     Lab Results   Component Value Date    TROPONINI <0 02 04/06/2020       Imaging Studies:     Echocardiogram: 2/24/20  LEFT VENTRICLE:  Systolic function was normal  Ejection fraction was estimated in the range of 55 % to 60 % to be 55 %  There were no regional wall motion abnormalities  Wall thickness was mildly to moderately increased measuring 1 3cm at lateral and septal walls      LEFT ATRIUM:  The atrium was moderately dilated      RIGHT ATRIUM:  The atrium was mildly dilated      MITRAL VALVE:  There was moderate to marked annular calcification  There was mild stenosis with mean gradient near 4-5mmHg     AORTIC VALVE:  Leaflets exhibited moderately increased thickness, marked calcification, markedly reduced cuspal separation, and sclerosis  Transaortic velocity was increased due to valvular stenosis    There was severe stenosis by Doppler measurements at right sternal border    Gated CTA of the chest/abdomen/pelvis: 3/16/20  VASCULAR STRUCTURES:       Annulus: diameter 27 x 22 mm      area: 489 sq mm    Annulus to LCA: 13 mm    Annulus to RCA: 14 mm    Minimal diameter right iliofemoral segment: 7 mm    Minimal diameter left iliofemoral segment: 7 mm    Cardiac catheterization: 7/27/20  CORONARY VESSELS:   --  The coronary circulation is right dominant  --  Left main: The vessel was large sized  Angiography showed mild atherosclerosis  --  LAD: The vessel was medium to large sized and moderately tortuous  Angiography showed moderate atherosclerosis  --  Circumflex: The vessel was medium to large sized  --  Ostial circumflex: There was a 50 % stenosis  --  Distal circumflex: There was a tubular 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  --  RCA: The vessel was large sized (dominant) and moderately calcified  --  Mid RCA: There was a 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  Pulmonary function tests: 3/16/20  Results:  FEV1/FVC Ratio: 79 % (99% predicted)  Forced Vital Capacity: 2 71 L    86 % predicted  FEV1: 2 14 L     87 % predicted  After administration of bronchodilator FEV1: 2 26 (+5%)     Lung volumes by body plethysmography: Total Lung Capacity 89 % predicted Residual volume 87 % predicted  RV/TLC ratio 97%     DLCO corrected for patients hemoglobin level: 91 %    Carotid artery ultrasound: 3/16/20  RIGHT:  There is <50% stenosis noted in the internal carotid artery  Plaque is  heterogenous, calcified and irregular  Vertebral artery flow is antegrade  There is no significant subclavian artery  disease  LEFT:  There is <50% stenosis noted in the internal carotid artery  Plaque is  heterogenous, calcified and irregular  Vertebral artery flow is antegrade  There is no significant subclavian artery  Disease      I have personally reviewed pertinent films in PACS    TAVR evaluation Assessment:     5 Meter Walk Test:      Attempt 1: 6 sec   Attempt 2: 6 sec   Attempt 3: 7 sec    STS Risk Score: 2 2%    NYHC: II    KCCQ-12 completed    Assessment:  Patient Active Problem List    Diagnosis Date Noted    Atrial fibrillation with rapid ventricular response ([address] ) 02/24/2020    Hyperlipidemia 02/24/2020    Aortic stenosis, severe 09/10/2019    Moderate mitral stenosis 09/10/2019    History of ovarian cancer 05/29/2019    Candidal intertrigo 05/29/2019    Vaginal bleeding 05/29/2019    Encounter for other screening for malignant neoplasm of breast 05/29/2019    Pyelonephritis 03/11/2019    History of ovarian cancer 10/03/2018    [facility]ostomy care ([facility] ) 09/12/2018    History of rectal cancer 03/22/2018    S/P gastric bypass 02/09/2018    Marginal ulcer 02/09/2018    Status post bariatric surgery 02/09/2018    Atherosclerotic peripheral vascular disease ([facility] ) 05/26/2017    Postgastrectomy malabsorption 07/27/2016    Morbid obesity due to excess calories ([place] [id number] ) 07/19/2016    Onychomycosis 07/14/2016     Severe aortic stenosis; TAVR workup completed    Plan:    Meagan Lee has symptomatic severe aortic stenosis  She has undergone testing for transcatheter aortic valve replacement  The results of these studies have been interpreted by the multidisciplinary TAVR team who have determined the patient to be Intermediate risk for open aortic valve replacement based on other pre-existing comobidities not reflected in the STS risk score  The risks, benefits, and alternatives to TAVR were discussed in detail with the patient today  They understand and wish to proceed with transfemoral transcatheter aortic valve replacement  Informed consent was obtained and a date for the intervention has been set  Meagan Lee was comfortable with our recommendations, and their questions were answered to their satisfaction  The following preoperative instructions were provided at the conclusion of their consultation:     1  You will receive a phone call from the hospital between 2:00 PM and 8:00 PM the day prior to surgery to confirm arrival time and location  For surgery on Mondays, you will receive a call on Friday  2  Do not drink or eat anything after midnight the night before surgery  That includes no water, candy, gum, lozenges, Lifesavers, etc  We recommend you not eat any salty or fatty snack food, consume alcohol or smoke the night before surgery    3  Continue taking aspirin but only 81 mg daily  4  If you take Coumadin and/or Plavix, discontinue it 5 days before surgery  5  If you are diabetic, do not take any of your diabetic pills the morning of surgery  If you take Lantus insulin, you may take it at your regularly scheduled time the day before surgery  Do not take any other insulins the morning of surgery  6  The 2 nights before surgery, take a shower each night using the special antiseptic soap or soap sponges you received from the office or hospital  Olivera Room your hair with regular shampoo and rinse completely before using the antiseptic sponges  Use the sponge to wash from your neck down, with special attention to the armpits and groin area  Do not use any other soap or cleanser on your skin  Do not use lotions, powder, deodorant or perfume of any kind on your skin after you shower  Use clean bed linens and wear clean pajamas after your shower  7  You will be prescribed Mupirocin nasal ointment  Apply to both nostrils twice a day for 5 days prior to surgery  8  Do not take a shower the morning of her surgery; you'll be given a special" bath" at the hospital   9  Notify the CT surgery office if you develop a cold, sore throat, cough, fever or other health issues before your surgery  10  Other medication changes included the following: STOP ELIQUIS 5 DAYS BEFORE SURGERY   STOP BIOTIN AND MULTIVITAMIN NOW     SIGNATURE: GEORGETTE Cook  DATE: July 29, 2020  TIME: 3:31 PM

## 2020-07-29 NOTE — LETTER
July 29, 2020     Brie Galvez[address] [place] [address]    Patient: Meagan Lee   YOB: 1955   Date of Visit: 7/29/2020       Dear Dr Daniela Andino: Thank you for referring Purnima Gore to me for evaluation  Below are my notes for this consultation  If you have questions, please do not hesitate to call me  I look forward to following your patient along with you  Sincerely,        Aylin Dinero DO        CC: Elena Werner, GEORGETTE Landis  7/29/2020  3:49 PM  Attested  Consultation - Cardiothoracic Surgery   Lisa Chilel 59 y o  female MRN: [id number]    Physician Requesting Consult: Dr Daniela Andino    Reason for Consult / Principal Problem: Aortic stenosis, Non-Rheumatic    History of Present Illness: Meagan Lee is a 59y o  year old female who was previously evaluated in our office by ROSA Hernandez  for transcatheter aortic valve replacement on 3/4/2020  During this initial consultation, arrangements were made for the following studies to be completed: Gated CTA of the chest/abdomen/pelvis, 3D THO, cardiac catheterization, dental clearance, pulmonary function tests with ABG, and carotid artery ultrasound  Meagan Lee now presents to review the results of these tests to confirm the suitability of proceeding with transcatheter aortic valve replacment  She had waited to return to our office to schedule her procedure due to the [place] pandemic  In review, Meagan Lee is a 59y o  year old female w ith symptomatic severe aortic stenosis  PMHx is notable for mitral stenosis, HLD, PVD, morbid obesity s/p Juan-En-Y (2/2016), Rectal cancer (IIA), ovarian and uterine cancer (IIIC) s/p protectomy/permamnent colostomy/abdominal perineal resection/posterior vaginectomy/omentectomy/radical ALEXANDRA-BSO (9/2018) with adjuvant chemo, chronic rectal fistula, spinal stenosis & DJD s/p b/l THR's     Patient states she was told she had a heart murmur at the time of her bariatric surgery  She has been followed by cardiology with serial echocardiograms and in 2018, echo revealed moderate to severe AS  At that time is when she was diagnosed with cancer and underwent surgical resection and chemo  She reports she was asymptomatic  She was admitted to the hospital 2/24/20 with new onset Afib with RVR  Echocardiogram during that hospitalization demonstrated progression of her stenosis to a severe range with an CHARBEL 0 71 cm2 and peak velocity 457 cm/s      Upon interview today, patient reports an increase in BERG  She denies chest pain,  Lightheadedness, weight gain, edema, PND or orthopnea  She lives in an appt and is independent with her ADL's using a walker or cane  She states she does not walk much due to her spinal stenosis and prior THRs  Patient quit smoking and drinking in 2005  She has a full upper denture and partial lower plate with a few remaining lower teeth  She has no dental issues      Past Medical History:  Past Medical History:   Diagnosis Date    Anemia     Arthritis     Cancer ([facility] )     rectal    Chronic lower back pain     Chronic pain disorder     back pain    Colon cancer ([place] [address] ) 2018    Diabetes mellitus ([place] [address] )     Endometrial cancer ([facility] ) 2018    GERD (gastroesophageal reflux disease)     History of chemotherapy     History of MRSA infection 0719/2016    Morbid obesity ([facility] )     Ovarian cancer ([facility] ) 2018    Rectal cancer ([facility] )     Spinal stenosis     Systolic murmur     Unsteady gait     uses walker    Wears dentures     Wears glasses          Past Surgical History:   Past Surgical History:   Procedure Laterality Date    ABDOMINAL PERINEAL BOWEL RESECTION W/ ILEOANAL POUCH N/A 9/6/2018    Procedure: LAPAROSCOPIC HAND ASSIST ABDOMINOPERINEAL RESECTION,  POSTERIOR VAGINECTOMY, OMENTECTOMY;  Surgeon: Jimmy Nelson MD;  [facility];  Service: Colorectal    ABDOMINAL SURGERY      abscess removed from abdomen and right thigh, a hole in thigh closed by plastic surgeon    ABSCESS DRAINAGE      abd, (R) leg, (L) leg     SECTION       SECTION      CYSTOSCOPY N/A 2018    Procedure: Susa Ovens;  Surgeon: Josy Aquino MD;  [facility];  Service: Gynecology Oncology    ESOPHAGOGASTRODUODENOSCOPY N/A 2016    Procedure: ESOPHAGOGASTRODUODENOSCOPY (EGD); Surgeon: Ara Lechuga MD;  [facility];  Service:     ESOPHAGOGASTRODUODENOSCOPY N/A 3/30/2016    Procedure: ESOPHAGOGASTRODUODENOSCOPY (EGD); Surgeon: Ara Lechuga MD;  [facility]; Service:     EYE SURGERY      laser eye surgery    HYSTERECTOMY N/A 2018    Procedure: RADICAL HYSTERECTOMY TOTAL ABDOMINAL (ALEXANDRA)  BSO;  Surgeon: Josy Aquino MD;  [facility];  Service: Gynecology Oncology    JOINT REPLACEMENT Left 2017    hip    JOINT REPLACEMENT Right 2017    hip    OOPHORECTOMY Bilateral     IN COLONOSCOPY FLX DX W/COLLJ SPEC WHEN PFRMD N/A 3/9/2018    Procedure: COLONOSCOPY;  Surgeon: Rayne Garibay MD;  Location: [place] [address] GI LAB; Service: Gastroenterology    IN COLONOSCOPY FLX DX W/COLLJ [facility] WHEN PFRMD N/A 2018    Procedure: COLONOSCOPY;  Surgeon: Everett Edwards MD;  [facility]; Service: Colorectal    IN ESOPHAGOGASTRODUODENOSCOPY TRANSORAL DIAGNOSTIC N/A 2018    Procedure: ESOPHAGOGASTRODUODENOSCOPY (EGD); Surgeon: Rayne Garibay MD;  Location: [facility] GI [facility];   Service: Gastroenterology    IN LAP GASTRIC BYPASS/SOLEDAD-EN-Y N/A 2016    Procedure: BYPASS GASTRIC  SOLEDAD-EN-Y LAPAROSCOPIC;  Surgeon: Ara Lechuga MD;  [facility];  Service: Bariatrics    IN LAP, SURG COLOSTOMY N/A 2018    Procedure: PERMANENT END COLOSTOMY;  Surgeon: Everett Edwards MD;  [facility];  Service: Colorectal    IN LAP, SURG PROCTECTOMY W COLOSTOMY N/A 2018    Procedure: PROCTECTOMY;  Surgeon: Everett Edwards MD;  [facility];  Service: Colorectal  TUBAL LIGATION           Family History:  Family History   Adopted: Yes   Problem Relation Age of Onset    Leukemia Mother     Heart disease Father     Coronary artery disease Father     Diabetes Father     No Known Problems Sister     No Known Problems Brother     Diabetes Son     No Known Problems Brother     No Known Problems Brother     No Known Problems Sister     No Known Problems Sister          Social History:    Social History     Substance and Sexual Activity   Alcohol Use Not Currently     Social History     Substance and Sexual Activity   Drug Use Not Currently    Types: Oxycodone    Comment: Percocet for lower back pain prn     Social History     Tobacco Use   Smoking Status Former Smoker    Packs/day:     Years:     Pack years:     Last attempt to quit: 3/30/2006    Years since quittin 3   Smokeless Tobacco Never Used       Home Medications:   Prior to Admission medications    Medication Sig Start Date End Date Taking?  Authorizing Provider   amiodarone 200 mg tablet Take 1 tablet (200 mg total) by mouth daily with breakfast 3/11/20  Yes Graeme Narvaez DO   anastrozole (ARIMIDEX) 1 mg tablet Take 1 tablet (1 mg total) by mouth daily 19  Yes Joanna Zheng,    apixaban (ELIQUIS) 5 mg Take 1 tablet (5 mg total) by mouth 2 (two) times a day 3/11/20  Yes Graeme Narvaez DO   BIOTIN PO Take 1 tablet by mouth daily   Yes Historical Provider, MD   Calcium-Vitamin D 500-125 MG-UNIT TABS Take 1 tablet by mouth 2 (two) times a day     Yes Historical Provider, MD   cyclobenzaprine (FLEXERIL) 5 mg tablet Take 1 tablet (5 mg total) by mouth 3 (three) times a day as needed for muscle spasms for up to 10 days Do not take before driving 9/7/41 3/17/91 Yes Mikal Jaeger MD   ferrous sulfate 324 (65 Fe) mg Take 1 tablet (324 mg total) by mouth daily before breakfast 18  Yes Barbara Duran, DO   Multiple Vitamins-Minerals (BARIATRIC FUSION) CHEW Chew 1 tablet daily     Yes Historical Provider, MD   nystatin (MYCOSTATIN) powder Apply topically 2 (two) times a day as needed (rash) 6/10/20  Yes Greg Brian,    omeprazole (PriLOSEC) 20 mg delayed release capsule TAKE 1 CAPSULE (20 MG TOTAL) BY MOUTH DAILY 7/16/20  Yes DO Wayne   oxyCODONE (ROXICODONE) 10 MG TABS Take 10 mg by mouth every 6 (six) hours as needed   9/15/18  Yes Historical Provider, MD   rosuvastatin (CRESTOR) 5 mg tablet Take 1 tablet (5 mg total) by mouth daily 2/3/20  Yes Graeme Narvaez,    mupirocin (BACTROBAN) 2 % ointment Apply 1 application topically 2 (two) times a day for 5 days Apply to each nostril twice daily for five days before your operation  7/29/20 8/3/20  GEORGETTE Gandhi       Allergies: Allergies   Allergen Reactions    Tramadol Other (See Comments)     Dizzy         Review of Systems:  Review of Systems - History obtained from chart review and the patient  General ROS: negative  Psychological ROS: negative  Ophthalmic ROS: positive for - uses glasses  ENT ROS: negative  Allergy and Immunology ROS: negative  Hematological and Lymphatic ROS: negative for - bleeding problems, blood clots, bruising or jaundice  Endocrine ROS: negative  Respiratory ROS: no cough, shortness of breath, or wheezing  Cardiovascular ROS: positive for - murmur and dyspnea on exertion  negative for - chest pain, edema, loss of consciousness, orthopnea or paroxysmal nocturnal dyspnea  Gastrointestinal ROS: no abdominal pain, colostomy functioning normally  No black or bloody stools   Chronic rectal fistula with drainage  Genito-Urinary ROS: no dysuria, trouble voiding, or hematuria  Musculoskeletal ROS: positive for gait disturbance, joint pain and back pain  Neurological ROS: no TIA or stroke symptoms  Dermatological ROS: negative    Vital Signs:     Vitals:    07/29/20 1500   BP: 110/70   BP Location: Left arm   Patient Position: Sitting   Cuff Size: Large   Pulse: 71   Resp: 18 Temp: 97 9 °F (36 6 °C)   TempSrc: Tympanic   Weight: 122 kg (270 lb)   Height: 5' 5" (1 651 m)       Physical Exam:    General: Alert, oriented, obese, no acute distress  HEENT/NECK:  PERRLA  No jugular venous distention  Cardiac:Regular rate and rhythm, III/VI harsh systolic murmur RUSB  Carotids: 1+ pulses, delayed upstrokes, cardiac murmur radiates to neck   Pulmonary:  Breath sounds clear bilaterally  Abdomen:  Non-tender, Non-distended  Positive bowel sounds  Upper extremities: 2+ radial pulses; brisk capillary refill  Lower extremities: Extremities warm/dry  2+ femoral pulses; PT/DP pulses 1+ bilaterally  Trace edema B/L  Neuro: Alert and oriented X 3  Sensation is grossly intact  No focal deficits  Musculoskeletal: MAEE, stable gait  Skin: Warm/Dry, without rashes or lesions  Lab Results:     Results from last 7 days   Lab Units 07/23/20  0950   WBC Thousand/uL 4 29*   HEMOGLOBIN g/dL 13 3   HEMATOCRIT % 43 3   PLATELETS Thousands/uL 148*     Results from last 7 days   Lab Units 07/23/20  0950   POTASSIUM mmol/L 4 5   CHLORIDE mmol/L 108   CO2 mmol/L 29   BUN mg/dL 16   CREATININE mg/dL 0 84   CALCIUM mg/dL 9 5         Lab Results   Component Value Date    HGBA1C 5 1 10/15/2019     Lab Results   Component Value Date    TROPONINI <0 02 04/06/2020       Imaging Studies:     Echocardiogram: 2/24/20  LEFT VENTRICLE:  Systolic function was normal  Ejection fraction was estimated in the range of 55 % to 60 % to be 55 %  There were no regional wall motion abnormalities  Wall thickness was mildly to moderately increased measuring 1 3cm at lateral and septal walls      LEFT ATRIUM:  The atrium was moderately dilated      RIGHT ATRIUM:  The atrium was mildly dilated      MITRAL VALVE:  There was moderate to marked annular calcification    There was mild stenosis with mean gradient near 4-5mmHg     AORTIC VALVE:  Leaflets exhibited moderately increased thickness, marked calcification, markedly reduced cuspal separation, and sclerosis  Transaortic velocity was increased due to valvular stenosis  There was severe stenosis by Doppler measurements at right sternal border    Gated CTA of the chest/abdomen/pelvis: 3/16/20  VASCULAR STRUCTURES:       Annulus: diameter 27 x 22 mm      area: 489 sq mm    Annulus to LCA: 13 mm    Annulus to RCA: 14 mm    Minimal diameter right iliofemoral segment: 7 mm    Minimal diameter left iliofemoral segment: 7 mm    Cardiac catheterization: 7/27/20  CORONARY VESSELS:   --  The coronary circulation is right dominant  --  Left main: The vessel was large sized  Angiography showed mild atherosclerosis  --  LAD: The vessel was medium to large sized and moderately tortuous  Angiography showed moderate atherosclerosis  --  Circumflex: The vessel was medium to large sized  --  Ostial circumflex: There was a 50 % stenosis  --  Distal circumflex: There was a tubular 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  --  RCA: The vessel was large sized (dominant) and moderately calcified  --  Mid RCA: There was a 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  Pulmonary function tests: 3/16/20  Results:  FEV1/FVC Ratio: 79 % (99% predicted)  Forced Vital Capacity: 2 71 L    86 % predicted  FEV1: 2 14 L     87 % predicted  After administration of bronchodilator FEV1: 2 26 (+5%)     Lung volumes by body plethysmography: Total Lung Capacity 89 % predicted Residual volume 87 % predicted  RV/TLC ratio 97%     DLCO corrected for patients hemoglobin level: 91 %    Carotid artery ultrasound: 3/16/20  RIGHT:  There is <50% stenosis noted in the internal carotid artery  Plaque is  heterogenous, calcified and irregular  Vertebral artery flow is antegrade  There is no significant subclavian artery  disease  LEFT:  There is <50% stenosis noted in the internal carotid artery  Plaque is  heterogenous, calcified and irregular  Vertebral artery flow is antegrade  There is no significant subclavian artery  Disease      I have personally reviewed pertinent films in PACS    TAVR evaluation Assessment:     5 Meter Walk Test:      Attempt 1: 6 sec   Attempt 2: 6 sec   Attempt 3: 7 sec    STS Risk Score: 2 2%    [facility]: II    KCCQ-12 completed    Assessment:  Patient Active Problem List    Diagnosis Date Noted    [facility] response [facility] ) 02/24/2020    Hyperlipidemia 02/24/2020    Aortic stenosis, severe 09/10/2019    Moderate mitral stenosis 09/10/2019    History of ovarian cancer 05/29/2019    Candidal intertrigo 05/29/2019    Vaginal bleeding 05/29/2019    Encounter for other screening for malignant neoplasm of breast 05/29/2019    Pyelonephritis 03/11/2019    History of ovarian cancer 10/03/2018    Colostomy care ([facility] ) 09/12/2018    History of rectal cancer 03/22/2018    S/P gastric bypass 02/09/2018    Marginal ulcer 02/09/2018    Status post bariatric surgery 02/09/2018    [facility]rosclerotic peripheral vascular disease [facility] ) 05/26/2017    Postgastrectomy malabsorption 07/27/2016    Morbid obesity due to excess calories ([facility] ) 07/19/2016    Onychomycosis 07/14/2016     Severe aortic stenosis; TAVR workup completed    Plan:    Giuliano Santana has symptomatic severe aortic stenosis  She has undergone testing for transcatheter aortic valve replacement  The results of these studies have been interpreted by the multidisciplinary TAVR team who have determined the patient to be Intermediate risk for open aortic valve replacement based on other pre-existing comobidities not reflected in the STS risk score  The risks, benefits, and alternatives to TAVR were discussed in detail with the patient today  They understand and wish to proceed with transfemoral transcatheter aortic valve replacement  Informed consent was obtained and a date for the intervention has been set  Giuliano Santana was comfortable with our recommendations, and their questions were answered to their satisfaction        The following preoperative instructions were provided at the conclusion of their consultation:     1  You will receive a phone call from the hospital between 2:00 PM and 8:00 PM the day prior to surgery to confirm arrival time and location  For surgery on Mondays, you will receive a call on Friday  2  Do not drink or eat anything after midnight the night before surgery  That includes no water, candy, gum, lozenges, Lifesavers, etc  We recommend you not eat any salty or fatty snack food, consume alcohol or smoke the night before surgery  3  Continue taking aspirin but only 81 mg daily  4  If you take Coumadin and/or Plavix, discontinue it 5 days before surgery  5  If you are diabetic, do not take any of your diabetic pills the morning of surgery  If you take Lantus insulin, you may take it at your regularly scheduled time the day before surgery  Do not take any other insulins the morning of surgery  6  The 2 nights before surgery, take a shower each night using the special antiseptic soap or soap sponges you received from the office or hospital  Scar Songster your hair with regular shampoo and rinse completely before using the antiseptic sponges  Use the sponge to wash from your neck down, with special attention to the armpits and groin area  Do not use any other soap or cleanser on your skin  Do not use lotions, powder, deodorant or perfume of any kind on your skin after you shower  Use clean bed linens and wear clean pajamas after your shower  7  You will be prescribed Mupirocin nasal ointment  Apply to both nostrils twice a day for 5 days prior to surgery  8  Do not take a shower the morning of her surgery; you'll be given a special" bath" at the hospital   9  Notify the CT surgery office if you develop a cold, sore throat, cough, fever or other health issues before your surgery  10  Other medication changes included the following: STOP ELIQUIS 5 DAYS BEFORE SURGERY   STOP BIOTIN AND MULTIVITAMIN NOW     SIGNATURE: GEORGETTE Menjivar  DATE: July 29, 2020  TIME: 3:31 PM  Attestation signed by Shayne Celeste DO at 7/29/2020  4:01 PM:  The patient was seen and examined, and I agree with the midlevel's history, physical exam, assessment and plan with the following additions:    Nelly Wadsworth was seen in the office today after completion of her preoperative TAVR testing  I reviewed her CT scan for sizing and access  She is a suitable candidate for transfemoral TAVR    The risks, benefits, and alternatives TAVR been discussed in detail with her and she wishes to proceed

## 2020-07-29 NOTE — H&P
Consultation - Cardiothoracic Surgery   Lisa Garcia 59 y o  female MRN: [id number]    Physician Requesting Consult: Dr Adeola Mir    Reason for Consult / Principal Problem: Aortic stenosis, Non-Rheumatic    History of Present Illness: Myranda Marie is a 59y o  year old female who was previously evaluated in our office by ROSA Greer  for transcatheter aortic valve replacement on 3/4/2020  During this initial consultation, arrangements were made for the following studies to be completed: Gated CTA of the chest/abdomen/pelvis, 3D THO, cardiac catheterization, dental clearance, pulmonary function tests with ABG, and carotid artery ultrasound  Myranda Marie now presents to review the results of these tests to confirm the suitability of proceeding with transcatheter aortic valve replacment  She had waited to return to our office to schedule her procedure due to the [place] pandemic  In review, Myranda Marie is a 59y o  year old female w ith symptomatic severe aortic stenosis  PMHx is notable for mitral stenosis, HLD, PVD, morbid obesity s/p Juan-En-Y (2/2016), Rectal cancer (IIA), ovarian and uterine cancer (IIIC) s/p protectomy/permamnent colostomy/abdominal perineal resection/posterior vaginectomy/omentectomy/radical ALEXANDRA-BSO (9/2018) with adjuvant chemo, chronic rectal fistula, spinal stenosis & DJD s/p b/l THR's  Patient states she was told she had a heart murmur at the time of her bariatric surgery  She has been followed by cardiology with serial echocardiograms and in 2018, echo revealed moderate to severe AS  At that time is when she was diagnosed with cancer and underwent surgical resection and chemo  She reports she was asymptomatic  She was admitted to the hospital 2/24/20 with new onset Afib with RVR   Echocardiogram during that hospitalization demonstrated progression of her stenosis to a severe range with an CHARBEL 0 71 cm2 and peak velocity 457 cm/s      Upon interview today, patient reports an increase in BERG  She denies chest pain,  Lightheadedness, weight gain, edema, PND or orthopnea  She lives in an appt and is independent with her ADL's using a walker or cane  She states she does not walk much due to her spinal stenosis and prior THRs  Patient quit smoking and drinking in   She has a full upper denture and partial lower plate with a few remaining lower teeth  She has no dental issues  Past Medical History:  Past Medical History:   Diagnosis Date    Anemia     Arthritis     Cancer ([place] [address] )     rectal    Chronic lower back pain     Chronic pain disorder     back pain    Colon cancer ([place] [address] ) 2018    Diabetes mellitus ([place] [address] )     Endometrial cancer ([place] [address] ) 2018    GERD (gastroesophageal reflux disease)     History of chemotherapy     History of MRSA infection     Morbid obesity ([place] [address] )     Ovarian cancer ([place] [address] ) 2018    Rectal cancer ([place] [address] )     Spinal stenosis     Systolic murmur     Unsteady gait     uses walker    Wears dentures     Wears glasses          Past Surgical History:   Past Surgical History:   Procedure Laterality Date    ABDOMINAL PERINEAL BOWEL RESECTION W/ ILEOANAL POUCH N/A 2018    Procedure: LAPAROSCOPIC HAND ASSIST ABDOMINOPERINEAL RESECTION,  POSTERIOR VAGINECTOMY, OMENTECTOMY;  Surgeon: Li Magana MD;  [facility];  Service: Colorectal    ABDOMINAL SURGERY      abscess removed from abdomen and right thigh, a hole in thigh closed by plastic surgeon    ABSCESS DRAINAGE      abd, (R) leg, (L) leg     SECTION       SECTION      CYSTOSCOPY N/A 2018    Procedure: CYSTOSCOPY;  Surgeon: Yumiko Ferraro MD;  [facility];  Service: Gynecology Oncology    ESOPHAGOGASTRODUODENOSCOPY N/A 2016    Procedure: ESOPHAGOGASTRODUODENOSCOPY (EGD);   Surgeon: Patrick Montanez MD;  [facility];  Service:     ESOPHAGOGASTRODUODENOSCOPY N/A 3/30/2016    Procedure: ESOPHAGOGASTRODUODENOSCOPY (EGD); Surgeon: Renetta Martinez MD;  [facility]; Service:     EYE SURGERY      laser eye surgery    HYSTERECTOMY N/A 9/6/2018    Procedure: RADICAL HYSTERECTOMY TOTAL ABDOMINAL (ALEXANDRA)  BSO;  Surgeon: Cuca Cervantes MD;  [facility];  Service: Gynecology Oncology    JOINT REPLACEMENT Left 2017    hip    JOINT REPLACEMENT Right 2017    hip    OOPHORECTOMY Bilateral     DC COLONOSCOPY FLX DX W/COLLJ SPEC WHEN PFRMD N/A 3/9/2018    Procedure: COLONOSCOPY;  Surgeon: Maged Tavares MD;  Location: [place] [address] GI LAB; Service: Gastroenterology    DC COLONOSCOPY FLX DX W/COLLJ [facility] WHEN PFRMD N/A 9/5/2018    Procedure: COLONOSCOPY;  Surgeon: Brittney Crawford MD;  [facility]; Service: Colorectal    DC ESOPHAGOGASTRODUODENOSCOPY TRANSORAL DIAGNOSTIC N/A 2/2/2018    Procedure: ESOPHAGOGASTRODUODENOSCOPY (EGD); Surgeon: Maged Tavares MD;  [facility];   Service: Gastroenterology    DC LAP GASTRIC BYPASS/SOLEDAD-EN-Y N/A 7/18/2016    Procedure: BYPASS GASTRIC  SOLEDAD-EN-Y LAPAROSCOPIC;  Surgeon: Renetta Martinez MD;  [facility];  Service: Bariatrics    DC LAP, SURG COLOSTOMY N/A 9/6/2018    Procedure: PERMANENT END COLOSTOMY;  Surgeon: Brittney Crawford MD;  [facility];  Service: Colorectal    DC LAP, SURG PROCTECTOMY W COLOSTOMY N/A 9/6/2018    Procedure: PROCTECTOMY;  Surgeon: Brittney Crawford MD;  [facility];  Service: Colorectal    TUBAL LIGATION           Family History:  Family History   Adopted: Yes   Problem Relation Age of Onset    Leukemia Mother     Heart disease Father     Coronary artery disease Father     Diabetes Father     No Known Problems Sister     No Known Problems Brother     Diabetes Son     No Known Problems Brother     No Known Problems Brother     No Known Problems Sister     No Known Problems Sister          Social History:    Social History     Substance and Sexual Activity   Alcohol Use Not Currently     Social History     Substance and Sexual Activity   Drug Use Not Currently    Types: Oxycodone    Comment: Percocet for lower back pain prn     Social History     Tobacco Use   Smoking Status Former Smoker    Packs/day:  00    Years: 25 00    Pack years: 25     Last attempt to quit: 3/30/2006    Years since quittin 3   Smokeless Tobacco Never Used       Home Medications:   Prior to Admission medications    Medication Sig Start Date End Date Taking?  Authorizing Provider   amiodarone 200 mg tablet Take 1 tablet (200 mg total) by mouth daily with breakfast 3/11/20  Yes Graeme Narvaez DO   anastrozole (ARIMIDEX) 1 mg tablet Take 1 tablet (1 mg total) by mouth daily 19  Yes Joanna Zheng DO   apixaban (ELIQUIS) 5 mg Take 1 tablet (5 mg total) by mouth 2 (two) times a day 3/11/20  Yes Graeme Narvaez DO   BIOTIN PO Take 1 tablet by mouth daily   Yes Historical Provider, MD   Calcium-Vitamin D 500-125 MG-UNIT TABS Take 1 tablet by mouth 2 (two) times a day     Yes Historical Provider, MD   cyclobenzaprine (FLEXERIL) 5 mg tablet Take 1 tablet (5 mg total) by mouth 3 (three) times a day as needed for muscle spasms for up to 10 days Do not take before driving 63 Yes Mikal Jaeger MD   ferrous sulfate 324 (65 Fe) mg Take 1 tablet (324 mg total) by mouth daily before breakfast 18  Yes Barbara Duran, DO   Multiple Vitamins-Minerals (BARIATRIC FUSION) CHEW Chew 1 tablet daily     Yes Historical Provider, MD   nystatin (MYCOSTATIN) powder Apply topically 2 (two) times a day as needed (rash) 6/10/20  Yes Joanna Zheng,    omeprazole (PriLOSEC) 20 mg delayed release capsule TAKE 1 CAPSULE (20 MG TOTAL) BY MOUTH DAILY 20  Yes Adelina Wells,    oxyCODONE (ROXICODONE) 10 MG TABS Take 10 mg by mouth every 6 (six) hours as needed   9/15/18  Yes Historical Provider, MD   rosuvastatin (CRESTOR) 5 mg tablet Take 1 tablet (5 mg total) by mouth daily 2/3/20  Yes Tracey Alvarez DO   mupirocin (BACTROBAN) 2 % ointment Apply 1 application topically 2 (two) times a day for 5 days Apply to each nostril twice daily for five days before your operation  7/29/20 8/3/20  GEORGETTE Pina       Allergies: Allergies   Allergen Reactions    Tramadol Other (See Comments)     Dizzy         Review of Systems:  Review of Systems - History obtained from chart review and the patient  General ROS: negative  Psychological ROS: negative  Ophthalmic ROS: positive for - uses glasses  ENT ROS: negative  Allergy and Immunology ROS: negative  Hematological and Lymphatic ROS: negative for - bleeding problems, blood clots, bruising or jaundice  Endocrine ROS: negative  Respiratory ROS: no cough, shortness of breath, or wheezing  Cardiovascular ROS: positive for - murmur and dyspnea on exertion  negative for - chest pain, edema, loss of consciousness, orthopnea or paroxysmal nocturnal dyspnea  Gastrointestinal ROS: no abdominal pain, colostomy functioning normally  No black or bloody stools  Chronic rectal fistula with drainage  Genito-Urinary ROS: no dysuria, trouble voiding, or hematuria  Musculoskeletal ROS: positive for gait disturbance, joint pain and back pain  Neurological ROS: no TIA or stroke symptoms  Dermatological ROS: negative    Vital Signs:     Vitals:    07/29/20 1500   BP: 110/70   BP Location: Left arm   Patient Position: Sitting   Cuff Size: Large   Pulse: 71   Resp: 18   Temp: 97 9 °F (36 6 °C)   TempSrc: Tympanic   Weight: 122 kg (270 lb)   Height: 5' 5" (1 651 m)       Physical Exam:    General: Alert, oriented, obese, no acute distress  HEENT/NECK:  PERRLA  No jugular venous distention  Cardiac:Regular rate and rhythm, III/VI harsh systolic murmur RUSB  Carotids: 1+ pulses, delayed upstrokes, cardiac murmur radiates to neck   Pulmonary:  Breath sounds clear bilaterally  Abdomen:  Non-tender, Non-distended  Positive bowel sounds    Upper extremities: 2+ radial pulses; brisk capillary refill  Lower extremities: Extremities warm/dry  2+ femoral pulses; PT/DP pulses 1+ bilaterally  Trace edema B/L  Neuro: Alert and oriented X 3  Sensation is grossly intact  No focal deficits  Musculoskeletal: MAEE, stable gait  Skin: Warm/Dry, without rashes or lesions  Lab Results:     Results from last 7 days   Lab Units 07/23/20  0950   WBC Thousand/uL 4 29*   HEMOGLOBIN g/dL 13 3   HEMATOCRIT % 43 3   PLATELETS Thousands/uL 148*     Results from last 7 days   Lab Units 07/23/20  0950   POTASSIUM mmol/L 4 5   CHLORIDE mmol/L 108   CO2 mmol/L 29   BUN mg/dL 16   CREATININE mg/dL 0 84   CALCIUM mg/dL 9 5         Lab Results   Component Value Date    HGBA1C 5 1 10/15/2019     Lab Results   Component Value Date    TROPONINI <0 02 04/06/2020       Imaging Studies:     Echocardiogram: 2/24/20  LEFT VENTRICLE:  Systolic function was normal  Ejection fraction was estimated in the range of 55 % to 60 % to be 55 %  There were no regional wall motion abnormalities  Wall thickness was mildly to moderately increased measuring 1 3cm at lateral and septal walls      LEFT ATRIUM:  The atrium was moderately dilated      RIGHT ATRIUM:  The atrium was mildly dilated      MITRAL VALVE:  There was moderate to marked annular calcification  There was mild stenosis with mean gradient near 4-5mmHg     AORTIC VALVE:  Leaflets exhibited moderately increased thickness, marked calcification, markedly reduced cuspal separation, and sclerosis  Transaortic velocity was increased due to valvular stenosis    There was severe stenosis by Doppler measurements at right sternal border    Gated CTA of the chest/abdomen/pelvis: 3/16/20  VASCULAR STRUCTURES:       Annulus: diameter 27 x 22 mm      area: 489 sq mm    Annulus to LCA: 13 mm    Annulus to RCA: 14 mm    Minimal diameter right iliofemoral segment: 7 mm    Minimal diameter left iliofemoral segment: 7 mm    Cardiac catheterization: 7/27/20  CORONARY VESSELS:   --  The coronary circulation is right dominant  --  Left main: The vessel was large sized  Angiography showed mild atherosclerosis  --  LAD: The vessel was medium to large sized and moderately tortuous  Angiography showed moderate atherosclerosis  --  Circumflex: The vessel was medium to large sized  --  Ostial circumflex: There was a 50 % stenosis  --  Distal circumflex: There was a tubular 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  --  RCA: The vessel was large sized (dominant) and moderately calcified  --  Mid RCA: There was a 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  Pulmonary function tests: 3/16/20  Results:  FEV1/FVC Ratio: 79 % (99% predicted)  Forced Vital Capacity: 2 71 L    86 % predicted  FEV1: 2 14 L     87 % predicted  After administration of bronchodilator FEV1: 2 26 (+5%)     Lung volumes by body plethysmography: Total Lung Capacity 89 % predicted Residual volume 87 % predicted  RV/TLC ratio 97%     DLCO corrected for patients hemoglobin level: 91 %    Carotid artery ultrasound: 3/16/20  RIGHT:  There is <50% stenosis noted in the internal carotid artery  Plaque is  heterogenous, calcified and irregular  Vertebral artery flow is antegrade  There is no significant subclavian artery  disease  LEFT:  There is <50% stenosis noted in the internal carotid artery  Plaque is  heterogenous, calcified and irregular  Vertebral artery flow is antegrade  There is no significant subclavian artery  Disease      I have personally reviewed pertinent films in PACS    TAVR evaluation Assessment:     5 Meter Walk Test:      Attempt 1: 6 sec   Attempt 2: 6 sec   Attempt 3: 7 sec    STS Risk Score: 2 2%    NYHC: II    KCCQ-12 completed    Assessment:  Patient Active Problem List    Diagnosis Date Noted    Atrial fibrillation with rapid ventricular response ([address] ) 02/24/2020    Hyperlipidemia 02/24/2020    Aortic stenosis, severe 09/10/2019    Moderate mitral stenosis 09/10/2019    History of ovarian cancer 05/29/2019    Candidal intertrigo 05/29/2019    Vaginal bleeding 05/29/2019    Encounter for other screening for malignant neoplasm of breast 05/29/2019    Pyelonephritis 03/11/2019    History of ovarian cancer 10/03/2018    Colostomy care ([facility] ) 09/12/2018    History of rectal cancer 03/22/2018    S/P gastric bypass 02/09/2018    Marginal ulcer 02/09/2018    Status post bariatric surgery 02/09/2018    Atherosclerotic peripheral vascular disease ([facility] ) 05/26/2017    Postgastrectomy malabsorption 07/27/2016    Morbid obesity due to excess calories ([place] [address] ) 07/19/2016    Onychomycosis 07/14/2016     Severe aortic stenosis; TAVR workup completed    Plan:    Paula Sanches has symptomatic severe aortic stenosis  She has undergone testing for transcatheter aortic valve replacement  The results of these studies have been interpreted by the multidisciplinary TAVR team who have determined the patient to be Intermediate risk for open aortic valve replacement based on other pre-existing comobidities not reflected in the STS risk score  The risks, benefits, and alternatives to TAVR were discussed in detail with the patient today  They understand and wish to proceed with transfemoral transcatheter aortic valve replacement  Informed consent was obtained and a date for the intervention has been set  Paula Sanches was comfortable with our recommendations, and their questions were answered to their satisfaction  The following preoperative instructions were provided at the conclusion of their consultation:     1  You will receive a phone call from the hospital between 2:00 PM and 8:00 PM the day prior to surgery to confirm arrival time and location  For surgery on Mondays, you will receive a call on Friday  2  Do not drink or eat anything after midnight the night before surgery  That includes no water, candy, gum, lozenges, Lifesavers, etc  We recommend you not eat any salty or fatty snack food, consume alcohol or smoke the night before surgery    3  Continue taking aspirin but only 81 mg daily  4  If you take Coumadin and/or Plavix, discontinue it 5 days before surgery  5  If you are diabetic, do not take any of your diabetic pills the morning of surgery  If you take Lantus insulin, you may take it at your regularly scheduled time the day before surgery  Do not take any other insulins the morning of surgery  6  The 2 nights before surgery, take a shower each night using the special antiseptic soap or soap sponges you received from the office or hospital  Gregorio Megan your hair with regular shampoo and rinse completely before using the antiseptic sponges  Use the sponge to wash from your neck down, with special attention to the armpits and groin area  Do not use any other soap or cleanser on your skin  Do not use lotions, powder, deodorant or perfume of any kind on your skin after you shower  Use clean bed linens and wear clean pajamas after your shower  7  You will be prescribed Mupirocin nasal ointment  Apply to both nostrils twice a day for 5 days prior to surgery  8  Do not take a shower the morning of her surgery; you'll be given a special" bath" at the hospital   9  Notify the CT surgery office if you develop a cold, sore throat, cough, fever or other health issues before your surgery  10  Other medication changes included the following: STOP ELIQUIS 5 DAYS BEFORE SURGERY   STOP BIOTIN AND MULTIVITAMIN NOW     SIGNATURE: GEORGETTE Lozano  DATE: July 29, 2020TIME: 3:31 PM

## 2020-07-30 ENCOUNTER — APPOINTMENT (OUTPATIENT)
Dept: LAB | Facility: CLINIC | Age: 65
End: 2020-07-30
Payer: MEDICARE

## 2020-07-30 DIAGNOSIS — I35.0 AORTIC STENOSIS, SEVERE: ICD-10-CM

## 2020-07-30 DIAGNOSIS — Z01.818 PREOP TESTING: ICD-10-CM

## 2020-07-30 LAB
ABO GROUP BLD: NORMAL
BLD GP AB SCN SERPL QL: NEGATIVE
INR PPP: 0.99 (ref 0.84–1.19)
PROTHROMBIN TIME: 13.1 SECONDS (ref 11.6–14.5)
RH BLD: POSITIVE
SARS-COV-2 RNA SPEC QL NAA+PROBE: NOT DETECTED
SPECIMEN EXPIRATION DATE: NORMAL

## 2020-07-30 PROCEDURE — 86900 BLOOD TYPING SEROLOGIC ABO: CPT

## 2020-07-30 PROCEDURE — 86850 RBC ANTIBODY SCREEN: CPT

## 2020-07-30 PROCEDURE — 85610 PROTHROMBIN TIME: CPT

## 2020-07-30 PROCEDURE — 87081 CULTURE SCREEN ONLY: CPT

## 2020-07-30 PROCEDURE — 36415 COLL VENOUS BLD VENIPUNCTURE: CPT

## 2020-07-30 PROCEDURE — 86901 BLOOD TYPING SEROLOGIC RH(D): CPT

## 2020-08-01 LAB — MRSA NOSE QL CULT: NORMAL

## 2020-08-13 ENCOUNTER — TELEMEDICINE (OUTPATIENT)
Dept: FAMILY MEDICINE CLINIC | Facility: CLINIC | Age: 65
End: 2020-08-13
Payer: MEDICARE

## 2020-08-13 DIAGNOSIS — R05.9 COUGH: Primary | ICD-10-CM

## 2020-08-13 PROCEDURE — 99442 PR PHYS/QHP TELEPHONE EVALUATION 11-20 MIN: CPT | Performed by: FAMILY MEDICINE

## 2020-08-13 RX ORDER — ALBUTEROL SULFATE 2.5 MG/3ML
2.5 SOLUTION RESPIRATORY (INHALATION) EVERY 4 HOURS PRN
Qty: 75 VIAL | Refills: 1 | Status: SHIPPED | OUTPATIENT
Start: 2020-08-13 | End: 2021-11-01

## 2020-08-13 NOTE — PROGRESS NOTES
Virtual Brief Visit    Assessment/Plan:    Problem List Items Addressed This Visit     None      Visit Diagnoses     Cough    -  Primary    Relevant Medications    albuterol (2 5 mg/3 mL) 0 083 % nebulizer solution    Other Relevant Orders    XR chest pa & lateral    Nebulizer Supplies        1  Cough  - patient with cough, suspected to be from reactive airway disease  Order chest x-ray as cough has been going on for the past 2 weeks  Order nebulizer treatments and nebulizer supplies  Discussed with patient to follow-up next week if symptoms do not improve  - albuterol (2 5 mg/3 mL) 0 083 % nebulizer solution; Take 1 vial (2 5 mg total) by nebulization every 4 (four) hours as needed for wheezing or shortness of breath  Dispense: 75 vial; Refill: 1  - XR chest pa & lateral; Future  - Nebulizer Supplies      ample time provided during visit to answer all questions  Recommended to call back office with any questions  Patient acknowledged understanding and verbally agreed to plan  Reason for visit is   Chief Complaint   Patient presents with    Virtual Brief Visit        Encounter provider Emmett Juan MD    Provider located at [address] [place] [id number]    Recent Visits  No visits were found meeting these conditions  Showing recent visits within past 7 days and meeting all other requirements     Today's Visits  Date Type Provider Dept   08/13/20 Telemedicine Emmett Juan MD [facility]   Showing today's visits and meeting all other requirements     Future Appointments  No visits were found meeting these conditions  Showing future appointments within next 150 days and meeting all other requirements        After connecting through telephone, the patient was identified by name and date of birth   Nan Linker was informed that this is a telemedicine visit and that the visit is being conducted through telephone  It was my intent to perform this visit via video technology but the patient was not able to do a video connection so the visit was completed via audio telephone only  Other methods to assure confidentiality were taken call made in resident office area  The following individuals were in the room with me and the patient informed other providers  She acknowledged consent and understanding of privacy and security of the platform  The patient has agreed to participate and understands she can discontinue the visit at any time  Patient is aware this is a billable service  Melchor Flores is a 59 y o  female   HPI     28-year-old female with past medical history of colon cancer, ovarian cancer, endometrial cancer,, GERD, diabetes type 2, severe aortic stenosis, CAD, AFib, moderate mitral stenosis, gastritis, spinal stenosis  Patient with May concern of cough for the past 2 weeks  Reports she is due for a valve replacement surgery however has had to cancel the appointment date 2 weeks ago  States she feels like she has congestion in chest   Associated symptoms include sputum production, wheezing  Patient reports cough is worse at night  Reports she had call with test 2 weeks ago which was negative  Denies sick contacts for covert, but endorses granddaughter had cough and runny nose recently  Reports she has a history of smoking, stopped smoking 50 years ago  States also she is unable to lay down to sleep, she is sleeping on a recliner  Reports swelling has worsened as she is sleeping flat  Reports she has been using Robitussin but has not helped with symptoms      Past Medical History:   Diagnosis Date    Anemia     Arthritis     [facility]     rectal    Chronic lower back pain     Chronic pain disorder     back pain    Colon cancer ([place] [address] ) 2018    Diabetes mellitus ([facility] )     Endometrial cancer ([place] [address] ) 2018    GERD (gastroesophageal reflux disease)     History of chemotherapy  History of MRSA infection     [facility]     Ovarian cancer [facility] ) 2018    Rectal cancer ([facility]     Spinal stenosis     Systolic murmur     Unsteady gait     uses walker    Wears dentures     Wears glasses        Past Surgical History:   Procedure Laterality Date    ABDOMINAL PERINEAL BOWEL RESECTION W/ ILEOANAL POUCH N/A 2018    Procedure: LAPAROSCOPIC HAND ASSIST ABDOMINOPERINEAL RESECTION,  POSTERIOR VAGINECTOMY, OMENTECTOMY;  Surgeon: Junior Rina MD;  [facility];  Service: Colorectal    ABDOMINAL SURGERY      abscess removed from abdomen and right thigh, a hole in thigh closed by plastic surgeon    ABSCESS DRAINAGE      abd, (R) leg, (L) leg     SECTION       SECTION      CYSTOSCOPY N/A 2018    Procedure: CYSTOSCOPY;  Surgeon: Weston Yung MD;  [facility];  Service: Gynecology Oncology    ESOPHAGOGASTRODUODENOSCOPY N/A 2016    Procedure: ESOPHAGOGASTRODUODENOSCOPY (EGD); Surgeon: Adeel Rendon MD;  [facility];  Service:     ESOPHAGOGASTRODUODENOSCOPY N/A 3/30/2016    Procedure: ESOPHAGOGASTRODUODENOSCOPY (EGD); Surgeon: Adeel Rendon MD;  [facility]; Service:     EYE SURGERY      laser eye surgery    HYSTERECTOMY N/A 2018    Procedure: RADICAL HYSTERECTOMY TOTAL ABDOMINAL (ALEXANDRA)  BSO;  Surgeon: Weston Yung MD;  [facility];  Service: Gynecology Oncology    JOINT REPLACEMENT Left 2017    hip    JOINT REPLACEMENT Right 2017    hip    OOPHORECTOMY Bilateral     MN COLONOSCOPY FLX DX W/COLLJ SPEC WHEN PFRMD N/A 3/9/2018    Procedure: COLONOSCOPY;  Surgeon: Christina Nelson MD;  [facility]; Service: Gastroenterology    MN COLONOSCOPY FLX DX W/COLLJ [facility] WHEN PFRMD N/A 2018    Procedure: COLONOSCOPY;  Surgeon: Junior Rina MD;  [facility];   Service: Colorectal    MN ESOPHAGOGASTRODUODENOSCOPY TRANSORAL DIAGNOSTIC N/A 2018 Procedure: ESOPHAGOGASTRODUODENOSCOPY (EGD); Surgeon: Marylin Hill MD;  Location: [facility] GI [facility]; Service: Gastroenterology    CA LAP GASTRIC BYPASS/SOLEDAD-EN-Y N/A 7/18/2016    Procedure: BYPASS GASTRIC  SOLEDAD-EN-Y LAPAROSCOPIC;  Surgeon: Martín Kong MD;  [facility];  Service: Bariatrics    CA LAP, SURG COLOSTOMY N/A 9/6/2018    Procedure: PERMANENT END COLOSTOMY;  Surgeon: Jackelyn Whitney MD;  [facility];  Service: Colorectal    CA LAP, SURG PROCTECTOMY W COLOSTOMY N/A 9/6/2018    Procedure: PROCTECTOMY;  Surgeon: Jackelyn Whitney MD;  [facility];  Service: Colorectal    TUBAL LIGATION         Current Outpatient Medications   Medication Sig Dispense Refill    albuterol (2 5 mg/3 mL) 0 083 % nebulizer solution Take 1 vial (2 5 mg total) by nebulization every 4 (four) hours as needed for wheezing or shortness of breath 75 vial 1    amiodarone 200 mg tablet Take 1 tablet (200 mg total) by mouth daily with breakfast 90 tablet 3    anastrozole (ARIMIDEX) 1 mg tablet Take 1 tablet (1 mg total) by mouth daily 90 tablet 2    apixaban (ELIQUIS) 5 mg Take 1 tablet (5 mg total) by mouth 2 (two) times a day 180 tablet 2    BIOTIN PO Take 1 tablet by mouth daily      Calcium-Vitamin D 500-125 MG-UNIT TABS Take 1 tablet by mouth 2 (two) times a day        cyclobenzaprine (FLEXERIL) 5 mg tablet Take 1 tablet (5 mg total) by mouth 3 (three) times a day as needed for muscle spasms for up to 10 days Do not take before driving 30 tablet 0    ferrous sulfate 324 (65 Fe) mg Take 1 tablet (324 mg total) by mouth daily before breakfast 90 tablet 0    Multiple Vitamins-Minerals (BARIATRIC FUSION) CHEW Chew 1 tablet daily        mupirocin (BACTROBAN) 2 % ointment Apply 1 application topically 2 (two) times a day for 5 days Apply to each nostril twice daily for five days before your operation   22 g 0    nystatin (MYCOSTATIN) powder Apply topically 2 (two) times a day as needed (rash) 30 g 1    omeprazole (PriLOSEC) 20 mg delayed release capsule TAKE 1 CAPSULE (20 MG TOTAL) BY MOUTH DAILY 90 capsule 0    oxyCODONE (ROXICODONE) 10 MG TABS Take 10 mg by mouth every 6 (six) hours as needed        rosuvastatin (CRESTOR) 5 mg tablet Take 1 tablet (5 mg total) by mouth daily (Patient taking differently: Take 5 mg by mouth daily after dinner ) 30 tablet 11     No current facility-administered medications for this visit  Allergies   Allergen Reactions    Tramadol Other (See Comments)     Dizzy         Review of Systems   Constitutional: Negative for chills and fever  HENT: Negative for congestion  Respiratory: Positive for cough and wheezing  Negative for chest tightness and shortness of breath  There were no vitals filed for this visit  I spent 15 minutes directly with the patient during this visit    VIRTUAL VISIT [address] Patrice acknowledges that she has consented to an online visit or consultation  She understands that the online visit is based solely on information provided by her, and that, in the absence of a face-to-face physical evaluation by the physician, the diagnosis she receives is both limited and provisional in terms of accuracy and completeness  This is not intended to replace a full medical face-to-face evaluation by the physician  Jordyn Escalera understands and accepts these terms

## 2020-08-19 ENCOUNTER — TRANSCRIBE ORDERS (OUTPATIENT)
Dept: ADMINISTRATIVE | Facility: HOSPITAL | Age: 65
End: 2020-08-19

## 2020-08-19 ENCOUNTER — APPOINTMENT (OUTPATIENT)
Dept: LAB | Facility: HOSPITAL | Age: 65
End: 2020-08-19
Attending: INTERNAL MEDICINE
Payer: MEDICARE

## 2020-08-19 ENCOUNTER — HOSPITAL ENCOUNTER (OUTPATIENT)
Dept: RADIOLOGY | Facility: HOSPITAL | Age: 65
Discharge: HOME | End: 2020-08-19
Attending: INTERNAL MEDICINE
Payer: MEDICARE

## 2020-08-19 DIAGNOSIS — Z85.048 HISTORY OF RECTAL CANCER: ICD-10-CM

## 2020-08-19 DIAGNOSIS — R05.9 COUGH: ICD-10-CM

## 2020-08-19 LAB
ALBUMIN SERPL BCP-MCNC: 3.2 G/DL (ref 3.5–5)
ALP SERPL-CCNC: 76 U/L (ref 46–116)
ALT SERPL W P-5'-P-CCNC: 14 U/L (ref 12–78)
ANION GAP SERPL CALCULATED.3IONS-SCNC: 8 MMOL/L (ref 4–13)
AST SERPL W P-5'-P-CCNC: 14 U/L (ref 5–45)
BASOPHILS # BLD AUTO: 0.06 THOUSANDS/ΜL (ref 0–0.1)
BASOPHILS NFR BLD AUTO: 1 % (ref 0–1)
BILIRUB SERPL-MCNC: 0.4 MG/DL (ref 0.2–1)
BUN SERPL-MCNC: 16 MG/DL (ref 5–25)
CALCIUM SERPL-MCNC: 9 MG/DL (ref 8.3–10.1)
CEA SERPL-MCNC: 1.1 NG/ML (ref 0–3)
CHLORIDE SERPL-SCNC: 101 MMOL/L (ref 100–108)
CO2 SERPL-SCNC: 29 MMOL/L (ref 21–32)
CREAT SERPL-MCNC: 0.99 MG/DL (ref 0.6–1.3)
EOSINOPHIL # BLD AUTO: 0.14 THOUSAND/ΜL (ref 0–0.61)
EOSINOPHIL NFR BLD AUTO: 2 % (ref 0–6)
ERYTHROCYTE [DISTWIDTH] IN BLOOD BY AUTOMATED COUNT: 14.6 % (ref 11.6–15.1)
GFR SERPL CREATININE-BSD FRML MDRD: 60 ML/MIN/1.73SQ M
GLUCOSE P FAST SERPL-MCNC: 112 MG/DL (ref 65–99)
HCT VFR BLD AUTO: 45.8 % (ref 34.8–46.1)
HGB BLD-MCNC: 14 G/DL (ref 11.5–15.4)
IMM GRANULOCYTES # BLD AUTO: 0.13 THOUSAND/UL (ref 0–0.2)
IMM GRANULOCYTES NFR BLD AUTO: 2 % (ref 0–2)
LYMPHOCYTES # BLD AUTO: 1.09 THOUSANDS/ΜL (ref 0.6–4.47)
LYMPHOCYTES NFR BLD AUTO: 19 % (ref 14–44)
MCH RBC QN AUTO: 26.3 PG (ref 26.8–34.3)
MCHC RBC AUTO-ENTMCNC: 30.6 G/DL (ref 31.4–37.4)
MCV RBC AUTO: 86 FL (ref 82–98)
MONOCYTES # BLD AUTO: 0.49 THOUSAND/ΜL (ref 0.17–1.22)
MONOCYTES NFR BLD AUTO: 8 % (ref 4–12)
NEUTROPHILS # BLD AUTO: 3.92 THOUSANDS/ΜL (ref 1.85–7.62)
NEUTS SEG NFR BLD AUTO: 68 % (ref 43–75)
NRBC BLD AUTO-RTO: 0 /100 WBCS
PLATELET # BLD AUTO: 194 THOUSANDS/UL (ref 149–390)
PMV BLD AUTO: 10 FL (ref 8.9–12.7)
POTASSIUM SERPL-SCNC: 4.2 MMOL/L (ref 3.5–5.3)
PROT SERPL-MCNC: 7.5 G/DL (ref 6.4–8.2)
RBC # BLD AUTO: 5.33 MILLION/UL (ref 3.81–5.12)
SODIUM SERPL-SCNC: 138 MMOL/L (ref 136–145)
WBC # BLD AUTO: 5.83 THOUSAND/UL (ref 4.31–10.16)

## 2020-08-19 PROCEDURE — 82378 CARCINOEMBRYONIC ANTIGEN: CPT

## 2020-08-19 PROCEDURE — 80053 COMPREHEN METABOLIC PANEL: CPT

## 2020-08-19 PROCEDURE — 85025 COMPLETE CBC W/AUTO DIFF WBC: CPT

## 2020-08-19 PROCEDURE — 71046 X-RAY EXAM CHEST 2 VIEWS: CPT

## 2020-08-19 PROCEDURE — 36415 COLL VENOUS BLD VENIPUNCTURE: CPT

## 2020-08-21 ENCOUNTER — OFFICE VISIT (OUTPATIENT)
Dept: HEMATOLOGY ONCOLOGY | Facility: MEDICAL CENTER | Age: 65
End: 2020-08-21
Payer: MEDICARE

## 2020-08-21 VITALS
HEART RATE: 81 BPM | RESPIRATION RATE: 18 BRPM | HEIGHT: 65 IN | DIASTOLIC BLOOD PRESSURE: 78 MMHG | WEIGHT: 270.2 LBS | BODY MASS INDEX: 45.02 KG/M2 | OXYGEN SATURATION: 93 % | TEMPERATURE: 96.2 F | SYSTOLIC BLOOD PRESSURE: 116 MMHG

## 2020-08-21 DIAGNOSIS — Z85.048 HISTORY OF RECTAL CANCER: Primary | ICD-10-CM

## 2020-08-21 PROCEDURE — 3074F SYST BP LT 130 MM HG: CPT | Performed by: INTERNAL MEDICINE

## 2020-08-21 PROCEDURE — 3078F DIAST BP <80 MM HG: CPT | Performed by: INTERNAL MEDICINE

## 2020-08-21 PROCEDURE — 1036F TOBACCO NON-USER: CPT | Performed by: INTERNAL MEDICINE

## 2020-08-21 PROCEDURE — 99214 OFFICE O/P EST MOD 30 MIN: CPT | Performed by: INTERNAL MEDICINE

## 2020-08-21 PROCEDURE — 3008F BODY MASS INDEX DOCD: CPT | Performed by: INTERNAL MEDICINE

## 2020-08-21 NOTE — PROGRESS NOTES
Tra Trinh  1955  [facility] [phone]    DISCUSSION/SUMMARY:    27-year-old female previously found to have high-grade dysplasia/intramucosal lesion arising in a tubulovillous adenoma  Issues:    Rectal cancer  Final stage is IIA (ypT3 pN0 G2)  Patient received neoadjuvant concurrent treatment and then underwent resection  Mrs Candido Vera had postoperative complications with wound healing and fistula formation - eventually resolved  From a medical oncology standpoint, Mrs Candido Vera feels well and clinically there are no concerning signs  Recent blood work including tumor marker was good/acceptable  The plan is to continue with surveillance  Patient is to return in 6 months with CT scans and blood work before  Patient will also follow up with Colorectal surgery as directed  Ovarian cancer, IIIC  As discussed previously, at the time of surgery, patient was found to have an incidental low-grade serous ovarian carcinoma with omental nodules greater than 2 cm grossly visible  Patient is followed by GYN Oncology and is on anastrozole (NCCN 2 21198 low-grade serous, stage II-IV tumors, treatment options include primary hormonal therapy (category 2B)  Genetics  As above, patient was recently diagnosed with 2 malignancies  Patient is adopted but states that she knows some of her family members, many of which have had multiple malignancies including many GI malignancies  Patient has 2 sons in good health, 3 grandchildren  This office will look in to whether or not patient can have 1 of the genetic panels performed  Cardiac issues  Aortic valve replacement is pending  Prior temporary loss of peripheral vision  This has not been an issue recently  Patient will follow up with Neurology and Ophthalmology as directed      Mrs Candido Vera knows to call if she has any other oncology questions or concerns  Carefully review your medication list and verify that the list is accurate and up-to-date  Please call the hematology/oncology office if there are medications missing from the list, medications on the list that you are not currently taking or if there is a dosage or instruction that is different from how you're taking that medication  Patient goals and areas of care:   Rectal cancer surveillance, follow-up with GYN Oncology, aortic valve replacement  Barriers to care:  None  Patient is able to self-care   ___________________________________________________________________________________________________    Chief Complaint   Patient presents with    Follow-up     Rectal cancer     History of Present Illness:    20-year-old female previously referred for the above  Previously Mrs Lucious Opitz had gastric bypass  Patient was seen by GI (late summer 2018) because of blood in the stools  Additional workup included a colonoscopy which demonstrated a rectal lesion  Patient recently completed concurrent chemotherapy (Xeloda) with radiation  Mrs Juancho Lozoya subsequently went onto an APR  Final pathology results are listed below; patient was also found to have IIIC low-grade serous ovarian carcinoma  Postoperatively, patient had issues with slow wound closure  Since completion of treatment, patient has been on surveillance  Mrs Juancho Lozoya feels well, okay, about the same as before  Patient has dyspnea on exertion - has undergone a cardiac workup and aortic valve replacement is pending  No shortness of breath at rest   No chest pain or pressure  Appetite is good, weight is stable  Activities are about the same as before  No colostomy problems, no abdominal pain or GI bleeding  No  problems  During the APR, patient was found to have incidental stage IIIC low-grade serous ovarian cancer (gyn oncology was called into the OR)  Patient is followed by Dr Abad Shaw and is on Arimidex    No problems with the Arimidex  Review of Systems   Constitutional: Negative for fatigue  HENT: Negative  Eyes: Negative  Respiratory: Negative          +dyspnea on exertion   Cardiovascular: Negative  Gastrointestinal: Negative for blood in stool, constipation and diarrhea  Endocrine: Negative  Genitourinary: Negative  Musculoskeletal: Positive for arthralgias  Skin: Negative  Allergic/Immunologic: Negative  Neurological: Negative  Hematological: Negative  Psychiatric/Behavioral: Negative  All other systems reviewed and are negative       Patient Active Problem List   Diagnosis    Morbid obesity due to excess calories (HCC)    Postgastrectomy malabsorption    S/P gastric bypass    Marginal ulcer    [facility]rosclerotic peripheral vascular disease [facility] )    Onychomycosis    History of rectal cancer    Colostomy care ([place] [address] )    History of ovarian cancer    Status post bariatric surgery    Pyelonephritis    History of ovarian cancer    Candidal intertrigo    Vaginal bleeding    Encounter for other screening for malignant neoplasm of breast    Aortic stenosis, severe    Moderate mitral stenosis    [facility]rial fibrillation with [facility] ventricular response [facility]    Hyperlipidemia     Past Medical History:   Diagnosis Date    Anemia     Arthritis     Cancer (HCC)     rectal    Chronic lower back pain     Chronic pain disorder     back pain    Colon cancer ([facility] ) 2018    Diabetes mellitus ([place] [address] )     Endometrial cancer ([facility] ) 2018    GERD (gastroesophageal reflux disease)     History of chemotherapy     History of MRSA infection 0719/2016    [facility]     [facility] cancer [facility] ) 2018    Rectal cancer [facility] )     Spinal stenosis     Systolic murmur     Unsteady gait     uses walker    Wears dentures     Wears glasses      Ob/gyn:  No recent mammogram -patient's choice, no post menopausal bleeding    Past Surgical History:   Procedure Laterality Date    ABDOMINAL PERINEAL BOWEL RESECTION W/ ILEOANAL POUCH N/A 2018    Procedure: LAPAROSCOPIC HAND ASSIST ABDOMINOPERINEAL RESECTION,  POSTERIOR VAGINECTOMY, OMENTECTOMY;  Surgeon: Jimmy Nelson MD;  [facility];  Service: Colorectal    ABDOMINAL SURGERY      abscess removed from abdomen and right thigh, a hole in thigh closed by plastic surgeon    ABSCESS DRAINAGE      abd, (R) leg, (L) leg     SECTION       SECTION      CYSTOSCOPY N/A 2018    Procedure: Jason Quick;  Surgeon: Selma Shields MD;  [facility];  Service: Gynecology Oncology    ESOPHAGOGASTRODUODENOSCOPY N/A 2016    Procedure: ESOPHAGOGASTRODUODENOSCOPY (EGD); Surgeon: Dimitri Quinteros MD;  [facility];  Service:     ESOPHAGOGASTRODUODENOSCOPY N/A 3/30/2016    Procedure: ESOPHAGOGASTRODUODENOSCOPY (EGD); Surgeon: Dimitri Quinteros MD;  [facility]; Service:     EYE SURGERY      laser eye surgery    HYSTERECTOMY N/A 2018    Procedure: RADICAL HYSTERECTOMY TOTAL ABDOMINAL (ALEXANDRA)  BSO;  Surgeon: Selma Shields MD;  [facility];  Service: Gynecology Oncology    JOINT REPLACEMENT Left 2017    hip    JOINT REPLACEMENT Right 2017    hip    OOPHORECTOMY Bilateral     DE COLONOSCOPY FLX DX W/COLLJ SPEC WHEN PFRMD N/A 3/9/2018    Procedure: COLONOSCOPY;  Surgeon: Yasemin Mejias MD;  Location: [place] [address] GI LAB; Service: Gastroenterology    DE COLONOSCOPY FLX DX W/COLLJ [facility] WHEN PFRMD N/A 2018    Procedure: COLONOSCOPY;  Surgeon: Jimmy Nelson MD;  [facility]; Service: Colorectal    DE ESOPHAGOGASTRODUODENOSCOPY TRANSORAL DIAGNOSTIC N/A 2018    Procedure: ESOPHAGOGASTRODUODENOSCOPY (EGD); Surgeon: Yasemin Mejias MD;  Location: [facility] GI LAB;   Service: Gastroenterology    DE LAP GASTRIC BYPASS/SOLEDAD-EN-Y N/A 2016    Procedure: BYPASS GASTRIC  SOLEDAD-EN-Y LAPAROSCOPIC;  Surgeon: Dimitri Quinteros MD;  [facility];  Service: Bariatrics    DE LAP, SURG COLOSTOMY N/A 2018    Procedure: PERMANENT END COLOSTOMY;  Surgeon: Oswald Payton MD;  [facility];  Service: Colorectal    AK LAP, SURG PROCTECTOMY W COLOSTOMY N/A 2018    Procedure: PROCTECTOMY;  Surgeon: Oswald Payton MD;  [facility];  Service: Colorectal    TUBAL LIGATION       Family History   Adopted: Yes   Problem Relation Age of Onset    Leukemia Mother     Heart disease Father     Coronary artery disease Father     Diabetes Father     No Known Problems Sister     No Known Problems Brother     Diabetes Son     No Known Problems Brother     No Known Problems Brother     No Known Problems Sister     No Known Problems Sister     Family history:   Mother  of leukemia (NOS), father  from heart disease (NOS), 2 children 1 with diabetes, no known familial or genetic diseases, no family history of GI malignancies    Social History     Socioeconomic History    Marital status: Single     Spouse name: Not on file    Number of children: 2    Years of education: Not on file    Highest education level: Not on file   Occupational History    Not on file   Social Needs    Financial resource strain: Not on file    Food insecurity     Worry: Not on file     Inability: Not on file   [place] needs     Medical: Not on file     Non-medical: Not on file   Tobacco Use    Smoking status: Former Smoker     Packs/day: 1 00     Years: 25 00     Pack years: 25 00     Last attempt to quit: 3/30/2006     Years since quittin 4    Smokeless tobacco: Never Used   Substance and Sexual Activity    Alcohol use: Not Currently    Drug use: Not Currently     Types: Oxycodone     Comment: Percocet for lower back pain prn    Sexual activity: Not Currently   Lifestyle    Physical activity     Days per week: Not on file     Minutes per session: Not on file    Stress: Not on file   Relationships    Social connections     Talks on phone: Not on file     Gets together: Not on file Attends Taoist service: Not on file     Active member of club or organization: Not on file     Attends meetings of clubs or organizations: Not on file     Relationship status: Not on file    Intimate partner violence     Fear of current or ex partner: Not on file     Emotionally abused: Not on file     Physically abused: Not on file     Forced sexual activity: Not on file   Other Topics Concern    Not on file   Social History Narrative    Not on file       Current Outpatient Medications:     albuterol (2 5 mg/3 mL) 0 083 % nebulizer solution, Take 1 vial (2 5 mg total) by nebulization every 4 (four) hours as needed for wheezing or shortness of breath, Disp: 75 vial, Rfl: 1    amiodarone 200 mg tablet, Take 1 tablet (200 mg total) by mouth daily with breakfast, Disp: 90 tablet, Rfl: 3    anastrozole (ARIMIDEX) 1 mg tablet, Take 1 tablet (1 mg total) by mouth daily, Disp: 90 tablet, Rfl: 2    apixaban (ELIQUIS) 5 mg, Take 1 tablet (5 mg total) by mouth 2 (two) times a day, Disp: 180 tablet, Rfl: 2    BIOTIN PO, Take 1 tablet by mouth daily, Disp: , Rfl:     Calcium-Vitamin D 500-125 MG-UNIT TABS, Take 1 tablet by mouth 2 (two) times a day  , Disp: , Rfl:     ferrous sulfate 324 (65 Fe) mg, Take 1 tablet (324 mg total) by mouth daily before breakfast, Disp: 90 tablet, Rfl: 0    Multiple Vitamins-Minerals (BARIATRIC FUSION) CHEW, Chew 1 tablet daily  , Disp: , Rfl:     mupirocin (BACTROBAN) 2 % ointment, Apply 1 application topically 2 (two) times a day for 5 days Apply to each nostril twice daily for five days before your operation  , Disp: 22 g, Rfl: 0    nystatin (MYCOSTATIN) powder, Apply topically 2 (two) times a day as needed (rash), Disp: 30 g, Rfl: 1    omeprazole (PriLOSEC) 20 mg delayed release capsule, TAKE 1 CAPSULE (20 MG TOTAL) BY MOUTH DAILY, Disp: 90 capsule, Rfl: 0    oxyCODONE (ROXICODONE) 10 MG TABS, Take 10 mg by mouth every 6 (six) hours as needed  , Disp: , Rfl:    rosuvastatin (CRESTOR) 5 mg tablet, Take 1 tablet (5 mg total) by mouth daily (Patient taking differently: Take 5 mg by mouth daily after dinner ), Disp: 30 tablet, Rfl: 11    cyclobenzaprine (FLEXERIL) 5 mg tablet, Take 1 tablet (5 mg total) by mouth 3 (three) times a day as needed for muscle spasms for up to 10 days Do not take before driving (Patient not taking: Reported on 8/21/2020), Disp: 30 tablet, Rfl: 0    Allergies   Allergen Reactions    Tramadol Other (See Comments)     Dizzy         Vitals:    08/21/20 0853   BP: 116/78   Pulse: 81   Resp: 18   Temp: (!) 96 2 °F (35 7 °C)   SpO2: 93%     Physical Exam   Constitutional: She is oriented to person, place, and time  She appears well-developed and well-nourished  Well-nourished female, no respiratory distress   HENT:   Head: Normocephalic and atraumatic  Right Ear: External ear normal    Left Ear: External ear normal    Nose: Nose normal    Mouth/Throat: Oropharynx is clear and moist    Eyes: Pupils are equal, round, and reactive to light  Conjunctivae and EOM are normal    Neck: Normal range of motion  Neck supple  Cardiovascular: Normal rate, regular rhythm, normal heart sounds and intact distal pulses  Pulmonary/Chest: Effort normal and breath sounds normal    Few scattered rhonchi, otherwise good air entry bilaterally   Abdominal: Soft  Bowel sounds are normal    Left lower quadrant colostomy in place  Stool in back, nontender, +bowel sounds, well-healed suture lines, obese, cannot palpate liver or spleen   Musculoskeletal:      Comments: No pain or tenderness with palpation of joints, muscles or bones, good range of motion in upper extremities, decreased range of motion in lower extremities but equal   Neurological: She is alert and oriented to person, place, and time  She has normal reflexes  Skin: Skin is warm  Warm, moist, good color, no petechiae or ecchymoses   Psychiatric: She has a normal mood and affect   Her behavior is normal  Judgment and thought content normal    Extremities:  1 +bilateral lower extremity edema - same as before no cords, pulses are 1+  Lymphatics:  No adenopathy in the neck, supraclavicular region, axilla and groin bilaterally  Rectal wound deferred    Labs    08/19/2020 WBC = 5 8 hemoglobin = 14 hematocrit = 45 8 MCV = 86 platelet = 591 neutrophil = 68% CEA = 1 1 BUN = 16 creatinine = 0 99 LFTs WNL    01/27/2020 WBC = 6 04 hemoglobin = 13 8 hematocrit = 45 platelet = 251 neutrophil = 73% BUN = 19 creatinine = 0 76 calcium = 9 5 LFTs WNL CEA = 0 8  11/04/2019  = 3 0 CEA < 0 5  05/28/2019  = 4 2  10/03/2018 WBC = 5 8 hemoglobin = 8 7 hematocrit = 31 MCV = 80 platelet = 407 CA -405 = 9 6  03/12/2018 CEA = 22 6 = high    Imaging    02/06/2020 CT scan of the chest and abdomen pelvis  Impression stated no evidence of metastatic disease, large left lower quadrant parastomal hernia containing multiple nonobstructed bowel loops  04/01/2019 CT scan of the chest and abdomen pelvis    No CT evidence of metastatic disease within the chest abdomen or pelvis  Stable postoperative changes  4/6/18 MRI pelvis rectal cancer staging    Low rectal cancer, 5 cm in length, circumferential around the rectal wall  (Series 8 images 19 through 33 )  Anteriorly, there are multiple areas where there is transmural tumor extension into the mesorectal fat making this at least a T3c lesion  On Series 8 image 31, tumor also abuts the mesorectal fascia making this CRM positive  At this point it is also   immediately adjacent to the vagina, therefore this may be a T4 lesion  There are 2 high risk perirectal nodes, and 1 low risk perirectal nodes  01/31/2018 ultrasound right upper quadrant    1  Layering gallbladder sludge  No acute cholecystitis or biliary ductal obstruction  2   Hepatomegaly  3   Fluid-filled stomach  1/27/18 CT scan of the abdomen/pelvis    1    Prior Juan-en-Y gastric bypass with distention of the excluded stomach  2   No evidence of acute abnormality in the abdomen or pelvis      Pathology    Case Report   Surgical Pathology Report                         Case: [id number]                                    Authorizing Provider: GEORGETTE Green       Collected:           05/29/2019 0922               Ordering Location:     [facility] Gyn Received:            05/29/2019 0922                                      Oncology [place]                                                            Pathologist:           Suresh West MD                                                                Specimen:    Vaginal, vaginal polyp                                                                     Final Diagnosis   A   Vagina, polyp (biopsy):  - Acutely and chronically inflamed granulation tissue    Electronically signed by Suresh West MD on 5/30/2019 at  1:18 PM         Case Report   Surgical Pathology Report                         Case: [id number]                                    Authorizing Provider: Todd Carrera MD      Collected:           09/06/2018 1010               Ordering Location:     [address]      Received:            09/06/2018 [facility]                                      Hospital Operating Room                                                       Pathologist:           Paola Pimentel MD                                                         Specimens:   A) - Soft Tissue, Other, EPIPLOIC NODULE                                                             B) - Rectum, enbloc rectum, sigmoid, anus, uterus, bso, cervix, posterior vaginal                    wall                                                                                                 C) - Omentum                                                                               Addendum   Additional immunohistochemical stains performed with appropriate controls on a selected portion of serous carcinoma involving omental tissue (block C1) show the following results:  Estrogen receptor: Positive  Progesterone receptor: Negative   Addendum electronically signed by Lilia Rojas MD on 9/27/2018 at  1:40 PM   Final Diagnosis   A  Soft tissue, epiploic nodule, biopsy:  -  Portion of nodular fibroadipose tissue with fat necrosis and dystrophic calcifications  -  Negative for metastatic carcinoma  -  Immunohistochemical stain performed with appropriate control for keratin AE1/3 is negative for epithelial elements, supporting the diagnosis      B  Rectum, sigmoid colon, anus, uterus,cervix, bilateral ovaries and fallopian tubes and posterior vaginal wall; en bloc resection:  -  Invasive moderately differentiated adenocarcinoma of rectum (see first synoptic report)  -  Low grade serous carcinoma involving left ovary, left fallopian tube, uterine serosa and colonic serosa (see second synoptic report)  -  Separate portion of colon (consistent with sigmoid) with diverticulosis coli showing focal serosal implant of low-grade serous carcinoma  -  Uterus showing benign inactive/atrophic endometrium with metaplastic change, benign endometrial polyp and rare cytologic atypia consistent with radiation effect  -  Cervix with mild glandular atypia, favor reactive (see note)  -  Benign uterine myometrium with one intramural benign leiomyoma, negative for atypia or malignancy  -  Benign anal skin and dentate line with mild reactive change, negative for involvement by carcinoma  -  Benign vaginal wall mucosa with thinning, chronic inflammation, and surface erosion most suggestive of prior therapy effect, negative for involvement by carcinoma  -  27 benign pericolorectal lymph nodes identified, negative for metastatic carcinoma      C  Omentum, omentectomy:  -   Low grade serous carcinoma         COLORECTAL CARCINOMA TUMOR STAGING SUMMARY (includes specimen A-C of this case):  1   Specimen identification:     - Specimen:  Rectum, sigmoid colon, anus, uterus,cervix, bilateral ovaries and fallopian tubes and posterior vaginal wall   2  Tumor:     - Tumor site:  Rectum     - Tumor size:  Up to 3 3 cm     - Macroscopic tumor perforation:  Not identified     - Tumor location:  Below the peritoneal reflection        - Macroscopic intactness of mesorectum:  Intact     - Histologic Type:   Adenocarcinoma      - Histologic Grade: Moderately differentiated (G2)     - Microscopic Tumor Extension (ypT3): Tumor invades through muscularis propria into pericolorectal soft tissues     - Tumor budding (only needed in polyps, Stage I or II cancers):  Not identified   3  Margins (specify distance for nearest radial margin on RECTAL tumors only):     - Proximal Margin:  Negative for carcinoma     - Distal Margin:  Negative for carcinoma     - Circumferential (Radial) or Mesenteric Margin:  Negative for carcinoma (0 6cm)     - Other Margins:  Vaginal margin negative for carcinoma   4  Treatment effect:  Present; Residual cancer with evident tumor regression, but more than single cells or rare small groups of cancer cells (partial response, score 2)   5  Lymph-vascular invasion:  Not identified   6  Perineural invasion:  Not identified   7  Tumor deposits: Not identified   8  Type of polyp in which invasive carcinoma arose:  Tubular adenoma   9  Regional lymph nodes (pN0):  27 benign pericolorectal lymph node sample, negative for metastatic carcinoma  10  Additional pathologic findings:  Diverticulosis coli  11  Ancillary Studies:  *  Immunohistochemical stains performed with appropriate controls show the primary rectal tumor to be positive for CK20 and CDX-2 and negative for CK 7, PAX-8, p53 (wild type expression), ER and WT-1, supporting a diagnosis of primary colorectal origin    12  8th Ed AJCC Stage:  at least Stage  IIA - ypT3, pN0, G2         Primary Tumor of the Ovary/Fallopian Tube/Peritoneum, Staging Summary (includes Specimen A to C of this case):  1  Procedure:  Hysterectomy, bilateral salpingo-oophorectomy, omentectomy  2  Hysterectomy type:  Radical hysterectomy  3  Specimen integrity:      - Right ovary:  Intact      - Left ovary:  Intact       - Right fallopian tube: Intact      - Left fallopian tube: Intact  4  Tumor site:  Ovarian and fallopian tube surfaces and uterine and colonic serosa  5  Ovarian surface involvement:  Present  6  Fallopian tube surface involvement:  Present  7  Tumor size:  Largest focus measures 1 8 cm (uterine serosa) (see note)  8  Histologic type:  Low-grade serous carcinoma   9  Histologic grade:  Low grade  10  Implant:  Present  11  Other tissue/organ involvement:  Bilateral ovaries and fallopian tubes, uterine serosa, sigmoid colon serosa, and omentum  12  Largest extrapelvic peritoneal focus: 2 5 cm (macroscopically identidfied)    - Specify site:  Omentum  13  Peritoneal/ascetic fluid:  Not performed   14  Pleural fluid:  Not performed  15  Treatment effect:  No known prior therapy  16  Regional lymph nodes:    - No regional uterine lymph nodes submitted or found    - 27 benign pericolorectal lymph nodes sampled, negative for metastatic carcinoma  17  Pathologic stage classification (pTNM, AJCC 8th edition): pT3c, pNX, Low Grade  18  FIGO stage (2015 FIGO cancer report): IIIC  19  Additional pathologic findings:  N/A  20  Ancillary studies:  Immunohistochemical stains performed with appropriate controls show the tumor cells to be positive for CK7, PAX-8, ER, and WT-1 with wild-type expression of p53 and negative for CK20 and CDX-2 supporting the diagnosis     Electronically signed by Raj Davidson MD on 9/25/2018 at 12:54 PM                  Case Report   Surgical Pathology Report                         Case: [id number]                                    Authorizing Provider: Sunshine Anderson MD      Collected:           03/28/2018 1130               Pathologist:           Gudelia Thomas MD             Received:            03/29/2018 0942               Specimen:    Rectum, Rectal cancer/mass biopsies                                                        Final Diagnosis   A  Rectal mass (biopsy):  - 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      Comment:   - In the context of a rectal mass, repeat biopsy and/or excision may be indicated to exclude a higher grade lesion  Electronically signed by Gideon Moss MD on 3/30/2018 at  2:36 PM         Case Report   Surgical Pathology Report                         Case: [id number]                                    Authorizing Provider: Kalyani Martínez MD          Collected:           03/09/2018 0902               Ordering Location:     [facility]   Received:            03/12/2018 0904                                      Center                                                                        Pathologist:           Becky Mulligan DO                                                             Specimens:   A) - Colon, polyp splenic flexure/cold snare                                                         B) - Colon, bx distal rectal mass                                                          Final Diagnosis   A  Colon, splenic flexure polyp, biopsy:  - Tubulovillous adenoma, negative for high-grade dysplasia      B  Rectum, mass, biopsy:  - 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

## 2020-08-24 ENCOUNTER — TELEPHONE (OUTPATIENT)
Dept: FAMILY MEDICINE CLINIC | Facility: CLINIC | Age: 65
End: 2020-08-24

## 2020-08-24 NOTE — TELEPHONE ENCOUNTER
Called patient to inform of negative CXR  Reports her cough has been improving, after a week of using neb treatments  Reports she may have surgery in 1 week

## 2020-08-26 ENCOUNTER — TELEPHONE (OUTPATIENT)
Dept: CARDIAC SURGERY | Facility: CLINIC | Age: 65
End: 2020-08-26

## 2020-08-26 DIAGNOSIS — Z11.59 SCREENING FOR VIRAL DISEASE: ICD-10-CM

## 2020-08-26 DIAGNOSIS — Z11.59 SCREENING FOR VIRAL DISEASE: Primary | ICD-10-CM

## 2020-08-26 DIAGNOSIS — Z01.818 PREOP TESTING: ICD-10-CM

## 2020-08-26 DIAGNOSIS — I35.0 AORTIC STENOSIS, SEVERE: Primary | ICD-10-CM

## 2020-08-26 PROCEDURE — U0003 INFECTIOUS AGENT DETECTION BY NUCLEIC ACID (DNA OR RNA); SEVERE ACUTE RESPIRATORY SYNDROME CORONAVIRUS 2 (SARS-COV-2) (CORONAVIRUS DISEASE [COVID-19]), AMPLIFIED PROBE TECHNIQUE, MAKING USE OF HIGH THROUGHPUT TECHNOLOGIES AS DESCRIBED BY CMS-2020-01-R: HCPCS | Performed by: NURSE PRACTITIONER

## 2020-08-26 NOTE — TELEPHONE ENCOUNTER
Called patient to review pre TAVR instructions, patient aware procedure is scheduled for 9/1/2020  Patient has written instructions from previously scheduled date, will hold Eliquis effective 8/27/20  Going for COVID testing today and labs tomorrow  Will call office if she has any questions

## 2020-08-27 ENCOUNTER — APPOINTMENT (OUTPATIENT)
Dept: LAB | Facility: HOSPITAL | Age: 65
End: 2020-08-27
Payer: MEDICARE

## 2020-08-27 ENCOUNTER — LAB REQUISITION (OUTPATIENT)
Dept: LAB | Facility: HOSPITAL | Age: 65
End: 2020-08-27
Payer: MEDICARE

## 2020-08-27 DIAGNOSIS — I35.0 AORTIC STENOSIS, SEVERE: ICD-10-CM

## 2020-08-27 DIAGNOSIS — Z01.818 PREOP TESTING: ICD-10-CM

## 2020-08-27 DIAGNOSIS — Z01.818 ENCOUNTER FOR OTHER PREPROCEDURAL EXAMINATION: ICD-10-CM

## 2020-08-27 DIAGNOSIS — I35.0 NONRHEUMATIC AORTIC (VALVE) STENOSIS: ICD-10-CM

## 2020-08-27 DIAGNOSIS — R82.90 ABNORMAL URINALYSIS: Primary | ICD-10-CM

## 2020-08-27 LAB
ABO GROUP BLD: NORMAL
BACTERIA UR QL AUTO: ABNORMAL /HPF
BILIRUB UR QL STRIP: NEGATIVE
BLD GP AB SCN SERPL QL: NEGATIVE
CLARITY UR: ABNORMAL
COLOR UR: YELLOW
GLUCOSE UR STRIP-MCNC: NEGATIVE MG/DL
HGB UR QL STRIP.AUTO: ABNORMAL
KETONES UR STRIP-MCNC: NEGATIVE MG/DL
LEUKOCYTE ESTERASE UR QL STRIP: ABNORMAL
NITRITE UR QL STRIP: NEGATIVE
NON-SQ EPI CELLS URNS QL MICRO: ABNORMAL /HPF
PH UR STRIP.AUTO: 5.5 [PH]
PROT UR STRIP-MCNC: NEGATIVE MG/DL
RBC #/AREA URNS AUTO: ABNORMAL /HPF
RH BLD: POSITIVE
SARS-COV-2 RNA SPEC QL NAA+PROBE: NOT DETECTED
SP GR UR STRIP.AUTO: 1.02 (ref 1–1.03)
SPECIMEN EXPIRATION DATE: NORMAL
UROBILINOGEN UR QL STRIP.AUTO: 0.2 E.U./DL
WBC #/AREA URNS AUTO: ABNORMAL /HPF

## 2020-08-27 PROCEDURE — 86850 RBC ANTIBODY SCREEN: CPT | Performed by: NURSE PRACTITIONER

## 2020-08-27 PROCEDURE — 86900 BLOOD TYPING SEROLOGIC ABO: CPT | Performed by: NURSE PRACTITIONER

## 2020-08-27 PROCEDURE — 81001 URINALYSIS AUTO W/SCOPE: CPT | Performed by: THORACIC SURGERY (CARDIOTHORACIC VASCULAR SURGERY)

## 2020-08-27 PROCEDURE — 86901 BLOOD TYPING SEROLOGIC RH(D): CPT | Performed by: NURSE PRACTITIONER

## 2020-08-27 PROCEDURE — 36415 COLL VENOUS BLD VENIPUNCTURE: CPT

## 2020-08-27 RX ORDER — SULFAMETHOXAZOLE AND TRIMETHOPRIM 800; 160 MG/1; MG/1
1 TABLET ORAL EVERY 12 HOURS SCHEDULED
Qty: 6 TABLET | Refills: 0 | Status: SHIPPED | OUTPATIENT
Start: 2020-08-27 | End: 2020-08-30

## 2020-08-27 NOTE — H&P (VIEW-ONLY)
Patient scheduled for elective TAVR 8/1/20  Pre op UA abnormal with leucocytes, RBCs and bacteria  Will treated with Bactrim DS 1 po BID x 3 days    Advised to take medication with food, drink plenty of fluids and eat yogurt with live cultures/  Patient denies dysuria, hematuria or foul smell to urine

## 2020-08-31 ENCOUNTER — ANESTHESIA EVENT (OUTPATIENT)
Dept: PERIOP | Facility: HOSPITAL | Age: 65
DRG: 267 | End: 2020-08-31
Payer: MEDICARE

## 2020-08-31 RX ORDER — HEPARIN SODIUM 1000 [USP'U]/ML
400 INJECTION, SOLUTION INTRAVENOUS; SUBCUTANEOUS ONCE
Status: CANCELLED | OUTPATIENT
Start: 2020-09-01

## 2020-08-31 RX ORDER — CEFAZOLIN SODIUM 2 G/50ML
2000 SOLUTION INTRAVENOUS ONCE
Status: CANCELLED | OUTPATIENT
Start: 2020-09-01

## 2020-08-31 NOTE — ANESTHESIA PREPROCEDURE EVALUATION
Procedure:  REPLACEMENT AORTIC VALVE TRANSCATHETER (TAVR) TRANSFEMORAL W/ 26MM WADDELL BARBARA S3 ULTRA VALVE(ACCESS ON RIGHT) (N/A Chest)  TRANSESOPHAGEAL ECHOCARDIOGRAM (THO) (N/A Esophagus)    Relevant Problems   CARDIO   (+) Aortic stenosis, severe   (+) Atrial fibrillation with rapid ventricular response (HCC)   (+) Hyperlipidemia      GI/HEPATIC   (+) Status post bariatric surgery      NEURO/PSYCH   (+) History of ovarian cancer   (+) History of rectal cancer      Other   (+) Postgastrectomy malabsorption   (+) S/P gastric bypass      Hgb 14 0, plt 194  LHC: oLcx 50, dLcx 40  B/L ICA < 50  TTE: normal biV systolic function, se AS, mild MS       Anesthesia Plan  ASA Score- 4     Anesthesia Type- general with ASA Monitors  Additional Monitors: central venous line and arterial line  Airway Plan: ETT  Comment: General anesthesia, endotracheal intubation, standard ASA monitors; large bore IV access (possible Cordis); pre-induction arterial line; TLC CVL; THO (no THO contraindications noted); plan for post-op PACU, possiblve ICU/vent  Risks discussed - nausea, discomfort, sore throat, dental damage; rare anesthetic emergencies; patient understands, agrees to proceed  Prior grade 2a view  Gastric bypass - will not be able to obtain TG views         Plan Factors-    Induction-     Postoperative Plan-     Informed Consent-

## 2020-09-01 ENCOUNTER — APPOINTMENT (INPATIENT)
Dept: RADIOLOGY | Facility: HOSPITAL | Age: 65
DRG: 267 | End: 2020-09-01
Payer: MEDICARE

## 2020-09-01 ENCOUNTER — ANESTHESIA (OUTPATIENT)
Dept: PERIOP | Facility: HOSPITAL | Age: 65
DRG: 267 | End: 2020-09-01
Payer: MEDICARE

## 2020-09-01 ENCOUNTER — HOSPITAL ENCOUNTER (INPATIENT)
Facility: HOSPITAL | Age: 65
LOS: 2 days | Discharge: HOME | DRG: 267 | End: 2020-09-03
Attending: THORACIC SURGERY (CARDIOTHORACIC VASCULAR SURGERY) | Admitting: THORACIC SURGERY (CARDIOTHORACIC VASCULAR SURGERY)
Payer: MEDICARE

## 2020-09-01 ENCOUNTER — APPOINTMENT (OUTPATIENT)
Dept: NON INVASIVE DIAGNOSTICS | Facility: HOSPITAL | Age: 65
DRG: 267 | End: 2020-09-01
Payer: MEDICARE

## 2020-09-01 DIAGNOSIS — I35.0 AORTIC VALVE STENOSIS, ETIOLOGY OF CARDIAC VALVE DISEASE UNSPECIFIED: Primary | ICD-10-CM

## 2020-09-01 DIAGNOSIS — Z86.79 HISTORY OF AORTIC STENOSIS: ICD-10-CM

## 2020-09-01 DIAGNOSIS — Z95.2 S/P TAVR (TRANSCATHETER AORTIC VALVE REPLACEMENT): ICD-10-CM

## 2020-09-01 DIAGNOSIS — Z95.2 S/P TAVR (TRANSCATHETER AORTIC VALVE REPLACEMENT): Primary | ICD-10-CM

## 2020-09-01 DIAGNOSIS — I35.9 NONRHEUMATIC AORTIC VALVE DISORDER: ICD-10-CM

## 2020-09-01 PROBLEM — E87.8 HYPOCHLOREMIA: Status: ACTIVE | Noted: 2020-09-01

## 2020-09-01 LAB
ABO GROUP BLD BPU: NORMAL
ANION GAP SERPL CALCULATED.3IONS-SCNC: 6 MMOL/L (ref 4–13)
BACTERIA UR QL AUTO: NORMAL /HPF
BASE EXCESS BLDA CALC-SCNC: -1 MMOL/L (ref -2–3)
BASE EXCESS BLDA CALC-SCNC: -1 MMOL/L (ref -2–3)
BASE EXCESS BLDA CALC-SCNC: 1 MMOL/L (ref -2–3)
BILIRUB UR QL STRIP: NEGATIVE
BPU ID: NORMAL
BUN SERPL-MCNC: 16 MG/DL (ref 5–25)
CA-I BLD-SCNC: 1.21 MMOL/L (ref 1.12–1.32)
CA-I BLD-SCNC: 1.24 MMOL/L (ref 1.12–1.32)
CA-I BLD-SCNC: 1.26 MMOL/L (ref 1.12–1.32)
CALCIUM SERPL-MCNC: 8.8 MG/DL (ref 8.3–10.1)
CHLORIDE SERPL-SCNC: 109 MMOL/L (ref 100–108)
CLARITY UR: CLEAR
CO2 SERPL-SCNC: 24 MMOL/L (ref 21–32)
COLOR UR: YELLOW
CREAT SERPL-MCNC: 0.89 MG/DL (ref 0.6–1.3)
CROSSMATCH: NORMAL
GFR SERPL CREATININE-BSD FRML MDRD: 69 ML/MIN/1.73SQ M
GLUCOSE SERPL-MCNC: 101 MG/DL (ref 65–140)
GLUCOSE SERPL-MCNC: 125 MG/DL (ref 65–140)
GLUCOSE SERPL-MCNC: 127 MG/DL (ref 65–140)
GLUCOSE SERPL-MCNC: 174 MG/DL (ref 65–140)
GLUCOSE SERPL-MCNC: 234 MG/DL (ref 65–140)
GLUCOSE SERPL-MCNC: 85 MG/DL (ref 65–140)
GLUCOSE SERPL-MCNC: 97 MG/DL (ref 65–140)
GLUCOSE SERPL-MCNC: 99 MG/DL (ref 65–140)
GLUCOSE UR STRIP-MCNC: NEGATIVE MG/DL
HCO3 BLDA-SCNC: 25.4 MMOL/L (ref 22–28)
HCO3 BLDA-SCNC: 25.4 MMOL/L (ref 22–28)
HCO3 BLDA-SCNC: 27.3 MMOL/L (ref 22–28)
HCT VFR BLD AUTO: 40.9 % (ref 34.8–46.1)
HCT VFR BLD CALC: 33 % (ref 34.8–46.1)
HCT VFR BLD CALC: 34 % (ref 34.8–46.1)
HCT VFR BLD CALC: 37 % (ref 34.8–46.1)
HGB BLD-MCNC: 12.9 G/DL (ref 11.5–15.4)
HGB BLDA-MCNC: 11.2 G/DL (ref 11.5–15.4)
HGB BLDA-MCNC: 11.6 G/DL (ref 11.5–15.4)
HGB BLDA-MCNC: 12.6 G/DL (ref 11.5–15.4)
HGB UR QL STRIP.AUTO: NEGATIVE
HYALINE CASTS #/AREA URNS LPF: NORMAL /LPF
KCT BLD-ACNC: 110 SEC (ref 89–137)
KCT BLD-ACNC: 127 SEC (ref 89–137)
KCT BLD-ACNC: 416 SEC (ref 89–137)
KETONES UR STRIP-MCNC: NEGATIVE MG/DL
LEUKOCYTE ESTERASE UR QL STRIP: NEGATIVE
NITRITE UR QL STRIP: NEGATIVE
NON-SQ EPI CELLS URNS QL MICRO: NORMAL /HPF
PCO2 BLD: 27 MMOL/L (ref 21–32)
PCO2 BLD: 27 MMOL/L (ref 21–32)
PCO2 BLD: 29 MMOL/L (ref 21–32)
PCO2 BLD: 48.1 MM HG (ref 36–44)
PCO2 BLD: 49.3 MM HG (ref 36–44)
PCO2 BLD: 52.3 MM HG (ref 36–44)
PH BLD: 7.32 [PH] (ref 7.35–7.45)
PH BLD: 7.33 [PH] (ref 7.35–7.45)
PH BLD: 7.33 [PH] (ref 7.35–7.45)
PH UR STRIP.AUTO: 5.5 [PH]
PO2 BLD: 152 MM HG (ref 75–129)
PO2 BLD: 384 MM HG (ref 75–129)
PO2 BLD: 97 MM HG (ref 75–129)
POTASSIUM BLD-SCNC: 4.4 MMOL/L (ref 3.5–5.3)
POTASSIUM BLD-SCNC: 4.6 MMOL/L (ref 3.5–5.3)
POTASSIUM BLD-SCNC: 4.6 MMOL/L (ref 3.5–5.3)
POTASSIUM SERPL-SCNC: 4.7 MMOL/L (ref 3.5–5.3)
PROT UR STRIP-MCNC: NEGATIVE MG/DL
RBC #/AREA URNS AUTO: NORMAL /HPF
SAO2 % BLD FROM PO2: 100 % (ref 60–85)
SAO2 % BLD FROM PO2: 97 % (ref 60–85)
SAO2 % BLD FROM PO2: 99 % (ref 60–85)
SODIUM BLD-SCNC: 137 MMOL/L (ref 136–145)
SODIUM SERPL-SCNC: 139 MMOL/L (ref 136–145)
SP GR UR STRIP.AUTO: 1.02 (ref 1–1.03)
SPECIMEN SOURCE: ABNORMAL
SPECIMEN SOURCE: NORMAL
SPECIMEN SOURCE: NORMAL
UNIT DISPENSE STATUS: NORMAL
UNIT PRODUCT CODE: NORMAL
UNIT RH: NORMAL
UROBILINOGEN UR QL STRIP.AUTO: 1 E.U./DL
WBC #/AREA URNS AUTO: NORMAL /HPF

## 2020-09-01 PROCEDURE — 33361 REPLACE AORTIC VALVE PERQ: CPT | Performed by: INTERNAL MEDICINE

## 2020-09-01 PROCEDURE — 85014 HEMATOCRIT: CPT

## 2020-09-01 PROCEDURE — C1769 GUIDE WIRE: HCPCS | Performed by: THORACIC SURGERY (CARDIOTHORACIC VASCULAR SURGERY)

## 2020-09-01 PROCEDURE — 84132 ASSAY OF SERUM POTASSIUM: CPT

## 2020-09-01 PROCEDURE — 85018 HEMOGLOBIN: CPT | Performed by: PHYSICIAN ASSISTANT

## 2020-09-01 PROCEDURE — 82947 ASSAY GLUCOSE BLOOD QUANT: CPT

## 2020-09-01 PROCEDURE — 82948 REAGENT STRIP/BLOOD GLUCOSE: CPT

## 2020-09-01 PROCEDURE — C1894 INTRO/SHEATH, NON-LASER: HCPCS | Performed by: THORACIC SURGERY (CARDIOTHORACIC VASCULAR SURGERY)

## 2020-09-01 PROCEDURE — 85014 HEMATOCRIT: CPT | Performed by: PHYSICIAN ASSISTANT

## 2020-09-01 PROCEDURE — G9197 ORDER FOR CEPH: HCPCS | Performed by: THORACIC SURGERY (CARDIOTHORACIC VASCULAR SURGERY)

## 2020-09-01 PROCEDURE — 76377 3D RENDER W/INTRP POSTPROCES: CPT

## 2020-09-01 PROCEDURE — 85347 COAGULATION TIME ACTIVATED: CPT

## 2020-09-01 PROCEDURE — 86923 COMPATIBILITY TEST ELECTRIC: CPT

## 2020-09-01 PROCEDURE — 71045 X-RAY EXAM CHEST 1 VIEW: CPT

## 2020-09-01 PROCEDURE — 82330 ASSAY OF CALCIUM: CPT

## 2020-09-01 PROCEDURE — 80048 BASIC METABOLIC PNL TOTAL CA: CPT | Performed by: PHYSICIAN ASSISTANT

## 2020-09-01 PROCEDURE — 02HV33Z INSERTION OF INFUSION DEVICE INTO SUPERIOR VENA CAVA, PERCUTANEOUS APPROACH: ICD-10-PCS | Performed by: ANESTHESIOLOGY

## 2020-09-01 PROCEDURE — 93005 ELECTROCARDIOGRAM TRACING: CPT

## 2020-09-01 PROCEDURE — 84295 ASSAY OF SERUM SODIUM: CPT

## 2020-09-01 PROCEDURE — 02RF38Z REPLACEMENT OF AORTIC VALVE WITH ZOOPLASTIC TISSUE, PERCUTANEOUS APPROACH: ICD-10-PCS | Performed by: THORACIC SURGERY (CARDIOTHORACIC VASCULAR SURGERY)

## 2020-09-01 PROCEDURE — 93312 ECHO TRANSESOPHAGEAL: CPT

## 2020-09-01 PROCEDURE — G9197 ORDER FOR CEPH: HCPCS | Performed by: INTERNAL MEDICINE

## 2020-09-01 PROCEDURE — 99024 POSTOP FOLLOW-UP VISIT: CPT | Performed by: THORACIC SURGERY (CARDIOTHORACIC VASCULAR SURGERY)

## 2020-09-01 PROCEDURE — 81001 URINALYSIS AUTO W/SCOPE: CPT | Performed by: NURSE PRACTITIONER

## 2020-09-01 PROCEDURE — C1760 CLOSURE DEV, VASC: HCPCS | Performed by: THORACIC SURGERY (CARDIOTHORACIC VASCULAR SURGERY)

## 2020-09-01 PROCEDURE — 33361 REPLACE AORTIC VALVE PERQ: CPT | Performed by: THORACIC SURGERY (CARDIOTHORACIC VASCULAR SURGERY)

## 2020-09-01 PROCEDURE — 82803 BLOOD GASES ANY COMBINATION: CPT

## 2020-09-01 DEVICE — VALVE TAVR SAPIEN 3 ULTRA W/ CMNDR DLV SYS 26MM: Type: IMPLANTABLE DEVICE | Site: HEART | Status: FUNCTIONAL

## 2020-09-01 DEVICE — PERCLOSE PROGLIDE™ SUTURE-MEDIATED CLOSURE SYSTEM
Type: IMPLANTABLE DEVICE | Site: ARTERIAL | Status: FUNCTIONAL
Brand: PERCLOSE PROGLIDE™

## 2020-09-01 RX ORDER — HYDRALAZINE HYDROCHLORIDE 20 MG/ML
5 INJECTION INTRAMUSCULAR; INTRAVENOUS EVERY 6 HOURS PRN
Status: DISCONTINUED | OUTPATIENT
Start: 2020-09-01 | End: 2020-09-02

## 2020-09-01 RX ORDER — ONDANSETRON 2 MG/ML
INJECTION INTRAMUSCULAR; INTRAVENOUS AS NEEDED
Status: DISCONTINUED | OUTPATIENT
Start: 2020-09-01 | End: 2020-09-01

## 2020-09-01 RX ORDER — ALBUTEROL SULFATE 2.5 MG/3ML
2.5 SOLUTION RESPIRATORY (INHALATION) EVERY 4 HOURS PRN
Status: DISCONTINUED | OUTPATIENT
Start: 2020-09-01 | End: 2020-09-03 | Stop reason: HOSPADM

## 2020-09-01 RX ORDER — HEPARIN SODIUM 1000 [USP'U]/ML
INJECTION, SOLUTION INTRAVENOUS; SUBCUTANEOUS AS NEEDED
Status: DISCONTINUED | OUTPATIENT
Start: 2020-09-01 | End: 2020-09-01

## 2020-09-01 RX ORDER — ROCURONIUM BROMIDE 10 MG/ML
INJECTION, SOLUTION INTRAVENOUS AS NEEDED
Status: DISCONTINUED | OUTPATIENT
Start: 2020-09-01 | End: 2020-09-01

## 2020-09-01 RX ORDER — LIDOCAINE HYDROCHLORIDE 10 MG/ML
INJECTION, SOLUTION EPIDURAL; INFILTRATION; INTRACAUDAL; PERINEURAL AS NEEDED
Status: DISCONTINUED | OUTPATIENT
Start: 2020-09-01 | End: 2020-09-01

## 2020-09-01 RX ORDER — OXYCODONE HYDROCHLORIDE 10 MG/1
10 TABLET ORAL EVERY 6 HOURS PRN
Status: DISCONTINUED | OUTPATIENT
Start: 2020-09-01 | End: 2020-09-03 | Stop reason: HOSPADM

## 2020-09-01 RX ORDER — HEPARIN SODIUM 5000 [USP'U]/ML
5000 INJECTION, SOLUTION INTRAVENOUS; SUBCUTANEOUS EVERY 8 HOURS SCHEDULED
Status: DISCONTINUED | OUTPATIENT
Start: 2020-09-02 | End: 2020-09-01

## 2020-09-01 RX ORDER — CEFAZOLIN SODIUM 2 G/50ML
2000 SOLUTION INTRAVENOUS EVERY 8 HOURS
Status: COMPLETED | OUTPATIENT
Start: 2020-09-01 | End: 2020-09-02

## 2020-09-01 RX ORDER — SODIUM CHLORIDE, SODIUM LACTATE, POTASSIUM CHLORIDE, CALCIUM CHLORIDE 600; 310; 30; 20 MG/100ML; MG/100ML; MG/100ML; MG/100ML
INJECTION, SOLUTION INTRAVENOUS CONTINUOUS PRN
Status: DISCONTINUED | OUTPATIENT
Start: 2020-09-01 | End: 2020-09-01

## 2020-09-01 RX ORDER — ACETAMINOPHEN 325 MG/1
650 TABLET ORAL EVERY 4 HOURS PRN
Status: DISCONTINUED | OUTPATIENT
Start: 2020-09-01 | End: 2020-09-02

## 2020-09-01 RX ORDER — CHLORHEXIDINE GLUCONATE 0.12 MG/ML
15 RINSE ORAL 2 TIMES DAILY
Status: DISCONTINUED | OUTPATIENT
Start: 2020-09-01 | End: 2020-09-03 | Stop reason: HOSPADM

## 2020-09-01 RX ORDER — ONDANSETRON 2 MG/ML
4 INJECTION INTRAMUSCULAR; INTRAVENOUS ONCE AS NEEDED
Status: DISCONTINUED | OUTPATIENT
Start: 2020-09-01 | End: 2020-09-01 | Stop reason: HOSPADM

## 2020-09-01 RX ORDER — ONDANSETRON 2 MG/ML
4 INJECTION INTRAMUSCULAR; INTRAVENOUS EVERY 6 HOURS PRN
Status: DISCONTINUED | OUTPATIENT
Start: 2020-09-01 | End: 2020-09-02

## 2020-09-01 RX ORDER — FENTANYL CITRATE/PF 50 MCG/ML
25 SYRINGE (ML) INJECTION
Status: DISCONTINUED | OUTPATIENT
Start: 2020-09-01 | End: 2020-09-01 | Stop reason: HOSPADM

## 2020-09-01 RX ORDER — ASPIRIN 81 MG/1
81 TABLET, CHEWABLE ORAL DAILY
Status: DISCONTINUED | OUTPATIENT
Start: 2020-09-02 | End: 2020-09-03 | Stop reason: HOSPADM

## 2020-09-01 RX ORDER — PANTOPRAZOLE SODIUM 40 MG/1
40 TABLET, DELAYED RELEASE ORAL
Status: DISCONTINUED | OUTPATIENT
Start: 2020-09-02 | End: 2020-09-03 | Stop reason: HOSPADM

## 2020-09-01 RX ORDER — FENTANYL CITRATE 50 UG/ML
INJECTION, SOLUTION INTRAMUSCULAR; INTRAVENOUS AS NEEDED
Status: DISCONTINUED | OUTPATIENT
Start: 2020-09-01 | End: 2020-09-01

## 2020-09-01 RX ORDER — POLYETHYLENE GLYCOL 3350 17 G/17G
17 POWDER, FOR SOLUTION ORAL DAILY
Status: DISCONTINUED | OUTPATIENT
Start: 2020-09-02 | End: 2020-09-02

## 2020-09-01 RX ORDER — PROPOFOL 10 MG/ML
INJECTION, EMULSION INTRAVENOUS AS NEEDED
Status: DISCONTINUED | OUTPATIENT
Start: 2020-09-01 | End: 2020-09-01

## 2020-09-01 RX ORDER — SODIUM CHLORIDE, SODIUM GLUCONATE, SODIUM ACETATE, POTASSIUM CHLORIDE, MAGNESIUM CHLORIDE, SODIUM PHOSPHATE, DIBASIC, AND POTASSIUM PHOSPHATE .53; .5; .37; .037; .03; .012; .00082 G/100ML; G/100ML; G/100ML; G/100ML; G/100ML; G/100ML; G/100ML
50 INJECTION, SOLUTION INTRAVENOUS CONTINUOUS
Status: DISCONTINUED | OUTPATIENT
Start: 2020-09-01 | End: 2020-09-02

## 2020-09-01 RX ORDER — FERROUS SULFATE 325(65) MG
325 TABLET ORAL
Status: DISCONTINUED | OUTPATIENT
Start: 2020-09-02 | End: 2020-09-03 | Stop reason: HOSPADM

## 2020-09-01 RX ORDER — PROTAMINE SULFATE 10 MG/ML
INJECTION, SOLUTION INTRAVENOUS AS NEEDED
Status: DISCONTINUED | OUTPATIENT
Start: 2020-09-01 | End: 2020-09-01

## 2020-09-01 RX ORDER — SODIUM CHLORIDE 9 MG/ML
INJECTION, SOLUTION INTRAVENOUS CONTINUOUS PRN
Status: DISCONTINUED | OUTPATIENT
Start: 2020-09-01 | End: 2020-09-01

## 2020-09-01 RX ORDER — CEFAZOLIN SODIUM 1 G/3ML
INJECTION, POWDER, FOR SOLUTION INTRAMUSCULAR; INTRAVENOUS AS NEEDED
Status: DISCONTINUED | OUTPATIENT
Start: 2020-09-01 | End: 2020-09-01

## 2020-09-01 RX ORDER — MAGNESIUM HYDROXIDE 1200 MG/15ML
LIQUID ORAL AS NEEDED
Status: DISCONTINUED | OUTPATIENT
Start: 2020-09-01 | End: 2020-09-01 | Stop reason: HOSPADM

## 2020-09-01 RX ORDER — PRAVASTATIN SODIUM 40 MG
40 TABLET ORAL
Status: DISCONTINUED | OUTPATIENT
Start: 2020-09-01 | End: 2020-09-03 | Stop reason: HOSPADM

## 2020-09-01 RX ORDER — BISACODYL 10 MG
10 SUPPOSITORY, RECTAL RECTAL DAILY PRN
Status: DISCONTINUED | OUTPATIENT
Start: 2020-09-01 | End: 2020-09-02

## 2020-09-01 RX ORDER — ANASTROZOLE 1 MG/1
1 TABLET ORAL DAILY
Status: DISCONTINUED | OUTPATIENT
Start: 2020-09-01 | End: 2020-09-03 | Stop reason: HOSPADM

## 2020-09-01 RX ORDER — SODIUM CHLORIDE, SODIUM LACTATE, POTASSIUM CHLORIDE, CALCIUM CHLORIDE 600; 310; 30; 20 MG/100ML; MG/100ML; MG/100ML; MG/100ML
75 INJECTION, SOLUTION INTRAVENOUS CONTINUOUS
Status: DISCONTINUED | OUTPATIENT
Start: 2020-09-01 | End: 2020-09-02

## 2020-09-01 RX ORDER — AMIODARONE HYDROCHLORIDE 200 MG/1
200 TABLET ORAL
Status: DISCONTINUED | OUTPATIENT
Start: 2020-09-02 | End: 2020-09-03 | Stop reason: HOSPADM

## 2020-09-01 RX ORDER — CHLORHEXIDINE GLUCONATE 0.12 MG/ML
15 RINSE ORAL ONCE
Status: COMPLETED | OUTPATIENT
Start: 2020-09-01 | End: 2020-09-01

## 2020-09-01 RX ORDER — DEXAMETHASONE SODIUM PHOSPHATE 10 MG/ML
INJECTION, SOLUTION INTRAMUSCULAR; INTRAVENOUS AS NEEDED
Status: DISCONTINUED | OUTPATIENT
Start: 2020-09-01 | End: 2020-09-01

## 2020-09-01 RX ADMIN — SODIUM CHLORIDE 10 MG/HR: 0.9 INJECTION, SOLUTION INTRAVENOUS at 12:22

## 2020-09-01 RX ADMIN — OXYCODONE HYDROCHLORIDE 10 MG: 10 TABLET ORAL at 16:31

## 2020-09-01 RX ADMIN — SUGAMMADEX 200 MG: 100 INJECTION, SOLUTION INTRAVENOUS at 12:20

## 2020-09-01 RX ADMIN — ROCURONIUM BROMIDE 50 MG: 10 INJECTION, SOLUTION INTRAVENOUS at 11:11

## 2020-09-01 RX ADMIN — IOHEXOL 20 ML: 350 INJECTION, SOLUTION INTRAVENOUS at 12:25

## 2020-09-01 RX ADMIN — CHLORHEXIDINE GLUCONATE 0.12% ORAL RINSE 15 ML: 1.2 LIQUID ORAL at 09:41

## 2020-09-01 RX ADMIN — INSULIN LISPRO 2 UNITS: 100 INJECTION, SOLUTION INTRAVENOUS; SUBCUTANEOUS at 21:07

## 2020-09-01 RX ADMIN — MUPIROCIN 1 APPLICATION: 20 OINTMENT TOPICAL at 09:41

## 2020-09-01 RX ADMIN — SODIUM CHLORIDE 15 MG/HR: 0.9 INJECTION, SOLUTION INTRAVENOUS at 16:09

## 2020-09-01 RX ADMIN — SODIUM CHLORIDE, SODIUM LACTATE, POTASSIUM CHLORIDE, AND CALCIUM CHLORIDE: .6; .31; .03; .02 INJECTION, SOLUTION INTRAVENOUS at 10:57

## 2020-09-01 RX ADMIN — SODIUM CHLORIDE, SODIUM GLUCONATE, SODIUM ACETATE, POTASSIUM CHLORIDE, MAGNESIUM CHLORIDE, SODIUM PHOSPHATE, DIBASIC, AND POTASSIUM PHOSPHATE 50 ML/HR: .53; .5; .37; .037; .03; .012; .00082 INJECTION, SOLUTION INTRAVENOUS at 13:05

## 2020-09-01 RX ADMIN — CEFAZOLIN 3000 MG: 1 INJECTION, POWDER, FOR SOLUTION INTRAVENOUS at 11:18

## 2020-09-01 RX ADMIN — PRAVASTATIN SODIUM 40 MG: 40 TABLET ORAL at 16:03

## 2020-09-01 RX ADMIN — ONDANSETRON 4 MG: 2 INJECTION INTRAMUSCULAR; INTRAVENOUS at 12:08

## 2020-09-01 RX ADMIN — SODIUM CHLORIDE 15 MG/HR: 0.9 INJECTION, SOLUTION INTRAVENOUS at 18:17

## 2020-09-01 RX ADMIN — FENTANYL CITRATE 50 MCG: 50 INJECTION, SOLUTION INTRAMUSCULAR; INTRAVENOUS at 10:58

## 2020-09-01 RX ADMIN — DEXAMETHASONE SODIUM PHOSPHATE 4 MG: 10 INJECTION, SOLUTION INTRAMUSCULAR; INTRAVENOUS at 11:18

## 2020-09-01 RX ADMIN — METOPROLOL TARTRATE 25 MG: 25 TABLET ORAL at 16:03

## 2020-09-01 RX ADMIN — Medication 25 MCG: at 13:16

## 2020-09-01 RX ADMIN — CEFAZOLIN SODIUM 2000 MG: 2 SOLUTION INTRAVENOUS at 18:18

## 2020-09-01 RX ADMIN — PROTAMINE SULFATE 120 MG: 10 INJECTION, SOLUTION INTRAVENOUS at 12:13

## 2020-09-01 RX ADMIN — SODIUM CHLORIDE 15 MG/HR: 0.9 INJECTION, SOLUTION INTRAVENOUS at 14:26

## 2020-09-01 RX ADMIN — CHLORHEXIDINE GLUCONATE 0.12% ORAL RINSE 15 ML: 1.2 LIQUID ORAL at 18:17

## 2020-09-01 RX ADMIN — PROPOFOL 120 MG: 10 INJECTION, EMULSION INTRAVENOUS at 11:11

## 2020-09-01 RX ADMIN — HEPARIN SODIUM 25000 UNITS: 1000 INJECTION INTRAVENOUS; SUBCUTANEOUS at 11:56

## 2020-09-01 RX ADMIN — PHENYLEPHRINE HYDROCHLORIDE 100 MCG: 10 INJECTION INTRAVENOUS at 11:11

## 2020-09-01 RX ADMIN — APIXABAN 5 MG: 5 TABLET, FILM COATED ORAL at 18:17

## 2020-09-01 RX ADMIN — SODIUM CHLORIDE: 0.9 INJECTION, SOLUTION INTRAVENOUS at 10:57

## 2020-09-01 RX ADMIN — LIDOCAINE HYDROCHLORIDE 50 MG: 10 INJECTION, SOLUTION EPIDURAL; INFILTRATION; INTRACAUDAL; PERINEURAL at 11:11

## 2020-09-01 RX ADMIN — MUPIROCIN 1 APPLICATION: 20 OINTMENT TOPICAL at 21:06

## 2020-09-01 RX ADMIN — FENTANYL CITRATE 50 MCG: 50 INJECTION, SOLUTION INTRAMUSCULAR; INTRAVENOUS at 11:09

## 2020-09-01 RX ADMIN — Medication 25 MCG: at 13:39

## 2020-09-01 NOTE — ANESTHESIA POSTPROCEDURE EVALUATION
Post-Op Assessment Note    CV Status:  Stable  Pain Score: 0    Pain management: adequate     Mental Status:  Alert   Hydration Status:  Stable   PONV Controlled:  None   Airway Patency:  Patent      Post Op Vitals Reviewed: Yes      Staff: CRNA         No complications documented      BP   143/77   Temp  97 8   Pulse  76   Resp   14   SpO2   98

## 2020-09-01 NOTE — PLAN OF CARE
Problem: Prexisting or High Potential for Compromised Skin Integrity  Goal: Skin integrity is maintained or improved  Description: INTERVENTIONS:  - Identify patients at risk for skin breakdown  - Assess and monitor skin integrity  - Assess and monitor nutrition and hydration status  - Monitor labs   - Assess for incontinence   - Turn and reposition patient  - Assist with mobility/ambulation  - Relieve pressure over bony prominences  - Avoid friction and shearing  - Provide appropriate hygiene as needed including keeping skin clean and dry  - Evaluate need for skin moisturizer/barrier cream  - Collaborate with interdisciplinary team   - Patient/family teaching  - Consider wound care consult   Outcome: Progressing     Problem: Potential for Falls  Goal: Patient will remain free of falls  Description: INTERVENTIONS:  - Assess patient frequently for physical needs  -  Identify cognitive and physical deficits and behaviors that affect risk of falls    -  [place] fall precautions as indicated by assessment   - Educate patient/family on patient safety including physical limitations  - Instruct patient to call for assistance with activity based on assessment  - Modify environment to reduce risk of injury  - Consider OT/PT consult to assist with strengthening/mobility  Outcome: Progressing

## 2020-09-01 NOTE — ANESTHESIA PROCEDURE NOTES
Procedure Performed: THO Anesthesia  Start Time:  9/1/2020 11:40 AM        Preanesthesia Checklist    Patient identified, IV assessed, risks and benefits discussed, monitors and equipment assessed, procedure being performed at surgeon's request and anesthesia consent obtained  Procedure    Diagnostic Indications for THO:  assessment of surgical repair  Type of THO: interventional THO with real time guidance of intracardiac procedure  Images Saved: ultrasound permanent image saved  Physician Requesting Echo: Rama Shows, DO  Location performed: OR  Intubated  Bite block not placed  Heart visualized  Insertion of THO Probe:  Atraumatic  Probe Type:  Multiplane  Modalities:  3D, color flow mapping, continuous wave Doppler and pulse wave Doppler  Echocardiographic and Doppler Measurements    PREPROCEDURE    LEFT VENTRICLE:  Systolic Function: normal  Ejection Fraction: 60%  RIGHT VENTRICLE:  Systolic Function: normal   Cavity size normal              AORTIC VALVE:  Leaflets: trileaflet  Leaflet motions restricted  Stenosis: severe and severely calcified by visualization  Regurgitation: mild  MITRAL VALVE:  Leaflets: calcified  Leaflet Motions: restricted and heavy calcification/MAD, restricted  Regurgitation: moderate and mild-mod  Stenosis: at least mild; about 2 5 cm^2 by 3DQ, but this my be an onverestimate  TRICUSPID VALVE:  Regurgitation: mild  ASCENDING AORTA:  Dissection not present  AORTIC ARCH:  dissection not present  Grade 2: severe intimal thickening without protruding atheroma  DESCENDING AORTA:  Dissection not present  Grade 2: severe intimal thickening without protruding atheroma  LEFT ATRIAL APPENDAGE:  No spontaneous echo contrast     OTHER ATRIAL FINDINGS:  No SHAAN clot  ATRIAL SEPTUM:  Intra-atrial septal morphology: patent foramen ovale  Patent foramen ovale shunt: left to right shunt and continuous  POSTPROCEDURE    LEFT VENTRICLE: Unchanged   RIGHT VENTRICLE: Unchanged   AORTIC VALVE:   Leaflets: BARBARA valve in aortic position, well-seated, does not rock, good leaflet excurions, at worst mild PVL  MITRAL VALVE: Unchanged   AORTA: Unchanged   Dissection: Dissection not present  ECHOCARDIOGRAM COMMENTS:  Did not enter stomach due to gastric bypass - unable to obtain gradients

## 2020-09-01 NOTE — OP NOTE
OPERATIVE REPORT  PATIENT NAME: Merlin Perez    :  1955  MRN: [id number]  Pt Location: [facility] OR ROOM 02    SURGERY DATE: 2020    SURGEON: María Elena Forrest MD     ASSISTANT: Chi Cabrera DO     CO-SURGEON: Hernando Madrid MD (Interventional Cardiology Fellow)    PREOPERATIVE DIAGNOSIS Symptomatic severe aortic stenosis  POSTOPERATIVE DIAGNOSIS Symptomatic severe aortic stenosis  NYHA CLASS: 4    CCS CLASS: 4    PROCEDURE Transcatheter aortic valve replacement with a 26 mm Huerta BARBARA 3 Ultra bioprosthetic valve via a percutaneous right transfemoral approach  ANESTHESIA Dr Mana Mathews, general endotracheal anesthesia with transesophageal echocardiogram guidance  CARDIOPULMONARY BYPASS TIME 0  PACKS/TUBES/DRAINS None  ESTIMATED BLOOD LOSS: 20 mL    OPERATIVE TECHNIQUE The patient was taken to the operating room and placed supine on the operating table  Following the satisfactory induction of general anesthesia and placement of monitoring lines, the patient was prepped and draped in the usual sterile fashion  A time-out procedure was performed  The right common femoral artery was accessed percutaneously using Seldinger technique and fluoroscopy  The right common femoral vein was accessed in a similar fashion and was cannulated with a 6 [place] sheath  The femoral artery was cannulated with a 7 [place] sheath  A pigtail catheter was inserted and advanced through the right common femoral artery sheath into the right coronary cusp  Using a series of injections, the angle of deployment was determined  Through the right common femoral vein sheath, a balloon tip temporary pacing catheter was inserted into the right ventricle and its capture was confirmed  The right common femoral artery sheath was removed over an extra-stiff wire  Two (2) Perclose sutures were deployed in the standard fashion The patient was systemically heparinized   The delivery sheath was inserted through the right common femoral artery and advanced into the aorta  The aortic valve was crossed with a wire  A 26 mm BARBARA 3 Ultra valve was then advanced through the sheath into the aorta and positioned onto the deployment balloon in the aorta and then advanced around the aortic arch and through the annulus of the aortic valve to the appropriate level  At this point, the catheter was desheathed in the standard fashion  The valve was positioned appropriately using a combination of fluoroscopy and transesophageal echocardiogram guidance  During an episode of rapid pacing, balloon deployment of the 26 mm BARBARA 3 Ultra valve was performed  Following deployment, the position of the valve was confirmed by fluoroscopy and echocardiography and its position appeared appropriate with trivial perivalvular leak  The valve delivery system was subsequently removed and the sheath was removed from the right common femoral artery while the Perclose sutures were secured and direct pressure was held  One additional Perclose suture was deployed and secured to achieve hemostasis  Protamine was administered with normalization of the ACT  Pressure was released from the right groin and there was no active bleeding and no evidence of hematoma development  The common femoral vein sheath was removed from the right and pressure was held  Sponge, needle, and instrument counts were reported as correct by the nursing staff  There was no qualified surgical resident available to assist  As the attending surgeon, I was present and scrubbed for all critical portions of this procedure  Final transesophageal echocardiogram demonstrated a well-positioned bioprosthetic transcatheter aortic valve with normal function and trivial perivalvular leak         Catarino Patiño MD  DATE: September 1, 2020  TIME: 12:26 PM

## 2020-09-01 NOTE — RESPIRATORY THERAPY NOTE
RT Protocol Note  Meagan Lee 59 y o  female MRN: [id number]  Unit/Bed#: [facility] 410-01 Encounter: [id number]    Assessment    Principal Problem: Aortic stenosis, severe  Active Problems:     Morbid obesity due to excess calories (HCC)    Atherosclerotic peripheral vascular disease (HCC)    Colostomy care ([place] [address] )    Moderate mitral stenosis    Atrial fibrillation with rapid ventricular response ([facility])    Hyperlipidemia    Hypochloremia      Home Pulmonary Medications:  Albuterol udn prn       Past Medical History:   Diagnosis Date    Anemia     Arthritis     Cancer ([facility])     rectal    Chronic lower back pain     Chronic pain disorder     back pain    Colon cancer ([place] [address] ) 2018    Diabetes mellitus ([place] [address] )     Endometrial cancer ([place] [address] ) 2018    GERD (gastroesophageal reflux disease)     History of chemotherapy     History of MRSA infection     Morbid obesity ([place] [address] )     Ovarian cancer ([place] [address] ) 2018    Rectal cancer ([place] [address] )     Spinal stenosis     Systolic murmur     Unsteady gait     uses walker    Wears dentures     Wears glasses      Social History     Socioeconomic History    Marital status: Single     Spouse name: Not on file    Number of children: 2    Years of education: Not on file    Highest education level: Not on file   Occupational History    Not on file   Social Needs    Financial resource strain: Not on file    Food insecurity     Worry: Not on file     Inability: Not on file   [employer] needs     Medical: Not on file     Non-medical: Not on file   Tobacco Use    Smoking status: Former Smoker     Packs/day: 1 00     Years: 25 00     Pack years: 25 00     Last attempt to quit: 3/30/2006     Years since quittin 4    Smokeless tobacco: Never Used   Substance and Sexual Activity    Alcohol use: Not Currently    Drug use: Not Currently     Types: Oxycodone     Comment: Percocet for lower back pain prn    Sexual activity: Not Currently   Lifestyle    Physical activity Days per week: Not on file     Minutes per session: Not on file    Stress: Not on file   Relationships    Social connections     Talks on phone: Not on file     Gets together: Not on file     Attends Congregational service: Not on file     Active member of club or organization: Not on file     Attends meetings of clubs or organizations: Not on file     Relationship status: Not on file    Intimate partner violence     Fear of current or ex partner: Not on file     Emotionally abused: Not on file     Physically abused: Not on file     Forced sexual activity: Not on file   Other Topics Concern    Not on file   Social History Narrative    Not on file       Subjective         Objective    Physical Exam:   Assessment Type: Assess only  General Appearance: Awake  Respiratory Pattern: Normal  Chest Assessment: Chest expansion symmetrical  Bilateral Breath Sounds: Diminished    Vitals:  Blood pressure 129/54, pulse 76, temperature 97 6 °F (36 4 °C), resp  rate 12, height 5' 5" (1 651 m), weight 122 kg (268 lb), SpO2 97 %, not currently breastfeeding  Imaging and other studies: I have personally reviewed pertinent reports  Plan    Respiratory Plan: Home Bronchodilator Patient pathway  Airway Clearance Plan: Incentive Spirometer     Resp Comments: (P) Pt admitted post TAVR procedure  Pt arrived extubated and on 3lpm nc sating 97%  Pt BS diminished  per pt, pt has no hx of pulmonary disease but takes albuterol prn udn at home for congestion and sob  Pt has been taking her albuterol udn as needed at home  Will cont pts home regimen  Also, instructed pt on IS  Pt achieved 1500 ml without difficulty  Pt denies sob at this time

## 2020-09-01 NOTE — INTERVAL H&P NOTE
H&P reviewed  After examining the patient I find no changes in the patients condition since the H&P had been written  Vitals:    09/01/20 0849   BP: 137/84   Pulse: 65   Resp: 16   Temp: (!) 96 6 °F (35 9 °C)   SpO2: 100%     Anticipated Length of Stay:  Patient will be admitted on an Inpatient basis with an anticipated length of stay of  greater than 2 midnights  Justification for Hospital Stay:  Post surgical recovery following open heart surgery

## 2020-09-01 NOTE — ANESTHESIA PROCEDURE NOTES
Central Line Insertion  Performed by: Bro Lantigua MD  Authorized by: Bro Lantigua MD     Date/Time: 9/1/2020 11:25 AM  Catheter Type:  triple lumen  Consent: Verbal consent obtained  Written consent obtained  Risks and benefits: risks, benefits and alternatives were discussed  Consent given by: patient  Patient understanding: patient states understanding of the procedure being performed  Patient consent: the patient's understanding of the procedure matches consent given  Procedure consent: procedure consent matches procedure scheduled  Relevant documents: relevant documents present and verified  Required items: required blood products, implants, devices, and special equipment available  Patient identity confirmed: arm band and hospital-assigned identification number  Time out: Immediately prior to procedure a "time out" was called to verify the correct patient, procedure, equipment, support staff and site/side marked as required  Indications: vascular access  Location details: right internal jugular  Catheter size: 7 Fr  Patient position: Trendelenburg  Anesthesia method: ga    Assessment: blood return through all ports and free fluid flow  Preparation: skin prepped with ChloraPrep  Skin prep agent dried: skin prep agent completely dried prior to procedure  Sterile barriers: all five maximum sterile barriers used - cap, mask, sterile gown, sterile gloves, and large sterile sheet  Hand hygiene: hand hygiene performed prior to central venous catheter insertion  Ultrasound guidance: yes  sterile gel and probe cover used in ultrasound-guided central venous catheter insertionSite selection rationale: tavr  Vessel of Catheter Tip End: central venous  Number of attempts: 1  Successful placement: yes  Post-procedure: line sutured,  dressing applied and chlorhexidine patch applied  Patient tolerance: Patient tolerated the procedure well with no immediate complications  independent trained observer present for patient monitoring -

## 2020-09-01 NOTE — OP NOTE
OPERATIVE REPORT  PATIENT NAME: Narciso Meigs    :  1955  MRN: [id number]  Pt Location: [facility] OR ROOM 02    SURGERY DATE: 2020      Co-Surgeons: Daya Garcia DO; Elyse Lama MD    ASSISTANT: Zion Romero DO      PREOPERATIVE DIAGNOSIS Symptomatic severe aortic stenosis  POSTOPERATIVE DIAGNOSIS Symptomatic severe aortic stenosis  PROCEDURE Transcatheter aortic valve replacement with a 26 mm Huerta BARBARA 3 ULTRA bioprosthetic valve via a percutaneous right transfemoral approach  ANESTHESIA Dr Abisai Ramirez, general endotracheal anesthesia with transesophageal echocardiogram guidance  After informed consent was obtained, patient brought to hybrid operating room in fasting state  Time out was performed  Using modified seldinger technique sheaths were placed in the right   femoral artery and vein respectively  The right   femoral artery was also used for placement of delivery sheath  The vessel was accessed using modified seldinger technique  Using fluoroscopy a temporary wire was advanced into RV apex through transfemoral sheath  The coplanar view was obtained using pigtail catheter placed in ascending aorta with subsequent ascending aortography  The TAVR delivery sheath was ulltimately advanced over a stiff wire  Next the 26 mm Huerta BARBARA 3 ULTRA valve was implanted using rapid pacing with fluoro and THO guidance  There was no significant paravalvular leak appreciated post implant  The sheaths were removed and hemostasis obtained by manual compression of the venous sheath  The  arterial sheath used for  Pigtail catheter insertion and ascending aortography was closed with an Proglide system  The TAVR delivery sheath was removed and percutaneous closure was obtained using Preclose technique with two Proglide devices deployed pre sheath insertion  A 3rd was used for further hemostasis    Patient left the hybrid operating room in stable condition  Impression  Successful 26 mm Huerta BARBARA 3 ULTRA transcatheter aortic valve replacement         SIGNATURE: Ela Field DO  DATE: September 1, 2020  TIME: 12:25 PM

## 2020-09-01 NOTE — H&P
Cardiothoracic Surgery   History and Physical  Lisa Merritt 59 y o  female MRN: [id number]  Unit/Bed#: OR [place] Encounter: [id number]    Attending Physician: ROSA Torres  Admitting Diagnosis: Severe aortic stenosis    History of Present Illness:   Abisai Grider is a 59y o  year old female who was previously evaluated in our office for transcatheter aortic valve replacement for her severe aortic stenosis  During this consultation, arrangements were made for pre-operative studies to be performed  Images were completed and acceptable  She is now ready to undergo TAVR      In review, Naya Deluna a 59 y  o  year old female w ith symptomatic severe aortic stenosis   PMHx is notable for mitral stenosis, HLD, PVD, morbid obesity s/p Juan-En-Y (2/2016), Rectal cancer (IIA), ovarian and uterine cancer (IIIC) s/p protectomy/permamnent colostomy/abdominal perineal resection/posterior vaginectomy/omentectomy/radical ALEXANDRA-BSO (9/2018) with adjuvant chemo, chronic rectal fistula, spinal stenosis & DJD s/p b/l THR's  Patient states she was told she had a heart murmur at the time of her bariatric surgery  She has been followed by cardiology with serial echocardiograms and in 2018, echo revealed moderate to severe AS  At that time is when she was diagnosed with cancer and underwent surgical resection and chemo  She reports she was asymptomatic  She was admitted to the hospital 2/24/20 with new onset Afib with RVR  Echocardiogram during that hospitalization demonstrated progression of her stenosis to a severe range with an CHARBEL 0 71 cm2 and peak velocity 457 cm/s      Upon questioning today, patient reports she has had no changes in her symptoms since last seen in our office  She experiences BERG and occasional lightheadedness  She denies chest pain, weight gain, edema, PND or orthopnea  She lives in an appt and is independent with her ADL's using a walker or cane   She states she does not walk much due to her spinal stenosis and prior THRs  Patient quit smoking and drinking in   She has a full upper denture and partial lower plate with a few remaining lower teeth  She has no dental issues  Past Medical History:  Past Medical History:   Diagnosis Date    Anemia     Arthritis     Cancer ([facility] )     rectal    Chronic lower back pain     Chronic pain disorder     back pain    [place] cancer ([place] [address] ) 2018    Diabetes mellitus ([place] [address] )     Endometrial cancer ([place] [address] ) 2018    GERD (gastroesophageal reflux disease)     History of chemotherapy     History of MRSA infection     Morbid obesity ([place] [address] )     Ovarian cancer ([place] [address] ) 2018    Rectal cancer ([place] [address] )     Spinal stenosis     Systolic murmur     Unsteady gait     uses walker    Wears dentures     Wears glasses          Past Surgical History:   Past Surgical History:   Procedure Laterality Date    ABDOMINAL PERINEAL BOWEL RESECTION W/ ILEOANAL POUCH N/A 2018    Procedure: LAPAROSCOPIC HAND ASSIST ABDOMINOPERINEAL RESECTION,  POSTERIOR VAGINECTOMY, OMENTECTOMY;  Surgeon: Froy Lopez MD;  [facility];  Service: Colorectal    ABDOMINAL SURGERY      abscess removed from abdomen and right thigh, a hole in thigh closed by plastic surgeon    ABSCESS DRAINAGE      abd, (R) leg, (L) leg     SECTION       SECTION      CYSTOSCOPY N/A 2018    Procedure: CYSTOSCOPY;  Surgeon: Mertha Babinski, MD;  [facility];  Service: Gynecology Oncology    ESOPHAGOGASTRODUODENOSCOPY N/A 2016    Procedure: ESOPHAGOGASTRODUODENOSCOPY (EGD); Surgeon: Carlos Alberto Laurent MD;  [facility];  Service:     ESOPHAGOGASTRODUODENOSCOPY N/A 3/30/2016    Procedure: ESOPHAGOGASTRODUODENOSCOPY (EGD); Surgeon: Carlos Alberto Laurent MD;  [facility]; Service:     EYE SURGERY      laser eye surgery    HYSTERECTOMY N/A 2018    Procedure: RADICAL HYSTERECTOMY TOTAL ABDOMINAL (ALEXANDRA)   BSO;  Surgeon: Mertha Babinski, MD; [facility];  Service: Gynecology Oncology    JOINT REPLACEMENT Left 2017    hip    JOINT REPLACEMENT Right 2017    hip    OOPHORECTOMY Bilateral     ID COLONOSCOPY FLX DX W/COLLJ SPEC WHEN PFRMD N/A 3/9/2018    Procedure: COLONOSCOPY;  Surgeon: Samantha Valenzuela MD;  [facility]; Service: Gastroenterology    ID COLONOSCOPY FLX DX W/COLLJ [facility] WHEN PFRMD N/A 9/5/2018    Procedure: COLONOSCOPY;  Surgeon: Todd Carrera MD;  [facility]: [facility]; Service: Colorectal    ID ESOPHAGOGASTRODUODENOSCOPY TRANSORAL DIAGNOSTIC N/A 2/2/2018    Procedure: ESOPHAGOGASTRODUODENOSCOPY (EGD); Surgeon: Samantha Valenzuela MD;  [facility];   Service: Gastroenterology    ID LAP GASTRIC BYPASS/SOLEDAD-EN-Y N/A 7/18/2016    Procedure: BYPASS GASTRIC  SOLEDAD-EN-Y LAPAROSCOPIC;  Surgeon: Ransom Lanes, MD;  [facility];  Service: Bariatrics    ID LAP, SURG COLOSTOMY N/A 9/6/2018    Procedure: PERMANENT END COLOSTOMY;  Surgeon: Todd Carrera MD;  [facility];  Service: Colorectal    ID LAP, SURG PROCTECTOMY W COLOSTOMY N/A 9/6/2018    Procedure: PROCTECTOMY;  Surgeon: Todd Carrera MD;  [facility];  Service: Colorectal    TUBAL LIGATION           Family History:  Family History   Adopted: Yes   Problem Relation Age of Onset    Leukemia Mother     Heart disease Father     Coronary artery disease Father     Diabetes Father     No Known Problems Sister     No Known Problems Brother     Diabetes Son     No Known Problems Brother     No Known Problems Brother     No Known Problems Sister     No Known Problems Sister          Social History:  Social History     Substance and Sexual Activity   Alcohol Use Not Currently     Social History     Substance and Sexual Activity   Drug Use Not Currently    Types: Oxycodone    Comment: Percocet for lower back pain prn     Social History     Tobacco Use   Smoking Status Former Smoker    Packs/day: 1 00    Years: 25 00  Pack years: 25 00    Last attempt to quit: 3/30/2006    Years since quittin 4   Smokeless Tobacco Never Used     Marital Status: Single      Home Medications:   Prior to Admission medications    Medication Sig Start Date End Date Taking? Authorizing Provider   albuterol (2 5 mg/3 mL) 0 083 % nebulizer solution Take 1 vial (2 5 mg total) by nebulization every 4 (four) hours as needed for wheezing or shortness of breath 20   Glo Leos MD   amiodarone 200 mg tablet Take 1 tablet (200 mg total) by mouth daily with breakfast 3/11/20   Anastasia Avila,    anastrozole (ARIMIDEX) 1 mg tablet Take 1 tablet (1 mg total) by mouth daily 19   Jackelin Cevallos DO   apixaban (ELIQUIS) 5 mg Take 1 tablet (5 mg total) by mouth 2 (two) times a day 3/11/20   Graeme Narvaez,    BIOTIN PO Take 1 tablet by mouth daily    Historical Provider, MD   Calcium-Vitamin D 500-125 MG-UNIT TABS Take 1 tablet by mouth 2 (two) times a day      Historical Provider, MD   cyclobenzaprine (FLEXERIL) 5 mg tablet Take 1 tablet (5 mg total) by mouth 3 (three) times a day as needed for muscle spasms for up to 10 days Do not take before driving  Patient not taking: Reported on 2020  Mikal Jaeger MD   ferrous sulfate 324 (65 Fe) mg Take 1 tablet (324 mg total) by mouth daily before breakfast 18   Marlene Phillips,    Multiple Vitamins-Minerals (BARIATRIC FUSION) CHEW Chew 1 tablet daily      Historical Provider, MD   mupirocin (BACTROBAN) 2 % ointment Apply 1 application topically 2 (two) times a day for 5 days Apply to each nostril twice daily for five days before your operation   20  GEORGETTE Mackenzie   nystatin (MYCOSTATIN) powder Apply topically 2 (two) times a day as needed (rash) 6/10/20   Jackelin Cevallos DO   omeprazole (PriLOSEC) 20 mg delayed release capsule TAKE 1 CAPSULE (20 MG TOTAL) BY MOUTH DAILY 20   Harolyn Anabella, DO   oxyCODONE (ROXICODONE) 10 MG TABS Take 10 mg by mouth every 6 (six) hours as needed   9/15/18   Historical Provider, MD   rosuvastatin (CRESTOR) 5 mg tablet Take 1 tablet (5 mg total) by mouth daily  Patient taking differently: Take 5 mg by mouth daily after dinner  2/3/20   Forest Sewell DO       Allergies: Allergies   Allergen Reactions    Tramadol Other (See Comments)     Dizzy         Review of Systems:  Review of Systems   Constitutional: Positive for activity change and fatigue  Negative for appetite change, chills and fever  HENT: Negative for facial swelling, nosebleeds and trouble swallowing  Eyes: Negative for pain and visual disturbance  Respiratory: Positive for shortness of breath  Negative for cough, chest tightness and wheezing  Cardiovascular: Negative for chest pain, palpitations and leg swelling  Gastrointestinal: Negative for abdominal pain, nausea and vomiting  Genitourinary: Negative for dysuria, flank pain and hematuria  Musculoskeletal: Positive for gait problem  Negative for arthralgias and joint swelling  Skin: Negative for color change and pallor  Neurological: Positive for light-headedness  Negative for seizures, syncope and numbness  Hematological: Negative for adenopathy  Does not bruise/bleed easily  Psychiatric/Behavioral: Negative for agitation, behavioral problems and confusion  Vital Signs:     Vitals:    09/01/20 0849 09/01/20 0928   BP: 137/84    Pulse: 65    Resp: 16    Temp: (!) 96 6 °F (35 9 °C)    TempSrc: Tympanic Core    SpO2: 100%    Weight:  122 kg (268 lb)   Height:  5' 5" (1 651 m)     Invasive Devices     Drain            Colostomy Descending/sigmoid  days                Physical Exam:  Physical Exam  Constitutional:       General: She is not in acute distress  Appearance: She is well-developed  She is not diaphoretic  HENT:      Head: Normocephalic and atraumatic        Right Ear: External ear normal       Left Ear: External ear normal  Nose: Nose normal    Eyes:      General:         Right eye: No discharge  Left eye: No discharge  Neck:      Musculoskeletal: Neck supple  No muscular tenderness  Vascular: No JVD  Cardiovascular:      Rate and Rhythm: Normal rate and regular rhythm  Heart sounds: Murmur present  No friction rub  Pulmonary:      Effort: Pulmonary effort is normal       Breath sounds: Normal breath sounds  No wheezing or rales  Abdominal:      General: Bowel sounds are normal  There is no distension  Palpations: Abdomen is soft  Tenderness: There is no abdominal tenderness  Musculoskeletal:         General: No deformity  Right lower leg: No edema  Left lower leg: No edema  Skin:     General: Skin is warm and dry  Capillary Refill: Capillary refill takes less than 2 seconds  Findings: No erythema or rash  Neurological:      General: No focal deficit present  Mental Status: She is alert and oriented to person, place, and time  Psychiatric:         Mood and Affect: Mood normal          Behavior: Behavior normal          Thought Content: Thought content normal          Lab Results:               Invalid input(s): LABGLOM      Lab Results   Component Value Date    HGBA1C 5 1 10/15/2019     Lab Results   Component Value Date    TROPONINI <0 02 04/06/2020       Imaging Studies:     Echocardiogram: 2/24/20  LEFT VENTRICLE:  Systolic function was normal  Ejection fraction was estimated in the range of 55 % to 60 % to be 55 %  There were no regional wall motion abnormalities  Wall thickness was mildly to moderately increased measuring 1 3cm at lateral and septal walls      LEFT ATRIUM:  The atrium was moderately dilated      RIGHT ATRIUM:  The atrium was mildly dilated      MITRAL VALVE:  There was moderate to marked annular calcification    There was mild stenosis with mean gradient near 4-5mmHg     AORTIC VALVE:  Leaflets exhibited moderately increased thickness, marked calcification, markedly reduced cuspal separation, and sclerosis  Transaortic velocity was increased due to valvular stenosis  There was severe stenosis by Doppler measurements at right sternal border     Gated CTA of the chest/abdomen/pelvis: 3/16/20  VASCULAR STRUCTURES:       Annulus: diameter 27 x 22 mm      area: 489 sq mm    Annulus to LCA: 13 mm    Annulus to RCA: 14 mm    Minimal diameter right iliofemoral segment: 7 mm    Minimal diameter left iliofemoral segment: 7 mm     Cardiac catheterization: 7/27/20  CORONARY VESSELS:   --  The coronary circulation is right dominant  --  Left main: The vessel was large sized  Angiography showed mild atherosclerosis  --  LAD: The vessel was medium to large sized and moderately tortuous  Angiography showed moderate atherosclerosis  --  Circumflex: The vessel was medium to large sized  --  Ostial circumflex: There was a 50 % stenosis  --  Distal circumflex: There was a tubular 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  --  RCA: The vessel was large sized (dominant) and moderately calcified  --  Mid RCA: There was a 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      Pulmonary function tests: 3/16/20  Results:  FEV1/FVC Ratio: 79 % (99% predicted)  Forced Vital Capacity: 2 71 L    86 % predicted  FEV1: 2 14 L     87 % predicted  After administration of bronchodilator FEV1: 2 26 (+5%)     Lung volumes by body plethysmography: Total Lung Capacity 89 % predicted Residual volume 87 % predicted  RV/TLC ratio 97%     DLCO corrected for patients hemoglobin level: 91 %     Carotid artery ultrasound: 3/16/20  RIGHT:  There is <50% stenosis noted in the internal carotid artery  Plaque is  heterogenous, calcified and irregular  Vertebral artery flow is antegrade  There is no significant subclavian artery  disease  LEFT:  There is <50% stenosis noted in the internal carotid artery  Plaque is  heterogenous, calcified and irregular  Vertebral artery flow is antegrade   There is no significant subclavian artery  Disease      I have personally reviewed pertinent films in PACS     TAVR evaluation Assessment:      5 Meter Walk Test:                  Attempt 1: 6 sec              Attempt 2: 6 sec              Attempt 3: 7 sec     STS Risk Score: 2 2%     [place]: II     KCCQ-12 completed       I have personally reviewed pertinent films in PACS    Assessment:  Principal Problem: Aortic stenosis, severe  Active Problems: Morbid obesity due to excess calories [facility])    Atherosclerotic peripheral vascular disease ([facility] )    [facility]    Moderate mitral stenosis    Atrial fibrillation with rapid ventricular response (HCC)    Hyperlipidemia    Severe aortic stenosis; Ongoing TAVR workup    Plan:  Tra Trinh is admitted for today treatment of severe aortic stenosis with transcatheter aortic valve replacement  Risks and benefits of transfemoral transcatheter aortic valve replacement were discussed in detail today with the patient  Tra Trinh was comfortable with our recommendations, and their questions were answered to their satisfaction        SIGNATURE: Ariadna Yuen PA-C  DATE: September 1, 2020  TIME: 10:02 AM

## 2020-09-01 NOTE — ANESTHESIA PROCEDURE NOTES
Arterial Line Insertion  Performed by: Omar Lacy MD  Authorized by: Omar Lacy MD   Consent: Verbal consent obtained  Written consent obtained  Risks and benefits: risks, benefits and alternatives were discussed  Consent given by: patient  Patient understanding: patient states understanding of the procedure being performed  Patient consent: the patient's understanding of the procedure matches consent given  Procedure consent: procedure consent matches procedure scheduled  Relevant documents: relevant documents present and verified  Required items: required blood products, implants, devices, and special equipment available  Patient identity confirmed: arm band and hospital-assigned identification number  Time out: Immediately prior to procedure a "time out" was called to verify the correct patient, procedure, equipment, support staff and site/side marked as required  Preparation: Patient was prepped and draped in the usual sterile fashion    Indications: hemodynamic monitoring  Orientation:  Right  Location: radial artery  Procedure Details:  Vijay's test normal: yes  Needle gauge: 20  Seldinger technique: Seldinger technique used  Number of attempts: 1    Post-procedure:  Post-procedure: dressing applied  Waveform: good waveform and waveform confirmed  Post-procedure CNS: normal and unchanged  Patient tolerance: Patient tolerated the procedure well with no immediate complications  Comments: Pre-induction, 2 minutes

## 2020-09-02 ENCOUNTER — APPOINTMENT (INPATIENT)
Dept: RADIOLOGY | Facility: HOSPITAL | Age: 65
DRG: 267 | End: 2020-09-02
Payer: MEDICARE

## 2020-09-02 ENCOUNTER — APPOINTMENT (INPATIENT)
Dept: NON INVASIVE DIAGNOSTICS | Facility: HOSPITAL | Age: 65
DRG: 267 | End: 2020-09-02
Payer: MEDICARE

## 2020-09-02 ENCOUNTER — TELEPHONE (OUTPATIENT)
Dept: FAMILY MEDICINE CLINIC | Facility: CLINIC | Age: 65
End: 2020-09-02

## 2020-09-02 LAB
ANION GAP SERPL CALCULATED.3IONS-SCNC: 5 MMOL/L (ref 4–13)
ATRIAL RATE: 57 BPM
ATRIAL RATE: 72 BPM
ATRIAL RATE: 73 BPM
BUN SERPL-MCNC: 16 MG/DL (ref 5–25)
CALCIUM SERPL-MCNC: 8.5 MG/DL (ref 8.3–10.1)
CHLORIDE SERPL-SCNC: 107 MMOL/L (ref 100–108)
CO2 SERPL-SCNC: 26 MMOL/L (ref 21–32)
CREAT SERPL-MCNC: 0.75 MG/DL (ref 0.6–1.3)
ERYTHROCYTE [DISTWIDTH] IN BLOOD BY AUTOMATED COUNT: 14.8 % (ref 11.6–15.1)
GFR SERPL CREATININE-BSD FRML MDRD: 85 ML/MIN/1.73SQ M
GLUCOSE SERPL-MCNC: 140 MG/DL (ref 65–140)
GLUCOSE SERPL-MCNC: 143 MG/DL (ref 65–140)
GLUCOSE SERPL-MCNC: 158 MG/DL (ref 65–140)
GLUCOSE SERPL-MCNC: 160 MG/DL (ref 65–140)
HCT VFR BLD AUTO: 36.9 % (ref 34.8–46.1)
HGB BLD-MCNC: 11.6 G/DL (ref 11.5–15.4)
MAGNESIUM SERPL-MCNC: 2.3 MG/DL (ref 1.6–2.6)
MCH RBC QN AUTO: 25.8 PG (ref 26.8–34.3)
MCHC RBC AUTO-ENTMCNC: 31.4 G/DL (ref 31.4–37.4)
MCV RBC AUTO: 82 FL (ref 82–98)
P AXIS: 61 DEGREES
P AXIS: 64 DEGREES
P AXIS: 67 DEGREES
PLATELET # BLD AUTO: 129 THOUSANDS/UL (ref 149–390)
PMV BLD AUTO: 9.9 FL (ref 8.9–12.7)
POTASSIUM SERPL-SCNC: 4.6 MMOL/L (ref 3.5–5.3)
PR INTERVAL: 188 MS
PR INTERVAL: 188 MS
PR INTERVAL: 202 MS
QRS AXIS: -17 DEGREES
QRS AXIS: -27 DEGREES
QRS AXIS: -29 DEGREES
QRSD INTERVAL: 92 MS
QRSD INTERVAL: 92 MS
QRSD INTERVAL: 96 MS
QT INTERVAL: 383 MS
QT INTERVAL: 396 MS
QT INTERVAL: 444 MS
QTC INTERVAL: 422 MS
QTC INTERVAL: 432 MS
QTC INTERVAL: 434 MS
RBC # BLD AUTO: 4.49 MILLION/UL (ref 3.81–5.12)
SODIUM SERPL-SCNC: 138 MMOL/L (ref 136–145)
T WAVE AXIS: 23 DEGREES
T WAVE AXIS: 41 DEGREES
T WAVE AXIS: 98 DEGREES
VENTRICULAR RATE: 57 BPM
VENTRICULAR RATE: 72 BPM
VENTRICULAR RATE: 73 BPM
WBC # BLD AUTO: 6.32 THOUSAND/UL (ref 4.31–10.16)

## 2020-09-02 PROCEDURE — 93005 ELECTROCARDIOGRAM TRACING: CPT

## 2020-09-02 PROCEDURE — 97535 SELF CARE MNGMENT TRAINING: CPT

## 2020-09-02 PROCEDURE — 83735 ASSAY OF MAGNESIUM: CPT | Performed by: PHYSICIAN ASSISTANT

## 2020-09-02 PROCEDURE — 85027 COMPLETE CBC AUTOMATED: CPT | Performed by: PHYSICIAN ASSISTANT

## 2020-09-02 PROCEDURE — 93306 TTE W/DOPPLER COMPLETE: CPT

## 2020-09-02 PROCEDURE — 82948 REAGENT STRIP/BLOOD GLUCOSE: CPT

## 2020-09-02 PROCEDURE — 93010 ELECTROCARDIOGRAM REPORT: CPT | Performed by: INTERNAL MEDICINE

## 2020-09-02 PROCEDURE — 93306 TTE W/DOPPLER COMPLETE: CPT | Performed by: INTERNAL MEDICINE

## 2020-09-02 PROCEDURE — 99233 SBSQ HOSP IP/OBS HIGH 50: CPT | Performed by: THORACIC SURGERY (CARDIOTHORACIC VASCULAR SURGERY)

## 2020-09-02 PROCEDURE — 97167 OT EVAL HIGH COMPLEX 60 MIN: CPT

## 2020-09-02 PROCEDURE — 97110 THERAPEUTIC EXERCISES: CPT

## 2020-09-02 PROCEDURE — 97163 PT EVAL HIGH COMPLEX 45 MIN: CPT

## 2020-09-02 PROCEDURE — 71045 X-RAY EXAM CHEST 1 VIEW: CPT

## 2020-09-02 PROCEDURE — 80048 BASIC METABOLIC PNL TOTAL CA: CPT | Performed by: PHYSICIAN ASSISTANT

## 2020-09-02 PROCEDURE — 99223 1ST HOSP IP/OBS HIGH 75: CPT | Performed by: INTERNAL MEDICINE

## 2020-09-02 PROCEDURE — 94760 N-INVAS EAR/PLS OXIMETRY 1: CPT

## 2020-09-02 RX ORDER — ONDANSETRON 2 MG/ML
4 INJECTION INTRAMUSCULAR; INTRAVENOUS EVERY 6 HOURS PRN
Status: DISPENSED | OUTPATIENT
Start: 2020-09-02 | End: 2020-09-03

## 2020-09-02 RX ORDER — ACETAMINOPHEN 325 MG/1
650 TABLET ORAL EVERY 6 HOURS PRN
Status: DISCONTINUED | OUTPATIENT
Start: 2020-09-02 | End: 2020-09-03 | Stop reason: HOSPADM

## 2020-09-02 RX ORDER — DOCUSATE SODIUM 100 MG/1
100 CAPSULE, LIQUID FILLED ORAL 2 TIMES DAILY
Status: DISCONTINUED | OUTPATIENT
Start: 2020-09-02 | End: 2020-09-03 | Stop reason: HOSPADM

## 2020-09-02 RX ORDER — TORSEMIDE 20 MG/1
20 TABLET ORAL DAILY
Status: DISCONTINUED | OUTPATIENT
Start: 2020-09-02 | End: 2020-09-03 | Stop reason: HOSPADM

## 2020-09-02 RX ORDER — POTASSIUM CHLORIDE 20 MEQ/1
20 TABLET, EXTENDED RELEASE ORAL DAILY
Status: DISCONTINUED | OUTPATIENT
Start: 2020-09-02 | End: 2020-09-03 | Stop reason: HOSPADM

## 2020-09-02 RX ADMIN — INSULIN LISPRO 2 UNITS: 100 INJECTION, SOLUTION INTRAVENOUS; SUBCUTANEOUS at 11:38

## 2020-09-02 RX ADMIN — CEFAZOLIN SODIUM 2000 MG: 2 SOLUTION INTRAVENOUS at 03:12

## 2020-09-02 RX ADMIN — METOPROLOL TARTRATE 25 MG: 25 TABLET ORAL at 08:13

## 2020-09-02 RX ADMIN — OXYCODONE HYDROCHLORIDE 10 MG: 10 TABLET ORAL at 00:38

## 2020-09-02 RX ADMIN — APIXABAN 5 MG: 5 TABLET, FILM COATED ORAL at 18:41

## 2020-09-02 RX ADMIN — APIXABAN 5 MG: 5 TABLET, FILM COATED ORAL at 08:14

## 2020-09-02 RX ADMIN — DOCUSATE SODIUM 100 MG: 100 CAPSULE, LIQUID FILLED ORAL at 18:41

## 2020-09-02 RX ADMIN — CEFAZOLIN SODIUM 2000 MG: 2 SOLUTION INTRAVENOUS at 11:33

## 2020-09-02 RX ADMIN — OXYCODONE HYDROCHLORIDE 10 MG: 10 TABLET ORAL at 16:42

## 2020-09-02 RX ADMIN — ASPIRIN 81 MG CHEWABLE TABLET 81 MG: 81 TABLET CHEWABLE at 08:13

## 2020-09-02 RX ADMIN — METOPROLOL TARTRATE 25 MG: 25 TABLET ORAL at 21:17

## 2020-09-02 RX ADMIN — ANASTROZOLE 1 MG: 1 TABLET, COATED ORAL at 08:15

## 2020-09-02 RX ADMIN — OXYCODONE HYDROCHLORIDE 10 MG: 10 TABLET ORAL at 08:19

## 2020-09-02 RX ADMIN — MUPIROCIN 1 APPLICATION: 20 OINTMENT TOPICAL at 21:17

## 2020-09-02 RX ADMIN — INSULIN LISPRO 1 UNITS: 100 INJECTION, SOLUTION INTRAVENOUS; SUBCUTANEOUS at 21:18

## 2020-09-02 RX ADMIN — POTASSIUM CHLORIDE 20 MEQ: 1500 TABLET, EXTENDED RELEASE ORAL at 08:14

## 2020-09-02 RX ADMIN — TORSEMIDE 20 MG: 20 TABLET ORAL at 08:14

## 2020-09-02 RX ADMIN — FERROUS SULFATE TAB 325 MG (65 MG ELEMENTAL FE) 325 MG: 325 (65 FE) TAB at 08:14

## 2020-09-02 RX ADMIN — AMIODARONE HYDROCHLORIDE 200 MG: 200 TABLET ORAL at 08:13

## 2020-09-02 RX ADMIN — ONDANSETRON 4 MG: 2 INJECTION INTRAMUSCULAR; INTRAVENOUS at 11:32

## 2020-09-02 RX ADMIN — PANTOPRAZOLE SODIUM 40 MG: 40 TABLET, DELAYED RELEASE ORAL at 05:38

## 2020-09-02 RX ADMIN — DOCUSATE SODIUM 100 MG: 100 CAPSULE, LIQUID FILLED ORAL at 08:14

## 2020-09-02 RX ADMIN — PRAVASTATIN SODIUM 40 MG: 40 TABLET ORAL at 16:34

## 2020-09-02 RX ADMIN — CHLORHEXIDINE GLUCONATE 0.12% ORAL RINSE 15 ML: 1.2 LIQUID ORAL at 08:14

## 2020-09-02 RX ADMIN — MUPIROCIN 1 APPLICATION: 20 OINTMENT TOPICAL at 08:14

## 2020-09-02 RX ADMIN — HYDRALAZINE HYDROCHLORIDE 5 MG: 20 INJECTION INTRAMUSCULAR; INTRAVENOUS at 06:02

## 2020-09-02 NOTE — PLAN OF CARE
Problem: OCCUPATIONAL THERAPY ADULT  Goal: Performs self-care activities at highest level of function for planned discharge setting  See evaluation for individualized goals  Description: Treatment Interventions: Cardiac education, Patient/family training, Compensatory technique education          See flowsheet documentation for full assessment, interventions and recommendations  Outcome: Completed  Note: Limitation: Decreased ADL status, Decreased endurance, Decreased high-level ADLs  Prognosis: Good  Assessment: Pt is a 59 y o  YO  female [facility] w/ severe aortic stenosis s/p TAVR  Pt  has a past medical history of Anemia, Arthritis, Cancer ([facility] ), Chronic lower back pain, Chronic pain disorder, Colon cancer ([facility] ), Diabetes mellitus ([facility] ), Endometrial cancer ([facility] ), GERD (gastroesophageal reflux disease), History of chemotherapy, History of MRSA infection, Morbid obesity ([facility] ), Ovarian cancer ([facility] ), Rectal cancer ([facility] ), Spinal stenosis, Systolic murmur, Unsteady gait, Wears dentures, and Wears glasses    Pt with active OT orders and ambulate  orders    Pt resides in a handicap accessible apt alone  Pt was I w/  ADLS and IADLS, (+) drove, & required no use of DME PTA  Currently pt is supervision for ADLs/IADLs/functional mobility  Pt is limited at this time 2*: pain, endurance, activity tolerance, decreased I w/ ADLS/IADLS and decreased safety awareness  The following Occupational Performance Areas to address include: bathing/shower, toilet hygiene, functional mobility, community mobility and household maintenance  Based on the aforementioned OT evaluation, functional performance deficits, and assessments, pt has been identified as a high complexity evaluation  From OT standpoint, anticipate d/c home with family support  Pt to continue to benefit from acute immediate OT services to address the following goals 1-2 sessions to  w/in 3-5 days:       OT Discharge Recommendation: Return to previous environment with social support  OT - OK to Discharge:  Yes

## 2020-09-02 NOTE — PLAN OF CARE
Problem: PHYSICAL THERAPY ADULT  Goal: Performs mobility at highest level of function for planned discharge setting  See evaluation for individualized goals  Description: Treatment/Interventions: Functional transfer training, LE strengthening/ROM, Therapeutic exercise, Endurance training, Equipment eval/education, Bed mobility, Gait training, Spoke to nursing, Spoke to case management, OT  Equipment Recommended: Walker(cont using rollator at this time)       See flowsheet documentation for full assessment, interventions and recommendations  Note: Prognosis: Good  Problem List: Decreased strength, Decreased endurance, Impaired balance, Decreased mobility, Obesity  Assessment: Pt is 59 y o  admitted with Dx of Aartic stenosis, severe and underwent transcatheter aortic valve replacement with a 26 mm [place] BARBARA 3 Ultra bioprosthetic valve via a percutaneous right transfemoral approach on 9/1/2020  Pt 's comorbidities affecting POC include: anemia, arthritis, chronic LBP, DM, morbid obesity, and unsteady gait and personal factors of: living alone  Pt's clinical presentation is currently  unstable/unpredictable which is evident in ongoing telem monitoring w/ a-line in place, abn lab values  Pt presents w/ min overall weakness, decreased endurance and inconsistent amb balance and gait patterns (likely close to premorbid level) w/ use of rollator for amb at this time  Will cont to follow pt in PT for progressive mobilization to max level of (I), endurance, and safety  Otherwise, anticipate pt will return home w/ available family support upon D/C provided she cont improving w/ mobility skills, safety, and endurance and when medically cleared; pt would benefit from home PT follow up but she currently declines; will follow    Barriers to Discharge: Decreased caregiver support     PT Discharge Recommendation: Return to previous environment with social support(pt declines ana m epT)          See flowsheet documentation for full assessment

## 2020-09-02 NOTE — RESTORATIVE TECHNICIAN NOTE
Restorative Specialist Mobility Note       Activity: Ambulate in [place](back to bed for echo)     Assistive Device: (personal rollator)

## 2020-09-02 NOTE — OCCUPATIONAL THERAPY NOTE
Occupational Therapy Evaluation [place] [address]     Patient Name: Liane Christianson  [id number] Date: 2020  Problem List  Principal Problem: Aortic stenosis, severe  Active Problems: Morbid obesity due to excess calories (HCC)    Atherosclerotic peripheral vascular disease (HCC)    Colostomy care ([facility] )    Moderate mitral stenosis    Atrial fibrillation with rapid ventricular response (HCC)    Hyperlipidemia    Hypochloremia    Past Medical History  Past Medical History:   Diagnosis Date    Anemia     Arthritis     Cancer ([facility] )     rectal    Chronic lower back pain     Chronic pain disorder     back pain    Colon cancer ([place] [address] ) 2018    Diabetes mellitus ([place] [address] )     Endometrial cancer ([facility] ) 2018    GERD (gastroesophageal reflux disease)     History of chemotherapy     History of MRSA infection     Morbid obesity ([place] [address] )     Ovarian cancer ([place] [address] ) 2018    Rectal cancer ([facility] )     Spinal stenosis     Systolic murmur     Unsteady gait     uses walker    Wears dentures     Wears glasses      Past Surgical History  Past Surgical History:   Procedure Laterality Date    ABDOMINAL PERINEAL BOWEL RESECTION W/ ILEOANAL POUCH N/A 2018    Procedure: LAPAROSCOPIC HAND ASSIST ABDOMINOPERINEAL RESECTION,  POSTERIOR VAGINECTOMY, OMENTECTOMY;  Surgeon: Fatmata Barbosa MD;  [facility];  Service: Colorectal    ABDOMINAL SURGERY      abscess removed from abdomen and right thigh, a hole in thigh closed by plastic surgeon    ABSCESS DRAINAGE      abd, (R) leg, (L) leg     SECTION       SECTION      CYSTOSCOPY N/A 2018    Procedure: CYSTOSCOPY;  Surgeon: Lsia Sánchez MD;  [facility];  Service: Gynecology Oncology    ESOPHAGOGASTRODUODENOSCOPY N/A 2016    Procedure: ESOPHAGOGASTRODUODENOSCOPY (EGD);   Surgeon: Liborio Calderon MD;  [facility];  Service:     ESOPHAGOGASTRODUODENOSCOPY N/A 3/30/2016    Procedure: ESOPHAGOGASTRODUODENOSCOPY (EGD); Surgeon: Carlos Eduardo Bennett MD;  [facility]; Service:     EYE SURGERY      laser eye surgery    HYSTERECTOMY N/A 9/6/2018    Procedure: RADICAL HYSTERECTOMY TOTAL ABDOMINAL (ALEXANDRA)  BSO;  Surgeon: Amanda Johnson MD;  [facility];  Service: Gynecology Oncology    JOINT REPLACEMENT Left 2017    hip    JOINT REPLACEMENT Right 2017    hip    OOPHORECTOMY Bilateral     VA COLONOSCOPY FLX DX W/COLLJ SPEC WHEN PFRMD N/A 3/9/2018    Procedure: COLONOSCOPY;  Surgeon: Yvon San MD;  Location: [place] [address] GI LAB; Service: Gastroenterology    VA COLONOSCOPY FLX DX W/COLLJ [facility] WHEN PFRMD N/A 9/5/2018    Procedure: COLONOSCOPY;  Surgeon: Mckenna Villalobos MD;  [facility]; Service: Colorectal    VA ECHO TRANSESOPHAG R-T 2D W/PRB IMG ACQUISJ I&R N/A 9/1/2020    Procedure: TRANSESOPHAGEAL ECHOCARDIOGRAM (THO); Surgeon: Solo Hamilton DO;  [facility];  Service: Cardiac Surgery    VA ESOPHAGOGASTRODUODENOSCOPY TRANSORAL DIAGNOSTIC N/A 2/2/2018    Procedure: ESOPHAGOGASTRODUODENOSCOPY (EGD); Surgeon: Yvon San MD;  [facility]; Service: Gastroenterology    VA LAP GASTRIC BYPASS/SOLEDAD-EN-Y N/A 7/18/2016    Procedure: BYPASS GASTRIC  SOLEDAD-EN-Y LAPAROSCOPIC;  Surgeon: Carlos Eduardo Bennett MD;  [facility];  Service: Bariatrics    VA LAP, SURG COLOSTOMY N/A 9/6/2018    Procedure: PERMANENT END COLOSTOMY;  Surgeon: Mckenna Villalobos MD;  [facility];  Service: Colorectal    VA LAP, SURG PROCTECTOMY W COLOSTOMY N/A 9/6/2018    Procedure: PROCTECTOMY;  Surgeon: Mckenna Villalobos MD;  [facility];  Service: Colorectal    VA REPLACE AORTIC VALVE OPENFEMORAL ARTERY APPROACH N/A 9/1/2020    Procedure: REPLACEMENT AORTIC VALVE TRANSCATHETER (TAVR) TRANSFEMORAL W/ 26MM WADDELL BARBARA S3 ULTRA VALVE(ACCESS ON RIGHT);   Surgeon: Solo Hamilton DO;  [facility];  Service: Cardiac Surgery    TUBAL LIGATION           09/02/20 7161 Restrictions/Precautions   Weight Bearing Precautions Per Order No   Other Precautions Multiple lines;Telemetry; Fall Risk  (TAVR)   Pain Assessment   Pain Score 6   Pain Location/Orientation Location: Back   Home Living   Type of Home Apartment   Home Layout One level   Bathroom Shower/Tub Walk-in shower   Bathroom Toilet Raised   Bathroom Equipment Built-in shower seat;Grab bars in shower   Bathroom Accessibility Accessible via wheelchair   [address]  (rollator)   Additional Comments pt reports she lives in a handicap accessbile apartment   Prior Function   Level of [place] Independent with ADLs and functional mobility   Lives With Alone   Receives Help From Family  (2 local sons)   ADL Assistance Independent   IADLs Independent   Falls in the last 6 months 0   Vocational On disability   Lifestyle   Autonomy pta pt reports I in ADLs/IADLs/functional mobility w/ use of rollator or SPC   Reciprocal Relationships two local sons   Service to Others on disability   Intrinsic Gratification caring for her grandchildren, two boys ages 6 and 5 andgrand dtr age 2 5   Psychosocial   Psychosocial (WDL) WDL   Subjective   Subjective "Why can't I drive for two weeks?"   ADL   Where Assessed Chair   Eating Assistance 5  Supervision/Setup   Grooming Assistance 5  Supervision/Setup   UB Bathing Assistance 5  Supervision/Setup   LB Bathing Assistance 5  Supervision/Setup   UB Dressing Assistance 5  Supervision/Setup   LB Dressing Assistance 5  Supervision/Setup   Toileting Assistance  5  Supervision/Setup   Transfers   Sit to [address]   Additional items Increased time required   Stand to Sit 5  Supervision   Additional items Increased time required   Functional Mobility   Functional Mobility 4  Minimal assistance   Additional Comments CGA   Additional items Rolling walker  (rollator)   Balance   Static Sitting Fair +   Static Standing Fair   Ambulatory Fair -   Activity Tolerance   Activity Tolerance Patient limited by pain   Medical Staff Made Aware PT Slava   Nurse Made Aware okay to see per RN   RUE Assessment   RUE Assessment WFL   LUE Assessment   LUE Assessment WFL   Hand Function   Gross Motor Coordination Functional   Fine Motor Coordination Functional   Cognition   Overall Cognitive Status WFL   Arousal/Participation Cooperative   Attention Within functional limits   Orientation Level Oriented X4   Memory Within functional limits   Following Commands Follows one step commands without difficulty   Assessment   Limitation Decreased ADL status; Decreased endurance;Decreased high-level ADLs   Prognosis Good   Assessment Pt is a 59 y o  YO  female admitted [facility]/2020 w/ severe aortic stenosis s/p TAVR  Pt  has a past medical history of Anemia, Arthritis, Cancer ([facility] ), Chronic lower back pain, Chronic pain disorder, Colon cancer ([facility] ), Diabetes mellitus ([facility] ), Endometrial cancer ([facility] ), GERD (gastroesophageal reflux disease), History of chemotherapy, History of MRSA infection, Morbid obesity ([facility] ), Ovarian cancer ([facility] ), Rectal cancer ([facility] ), Spinal stenosis, Systolic murmur, Unsteady gait, Wears dentures, and Wears glasses    Pt with active OT orders and ambulate  orders    Pt resides in a handicap accessible apt alone  Pt was I w/  ADLS and IADLS, (+) drove, & required no use of DME PTA  Currently pt is supervision for ADLs/IADLs/functional mobility  Pt is limited at this time 2*: pain, endurance, activity tolerance, decreased I w/ ADLS/IADLS and decreased safety awareness  The following Occupational Performance Areas to address include: bathing/shower, toilet hygiene, functional mobility, community mobility and household maintenance  Based on the aforementioned OT evaluation, functional performance deficits, and assessments, pt has been identified as a high complexity evaluation  From OT standpoint, anticipate d/c home with family support   Pt to continue to benefit from acute immediate OT services to address the following goals 1-2 sessions to  w/in 3-5 days: Judith Navas Goals   Patient Goals go home   STG Time Frame 3-5   Plan   Treatment Interventions Cardiac education;Patient/family training; Compensatory technique education   Goal Expiration Date 20   OT Treatment Day 1   OT Frequency 1-2x/wk   Additional Treatment Session   Start Time    End Time 904   Treatment Assessment Pt participated in additional OT session focusing on cardiac education  Provided pt with Recovering After Minimally Invasive Cardiac Surgery packet and educated pt regarding; lifiting restrictions, safe activity engagement, energy conservation, lifestyle modifications, stress management and cardiac rehabilitation programs  Pt's questions were addressed after discussion of the packet  Provided pt with contact information for OT department if questions arrise  Pt is limited by decreased endurance and decreased ability to complete higher level ADLs, however her kids are local will be home and able to assist as needed upon d/c  Rec pt cont participation in self care after s/u w/ nrsg staff and cont mobility w/ non OT staff while in the hospital  Pt denies any questions or concerns from an OT standpoint at this time  Rec pt return home w/ increased family support upon d/c  D/C OT  Recommendation   OT Discharge Recommendation Return to previous environment with social support   OT - OK to Discharge Yes   Modified [place] Scale   Modified [place] Scale 3     Goals:    1 ) Pt/family will participate in cardiac education w/ G attn and carryover during functional/leisure activities      2 ) Pt will demonstrate 100% carryover of energy conservation techniques t/o functional I/ADL/leisure tasks w/o cues s/p skilled education      Deondre Adams MS, OTR/L

## 2020-09-02 NOTE — CONSULTS
Consultation - Cardiology Team One  Flora Quinteros 59 y o  female MRN: [id number]  Unit/Bed#: [facility] 410-01 Encounter: [id number]    Inpatient consult to Cardiology  Consult performed by: GEORGETTE Mills  Consult ordered by: Alli Vargas PA-C          Physician Requesting Consult: Latonya Moon DO     Reason for Consult / Principal Problem: Severe AS s/p TAVR      Assessment/ Plan    1  Severe Aortic Stenosis: s/p TAVR  POD #1  Doing well post TAVR  No pain, able to ambulate halls this AM and felt well  On appropriate medications: ASA (no Plavix as she is on Eliquis), metoprolol tartrate and statin  Repeat echo this afternoon to check valve function and position  Maintaining SR  Labs and VS stable; no events on telemetry  Continue remainder of post op care per cardiac surgery; possible discharge home tomorrow    2  Preserved LV systolic function: EF 16-22%  3  Paroxsymal atrial fibrillation: maintaining SR on oral amiodarone (on as OP 200mg daily); tolerating BB and AC restarted; Eliquis 5mg BID  4  HLD: continue statin  5  Hx of gastric bypass  6  Non-obstructive CAD: on pre-op cardiac cath 50% circumflex lesion; continue ASA, statin, BB      History of Present Illness      HPI: Flora Quinteros is a 59y o  year old female who has a history of atrial fibrillation, severe AS, PVD, HLD, hx of obesity s/p gastric bypass, hx of rectal CA  She follows with cardiologist Dr Willian Bamberger  Pt with known hx of AS with progression of disease on echo 2/2020 to severe with preserved LV function  She was referred to cardiac surgery for evaluation  After undergoing appropriate workup she presented on 9/1 and underwent Transcatheter aortic valve replacement with a 26 mm Huerta BARBARA 3 Ultra bioprosthetic valve via a percutaneous right transfemoral approach  Procedure uncomplicated per OP report  Cardiology asked to evaluate pt today regarding post op management  At time of my exam she reports feeling well   Has not had any surgical pain; has had some transient nausea without vomiting  Was able to ambulate halls this AM and felt well; less SOB  For repeat echo this afternoon to eval AV function and position  Other pertinent hx includes PAF; maintains SR;  As OP she is is on amiodarone 200mg daily as OP and AC with eliquis  Cardiac cath without obstructive CAD  Pre-operative cardiac cath: 8/2020  "CORONARY VESSELS:   --  The coronary circulation is right dominant  --  Left main: The vessel was large sized  Angiography showed mild atherosclerosis  --  LAD: The vessel was medium to large sized and moderately tortuous  Angiography showed moderate atherosclerosis  --  Circumflex: The vessel was medium to large sized  --  Ostial circumflex: There was a 50 % stenosis  --  Distal circumflex: There was a tubular 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  --  RCA: The vessel was large sized (dominant) and moderately calcified  --  Mid RCA: There was a 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 "    EKG reviewed personally:  5/2/2020  Sinus bradycardia  HR 57    Telemetry reviewed personally:   Sinus rhythm/sinus bradycardia; no significant events    Review of Systems   Constitution: Negative for decreased appetite and fever  Cardiovascular: Negative for chest pain, dyspnea on exertion, leg swelling, near-syncope, orthopnea, palpitations and syncope  Respiratory: Negative for cough, shortness of breath and wheezing  Gastrointestinal: Positive for nausea  Negative for abdominal pain and vomiting  Genitourinary: Negative for dysuria  Neurological: Positive for headaches  Negative for dizziness and light-headedness  Psychiatric/Behavioral: Negative for altered mental status  All other systems reviewed and are negative       Historical Information   Past Medical History:   Diagnosis Date    Anemia     Arthritis     Cancer ([facility] )     rectal    Chronic lower back pain     Chronic pain disorder     back pain    Colon cancer ([facility] ) 2018    Diabetes mellitus ([facility] )     Endometrial cancer ([facility] ) 2018    GERD (gastroesophageal reflux disease)     History of chemotherapy     History of MRSA infection     Morbid obesity ([facility] )     Ovarian cancer ([facility] ) 2018    [facility] cancer ([facility] )     Spinal stenosis     Systolic murmur     Unsteady gait     uses walker    Wears dentures     Wears glasses      Past Surgical History:   Procedure Laterality Date    ABDOMINAL PERINEAL BOWEL RESECTION W/ ILEOANAL POUCH N/A 2018    Procedure: LAPAROSCOPIC HAND ASSIST ABDOMINOPERINEAL RESECTION,  POSTERIOR VAGINECTOMY, OMENTECTOMY;  Surgeon: Everett Edwards MD;  [facility];  Service: Colorectal    ABDOMINAL SURGERY      abscess removed from abdomen and right thigh, a hole in thigh closed by plastic surgeon    ABSCESS DRAINAGE      abd, (R) leg, (L) leg     SECTION       SECTION      CYSTOSCOPY N/A 2018    Procedure: CYSTOSCOPY;  Surgeon: Josy Aquino MD;  [facility];  Service: Gynecology Oncology    ESOPHAGOGASTRODUODENOSCOPY N/A 2016    Procedure: ESOPHAGOGASTRODUODENOSCOPY (EGD); Surgeon: Ara Lechuga MD;  [facility];  Service:     ESOPHAGOGASTRODUODENOSCOPY N/A 3/30/2016    Procedure: ESOPHAGOGASTRODUODENOSCOPY (EGD); Surgeon: Ara Lechuga MD;  [facility]; Service:     EYE SURGERY      laser eye surgery    HYSTERECTOMY N/A 2018    Procedure: RADICAL HYSTERECTOMY TOTAL ABDOMINAL (ALEXANDRA)  BSO;  Surgeon: Josy Aquino MD;  [facility];  Service: Gynecology Oncology    JOINT REPLACEMENT Left 2017    hip    JOINT REPLACEMENT Right 2017    hip    OOPHORECTOMY Bilateral     WA COLONOSCOPY FLX DX W/COLLJ SPEC WHEN PFRMD N/A 3/9/2018    Procedure: COLONOSCOPY;  Surgeon: Rayne Garibay MD;  Location: [place] [address] GI LAB;   Service: Gastroenterology    WA COLONOSCOPY FLX DX W/COLLJ [address] WHEN PFRMD N/A 2018    Procedure: COLONOSCOPY;  Surgeon: Everett Edwards MD;  [facility]; Service: Colorectal    NV ECHO TRANSESOPHAG R-T 2D W/PRB IMG ACQUISJ I&R N/A 2020    Procedure: TRANSESOPHAGEAL ECHOCARDIOGRAM (THO); Surgeon: Kendall Flaherty DO;  [facility];  Service: Cardiac Surgery    NV ESOPHAGOGASTRODUODENOSCOPY TRANSORAL DIAGNOSTIC N/A 2018    Procedure: ESOPHAGOGASTRODUODENOSCOPY (EGD); Surgeon: Cleve England MD;  Location: [facility] GI LAB; Service: Gastroenterology    NV LAP GASTRIC BYPASS/SOLEDAD-EN-Y N/A 2016    Procedure: BYPASS GASTRIC  SOLEDAD-EN-Y LAPAROSCOPIC;  Surgeon: Hemalatha Steven MD;  [facility];  Service: Bariatrics    NV LAP, SURG COLOSTOMY N/A 2018    Procedure: PERMANENT END COLOSTOMY;  Surgeon: Carlos Alberto Lyons MD;  [facility];  Service: Colorectal    NV LAP, SURG PROCTECTOMY W COLOSTOMY N/A 2018    Procedure: PROCTECTOMY;  Surgeon: Carlos Alberto Lyons MD;  [facility];  Service: Colorectal    NV REPLACE AORTIC VALVE OPENFEMORAL ARTERY APPROACH N/A 2020    Procedure: REPLACEMENT AORTIC VALVE TRANSCATHETER (TAVR) TRANSFEMORAL W/ 26MM WADDELL BARBARA S3 ULTRA VALVE(ACCESS ON RIGHT);   Surgeon: Kendlal Flaherty DO;  [facility];  Service: Cardiac Surgery    TUBAL LIGATION       Social History     Substance and Sexual Activity   Alcohol Use Not Currently     Social History     Substance and Sexual Activity   Drug Use Not Currently    Types: Oxycodone    Comment: Percocet for lower back pain prn     Social History     Tobacco Use   Smoking Status Former Smoker    Packs/day: 1 00    Years: 25 00    Pack years: 25 00    Last attempt to quit: 3/30/2006    Years since quittin 4   Smokeless Tobacco Never Used     Family History:   Family History   Adopted: Yes   Problem Relation Age of Onset   Jae Abt Leukemia Mother     Heart disease Father     Coronary artery disease Father     Diabetes Father     No Known Problems Sister     No Known Problems Brother     Diabetes Son     No Known Problems Brother     No Known Problems Brother     No Known Problems Sister     No Known Problems Sister      Meds/Allergies   all current active meds have been reviewed       Allergies   Allergen Reactions    Tramadol Other (See Comments)     Dizzy       Objective   Vitals: Blood pressure 131/54, pulse 69, temperature (!) 97 4 °F (36 3 °C), temperature source Oral, resp  rate 18, height 5' 5" (1 651 m), weight 124 kg (274 lb 0 5 oz), SpO2 99 %, not currently breastfeeding ,     Body mass index is 45 6 kg/m²  ,     Systolic (89MLM), WZ , Min:95 , UGE:981     Diastolic (40GYO), TKQ:62, Min:53, Max:67      Intake/Output Summary (Last 24 hours) at 2020 1051  Last data filed at 2020 1001  Gross per 24 hour   Intake 3599 16 ml   Output 2650 ml   Net 949 16 ml     Weight (last 2 days)     Date/Time   Weight    20 0557   124 (274 03)    20 0928   122 (268)            Invasive Devices     Central Venous Catheter Line            CVC Central Lines 20 Triple less than 1 day          Peripheral Intravenous Line            Peripheral IV 20 Left Antecubital less than 1 day    Peripheral IV 20 Right Hand less than 1 day          Drain            Colostomy Descending/sigmoid  days    Urethral Catheter Non-latex; Temperature probe 16 Fr  less than 1 day              Physical Exam  Vitals signs reviewed  Constitutional:       Comments: Pt sitting up in chair in NAD; alert, pleasant and cooperative   HENT:      Head: Normocephalic  Eyes:      Comments: RIJ TLC intact   Cardiovascular:      Rate and Rhythm: Normal rate and regular rhythm  Heart sounds: S1 normal and S2 normal  No murmur  Comments: Trace LE edema  Pulmonary:      Effort: Pulmonary effort is normal       Breath sounds: Normal breath sounds  No wheezing or rales  Comments: On RA  Abdominal:      Palpations: Abdomen is soft  Musculoskeletal:      Right lower leg: No edema  Left lower leg: No edema  Skin:     General: Skin is warm and dry  Neurological:      General: No focal deficit present  Mental Status: She is alert and oriented to person, place, and time     Psychiatric:         Mood and Affect: Mood normal        LABORATORY RESULTS:      CBC with diff:   Results from last 7 days   Lab Units 09/02/20 0451 09/01/20  1258 09/01/20  1221 09/01/20  1203 09/01/20  1136   WBC Thousand/uL 6 32  --   --   --   --    HEMOGLOBIN g/dL 11 6 12 9  --   --   --    I STAT HEMOGLOBIN g/dl  --   --  11 2* 11 6 12 6   HEMATOCRIT % 36 9 40 9  --   --   --    HEMATOCRIT, ISTAT %  --   --  33* 34* 37   MCV fL 82  --   --   --   --    PLATELETS Thousands/uL 129*  --   --   --   --    MCH pg 25 8*  --   --   --   --    MCHC g/dL 31 4  --   --   --   --    RDW % 14 8  --   --   --   --    MPV fL 9 9  --   --   --   --      CMP:  Results from last 7 days   Lab Units 09/02/20 0451 09/01/20  1258 09/01/20  1221 09/01/20  1203 09/01/20  1136   POTASSIUM mmol/L 4 6 4 7  --   --   --    CHLORIDE mmol/L 107 109*  --   --   --    CO2 mmol/L 26 24  --   --   --    CO2, I-STAT mmol/L  --   --  29 27 27   BUN mg/dL 16 16  --   --   --    CREATININE mg/dL 0 75 0 89  --   --   --    GLUCOSE, ISTAT mg/dl  --   --  99 97 101   CALCIUM mg/dL 8 5 8 8  --   --   --    EGFR ml/min/1 73sq m 85 69  --   --   --      BMP:  Results from last 7 days   Lab Units 09/02/20 0451 09/01/20  1258 09/01/20  1221 09/01/20  1203 09/01/20  1136   POTASSIUM mmol/L 4 6 4 7  --   --   --    CHLORIDE mmol/L 107 109*  --   --   --    CO2 mmol/L 26 24  --   --   --    CO2, I-STAT mmol/L  --   --  29 27 27   BUN mg/dL 16 16  --   --   --    CREATININE mg/dL 0 75 0 89  --   --   --    GLUCOSE, ISTAT mg/dl  --   --  99 97 101   CALCIUM mg/dL 8 5 8 8  --   --   --      Lab Results   Component Value Date    NTBNP 936 (H) 04/06/2020    NTBNP 799 (H) 02/24/2020      Results from last 7 days   Lab Units 09/02/20  0451   MAGNESIUM mg/dL 2 3       Lipid Profile:   Lab Results   Component Value Date    CHOL 159 2014     Lab Results   Component Value Date    HDL 54 2019    HDL 53 2018    HDL 46 2017     Lab Results   Component Value Date    LDLCALC 117 (H) 2019    LDLCALC 114 (H) 2018    LDLCALC 99 2017     Lab Results   Component Value Date    TRIG 134 2019    TRIG 141 2018    TRIG 91 2017     Cardiac testing:   Results for orders placed during the hospital encounter of 20   Echo complete with contrast if indicated    Narrative [address]  [address] [facility], [address]  [phone]    Transthoracic Echocardiogram  2D, M-mode, Doppler, and Color Doppler    Study date:  2020    Patient: Min Askwe  MR number: [id number]  Account number: [de-identified]  : 1955  Age: 59 years  Gender: Female  Status: Inpatient  Location: Bedside  Height: 65 in  Weight: 259 4 lb  BP: 132/ 83 mmHg    Indications: AS, MS    Diagnoses: 394 9 - MITRAL VALVE DIS NEC/NOS, 424 1 - AORTIC VALVE DISORDER    Sonographer:  MAEVE Grullon  Primary Physician:  Carole Kitchen MD  Referring Physician:  Jessica Galeano MD  Group:  [facility]  Interpreting Physician:  Jessica Galeano MD    SUMMARY    SUMMARY:  Compared to prior echo, LVEF remains normal  aortic valve velocities were more prominent and aortic valve stenosis severe  mitral stenosis is mild  LEFT VENTRICLE:  Systolic function was normal  Ejection fraction was estimated in the range of 55 % to 60 % to be 55 %  There were no regional wall motion abnormalities  Wall thickness was mildly to moderately increased measuring 1 3cm at lateral and septal walls  LEFT ATRIUM:  The atrium was moderately dilated  RIGHT ATRIUM:  The atrium was mildly dilated  MITRAL VALVE:  There was moderate to marked annular calcification    There was mild stenosis with mean gradient near 4-5mmHg    AORTIC VALVE:  Leaflets exhibited moderately increased thickness, marked calcification, markedly reduced cuspal separation, and sclerosis  Transaortic velocity was increased due to valvular stenosis  There was severe stenosis by Doppler measurements at right sternal border    HISTORY: PRIOR HISTORY: Obesity, Gastric Bypass, Rectal Cancer, Ovarian Cancer    PROCEDURE: The procedure was performed at the bedside  This was a routine study  The transthoracic approach was used  The study included complete 2D imaging, M-mode, complete spectral Doppler, and color Doppler  The heart rate was 72 bpm,  at the start of the study  Echocardiographic views were limited due to poor patient compliance and poor acoustic window availability  This was a technically difficult study  LEFT VENTRICLE: Size was normal  Systolic function was normal  Ejection fraction was estimated in the range of 55 % to 60 % to be 55 %  There were no regional wall motion abnormalities  Wall thickness was mildly to moderately increased  measuring 1 3cm at lateral and septal walls  No evidence of apical thrombus  DOPPLER: The study was not technically sufficient to allow evaluation of LV diastolic function  RIGHT VENTRICLE: The size was normal  Systolic function was normal  Wall thickness was normal     LEFT ATRIUM: The atrium was moderately dilated  RIGHT ATRIUM: The atrium was mildly dilated  MITRAL VALVE: There was moderate to marked annular calcification  There was mildly reduced leaflet separation  DOPPLER: Transmitral velocity was minimally increased  There was mild stenosis with mean gradient near 4-5mmHg There was no  significant regurgitation  AORTIC VALVE: Leaflets exhibited moderately increased thickness, marked calcification, markedly reduced cuspal separation, and sclerosis  DOPPLER: Transaortic velocity was increased due to valvular stenosis   There was severe stenosis by  Doppler measurements at right sternal border There was no significant regurgitation  TRICUSPID VALVE: The valve structure was normal  There was normal leaflet separation  DOPPLER: The transtricuspid velocity was within the normal range  There was no evidence for stenosis  There was no significant regurgitation  PULMONIC VALVE: Leaflets exhibited normal thickness, no calcification, and normal cuspal separation  DOPPLER: The transpulmonic velocity was within the normal range  There was no significant regurgitation  PERICARDIUM: There was no pericardial effusion  The pericardium was normal in appearance  AORTA: The root exhibited normal size  SYSTEMIC VEINS: IVC: The inferior vena cava was normal in size  MEASUREMENT TABLES    DOPPLER MEASUREMENTS  Aortic valve   (Reference normals)  Peak noman   457 cm/s   (--)  Peak gradient   84 mmHg   (--)  Valve area   0 71 cmï¾²   (--)    SYSTEM MEASUREMENT TABLES    2D mode  AoR Diam 2D: 3 4 cm  LA Diam (2D): 4 cm  LA/Ao (2D): 1 18  FS (2D Teich): 27 2 %  IVSd (2D): 1 29 cm  LVDEV: 108 cmï¾³  LVESV: 50 9 cmï¾³  LVIDd(2D): 4 81 cm  LVISd (2D): 3 5 cm  LVOT Area 2D: 3 14 cmï¾²  LVPWd (2D): 1 33 cm  SV (Teich): 57 1 cmï¾³    Unspecified Scan Mode  CHARBEL Cont Eq (Peak Noman): 0 76 cmï¾²  CHARBEL Cont Eq (VTI): 0 76 cmï¾²  LVOT (VTI): 24 3 cm  LVOT Diam : 2 cm  LVOT Vmax: 1030 mm/s  LVOT Vmax; Mean: 1030 mm/s  Peak Grad ; Mean: 4 mm[Hg]  SV (LVOT): 76 cmï¾³  VTI;Mean: 2 mm[Hg]  CHARBEL Cont Eq (VTI): 1 38 cmï¾²  MV Peak A Noman: 1410 mm/s  MV Peak E Noman  Mean: 1190 mm/s  MVA (PHT): 1 63 cmï¾²  PHT: 132 ms  Max P mm[Hg]  V Max: 2570 mm/s  Vmax: 2570 mm/s  RA Area: 18 2 cmï¾²  RA Volume: 51 8 cmï¾³  TAPSE: 3 3 cm    [employer] Accredited Echocardiography Laboratory    Prepared and electronically signed by    Domitila Mcknight MD  Signed [id number] 10:29:59       Imaging: I have personally reviewed pertinent reports  Xr Chest Portable    Result Date: 2020  Narrative: CHEST INDICATION:   Post Open Heart Surgery   COMPARISON: September 1, 2020 EXAM PERFORMED/VIEWS:  XR CHEST PORTABLE FINDINGS:  Right IJ catheter remains in place  Status post TAVR  Cardiomediastinal silhouette appears unremarkable  The lungs are clear  No pneumothorax or pleural effusion  Osseous structures appear within normal limits for patient age  Impression: No acute cardiopulmonary disease  No pneumothorax identified  Improved appearance from prior study  Workstation performed: [id number]     Xr Chest Pa & Lateral    Result Date: 8/20/2020  Narrative: CHEST INDICATION:   R05: Cough  COMPARISON:  Chest radiograph from 4/6/2020  Chest CT from 3/16/2020  EXAM PERFORMED/VIEWS:  XR CHEST PA & LATERAL  DUAL ENERGY SUBTRACTION TECHNIQUE FINDINGS: Cardiomediastinal silhouette appears unremarkable  Moderate calcification of the mitral annulus  The lungs are clear  No pneumothorax or pleural effusion  Osseous structures appear within normal limits for patient age  Impression: No acute cardiopulmonary disease  Workstation performed: [id number]     Xr Chest Portable Icu    Result Date: 9/1/2020  Narrative: CHEST INDICATION:   S/P Transcatheter aortic valve replacement  COMPARISON:  8/19/2020 EXAM PERFORMED/VIEWS:  XR CHEST PORTABLE ICU FINDINGS:  Right IJ central line tip projects over the cavoatrial junction  Cardiomediastinal silhouette appears unremarkable  Low lung volumes limiting assessment with some vascular crowding  No consolidation, pleural effusion or pneumothorax  Osseous structures appear within normal limits for patient age  Impression: Right IJ central line tip projects over the cavoatrial junction  No acute pulmonary findings with low lung volumes  Workstation performed: [id number]     Assessment  Principal Problem: Aortic stenosis, severe  Active Problems:     Morbid obesity due to excess calories [facility])    Atherosclerotic peripheral vascular disease ([facility] )    Colostomy care ([facility] )    Moderate mitral stenosis    Atrial fibrillation with rapid ventricular response ([address] )    Hyperlipidemia    Hypochloremia    Thank you for allowing us to participate in this patient's care  This pt will follow up with Dr Sara Nicole once discharged  Counseling / Coordination of Care  Total floor / unit time spent today 45 minutes  Greater than 50% of total time was spent with the patient and / or family counseling and / or coordination of care  A description of the counseling / coordination of care: Review of history, current assessment, development of a plan  Code Status: Level 1 - Full Code    ** Please Note: Dragon 360 Dictation voice to text software may have been used in the creation of this document   **

## 2020-09-02 NOTE — RESTORATIVE TECHNICIAN NOTE
Restorative Specialist Mobility Note       Activity: Ambulate in [place], Chair     Assistive Device: (personal rollator)

## 2020-09-02 NOTE — PLAN OF CARE
Problem: Prexisting or High Potential for Compromised Skin Integrity  Goal: Skin integrity is maintained or improved  Description: INTERVENTIONS:  - Identify patients at risk for skin breakdown  - Assess and monitor skin integrity  - Assess and monitor nutrition and hydration status  - Monitor labs   - Assess for incontinence   - Turn and reposition patient  - Assist with mobility/ambulation  - Relieve pressure over bony prominences  - Avoid friction and shearing  - Provide appropriate hygiene as needed including keeping skin clean and dry  - Evaluate need for skin moisturizer/barrier cream  - Collaborate with interdisciplinary team   - Patient/family teaching  - Consider wound care consult   9/2/2020 0948 by Casey Webber RN  Outcome: Progressing  9/2/2020 0948 by Casey Webber RN  Outcome: Progressing     Problem: Potential for Falls  Goal: Patient will remain free of falls  Description: INTERVENTIONS:  - Assess patient frequently for physical needs  -  Identify cognitive and physical deficits and behaviors that affect risk of falls    -  [place] fall precautions as indicated by assessment   - Educate patient/family on patient safety including physical limitations  - Instruct patient to call for assistance with activity based on assessment  - Modify environment to reduce risk of injury  - Consider OT/PT consult to assist with strengthening/mobility  9/2/2020 0948 by Casey Webber RN  Outcome: Progressing  9/2/2020 0948 by Casey Webber RN  Outcome: Progressing     Problem: CARDIOVASCULAR - ADULT  Goal: Maintains optimal cardiac output and hemodynamic stability  Description: INTERVENTIONS:  - Monitor I/O, vital signs and rhythm  - Monitor for S/S and trends of decreased cardiac output  - Administer and titrate ordered vasoactive medications to optimize hemodynamic stability  - Assess quality of pulses, skin color and temperature  - Assess for signs of decreased coronary artery perfusion  - Instruct patient to report change in severity of symptoms  Outcome: Progressing  Goal: Absence of cardiac dysrhythmias or at baseline rhythm  Description: INTERVENTIONS:  - Continuous cardiac monitoring, vital signs, obtain 12 lead EKG if ordered  - Administer antiarrhythmic and heart rate control medications as ordered  - Monitor electrolytes and administer replacement therapy as ordered  Outcome: Progressing     Problem: PAIN - ADULT  Goal: Verbalizes/displays adequate comfort level or baseline comfort level  Description: Interventions:  - Encourage patient to monitor pain and request assistance  - Assess pain using appropriate pain scale  - Administer analgesics based on type and severity of pain and evaluate response  - Implement non-pharmacological measures as appropriate and evaluate response  - Consider cultural and social influences on pain and pain management  - Notify physician/advanced practitioner if interventions unsuccessful or patient reports new pain  Outcome: Progressing     Problem: INFECTION - ADULT  Goal: Absence or prevention of progression during hospitalization  Description: INTERVENTIONS:  - Assess and monitor for signs and symptoms of infection  - Monitor lab/diagnostic results  - Monitor all insertion sites, i e  indwelling lines, tubes, and drains  - Monitor endotracheal if appropriate and nasal secretions for changes in amount and color  - [place] appropriate cooling/warming therapies per order  - Administer medications as ordered  - Instruct and encourage patient and family to use good hand hygiene technique  - Identify and instruct in appropriate isolation precautions for identified infection/condition  Outcome: Progressing  Goal: Absence of fever/infection during neutropenic period  Description: INTERVENTIONS:  - Monitor WBC    Outcome: Progressing     Problem: SAFETY ADULT  Goal: Maintain or return to baseline ADL function  Description: INTERVENTIONS:  -  Assess patient's ability to carry out ADLs; assess patient's baseline for ADL function and identify physical deficits which impact ability to perform ADLs (bathing, care of mouth/teeth, toileting, grooming, dressing, etc )  - Assess/evaluate cause of self-care deficits   - Assess range of motion  - Assess patient's mobility; develop plan if impaired  - Assess patient's need for assistive devices and provide as appropriate  - Encourage maximum independence but intervene and supervise when necessary  - Involve family in performance of ADLs  - Assess for home care needs following discharge   - Consider OT consult to assist with ADL evaluation and planning for discharge  - Provide patient education as appropriate  Outcome: Progressing  Goal: Maintain or return mobility status to optimal level  Description: INTERVENTIONS:  - Assess patient's baseline mobility status (ambulation, transfers, stairs, etc )    - Identify cognitive and physical deficits and behaviors that affect mobility  - Identify mobility aids required to assist with transfers and/or ambulation (gait belt, sit-to-stand, lift, walker, cane, etc )  - [place] fall precautions as indicated by assessment  - Record patient progress and toleration of activity level on Mobility SBAR; progress patient to next Phase/Stage  - Instruct patient to call for assistance with activity based on assessment  - Consider rehabilitation consult to assist with strengthening/weightbearing, etc   Outcome: Progressing     Problem: DISCHARGE PLANNING  Goal: Discharge to home or other facility with appropriate resources  Description: INTERVENTIONS:  - Identify barriers to discharge w/patient and caregiver  - Arrange for needed discharge resources and transportation as appropriate  - Identify discharge learning needs (meds, wound care, etc )  - Arrange for interpretive services to assist at discharge as needed  - Refer to Case Management Department for coordinating discharge planning if the patient needs post-hospital services based on physician/advanced practitioner order or complex needs related to functional status, cognitive ability, or social support system  Outcome: Progressing     Problem: Knowledge Deficit  Goal: Patient/family/caregiver demonstrates understanding of disease process, treatment plan, medications, and discharge instructions  Description: Complete learning assessment and assess knowledge base    Interventions:  - Provide teaching at level of understanding  - Provide teaching via preferred learning methods  Outcome: Progressing

## 2020-09-02 NOTE — PROGRESS NOTES
Progress Note - Cardiothoracic Surgery   Merlin Perez 59 y o  female MRN: [id number]  Unit/Bed#: [facility] 410-01 Encounter: [id number]    Aortic stenosis, Non-Rheumatic  S/P transfemoral transcatheter aortic valve replacement; POD # 1    24 Hour Events: Weaned to room air  Cardene weaned off       Medications:   Scheduled Meds:  Current Facility-Administered Medications   Medication Dose Route Frequency Provider Last Rate    acetaminophen  650 mg Rectal Q4H PRN Lara Steele PA-C      acetaminophen  650 mg Oral Q4H PRN Lara Steele PA-C      albuterol  2 5 mg Nebulization Q4H PRN Lara Steele PA-C      amiodarone  200 mg Oral Daily With Breakfast Lauren Charles PA-C      anastrozole  1 mg Oral Daily Lauren Charles PA-C      apixaban  5 mg Oral BID Lara Steele PA-C      aspirin  81 mg Oral Daily Lauren Charles PA-C      bisacodyl  10 mg Rectal Daily PRN Lara Steele PA-C      cefazolin  2,000 mg Intravenous Q8H Lauren Charles PA-C Stopped (09/02/20 5119)    chlorhexidine  15 mL Mouth/Throat BID Lauren Charles PA-C      ferrous sulfate  325 mg Oral Daily With Breakfast Lauren Charles PA-C      hydrALAZINE  5 mg Intravenous Q6H PRN Nikita Perdomo PA-C      insulin lispro  1-5 Units Subcutaneous HS Lauren Charles PA-C      insulin lispro  2-12 Units Subcutaneous TID AC Lauren Charles PA-C      lactated ringers  75 mL/hr Intravenous Continuous Elsa Jeffery CRNA Stopped (09/01/20 1300)    metoprolol tartrate  25 mg Oral Q12H [address] Nikita KUSHAL Perdomo      mupirocin  1 application Nasal [address] Lauren Charles PA-C      niCARdipine  1-15 mg/hr Intravenous Titrated Jovanni Charles PA-C Stopped (09/01/20 1092)    ondansetron  4 mg Intravenous Q6H PRN Lara Steele PA-C      oxyCODONE  10 mg Oral Q6H PRN Nikita KELI PerdomoC      pantoprazole  40 mg Oral Early Morning Lauren Charles PA-C      polyethylene glycol  17 g Oral Daily Lauren Charles PA-C      pravastatin  40 mg Oral Daily With L-3 Natali Charles PA-C       Continuous Infusions:lactated ringers, 75 mL/hr, Last Rate: Stopped (09/01/20 1300)  niCARdipine, 1-15 mg/hr, Last Rate: Stopped (09/01/20 2145)      PRN Meds:   acetaminophen    acetaminophen    albuterol    bisacodyl    hydrALAZINE    ondansetron    oxyCODONE    Vitals:   Vitals:    09/02/20 0100 09/02/20 0200 09/02/20 0300 09/02/20 0557   BP: 103/56 102/57 97/54    BP Location:   Left arm    Pulse: 58 66 67    Resp: 12  13    Temp:   99 1 °F (37 3 °C)    TempSrc:   Oral    SpO2:  96% 96%    Weight:    124 kg (274 lb 0 5 oz)   Height:           Telemetry: NSR; Heart Rate: 59    Hemodynamics:       Respiratory:   SpO2: SpO2: 96 %, SpO2 Activity: SpO2 Activity: At Rest; Room Air    Intake/Output:   I/O       08/31 0701 - 09/01 0700 09/01 0701 - 09/02 0700 09/02 0701 - 09/03 0700    P  O   350     I V  (mL/kg)  2849 2 (23)     IV Piggyback  100     Total Intake(mL/kg)  3299 2 (26 6)     Urine (mL/kg/hr)  2300     Stool  0     Total Output  2300     Net  +999 2                  Weights:   Weight (last 2 days)     Date/Time   Weight    09/02/20 0557   124 (274 03)    09/01/20 0928   122 (268)              Results:   Results from last 7 days   Lab Units 09/02/20  0451 09/01/20  1258 09/01/20  1221   WBC Thousand/uL 6 32  --   --    HEMOGLOBIN g/dL 11 6 12 9  --    I STAT HEMOGLOBIN g/dl  --   --  11 2*   HEMATOCRIT % 36 9 40 9  --    HEMATOCRIT, ISTAT %  --   --  33*   PLATELETS Thousands/uL 129*  --   --      Results from last 7 days   Lab Units 09/02/20  0451 09/01/20  1258 09/01/20  1221   POTASSIUM mmol/L 4 6 4 7  --    CHLORIDE mmol/L 107 109*  --    CO2 mmol/L 26 24  --    CO2, I-STAT mmol/L  --   --  29   BUN mg/dL 16 16  --    CREATININE mg/dL 0 75 0 89  --    GLUCOSE, ISTAT mg/dl  --   --  99   CALCIUM mg/dL 8 5 8 8  --          Point of care glucose: 143-160    Studies:  CXR: No effusion   No Pneumothorax  EKG: No noted ischemic changes  I have personally reviewed pertinent reports  and I have personally reviewed pertinent films in PACS    Invasive Lines/Tubes:  Invasive Devices     Central Venous Catheter Line            CVC Central Lines 09/01/20 Triple less than 1 day          Peripheral Intravenous Line            Peripheral IV 09/01/20 Left Antecubital less than 1 day    Peripheral IV 09/01/20 Right Hand less than 1 day          Arterial Line            Arterial Line 09/01/20 Right Radial less than 1 day          Drain            Colostomy Descending/sigmoid  days    Urethral Catheter Non-latex; Temperature probe 16 Fr  less than 1 day                Physical Exam:    HEENT/NECK:  PERRLA  No jugular venous distention  Cardiac: Regular rate and rhythm and No murmurs/rubs/gallops  Pulmonary:  Breath sounds clear bilaterally and No rales/rhonchi/wheezes  Abdomen:  Non-tender, Non-distended and Normal bowel sounds  Incisions: Groin puncture sites dressed with sterile dressing  No hematoma, erythema, or drainage  Extremities: Extremities warm/dry and Trace edema B/L  Neuro: Alert and oriented X 3, Sensation is grossly intact and No focal deficits  Skin: Warm/Dry, without rashes or lesions  Assessment:  Principal Problem: Aortic stenosis, severe  Active Problems: Morbid obesity due to excess calories (HCC)    Atherosclerotic peripheral vascular disease ([facility] )    Colostomy care ([facility] )    Moderate mitral stenosis    Atrial fibrillation with rapid ventricular response (HCC)    Hyperlipidemia    Hypochloremia       Aortic stenosis, Non-Rheumatic  S/P transfemoral transcatheter aortic valve replacement; POD # 1    Plan:    1   Cardiac:   Atrial Fibrillation; HR/BP well-controlled  Lopressor, 25mg PO BID    Cardene D/C   D/C arterial line  Maintain central IV access today for one more day  ASA/Eliquis  Consult cardiology for postoperative medical management  Follow up transthoracic echocardiogram today  Continue Statin therapy    2  Pulmonary:   Extubated  CXR findings: Clear  Good Room air oxygen saturation; Continue incentive spirometry/Coughing/Deep breathing exercises    3  Renal:   Intake/Output net: +1000 mL/24 hours   Add Torsemide, 20 mg PO QD  Post op Creatinine stable; Follow up labs prn    4  Neuro:  Neurologically intact; No active issues  Incisional pain well-controlled; Continue prn Tylenol    5  GI:  Tolerating TLC 2 3 gm sodium diet  Maintain 1800 mL daily fluid restriction   Continue daily stool softeners and prn suppository  Continue GI prophylaxis    6  Endo:   No history of diabetes; Glucose well-controlled  Continue Insulin sliding scale coverage    7  Hematology:    Post-operative blood count acceptable; Trend prn    8  Disposition:   Gerontology consultation not indicated, as patient is under 72years old   Transfer from ICU to telemetry today    VTE Pharmacologic Prophylaxis: Sequential compression device (Venodyne)   VTE Mechanical Prophylaxis: sequential compression device    Bedside rounds completed with JOSLYN Almendarez    Plan of care discussed with bedside nurse    SIGNATURE: Naomy Guzman PA-C  DATE: September 2, 2020  TIME: 7:08 AM

## 2020-09-02 NOTE — PHYSICAL THERAPY NOTE
Physical Therapy Evaluation     Patient's Name: Lucio Healy    Admitting Diagnosis  Aortic valve stenosis, etiology of cardiac valve disease unspecified [I35 0]    Problem List  Patient Active Problem List   Diagnosis    Morbid obesity due to excess calories ([facility] )    Postgastrectomy malabsorption    S/P gastric bypass    Marginal ulcer    Atherosclerotic peripheral vascular disease ([place] [address] )    Onychomycosis    History of rectal cancer    Colostomy care ([place] [address] )    History of ovarian cancer    Status post bariatric surgery    Pyelonephritis    History of ovarian cancer    Candidal intertrigo    Vaginal bleeding    Encounter for other screening for malignant neoplasm of breast    Aortic stenosis, severe    Moderate mitral stenosis    Atrial fibrillation with rapid ventricular response ([place] [address] )    Hyperlipidemia    Hypochloremia       Past Medical History  Past Medical History:   Diagnosis Date    Anemia     Arthritis     Cancer ([place] [address] )     rectal    Chronic lower back pain     Chronic pain disorder     back pain    Colon cancer ([facility] ) 2018    Diabetes mellitus ([place] [address] )     Endometrial cancer ([place] [address] ) 2018    GERD (gastroesophageal reflux disease)     History of chemotherapy     History of MRSA infection 0719/2016    Morbid obesity ([place] [address] )     Ovarian cancer ([facility] ) 2018    Rectal cancer ([facility] )     Spinal stenosis     Systolic murmur     Unsteady gait     uses walker    Wears dentures     Wears glasses        Past Surgical History  Past Surgical History:   Procedure Laterality Date    ABDOMINAL PERINEAL BOWEL RESECTION W/ ILEOANAL POUCH N/A 9/6/2018    Procedure: LAPAROSCOPIC HAND ASSIST ABDOMINOPERINEAL RESECTION,  POSTERIOR VAGINECTOMY, OMENTECTOMY;  Surgeon: Mckenna Villalobos MD;  [facility];  Service: Colorectal    ABDOMINAL SURGERY      abscess removed from abdomen and right thigh, a hole in thigh closed by plastic surgeon    ABSCESS DRAINAGE      abd, (R) leg, (L) leg     SECTION       SECTION      CYSTOSCOPY N/A 2018    Procedure: CYSTOSCOPY;  Surgeon: Kit Rodriguez MD;  [facility];  Service: Gynecology Oncology    ESOPHAGOGASTRODUODENOSCOPY N/A 2016    Procedure: ESOPHAGOGASTRODUODENOSCOPY (EGD); Surgeon: Martín Kong MD;  [facility];  Service:     ESOPHAGOGASTRODUODENOSCOPY N/A 3/30/2016    Procedure: ESOPHAGOGASTRODUODENOSCOPY (EGD); Surgeon: Martín Kong MD;  [facility]; Service:     EYE SURGERY      laser eye surgery    HYSTERECTOMY N/A 2018    Procedure: RADICAL HYSTERECTOMY TOTAL ABDOMINAL (ALEXANDRA)  BSO;  Surgeon: Kit Rodriguez MD;  [facility];  Service: Gynecology Oncology    JOINT REPLACEMENT Left 2017    hip    JOINT REPLACEMENT Right 2017    hip    OOPHORECTOMY Bilateral     MI COLONOSCOPY FLX DX W/COLLJ SPEC WHEN PFRMD N/A 3/9/2018    Procedure: COLONOSCOPY;  Surgeon: Marylin Hill MD;  Location: [place] [address] GI LAB; Service: Gastroenterology    MI COLONOSCOPY FLX DX W/COLLJ [facility] WHEN PFRMD N/A 2018    Procedure: COLONOSCOPY;  Surgeon: Jackelyn Whitney MD;  [facility]; Service: Colorectal    MI ECHO TRANSESOPHAG R-T 2D W/PRB IMG ACQUISJ I&R N/A 2020    Procedure: TRANSESOPHAGEAL ECHOCARDIOGRAM (THO); Surgeon: Elsie Bonilla DO;  [facility];  Service: Cardiac Surgery    MI ESOPHAGOGASTRODUODENOSCOPY TRANSORAL DIAGNOSTIC N/A 2018    Procedure: ESOPHAGOGASTRODUODENOSCOPY (EGD); Surgeon: Marylin Hill MD;  Location: [facility] GI LAB;   Service: Gastroenterology    MI LAP GASTRIC BYPASS/SOLEDAD-EN-Y N/A 2016    Procedure: BYPASS GASTRIC  SOLEDAD-EN-Y LAPAROSCOPIC;  Surgeon: Martín Kong MD;  [facility];  Service: Bariatrics    MI LAP, SURG COLOSTOMY N/A 2018    Procedure: PERMANENT END COLOSTOMY;  Surgeon: Jackelyn Whitney MD;  [facility];  Service: Colorectal    MI LAP, SURG PROCTECTOMY W COLOSTOMY N/A 2018 Procedure: PROCTECTOMY;  Surgeon: Samantha Edmondson MD;  [facility];  Service: Colorectal    GA REPLACE AORTIC VALVE OPENFEMORAL ARTERY APPROACH N/A 9/1/2020    Procedure: REPLACEMENT AORTIC VALVE TRANSCATHETER (TAVR) TRANSFEMORAL W/ 26MM WADDELL BARBARA S3 ULTRA VALVE(ACCESS ON RIGHT); Surgeon: Kameron Titus DO;  [facility];  Service: Cardiac Surgery    TUBAL LIGATION              09/02/20 0841   Note Type   Note type Eval/Treat   Pain Assessment   Pain Assessment Tool FLACC   Pain Location/Orientation Location: Back   Pain Onset/Description Onset: Ongoing;Frequency: Constant/Continuous; Descriptor: Aching;Descriptor: Discomfort  (chronic)   Effect of Pain on Daily Activities no increased discomfort   Patient's Stated Pain Goal No pain   Hospital Pain Intervention(s) Repositioned; Ambulation/increased activity; Emotional support   Multiple Pain Sites Yes   Pain Rating: FLACC (Rest) - Face 1   Pain Rating: FLACC (Rest) - Legs 0   Pain Rating: FLACC (Rest) - Activity 0   Pain Rating: FLACC (Rest) - Cry 1   Pain Rating: FLACC (Rest) - Consolability 1   Score: FLACC (Rest) 3   Pain Rating: FLACC (Activity) - Face 1   Pain Rating: FLACC (Activity) - Legs 0   Pain Rating: FLACC (Activity) - Activity 0   Pain Rating: FLACC (Activity) - Cry 1   Pain 2   Pain Location/Orientation 2 Location: Head   Pain Onset/Description 2 Onset: Gradual;Descriptor: Aching;Descriptor: Discomfort   Patient's Stated Pain Goal 2 No pain   Hospital Pain Intervention(s) 2 Repositioned; Ambulation/increased activity; Emotional support   Home Living   Type of [address] [facility] One level  (no GALDINO)   Home Equipment Sp[place]ers; Other (Comment)  (rollator)   Prior Function   Level of [place] Independent with ADLs and functional mobility  (amb w/o AD in [facility] or rollator outside)   Lives With Alone   Receives Help From Family   Restrictions/Precautions   Braces or Orthoses   (denies)   Other Precautions Cardiac/sternal;Multiple lines;Telemetry; Fall Risk  (a-line)   General   Additional Pertinent History cleared for assessment (spoke to nsg)   Cognition   Overall Cognitive Status WFL   Arousal/Participation Alert   Orientation Level Oriented to person;Oriented to place;Oriented to situation   Memory Within functional limits   Following Commands Follows one step commands without difficulty   Comments Alert; in the chair; agreeable to mobilize   RUE Assessment   RUE Assessment WFL  (AROM)   LUE Assessment   LUE Assessment WFL  (AROM)   RLE Assessment   RLE Assessment WFL  (AROM)   Strength RLE   RLE Overall Strength   (fair/ fair + (grossly))   LLE Assessment   LLE Assessment WFL  (AROM)   Strength LLE   LLE Overall Strength   (fair/ fair + (grossly))   Transfers   Sit to Stand 5  Supervision   Additional items Assist x 1;Verbal cues   Stand to Sit 5  Supervision   Additional items Assist x 1;Verbal cues   Ambulation/Elevation   Gait pattern Excessively slow; Inconsistent asuncion; Short stride   Gait Assistance 5  Supervision   Additional items Assist x 1;Verbal cues; Tactile cues  (stand by (A) of 2nd for lines)   Assistive Device 4-wheeled walker  (pt's rollator)   Distance 240 ft   Balance   Static Sitting Fair +   Static Standing Fair   Ambulatory Fair -   Activity Tolerance   Activity Tolerance Patient limited by fatigue   Nurse Made Aware spoke to MARIA FERNANDA Monreal   Assessment   Prognosis Good   Problem List Decreased strength;Decreased endurance; Impaired balance;Decreased mobility;Obesity   Assessment Pt is 59 y o  admitted with Dx of Aartic stenosis, severe and underwent transcatheter aortic valve replacement with a 26 mm [place] BARBARA 3 Ultra bioprosthetic valve via a percutaneous right transfemoral approach on 9/1/2020  Pt 's comorbidities affecting POC include: anemia, arthritis, chronic LBP, DM, morbid obesity, and unsteady gait and personal factors of: living alone   Pt's clinical presentation is currently unstable/unpredictable which is evident in ongoing telem monitoring w/ a-line in place, abn lab values  Pt presents w/ min overall weakness, decreased endurance and inconsistent amb balance and gait patterns (likely close to premorbid level) w/ use of rollator for amb at this time  Will cont to follow pt in PT for progressive mobilization to max level of (I), endurance, and safety  Otherwise, anticipate pt will return home w/ available family support upon D/C provided she cont improving w/ mobility skills, safety, and endurance and when medically cleared; pt would benefit from home PT follow up but she currently declines; will follow  Barriers to Discharge Decreased caregiver support   Goals   Patient Goals to go home   STG Expiration Date 09/12/20   Short Term Goal #1 7-10 days  Pt will amb 300 ft w/ least restrictive assistive device, mod (I) in order to facilitate safe return to premorbid environment and community amb status  Pt will achieve (I) level w/ bed mob in order to facilitate safety with OOB and back to bed transitions in own living environment  Pt will perform transfers w/ mod (I) to assure (I) and safety w/ functional mobility/transitions w/ all aspects of mobility/locomotion  Pt will participate in LE therex and balance activities to max progression w/ mobility skills  PT Treatment Day 1  (follow up Tx session)   Plan   Treatment/Interventions Functional transfer training;LE strengthening/ROM; Therapeutic exercise; Endurance training;Equipment eval/education; Bed mobility;Gait training;Spoke to nursing;Spoke to case management;OT   PT Frequency Other (Comment)  (4-6x/wk)   Recommendation   PT Discharge Recommendation Return to previous environment with social support  (pt declines ana m epT)   Equipment Recommended Walker  (cont using rollator at this time)   Modified Lamar Scale   Modified Lamar Scale 3       Mata Bhat, PT          PT Tx note    Time In: 08:27  Time Out: 08:35  Total Time: 8 min      S:  Pt is in the chair; agreeable to perform LE therex     O:  (B) LE therex/HEP performed in the chair as following: (B) ankle pumps 2 x 10 reps, AROM; (B) LAQ 2 x 10 reps, AROM; marching 2 x 5 reps, AROM; pt was reclined in the chair w/ LE elevated at the end of session; pt requested to keep SCDs off (RN informed); pillow placed for (L) UE support; call bell placed w/in reach;     A:  Additional follow up consecutive session performed to initiate LE therex in order to max strength and to facilitate progression w/ functional mobility skills and overall level of (I) and to initiate HEP  Pt was able to complete the program in an AROM mode w/ rest periods provided as needed; pt remained in NAD and appeared to be comfortable at the end of session; currently, cont to anticipate pt will return home w/ available family support pending additional progress and when medically cleared; will follow  P:  Cont to follow pt 4-6x/week for LE therex, functional mobility training, endurance training and pt education per POC to max functional (I) and safety        Chava Posey, PT

## 2020-09-03 ENCOUNTER — APPOINTMENT (EMERGENCY)
Dept: RADIOLOGY | Facility: HOSPITAL | Age: 65
End: 2020-09-03
Payer: MEDICARE

## 2020-09-03 ENCOUNTER — HOSPITAL ENCOUNTER (EMERGENCY)
Facility: HOSPITAL | Age: 65
Discharge: HOME | End: 2020-09-04
Attending: EMERGENCY MEDICINE | Admitting: EMERGENCY MEDICINE
Payer: MEDICARE

## 2020-09-03 VITALS
OXYGEN SATURATION: 98 % | SYSTOLIC BLOOD PRESSURE: 119 MMHG | DIASTOLIC BLOOD PRESSURE: 56 MMHG | TEMPERATURE: 98.2 F | RESPIRATION RATE: 20 BRPM | BODY MASS INDEX: 45.47 KG/M2 | HEART RATE: 57 BPM | WEIGHT: 272.93 LBS | HEIGHT: 65 IN
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
## 2020-09-03 DIAGNOSIS — M54.9 BACK PAIN: ICD-10-CM

## 2020-09-03 LAB
ANION GAP SERPL CALCULATED.3IONS-SCNC: 3 MMOL/L (ref 4–13)
BASOPHILS # BLD AUTO: 0.02 THOUSANDS/ΜL (ref 0–0.1)
BASOPHILS NFR BLD AUTO: 0 % (ref 0–1)
BUN SERPL-MCNC: 16 MG/DL (ref 5–25)
CALCIUM SERPL-MCNC: 8.8 MG/DL (ref 8.3–10.1)
CHLORIDE SERPL-SCNC: 104 MMOL/L (ref 100–108)
CO2 SERPL-SCNC: 31 MMOL/L (ref 21–32)
CREAT SERPL-MCNC: 1.02 MG/DL (ref 0.6–1.3)
EOSINOPHIL # BLD AUTO: 0.03 THOUSAND/ΜL (ref 0–0.61)
EOSINOPHIL NFR BLD AUTO: 0 % (ref 0–6)
ERYTHROCYTE [DISTWIDTH] IN BLOOD BY AUTOMATED COUNT: 14.9 % (ref 11.6–15.1)
ERYTHROCYTE [DISTWIDTH] IN BLOOD BY AUTOMATED COUNT: 15.1 % (ref 11.6–15.1)
GFR SERPL CREATININE-BSD FRML MDRD: 58 ML/MIN/1.73SQ M
GLUCOSE SERPL-MCNC: 114 MG/DL (ref 65–140)
GLUCOSE SERPL-MCNC: 85 MG/DL (ref 65–140)
GLUCOSE SERPL-MCNC: 90 MG/DL (ref 65–140)
HCT VFR BLD AUTO: 41.9 % (ref 34.8–46.1)
HCT VFR BLD AUTO: 44.6 % (ref 34.8–46.1)
HGB BLD-MCNC: 13 G/DL (ref 11.5–15.4)
HGB BLD-MCNC: 13.7 G/DL (ref 11.5–15.4)
IMM GRANULOCYTES # BLD AUTO: 0.07 THOUSAND/UL (ref 0–0.2)
IMM GRANULOCYTES NFR BLD AUTO: 1 % (ref 0–2)
LYMPHOCYTES # BLD AUTO: 0.66 THOUSANDS/ΜL (ref 0.6–4.47)
LYMPHOCYTES NFR BLD AUTO: 9 % (ref 14–44)
MCH RBC QN AUTO: 25.8 PG (ref 26.8–34.3)
MCH RBC QN AUTO: 25.9 PG (ref 26.8–34.3)
MCHC RBC AUTO-ENTMCNC: 30.7 G/DL (ref 31.4–37.4)
MCHC RBC AUTO-ENTMCNC: 31 G/DL (ref 31.4–37.4)
MCV RBC AUTO: 84 FL (ref 82–98)
MCV RBC AUTO: 84 FL (ref 82–98)
MONOCYTES # BLD AUTO: 0.61 THOUSAND/ΜL (ref 0.17–1.22)
MONOCYTES NFR BLD AUTO: 9 % (ref 4–12)
NEUTROPHILS # BLD AUTO: 5.8 THOUSANDS/ΜL (ref 1.85–7.62)
NEUTS SEG NFR BLD AUTO: 81 % (ref 43–75)
NRBC BLD AUTO-RTO: 0 /100 WBCS
PLATELET # BLD AUTO: 115 THOUSANDS/UL (ref 149–390)
PLATELET # BLD AUTO: 124 THOUSANDS/UL (ref 149–390)
PMV BLD AUTO: 10 FL (ref 8.9–12.7)
PMV BLD AUTO: 9.8 FL (ref 8.9–12.7)
POTASSIUM SERPL-SCNC: 4.1 MMOL/L (ref 3.5–5.3)
RBC # BLD AUTO: 5.02 MILLION/UL (ref 3.81–5.12)
RBC # BLD AUTO: 5.31 MILLION/UL (ref 3.81–5.12)
SODIUM SERPL-SCNC: 138 MMOL/L (ref 136–145)
WBC # BLD AUTO: 5.44 THOUSAND/UL (ref 4.31–10.16)
WBC # BLD AUTO: 7.19 THOUSAND/UL (ref 4.31–10.16)

## 2020-09-03 PROCEDURE — 80053 COMPREHEN METABOLIC PANEL: CPT | Performed by: EMERGENCY MEDICINE

## 2020-09-03 PROCEDURE — 96374 THER/PROPH/DIAG INJ IV PUSH: CPT

## 2020-09-03 PROCEDURE — 83735 ASSAY OF MAGNESIUM: CPT | Performed by: EMERGENCY MEDICINE

## 2020-09-03 PROCEDURE — 97116 GAIT TRAINING THERAPY: CPT

## 2020-09-03 PROCEDURE — 72131 CT LUMBAR SPINE W/O DYE: CPT

## 2020-09-03 PROCEDURE — 99284 EMERGENCY DEPT VISIT MOD MDM: CPT

## 2020-09-03 PROCEDURE — 94760 N-INVAS EAR/PLS OXIMETRY 1: CPT

## 2020-09-03 PROCEDURE — 97530 THERAPEUTIC ACTIVITIES: CPT

## 2020-09-03 PROCEDURE — G1004 CDSM NDSC: HCPCS

## 2020-09-03 PROCEDURE — 99238 HOSP IP/OBS DSCHRG MGMT 30/<: CPT | Performed by: THORACIC SURGERY (CARDIOTHORACIC VASCULAR SURGERY)

## 2020-09-03 PROCEDURE — 36415 COLL VENOUS BLD VENIPUNCTURE: CPT | Performed by: EMERGENCY MEDICINE

## 2020-09-03 PROCEDURE — 1124F ACP DISCUSS-NO DSCNMKR DOCD: CPT | Performed by: EMERGENCY MEDICINE

## 2020-09-03 PROCEDURE — NC001 PR NO CHARGE: Performed by: PHYSICIAN ASSISTANT

## 2020-09-03 PROCEDURE — 85027 COMPLETE CBC AUTOMATED: CPT | Performed by: PHYSICIAN ASSISTANT

## 2020-09-03 PROCEDURE — 96372 THER/PROPH/DIAG INJ SC/IM: CPT

## 2020-09-03 PROCEDURE — 99285 EMERGENCY DEPT VISIT HI MDM: CPT | Performed by: EMERGENCY MEDICINE

## 2020-09-03 PROCEDURE — 82948 REAGENT STRIP/BLOOD GLUCOSE: CPT

## 2020-09-03 PROCEDURE — 99232 SBSQ HOSP IP/OBS MODERATE 35: CPT | Performed by: INTERNAL MEDICINE

## 2020-09-03 PROCEDURE — 80048 BASIC METABOLIC PNL TOTAL CA: CPT | Performed by: PHYSICIAN ASSISTANT

## 2020-09-03 PROCEDURE — 85025 COMPLETE CBC W/AUTO DIFF WBC: CPT | Performed by: EMERGENCY MEDICINE

## 2020-09-03 RX ORDER — TORSEMIDE 10 MG/1
10 TABLET ORAL DAILY
Qty: 5 TABLET | Refills: 2 | Status: SHIPPED | OUTPATIENT
Start: 2020-09-03 | End: 2020-10-01

## 2020-09-03 RX ORDER — ASPIRIN 81 MG/1
81 TABLET, CHEWABLE ORAL DAILY
Qty: 30 TABLET | Refills: 2 | Status: SHIPPED | OUTPATIENT
Start: 2020-09-03 | End: 2021-08-21 | Stop reason: HOSPADM

## 2020-09-03 RX ORDER — SENNOSIDES 8.6 MG
650 CAPSULE ORAL EVERY 8 HOURS PRN
Qty: 30 TABLET | Refills: 0 | Status: SHIPPED | OUTPATIENT
Start: 2020-09-03 | End: 2020-10-03

## 2020-09-03 RX ORDER — HYDROMORPHONE HCL/PF 1 MG/ML
1 SYRINGE (ML) INJECTION ONCE
Status: COMPLETED | OUTPATIENT
Start: 2020-09-03 | End: 2020-09-03

## 2020-09-03 RX ORDER — POTASSIUM CHLORIDE 750 MG/1
10 TABLET, EXTENDED RELEASE ORAL DAILY
Qty: 5 TABLET | Refills: 2 | Status: SHIPPED | OUTPATIENT
Start: 2020-09-03 | End: 2020-10-07 | Stop reason: ALTCHOICE

## 2020-09-03 RX ADMIN — APIXABAN 5 MG: 5 TABLET, FILM COATED ORAL at 09:32

## 2020-09-03 RX ADMIN — AMIODARONE HYDROCHLORIDE 200 MG: 200 TABLET ORAL at 09:32

## 2020-09-03 RX ADMIN — MUPIROCIN 1 APPLICATION: 20 OINTMENT TOPICAL at 09:32

## 2020-09-03 RX ADMIN — PANTOPRAZOLE SODIUM 40 MG: 40 TABLET, DELAYED RELEASE ORAL at 05:41

## 2020-09-03 RX ADMIN — HYDROMORPHONE HYDROCHLORIDE 1 MG: 1 INJECTION, SOLUTION INTRAMUSCULAR; INTRAVENOUS; SUBCUTANEOUS at 21:02

## 2020-09-03 RX ADMIN — METOPROLOL TARTRATE 12.5 MG: 25 TABLET ORAL at 09:33

## 2020-09-03 RX ADMIN — TORSEMIDE 20 MG: 20 TABLET ORAL at 09:33

## 2020-09-03 RX ADMIN — ASPIRIN 81 MG CHEWABLE TABLET 81 MG: 81 TABLET CHEWABLE at 09:32

## 2020-09-03 RX ADMIN — ANASTROZOLE 1 MG: 1 TABLET, COATED ORAL at 09:31

## 2020-09-03 RX ADMIN — HYDROMORPHONE HYDROCHLORIDE 1 MG: 1 INJECTION, SOLUTION INTRAMUSCULAR; INTRAVENOUS; SUBCUTANEOUS at 23:54

## 2020-09-03 RX ADMIN — POTASSIUM CHLORIDE 20 MEQ: 1500 TABLET, EXTENDED RELEASE ORAL at 09:32

## 2020-09-03 RX ADMIN — DOCUSATE SODIUM 100 MG: 100 CAPSULE, LIQUID FILLED ORAL at 09:31

## 2020-09-03 NOTE — PROGRESS NOTES
Cardiology Progress Note - Sharyn Khanna 59 y o  female MRN: [id number]    Unit/Bed#: [facility] 410-01 Encounter: [id number]      Assessment:  Principal Problem: Aortic stenosis, severe  Active Problems: Morbid obesity due to excess calories (HCC)    Atherosclerotic peripheral vascular disease ([facility] )    Colostomy care ([facility] )    Moderate mitral stenosis    Atrial fibrillation with rapid ventricular response ([facility] )    Hyperlipidemia    Hypochloremia      Plan:  Patient comfortable this morning  She has no chest pain or significant dyspnea  She is in sinus rhythm on telemetry  She is postop after transfemoral transcatheter aortic valve replacement  Echocardiogram done yesterday demonstrates preserved LV systolic function with mild to moderate concentric left ventricular hypertrophy and an appropriately functioning bioprosthesis in the aortic position  No evidence of regurgitation is noted  Patient in the process of discharge planning  She will follow with her primary cardiologist Dr Bethany Ramsey as an outpatient  Subjective:   Patient seen and examined  No significant events overnight   negative  Objective:     Vitals: Blood pressure 119/56, pulse 57, temperature 98 2 °F (36 8 °C), temperature source Oral, resp  rate 20, height 5' 5" (1 651 m), weight 124 kg (272 lb 14 9 oz), SpO2 98 %, not currently breastfeeding , Body mass index is 45 42 kg/m² ,   Orthostatic Blood Pressures      Most Recent Value   Blood Pressure  119/56 filed at 09/03/2020 0700   Patient Position - Orthostatic VS  Sitting filed at 09/03/2020 0700      ,      Intake/Output Summary (Last 24 hours) at 9/3/2020 0942  Last data filed at 9/3/2020 0900  Gross per 24 hour   Intake 618 ml   Output 1625 ml   Net -1007 ml       No significant arrhythmias seen on telemetry review         Physical Exam:    GEN: Sharyn Khanna appears well, alert and oriented x 3, pleasant and cooperative   NECK: supple, no carotid bruits, no JVD or HJR  HEART: normal rate, regular rhythm, normal S1 and S2, no murmurs, clicks, gallops or rubs   LUNGS: clear to auscultation bilaterally; no wheezes, rales, or rhonchi   ABDOMEN: normal bowel sounds, soft, no tenderness, no distention  EXTREMITIES: peripheral pulses normal; no clubbing, cyanosis, or edema  SKIN: warm and well perfused, no suspicious lesions on exposed skin    Labs & Results:    Admission on 09/01/2020   Component Date Value    Unit Product Code 09/01/2020 X5461I40     Unit Number 09/01/2020 [id number]     Unit ABO 09/01/2020 B     Unit RH 09/01/2020 POS     Crossmatch 09/01/2020 Compatible     Unit Dispense Status 09/01/2020 Return to [address] [place] [address] Unit Product Code 09/01/2020 R1317Y94     Unit Number 09/01/2020 [id number]     Unit ABO 09/01/2020 B     Unit RH 09/01/2020 POS     Crossmatch 09/01/2020 Compatible     Unit Dispense Status 09/01/2020 Return to [address] [place] [address] Unit Product Code 09/01/2020 U3382X06     Unit Number 09/01/2020 [id number]     Unit ABO 09/01/2020 B     Unit RH 09/01/2020 POS     Crossmatch 09/01/2020 Compatible     Unit Dispense Status 09/01/2020 Return to [address] [place] [address] Unit Product Code 09/01/2020 [id number]     Unit Number 09/01/2020 [id number]     Unit ABO 09/01/2020 B     Unit RH 09/01/2020 POS     Crossmatch 09/01/2020 Compatible     Unit Dispense Status 09/01/2020 Return to [employer], UA 09/01/2020 Clear     Color, UA 09/01/2020 Yellow     Specific Gravity, UA 09/01/2020 1 020     pH, UA 09/01/2020 5 5     Glucose, UA 09/01/2020 Negative     Ketones, UA 09/01/2020 Negative     Blood, UA 09/01/2020 Negative     Protein, UA 09/01/2020 Negative     Nitrite, UA 09/01/2020 Negative     Bilirubin, UA 09/01/2020 Negative     Urobilinogen, UA 09/01/2020 1 0     Leukocytes, UA 09/01/2020 Negative     WBC, UA 09/01/2020 None Seen     RBC, UA 09/01/2020 None Seen     Hyaline Casts,  09/01/2020 None Seen     Bacteria,  09/01/2020 None Seen     Epithelial Cells 09/01/2020 None Seen     POC Glucose 09/01/2020 85     Sodium 09/01/2020 139     Potassium 09/01/2020 4 7     Chloride 09/01/2020 109*    CO2 09/01/2020 24     ANION GAP 09/01/2020 6     BUN 09/01/2020 16     Creatinine 09/01/2020 0 89     Glucose 09/01/2020 127     Calcium 09/01/2020 8 8     eGFR 09/01/2020 69     Hemoglobin 09/01/2020 12 9     Hematocrit 09/01/2020 40 9     POC Glucose 09/01/2020 125     pH, Art i-STAT 09/01/2020 7 320*    pCO2, Art i-STAT 09/01/2020 49 3*    pO2, ART i-STAT 09/01/2020 97 0     BE, i-STAT 09/01/2020 -1     HCO3, Art i-STAT 09/01/2020 25 4     CO2, i-STAT 09/01/2020 27     O2 Sat, i-STAT 09/01/2020 97*    SODIUM, I-STAT 09/01/2020 137     Potassium, i-STAT 09/01/2020 4 6     Calcium, Ionized i-STAT 09/01/2020 1 26     Hct, i-STAT 09/01/2020 37     Hgb, i-STAT 09/01/2020 12 6     Glucose, i-STAT 09/01/2020 101     Specimen Type 09/01/2020 ARTERIAL     pH, Art i-STAT 09/01/2020 7 331*    pCO2, Art i-STAT 09/01/2020 48 1*    pO2, ART i-STAT 09/01/2020 152 0*    BE, i-STAT 09/01/2020 -1     HCO3, Art i-STAT 09/01/2020 25 4     CO2, i-STAT 09/01/2020 27     O2 Sat, i-STAT 09/01/2020 99*    SODIUM, I-STAT 09/01/2020 137     Potassium, i-STAT 09/01/2020 4 4     Calcium, Ionized i-STAT 09/01/2020 1 24     Hct, i-STAT 09/01/2020 34*    Hgb, i-STAT 09/01/2020 11 6     Glucose, i-STAT 09/01/2020 97     Specimen Type 09/01/2020 ARTERIAL     pH, Art i-STAT 09/01/2020 7 326*    pCO2, Art i-STAT 09/01/2020 52 3*    pO2, ART i-STAT 09/01/2020 384 0*    BE, i-STAT 09/01/2020 1     HCO3, Art i-STAT 09/01/2020 27 3     CO2, i-STAT 09/01/2020 29     O2 Sat, i-STAT 09/01/2020 100*    SODIUM, I-STAT 09/01/2020 137     Potassium, i-STAT 09/01/2020 4 6     Calcium, Ionized i-STAT 09/01/2020 1 21     Hct, i-STAT 09/01/2020 33*    Hgb, i-STAT 09/01/2020 11 2*    Glucose, i-STAT 09/01/2020 99     Specimen Type 09/01/2020 ARTERIAL     Activated Clotting Time,* 09/01/2020 127     Specimen Type 09/01/2020 ARTERIAL     Activated Clotting Time,* 09/01/2020 416*    Specimen Type 09/01/2020 ARTERIAL     Activated Clotting Time,* 09/01/2020 110     Specimen Type 09/01/2020 ARTERIAL     POC Glucose 09/01/2020 174*    POC Glucose 09/01/2020 234*    Sodium 09/02/2020 138     Potassium 09/02/2020 4 6     Chloride 09/02/2020 107     CO2 09/02/2020 26     ANION GAP 09/02/2020 5     BUN 09/02/2020 16     Creatinine 09/02/2020 0 75     Glucose 09/02/2020 160*    Calcium 09/02/2020 8 5     eGFR 09/02/2020 85     WBC 09/02/2020 6 32     RBC 09/02/2020 4 49     Hemoglobin 09/02/2020 11 6     Hematocrit 09/02/2020 36 9     MCV 09/02/2020 82     MCH 09/02/2020 25 8*    MCHC 09/02/2020 31 4     RDW 09/02/2020 14 8     Platelets 76/97/3433 129*    MPV 09/02/2020 9 9     Magnesium 09/02/2020 2 3     Ventricular Rate 09/02/2020 57     Atrial Rate 09/02/2020 57     OK Interval 09/02/2020 202     QRSD Interval 09/02/2020 92     QT Interval 09/02/2020 444     QTC Interval 09/02/2020 432     P Axis 09/02/2020 64     QRS Axis 09/02/2020 -17     T Wave Axis 09/02/2020 23     POC Glucose 09/02/2020 143*    Ventricular Rate 09/01/2020 72     Atrial Rate 09/01/2020 72     OK Interval 09/01/2020 188     QRSD Interval 09/01/2020 96     QT Interval 09/01/2020 396     QTC Interval 09/01/2020 434     P Axis 09/01/2020 67     QRS Axis 09/01/2020 -27     T Wave Axis 09/01/2020 41     Ventricular Rate 09/01/2020 73     Atrial Rate 09/01/2020 73     OK Interval 09/01/2020 188     QRSD Interval 09/01/2020 92     QT Interval 09/01/2020 383     QTC Interval 09/01/2020 422     P Axis 09/01/2020 61     QRS Axis 09/01/2020 -29     T Wave Axis 09/01/2020 98     POC Glucose 09/02/2020 158*    POC Glucose 09/02/2020 140     Sodium 09/03/2020 138     Potassium 09/03/2020 4 1     Chloride 09/03/2020 104     CO2 09/03/2020 31     ANION GAP 09/03/2020 3*    BUN 09/03/2020 16     Creatinine 09/03/2020 1 02     Glucose 09/03/2020 90     Calcium 09/03/2020 8 8     eGFR 09/03/2020 58     WBC 09/03/2020 5 44     RBC 09/03/2020 5 02     Hemoglobin 09/03/2020 13 0     Hematocrit 09/03/2020 41 9     MCV 09/03/2020 84     MCH 09/03/2020 25 9*    MCHC 09/03/2020 31 0*    RDW 09/03/2020 15 1     Platelets 96/21/3438 124*    MPV 09/03/2020 10 0     POC Glucose 09/03/2020 85        Xr Chest Portable    Result Date: 9/2/2020  Narrative: CHEST INDICATION:   Post Open Heart Surgery  COMPARISON:  September 1, 2020 EXAM PERFORMED/VIEWS:  XR CHEST PORTABLE FINDINGS:  Right IJ catheter remains in place  Status post TAVR  Cardiomediastinal silhouette appears unremarkable  The lungs are clear  No pneumothorax or pleural effusion  Osseous structures appear within normal limits for patient age  Impression: No acute cardiopulmonary disease  No pneumothorax identified  Improved appearance from prior study  Workstation performed: [id number]     Xr Chest Pa & Lateral    Result Date: 8/20/2020  Narrative: CHEST INDICATION:   R05: Cough  COMPARISON:  Chest radiograph from 4/6/2020  Chest CT from 3/16/2020  EXAM PERFORMED/VIEWS:  XR CHEST PA & LATERAL  DUAL ENERGY SUBTRACTION TECHNIQUE FINDINGS: Cardiomediastinal silhouette appears unremarkable  Moderate calcification of the mitral annulus  The lungs are clear  No pneumothorax or pleural effusion  Osseous structures appear within normal limits for patient age  Impression: No acute cardiopulmonary disease  Workstation performed: [id number]     Xr Chest Portable Icu    Result Date: 9/1/2020  Narrative: CHEST INDICATION:   S/P Transcatheter aortic valve replacement  COMPARISON:  8/19/2020 EXAM PERFORMED/VIEWS:  XR CHEST PORTABLE ICU FINDINGS:  Right IJ central line tip projects over the cavoatrial junction  Cardiomediastinal silhouette appears unremarkable   Low lung volumes limiting assessment with some vascular crowding  No consolidation, pleural effusion or pneumothorax  Osseous structures appear within normal limits for patient age  Impression: Right IJ central line tip projects over the cavoatrial junction  No acute pulmonary findings with low lung volumes  Workstation performed: [id number]       EKG personally reviewed by Tressa Bello MD      Counseling / Coordination of Care  Total floor / unit time spent today 30 minutes  Greater than 50% of total time was spent with the patient and / or family counseling and / or coordination of care

## 2020-09-03 NOTE — PLAN OF CARE
Problem: PHYSICAL THERAPY ADULT  Goal: Performs mobility at highest level of function for planned discharge setting  See evaluation for individualized goals  Description: Treatment/Interventions: Functional transfer training, LE strengthening/ROM, Therapeutic exercise, Endurance training, Equipment eval/education, Bed mobility, Gait training, Spoke to nursing, Spoke to case management, OT  Equipment Recommended: Walker(cont using rollator at this time)       See flowsheet documentation for full assessment, interventions and recommendations  Outcome: Adequate for Discharge  Note: Prognosis: Excellent  Problem List: Decreased endurance, Decreased mobility, Obesity  Assessment: The pt  has improving activity tolerance today, but she did require one seated rest in-between gait sessions  She had no loss of balance, but she had increasing gait deviations as she ambulated due to fatigue and reports of back discomfort  She denied any pain throughout the session however  The pt  has demonstrated the necessary mobility in order to safely return home at discharge  Barriers to Discharge: None     PT Discharge Recommendation: Return to previous environment with social support     PT - OK to Discharge: Yes    See flowsheet documentation for full assessment

## 2020-09-03 NOTE — PROGRESS NOTES
Progress Note - Cardiothoracic Surgery   Karyle Chars 59 y o  female MRN: [id number]  Unit/Bed#: [facility] 410-01 Encounter: [id number]    Aortic stenosis, Non-Rheumatic  S/P transfemoral transcatheter aortic valve replacement; POD # 2    24 Hour Events: No events  Denies C/O        Medications:   Scheduled Meds:  Current Facility-Administered Medications   Medication Dose Route Frequency Provider Last Rate    acetaminophen  650 mg Oral Q6H PRN Barbara Fillers, PA-C      albuterol  2 5 mg Nebulization Q4H PRN Barbara Fillers, PA-C      amiodarone  200 mg Oral Daily With Breakfast Barbara Fillers, PA-C      anastrozole  1 mg Oral Daily Barbara Fillers, PA-C      apixaban  5 mg Oral BID Barbara Fillers, PA-C      aspirin  81 mg Oral Daily Barbara Fillers, PA-C      chlorhexidine  15 mL Mouth/Throat BID Barbara Fillers, PA-C      docusate sodium  100 mg Oral BID Barbara Fillers, PA-C      ferrous sulfate  325 mg Oral Daily With Breakfast Barbara Fillers, PA-C      insulin lispro  1-5 Units Subcutaneous HS Barbara Fillers, PA-C      insulin lispro  2-12 Units Subcutaneous TID AC Barbara Fillers, PA-C      metoprolol tartrate  25 mg Oral Q12H [facility] Barbara Fillers, PA-C      mupirocin  1 application Nasal F13C [facility] Barbara Fillers, PA-C      ondansetron  4 mg Intravenous Q6H PRN Barbara Fillers, PA-C      oxyCODONE  10 mg Oral Q6H PRN Barbara Fillers, PA-C      pantoprazole  40 mg Oral Early Morning Barbara Fillers, PA-C      potassium chloride  20 mEq Oral Daily Barbara Fillers, PA-C      pravastatin  40 mg Oral Daily With Reginor Saira, PA-C      torsemide  20 mg Oral Daily Barbara Fillers, PA-C       Continuous Infusions:   PRN Meds:   acetaminophen    albuterol    ondansetron    oxyCODONE    Vitals:   Vitals:    09/02/20 2119 09/02/20 2304 09/03/20 0300 09/03/20 0600   BP: 158/83 128/58 107/61    BP Location:  Left arm Left arm    Pulse: 56 (!) 51 (!) 52    Resp:  20 18    Temp:  97 8 °F (36 6 °C) 98 4 °F (36 9 °C)    TempSrc:  Oral Oral    SpO2:  97% 98%    Weight:    124 kg (272 lb 14 9 oz)   Height:           Telemetry: NSR; Heart Rate: 57    Hemodynamics:       Respiratory:   SpO2: SpO2: 98 %, SpO2 Activity: SpO2 Activity: At Rest; Room Air    Intake/Output:   I/O       08/31 0701 - 09/01 0700 09/01 0701 - 09/02 0700 09/02 0701 - 09/03 0700    P  O   350     I V  (mL/kg)  2849 2 (23)     IV Piggyback  100     Total Intake(mL/kg)  3299 2 (26 6)     Urine (mL/kg/hr)  2300     Stool  0     Total Output  2300     Net  +999 2                I/O: -1677 mL/24    Weights:   Weight (last 2 days)     Date/Time   Weight    09/03/20 0600   124 (272 93)    09/02/20 0557   124 (274 03)    09/01/20 0928   122 (268)              Results:   Results from last 7 days   Lab Units 09/03/20  0649 09/02/20  0451 09/01/20  1258   WBC Thousand/uL 5 44 6 32  --    HEMOGLOBIN g/dL 13 0 11 6 12 9   HEMATOCRIT % 41 9 36 9 40 9   PLATELETS Thousands/uL 124* 129*  --      Results from last 7 days   Lab Units 09/03/20  0649 09/02/20  0451 09/01/20  1258 09/01/20  1221   POTASSIUM mmol/L 4 1 4 6 4 7  --    CHLORIDE mmol/L 104 107 109*  --    CO2 mmol/L 31 26 24  --    CO2, I-STAT mmol/L  --   --   --  29   BUN mg/dL 16 16 16  --    CREATININE mg/dL 1 02 0 75 0 89  --    GLUCOSE, ISTAT mg/dl  --   --   --  99   CALCIUM mg/dL 8 8 8 5 8 8  --          Point of care glucose: 85-90    I have personally reviewed pertinent reports  and I have personally reviewed pertinent films in PACS    Invasive Lines/Tubes:  Invasive Devices     Peripheral Intravenous Line            Peripheral IV 09/01/20 Right Hand 1 day          Drain            Colostomy Descending/sigmoid  days              Physical Exam:    HEENT/NECK:  PERRLA  No jugular venous distention      Cardiac: Regular rate and rhythm and No murmurs/rubs/gallops  Pulmonary:  Breath sounds clear bilaterally and No rales/rhonchi/wheezes  Abdomen:  Non-tender, Non-distended and Normal bowel sounds  Incisions: Groin puncture sites clean, dry, and intact without hematoma  Extremities: Extremities warm/dry and Trace edema B/L  Neuro: Alert and oriented X 3, Sensation is grossly intact and No focal deficits  Skin: Warm/Dry, without rashes or lesions  Echo: A 26mm ES 3 ULTRA TAVR bioprosthesis was present  It exhibited normal function  No valvular or paravalvular leak  Assessment:  Principal Problem: Aortic stenosis, severe  Active Problems: Morbid obesity due to excess calories ([facility])    Atherosclerotic peripheral vascular disease ([facility] [address] )    Colostomy care ([facility] )    Moderate mitral stenosis    Atrial fibrillation with rapid ventricular response ([facility])    Hyperlipidemia    Hypochloremia       Aortic stenosis, Non-Rheumatic  S/P transfemoral transcatheter aortic valve replacement; POD # 2    Plan:    1  Cardiac:   Atrial Fibrillation; HR 57  Decrease Lopressor, 12 5 mg PO BID    Maintain central IV access today for one more day  Echo shows well seated valve without valvular or paravalvular leak  ASA/Eliquis  Consult cardiology for postoperative medical management  Continue Statin therapy    2  Pulmonary:   Extubated  CXR findings: Clear  Good Room air oxygen saturation; Continue incentive spirometry/Coughing/Deep breathing exercises    3  Renal:   Intake/Output net: -1600 mL/24 hours   orsemide, 20 mg PO QD  Post op Creatinine stable; Follow up labs prn    4  Neuro:  Neurologically intact; No active issues  Incisional pain well-controlled; Continue prn Tylenol    5  GI:  Tolerating TLC 2 3 gm sodium diet  Maintain 1800 mL daily fluid restriction   Continue daily stool softeners and prn suppository  Continue GI prophylaxis    6  Endo:   No history of diabetes; Glucose well-controlled  Continue Insulin sliding scale coverage    7  Hematology:    Post-operative blood count acceptable; Trend prn    8     Disposition:   Home today    VTE Pharmacologic Prophylaxis: Sequential compression device Edwin Diaz)   VTE Mechanical Prophylaxis: sequential compression device    Bedside rounds completed with JOSLYN Garza    Plan of care discussed with bedside nurse    SIGNATURE: Alcira Groves PA-C  DATE: September 3, 2020  TIME: 7:16 AM

## 2020-09-03 NOTE — PHYSICAL THERAPY NOTE
Physical Therapy Progress Note     09/03/20 0825   Pain Assessment   Pain Assessment Tool Pain Assessment not indicated - pt denies pain   Pain Score No Pain   Restrictions/Precautions   Other Precautions Cardiac/sternal;Telemetry   Subjective   Subjective The pt  states that she is eager to go home and to get some rest    Transfers   Sit to Stand 6  Modified independent   Stand to Sit 6  Modified independent   Ambulation/Elevation   Gait pattern Excessively slow; Inconsistent asuncion; Improper Weight shift   Gait Assistance 6  Modified independent   Assistive Device 4-wheeled walker   Distance 240 feet x2  Balance   Static Sitting Normal   Dynamic Sitting Good   Static Standing Fair +   Ambulatory Fair +   Activity Tolerance   Activity Tolerance Patient tolerated treatment well   Assessment   Prognosis Excellent   Problem List Decreased endurance;Decreased mobility;Obesity   Assessment The pt  has improving activity tolerance today, but she did require one seated rest in-between gait sessions  She had no loss of balance, but she had increasing gait deviations as she ambulated due to fatigue and reports of back discomfort  She denied any pain throughout the session however  The pt  has demonstrated the necessary mobility in order to safely return home at discharge  Barriers to Discharge None   Goals   Patient Goals To go home  STG Expiration Date 09/12/20   PT Treatment Day 2   Plan   Treatment/Interventions Functional transfer training;LE strengthening/ROM; Therapeutic exercise; Endurance training;Patient/family training;Bed mobility;Gait training   Progress Progressing toward goals   PT Frequency   (4-6x a week )   Recommendation   PT Discharge Recommendation Return to previous environment with social support   PT - OK to Discharge Yes     Modesto Diallo, TEO

## 2020-09-03 NOTE — DISCHARGE SUMMARY
Discharge Summary - Cardiothoracic Surgery   Tra Trinh 59 y o  female MRN: [id number]  Unit/Bed#: [facility] 410-01 Encounter: [id number]    Admission Date: 9/1/2020     Discharge Date: 09/03/20    Admitting Diagnosis: Aortic valve stenosis, etiology of cardiac valve disease unspecified [I35 0]    Primary Discharge Diagnosis:   Aortic stenosis, Non-Rheumatic  S/P transfemoral transcatheter aortic valve replacement;    Secondary Discharge Diagnosis:   CAD, CHF, NSR, PAF on Eliquis, SVT, HTN, anemia, arthritis, DM2 (diet controlled), HLD, colostomy, LLQ parastomal hernia, GERD, H/o MRSA, spinal stenosis, PVD, s/p gastric bypass, H/O Endometrial Ca & Ovarian Ca s/p ALEXANDRA, H/O Rectal Ca s/p protectomy/perineal resection, s/p bl THR     Attending: ROSA Sutton  Consulting Physician(s):   Cardiology  Medical/Critical Care    Procedures Performed:   Procedure(s):  REPLACEMENT AORTIC VALVE TRANSCATHETER (TAVR) TRANSFEMORAL W/ 26MM WADDELL CRISTIAN S3 ULTRA VALVE(ACCESS ON RIGHT)  TRANSESOPHAGEAL ECHOCARDIOGRAM (THO)     Hospital Course: The patient was seen in consultation prior to this admission for evaluation of Aortic stenosis, Non-Rheumatic  Risks and benefits of transcatheter aortic valve replacement were discussed in detail, and patient was agreeable  Routine preoperative evaluation was completed and informed consent was obtained prior to admission  9/1: Elective admission for TF TAVR 26 mm Cristian 3 Ultra via right sided approach  Extubated in the OR and transferred to PACU supported with cardene at 15  Left DP pulse dopplerable, right DP pulse palpable  EKG reviewed, NSR  Start Metoprolol 25 mg BID  PM: C/o severe chronic pain, resumed home Oxy 10mg Q6 PRN  Remains on cardene 15 s/p Lopressor, PRN Hydralazine ordered  9/2: Weaned to room air  Cardene weaned off  D/C arterial line and del real catheter  Echo today  Add Torsemide, 20 mg PO QD  Transfer to tele  9/3: HR 57   Decrease Lopressor, 12 5 mg PO BID  Echo shows well seated valve without valvular or paravalvular leak  Fit for discharge home  Condition at Discharge:   good     Discharge Physical Exam:    HEENT/NECK:  PERRLA  No jugular venous distention  Cardiac: Regular rate and rhythm and No murmurs/rubs/gallops  Pulmonary:  Breath sounds clear bilaterally and No rales/rhonchi/wheezes  Abdomen:  Non-tender, Non-distended and Normal bowel sounds  Incisions: Groin puncture sites clean, dry, and intact without hematoma  Extremities: Extremities warm/dry and Trace edema B/L  Neuro: Alert and oriented X 3, Sensation is grossly intact and No focal deficits  Skin: Warm/Dry, without rashes or lesions  Discharge Data:  Results from last 7 days   Lab Units 09/03/20  0649 09/02/20  0451 09/01/20  1258   WBC Thousand/uL 5 44 6 32  --    HEMOGLOBIN g/dL 13 0 11 6 12 9   HEMATOCRIT % 41 9 36 9 40 9   PLATELETS Thousands/uL 124* 129*  --      Results from last 7 days   Lab Units 09/03/20  0649 09/02/20  0451 09/01/20  1258 09/01/20  1221   POTASSIUM mmol/L 4 1 4 6 4 7  --    CHLORIDE mmol/L 104 107 109*  --    CO2 mmol/L 31 26 24  --    CO2, I-STAT mmol/L  --   --   --  29   BUN mg/dL 16 16 16  --    CREATININE mg/dL 1 02 0 75 0 89  --    GLUCOSE, ISTAT mg/dl  --   --   --  99   CALCIUM mg/dL 8 8 8 5 8 8  --            Discharge instructions/Information to patient and family:   See after visit summary for information provided to patient and family  Abisai Grider was educated on restrictions regarding driving and lifting, and techniques of proper incisional care  They were specifically counselled on signs and symptoms of an incisional infection, and advised to contact our service immediately should they develop fevers, sweats, chill, redness or drainage at the site of any incisions  Provisions for Follow-Up Care:  See after visit summary for information related to follow-up care and any pertinent home health orders  Disposition:  Home    Planned Readmission:   No    Discharge Medications:  See after visit summary for reconciled discharge medications provided to patient and family  Meagan Lee was provided contact information and scheduled a follow up appointment with ROSA Hernandez  Additionally, follow up appointments have been scheduled for their primary care physician and primary cardiologist   Contact information was provided  Upon admission, troponins were Not checked    Meagan Lee was counseled on the importance of avoiding tobacco products  As with all patients whom have undergone open heart surgery, tobacco cessation medication was contraindicated at the time of discharge  ACE/ARB was Contraindicated secondary to hypotension      The patient was discharged on ongoing diuretic therapy with Torsemide 10 mg, PO QD and Potassium Chloride 10 mEq, PO QD  They were advised to continue these medications for 5 days, unless otherwise directed  The patient was informed that following their postoperative surgical evaluation, they will be referred to outpatient cardiac rehabilitation  They were counseled that this program is run by specialists who will help them safely strengthen their heart and prevent more heart disease  Cardiac rehabilitation will include exercise, relaxation, stress management, and heart-healthy nutrition  Caregivers will also check to make sure their medication regimen is working  During this admission, the patient was questioned on their use of tobacco, alcohol, and illicit/non-prescription drug use in the  previous 24 months  Meagan Lee states that they have not used any of these substances in this time frame  I spent 20 minutes discharging the patient  This time was spent on the day of discharge  I had direct contact with the patient on the day of discharge  Additional documentation is required if more than 30 minutes were spent on discharge       SIGNATURE: Frances Sánchez PA-C  DATE: September 3, 2020  TIME: 7:30 AM

## 2020-09-03 NOTE — DISCHARGE INSTRUCTIONS
Transcatheter Aortic Valve Replacement   AMBULATORY CARE:   What you need to know about a TAVR:   · A TAVR is a procedure to replace your aortic valve  It is done without removing your old valve  The aortic valve is between the left ventricle and the aorta  The left ventricle is the lower left chamber of your heart  The aorta is a blood vessel that pumps blood to your brain and body  The aortic valve opens and closes to let blood flow from your heart to the rest of your body  · TAVR may be used to replace your aortic valve if open heart surgery is not safe for you  Your valve will be replaced with a tissue valve  The tissue may be taken from an animal, such as a pig or cow  What will happen before a TAVR:   · You may need an echocardiogram, angiogram, EKG, or CT scan before your procedure  These tests will help your healthcare provider plan your procedure  They will also make sure a TAVR is safe for you  You may need to stop taking blood thinner medicine several days before your procedure  This will prevent heavy bleeding during your procedure  · Your healthcare provider will talk to you about how to prepare for your procedure  He may tell you not to eat or drink anything after midnight on the day of your procedure  He will tell you what medicines to take or not take on the day of your procedure  You may stay in the hospital 2 to 5 days after your procedure  Arrange for someone to drive you home and stay with you  This person can call 911 if you have problems at home  What will happen during a TAVR:   · You may be given general anesthesia to keep you asleep and free from pain during your procedure  You may instead be given IV sedation and local anesthesia  IV sedation will make you feel calm and relaxed during the procedure  With local anesthesia, you may still feel pressure or pushing during your procedure, but you should not feel any pain   You may be given an antibiotic through your IV to prevent a bacterial infection  Tell healthcare providers if you have ever had an allergic reaction to an antibiotic  · Your healthcare provider will make an incision in your neck, groin, or chest  He will guide a catheter with a balloon on the end through a blood vessel and into your heart  He will inflate the balloon in the opening of your valve  This balloon make space for your new valve to fit inside the old one  The balloon will be deflated and the catheter will be removed  · Your healthcare provider will insert another catheter into your heart  This catheter will hold a balloon, a stent, and the new valve  The new valve will be inside of the stent  When the catheter reaches your valve, your healthcare provider will expand the balloon  The balloon will push your old valve against the walls of your heart  The stent and new valve will be put in the old valve's position  The balloon will be deflated  The stent will continue to hold your old valve out of the way  It will also keep your new valve in the correct place  · Your healthcare provider will remove the catheter and wire  He may use clamps, stitches, or other devices to close the incision  Pressure will be applied to the incision for several minutes to stop any bleeding  A pressure bandage or other pressure device may be placed over the incision to help prevent more bleeding  What will happen after a TAVR:   · Healthcare providers will monitor your vital signs and pulses in your arm or leg, closely  They will check your pressure bandage for bleeding or swelling  You will need to lie flat with your leg or arm straight for 2 to 4 hours  Do not  get out of bed until healthcare providers says it is okay  Arm or leg movements can cause serious bleeding  · You may need blood tests, a chest x-ray, an EKG, or an echocardiogram before you leave the hospital  These tests will make sure your valve is working correctly   You will need to take blood thinner medicine for at least 6 months after your procedure  You may need to take aspirin for the rest of your life  These medicines will prevent blood clots from forming near your valve  Risks of a TAVR:  You may have bruising or pain where the catheter was  You may get an infection or bleed more than expected  The catheter may cause a hole in your heart or blood vessels  A blood clot may form around your valve  The clot may travel to your brain and cause a stroke  Your heartbeat may become irregular during or after a TAVR  You may need medicines or procedures to treat this  Your new valve may not work correctly  You may need another procedure to replace the valve  Follow up with your healthcare provider as directed:  Write down your questions so you remember to ask them during your visits  © 2017 [address] [facility] Information is for End User's use only and may not be sold, redistributed or otherwise used for commercial purposes  All illustrations and images included in CareNotes® are the copyrighted property of A D A M , Inc  or Arnaldo Emma  The above information is an  only  It is not intended as medical advice for individual conditions or treatments  Talk to your doctor, nurse or pharmacist before following any medical regimen to see if it is safe and effective for you  Novel Coronavirus   WHAT YOU NEED TO KNOW:   The nCoV is a new strain (type) of coronavirus that can cause severe respiratory (lung) infection  DISCHARGE INSTRUCTIONS:   Call your local emergency number (911 in the [address] [place] [address],[address]) for any of the following:  Tell the  you may have nCoV  This will help anyone in the ambulance stay safe, and to call ahead to the hospital   · You have sudden shortness of breath  · You have a fast heartbeat or chest pain  Return to the emergency department if:   · You feel so dizzy that you have trouble standing up  · Your lips and fingernails turn blue      Call your doctor if: · You have a fever over 102ºF (39ºC)  · Your sore throat gets worse or you see white or yellow spots in your throat  · Your symptoms get worse after 3 to 5 days or your cold is not better in 14 days  · You have a rash anywhere on your skin  · You have large, tender lumps in your neck  · You have thick, green, or yellow drainage from your nose  · You cough up thick yellow, green, or bloody mucus  · You are vomiting for more than 24 hours and cannot keep fluids down  · You have a bad earache  · You have questions or concerns about your condition or care  Medicines: You may need any of the following:  · Decongestants  help reduce nasal congestion and help you breathe more easily  If you take decongestant pills, they may make you feel restless or cause problems with your sleep  Do not use decongestant sprays for more than a few days  · Cough suppressants  help reduce coughing  Ask your healthcare provider which type of cough medicine is best for you  · NSAIDs , such as ibuprofen, help decrease swelling, pain, and fever  NSAIDs can cause stomach bleeding or kidney problems in certain people  If you take blood thinner medicine, always ask your healthcare provider if NSAIDs are safe for you  Always read the medicine label and follow directions  · Acetaminophen  decreases pain and fever  It is available without a doctor's order  Ask how much to take and how often to take it  Follow directions  Read the labels of all other medicines you are using to see if they also contain acetaminophen, or ask your doctor or pharmacist  Acetaminophen can cause liver damage if not taken correctly  Do not use more than 4 grams (4,000 milligrams) total of acetaminophen in one day  · Take your medicine as directed  Contact your healthcare provider if you think your medicine is not helping or if you have side effects  Tell him or her if you are allergic to any medicine   Keep a list of the medicines, vitamins, and herbs you take  Include the amounts, and when and why you take them  Bring the list or the pill bottles to follow-up visits  Carry your medicine list with you in case of an emergency  Help relieve mild symptoms:   · Drink more liquids as directed  Liquids will help thin and loosen mucus so you can cough it up  Liquids will also help prevent dehydration  Liquids that help prevent dehydration include water, fruit juice, and broth  Do not drink liquids that contain caffeine  Caffeine can increase your risk for dehydration  Ask your healthcare provider how much liquid to drink each day  · Soothe a sore throat  Gargle with warm salt water  This helps your sore throat feel better  Make salt water by dissolving ¼ teaspoon salt in 1 cup warm water  You may also suck on hard candy or throat lozenges  You may use a sore throat spray  · Use a humidifier or vaporizer  Use a cool mist humidifier or a vaporizer to increase air moisture in your home  This may make it easier for you to breathe and help decrease your cough  · Use saline nasal drops as directed  These help relieve congestion  · Apply petroleum-based jelly around the outside of your nostrils  This can decrease irritation from blowing your nose  · Do not smoke  Nicotine and other chemicals in cigarettes and cigars can make your symptoms worse  They can also cause infections such as bronchitis or pneumonia  Ask your healthcare provider for information if you currently smoke and need help to quit  E-cigarettes or smokeless tobacco still contain nicotine  Talk to your healthcare provider before you use these products  Prevent the spread of nCoV:  If you are around anyone who has nCoV, the following can help you protect you from infection  Have the person follow the guidelines to prevent infecting others:  · Limit close contact with others  Anyone with nCoV should stay in a different room, or sleep in a separate bed  Others need to stay at least 6 feet (2 meters) away  The person should only go out of the house for medical appointments  He or she should always call the office of any healthcare provider  The provider will need to prepare to keep others safe  · Wear a medical mask when around others  You can wear a medical mask or have the person wear one  A medical mask will help prevent nCoV from spreading  · Cover your mouth and nose to sneeze or cough  Everyone should always cover a sneeze or cough  Use a tissue that covers the mouth and nose  Use the bend of our arm if a tissue is not available  Throw the tissue away in a trash can right away  Then wash your hands well with soap and water  Use hand  that contains alcohol if you cannot wash your hands  · Wash your hands often  You and everyone in your home must wash your hands throughout the day  Use soap and water  Use germ-killing gel if soap and water are not available  A person with nCoV should not touch his or her eyes or mouth without washing his or her hands first          · Do not share items  Do not share dishes, towels, or other items with anyone  Always wash items after a person who has nCoV uses them  · Clean surfaces often  Use household  or bleach diluted with water to clean counters, doorknobs, toilet seats, and other surfaces  · Do not handle live animals  The nCoV may be spread to animals, including pets  Until more is known, it is best for a person with nCoV not to touch, play with, or handle live animals  Follow up with your doctor as directed:  Write down your questions so you remember to ask them during your visits  © Copyright [address] Information is for End User's use only and may not be sold, redistributed or otherwise used for commercial purposes   All illustrations and images included in CareNotes® are the copyrighted property of A D A M , Inc  or [facility] Angelica Penny   The above information is an  only  It is not intended as medical advice for individual conditions or treatments  Talk to your doctor, nurse or pharmacist before following any medical regimen to see if it is safe and effective for you

## 2020-09-04 ENCOUNTER — TRANSITIONAL CARE MANAGEMENT (OUTPATIENT)
Dept: FAMILY MEDICINE CLINIC | Facility: CLINIC | Age: 65
End: 2020-09-04

## 2020-09-04 ENCOUNTER — APPOINTMENT (EMERGENCY)
Dept: RADIOLOGY | Facility: HOSPITAL | Age: 65
End: 2020-09-04
Payer: MEDICARE

## 2020-09-04 ENCOUNTER — OFFICE VISIT (OUTPATIENT)
Dept: FAMILY MEDICINE CLINIC | Facility: CLINIC | Age: 65
End: 2020-09-04
Payer: MEDICARE

## 2020-09-04 VITALS
TEMPERATURE: 99.4 F | DIASTOLIC BLOOD PRESSURE: 61 MMHG | OXYGEN SATURATION: 95 % | RESPIRATION RATE: 18 BRPM | HEART RATE: 62 BPM | SYSTOLIC BLOOD PRESSURE: 115 MMHG

## 2020-09-04 VITALS
TEMPERATURE: 97.4 F | OXYGEN SATURATION: 94 % | DIASTOLIC BLOOD PRESSURE: 80 MMHG | HEART RATE: 76 BPM | SYSTOLIC BLOOD PRESSURE: 122 MMHG | HEIGHT: 65 IN | WEIGHT: 264 LBS | BODY MASS INDEX: 43.99 KG/M2

## 2020-09-04 DIAGNOSIS — Z71.89 COMPLEX CARE COORDINATION: Primary | ICD-10-CM

## 2020-09-04 DIAGNOSIS — Z76.89 ENCOUNTER FOR SUPPORT AND COORDINATION OF TRANSITION OF CARE: ICD-10-CM

## 2020-09-04 DIAGNOSIS — Z12.39 BREAST CANCER SCREENING: Primary | ICD-10-CM

## 2020-09-04 LAB
ALBUMIN SERPL BCP-MCNC: 3.3 G/DL (ref 3.5–5)
ALP SERPL-CCNC: 67 U/L (ref 46–116)
ALT SERPL W P-5'-P-CCNC: 17 U/L (ref 12–78)
ANION GAP SERPL CALCULATED.3IONS-SCNC: 6 MMOL/L (ref 4–13)
AST SERPL W P-5'-P-CCNC: 20 U/L (ref 5–45)
BILIRUB SERPL-MCNC: 0.6 MG/DL (ref 0.2–1)
BUN SERPL-MCNC: 25 MG/DL (ref 5–25)
CALCIUM SERPL-MCNC: 9 MG/DL (ref 8.3–10.1)
CHLORIDE SERPL-SCNC: 98 MMOL/L (ref 100–108)
CO2 SERPL-SCNC: 33 MMOL/L (ref 21–32)
CREAT SERPL-MCNC: 1.19 MG/DL (ref 0.6–1.3)
GFR SERPL CREATININE-BSD FRML MDRD: 48 ML/MIN/1.73SQ M
GLUCOSE SERPL-MCNC: 153 MG/DL (ref 65–140)
GLUCOSE SERPL-MCNC: 167 MG/DL (ref 65–140)
MAGNESIUM SERPL-MCNC: 2 MG/DL (ref 1.6–2.6)
POTASSIUM SERPL-SCNC: 3.8 MMOL/L (ref 3.5–5.3)
PROT SERPL-MCNC: 7.2 G/DL (ref 6.4–8.2)
SODIUM SERPL-SCNC: 137 MMOL/L (ref 136–145)

## 2020-09-04 PROCEDURE — 71045 X-RAY EXAM CHEST 1 VIEW: CPT

## 2020-09-04 PROCEDURE — 99496 TRANSJ CARE MGMT HIGH F2F 7D: CPT | Performed by: FAMILY MEDICINE

## 2020-09-04 RX ORDER — OXYCODONE HYDROCHLORIDE AND ACETAMINOPHEN 5; 325 MG/1; MG/1
1 TABLET ORAL EVERY 8 HOURS PRN
Qty: 12 TABLET | Refills: 0 | Status: SHIPPED | OUTPATIENT
Start: 2020-09-04 | End: 2020-09-07

## 2020-09-04 NOTE — DISCHARGE INSTRUCTIONS
-apply lidoderm patch on the lower back  -apply ice on the area followed by warm compresses  -take the pain medications at home  -rest for a day, then get back into activity

## 2020-09-04 NOTE — PROGRESS NOTES
[address] Note  [facility]     Lucio Healy MRN: [id number] : 1955 Age: 59 y o  Assessment/Plan       1  Breast cancer screening    - Mammo screening bilateral w 3d & cad; Future    2  Encounter for support and coordination of transition of care  -patient following up for transition of care appointment status post TAVR  Patient has follow-up appointment with cardiothoracic surgeon and cardiologist with repeat lab work and echo  AVS reviewed with patient  Lucio Healy acknowledged understanding of treatment plan, all questions answered  Plan discussed with attending physician Dr Persaud Seen  Wu Render is a 59 y o  female presents for transition of care appointment  Patient was hospitalized from 2020 to 2020 for aortic valve replacement  Patient states that yesterday when she returned home she was ambulating with her walker and "1 of the wheels gave out" and patient tripped, rounded on right side, did not hit head  Patient states that it took her 20 minutes to crawl to the phone and call the squad  "The pain was so bad it took my breath away"  Chest x-ray revealed no acute cardiopulmonary disease  Patient reports that she does have chronic lumbar region pain however the pain experienced after the fall was more localized to thoracic region, described as "stabbing"  CT spine lumbar without contrast revealed Normal computed tomography of the lumbar spine  Patient states that she has not experienced any chest pain  Patient denies increased lower extremity swelling  Patient does not smoke  Patient offer she has a history of rectal cancer, follows with Dr Diana Lopez and Dr Katelyn Faulkner  Patient follows with gynecologist at [facility]  Patient follows with cardiologist Dr Kristy Foster, next appt 9/10/20, she has echo and blood work scheduled for that time    Patient offers about 6 months ago she did have hospital stay related to paroxysmal atrial fibrillation, started on amiodarone and [facility] course per discharge summary:    The patient was seen in consultation prior to this admission for evaluation of Aortic stenosis, Non-Rheumatic  Risks and benefits of transcatheter aortic valve replacement were discussed in detail, and patient was agreeable  Routine preoperative evaluation was completed and informed consent was obtained prior to admission      9/1: Elective admission for TF TAVR 26 mm Cristian 3 Ultra via right sided approach  Extubated in the OR and transferred to PACU supported with cardene at 15  Left DP pulse dopplerable, right DP pulse palpable  EKG reviewed, NSR  Start Metoprolol 25 mg BID  PM: C/o severe chronic pain, resumed home Oxy 10mg Q6 PRN  Remains on cardene 15 s/p Lopressor, PRN Hydralazine ordered       9/2: Weaned to room air  Cardene weaned off  D/C arterial line and del real catheter  Echo today  Add Torsemide, 20 mg PO QD  Transfer to [facility]        9/3: HR 57  Decrease Lopressor, 12 5 mg PO BID  Echo shows well seated valve without valvular or paravalvular leak  Fit for discharge home        The following portions of the patient's history were reviewed and updated as appropriate: allergies, current medications, past medical history, past social history, past surgical history and problem list     Past Medical History:   Diagnosis Date    Anemia     Arthritis     Cancer ([facility] )     rectal    Chronic lower back pain     Chronic pain disorder     back pain    Colon cancer ([facility] ) 2018    [facility]     Endometrial cancer [facility] ) 2018    GERD (gastroesophageal reflux disease)     History of chemotherapy     History of MRSA infection 0719/2016    Morbid obesity [facility]     Ovarian cancer ([facility] ) 2018    Rectal cancer ([facility]     Spinal stenosis     Systolic murmur     Unsteady gait     uses walker    Wears dentures     Wears glasses        Past Surgical History:   Procedure Laterality Date  ABDOMINAL PERINEAL BOWEL RESECTION W/ ILEOANAL POUCH N/A 2018    Procedure: LAPAROSCOPIC HAND ASSIST ABDOMINOPERINEAL RESECTION,  POSTERIOR VAGINECTOMY, OMENTECTOMY;  Surgeon: Oswald Payton MD;  [facility];  Service: Colorectal    ABDOMINAL SURGERY      abscess removed from abdomen and right thigh, a hole in thigh closed by plastic surgeon    ABSCESS DRAINAGE      abd, (R) leg, (L) leg     SECTION       SECTION      CYSTOSCOPY N/A 2018    Procedure: Annamarie Prader;  Surgeon: Esteban Allred MD;  [facility];  Service: Gynecology Oncology    ESOPHAGOGASTRODUODENOSCOPY N/A 2016    Procedure: ESOPHAGOGASTRODUODENOSCOPY (EGD); Surgeon: Umm Brasher MD;  [facility];  Service:     ESOPHAGOGASTRODUODENOSCOPY N/A 3/30/2016    Procedure: ESOPHAGOGASTRODUODENOSCOPY (EGD); Surgeon: Umm Brasher MD;  [facility]; Service:     EYE SURGERY      laser eye surgery    HYSTERECTOMY N/A 2018    Procedure: RADICAL HYSTERECTOMY TOTAL ABDOMINAL (ALEXANDRA)  BSO;  Surgeon: Esteban Allred MD;  [facility];  Service: Gynecology Oncology    JOINT REPLACEMENT Left 2017    hip    JOINT REPLACEMENT Right 2017    hip    OOPHORECTOMY Bilateral     DE COLONOSCOPY FLX DX W/COLLJ SPEC WHEN PFRMD N/A 3/9/2018    Procedure: COLONOSCOPY;  Surgeon: Rina Russ MD;  Location: [place] [address] GI LAB; Service: Gastroenterology    DE COLONOSCOPY FLX DX W/COLLJ [facility] WHEN PFRMD N/A 2018    Procedure: COLONOSCOPY;  Surgeon: Oswald Payton MD;  [facility]; Service: Colorectal    DE ECHO TRANSESOPHAG R-T 2D W/PRB IMG ACQUISJ I&R N/A 2020    Procedure: TRANSESOPHAGEAL ECHOCARDIOGRAM (THO); Surgeon: Paula Veras DO;  [facility];  Service: Cardiac Surgery    DE ESOPHAGOGASTRODUODENOSCOPY TRANSORAL DIAGNOSTIC N/A 2018    Procedure: ESOPHAGOGASTRODUODENOSCOPY (EGD);   Surgeon: Rina Russ MD;  [facility]; Service: Gastroenterology    ID LAP GASTRIC BYPASS/SOLEDAD-EN-Y N/A 7/18/2016    Procedure: BYPASS GASTRIC  SOLEDAD-EN-Y LAPAROSCOPIC;  Surgeon: Gwendolyn Green MD;  [facility];  Service: Bariatrics    ID LAP, SURG COLOSTOMY N/A 9/6/2018    Procedure: PERMANENT END COLOSTOMY;  Surgeon: Elaine Parham MD;  [facility];  Service: Colorectal    ID LAP, SURG PROCTECTOMY W COLOSTOMY N/A 9/6/2018    Procedure: PROCTECTOMY;  Surgeon: Elaine Parham MD;  [facility];  Service: Colorectal    ID REPLACE AORTIC VALVE OPENFEMORAL ARTERY APPROACH N/A 9/1/2020    Procedure: REPLACEMENT AORTIC VALVE TRANSCATHETER (TAVR) TRANSFEMORAL W/ 26MM WADDELL BARBARA S3 ULTRA VALVE(ACCESS ON RIGHT);   Surgeon: hSayne Celeste DO;  [facility];  Service: Cardiac Surgery    TUBAL LIGATION         Current Outpatient Medications   Medication Sig Dispense Refill    acetaminophen (TYLENOL) 650 mg CR tablet Take 1 tablet (650 mg total) by mouth every 8 (eight) hours as needed for mild pain (Patient not taking: Reported on 9/3/2020) 30 tablet 0    albuterol (2 5 mg/3 mL) 0 083 % nebulizer solution Take 1 vial (2 5 mg total) by nebulization every 4 (four) hours as needed for wheezing or shortness of breath 75 vial 1    amiodarone 200 mg tablet Take 1 tablet (200 mg total) by mouth daily with breakfast 90 tablet 3    anastrozole (ARIMIDEX) 1 mg tablet Take 1 tablet (1 mg total) by mouth daily 90 tablet 2    apixaban (ELIQUIS) 5 mg Take 1 tablet (5 mg total) by mouth 2 (two) times a day 180 tablet 2    aspirin 81 mg chewable tablet Chew 1 tablet (81 mg total) daily 30 tablet 2    BIOTIN PO Take 1 tablet by mouth daily      Calcium-Vitamin D 500-125 MG-UNIT TABS Take 1 tablet by mouth 2 (two) times a day        cyclobenzaprine (FLEXERIL) 5 mg tablet Take 1 tablet (5 mg total) by mouth 3 (three) times a day as needed for muscle spasms for up to 10 days Do not take before driving (Patient not taking: Reported on 8/21/2020) 30 tablet 0    ferrous sulfate 324 (65 Fe) mg Take 1 tablet (324 mg total) by mouth daily before breakfast 90 tablet 0    metoprolol tartrate (LOPRESSOR) 25 mg tablet Take 0 5 tablets (12 5 mg total) by mouth every 12 (twelve) hours 30 tablet 2    Multiple Vitamins-Minerals (BARIATRIC FUSION) CHEW Chew 1 tablet daily        omeprazole (PriLOSEC) 20 mg delayed release capsule TAKE 1 CAPSULE (20 MG TOTAL) BY MOUTH DAILY 90 capsule 0    oxyCODONE (ROXICODONE) 10 MG TABS Take 10 mg by mouth every 6 (six) hours as needed        oxyCODONE-acetaminophen (PERCOCET) 5-325 mg per tablet Take 1 tablet by mouth every 8 (eight) hours as needed for moderate pain for up to 3 daysMax Daily Amount: 3 tablets 12 tablet 0    potassium chloride (K-DUR,KLOR-CON) 10 mEq tablet Take 1 tablet (10 mEq total) by mouth daily for 5 days 5 tablet 2    rosuvastatin (CRESTOR) 5 mg tablet Take 1 tablet (5 mg total) by mouth daily (Patient taking differently: Take 5 mg by mouth daily after dinner ) 30 tablet 11    torsemide (DEMADEX) 10 mg tablet Take 1 tablet (10 mg total) by mouth daily for 5 days 5 tablet 2     No current facility-administered medications for this visit  Review of Systems     As noted in HPI    Objective      /80 (BP Location: Right arm, Patient Position: Sitting, Cuff Size: Adult)   Pulse 76   Temp (!) 97 4 °F (36 3 °C) (Tympanic)   Ht 5' 5" (1 651 m)   Wt 120 kg (264 lb)   SpO2 94%   BMI 43 93 kg/m²     Physical Exam  Vitals signs reviewed  Constitutional:       General: She is not in acute distress  Appearance: She is obese  She is not ill-appearing, toxic-appearing or diaphoretic  HENT:      Head: Normocephalic and atraumatic  Nose: Nose normal       Mouth/Throat:      Mouth: Mucous membranes are moist       Pharynx: Oropharynx is clear  Eyes:      Conjunctiva/sclera: Conjunctivae normal    Neck:      Musculoskeletal: Neck supple     Cardiovascular:      Rate and Rhythm: Normal rate and regular rhythm  Pulses: Normal pulses  Pulmonary:      Effort: Pulmonary effort is normal       Breath sounds: Normal breath sounds  Abdominal:      Palpations: Abdomen is soft  Skin:     General: Skin is warm and dry  Neurological:      Mental Status: She is alert and oriented to person, place, and time  Psychiatric:         Mood and Affect: Mood normal          Behavior: Behavior normal          Thought Content: Thought content normal          Judgment: Judgment normal              Some portions of this record may have been generated with voice recognition software  There may be translation, syntax, or grammatical errors  Occasional wrong word or "sound-a-like" substitutions may have occurred due to the inherent limitations of the voice recognition software  Read the chart carefully and recognize, using context, where substations may have occurred  If you have any questions, please contact the dictating provider for clarification or correction, as needed

## 2020-09-04 NOTE — ED PROVIDER NOTES
History  Chief Complaint   Patient presents with    Fall     Pt arrives via ambulance  Pt was sitting on walker, back tire broke off and patient fell onto floor  Pt c/o middle back pain that makes it hard to breathe at times  Pt denied head injury or LOC  Pt was d/c today from [facility] for heart valve replacement  58 y/o female presents with lower back pain after she fell from a walker did not hit her head, no nausea,vomiting, no neck pain or injury  Denies any abdominal pain, upper or lower extremity pain  Patient just got released from [facility] for a heart valve repair  On anticoagulation  No numbness,tingling weakness, no saddle anesthesia, no urinary incontinence, no urinary retention  No radiation of the pain, currently 7/10 sharp continuous nothing makes it better or worse  History provided by:  Patient   used: No        Prior to Admission Medications   Prescriptions Last Dose Informant Patient Reported? Taking?    BIOTIN PO More than a month at Unknown time Self Yes No   Sig: Take 1 tablet by mouth daily   Calcium-Vitamin D 500-125 MG-UNIT TABS Past Week at Unknown time Self Yes Yes   Sig: Take 1 tablet by mouth 2 (two) times a day     Multiple Vitamins-Minerals (BARIATRIC FUSION) CHEW Past Week at Unknown time Self Yes Yes   Sig: Chew 1 tablet daily     acetaminophen (TYLENOL) 650 mg CR tablet Not Taking at Unknown time  No No   Sig: Take 1 tablet (650 mg total) by mouth every 8 (eight) hours as needed for mild pain   Patient not taking: Reported on 9/3/2020   albuterol (2 5 mg/3 mL) 0 083 % nebulizer solution Past Week at Unknown time Self No Yes   Sig: Take 1 vial (2 5 mg total) by nebulization every 4 (four) hours as needed for wheezing or shortness of breath   amiodarone 200 mg tablet 9/3/2020 at Unknown time Self No Yes   Sig: Take 1 tablet (200 mg total) by mouth daily with breakfast   anastrozole (ARIMIDEX) 1 mg tablet 9/3/2020 at Unknown time Self No Yes   Sig: Take 1 tablet (1 mg total) by mouth daily   apixaban (ELIQUIS) 5 mg 9/3/2020 at Unknown time Self No Yes   Sig: Take 1 tablet (5 mg total) by mouth 2 (two) times a day   aspirin 81 mg chewable tablet 9/3/2020 at Unknown time  No Yes   Sig: Chew 1 tablet (81 mg total) daily   cyclobenzaprine (FLEXERIL) 5 mg tablet  Self No No   Sig: Take 1 tablet (5 mg total) by mouth 3 (three) times a day as needed for muscle spasms for up to 10 days Do not take before driving   Patient not taking: Reported on 8/21/2020   ferrous sulfate 324 (65 Fe) mg 9/3/2020 at Unknown time Self No Yes   Sig: Take 1 tablet (324 mg total) by mouth daily before breakfast   metoprolol tartrate (LOPRESSOR) 25 mg tablet 9/3/2020 at Unknown time  No Yes   Sig: Take 0 5 tablets (12 5 mg total) by mouth every 12 (twelve) hours   omeprazole (PriLOSEC) 20 mg delayed release capsule 9/3/2020 at Unknown time Self No Yes   Sig: TAKE 1 CAPSULE (20 MG TOTAL) BY MOUTH DAILY   oxyCODONE (ROXICODONE) 10 MG TABS 9/3/2020 at Unknown time Self Yes Yes   Sig: Take 10 mg by mouth every 6 (six) hours as needed     potassium chloride (K-DUR,KLOR-CON) 10 mEq tablet 9/3/2020 at Unknown time  No Yes   Sig: Take 1 tablet (10 mEq total) by mouth daily for 5 days   rosuvastatin (CRESTOR) 5 mg tablet 9/2/2020 at Unknown time Self No Yes   Sig: Take 1 tablet (5 mg total) by mouth daily   Patient taking differently: Take 5 mg by mouth daily after dinner    torsemide (DEMADEX) 10 mg tablet 9/3/2020 at Unknown time  No Yes   Sig: Take 1 tablet (10 mg total) by mouth daily for 5 days      Facility-Administered Medications: None       Past Medical History:   Diagnosis Date    Anemia     Arthritis     Cancer (HCC)     rectal    Chronic lower back pain     Chronic pain disorder     back pain    Colon cancer (HCC) 2018    Diabetes mellitus [facility] [address] [facility]     Endometrial cancer ([facility] [address] ) 2018    GERD (gastroesophageal reflux disease)     History of chemotherapy     History of MRSA infection 2016    [facility] obesity [facility] [address] [facility]     Ovarian cancer ([facility] [address] ) 2018    Rectal cancer ([facility] [address] [facility]     Spinal stenosis     Systolic murmur     Unsteady gait     uses walker    Wears dentures     Wears glasses        Past Surgical History:   Procedure Laterality Date    ABDOMINAL PERINEAL BOWEL RESECTION W/ ILEOANAL POUCH N/A 2018    Procedure: LAPAROSCOPIC HAND ASSIST ABDOMINOPERINEAL RESECTION,  POSTERIOR VAGINECTOMY, OMENTECTOMY;  Surgeon: Pierce Stone MD;  [facility];  Service: Colorectal    ABDOMINAL SURGERY      abscess removed from abdomen and right thigh, a hole in thigh closed by plastic surgeon    ABSCESS DRAINAGE      abd, (R) leg, (L) leg     SECTION       SECTION      CYSTOSCOPY N/A 2018    Procedure: CYSTOSCOPY;  Surgeon: Rinku Caputo MD;  [facility];  Service: Gynecology Oncology    ESOPHAGOGASTRODUODENOSCOPY N/A 2016    Procedure: ESOPHAGOGASTRODUODENOSCOPY (EGD); Surgeon: Twan James MD;  [facility];  Service:     ESOPHAGOGASTRODUODENOSCOPY N/A 3/30/2016    Procedure: ESOPHAGOGASTRODUODENOSCOPY (EGD); Surgeon: Twan James MD;  [facility]; Service:     EYE SURGERY      laser eye surgery    HYSTERECTOMY N/A 2018    Procedure: RADICAL HYSTERECTOMY TOTAL ABDOMINAL (ALEXANDRA)  BSO;  Surgeon: Rinku Caputo MD;  [facility];  Service: Gynecology Oncology    JOINT REPLACEMENT Left 2017    hip    JOINT REPLACEMENT Right 2017    hip    OOPHORECTOMY Bilateral     CT COLONOSCOPY FLX DX W/COLLJ SPEC WHEN PFRMD N/A 3/9/2018    Procedure: COLONOSCOPY;  Surgeon: Aleah Smith MD;  Location: [facility] GI LAB; Service: Gastroenterology    CT COLONOSCOPY FLX DX W/COLLJ [facility] WHEN PFRMD N/A 2018    Procedure: COLONOSCOPY;  Surgeon: Pierce Stone MD;  Location:  GI LAB;   Service: Colorectal    CT ECHO TRANSESOPHAG R-T 2D W/PRB IMG ACQUISJ I&R N/A 2020    Procedure: TRANSESOPHAGEAL ECHOCARDIOGRAM (THO); Surgeon: Kameron Titus DO;  [facility];  Service: Cardiac Surgery    [place] ESOPHAGOGASTRODUODENOSCOPY TRANSORAL DIAGNOSTIC N/A 2018    Procedure: ESOPHAGOGASTRODUODENOSCOPY (EGD); Surgeon: Brooklynn Bland MD;  Location: [facility]; Service: Gastroenterology    [place] LAP GASTRIC BYPASS/SOLEDAD-EN-Y N/A 2016    Procedure: BYPASS GASTRIC  SOLEDAD-EN-Y LAPAROSCOPIC;  Surgeon: Binu Rowan MD;  [facility];  Service: Bariatrics    [place] LAP, SURG COLOSTOMY N/A 2018    Procedure: PERMANENT END COLOSTOMY;  Surgeon: Samantha Edmondson MD;  [facility];  Service: Colorectal    [place] LAP, SURG PROCTECTOMY W COLOSTOMY N/A 2018    Procedure: PROCTECTOMY;  Surgeon: Samantha Edmondson MD;  [facility];  Service: Colorectal    [place] REPLACE AORTIC VALVE OPENFEMORAL ARTERY APPROACH N/A 2020    Procedure: REPLACEMENT AORTIC VALVE TRANSCATHETER (TAVR) TRANSFEMORAL W/ 26MM WADDELL BARBARA S3 ULTRA VALVE(ACCESS ON RIGHT); Surgeon: Kameron Titus DO;  [facility];  Service: Cardiac Surgery    TUBAL LIGATION         Family History   Adopted: Yes   Problem Relation Age of Onset    Leukemia Mother     Heart disease Father     Coronary artery disease Father     Diabetes Father     No Known Problems Sister     No Known Problems Brother     Diabetes Son     No Known Problems Brother     No Known Problems Brother     No Known Problems Sister     No Known Problems Sister      I have reviewed and agree with the history as documented      E-Cigarette/Vaping    E-Cigarette Use Never User      E-Cigarette/Vaping Substances     Social History     Tobacco Use    Smoking status: Former Smoker     Packs/day: 1 00     Years: 25 00     Pack years: 25 00     Last attempt to quit: 3/30/2006     Years since quittin 4    Smokeless tobacco: Never Used   Substance Use Topics    Alcohol use: Not Currently    Drug use: Not Currently     Types: Oxycodone Comment: Percocet for lower back pain prn       Review of Systems   Constitutional: Negative  HENT: Negative  Eyes: Negative  Respiratory: Negative  Cardiovascular: Negative  Gastrointestinal: Negative  Endocrine: Negative  Genitourinary: Negative  Musculoskeletal: Positive for back pain  Skin: Negative  Allergic/Immunologic: Negative  Neurological: Negative  Hematological: Negative  Psychiatric/Behavioral: Negative  All other systems reviewed and are negative  Physical Exam  Physical Exam  Vitals signs and nursing note reviewed  Constitutional:       Appearance: She is well-developed  HENT:      Head: Normocephalic and atraumatic  Eyes:      Pupils: Pupils are equal, round, and reactive to light  Neck:      Musculoskeletal: Normal range of motion and neck supple  Cardiovascular:      Rate and Rhythm: Normal rate and regular rhythm  Pulmonary:      Effort: Pulmonary effort is normal       Breath sounds: Normal breath sounds  Abdominal:      General: Bowel sounds are normal       Palpations: Abdomen is soft  Musculoskeletal: Normal range of motion  Comments: Lumbar spine tenderness noted, no step offs, diffuse paravertebral tenderness   Skin:     General: Skin is warm and dry  Neurological:      Mental Status: She is alert and oriented to person, place, and time           Vital Signs  ED Triage Vitals [09/03/20 2041]   Temperature Pulse Respirations Blood Pressure SpO2   99 4 °F (37 4 °C) 64 18 136/71 97 %      Temp Source Heart Rate Source Patient Position - Orthostatic VS BP Location FiO2 (%)   Tympanic Monitor Lying Right arm --      Pain Score       6           Vitals:    09/03/20 2041 09/04/20 0000 09/04/20 0015 09/04/20 0045   BP: 136/71 124/61 123/73 115/61   Pulse: 64 62     Patient Position - Orthostatic VS: Lying            Visual Acuity      ED Medications  Medications   HYDROmorphone (DILAUDID) injection 1 mg (1 mg Intramuscular Given 9/3/20 2102)   HYDROmorphone (DILAUDID) injection 1 mg (1 mg Intravenous Given 9/3/20 2354)       Diagnostic Studies  Results Reviewed     Procedure Component Value Units Date/Time    Comprehensive metabolic panel [[id number]]  (Abnormal) Collected:  09/03/20 2352    Lab Status:  Final result Specimen:  Blood from Arm, Right Updated:  09/04/20 0016     Sodium 137 mmol/L      Potassium 3 8 mmol/L      Chloride 98 mmol/L      CO2 33 mmol/L      ANION GAP 6 mmol/L      BUN 25 mg/dL      Creatinine 1 19 mg/dL      Glucose 167 mg/dL      Calcium 9 0 mg/dL      AST 20 U/L      ALT 17 U/L      Alkaline Phosphatase 67 U/L      Total Protein 7 2 g/dL      Albumin 3 3 g/dL      Total Bilirubin 0 60 mg/dL      eGFR 48 ml/min/1 73sq m     Narrative:       National Kidney Disease Foundation guidelines for Chronic Kidney Disease (CKD):     Stage 1 with normal or high GFR (GFR > 90 mL/min/1 73 square meters)    Stage 2 Mild CKD (GFR = 60-89 mL/min/1 73 square meters)    Stage 3A Moderate CKD (GFR = 45-59 mL/min/1 73 square meters)    Stage 3B Moderate CKD (GFR = 30-44 mL/min/1 73 square meters)    Stage 4 Severe CKD (GFR = 15-29 mL/min/1 73 square meters)    Stage 5 End Stage CKD (GFR <15 mL/min/1 73 square meters)  Note: GFR calculation is accurate only with a steady state creatinine    Magnesium [[id number]]  (Normal) Collected:  09/03/20 2352    Lab Status:  Final result Specimen:  Blood from Arm, Right Updated:  09/04/20 0016     Magnesium 2 0 mg/dL     CBC and differential [[id number]]  (Abnormal) Collected:  09/03/20 2352    Lab Status:  Final result Specimen:  Blood from Arm, Right Updated:  09/03/20 2357     WBC 7 19 Thousand/uL      RBC 5 31 Million/uL      Hemoglobin 13 7 g/dL      Hematocrit 44 6 %      MCV 84 fL      MCH 25 8 pg      MCHC 30 7 g/dL      RDW 14 9 %      MPV 9 8 fL      Platelets 483 Thousands/uL      nRBC 0 /100 WBCs      Neutrophils Relative 81 %      Immat GRANS % 1 %      Lymphocytes Relative 9 % Monocytes Relative 9 %      Eosinophils Relative 0 %      Basophils Relative 0 %      Neutrophils Absolute 5 80 Thousands/µL      Immature Grans Absolute 0 07 Thousand/uL      Lymphocytes Absolute 0 66 Thousands/µL      Monocytes Absolute 0 61 Thousand/µL      Eosinophils Absolute 0 03 Thousand/µL      Basophils Absolute 0 02 Thousands/µL                  CT spine lumbar without contrast   Final Result by Jesse Burns MD (09/03 2316)      Normal computed tomography of the lumbar spine  Workstation performed: [id number]         XR chest 1 view portable    (Results Pending)              Procedures  Procedures         ED Course                                             MDM  Number of Diagnoses or Management Options  Back pain:   Fall, initial encounter:      Amount and/or Complexity of Data Reviewed  Clinical lab tests: reviewed and ordered  Tests in the radiology section of CPT®: reviewed and ordered  Tests in the medicine section of CPT®: ordered and reviewed    Patient Progress  Patient progress: stable        Disposition  Final diagnoses:   Fall, initial encounter   Back pain     Time reflects when diagnosis was documented in both MDM as applicable and the Disposition within this note     Time User Action Codes Description Comment    9/3/2020 11:26 PM AnepuCrys Add [E10  GHUK] Fall, initial encounter     9/3/2020 11:26 PM AnepuJorge Add [M54 9] Back pain       ED Disposition     ED Disposition Condition Date/Time Comment    Discharge Stable Thu Sep 3, 2020 11:25 PM [address] discharge to home/self care              Follow-up Information     Follow up With Specialties Details Why Contact Info Additional Information    [address] [facility] Emergency Medicine  If symptoms worsen [address] [facility]  [phone] [facility] ED, [facility], [address]          Discharge Medication List as of 9/3/2020 11:31 PM      CONTINUE these medications which have NOT CHANGED    Details   acetaminophen (TYLENOL) 650 mg CR tablet Take 1 tablet (650 mg total) by mouth every 8 (eight) hours as needed for mild pain, Starting Thu 9/3/2020, Until Sat 10/3/2020, Normal      albuterol (2 5 mg/3 mL) 0 083 % nebulizer solution Take 1 vial (2 5 mg total) by nebulization every 4 (four) hours as needed for wheezing or shortness of breath, Starting Thu 8/13/2020, Normal      amiodarone 200 mg tablet Take 1 tablet (200 mg total) by mouth daily with breakfast, Starting Wed 3/11/2020, Normal      anastrozole (ARIMIDEX) 1 mg tablet Take 1 tablet (1 mg total) by mouth daily, Starting Thu 12/12/2019, Normal      apixaban (ELIQUIS) 5 mg Take 1 tablet (5 mg total) by mouth 2 (two) times a day, Starting Wed 3/11/2020, Normal      aspirin 81 mg chewable tablet Chew 1 tablet (81 mg total) daily, Starting Thu 9/3/2020, Normal      BIOTIN PO Take 1 tablet by mouth daily, Historical Med      Calcium-Vitamin D 500-125 MG-UNIT TABS Take 1 tablet by mouth 2 (two) times a day  , Historical Med      cyclobenzaprine (FLEXERIL) 5 mg tablet Take 1 tablet (5 mg total) by mouth 3 (three) times a day as needed for muscle spasms for up to 10 days Do not take before driving, Starting Fri 5/8/2020, Until Thu 7/30/2020, Normal      ferrous sulfate 324 (65 Fe) mg Take 1 tablet (324 mg total) by mouth daily before breakfast, Starting Mon 11/5/2018, Normal      metoprolol tartrate (LOPRESSOR) 25 mg tablet Take 0 5 tablets (12 5 mg total) by mouth every 12 (twelve) hours, Starting Thu 9/3/2020, Normal      Multiple Vitamins-Minerals (BARIATRIC FUSION) CHEW Chew 1 tablet daily  , Historical Med      omeprazole (PriLOSEC) 20 mg delayed release capsule TAKE 1 CAPSULE (20 MG TOTAL) BY MOUTH DAILY, Starting Thu 7/16/2020, Normal      oxyCODONE (ROXICODONE) 10 MG TABS Take 10 mg by mouth every 6 (six) hours as needed  , Starting Sat 9/15/2018, Historical Med      potassium chloride (K-DUR,KLOR-CON) 10 mEq tablet Take 1 tablet (10 mEq total) by mouth daily for 5 days, Starting Thu 9/3/2020, Until Tue 9/8/2020, Normal      rosuvastatin (CRESTOR) 5 mg tablet Take 1 tablet (5 mg total) by mouth daily, Starting Mon 2/3/2020, Normal      torsemide (DEMADEX) 10 mg tablet Take 1 tablet (10 mg total) by mouth daily for 5 days, Starting Thu 9/3/2020, Until Tue 9/8/2020, Normal           No discharge procedures on file      PDMP Review     None          ED Provider  Electronically Signed by           Ayla Dinero DO  09/04/20 0145

## 2020-09-09 NOTE — PROGRESS NOTES
Hospital Follow Up   Office Visit Note  Gato Klein   59 y o    female   MRN: [id number]  [address]  [address]  [place] [address] [facility] [place] [address]  [phone]    PCP: Dc Flood DO  Cardiologist: Dr Zak Vazquez            Summary of recommendations  Low-sodium diet, Heart failure education as below  Finish diuretic and potassium, then DC  nonfasting BMP 1 week  Follow-up with CT surgery September 25th  Follow up will be scheduled with Dr Zak Vazquez 6-8 weeks         Assessment/plan  Aortic stenosis, status post transfemoral transcatheter aortic valve replacement, 9/1 admitted 9/1 -9/3/2020  On torsemide 10 mg daily and potassium 10 mEq daily-last dose tomorrow  Chronic diastolic heart failure, on metoprolol tartrate 12 5 Q 12, diuretic  Not previously on a diuretic  Low Na diet  The BB is new since her surgery  Coronary artery disease, nonobstructive by catheterization July 2020  On aspirin, statin, beta-blocker   Mitral stenosis, mild  Paroxysmal atrial fibrillation on anticoagulation with Eliquis, dx feb 2020  On amiodarone 200 mg daily, and low-dose metoprolol tartrate  EKG September 2 showed sinus bradycardia 57 beats per minute with first-degree AV block  Today, SB  Hypertension, essential   /56 metoprolol tartrate 12 5 Q 12  Currently loop diuretic  Diabetes mellitus type 2, diet controlled  Hemoglobin A1c 5 1, October 2019  Hyperlipidemia, on rosuvastatin 5 mg/d  Lipids February 2019:  , non   PAD, less than 50% ICA stenosis bilaterally  Carotid duplex March 2020  Obesity, status post gastric bypass 2016  Current BMI 43  History of endometrial, ovarian carcinoma and rectal CA,rectal and ovarian cancer status post proctectomy; permanent end colostomy; laparoscopic hand assist abdominoperineal resection, posterior vaginectomy, omentectomy, radical total abdominal hysterectomy on 9/6/2018  Cardiac testing  --TTE 9/2/2020  EF 60%  No RWMA  Mild mitral stenosis  KHADIJAH, L > R  There was a 26 mm TAVR bioprosthesis present exhibited normal function  There was no significant paravalvular leak  Mean gradient 10, peak gradient 18  Aortic valve area 1 8 centimeter squared by the continuity equation  Mild TR     --cardiac catheterization 7/27/20     --  The coronary circulation is right dominant  --  Left main: The vessel was large sized  Angiography showed mild atherosclerosis  --  LAD: The vessel was medium to large sized and moderately tortuous  Angiography showed moderate atherosclerosis  --  Circumflex: The vessel was medium to large sized  --  Ostial circumflex: There was a 50 % stenosis  --  Distal circumflex: There was a tubular 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  --  RCA: The vessel was large sized (dominant) and moderately calcified  --  Mid RCA: There was a 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              HPI Semaj Mena is a 59 y o  female  with severe aortic stenosis, status post recent, uncomplicated TAVR September 1st, 2020  Discharge home September 3rd She has nonobstructive CAD, by pre TAVR angiography, mild mitral stenosis,  peripheral vascular disease and hyperlipidemia  Her LV function has been preserved  On February 2020 he was she was hospitalized and found to have AFib  She was placed on [address] [place] [address] Road with Eliquis and amiodarone  She has morbid obesity; status post gastric bypass in 2016  She has ovarian, uterine and rectal CA  She has had adjuvant chemotherapy  She is a former tobacco user  She follows with cardiologist Dr Ethel Singleton  Interval history  ED visit, [facility] 09/03 day of discharge, after a fall, taken by ambulance  Reportedly was sitting on her walker when the back tire broke off and she fell onto the floor  She complained of back pain  No head injury or loss of consciousness  Chest x-ray and CT of the lumbar spine unremarkable  After evaluation, discharged home on pain medicine  Did see her PCP in follow-up    Repeat chest x-ray September 4, stable    9/10/2020  Hospital follow-up  She still has some back discomfort from her fall  She is using a walker  She has taking a p r n  pain pill from her pain management specialist   From a cardiac standpoint she is doing well  No chest pain, no shortness of breath  She finishes up the diuretic tomorrow  On exam, she has diminished breath sounds  Heart tones are regular  Her EKG shows sinus bradycardia, 55 beats per minute  Systolic blood pressure is 106  She does have +1 bilateral lower extremity edema  She was not previously on a diuretic  Per she prefers not to be  She does have some gait dysfunction and uses a walker or a cane for gait assistance  I recommended she avoid dietary sodium  She has not necessarily been doing this  He there is some room for improvement  She get a BMP next week  She will return to see her cardiologist in about 6-8 weeks  Assessment  Diagnoses and all orders for this visit:    Hospital discharge follow-up    S/P TAVR (transcatheter aortic valve replacement)    Hyperlipidemia, unspecified hyperlipidemia type    Essential hypertension    Mitral valve stenosis, unspecified etiology          Past Medical History:   Diagnosis Date    Anemia     Arthritis     Cancer ([place] [address] )     rectal    Chronic lower back pain     Chronic pain disorder     back pain    Colon cancer ([place] [address] ) 2018    Diabetes mellitus ([place] [address] )     Endometrial cancer ([place] [address] ) 2018    GERD (gastroesophageal reflux disease)     History of chemotherapy     History of MRSA infection 0719/2016    Morbid obesity ([facility] )     Ovarian cancer ([place] [address] ) 2018    Rectal cancer ([place] [address] )     Spinal stenosis     Systolic murmur     Unsteady gait     uses walker    Wears dentures     Wears glasses        Review of Systems   Constitution: Negative for chills     Cardiovascular: Negative for chest pain, claudication, cyanosis, dyspnea on exertion, irregular heartbeat, leg swelling, near-syncope, orthopnea, palpitations, paroxysmal nocturnal dyspnea and syncope  Respiratory: Negative for cough and shortness of breath  Musculoskeletal: Positive for back pain  Gastrointestinal: Negative for heartburn and nausea  Neurological: Negative for dizziness, focal weakness, headaches, light-headedness and weakness  All other systems reviewed and are negative  Allergies   Allergen Reactions    Tramadol Other (See Comments)     Dizzy             Current Outpatient Medications:     acetaminophen (TYLENOL) 650 mg CR tablet, Take 1 tablet (650 mg total) by mouth every 8 (eight) hours as needed for mild pain (Patient not taking: Reported on 9/3/2020), Disp: 30 tablet, Rfl: 0    albuterol (2 5 mg/3 mL) 0 083 % nebulizer solution, Take 1 vial (2 5 mg total) by nebulization every 4 (four) hours as needed for wheezing or shortness of breath, Disp: 75 vial, Rfl: 1    amiodarone 200 mg tablet, Take 1 tablet (200 mg total) by mouth daily with breakfast, Disp: 90 tablet, Rfl: 3    anastrozole (ARIMIDEX) 1 mg tablet, Take 1 tablet (1 mg total) by mouth daily, Disp: 90 tablet, Rfl: 2    apixaban (ELIQUIS) 5 mg, Take 1 tablet (5 mg total) by mouth 2 (two) times a day, Disp: 180 tablet, Rfl: 2    aspirin 81 mg chewable tablet, Chew 1 tablet (81 mg total) daily, Disp: 30 tablet, Rfl: 2    BIOTIN PO, Take 1 tablet by mouth daily, Disp: , Rfl:     Calcium-Vitamin D 500-125 MG-UNIT TABS, Take 1 tablet by mouth 2 (two) times a day  , Disp: , Rfl:     cyclobenzaprine (FLEXERIL) 5 mg tablet, Take 1 tablet (5 mg total) by mouth 3 (three) times a day as needed for muscle spasms for up to 10 days Do not take before driving (Patient not taking: Reported on 8/21/2020), Disp: 30 tablet, Rfl: 0    ferrous sulfate 324 (65 Fe) mg, Take 1 tablet (324 mg total) by mouth daily before breakfast, Disp: 90 tablet, Rfl: 0    metoprolol tartrate (LOPRESSOR) 25 mg tablet, Take 0 5 tablets (12 5 mg total) by mouth every 12 (twelve) hours, Disp: 30 tablet, Rfl: 2    Multiple Vitamins-Minerals (BARIATRIC FUSION) CHEW, Chew 1 tablet daily  , Disp: , Rfl:     omeprazole (PriLOSEC) 20 mg delayed release capsule, TAKE 1 CAPSULE (20 MG TOTAL) BY MOUTH DAILY, Disp: 90 capsule, Rfl: 0    oxyCODONE (ROXICODONE) 10 MG TABS, Take 10 mg by mouth every 6 (six) hours as needed  , Disp: , Rfl:     potassium chloride (K-DUR,KLOR-CON) 10 mEq tablet, Take 1 tablet (10 mEq total) by mouth daily for 5 days, Disp: 5 tablet, Rfl: 2    rosuvastatin (CRESTOR) 5 mg tablet, Take 1 tablet (5 mg total) by mouth daily (Patient taking differently: Take 5 mg by mouth daily after dinner ), Disp: 30 tablet, Rfl: 11    torsemide (DEMADEX) 10 mg tablet, Take 1 tablet (10 mg total) by mouth daily for 5 days, Disp: 5 tablet, Rfl: 2        Social History     Socioeconomic History    Marital status: Single     Spouse name: Not on file    Number of children: 2    Years of education: Not on file    Highest education level: Not on file   Occupational History    Not on file   Social Needs    Financial resource strain: Not on file    Food insecurity     Worry: Not on file     Inability: Not on file   [employer] [facility] needs     Medical: Not on file     Non-medical: Not on file   Tobacco Use    Smoking status: Former Smoker     Packs/day: 1 00     Years: 25 00     Pack years: 25 00     Last attempt to quit: 3/30/2006     Years since quittin 4    Smokeless tobacco: Never Used   Substance and Sexual Activity    Alcohol use: Not Currently    Drug use: Not Currently     Types: Oxycodone     Comment: Percocet for lower back pain prn    Sexual activity: Not Currently   Lifestyle    Physical activity     Days per week: Not on file     Minutes per session: Not on file    Stress: Not on file   Relationships    Social connections     Talks on phone: Not on file     Gets together: Not on file     Attends Oriental orthodox service: Not on file     Active member of club or organization: Not on file     Attends meetings of clubs or organizations: Not on file     Relationship status: Not on file    Intimate partner violence     Fear of current or ex partner: Not on file     Emotionally abused: Not on file     Physically abused: Not on file     Forced sexual activity: Not on file   Other Topics Concern    Not on file   Social History Narrative    Not on file       Family History   Adopted: Yes   Problem Relation Age of Onset    Leukemia Mother     Heart disease Father     Coronary artery disease Father     Diabetes Father     No Known Problems Sister     No Known Problems Brother     Diabetes Son     No Known Problems Brother     No Known Problems Brother     No Known Problems Sister     No Known Problems Sister        Physical Exam   Constitutional: She is oriented to person, place, and time  Vital signs are normal  No distress  Using walker for gait assistance   HENT:   Head: Normocephalic and atraumatic  Eyes: Conjunctivae and EOM are normal    Neck: Normal range of motion  Neck supple  Cardiovascular: Normal rate, regular rhythm, S1 normal, S2 normal, normal heart sounds and intact distal pulses  Pulmonary/Chest: Effort normal  She has decreased breath sounds  Abdominal: Soft  Bowel sounds are normal    Musculoskeletal: Normal range of motion  General: Edema present  Neurological: She is alert and oriented to person, place, and time  Skin: Skin is warm and dry  She is not diaphoretic  Psychiatric: She has a normal mood and affect  Nursing note and vitals reviewed  Vitals: not currently breastfeeding     Wt Readings from Last 3 Encounters:   09/04/20 120 kg (264 lb)   09/03/20 124 kg (272 lb 14 9 oz)   08/21/20 123 kg (270 lb 3 2 oz)         Labs & Results:  Lab Results   Component Value Date    WBC 7 19 09/03/2020    HGB 13 7 09/03/2020    HCT 44 6 09/03/2020    MCV 84 09/03/2020     (L) 09/03/2020     No results found for: BNP  No components found for: CHEM  Troponin I   Date Value Ref Range Status   04/06/2020 <0 02 <=0 04 ng/mL Final     Comment:     Autovalidation override  Siemens Chemistry analyzer 99% cutoff is > 0 04 ng/mL in network labs     o cTnI 99% cutoff is useful only when applied to patients in the clinical setting of myocardial ischemia   o cTnI 99% cutoff should be interpreted in the context of clinical history, ECG findings and possibly cardiac imaging to establish correct diagnosis  o cTnI 99% cutoff may be suggestive but clearly not indicative of a coronary event without the clinical setting of myocardial ischemia      02/24/2020 <0 02 <=0 04 ng/mL Final     Comment:     Autovalidation override  Siemens Chemistry analyzer 99% cutoff is > 0 04 ng/mL in network labs     o cTnI 99% cutoff is useful only when applied to patients in the clinical setting of myocardial ischemia   o cTnI 99% cutoff should be interpreted in the context of clinical history, ECG findings and possibly cardiac imaging to establish correct diagnosis  o cTnI 99% cutoff may be suggestive but clearly not indicative of a coronary event without the clinical setting of myocardial ischemia  10/13/2019 <0 02 <=0 04 ng/mL Final     Comment:     Autovalidation override  Siemens Chemistry analyzer 99% cutoff is > 0 04 ng/mL in network labs     o cTnI 99% cutoff is useful only when applied to patients in the clinical setting of myocardial ischemia   o cTnI 99% cutoff should be interpreted in the context of clinical history, ECG findings and possibly cardiac imaging to establish correct diagnosis     o cTnI 99% cutoff may be suggestive but clearly not indicative of a coronary event without the clinical setting of myocardial ischemia      03/26/2014 <0 04 0 00 - 0 04 ng/mL Final     Comment:     The above 1 analytes were performed by Ricci Billings [id number]       Results for orders placed during the hospital encounter of 09/01/20   Echo complete with contrast if indicated    Narrative [address]  [facility], [address] [facility]  [phone]    Transthoracic Echocardiogram  2D, M-mode, Doppler, and Color Doppler    Study date:  02-Sep-2020    Patient: Lanre Friedman  MR number: [id number]  Account number: [de-identified]  : 1955  Age: 59 years  Gender: Female  Status: Inpatient  Location: Bedside  Height: 65 in  Weight: 268 lb  BP: 131/ 54 mmHg    Indications: 1 day s/p 26 mm TAVR    Diagnoses: I35 9 - Nonrheumatic aortic valve disorder, unspecified    Sonographer:  Ugo Andrade RDCS  Primary Physician:  Castro Silva DO  Referring Physician:  Elissa Sethi DO  Group:  [facility]  Sonographer:  MAEVE John  Sonographer:  SAMUEL Mccoy, CS  Interpreting Physician:  Domonique Beltran MD    SUMMARY    LEFT VENTRICLE:  Systolic function was normal  Ejection fraction was estimated to be 60 %  There were no regional wall motion abnormalities  Wall thickness was mildly to moderately increased  Doppler parameters were consistent with abnormal left ventricular relaxation (grade 1 diastolic dysfunction)  LEFT ATRIUM:  The atrium was moderately dilated  RIGHT ATRIUM:  The atrium was dilated  MITRAL VALVE:  There was marked annular calcification  There was mild stenosis  AORTIC VALVE:  A 26mm ES 3 ULTRA TAVR bioprosthesis was present  It exhibited normal function  No valvular or paravalvular leak  Valve peak gradient was 18 mmHg  Valve mean gradient was 10 mmHg  Aortic valve area was 1 8 cmï¾² by the continuity equation  TRICUSPID VALVE:  There was mild regurgitation  HISTORY: PRIOR HISTORY: 26 mm TAVR, Obesity, Afib, DM, Mitral stenosis    PROCEDURE: The procedure was performed at the bedside  This was a routine study  The transthoracic approach was used  The study included complete 2D imaging, M-mode, complete spectral Doppler, and color Doppler   The heart rate was 52 bpm,  at the start of the study  Images were obtained from the parasternal, apical, subcostal, and suprasternal notch acoustic windows  Echocardiographic views were limited due to poor acoustic window availability and decreased penetration  This  was a technically difficult study  LEFT VENTRICLE: Size was normal  Systolic function was normal  Ejection fraction was estimated to be 60 %  There were no regional wall motion abnormalities  Wall thickness was mildly to moderately increased  DOPPLER: Doppler parameters  were consistent with abnormal left ventricular relaxation (grade 1 diastolic dysfunction)  RIGHT VENTRICLE: The size was normal  Systolic function was normal  Wall thickness was normal     LEFT ATRIUM: The atrium was moderately dilated  RIGHT ATRIUM: The atrium was dilated  MITRAL VALVE: There was marked annular calcification  There was normal leaflet separation  DOPPLER: The transmitral velocity was within the normal range  There was mild stenosis  There was no significant regurgitation  AORTIC VALVE: A 26mm ES 3 ULTRA TAVR bioprosthesis was present  It exhibited normal function  No valvular or paravalvular leak  TRICUSPID VALVE: The valve structure was normal  There was normal leaflet separation  DOPPLER: The transtricuspid velocity was within the normal range  There was no evidence for stenosis  There was mild regurgitation  PULMONIC VALVE: Leaflets exhibited normal thickness, no calcification, and normal cuspal separation  DOPPLER: The transpulmonic velocity was within the normal range  There was no significant regurgitation  PERICARDIUM: There was no pericardial effusion  The pericardium was normal in appearance  AORTA: The root exhibited normal size  SYSTEMIC VEINS: IVC: The inferior vena cava was normal in size      MEASUREMENT TABLES    DOPPLER MEASUREMENTS  Aortic valve   (Reference normals)  Peak gradient   18 mmHg   (--)  Mean gradient   10 mmHg   (--)  Valve area, cont   1 8 cmï¾²   (--)    SYSTEM MEASUREMENT TABLES    2D mode  Left Atrium Victorino-posterior Systolic Dimension; User chosen value; 2D mode;: 4 7 cm  EDV (2D-Cubed): 119 cm3  EF (2D-Cubed): 64 3 %  ESV (2D-Cubed): 42 5 cm3  FS (2D-Cubed): 29 1 %  FS (2D-Teich): 29 1 %  IVS/LVPW (2D): 0 95  Interventricular Septum Diastolic Thickness; User chosen value; 2D mode;: 1 26 cm  LVOT Area (2D): 314 mm2  Left Ventricle Internal End Diastolic Dimension; User chosen value; 2D mode;: 4 92 cm  Left Ventricle Internal Systolic Dimension; User chosen value; 2D mode;: 3 49 cm  Left Ventricle Posterior Wall Diastolic Thickness; User chosen value; 2D mode;: 1 33 cm  Left Ventricular Ejection Fraction; Teichholz; 2D mode;: 55 7 %  Left Ventricular End Diastolic Volume; Teichholz; 2D mode;: 114 cm3  Left Ventricular End Systolic Volume; Teichholz; 2D mode;: 50 5 cm3  SV (2D-Cubed): 76 5 cm3  Stroke Volume; Teichholz; 2D mode;: 63 5 cm3    Apical four chamber  Left Atrium MOD Diam; Most recent value chosen; End Systole; Apical four chamber;: 3 61 cm  Left Atrium MOD Diam; Most recent value chosen; End Systole; Apical four chamber;: 2 49 cm  Left Atrium MOD Diam; Most recent value chosen; End Systole; Apical four chamber;: 4 36 cm  Left Atrium MOD Diam; Most recent value chosen; End Systole; Apical four chamber;: 4 76 cm  Left Atrium MOD Diam; Most recent value chosen; End Systole; Apical four chamber;: 5 02 cm  Left Atrium MOD Diam; Most recent value chosen; End Systole; Apical four chamber;: 5 54 cm  Left Atrium MOD Diam; Most recent value chosen; End Systole; Apical four chamber;: 5 66 cm  Left Atrium MOD Diam; Most recent value chosen; End Systole; Apical four chamber;: 5 73 cm  Left Atrium MOD Diam; Most recent value chosen; End Systole; Apical four chamber;: 5 84 cm  Left Atrium MOD Diam; Most recent value chosen; End Systole; Apical four chamber;: 6 07 cm  Left Atrium MOD Diam; Most recent value chosen; End Systole;  Apical four chamber;: 6 14 cm  Left Atrium MOD Diam; Most recent value chosen; End Systole; Apical four chamber;: 6 14 cm  Left Atrium MOD Diam; Most recent value chosen; End Systole; Apical four chamber;: 6 1 cm  Left Atrium MOD Diam; Most recent value chosen; End Systole; Apical four chamber;: 5 84 cm  Left Atrium MOD Diam; Most recent value chosen; End Systole; Apical four chamber;: 5 51 cm  Left Atrium MOD Diam; Most recent value chosen; End Systole; Apical four chamber;: 5 17 cm  Left Atrium MOD Diam; Most recent value chosen; End Systole; Apical four chamber;: 4 76 cm  Left Atrium MOD Diam; Most recent value chosen; End Systole; Apical four chamber;: 3 65 cm  Left Atrium MOD Diam; Most recent value chosen; End Systole; Apical four chamber;: 2 08 cm  Left Atrium MOD Diam; Most recent value chosen; End Systole; Apical four chamber;: 5 32 cm  Left Atrium Systolic Area; Most recent value chosen; Method of Disks, Single Plane; 2D mode; Apical four chamber;: 3750 mm2  Left Atrium Systolic Volume; Most recent value chosen; Method of Disks, Single Plane; 2D mode; Apical four chamber;: 154 cm3  Left Atrium systolic major axis; Most recent value chosen; Method of Disks, Single Plane; 2D mode; Apical four chamber;: 7 48 cm    Unspecified Scan Mode  Mean Grad; Antegrade Flow: 10 mm[Hg]  Mean Grad; Mean value; Antegrade Flow: 10 mm[Hg]  Mean Noman; Antegrade Flow: 1520 mm/s  Mean Noman; Mean value; Antegrade Flow: 1520 mm/s  Peak Grad; Mean; Antegrade Flow: 18 mm[Hg]  VTI; Antegrade Flow: 48 3 cm  VTI; Mean; Antegrade Flow: 48 3 cm  Valve Area Peak Noman: 161 mm2  Valve Area VTI: 179 mm2  Vmax; Antegrade Flow: 2150 mm/s  Vmax; Mean;  Antegrade Flow: 2150 mm/s  LVOT CV Orifice Diam; Mean: 2 cm  LVOT Diam: 2 cm  LVOT Mean Grad: 3 mm[Hg]  LVOT Mean Grad; Mean value: 3 mm[Hg]  LVOT Mean Noman: 810 mm/s  LVOT Mean Noman; Mean value: 810 mm/s  LVOT Peak Grad; Mean: 5 mm[Hg]  LVOT VTI: 27 6 cm  LVOT VTI; Mean: 27 6 cm  LVOT Vmax: 1100 mm/s  LVOT Vmax; Mean: 1100 mm/s  SV (LVOT): 87 cm3  DT; Antegrade Flow: 489 ms  DT; Mean; Antegrade Flow: 489 ms  Dec Maverick; Antegrade Flow: 2390 mm/s2  Dec Maverick; Mean; Antegrade Flow: 2390 mm/s2  MV A Noman: 1190 mm/s  MV E Noman: 69 1 mm/s  MV E Noman: 1110 mm/s  MV E/A Ratio: 0 5  MV Peak A Noman: 1190 mm/s  MV Peak E Noman; Mean; Antegrade Flow: 590 mm/s  MVA (PHT): 130 mm2  Mean Grad; Antegrade Flow: 4 mm[Hg]  Mean Grad; Mean value; Antegrade Flow: 4 mm[Hg]  Mean Noman; Antegrade Flow: 912 mm/s  Mean Noman; Mean value; Antegrade Flow: 912 mm/s  PHT: 169 ms  PHT Peak Noman;: 1380 mm/s  PHT Peak Noman; Mean: 1380 mm/s  PHT; Mean: 169 ms  Peak Grad; Mean; Antegrade Flow: 8 mm[Hg]  VTI; Antegrade Flow: 66 4 cm  VTI; Mean; Antegrade Flow: 66 4 cm  Valve Area VTI: 131 mm2  Vmax; Antegrade Flow: 1440 mm/s  Vmax; Mean; Antegrade Flow: 1440 mm/s  Peak Grad; Mean; Regurgitant Flow: 18 mm[Hg]  Vmax; Mean; Regurgitant Flow: 2100 mm/s  Vmax; Regurgitant Flow: 2100 mm/s  Atrial [employer]; Most recent value chosen;: 2430 mm2  [facility]; Most recent value chosen;: 6 69 cm  Atrial Herzog Volume; Most recent value chosen;: 75 cm3  Distance;: 4 4 cm  Distance; Mean; Mean value chosen;: 4 4 cm  Distance; User chosen value;: 3 1 cm    [facility] Accredited [facility]ch[facility]    Prepared and electronically signed by    Phyllis Paz MD  Signed 02-Sep-2020 17:02:52       No results found for this or any previous visit  This note was completed in part utilizing [email or web address] direct voice recognition software  Grammatical errors, random word insertion, spelling mistakes, and incomplete sentences may be an occasional consequence of the system secondary to software limitations, ambient noise and hardware issues  At the time of dictation, efforts were made to edit, clarify and /or correct errors  Please read the chart carefully and recognize, using context, where substitutions have occurred    If you have any questions or concerns about the context, text or information contained within the body of this dictation, please contact myself, the provider, for further clarification

## 2020-09-10 ENCOUNTER — OFFICE VISIT (OUTPATIENT)
Dept: CARDIOLOGY CLINIC | Facility: CLINIC | Age: 65
End: 2020-09-10
Payer: MEDICARE

## 2020-09-10 ENCOUNTER — TELEPHONE (OUTPATIENT)
Dept: CARDIAC SURGERY | Facility: CLINIC | Age: 65
End: 2020-09-10

## 2020-09-10 VITALS
HEART RATE: 55 BPM | SYSTOLIC BLOOD PRESSURE: 106 MMHG | BODY MASS INDEX: 45.48 KG/M2 | OXYGEN SATURATION: 97 % | WEIGHT: 273 LBS | HEIGHT: 65 IN | DIASTOLIC BLOOD PRESSURE: 56 MMHG

## 2020-09-10 DIAGNOSIS — E78.5 HYPERLIPIDEMIA, UNSPECIFIED HYPERLIPIDEMIA TYPE: ICD-10-CM

## 2020-09-10 DIAGNOSIS — Z95.2 S/P TAVR (TRANSCATHETER AORTIC VALVE REPLACEMENT): ICD-10-CM

## 2020-09-10 DIAGNOSIS — Z09 HOSPITAL DISCHARGE FOLLOW-UP: Primary | ICD-10-CM

## 2020-09-10 DIAGNOSIS — I10 ESSENTIAL HYPERTENSION: ICD-10-CM

## 2020-09-10 DIAGNOSIS — I05.0 MITRAL VALVE STENOSIS, UNSPECIFIED ETIOLOGY: ICD-10-CM

## 2020-09-10 PROCEDURE — 93000 ELECTROCARDIOGRAM COMPLETE: CPT | Performed by: NURSE PRACTITIONER

## 2020-09-10 PROCEDURE — 99214 OFFICE O/P EST MOD 30 MIN: CPT | Performed by: NURSE PRACTITIONER

## 2020-09-10 NOTE — LETTER
September 10, 2020     Alexandria Roberts DO  [id number] [address] [id number]    Patient: Kimberli Enriquez   YOB: 1955   Date of Visit: 9/10/2020       Dear Dr Amaury Serrano:    Thank you for referring Bianca Bray to me for evaluation  Below are my notes for this consultation  If you have questions, please do not hesitate to call me  I look forward to following your patient along with you  Sincerely,        GEORGETTE Isidro        CC: DO Azalea Lee CRNP  9/10/2020  9:37 AM  Sign when [facility] Follow Up   Office Visit Note  Kimberli Enriquez   59 y o    female   MRN: [id number]  [address]  [place] [address] [facility] [address] [place] [address]  [phone]  [phone]    PCP: Alexandria Roberts DO  Cardiologist: Dr Angeles Martinez            Summary of recommendations  Low-sodium diet, Heart failure education as below  Finish diuretic and potassium, then DC  nonfasting BMP 1 week  Follow-up with CT surgery September 25th  Follow up will be scheduled with Dr Angeles Martinez 6-8 weeks         Assessment/plan  Aortic stenosis, status post transfemoral transcatheter aortic valve replacement, 9/1 admitted 9/1 -9/3/2020  On torsemide 10 mg daily and potassium 10 mEq daily-last dose tomorrow  Chronic diastolic heart failure, on metoprolol tartrate 12 5 Q 12, diuretic  Not previously on a diuretic  Low Na diet  The BB is new since her surgery  Coronary artery disease, nonobstructive by catheterization July 2020  On aspirin, statin, beta-blocker   Mitral stenosis, mild  Paroxysmal atrial fibrillation on anticoagulation with Eliquis, dx feb 2020  On amiodarone 200 mg daily, and low-dose metoprolol tartrate  EKG September 2 showed sinus bradycardia 57 beats per minute with first-degree AV block  Today, SB  Hypertension, essential   /56 metoprolol tartrate 12 5 Q 12  Currently loop diuretic  Diabetes mellitus type 2, diet controlled  Hemoglobin A1c 5 1, October 2019  Hyperlipidemia, on rosuvastatin 5 mg/d  Lipids February 2019:  , non   PAD, less than 50% ICA stenosis bilaterally  Carotid duplex March 2020  Obesity, status post gastric bypass 2016  Current BMI 43  History of endometrial, ovarian carcinoma and rectal CA,rectal and ovarian cancer status post proctectomy; permanent end colostomy; laparoscopic hand assist abdominoperineal resection, posterior vaginectomy, omentectomy, radical total abdominal hysterectomy on 9/6/2018  Cardiac testing  --TTE 9/2/2020  EF 60%  No RWMA  Mild mitral stenosis  KHADIJAH, L > R  There was a 26 mm TAVR bioprosthesis present exhibited normal function  There was no significant paravalvular leak  Mean gradient 10, peak gradient 18  Aortic valve area 1 8 centimeter squared by the continuity equation  Mild TR     --cardiac catheterization 7/27/20     --  The coronary circulation is right dominant  --  Left main: The vessel was large sized  Angiography showed mild atherosclerosis  --  LAD: The vessel was medium to large sized and moderately tortuous  Angiography showed moderate atherosclerosis  --  Circumflex: The vessel was medium to large sized  --  Ostial circumflex: There was a 50 % stenosis  --  Distal circumflex: There was a tubular 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  --  RCA: The vessel was large sized (dominant) and moderately calcified  --  Mid RCA: There was a 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              HPI Simone Paul is a 59 y o  female  with severe aortic stenosis, status post recent, uncomplicated TAVR September 1st, 2020  Discharge home September 3rd She has nonobstructive CAD, by pre TAVR angiography, mild mitral stenosis,  peripheral vascular disease and hyperlipidemia  Her LV function has been preserved  On February 2020 he was she was hospitalized and found to have AFib  She was placed on [address] [place] [address] Road with Eliquis and amiodarone  She has morbid obesity; status post gastric bypass in 2016    She has ovarian, uterine and rectal CA  She has had adjuvant chemotherapy  She is a former tobacco user  She follows with cardiologist Dr Arabella Pereyra  Interval history  ED visit, [address] 09/03 day of discharge, after a fall, taken by ambulance  Reportedly was sitting on her walker when the back tire broke off and she fell onto the floor  She complained of back pain  No head injury or loss of consciousness  Chest x-ray and CT of the lumbar spine unremarkable  After evaluation, discharged home on pain medicine  Did see her PCP in follow-up  Repeat chest x-ray  September 4, stable    9/10/2020  Hospital follow-up  She still has some back discomfort from her fall  She is using a walker  She has taking a p r n  pain pill from her pain management specialist   From a cardiac standpoint she is doing well  No chest pain, no shortness of breath  She finishes up the diuretic tomorrow  On exam, she has diminished breath sounds  Heart tones are regular  Her EKG shows sinus bradycardia, 55 beats per minute  Systolic blood pressure is 106  She does have +1 bilateral lower extremity edema  She was not previously on a diuretic  Per she prefers not to be  She does have some gait dysfunction and uses a walker or a cane for gait assistance  I recommended she avoid dietary sodium  She has not necessarily been doing this  He there is some room for improvement  She get a BMP next week  She will return to see her cardiologist in about 6-8 weeks          Assessment  Diagnoses and all orders for this visit:    Hospital discharge follow-up    S/P TAVR (transcatheter aortic valve replacement)    Hyperlipidemia, unspecified hyperlipidemia type    Essential hypertension    Mitral valve stenosis, unspecified etiology          Past Medical History:   Diagnosis Date    Anemia     Arthritis     Cancer ([facility] [address] )     rectal    Chronic lower back pain     Chronic pain disorder     back pain    Colon cancer ([facility] [address] ) 2018    Diabetes mellitus ([place] [address] )     Endometrial cancer ([place] [address] ) 2018    GERD (gastroesophageal reflux disease)     History of chemotherapy     History of MRSA infection 0719/2016    Morbid obesity ([place] [address] )     Ovarian cancer ([place] [address] ) 2018    Rectal cancer ([place] [address] )     Spinal stenosis     Systolic murmur     Unsteady gait     uses walker    Wears dentures     Wears glasses        Review of Systems   Constitution: Negative for chills  Cardiovascular: Negative for chest pain, claudication, cyanosis, dyspnea on exertion, irregular heartbeat, leg swelling, near-syncope, orthopnea, palpitations, paroxysmal nocturnal dyspnea and syncope  Respiratory: Negative for cough and shortness of breath  Musculoskeletal: Positive for back pain  Gastrointestinal: Negative for heartburn and nausea  Neurological: Negative for dizziness, focal weakness, headaches, light-headedness and weakness  All other systems reviewed and are negative  Allergies   Allergen Reactions    Tramadol Other (See Comments)     Dizzy             Current Outpatient Medications:     acetaminophen (TYLENOL) 650 mg CR tablet, Take 1 tablet (650 mg total) by mouth every 8 (eight) hours as needed for mild pain (Patient not taking: Reported on 9/3/2020), Disp: 30 tablet, Rfl: 0    albuterol (2 5 mg/3 mL) 0 083 % nebulizer solution, Take 1 vial (2 5 mg total) by nebulization every 4 (four) hours as needed for wheezing or shortness of breath, Disp: 75 vial, Rfl: 1    amiodarone 200 mg tablet, Take 1 tablet (200 mg total) by mouth daily with breakfast, Disp: 90 tablet, Rfl: 3    anastrozole (ARIMIDEX) 1 mg tablet, Take 1 tablet (1 mg total) by mouth daily, Disp: 90 tablet, Rfl: 2    apixaban (ELIQUIS) 5 mg, Take 1 tablet (5 mg total) by mouth 2 (two) times a day, Disp: 180 tablet, Rfl: 2    aspirin 81 mg chewable tablet, Chew 1 tablet (81 mg total) daily, Disp: 30 tablet, Rfl: 2    BIOTIN PO, Take 1 tablet by mouth daily, Disp: , Rfl:     Calcium-Vitamin D 500-125 MG-UNIT TABS, Take 1 tablet by mouth 2 (two) times a day  , Disp: , Rfl:     cyclobenzaprine (FLEXERIL) 5 mg tablet, Take 1 tablet (5 mg total) by mouth 3 (three) times a day as needed for muscle spasms for up to 10 days Do not take before driving (Patient not taking: Reported on 8/21/2020), Disp: 30 tablet, Rfl: 0    ferrous sulfate 324 (65 Fe) mg, Take 1 tablet (324 mg total) by mouth daily before breakfast, Disp: 90 tablet, Rfl: 0    metoprolol tartrate (LOPRESSOR) 25 mg tablet, Take 0 5 tablets (12 5 mg total) by mouth every 12 (twelve) hours, Disp: 30 tablet, Rfl: 2    Multiple Vitamins-Minerals (BARIATRIC FUSION) CHEW, Chew 1 tablet daily  , Disp: , Rfl:     omeprazole (PriLOSEC) 20 mg delayed release capsule, TAKE 1 CAPSULE (20 MG TOTAL) BY MOUTH DAILY, Disp: 90 capsule, Rfl: 0    oxyCODONE (ROXICODONE) 10 MG TABS, Take 10 mg by mouth every 6 (six) hours as needed  , Disp: , Rfl:     potassium chloride (K-DUR,KLOR-CON) 10 mEq tablet, Take 1 tablet (10 mEq total) by mouth daily for 5 days, Disp: 5 tablet, Rfl: 2    rosuvastatin (CRESTOR) 5 mg tablet, Take 1 tablet (5 mg total) by mouth daily (Patient taking differently: Take 5 mg by mouth daily after dinner ), Disp: 30 tablet, Rfl: 11    torsemide (DEMADEX) 10 mg tablet, Take 1 tablet (10 mg total) by mouth daily for 5 days, Disp: 5 tablet, Rfl: 2        Social History     Socioeconomic History    Marital status: Single     Spouse name: Not on file    Number of children: 2    Years of education: Not on file    Highest education level: Not on file   Occupational History    Not on file   Social Needs    Financial resource strain: Not on file    Food insecurity     Worry: Not on file     Inability: Not on file   [employer] needs     Medical: Not on file     Non-medical: Not on file   Tobacco Use    Smoking status: Former Smoker     Packs/day: 1 00     Years: 25 00     Pack years: 25 00     Last attempt to quit: 3/30/2006     Years since quittin 4    Smokeless tobacco: Never Used   Substance and Sexual Activity    Alcohol use: Not Currently    Drug use: Not Currently     Types: Oxycodone     Comment: Percocet for lower back pain prn    Sexual activity: Not Currently   Lifestyle    Physical activity     Days per week: Not on file     Minutes per session: Not on file    Stress: Not on file   Relationships    Social connections     Talks on phone: Not on file     Gets together: Not on file     Attends Jainism service: Not on file     Active member of club or organization: Not on file     Attends meetings of clubs or organizations: Not on file     Relationship status: Not on file    Intimate partner violence     Fear of current or ex partner: Not on file     Emotionally abused: Not on file     Physically abused: Not on file     Forced sexual activity: Not on file   Other Topics Concern    Not on file   Social History Narrative    Not on file       Family History   Adopted: Yes   Problem Relation Age of Onset    Leukemia Mother     Heart disease Father     Coronary artery disease Father     Diabetes Father     No Known Problems Sister     No Known Problems Brother     Diabetes Son     No Known Problems Brother     No Known Problems Brother     No Known Problems Sister     No Known Problems Sister        Physical Exam   Constitutional: She is oriented to person, place, and time  Vital signs are normal  No distress  Using walker for gait assistance   HENT:   Head: Normocephalic and atraumatic  Eyes: Conjunctivae and EOM are normal    Neck: Normal range of motion  Neck supple  Cardiovascular: Normal rate, regular rhythm, S1 normal, S2 normal, normal heart sounds and intact distal pulses  Pulmonary/Chest: Effort normal  She has decreased breath sounds  Abdominal: Soft  Bowel sounds are normal    Musculoskeletal: Normal range of motion  General: Edema present     Neurological: She is alert and oriented to person, place, and time  Skin: Skin is warm and dry  She is not diaphoretic  Psychiatric: She has a normal mood and affect  Nursing note and vitals reviewed  Vitals: not currently breastfeeding  Wt Readings from Last 3 Encounters:   09/04/20 120 kg (264 lb)   09/03/20 124 kg (272 lb 14 9 oz)   08/21/20 123 kg (270 lb 3 2 oz)         Labs & Results:  Lab Results   Component Value Date    WBC 7 19 09/03/2020    HGB 13 7 09/03/2020    HCT 44 6 09/03/2020    MCV 84 09/03/2020     (L) 09/03/2020     No results found for: BNP  No components found for: CHEM  Troponin I   Date Value Ref Range Status   04/06/2020 <0 02 <=0 04 ng/mL Final     Comment:     Autovalidation override  Siem[employer] Chemistry analyzer 99% cutoff is > 0 04 ng/mL in network labs     o cTnI 99% cutoff is useful only when applied to patients in the clinical setting of myocardial ischemia   o cTnI 99% cutoff should be interpreted in the context of clinical history, ECG findings and possibly cardiac imaging to establish correct diagnosis  o cTnI 99% cutoff may be suggestive but clearly not indicative of a coronary event without the clinical setting of myocardial ischemia      02/24/2020 <0 02 <=0 04 ng/mL Final     Comment:     Autovalidation override  Siemens Chemistry analyzer 99% cutoff is > 0 04 ng/mL in network labs     o cTnI 99% cutoff is useful only when applied to patients in the clinical setting of myocardial ischemia   o cTnI 99% cutoff should be interpreted in the context of clinical history, ECG findings and possibly cardiac imaging to establish correct diagnosis  o cTnI 99% cutoff may be suggestive but clearly not indicative of a coronary event without the clinical setting of myocardial ischemia       10/13/2019 <0 02 <=0 04 ng/mL Final     Comment:     Autovalidation override  Siemens Chemistry analyzer 99% cutoff is > 0 04 ng/mL in network labs     o cTnI 99% cutoff is useful only when applied to patients in the clinical setting of myocardial ischemia   o cTnI 99% cutoff should be interpreted in the context of clinical history, ECG findings and possibly cardiac imaging to establish correct diagnosis  o cTnI 99% cutoff may be suggestive but clearly not indicative of a coronary event without the clinical setting of myocardial ischemia      2014 <0 04 0 00 - 0 04 ng/mL Final     Comment:     The above 1 analytes were performed by Ricci Billings 1540       Results for orders placed during the hospital encounter of 20   Echo complete with contrast if indicated    Narrative [address]  [facility], [address] [facility]  [phone]    Transthoracic Echocardiogram  2D, M-mode, Doppler, and Color Doppler    Study date:  02-Sep-2020    Patient: Kenya Mcelroy  MR number: [id number]  Account number: [de-identified]  : 1955  Age: 59 years  Gender: Female  Status: Inpatient  Location: Bedside  Height: 65 in  Weight: 268 lb  BP: 131/ 54 mmHg    Indications: 1 day s/p 26 mm TAVR    Diagnoses: I35 9 - Nonrheumatic aortic valve disorder, unspecified    Sonographer:  Jaime Herbert RDCS  Primary Physician:  Maggy Francois DO  Referring Physician:  Solo Hamilton DO  Group:  [facility]  Sonographer:  MAEVE Michelle  Sonographer:  SAMUEL Marquis, [facility]  Interpreting Physician:  Isabella Colorado MD    SUMMARY    LEFT VENTRICLE:  Systolic function was normal  Ejection fraction was estimated to be 60 %  There were no regional wall motion abnormalities  Wall thickness was mildly to moderately increased  Doppler parameters were consistent with abnormal left ventricular relaxation (grade 1 diastolic dysfunction)  LEFT ATRIUM:  The atrium was moderately dilated  RIGHT ATRIUM:  The atrium was dilated  MITRAL VALVE:  There was marked annular calcification  There was mild stenosis      AORTIC VALVE:  A 26mm ES 3 ULTRA TAVR bioprosthesis was present  It exhibited normal function  No valvular or paravalvular leak  Valve peak gradient was 18 mmHg  Valve mean gradient was 10 mmHg  Aortic valve area was 1 8 cmï¾² by the continuity equation  TRICUSPID VALVE:  There was mild regurgitation  HISTORY: PRIOR HISTORY: 26 mm TAVR, Obesity, Afib, DM, Mitral stenosis    PROCEDURE: The procedure was performed at the bedside  This was a routine study  The transthoracic approach was used  The study included complete 2D imaging, M-mode, complete spectral Doppler, and color Doppler  The heart rate was 52 bpm,  at the start of the study  Images were obtained from the parasternal, apical, subcostal, and suprasternal notch acoustic windows  Echocardiographic views were limited due to poor acoustic window availability and decreased penetration  This  was a technically difficult study  LEFT VENTRICLE: Size was normal  Systolic function was normal  Ejection fraction was estimated to be 60 %  There were no regional wall motion abnormalities  Wall thickness was mildly to moderately increased  DOPPLER: Doppler parameters  were consistent with abnormal left ventricular relaxation (grade 1 diastolic dysfunction)  RIGHT VENTRICLE: The size was normal  Systolic function was normal  Wall thickness was normal     LEFT ATRIUM: The atrium was moderately dilated  RIGHT ATRIUM: The atrium was dilated  MITRAL VALVE: There was marked annular calcification  There was normal leaflet separation  DOPPLER: The transmitral velocity was within the normal range  There was mild stenosis  There was no significant regurgitation  AORTIC VALVE: A 26mm ES 3 ULTRA TAVR bioprosthesis was present  It exhibited normal function  No valvular or paravalvular leak  TRICUSPID VALVE: The valve structure was normal  There was normal leaflet separation  DOPPLER: The transtricuspid velocity was within the normal range  There was no evidence for stenosis   There was mild regurgitation  PULMONIC VALVE: Leaflets exhibited normal thickness, no calcification, and normal cuspal separation  DOPPLER: The transpulmonic velocity was within the normal range  There was no significant regurgitation  PERICARDIUM: There was no pericardial effusion  The pericardium was normal in appearance  AORTA: The root exhibited normal size  SYSTEMIC VEINS: IVC: The inferior vena cava was normal in size  MEASUREMENT TABLES    DOPPLER MEASUREMENTS  Aortic valve   (Reference normals)  Peak gradient   18 mmHg   (--)  Mean gradient   10 mmHg   (--)  Valve area, cont   1 8 cmï¾²   (--)    SYSTEM MEASUREMENT TABLES    2D mode  Left Atrium Victorino-posterior Systolic Dimension; User chosen value; 2D mode;: 4 7 cm  EDV (2D-Cubed): 119 cm3  EF (2D-Cubed): 64 3 %  ESV (2D-Cubed): 42 5 cm3  FS (2D-Cubed): 29 1 %  FS (2D-Teich): 29 1 %  IVS/LVPW (2D): 0 95  Interventricular Septum Diastolic Thickness; User chosen value; 2D mode;: 1 26 cm  LVOT Area (2D): 314 mm2  Left Ventricle Internal End Diastolic Dimension; User chosen value; 2D mode;: 4 92 cm  Left Ventricle Internal Systolic Dimension; User chosen value; 2D mode;: 3 49 cm  Left Ventricle Posterior Wall Diastolic Thickness; User chosen value; 2D mode;: 1 33 cm  Left Ventricular Ejection Fraction; Teichholz; 2D mode;: 55 7 %  Left Ventricular End Diastolic Volume; Teichholz; 2D mode;: 114 cm3  Left Ventricular End Systolic Volume; Teichholz; 2D mode;: 50 5 cm3  SV (2D-Cubed): 76 5 cm3  Stroke Volume; Teichholz; 2D mode;: 63 5 cm3    Apical four chamber  Left Atrium MOD Diam; Most recent value chosen; End Systole; Apical four chamber;: 3 61 cm  Left Atrium MOD Diam; Most recent value chosen; End Systole; Apical four chamber;: 2 49 cm  Left Atrium MOD Diam; Most recent value chosen; End Systole; Apical four chamber;: 4 36 cm  Left Atrium MOD Diam; Most recent value chosen; End Systole;  Apical four chamber;: 4 76 cm  Left Atrium MOD Diam; Most recent value chosen; End Systole; Apical four chamber;: 5 02 cm  Left Atrium MOD Diam; Most recent value chosen; End Systole; Apical four chamber;: 5 54 cm  Left Atrium MOD Diam; Most recent value chosen; End Systole; Apical four chamber;: 5 66 cm  Left Atrium MOD Diam; Most recent value chosen; End Systole; Apical four chamber;: 5 73 cm  Left Atrium MOD Diam; Most recent value chosen; End Systole; Apical four chamber;: 5 84 cm  Left Atrium MOD Diam; Most recent value chosen; End Systole; Apical four chamber;: 6 07 cm  Left Atrium MOD Diam; Most recent value chosen; End Systole; Apical four chamber;: 6 14 cm  Left Atrium MOD Diam; Most recent value chosen; End Systole; Apical four chamber;: 6 14 cm  Left Atrium MOD Diam; Most recent value chosen; End Systole; Apical four chamber;: 6 1 cm  Left Atrium MOD Diam; Most recent value chosen; End Systole; Apical four chamber;: 5 84 cm  Left Atrium MOD Diam; Most recent value chosen; End Systole; Apical four chamber;: 5 51 cm  Left Atrium MOD Diam; Most recent value chosen; End Systole; Apical four chamber;: 5 17 cm  Left Atrium MOD Diam; Most recent value chosen; End Systole; Apical four chamber;: 4 76 cm  Left Atrium MOD Diam; Most recent value chosen; End Systole; Apical four chamber;: 3 65 cm  Left Atrium MOD Diam; Most recent value chosen; End Systole; Apical four chamber;: 2 08 cm  Left Atrium MOD Diam; Most recent value chosen; End Systole; Apical four chamber;: 5 32 cm  Left Atrium Systolic Area; Most recent value chosen; Method of Disks, Single Plane; 2D mode; Apical four chamber;: 3750 mm2  Left Atrium Systolic Volume; Most recent value chosen; Method of Disks, Single Plane; 2D mode; Apical four chamber;: 154 cm3  Left Atrium systolic major axis; Most recent value chosen; Method of Disks, Single Plane; 2D mode; Apical four chamber;: 7 48 cm    Unspecified Scan Mode  Mean Grad; Antegrade Flow: 10 mm[Hg]  Mean Grad; Mean value; Antegrade Flow: 10 mm[Hg]  Mean Noman;  Antegrade Flow: 1520 mm/s  Mean Noman; Mean value; Antegrade Flow: 1520 mm/s  Peak Grad; Mean; Antegrade Flow: 18 mm[Hg]  VTI; Antegrade Flow: 48 3 cm  VTI; Mean; Antegrade Flow: 48 3 cm  Valve Area Peak Noman: 161 mm2  Valve Area VTI: 179 mm2  Vmax; Antegrade Flow: 2150 mm/s  Vmax; Mean; Antegrade Flow: 2150 mm/s  LVOT CV Orifice Diam; Mean: 2 cm  LVOT Diam: 2 cm  LVOT Mean Grad: 3 mm[Hg]  LVOT Mean Grad; Mean value: 3 mm[Hg]  LVOT Mean Noman: 810 mm/s  LVOT Mean Noman; Mean value: 810 mm/s  LVOT Peak Grad; Mean: 5 mm[Hg]  LVOT VTI: 27 6 cm  LVOT VTI; Mean: 27 6 cm  LVOT Vmax: 1100 mm/s  LVOT Vmax; Mean: 1100 mm/s  SV (LVOT): 87 cm3  DT; Antegrade Flow: 489 ms  DT; Mean; Antegrade Flow: 489 ms  Dec Piatt; Antegrade Flow: 2390 mm/s2  Dec Piatt; Mean; Antegrade Flow: 2390 mm/s2  MV A Noman: 1190 mm/s  MV E Noman: 69 1 mm/s  MV E Noman: 1110 mm/s  MV E/A Ratio: 0 5  MV Peak A Noman: 1190 mm/s  MV Peak E Nomna; Mean; Antegrade Flow: 590 mm/s  MVA (PHT): 130 mm2  Mean Grad; Antegrade Flow: 4 mm[Hg]  Mean Grad; Mean value; Antegrade Flow: 4 mm[Hg]  Mean Noman; Antegrade Flow: 912 mm/s  Mean Noman; Mean value; Antegrade Flow: 912 mm/s  PHT: 169 ms  PHT Peak Noman;: 1380 mm/s  PHT Peak Noman; Mean: 1380 mm/s  PHT; Mean: 169 ms  Peak Grad; Mean; Antegrade Flow: 8 mm[Hg]  VTI; Antegrade Flow: 66 4 cm  VTI; Mean; Antegrade Flow: 66 4 cm  Valve Area VTI: 131 mm2  Vmax; Antegrade Flow: 1440 mm/s  Vmax; Mean; Antegrade Flow: 1440 mm/s  Peak Grad; Mean; Regurgitant Flow: 18 mm[Hg]  Vmax; Mean; Regurgitant Flow: 2100 mm/s  Vmax; Regurgitant Flow: 2100 mm/s  Atrial [employer]; Most recent value chosen;: 2430 mm2  Atrial [facility]; Most recent value chosen;: 6 69 cm  Atrial Herzog Volume;  Most recent value chosen;: 75 cm3  Distance;: 4 4 cm  Distance; Mean; Mean value chosen;: 4 4 cm  Distance; User chosen value;: 3 1 cm    [facility]    Prepared and electronically signed by    Yordan Lozoya MD  Signed 02-Sep-2020 17:02:52       No results found for this or any previous visit  This note was completed in part utilizing m-[employer] fluency direct voice recognition software  Grammatical errors, random word insertion, spelling mistakes, and incomplete sentences may be an occasional consequence of the system secondary to software limitations, ambient noise and hardware issues  At the time of dictation, efforts were made to edit, clarify and /or correct errors  Please read the chart carefully and recognize, using context, where substitutions have occurred    If you have any questions or concerns about the context, text or information contained within the body of this dictation, please contact myself, the provider, for further clarification

## 2020-09-10 NOTE — PATIENT INSTRUCTIONS
Take your medications as directed, and keep your follow up appointments  Adhere to a heart healthy lifestyle, maintaining a low sodium diet  Daily weight and record  If your weight increases 2-3 lbs in one day, or 5 lbs in 2 days, you are short of breath or have lower extremity swelling, please call the heart failure team at  [facility] at [phone]  DASH Eating Plan   WHAT YOU NEED TO KNOW:   The DASH (Dietary Approaches to Stop Hypertension) Eating Plan is designed to help prevent or lower high blood pressure  It can also help to lower LDL (bad) cholesterol and decrease your risk of heart disease  The plan is low in sodium, sugar, unhealthy fats, and total fat  It is high in potassium, calcium, magnesium, and fiber  These nutrients are added when you eat more fruits, vegetables, and whole grains  DISCHARGE INSTRUCTIONS:   Your sodium limit each day: Your dietitian will tell you how much sodium is safe for you to have each day  People with high blood pressure should have no more than 1,500 to 2,300 mg of sodium in a day  A teaspoon (tsp) of salt has 2,300 mg of sodium  This may seem like a difficult goal, but small changes to the foods you eat can make a big difference  Your healthcare provider or dietitian can help you create a meal plan that follows your sodium limit  How to limit sodium:   · Read food labels  Food labels can help you choose foods that are low in sodium  The amount of sodium is listed in milligrams (mg)  The % Daily Value (DV) column tells you how much of your daily needs are met by 1 serving of the food for each nutrient listed  Choose foods that have less than 5% of the DV of sodium  These foods are considered low in sodium  Foods that have 20% or more of the DV of sodium are considered high in sodium  Avoid foods that have more than 300 mg of sodium in each serving  Choose foods that say low-sodium, reduced-sodium, or no salt added on the food label             · Avoid salt   Do not salt food at the table, and add very little salt to foods during cooking  Use herbs and spices, such as onions, garlic, and salt-free seasonings to add flavor to foods  Try lemon or lime juice or vinegar to give foods a tart flavor  Use hot peppers or a small amount of hot pepper sauce to add a spicy flavor to foods  · Ask about salt substitutes  Ask your healthcare provider if you may use salt substitutes  Some salt substitutes have ingredients that can be harmful if you have certain health conditions  · Choose foods carefully at restaurants  Meals from restaurants, especially fast food restaurants, are often high in sodium  Some restaurants have nutrition information that tells you the amount of sodium in their foods  Ask to have your food prepared with less, or no salt  What you need to know about fats:   · Include healthy fats  Examples are unsaturated fats and omega-3 fatty acids  Unsaturated fats are found in soybean, canola, olive, or sunflower oil, and liquid and soft tub margarines  Omega-3 fatty acids are found in fatty fish, such as salmon, tuna, mackerel, and sardines  It is also found in flaxseed oil and ground flaxseed  · Avoid unhealthy fats  Do not eat unhealthy fats, such as saturated fats and trans fats  Saturated fats are found in foods that contain fat from animals  Examples are fatty meats, whole milk, butter, cream, and other dairy foods  It is also found in shortening, stick margarine, palm oil, and coconut oil  Trans fats are found in fried foods, crackers, chips, and baked goods made with margarine or shortening  Foods to include: With the DASH eating plan, you need to eat a certain number of servings from each food group  This will help you get enough of certain nutrients and limit others  The amount of servings you should eat depends on how many calories you need  Your dietitian can tell you how many calories you need   The number of servings listed next to the food groups below are for people who need about 2,000 calories each day    · Grains:  6 to 8 servings (3 of these servings should be whole-grain foods)    ¨ 1 slice of whole-grain bread     ¨ 1 ounce of dry cereal    ¨ ½ cup of cooked cereal, pasta, or brown rice    · Vegetables and fruits:  4 to 5 servings of fruits and 4 to 5 servings of vegetables    ¨ 1 medium fruit    ¨ ½ cup of frozen, canned (no added salt), or chopped fresh vegetables     ¨ ½ cup of fresh, frozen, dried, or canned fruit (canned in light syrup or fruit juice)    ¨ ½ cup of vegetable or fruit juice    · Dairy:  2 to 3 servings    ¨ 1 cup of nonfat (skim) or 1% milk    ¨ 1½ ounces of fat-free or low-fat cheese    ¨ 6 ounces of nonfat or low-fat yogurt    · Lean meat, poultry, and fish:  6 ounces or less    [employer] (chicken, turkey) with no skin    ¨ Fish (especially fatty fish, such as salmon, fresh tuna, or mackerel)    ¨ Lean beef and pork (loin, round, extra lean hamburger)    ¨ Egg whites and egg substitutes    · Nuts, seeds, and legumes:  4 to 5 servings each week    ¨ ½ cup of cooked beans and peas    ¨ 1½ ounces of unsalted nuts    ¨ 2 tablespoons of peanut butter or seeds    · Sweets and added sugars:  5 or less each week    ¨ 1 tablespoon of sugar, jelly, or jam    ¨ ½ cup of sorbet or gelatin    ¨ 1 cup of lemonade    · Fats:  2 to 3 servings each week    ¨ 1 teaspoon of soft margarine or vegetable oil    ¨ 1 tablespoon of mayonnaise    ¨ 2 tablespoons of salad dressing  Foods to avoid:   · Grains:      [employer], such as doughnuts, pastries, cookies, and biscuits (high in fat and sugar)    ¨ Mixes for cornbread and biscuits, packaged foods, such as bread stuffing, rice and pasta mixes, macaroni and cheese, and instant cereals (high in sodium)    · Fruits and vegetables:      ¨ Regular, canned vegetables (high in sodium)    ¨ Sauerkraut, pickled vegetables, and other foods prepared in brine (high in sodium)    [place] vegetables or vegetables in butter or high-fat sauces    ¨ Fruit in cream or butter sauce (high in fat)    · Dairy:      ¨ Whole milk, 2% milk, and cream (high in fat)    ¨ Regular cheese and processed cheese (high in fat and sodium)    · Meats and protein foods:      ¨ Smoked or cured meat, such as corned beef, dietz, ham, hot dogs, and sausage (high in fat and sodium)    ¨ Canned beans and canned meats or spreads, such as potted meats, sardines, anchovies, and imitation seafood (high in sodium)    ¨ Deli or lunch meats, such as bologna, ham, turkey, and roast beef (high in sodium)    ¨ High-fat meat (T-bone steak, regular hamburger, and ribs)    ¨ Whole eggs and egg yolks (high in fat)    · Other:      ¨ Seasonings made with salt, such as garlic salt, celery salt, onion salt, seasoned salt, meat tenderizers, and monosodium glutamate (MSG)    ¨ Miso soup and canned or dried soup mixes (high in sodium)    ¨ Regular soy sauce, barbecue sauce, teriyaki sauce, steak sauce, Worcestershire sauce, and most flavored vinegars (high in sodium)    ¨ Regular condiments, such as mustard, ketchup, and salad dressings (high in sodium)    ¨ Gravy and sauces, such as Ole or cheese sauces (high in sodium and fat)    ¨ Drinks high in sugar, such as soda or fruit drinks    [employer] foods, such as salted chips, popcorn, pretzels, pork rinds, salted crackers, and salted nuts    ¨ Frozen foods, such as dinners, entrees, vegetables with sauces, and breaded meats (high in sodium)  Other guidelines to follow:   · Maintain a healthy weight  Your risk for heart disease is higher if you are overweight  Your healthcare provider may suggest that you lose weight if you are overweight  You can lose weight by eating fewer calories and foods that have added sugars and fat  The DASH meal plan can help you do this  Decrease calories by eating smaller portions at each meal and fewer snacks   Ask your healthcare provider for more information about how to lose weight  · Exercise regularly  Regular exercise can help you reach or maintain a healthy weight  Regular exercise can also help decrease your blood pressure and improve your cholesterol levels  Get 30 minutes or more of moderate exercise each day of the week  To lose weight, get at least 60 minutes of exercise  Talk to your healthcare provider about the best exercise program for you  · Limit alcohol  Women should limit alcohol to 1 drink a day  Men should limit alcohol to 2 drinks a day  A drink of alcohol is 12 ounces of beer, 5 ounces of wine, or 1½ ounces of liquor  © 2017 [address]  Information is for End User's use only and may not be sold, redistributed or otherwise used for commercial purposes  All illustrations and images included in CareNotes® are the copyrighted property of A D A M , Inc  or Arnaldo Carter  The above information is an  only  It is not intended as medical advice for individual conditions or treatments  Talk to your doctor, nurse or pharmacist before following any medical regimen to see if it is safe and effective for you

## 2020-09-14 ENCOUNTER — TELEPHONE (OUTPATIENT)
Dept: CARDIAC SURGERY | Facility: CLINIC | Age: 65
End: 2020-09-14

## 2020-09-14 NOTE — TELEPHONE ENCOUNTER
Spoke with patient for post op routine call  She said she is feeling good, up and walking around with no issues  No fevers, chills or shortness of breath  She was concerned about some weight gain she said her last known weight from 09/10 was 273lbs, her discharge weight was 272lbs  She does not have a scale and has not been checking her weight daily, advised to get a scale to keep track  No lasix ordered at this time  Pt aware of all upcoming appts  Advised to call the office with any questions or concerns

## 2020-09-25 ENCOUNTER — HOSPITAL ENCOUNTER (OUTPATIENT)
Dept: NON INVASIVE DIAGNOSTICS | Facility: HOSPITAL | Age: 65
Discharge: HOME | End: 2020-09-25
Payer: MEDICARE

## 2020-09-25 ENCOUNTER — APPOINTMENT (OUTPATIENT)
Dept: LAB | Facility: CLINIC | Age: 65
End: 2020-09-25
Payer: MEDICARE

## 2020-09-25 ENCOUNTER — OFFICE VISIT (OUTPATIENT)
Dept: CARDIAC SURGERY | Facility: CLINIC | Age: 65
End: 2020-09-25
Payer: MEDICARE

## 2020-09-25 VITALS
HEIGHT: 65 IN | HEART RATE: 88 BPM | OXYGEN SATURATION: 97 % | TEMPERATURE: 98.8 F | DIASTOLIC BLOOD PRESSURE: 74 MMHG | WEIGHT: 271.1 LBS | SYSTOLIC BLOOD PRESSURE: 116 MMHG | RESPIRATION RATE: 16 BRPM | BODY MASS INDEX: 45.17 KG/M2

## 2020-09-25 DIAGNOSIS — Z95.2 S/P TAVR (TRANSCATHETER AORTIC VALVE REPLACEMENT): Primary | ICD-10-CM

## 2020-09-25 DIAGNOSIS — Z95.2 S/P TAVR (TRANSCATHETER AORTIC VALVE REPLACEMENT): ICD-10-CM

## 2020-09-25 DIAGNOSIS — I35.0 AORTIC VALVE STENOSIS, ETIOLOGY OF CARDIAC VALVE DISEASE UNSPECIFIED: ICD-10-CM

## 2020-09-25 LAB
ANION GAP SERPL CALCULATED.3IONS-SCNC: 4 MMOL/L (ref 4–13)
ATRIAL RATE: 57 BPM
BUN SERPL-MCNC: 18 MG/DL (ref 5–25)
CALCIUM SERPL-MCNC: 9.6 MG/DL (ref 8.3–10.1)
CHLORIDE SERPL-SCNC: 107 MMOL/L (ref 100–108)
CO2 SERPL-SCNC: 29 MMOL/L (ref 21–32)
CREAT SERPL-MCNC: 0.94 MG/DL (ref 0.6–1.3)
GFR SERPL CREATININE-BSD FRML MDRD: 64 ML/MIN/1.73SQ M
GLUCOSE P FAST SERPL-MCNC: 118 MG/DL (ref 65–99)
P AXIS: 50 DEGREES
POTASSIUM SERPL-SCNC: 4.5 MMOL/L (ref 3.5–5.3)
PR INTERVAL: 188 MS
QRS AXIS: -14 DEGREES
QRSD INTERVAL: 96 MS
QT INTERVAL: 418 MS
QTC INTERVAL: 406 MS
SODIUM SERPL-SCNC: 140 MMOL/L (ref 136–145)
T WAVE AXIS: 34 DEGREES
VENTRICULAR RATE: 57 BPM

## 2020-09-25 PROCEDURE — 80048 BASIC METABOLIC PNL TOTAL CA: CPT

## 2020-09-25 PROCEDURE — 93010 ELECTROCARDIOGRAM REPORT: CPT | Performed by: INTERNAL MEDICINE

## 2020-09-25 PROCEDURE — 36415 COLL VENOUS BLD VENIPUNCTURE: CPT

## 2020-09-25 PROCEDURE — C8929 TTE W OR WO FOL WCON,DOPPLER: HCPCS

## 2020-09-25 PROCEDURE — 93005 ELECTROCARDIOGRAM TRACING: CPT

## 2020-09-25 PROCEDURE — 99213 OFFICE O/P EST LOW 20 MIN: CPT | Performed by: NURSE PRACTITIONER

## 2020-09-25 RX ADMIN — PERFLUTREN 0.8 ML/MIN: 6.52 INJECTION, SUSPENSION INTRAVENOUS at 12:00

## 2020-09-25 NOTE — LETTER
September 25, 2020     Vicente Aburto [address] [place] [address]    Patient: Fer Gilbert   YOB: 1955   Date of Visit: 9/25/2020       Dear Dr Brandee Becerra: Thank you for referring Koko Meng to me for evaluation  Below are my notes for this consultation  If you have questions, please do not hesitate to call me  I look forward to following your patient along with you  Sincerely,        Luz Coelho MD        CC: DO Kimani Palm PA-C  9/25/2020  1:49 PM  Attested  Procedure: S/P transfemoral transcatheter aortic valve replacement, performed on September 1    History: Fer Gilbert is a 72y o  year old female who presents to our office today for routine follow up care from transfemoral transcatheter aortic valve replacement  Following routine postoperative recovery she was discharged to home  Shortly after arrival home, she did suffer a fall  She was seen in the emergency department with CT and x-ray images completed  No fractures were identified  She was again discharged home  During interview today she notes moderate improvement regarding her preoperative exertional fatigability and dyspnea  She denies any postoperative fevers, sweats, or chills  Her heart rate and blood pressure been well controlled she has been checked with multiple postoperative appointments  Vital Signs:   Vitals:    09/25/20 1317 09/25/20 1328   BP: 118/78 116/74   BP Location: Left arm Right arm   Patient Position: Sitting Sitting   Cuff Size: Adult Adult   Pulse: 88    Resp: 16    Temp: 98 8 °F (37 1 °C)    SpO2: 97%    Weight: 123 kg (271 lb 1 6 oz)    Height: 5' 5" (1 651 m)        Home Medications:   Prior to Admission medications    Medication Sig Start Date End Date Taking?  Authorizing Provider   acetaminophen (TYLENOL) 650 mg CR tablet Take 1 tablet (650 mg total) by mouth every 8 (eight) hours as needed for mild pain 9/3/20 10/3/20 Yes Ofelia Molina KUSHAL Cox   albuterol (2 5 mg/3 mL) 0 083 % nebulizer solution Take 1 vial (2 5 mg total) by nebulization every 4 (four) hours as needed for wheezing or shortness of breath 8/13/20  Yes Francy Celis MD   amiodarone 200 mg tablet Take 1 tablet (200 mg total) by mouth daily with breakfast 3/11/20  Yes Graeme Narvaez DO   anastrozole (ARIMIDEX) 1 mg tablet Take 1 tablet (1 mg total) by mouth daily 12/12/19  Yes Marlise Schwab,    apixaban (ELIQUIS) 5 mg Take 1 tablet (5 mg total) by mouth 2 (two) times a day 3/11/20  Yes Graeme Narvaez DO   aspirin 81 mg chewable tablet Chew 1 tablet (81 mg total) daily 9/3/20  Yes Kimani Wild PA-C   BIOTIN PO Take 1 tablet by mouth daily   Yes Historical Provider, MD   Calcium-Vitamin D 500-125 MG-UNIT TABS Take 1 tablet by mouth 2 (two) times a day     Yes Historical Provider, MD   ferrous sulfate 324 (65 Fe) mg Take 1 tablet (324 mg total) by mouth daily before breakfast 11/5/18  Yes Petros Toledo,    metoprolol tartrate (LOPRESSOR) 25 mg tablet Take 0 5 tablets (12 5 mg total) by mouth every 12 (twelve) hours 9/3/20  Yes Kimani Wild PA-C   Multiple Vitamins-Minerals (BARIATRIC FUSION) CHEW Chew 1 tablet daily     Yes Historical Provider, MD   omeprazole (PriLOSEC) 20 mg delayed release capsule TAKE 1 CAPSULE (20 MG TOTAL) BY MOUTH DAILY 7/16/20  Yes Emily Shah,    oxyCODONE (ROXICODONE) 10 MG TABS Take 10 mg by mouth every 6 (six) hours as needed   9/15/18  Yes Historical Provider, MD   rosuvastatin (CRESTOR) 5 mg tablet Take 1 tablet (5 mg total) by mouth daily  Patient taking differently: Take 5 mg by mouth daily after dinner  2/3/20  Yes Graeme Narvaez DO   cyclobenzaprine (FLEXERIL) 5 mg tablet Take 1 tablet (5 mg total) by mouth 3 (three) times a day as needed for muscle spasms for up to 10 days Do not take before driving  Patient not taking: Reported on 8/21/2020 5/8/20 7/30/20  Mikal Longo MD   potassium chloride (K-DUR,KLOR-CON) 10 mEq tablet Take 1 tablet (10 mEq total) by mouth daily for 5 days  Patient not taking: Reported on 9/25/2020 9/3/20 9/8/20  Shaan Prieto PA-C   torsemide [facility]) 10 mg tablet Take 1 tablet (10 mg total) by mouth daily for 5 days  Patient not taking: Reported on 9/25/2020 9/3/20 9/8/20  Shaan Prieto PA-C       Physical Exam:    HEENT/NECK:  PERRLA  No jugular venous distention  Cardiac: Regular rate and rhythm and No murmurs/rubs/gallops  Pulmonary:  Breath sounds clear bilaterally and No rales/rhonchi/wheezes  Abdomen:  Non-tender, Non-distended and Normal bowel sounds  Incisions: Groin puncture sites clean, dry, and intact without hematoma  Extremities: Extremities warm/dry and 1+ edema B/L  Neuro: Alert and oriented X 3, Sensation is grossly intact and No focal deficits  Skin: Warm/Dry, without rashes or lesions  Lab Results:               Invalid input(s): LABGLOM    Imaging Studies:     Transthoracic Echocardiogram:   Preliminary evaluation demonstrates no significant paravalvular leak  Final report pending  I have personally reviewed pertinent reports  and I have personally reviewed pertinent films in PACS    TAVR evaluation Assessment:     Isabel Fairbanks 122: II    Assessment: Aortic stenosis, Non-Rheumatic  S/P transfemoral transcatheter aortic valve replacement;    Plan:     Simone Paul continues to recover well following transfemoral transcatheter aortic valve replacement  To date they have made progressive improvements with physical rehabilitation  At this point I have cleared them to begin outpatient cardiac rehabilitation and have encouraged them to to do so  Simone Paul has also been cleared to resume driving at this point  I have asked that they do so in small, progressive increments  Simone Paul has already been evaluated by their primary care physician and their cardiologist for ongoing medical care   Arrangements will be made for one year surveillance follow up with repeat ECG and echocardiogram   I have advised them to notify their PCP with any new concerns that may arise in the interim  Nan Linker was comfortable with our recommendations and their questions were answered to their satisfaction  Lucy Anderson PA-C  [unfilled]  1:46 PM  Attestation signed by Ciro Penny MD at 9/25/2020  2:04 PM:  Pt seen and examined with PA  I agree with the above assessment and plan  Ramon Bush is doing well s/p recent TAVR  Echo shows normal TAVR function  She'll be starting cardiac rehab next week        Darius Mueller MD  DATE: September 25, 2020  TIME: 2:03 PM

## 2020-09-25 NOTE — PROGRESS NOTES
Procedure: S/P transfemoral transcatheter aortic valve replacement, performed on September 1    History: Myranda Marie is a 72y o  year old female who presents to our office today for routine follow up care from transfemoral transcatheter aortic valve replacement  Following routine postoperative recovery she was discharged to home  Shortly after arrival home, she did suffer a fall  She was seen in the emergency department with CT and x-ray images completed  No fractures were identified  She was again discharged home  During interview today she notes moderate improvement regarding her preoperative exertional fatigability and dyspnea  She denies any postoperative fevers, sweats, or chills  Her heart rate and blood pressure been well controlled she has been checked with multiple postoperative appointments  Vital Signs:   Vitals:    09/25/20 1317 09/25/20 1328   BP: 118/78 116/74   BP Location: Left arm Right arm   Patient Position: Sitting Sitting   Cuff Size: Adult Adult   Pulse: 88    Resp: 16    Temp: 98 8 °F (37 1 °C)    SpO2: 97%    Weight: 123 kg (271 lb 1 6 oz)    Height: 5' 5" (1 651 m)        Home Medications:   Prior to Admission medications    Medication Sig Start Date End Date Taking?  Authorizing Provider   acetaminophen (TYLENOL) 650 mg CR tablet Take 1 tablet (650 mg total) by mouth every 8 (eight) hours as needed for mild pain 9/3/20 10/3/20 Yes Sheldon Cox PA-C   albuterol (2 5 mg/3 mL) 0 083 % nebulizer solution Take 1 vial (2 5 mg total) by nebulization every 4 (four) hours as needed for wheezing or shortness of breath 8/13/20  Yes Mehran Goyal MD   amiodarone 200 mg tablet Take 1 tablet (200 mg total) by mouth daily with breakfast 3/11/20  Yes Graeme Narvaez,    anastrozole (ARIMIDEX) 1 mg tablet Take 1 tablet (1 mg total) by mouth daily 12/12/19  Yes Soniya Alvarado,    apixaban (ELIQUIS) 5 mg Take 1 tablet (5 mg total) by mouth 2 (two) times a day 3/11/20 Yes Graeme Narvaez,    aspirin 81 mg chewable tablet Chew 1 tablet (81 mg total) daily 9/3/20  Yes Claude Apley, PA-C   BIOTIN PO Take 1 tablet by mouth daily   Yes Historical Provider, MD   Calcium-Vitamin D 500-125 MG-UNIT TABS Take 1 tablet by mouth 2 (two) times a day     Yes Historical Provider, MD   ferrous sulfate 324 (65 Fe) mg Take 1 tablet (324 mg total) by mouth daily before breakfast 11/5/18  Yes Stevenson Cooks, DO   metoprolol tartrate (LOPRESSOR) 25 mg tablet Take 0 5 tablets (12 5 mg total) by mouth every 12 (twelve) hours 9/3/20  Yes Claude Apley, PA-C   Multiple Vitamins-Minerals (BARIATRIC FUSION) CHEW Chew 1 tablet daily     Yes Historical Provider, MD   omeprazole (PriLOSEC) 20 mg delayed release capsule TAKE 1 CAPSULE (20 MG TOTAL) BY MOUTH DAILY 7/16/20  Yes David Lowery,    oxyCODONE (ROXICODONE) 10 MG TABS Take 10 mg by mouth every 6 (six) hours as needed   9/15/18  Yes Historical Provider, MD   rosuvastatin (CRESTOR) 5 mg tablet Take 1 tablet (5 mg total) by mouth daily  Patient taking differently: Take 5 mg by mouth daily after dinner  2/3/20  Yes Graeme Narvaez DO   cyclobenzaprine (FLEXERIL) 5 mg tablet Take 1 tablet (5 mg total) by mouth 3 (three) times a day as needed for muscle spasms for up to 10 days Do not take before driving  Patient not taking: Reported on 8/21/2020 5/8/20 7/30/20  Mikal Jaeger MD   potassium chloride (K-DUR,KLOR-CON) 10 mEq tablet Take 1 tablet (10 mEq total) by mouth daily for 5 days  Patient not taking: Reported on 9/25/2020 9/3/20 9/8/20  Claude Apley, PA-C   torsemide (DEMADEX) 10 mg tablet Take 1 tablet (10 mg total) by mouth daily for 5 days  Patient not taking: Reported on 9/25/2020 9/3/20 9/8/20  Claude Apley, PA-C       Physical Exam:    HEENT/NECK:  PERRLA  No jugular venous distention      Cardiac: Regular rate and rhythm and No murmurs/rubs/gallops  Pulmonary:  Breath sounds clear bilaterally and No rales/rhonchi/wheezes  Abdomen:  Non-tender, Non-distended and Normal bowel sounds  Incisions: Groin puncture sites clean, dry, and intact without hematoma  Extremities: Extremities warm/dry and 1+ edema B/L  Neuro: Alert and oriented X 3, Sensation is grossly intact and No focal deficits  Skin: Warm/Dry, without rashes or lesions  Lab Results:               Invalid input(s): LABGLOM    Imaging Studies:     Transthoracic Echocardiogram:   Preliminary evaluation demonstrates no significant paravalvular leak  Final report pending  I have personally reviewed pertinent reports  and I have personally reviewed pertinent films in PACS    TAVR evaluation Assessment:     Isabel Fairbanks 122: II    Assessment: Aortic stenosis, Non-Rheumatic  S/P transfemoral transcatheter aortic valve replacement;    Plan:     Meagan Lee continues to recover well following transfemoral transcatheter aortic valve replacement  To date they have made progressive improvements with physical rehabilitation  At this point I have cleared them to begin outpatient cardiac rehabilitation and have encouraged them to to do so  Meagan Lee has also been cleared to resume driving at this point  I have asked that they do so in small, progressive increments  Meagan Lee has already been evaluated by their primary care physician and their cardiologist for ongoing medical care  Arrangements will be made for one year surveillance follow up with repeat ECG and echocardiogram   I have advised them to notify their PCP with any new concerns that may arise in the interim  Meagan Lee was comfortable with our recommendations and their questions were answered to their satisfaction      Ronny Peña PA-C  [unfilled]  1:46 PM

## 2020-09-26 PROCEDURE — 93306 TTE W/DOPPLER COMPLETE: CPT | Performed by: INTERNAL MEDICINE

## 2020-09-29 ENCOUNTER — CLINICAL SUPPORT (OUTPATIENT)
Dept: CARDIAC REHAB | Facility: CLINIC | Age: 65
End: 2020-09-29
Payer: MEDICARE

## 2020-09-29 VITALS — HEIGHT: 65 IN | WEIGHT: 268.5 LBS | BODY MASS INDEX: 44.73 KG/M2

## 2020-09-29 DIAGNOSIS — Z95.2 S/P TAVR (TRANSCATHETER AORTIC VALVE REPLACEMENT): ICD-10-CM

## 2020-09-29 PROCEDURE — 93797 PHYS/QHP OP CAR RHAB WO ECG: CPT

## 2020-09-29 NOTE — PROGRESS NOTES
Lisa Pineda   1955     Risk: moderate     Pre Post % Change Goal   Date: 9/29/2020      Physical       Sub Max ETT (mets) 1 38   10% increase   6MWT (feet) 570   10% increase   Arm Curl  12      Chair to stands 9      YU Al (est peak O2) 5 04      Peak exercise CR/OK (mets) 1 83   40% increase   Emotional       PHQ9 (> 10 refer to MD) 1   4 pt decrease   GAD7 0      [place] (lower score = improvement)    Total 22   < 27   Feelings 2   < 3   Physical Fitness 5   < 3   Social Support 1   < 3   Daily Activities 2   < 3   Social Activities 1   < 3   Pain 4   < 3   Overall Health 3   < 3   Quality of Life 2   < 3   Change in Health 2   < 3   Dietary       Rate your plate 63   > 58   Measurements       Weight 268 5   2 5 - 5%   BMI 44 7   19 - 25   Waist Circ  61 5   < 40 M / < 35 F   % Body fat 49 4   < 25 M / < 33 F   BP left arm               (systolic) 338   < 188   (diastolic) 82   < 90   Smoking #/day  (if applicable) 0   0   Lipids/Glucose (Date) 2/8/2019      Total cholesterol 198   50 - 200   Triglycerides 134   < 150   HDL 54   40 - 60      < 100   A1C 5 1   4 0 - 5 6%   Fasting

## 2020-09-29 NOTE — PROGRESS NOTES
Cardiac Rehabilitation Plan of Care   Care Plan       Today's date: 2020   # of Exercise Sessions Completed: Initial  Patient name: Vanessa Hess      : 1955  Age: 72 y o  MRN: [id number]  Referring Physician: Melita Gomez DO  Cardiologist: John Hood  Provider: Willard Strauss  Clinician: Eleazar Downs RN    Dx:   Encounter Diagnosis   Name Primary?  S/P TAVR (transcatheter aortic valve replacement)      Date of onset: 9/3/2020      SUMMARY OF PROGRESS:  Completed initial evaluation  Explained cardiac rehabilitation, cardiac risk factors, how the heart functions, afib and a heart healthy diet  Completed 6 minute walk, sub max treadmill, arm curl and chair to stand test  Obtained BMI, body fat% and waist measurements  Telemetry monitor NSR at rest and with exercise  Exercise MET level 1 38  RPE 7  She ambulates with assist of walker  She c/o chronic discomfort in her back, fatigue and shortness of breath with minimal exertion  She has appointment with dietician this week  She stated she recently stopped grazing on food and has returned to drinking 2 protein shakes a day and will decrease sodium and fat in diet  She stated she had RNY bypass surgery and has maintained a 90 pound weight loss  Provided her with handbook for cardiac rehabilitation  Will continue to monitor       Medication compliance: Yes   Comments: she states she is taking her medication as prescribed  Fall Risk: Moderate   Comments: she stated she fel on 9/3/2020 MD aware    EKG changes: NSR at rest and with exercsie      EXERCISE ASSESSMENT and PLAN    Current Exercise Program in Rehab:       Frequency: 1 days/week Supplement with home exercise 2+ days/wk as tolerated       Minutes: 25         METS: 1 38            HR:    RPE: 5-7         Modalities: Treadmill and Room walking      Exercise Progression 30 Day Goals :    Frequency: 3 days/week      Supplement with home exercise 2+ days/wk as tolerated Minutes: 31-45   METS: 1 5-3 5   HR:     RPE: 4-6   Modalities: Treadmill, UBE, NuStep, Recumbent bike and Room walking    Strength trainin-3 days / week  12-15 repetitions  1-2 sets per modality    Modalities: Lateral Raise, Arm Curl, Sit to Stands, Upright Rows, Front Raises and Shoulder Shrugs    Progressing: In Progress    Home Exercise: None    Goals: Improved 6MWT distance by 10% and decrease discomfort in back, increase endurance to wal a mile  Education: Benefit of exercise for CAD risk factors, home exercise guidelines, signs and sxs and RPE scale   Plan:home exercise 30+ mins 2 days opposite CR  Readiness to change: Preparation:  (Getting ready to change)       NUTRITION ASSESSMENT AND PLAN    Weight control:    Starting weight: 268 5   Current weight: Weight - Scale: 122 kg (268 lb 8 oz)   Waist circumference:    Startin 5   Current: Waist circumference (inches): 61 5 Inches  Diabetes: She stated she does not check BS  Lipid management: Discussed diet and lipid management and Last lipid profile 2019  Chol 198    HDL 54    Goals:decrease weight 5 to 10 pounds in 12 weeks, follow up with dietician  Education: heart healthy eating  low sodium diet  diet and lipid management  diabetes management and exercise  wt  loss  Progressing: In Progress  Plan: Education class: Reading Food Labels and Education Class: Heart Healthy Eating  Readiness to change: Preparation:  (Getting ready to change)       PSYCHOSOCIAL ASSESSMENT AND PLAN    Emotional:  Depression assessment:  PHQ-9 = 1-4 = Minimal Depression            Anxiety measure:  MASSIEL-7 = 0-4  = Not anxious  Self-reported stress level:  2  Social support: Very Good  Goals:  increased energy  Education: signs/sxs of depression, benefits of positive support system and coping mechanisms  Progressing: In Progress  Plan: Practice relaxation techniques  Readiness to change: Action:  (Changing behavior)      OTHER CORE COMPONENTS Tobacco:   Social History     Tobacco Use   Smoking Status Former Smoker    Packs/day: 1 00    Years: 25 00    Pack years: 25 00    Last attempt to quit: 3/30/2006    Years since quittin 5   Smokeless Tobacco Never Used       Tobacco Use Intervention:   Pt quit 2016  and has abstained    Blood pressure:    Restin/88   Exercise: 162/94    Goals: consistent BP < 130/80 and reduced dietary sodium <2300mg  Education:  understanding HTN and CAD and low sodium diet and HTN  Progressing: In Progress  Plan: Class: Understanding Heart Disease and Class: Common Heart Medications  Readiness to change: Preparation:  (Getting ready to change)

## 2020-09-29 NOTE — PROGRESS NOTES
CARDIAC REHAB ASSESSMENT    Today's date: 2020  Patient name: Giuliano Santana     : 1955       MRN: [id number]  PCP: Lucia Garcia DO  Referring Physician: Danny Freed DO  Cardiologist: Micki oRy  Surgeon: Danny Freed  Dx:   Encounter Diagnosis   Name Primary?     S/P TAVR (transcatheter aortic valve replacement)        Date of onset: 9/3/2020  Cultural needs: none    Height:    Wt Readings from Last 1 Encounters:   20 122 kg (268 lb 8 oz)      Weight:   Ht Readings from Last 1 Encounters:   20 5' 5" (1 651 m)     Medical History:   Past Medical History:   Diagnosis Date    Anemia     Arthritis     Cancer ([facility] )     rectal    Chronic lower back pain     Chronic pain disorder     back pain    Colon cancer ([facility] ) 2018    Diabetes mellitus ([facility] )     Endometrial cancer ([facility] ) 2018    GERD (gastroesophageal reflux disease)     History of chemotherapy     History of MRSA infection     Morbid obesity ([facility] )     Ovarian cancer ([facility] ) 2018    Rectal cancer ([facility] )     Spinal stenosis     Systolic murmur     Unsteady gait     uses walker    Wears dentures     Wears glasses          Physical Limitations: chronic discomfort in low back, fatigue and dyspnea, ambulates with assist of walker    Risk Factors   Cholesterol: Yes  Smoking: Former user  HTN: Yes  DM: Type 2   Pt does not check BS  Obesity: Yes   Inactivity: Yes  Stress:  perceived  stress: 2/10   Stressors:sons lifestyle   Goals for Stress Management:prayer, relaxation breathing, family and friends support    Family History:  Family History   Adopted: Yes   Problem Relation Age of Onset    Leukemia Mother     Heart disease Father     Coronary artery disease Father     Diabetes Father     No Known Problems Sister     No Known Problems Brother     Diabetes Son     No Known Problems Brother     No Known Problems Brother     No Known Problems Sister     No Known Problems Sister Allergies: Tramadol  ETOH:   Social History     Substance and Sexual Activity   Alcohol Use Not Currently         Current Medications:   Current Outpatient Medications   Medication Sig Dispense Refill    acetaminophen (TYLENOL) 650 mg CR tablet Take 1 tablet (650 mg total) by mouth every 8 (eight) hours as needed for mild pain 30 tablet 0    albuterol (2 5 mg/3 mL) 0 083 % nebulizer solution Take 1 vial (2 5 mg total) by nebulization every 4 (four) hours as needed for wheezing or shortness of breath 75 vial 1    amiodarone 200 mg tablet Take 1 tablet (200 mg total) by mouth daily with breakfast 90 tablet 3    anastrozole (ARIMIDEX) 1 mg tablet Take 1 tablet (1 mg total) by mouth daily 90 tablet 2    apixaban (ELIQUIS) 5 mg Take 1 tablet (5 mg total) by mouth 2 (two) times a day 180 tablet 2    aspirin 81 mg chewable tablet Chew 1 tablet (81 mg total) daily 30 tablet 2    BIOTIN PO Take 1 tablet by mouth daily      Calcium-Vitamin D 500-125 MG-UNIT TABS Take 1 tablet by mouth 2 (two) times a day        cyclobenzaprine (FLEXERIL) 5 mg tablet Take 1 tablet (5 mg total) by mouth 3 (three) times a day as needed for muscle spasms for up to 10 days Do not take before driving (Patient not taking: Reported on 8/21/2020) 30 tablet 0    ferrous sulfate 324 (65 Fe) mg Take 1 tablet (324 mg total) by mouth daily before breakfast 90 tablet 0    metoprolol tartrate (LOPRESSOR) 25 mg tablet Take 0 5 tablets (12 5 mg total) by mouth every 12 (twelve) hours 30 tablet 2    Multiple Vitamins-Minerals (BARIATRIC FUSION) CHEW Chew 1 tablet daily        omeprazole (PriLOSEC) 20 mg delayed release capsule TAKE 1 CAPSULE (20 MG TOTAL) BY MOUTH DAILY 90 capsule 0    oxyCODONE (ROXICODONE) 10 MG TABS Take 10 mg by mouth every 6 (six) hours as needed        potassium chloride (K-DUR,KLOR-CON) 10 mEq tablet Take 1 tablet (10 mEq total) by mouth daily for 5 days (Patient not taking: Reported on 9/25/2020) 5 tablet 2    rosuvastatin (CRESTOR) 5 mg tablet Take 1 tablet (5 mg total) by mouth daily (Patient taking differently: Take 5 mg by mouth daily after dinner ) 30 tablet 11    torsemide (DEMADEX) 10 mg tablet Take 1 tablet (10 mg total) by mouth daily for 5 days (Patient not taking: Reported on 9/25/2020) 5 tablet 2     No current facility-administered medications for this visit  Functional Status Prior to Diagnosis for Treatment   Occupation: unemployed, disabled  Recreation: watch TV, computer brain games, spends time with 3 gradchildren  ADLs: able to perform self-care  [place]: able to perform self-care  Exercise: none  Other: bilateral hip replacement, Colon Cancer, chemotherapy, AVR, then fall, chronic discomfort of back fatigue and short of breath with minimal exertion    Current Functional Status  Occupation: unemployed, disabled  Recreation: watch TV, computer brain games, spends time with 3 gradchildren  ADLs: able to perform self-care  [place]: able to perform self-care  Exercise: none  Other: bilateral hip replacement, Colon Cancer, chemotherapy, AVR, then fall, chronic discomfort of back fatigue and short of breath with minimal exertion  Patient Specific Goals: Increase endurance to walk a mile    Short Term Program Goals: increased strength and endurance to walk a mile    Long Term Goals: increased maximal walking duration    Ability to reach goals/rehabilitation potential:  Very Good     Projected return to function: 8-12 weeks  Objective tests: sub-max TM ETT  6 MWT  sit to stand  arm curl      Nutritional   Reviewed details of Rate your Plate  Discussed key elements of heart healthy eating  Reviewed patient goals for dietary modifications and their clinical implications  Reviewed most recent lipid profile       Goals for dietary modification: follow up with dietician this week, consume a heart healthy diet, decrease grazing food      Emotional/Social  Patient reports he/she is coping well with good social support and denies depression or anxiety  Reports sufficient emotional support    SOCIAL SUPPORT NETWORK    Marital status: single    Rate 1-5:    Marriage: N/A   Family: 2   Financial: 0   Relationships: 0   Spirituality: 0   Intellectual: 0      Domestic Violence Screening: yes completed, she states she feels safe and free of harm    Comments: initial evaluation completed

## 2020-09-30 ENCOUNTER — TELEPHONE (OUTPATIENT)
Dept: BARIATRICS | Facility: CLINIC | Age: 65
End: 2020-09-30

## 2020-09-30 PROBLEM — Z48.815 ENCOUNTER FOR SURGICAL AFTERCARE FOLLOWING SURGERY OF DIGESTIVE SYSTEM: Status: ACTIVE | Noted: 2018-02-09

## 2020-09-30 NOTE — TELEPHONE ENCOUNTER
COVID Pre-Visit Screening     1  Is this a family member screening? No  2  Have you traveled outside of your state in the past 2 weeks? Yes: Quarantine recommendations for [place] or [place] were reviewed with patient and patient DOES NOT meet criteria for quarantine: No  3  Do you presently have a fever or flu-like symptoms? No  4  Do you have symptoms of an upper respiratory infection like runny nose, sore throat, or cough? No  5  Are you suffering from new headache that you have not had in the past?  No  6  Do you have/have you experienced any new shortness of breath recently? No  7  Do you have any new diarrhea, nausea or vomiting? No  8  Have you been in contact with anyone who has been sick or diagnosed with COVID-19? No  9  Do you have any new loss of taste or smell? No  10  Are you able to wear a mask without a valve for the entire visit?  Yes

## 2020-10-01 ENCOUNTER — OFFICE VISIT (OUTPATIENT)
Dept: BARIATRICS | Facility: CLINIC | Age: 65
End: 2020-10-01
Payer: MEDICARE

## 2020-10-01 VITALS
WEIGHT: 271 LBS | TEMPERATURE: 97.3 F | HEIGHT: 65 IN | BODY MASS INDEX: 45.15 KG/M2 | HEART RATE: 61 BPM | DIASTOLIC BLOOD PRESSURE: 80 MMHG | SYSTOLIC BLOOD PRESSURE: 120 MMHG

## 2020-10-01 DIAGNOSIS — Z48.815 ENCOUNTER FOR SURGICAL AFTERCARE FOLLOWING SURGERY OF DIGESTIVE SYSTEM: Primary | ICD-10-CM

## 2020-10-01 DIAGNOSIS — Z85.048 HISTORY OF RECTAL CANCER: ICD-10-CM

## 2020-10-01 DIAGNOSIS — Z98.84 WEIGHT GAIN FOLLOWING GASTRIC BYPASS SURGERY: ICD-10-CM

## 2020-10-01 DIAGNOSIS — Z90.3 POSTGASTRECTOMY MALABSORPTION: ICD-10-CM

## 2020-10-01 DIAGNOSIS — E61.1 LOW IRON: ICD-10-CM

## 2020-10-01 DIAGNOSIS — E78.5 HYPERLIPIDEMIA, UNSPECIFIED HYPERLIPIDEMIA TYPE: ICD-10-CM

## 2020-10-01 DIAGNOSIS — I48.91 ATRIAL FIBRILLATION WITH RAPID VENTRICULAR RESPONSE (HCC): ICD-10-CM

## 2020-10-01 DIAGNOSIS — R63.5 WEIGHT GAIN FOLLOWING GASTRIC BYPASS SURGERY: ICD-10-CM

## 2020-10-01 DIAGNOSIS — K91.2 POSTGASTRECTOMY MALABSORPTION: ICD-10-CM

## 2020-10-01 PROCEDURE — 99214 OFFICE O/P EST MOD 30 MIN: CPT | Performed by: PHYSICIAN ASSISTANT

## 2020-10-02 ENCOUNTER — APPOINTMENT (OUTPATIENT)
Dept: LAB | Facility: CLINIC | Age: 65
End: 2020-10-02
Payer: MEDICARE

## 2020-10-02 ENCOUNTER — CLINICAL SUPPORT (OUTPATIENT)
Dept: CARDIAC REHAB | Facility: CLINIC | Age: 65
End: 2020-10-02
Payer: MEDICARE

## 2020-10-02 DIAGNOSIS — E61.1 LOW IRON: ICD-10-CM

## 2020-10-02 DIAGNOSIS — E78.5 HYPERLIPIDEMIA, UNSPECIFIED HYPERLIPIDEMIA TYPE: ICD-10-CM

## 2020-10-02 DIAGNOSIS — K91.2 POSTGASTRECTOMY MALABSORPTION: ICD-10-CM

## 2020-10-02 DIAGNOSIS — Z95.2 S/P AVR (AORTIC VALVE REPLACEMENT): ICD-10-CM

## 2020-10-02 DIAGNOSIS — Z90.3 POSTGASTRECTOMY MALABSORPTION: ICD-10-CM

## 2020-10-02 LAB
25(OH)D3 SERPL-MCNC: 22.5 NG/ML (ref 30–100)
ALBUMIN SERPL BCP-MCNC: 3.3 G/DL (ref 3.5–5)
ALP SERPL-CCNC: 80 U/L (ref 46–116)
ALT SERPL W P-5'-P-CCNC: 11 U/L (ref 12–78)
ANION GAP SERPL CALCULATED.3IONS-SCNC: 5 MMOL/L (ref 4–13)
AST SERPL W P-5'-P-CCNC: 10 U/L (ref 5–45)
BASOPHILS # BLD AUTO: 0.04 THOUSANDS/ΜL (ref 0–0.1)
BASOPHILS NFR BLD AUTO: 1 % (ref 0–1)
BILIRUB SERPL-MCNC: 0.65 MG/DL (ref 0.2–1)
BUN SERPL-MCNC: 22 MG/DL (ref 5–25)
CALCIUM ALBUM COR SERPL-MCNC: 10.1 MG/DL (ref 8.3–10.1)
CALCIUM SERPL-MCNC: 9.5 MG/DL (ref 8.3–10.1)
CHLORIDE SERPL-SCNC: 108 MMOL/L (ref 100–108)
CHOLEST SERPL-MCNC: 155 MG/DL (ref 50–200)
CO2 SERPL-SCNC: 28 MMOL/L (ref 21–32)
CREAT SERPL-MCNC: 0.99 MG/DL (ref 0.6–1.3)
EOSINOPHIL # BLD AUTO: 0.12 THOUSAND/ΜL (ref 0–0.61)
EOSINOPHIL NFR BLD AUTO: 3 % (ref 0–6)
ERYTHROCYTE [DISTWIDTH] IN BLOOD BY AUTOMATED COUNT: 14.7 % (ref 11.6–15.1)
EST. AVERAGE GLUCOSE BLD GHB EST-MCNC: 120 MG/DL
FERRITIN SERPL-MCNC: 38 NG/ML (ref 8–388)
FOLATE SERPL-MCNC: 14.2 NG/ML (ref 3.1–17.5)
GFR SERPL CREATININE-BSD FRML MDRD: 60 ML/MIN/1.73SQ M
GLUCOSE P FAST SERPL-MCNC: 96 MG/DL (ref 65–99)
HBA1C MFR BLD: 5.8 %
HCT VFR BLD AUTO: 42.6 % (ref 34.8–46.1)
HDLC SERPL-MCNC: 54 MG/DL
HGB BLD-MCNC: 13 G/DL (ref 11.5–15.4)
IMM GRANULOCYTES # BLD AUTO: 0.06 THOUSAND/UL (ref 0–0.2)
IMM GRANULOCYTES NFR BLD AUTO: 1 % (ref 0–2)
IRON SATN MFR SERPL: 15 %
IRON SERPL-MCNC: 53 UG/DL (ref 50–170)
LDLC SERPL CALC-MCNC: 74 MG/DL (ref 0–100)
LYMPHOCYTES # BLD AUTO: 0.81 THOUSANDS/ΜL (ref 0.6–4.47)
LYMPHOCYTES NFR BLD AUTO: 18 % (ref 14–44)
MCH RBC QN AUTO: 25.8 PG (ref 26.8–34.3)
MCHC RBC AUTO-ENTMCNC: 30.5 G/DL (ref 31.4–37.4)
MCV RBC AUTO: 85 FL (ref 82–98)
MONOCYTES # BLD AUTO: 0.44 THOUSAND/ΜL (ref 0.17–1.22)
MONOCYTES NFR BLD AUTO: 10 % (ref 4–12)
NEUTROPHILS # BLD AUTO: 3.16 THOUSANDS/ΜL (ref 1.85–7.62)
NEUTS SEG NFR BLD AUTO: 67 % (ref 43–75)
NONHDLC SERPL-MCNC: 101 MG/DL
NRBC BLD AUTO-RTO: 0 /100 WBCS
PLATELET # BLD AUTO: 138 THOUSANDS/UL (ref 149–390)
PMV BLD AUTO: 10.5 FL (ref 8.9–12.7)
POTASSIUM SERPL-SCNC: 4.2 MMOL/L (ref 3.5–5.3)
PROT SERPL-MCNC: 7 G/DL (ref 6.4–8.2)
PTH-INTACT SERPL-MCNC: 142 PG/ML (ref 18.4–80.1)
RBC # BLD AUTO: 5.04 MILLION/UL (ref 3.81–5.12)
SODIUM SERPL-SCNC: 141 MMOL/L (ref 136–145)
TIBC SERPL-MCNC: 358 UG/DL (ref 250–450)
TRIGL SERPL-MCNC: 134 MG/DL
VIT B12 SERPL-MCNC: 262 PG/ML (ref 100–900)
WBC # BLD AUTO: 4.63 THOUSAND/UL (ref 4.31–10.16)

## 2020-10-02 PROCEDURE — 84630 ASSAY OF ZINC: CPT

## 2020-10-02 PROCEDURE — 83550 IRON BINDING TEST: CPT

## 2020-10-02 PROCEDURE — 36415 COLL VENOUS BLD VENIPUNCTURE: CPT

## 2020-10-02 PROCEDURE — 80053 COMPREHEN METABOLIC PANEL: CPT

## 2020-10-02 PROCEDURE — 84425 ASSAY OF VITAMIN B-1: CPT

## 2020-10-02 PROCEDURE — 93798 PHYS/QHP OP CAR RHAB W/ECG: CPT

## 2020-10-02 PROCEDURE — 84590 ASSAY OF VITAMIN A: CPT

## 2020-10-02 PROCEDURE — 80061 LIPID PANEL: CPT

## 2020-10-02 PROCEDURE — 83036 HEMOGLOBIN GLYCOSYLATED A1C: CPT

## 2020-10-02 PROCEDURE — 82746 ASSAY OF FOLIC ACID SERUM: CPT

## 2020-10-02 PROCEDURE — 82728 ASSAY OF FERRITIN: CPT

## 2020-10-02 PROCEDURE — 83970 ASSAY OF PARATHORMONE: CPT

## 2020-10-02 PROCEDURE — 82607 VITAMIN B-12: CPT

## 2020-10-02 PROCEDURE — 85025 COMPLETE CBC W/AUTO DIFF WBC: CPT

## 2020-10-02 PROCEDURE — 82306 VITAMIN D 25 HYDROXY: CPT

## 2020-10-02 PROCEDURE — 83540 ASSAY OF IRON: CPT

## 2020-10-05 ENCOUNTER — TELEPHONE (OUTPATIENT)
Dept: BARIATRICS | Facility: CLINIC | Age: 65
End: 2020-10-05

## 2020-10-05 ENCOUNTER — CLINICAL SUPPORT (OUTPATIENT)
Dept: CARDIAC REHAB | Facility: CLINIC | Age: 65
End: 2020-10-05
Payer: MEDICARE

## 2020-10-05 DIAGNOSIS — Z95.2 S/P TAVR (TRANSCATHETER AORTIC VALVE REPLACEMENT): ICD-10-CM

## 2020-10-05 DIAGNOSIS — E66.9 OBESITY, CLASS II, BMI 35-39.9: ICD-10-CM

## 2020-10-05 DIAGNOSIS — K91.2 POSTGASTRECTOMY MALABSORPTION: Primary | ICD-10-CM

## 2020-10-05 DIAGNOSIS — Z90.3 POSTGASTRECTOMY MALABSORPTION: Primary | ICD-10-CM

## 2020-10-05 DIAGNOSIS — Z98.84 WEIGHT GAIN FOLLOWING GASTRIC BYPASS SURGERY: ICD-10-CM

## 2020-10-05 DIAGNOSIS — R79.89 HIGH SERUM PARATHYROID HORMONE (PTH): ICD-10-CM

## 2020-10-05 DIAGNOSIS — E61.1 LOW IRON: ICD-10-CM

## 2020-10-05 DIAGNOSIS — Z95.2 S/P AVR: ICD-10-CM

## 2020-10-05 DIAGNOSIS — R63.5 WEIGHT GAIN FOLLOWING GASTRIC BYPASS SURGERY: ICD-10-CM

## 2020-10-05 DIAGNOSIS — E53.8 LOW VITAMIN B12 LEVEL: ICD-10-CM

## 2020-10-05 DIAGNOSIS — R73.03 PRE-DIABETES: ICD-10-CM

## 2020-10-05 DIAGNOSIS — E55.9 VITAMIN D INSUFFICIENCY: ICD-10-CM

## 2020-10-05 PROCEDURE — 93798 PHYS/QHP OP CAR RHAB W/ECG: CPT

## 2020-10-05 RX ORDER — ERGOCALCIFEROL 1.25 MG/1
50000 CAPSULE ORAL
Qty: 20 CAPSULE | Refills: 0 | Status: SHIPPED | OUTPATIENT
Start: 2020-10-05

## 2020-10-06 ENCOUNTER — OFFICE VISIT (OUTPATIENT)
Dept: BARIATRICS | Facility: CLINIC | Age: 65
End: 2020-10-06

## 2020-10-06 VITALS — TEMPERATURE: 97.2 F | BODY MASS INDEX: 45.36 KG/M2 | WEIGHT: 272.6 LBS

## 2020-10-06 VITALS — WEIGHT: 272.6 LBS | HEIGHT: 64 IN | BODY MASS INDEX: 46.54 KG/M2

## 2020-10-06 DIAGNOSIS — R63.5 WEIGHT GAIN FOLLOWING GASTRIC BYPASS SURGERY: Primary | ICD-10-CM

## 2020-10-06 DIAGNOSIS — Z98.84 WEIGHT GAIN FOLLOWING GASTRIC BYPASS SURGERY: Primary | ICD-10-CM

## 2020-10-06 DIAGNOSIS — R63.5 ABNORMAL WEIGHT GAIN: ICD-10-CM

## 2020-10-06 DIAGNOSIS — K91.2 POSTSURGICAL MALABSORPTION: Primary | ICD-10-CM

## 2020-10-06 LAB
VIT B1 BLD-SCNC: 142.3 NMOL/L (ref 66.5–200)
ZINC SERPL-MCNC: 103 UG/DL (ref 56–134)

## 2020-10-06 PROCEDURE — RECHECK

## 2020-10-07 ENCOUNTER — OFFICE VISIT (OUTPATIENT)
Dept: CARDIOLOGY CLINIC | Facility: CLINIC | Age: 65
End: 2020-10-07
Payer: MEDICARE

## 2020-10-07 ENCOUNTER — CLINICAL SUPPORT (OUTPATIENT)
Dept: CARDIAC REHAB | Facility: CLINIC | Age: 65
End: 2020-10-07
Payer: MEDICARE

## 2020-10-07 VITALS
HEIGHT: 64 IN | SYSTOLIC BLOOD PRESSURE: 126 MMHG | HEART RATE: 80 BPM | WEIGHT: 273 LBS | BODY MASS INDEX: 46.61 KG/M2 | DIASTOLIC BLOOD PRESSURE: 72 MMHG | OXYGEN SATURATION: 98 %

## 2020-10-07 DIAGNOSIS — I25.10 CORONARY ARTERY DISEASE INVOLVING NATIVE HEART WITHOUT ANGINA PECTORIS, UNSPECIFIED VESSEL OR LESION TYPE: ICD-10-CM

## 2020-10-07 DIAGNOSIS — I05.0 MODERATE MITRAL STENOSIS: ICD-10-CM

## 2020-10-07 DIAGNOSIS — I48.91 ATRIAL FIBRILLATION WITH RAPID VENTRICULAR RESPONSE (HCC): ICD-10-CM

## 2020-10-07 DIAGNOSIS — I10 ESSENTIAL HYPERTENSION: ICD-10-CM

## 2020-10-07 DIAGNOSIS — E78.5 HYPERLIPIDEMIA, UNSPECIFIED HYPERLIPIDEMIA TYPE: ICD-10-CM

## 2020-10-07 DIAGNOSIS — I35.0 SEVERE AORTIC STENOSIS BY PRIOR ECHOCARDIOGRAM: ICD-10-CM

## 2020-10-07 DIAGNOSIS — Z95.2 S/P AVR (AORTIC VALVE REPLACEMENT): ICD-10-CM

## 2020-10-07 DIAGNOSIS — I70.209 ATHEROSCLEROTIC PERIPHERAL VASCULAR DISEASE (HCC): ICD-10-CM

## 2020-10-07 DIAGNOSIS — I35.0 AORTIC STENOSIS, SEVERE: Primary | ICD-10-CM

## 2020-10-07 DIAGNOSIS — I50.32 CHRONIC DIASTOLIC HF (HEART FAILURE) (HCC): ICD-10-CM

## 2020-10-07 DIAGNOSIS — E66.01 MORBID OBESITY DUE TO EXCESS CALORIES (HCC): ICD-10-CM

## 2020-10-07 PROCEDURE — 99215 OFFICE O/P EST HI 40 MIN: CPT | Performed by: NURSE PRACTITIONER

## 2020-10-07 PROCEDURE — 93798 PHYS/QHP OP CAR RHAB W/ECG: CPT

## 2020-10-07 RX ORDER — AMIODARONE HYDROCHLORIDE 200 MG/1
TABLET ORAL
Qty: 90 TABLET | Refills: 3
Start: 2020-10-07 | End: 2021-06-21 | Stop reason: ALTCHOICE

## 2020-10-07 RX ORDER — METOPROLOL SUCCINATE 25 MG/1
25 TABLET, EXTENDED RELEASE ORAL DAILY
Qty: 90 TABLET | Refills: 1 | Status: SHIPPED | OUTPATIENT
Start: 2020-10-07 | End: 2021-04-30 | Stop reason: SDUPTHER

## 2020-10-08 LAB — VIT A SERPL-MCNC: 58 UG/DL (ref 22–69.5)

## 2020-10-09 ENCOUNTER — CLINICAL SUPPORT (OUTPATIENT)
Dept: CARDIAC REHAB | Facility: CLINIC | Age: 65
End: 2020-10-09
Payer: MEDICARE

## 2020-10-09 DIAGNOSIS — Z95.2 S/P AVR (AORTIC VALVE REPLACEMENT): ICD-10-CM

## 2020-10-09 PROCEDURE — 93798 PHYS/QHP OP CAR RHAB W/ECG: CPT

## 2020-10-12 ENCOUNTER — CLINICAL SUPPORT (OUTPATIENT)
Dept: CARDIAC REHAB | Facility: CLINIC | Age: 65
End: 2020-10-12
Payer: MEDICARE

## 2020-10-12 DIAGNOSIS — K28.9 MARGINAL ULCER: ICD-10-CM

## 2020-10-12 DIAGNOSIS — C56.9 MALIGNANT NEOPLASM OF OVARY, UNSPECIFIED LATERALITY (HCC): ICD-10-CM

## 2020-10-12 DIAGNOSIS — Z95.2 S/P AVR: ICD-10-CM

## 2020-10-12 PROCEDURE — 93798 PHYS/QHP OP CAR RHAB W/ECG: CPT

## 2020-10-12 RX ORDER — OMEPRAZOLE 20 MG/1
20 CAPSULE, DELAYED RELEASE ORAL DAILY
Qty: 90 CAPSULE | Refills: 3 | Status: CANCELLED | OUTPATIENT
Start: 2020-10-12

## 2020-10-13 DIAGNOSIS — K28.9 MARGINAL ULCER: ICD-10-CM

## 2020-10-13 RX ORDER — ANASTROZOLE 1 MG/1
1 TABLET ORAL DAILY
Qty: 90 TABLET | Refills: 3 | OUTPATIENT
Start: 2020-10-13

## 2020-10-13 RX ORDER — OMEPRAZOLE 20 MG/1
20 CAPSULE, DELAYED RELEASE ORAL DAILY
Qty: 90 CAPSULE | Refills: 0 | Status: SHIPPED | OUTPATIENT
Start: 2020-10-13 | End: 2021-01-08 | Stop reason: SDUPTHER

## 2020-10-14 ENCOUNTER — CLINICAL SUPPORT (OUTPATIENT)
Dept: CARDIAC REHAB | Facility: CLINIC | Age: 65
End: 2020-10-14
Payer: MEDICARE

## 2020-10-14 DIAGNOSIS — Z95.2 S/P TAVR (TRANSCATHETER AORTIC VALVE REPLACEMENT): ICD-10-CM

## 2020-10-14 PROCEDURE — 93798 PHYS/QHP OP CAR RHAB W/ECG: CPT

## 2020-10-16 ENCOUNTER — CLINICAL SUPPORT (OUTPATIENT)
Dept: CARDIAC REHAB | Facility: CLINIC | Age: 65
End: 2020-10-16
Payer: MEDICARE

## 2020-10-16 ENCOUNTER — TELEPHONE (OUTPATIENT)
Dept: BARIATRICS | Facility: CLINIC | Age: 65
End: 2020-10-16

## 2020-10-16 DIAGNOSIS — Z95.2 S/P AVR: ICD-10-CM

## 2020-10-16 PROCEDURE — 93798 PHYS/QHP OP CAR RHAB W/ECG: CPT

## 2020-10-19 ENCOUNTER — TELEMEDICINE (OUTPATIENT)
Dept: RADIATION ONCOLOGY | Facility: HOSPITAL | Age: 65
End: 2020-10-19
Attending: RADIOLOGY
Payer: MEDICARE

## 2020-10-19 ENCOUNTER — TELEPHONE (OUTPATIENT)
Dept: RADIATION ONCOLOGY | Facility: HOSPITAL | Age: 65
End: 2020-10-19

## 2020-10-19 ENCOUNTER — CLINICAL SUPPORT (OUTPATIENT)
Dept: CARDIAC REHAB | Facility: CLINIC | Age: 65
End: 2020-10-19
Payer: MEDICARE

## 2020-10-19 DIAGNOSIS — Z95.2 S/P AVR: ICD-10-CM

## 2020-10-19 DIAGNOSIS — Z85.048 HISTORY OF RECTAL CANCER: Primary | ICD-10-CM

## 2020-10-19 PROCEDURE — 99211 OFF/OP EST MAY X REQ PHY/QHP: CPT | Performed by: RADIOLOGY

## 2020-10-19 PROCEDURE — 93798 PHYS/QHP OP CAR RHAB W/ECG: CPT

## 2020-10-20 ENCOUNTER — OFFICE VISIT (OUTPATIENT)
Dept: BARIATRICS | Facility: CLINIC | Age: 65
End: 2020-10-20

## 2020-10-20 VITALS — TEMPERATURE: 96.5 F | BODY MASS INDEX: 45.85 KG/M2 | WEIGHT: 268.6 LBS | HEIGHT: 64 IN

## 2020-10-20 VITALS — TEMPERATURE: 96.5 F

## 2020-10-20 DIAGNOSIS — R63.5 ABNORMAL WEIGHT GAIN: ICD-10-CM

## 2020-10-20 DIAGNOSIS — Z98.84 WEIGHT GAIN FOLLOWING GASTRIC BYPASS SURGERY: Primary | ICD-10-CM

## 2020-10-20 DIAGNOSIS — K91.2 POSTSURGICAL MALABSORPTION: Primary | ICD-10-CM

## 2020-10-20 DIAGNOSIS — R63.5 WEIGHT GAIN FOLLOWING GASTRIC BYPASS SURGERY: Primary | ICD-10-CM

## 2020-10-20 PROCEDURE — RECHECK

## 2020-10-21 ENCOUNTER — CLINICAL SUPPORT (OUTPATIENT)
Dept: CARDIAC REHAB | Facility: CLINIC | Age: 65
End: 2020-10-21
Payer: MEDICARE

## 2020-10-21 DIAGNOSIS — Z95.2 S/P AVR: ICD-10-CM

## 2020-10-21 PROCEDURE — 93798 PHYS/QHP OP CAR RHAB W/ECG: CPT

## 2020-11-07 ENCOUNTER — HOSPITAL ENCOUNTER (EMERGENCY)
Facility: HOSPITAL | Age: 65
Discharge: HOME | End: 2020-11-07
Attending: EMERGENCY MEDICINE | Admitting: EMERGENCY MEDICINE
Payer: MEDICARE

## 2020-11-07 ENCOUNTER — APPOINTMENT (EMERGENCY)
Dept: RADIOLOGY | Facility: HOSPITAL | Age: 65
End: 2020-11-07
Payer: MEDICARE

## 2020-11-07 VITALS
OXYGEN SATURATION: 100 % | HEIGHT: 65 IN | WEIGHT: 270 LBS | SYSTOLIC BLOOD PRESSURE: 117 MMHG | TEMPERATURE: 97.7 F | BODY MASS INDEX: 44.98 KG/M2 | RESPIRATION RATE: 18 BRPM | DIASTOLIC BLOOD PRESSURE: 63 MMHG | HEART RATE: 52 BPM

## 2020-11-07 DIAGNOSIS — R42 VERTIGO: Primary | ICD-10-CM

## 2020-11-07 LAB
ALBUMIN SERPL BCP-MCNC: 3.1 G/DL (ref 3.5–5)
ALP SERPL-CCNC: 74 U/L (ref 46–116)
ALT SERPL W P-5'-P-CCNC: 14 U/L (ref 12–78)
ANION GAP SERPL CALCULATED.3IONS-SCNC: 6 MMOL/L (ref 4–13)
AST SERPL W P-5'-P-CCNC: 14 U/L (ref 5–45)
BASOPHILS # BLD AUTO: 0.02 THOUSANDS/ΜL (ref 0–0.1)
BASOPHILS NFR BLD AUTO: 0 % (ref 0–1)
BILIRUB SERPL-MCNC: 0.5 MG/DL (ref 0.2–1)
BUN SERPL-MCNC: 21 MG/DL (ref 5–25)
CALCIUM ALBUM COR SERPL-MCNC: 9.5 MG/DL (ref 8.3–10.1)
CALCIUM SERPL-MCNC: 8.8 MG/DL (ref 8.3–10.1)
CHLORIDE SERPL-SCNC: 104 MMOL/L (ref 100–108)
CO2 SERPL-SCNC: 27 MMOL/L (ref 21–32)
CREAT SERPL-MCNC: 0.92 MG/DL (ref 0.6–1.3)
EOSINOPHIL # BLD AUTO: 0.1 THOUSAND/ΜL (ref 0–0.61)
EOSINOPHIL NFR BLD AUTO: 2 % (ref 0–6)
ERYTHROCYTE [DISTWIDTH] IN BLOOD BY AUTOMATED COUNT: 14.8 % (ref 11.6–15.1)
GFR SERPL CREATININE-BSD FRML MDRD: 66 ML/MIN/1.73SQ M
GLUCOSE SERPL-MCNC: 106 MG/DL (ref 65–140)
HCT VFR BLD AUTO: 44.1 % (ref 34.8–46.1)
HGB BLD-MCNC: 13.4 G/DL (ref 11.5–15.4)
IMM GRANULOCYTES # BLD AUTO: 0.04 THOUSAND/UL (ref 0–0.2)
IMM GRANULOCYTES NFR BLD AUTO: 1 % (ref 0–2)
LYMPHOCYTES # BLD AUTO: 0.96 THOUSANDS/ΜL (ref 0.6–4.47)
LYMPHOCYTES NFR BLD AUTO: 20 % (ref 14–44)
MAGNESIUM SERPL-MCNC: 1.9 MG/DL (ref 1.6–2.6)
MCH RBC QN AUTO: 25.5 PG (ref 26.8–34.3)
MCHC RBC AUTO-ENTMCNC: 30.4 G/DL (ref 31.4–37.4)
MCV RBC AUTO: 84 FL (ref 82–98)
MONOCYTES # BLD AUTO: 0.48 THOUSAND/ΜL (ref 0.17–1.22)
MONOCYTES NFR BLD AUTO: 10 % (ref 4–12)
NEUTROPHILS # BLD AUTO: 3.26 THOUSANDS/ΜL (ref 1.85–7.62)
NEUTS SEG NFR BLD AUTO: 67 % (ref 43–75)
NRBC BLD AUTO-RTO: 0 /100 WBCS
NT-PROBNP SERPL-MCNC: 593 PG/ML
PLATELET # BLD AUTO: 126 THOUSANDS/UL (ref 149–390)
PMV BLD AUTO: 9.6 FL (ref 8.9–12.7)
POTASSIUM SERPL-SCNC: 4.3 MMOL/L (ref 3.5–5.3)
PROT SERPL-MCNC: 6.8 G/DL (ref 6.4–8.2)
RBC # BLD AUTO: 5.26 MILLION/UL (ref 3.81–5.12)
SODIUM SERPL-SCNC: 137 MMOL/L (ref 136–145)
TROPONIN I SERPL-MCNC: <0.02 NG/ML
TSH SERPL DL<=0.05 MIU/L-ACNC: 1.52 UIU/ML (ref 0.36–3.74)
WBC # BLD AUTO: 4.86 THOUSAND/UL (ref 4.31–10.16)

## 2020-11-07 PROCEDURE — 84443 ASSAY THYROID STIM HORMONE: CPT | Performed by: EMERGENCY MEDICINE

## 2020-11-07 PROCEDURE — 85025 COMPLETE CBC W/AUTO DIFF WBC: CPT | Performed by: EMERGENCY MEDICINE

## 2020-11-07 PROCEDURE — 70498 CT ANGIOGRAPHY NECK: CPT

## 2020-11-07 PROCEDURE — 70496 CT ANGIOGRAPHY HEAD: CPT

## 2020-11-07 PROCEDURE — 96361 HYDRATE IV INFUSION ADD-ON: CPT

## 2020-11-07 PROCEDURE — 99285 EMERGENCY DEPT VISIT HI MDM: CPT

## 2020-11-07 PROCEDURE — 96365 THER/PROPH/DIAG IV INF INIT: CPT

## 2020-11-07 PROCEDURE — 96366 THER/PROPH/DIAG IV INF ADDON: CPT

## 2020-11-07 PROCEDURE — 96375 TX/PRO/DX INJ NEW DRUG ADDON: CPT

## 2020-11-07 PROCEDURE — 83880 ASSAY OF NATRIURETIC PEPTIDE: CPT | Performed by: EMERGENCY MEDICINE

## 2020-11-07 PROCEDURE — 93005 ELECTROCARDIOGRAM TRACING: CPT

## 2020-11-07 PROCEDURE — G1004 CDSM NDSC: HCPCS

## 2020-11-07 PROCEDURE — 71045 X-RAY EXAM CHEST 1 VIEW: CPT

## 2020-11-07 PROCEDURE — 80053 COMPREHEN METABOLIC PANEL: CPT | Performed by: EMERGENCY MEDICINE

## 2020-11-07 PROCEDURE — 84484 ASSAY OF TROPONIN QUANT: CPT | Performed by: EMERGENCY MEDICINE

## 2020-11-07 PROCEDURE — 83735 ASSAY OF MAGNESIUM: CPT | Performed by: EMERGENCY MEDICINE

## 2020-11-07 PROCEDURE — 36415 COLL VENOUS BLD VENIPUNCTURE: CPT | Performed by: EMERGENCY MEDICINE

## 2020-11-07 PROCEDURE — 99284 EMERGENCY DEPT VISIT MOD MDM: CPT | Performed by: EMERGENCY MEDICINE

## 2020-11-07 RX ORDER — MECLIZINE HYDROCHLORIDE 25 MG/1
25 TABLET ORAL EVERY 8 HOURS PRN
Qty: 30 TABLET | Refills: 0 | Status: SHIPPED | OUTPATIENT
Start: 2020-11-07 | End: 2022-07-28

## 2020-11-07 RX ORDER — MECLIZINE HYDROCHLORIDE 25 MG/1
50 TABLET ORAL ONCE
Status: COMPLETED | OUTPATIENT
Start: 2020-11-07 | End: 2020-11-07

## 2020-11-07 RX ORDER — MAGNESIUM SULFATE HEPTAHYDRATE 40 MG/ML
2 INJECTION, SOLUTION INTRAVENOUS ONCE
Status: COMPLETED | OUTPATIENT
Start: 2020-11-07 | End: 2020-11-07

## 2020-11-07 RX ORDER — ONDANSETRON 2 MG/ML
4 INJECTION INTRAMUSCULAR; INTRAVENOUS ONCE
Status: COMPLETED | OUTPATIENT
Start: 2020-11-07 | End: 2020-11-07

## 2020-11-07 RX ADMIN — IOHEXOL 85 ML: 350 INJECTION, SOLUTION INTRAVENOUS at 10:33

## 2020-11-07 RX ADMIN — ONDANSETRON 4 MG: 2 INJECTION INTRAMUSCULAR; INTRAVENOUS at 09:56

## 2020-11-07 RX ADMIN — SODIUM CHLORIDE 1000 ML: 0.9 INJECTION, SOLUTION INTRAVENOUS at 09:22

## 2020-11-07 RX ADMIN — MECLIZINE HYDROCHLORIDE 50 MG: 25 TABLET ORAL at 09:55

## 2020-11-07 RX ADMIN — MAGNESIUM SULFATE IN WATER 2 G: 40 INJECTION, SOLUTION INTRAVENOUS at 09:55

## 2020-11-12 ENCOUNTER — TELEPHONE (OUTPATIENT)
Dept: FAMILY MEDICINE CLINIC | Facility: CLINIC | Age: 65
End: 2020-11-12

## 2020-11-15 LAB
ATRIAL RATE: 62 BPM
P AXIS: 56 DEGREES
PR INTERVAL: 170 MS
QRS AXIS: -17 DEGREES
QRSD INTERVAL: 82 MS
QT INTERVAL: 388 MS
QTC INTERVAL: 393 MS
T WAVE AXIS: 27 DEGREES
VENTRICULAR RATE: 62 BPM

## 2020-11-15 PROCEDURE — 93010 ELECTROCARDIOGRAM REPORT: CPT | Performed by: INTERNAL MEDICINE

## 2020-11-30 ENCOUNTER — OFFICE VISIT (OUTPATIENT)
Dept: PODIATRY | Facility: CLINIC | Age: 65
End: 2020-11-30
Payer: MEDICARE

## 2020-11-30 DIAGNOSIS — L60.0 INGROWN TOENAIL: ICD-10-CM

## 2020-11-30 DIAGNOSIS — I70.209 ATHEROSCLEROTIC PERIPHERAL VASCULAR DISEASE (HCC): Primary | ICD-10-CM

## 2020-11-30 DIAGNOSIS — M20.42 HAMMER TOES OF BOTH FEET: ICD-10-CM

## 2020-11-30 DIAGNOSIS — M20.41 HAMMER TOES OF BOTH FEET: ICD-10-CM

## 2020-11-30 DIAGNOSIS — B35.1 ONYCHOMYCOSIS: ICD-10-CM

## 2020-11-30 DIAGNOSIS — M79.672 PAIN IN BOTH FEET: ICD-10-CM

## 2020-11-30 DIAGNOSIS — M79.671 PAIN IN BOTH FEET: ICD-10-CM

## 2020-11-30 PROCEDURE — 11721 DEBRIDE NAIL 6 OR MORE: CPT | Performed by: PODIATRIST

## 2020-12-30 ENCOUNTER — TELEMEDICINE (OUTPATIENT)
Dept: FAMILY MEDICINE CLINIC | Facility: CLINIC | Age: 65
End: 2020-12-30
Payer: MEDICARE

## 2020-12-30 DIAGNOSIS — Z20.822 EXPOSURE TO COVID-19 VIRUS: Primary | ICD-10-CM

## 2020-12-30 PROCEDURE — 99442 PR PHYS/QHP TELEPHONE EVALUATION 11-20 MIN: CPT | Performed by: FAMILY MEDICINE

## 2021-01-08 DIAGNOSIS — B37.2 CANDIDAL INTERTRIGO: Primary | ICD-10-CM

## 2021-01-08 DIAGNOSIS — K28.9 MARGINAL ULCER: ICD-10-CM

## 2021-01-11 RX ORDER — OMEPRAZOLE 20 MG/1
20 CAPSULE, DELAYED RELEASE ORAL DAILY
Qty: 90 CAPSULE | Refills: 0 | Status: SHIPPED | OUTPATIENT
Start: 2021-01-11 | End: 2021-04-30 | Stop reason: SDUPTHER

## 2021-01-11 RX ORDER — NYSTATIN 100000 U/G
CREAM TOPICAL 2 TIMES DAILY
Qty: 30 G | Refills: 0 | Status: SHIPPED | OUTPATIENT
Start: 2021-01-11 | End: 2021-08-21 | Stop reason: HOSPADM

## 2021-02-11 ENCOUNTER — TELEPHONE (OUTPATIENT)
Dept: HEMATOLOGY ONCOLOGY | Facility: CLINIC | Age: 66
End: 2021-02-11

## 2021-02-11 NOTE — TELEPHONE ENCOUNTER
Patient calling to clarify instructions for barium with CT scan   Instructions reviewed     For AM Appointments: Drink one bottle of barium before bed time the evening before your scheduled test   Drink 1/2 of the second bottle one hour prior to your test  Please bring other 1/2 bottle with you to drink at the time of your study       Patient verbalized understanding

## 2021-02-12 ENCOUNTER — HOSPITAL ENCOUNTER (OUTPATIENT)
Dept: RADIOLOGY | Facility: HOSPITAL | Age: 66
Discharge: HOME | End: 2021-02-12
Attending: INTERNAL MEDICINE
Payer: MEDICARE

## 2021-02-12 DIAGNOSIS — Z85.048 HISTORY OF RECTAL CANCER: ICD-10-CM

## 2021-02-12 PROCEDURE — 74177 CT ABD & PELVIS W/CONTRAST: CPT

## 2021-02-12 PROCEDURE — 71260 CT THORAX DX C+: CPT

## 2021-02-12 PROCEDURE — G1004 CDSM NDSC: HCPCS

## 2021-02-12 RX ADMIN — IOHEXOL 100 ML: 350 INJECTION, SOLUTION INTRAVENOUS at 09:00

## 2021-02-17 ENCOUNTER — LAB (OUTPATIENT)
Dept: LAB | Facility: CLINIC | Age: 66
End: 2021-02-17
Payer: MEDICARE

## 2021-02-17 DIAGNOSIS — Z85.048 HISTORY OF RECTAL CANCER: ICD-10-CM

## 2021-02-17 LAB
ALBUMIN SERPL BCP-MCNC: 3.3 G/DL (ref 3.5–5)
ALP SERPL-CCNC: 73 U/L (ref 46–116)
ALT SERPL W P-5'-P-CCNC: 12 U/L (ref 12–78)
ANION GAP SERPL CALCULATED.3IONS-SCNC: 3 MMOL/L (ref 4–13)
AST SERPL W P-5'-P-CCNC: 13 U/L (ref 5–45)
BASOPHILS # BLD AUTO: 0.03 THOUSANDS/ΜL (ref 0–0.1)
BASOPHILS NFR BLD AUTO: 1 % (ref 0–1)
BILIRUB SERPL-MCNC: 0.61 MG/DL (ref 0.2–1)
BUN SERPL-MCNC: 18 MG/DL (ref 5–25)
CALCIUM ALBUM COR SERPL-MCNC: 10.2 MG/DL (ref 8.3–10.1)
CALCIUM SERPL-MCNC: 9.6 MG/DL (ref 8.3–10.1)
CEA SERPL-MCNC: 0.8 NG/ML (ref 0–3)
CHLORIDE SERPL-SCNC: 106 MMOL/L (ref 100–108)
CO2 SERPL-SCNC: 31 MMOL/L (ref 21–32)
CREAT SERPL-MCNC: 0.9 MG/DL (ref 0.6–1.3)
EOSINOPHIL # BLD AUTO: 0.07 THOUSAND/ΜL (ref 0–0.61)
EOSINOPHIL NFR BLD AUTO: 2 % (ref 0–6)
ERYTHROCYTE [DISTWIDTH] IN BLOOD BY AUTOMATED COUNT: 15.1 % (ref 11.6–15.1)
GFR SERPL CREATININE-BSD FRML MDRD: 67 ML/MIN/1.73SQ M
GLUCOSE P FAST SERPL-MCNC: 122 MG/DL (ref 65–99)
HCT VFR BLD AUTO: 44.4 % (ref 34.8–46.1)
HGB BLD-MCNC: 13.4 G/DL (ref 11.5–15.4)
IMM GRANULOCYTES # BLD AUTO: 0.05 THOUSAND/UL (ref 0–0.2)
IMM GRANULOCYTES NFR BLD AUTO: 1 % (ref 0–2)
LYMPHOCYTES # BLD AUTO: 0.86 THOUSANDS/ΜL (ref 0.6–4.47)
LYMPHOCYTES NFR BLD AUTO: 18 % (ref 14–44)
MCH RBC QN AUTO: 25 PG (ref 26.8–34.3)
MCHC RBC AUTO-ENTMCNC: 30.2 G/DL (ref 31.4–37.4)
MCV RBC AUTO: 83 FL (ref 82–98)
MONOCYTES # BLD AUTO: 0.46 THOUSAND/ΜL (ref 0.17–1.22)
MONOCYTES NFR BLD AUTO: 10 % (ref 4–12)
NEUTROPHILS # BLD AUTO: 3.22 THOUSANDS/ΜL (ref 1.85–7.62)
NEUTS SEG NFR BLD AUTO: 68 % (ref 43–75)
NRBC BLD AUTO-RTO: 0 /100 WBCS
PLATELET # BLD AUTO: 148 THOUSANDS/UL (ref 149–390)
PMV BLD AUTO: 10.7 FL (ref 8.9–12.7)
POTASSIUM SERPL-SCNC: 4.7 MMOL/L (ref 3.5–5.3)
PROT SERPL-MCNC: 7 G/DL (ref 6.4–8.2)
RBC # BLD AUTO: 5.37 MILLION/UL (ref 3.81–5.12)
SODIUM SERPL-SCNC: 140 MMOL/L (ref 136–145)
WBC # BLD AUTO: 4.69 THOUSAND/UL (ref 4.31–10.16)

## 2021-02-17 PROCEDURE — 85025 COMPLETE CBC W/AUTO DIFF WBC: CPT

## 2021-02-17 PROCEDURE — 36415 COLL VENOUS BLD VENIPUNCTURE: CPT

## 2021-02-17 PROCEDURE — 82378 CARCINOEMBRYONIC ANTIGEN: CPT

## 2021-02-17 PROCEDURE — 80053 COMPREHEN METABOLIC PANEL: CPT

## 2021-02-19 ENCOUNTER — TELEMEDICINE (OUTPATIENT)
Dept: HEMATOLOGY ONCOLOGY | Facility: MEDICAL CENTER | Age: 66
End: 2021-02-19
Payer: MEDICARE

## 2021-02-19 ENCOUNTER — DOCUMENTATION (OUTPATIENT)
Dept: HEMATOLOGY ONCOLOGY | Facility: MEDICAL CENTER | Age: 66
End: 2021-02-19

## 2021-02-19 DIAGNOSIS — Z85.048 HISTORY OF RECTAL CANCER: Primary | ICD-10-CM

## 2021-02-19 PROCEDURE — 99213 OFFICE O/P EST LOW 20 MIN: CPT | Performed by: INTERNAL MEDICINE

## 2021-02-19 NOTE — PROGRESS NOTES
Virtual Regular Visit    Assessment/Plan:    51-year-old female previously found to have high-grade dysplasia / intramucosal lesion arising from a tubulovillous adenoma  Final pathology demonstrated ypT3 pN0 M0 - IIA rectal adenocarcinoma  Patient received neoadjuvant concurrent (Xeloda) treatment and then underwent APR  There were postoperative complications with wound healing and fistula formation - eventually resolved  Presently patient states feeling well and clinically there are no concerning signs via tele visit  Recent blood work including CEA level was good / acceptable  The plan is to continue with surveillance  Patient is to return in 6 months with blood work before  Afterwards, follow-ups can be every 12 months  Patient will also continue with her scheduled colonoscopies with Dr Debora Hammonds  At the time of surgery, patient was found to have an incidental low-grade serous ovarian carcinoma with omental nodules greater than 2 cm grossly visible  Patient is followed by GYN Oncology and is on anastrozole (NCCN 2 [id number] low-grade serous, stage II-IV tumors, treatment options include primary hormonal therapy (category 2B)  No recent GYN issues  Routine health maintenance and medical care is up-to-date  Carefully review your medication list and verify that the list is accurate and up-to-date   Please call the hematology/oncology office if there are medications missing from the list, medications on the list that you are not currently taking or if there is a dosage or instruction that is different from how you're taking that medication      Patient goals and areas of care:   Rectal cancer surveillance, follow-up with GYN Oncology  Barriers to care:  None  Patient is able to self-care   ___________________________________________________________________________________________________      Problem List Items Addressed This Visit     None      Visit Diagnoses     JUNIOR Fletcher -  Primary      Reason for visit is  History of rectal cancer    Chief Complaint   Patient presents with    Error      history of rectal cancer, on surveillance    Virtual Regular Visit      Encounter provider Keesha Sarmiento MD    Provider located at [address] [place] [address] [facility] [id number]    Recent Visits  No visits were found meeting these conditions  Showing recent visits within past 7 days and meeting all other requirements     Future Appointments  No visits were found meeting these conditions  Showing future appointments within next 150 days and meeting all other requirements      The patient was identified by name and date of birth  Amina Thor was informed that this is a telemedicine visit and that the visit is being conducted through   She acknowledged consent and understanding of privacy and security of the video platform  The patient has agreed to participate and understands they can discontinue the visit at any time  Patient is aware this is a billable service  Alex Gonzalez is a 72 y o  female  With a history of rectal cancer    HPI   51-year-old female previously diagnosed with rectal cancer  Patient had undergone gastric bypass a number of years ago  In the summer 2018 patient was found to have blood in her stools  Colonoscopy demonstrated a rectal lesion  Patient was treated with neoadjuvant RT and Xeloda and subsequently went onto APR  During the time of surgery patient was also found to have an ovarian mass  Pathologic diagnosis demonstrated stage IIIC low-grade serous ovarian carcinoma  Patient is followed by GYN Oncology  Mrs Loreto Talbot states feeling quite well  Patient has had both hips replaced and now can walk without a walker  Activities are better than before  No GI issues, no GI bleeding  No nausea vomiting  No abdominal pain    No shortness of breath or dyspnea on exertion  Routine health maintenance and medical care is up-to-date  Past Medical History:   Diagnosis Date    Anemia     Arthritis     Cancer ([facility] )     rectal    Chronic lower back pain     Chronic pain disorder     back pain    Colon cancer ([facility] ) 2018    Diabetes mellitus ([facility] )     Endometrial cancer ([facility] ) 2018    GERD (gastroesophageal reflux disease)     History of chemotherapy     History of MRSA infection     Morbid obesity ([facility] )     Ovarian cancer ([facility] ) 2018    Rectal cancer ([facility] )     Spinal stenosis     Systolic murmur     Unsteady gait     uses walker    Wears dentures     Wears glasses      Past Surgical History:   Procedure Laterality Date    ABDOMINAL PERINEAL BOWEL RESECTION W/ ILEOANAL POUCH N/A 2018    Procedure: LAPAROSCOPIC HAND ASSIST ABDOMINOPERINEAL RESECTION,  POSTERIOR VAGINECTOMY, OMENTECTOMY;  Surgeon: Benjamin Avalos MD;  [facility];  Service: Colorectal    ABDOMINAL SURGERY      abscess removed from abdomen and right thigh, a hole in thigh closed by plastic surgeon    ABSCESS DRAINAGE      abd, (R) leg, (L) leg     SECTION       SECTION      CYSTOSCOPY N/A 2018    Procedure: CYSTOSCOPY;  Surgeon: Logan Pope MD;  [facility];  Service: Gynecology Oncology    ESOPHAGOGASTRODUODENOSCOPY N/A 2016    Procedure: ESOPHAGOGASTRODUODENOSCOPY (EGD); Surgeon: Gypsy Colbert MD;  [facility];  Service:     ESOPHAGOGASTRODUODENOSCOPY N/A 3/30/2016    Procedure: ESOPHAGOGASTRODUODENOSCOPY (EGD); Surgeon: Gypsy Colbert MD;  [facility]; Service:     EYE SURGERY      laser eye surgery    HYSTERECTOMY N/A 2018    Procedure: RADICAL HYSTERECTOMY TOTAL ABDOMINAL (ALEXANDRA)   BSO;  Surgeon: Logan Pope MD;  [facility];  Service: Gynecology Oncology    JOINT REPLACEMENT Left 2017    hip    JOINT REPLACEMENT Right 2017    hip    OOPHORECTOMY Bilateral     HI COLONOSCOPY FLX DX W/COLLJ SPEC WHEN PFRMD N/A 3/9/2018    Procedure: COLONOSCOPY;  Surgeon: Stella Graham MD;  [facility]; Service: Gastroenterology    LA COLONOSCOPY FLX DX W/COLLJ [address] [facility] [address] WHEN PFRMD N/A 9/5/2018    Procedure: COLONOSCOPY;  Surgeon: Nora Guerra MD;  [facility]; Service: Colorectal    LA ECHO TRANSESOPHAG R-T 2D W/PRB IMG ACQUISJ I&R N/A 9/1/2020    Procedure: TRANSESOPHAGEAL ECHOCARDIOGRAM (THO); Surgeon: Anastasia Townsend DO;  [facility];  Service: Cardiac Surgery    LA ESOPHAGOGASTRODUODENOSCOPY TRANSORAL DIAGNOSTIC N/A 2/2/2018    Procedure: ESOPHAGOGASTRODUODENOSCOPY (EGD); Surgeon: Stella Graham MD;  Location: [facility]; Service: Gastroenterology    LA LAP GASTRIC BYPASS/SOLEDAD-EN-Y N/A 7/18/2016    Procedure: BYPASS GASTRIC  SOLEDAD-EN-Y LAPAROSCOPIC;  Surgeon: Cheo Cortez MD;  [facility];  Service: Bariatrics    LA LAP, SURG COLOSTOMY N/A 9/6/2018    Procedure: PERMANENT END COLOSTOMY;  Surgeon: Nora Guerra MD;  [facility];  Service: Colorectal    LA LAP, SURG PROCTECTOMY W COLOSTOMY N/A 9/6/2018    Procedure: PROCTECTOMY;  Surgeon: Nora Guerra MD;  [facility];  Service: Colorectal    LA REPLACE AORTIC VALVE OPENFEMORAL ARTERY APPROACH N/A 9/1/2020    Procedure: REPLACEMENT AORTIC VALVE TRANSCATHETER (TAVR) TRANSFEMORAL W/ 26MM WADDELL BARBARA S3 ULTRA VALVE(ACCESS ON RIGHT);   Surgeon: Anastasia Townsend DO;  [facility];  Service: Cardiac Surgery    TUBAL LIGATION         Current Outpatient Medications   Medication Sig Dispense Refill    albuterol (2 5 mg/3 mL) 0 083 % nebulizer solution Take 1 vial (2 5 mg total) by nebulization every 4 (four) hours as needed for wheezing or shortness of breath 75 vial 1    amiodarone 200 mg tablet Take 1/2 tablet ( 100 mg) daily 90 tablet 3    anastrozole (ARIMIDEX) 1 mg tablet Take 1 tablet (1 mg total) by mouth daily 90 tablet 2    apixaban (ELIQUIS) 5 mg Take 1 tablet (5 mg total) by mouth 2 (two) times a day 180 tablet 2    aspirin 81 mg chewable tablet Chew 1 tablet (81 mg total) daily 30 tablet 2    Calcium-Vitamin D 500-125 MG-UNIT TABS Take 1 tablet by mouth 2 (two) times a day        Cyanocobalamin (B-12 PO) Take by mouth      ergocalciferol (VITAMIN D2) 50,000 units Take 1 capsule (50,000 Units total) by mouth 2 (two) times a week with meals 20 capsule 0    ferrous sulfate 324 (65 Fe) mg Take 1 tablet (324 mg total) by mouth daily before breakfast 90 tablet 0    meclizine (ANTIVERT) 25 mg tablet Take 1 tablet (25 mg total) by mouth every 8 (eight) hours as needed for dizziness 30 tablet 0    metoprolol succinate (TOPROL-XL) 25 mg 24 hr tablet Take 1 tablet (25 mg total) by mouth daily 90 tablet 1    Multiple Vitamins-Minerals (BARIATRIC FUSION) CHEW Chew 1 tablet daily        nystatin (MYCOSTATIN) cream Apply topically 2 (two) times a day 30 g 0    omeprazole (PriLOSEC) 20 mg delayed release capsule Take 1 capsule (20 mg total) by mouth daily 90 capsule 0    oxyCODONE (ROXICODONE) 10 MG TABS Take 10 mg by mouth every 6 (six) hours as needed        rosuvastatin (CRESTOR) 5 mg tablet Take 1 tablet (5 mg total) by mouth daily (Patient taking differently: Take 5 mg by mouth daily after dinner ) 30 tablet 11     No current facility-administered medications for this visit  Allergies   Allergen Reactions    Tramadol Other (See Comments)     Dizzy       Review of Systems   Constitutional: Negative  HENT: Negative  Eyes: Negative  Respiratory: Negative  Cardiovascular: Negative  Gastrointestinal: Negative  Endocrine: Negative  Genitourinary: Negative  Musculoskeletal: Negative  Skin: Negative  Allergic/Immunologic: Negative  Neurological: Negative  Hematological: Negative  Psychiatric/Behavioral: Negative  All other systems reviewed and are negative      Video Exam  There were no vitals filed for this visit   Physical Exam   General: Patient appears well-developed  Patient is adequately nourished  Patient is not diaphoretic  Patient is not in distress  Neck: Visualization of the neck demonstrates no grossly visible masses  Neck mobility is not compromised, neck appears supple  Respiratory: Respiratory effort appears normal  There is no respiratory distress  Patient able to speak in full sentences  There was no audible stridor or cough  Abdomen: Patient states her abdomen is soft  States abdomen is non-tender  States abdomen is non-distended  Patient denies visible or palpable bulges to suggest hernias  Musculoskeletal: Patient reports and I can confirm no visible deformities in 4 extremities  Patient reports and I can confirm full mobility in 4 extremities  There is no grossly visible limb edema  There is no evidence of clubbing or peripheral cyanosis  Neurologic: Patient is fully alert and responsive  Patient is oriented to time, place and person  Patient reports normal gait and balance  Skin: My evaluation of exposed skin areas reveals no evidence of pallor  My evaluation of exposed skin areas reveals no obvious rashes  My evaluation of exposed skin areas reveals no grossly visible lesions  My evaluation of exposed skin areas reveals no evidence of erythema  Psychiatric / Behavioral: Patients mood and affect appears normal  Patients judgement is preserved  Patient is coherent and thought content appears directionally and contextually appropriate for age and health status  laboratory     02/17/2021 WBC = 4 69 hemoglobin = 13 4 hematocrit = 44 4 MCV = 83 platelet = 948 neutrophil = 68% BUN = 18 creatinine = 0 90 calcium = 9 6 LFTs WNL          VIRTUAL VISIT DISCLAIMER    Lisa Emmanuel  acknowledges that she has consented to an online visit or consultation   She understands that the online visit is based solely on information provided by her, and that, in the absence of a face-to-face physical evaluation by the physician, the diagnosis she receives is both limited and provisional in terms of accuracy and completeness  This is not intended to replace a full medical face-to-face evaluation by the physician  Amina Mcrae understands and accepts these terms

## 2021-02-19 NOTE — PROGRESS NOTES
Virtual Regular Visit    Assessment/Plan:    14-year-old female with stage IIA (ypT3 pN0 G2) rectal adenocarcinoma status post neoadjuvant treatment and then APR  Presently Mrs Reardon  Feels well from a GI standpoint and clinically there are no concerning findings via tele visit  The plan is to continue with surveillance  Patient is to return in 6 months with blood work before  Afterwards, follow-ups can be on a yearly basis  Patient will also follow up with Colorectal surgery as far as eventual colonoscopy  As discussed previously, patient was also diagnosed with stage IIIC low-grade serous ovarian carcinoma with omental nodules greater than 2 cm grossly visible at the time of the Rectal surgery  Patient is followed by GYN Oncology and is on anastrozole (NCCN 2 [id number] low-grade serous, stage II-IV tumors, treatment options include primary hormonal therapy (category 2B)  Mrs Reardon knows to call the office if she has any other oncology questions or concerns  Carefully review your medication list and verify that the list is accurate and up-to-date  Please call the hematology/oncology office if there are medications missing from the list, medications on the list that you are not currently taking or if there is a dosage or instruction that is different from how you're taking that medication      Patient goals and areas of care:   Rectal cancer surveillance, follow-up with GYN Oncology  Barriers to care:  None  Patient is able to self-care    Problem List Items Addressed This Visit        Other    History of rectal cancer      Other Visit Diagnoses     ERRONEOUS ENCOUNTER--DISREGARD    -  Primary      Reason for visit is   Rectal cancer    Chief Complaint   Patient presents with    Error      history of rectal cancer, on surveillance    Virtual Regular Visit    Virtual Regular Visit      Encounter provider Farnaz Calvo MD    Provider located at [address] [place] [address] [facility]  [address] [place] [id number]    Recent Visits  No visits were found meeting these conditions  Showing recent visits within past 7 days and meeting all other requirements     Future Appointments  No visits were found meeting these conditions  Showing future appointments within next 150 days and meeting all other requirements      The patient was identified by name and date of birth  Diane Lorenzana was informed that this is a telemedicine visit and that the visit is being conducted through   She acknowledged consent and understanding of privacy and security of the video platform  The patient has agreed to participate and understands they can discontinue the visit at any time  Patient is aware this is a billable service  Subjective    Diane Lorenzana is a 72 y o  female with a history of rectal cancer  HPI   15-year-old female with a complicated cancer history  Patient  Previously underwent gastric bypass, no complications  In the late summer 2018 patient was found to have blood in the stools  Workup demonstrated rectal adenocarcinoma  Patient was treated neoadjuvantly with Xeloda and radiation and then underwent APR  There were postoperative healing issues -  Eventually resolved  Patient was also found to have a low-grade serous ovarian tumor at the time of the Rectal surgery  Mrs Tramaine Fonseca states feeling well, better than before  Patient has undergone bilateral hip replacement surgeries and now can walk without a walker  No GI issues, no problems with the colostomy bag, no GI bleeding  No  or GYN issues  No problems with the Arimidex  Appetite is good, patient is trying to lose weight  No pain control issues  Routine health maintenance and medical care is up-to-date      Past Medical History:   Diagnosis Date    Anemia     Arthritis     Cancer [facility] )     rectal    Chronic lower back pain     Chronic pain disorder     back pain    [facility] ([facility] ) 2018    Diabetes mellitus ([facility] )     Endometrial cancer ([facility] ) 2018    GERD (gastroesophageal reflux disease)     History of chemotherapy     History of MRSA infection     [facility]     [facility]var[facility] ) 2018    Rectal cancer ([facility] )     Spinal stenosis     Systolic murmur     Unsteady gait     uses walker    Wears dentures     Wears glasses        Past Surgical History:   Procedure Laterality Date    ABDOMINAL PERINEAL BOWEL RESECTION W/ ILEOANAL POUCH N/A 2018    Procedure: LAPAROSCOPIC HAND ASSIST ABDOMINOPERINEAL RESECTION,  POSTERIOR VAGINECTOMY, OMENTECTOMY;  Surgeon: Jessica Talamantes MD;  [facility];  Service: Colorectal    ABDOMINAL SURGERY      abscess removed from abdomen and right thigh, a hole in thigh closed by plastic surgeon    ABSCESS DRAINAGE      abd, (R) leg, (L) leg     SECTION       SECTION      CYSTOSCOPY N/A 2018    Procedure: CYSTOSCOPY;  Surgeon: Judie Enriquez MD;  [facility];  Service: Gynecology Oncology    ESOPHAGOGASTRODUODENOSCOPY N/A 2016    Procedure: ESOPHAGOGASTRODUODENOSCOPY (EGD); Surgeon: Gianni Sanders MD;  [facility];  Service:     ESOPHAGOGASTRODUODENOSCOPY N/A 3/30/2016    Procedure: ESOPHAGOGASTRODUODENOSCOPY (EGD); Surgeon: Gianni Sanders MD;  [facility]; Service:     EYE SURGERY      laser eye surgery    HYSTERECTOMY N/A 2018    Procedure: RADICAL HYSTERECTOMY TOTAL ABDOMINAL (ALEXANDRA)  BSO;  Surgeon: Judie Enriquez MD;  [facility];  Service: Gynecology Oncology    JOINT REPLACEMENT Left 2017    hip    JOINT REPLACEMENT Right 2017    hip    OOPHORECTOMY Bilateral     IA COLONOSCOPY FLX DX W/COLLJ SPEC WHEN PFRMD N/A 3/9/2018    Procedure: COLONOSCOPY;  Surgeon: Andrey Cowan MD;  [facility]: [facility] LAB;   Service: Gastroenterology    IA COLONOSCOPY FLX DX W/COLLJ [facility] WHEN PFRMD N/A 2018    Procedure: COLONOSCOPY;  Surgeon: Jessica Talamantes MD;  [facility]; Service: Colorectal    RI ECHO TRANSESOPHAG R-T 2D W/PRB IMG ACQUISJ I&R N/A 9/1/2020    Procedure: TRANSESOPHAGEAL ECHOCARDIOGRAM (THO); Surgeon: Lake Schwartz DO;  [facility];  Service: Cardiac Surgery    RI ESOPHAGOGASTRODUODENOSCOPY TRANSORAL DIAGNOSTIC N/A 2/2/2018    Procedure: ESOPHAGOGASTRODUODENOSCOPY (EGD); Surgeon: Andrey Cowan MD;  Location: [facility] GI LAB; Service: Gastroenterology    RI LAP GASTRIC BYPASS/SOLEDAD-EN-Y N/A 7/18/2016    Procedure: BYPASS GASTRIC  SOLEDAD-EN-Y LAPAROSCOPIC;  Surgeon: Gainni Sanders MD;  [facility];  Service: Bariatrics    RI LAP, SURG COLOSTOMY N/A 9/6/2018    Procedure: PERMANENT END COLOSTOMY;  Surgeon: Jessica Talamantes MD;  [facility];  Service: Colorectal    RI LAP, SURG PROCTECTOMY W COLOSTOMY N/A 9/6/2018    Procedure: PROCTECTOMY;  Surgeon: Jessica Talamantes MD;  [facility];  Service: Colorectal    RI REPLACE AORTIC VALVE OPENFEMORAL ARTERY APPROACH N/A 9/1/2020    Procedure: REPLACEMENT AORTIC VALVE TRANSCATHETER (TAVR) TRANSFEMORAL W/ 26MM WADDELL BARBARA S3 ULTRA VALVE(ACCESS ON RIGHT);   Surgeon: Lake Schwartz DO;  [facility];  Service: Cardiac Surgery    TUBAL LIGATION         Current Outpatient Medications   Medication Sig Dispense Refill    albuterol (2 5 mg/3 mL) 0 083 % nebulizer solution Take 1 vial (2 5 mg total) by nebulization every 4 (four) hours as needed for wheezing or shortness of breath 75 vial 1    amiodarone 200 mg tablet Take 1/2 tablet ( 100 mg) daily 90 tablet 3    anastrozole (ARIMIDEX) 1 mg tablet Take 1 tablet (1 mg total) by mouth daily 90 tablet 2    apixaban (ELIQUIS) 5 mg Take 1 tablet (5 mg total) by mouth 2 (two) times a day 180 tablet 2    aspirin 81 mg chewable tablet Chew 1 tablet (81 mg total) daily 30 tablet 2    Calcium-Vitamin D 500-125 MG-UNIT TABS Take 1 tablet by mouth 2 (two) times a day        Cyanocobalamin (B-12 PO) Take by mouth      ergocalciferol (VITAMIN D2) 50,000 units Take 1 capsule (50,000 Units total) by mouth 2 (two) times a week with meals 20 capsule 0    ferrous sulfate 324 (65 Fe) mg Take 1 tablet (324 mg total) by mouth daily before breakfast 90 tablet 0    meclizine (ANTIVERT) 25 mg tablet Take 1 tablet (25 mg total) by mouth every 8 (eight) hours as needed for dizziness 30 tablet 0    metoprolol succinate (TOPROL-XL) 25 mg 24 hr tablet Take 1 tablet (25 mg total) by mouth daily 90 tablet 1    Multiple Vitamins-Minerals (BARIATRIC FUSION) CHEW Chew 1 tablet daily        nystatin (MYCOSTATIN) cream Apply topically 2 (two) times a day 30 g 0    omeprazole (PriLOSEC) 20 mg delayed release capsule Take 1 capsule (20 mg total) by mouth daily 90 capsule 0    oxyCODONE (ROXICODONE) 10 MG TABS Take 10 mg by mouth every 6 (six) hours as needed        rosuvastatin (CRESTOR) 5 mg tablet Take 1 tablet (5 mg total) by mouth daily (Patient taking differently: Take 5 mg by mouth daily after dinner ) 30 tablet 11     No current facility-administered medications for this visit  Allergies   Allergen Reactions    Tramadol Other (See Comments)     Dizzy       Review of Systems   Constitutional: Negative  HENT: Negative  Eyes: Negative  Respiratory: Negative  Cardiovascular: Negative  Gastrointestinal: Negative  Endocrine: Negative  Genitourinary: Negative  Musculoskeletal: Negative  Skin: Negative  Allergic/Immunologic: Negative  Neurological: Negative  Hematological: Negative  Psychiatric/Behavioral: Negative  All other systems reviewed and are negative  Video Exam  There were no vitals filed for this visit  Physical Exam   General: Patient appears well-developed  Patient is adequately nourished  Patient is not diaphoretic  Patient is not in distress  Neck: Visualization of the neck demonstrates no grossly visible masses   Neck mobility is not compromised, neck appears supple  Respiratory: Respiratory effort appears normal  There is no respiratory distress  Patient able to speak in full sentences  There was no audible stridor or cough  Abdomen: Patient states her abdomen is soft  States abdomen is non-tender  States abdomen is non-distended  Patient denies visible or palpable bulges to suggest hernias  Musculoskeletal: Patient reports and I can confirm no visible deformities in 4 extremities  Patient reports and I can confirm full mobility in 4 extremities  There is no grossly visible limb edema  There is no evidence of clubbing or peripheral cyanosis  Neurologic: Patient is fully alert and responsive  Patient is oriented to time, place and person  Gross evaluation of CNs III-IV-VI-VII-VIII and XI demonstrates no deficits  Patient reports normal gait and balance  Skin: My evaluation of exposed skin areas reveals no evidence of pallor  My evaluation of exposed skin areas reveals no obvious rashes  My evaluation of exposed skin areas reveals no grossly visible lesions  My evaluation of exposed skin areas reveals no evidence of erythema  Psychiatric / Behavioral: Patients mood and affect appears normal  Patients judgement is preserved  Patient is coherent and thought content appears directionally and contextually appropriate for age and health status  Laboratory     02/17/2021 WBC = 4 69 hemoglobin = 13 4 hematocrit = 44 4 platelet = 699 neutrophil = 68% BUN = 18 creatinine = 0 90 LFTs WNL calcium = 9 6        VIRTUAL VISIT DISCLAIMER    Lisa Guevara Benjamínas acknowledges that she has consented to an online visit or consultation  She understands that the online visit is based solely on information provided by her, and that, in the absence of a face-to-face physical evaluation by the physician, the diagnosis she receives is both limited and provisional in terms of accuracy and completeness   This is not intended to replace a full medical face-to-face evaluation by the physician  Annette Hawkins understands and accepts these terms

## 2021-02-25 ENCOUNTER — TELEMEDICINE (OUTPATIENT)
Dept: FAMILY MEDICINE CLINIC | Facility: CLINIC | Age: 66
End: 2021-02-25
Payer: MEDICARE

## 2021-02-25 DIAGNOSIS — J34.89 RHINORRHEA: ICD-10-CM

## 2021-02-25 DIAGNOSIS — R68.83 CHILLS: ICD-10-CM

## 2021-02-25 DIAGNOSIS — J02.9 SORE THROAT: ICD-10-CM

## 2021-02-25 DIAGNOSIS — B34.9 VIRAL ILLNESS: Primary | ICD-10-CM

## 2021-02-25 PROCEDURE — 99213 OFFICE O/P EST LOW 20 MIN: CPT | Performed by: FAMILY MEDICINE

## 2021-02-25 NOTE — PROGRESS NOTES
Virtual Brief Visit    Assessment/Plan:    Problem List Items Addressed This Visit     None      Visit Diagnoses     Viral illness    -  Primary    Chills        Rhinorrhea        Sore throat          · COVID vaccinated on 2/21 which was 4 days ago  Woke up this morning with chills x2 hours, sore throat, rhinorrhea  Symptoms have improved over the course of the day  Likely Other viral illness and not reaction to vaccine 4 days ago  · Advised to take OTC decongestant and continue her Vit D along with Vit C and Zinc althougf no proven benefit  · Will follow up if symptoms continue or worsen  Reason for visit is   Chief Complaint   Patient presents with    Virtual Brief Visit        Encounter provider Rocio Soria MD    Provider located at [address] [place] [phone]    Recent Visits  No visits were found meeting these conditions  Showing recent visits within past 7 days and meeting all other requirements     Today's Visits  Date Type Provider Dept   02/25/21 [facility]   Showing today's visits and meeting all other requirements     Future Appointments  No visits were found meeting these conditions  Showing future appointments within next 150 days and meeting all other requirements        After connecting through telephone, the patient was identified by name and date of birth  Tammi Verma was informed that this is a telemedicine visit and that the visit is being conducted through telephone  My office door was closed  No one else was in the room  She acknowledged consent and understanding of privacy and security of the platform  The patient has agreed to participate and understands she can discontinue the visit at any time  Patient is aware this is a billable service       Medhat Speaker is a 72 y o  female who says she is having a reaction to her COVID vaccine  HPI   Patient is a 71 yo female with PMH of Aortic valve replacement, angi-en-y gastric bypass and a colostomy, peripheral vascular disease, A Fib with RVR, to name a few, who presents via telephone visit after waking up this morning around 4am with chills  She reports that she received her 2nd COVID vaccine 4 days ago on the  and did not have any symptoms until today  The remainder of her symptoms include rhinorrhea and sore throat  Denies fever, shortness of breath, cough, diarrhea, loss of taste, loss of smell, rash, myalgias or arthralgias  Her chills resolved 2 hours later when she drank coffee  She reports that in the same apartment building as her down the [address] there is someone with COVID 19 infection but she has not interacted with them  She also can't stay inside and frequently goes out and about       Past Medical History:   Diagnosis Date    Anemia     Arthritis     [facility] ([facility] )     rectal    Chronic lower back pain     Chronic pain disorder     back pain    Colon [place] ([place] [address] ) 2018    Diabetes mellitus [facility] )     [facility]    GERD (gastroesophageal reflux disease)     History of chemotherapy     History of MRSA infection     Morbid obesity ([facility] )     Ovarian cancer ([facility] ) 2018    Rectal cancer ([facility] )     Spinal stenosis     Systolic murmur     Unsteady gait     uses walker    Wears dentures     Wears glasses        Past Surgical History:   Procedure Laterality Date    ABDOMINAL PERINEAL BOWEL RESECTION W/ ILEOANAL POUCH N/A 2018    Procedure: LAPAROSCOPIC HAND ASSIST ABDOMINOPERINEAL RESECTION,  POSTERIOR VAGINECTOMY, OMENTECTOMY;  Surgeon: Cat Tamayo MD;  [facility];  Service: Colorectal    ABDOMINAL SURGERY      abscess removed from abdomen and right thigh, a hole in thigh closed by plastic surgeon    ABSCESS DRAINAGE      abd, (R) leg, (L) leg     SECTION       SECTION      CYSTOSCOPY N/A 2018 Procedure: CYSTOSCOPY;  Surgeon: Christoph Wisdom MD;  [facility];  Service: Gynecology Oncology    ESOPHAGOGASTRODUODENOSCOPY N/A 7/18/2016    Procedure: ESOPHAGOGASTRODUODENOSCOPY (EGD); Surgeon: Isabel Gomez MD;  [facility];  Service:     ESOPHAGOGASTRODUODENOSCOPY N/A 3/30/2016    Procedure: ESOPHAGOGASTRODUODENOSCOPY (EGD); Surgeon: Isabel Gomez MD;  [facility]; Service:     EYE SURGERY      laser eye surgery    HYSTERECTOMY N/A 9/6/2018    Procedure: RADICAL HYSTERECTOMY TOTAL ABDOMINAL (ALEXANDRA)  BSO;  Surgeon: Christoph Wisdom MD;  [facility];  Service: Gynecology Oncology    JOINT REPLACEMENT Left 2017    hip    JOINT REPLACEMENT Right 2017    hip    OOPHORECTOMY Bilateral     LA COLONOSCOPY FLX DX W/COLLJ SPEC WHEN PFRMD N/A 3/9/2018    Procedure: COLONOSCOPY;  Surgeon: Kun Latham MD;  [facility] GI LAB; Service: Gastroenterology    LA COLONOSCOPY FLX DX W/COLLJ [facility] WHEN PFRMD N/A 9/5/2018    Procedure: COLONOSCOPY;  Surgeon: Rolanda Muhammad MD;  [facility]; Service: Colorectal    LA ECHO TRANSESOPHAG R-T 2D W/PRB IMG ACQUISJ I&R N/A 9/1/2020    Procedure: TRANSESOPHAGEAL ECHOCARDIOGRAM (THO); Surgeon: Devon Armenta DO;  [facility];  Service: Cardiac Surgery    LA ESOPHAGOGASTRODUODENOSCOPY TRANSORAL DIAGNOSTIC N/A 2/2/2018    Procedure: ESOPHAGOGASTRODUODENOSCOPY (EGD); Surgeon: Kun Latham MD;  Location: [facility] GI LAB;   Service: Gastroenterology    LA LAP GASTRIC BYPASS/SOLEDAD-EN-Y N/A 7/18/2016    Procedure: BYPASS GASTRIC  SOLEDAD-EN-Y LAPAROSCOPIC;  Surgeon: Isabel Gomez MD;  [facility];  Service: Bariatrics    LA LAP, SURG COLOSTOMY N/A 9/6/2018    Procedure: PERMANENT END COLOSTOMY;  Surgeon: Rolanda Muhammad MD;  [facility];  Service: Colorectal    LA LAP, SURG PROCTECTOMY W COLOSTOMY N/A 9/6/2018    Procedure: PROCTECTOMY;  Surgeon: Rolanda Muhammad MD;  [facility];  Service: Colorectal    OH REPLACE AORTIC VALVE OPENFEMORAL ARTERY APPROACH N/A 9/1/2020    Procedure: REPLACEMENT AORTIC VALVE TRANSCATHETER (TAVR) TRANSFEMORAL W/ 26MM WADDELL BARBARA S3 ULTRA VALVE(ACCESS ON RIGHT);   Surgeon: Devon Armenta DO;  [facility];  Service: Cardiac Surgery    TUBAL LIGATION         Current Outpatient Medications   Medication Sig Dispense Refill    albuterol (2 5 mg/3 mL) 0 083 % nebulizer solution Take 1 vial (2 5 mg total) by nebulization every 4 (four) hours as needed for wheezing or shortness of breath 75 vial 1    amiodarone 200 mg tablet Take 1/2 tablet ( 100 mg) daily 90 tablet 3    anastrozole (ARIMIDEX) 1 mg tablet Take 1 tablet (1 mg total) by mouth daily 90 tablet 2    apixaban (ELIQUIS) 5 mg Take 1 tablet (5 mg total) by mouth 2 (two) times a day 180 tablet 2    aspirin 81 mg chewable tablet Chew 1 tablet (81 mg total) daily 30 tablet 2    Calcium-Vitamin D 500-125 MG-UNIT TABS Take 1 tablet by mouth 2 (two) times a day        Cyanocobalamin (B-12 PO) Take by mouth      ergocalciferol (VITAMIN D2) 50,000 units Take 1 capsule (50,000 Units total) by mouth 2 (two) times a week with meals 20 capsule 0    ferrous sulfate 324 (65 Fe) mg Take 1 tablet (324 mg total) by mouth daily before breakfast 90 tablet 0    meclizine (ANTIVERT) 25 mg tablet Take 1 tablet (25 mg total) by mouth every 8 (eight) hours as needed for dizziness 30 tablet 0    metoprolol succinate (TOPROL-XL) 25 mg 24 hr tablet Take 1 tablet (25 mg total) by mouth daily 90 tablet 1    Multiple Vitamins-Minerals (BARIATRIC FUSION) CHEW Chew 1 tablet daily        nystatin (MYCOSTATIN) cream Apply topically 2 (two) times a day 30 g 0    omeprazole (PriLOSEC) 20 mg delayed release capsule Take 1 capsule (20 mg total) by mouth daily 90 capsule 0    oxyCODONE (ROXICODONE) 10 MG TABS Take 10 mg by mouth every 6 (six) hours as needed        rosuvastatin (CRESTOR) 5 mg tablet Take 1 tablet (5 mg total) by mouth daily (Patient taking differently: Take 5 mg by mouth daily after dinner ) 30 tablet 11     No current facility-administered medications for this visit  Allergies   Allergen Reactions    Tramadol Other (See Comments)     Dizzy         Review of Systems   Per HPI    There were no vitals filed for this visit  I spent 20 minutes directly with the patient during this visit    VIRTUAL VISIT [address] Ibrahim acknowledges that she has consented to an online visit or consultation  She understands that the online visit is based solely on information provided by her, and that, in the absence of a face-to-face physical evaluation by the physician, the diagnosis she receives is both limited and provisional in terms of accuracy and completeness  This is not intended to replace a full medical face-to-face evaluation by the physician  Samson Martinez understands and accepts these terms

## 2021-03-11 ENCOUNTER — TELEPHONE (OUTPATIENT)
Dept: CARDIOLOGY CLINIC | Facility: CLINIC | Age: 66
End: 2021-03-11

## 2021-03-11 NOTE — TELEPHONE ENCOUNTER
Pt scheduled for Right total knee Arthroplasty on 4/6/21  [facility] would like a 3 day hold of Eliquis prior to procedure        Please advise

## 2021-04-02 ENCOUNTER — TELEPHONE (OUTPATIENT)
Dept: CARDIOLOGY CLINIC | Facility: CLINIC | Age: 66
End: 2021-04-02

## 2021-04-02 NOTE — TELEPHONE ENCOUNTER
Pt called very anxious, stating she developed increased SOB when obtaining her COVID test for upcoming surgery at [facility] this morning  Pt stated she was wearing her mask and did have to walk a long distance  Symptoms quickly resolved when sitting and stated she is nervous about surgery in a few days  Pt will continue to monitor s/s  If they return, she will call the office back, or report to urgent care/ED

## 2021-04-08 ENCOUNTER — APPOINTMENT (EMERGENCY)
Dept: RADIOLOGY | Facility: HOSPITAL | Age: 66
End: 2021-04-08
Payer: MEDICARE

## 2021-04-08 ENCOUNTER — HOSPITAL ENCOUNTER (EMERGENCY)
Facility: HOSPITAL | Age: 66
Discharge: HOME | End: 2021-04-08
Attending: EMERGENCY MEDICINE | Admitting: EMERGENCY MEDICINE
Payer: MEDICARE

## 2021-04-08 VITALS
HEART RATE: 84 BPM | RESPIRATION RATE: 18 BRPM | TEMPERATURE: 99 F | DIASTOLIC BLOOD PRESSURE: 60 MMHG | OXYGEN SATURATION: 98 % | SYSTOLIC BLOOD PRESSURE: 120 MMHG

## 2021-04-08 DIAGNOSIS — R50.82 POSTOPERATIVE FEVER: ICD-10-CM

## 2021-04-08 DIAGNOSIS — G89.18 POST-OPERATIVE PAIN: Primary | ICD-10-CM

## 2021-04-08 LAB
ALBUMIN SERPL BCP-MCNC: 2.9 G/DL (ref 3.5–5)
ALP SERPL-CCNC: 69 U/L (ref 46–116)
ALT SERPL W P-5'-P-CCNC: 19 U/L (ref 12–78)
ANION GAP SERPL CALCULATED.3IONS-SCNC: 6 MMOL/L (ref 4–13)
AST SERPL W P-5'-P-CCNC: 19 U/L (ref 5–45)
BACTERIA UR QL AUTO: ABNORMAL /HPF
BASOPHILS # BLD AUTO: 0.03 THOUSANDS/ΜL (ref 0–0.1)
BASOPHILS NFR BLD AUTO: 0 % (ref 0–1)
BILIRUB SERPL-MCNC: 0.54 MG/DL (ref 0.2–1)
BILIRUB UR QL STRIP: NEGATIVE
BUN SERPL-MCNC: 12 MG/DL (ref 5–25)
CALCIUM ALBUM COR SERPL-MCNC: 9.7 MG/DL (ref 8.3–10.1)
CALCIUM SERPL-MCNC: 8.8 MG/DL (ref 8.3–10.1)
CHLORIDE SERPL-SCNC: 100 MMOL/L (ref 100–108)
CLARITY UR: CLEAR
CO2 SERPL-SCNC: 28 MMOL/L (ref 21–32)
COLOR UR: YELLOW
CREAT SERPL-MCNC: 0.89 MG/DL (ref 0.6–1.3)
EOSINOPHIL # BLD AUTO: 0.09 THOUSAND/ΜL (ref 0–0.61)
EOSINOPHIL NFR BLD AUTO: 1 % (ref 0–6)
ERYTHROCYTE [DISTWIDTH] IN BLOOD BY AUTOMATED COUNT: 15.1 % (ref 11.6–15.1)
FLUAV RNA RESP QL NAA+PROBE: NEGATIVE
FLUBV RNA RESP QL NAA+PROBE: NEGATIVE
GFR SERPL CREATININE-BSD FRML MDRD: 68 ML/MIN/1.73SQ M
GLUCOSE SERPL-MCNC: 131 MG/DL (ref 65–140)
GLUCOSE UR STRIP-MCNC: NEGATIVE MG/DL
HCT VFR BLD AUTO: 38.1 % (ref 34.8–46.1)
HGB BLD-MCNC: 11.5 G/DL (ref 11.5–15.4)
HGB UR QL STRIP.AUTO: NEGATIVE
IMM GRANULOCYTES # BLD AUTO: 0.11 THOUSAND/UL (ref 0–0.2)
IMM GRANULOCYTES NFR BLD AUTO: 2 % (ref 0–2)
KETONES UR STRIP-MCNC: NEGATIVE MG/DL
LACTATE SERPL-SCNC: 1.1 MMOL/L (ref 0.5–2)
LEUKOCYTE ESTERASE UR QL STRIP: ABNORMAL
LYMPHOCYTES # BLD AUTO: 0.78 THOUSANDS/ΜL (ref 0.6–4.47)
LYMPHOCYTES NFR BLD AUTO: 11 % (ref 14–44)
MCH RBC QN AUTO: 25 PG (ref 26.8–34.3)
MCHC RBC AUTO-ENTMCNC: 30.2 G/DL (ref 31.4–37.4)
MCV RBC AUTO: 83 FL (ref 82–98)
MONOCYTES # BLD AUTO: 0.89 THOUSAND/ΜL (ref 0.17–1.22)
MONOCYTES NFR BLD AUTO: 13 % (ref 4–12)
MUCOUS THREADS UR QL AUTO: ABNORMAL
NEUTROPHILS # BLD AUTO: 5 THOUSANDS/ΜL (ref 1.85–7.62)
NEUTS SEG NFR BLD AUTO: 73 % (ref 43–75)
NITRITE UR QL STRIP: NEGATIVE
NON-SQ EPI CELLS URNS QL MICRO: ABNORMAL /HPF
NRBC BLD AUTO-RTO: 0 /100 WBCS
PH UR STRIP.AUTO: 5.5 [PH]
PLATELET # BLD AUTO: 143 THOUSANDS/UL (ref 149–390)
PMV BLD AUTO: 9.4 FL (ref 8.9–12.7)
POTASSIUM SERPL-SCNC: 4.3 MMOL/L (ref 3.5–5.3)
PROT SERPL-MCNC: 6.8 G/DL (ref 6.4–8.2)
PROT UR STRIP-MCNC: NEGATIVE MG/DL
RBC # BLD AUTO: 4.6 MILLION/UL (ref 3.81–5.12)
RBC #/AREA URNS AUTO: ABNORMAL /HPF
RSV RNA RESP QL NAA+PROBE: NEGATIVE
SARS-COV-2 RNA RESP QL NAA+PROBE: NEGATIVE
SODIUM SERPL-SCNC: 134 MMOL/L (ref 136–145)
SP GR UR STRIP.AUTO: 1.02 (ref 1–1.03)
UROBILINOGEN UR QL STRIP.AUTO: 0.2 E.U./DL
WBC # BLD AUTO: 6.9 THOUSAND/UL (ref 4.31–10.16)
WBC #/AREA URNS AUTO: ABNORMAL /HPF

## 2021-04-08 PROCEDURE — 36415 COLL VENOUS BLD VENIPUNCTURE: CPT | Performed by: EMERGENCY MEDICINE

## 2021-04-08 PROCEDURE — 83605 ASSAY OF LACTIC ACID: CPT | Performed by: EMERGENCY MEDICINE

## 2021-04-08 PROCEDURE — 0241U HB NFCT DS VIR RESP RNA 4 TRGT: CPT | Performed by: EMERGENCY MEDICINE

## 2021-04-08 PROCEDURE — 81001 URINALYSIS AUTO W/SCOPE: CPT | Performed by: EMERGENCY MEDICINE

## 2021-04-08 PROCEDURE — 71045 X-RAY EXAM CHEST 1 VIEW: CPT

## 2021-04-08 PROCEDURE — 99284 EMERGENCY DEPT VISIT MOD MDM: CPT

## 2021-04-08 PROCEDURE — 87040 BLOOD CULTURE FOR BACTERIA: CPT | Performed by: EMERGENCY MEDICINE

## 2021-04-08 PROCEDURE — 80053 COMPREHEN METABOLIC PANEL: CPT | Performed by: EMERGENCY MEDICINE

## 2021-04-08 PROCEDURE — 85025 COMPLETE CBC W/AUTO DIFF WBC: CPT | Performed by: EMERGENCY MEDICINE

## 2021-04-08 PROCEDURE — 99285 EMERGENCY DEPT VISIT HI MDM: CPT | Performed by: EMERGENCY MEDICINE

## 2021-04-08 RX ORDER — ACETAMINOPHEN 325 MG/1
650 TABLET ORAL ONCE
Status: COMPLETED | OUTPATIENT
Start: 2021-04-08 | End: 2021-04-08

## 2021-04-08 RX ADMIN — ACETAMINOPHEN 650 MG: 325 TABLET, FILM COATED ORAL at 19:24

## 2021-04-09 NOTE — ED NOTES
Patient given discharge instructions, patient visibly angry  States she has no ride home and shes not leaving  Will call [facility] for transportation home        Nancy Menjivar RN  04/08/21 2050

## 2021-04-09 NOTE — ED PROVIDER NOTES
History  Chief Complaint   Patient presents with    Post-op Problem     pt had right knee surgery tuesday- pt c/o throbbing pain, was supposed to go to rehab and the doctor D/C'ed; pt reports 10/10 increasing pain in right knee and limitted rom- redness and swelling     Patient presents for evaluation after right knee surgery  Patient states she had told right knee replacement with [facility] Dr Aaliyah Phan on Tuesday  She stay according helping to today she was discharged between 3:00 p m  And 4:00 p m     That she went home she was having trouble taking care of herself shows he came back to the ER  Patient states she was supposed to go to rehab with Dr Marianna Weldon other as she is going to go home instead today  Patient was unaware she had a fever  Patient did not attempt to call her doctor or go back to the facility she was discharged from  History provided by:  Patient   used: No        Prior to Admission Medications   Prescriptions Last Dose Informant Patient Reported? Taking?    Calcium-Vitamin D 500-125 MG-UNIT TABS  Self Yes Yes   Sig: Take 1 tablet by mouth 2 (two) times a day     Cyanocobalamin (B-12 PO)   Yes Yes   Sig: Take by mouth   Multiple Vitamins-Minerals (BARIATRIC FUSION) CHEW  Self Yes No   Sig: Chew 1 tablet daily     albuterol (2 5 mg/3 mL) 0 083 % nebulizer solution  Self No Yes   Sig: Take 1 vial (2 5 mg total) by nebulization every 4 (four) hours as needed for wheezing or shortness of breath   amiodarone 200 mg tablet   No Yes   Sig: Take 1/2 tablet ( 100 mg) daily   anastrozole (ARIMIDEX) 1 mg tablet  Self No Yes   Sig: Take 1 tablet (1 mg total) by mouth daily   apixaban (ELIQUIS) 5 mg  Self No Yes   Sig: Take 1 tablet (5 mg total) by mouth 2 (two) times a day   aspirin 81 mg chewable tablet  Self No Yes   Sig: Chew 1 tablet (81 mg total) daily   ergocalciferol (VITAMIN D2) 50,000 units  Self No Yes   Sig: Take 1 capsule (50,000 Units total) by mouth 2 (two) times a week with meals   ferrous sulfate 324 (65 Fe) mg  Self No Yes   Sig: Take 1 tablet (324 mg total) by mouth daily before breakfast   meclizine (ANTIVERT) 25 mg tablet   No Yes   Sig: Take 1 tablet (25 mg total) by mouth every 8 (eight) hours as needed for dizziness   metoprolol succinate (TOPROL-XL) 25 mg 24 hr tablet   No Yes   Sig: Take 1 tablet (25 mg total) by mouth daily   nystatin (MYCOSTATIN) cream   No Yes   Sig: Apply topically 2 (two) times a day   omeprazole (PriLOSEC) 20 mg delayed release capsule   No Yes   Sig: Take 1 capsule (20 mg total) by mouth daily   oxyCODONE (ROXICODONE) 10 MG TABS  Self Yes Yes   Sig: Take 10 mg by mouth every 6 (six) hours as needed        Facility-Administered Medications: None       Past Medical History:   Diagnosis Date    Anemia     Arthritis     Cancer (HCC)     rectal    Chronic lower back pain     Chronic pain disorder     back pain    [facility] cancer ([facility] ) 2018    Diabetes mellitus ([facility] )     Endometrial cancer ([facility] ) 2018    GERD (gastroesophageal reflux disease)     History of chemotherapy     History of MRSA infection     [facility]bid [facility]     Ovarian cancer ([facility] ) 2018    [facility]     Spinal stenosis     Systolic murmur     Unsteady gait     uses walker    Wears dentures     Wears glasses        Past Surgical History:   Procedure Laterality Date    ABDOMINAL PERINEAL BOWEL RESECTION W/ ILEOANAL POUCH N/A 2018    Procedure: LAPAROSCOPIC HAND ASSIST ABDOMINOPERINEAL RESECTION,  POSTERIOR VAGINECTOMY, OMENTECTOMY;  Surgeon: Helena Tapia MD;  [facility];  Service: Colorectal    ABDOMINAL SURGERY      abscess removed from abdomen and right thigh, a hole in thigh closed by plastic surgeon    ABSCESS DRAINAGE      abd, (R) leg, (L) leg     SECTION       SECTION      CYSTOSCOPY N/A 2018    Procedure: CYSTOSCOPY;  Surgeon: Jace Sidhu MD;  [facility];  Service: Gynecology Oncology    ESOPHAGOGASTRODUODENOSCOPY N/A 7/18/2016    Procedure: ESOPHAGOGASTRODUODENOSCOPY (EGD); Surgeon: James Dominguez MD;  [facility];  Service:     ESOPHAGOGASTRODUODENOSCOPY N/A 3/30/2016    Procedure: ESOPHAGOGASTRODUODENOSCOPY (EGD); Surgeon: James Dominguez MD;  [facility]; Service:     EYE SURGERY      laser eye surgery    HYSTERECTOMY N/A 9/6/2018    Procedure: RADICAL HYSTERECTOMY TOTAL ABDOMINAL (ALEXANDRA)  BSO;  Surgeon: MD Jacobo;  [facility];  Service: Gynecology Oncology    JOINT REPLACEMENT Left 2017    hip    JOINT REPLACEMENT Right 2017    hip    OOPHORECTOMY Bilateral     MT COLONOSCOPY FLX DX W/COLLJ SPEC WHEN PFRMD N/A 3/9/2018    Procedure: COLONOSCOPY;  Surgeon: Claudio Flower MD;  Location: [place] [address] GI LAB; Service: Gastroenterology    MT COLONOSCOPY FLX DX W/COLLJ [facility] WHEN PFRMD N/A 9/5/2018    Procedure: COLONOSCOPY;  Surgeon: Harika Gil MD;  [facility]; Service: Colorectal    MT ECHO TRANSESOPHAG R-T 2D W/PRB IMG ACQUISJ I&R N/A 9/1/2020    Procedure: TRANSESOPHAGEAL ECHOCARDIOGRAM (THO); Surgeon: Francies Homans, DO;  [facility];  Service: Cardiac Surgery    MT ESOPHAGOGASTRODUODENOSCOPY TRANSORAL DIAGNOSTIC N/A 2/2/2018    Procedure: ESOPHAGOGASTRODUODENOSCOPY (EGD); Surgeon: Claudio Flower MD;  Location: [facility] LAB;   Service: Gastroenterology    MT LAP GASTRIC BYPASS/SOLEDAD-EN-Y N/A 7/18/2016    Procedure: BYPASS GASTRIC  SOLEDAD-EN-Y LAPAROSCOPIC;  Surgeon: James Dominguez MD;  [facility];  Service: Bariatrics    MT LAP, SURG COLOSTOMY N/A 9/6/2018    Procedure: PERMANENT END COLOSTOMY;  Surgeon: Harika Gil MD;  [facility];  Service: Colorectal    MT LAP, SURG PROCTECTOMY W COLOSTOMY N/A 9/6/2018    Procedure: PROCTECTOMY;  Surgeon: Harika Gil MD;  [facility];  Service: Colorectal    MT REPLACE AORTIC VALVE OPENFEMORAL ARTERY APPROACH N/A 9/1/2020 Procedure: REPLACEMENT AORTIC VALVE TRANSCATHETER (TAVR) TRANSFEMORAL W/ 26MM WADDELL BARBARA S3 ULTRA VALVE(ACCESS ON RIGHT); Surgeon: Ariela Troncoso DO;  [facility];  Service: Cardiac Surgery    TUBAL LIGATION         Family History   Adopted: Yes   Problem Relation Age of Onset    Leukemia Mother     Heart disease Father     Coronary artery disease Father     Diabetes Father     No Known Problems Sister     No Known Problems Brother     Diabetes Son     No Known Problems Brother     No Known Problems Brother     No Known Problems Sister     No Known Problems Sister      I have reviewed and agree with the history as documented  E-Cigarette/Vaping    E-Cigarette Use Never User      E-Cigarette/Vaping Substances    Nicotine No     THC No     CBD No     Flavoring No     Other No     Unknown No      Social History     Tobacco Use    Smoking status: Former Smoker     Packs/day: 1 00     Years: 25 00     Pack years: 25 00     Quit date: 3/30/2006     Years since quitting: 15 0    Smokeless tobacco: Never Used   Substance Use Topics    Alcohol use: Not Currently    Drug use: Not Currently     Types: Oxycodone     Comment: Percocet for lower back pain prn       Review of Systems   Constitutional: Positive for fever  Negative for chills  HENT: Negative for congestion and sore throat  Respiratory: Negative for cough and shortness of breath  Gastrointestinal: Negative for abdominal pain, nausea and vomiting  Genitourinary: Negative for difficulty urinating and dysuria  Musculoskeletal: Positive for arthralgias and gait problem  Difficulty ambulating secondary right knee pain  Skin: Positive for wound  Neurological: Negative for weakness, numbness and headaches  All other systems reviewed and are negative  Physical Exam  Physical Exam  Vitals signs and nursing note reviewed  Constitutional:       General: She is not in acute distress       Appearance: Normal appearance  HENT:      Head: Atraumatic  Right Ear: External ear normal       Left Ear: External ear normal       Nose: Nose normal    Eyes:      General: No scleral icterus  Conjunctiva/sclera: Conjunctivae normal    Cardiovascular:      Rate and Rhythm: Normal rate and regular rhythm  Pulses: Normal pulses  Pulmonary:      Effort: Pulmonary effort is normal  No respiratory distress  Breath sounds: Normal breath sounds  No wheezing, rhonchi or rales  Abdominal:      General: Abdomen is flat  Bowel sounds are normal  There is no distension  Palpations: Abdomen is soft  Tenderness: There is no abdominal tenderness  There is no guarding or rebound  Musculoskeletal:         General: Swelling and tenderness present  Comments: See picture right knee erythema swelling mild warmth dressing clean dry intact   Skin:     Capillary Refill: Capillary refill takes less than 2 seconds  Findings: No rash  Neurological:      General: No focal deficit present  Mental Status: She is alert and oriented to person, place, and time               Vital Signs  ED Triage Vitals [04/08/21 1730]   Temperature Pulse Respirations Blood Pressure SpO2   (S) (!) 101 4 °F (38 6 °C) 85 16 111/55 97 %      Temp Source Heart Rate Source Patient Position - Orthostatic VS BP Location FiO2 (%)   Tympanic -- Sitting Left arm --      Pain Score       Worst Possible Pain           Vitals:    04/08/21 1730   BP: 111/55   Pulse: 85   Patient Position - Orthostatic VS: Sitting         Visual Acuity      ED Medications  Medications   acetaminophen (TYLENOL) tablet 650 mg (650 mg Oral Given 4/8/21 1924)       Diagnostic Studies  Results Reviewed     Procedure Component Value Units Date/Time    Urine Microscopic [[id number]]  (Abnormal) Collected: 04/08/21 1852    Lab Status: Final result Specimen: Urine, Clean Catch Updated: 04/08/21 1918     RBC, UA None Seen /hpf      WBC, UA 4-10 /hpf      Epithelial Cells Occasional /hpf      Bacteria, UA Occasional /hpf      MUCUS THREADS Occasional    COVID19, Influenza A/B, RSV PCR, [facility] [[id number]]  (Normal) Collected: 04/08/21 1828    Lab Status: Final result Specimen: Nares from Nasopharyngeal Swab Updated: 04/08/21 1915     SARS-CoV-2 Negative     INFLUENZA A PCR Negative     INFLUENZA B PCR Negative     RSV PCR Negative    Narrative: This test has been authorized by FDA under an EUA (Emergency Use Assay) for use by authorized laboratories  Clinical caution and judgement should be used with the interpretation of these results with consideration of the clinical impression and other laboratory testing  Testing reported as "Positive" or "Negative" has been proven to be accurate according to standard laboratory validation requirements  All testing is performed with control materials showing appropriate reactivity at standard intervals  UA w Reflex to Microscopic w Reflex to Culture [[id number]]  (Abnormal) Collected: 04/08/21 1852    Lab Status: Final result Specimen: Urine, Clean Catch Updated: 04/08/21 1859     Color, UA Yellow     Clarity, UA Clear     Specific [place], UA 1 020     pH, UA 5 5     Leukocytes, UA Trace     Nitrite, UA Negative     Protein, UA Negative mg/dl      Glucose, UA Negative mg/dl      Ketones, UA Negative mg/dl      Urobilinogen, UA 0 2 E U /dl      Bilirubin, UA Negative     Blood, UA Negative    Lactic acid [[id number]]  (Normal) Collected: 04/08/21 1828    Lab Status: Final result Specimen: Blood from Arm, Left Updated: 04/08/21 1853     LACTIC ACID 1 1 mmol/L     Narrative:      Result may be elevated if tourniquet was used during collection      Comprehensive metabolic panel [[id number]]  (Abnormal) Collected: 04/08/21 1828    Lab Status: Final result Specimen: Blood from Arm, Left Updated: 04/08/21 1850     Sodium 134 mmol/L      Potassium 4 3 mmol/L      Chloride 100 mmol/L      CO2 28 mmol/L      ANION GAP 6 mmol/L      BUN 12 mg/dL      Creatinine 0 89 mg/dL      Glucose 131 mg/dL      Calcium 8 8 mg/dL      Corrected Calcium 9 7 mg/dL      AST 19 U/L      ALT 19 U/L      Alkaline Phosphatase 69 U/L      Total Protein 6 8 g/dL      Albumin 2 9 g/dL      Total Bilirubin 0 54 mg/dL      eGFR 68 ml/min/1 73sq m     Narrative:      [place] guidelines for Chronic Kidney Disease (CKD):     Stage 1 with normal or high GFR (GFR > 90 mL/min/1 73 square meters)    Stage 2 Mild CKD (GFR = 60-89 mL/min/1 73 square meters)    Stage 3A Moderate CKD (GFR = 45-59 mL/min/1 73 square meters)    Stage 3B Moderate CKD (GFR = 30-44 mL/min/1 73 square meters)    Stage 4 Severe CKD (GFR = 15-29 mL/min/1 73 square meters)    Stage 5 End Stage CKD (GFR <15 mL/min/1 73 square meters)  Note: GFR calculation is accurate only with a steady state creatinine    CBC and differential [[id number]]  (Abnormal) Collected: 04/08/21 1828    Lab Status: Final result Specimen: Blood from Arm, Left Updated: 04/08/21 1834     WBC 6 90 Thousand/uL      RBC 4 60 Million/uL      Hemoglobin 11 5 g/dL      Hematocrit 38 1 %      MCV 83 fL      MCH 25 0 pg      MCHC 30 2 g/dL      RDW 15 1 %      MPV 9 4 fL      Platelets 612 Thousands/uL      nRBC 0 /100 WBCs      Neutrophils Relative 73 %      Immat GRANS % 2 %      Lymphocytes Relative 11 %      Monocytes Relative 13 %      Eosinophils Relative 1 %      Basophils Relative 0 %      Neutrophils Absolute 5 00 Thousands/µL      Immature Grans Absolute 0 11 Thousand/uL      Lymphocytes Absolute 0 78 Thousands/µL      Monocytes Absolute 0 89 Thousand/µL      Eosinophils Absolute 0 09 Thousand/µL      Basophils Absolute 0 03 Thousands/µL     Blood culture #2 [[id number]] Collected: 04/08/21 1828    Lab Status: In process Specimen: Blood from Arm, Left Updated: 04/08/21 1833    Blood culture #1 [[id number]] Collected: 04/08/21 1811    Lab Status:  In process Specimen: Blood from Arm, Right Updated: 04/08/21 1815                 XR chest 1 view portable    (Results Pending)              Procedures  Procedures         ED Course  ED Course as of Apr 08 2100   Thu Apr 08, 2021   1933 First call [facility] left message awaiting call back  7536 Second call the [facility] able to speak now upper this time we attempted to reach Dr Fred Wild no answer  Took a message will continue to try to page him  2026 Spoke with Susan Lemus from [employer]  Discussed the fever and lab results  Recommended patient continue with Tylenol pain medication follow-up the office tomorrow  MDM  Number of Diagnoses or Management Options  Postoperative fever:   Post-operative pain:   Diagnosis management comments: Pulse ox 97% on room air indicating adequate oxygenation  CXR: NAD as read by me    COVID test was negative  Lab work was unremarkable    Case was discussed with [facility]  This time they felt the swelling was normal postop and of the fever have benign cause  Recommend following up with the office tomorrow morning         Amount and/or Complexity of Data Reviewed  Clinical lab tests: ordered and reviewed  Tests in the radiology section of CPT®: ordered and reviewed  Decide to obtain previous medical records or to obtain history from someone other than the patient: yes  Review and summarize past medical records: yes  Discuss the patient with other providers: yes  Independent visualization of images, tracings, or specimens: yes    Patient Progress  Patient progress: stable      Disposition  Final diagnoses:   Post-operative pain   Postoperative fever     Time reflects when diagnosis was documented in both MDM as applicable and the Disposition within this note     Time User Action Codes Description Comment    4/8/2021  8:27 PM Amber Parikh [G89 [id number]] Post-operative pain     4/8/2021  8:27 PM Perry Walker [R50 [id number]] Postoperative fever ED Disposition     ED Disposition Condition Date/Time Comment    Discharge Stable Thu Apr 8, 2021  8:27 PM [address] discharge to home/self care  Follow-up Information     Follow up With Specialties Details Why Rubén Jiménez MD Orthopedic Surgery In 1 day  [place] [address]  [phone]            Patient's Medications   Discharge Prescriptions    No medications on file     No discharge procedures on file      PDMP Review     None          ED Provider  Electronically Signed by           Ksenia Horner DO  04/08/21 2100

## 2021-04-12 ENCOUNTER — DOCUMENTATION (OUTPATIENT)
Dept: FAMILY MEDICINE CLINIC | Facility: CLINIC | Age: 66
End: 2021-04-12

## 2021-04-14 LAB
BACTERIA BLD CULT: NORMAL
BACTERIA BLD CULT: NORMAL

## 2021-04-16 ENCOUNTER — TELEPHONE (OUTPATIENT)
Dept: FAMILY MEDICINE CLINIC | Facility: CLINIC | Age: 66
End: 2021-04-16

## 2021-04-20 ENCOUNTER — TELEPHONE (OUTPATIENT)
Dept: FAMILY MEDICINE CLINIC | Facility: CLINIC | Age: 66
End: 2021-04-20

## 2021-04-20 NOTE — TELEPHONE ENCOUNTER
We got a plan of care to be signed from [facility]    Copy scanned into chart    Original left in white bin

## 2021-04-21 NOTE — TELEPHONE ENCOUNTER
C/o right sided back/flank pain for 1 wk and increased urinary frequency.  Denies N/V or fever LMOM asking that patient call me to confirm that she is scheduled to colorectal

## 2021-04-21 NOTE — TELEPHONE ENCOUNTER
Received initial order for start-of-care from [facility]; needs review and signature from attending  Form in [facility] in resident area

## 2021-04-23 NOTE — TELEPHONE ENCOUNTER
Dr Ankur Parks signed form  Faxed completed form to [phone]       Placed in "to be scanned" bin

## 2021-04-28 ENCOUNTER — TELEPHONE (OUTPATIENT)
Dept: FAMILY MEDICINE CLINIC | Facility: CLINIC | Age: 66
End: 2021-04-28

## 2021-04-28 NOTE — TELEPHONE ENCOUNTER
Plan of Care placed in Dr Kassidy Swenson folder in precepting room  Should be co-signed by attending

## 2021-04-30 DIAGNOSIS — K28.9 MARGINAL ULCER: ICD-10-CM

## 2021-04-30 DIAGNOSIS — I48.91 ATRIAL FIBRILLATION WITH RAPID VENTRICULAR RESPONSE (HCC): ICD-10-CM

## 2021-04-30 RX ORDER — METOPROLOL SUCCINATE 25 MG/1
25 TABLET, EXTENDED RELEASE ORAL DAILY
Qty: 90 TABLET | Refills: 1 | Status: SHIPPED | OUTPATIENT
Start: 2021-04-30

## 2021-04-30 RX ORDER — OMEPRAZOLE 20 MG/1
20 CAPSULE, DELAYED RELEASE ORAL DAILY
Qty: 90 CAPSULE | Refills: 0 | Status: SHIPPED | OUTPATIENT
Start: 2021-04-30 | End: 2021-07-30 | Stop reason: SDUPTHER

## 2021-04-30 NOTE — TELEPHONE ENCOUNTER
Patient requesting refills of Omeprazole and Metoprolol to [address] [place] [address] Drive  Last televisit 2/25/21 and office visit 9/4/20

## 2021-05-03 ENCOUNTER — OFFICE VISIT (OUTPATIENT)
Dept: WOUND CARE | Facility: HOSPITAL | Age: 66
End: 2021-05-03
Payer: MEDICARE

## 2021-05-03 VITALS
SYSTOLIC BLOOD PRESSURE: 133 MMHG | RESPIRATION RATE: 16 BRPM | DIASTOLIC BLOOD PRESSURE: 75 MMHG | TEMPERATURE: 96.5 F | HEART RATE: 79 BPM

## 2021-05-03 DIAGNOSIS — S31.809A OPEN WOUND OF BUTTOCK, UNSPECIFIED LATERALITY, INITIAL ENCOUNTER: Primary | ICD-10-CM

## 2021-05-03 DIAGNOSIS — E66.01 MORBID OBESITY DUE TO EXCESS CALORIES (HCC): ICD-10-CM

## 2021-05-03 DIAGNOSIS — Z85.048 HISTORY OF RECTAL CANCER: ICD-10-CM

## 2021-05-03 PROCEDURE — 97597 DBRDMT OPN WND 1ST 20 CM/<: CPT | Performed by: NURSE PRACTITIONER

## 2021-05-03 PROCEDURE — 99213 OFFICE O/P EST LOW 20 MIN: CPT | Performed by: NURSE PRACTITIONER

## 2021-05-03 PROCEDURE — 99203 OFFICE O/P NEW LOW 30 MIN: CPT | Performed by: NURSE PRACTITIONER

## 2021-05-03 RX ORDER — LIDOCAINE HYDROCHLORIDE 40 MG/ML
5 SOLUTION TOPICAL ONCE
Status: COMPLETED | OUTPATIENT
Start: 2021-05-03 | End: 2021-05-03

## 2021-05-03 RX ADMIN — LIDOCAINE HYDROCHLORIDE 5 ML: 40 SOLUTION TOPICAL at 08:33

## 2021-05-03 NOTE — PATIENT INSTRUCTIONS
Orders Placed This Encounter   Procedures    Wound cleansing and dressings     Buttock wound  Wash your hands with soap and water  Remove old dressing, discard into plastic bag and place in trash  Cleanse the wound with normal saline prior to applying a clean dressing  Do not use tissue or cotton balls  Do not scrub the wound  Pat dry using gauze  Shower no   Apply skin prep to skin surrounding wound  Apply Aquacell AG rope to the wound  Cover with ABD pad  Secure with tape  Change dressing 3x weekly  This was done today     Standing Status:   Future     Standing Expiration Date:   5/3/2022    Wound off loading     Off-loading Instructions:    Keep weight and pressure off wound at all times  Standing Status:   Future     Standing Expiration Date:   5/3/2022    Wound home care     Visiting nurses 2-3x weekly and PRN for wound care       Standing Status:   Future     Standing Expiration Date:   5/3/2022

## 2021-05-03 NOTE — LETTER
[facility]  [address]  [place] [address]  Phone#  [phone]  Fax#  [phone]    Patient:  Johnny Felder  YOB: 1955  Phone:  [phone]  Date of Visit:  5/3/2021    Orders Placed This Encounter   Procedures    Wound cleansing and dressings     Buttock wound  Wash your hands with soap and water  Remove old dressing, discard into plastic bag and place in trash  Cleanse the wound with normal saline prior to applying a clean dressing  Do not use tissue or cotton balls  Do not scrub the wound  Pat dry using gauze  Shower no   Apply skin prep to skin surrounding wound  Apply Aquacell AG rope to the wound  Cover with ABD pad  Secure with tape  Change dressing 3x weekly  This was done today     Standing Status:   Future     Standing Expiration Date:   5/3/2022    Wound off loading     Off-loading Instructions:    Keep weight and pressure off wound at all times  Standing Status:   Future     Standing Expiration Date:   5/3/2022    Wound home care     Visiting nurses 2-3x weekly and PRN for wound care       Standing Status:   Future     Standing Expiration Date:   5/3/2022    Debridement     This order was created via procedure documentation         Electronically signed by GEORGETTE Chua

## 2021-05-03 NOTE — PROGRESS NOTES
Patient ID: Chela Humphrey is a 72 y o  female Date of Birth 1955     Chief Complaint  Chief Complaint   Patient presents with    New Patient Visit     sacral wound       Allergies  Tramadol    Assessment:     Diagnoses and all orders for this visit:    Open wound of buttock, unspecified laterality, initial encounter  -     lidocaine (XYLOCAINE) 4 % topical solution 5 mL  -     Wound cleansing and dressings; Future  -     Wound off loading; Future  -     Wound home care; Future  -     Debridement    Morbid obesity due to excess calories ([facility] [address] )    History of rectal cancer              Debridement   Wound 05/03/21 Other (comment) Sacrum    Universal Protocol:  Consent: Written consent obtained  Consent given by: patient  Time out: Immediately prior to procedure a "time out" was called to verify the correct patient, procedure, equipment, support staff and site/side marked as required  Timeout called at: 5/3/2021 8:52 AM   Patient understanding: patient states understanding of the procedure being performed  Patient consent: the patient's understanding of the procedure matches consent given  Procedure consent matches procedure scheduled: N/A  Relevant documents present and verified: N/A  Test results available and properly labeled: N/A  Site marked: the operative site was marked  Imaging studies available: N/A  Required blood products, implants, devices, and special equipment available: N/A    Patient identity confirmed: verbally with patient      Performed by: NP  Debridement type: selective  Pain control: lidocaine 4%  Pre-debridement measurements  Length (cm): 3 2  Width (cm): 2  Depth (cm): 10  Surface Area (cm^2): 6 4  Volume (cm^3): 64    Post-debridement measurements  Length (cm): 3 2  Width (cm): 2  Depth (cm): 10  Percent debrided: 100%  Surface Area (cm^2): 6 4  Area debrided (cm^2): 6 4  Volume (cm^3): 64  Devitalized tissue debrided: biofilm, fibrin and slough  Instrument(s) utilized: curette  Bleeding: medium  Hemostasis obtained with: pressure  Procedural pain (0-10): 0  Post-procedural pain: 0   Response to treatment: procedure was tolerated well          Plan:  1  Initial visit  Wound debrided  Wound probes deeply but has a clean granular wound base  Patient reports she has a history of rectal cancer with 6 weeks of radiation to rectal region  Patient's wound may not be healing due to soft tissue radionecrosis? Will review patient's chart for documentation on this  Discussed HBO with patient  Patient reports she wants to think about it  In the meantime will have Aquacel with Ag rope gently packed into wound covered with gauze and ABD changed 3 times a week  Patient will followup in 1 week  Wound 05/03/21 Other (comment) Sacrum (Active)   Wound Image Images linked 05/03/21 0828   Wound Description Beefy red;Fragile;Granulation tissue;Dark edges;Drainage 05/03/21 0828   Kely-wound Assessment Intact 05/03/21 0828   Wound Length (cm) 3 2 cm 05/03/21 0828   Wound Width (cm) 2 cm 05/03/21 0828   Wound Depth (cm) 10 cm 05/03/21 0828   Wound Surface Area (cm^2) 6 4 cm^2 05/03/21 0828   Wound Volume (cm^3) 64 cm^3 05/03/21 0828   Calculated Wound Volume (cm^3) 64 cm^3 05/03/21 0828   Undermining 5 5 (deepest at 12) 05/03/21 0828   Undermining is depth extending from 9-5 05/03/21 0828   Drainage Amount Moderate 05/03/21 0828   Drainage Description Serosanguineous 05/03/21 0828   Non-staged Wound Description Full thickness 05/03/21 0828   Dressing Status Other (Comment) (open to air on arrival) 05/03/21 0828       Wound 05/03/21 Other (comment) Sacrum (Active)   Date First Assessed/Time First Assessed: 05/03/21 9436   Primary Wound Type:  Other (comment)  Location: Sacrum       [REMOVED] Wound 09/01/20 Groin Right (Removed)   Resolved Date: 05/03/21  Date First Assessed/Time First Assessed: 09/01/20 1143   Location: Groin  Wound Location Orientation: Right  Wound Description (Comments): femoral artery sheath puncture site; artery closed with proglide closure devices  Inc  Subjective:     Patient is a 80-year-old female who presents to the [address] [place] [address] as a new patient for a nonhealing open wound of her rectal region  Patient reports she has a past medical history of rectal cancer status post proctectomy and permanent end colostomy in 2018  She underwent 6 weeks of both chemo and radiation in 2018  Patient reports her current rectal wound has been present for the past 2-1/2 years  Patient reports her color rectal surgeon recommended she undergo HBOT in the past but she refused  She reports her wound drains a small to moderate amount of serosanguineous drainage  She has been managing her wound at home by keeping her wound covered with a sanitary pad and letting her wound drain  She denies any pain, fevers, or chills  The following portions of the patient's history were reviewed and updated as appropriate:   She  has a past medical history of Anemia, Arthritis, Cancer ([address] ), Chronic lower back pain, Chronic pain disorder, Colon cancer ([address] ) (2018), Diabetes mellitus ([address] ), Endometrial cancer ([address] ) (2018), GERD (gastroesophageal reflux disease), History of chemotherapy, History of MRSA infection (0719/2016), Morbid obesity ([address] ), Ovarian cancer ([address] ) (2018), Rectal cancer ([address] ), Spinal stenosis, Systolic murmur, Unsteady gait, Wears dentures, and Wears glasses    She   Patient Active Problem List    Diagnosis Date Noted    Weight gain following gastric bypass surgery 10/01/2020    Hypochloremia 09/01/2020    Atrial fibrillation with rapid ventricular response ([address] ) 02/24/2020    Hyperlipidemia 02/24/2020    Aortic stenosis, severe 09/10/2019    Moderate mitral stenosis 09/10/2019    History of ovarian cancer 05/29/2019    Candidal intertrigo 05/29/2019    Vaginal bleeding 05/29/2019    Encounter for other screening for malignant neoplasm of breast 05/29/2019    Pyelonephritis 2019    History of ovarian cancer 10/03/2018    Colostomy care ([place] [address] ) 2018    History of rectal cancer 2018    S/P gastric bypass 2018    Marginal ulcer 2018    Encounter for surgical aftercare following surgery of digestive system 2018    Atherosclerotic peripheral vascular disease ([place] [address] ) 2017    Postgastrectomy malabsorption 2016    Morbid obesity due to excess calories ([place] [address] ) 2016    Onychomycosis 2016     She  has a past surgical history that includes Abscess drainage;  section; Tubal ligation; Eye surgery; pr lap gastric bypass/angi-en-y (N/A, 2016); Esophagogastroduodenoscopy (N/A, 2016); Esophagogastroduodenoscopy (N/A, 3/30/2016); pr esophagogastroduodenoscopy transoral diagnostic (N/A, 2018); pr colonoscopy flx dx w/collj spec when pfrmd (N/A, 3/9/2018); Abdominal surgery; Joint replacement (Left, 2017); Joint replacement (Right, 2017);  section; pr colonoscopy flx dx w/collj spec when pfrmd (N/A, 2018); pr lap, surg proctectomy w colostomy (N/A, 2018); pr lap, surg colostomy (N/A, 2018); Abdominal perineal bowel resection w/ ileoanal pouch (N/A, 2018); Hysterectomy (N/A, 2018); CYSTOSCOPY (N/A, 2018); Oophorectomy (Bilateral); pr replace aortic valve openfemoral artery approach (N/A, 2020); and pr echo transesophag r-t 2d w/prb img acquisj i&r (N/A, 2020)  Her family history includes Coronary artery disease in her father; Diabetes in her father and son; Heart disease in her father; Leukemia in her mother; No Known Problems in her brother, brother, brother, sister, sister, and sister  She was adopted  She  reports that she quit smoking about 15 years ago  She has a 25 00 pack-year smoking history  She has never used smokeless tobacco  She reports previous alcohol use  She reports previous drug use  Drug: Oxycodone    Current Outpatient Medications   Medication Sig Dispense Refill    amiodarone 200 mg tablet Take 1/2 tablet ( 100 mg) daily 90 tablet 3    apixaban (ELIQUIS) 5 mg Take 1 tablet (5 mg total) by mouth 2 (two) times a day 180 tablet 2    aspirin 81 mg chewable tablet Chew 1 tablet (81 mg total) daily 30 tablet 2    Calcium-Vitamin D 500-125 MG-UNIT TABS Take 1 tablet by mouth 2 (two) times a day        Cyanocobalamin (B-12 PO) Take by mouth      meclizine (ANTIVERT) 25 mg tablet Take 1 tablet (25 mg total) by mouth every 8 (eight) hours as needed for dizziness 30 tablet 0    metoprolol succinate (TOPROL-XL) 25 mg 24 hr tablet Take 1 tablet (25 mg total) by mouth daily 90 tablet 1    Multiple Vitamins-Minerals (BARIATRIC FUSION) CHEW Chew 1 tablet daily        nystatin (MYCOSTATIN) cream Apply topically 2 (two) times a day 30 g 0    omeprazole (PriLOSEC) 20 mg delayed release capsule Take 1 capsule (20 mg total) by mouth daily 90 capsule 0    oxyCODONE (ROXICODONE) 10 MG TABS Take 10 mg by mouth every 6 (six) hours as needed        albuterol (2 5 mg/3 mL) 0 083 % nebulizer solution Take 1 vial (2 5 mg total) by nebulization every 4 (four) hours as needed for wheezing or shortness of breath (Patient not taking: Reported on 5/3/2021) 75 vial 1    anastrozole (ARIMIDEX) 1 mg tablet Take 1 tablet (1 mg total) by mouth daily (Patient not taking: Reported on 5/3/2021) 90 tablet 2    ergocalciferol (VITAMIN D2) 50,000 units Take 1 capsule (50,000 Units total) by mouth 2 (two) times a week with meals (Patient not taking: Reported on 5/3/2021) 20 capsule 0    ferrous sulfate 324 (65 Fe) mg Take 1 tablet (324 mg total) by mouth daily before breakfast (Patient not taking: Reported on 5/3/2021) 90 tablet 0     No current facility-administered medications for this visit  She is allergic to tramadol       Review of Systems   Constitutional: Negative  HENT: Negative for ear pain and hearing loss  Eyes: Negative for pain     Respiratory: Negative for chest tightness and shortness of breath  Cardiovascular: Negative for chest pain, palpitations and leg swelling  Gastrointestinal: Negative for diarrhea, nausea and vomiting  Colostomy   Genitourinary: Negative for dysuria  Musculoskeletal: Positive for gait problem  Skin: Positive for wound  Neurological: Negative for tremors and weakness  Psychiatric/Behavioral: Negative for behavioral problems, confusion and suicidal ideas  Objective:       Wound 05/03/21 Other (comment) Sacrum (Active)   Wound Image Images linked 05/03/21 0828   Wound Description Beefy red;Fragile;Granulation tissue;Dark edges;Drainage 05/03/21 0828   Kely-wound Assessment Intact 05/03/21 0828   Wound Length (cm) 3 2 cm 05/03/21 0828   Wound Width (cm) 2 cm 05/03/21 0828   Wound Depth (cm) 10 cm 05/03/21 0828   Wound Surface Area (cm^2) 6 4 cm^2 05/03/21 0828   Wound Volume (cm^3) 64 cm^3 05/03/21 0828   Calculated Wound Volume (cm^3) 64 cm^3 05/03/21 0828   Undermining 5 5 (deepest at 12) 05/03/21 0828   Undermining is depth extending from 9-5 05/03/21 0828   Drainage Amount Moderate 05/03/21 0828   Drainage Description Serosanguineous 05/03/21 0828   Non-staged Wound Description Full thickness 05/03/21 0828   Dressing Status Other (Comment) (open to air on arrival) 05/03/21 0828       /75   Pulse 79   Temp (!) 96 5 °F (35 8 °C)   Resp 16     Physical Exam  Vitals signs and nursing note reviewed  Constitutional:       General: She is not in acute distress  Appearance: Normal appearance  She is obese  HENT:      Head: Normocephalic and atraumatic  Eyes:      General:         Right eye: No discharge  Left eye: No discharge  Neck:      Musculoskeletal: Normal range of motion and neck supple  No neck rigidity  Pulmonary:      Effort: Pulmonary effort is normal  No respiratory distress  Musculoskeletal: Normal range of motion  Right lower leg: No edema  Left lower leg: No edema  Skin:     General: Skin is warm and dry  Findings: Wound present  No erythema  Neurological:      General: No focal deficit present  Mental Status: She is alert and oriented to person, place, and time  Mental status is at baseline  Psychiatric:         Mood and Affect: Mood normal          Behavior: Behavior normal          Thought Content: Thought content normal          Judgment: Judgment normal                  Wound Instructions:  Orders Placed This Encounter   Procedures    Wound cleansing and dressings     Buttock wound  Wash your hands with soap and water  Remove old dressing, discard into plastic bag and place in trash  Cleanse the wound with normal saline prior to applying a clean dressing  Do not use tissue or cotton balls  Do not scrub the wound  Pat dry using gauze  Shower no   Apply skin prep to skin surrounding wound  Apply Aquacell AG rope to the wound  Cover with ABD pad  Secure with tape  Change dressing 3x weekly  This was done today     Standing Status:   Future     Standing Expiration Date:   5/3/2022    Wound off loading     Off-loading Instructions:    Keep weight and pressure off wound at all times  Standing Status:   Future     Standing Expiration Date:   5/3/2022    Wound home care     Visiting nurses 2-3x weekly and PRN for wound care  Standing Status:   Future     Standing Expiration Date:   5/3/2022    Debridement     This order was created via procedure documentation        Diagnosis ICD-10-CM Associated Orders   1  Open wound of buttock, unspecified laterality, initial encounter  S31 809A lidocaine (XYLOCAINE) 4 % topical solution 5 mL     Wound cleansing and dressings     Wound off loading     Wound home care     Debridement   2  Morbid obesity due to excess calories (HCC)  E66 01    3   History of rectal cancer  [id number]

## 2021-05-07 ENCOUNTER — TELEPHONE (OUTPATIENT)
Dept: FAMILY MEDICINE CLINIC | Facility: CLINIC | Age: 66
End: 2021-05-07

## 2021-05-07 NOTE — TELEPHONE ENCOUNTER
Received form via [employer] from [facility]   Copy of form scanned into encounter  Placed in white team bin at nurse station       Needs to be faxed back to [phone]

## 2021-05-10 ENCOUNTER — OFFICE VISIT (OUTPATIENT)
Dept: WOUND CARE | Facility: HOSPITAL | Age: 66
End: 2021-05-10
Payer: MEDICARE

## 2021-05-10 VITALS
TEMPERATURE: 97.6 F | DIASTOLIC BLOOD PRESSURE: 76 MMHG | SYSTOLIC BLOOD PRESSURE: 152 MMHG | HEART RATE: 65 BPM | RESPIRATION RATE: 15 BRPM

## 2021-05-10 DIAGNOSIS — Z85.048 HISTORY OF RECTAL CANCER: ICD-10-CM

## 2021-05-10 DIAGNOSIS — E66.01 MORBID OBESITY DUE TO EXCESS CALORIES (HCC): ICD-10-CM

## 2021-05-10 DIAGNOSIS — S31.809A OPEN WOUND OF BUTTOCK, UNSPECIFIED LATERALITY, INITIAL ENCOUNTER: Primary | ICD-10-CM

## 2021-05-10 PROCEDURE — 99213 OFFICE O/P EST LOW 20 MIN: CPT | Performed by: NURSE PRACTITIONER

## 2021-05-10 PROCEDURE — 97597 DBRDMT OPN WND 1ST 20 CM/<: CPT | Performed by: NURSE PRACTITIONER

## 2021-05-10 RX ORDER — LIDOCAINE HYDROCHLORIDE 40 MG/ML
5 SOLUTION TOPICAL ONCE
Status: COMPLETED | OUTPATIENT
Start: 2021-05-10 | End: 2021-05-10

## 2021-05-10 RX ADMIN — LIDOCAINE HYDROCHLORIDE 5 ML: 40 SOLUTION TOPICAL at 08:35

## 2021-05-10 NOTE — PATIENT INSTRUCTIONS
Orders Placed This Encounter   Procedures    Wound cleansing and dressings     Buttock wound  Wash your hands with soap and water  Remove old dressing, discard into plastic bag and place in trash  Cleanse the wound with normal saline prior to applying a clean dressing  Do not use tissue or cotton balls  Do not scrub the wound  Pat dry using gauze  Shower no   Apply skin prep to skin surrounding wound  Apply Aquacell AG rope to the wound  Cover with ABD pad  Secure with tape  Change dressing 3x weekly  This was done today          Standing Status:   Future     Standing Expiration Date:   5/10/2022    Wound off loading     Off-loading Instructions:     Keep weight and pressure off wound at all times           Standing Status:   Future     Standing Expiration Date:   5/10/2022    Wound home care     Wound home care  Visiting nurses 2-3x weekly and PRN for wound care       Standing Status:   Future     Standing Expiration Date:   5/10/2022

## 2021-05-10 NOTE — LETTER
[facility]  [place] [address] [place] [address]  Phone#  [phone]  Fax#  [phone]    Patient:  Johnny Felder  YOB: 1955  Phone:  [phone]  Date of Visit:  5/10/2021    Orders Placed This Encounter   Procedures    Wound cleansing and dressings     Buttock wound  Wash your hands with soap and water  Remove old dressing, discard into plastic bag and place in trash  Cleanse the wound with normal saline prior to applying a clean dressing  Do not use tissue or cotton balls  Do not scrub the wound  Pat dry using gauze  Shower no   Apply skin prep to skin surrounding wound  Apply Aquacell AG rope to the wound  Cover with ABD pad  Secure with tape  Change dressing 3x weekly  This was done today          Standing Status:   Future     Standing Expiration Date:   5/10/2022    Wound off loading     Off-loading Instructions:     Keep weight and pressure off wound at all times           Standing Status:   Future     Standing Expiration Date:   5/10/2022    Wound home care     Wound home care  Visiting nurses 2-3x weekly and PRN for wound care       Standing Status:   Future     Standing Expiration Date:   5/10/2022         Electronically signed by GEORGETTE Chua

## 2021-05-10 NOTE — PROGRESS NOTES
Patient ID: Chela Humphrey is a 72 y o  female Date of Birth 1955     Chief Complaint  Chief Complaint   Patient presents with    Follow Up Wound Care Visit     Buttock       Allergies  Tramadol    Assessment:     Diagnoses and all orders for this visit:    Open wound of buttock, unspecified laterality, initial encounter  -     lidocaine (XYLOCAINE) 4 % topical solution 5 mL  -     Wound cleansing and dressings; Future  -     Wound off loading; Future  -     Wound home care; Future  -     ECG 12 lead; Future  -     Debridement    Morbid obesity due to excess calories ([facility] )    History of rectal cancer              Debridement   Wound 05/03/21 Other (comment) Sacrum    Universal Protocol:  Consent: Written consent obtained  Consent given by: patient  Time out: Immediately prior to procedure a "time out" was called to verify the correct patient, procedure, equipment, support staff and site/side marked as required  Timeout called at: 5/10/2021 9:05 AM   Patient understanding: patient states understanding of the procedure being performed  Patient consent: the patient's understanding of the procedure matches consent given  Procedure consent matches procedure scheduled: N/A  Relevant documents present and verified: N/A  Test results available and properly labeled: N/A  Site marked: the operative site was marked  Imaging studies available: N/A  Required blood products, implants, devices, and special equipment available: N/A    Patient identity confirmed: verbally with patient      Performed by: NP  Debridement type: selective  Pain control: lidocaine 4%  Pre-debridement measurements  Length (cm): 5  Width (cm): 2 5  Depth (cm): 11  Surface Area (cm^2): 12 5  Volume (cm^3): 137 5    Post-debridement measurements  Length (cm): 5  Width (cm): 2 5  Depth (cm): 11  Percent debrided: 50%  Surface Area (cm^2): 12 5  Area debrided (cm^2): 6 25  Volume (cm^3): 137 5  Devitalized tissue debrided: biofilm, fibrin and slough  Instrument(s) utilized: curette  Bleeding: small  Hemostasis obtained with: pressure  Procedural pain (0-10): 0  Post-procedural pain: 0   Response to treatment: procedure was tolerated well          Plan:  1  F/u visit  Wound debrided  Wound measuring the same  Continue Aquacel Ag rope changed daily and PRN  HBO consult completed today  Patient has a history of rectal cancer with 6 weeks radiation from 5/10/18-6/20/18  She received a total dose of 5040cGy to her pelvis  Patient has a non-healing wound that has been open for the past 2 1/2 years  Patient is a candidate for HBOT and would benefit from treatment with the goal of complete wound healing  2  Patient had a CXR on 4/8/21 which showed, "No acute cardiopulmonary disease "  3  Will order an EKG prior to patient starting with HBOT  4  Patient will follow up in 2 weeks  She is to contact patient finance to look into options to help cover HBOT  Wound 05/03/21 Other (comment) Sacrum (Active)   Wound Image -- (Rovers are not working; unable to take picture  IT notified ) 05/10/21 0827   Wound Description Beefy red;Granulation tissue;Dark edges;Drainage;Slough; Yellow 05/10/21 0827   Kely-wound Assessment Intact 05/10/21 0827   Wound Length (cm) 5 cm 05/10/21 0827   Wound Width (cm) 2 5 cm 05/10/21 0827   Wound Depth (cm) 11 cm 05/10/21 0827   Wound Surface Area (cm^2) 12 5 cm^2 05/10/21 0827   Wound Volume (cm^3) 137 5 cm^3 05/10/21 0827   Calculated Wound Volume (cm^3) 137 5 cm^3 05/10/21 0827   Change in Wound Size % -114 84 05/10/21 0827   Undermining 2 5 05/10/21 0827   Undermining is depth extending from 9-5 05/10/21 0827   Drainage Amount Moderate 05/10/21 0827   Drainage Description Yellow;Serous 05/10/21 0827   Non-staged Wound Description Full thickness 05/10/21 0827   Dressing Status Other (Comment) (no dressing upon arrival) 05/10/21 0827       Wound 05/03/21 Other (comment) Sacrum (Active)   Date First Assessed/Time First Assessed: 05/03/21 7252   Primary Wound Type: Other (comment)  Location: Sacrum       [REMOVED] Wound 09/01/20 Groin Right (Removed)   Resolved Date: 05/03/21  Date First Assessed/Time First Assessed: 09/01/20 1143   Location: Groin  Wound Location Orientation: Right  Wound Description (Comments): femoral artery sheath puncture site; artery closed with proglide closure devices  Inc  Subjective:     Patient is a 59-year-old female who presents to the [address] [place] [address] as a new patient for a nonhealing open wound of her rectal region  Patient reports she has a past medical history of rectal cancer status post proctectomy and permanent end colostomy in 2018  She underwent 6 weeks of both chemo and radiation from 5/10/18-6/20/18  She received a total dose of 5040cGy to her pelvis  Patient reports her current rectal wound has been present for the past 2-1/2 years  Patient reports her colorectal surgeon recommended she undergo HBOT in the past but she refused  She reports her wound drains a small to moderate amount of serosanguineous drainage  She has been managing her wound at home by keeping her wound covered with a sanitary pad and letting her wound drain  She denies any pain, fevers, or chills  The following portions of the patient's history were reviewed and updated as appropriate:   She  has a past medical history of Anemia, Arthritis, Cancer ([address] ), Chronic lower back pain, Chronic pain disorder, Colon cancer ([address] ) (2018), Diabetes mellitus ([address] ), Endometrial cancer ([address] ) (2018), GERD (gastroesophageal reflux disease), History of chemotherapy, History of MRSA infection (0719/2016), Morbid obesity ([address] ), Ovarian cancer ([address] ) (2018), Rectal cancer ([address] ), Spinal stenosis, Systolic murmur, Unsteady gait, Wears dentures, and Wears glasses    She   Patient Active Problem List    Diagnosis Date Noted    Weight gain following gastric bypass surgery 10/01/2020    Hypochloremia 09/01/2020    [facility] fibrillation [facility] response [facility] ) 2020    Hyperlipidemia 2020    Aortic stenosis, severe 09/10/2019    Moderate mitral stenosis 09/10/2019    History of ovarian cancer 2019    Candidal intertrigo 2019    Vaginal bleeding 2019    Encounter for other screening for malignant neoplasm of breast 2019    Pyelonephritis 2019    History of ovarian cancer 10/03/2018    Colostomy care ([place] [address] ) 2018    History of rectal cancer 2018    S/P gastric bypass 2018    Marginal ulcer 2018    Encounter for surgical aftercare following surgery of digestive system 2018    Atherosclerotic peripheral vascular disease ([place] [address] ) 2017    Postgastrectomy malabsorption 2016    Morbid obesity due to excess calories ([place] [address] ) 2016    Onychomycosis 2016     She  has a past surgical history that includes Abscess drainage;  section; Tubal ligation; Eye surgery; pr lap gastric bypass/angi-en-y (N/A, 2016); Esophagogastroduodenoscopy (N/A, 2016); Esophagogastroduodenoscopy (N/A, 3/30/2016); pr esophagogastroduodenoscopy transoral diagnostic (N/A, 2018); pr colonoscopy flx dx w/collj spec when pfrmd (N/A, 3/9/2018); Abdominal surgery; Joint replacement (Left, ); Joint replacement (Right, 2017);  section; pr colonoscopy flx dx w/collj spec when pfrmd (N/A, 2018); pr lap, surg proctectomy w colostomy (N/A, 2018); pr lap, surg colostomy (N/A, 2018); Abdominal perineal bowel resection w/ ileoanal pouch (N/A, 2018); Hysterectomy (N/A, 2018); CYSTOSCOPY (N/A, 2018); Oophorectomy (Bilateral); pr replace aortic valve openfemoral artery approach (N/A, 2020); and pr echo transesophag r-t 2d w/prb img acquisj i&r (N/A, 2020)  Her family history includes Coronary artery disease in her father; Diabetes in her father and son;  Heart disease in her father; Leukemia in her mother; No Known Problems in her brother, brother, brother, sister, sister, and sister  She was adopted  She  reports that she quit smoking about 15 years ago  She has a 25 00 pack-year smoking history  She has never used smokeless tobacco  She reports previous alcohol use  She reports previous drug use  Drug: Oxycodone    Current Outpatient Medications   Medication Sig Dispense Refill    albuterol (2 5 mg/3 mL) 0 083 % nebulizer solution Take 1 vial (2 5 mg total) by nebulization every 4 (four) hours as needed for wheezing or shortness of breath (Patient not taking: Reported on 5/3/2021) 75 vial 1    amiodarone 200 mg tablet Take 1/2 tablet ( 100 mg) daily 90 tablet 3    anastrozole (ARIMIDEX) 1 mg tablet Take 1 tablet (1 mg total) by mouth daily (Patient not taking: Reported on 5/3/2021) 90 tablet 2    apixaban (ELIQUIS) 5 mg Take 1 tablet (5 mg total) by mouth 2 (two) times a day 180 tablet 2    aspirin 81 mg chewable tablet Chew 1 tablet (81 mg total) daily 30 tablet 2    Calcium-Vitamin D 500-125 MG-UNIT TABS Take 1 tablet by mouth 2 (two) times a day        Cyanocobalamin (B-12 PO) Take by mouth      ergocalciferol (VITAMIN D2) 50,000 units Take 1 capsule (50,000 Units total) by mouth 2 (two) times a week with meals (Patient not taking: Reported on 5/3/2021) 20 capsule 0    ferrous sulfate 324 (65 Fe) mg Take 1 tablet (324 mg total) by mouth daily before breakfast (Patient not taking: Reported on 5/3/2021) 90 tablet 0    meclizine (ANTIVERT) 25 mg tablet Take 1 tablet (25 mg total) by mouth every 8 (eight) hours as needed for dizziness 30 tablet 0    metoprolol succinate (TOPROL-XL) 25 mg 24 hr tablet Take 1 tablet (25 mg total) by mouth daily 90 tablet 1    Multiple Vitamins-Minerals (BARIATRIC FUSION) CHEW Chew 1 tablet daily        nystatin (MYCOSTATIN) cream Apply topically 2 (two) times a day 30 g 0    omeprazole (PriLOSEC) 20 mg delayed release capsule Take 1 capsule (20 mg total) by mouth daily 90 capsule 0    oxyCODONE (ROXICODONE) 10 MG TABS Take 10 mg by mouth every 6 (six) hours as needed         No current facility-administered medications for this visit  She is allergic to tramadol       Review of Systems   Constitutional: Negative  HENT: Negative for ear pain and hearing loss  Eyes: Negative for pain  Respiratory: Negative for chest tightness and shortness of breath  Cardiovascular: Negative for chest pain, palpitations and leg swelling  Gastrointestinal: Negative for diarrhea, nausea and vomiting  Colostomy   Genitourinary: Negative for dysuria  Musculoskeletal: Positive for gait problem  Skin: Positive for wound  Neurological: Negative for tremors and weakness  Psychiatric/Behavioral: Negative for behavioral problems, confusion and suicidal ideas  HBO Qualification & Assessment     Meagan Lee presents today for a consultation for HBO treatment  HBO Indication    Soft Tissue Radionecrosis    Soft Tissue Radionecrosis diagnosis code  L59 9 Other specified disorders of the skin and sub Q tissue related to radiation    Anatomical site Intestine    Date radiation treatments started 5/10/18    Date radiation treatments completed 6/20/18    Radiation therapy treatments ended at least 6 months previous to consideration of HBO  Yes    Symptoms of STRN the patient is experiencing Open wound    Since the patient has radiation soft tissue necrosis as evidenced by above documentation HBO is medically necessary    Review of the [facility], revels patient had cancer and received radiation  HBO is being used as an adjunct to conventional Rx  Based on the information included herein, it is my determination that the patient has a medical necessity for HBOT and meets the conditions for the adjunctive treatment to a comprehensive plan    Yes    Brief history of co-morbidities that may affect HBO treatment    Previously treated with No past history of Adriamycin, Bleomycin, Antabuse, Cis-platinum or Sulfamylon    Patient reports s/s of No acute infections    Eyes    Patient has  No history of Myopia, Cataracts or Optic Nerve    Ears    Patient has a history of No ear abnormalities or conditions    Ears assessed for tympanic membrane or middle are abnormalities  Ears clear bilaterally    Patient instructed on how to clear ears and demonstrate proper technique Yes    Right ear baseline TEEDS Grade 0    Left ear baseline TEEDS Grade 0    ENT consult for Myringotomy tube insertion due to No consult is needed    Cerumen removal performed N/A  ears clear of cerumen    Neurological    Patient canela a history of seizures No    Cardiovascular    Patient has a history of No cardiac history requiring work-up prior to hyperbaric therapy    EKG ordered Yes    Echocardiogram ordered No    Ejection fraction Greater than 50%    Cardiovascular consult ordered No    Smoking  Social History     Tobacco Use   Smoking Status Former Smoker    Packs/day: 1 00    Years: 25 00    Pack years: 25 00    Quit date: 3/30/2006    Years since quitting: 15 1   Smokeless Tobacco Never Used       Respiratory    Patient has a history of pneumothorax No    Patient has a history of No respiratory conditions requiring further work-up prior to HBO    Lungs clear bilaterally Yes      Chest x-ray ordered No    Psychiatric    Patient has confinement anxiety Yes    Patient education and consent    I discussed with and educated the patient on the risks and benefits of HBO, different treatment options, potential adverse side effects and side effects, HBO procedure, smoking/drug/alcohol policy, and itesm not allowed in the hyperbaric chamber  Yes    Written consent for HBO obtained Yes    A trained emergency response team and ICU is available in this facility to assist with complications if required   Yes    HBO treatment goal    Complete wound healing    Objective:       Wound 05/03/21 Other (comment) Sacrum (Active)   Wound Image -- (Rovers are not working; unable to take picture  IT notified ) 05/10/21 0827   Wound Description Beefy red;Granulation tissue;Dark edges;Drainage;Slough; Yellow 05/10/21 0827   Kely-wound Assessment Intact 05/10/21 0827   Wound Length (cm) 5 cm 05/10/21 0827   Wound Width (cm) 2 5 cm 05/10/21 0827   Wound Depth (cm) 11 cm 05/10/21 0827   Wound Surface Area (cm^2) 12 5 cm^2 05/10/21 0827   Wound Volume (cm^3) 137 5 cm^3 05/10/21 0827   Calculated Wound Volume (cm^3) 137 5 cm^3 05/10/21 0827   Change in Wound Size % -114 84 05/10/21 0827   Undermining 2 5 05/10/21 0827   Undermining is depth extending from 9-5 05/10/21 0827   Drainage Amount Moderate 05/10/21 0827   Drainage Description Yellow;Serous 05/10/21 0827   Non-staged Wound Description Full thickness 05/10/21 0827   Dressing Status Other (Comment) (no dressing upon arrival) 05/10/21 0827       /76   Pulse 65   Temp 97 6 °F (36 4 °C)   Resp 15     Physical Exam  Vitals signs and nursing note reviewed  Constitutional:       General: She is not in acute distress  Appearance: Normal appearance  She is obese  HENT:      Head: Normocephalic and atraumatic  Right Ear: Tympanic membrane, ear canal and external ear normal  There is no impacted cerumen  Left Ear: Tympanic membrane, ear canal and external ear normal  There is no impacted cerumen  Eyes:      General:         Right eye: No discharge  Left eye: No discharge  Neck:      Musculoskeletal: Normal range of motion and neck supple  No neck rigidity  Cardiovascular:      Rate and Rhythm: Normal rate and regular rhythm  Pulmonary:      Effort: Pulmonary effort is normal  No respiratory distress  Breath sounds: Normal breath sounds  Musculoskeletal: Normal range of motion  Right lower leg: No edema  Left lower leg: No edema  Skin:     General: Skin is warm and dry  Findings: Wound present  No erythema            Neurological:      General: No focal deficit present  Mental Status: She is alert and oriented to person, place, and time  Mental status is at baseline  Psychiatric:         Mood and Affect: Mood normal          Behavior: Behavior normal          Thought Content: Thought content normal          Judgment: Judgment normal                  Wound Instructions:  Orders Placed This Encounter   Procedures    Wound cleansing and dressings     Buttock wound  Wash your hands with soap and water  Remove old dressing, discard into plastic bag and place in trash  Cleanse the wound with normal saline prior to applying a clean dressing  Do not use tissue or cotton balls  Do not scrub the wound  Pat dry using gauze  Shower no   Apply skin prep to skin surrounding wound  Apply Aquacell AG rope to the wound  Cover with ABD pad  Secure with tape  Change dressing 3x weekly  This was done today          Standing Status:   Future     Standing Expiration Date:   5/10/2022    Wound off loading     Off-loading Instructions:     Keep weight and pressure off wound at all times           Standing Status:   Future     Standing Expiration Date:   5/10/2022    Wound home care     Wound home care  Visiting nurses 2-3x weekly and PRN for wound care  Standing Status:   Future     Standing Expiration Date:   5/10/2022    Debridement     This order was created via procedure documentation    ECG 12 lead     Needed prior to starting HBOT     Standing Status:   Future     Standing Expiration Date:   5/10/2022        Diagnosis ICD-10-CM Associated Orders   1  Open wound of buttock, unspecified laterality, initial encounter  S31 809A lidocaine (XYLOCAINE) 4 % topical solution 5 mL     Wound cleansing and dressings     Wound off loading     Wound home care     ECG 12 lead     Debridement   2  Morbid obesity due to excess calories (HCC)  E66 01    3   History of rectal cancer  [id number]

## 2021-05-18 ENCOUNTER — TELEPHONE (OUTPATIENT)
Dept: FAMILY MEDICINE CLINIC | Facility: CLINIC | Age: 66
End: 2021-05-18

## 2021-05-18 NOTE — TELEPHONE ENCOUNTER
We got a medicare face to face encounter to be signed from [facility]    Copy attached    Original left in white bin

## 2021-06-10 ENCOUNTER — TELEPHONE (OUTPATIENT)
Dept: CARDIAC SURGERY | Facility: CLINIC | Age: 66
End: 2021-06-10

## 2021-06-10 NOTE — TELEPHONE ENCOUNTER
Pt called in the office to schedule her 1 yr f/u, patient wanted to let us know that she was experiencing episodes of chest discomfort which leads to a panic attack   Informed PT she should reach out to her cardiologist and we will also get her scheduled for her 1 yr f/u

## 2021-06-11 DIAGNOSIS — I35.9 NONRHEUMATIC AORTIC VALVE DISORDER: ICD-10-CM

## 2021-06-11 DIAGNOSIS — I35.0 AORTIC STENOSIS, SEVERE: ICD-10-CM

## 2021-06-11 DIAGNOSIS — Z95.2 S/P TAVR (TRANSCATHETER AORTIC VALVE REPLACEMENT): Primary | ICD-10-CM

## 2021-06-14 ENCOUNTER — TELEPHONE (OUTPATIENT)
Dept: CARDIAC SURGERY | Facility: CLINIC | Age: 66
End: 2021-06-14

## 2021-06-16 ENCOUNTER — TELEPHONE (OUTPATIENT)
Dept: BARIATRICS | Facility: CLINIC | Age: 66
End: 2021-06-16

## 2021-06-16 NOTE — PROGRESS NOTES
Cardiology  Hospital Follow Up   Office Visit Note  Fiona Blanton   72 y o    female   MRN: [id number]  [address] [facility]  [place] [address]  [phone]    PCP: Stephanie Schmid DO  Cardiologist: Dr Grajeda Daily            Summary of recommendations  Low-sodium diet, Heart failure education as below  Torsemide 10 mg/d  K 20 meq/d  BMP 2 weeks  F/U Dr Grajeda Daily- next appt avail           Assessment/plan  Volume overload, likely residual from her knee replacement  Started on torsemide 10 mg daily, potassium 20 mEq daily  BMP 2 weeks  Chronic diastolic heart failure, on metoprolol tartrate 12 5 Q 12, diuretic  Toprol 25 mg daily  Low Na diet  Wt Readings from Last 3 Encounters:   06/21/21 126 kg (277 lb 12 8 oz)   11/07/20 122 kg (270 lb)   10/20/20 122 kg (268 lb 9 6 oz)       Wt Readings from Last 3 Encounters:   11/07/20 122 kg (270 lb)   10/20/20 122 kg (268 lb 9 6 oz)   10/07/20 124 kg (273 lb)     Aortic stenosis, S/P TF TAVR 9/1/20  Has a F/U echo soon  Coronary artery disease, nonobstructive disease by catheterization July 2020  On aspirin, statin, beta-blocker   Mitral stenosis, mild  Paroxysmal atrial fibrillation on anticoagulation with Eliquis, dx feb 2020  On amiodarone 100 mg daily, and low-dose metoprolol tartrate     -Maintaining sinus rhythm:  EKG March 2021 [facility]  She is in normal rhythm by exam today  She has moderate left atrial dilatation  Hypertension, essential   Toprol 25 mg daily  Hyperlipidemia, on rosuvastatin 5 mg/d  - Lipids February 2019:    , non   -Lipids September 2020:  LDL 74, non   PAD, less than 50% ICA stenosis bilaterally  Carotid duplex March 2020  -CT head neck November 2020 in the context of dizziness and palpitations: Atherosclerotic disease of the internal carotid arteries bilaterally, with mild stenosis of the proximal left internal carotid artery of approximately 50%  Obesity, status post gastric bypass 2016  Current BMI 43  Continues to follow regularly with the bariatric team  History of endometrial, ovarian carcinoma and rectal CA,rectal and ovarian cancer status post proctectomy; permanent end colostomy; laparoscopic hand assist abdominoperineal resection, posterior vaginectomy, omentectomy, radical total abdominal hysterectomy on 9/6/2018  Diabetes mellitus type 2, diet controlled  Hemoglobin A1c 6 2 4/9/21  Cardiac testing  --TTE 9/2/2020  EF 60%  No RWMA  Mild mitral stenosis  KHADIJAH, L > R  There was a 26 mm TAVR bioprosthesis present exhibited normal function  There was no significant paravalvular leak  Mean gradient 10, peak gradient 18  Aortic valve area 1 8 centimeter squared by the continuity equation  Mild TR     --cardiac catheterization 7/27/20     --  The coronary circulation is right dominant  --  Left main: The vessel was large sized  Angiography showed mild atherosclerosis  --  LAD: The vessel was medium to large sized and moderately tortuous  Angiography showed moderate atherosclerosis  --  Circumflex: The vessel was medium to large sized  --  Ostial circumflex: There was a 50 % stenosis  --  Distal circumflex: There was a tubular 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  --  RCA: The vessel was large sized (dominant) and moderately calcified  --  Mid RCA: There was a 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  -TTE 9/25/20  EF 60  Grade 1 diastolic dysfunction  Mod LAE  Mild mitral stenosis  A 26mm ES 3 ULTRA TAVR bioprosthesis was present  It exhibited normal function  No valvular or paravalvular leak  peak gradient was 22 mmHg; mean 10 mmHg  Aortic valve area was 1 5 cmï¾² by the continuity equation  Left atrium moderately dilated                  PANKAJ Flora Quinteros is a 59 y o  female  with severe aortic stenosis, status post recent, uncomplicated TAVR September 1st, 2020   Discharge home September 3rd She has nonobstructive CAD, by pre TAVR angiography, mild mitral stenosis,  peripheral vascular disease and hyperlipidemia  Her LV function has been preserved  On February 2020 he was she was hospitalized and found to have AFib  She was placed on [address] with Eliquis and amiodarone  She has morbid obesity; status post gastric bypass in 2016  She has ovarian, uterine and rectal CA  She has had adjuvant chemotherapy  She is a former tobacco user  She follows with cardiologist Dr Grajeda Daily  Interval history  ED visit, [facility] 09/03 day of discharge, after a fall, taken by ambulance  Reportedly was sitting on her walker when the back tire broke off and she fell onto the floor  She complained of back pain  No head injury or loss of consciousness  Chest x-ray and CT of the lumbar spine unremarkable  After evaluation, discharged home on pain medicine  Did see her PCP in follow-up  Repeat chest x-ray  September 4, stable    9/10/2020  Hospital follow-up  She still has some back discomfort from her fall  She is using a walker  She has taking a p r n  pain pill from her pain management specialist   From a cardiac standpoint she is doing well  No chest pain, no shortness of breath  She finishes up the diuretic tomorrow  On exam, she has diminished breath sounds  Heart tones are regular  Her EKG shows sinus bradycardia, 55 beats per minute  Systolic blood pressure is 106  She does have +1 bilateral lower extremity edema  She was not previously on a diuretic  Per she prefers not to be  She does have some gait dysfunction and uses a walker or a cane for gait assistance  I recommended she avoid dietary sodium  She has not necessarily been doing this  He there is some room for improvement  She get a BMP next week  She will return to see her cardiologist in about 6-8 weeks  10/7/2020  Hospital follow-up  She is doing well  She is seeing me due to scheduling issues with her cardiologist   She is participating in cardiac rehab  She is doing well    She is now back establishing a relationship with the bariatric team and is committed to losing the 40 lb that she regained after her gastric bypass  She saw CT surgery September 25th  She had a follow-up echocardiogram  Had full labs October 2nd  CMP satisfactory; normal renal function: potassium 4 2  LDL 74 non   Will decrease her amiodarone to 100 mg daily  Will continue it for now  She will reconvene with her cardiologist in 3 months and consider whether to continue this long-term or not  I did recommend she see an ophthalmologist   Will also get a repeat TSH prior to the next visit  Her LFTs have been normal   Will also transition her to oral long-acting dose of Toprol, low-dose  On exam she is in a regular rhythm  Blood pressure is satisfactory  Mild lower extremity edema  Weight stable  She is contemplating knee surgery  She has had bilateral hip replacements in the past   She is hopeful that rehab will strengthen the musculature around the knee    Interval  History  ER adm 11/2020  Dizziness  Palpitations  EKG sinus rhythm with PVCs     CT head neck performed demonstrating mild atherosclerosis  Symptoms improved with Zofran and meclizine ;diagnosed with vertigo    Saw Dr Charo Pierre pre op 3/17/21  EKG: NSR    ER adm 4/8/21  Right knee pain,a few days after right knee surgery ([facility])    6/10/21 phone call from pt  C/O cp and to sched her 1 yr follow up with her doctor    6/21/21  Cc: Aggie Pinto   gained wt  Palpitations  She is still using a walker, since her right knee surgery  Ke Mitchell tells me she had palpitations related to intake of caffeine, she really drinks coffee  She denies chest pain  She has really gained some weight, with lower extremity edema, particularly about the right knee  She is seeing her orthopedist next week  From a cardiac standpoint she is due for an echo next month follow-up with CT surgery team   She is maintained on amiodarone  Will schedule her for a TSH    She had recent LFTs that were normal, in April  Wt Readings from Last 3 Encounters:   06/21/21 126 kg (277 lb 12 8 oz)   11/07/20 122 kg (270 lb)   10/20/20 122 kg (268 lb 9 6 oz)           I have spent 25 minutes with Patient  today in which greater than 50% of this time was spent in counseling/coordination of care regarding Intructions for management, Patient and family education, Importance of tx compliance and Risk factor reductions  Assessment  Diagnoses and all orders for this visit:    Chronic diastolic heart failure ([facility] )  -     Basic metabolic panel; Future    Aortic stenosis, severe    S/P TAVR (transcatheter aortic valve replacement)    Hyperlipidemia, unspecified hyperlipidemia type    Nonrheumatic mitral valve stenosis    Paroxysmal atrial fibrillation (HCC)  -     amiodarone 100 mg tablet; Take 1 tablet (100 mg total) by mouth daily    Essential hypertension    PAD (peripheral artery disease) (HCC)    Atrial fibrillation with rapid ventricular response ([facility] )    Encounter for monitoring amiodarone therapy  -     TSH, 3rd generation with Free T4 reflex; Future    Hypervolemia, unspecified hypervolemia type  -     torsemide (DEMADEX) 20 mg tablet; Take 0 5 tablets (10 mg total) by mouth daily  -     potassium chloride (K-DUR,KLOR-CON) 20 mEq tablet;  Take 1 tablet (20 mEq total) by mouth daily          Past Medical History:   Diagnosis Date    Acute pain of right knee     Ambulatory dysfunction     Anemia     Arthritis     Cancer (HCC)     rectal    Chronic lower back pain     Chronic pain disorder     back pain    Colon cancer ([facility] ) 2018    Diabetes mellitus ([facility] )     Endometrial cancer ([place] [address] ) 2018    GERD (gastroesophageal reflux disease)     History of chemotherapy     History of MRSA infection 0719/2016    Morbid obesity ([facility] )     Ovarian cancer ([facility] ) 2018    Paroxysmal atrial fibrillation (HCC)     Rectal cancer ([facility] )     S/P TAVR (transcatheter aortic valve replacement)     SOB (shortness of breath)     Spinal stenosis     Systolic murmur     Unsteady gait     uses walker    Wears dentures     Wears glasses        Review of Systems   Constitutional: Negative for chills  Cardiovascular: Positive for dyspnea on exertion and leg swelling  Negative for chest pain, claudication, cyanosis, irregular heartbeat, near-syncope, orthopnea, palpitations, paroxysmal nocturnal dyspnea and syncope  Respiratory: Negative for cough and shortness of breath  Musculoskeletal: Negative for back pain  Ambulatory dysfunction   Gastrointestinal: Negative for heartburn and nausea  Neurological: Negative for dizziness, focal weakness, headaches, light-headedness and weakness  All other systems reviewed and are negative  Allergies   Allergen Reactions    Tramadol Other (See Comments)     Dizzy             Current Outpatient Medications:     albuterol (2 5 mg/3 mL) 0 083 % nebulizer solution, Take 1 vial (2 5 mg total) by nebulization every 4 (four) hours as needed for wheezing or shortness of breath, Disp: 75 vial, Rfl: 1    apixaban (ELIQUIS) 5 mg, Take 1 tablet (5 mg total) by mouth 2 (two) times a day, Disp: 180 tablet, Rfl: 2    aspirin 81 mg chewable tablet, Chew 1 tablet (81 mg total) daily, Disp: 30 tablet, Rfl: 2    meclizine (ANTIVERT) 25 mg tablet, Take 1 tablet (25 mg total) by mouth every 8 (eight) hours as needed for dizziness, Disp: 30 tablet, Rfl: 0    metoprolol succinate (TOPROL-XL) 25 mg 24 hr tablet, Take 1 tablet (25 mg total) by mouth daily, Disp: 90 tablet, Rfl: 1    Multiple Vitamins-Minerals (BARIATRIC FUSION) CHEW, Chew 1 tablet daily  , Disp: , Rfl:     nystatin (MYCOSTATIN) cream, Apply topically 2 (two) times a day, Disp: 30 g, Rfl: 0    omeprazole (PriLOSEC) 20 mg delayed release capsule, Take 1 capsule (20 mg total) by mouth daily, Disp: 90 capsule, Rfl: 0    oxyCODONE (ROXICODONE) 10 MG TABS, Take 10 mg by mouth every 6 (six) hours as needed  , Disp: , Rfl:     amiodarone 100 mg tablet, Take 1 tablet (100 mg total) by mouth daily, Disp: 90 tablet, Rfl: 3    anastrozole (ARIMIDEX) 1 mg tablet, Take 1 tablet (1 mg total) by mouth daily (Patient not taking: Reported on 5/3/2021), Disp: 90 tablet, Rfl: 2    Calcium-Vitamin D 500-125 MG-UNIT TABS, Take 1 tablet by mouth 2 (two) times a day   (Patient not taking: Reported on 6/21/2021), Disp: , Rfl:     Cyanocobalamin (B-12 PO), Take by mouth (Patient not taking: Reported on 6/21/2021), Disp: , Rfl:     ergocalciferol (VITAMIN D2) 50,000 units, Take 1 capsule (50,000 Units total) by mouth 2 (two) times a week with meals (Patient not taking: Reported on 5/3/2021), Disp: 20 capsule, Rfl: 0    ferrous sulfate 324 (65 Fe) mg, Take 1 tablet (324 mg total) by mouth daily before breakfast (Patient not taking: Reported on 5/3/2021), Disp: 90 tablet, Rfl: 0    potassium chloride (K-DUR,KLOR-CON) 20 mEq tablet, Take 1 tablet (20 mEq total) by mouth daily, Disp: 30 tablet, Rfl: 1    torsemide (DEMADEX) 20 mg tablet, Take 0 5 tablets (10 mg total) by mouth daily, Disp: 30 tablet, Rfl: 3        Social History     Socioeconomic History    Marital status: Single     Spouse name: Not on file    Number of children: 2    Years of education: Not on file    Highest education level: Not on file   Occupational History    Not on file   Tobacco Use    Smoking status: Former Smoker     Packs/day: 1 00     Years: 25 00     Pack years: 25 00     Quit date: 3/30/2006     Years since quitting: 15 2    Smokeless tobacco: Never Used   Vaping Use    Vaping Use: Never used   Substance and Sexual Activity    Alcohol use: Not Currently    Drug use: Not Currently     Types: Oxycodone     Comment: Percocet for lower back pain prn    Sexual activity: Not Currently   Other Topics Concern    Not on file   Social History Narrative    Not on file     Social Determinants of Health     Financial Resource Strain:     Difficulty of Paying Living Expenses:    Food Insecurity:     Worried About Running Out of Food in the Last Year:     [address] in the Last Year:    Transportation Needs:     Lack of Transportation (Medical):  Lack of Transportation (Non-Medical):    Physical Activity:     Days of Exercise per Week:     Minutes of Exercise per Session:    Stress:     Feeling of Stress :    Social Connections:     Frequency of Communication with Friends and Family:     Frequency of Social Gatherings with Friends and Family:     Attends Spiritism Services:     Active Member of Clubs or Organizations:     Attends Club or Organization Meetings:     Marital Status:    Intimate Partner Violence:     Fear of Current or Ex-Partner:     Emotionally Abused:     Physically Abused:     Sexually Abused:        Family History   Adopted: Yes   Problem Relation Age of Onset    Leukemia Mother     Heart disease Father     Coronary artery disease Father     Diabetes Father     No Known Problems Sister     No Known Problems Brother     Diabetes Son     No Known Problems Brother     No Known Problems Brother     No Known Problems Sister     No Known Problems Sister        Physical Exam  Vitals and nursing note reviewed  Constitutional:       General: She is not in acute distress  Appearance: She is not diaphoretic  HENT:      Head: Normocephalic and atraumatic  Eyes:      Conjunctiva/sclera: Conjunctivae normal    Cardiovascular:      Rate and Rhythm: Normal rate and regular rhythm  Pulses: Intact distal pulses  Heart sounds: S1 normal and S2 normal    Pulmonary:      Effort: Pulmonary effort is normal       Breath sounds: Examination of the right-lower field reveals decreased breath sounds  Examination of the left-lower field reveals decreased breath sounds  Decreased breath sounds present  Abdominal:      General: Bowel sounds are normal       Palpations: Abdomen is soft     Musculoskeletal: General: Normal range of motion  Cervical back: Normal range of motion and neck supple  Comments: janneth  , R>L lower extremity edema   Skin:     General: Skin is warm and dry  Comments: Healing right knee scar   Neurological:      Mental Status: She is alert and oriented to person, place, and time  Vitals: Blood pressure 106/70, pulse 80, height 5' 5" (1 651 m), weight 126 kg (277 lb 12 8 oz), SpO2 98 %, not currently breastfeeding  Wt Readings from Last 3 Encounters:   06/21/21 126 kg (277 lb 12 8 oz)   11/07/20 122 kg (270 lb)   10/20/20 122 kg (268 lb 9 6 oz)         Labs & Results:  Lab Results   Component Value Date    WBC 6 90 04/08/2021    HGB 11 5 04/08/2021    HCT 38 1 04/08/2021    MCV 83 04/08/2021     (L) 04/08/2021     No results found for: BNP  No components found for: CHEM  Troponin I   Date Value Ref Range Status   11/07/2020 <0 02 <=0 04 ng/mL Final     Comment:     Autovalidation override  Siemens Chemistry analyzer 99% cutoff is > 0 04 ng/mL in network labs     o cTnI 99% cutoff is useful only when applied to patients in the clinical setting of myocardial ischemia   o cTnI 99% cutoff should be interpreted in the context of clinical history, ECG findings and possibly cardiac imaging to establish correct diagnosis  o cTnI 99% cutoff may be suggestive but clearly not indicative of a coronary event without the clinical setting of myocardial ischemia  04/06/2020 <0 02 <=0 04 ng/mL Final     Comment:     Autovalidation override  Siemens Chemistry analyzer 99% cutoff is > 0 04 ng/mL in network labs     o cTnI 99% cutoff is useful only when applied to patients in the clinical setting of myocardial ischemia   o cTnI 99% cutoff should be interpreted in the context of clinical history, ECG findings and possibly cardiac imaging to establish correct diagnosis     o cTnI 99% cutoff may be suggestive but clearly not indicative of a coronary event without the clinical setting of myocardial ischemia      2020 <0 02 <=0 04 ng/mL Final     Comment:     Autovalidation override  Siemens Chemistry analyzer 99% cutoff is > 0 04 ng/mL in network labs     o cTnI 99% cutoff is useful only when applied to patients in the clinical setting of myocardial ischemia   o cTnI 99% cutoff should be interpreted in the context of clinical history, ECG findings and possibly cardiac imaging to establish correct diagnosis  o cTnI 99% cutoff may be suggestive but clearly not indicative of a coronary event without the clinical setting of myocardial ischemia      2014 <0 04 0 00 - 0 04 ng/mL Final     Comment:     The above 1 analytes were performed by Ricci Billings [id number]       Results for orders placed during the hospital encounter of 20   Echo complete with contrast if indicated    Narrative [address]  [facility] [place] [facility]  [phone]    Transthoracic Echocardiogram  2D, M-mode, Doppler, and Color Doppler    Study date:  02-Sep-2020    Patient: Ariana Sánchez  MR number: [id number]  Account number: [de-identified]  : 1955  Age: 59 years  Gender: Female  Status: Inpatient  Location: Bedside  Height: 65 in  Weight: 268 lb  BP: 131/ 54 mmHg    Indications: 1 day s/p 26 mm TAVR    Diagnoses: I35 9 - Nonrheumatic aortic valve disorder, unspecified    Sonographer:  Christy Vargas RDCS  Primary Physician:  Yair Carballo DO  Referring Physician:  Cecily Mcmahan DO  Group:  [facility]  Sonographer:  MAEVE Huerta  Sonographer:  SAMUEL Estes, [facility]  Interpreting Physician:  Caty Walls MD    SUMMARY    LEFT VENTRICLE:  Systolic function was normal  Ejection fraction was estimated to be 60 %  There were no regional wall motion abnormalities  Wall thickness was mildly to moderately increased    Doppler parameters were consistent with abnormal left ventricular relaxation (grade 1 diastolic dysfunction)  LEFT ATRIUM:  The atrium was moderately dilated  RIGHT ATRIUM:  The atrium was dilated  MITRAL VALVE:  There was marked annular calcification  There was mild stenosis  AORTIC VALVE:  A 26mm ES 3 ULTRA TAVR bioprosthesis was present  It exhibited normal function  No valvular or paravalvular leak  Valve peak gradient was 18 mmHg  Valve mean gradient was 10 mmHg  Aortic valve area was 1 8 cmï¾² by the continuity equation  TRICUSPID VALVE:  There was mild regurgitation  HISTORY: PRIOR HISTORY: 26 mm TAVR, Obesity, Afib, DM, Mitral stenosis    PROCEDURE: The procedure was performed at the bedside  This was a routine study  The transthoracic approach was used  The study included complete 2D imaging, M-mode, complete spectral Doppler, and color Doppler  The heart rate was 52 bpm,  at the start of the study  Images were obtained from the parasternal, apical, subcostal, and suprasternal notch acoustic windows  Echocardiographic views were limited due to poor acoustic window availability and decreased penetration  This  was a technically difficult study  LEFT VENTRICLE: Size was normal  Systolic function was normal  Ejection fraction was estimated to be 60 %  There were no regional wall motion abnormalities  Wall thickness was mildly to moderately increased  DOPPLER: Doppler parameters  were consistent with abnormal left ventricular relaxation (grade 1 diastolic dysfunction)  RIGHT VENTRICLE: The size was normal  Systolic function was normal  Wall thickness was normal     LEFT ATRIUM: The atrium was moderately dilated  RIGHT ATRIUM: The atrium was dilated  MITRAL VALVE: There was marked annular calcification  There was normal leaflet separation  DOPPLER: The transmitral velocity was within the normal range  There was mild stenosis  There was no significant regurgitation  AORTIC VALVE: A 26mm ES 3 ULTRA TAVR bioprosthesis was present  It exhibited normal function    No valvular or paravalvular leak  TRICUSPID VALVE: The valve structure was normal  There was normal leaflet separation  DOPPLER: The transtricuspid velocity was within the normal range  There was no evidence for stenosis  There was mild regurgitation  PULMONIC VALVE: Leaflets exhibited normal thickness, no calcification, and normal cuspal separation  DOPPLER: The transpulmonic velocity was within the normal range  There was no significant regurgitation  PERICARDIUM: There was no pericardial effusion  The pericardium was normal in appearance  AORTA: The root exhibited normal size  SYSTEMIC VEINS: IVC: The inferior vena cava was normal in size  MEASUREMENT TABLES    DOPPLER MEASUREMENTS  Aortic valve   (Reference normals)  Peak gradient   18 mmHg   (--)  Mean gradient   10 mmHg   (--)  Valve area, cont   1 8 cmï¾²   (--)    SYSTEM MEASUREMENT TABLES    2D mode  Left Atrium Victorino-posterior Systolic Dimension; User chosen value; 2D mode;: 4 7 cm  EDV (2D-Cubed): 119 cm3  EF (2D-Cubed): 64 3 %  ESV (2D-Cubed): 42 5 cm3  FS (2D-Cubed): 29 1 %  FS (2D-Teich): 29 1 %  IVS/LVPW (2D): 0 95  Interventricular Septum Diastolic Thickness; User chosen value; 2D mode;: 1 26 cm  LVOT Area (2D): 314 mm2  Left Ventricle Internal End Diastolic Dimension; User chosen value; 2D mode;: 4 92 cm  Left Ventricle Internal Systolic Dimension; User chosen value; 2D mode;: 3 49 cm  Left Ventricle Posterior Wall Diastolic Thickness; User chosen value; 2D mode;: 1 33 cm  Left Ventricular Ejection Fraction; Teichholz; 2D mode;: 55 7 %  Left Ventricular End Diastolic Volume; Teichholz; 2D mode;: 114 cm3  Left Ventricular End Systolic Volume; Teichholz; 2D mode;: 50 5 cm3  SV (2D-Cubed): 76 5 cm3  Stroke Volume; Teichholz; 2D mode;: 63 5 cm3    Apical four chamber  Left Atrium MOD Diam; Most recent value chosen; End Systole; Apical four chamber;: 3 61 cm  Left Atrium MOD Diam; Most recent value chosen; End Systole;  Apical four chamber;: 2 49 cm  Left Atrium MOD Diam; Most recent value chosen; End Systole; Apical four chamber;: 4 36 cm  Left Atrium MOD Diam; Most recent value chosen; End Systole; Apical four chamber;: 4 76 cm  Left Atrium MOD Diam; Most recent value chosen; End Systole; Apical four chamber;: 5 02 cm  Left Atrium MOD Diam; Most recent value chosen; End Systole; Apical four chamber;: 5 54 cm  Left Atrium MOD Diam; Most recent value chosen; End Systole; Apical four chamber;: 5 66 cm  Left Atrium MOD Diam; Most recent value chosen; End Systole; Apical four chamber;: 5 73 cm  Left Atrium MOD Diam; Most recent value chosen; End Systole; Apical four chamber;: 5 84 cm  Left Atrium MOD Diam; Most recent value chosen; End Systole; Apical four chamber;: 6 07 cm  Left Atrium MOD Diam; Most recent value chosen; End Systole; Apical four chamber;: 6 14 cm  Left Atrium MOD Diam; Most recent value chosen; End Systole; Apical four chamber;: 6 14 cm  Left Atrium MOD Diam; Most recent value chosen; End Systole; Apical four chamber;: 6 1 cm  Left Atrium MOD Diam; Most recent value chosen; End Systole; Apical four chamber;: 5 84 cm  Left Atrium MOD Diam; Most recent value chosen; End Systole; Apical four chamber;: 5 51 cm  Left Atrium MOD Diam; Most recent value chosen; End Systole; Apical four chamber;: 5 17 cm  Left Atrium MOD Diam; Most recent value chosen; End Systole; Apical four chamber;: 4 76 cm  Left Atrium MOD Diam; Most recent value chosen; End Systole; Apical four chamber;: 3 65 cm  Left Atrium MOD Diam; Most recent value chosen; End Systole; Apical four chamber;: 2 08 cm  Left Atrium MOD Diam; Most recent value chosen; End Systole; Apical four chamber;: 5 32 cm  Left Atrium Systolic Area; Most recent value chosen; Method of Disks, Single Plane; 2D mode; Apical four chamber;: 3750 mm2  Left Atrium Systolic Volume; Most recent value chosen; Method of Disks, Single Plane; 2D mode;  Apical four chamber;: 154 cm3  Left Atrium systolic major axis; Most recent value chosen; Method of Disks, Single Plane; 2D mode; Apical four chamber;: 7 48 cm    Unspecified Scan Mode  Mean Grad; Antegrade Flow: 10 mm[Hg]  Mean Grad; Mean value; Antegrade Flow: 10 mm[Hg]  Mean Noman; Antegrade Flow: 1520 mm/s  Mean Noman; Mean value; Antegrade Flow: 1520 mm/s  Peak Grad; Mean; Antegrade Flow: 18 mm[Hg]  VTI; Antegrade Flow: 48 3 cm  VTI; Mean; Antegrade Flow: 48 3 cm  Valve Area Peak Noman: 161 mm2  Valve Area VTI: 179 mm2  Vmax; Antegrade Flow: 2150 mm/s  Vmax; Mean; Antegrade Flow: 2150 mm/s  LVOT CV Orifice Diam; Mean: 2 cm  LVOT Diam: 2 cm  LVOT Mean Grad: 3 mm[Hg]  LVOT Mean Grad; Mean value: 3 mm[Hg]  LVOT Mean Noman: 810 mm/s  LVOT Mean Noman; Mean value: 810 mm/s  LVOT Peak Grad; Mean: 5 mm[Hg]  LVOT VTI: 27 6 cm  LVOT VTI; Mean: 27 6 cm  LVOT Vmax: 1100 mm/s  LVOT Vmax; Mean: 1100 mm/s  SV (LVOT): 87 cm3  DT; Antegrade Flow: 489 ms  DT; Mean; Antegrade Flow: 489 ms  Dec [place]; Antegrade Flow: 2390 mm/s2  Dec [place]; Mean; Antegrade Flow: 2390 mm/s2  MV A Noman: 1190 mm/s  MV E Noman: 69 1 mm/s  MV E Noman: 1110 mm/s  MV E/A Ratio: 0 5  MV Peak A Noman: 1190 mm/s  MV Peak E [place]; Mean; Antegrade Flow: 590 mm/s  MVA (PHT): 130 mm2  Mean Grad; Antegrade Flow: 4 mm[Hg]  Mean Grad; Mean value; Antegrade Flow: 4 mm[Hg]  Mean Noman; Antegrade Flow: 912 mm/s  Mean Noman; Mean value; Antegrade Flow: 912 mm/s  PHT: 169 ms  PHT Peak Noman;: 1380 mm/s  PHT Peak Noman; Mean: 1380 mm/s  PHT; Mean: 169 ms  Peak Grad; Mean; Antegrade Flow: 8 mm[Hg]  VTI; Antegrade Flow: 66 4 cm  VTI; Mean; Antegrade Flow: 66 4 cm  Valve Area VTI: 131 mm2  Vmax; Antegrade Flow: 1440 mm/s  Vmax; Mean; Antegrade Flow: 1440 mm/s  Peak Grad; Mean; Regurgitant Flow: 18 mm[Hg]  Vmax; Mean; Regurgitant Flow: 2100 mm/s  Vmax; Regurgitant Flow: 2100 mm/s  Atrial [employer]; Most recent value chosen;: 2430 mm2  [facility]; Most recent value chosen;: 6 69 cm  Atrial [place] Volume;  Most recent value chosen;: 75 cm3  Distance;: 4 4 cm  Distance; Mean; Mean value chosen;: 4 4 cm  Distance; User chosen value;: 3 1 cm    [employer] [facility]    Prepared and electronically signed by    Tara Garrison MD  Signed 02-Sep-2020 17:02:52       No results found for this or any previous visit  This note was completed in part utilizing [employer] direct voice recognition software  Grammatical errors, random word insertion, spelling mistakes, and incomplete sentences may be an occasional consequence of the system secondary to software limitations, ambient noise and hardware issues  At the time of dictation, efforts were made to edit, clarify and /or correct errors  Please read the chart carefully and recognize, using context, where substitutions have occurred    If you have any questions or concerns about the context, text or information contained within the body of this dictation, please contact myself, the provider, for further clarification

## 2021-06-20 PROBLEM — Z95.2 S/P TAVR (TRANSCATHETER AORTIC VALVE REPLACEMENT): Status: ACTIVE | Noted: 2021-06-20

## 2021-06-21 ENCOUNTER — OFFICE VISIT (OUTPATIENT)
Dept: CARDIOLOGY CLINIC | Facility: CLINIC | Age: 66
End: 2021-06-21
Payer: MEDICARE

## 2021-06-21 VITALS
HEART RATE: 80 BPM | SYSTOLIC BLOOD PRESSURE: 106 MMHG | BODY MASS INDEX: 46.28 KG/M2 | HEIGHT: 65 IN | WEIGHT: 277.8 LBS | OXYGEN SATURATION: 98 % | DIASTOLIC BLOOD PRESSURE: 70 MMHG

## 2021-06-21 DIAGNOSIS — I34.2 NONRHEUMATIC MITRAL VALVE STENOSIS: ICD-10-CM

## 2021-06-21 DIAGNOSIS — I10 ESSENTIAL HYPERTENSION: ICD-10-CM

## 2021-06-21 DIAGNOSIS — I73.9 PAD (PERIPHERAL ARTERY DISEASE) (HCC): ICD-10-CM

## 2021-06-21 DIAGNOSIS — E87.70 HYPERVOLEMIA, UNSPECIFIED HYPERVOLEMIA TYPE: ICD-10-CM

## 2021-06-21 DIAGNOSIS — I50.32 CHRONIC DIASTOLIC HEART FAILURE (HCC): Primary | ICD-10-CM

## 2021-06-21 DIAGNOSIS — I48.0 PAROXYSMAL ATRIAL FIBRILLATION (HCC): ICD-10-CM

## 2021-06-21 DIAGNOSIS — I35.0 AORTIC STENOSIS, SEVERE: ICD-10-CM

## 2021-06-21 DIAGNOSIS — E78.5 HYPERLIPIDEMIA, UNSPECIFIED HYPERLIPIDEMIA TYPE: ICD-10-CM

## 2021-06-21 DIAGNOSIS — I48.91 ATRIAL FIBRILLATION WITH RAPID VENTRICULAR RESPONSE (HCC): ICD-10-CM

## 2021-06-21 DIAGNOSIS — Z79.899 ENCOUNTER FOR MONITORING AMIODARONE THERAPY: ICD-10-CM

## 2021-06-21 DIAGNOSIS — Z51.81 ENCOUNTER FOR MONITORING AMIODARONE THERAPY: ICD-10-CM

## 2021-06-21 DIAGNOSIS — Z95.2 S/P TAVR (TRANSCATHETER AORTIC VALVE REPLACEMENT): ICD-10-CM

## 2021-06-21 PROCEDURE — 99214 OFFICE O/P EST MOD 30 MIN: CPT | Performed by: NURSE PRACTITIONER

## 2021-06-21 RX ORDER — POTASSIUM CHLORIDE 20 MEQ/1
20 TABLET, EXTENDED RELEASE ORAL DAILY
Qty: 30 TABLET | Refills: 1 | Status: ON HOLD | OUTPATIENT
Start: 2021-06-21 | End: 2021-08-21 | Stop reason: SDUPTHER

## 2021-06-21 RX ORDER — TORSEMIDE 20 MG/1
10 TABLET ORAL DAILY
Qty: 30 TABLET | Refills: 3 | Status: ON HOLD | OUTPATIENT
Start: 2021-06-21 | End: 2021-08-21 | Stop reason: SDUPTHER

## 2021-06-21 RX ORDER — AMIODARONE HYDROCHLORIDE 100 MG/1
100 TABLET ORAL DAILY
Qty: 90 TABLET | Refills: 3 | Status: SHIPPED | OUTPATIENT
Start: 2021-06-21 | End: 2022-06-27

## 2021-06-21 NOTE — LETTER
June 21, 2021     Maggy Francois DO  [address] [facility] [address]    Patient: Lucio Healy   YOB: 1955   Date of Visit: 6/21/2021       Dear Dr Rosalinda Mark:    Thank you for referring Donato Robertson to me for evaluation  Below are my notes for this consultation  If you have questions, please do not hesitate to call me  I look forward to following your patient along with you  Sincerely,        GEORGETTE Gonzalez        CC: DO Naz Gutiérrez, [address]  6/21/2021  3:24 PM  Incomplete  Cardiology  Hospital Follow Up   Office Visit Note  Lucio Healy   72 y o    female   MRN: [id number]  [address]  [address]  [place] [address] [facility] [place] [address]  [phone]    PCP: Maggy Francois DO  Cardiologist: Dr Asha Mehta            Summary of recommendations  Low-sodium diet, Heart failure education as below  Torsemide 10 mg/d  K 20 meq/d  BMP 2 weeks  F/U Dr Asha Mehta- next appt avail           Assessment/plan  Volume overload, likely residual from her knee replacement  Started on torsemide 10 mg daily, potassium 20 mEq daily  BMP 2 weeks  Chronic diastolic heart failure, on metoprolol tartrate 12 5 Q 12, diuretic  Toprol 25 mg daily  Low Na diet  Wt Readings from Last 3 Encounters:   06/21/21 126 kg (277 lb 12 8 oz)   11/07/20 122 kg (270 lb)   10/20/20 122 kg (268 lb 9 6 oz)       Wt Readings from Last 3 Encounters:   11/07/20 122 kg (270 lb)   10/20/20 122 kg (268 lb 9 6 oz)   10/07/20 124 kg (273 lb)     Aortic stenosis, S/P TF TAVR 9/1/20  Has a F/U echo soon  Coronary artery disease, nonobstructive disease by catheterization July 2020  On aspirin, statin, beta-blocker   Mitral stenosis, mild  Paroxysmal atrial fibrillation on anticoagulation with Eliquis, dx feb 2020  On amiodarone 100 mg daily, and low-dose metoprolol tartrate     -Maintaining sinus rhythm:  EKG March 2021 [facility]    She is in normal rhythm by exam today  She has moderate left atrial dilatation  Hypertension, essential   Toprol 25 mg daily  Hyperlipidemia, on rosuvastatin 5 mg/d  - Lipids February 2019:    , non   -Lipids September 2020:  LDL 74, non   PAD, less than 50% ICA stenosis bilaterally  Carotid duplex March 2020  -CT head neck November 2020 in the context of dizziness and palpitations: Atherosclerotic disease of the internal carotid arteries bilaterally, with mild stenosis of the proximal left internal carotid artery of approximately 50%  Obesity, status post gastric bypass 2016  Current BMI 43  Continues to follow regularly with the bariatric team  History of endometrial, ovarian carcinoma and rectal CA,rectal and ovarian cancer status post proctectomy; permanent end colostomy; laparoscopic hand assist abdominoperineal resection, posterior vaginectomy, omentectomy, radical total abdominal hysterectomy on 9/6/2018  Diabetes mellitus type 2, diet controlled  Hemoglobin A1c 6 2 4/9/21  Cardiac testing  --TTE 9/2/2020  EF 60%  No RWMA  Mild mitral stenosis  KHADIJAH, L > R  There was a 26 mm TAVR bioprosthesis present exhibited normal function  There was no significant paravalvular leak  Mean gradient 10, peak gradient 18  Aortic valve area 1 8 centimeter squared by the continuity equation  Mild TR     --cardiac catheterization 7/27/20     --  The coronary circulation is right dominant  --  Left main: The vessel was large sized  Angiography showed mild atherosclerosis  --  LAD: The vessel was medium to large sized and moderately tortuous  Angiography showed moderate atherosclerosis  --  Circumflex: The vessel was medium to large sized  --  Ostial circumflex: There was a 50 % stenosis  --  Distal circumflex: There was a tubular 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  --  RCA: The vessel was large sized (dominant) and moderately calcified  --  Mid RCA: There was a 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  -TTE 9/25/20    EF 60  Grade 1 diastolic dysfunction  Mod LAE  Mild mitral stenosis  A 26mm ES 3 ULTRA TAVR bioprosthesis was present  It exhibited normal function  No valvular or paravalvular leak  peak gradient was 22 mmHg; mean 10 mmHg  Aortic valve area was 1 5 cmï¾² by the continuity equation  Left atrium moderately dilated                  PANKAJ Marie is a 59 y o  female  with severe aortic stenosis, status post recent, uncomplicated TAVR September 1st, 2020  Discharge home September 3rd She has nonobstructive CAD, by pre TAVR angiography, mild mitral stenosis,  peripheral vascular disease and hyperlipidemia  Her LV function has been preserved  On February 2020 he was she was hospitalized and found to have AFib  She was placed on [address] [place] [address] Road with Eliquis and amiodarone  She has morbid obesity; status post gastric bypass in 2016  She has ovarian, uterine and rectal CA  She has had adjuvant chemotherapy  She is a former tobacco user  She follows with cardiologist Dr Adeola Mir  Interval history  ED visit, [facility] 09/03 day of discharge, after a fall, taken by ambulance  Reportedly was sitting on her walker when the back tire broke off and she fell onto the floor  She complained of back pain  No head injury or loss of consciousness  Chest x-ray and CT of the lumbar spine unremarkable  After evaluation, discharged home on pain medicine  Did see her PCP in follow-up  Repeat chest x-ray  September 4, stable    9/10/2020  Hospital follow-up  She still has some back discomfort from her fall  She is using a walker  She has taking a p r n  pain pill from her pain management specialist   From a cardiac standpoint she is doing well  No chest pain, no shortness of breath  She finishes up the diuretic tomorrow  On exam, she has diminished breath sounds  Heart tones are regular  Her EKG shows sinus bradycardia, 55 beats per minute  Systolic blood pressure is 106  She does have +1 bilateral lower extremity edema    She was not previously on a diuretic  Per she prefers not to be  She does have some gait dysfunction and uses a walker or a cane for gait assistance  I recommended she avoid dietary sodium  She has not necessarily been doing this  He there is some room for improvement  She get a BMP next week  She will return to see her cardiologist in about 6-8 weeks  10/7/2020  Hospital follow-up  She is doing well  She is seeing me due to scheduling issues with her cardiologist   She is participating in cardiac rehab  She is doing well  She is now back establishing a relationship with the bariatric team and is committed to losing the 40 lb that she regained after her gastric bypass  She saw CT surgery September 25th  She had a follow-up echocardiogram  Had full labs October 2nd  CMP satisfactory; normal renal function: potassium 4 2  LDL 74 non   Will decrease her amiodarone to 100 mg daily  Will continue it for now  She will reconvene with her cardiologist in 3 months and consider whether to continue this long-term or not  I did recommend she see an ophthalmologist   Will also get a repeat TSH prior to the next visit  Her LFTs have been normal   Will also transition her to oral long-acting dose of Toprol, low-dose  On exam she is in a regular rhythm  Blood pressure is satisfactory  Mild lower extremity edema  Weight stable  She is contemplating knee surgery  She has had bilateral hip replacements in the past   She is hopeful that rehab will strengthen the musculature around the knee    Interval  History  ER adm 11/2020  Dizziness  Palpitations  EKG sinus rhythm with PVCs     CT head neck performed demonstrating mild atherosclerosis  Symptoms improved with Zofran and meclizine ;diagnosed with vertigo    Saw Dr Taylor Stone pre op 3/17/21  EKG: NSR    ER adm 4/8/21  Right knee pain,a few days after right knee surgery ([facility])    6/10/21 phone call from pt   C/O cp and to sched her 1 yr follow up with her doctor    6/21/21  Cc: Gayle Garcia   gained wt  Palpitations  She is still using a walker, since her right knee surgery  Lien Quiroz tells me she had palpitations related to intake of caffeine, she really drinks coffee  She denies chest pain  She has really gained some weight, with lower extremity edema, particularly about the right knee  She is seeing her orthopedist next week  From a cardiac standpoint she is due for an echo next month follow-up with CT surgery team   She is maintained on amiodarone  Will schedule her for a TSH  She had recent LFTs that were normal, in April  Wt Readings from Last 3 Encounters:   06/21/21 126 kg (277 lb 12 8 oz)   11/07/20 122 kg (270 lb)   10/20/20 122 kg (268 lb 9 6 oz)           I have spent 25 minutes with Patient  today in which greater than 50% of this time was spent in counseling/coordination of care regarding Intructions for management, Patient and family education, Importance of tx compliance and Risk factor reductions    Assessment  Diagnoses and all orders for this visit:    Chronic diastolic heart failure (HCC)    Aortic stenosis, severe    S/P TAVR (transcatheter aortic valve replacement)    Hyperlipidemia, unspecified hyperlipidemia type    Nonrheumatic mitral valve stenosis    Paroxysmal atrial fibrillation (HCC)    Essential hypertension    PAD (peripheral artery disease) (HCC)          Past Medical History:   Diagnosis Date    Acute pain of right knee     Ambulatory dysfunction     Anemia     Arthritis     Cancer (HCC)     rectal    Chronic lower back pain     Chronic pain disorder     back pain    Colon cancer ([facility] [address] ) 2018    Diabetes mellitus ([facility] [address] [facility] )     Endometrial cancer ([facility] [address] ) 2018    GERD (gastroesophageal reflux disease)     History of chemotherapy     History of MRSA infection 0719/2016    Morbid obesity ([facility] [address] [facility] )     Ovarian cancer ([address] ) 2018    Paroxysmal atrial fibrillation ([facility] [address] )     Rectal cancer ([facility] [address] )     S/P TAVR (transcatheter aortic valve replacement)     SOB (shortness of breath)     Spinal stenosis     Systolic murmur     Unsteady gait     uses walker    Wears dentures     Wears glasses        Review of Systems   Constitutional: Negative for chills  Cardiovascular: Negative for chest pain, claudication, cyanosis, dyspnea on exertion, irregular heartbeat, leg swelling, near-syncope, orthopnea, palpitations, paroxysmal nocturnal dyspnea and syncope  Respiratory: Negative for cough and shortness of breath  Musculoskeletal: Negative for back pain  Gastrointestinal: Negative for heartburn and nausea  Neurological: Negative for dizziness, focal weakness, headaches, light-headedness and weakness  All other systems reviewed and are negative  Allergies   Allergen Reactions    Tramadol Other (See Comments)     Dizzy             Current Outpatient Medications:     albuterol (2 5 mg/3 mL) 0 083 % nebulizer solution, Take 1 vial (2 5 mg total) by nebulization every 4 (four) hours as needed for wheezing or shortness of breath (Patient not taking: Reported on 5/3/2021), Disp: 75 vial, Rfl: 1    amiodarone 200 mg tablet, Take 1/2 tablet ( 100 mg) daily, Disp: 90 tablet, Rfl: 3    anastrozole (ARIMIDEX) 1 mg tablet, Take 1 tablet (1 mg total) by mouth daily (Patient not taking: Reported on 5/3/2021), Disp: 90 tablet, Rfl: 2    apixaban (ELIQUIS) 5 mg, Take 1 tablet (5 mg total) by mouth 2 (two) times a day, Disp: 180 tablet, Rfl: 2    aspirin 81 mg chewable tablet, Chew 1 tablet (81 mg total) daily, Disp: 30 tablet, Rfl: 2    Calcium-Vitamin D 500-125 MG-UNIT TABS, Take 1 tablet by mouth 2 (two) times a day  , Disp: , Rfl:     Cyanocobalamin (B-12 PO), Take by mouth, Disp: , Rfl:     ergocalciferol (VITAMIN D2) 50,000 units, Take 1 capsule (50,000 Units total) by mouth 2 (two) times a week with meals (Patient not taking: Reported on 5/3/2021), Disp: 20 capsule, Rfl: 0    ferrous sulfate 324 (65 Fe) mg, Take 1 tablet (324 mg total) by mouth daily before breakfast (Patient not taking: Reported on 5/3/2021), Disp: 90 tablet, Rfl: 0    meclizine (ANTIVERT) 25 mg tablet, Take 1 tablet (25 mg total) by mouth every 8 (eight) hours as needed for dizziness, Disp: 30 tablet, Rfl: 0    metoprolol succinate (TOPROL-XL) 25 mg 24 hr tablet, Take 1 tablet (25 mg total) by mouth daily, Disp: 90 tablet, Rfl: 1    Multiple Vitamins-Minerals (BARIATRIC FUSION) CHEW, Chew 1 tablet daily  , Disp: , Rfl:     nystatin (MYCOSTATIN) cream, Apply topically 2 (two) times a day, Disp: 30 g, Rfl: 0    omeprazole (PriLOSEC) 20 mg delayed release capsule, Take 1 capsule (20 mg total) by mouth daily, Disp: 90 capsule, Rfl: 0    oxyCODONE (ROXICODONE) 10 MG TABS, Take 10 mg by mouth every 6 (six) hours as needed  , Disp: , Rfl:         Social History     Socioeconomic History    Marital status: Single     Spouse name: Not on file    Number of children: 2    Years of education: Not on file    Highest education level: Not on file   Occupational History    Not on file   Tobacco Use    Smoking status: Former Smoker     Packs/day: 1 00     Years: 25 00     Pack years: 25 00     Quit date: 3/30/2006     Years since quitting: 15 2    Smokeless tobacco: Never Used   Vaping Use    Vaping Use: Never used   Substance and Sexual Activity    Alcohol use: Not Currently    Drug use: Not Currently     Types: Oxycodone     Comment: Percocet for lower back pain prn    Sexual activity: Not Currently   Other Topics Concern    Not on file   Social History Narrative    Not on file     Social Determinants of Health     Financial Resource Strain:     Difficulty of Paying Living Expenses:    Food Insecurity:     Worried About Running Out of Food in the Last Year:     Ran Out of Food in the Last Year:    Transportation Needs:     Lack of Transportation (Medical):      Lack of Transportation (Non-Medical):    Physical Activity:     Days of Exercise per Week:     Minutes of Exercise per Session:    Stress:     Feeling of Stress :    Social Connections:     Frequency of Communication with Friends and Family:     Frequency of Social Gatherings with Friends and Family:     Attends Sikh Services:     Active Member of Clubs or Organizations:     Attends Club or Organization Meetings:     Marital Status:    Intimate Partner Violence:     Fear of Current or Ex-Partner:     Emotionally Abused:     Physically Abused:     Sexually Abused:        Family History   Adopted: Yes   Problem Relation Age of Onset    Leukemia Mother     Heart disease Father     Coronary artery disease Father     Diabetes Father     No Known Problems Sister     No Known Problems Brother     Diabetes Son     No Known Problems Brother     No Known Problems Brother     No Known Problems Sister     No Known Problems Sister        Physical Exam  Vitals and nursing note reviewed  Constitutional:       General: She is not in acute distress  Appearance: She is not diaphoretic  Comments: Using a cane today   HENT:      Head: Normocephalic and atraumatic  Eyes:      Conjunctiva/sclera: Conjunctivae normal    Cardiovascular:      Rate and Rhythm: Normal rate and regular rhythm  Pulses: Intact distal pulses  Heart sounds: S1 normal and S2 normal  Murmur heard  Systolic murmur is present  2-9/3 systolic murmur over precordium      Pulmonary:      Effort: Pulmonary effort is normal       Breath sounds: Decreased breath sounds present  Abdominal:      General: Bowel sounds are normal       Palpations: Abdomen is soft  Musculoskeletal:         General: Normal range of motion  Cervical back: Normal range of motion and neck supple  Comments: Mild lower extremity edema   Skin:     General: Skin is warm and dry  Neurological:      Mental Status: She is alert and oriented to person, place, and time  Vitals: not currently breastfeeding     Wt Readings from Last 3 Encounters:   11/07/20 122 kg (270 lb)   10/20/20 122 kg (268 lb 9 6 oz)   10/07/20 124 kg (273 lb)         Labs & Results:  Lab Results   Component Value Date    WBC 6 90 04/08/2021    HGB 11 5 04/08/2021    HCT 38 1 04/08/2021    MCV 83 04/08/2021     (L) 04/08/2021     No results found for: BNP  No components found for: CHEM  Troponin I   Date Value Ref Range Status   11/07/2020 <0 02 <=0 04 ng/mL Final     Comment:     Autovalidation override  S[employer] Chemistry analyzer 99% cutoff is > 0 04 ng/mL in network labs     o cTnI 99% cutoff is useful only when applied to patients in the clinical setting of myocardial ischemia   o cTnI 99% cutoff should be interpreted in the context of clinical history, ECG findings and possibly cardiac imaging to establish correct diagnosis  o cTnI 99% cutoff may be suggestive but clearly not indicative of a coronary event without the clinical setting of myocardial ischemia  04/06/2020 <0 02 <=0 04 ng/mL Final     Comment:     Autovalidation override  Siemens Chemistry analyzer 99% cutoff is > 0 04 ng/mL in network labs     o cTnI 99% cutoff is useful only when applied to patients in the clinical setting of myocardial ischemia   o cTnI 99% cutoff should be interpreted in the context of clinical history, ECG findings and possibly cardiac imaging to establish correct diagnosis  o cTnI 99% cutoff may be suggestive but clearly not indicative of a coronary event without the clinical setting of myocardial ischemia      02/24/2020 <0 02 <=0 04 ng/mL Final     Comment:     Autovalidation override  Siemens Chemistry analyzer 99% cutoff is > 0 04 ng/mL in network labs     o cTnI 99% cutoff is useful only when applied to patients in the clinical setting of myocardial ischemia   o cTnI 99% cutoff should be interpreted in the context of clinical history, ECG findings and possibly cardiac imaging to establish correct diagnosis     o cTnI 99% cutoff may be suggestive but clearly not indicative of a coronary event without the clinical setting of myocardial ischemia      2014 <0 04 0 00 - 0 04 ng/mL Final     Comment:     The above 1 analytes were performed by Ricci Lao0       Results for orders placed during the hospital encounter of 20   Echo complete with contrast if indicated    Narrative [address]  [facility], [address] [facility]  [phone]    Transthoracic Echocardiogram  2D, M-mode, Doppler, and Color Doppler    Study date:  02-Sep-2020    Patient: Luiza Walker  MR number: [id number]  Account number: [de-identified]  : 1955  Age: 59 years  Gender: Female  Status: Inpatient  Location: Bedside  Height: 65 in  Weight: 268 lb  BP: 131/ 54 mmHg    Indications: 1 day s/p 26 mm TAVR    Diagnoses: I35 9 - Nonrheumatic aortic valve disorder, unspecified    Sonographer:  Nito Cary RDCS  Primary Physician:  Alexa Peguero DO  Referring Physician:  Chi Cabrera DO  Group:  [facility]  Sonographer:  MAEVE Aiken  Sonographer:  SAMUEL Sanchez, [facility]  Interpreting Physician:  Rosario Barriga MD    SUMMARY    LEFT VENTRICLE:  Systolic function was normal  Ejection fraction was estimated to be 60 %  There were no regional wall motion abnormalities  Wall thickness was mildly to moderately increased  Doppler parameters were consistent with abnormal left ventricular relaxation (grade 1 diastolic dysfunction)  LEFT ATRIUM:  The atrium was moderately dilated  RIGHT ATRIUM:  The atrium was dilated  MITRAL VALVE:  There was marked annular calcification  There was mild stenosis  AORTIC VALVE:  A 26mm ES 3 ULTRA TAVR bioprosthesis was present  It exhibited normal function  No valvular or paravalvular leak  Valve peak gradient was 18 mmHg  Valve mean gradient was 10 mmHg  Aortic valve area was 1 8 cmï¾² by the continuity equation      TRICUSPID VALVE:  There was mild regurgitation  HISTORY: PRIOR HISTORY: 26 mm TAVR, Obesity, Afib, DM, Mitral stenosis    PROCEDURE: The procedure was performed at the bedside  This was a routine study  The transthoracic approach was used  The study included complete 2D imaging, M-mode, complete spectral Doppler, and color Doppler  The heart rate was 52 bpm,  at the start of the study  Images were obtained from the parasternal, apical, subcostal, and suprasternal notch acoustic windows  Echocardiographic views were limited due to poor acoustic window availability and decreased penetration  This  was a technically difficult study  LEFT VENTRICLE: Size was normal  Systolic function was normal  Ejection fraction was estimated to be 60 %  There were no regional wall motion abnormalities  Wall thickness was mildly to moderately increased  DOPPLER: Doppler parameters  were consistent with abnormal left ventricular relaxation (grade 1 diastolic dysfunction)  RIGHT VENTRICLE: The size was normal  Systolic function was normal  Wall thickness was normal     LEFT ATRIUM: The atrium was moderately dilated  RIGHT ATRIUM: The atrium was dilated  MITRAL VALVE: There was marked annular calcification  There was normal leaflet separation  DOPPLER: The transmitral velocity was within the normal range  There was mild stenosis  There was no significant regurgitation  AORTIC VALVE: A 26mm ES 3 ULTRA TAVR bioprosthesis was present  It exhibited normal function  No valvular or paravalvular leak  TRICUSPID VALVE: The valve structure was normal  There was normal leaflet separation  DOPPLER: The transtricuspid velocity was within the normal range  There was no evidence for stenosis  There was mild regurgitation  PULMONIC VALVE: Leaflets exhibited normal thickness, no calcification, and normal cuspal separation  DOPPLER: The transpulmonic velocity was within the normal range  There was no significant regurgitation      PERICARDIUM: There was no pericardial effusion  The pericardium was normal in appearance  AORTA: The root exhibited normal size  SYSTEMIC VEINS: IVC: The inferior vena cava was normal in size  MEASUREMENT TABLES    DOPPLER MEASUREMENTS  Aortic valve   (Reference normals)  Peak gradient   18 mmHg   (--)  Mean gradient   10 mmHg   (--)  Valve area, cont   1 8 cmï¾²   (--)    SYSTEM MEASUREMENT TABLES    2D mode  Left Atrium Victorino-posterior Systolic Dimension; User chosen value; 2D mode;: 4 7 cm  EDV (2D-Cubed): 119 cm3  EF (2D-Cubed): 64 3 %  ESV (2D-Cubed): 42 5 cm3  FS (2D-Cubed): 29 1 %  FS (2D-Teich): 29 1 %  IVS/LVPW (2D): 0 95  Interventricular Septum Diastolic Thickness; User chosen value; 2D mode;: 1 26 cm  LVOT Area (2D): 314 mm2  Left Ventricle Internal End Diastolic Dimension; User chosen value; 2D mode;: 4 92 cm  Left Ventricle Internal Systolic Dimension; User chosen value; 2D mode;: 3 49 cm  Left Ventricle Posterior Wall Diastolic Thickness; User chosen value; 2D mode;: 1 33 cm  Left Ventricular Ejection Fraction; Teichholz; 2D mode;: 55 7 %  Left Ventricular End Diastolic Volume; Teichholz; 2D mode;: 114 cm3  Left Ventricular End Systolic Volume; Teichholz; 2D mode;: 50 5 cm3  SV (2D-Cubed): 76 5 cm3  Stroke Volume; Teichholz; 2D mode;: 63 5 cm3    Apical four chamber  Left Atrium MOD Diam; Most recent value chosen; End Systole; Apical four chamber;: 3 61 cm  Left Atrium MOD Diam; Most recent value chosen; End Systole; Apical four chamber;: 2 49 cm  Left Atrium MOD Diam; Most recent value chosen; End Systole; Apical four chamber;: 4 36 cm  Left Atrium MOD Diam; Most recent value chosen; End Systole; Apical four chamber;: 4 76 cm  Left Atrium MOD Diam; Most recent value chosen; End Systole; Apical four chamber;: 5 02 cm  Left Atrium MOD Diam; Most recent value chosen; End Systole; Apical four chamber;: 5 54 cm  Left Atrium MOD Diam; Most recent value chosen; End Systole;  Apical four chamber;: 5 66 cm  Left Atrium MOD Diam; Most recent value chosen; End Systole; Apical four chamber;: 5 73 cm  Left Atrium MOD Diam; Most recent value chosen; End Systole; Apical four chamber;: 5 84 cm  Left Atrium MOD Diam; Most recent value chosen; End Systole; Apical four chamber;: 6 07 cm  Left Atrium MOD Diam; Most recent value chosen; End Systole; Apical four chamber;: 6 14 cm  Left Atrium MOD Diam; Most recent value chosen; End Systole; Apical four chamber;: 6 14 cm  Left Atrium MOD Diam; Most recent value chosen; End Systole; Apical four chamber;: 6 1 cm  Left Atrium MOD Diam; Most recent value chosen; End Systole; Apical four chamber;: 5 84 cm  Left Atrium MOD Diam; Most recent value chosen; End Systole; Apical four chamber;: 5 51 cm  Left Atrium MOD Diam; Most recent value chosen; End Systole; Apical four chamber;: 5 17 cm  Left Atrium MOD Diam; Most recent value chosen; End Systole; Apical four chamber;: 4 76 cm  Left Atrium MOD Diam; Most recent value chosen; End Systole; Apical four chamber;: 3 65 cm  Left Atrium MOD Diam; Most recent value chosen; End Systole; Apical four chamber;: 2 08 cm  Left Atrium MOD Diam; Most recent value chosen; End Systole; Apical four chamber;: 5 32 cm  Left Atrium Systolic Area; Most recent value chosen; Method of Disks, Single Plane; 2D mode; Apical four chamber;: 3750 mm2  Left Atrium Systolic Volume; Most recent value chosen; Method of Disks, Single Plane; 2D mode; Apical four chamber;: 154 cm3  Left Atrium systolic major axis; Most recent value chosen; Method of Disks, Single Plane; 2D mode; Apical four chamber;: 7 48 cm    Unspecified Scan Mode  Mean Grad; Antegrade Flow: 10 mm[Hg]  Mean Grad; Mean value; Antegrade Flow: 10 mm[Hg]  Mean Noman; Antegrade Flow: 1520 mm/s  Mean Noman; Mean value; Antegrade Flow: 1520 mm/s  Peak Grad; Mean; Antegrade Flow: 18 mm[Hg]  VTI; Antegrade Flow: 48 3 cm  VTI; Mean; Antegrade Flow: 48 3 cm  Valve Area Peak Noman: 161 mm2  Valve Area VTI: 179 mm2  Vmax;  Antegrade Flow: 2150 mm/s  Vmax; Mean; Antegrade Flow: 2150 mm/s  LVOT CV Orifice Diam; Mean: 2 cm  LVOT Diam: 2 cm  LVOT Mean Grad: 3 mm[Hg]  LVOT Mean Grad; Mean value: 3 mm[Hg]  LVOT Mean Noman: 810 mm/s  LVOT Mean Noman; Mean value: 810 mm/s  LVOT Peak Grad; Mean: 5 mm[Hg]  LVOT VTI: 27 6 cm  LVOT VTI; Mean: 27 6 cm  LVOT Vmax: 1100 mm/s  LVOT Vmax; Mean: 1100 mm/s  SV (LVOT): 87 cm3  DT; Antegrade Flow: 489 ms  DT; Mean; Antegrade Flow: 489 ms  Dec [place]; Antegrade Flow: 2390 mm/s2  Dec [place]; Mean; Antegrade Flow: 2390 mm/s2  MV A Noman: 1190 mm/s  MV E Noman: 69 1 mm/s  MV E Noman: 1110 mm/s  MV E/A Ratio: 0 5  MV Peak A Noman: 1190 mm/s  MV Peak E Noman; Mean; Antegrade Flow: 590 mm/s  MVA (PHT): 130 mm2  Mean Grad; Antegrade Flow: 4 mm[Hg]  Mean Grad; Mean value; Antegrade Flow: 4 mm[Hg]  Mean Noman; Antegrade Flow: 912 mm/s  Mean Noman; Mean value; Antegrade Flow: 912 mm/s  PHT: 169 ms  PHT Peak Noman;: 1380 mm/s  PHT Peak Noman; Mean: 1380 mm/s  PHT; Mean: 169 ms  Peak Grad; Mean; Antegrade Flow: 8 mm[Hg]  VTI; Antegrade Flow: 66 4 cm  VTI; Mean; Antegrade Flow: 66 4 cm  Valve Area VTI: 131 mm2  Vmax; Antegrade Flow: 1440 mm/s  Vmax; Mean; Antegrade Flow: 1440 mm/s  Peak Grad; Mean; Regurgitant Flow: 18 mm[Hg]  Vmax; Mean; Regurgitant Flow: 2100 mm/s  Vmax; Regurgitant Flow: 2100 mm/s  [facility] Financial; Most recent value chosen;: 2430 mm2  [facility]; Most recent value chosen;: 6 69 cm  Atrial Herzog Volume; Most recent value chosen;: 75 cm3  Distance;: 4 4 cm  Distance; Mean; Mean value chosen;: 4 4 cm  Distance; User chosen value;: 3 1 cm    [employer] Accredited [facility]chocardi[facility]    Prepared and electronically signed by    Stefan Kaur MD  Signed 02-Sep-2020 17:02:52       No results found for this or any previous visit  This note was completed in part utilizing m-modal fluency direct voice recognition software     Grammatical errors, random word insertion, spelling mistakes, and incomplete sentences may be an occasional consequence of the system secondary to software limitations, ambient noise and hardware issues  At the time of dictation, efforts were made to edit, clarify and /or correct errors  Please read the chart carefully and recognize, using context, where substitutions have occurred  If you have any questions or concerns about the context, text or information contained within the body of this dictation, please contact myself, the provider, for further clarification        GEORGETTE Snow  6/21/2021  3:15 PM  Sign when [address] Follow Up   Office Visit Note  Cammy Maciel   72 y o    female   MRN: [id number]  [address]  [address]  [address] [address] [facility] [place] [id number]    PCP: Blake Alicia DO  Cardiologist: Dr Rory Coe            Summary of recommendations  Low-sodium diet, Heart failure education as below  Torsemide 10 mg/d  K 20 meq/d   BMP 2 weeks  F/U Dr Rory Coe- next appt avail           Assessment/plan  Acute on Chronic diastolic heart failure, on metoprolol tartrate 12 5 Q 12, diuretic  Toprol 25 mg daily  Low Na diet  Wt Readings from Last 3 Encounters:   06/21/21 126 kg (277 lb 12 8 oz)   11/07/20 122 kg (270 lb)   10/20/20 122 kg (268 lb 9 6 oz)       Wt Readings from Last 3 Encounters:   11/07/20 122 kg (270 lb)   10/20/20 122 kg (268 lb 9 6 oz)   10/07/20 124 kg (273 lb)     Aortic stenosis, S/P TF TAVR 9/1/20  Has a F/U echo soon  Coronary artery disease, nonobstructive disease by catheterization July 2020  On aspirin, statin, beta-blocker   Mitral stenosis, mild  Paroxysmal atrial fibrillation on anticoagulation with Eliquis, dx feb 2020  On amiodarone 200 mg daily, and low-dose metoprolol tartrate  Maintaining sinus rhythm:  EKG September 25th sinus bradycardia 57 beats per minute  LFTs within normal limits September and TSH 1 2 in February    She has moderate left atrial dilatation  Hypertension, essential   /70 Toprol 25 mg daily  Hyperlipidemia, on rosuvastatin 5 mg/d  - Lipids February 2019:    , non   -Lipids September 2020:  LDL 74, non   PAD, less than 50% ICA stenosis bilaterally  Carotid duplex March 2020  -CT head neck November 2020 in the context of dizziness and palpitations: Atherosclerotic disease of the internal carotid arteries bilaterally, with mild stenosis of the proximal left internal carotid artery of approximately 50%  Obesity, status post gastric bypass 2016  Current BMI 43  Continues to follow regularly with the bariatric team  History of endometrial, ovarian carcinoma and rectal CA,rectal and ovarian cancer status post proctectomy; permanent end colostomy; laparoscopic hand assist abdominoperineal resection, posterior vaginectomy, omentectomy, radical total abdominal hysterectomy on 9/6/2018  Diabetes mellitus type 2, diet controlled  Hemoglobin A1c 6 2 4/9/21  Cardiac testing  --TTE 9/2/2020  EF 60%  No RWMA  Mild mitral stenosis  KHADIJAH, L > R  There was a 26 mm TAVR bioprosthesis present exhibited normal function  There was no significant paravalvular leak  Mean gradient 10, peak gradient 18  Aortic valve area 1 8 centimeter squared by the continuity equation  Mild TR     --cardiac catheterization 7/27/20     --  The coronary circulation is right dominant  --  Left main: The vessel was large sized  Angiography showed mild atherosclerosis  --  LAD: The vessel was medium to large sized and moderately tortuous  Angiography showed moderate atherosclerosis  --  Circumflex: The vessel was medium to large sized  --  Ostial circumflex: There was a 50 % stenosis  --  Distal circumflex: There was a tubular 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  --  RCA: The vessel was large sized (dominant) and moderately calcified  --  Mid RCA: There was a 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  -TTE 9/25/20  EF 60  Grade 1 diastolic dysfunction  Mod LAE  Mild mitral stenosis  A 26mm ES 3 ULTRA TAVR bioprosthesis was present  It exhibited normal function  No valvular or paravalvular leak  peak gradient was 22 mmHg; mean 10 mmHg  Aortic valve area was 1 5 cmï¾² by the continuity equation  Left atrium moderately dilated                  PANKAJ García is a 59 y o  female  with severe aortic stenosis, status post recent, uncomplicated TAVR September 1st, 2020  Discharge home September 3rd She has nonobstructive CAD, by pre TAVR angiography, mild mitral stenosis,  peripheral vascular disease and hyperlipidemia  Her LV function has been preserved  On February 2020 he was she was hospitalized and found to have AFib  She was placed on [address] [place] [address] Road with Eliquis and amiodarone  She has morbid obesity; status post gastric bypass in 2016  She has ovarian, uterine and rectal CA  She has had adjuvant chemotherapy  She is a former tobacco user  She follows with cardiologist Dr Lee Richter  Interval history  ED visit, Germaine Arias 09/03 day of discharge, after a fall, taken by ambulance  Reportedly was sitting on her walker when the back tire broke off and she fell onto the floor  She complained of back pain  No head injury or loss of consciousness  Chest x-ray and CT of the lumbar spine unremarkable  After evaluation, discharged home on pain medicine  Did see her PCP in follow-up  Repeat chest x-ray  September 4, stable    9/10/2020  Hospital follow-up  She still has some back discomfort from her fall  She is using a walker  She has taking a p r n  pain pill from her pain management specialist   From a cardiac standpoint she is doing well  No chest pain, no shortness of breath  She finishes up the diuretic tomorrow  On exam, she has diminished breath sounds  Heart tones are regular  Her EKG shows sinus bradycardia, 55 beats per minute  Systolic blood pressure is 106  She does have +1 bilateral lower extremity edema  She was not previously on a diuretic    Per she prefers not to be   She does have some gait dysfunction and uses a walker or a cane for gait assistance  I recommended she avoid dietary sodium  She has not necessarily been doing this  He there is some room for improvement  She get a BMP next week  She will return to see her cardiologist in about 6-8 weeks  10/7/2020  Hospital follow-up  She is doing well  She is seeing me due to scheduling issues with her cardiologist   She is participating in cardiac rehab  She is doing well  She is now back establishing a relationship with the bariatric team and is committed to losing the 40 lb that she regained after her gastric bypass  She saw CT surgery September 25th  She had a follow-up echocardiogram  Had full labs October 2nd  CMP satisfactory; normal renal function: potassium 4 2  LDL 74 non   Will decrease her amiodarone to 100 mg daily  Will continue it for now  She will reconvene with her cardiologist in 3 months and consider whether to continue this long-term or not  I did recommend she see an ophthalmologist   Will also get a repeat TSH prior to the next visit  Her LFTs have been normal   Will also transition her to oral long-acting dose of Toprol, low-dose  On exam she is in a regular rhythm  Blood pressure is satisfactory  Mild lower extremity edema  Weight stable  She is contemplating knee surgery  She has had bilateral hip replacements in the past   She is hopeful that rehab will strengthen the musculature around the knee    Interval  History  ER adm 11/2020  Dizziness  Palpitations  EKG sinus rhythm with PVCs     CT head neck performed demonstrating mild atherosclerosis  Symptoms improved with Zofran and meclizine ;diagnosed with vertigo    Saw Dr Cipriano Henning pre op 3/17/21  EKG: NSR    ER adm 4/8/21  Right knee pain,a few days after right knee surgery ([facility])    6/10/21 phone call from pt  C/O cp and to sched her 1 yr follow up with her doctor    6/21/21  Cc: Selene burnham wt  palpitations  Wt Readings from Last 3 Encounters:   06/21/21 126 kg (277 lb 12 8 oz)   11/07/20 122 kg (270 lb)   10/20/20 122 kg (268 lb 9 6 oz)                  I have spent 25 minutes with Patient  today in which greater than 50% of this time was spent in counseling/coordination of care regarding Intructions for management, Patient and family education, Importance of tx compliance and Risk factor reductions  Assessment  Diagnoses and all orders for this visit:    Chronic diastolic heart failure (HCC)    Aortic stenosis, severe    S/P TAVR (transcatheter aortic valve replacement)    Hyperlipidemia, unspecified hyperlipidemia type    Nonrheumatic mitral valve stenosis    Paroxysmal atrial fibrillation (HCC)    Essential hypertension    PAD (peripheral artery disease) ([facility])          Past Medical History:   Diagnosis Date    Acute pain of right knee     Ambulatory dysfunction     Anemia     Arthritis     Cancer (HCC)     rectal    Chronic lower back pain     Chronic pain disorder     back pain    Colon cancer ([facility] [facility] ) 2018    [facility]     [email or web address] ) 2018    GERD (gastroesophageal reflux disease)     History of chemotherapy     History of MRSA infection 0719/2016    Morbid obesity ([facility] )     Ovarian cancer ([facility] ) 2018    Paroxysmal atrial fibrillation (HCC)     Rectal cancer ([facility] )     S/P TAVR (transcatheter aortic valve replacement)     SOB (shortness of breath)     Spinal stenosis     Systolic murmur     Unsteady gait     uses walker    Wears dentures     Wears glasses        Review of Systems   Constitutional: Negative for chills  Cardiovascular: Negative for chest pain, claudication, cyanosis, dyspnea on exertion, irregular heartbeat, leg swelling, near-syncope, orthopnea, palpitations, paroxysmal nocturnal dyspnea and syncope  Respiratory: Negative for cough and shortness of breath  Musculoskeletal: Negative for back pain     Gastrointestinal: Negative for heartburn and nausea  Neurological: Negative for dizziness, focal weakness, headaches, light-headedness and weakness  All other systems reviewed and are negative  Allergies   Allergen Reactions    Tramadol Other (See Comments)     Dizzy             Current Outpatient Medications:     albuterol (2 5 mg/3 mL) 0 083 % nebulizer solution, Take 1 vial (2 5 mg total) by nebulization every 4 (four) hours as needed for wheezing or shortness of breath (Patient not taking: Reported on 5/3/2021), Disp: 75 vial, Rfl: 1    amiodarone 200 mg tablet, Take 1/2 tablet ( 100 mg) daily, Disp: 90 tablet, Rfl: 3    anastrozole (ARIMIDEX) 1 mg tablet, Take 1 tablet (1 mg total) by mouth daily (Patient not taking: Reported on 5/3/2021), Disp: 90 tablet, Rfl: 2    apixaban (ELIQUIS) 5 mg, Take 1 tablet (5 mg total) by mouth 2 (two) times a day, Disp: 180 tablet, Rfl: 2    aspirin 81 mg chewable tablet, Chew 1 tablet (81 mg total) daily, Disp: 30 tablet, Rfl: 2    Calcium-Vitamin D 500-125 MG-UNIT TABS, Take 1 tablet by mouth 2 (two) times a day  , Disp: , Rfl:     Cyanocobalamin (B-12 PO), Take by mouth, Disp: , Rfl:     ergocalciferol (VITAMIN D2) 50,000 units, Take 1 capsule (50,000 Units total) by mouth 2 (two) times a week with meals (Patient not taking: Reported on 5/3/2021), Disp: 20 capsule, Rfl: 0    ferrous sulfate 324 (65 Fe) mg, Take 1 tablet (324 mg total) by mouth daily before breakfast (Patient not taking: Reported on 5/3/2021), Disp: 90 tablet, Rfl: 0    meclizine (ANTIVERT) 25 mg tablet, Take 1 tablet (25 mg total) by mouth every 8 (eight) hours as needed for dizziness, Disp: 30 tablet, Rfl: 0    metoprolol succinate (TOPROL-XL) 25 mg 24 hr tablet, Take 1 tablet (25 mg total) by mouth daily, Disp: 90 tablet, Rfl: 1    Multiple Vitamins-Minerals (BARIATRIC FUSION) CHEW, Chew 1 tablet daily  , Disp: , Rfl:     nystatin (MYCOSTATIN) cream, Apply topically 2 (two) times a day, Disp: 30 g, Rfl: 0   omeprazole (PriLOSEC) 20 mg delayed release capsule, Take 1 capsule (20 mg total) by mouth daily, Disp: 90 capsule, Rfl: 0    oxyCODONE (ROXICODONE) 10 MG TABS, Take 10 mg by mouth every 6 (six) hours as needed  , Disp: , Rfl:         Social History     Socioeconomic History    Marital status: Single     Spouse name: Not on file    Number of children: 2    Years of education: Not on file    Highest education level: Not on file   Occupational History    Not on file   Tobacco Use    Smoking status: Former Smoker     Packs/day: 1 00     Years: 25 00     Pack years: 25 00     Quit date: 3/30/2006     Years since quitting: 15 2    Smokeless tobacco: Never Used   Vaping Use    Vaping Use: Never used   Substance and Sexual Activity    Alcohol use: Not Currently    Drug use: Not Currently     Types: Oxycodone     Comment: Percocet for lower back pain prn    Sexual activity: Not Currently   Other Topics Concern    Not on file   Social History Narrative    Not on file     Social Determinants of Health     Financial Resource Strain:     Difficulty of Paying Living Expenses:    Food Insecurity:     Worried About Running Out of Food in the Last Year:     Ran Out of Food in the Last Year:    Transportation Needs:     Lack of Transportation (Medical):      Lack of Transportation (Non-Medical):    Physical Activity:     Days of Exercise per Week:     Minutes of Exercise per Session:    Stress:     Feeling of Stress :    Social Connections:     Frequency of Communication with Friends and Family:     Frequency of Social Gatherings with Friends and Family:     Attends Restorationist Services:     Active Member of Clubs or Organizations:     Attends Club or Organization Meetings:     Marital Status:    Intimate Partner Violence:     Fear of Current or Ex-Partner:     Emotionally Abused:     Physically Abused:     Sexually Abused:        Family History   Adopted: Yes   Problem Relation Age of Onset    Leukemia Mother     Heart disease Father     Coronary artery disease Father     Diabetes Father     No Known Problems Sister     No Known Problems Brother     Diabetes Son     No Known Problems Brother     No Known Problems Brother     No Known Problems Sister     No Known Problems Sister        Physical Exam  Vitals and nursing note reviewed  Constitutional:       General: She is not in acute distress  Appearance: She is not diaphoretic  Comments: Using a cane today   HENT:      Head: Normocephalic and atraumatic  Eyes:      Conjunctiva/sclera: Conjunctivae normal    Cardiovascular:      Rate and Rhythm: Normal rate and regular rhythm  Pulses: Intact distal pulses  Heart sounds: S1 normal and S2 normal  Murmur heard  Systolic murmur is present  4-1/7 systolic murmur over precordium      Pulmonary:      Effort: Pulmonary effort is normal       Breath sounds: Decreased breath sounds present  Abdominal:      General: Bowel sounds are normal       Palpations: Abdomen is soft  Musculoskeletal:         General: Normal range of motion  Cervical back: Normal range of motion and neck supple  Comments: Mild lower extremity edema   Skin:     General: Skin is warm and dry  Neurological:      Mental Status: She is alert and oriented to person, place, and time  Vitals: not currently breastfeeding     Wt Readings from Last 3 Encounters:   11/07/20 122 kg (270 lb)   10/20/20 122 kg (268 lb 9 6 oz)   10/07/20 124 kg (273 lb)         Labs & Results:  Lab Results   Component Value Date    WBC 6 90 04/08/2021    HGB 11 5 04/08/2021    HCT 38 1 04/08/2021    MCV 83 04/08/2021     (L) 04/08/2021     No results found for: BNP  No components found for: CHEM  Troponin I   Date Value Ref Range Status   11/07/2020 <0 02 <=0 04 ng/mL Final     Comment:     Autovalidation override  Siemens Chemistry analyzer 99% cutoff is > 0 04 ng/mL in network labs     o cTnI 99% cutoff is useful only when applied to patients in the clinical setting of myocardial ischemia   o cTnI 99% cutoff should be interpreted in the context of clinical history, ECG findings and possibly cardiac imaging to establish correct diagnosis  o cTnI 99% cutoff may be suggestive but clearly not indicative of a coronary event without the clinical setting of myocardial ischemia  04/06/2020 <0 02 <=0 04 ng/mL Final     Comment:     Autovalidation override  Siemens Chemistry analyzer 99% cutoff is > 0 04 ng/mL in network labs     o cTnI 99% cutoff is useful only when applied to patients in the clinical setting of myocardial ischemia   o cTnI 99% cutoff should be interpreted in the context of clinical history, ECG findings and possibly cardiac imaging to establish correct diagnosis  o cTnI 99% cutoff may be suggestive but clearly not indicative of a coronary event without the clinical setting of myocardial ischemia      02/24/2020 <0 02 <=0 04 ng/mL Final     Comment:     Autovalidation override  Siemens Chemistry analyzer 99% cutoff is > 0 04 ng/mL in network labs     o cTnI 99% cutoff is useful only when applied to patients in the clinical setting of myocardial ischemia   o cTnI 99% cutoff should be interpreted in the context of clinical history, ECG findings and possibly cardiac imaging to establish correct diagnosis     o cTnI 99% cutoff may be suggestive but clearly not indicative of a coronary event without the clinical setting of myocardial ischemia      03/26/2014 <0 04 0 00 - 0 04 ng/mL Final     Comment:     The above 1 analytes were performed by Ricci Billings [id number]       Results for orders placed during the hospital encounter of 09/01/20   Echo complete with contrast if indicated    Narrative [address]  [facility] [place] [address] [facility]  [phone]    Transthoracic Echocardiogram  2D, M-mode, Doppler, and Color Doppler    Study date:  02-Sep-2020    Patient: Arcadio Ellison  MR number: [id number]  Account number: [de-identified]  : 1955  Age: 59 years  Gender: Female  Status: Inpatient  Location: Bedside  Height: 65 in  Weight: 268 lb  BP: 131/ 54 mmHg    Indications: 1 day s/p 26 mm TAVR    Diagnoses: I35 9 - Nonrheumatic aortic valve disorder, unspecified    Sonographer:  Derek Lopez RDCS  Primary Physician:  Jolene Hendrix DO  Referring Physician:  Shayne Celeste DO  Group:  [facility]  Sonographer:  MAEVE Eldridge  Sonographer:  SAMUEL Colon, [facility]  Interpreting Physician:  Andrea Bhatt MD    SUMMARY    LEFT VENTRICLE:  Systolic function was normal  Ejection fraction was estimated to be 60 %  There were no regional wall motion abnormalities  Wall thickness was mildly to moderately increased  Doppler parameters were consistent with abnormal left ventricular relaxation (grade 1 diastolic dysfunction)  LEFT ATRIUM:  The atrium was moderately dilated  RIGHT ATRIUM:  The atrium was dilated  MITRAL VALVE:  There was marked annular calcification  There was mild stenosis  AORTIC VALVE:  A 26mm ES 3 ULTRA TAVR bioprosthesis was present  It exhibited normal function  No valvular or paravalvular leak  Valve peak gradient was 18 mmHg  Valve mean gradient was 10 mmHg  Aortic valve area was 1 8 cmï¾² by the continuity equation  TRICUSPID VALVE:  There was mild regurgitation  HISTORY: PRIOR HISTORY: 26 mm TAVR, Obesity, Afib, DM, Mitral stenosis    PROCEDURE: The procedure was performed at the bedside  This was a routine study  The transthoracic approach was used  The study included complete 2D imaging, M-mode, complete spectral Doppler, and color Doppler  The heart rate was 52 bpm,  at the start of the study  Images were obtained from the parasternal, apical, subcostal, and suprasternal notch acoustic windows  Echocardiographic views were limited due to poor acoustic window availability and decreased penetration  This  was a technically difficult study  LEFT VENTRICLE: Size was normal  Systolic function was normal  Ejection fraction was estimated to be 60 %  There were no regional wall motion abnormalities  Wall thickness was mildly to moderately increased  DOPPLER: Doppler parameters  were consistent with abnormal left ventricular relaxation (grade 1 diastolic dysfunction)  RIGHT VENTRICLE: The size was normal  Systolic function was normal  Wall thickness was normal     LEFT ATRIUM: The atrium was moderately dilated  RIGHT ATRIUM: The atrium was dilated  MITRAL VALVE: There was marked annular calcification  There was normal leaflet separation  DOPPLER: The transmitral velocity was within the normal range  There was mild stenosis  There was no significant regurgitation  AORTIC VALVE: A 26mm ES 3 ULTRA TAVR bioprosthesis was present  It exhibited normal function  No valvular or paravalvular leak  TRICUSPID VALVE: The valve structure was normal  There was normal leaflet separation  DOPPLER: The transtricuspid velocity was within the normal range  There was no evidence for stenosis  There was mild regurgitation  PULMONIC VALVE: Leaflets exhibited normal thickness, no calcification, and normal cuspal separation  DOPPLER: The transpulmonic velocity was within the normal range  There was no significant regurgitation  PERICARDIUM: There was no pericardial effusion  The pericardium was normal in appearance  AORTA: The root exhibited normal size  SYSTEMIC VEINS: IVC: The inferior vena cava was normal in size      MEASUREMENT TABLES    DOPPLER MEASUREMENTS  Aortic valve   (Reference normals)  Peak gradient   18 mmHg   (--)  Mean gradient   10 mmHg   (--)  Valve area, cont   1 8 cmï¾²   (--)    SYSTEM MEASUREMENT TABLES    2D mode  Left Atrium Victorino-posterior Systolic Dimension; User chosen value; 2D mode;: 4 7 cm  EDV (2D-Cubed): 119 cm3  EF (2D-Cubed): 64 3 %  ESV (2D-Cubed): 42 5 cm3  FS (2D-Cubed): 29 1 %  FS (2D-Teich): 29 1 %  IVS/LVPW (2D): 0 95  Interventricular Septum Diastolic Thickness; User chosen value; 2D mode;: 1 26 cm  LVOT Area (2D): 314 mm2  Left Ventricle Internal End Diastolic Dimension; User chosen value; 2D mode;: 4 92 cm  Left Ventricle Internal Systolic Dimension; User chosen value; 2D mode;: 3 49 cm  Left Ventricle Posterior Wall Diastolic Thickness; User chosen value; 2D mode;: 1 33 cm  Left Ventricular Ejection Fraction; Teichholz; 2D mode;: 55 7 %  Left Ventricular End Diastolic Volume; Teichholz; 2D mode;: 114 cm3  Left Ventricular End Systolic Volume; Teichholz; 2D mode;: 50 5 cm3  SV (2D-Cubed): 76 5 cm3  Stroke Volume; Teichholz; 2D mode;: 63 5 cm3    Apical four chamber  Left Atrium MOD Diam; Most recent value chosen; End Systole; Apical four chamber;: 3 61 cm  Left Atrium MOD Diam; Most recent value chosen; End Systole; Apical four chamber;: 2 49 cm  Left Atrium MOD Diam; Most recent value chosen; End Systole; Apical four chamber;: 4 36 cm  Left Atrium MOD Diam; Most recent value chosen; End Systole; Apical four chamber;: 4 76 cm  Left Atrium MOD Diam; Most recent value chosen; End Systole; Apical four chamber;: 5 02 cm  Left Atrium MOD Diam; Most recent value chosen; End Systole; Apical four chamber;: 5 54 cm  Left Atrium MOD Diam; Most recent value chosen; End Systole; Apical four chamber;: 5 66 cm  Left Atrium MOD Diam; Most recent value chosen; End Systole; Apical four chamber;: 5 73 cm  Left Atrium MOD Diam; Most recent value chosen; End Systole; Apical four chamber;: 5 84 cm  Left Atrium MOD Diam; Most recent value chosen; End Systole; Apical four chamber;: 6 07 cm  Left Atrium MOD Diam; Most recent value chosen; End Systole; Apical four chamber;: 6 14 cm  Left Atrium MOD Diam; Most recent value chosen; End Systole; Apical four chamber;: 6 14 cm  Left Atrium MOD Diam; Most recent value chosen; End Systole;  Apical four chamber;: 6 1 cm  Left Atrium MOD Diam; Most recent value chosen; End Systole; Apical four chamber;: 5 84 cm  Left Atrium MOD Diam; Most recent value chosen; End Systole; Apical four chamber;: 5 51 cm  Left Atrium MOD Diam; Most recent value chosen; End Systole; Apical four chamber;: 5 17 cm  Left Atrium MOD Diam; Most recent value chosen; End Systole; Apical four chamber;: 4 76 cm  Left Atrium MOD Diam; Most recent value chosen; End Systole; Apical four chamber;: 3 65 cm  Left Atrium MOD Diam; Most recent value chosen; End Systole; Apical four chamber;: 2 08 cm  Left Atrium MOD Diam; Most recent value chosen; End Systole; Apical four chamber;: 5 32 cm  Left Atrium Systolic Area; Most recent value chosen; Method of Disks, Single Plane; 2D mode; Apical four chamber;: 3750 mm2  Left Atrium Systolic Volume; Most recent value chosen; Method of Disks, Single Plane; 2D mode; Apical four chamber;: 154 cm3  Left Atrium systolic major axis; Most recent value chosen; Method of Disks, Single Plane; 2D mode; Apical four chamber;: 7 48 cm    Unspecified Scan Mode  Mean Grad; Antegrade Flow: 10 mm[Hg]  Mean Grad; Mean value; Antegrade Flow: 10 mm[Hg]  Mean Noman; Antegrade Flow: 1520 mm/s  Mean Noman; Mean value; Antegrade Flow: 1520 mm/s  Peak Grad; Mean; Antegrade Flow: 18 mm[Hg]  VTI; Antegrade Flow: 48 3 cm  VTI; Mean; Antegrade Flow: 48 3 cm  Valve Area Peak Noman: 161 mm2  Valve Area VTI: 179 mm2  Vmax; Antegrade Flow: 2150 mm/s  Vmax; Mean; Antegrade Flow: 2150 mm/s  LVOT CV Orifice Diam; Mean: 2 cm  LVOT Diam: 2 cm  LVOT Mean Grad: 3 mm[Hg]  LVOT Mean Grad; Mean value: 3 mm[Hg]  LVOT Mean Noman: 810 mm/s  LVOT Mean Noman; Mean value: 810 mm/s  LVOT Peak Grad; Mean: 5 mm[Hg]  LVOT VTI: 27 6 cm  LVOT VTI; Mean: 27 6 cm  LVOT Vmax: 1100 mm/s  LVOT Vmax; Mean: 1100 mm/s  SV (LVOT): 87 cm3  DT; Antegrade Flow: 489 ms  DT; Mean; Antegrade Flow: 489 ms  Dec Powell; Antegrade Flow: 2390 mm/s2  Dec Powell; Mean;  Antegrade Flow: 2390 mm/s2  MV A Noman: 1190 mm/s  MV E Noman: 69 1 mm/s  MV E Noman: 1110 mm/s  MV E/A Ratio: 0 5  MV Peak A Noman: 1190 mm/s  MV Peak E Noman; Mean; Antegrade Flow: 590 mm/s  MVA (PHT): 130 mm2  Mean Grad; Antegrade Flow: 4 mm[Hg]  Mean Grad; Mean value; Antegrade Flow: 4 mm[Hg]  Mean Noman; Antegrade Flow: 912 mm/s  Mean Noman; Mean value; Antegrade Flow: 912 mm/s  PHT: 169 ms  PHT Peak Noman;: 1380 mm/s  PHT Peak Noman; Mean: 1380 mm/s  PHT; Mean: 169 ms  Peak Grad; Mean; Antegrade Flow: 8 mm[Hg]  VTI; Antegrade Flow: 66 4 cm  VTI; Mean; Antegrade Flow: 66 4 cm  Valve Area VTI: 131 mm2  Vmax; Antegrade Flow: 1440 mm/s  Vmax; Mean; Antegrade Flow: 1440 mm/s  Peak Grad; Mean; Regurgitant Flow: 18 mm[Hg]  Vmax; Mean; Regurgitant Flow: 2100 mm/s  Vmax; Regurgitant Flow: 2100 mm/s  Atrial [employer]; Most recent value chosen;: 2430 mm2  [facility]; Most recent value chosen;: 6 69 cm  Atrial Herzog Volume; Most recent value chosen;: 75 cm3  Distance;: 4 4 cm  Distance; Mean; Mean value chosen;: 4 4 cm  Distance; User chosen value;: 3 1 cm    [facility]    Prepared and electronically signed by    Sandor Lakhani MD  Signed 02-Sep-2020 17:02:52       No results found for this or any previous visit  This note was completed in part utilizing m-Snappy shuttle fluency direct voice recognition software  Grammatical errors, random word insertion, spelling mistakes, and incomplete sentences may be an occasional consequence of the system secondary to software limitations, ambient noise and hardware issues  At the time of dictation, efforts were made to edit, clarify and /or correct errors  Please read the chart carefully and recognize, using context, where substitutions have occurred    If you have any questions or concerns about the context, text or information contained within the body of this dictation, please contact myself, the provider, for further clarification

## 2021-06-24 ENCOUNTER — OFFICE VISIT (OUTPATIENT)
Dept: BARIATRICS | Facility: CLINIC | Age: 66
End: 2021-06-24
Payer: MEDICARE

## 2021-06-24 VITALS
BODY MASS INDEX: 48.28 KG/M2 | HEART RATE: 83 BPM | SYSTOLIC BLOOD PRESSURE: 138 MMHG | DIASTOLIC BLOOD PRESSURE: 80 MMHG | WEIGHT: 282.8 LBS | TEMPERATURE: 97.2 F | HEIGHT: 64 IN

## 2021-06-24 DIAGNOSIS — K91.2 POSTGASTRECTOMY MALABSORPTION: Primary | ICD-10-CM

## 2021-06-24 DIAGNOSIS — Z98.84 S/P GASTRIC BYPASS: ICD-10-CM

## 2021-06-24 DIAGNOSIS — Z90.3 POSTGASTRECTOMY MALABSORPTION: Primary | ICD-10-CM

## 2021-06-24 PROCEDURE — 99213 OFFICE O/P EST LOW 20 MIN: CPT | Performed by: SURGERY

## 2021-06-24 NOTE — PROGRESS NOTES
Assessment/Plan:    - has undergone significant life changes and joint surgeries and will require reorientation with diet and healthy choices including Increase PO fluid intake, do not skip meals, maintain protein intake, continue daily MV  - f/u with LCSW, surgical RD, and MWM       Diagnoses and all orders for this visit:    Postgastrectomy malabsorption  -     CBC and Platelet; Future  -     Comprehensive metabolic panel; Future  -     Copper Level; Future  -     Ferritin; Future  -     Iron Saturation %; Future  -     Lipid panel; Future  -     Methylmalonic acid, serum; Future  -     PTH, intact; Future  -     Retinol Binding Protein; Future  -     Vitamin A; Future  -     Vitamin B1, whole blood; Future  -     Vitamin B12; Future  -     Vitamin D 25 hydroxy; Future  -     Zinc; Future    S/P gastric bypass  -     CBC and Platelet; Future  -     Comprehensive metabolic panel; Future  -     Copper Level; Future  -     Ferritin; Future  -     Iron Saturation %; Future  -     Lipid panel; Future  -     Methylmalonic acid, serum; Future  -     PTH, intact; Future  -     Retinol Binding Protein; Future  -     Vitamin A; Future  -     Vitamin B1, whole blood; Future  -     Vitamin B12; Future  -     Vitamin D 25 hydroxy; Future  -     Zinc; Future          Subjective:      Patient ID: Merlin Perez is a 72 y o  female  -s/p Juan-En-Y Gastric Bypass with Dr Sherren Kinds on 7/18/16  Presents to the office today for routine follow up  Tolerating diet without issues; denies N/V, dysphagia, reflux  Overall doing Fair as she has experienced weight regain after life stressors including but not limited to colorectal cancer surgery and chemotherapy and joint replacements  Initial: 353  Current: 282  EWL: 35%  John: 229 4  Current BMI is Body mass index is 48 54 kg/m²        The following portions of the patient's history were reviewed and updated as appropriate: allergies, current medications, past family history, past medical history, past social history, past surgical history and problem list     Review of Systems   Constitutional: Negative  Respiratory: Negative  Cardiovascular: Negative  Gastrointestinal: Negative  Musculoskeletal: Negative  Neurological: Negative  All other systems reviewed and are negative  Objective:      /80 (BP Location: Left arm, Patient Position: Sitting, Cuff Size: Large)   Pulse 83   Temp (!) 97 2 °F (36 2 °C)   Ht 5' 4" (1 626 m)   Wt 128 kg (282 lb 12 8 oz)   BMI 48 54 kg/m²          Physical Exam  Vitals reviewed  Constitutional:       Appearance: She is well-developed  HENT:      Head: Normocephalic  Eyes:      Extraocular Movements: Extraocular movements intact  Cardiovascular:      Rate and Rhythm: Normal rate  Pulmonary:      Effort: Pulmonary effort is normal    Abdominal:      General: There is no distension  Musculoskeletal:         General: Normal range of motion  Cervical back: Normal range of motion  Skin:     General: Skin is warm  Neurological:      Mental Status: She is alert and oriented to person, place, and time  Psychiatric:         Mood and Affect: Mood normal          Behavior: Behavior normal          Thought Content: Thought content normal          Judgment: Judgment normal            BARRIERS: none identified    GOALS:   · Continued/Maintain healthy weight loss with good nutrition intakes  · Adequate hydration with at least 64oz  fluid intake  · Normal vitamin and mineral levels  · Exercise as tolerated  · Follow-up in 1 year with our surgical PA  We kindly ask that your arrive 15 minutes before your scheduled appointment time with your provider to allow our staff to room you, get your vital signs and update your chart  · Schedule follow up with our LCSW, surgical RD, and MWM    · Follow diet as discussed  · Get lab work done in the next 2 weeks  You have been given a lab slip today    Please call the office if you need a replacement  It is recommended to check with your insurance BEFORE getting labs done to make sure they are covered by your policy  Also, please check with your PCP and other providers before getting labs to avoid duplicate labs  Make sure to HOLD any multivitamins that may contain biotin and any biotin supplements FOR 5 DAYS before any labs since it can affect the results  · Follow vitamin and mineral recommendations as reviewed with you  · Call our office if you have any problems with abdominal pain especially associated with fever, chills, nausea, vomiting or any other concerns  · All  Post-bariatric surgery patients should be aware that very small quantities of any alcohol can cause impairment and it is very possible not to feel the effect  The effect can be in the system for several hours  It is also a stomach irritant  · It is advised to AVOID alcohol, Nonsteroidal antiinflammatory drugs (NSAIDS) and nicotine of all forms   Any of these can cause stomach irritation/pain

## 2021-07-19 ENCOUNTER — APPOINTMENT (OUTPATIENT)
Dept: LAB | Facility: CLINIC | Age: 66
End: 2021-07-19
Payer: MEDICARE

## 2021-07-19 DIAGNOSIS — Z90.3 POSTGASTRECTOMY MALABSORPTION: ICD-10-CM

## 2021-07-19 DIAGNOSIS — Z51.81 ENCOUNTER FOR MONITORING AMIODARONE THERAPY: ICD-10-CM

## 2021-07-19 DIAGNOSIS — Z98.84 S/P GASTRIC BYPASS: ICD-10-CM

## 2021-07-19 DIAGNOSIS — I35.0 AORTIC VALVE STENOSIS, ETIOLOGY OF CARDIAC VALVE DISEASE UNSPECIFIED: ICD-10-CM

## 2021-07-19 DIAGNOSIS — R73.03 PRE-DIABETES: ICD-10-CM

## 2021-07-19 DIAGNOSIS — I50.32 CHRONIC DIASTOLIC HEART FAILURE (HCC): ICD-10-CM

## 2021-07-19 DIAGNOSIS — E66.9 OBESITY, CLASS II, BMI 35-39.9: ICD-10-CM

## 2021-07-19 DIAGNOSIS — E55.9 VITAMIN D INSUFFICIENCY: ICD-10-CM

## 2021-07-19 DIAGNOSIS — R79.89 HIGH SERUM PARATHYROID HORMONE (PTH): ICD-10-CM

## 2021-07-19 DIAGNOSIS — I35.0 AORTIC STENOSIS, SEVERE: ICD-10-CM

## 2021-07-19 DIAGNOSIS — Z79.899 ENCOUNTER FOR MONITORING AMIODARONE THERAPY: ICD-10-CM

## 2021-07-19 DIAGNOSIS — E53.8 LOW VITAMIN B12 LEVEL: ICD-10-CM

## 2021-07-19 DIAGNOSIS — E61.1 LOW IRON: ICD-10-CM

## 2021-07-19 DIAGNOSIS — Z95.2 S/P TAVR (TRANSCATHETER AORTIC VALVE REPLACEMENT): ICD-10-CM

## 2021-07-19 DIAGNOSIS — K91.2 POSTGASTRECTOMY MALABSORPTION: ICD-10-CM

## 2021-07-19 LAB
25(OH)D3 SERPL-MCNC: 23 NG/ML (ref 30–100)
ALBUMIN SERPL BCP-MCNC: 2.9 G/DL (ref 3.5–5)
ALP SERPL-CCNC: 74 U/L (ref 46–116)
ALT SERPL W P-5'-P-CCNC: 13 U/L (ref 12–78)
ANION GAP SERPL CALCULATED.3IONS-SCNC: 4 MMOL/L (ref 4–13)
AST SERPL W P-5'-P-CCNC: 10 U/L (ref 5–45)
BASOPHILS # BLD AUTO: 0.05 THOUSANDS/ΜL (ref 0–0.1)
BASOPHILS NFR BLD AUTO: 1 % (ref 0–1)
BILIRUB SERPL-MCNC: 0.5 MG/DL (ref 0.2–1)
BUN SERPL-MCNC: 16 MG/DL (ref 5–25)
CALCIUM ALBUM COR SERPL-MCNC: 10 MG/DL (ref 8.3–10.1)
CALCIUM SERPL-MCNC: 9.1 MG/DL (ref 8.3–10.1)
CHLORIDE SERPL-SCNC: 108 MMOL/L (ref 100–108)
CHOLEST SERPL-MCNC: 216 MG/DL (ref 50–200)
CO2 SERPL-SCNC: 27 MMOL/L (ref 21–32)
CREAT SERPL-MCNC: 0.79 MG/DL (ref 0.6–1.3)
EOSINOPHIL # BLD AUTO: 0.15 THOUSAND/ΜL (ref 0–0.61)
EOSINOPHIL NFR BLD AUTO: 3 % (ref 0–6)
ERYTHROCYTE [DISTWIDTH] IN BLOOD BY AUTOMATED COUNT: 15.4 % (ref 11.6–15.1)
EST. AVERAGE GLUCOSE BLD GHB EST-MCNC: 131 MG/DL
FERRITIN SERPL-MCNC: 25 NG/ML (ref 8–388)
GFR SERPL CREATININE-BSD FRML MDRD: 79 ML/MIN/1.73SQ M
GLUCOSE P FAST SERPL-MCNC: 99 MG/DL (ref 65–99)
HBA1C MFR BLD: 6.2 %
HCT VFR BLD AUTO: 41.5 % (ref 34.8–46.1)
HDLC SERPL-MCNC: 51 MG/DL
HGB BLD-MCNC: 12.5 G/DL (ref 11.5–15.4)
IMM GRANULOCYTES # BLD AUTO: 0.14 THOUSAND/UL (ref 0–0.2)
IMM GRANULOCYTES NFR BLD AUTO: 2 % (ref 0–2)
IRON SATN MFR SERPL: 13 %
IRON SERPL-MCNC: 54 UG/DL (ref 50–170)
LDLC SERPL CALC-MCNC: 130 MG/DL (ref 0–100)
LYMPHOCYTES # BLD AUTO: 1.19 THOUSANDS/ΜL (ref 0.6–4.47)
LYMPHOCYTES NFR BLD AUTO: 20 % (ref 14–44)
MCH RBC QN AUTO: 23.9 PG (ref 26.8–34.3)
MCHC RBC AUTO-ENTMCNC: 30.1 G/DL (ref 31.4–37.4)
MCV RBC AUTO: 80 FL (ref 82–98)
MONOCYTES # BLD AUTO: 0.5 THOUSAND/ΜL (ref 0.17–1.22)
MONOCYTES NFR BLD AUTO: 8 % (ref 4–12)
NEUTROPHILS # BLD AUTO: 3.98 THOUSANDS/ΜL (ref 1.85–7.62)
NEUTS SEG NFR BLD AUTO: 66 % (ref 43–75)
NONHDLC SERPL-MCNC: 165 MG/DL
NRBC BLD AUTO-RTO: 0 /100 WBCS
PLATELET # BLD AUTO: 196 THOUSANDS/UL (ref 149–390)
PMV BLD AUTO: 10.6 FL (ref 8.9–12.7)
POTASSIUM SERPL-SCNC: 4.5 MMOL/L (ref 3.5–5.3)
PROT SERPL-MCNC: 7.3 G/DL (ref 6.4–8.2)
PTH-INTACT SERPL-MCNC: 146.8 PG/ML (ref 18.4–80.1)
RBC # BLD AUTO: 5.22 MILLION/UL (ref 3.81–5.12)
SODIUM SERPL-SCNC: 139 MMOL/L (ref 136–145)
TIBC SERPL-MCNC: 413 UG/DL (ref 250–450)
TRIGL SERPL-MCNC: 177 MG/DL
VIT B12 SERPL-MCNC: 558 PG/ML (ref 100–900)
WBC # BLD AUTO: 6.01 THOUSAND/UL (ref 4.31–10.16)

## 2021-07-19 PROCEDURE — 82306 VITAMIN D 25 HYDROXY: CPT

## 2021-07-19 PROCEDURE — 82607 VITAMIN B-12: CPT

## 2021-07-19 PROCEDURE — 36415 COLL VENOUS BLD VENIPUNCTURE: CPT

## 2021-07-19 PROCEDURE — 80053 COMPREHEN METABOLIC PANEL: CPT

## 2021-07-19 PROCEDURE — 84630 ASSAY OF ZINC: CPT

## 2021-07-19 PROCEDURE — 83550 IRON BINDING TEST: CPT

## 2021-07-19 PROCEDURE — 83036 HEMOGLOBIN GLYCOSYLATED A1C: CPT

## 2021-07-19 PROCEDURE — 84425 ASSAY OF VITAMIN B-1: CPT

## 2021-07-19 PROCEDURE — 80061 LIPID PANEL: CPT

## 2021-07-19 PROCEDURE — 85025 COMPLETE CBC W/AUTO DIFF WBC: CPT

## 2021-07-19 PROCEDURE — 84590 ASSAY OF VITAMIN A: CPT

## 2021-07-19 PROCEDURE — 82728 ASSAY OF FERRITIN: CPT

## 2021-07-19 PROCEDURE — 82525 ASSAY OF COPPER: CPT

## 2021-07-19 PROCEDURE — 83970 ASSAY OF PARATHORMONE: CPT

## 2021-07-19 PROCEDURE — 83918 ORGANIC ACIDS TOTAL QUANT: CPT

## 2021-07-19 PROCEDURE — 83540 ASSAY OF IRON: CPT

## 2021-07-20 DIAGNOSIS — R05.9 COUGH: ICD-10-CM

## 2021-07-21 ENCOUNTER — OFFICE VISIT (OUTPATIENT)
Dept: FAMILY MEDICINE CLINIC | Facility: CLINIC | Age: 66
End: 2021-07-21
Payer: MEDICARE

## 2021-07-21 VITALS
HEIGHT: 66 IN | WEIGHT: 276 LBS | RESPIRATION RATE: 16 BRPM | OXYGEN SATURATION: 97 % | TEMPERATURE: 98.3 F | DIASTOLIC BLOOD PRESSURE: 76 MMHG | BODY MASS INDEX: 44.36 KG/M2 | HEART RATE: 72 BPM | SYSTOLIC BLOOD PRESSURE: 122 MMHG

## 2021-07-21 DIAGNOSIS — I50.32 CHRONIC DIASTOLIC HEART FAILURE (HCC): ICD-10-CM

## 2021-07-21 DIAGNOSIS — K28.9 ULCER AT SITE OF SURGICAL ANASTOMOSIS FOLLOWING BYPASS OF STOMACH: ICD-10-CM

## 2021-07-21 DIAGNOSIS — Z85.048 HISTORY OF RECTAL CANCER: ICD-10-CM

## 2021-07-21 DIAGNOSIS — K91.2 POSTGASTRECTOMY MALABSORPTION: ICD-10-CM

## 2021-07-21 DIAGNOSIS — Z85.43 HISTORY OF OVARIAN CANCER: ICD-10-CM

## 2021-07-21 DIAGNOSIS — Z90.3 POSTGASTRECTOMY MALABSORPTION: ICD-10-CM

## 2021-07-21 DIAGNOSIS — Z12.31 ENCOUNTER FOR SCREENING MAMMOGRAM FOR MALIGNANT NEOPLASM OF BREAST: ICD-10-CM

## 2021-07-21 DIAGNOSIS — Z11.59 NEED FOR HEPATITIS C SCREENING TEST: ICD-10-CM

## 2021-07-21 DIAGNOSIS — Z78.0 POST-MENOPAUSAL: ICD-10-CM

## 2021-07-21 DIAGNOSIS — I70.209 ATHEROSCLEROTIC PERIPHERAL VASCULAR DISEASE (HCC): ICD-10-CM

## 2021-07-21 DIAGNOSIS — Z95.2 S/P TAVR (TRANSCATHETER AORTIC VALVE REPLACEMENT): ICD-10-CM

## 2021-07-21 DIAGNOSIS — I48.91 ATRIAL FIBRILLATION WITH RAPID VENTRICULAR RESPONSE (HCC): ICD-10-CM

## 2021-07-21 DIAGNOSIS — J20.9 ACUTE BRONCHITIS, UNSPECIFIED ORGANISM: Primary | ICD-10-CM

## 2021-07-21 DIAGNOSIS — Z00.00 MEDICARE ANNUAL WELLNESS VISIT, INITIAL: ICD-10-CM

## 2021-07-21 DIAGNOSIS — E66.01 MORBID OBESITY (HCC): ICD-10-CM

## 2021-07-21 DIAGNOSIS — Z43.3 COLOSTOMY CARE (HCC): ICD-10-CM

## 2021-07-21 DIAGNOSIS — T85.898A ULCER AT SITE OF SURGICAL ANASTOMOSIS FOLLOWING BYPASS OF STOMACH: ICD-10-CM

## 2021-07-21 DIAGNOSIS — E78.5 HYPERLIPIDEMIA, UNSPECIFIED HYPERLIPIDEMIA TYPE: ICD-10-CM

## 2021-07-21 PROBLEM — Z79.01 CHRONIC ANTICOAGULATION: Status: ACTIVE | Noted: 2021-04-06

## 2021-07-21 PROBLEM — Z86.39 H/O DIABETES MELLITUS: Status: ACTIVE | Noted: 2021-07-21

## 2021-07-21 PROBLEM — R26.2 AMBULATORY DYSFUNCTION: Status: ACTIVE | Noted: 2021-04-09

## 2021-07-21 PROBLEM — M17.11 OSTEOARTHRITIS OF RIGHT KNEE: Status: ACTIVE | Noted: 2021-04-06

## 2021-07-21 PROBLEM — I34.2 NONRHEUMATIC MITRAL VALVE STENOSIS: Status: ACTIVE | Noted: 2019-09-10

## 2021-07-21 PROBLEM — I25.10 CAD (CORONARY ARTERY DISEASE): Status: ACTIVE | Noted: 2021-04-06

## 2021-07-21 LAB
COPPER SERPL-MCNC: 115 UG/DL (ref 80–158)
METHYLMALONATE SERPL-SCNC: 175 NMOL/L (ref 0–378)
SL AMB DISCLAIMER: NORMAL
ZINC SERPL-MCNC: 82 UG/DL (ref 44–115)

## 2021-07-21 PROCEDURE — 1123F ACP DISCUSS/DSCN MKR DOCD: CPT | Performed by: NURSE PRACTITIONER

## 2021-07-21 PROCEDURE — G0438 PPPS, INITIAL VISIT: HCPCS | Performed by: NURSE PRACTITIONER

## 2021-07-21 PROCEDURE — 99214 OFFICE O/P EST MOD 30 MIN: CPT | Performed by: NURSE PRACTITIONER

## 2021-07-21 RX ORDER — ATORVASTATIN CALCIUM 20 MG/1
20 TABLET, FILM COATED ORAL DAILY
Qty: 90 TABLET | Refills: 1 | Status: SHIPPED | OUTPATIENT
Start: 2021-07-21 | End: 2021-10-20

## 2021-07-21 RX ORDER — DOXYCYCLINE HYCLATE 100 MG/1
100 CAPSULE ORAL EVERY 12 HOURS SCHEDULED
Qty: 20 CAPSULE | Refills: 0 | Status: SHIPPED | OUTPATIENT
Start: 2021-07-21 | End: 2021-07-31

## 2021-07-21 NOTE — PATIENT INSTRUCTIONS
Medicare Preventive Visit Patient Instructions  Thank you for completing your Welcome to Medicare Visit or Medicare Annual Wellness Visit today  Your next wellness visit will be due in one year (7/22/2022)  The screening/preventive services that you may require over the next 5-10 years are detailed below  Some tests may not apply to you based off risk factors and/or age  Screening tests ordered at today's visit but not completed yet may show as past due  Also, please note that scanned in results may not display below  Preventive Screenings:  Service Recommendations Previous Testing/Comments   Colorectal Cancer Screening  * Colonoscopy    * Fecal Occult Blood Test (FOBT)/Fecal Immunochemical Test (FIT)  * Fecal DNA/Cologuard Test  * Flexible Sigmoidoscopy Age: 54-65 years old   Colonoscopy: every 10 years (may be performed more frequently if at higher risk)  OR  FOBT/FIT: every 1 year  OR  Cologuard: every 3 years  OR  Sigmoidoscopy: every 5 years  Screening may be recommended earlier than age 48 if at higher risk for colorectal cancer  Also, an individualized decision between you and your healthcare provider will decide whether screening between the ages of 74-80 would be appropriate  Colonoscopy: 09/05/2018  FOBT/FIT: Not on file  Cologuard: Not on file  Sigmoidoscopy: Not on file    History Colorectal Cancer     Breast Cancer Screening Age: 36 years old  Frequency: every 1-2 years  Not required if history of left and right mastectomy Mammogram: 06/05/2019        Cervical Cancer Screening Between the ages of 21-29, pap smear recommended once every 3 years  Between the ages of 33-67, can perform pap smear with HPV co-testing every 5 years     Recommendations may differ for women with a history of total hysterectomy, cervical cancer, or abnormal pap smears in past  Pap Smear: Not on file    Screening Not Indicated   Hepatitis C Screening Once for adults born between 1945 and 1965  More frequently in patients at high risk for Hepatitis C Hep C Antibody: Not on file        Diabetes Screening 1-2 times per year if you're at risk for diabetes or have pre-diabetes Fasting glucose: 99 mg/dL   A1C: 6 2 %    Screening Current   Cholesterol Screening Once every 5 years if you don't have a lipid disorder  May order more often based on risk factors  Lipid panel: 07/19/2021    Screening Not Indicated  History Lipid Disorder     Other Preventive Screenings Covered by Medicare:  1  Abdominal Aortic Aneurysm (AAA) Screening: covered once if your at risk  You're considered to be at risk if you have a family history of AAA  2  Lung Cancer Screening: covers low dose CT scan once per year if you meet all of the following conditions: (1) Age 50-69; (2) No signs or symptoms of lung cancer; (3) Current smoker or have quit smoking within the last 15 years; (4) You have a tobacco smoking history of at least 30 pack years (packs per day multiplied by number of years you smoked); (5) You get a written order from a healthcare provider  3  Glaucoma Screening: covered annually if you're considered high risk: (1) You have diabetes OR (2) Family history of glaucoma OR (3)  aged 48 and older OR (3)  American aged 72 and older  3  Osteoporosis Screening: covered every 2 years if you meet one of the following conditions: (1) You're estrogen deficient and at risk for osteoporosis based off medical history and other findings; (2) Have a vertebral abnormality; (3) On glucocorticoid therapy for more than 3 months; (4) Have primary hyperparathyroidism; (5) On osteoporosis medications and need to assess response to drug therapy  · Last bone density test (DXA Scan): Not on file  5  HIV Screening: covered annually if you're between the age of 12-76  Also covered annually if you are younger than 13 and older than 72 with risk factors for HIV infection   For pregnant patients, it is covered up to 3 times per pregnancy  Immunizations:  Immunization Recommendations   Influenza Vaccine Annual influenza vaccination during flu season is recommended for all persons aged >= 6 months who do not have contraindications   Pneumococcal Vaccine (Prevnar and Pneumovax)  * Prevnar = PCV13  * Pneumovax = PPSV23   Adults 25-60 years old: 1-3 doses may be recommended based on certain risk factors  Adults 72 years old: Prevnar (PCV13) vaccine recommended followed by Pneumovax (PPSV23) vaccine  If already received PPSV23 since turning 65, then PCV13 recommended at least one year after PPSV23 dose  Hepatitis B Vaccine 3 dose series if at intermediate or high risk (ex: diabetes, end stage renal disease, liver disease)   Tetanus (Td) Vaccine - COST NOT COVERED BY MEDICARE PART B Following completion of primary series, a booster dose should be given every 10 years to maintain immunity against tetanus  Td may also be given as tetanus wound prophylaxis  Tdap Vaccine - COST NOT COVERED BY MEDICARE PART B Recommended at least once for all adults  For pregnant patients, recommended with each pregnancy  Shingles Vaccine (Shingrix) - COST NOT COVERED BY MEDICARE PART B  2 shot series recommended in those aged 48 and above     Health Maintenance Due:      Topic Date Due    Hepatitis C Screening  Never done    HIV Screening  Never done    Breast Cancer Screening: Mammogram  06/05/2020     Immunizations Due:      Topic Date Due    COVID-19 Vaccine (1) Never done    DTaP,Tdap,and Td Vaccines (1 - Tdap) Never done    Pneumococcal Vaccine: 65+ Years (2 of 2 - PPSV23) 09/17/2020    Influenza Vaccine (1) 09/01/2021     Advance Directives   What are advance directives? Advance directives are legal documents that state your wishes and plans for medical care  These plans are made ahead of time in case you lose your ability to make decisions for yourself   Advance directives can apply to any medical decision, such as the treatments you want, and if you want to donate organs  What are the types of advance directives? There are many types of advance directives, and each state has rules about how to use them  You may choose a combination of any of the following:  · Living will: This is a written record of the treatment you want  You can also choose which treatments you do not want, which to limit, and which to stop at a certain time  This includes surgery, medicine, IV fluid, and tube feedings  · Durable power of  for healthcare [facility]): This is a written record that states who you want to make healthcare choices for you when you are unable to make them for yourself  This person, called a proxy, is usually a family member or a friend  You may choose more than 1 proxy  · Do not resuscitate (DNR) order:  A DNR order is used in case your heart stops beating or you stop breathing  It is a request not to have certain forms of treatment, such as CPR  A DNR order may be included in other types of advance directives  · Medical directive: This covers the care that you want if you are in a coma, near death, or unable to make decisions for yourself  You can list the treatments you want for each condition  Treatment may include pain medicine, surgery, blood transfusions, dialysis, IV or tube feedings, and a ventilator (breathing machine)  · Values history: This document has questions about your views, beliefs, and how you feel and think about life  This information can help others choose the care that you would choose  Why are advance directives important? An advance directive helps you control your care  Although spoken wishes may be used, it is better to have your wishes written down  Spoken wishes can be misunderstood, or not followed  Treatments may be given even if you do not want them  An advance directive may make it easier for your family to make difficult choices about your care     Fall Prevention    Fall prevention  includes ways to make your home and other areas safer  It also includes ways you can move more carefully to prevent a fall  Health conditions that cause changes in your blood pressure, vision, or muscle strength and coordination may increase your risk for falls  Medicines may also increase your risk for falls if they make you dizzy, weak, or sleepy  Fall prevention tips:   · Stand or sit up slowly  · Use assistive devices as directed  · Wear shoes that fit well and have soles that   · Wear a personal alarm  · Stay active  · Manage your medical conditions  Home Safety Tips:  · Add items to prevent falls in the bathroom  · Keep paths clear  · Install bright lights in your home  · Keep items you use often on shelves within reach  · Paint or place reflective tape on the edges of your stairs  Weight Management   Why it is important to manage your weight:  Being overweight increases your risk of health conditions such as heart disease, high blood pressure, type 2 diabetes, and certain types of cancer  It can also increase your risk for osteoarthritis, sleep apnea, and other respiratory problems  Aim for a slow, steady weight loss  Even a small amount of weight loss can lower your risk of health problems  How to lose weight safely:  A safe and healthy way to lose weight is to eat fewer calories and get regular exercise  You can lose up about 1 pound a week by decreasing the number of calories you eat by 500 calories each day  Healthy meal plan for weight management:  A healthy meal plan includes a variety of foods, contains fewer calories, and helps you stay healthy  A healthy meal plan includes the following:  · Eat whole-grain foods more often  A healthy meal plan should contain fiber  Fiber is the part of grains, fruits, and vegetables that is not broken down by your body  Whole-grain foods are healthy and provide extra fiber in your diet   Some examples of whole-grain foods are whole-wheat breads and pastas, oatmeal, brown rice, and bulgur  · Eat a variety of vegetables every day  Include dark, leafy greens such as spinach, kale, daniela greens, and mustard greens  Eat yellow and orange vegetables such as carrots, sweet potatoes, and winter squash  · Eat a variety of fruits every day  Choose fresh or canned fruit (canned in its own juice or light syrup) instead of juice  Fruit juice has very little or no fiber  · Eat low-fat dairy foods  Drink fat-free (skim) milk or 1% milk  Eat fat-free yogurt and low-fat cottage cheese  Try low-fat cheeses such as mozzarella and other reduced-fat cheeses  · Choose meat and other protein foods that are low in fat  Choose beans or other legumes such as split peas or lentils  Choose fish, skinless poultry (chicken or turkey), or lean cuts of red meat (beef or pork)  Before you cook meat or poultry, cut off any visible fat  · Use less fat and oil  Try baking foods instead of frying them  Add less fat, such as margarine, sour cream, regular salad dressing and mayonnaise to foods  Eat fewer high-fat foods  Some examples of high-fat foods include french fries, doughnuts, ice cream, and cakes  · Eat fewer sweets  Limit foods and drinks that are high in sugar  This includes candy, cookies, regular soda, and sweetened drinks  Exercise:  Exercise at least 30 minutes per day on most days of the week  Some examples of exercise include walking, biking, dancing, and swimming  You can also fit in more physical activity by taking the stairs instead of the elevator or parking farther away from stores  Ask your healthcare provider about the best exercise plan for you  © Copyright [employer] 2018 Information is for End User's use only and may not be sold, redistributed or otherwise used for commercial purposes   All illustrations and images included in CareNotes® are the copyrighted property of [employer]  or [address] [place] [address]

## 2021-07-21 NOTE — PROGRESS NOTES
Assessment/Plan:    1  Acute bronchitis, unspecified organism  -     doxycycline hyclate (VIBRAMYCIN) 100 mg capsule; Take 1 capsule (100 mg total) by mouth every 12 (twelve) hours for 10 days    2  Medicare annual wellness visit, initial  -     Ambulatory referral to Cardiology; Future    3  Chronic diastolic heart failure (HCC)  Comments:  on diuretics and denies any edema and sob  Orders:  -     Ambulatory referral to Cardiology; Future    4  Atherosclerotic peripheral vascular disease ([place] [address] )  -     Ambulatory referral to Cardiology; Future    5  Atrial fibrillation with rapid ventricular response (HCC)  Comments:  on metoprolol and amiodarone with eliquis and managed by cardiologist  Orders:  -     Ambulatory referral to Cardiology; Future    6  S/P TAVR (transcatheter aortic valve replacement)  -     Ambulatory referral to Cardiology; Future    7  Hyperlipidemia, unspecified hyperlipidemia type  -     atorvastatin (LIPITOR) 20 mg tablet; Take 1 tablet (20 mg total) by mouth daily  -     Comprehensive metabolic panel; Future  -     Lipid Panel with Direct LDL reflex; Future    8  Ulcer at site of surgical anastomosis following bypass of stomach  -     Ambulatory referral to Gastroenterology; Future    9  History of rectal cancer  -     Ambulatory referral to Gastroenterology; Future    10  Colostomy care [facility])  -     Ambulatory referral to Gastroenterology; Future    11  Need for hepatitis C screening test  -     Hepatitis C antibody; Future    12  Encounter for screening mammogram for malignant neoplasm of breast  -     Mammo screening bilateral w 3d & cad; Future; Expected date: 07/21/2021    13  Post-menopausal  -     DXA bone density spine hip and pelvis; Future; Expected date: 07/21/2021    14  Postgastrectomy malabsorption  Comments:  managed by weight management    15  Morbid obesity ([place] [address] )  Comments:  under care of weight management    16   History of ovarian cancer  Comments:  will schedule with gyn oncologist          BMI Counseling: Body mass index is 44 55 kg/m²  Discussed the patient's BMI with her  The BMI is above normal  Nutrition recommendations include reducing portion sizes, decreasing overall calorie intake, 3-5 servings of fruits/vegetables daily, reducing fast food intake, consuming healthier snacks, decreasing soda and/or juice intake, moderation in carbohydrate intake, increasing intake of lean protein, reducing intake of saturated fat and trans fat and reducing intake of cholesterol  Patient referred to weight management due to patient being morbidly obese  Patient Instructions: Take medication with food  It is important that you take the entire course of antibiotics prescribed  May also take a probiotic of your choice to maintain healthy GI breanna  Can take some probiotic and yogurt with the medication  Supportive care discussed and advised  Advised to RTO for any worsening and no improvement  Follow up for no improvement and worsening of conditions  Patient advised and educated when to see immediate medical care  Return in about 3 months (around 10/21/2021), or if symptoms worsen or fail to improve  Future Appointments   Date Time Provider Aniceto Lowery   7/29/2021  1:30 PM Rachel Ruggiero [facility]   8/19/2021  9:00 AM Sulema Resendez MD HEM ONC [facility]   8/24/2021 11:00 AM [place] ECHO RM [address]   8/31/2021  9:15 AM GEORGETTE Mcghee CV SURG [facility]   9/10/2021  9:00 AM DO SARAH Pritchett [facility]   10/18/2021  8[facility]   10/18/2021  9:00 AM Chun Bellamy MD AN Rad Onc AN H[facility]   10/21/2021  9:00 AM GEORGETTE Lam [facility]           Subjective:      Patient ID: Alyssa Guzman is a 72 y o  female      Chief Complaint   Patient presents with    Cough     Cold symptomas las t 3 weeks mz cma  Medicare Wellness Visit    New Patient Visit         Vitals:  /76   Pulse 72   Temp 98 3 °F (36 8 °C)   Resp 16   Ht 5' 6" (1 676 m)   Wt 125 kg (276 lb)   SpO2 97%   BMI 44 55 kg/m²     HPI  New to practice  Personal and family medical history reviewed  Patient stated that was diagnosed with rectal cancer in 2018 and with ovarian cancer had PROCTECTOMY (N/A Anus)   PERMANENT END COLOSTOMY (N/A Abdomen)   LAPAROSCOPIC HAND ASSIST ABDOMINOPERINEAL RESECTION,  POSTERIOR VAGINECTOMY, OMENTECTOMY (N/A Abdomen)   RADICAL HYSTERECTOMY TOTAL ABDOMINAL (ALEXANDRA)  BSO     Patient had gastric bypass in 2016 and stated that before that she was diabetic and it was resolved after surgery and since 2016 completely off all medications and was told that does not have diabetes anymore  Recent HbA1c done this month and was 6 2  patient is still under care of weight management as stated that gained back about 50 pounds  Had EGD done in 2018 and ulcer was found at surgical site antasmosis following stomach bypass and since then taking omeprazole and feeling better and no abdominal issues and have not went back to follow on that and advised to follow back with gastro  Adverse effects of long term use of PPI discussed like kidney issues, and osteoporosis and high risk of stomach cancer  Quit smoking 16 years ago and before that was smoking less than pack a day  Patient had S/P TAVR in 2020 and on eliquis and under care of cardiothoracic surgeon and schedule for ECHO next month  Stated that regular cardiologist in [place] too and wants to switch closer and had seen Dr Leah Grace in [facility] and would like to follow with him  Complaint with cardiac medications  Patient stated that stopped crestor couple of months ago as was having aches and pain and recent lipid profile is elevated and LDL was 130  Discussed with patient all neurological, vascular and cardiac risks related to elevated blood work and strongly recommended to start statin   Stated that took lipitor in past and will try that one and will start at lower dose of 20 mg and advised to take CoQ10 with it and will repeat CMP and lipids in 3 months  Stated that follows with heme/oncologist Dr Slime Brown yearly and next appt is on 8/19/202  Patient last CT abdomen chest pelvis was done in feb 2021  Patient followed up with gynecologist oncology in 2020 and anastrozole was prescribed which she stopped on her own  Last time followed with them in 2019 and advised that due for follow up and stated that will schedule it  Denies any vaginal bleeding  Has h/[facility] knee replacement by [facility]    Stated that started with cough about 3 weeks ago with congestion and tired OTC but not much relief but recently started using nebulizer and feeling much better from last 2 days but still wanted to get checked as it was for long time  Denies any sob, fever and chills  Refused pneumonia vaccine today                        PHQ-9 Depression Screening    PHQ-9:   Frequency of the following problems over the past two weeks:      Little interest or pleasure in doing things: 0 - not at all  Feeling down, depressed, or hopeless: 0 - not at all  PHQ-2 Score: 0             The following portions of the patient's history were reviewed and updated as appropriate: allergies, current medications, past family history, past medical history, past social history, past surgical history and problem list       Review of Systems   Constitutional: Negative for chills, diaphoresis, fatigue, fever and unexpected weight change  HENT: Positive for congestion  Negative for dental problem, drooling, ear discharge, ear pain, facial swelling, hearing loss, mouth sores, nosebleeds, postnasal drip, rhinorrhea, sinus pressure, sinus pain, sneezing, sore throat, tinnitus, trouble swallowing and voice change  Respiratory: Positive for cough  Negative for chest tightness, shortness of breath and wheezing  Cardiovascular: Negative      Gastrointestinal: Negative for abdominal pain, anal bleeding, blood in stool, constipation, diarrhea, nausea, rectal pain and vomiting  Chronic colostomy   Genitourinary: Negative for difficulty urinating and vaginal bleeding  Musculoskeletal: Positive for arthralgias  Uses cane for ambulation   Skin: Negative  Neurological: Negative for dizziness, light-headedness and headaches  Hematological: Negative  Psychiatric/Behavioral: Negative  Objective:    Social History     Tobacco Use   Smoking Status Former Smoker    Packs/day: 1 00    Years: 25 00    Pack years: 25 00    Quit date: 3/30/2006    Years since quitting: 15 3   Smokeless Tobacco Never Used       Allergies:    Allergies   Allergen Reactions    Tramadol Other (See Comments)     Dizzy           Current Outpatient Medications   Medication Sig Dispense Refill    albuterol (2 5 mg/3 mL) 0 083 % nebulizer solution Take 1 vial (2 5 mg total) by nebulization every 4 (four) hours as needed for wheezing or shortness of breath 75 vial 1    amiodarone 100 mg tablet Take 1 tablet (100 mg total) by mouth daily 90 tablet 3    apixaban (ELIQUIS) 5 mg Take 1 tablet (5 mg total) by mouth 2 (two) times a day 180 tablet 2    aspirin 81 mg chewable tablet Chew 1 tablet (81 mg total) daily 30 tablet 2    Calcium-Vitamin D 500-125 MG-UNIT TABS Take 1 tablet by mouth 2 (two) times a day       Cyanocobalamin (B-12 PO) Take by mouth       ergocalciferol (VITAMIN D2) 50,000 units Take 1 capsule (50,000 Units total) by mouth 2 (two) times a week with meals 20 capsule 0    ferrous sulfate 324 (65 Fe) mg Take 1 tablet (324 mg total) by mouth daily before breakfast 90 tablet 0    meclizine (ANTIVERT) 25 mg tablet Take 1 tablet (25 mg total) by mouth every 8 (eight) hours as needed for dizziness 30 tablet 0    metoprolol succinate (TOPROL-XL) 25 mg 24 hr tablet Take 1 tablet (25 mg total) by mouth daily 90 tablet 1    Multiple Vitamins-Minerals (BARIATRIC FUSION) CHEW Chew 1 tablet daily        nystatin (MYCOSTATIN) cream Apply topically 2 (two) times a day 30 g 0    omeprazole (PriLOSEC) 20 mg delayed release capsule Take 1 capsule (20 mg total) by mouth daily 90 capsule 0    oxyCODONE (ROXICODONE) 10 MG TABS Take 10 mg by mouth every 6 (six) hours as needed        potassium chloride (K-DUR,KLOR-CON) 20 mEq tablet Take 1 tablet (20 mEq total) by mouth daily 30 tablet 1    torsemide (DEMADEX) 20 mg tablet Take 0 5 tablets (10 mg total) by mouth daily 30 tablet 3    anastrozole (ARIMIDEX) 1 mg tablet Take 1 tablet (1 mg total) by mouth daily (Patient not taking: Reported on 5/3/2021) 90 tablet 2    atorvastatin (LIPITOR) 20 mg tablet Take 1 tablet (20 mg total) by mouth daily 90 tablet 1    doxycycline hyclate (VIBRAMYCIN) 100 mg capsule Take 1 capsule (100 mg total) by mouth every 12 (twelve) hours for 10 days 20 capsule 0     No current facility-administered medications for this visit  Physical Exam  Vitals reviewed  Constitutional:       Appearance: Normal appearance  She is well-developed  HENT:      Head: Normocephalic  Nose: Nose normal       Right Sinus: No maxillary sinus tenderness or frontal sinus tenderness  Left Sinus: No maxillary sinus tenderness or frontal sinus tenderness  Mouth/Throat:      Mouth: No oral lesions  Pharynx: No oropharyngeal exudate or posterior oropharyngeal erythema  Cardiovascular:      Rate and Rhythm: Normal rate and regular rhythm  Heart sounds: Normal heart sounds  Pulmonary:      Effort: Pulmonary effort is normal       Breath sounds: Examination of the left-upper field reveals decreased breath sounds  Decreased breath sounds present  Musculoskeletal:         General: Normal range of motion  Cervical back: Neck supple  Lymphadenopathy:      Cervical:      Right cervical: No superficial or posterior cervical adenopathy       Left cervical: No superficial or posterior cervical adenopathy  Skin:     General: Skin is warm and dry  Neurological:      Mental Status: She is alert and oriented to person, place, and time  Psychiatric:         Behavior: Behavior normal          Thought Content:  Thought content normal          Judgment: Judgment normal                      GEORGETTE Mcdonough

## 2021-07-21 NOTE — PROGRESS NOTES
Assessment and Plan:     Problem List Items Addressed This Visit        Digestive    Postgastrectomy malabsorption       Cardiovascular and Mediastinum    Atherosclerotic peripheral vascular disease ([facility] )    Relevant Orders    Ambulatory referral to Cardiology    Atrial fibrillation with rapid ventricular response [facility])    Relevant Orders    Ambulatory referral to Cardiology    Chronic diastolic heart failure [facility])    Relevant Orders    Ambulatory referral to Cardiology       Other    History of rectal cancer    Relevant Orders    Ambulatory referral to Gastroenterology    Colostomy care [facility])    Relevant Orders    Ambulatory referral to Gastroenterology    History of ovarian cancer    Hyperlipidemia    Relevant Medications    atorvastatin (LIPITOR) 20 mg tablet    Other Relevant Orders    Comprehensive metabolic panel    Lipid Panel with Direct LDL reflex    S/P TAVR (transcatheter aortic valve replacement)    Relevant Orders    Ambulatory referral to Cardiology      Other Visit Diagnoses     Acute bronchitis, unspecified organism    -  Primary    Relevant Medications    doxycycline hyclate (VIBRAMYCIN) 100 mg capsule    Medicare annual wellness visit, initial        Relevant Orders    Ambulatory referral to Cardiology    Ulcer at site of surgical anastomosis following bypass of stomach        Relevant Orders    Ambulatory referral to Gastroenterology    Need for hepatitis C screening test        Relevant Orders    Hepatitis C antibody    Encounter for screening mammogram for malignant neoplasm of breast        Relevant Orders    Mammo screening bilateral w 3d & cad    Post-menopausal        Relevant Orders    DXA bone density spine hip and pelvis    Morbid obesity ([facility] )        under care of weight management           Preventive health issues were discussed with patient, and age appropriate screening tests were ordered as noted in patient's After Visit Summary    Personalized health advice and appropriate referrals for health education or preventive services given if needed, as noted in patient's After Visit Summary       History of Present Illness:     Patient presents for Medicare Annual Wellness visit    Patient Care Team:  Jackie Dejesus as PCP - General (Family Medicine)  Azucena Tavera as PCP - Wound (Wound Care)  KUSHAL Beaver MD (General Surgery)  Ming Perry MD (Hematology and Oncology)  Cristy Leo MD (Gastroenterology)  Jesusiat Martinez MD (Colon and Rectal Surgery)  Lizbeth Triana MD (Radiation Oncology)  Beth Avalos RN as Registered Nurse (Oncology)  Marilin Keyes DO (Cardiology)  Jesusita Martinez MD as Endoscopist     Problem List:     Patient Active Problem List   Diagnosis    Morbid obesity due to excess calories ([address] )    Postgastrectomy malabsorption    S/P gastric bypass    Marginal ulcer    S/P bariatric surgery    Atherosclerotic peripheral vascular disease ([address] )    Onychomycosis    History of rectal cancer    Colostomy care ([address] )    History of ovarian cancer    Encounter for surgical aftercare following surgery of digestive system    Pyelonephritis    History of ovarian cancer    Candidal intertrigo    Vaginal bleeding    Encounter for other screening for malignant neoplasm of breast    Aortic stenosis, severe    Nonrheumatic mitral valve stenosis    Atrial fibrillation with rapid ventricular response ([address] )    Hyperlipidemia    Hypochloremia    Weight gain following gastric bypass surgery    S/P TAVR (transcatheter aortic valve replacement)    Ambulatory dysfunction    CAD (coronary artery disease)    Chronic anticoagulation    Chronic diastolic heart failure ([address] )    Osteoarthritis of right knee    H/O diabetes mellitus      Past Medical and Surgical History:     Past Medical History:   Diagnosis Date    Acute pain of right knee     Ambulatory dysfunction     Anemia     Arthritis     Cancer ([address] )     rectal  Chronic lower back pain     Chronic pain disorder     back pain    Colon cancer ([place] [address] ) 2018    Diabetes mellitus ([place] [address] )     Endometrial cancer ([place] [address] ) 2018    GERD (gastroesophageal reflux disease)     History of chemotherapy     History of MRSA infection     Morbid obesity ([place] [address] )     Morbid obesity with BMI of 45 0-49 9, adult ([place] [address] )     Ovarian cancer ([place] [address] ) 2018    Paroxysmal atrial fibrillation (HCC)     Rectal cancer (HCC)     S/P TAVR (transcatheter aortic valve replacement)     SOB (shortness of breath)     Spinal stenosis     Systolic murmur     Unsteady gait     uses walker    Wears dentures     Wears glasses      Past Surgical History:   Procedure Laterality Date    ABDOMINAL PERINEAL BOWEL RESECTION W/ ILEOANAL POUCH N/A 2018    Procedure: LAPAROSCOPIC HAND ASSIST ABDOMINOPERINEAL RESECTION,  POSTERIOR VAGINECTOMY, OMENTECTOMY;  Surgeon: Monica Turcios MD;  [facility];  Service: Colorectal    ABDOMINAL SURGERY      abscess removed from abdomen and right thigh, a hole in thigh closed by plastic surgeon    ABSCESS DRAINAGE      abd, (R) leg, (L) leg     SECTION       SECTION      CYSTOSCOPY N/A 2018    Procedure: CYSTOSCOPY;  Surgeon: Lalit Maldonado MD;  [facility];  Service: Gynecology Oncology    ESOPHAGOGASTRODUODENOSCOPY N/A 2016    Procedure: ESOPHAGOGASTRODUODENOSCOPY (EGD); Surgeon: Maria Guadalupe Posey MD;  [facility];  Service:     ESOPHAGOGASTRODUODENOSCOPY N/A 3/30/2016    Procedure: ESOPHAGOGASTRODUODENOSCOPY (EGD); Surgeon: Maria Guadalupe Posey MD;  [facility]; Service:     EYE SURGERY      laser eye surgery    HYSTERECTOMY N/A 2018    Procedure: RADICAL HYSTERECTOMY TOTAL ABDOMINAL (ALEXANDRA)   BSO;  Surgeon: Lalit Maldonado MD;  [facility];  Service: Gynecology Oncology    JOINT REPLACEMENT Left 2017    hip    JOINT REPLACEMENT Right 2017    hip    OOPHORECTOMY Bilateral     [place] COLONOSCOPY FLX DX W/COLLJ SPEC WHEN PFRMD N/A 3/9/2018    Procedure: COLONOSCOPY;  Surgeon: Franky Willingham MD;  Location: [place] [address] GI LAB; Service: Gastroenterology    [place] COLONOSCOPY FLX DX W/COLLJ [facility] WHEN PFRMD N/A 9/5/2018    Procedure: COLONOSCOPY;  Surgeon: Kory Reynolds MD;  [facility]; Service: Colorectal    [place] ECHO TRANSESOPHAG R-T 2D W/PRB IMG ACQUISJ I&R N/A 9/1/2020    Procedure: TRANSESOPHAGEAL ECHOCARDIOGRAM (THO); Surgeon: Mickey Hernandez DO;  [facility];  Service: Cardiac Surgery    [place] ESOPHAGOGASTRODUODENOSCOPY TRANSORAL DIAGNOSTIC N/A 2/2/2018    Procedure: ESOPHAGOGASTRODUODENOSCOPY (EGD); Surgeon: Franky Willingham MD;  Location: [facility]; Service: Gastroenterology    [place] LAP GASTRIC BYPASS/SOLEDAD-EN-Y N/A 7/18/2016    Procedure: BYPASS GASTRIC  SOLEDAD-EN-Y LAPAROSCOPIC;  Surgeon: Annamaria Casillas MD;  [facility];  Service: Bariatrics    [place] LAP, SURG COLOSTOMY N/A 9/6/2018    Procedure: PERMANENT END COLOSTOMY;  Surgeon: Kory Reynolds MD;  [facility];  Service: Colorectal    [place] LAP, SURG PROCTECTOMY W COLOSTOMY N/A 9/6/2018    Procedure: PROCTECTOMY;  Surgeon: Kory Reynolds MD;  [facility];  Service: Colorectal    [place] REPLACE AORTIC VALVE OPENFEMORAL ARTERY APPROACH N/A 9/1/2020    Procedure: REPLACEMENT AORTIC VALVE TRANSCATHETER (TAVR) TRANSFEMORAL W/ 26MM WADDELL BARBARA S3 ULTRA VALVE(ACCESS ON RIGHT);   Surgeon: Mickey Hernandez DO;  [facility];  Service: Cardiac Surgery    REPLACEMENT TOTAL KNEE      TUBAL LIGATION        Family History:     Family History   Adopted: Yes   Problem Relation Age of Onset    Leukemia Mother     Heart disease Father     Coronary artery disease Father     Diabetes Father     No Known Problems Sister     No Known Problems Brother     Diabetes Son     No Known Problems Brother     No Known Problems Brother     No Known Problems Sister     No Known Problems Sister Social History:     Social History     Socioeconomic History    Marital status: Single     Spouse name: None    Number of children: 2    Years of education: None    Highest education level: None   Occupational History    None   Tobacco Use    Smoking status: Former Smoker     Packs/day: 1 00     Years: 25 00     Pack years: 25 00     Quit date: 3/30/2006     Years since quitting: 15 3    Smokeless tobacco: Never Used   Vaping Use    Vaping Use: Never used   Substance and Sexual Activity    Alcohol use: Not Currently    Drug use: Not Currently     Types: Oxycodone     Comment: Percocet for lower back pain prn    Sexual activity: Not Currently   Other Topics Concern    None   Social History Narrative    None     Social Determinants of Health     Financial Resource Strain:     Difficulty of Paying Living Expenses:    Food Insecurity:     Worried About Running Out of Food in the Last Year:     Ran Out of Food in the Last Year:    Transportation Needs:     Lack of Transportation (Medical):      Lack of Transportation (Non-Medical):    Physical Activity:     Days of Exercise per Week:     Minutes of Exercise per Session:    Stress:     Feeling of Stress :    Social Connections:     Frequency of Communication with Friends and Family:     Frequency of Social Gatherings with Friends and Family:     Attends Che[employer] Services:     Active Member of Clubs or Organizations:     Attends Club or Organization Meetings:     Marital Status:    Intimate Partner Violence:     Fear of Current or Ex-Partner:     Emotionally Abused:     Physically Abused:     Sexually Abused:       Medications and Allergies:     Current Outpatient Medications   Medication Sig Dispense Refill    albuterol (2 5 mg/3 mL) 0 083 % nebulizer solution Take 1 vial (2 5 mg total) by nebulization every 4 (four) hours as needed for wheezing or shortness of breath 75 vial 1    amiodarone 100 mg tablet Take 1 tablet (100 mg total) by mouth daily 90 tablet 3    apixaban (ELIQUIS) 5 mg Take 1 tablet (5 mg total) by mouth 2 (two) times a day 180 tablet 2    aspirin 81 mg chewable tablet Chew 1 tablet (81 mg total) daily 30 tablet 2    Calcium-Vitamin D 500-125 MG-UNIT TABS Take 1 tablet by mouth 2 (two) times a day       Cyanocobalamin (B-12 PO) Take by mouth       ergocalciferol (VITAMIN D2) 50,000 units Take 1 capsule (50,000 Units total) by mouth 2 (two) times a week with meals 20 capsule 0    ferrous sulfate 324 (65 Fe) mg Take 1 tablet (324 mg total) by mouth daily before breakfast 90 tablet 0    meclizine (ANTIVERT) 25 mg tablet Take 1 tablet (25 mg total) by mouth every 8 (eight) hours as needed for dizziness 30 tablet 0    metoprolol succinate (TOPROL-XL) 25 mg 24 hr tablet Take 1 tablet (25 mg total) by mouth daily 90 tablet 1    Multiple Vitamins-Minerals (BARIATRIC FUSION) CHEW Chew 1 tablet daily        nystatin (MYCOSTATIN) cream Apply topically 2 (two) times a day 30 g 0    omeprazole (PriLOSEC) 20 mg delayed release capsule Take 1 capsule (20 mg total) by mouth daily 90 capsule 0    oxyCODONE (ROXICODONE) 10 MG TABS Take 10 mg by mouth every 6 (six) hours as needed        potassium chloride (K-DUR,KLOR-CON) 20 mEq tablet Take 1 tablet (20 mEq total) by mouth daily 30 tablet 1    torsemide (DEMADEX) 20 mg tablet Take 0 5 tablets (10 mg total) by mouth daily 30 tablet 3    anastrozole (ARIMIDEX) 1 mg tablet Take 1 tablet (1 mg total) by mouth daily (Patient not taking: Reported on 5/3/2021) 90 tablet 2    atorvastatin (LIPITOR) 20 mg tablet Take 1 tablet (20 mg total) by mouth daily 90 tablet 1    doxycycline hyclate (VIBRAMYCIN) 100 mg capsule Take 1 capsule (100 mg total) by mouth every 12 (twelve) hours for 10 days 20 capsule 0     No current facility-administered medications for this visit       Allergies   Allergen Reactions    Tramadol Other (See Comments)     Dizzy        Immunizations:     Immunization History   Administered Date(s) Administered    INFLUENZA 04/25/2018    Influenza Quadrivalent Preservative Free 3 years and older IM 04/25/2018    Influenza, seasonal, injectable 10/12/2006, 12/26/2012    Pneumococcal Polysaccharide PPV23 08/12/2013    SARS-CoV-2 / COVID-19 mRNA IM (Pfizer-BioNTech) 01/31/2021, 02/21/2021      Health Maintenance:         Topic Date Due    Hepatitis C Screening  Never done    HIV Screening  Never done    Breast Cancer Screening: Mammogram  06/05/2020         Topic Date Due    DTaP,Tdap,and Td Vaccines (1 - Tdap) Never done    Pneumococcal Vaccine: 65+ Years (2 of 2 - PPSV23) 09/17/2020    Influenza Vaccine (1) 09/01/2021      Medicare Health Risk Assessment:     /76   Pulse 72   Temp 98 3 °F (36 8 °C)   Resp 16   Ht 5' 6" (1 676 m)   Wt 125 kg (276 lb)   SpO2 97%   BMI 44 55 kg/m²      Colton Boyle is here for her Initial Wellness visit  Health Risk Assessment:   Patient rates overall health as good  Patient feels that their physical health rating is same  Patient is satisfied with their life  Eyesight was rated as same  Hearing was rated as same  Patient feels that their emotional and mental health rating is much better  Patients states they are sometimes angry  Patient states they are never, rarely unusually tired/fatigued  Pain experienced in the last 7 days has been some  Patient's pain rating has been 7/10  Patient states that she has experienced no weight loss or gain in last 6 months  Depression Screening:   PHQ-2 Score: 0      Fall Risk Screening: In the past year, patient has experienced: history of falling in past year    Number of falls: 1  Injured during fall?: No    Feels unsteady when standing or walking?: Yes    Worried about falling?: Yes      Urinary Incontinence Screening:   Patient has not leaked urine accidently in the last six months  Home Safety:  Patient has trouble with stairs inside or outside of their home   Patient has working smoke alarms and has working carbon monoxide detector  Home safety hazards include: none  Nutrition:   Current diet is Regular, Limited junk food and Low Saturated Fat  Medications:   Patient is currently taking over-the-counter supplements  OTC medications include: see medication list  Patient is able to manage medications  Activities of Daily Living (ADLs)/Instrumental Activities of Daily Living (IADLs):   Walk and transfer into and out of bed and chair?: Yes  Dress and groom yourself?: Yes    Bathe or shower yourself?: Yes    Feed yourself?  Yes  Do your laundry/housekeeping?: Yes  Manage your money, pay your bills and track your expenses?: Yes  Make your own meals?: Yes    Do your own shopping?: Yes    Previous Hospitalizations:   Any hospitalizations or ED visits within the last 12 months?: Yes    How many hospitalizations have you had in the last year?: 1-2    Hospitalization Comments: Knee replacement    Advance Care Planning:   Living will: No    Durable POA for healthcare: No    Advanced directive: No    Advanced directive counseling given: Yes    Five wishes given: Yes      Cognitive Screening:   Provider or family/friend/caregiver concerned regarding cognition?: No    PREVENTIVE SCREENINGS      Cardiovascular Screening:    General: Screening Not Indicated, History Lipid Disorder, Screening Current and Risks and Benefits Discussed      Diabetes Screening:     General: Screening Current and Risks and Benefits Discussed      Colorectal Cancer Screening:     General: History Colorectal Cancer and Risks and Benefits Discussed      Breast Cancer Screening:     General: Risks and Benefits Discussed    Due for: Mammogram        Cervical Cancer Screening:    General: Screening Not Indicated      Osteoporosis Screening:    General: Risks and Benefits Discussed    Due for: DXA Axial      Abdominal Aortic Aneurysm (AAA) Screening:        General: Screening Not Indicated      Lung Cancer Screening: General: Screening Not Indicated      Hepatitis C Screening:    General: Risks and Benefits Discussed    Hep C Screening Accepted: Yes        Preventive Screening Comments: Will follow with gastro and will discuss if needs any follow up colonoscopies and how frequently    Screening, Brief Intervention, and Referral to Treatment (SBIRT)    Screening  Typical number of drinks in a day: 0  Typical number of drinks in a week: 0  Interpretation: Low risk drinking behavior  AUDIT-C Screenin) How often did you have a drink containing alcohol in the past year? never  2) How many drinks did you have on a typical day when you were drinking in the past year? 0  3) How often did you have 6 or more drinks on one occasion in the past year? never    AUDIT-C Score: 0  Interpretation: Score 0-2 (female): Negative screen for alcohol misuse    Single Item Drug Screening:  How often have you used an illegal drug (including marijuana) or a prescription medication for non-medical reasons in the past year? never    Single Item Drug Screen Score: 0  Interpretation: Negative screen for possible drug use disorder    Brief Intervention  Alcohol & drug use screenings were reviewed  No concerns regarding substance use disorder identified  Other Counseling Topics:   Car/seat belt/driving safety, skin self-exam, sunscreen and regular weightbearing exercise and calcium and vitamin D intake         GEORGETTE Lynn

## 2021-07-22 LAB — VIT A SERPL-MCNC: 54.5 UG/DL (ref 22–69.5)

## 2021-07-23 LAB — VIT B1 BLD-SCNC: 107.6 NMOL/L (ref 66.5–200)

## 2021-07-27 DIAGNOSIS — E55.9 VITAMIN D INSUFFICIENCY: ICD-10-CM

## 2021-07-27 DIAGNOSIS — K91.2 POSTGASTRECTOMY MALABSORPTION: ICD-10-CM

## 2021-07-27 DIAGNOSIS — Z90.3 POSTGASTRECTOMY MALABSORPTION: ICD-10-CM

## 2021-07-27 DIAGNOSIS — R79.89 HIGH SERUM PARATHYROID HORMONE (PTH): ICD-10-CM

## 2021-07-27 DIAGNOSIS — Z98.84 S/P GASTRIC BYPASS: ICD-10-CM

## 2021-07-27 DIAGNOSIS — E21.1 HYPERPARATHYROIDISM , SECONDARY, NON-RENAL (HCC): Primary | ICD-10-CM

## 2021-07-27 DIAGNOSIS — K91.2 POSTSURGICAL MALABSORPTION: ICD-10-CM

## 2021-07-30 DIAGNOSIS — K28.9 MARGINAL ULCER: ICD-10-CM

## 2021-07-30 RX ORDER — OMEPRAZOLE 20 MG/1
20 CAPSULE, DELAYED RELEASE ORAL DAILY
Qty: 90 CAPSULE | Refills: 0 | Status: SHIPPED | OUTPATIENT
Start: 2021-07-30 | End: 2021-08-21 | Stop reason: HOSPADM

## 2021-08-17 ENCOUNTER — TELEPHONE (OUTPATIENT)
Dept: HEMATOLOGY ONCOLOGY | Facility: CLINIC | Age: 66
End: 2021-08-17

## 2021-08-17 DIAGNOSIS — Z85.048 HISTORY OF RECTAL CANCER: Primary | ICD-10-CM

## 2021-08-18 ENCOUNTER — APPOINTMENT (OUTPATIENT)
Dept: LAB | Facility: CLINIC | Age: 66
DRG: 871 | End: 2021-08-18
Payer: MEDICARE

## 2021-08-18 DIAGNOSIS — Z85.048 HISTORY OF RECTAL CANCER: ICD-10-CM

## 2021-08-18 LAB
ALBUMIN SERPL BCP-MCNC: 2.9 G/DL (ref 3.5–5)
ALP SERPL-CCNC: 70 U/L (ref 46–116)
ALT SERPL W P-5'-P-CCNC: 14 U/L (ref 12–78)
ANION GAP SERPL CALCULATED.3IONS-SCNC: 3 MMOL/L (ref 4–13)
AST SERPL W P-5'-P-CCNC: 13 U/L (ref 5–45)
BASOPHILS # BLD AUTO: 0.04 THOUSANDS/ΜL (ref 0–0.1)
BASOPHILS NFR BLD AUTO: 1 % (ref 0–1)
BILIRUB SERPL-MCNC: 0.48 MG/DL (ref 0.2–1)
BUN SERPL-MCNC: 18 MG/DL (ref 5–25)
CALCIUM ALBUM COR SERPL-MCNC: 9.9 MG/DL (ref 8.3–10.1)
CALCIUM SERPL-MCNC: 9 MG/DL (ref 8.3–10.1)
CEA SERPL-MCNC: 1 NG/ML (ref 0–3)
CHLORIDE SERPL-SCNC: 109 MMOL/L (ref 100–108)
CO2 SERPL-SCNC: 26 MMOL/L (ref 21–32)
CREAT SERPL-MCNC: 0.83 MG/DL (ref 0.6–1.3)
EOSINOPHIL # BLD AUTO: 0.11 THOUSAND/ΜL (ref 0–0.61)
EOSINOPHIL NFR BLD AUTO: 2 % (ref 0–6)
ERYTHROCYTE [DISTWIDTH] IN BLOOD BY AUTOMATED COUNT: 16.1 % (ref 11.6–15.1)
GFR SERPL CREATININE-BSD FRML MDRD: 74 ML/MIN/1.73SQ M
GLUCOSE P FAST SERPL-MCNC: 121 MG/DL (ref 65–99)
HCT VFR BLD AUTO: 39.7 % (ref 34.8–46.1)
HGB BLD-MCNC: 11.7 G/DL (ref 11.5–15.4)
IMM GRANULOCYTES # BLD AUTO: 0.07 THOUSAND/UL (ref 0–0.2)
IMM GRANULOCYTES NFR BLD AUTO: 1 % (ref 0–2)
LYMPHOCYTES # BLD AUTO: 0.9 THOUSANDS/ΜL (ref 0.6–4.47)
LYMPHOCYTES NFR BLD AUTO: 16 % (ref 14–44)
MCH RBC QN AUTO: 23.5 PG (ref 26.8–34.3)
MCHC RBC AUTO-ENTMCNC: 29.5 G/DL (ref 31.4–37.4)
MCV RBC AUTO: 80 FL (ref 82–98)
MONOCYTES # BLD AUTO: 0.43 THOUSAND/ΜL (ref 0.17–1.22)
MONOCYTES NFR BLD AUTO: 8 % (ref 4–12)
NEUTROPHILS # BLD AUTO: 4 THOUSANDS/ΜL (ref 1.85–7.62)
NEUTS SEG NFR BLD AUTO: 72 % (ref 43–75)
NRBC BLD AUTO-RTO: 0 /100 WBCS
PLATELET # BLD AUTO: 158 THOUSANDS/UL (ref 149–390)
PMV BLD AUTO: 10.8 FL (ref 8.9–12.7)
POTASSIUM SERPL-SCNC: 4.6 MMOL/L (ref 3.5–5.3)
PROT SERPL-MCNC: 6.8 G/DL (ref 6.4–8.2)
RBC # BLD AUTO: 4.97 MILLION/UL (ref 3.81–5.12)
SODIUM SERPL-SCNC: 138 MMOL/L (ref 136–145)
TSH SERPL DL<=0.05 MIU/L-ACNC: 1.82 UIU/ML (ref 0.36–3.74)
WBC # BLD AUTO: 5.55 THOUSAND/UL (ref 4.31–10.16)

## 2021-08-18 PROCEDURE — 85025 COMPLETE CBC W/AUTO DIFF WBC: CPT

## 2021-08-18 PROCEDURE — 36415 COLL VENOUS BLD VENIPUNCTURE: CPT

## 2021-08-18 PROCEDURE — 82378 CARCINOEMBRYONIC ANTIGEN: CPT

## 2021-08-18 PROCEDURE — 80053 COMPREHEN METABOLIC PANEL: CPT

## 2021-08-18 PROCEDURE — 84443 ASSAY THYROID STIM HORMONE: CPT

## 2021-08-18 PROCEDURE — 83883 ASSAY NEPHELOMETRY NOT SPEC: CPT

## 2021-08-19 ENCOUNTER — APPOINTMENT (EMERGENCY)
Dept: RADIOLOGY | Facility: HOSPITAL | Age: 66
DRG: 871 | End: 2021-08-19
Payer: MEDICARE

## 2021-08-19 ENCOUNTER — HOSPITAL ENCOUNTER (INPATIENT)
Facility: HOSPITAL | Age: 66
LOS: 2 days | Discharge: HOME | DRG: 871 | End: 2021-08-21
Attending: EMERGENCY MEDICINE | Admitting: INTERNAL MEDICINE
Payer: MEDICARE

## 2021-08-19 ENCOUNTER — OFFICE VISIT (OUTPATIENT)
Dept: HEMATOLOGY ONCOLOGY | Facility: MEDICAL CENTER | Age: 66
End: 2021-08-19
Payer: MEDICARE

## 2021-08-19 VITALS
RESPIRATION RATE: 20 BRPM | TEMPERATURE: 98.1 F | WEIGHT: 280 LBS | OXYGEN SATURATION: 100 % | BODY MASS INDEX: 45 KG/M2 | HEART RATE: 89 BPM | HEIGHT: 66 IN | SYSTOLIC BLOOD PRESSURE: 112 MMHG | DIASTOLIC BLOOD PRESSURE: 60 MMHG

## 2021-08-19 DIAGNOSIS — Z85.048 HISTORY OF RECTAL CANCER: Primary | ICD-10-CM

## 2021-08-19 DIAGNOSIS — I25.10 CAD (CORONARY ARTERY DISEASE): ICD-10-CM

## 2021-08-19 DIAGNOSIS — J18.9 PNEUMONIA: ICD-10-CM

## 2021-08-19 DIAGNOSIS — K92.2 GI BLEED: ICD-10-CM

## 2021-08-19 DIAGNOSIS — E61.1 LOW SERUM IRON: ICD-10-CM

## 2021-08-19 DIAGNOSIS — E87.70 HYPERVOLEMIA, UNSPECIFIED HYPERVOLEMIA TYPE: ICD-10-CM

## 2021-08-19 DIAGNOSIS — I48.91 ATRIAL FIBRILLATION WITH RAPID VENTRICULAR RESPONSE (HCC): ICD-10-CM

## 2021-08-19 DIAGNOSIS — K92.2 UPPER GI BLEED: Primary | ICD-10-CM

## 2021-08-19 DIAGNOSIS — D50.9 IRON DEFICIENCY ANEMIA, UNSPECIFIED IRON DEFICIENCY ANEMIA TYPE: ICD-10-CM

## 2021-08-19 PROBLEM — E87.20 LACTIC ACIDOSIS: Status: ACTIVE | Noted: 2021-08-19

## 2021-08-19 PROBLEM — D72.825 BANDEMIA: Status: ACTIVE | Noted: 2021-08-19

## 2021-08-19 PROBLEM — E87.2 LACTIC ACIDOSIS: Status: ACTIVE | Noted: 2021-08-19

## 2021-08-19 PROBLEM — A41.9 SEPSIS (HCC): Status: ACTIVE | Noted: 2021-08-19

## 2021-08-19 LAB
ABO GROUP BLD: NORMAL
ALBUMIN SERPL BCP-MCNC: 3.1 G/DL (ref 3.5–5)
ALP SERPL-CCNC: 61 U/L (ref 46–116)
ALT SERPL W P-5'-P-CCNC: 20 U/L (ref 12–78)
ANION GAP SERPL CALCULATED.3IONS-SCNC: 10 MMOL/L (ref 4–13)
APTT PPP: 29 SECONDS (ref 23–37)
AST SERPL W P-5'-P-CCNC: 14 U/L (ref 5–45)
BACTERIA UR QL AUTO: ABNORMAL /HPF
BASOPHILS # BLD MANUAL: 0 THOUSAND/UL (ref 0–0.1)
BASOPHILS NFR MAR MANUAL: 0 % (ref 0–1)
BILIRUB SERPL-MCNC: 0.53 MG/DL (ref 0.2–1)
BILIRUB UR QL STRIP: NEGATIVE
BLD GP AB SCN SERPL QL: NEGATIVE
BUN SERPL-MCNC: 31 MG/DL (ref 5–25)
CALCIUM ALBUM COR SERPL-MCNC: 9.2 MG/DL (ref 8.3–10.1)
CALCIUM SERPL-MCNC: 8.5 MG/DL (ref 8.3–10.1)
CHLORIDE SERPL-SCNC: 104 MMOL/L (ref 100–108)
CLARITY UR: CLEAR
CO2 SERPL-SCNC: 25 MMOL/L (ref 21–32)
COLOR UR: YELLOW
CREAT SERPL-MCNC: 1.02 MG/DL (ref 0.6–1.3)
EOSINOPHIL # BLD MANUAL: 0 THOUSAND/UL (ref 0–0.4)
EOSINOPHIL NFR BLD MANUAL: 0 % (ref 0–6)
ERYTHROCYTE [DISTWIDTH] IN BLOOD BY AUTOMATED COUNT: 16 % (ref 11.6–15.1)
GFR SERPL CREATININE-BSD FRML MDRD: 58 ML/MIN/1.73SQ M
GLUCOSE SERPL-MCNC: 164 MG/DL (ref 65–140)
GLUCOSE UR STRIP-MCNC: NEGATIVE MG/DL
HCT VFR BLD AUTO: 31.2 % (ref 34.8–46.1)
HCT VFR BLD AUTO: 35 % (ref 34.8–46.1)
HGB BLD-MCNC: 10.6 G/DL (ref 11.5–15.4)
HGB BLD-MCNC: 9.5 G/DL (ref 11.5–15.4)
HGB UR QL STRIP.AUTO: NEGATIVE
INR PPP: 1.18 (ref 0.84–1.19)
KETONES UR STRIP-MCNC: NEGATIVE MG/DL
LACTATE SERPL-SCNC: 2 MMOL/L (ref 0.5–2)
LACTATE SERPL-SCNC: 2.3 MMOL/L (ref 0.5–2)
LACTATE SERPL-SCNC: 2.3 MMOL/L (ref 0.5–2)
LEUKOCYTE ESTERASE UR QL STRIP: ABNORMAL
LIPASE SERPL-CCNC: 62 U/L (ref 73–393)
LYMPHOCYTES # BLD AUTO: 0.87 THOUSAND/UL (ref 0.6–4.47)
LYMPHOCYTES # BLD AUTO: 6 % (ref 14–44)
MCH RBC QN AUTO: 23.8 PG (ref 26.8–34.3)
MCHC RBC AUTO-ENTMCNC: 30.3 G/DL (ref 31.4–37.4)
MCV RBC AUTO: 79 FL (ref 82–98)
MONOCYTES # BLD AUTO: 0.87 THOUSAND/UL (ref 0–1.22)
MONOCYTES NFR BLD: 6 % (ref 4–12)
NEUTROPHILS # BLD MANUAL: 12.78 THOUSAND/UL (ref 1.85–7.62)
NEUTS BAND NFR BLD MANUAL: 11 % (ref 0–8)
NEUTS SEG NFR BLD AUTO: 77 % (ref 43–75)
NITRITE UR QL STRIP: NEGATIVE
NON-SQ EPI CELLS URNS QL MICRO: ABNORMAL /HPF
NT-PROBNP SERPL-MCNC: 464 PG/ML
PH UR STRIP.AUTO: 5.5 [PH]
PLATELET # BLD AUTO: 162 THOUSANDS/UL (ref 149–390)
PLATELET BLD QL SMEAR: ADEQUATE
PMV BLD AUTO: 10.2 FL (ref 8.9–12.7)
POLYCHROMASIA BLD QL SMEAR: PRESENT
POTASSIUM SERPL-SCNC: 4.5 MMOL/L (ref 3.5–5.3)
PROT SERPL-MCNC: 6.8 G/DL (ref 6.4–8.2)
PROT UR STRIP-MCNC: NEGATIVE MG/DL
PROTHROMBIN TIME: 14.9 SECONDS (ref 11.6–14.5)
RBC # BLD AUTO: 4.45 MILLION/UL (ref 3.81–5.12)
RBC #/AREA URNS AUTO: ABNORMAL /HPF
RBC MORPH BLD: PRESENT
RH BLD: POSITIVE
SODIUM SERPL-SCNC: 139 MMOL/L (ref 136–145)
SP GR UR STRIP.AUTO: 1.02 (ref 1–1.03)
SPECIMEN EXPIRATION DATE: NORMAL
TROPONIN I SERPL-MCNC: <0.02 NG/ML
UROBILINOGEN UR QL STRIP.AUTO: 0.2 E.U./DL
WBC # BLD AUTO: 14.52 THOUSAND/UL (ref 4.31–10.16)
WBC #/AREA URNS AUTO: ABNORMAL /HPF
WBC CLUMPS # UR AUTO: ABNORMAL /UL

## 2021-08-19 PROCEDURE — C9113 INJ PANTOPRAZOLE SODIUM, VIA: HCPCS | Performed by: NURSE PRACTITIONER

## 2021-08-19 PROCEDURE — 86850 RBC ANTIBODY SCREEN: CPT | Performed by: EMERGENCY MEDICINE

## 2021-08-19 PROCEDURE — 99223 1ST HOSP IP/OBS HIGH 75: CPT | Performed by: INTERNAL MEDICINE

## 2021-08-19 PROCEDURE — 74176 CT ABD & PELVIS W/O CONTRAST: CPT

## 2021-08-19 PROCEDURE — 96375 TX/PRO/DX INJ NEW DRUG ADDON: CPT

## 2021-08-19 PROCEDURE — 83605 ASSAY OF LACTIC ACID: CPT | Performed by: NURSE PRACTITIONER

## 2021-08-19 PROCEDURE — 84484 ASSAY OF TROPONIN QUANT: CPT | Performed by: EMERGENCY MEDICINE

## 2021-08-19 PROCEDURE — 93005 ELECTROCARDIOGRAM TRACING: CPT

## 2021-08-19 PROCEDURE — 71045 X-RAY EXAM CHEST 1 VIEW: CPT

## 2021-08-19 PROCEDURE — 85027 COMPLETE CBC AUTOMATED: CPT | Performed by: EMERGENCY MEDICINE

## 2021-08-19 PROCEDURE — 85014 HEMATOCRIT: CPT | Performed by: NURSE PRACTITIONER

## 2021-08-19 PROCEDURE — 83690 ASSAY OF LIPASE: CPT | Performed by: EMERGENCY MEDICINE

## 2021-08-19 PROCEDURE — 86901 BLOOD TYPING SEROLOGIC RH(D): CPT | Performed by: EMERGENCY MEDICINE

## 2021-08-19 PROCEDURE — 83880 ASSAY OF NATRIURETIC PEPTIDE: CPT | Performed by: EMERGENCY MEDICINE

## 2021-08-19 PROCEDURE — 36415 COLL VENOUS BLD VENIPUNCTURE: CPT | Performed by: EMERGENCY MEDICINE

## 2021-08-19 PROCEDURE — 83605 ASSAY OF LACTIC ACID: CPT | Performed by: EMERGENCY MEDICINE

## 2021-08-19 PROCEDURE — 96367 TX/PROPH/DG ADDL SEQ IV INF: CPT

## 2021-08-19 PROCEDURE — 85018 HEMOGLOBIN: CPT | Performed by: NURSE PRACTITIONER

## 2021-08-19 PROCEDURE — 96361 HYDRATE IV INFUSION ADD-ON: CPT

## 2021-08-19 PROCEDURE — 87086 URINE CULTURE/COLONY COUNT: CPT | Performed by: EMERGENCY MEDICINE

## 2021-08-19 PROCEDURE — 85007 BL SMEAR W/DIFF WBC COUNT: CPT | Performed by: EMERGENCY MEDICINE

## 2021-08-19 PROCEDURE — 80053 COMPREHEN METABOLIC PANEL: CPT | Performed by: EMERGENCY MEDICINE

## 2021-08-19 PROCEDURE — 99285 EMERGENCY DEPT VISIT HI MDM: CPT | Performed by: EMERGENCY MEDICINE

## 2021-08-19 PROCEDURE — 87449 NOS EACH ORGANISM AG IA: CPT | Performed by: NURSE PRACTITIONER

## 2021-08-19 PROCEDURE — 87040 BLOOD CULTURE FOR BACTERIA: CPT | Performed by: EMERGENCY MEDICINE

## 2021-08-19 PROCEDURE — 85610 PROTHROMBIN TIME: CPT | Performed by: EMERGENCY MEDICINE

## 2021-08-19 PROCEDURE — 85730 THROMBOPLASTIN TIME PARTIAL: CPT | Performed by: EMERGENCY MEDICINE

## 2021-08-19 PROCEDURE — 86900 BLOOD TYPING SEROLOGIC ABO: CPT | Performed by: EMERGENCY MEDICINE

## 2021-08-19 PROCEDURE — 81001 URINALYSIS AUTO W/SCOPE: CPT | Performed by: EMERGENCY MEDICINE

## 2021-08-19 PROCEDURE — 99285 EMERGENCY DEPT VISIT HI MDM: CPT

## 2021-08-19 PROCEDURE — 99215 OFFICE O/P EST HI 40 MIN: CPT | Performed by: INTERNAL MEDICINE

## 2021-08-19 PROCEDURE — 96365 THER/PROPH/DIAG IV INF INIT: CPT

## 2021-08-19 PROCEDURE — C9113 INJ PANTOPRAZOLE SODIUM, VIA: HCPCS | Performed by: EMERGENCY MEDICINE

## 2021-08-19 RX ORDER — CEFTRIAXONE 1 G/50ML
1000 INJECTION, SOLUTION INTRAVENOUS EVERY 24 HOURS
Status: DISCONTINUED | OUTPATIENT
Start: 2021-08-20 | End: 2021-08-21 | Stop reason: HOSPADM

## 2021-08-19 RX ORDER — AMIODARONE HYDROCHLORIDE 200 MG/1
100 TABLET ORAL DAILY
Status: DISCONTINUED | OUTPATIENT
Start: 2021-08-20 | End: 2021-08-21 | Stop reason: HOSPADM

## 2021-08-19 RX ORDER — ATORVASTATIN CALCIUM 20 MG/1
20 TABLET, FILM COATED ORAL DAILY
Status: DISCONTINUED | OUTPATIENT
Start: 2021-08-20 | End: 2021-08-21 | Stop reason: HOSPADM

## 2021-08-19 RX ORDER — TORSEMIDE 10 MG/1
10 TABLET ORAL DAILY
Status: DISCONTINUED | OUTPATIENT
Start: 2021-08-20 | End: 2021-08-20

## 2021-08-19 RX ORDER — METOPROLOL SUCCINATE 25 MG/1
25 TABLET, EXTENDED RELEASE ORAL DAILY
Status: DISCONTINUED | OUTPATIENT
Start: 2021-08-20 | End: 2021-08-21 | Stop reason: HOSPADM

## 2021-08-19 RX ORDER — ONDANSETRON 2 MG/ML
4 INJECTION INTRAMUSCULAR; INTRAVENOUS EVERY 6 HOURS PRN
Status: DISCONTINUED | OUTPATIENT
Start: 2021-08-19 | End: 2021-08-21 | Stop reason: HOSPADM

## 2021-08-19 RX ORDER — FENTANYL CITRATE 50 UG/ML
50 INJECTION, SOLUTION INTRAMUSCULAR; INTRAVENOUS ONCE
Status: COMPLETED | OUTPATIENT
Start: 2021-08-19 | End: 2021-08-19

## 2021-08-19 RX ORDER — CEFTRIAXONE 1 G/50ML
1000 INJECTION, SOLUTION INTRAVENOUS ONCE
Status: COMPLETED | OUTPATIENT
Start: 2021-08-19 | End: 2021-08-19

## 2021-08-19 RX ORDER — POTASSIUM CHLORIDE 20 MEQ/1
20 TABLET, EXTENDED RELEASE ORAL DAILY
Status: DISCONTINUED | OUTPATIENT
Start: 2021-08-20 | End: 2021-08-20

## 2021-08-19 RX ORDER — OXYCODONE HYDROCHLORIDE 10 MG/1
10 TABLET ORAL EVERY 6 HOURS PRN
Status: DISCONTINUED | OUTPATIENT
Start: 2021-08-19 | End: 2021-08-21 | Stop reason: HOSPADM

## 2021-08-19 RX ORDER — ALBUTEROL SULFATE 2.5 MG/3ML
2.5 SOLUTION RESPIRATORY (INHALATION) EVERY 4 HOURS PRN
Status: DISCONTINUED | OUTPATIENT
Start: 2021-08-19 | End: 2021-08-21 | Stop reason: HOSPADM

## 2021-08-19 RX ORDER — ACETAMINOPHEN 325 MG/1
650 TABLET ORAL EVERY 6 HOURS PRN
Status: DISCONTINUED | OUTPATIENT
Start: 2021-08-19 | End: 2021-08-21 | Stop reason: HOSPADM

## 2021-08-19 RX ORDER — SODIUM CHLORIDE 9 MG/ML
50 INJECTION, SOLUTION INTRAVENOUS CONTINUOUS
Status: DISCONTINUED | OUTPATIENT
Start: 2021-08-20 | End: 2021-08-20

## 2021-08-19 RX ORDER — MECLIZINE HYDROCHLORIDE 25 MG/1
25 TABLET ORAL EVERY 8 HOURS PRN
Status: DISCONTINUED | OUTPATIENT
Start: 2021-08-19 | End: 2021-08-21 | Stop reason: HOSPADM

## 2021-08-19 RX ORDER — FERROUS SULFATE 325(65) MG
325 TABLET ORAL
Status: DISCONTINUED | OUTPATIENT
Start: 2021-08-20 | End: 2021-08-21 | Stop reason: HOSPADM

## 2021-08-19 RX ORDER — CALCIUM CARBONATE 500(1250)
1 TABLET ORAL 2 TIMES DAILY WITH MEALS
Status: DISCONTINUED | OUTPATIENT
Start: 2021-08-20 | End: 2021-08-21 | Stop reason: HOSPADM

## 2021-08-19 RX ADMIN — AZITHROMYCIN MONOHYDRATE 500 MG: 500 INJECTION, POWDER, LYOPHILIZED, FOR SOLUTION INTRAVENOUS at 15:00

## 2021-08-19 RX ADMIN — FENTANYL CITRATE 50 MCG: 50 INJECTION, SOLUTION INTRAMUSCULAR; INTRAVENOUS at 12:31

## 2021-08-19 RX ADMIN — CEFTRIAXONE 1000 MG: 1 INJECTION, SOLUTION INTRAVENOUS at 14:10

## 2021-08-19 RX ADMIN — SODIUM CHLORIDE 500 ML: 0.9 INJECTION, SOLUTION INTRAVENOUS at 15:03

## 2021-08-19 RX ADMIN — SODIUM CHLORIDE 80 MG: 9 INJECTION, SOLUTION INTRAVENOUS at 12:16

## 2021-08-19 RX ADMIN — SODIUM CHLORIDE 500 ML: 0.9 INJECTION, SOLUTION INTRAVENOUS at 12:50

## 2021-08-19 RX ADMIN — SODIUM CHLORIDE 8 MG/HR: 9 INJECTION, SOLUTION INTRAVENOUS at 16:43

## 2021-08-19 RX ADMIN — IOHEXOL 20 ML: 240 INJECTION, SOLUTION INTRATHECAL; INTRAVASCULAR; INTRAVENOUS; ORAL at 12:08

## 2021-08-19 NOTE — ASSESSMENT & PLAN NOTE
Suspected GI bleed present on admission as evidence by high output of black liquid stool through colostomy  Patient denies any recent NSAID use, does report a history of marginal ulcer secondary to NSAID use in 2018  On Prilosec and Eliquis/aspirin at home  Hemoglobin 11 7 day prior to admission, 10 6 on admission  Continues with high output from colostomy, liquid black stool  Appreciate GI consult  · Patient was started on Protonix drip on admission  · Patient underwent EGD today - 2 medium-size clean based ulcers at gastrojejunal anastomotic site, no signs of active GI bleed  Status post gastric bypass surgery with large gastric pouch  Normal esophagus  · GI recommend avoiding NSAIDs  · Protonix 40 b i d  And liquid Carafate 1 g 4 times a day  · Regular diet    Hemoglobin slowly trending, 8 3 this morning  Patient denies black stool this morning  Repeat H&H this evening and in the morning  Transfuse for hemoglobin less than 7 or sooner if hemodynamically unstable  Continue to hold Eliquis today  Possible resume Eliquis/aspirin in a m   If okay with GI

## 2021-08-19 NOTE — ASSESSMENT & PLAN NOTE
Wt Readings from Last 3 Encounters:   08/19/21 127 kg (280 lb)   07/21/21 125 kg (276 lb)   06/24/21 128 kg (282 lb 12 8 oz)     Intake and output, daily weights ordered  Continue patient's home medication torsemide 10 mg daily  When diet is resumed heart healthy diet    Reinforced need for taking diuretics as prescribed by physician  Monitor

## 2021-08-19 NOTE — ASSESSMENT & PLAN NOTE
Suspected GI bleed present on admission as evidence by high output of black liquid stool through colostomy  Patient reports that Monday previous to admission she noticed to have increased output from the colostomy, reports that normally she has bowel movement overnight and then for the rest the day minimal output  She also noted that the stool darkened in color, attributed to possibly taking calcium  She had blood work drawn the day prior to admission as she was to see her oncologist Dr Jennifer Schwarz in the office  On morning of admission patient was at Dr Dania Bailon office, complained of chills and required a blanket  Patient was advised to present to the emergency department for further evaluation treatment  Previous days hemoglobin was noted to be 11 7, 10 6 on admission  Continues with high output from colostomy, liquid black stool  GI consulted, started on Protonix drip  Clear liquid diet for tonight then NPO after midnight in anticipation of scope  Patient denies any recent NSAID use, does report a history 80 marginal ulcer secondary to NSAID use in 2018  Patient indicated that approximately 2 weeks ago she was out of her Prilosec for about 4 days, but then has been taking it routinely  Serial H&Hs q 6 hours  Hemodynamically stable at this time    Monitor closely

## 2021-08-19 NOTE — PROGRESS NOTES
Gerardmond Duty  1955  [facility] CHRISTOPHER [place] [facility] [id number]    DISCUSSION/SUMMARY:    51-year-old female previously found to have high-grade dysplasia/intramucosal lesion arising in a tubulovillous adenoma  Issues:    Rectal cancer  Final stage was IIA (ypT3 pN0 G2)  Patient received neoadjuvant concurrent treatment and then underwent resection  Mrs Darya Ocasio had postoperative complications with wound healing and fistula formation - eventually resolved  From a medical oncology standpoint, Mrs Darya Ocasio feels well and clinically there are no concerning signs  Recent blood work including tumor marker was good/acceptable  CT scans in February 2021 did not demonstrate any evidence for recurrence  The plan is to continue with surveillance  Ovarian cancer, IIIC  As discussed previously, at the time of surgery, patient was found to have an incidental low-grade serous ovarian carcinoma with omental nodules greater than 2 cm grossly visible  Patient is followed by GYN Oncology and is on anastrozole (NCCN 2 [id number] low-grade serous, stage II-IV tumors, treatment options include primary hormonal therapy (category 2B)  Acute issues  Patient has shaking chills, some recent urinary irregularities, dark colored stools, recent GI issues, history of peptic ulcer disease  Patient was shaking in the office today  We discussed options  Mrs Darya Ocasio is to go to the emergency room now (bleeding ulcer, UTI, sepsis, other etc)  Case discussed with Dr Osbadlo Cohen, ER physician  Mrs Reardon will be brought back to the office in 4-5 weeks to make sure she is doing okay  Patient knows to call if she has any other oncology questions or concerns  Carefully review your medication list and verify that the list is accurate and up-to-date   Please call the hematology/oncology office if there are medications missing from the list, medications on the list that you are not currently taking or if there is a dosage or instruction that is different from how you're taking that medication  Patient goals and areas of care:  Go to the ER now  Barriers to care:  None  Patient is able to self-care   ___________________________________________________________________________________________________    Chief Complaint   Patient presents with    Follow-up     Rectal cancer     History of Present Illness:    70-year-old female previously referred for the above  Previously Mrs Tubbs Age had gastric bypass  Patient was seen by GI (late summer 2018) because of blood in the stools  Additional workup included a colonoscopy which demonstrated a rectal lesion  Patient recently completed concurrent chemotherapy (Xeloda) with radiation  Mrs Darya Ocasio subsequently went onto an APR  Final pathology results are listed below; patient was also found to have IIIC low-grade serous ovarian carcinoma  Postoperatively, patient had issues with slow wound closure  Since completion of treatment, patient has been on surveillance and returns for follow-up  Mrs Darya Ocasio states that she has not felt well recently  Patient has a history of peptic ulcer disease, had been off the omeprazole (specifics not presently available but the medication was not renewed by the PCP as per patient)  Patient states that she has had off and on abdominal pain, a change in the color of her stools, much darker than before, diarrhea, occasional dysuria and shaking chills  Patient was shaking in the office today  Activities have been less than usual   No obvious GI bleeding  During the APR, patient was found to have incidental stage IIIC low-grade serous ovarian cancer (gyn oncology was called into the OR)  Patient is followed by Dr Neeru Garcia and is on Arimidex  No problems with the Arimidex  Review of Systems   Constitutional: Negative for fatigue  HENT: Negative  Eyes: Negative  Respiratory: Negative  Cardiovascular: Negative  Gastrointestinal: Positive for diarrhea  Negative for blood in stool and constipation  Endocrine: Negative  Genitourinary: Negative  Musculoskeletal: Positive for arthralgias  Skin: Negative  Allergic/Immunologic: Negative  Neurological: Negative  Hematological: Negative  Psychiatric/Behavioral: Negative  All other systems reviewed and are negative       Patient Active Problem List   Diagnosis    Morbid obesity due to excess calories (HCC)    Postgastrectomy malabsorption    S/P gastric bypass    Marginal ulcer    S/P bariatric surgery    Atherosclerotic peripheral vascular disease ([place] [address] )    Onychomycosis    History of rectal cancer    Colostomy care ([place] [address] )    History of ovarian cancer    Encounter for surgical aftercare following surgery of digestive system    Pyelonephritis    History of ovarian cancer    Candidal intertrigo    Vaginal bleeding    Encounter for other screening for malignant neoplasm of breast    Aortic stenosis, severe    Nonrheumatic mitral valve stenosis    Atrial fibrillation with rapid ventricular response (HCC)    Hyperlipidemia    Hypochloremia    Weight gain following gastric bypass surgery    S/P TAVR (transcatheter aortic valve replacement)    Ambulatory dysfunction    CAD (coronary artery disease)    Chronic anticoagulation    Chronic diastolic heart failure (HCC)    Osteoarthritis of right knee    H/O diabetes mellitus     Past Medical History:   Diagnosis Date    Acute pain of right knee     Ambulatory dysfunction     Anemia     Arthritis     Cancer (HCC)     rectal    Chronic lower back pain     Chronic pain disorder     back pain    Colon cancer ([place] [address] ) 2018    Diabetes mellitus ([place] [address] )     Endometrial cancer ([place] [address] ) 2018    GERD (gastroesophageal reflux disease)     History of chemotherapy     History of MRSA infection 0719/2016    Morbid obesity ([facility] )     Morbid obesity with BMI of 45 0-49 9, adult (HCC)     [facility]var[facility] cancer ([facility] ) 2018    Paroxysmal atrial fibrillation (HCC)     Rectal cancer (HCC)     S/P TAVR (transcatheter aortic valve replacement)     SOB (shortness of breath)     Spinal stenosis     Systolic murmur     Unsteady gait     uses walker    Wears dentures     Wears glasses      Ob/gyn:  No recent mammogram -patient's choice, no post menopausal bleeding    Past Surgical History:   Procedure Laterality Date    ABDOMINAL PERINEAL BOWEL RESECTION W/ ILEOANAL POUCH N/A 2018    Procedure: LAPAROSCOPIC HAND ASSIST ABDOMINOPERINEAL RESECTION,  POSTERIOR VAGINECTOMY, OMENTECTOMY;  Surgeon: Jonelle Alcaraz MD;  [facility];  Service: Colorectal    ABDOMINAL SURGERY      abscess removed from abdomen and right thigh, a hole in thigh closed by plastic surgeon    ABSCESS DRAINAGE      abd, (R) leg, (L) leg     SECTION       SECTION      CYSTOSCOPY N/A 2018    Procedure: CYSTOSCOPY;  Surgeon: Elida Marcial MD;  [facility];  Service: Gynecology Oncology    ESOPHAGOGASTRODUODENOSCOPY N/A 2016    Procedure: ESOPHAGOGASTRODUODENOSCOPY (EGD); Surgeon: Josie Booth MD;  [facility];  Service:     ESOPHAGOGASTRODUODENOSCOPY N/A 3/30/2016    Procedure: ESOPHAGOGASTRODUODENOSCOPY (EGD); Surgeon: Josie Booth MD;  [facility]; Service:     EYE SURGERY      laser eye surgery    HYSTERECTOMY N/A 2018    Procedure: RADICAL HYSTERECTOMY TOTAL ABDOMINAL (ALEXANDRA)  BSO;  Surgeon: Elida Marcial MD;  [facility];  Service: Gynecology Oncology    JOINT REPLACEMENT Left 2017    hip    JOINT REPLACEMENT Right 2017    hip    OOPHORECTOMY Bilateral     MA COLONOSCOPY FLX DX W/COLLJ SPEC WHEN PFRMD N/A 3/9/2018    Procedure: COLONOSCOPY;  Surgeon: Mendoza Castillo MD;  Location: [facility] GI [facility];   Service: Gastroenterology    MA COLONOSCOPY FLX DX W/COLLJ SPEC WHEN PFRMD N/A 2018 Procedure: COLONOSCOPY;  Surgeon: Dionne Burkitt, MD;  [facility]; Service: Colorectal    FL ECHO TRANSESOPHAG R-T 2D W/PRB IMG ACQUISJ I&R N/A 2020    Procedure: TRANSESOPHAGEAL ECHOCARDIOGRAM (THO); Surgeon: Sebas Karimi DO;  [facility];  Service: Cardiac Surgery    FL ESOPHAGOGASTRODUODENOSCOPY TRANSORAL DIAGNOSTIC N/A 2018    Procedure: ESOPHAGOGASTRODUODENOSCOPY (EGD); Surgeon: Marylene Alstrom, MD;  Location: [facility]; Service: Gastroenterology    FL LAP GASTRIC BYPASS/SOLEDAD-EN-Y N/A 2016    Procedure: BYPASS GASTRIC  SOLEDAD-EN-Y LAPAROSCOPIC;  Surgeon: Yazmin Lal MD;  [facility];  Service: Bariatrics    FL LAP, SURG COLOSTOMY N/A 2018    Procedure: PERMANENT END COLOSTOMY;  Surgeon: Dionne Burkitt, MD;  [facility];  Service: Colorectal    FL LAP, SURG PROCTECTOMY W COLOSTOMY N/A 2018    Procedure: PROCTECTOMY;  Surgeon: Dionne Burkitt, MD;  [facility];  Service: Colorectal    FL REPLACE AORTIC VALVE OPENFEMORAL ARTERY APPROACH N/A 2020    Procedure: REPLACEMENT AORTIC VALVE TRANSCATHETER (TAVR) TRANSFEMORAL W/ 26MM WADDELL BARBARA S3 ULTRA VALVE(ACCESS ON RIGHT); Surgeon: Sebas Karimi DO;  [facility];  Service: Cardiac Surgery    REPLACEMENT TOTAL KNEE      TUBAL LIGATION       Family History   Adopted: Yes   Problem Relation Age of Onset    Leukemia Mother     Heart disease Father     Coronary artery disease Father     Diabetes Father     No Known Problems Sister     No Known Problems Brother     Diabetes Son     No Known Problems Brother     No Known Problems Brother     No Known Problems Sister     No Known Problems Sister     Family history:   Mother  of leukemia (NOS), father  from heart disease (NOS), 2 children 1 with diabetes, no known familial or genetic diseases, no family history of GI malignancies    Social History     Socioeconomic History    Marital status: Single     Spouse name: Not on file    Number of children: 2    Years of education: Not on file    Highest education level: Not on file   Occupational History    Not on file   Tobacco Use    Smoking status: Former Smoker     Packs/day: 1 00     Years: 25 00     Pack years: 25 00     Quit date: 3/30/2006     Years since quitting: 15 4    Smokeless tobacco: Never Used   Vaping Use    Vaping Use: Never used   Substance and Sexual Activity    Alcohol use: Not Currently    Drug use: Not Currently     Types: Oxycodone     Comment: Percocet for lower back pain prn    Sexual activity: Not Currently   Other Topics Concern    Not on file   Social History Narrative    Not on file     Social Determinants of Health     Financial Resource Strain:     Difficulty of Paying Living Expenses:    Food Insecurity:     Worried About Running Out of Food in the Last Year:     Ran Out of Food in the Last Year:    Transportation Needs:     Lack of Transportation (Medical):      Lack of Transportation (Non-Medical):    Physical Activity:     Days of Exercise per Week:     Minutes of Exercise per Session:    Stress:     Feeling of Stress :    Social Connections:     Frequency of Communication with Friends and Family:     Frequency of Social Gatherings with Friends and Family:     Attends Jehovah's witness Services:     Active Member of Clubs or Organizations:     Attends Club or Organization Meetings:     Marital Status:    Intimate Partner Violence:     Fear of Current or Ex-Partner:     Emotionally Abused:     Physically Abused:     Sexually Abused:        Current Outpatient Medications:     albuterol (2 5 mg/3 mL) 0 083 % nebulizer solution, Take 1 vial (2 5 mg total) by nebulization every 4 (four) hours as needed for wheezing or shortness of breath, Disp: 75 vial, Rfl: 1    amiodarone 100 mg tablet, Take 1 tablet (100 mg total) by mouth daily, Disp: 90 tablet, Rfl: 3    anastrozole (ARIMIDEX) 1 mg tablet, Take 1 tablet (1 mg total) by mouth daily (Patient not taking: Reported on 5/3/2021), Disp: 90 tablet, Rfl: 2    apixaban (ELIQUIS) 5 mg, Take 1 tablet (5 mg total) by mouth 2 (two) times a day, Disp: 180 tablet, Rfl: 2    aspirin 81 mg chewable tablet, Chew 1 tablet (81 mg total) daily, Disp: 30 tablet, Rfl: 2    atorvastatin (LIPITOR) 20 mg tablet, Take 1 tablet (20 mg total) by mouth daily, Disp: 90 tablet, Rfl: 1    Calcium-Vitamin D 500-125 MG-UNIT TABS, Take 1 tablet by mouth 2 (two) times a day , Disp: , Rfl:     Cyanocobalamin (B-12 PO), Take by mouth , Disp: , Rfl:     ergocalciferol (VITAMIN D2) 50,000 units, Take 1 capsule (50,000 Units total) by mouth 2 (two) times a week with meals, Disp: 20 capsule, Rfl: 0    ferrous sulfate 324 (65 Fe) mg, Take 1 tablet (324 mg total) by mouth daily before breakfast, Disp: 90 tablet, Rfl: 0    meclizine (ANTIVERT) 25 mg tablet, Take 1 tablet (25 mg total) by mouth every 8 (eight) hours as needed for dizziness, Disp: 30 tablet, Rfl: 0    metoprolol succinate (TOPROL-XL) 25 mg 24 hr tablet, Take 1 tablet (25 mg total) by mouth daily, Disp: 90 tablet, Rfl: 1    Multiple Vitamins-Minerals (BARIATRIC FUSION) CHEW, Chew 1 tablet daily  , Disp: , Rfl:     nystatin (MYCOSTATIN) cream, Apply topically 2 (two) times a day, Disp: 30 g, Rfl: 0    omeprazole (PriLOSEC) 20 mg delayed release capsule, Take 1 capsule (20 mg total) by mouth daily, Disp: 90 capsule, Rfl: 0    oxyCODONE (ROXICODONE) 10 MG TABS, Take 10 mg by mouth every 6 (six) hours as needed  , Disp: , Rfl:     potassium chloride (K-DUR,KLOR-CON) 20 mEq tablet, Take 1 tablet (20 mEq total) by mouth daily, Disp: 30 tablet, Rfl: 1    torsemide (DEMADEX) 20 mg tablet, Take 0 5 tablets (10 mg total) by mouth daily, Disp: 30 tablet, Rfl: 3    Allergies   Allergen Reactions    Tramadol Other (See Comments)     Dizzy         Vitals:    08/19/21 0932   BP: 112/60   Pulse: 89   Resp: 20   Temp: 98 1 °F (36 7 °C)   SpO2: 100%     Physical Exam  Constitutional:       Appearance: She is well-developed  Comments: Well-nourished female, no respiratory distress   HENT:      Head: Normocephalic and atraumatic  Right Ear: External ear normal       Left Ear: External ear normal       Nose: Nose normal    Eyes:      Conjunctiva/sclera: Conjunctivae normal       Pupils: Pupils are equal, round, and reactive to light  Cardiovascular:      Rate and Rhythm: Normal rate and regular rhythm  Heart sounds: Normal heart sounds  Pulmonary:      Effort: Pulmonary effort is normal       Breath sounds: Normal breath sounds  Abdominal:      General: Bowel sounds are normal       Palpations: Abdomen is soft  Comments: Left lower quadrant colostomy in place  Stool in back, nontender, +bowel sounds, well-healed suture lines, obese, cannot palpate liver or spleen   Musculoskeletal:      Cervical back: Normal range of motion and neck supple  Comments: No pain or tenderness with palpation of joints, muscles or bones, good range of motion in upper extremities, decreased range of motion in lower extremities but equal   Skin:     General: Skin is warm  Comments: Warm, moist, good color, no petechiae or ecchymoses   Neurological:      Mental Status: She is alert and oriented to person, place, and time  Deep Tendon Reflexes: Reflexes are normal and symmetric  Psychiatric:         Behavior: Behavior normal          Thought Content:  Thought content normal          Judgment: Judgment normal      Extremities: no lower extremity edema bilaterally, no cords, pulses are 1+  Lymphatics:  No adenopathy in the neck, supraclavicular region, axilla and groin bilaterally  Rectal wound deferred    Labs    08/18/2021 WBC = 5 55 hemoglobin = 11 7 hematocrit = 39 7 MCV = 80 RDW = 16 1 platelet = 478 neutrophil = 72% BUN = 18 creatinine = 0 83 LFTs WNL calcium = 9 0 CEA = 1 0    Imaging    02/21/2021 CT scan chest abdomen pelvis    No evidence of recurrent or metastatic disease throughout the chest, abdomen or pelvis  Large parastomal hernia containing loops of small bowel and cecum  02/06/2020 CT scan of the chest and abdomen pelvis  Impression stated no evidence of metastatic disease, large left lower quadrant parastomal hernia containing multiple nonobstructed bowel loops  04/01/2019 CT scan of the chest and abdomen pelvis    No CT evidence of metastatic disease within the chest abdomen or pelvis  Stable postoperative changes  4/6/18 MRI pelvis rectal cancer staging    Low rectal cancer, 5 cm in length, circumferential around the rectal wall  (Series 8 images 19 through 33 )  Anteriorly, there are multiple areas where there is transmural tumor extension into the mesorectal fat making this at least a T3c lesion  On Series 8 image 31, tumor also abuts the mesorectal fascia making this CRM positive  At this point it is also   immediately adjacent to the vagina, therefore this may be a T4 lesion  There are 2 high risk perirectal nodes, and 1 low risk perirectal nodes  01/31/2018 ultrasound right upper quadrant    1  Layering gallbladder sludge  No acute cholecystitis or biliary ductal obstruction  2   Hepatomegaly  3   Fluid-filled stomach  1/27/18 CT scan of the abdomen/pelvis    1  Prior Juan-en-Y gastric bypass with distention of the excluded stomach    2   No evidence of acute abnormality in the abdomen or pelvis      Pathology    Case Report   Surgical Pathology Report                         Case: [id number]                                    Authorizing Provider: GEORGETTE Carrasquillo       Collected:           05/29/2019 0922               Ordering Location:     [facility] Received:            05/29/2019 0922                                      [facility] [place]                                                            Pathologist:           Wayne Rachel MD                                                                Specimen:    Vaginal, vaginal polyp                                                                     Final Diagnosis   A  Vagina, polyp (biopsy):  - Acutely and chronically inflamed granulation tissue    Electronically signed by Donato Chilel MD on 5/30/2019 at  1:18 PM         Case Report   Surgical Pathology Report                         Case: [id number]                                    Authorizing Provider: Bimal Maher MD      Collected:           09/06/2018 1010               Ordering Location:     [address]      Received:            09/06/2018 [facility]                                      Hospital Operating Room                                                       Pathologist:           Sherry Young MD                                                         Specimens:   A) - Soft Tissue, Other, EPIPLOIC NODULE                                                             B) - Rectum, enbloc rectum, sigmoid, anus, uterus, bso, cervix, posterior vaginal                    wall                                                                                                 C) - Omentum                                                                               Addendum   Additional immunohistochemical stains performed with appropriate controls on a selected portion of serous carcinoma involving omental tissue (block C1) show the following results:  Estrogen receptor: Positive  Progesterone receptor: Negative   Addendum electronically signed by Sherry Young MD on 9/27/2018 at  1:40 PM   Final Diagnosis   A  Soft tissue, epiploic nodule, biopsy:  -  Portion of nodular fibroadipose tissue with fat necrosis and dystrophic calcifications  -  Negative for metastatic carcinoma  -  Immunohistochemical stain performed with appropriate control for keratin AE1/3 is negative for epithelial elements, supporting the diagnosis      B    Rectum, sigmoid colon, anus, uterus,cervix, bilateral ovaries and fallopian tubes and posterior vaginal wall; en bloc resection:  -  Invasive moderately differentiated adenocarcinoma of rectum (see first synoptic report)  -  Low grade serous carcinoma involving left ovary, left fallopian tube, uterine serosa and colonic serosa (see second synoptic report)  -  Separate portion of colon (consistent with sigmoid) with diverticulosis coli showing focal serosal implant of low-grade serous carcinoma  -  Uterus showing benign inactive/atrophic endometrium with metaplastic change, benign endometrial polyp and rare cytologic atypia consistent with radiation effect  -  Cervix with mild glandular atypia, favor reactive (see note)  -  Benign uterine myometrium with one intramural benign leiomyoma, negative for atypia or malignancy  -  Benign anal skin and dentate line with mild reactive change, negative for involvement by carcinoma  -  Benign vaginal wall mucosa with thinning, chronic inflammation, and surface erosion most suggestive of prior therapy effect, negative for involvement by carcinoma  -  27 benign pericolorectal lymph nodes identified, negative for metastatic carcinoma      C  Omentum, omentectomy:  -   Low grade serous carcinoma         COLORECTAL CARCINOMA TUMOR STAGING SUMMARY (includes specimen A-C of this case):  1  Specimen identification:     - Specimen:  Rectum, sigmoid colon, anus, uterus,cervix, bilateral ovaries and fallopian tubes and posterior vaginal wall   2  Tumor:     - Tumor site:  Rectum     - Tumor size:  Up to 3 3 cm     - Macroscopic tumor perforation:  Not identified     - Tumor location:  Below the peritoneal reflection        - Macroscopic intactness of mesorectum:  Intact     - Histologic Type:   Adenocarcinoma      - Histologic Grade: Moderately differentiated (G2)     - Microscopic Tumor Extension (ypT3):   Tumor invades through muscularis propria into pericolorectal soft tissues     - Tumor budding (only needed in polyps, Stage I or II cancers):  Not identified   3  Margins (specify distance for nearest radial margin on RECTAL tumors only):     - Proximal Margin:  Negative for carcinoma     - Distal Margin:  Negative for carcinoma     - Circumferential (Radial) or Mesenteric Margin:  Negative for carcinoma (0 6cm)     - Other Margins:  Vaginal margin negative for carcinoma   4  Treatment effect:  Present; Residual cancer with evident tumor regression, but more than single cells or rare small groups of cancer cells (partial response, score 2)   5  Lymph-vascular invasion:  Not identified   6  Perineural invasion:  Not identified   7  Tumor deposits: Not identified   8  Type of polyp in which invasive carcinoma arose:  Tubular adenoma   9  Regional lymph nodes (pN0):  27 benign pericolorectal lymph node sample, negative for metastatic carcinoma  10  Additional pathologic findings:  Diverticulosis coli  11  Ancillary Studies:  *  Immunohistochemical stains performed with appropriate controls show the primary rectal tumor to be positive for CK20 and CDX-2 and negative for CK 7, PAX-8, p53 (wild type expression), ER and WT-1, supporting a diagnosis of primary colorectal origin  12  8th Ed AJCC Stage:  at least Stage  IIA - ypT3, pN0, G2         Primary Tumor of the Ovary/Fallopian Tube/Peritoneum, Staging Summary (includes Specimen A to C of this case):  1  Procedure:  Hysterectomy, bilateral salpingo-oophorectomy, omentectomy  2  Hysterectomy type:  Radical hysterectomy  3  Specimen integrity:      - Right ovary:  Intact      - Left ovary:  Intact       - Right fallopian tube: Intact      - Left fallopian tube: Intact  4  Tumor site:  Ovarian and fallopian tube surfaces and uterine and colonic serosa  5  Ovarian surface involvement:  Present  6  Fallopian tube surface involvement:  Present  7  Tumor size:  Largest focus measures 1 8 cm (uterine serosa) (see note)  8   Histologic type:  Low-grade serous carcinoma   9  Histologic grade:  Low grade  10  Implant:  Present  11  Other tissue/organ involvement:  Bilateral ovaries and fallopian tubes, uterine serosa, sigmoid colon serosa, and omentum  12  Largest extrapelvic peritoneal focus: 2 5 cm (macroscopically identidfied)    - Specify site:  Omentum  13  Peritoneal/ascetic fluid:  Not performed   14  Pleural fluid:  Not performed  15  Treatment effect:  No known prior therapy  16  Regional lymph nodes:    - No regional uterine lymph nodes submitted or found    - 27 benign pericolorectal lymph nodes sampled, negative for metastatic carcinoma  17  Pathologic stage classification (pTNM, AJCC 8th edition): pT3c, pNX, Low Grade  18  FIGO stage (2015 FIGO cancer report): IIIC  19  Additional pathologic findings:  N/A  20  Ancillary studies:  Immunohistochemical stains performed with appropriate controls show the tumor cells to be positive for CK7, PAX-8, ER, and WT-1 with wild-type expression of p53 and negative for CK20 and CDX-2 supporting the diagnosis  Electronically signed by [place] MD on 9/25/2018 at 12:54 PM                  Case Report   Surgical Pathology Report                         Case: [id number]                                    Authorizing Provider: Arabella Betancourt MD      Collected:           03/28/2018 1130               Pathologist:           Elsa Everett MD             Received:            03/29/2018 0942               Specimen:    Rectum, Rectal cancer/mass biopsies                                                        Final Diagnosis   A  Rectal mass (biopsy):  - 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      Comment:   - In the context of a rectal mass, repeat biopsy and/or excision may be indicated to exclude a higher grade lesion      Electronically signed by Elsa Everett MD on 3/30/2018 at  2:36 PM         Case Report   Surgical Pathology Report                         Case: [id number]                                    Authorizing Provider: Salli Drew, MD          Collected:           03/09/2018 0902               Ordering Location:     [facility]   Received:            03/12/2018 0904                                      Center                                                                        Pathologist:           Becky Mulligan DO                                                             Specimens:   A) - Colon, polyp splenic flexure/cold snare                                                         B) - Colon, bx distal rectal mass                                                          Final Diagnosis   A  Colon, splenic flexure polyp, biopsy:  - Tubulovillous adenoma, negative for high-grade dysplasia      B  Rectum, mass, biopsy:  - At least high grade dysplasia/intramucosal carcinoma arising in a tubulovillous adenoma, suspicious for invasion       Intradepartmental consultation is in agreement with the above diagnosis (AT)     Interpretation performed at [facility], [address] [place] [address] [address] [address]  Report faxed to Dr Susan Clinton on 3/13/18 @ 10:55 AM   Electronically signed by April Hadley DO on 3/13/2018 at 10:54 AM

## 2021-08-19 NOTE — CONSULTS
Consultation - [facility] Gastroenterology   Lisa Mcadams 72 y o  female MRN: [id number]  Unit/Bed#: ED 06 Encounter: [id number]        Inpatient consult to gastroenterology  Consult performed by: GEORGETTE Dhaliwal  Consult ordered by: Christiano Dhaliwal          Reason for Consult / Principal Problem:     Upper GI Bleed      ASSESSMENT AND PLAN:      61-year-old female with history of AFib on Eliquis, gastric bypass (2016), NSAID induced anastomotic ulcer (2018), and rectal cancer s/p chemo and resection with colostomy presents from outpatient oncology office with melena in ostomy bag and shaking chills  1  Melena, Anemia  Patient noted darker more frequent output in her ostomy bag starting Monday but wasn't aware this could be a sign of upper GI bleeding until she went to her oncologist's office today and they directed her to the ED for evaluation  In the ED, Hemoglobin 10 6 down from 11 7 yesterday with recent baseline 12-14  She was hemodynamically stable  BUN noted to be elevated at 31  She denies any NSAID use  She ran out of her Prilosec 20 mg for brief period of time recently before getting it refilled  Denies any frequent heartburn, reflux, nausea/vomiting, abdominal pain  Melena/anemia likely secondary to upper GI bleed from an anastomotic ulcer, PUD, or AVM  Last dose of Eliquis was this morning (8/19)  She has had her COVID vaccine     -hold Eliquis  -PPI gtt  -maintain 2 large-bore IVs  -monitor serial H&H  -transfuse blood products as needed to maintain hemoglobin greater than 7  -avoid NSAIDs  -okay for clear liquid diet  -NPO past midnight  -we will schedule EGD tomorrow for further evaluation  Prep and procedure explained  -close hemodynamic monitoring; please notify GI team if any hemodynamic instability, e/o GI bleeding, or drop in Hgb refractory to transfusion    2  RLL PNA  Pt with shaking chills at oncology office  Temp 99 7 on arrival, no hypotension or tachycardia   WBC elevated at 14  CT showed right lower lobe infiltrate suspicious for pneumonia otherwise unremarkable  Blood cx drawn and pt started on antibiotics  O2 saturation 99% on room air   -treatment per primary team        Thank you for the consultation  Case discussed with Dr Ronnie Juarez      ______________________________________________________________________    HPI:  Rob Faith is a 72 y o  female with past medical history of DM, HTN, gastric bypass 2016, PUD, rectal cancer s/p resection with colostomy and chemotherapy, and AFib on Eliquis who presented to [facility] from her oncologist's office today because of melena in her ostomy bag  Patient noted her ostomy output increased in frequency and was darker in color starting Monday  She reports a history of anastomotic ulcer which was NSAID induced in the past, but denies any further NSAID use since that time  She is on PPI daily but recently ran out for a few days prior to getting refill  Denies any frequent heartburn, nausea/vomiting, abdominal pain  In the ED she was hemodynamically stable  Hemoglobin was noted to be 10 6, down from 11 7 yesterday  She has yellow large amount of melena in ostomy bag  BUN elevated at 31  CT abdomen pelvis showed right lower lobe infiltrate in keeping with pneumonia, left lower quadrant ostomy with unchanged large left parastomal bowel containing hernia not causing obstruction or other complication, status post gastric bypass without apparent complication  She was given fluids, Protonix bolus, and started on IV antibiotics  Last EGD/colonoscopy was in 2018  Colonoscopy showed cecum and ileocecal valve normal mucosa, 5 mm polyp at the splenic flexure removed with cold snare, few sigmoid diverticula, and in the distal rectum just above the dentate line there was large semi circumferential ulcerated mass  Splenic flexure polyp was tubulovillous adenoma negative for high-grade dysplasia    Rectal mass showed high-grade dysplasia/intramucosal carcinoma arising in a tubulovillous adenoma, suspicious for invasion  EGD showed small hiatal hernia, status post gastric bypass surgery with erythema noted in the body, and very deep large ulcer with necrotic appearing base at the gastric enteric anastomosis  REVIEW OF SYSTEMS:    CONSTITUTIONAL: Denies any fever, chills, rigors, and weight loss  HEENT: No earache or tinnitus  Denies hearing loss or visual disturbances  CARDIOVASCULAR: No chest pain or palpitations  RESPIRATORY: Denies any cough, hemoptysis, shortness of breath or dyspnea on exertion  GASTROINTESTINAL: As noted in the History of Present Illness  GENITOURINARY: No problems with urination  Denies any hematuria or dysuria  NEUROLOGIC: No dizziness or vertigo, denies headaches  MUSCULOSKELETAL: Denies any muscle or joint pain  SKIN: Denies skin rashes or itching  ENDOCRINE: Denies excessive thirst  Denies intolerance to heat or cold  PSYCHOSOCIAL: Denies depression or anxiety  Denies any recent memory loss         Historical Information   Past Medical History:   Diagnosis Date    Acute pain of right knee     Ambulatory dysfunction     Anemia     Arthritis     Cancer (HCC)     rectal    Chronic lower back pain     Chronic pain disorder     back pain    Colon cancer ([place] [address] ) 2018    Diabetes mellitus ([place] [address] )     Endometrial cancer ([place] [address] ) 2018    GERD (gastroesophageal reflux disease)     History of chemotherapy     History of MRSA infection 0719/2016    Morbid obesity ([place] [address] )     Morbid obesity with BMI of 45 0-49 9, adult ([place] [address] )     Ovarian cancer ([place] [address] ) 2018    Paroxysmal atrial fibrillation (HCC)     Rectal cancer (HCC)     S/P TAVR (transcatheter aortic valve replacement)     SOB (shortness of breath)     Spinal stenosis     Systolic murmur     Unsteady gait     uses walker    Wears dentures     Wears glasses      Past Surgical History:   Procedure Laterality Date    ABDOMINAL PERINEAL BOWEL RESECTION W/ ILEOANAL POUCH N/A 2018    Procedure: LAPAROSCOPIC HAND ASSIST ABDOMINOPERINEAL RESECTION,  POSTERIOR VAGINECTOMY, OMENTECTOMY;  Surgeon: Marisel Sandoval MD;  [facility];  Service: Colorectal    ABDOMINAL SURGERY      abscess removed from abdomen and right thigh, a hole in thigh closed by plastic surgeon    ABSCESS DRAINAGE      abd, (R) leg, (L) leg     SECTION       SECTION      CYSTOSCOPY N/A 2018    Procedure: César Castillo;  Surgeon: Tamara Brannon MD;  [facility];  Service: Gynecology Oncology    ESOPHAGOGASTRODUODENOSCOPY N/A 2016    Procedure: ESOPHAGOGASTRODUODENOSCOPY (EGD); Surgeon: Elina Moses MD;  [facility];  Service:     ESOPHAGOGASTRODUODENOSCOPY N/A 3/30/2016    Procedure: ESOPHAGOGASTRODUODENOSCOPY (EGD); Surgeon: Elina Moses MD;  [facility]; Service:     EYE SURGERY      laser eye surgery    HYSTERECTOMY N/A 2018    Procedure: RADICAL HYSTERECTOMY TOTAL ABDOMINAL (ALEXANDRA)  BSO;  Surgeon: Tamara Brannon MD;  [facility];  Service: Gynecology Oncology    JOINT REPLACEMENT Left 2017    hip    JOINT REPLACEMENT Right 2017    hip    OOPHORECTOMY Bilateral     AR COLONOSCOPY FLX DX W/COLLJ SPEC WHEN PFRMD N/A 3/9/2018    Procedure: COLONOSCOPY;  Surgeon: Dev Nunez MD;  Location: [place] [address] GI LAB; Service: Gastroenterology    AR COLONOSCOPY FLX DX W/COLLJ [facility] WHEN PFRMD N/A 2018    Procedure: COLONOSCOPY;  Surgeon: Marisel Sandoval MD;  [facility]; Service: Colorectal    AR ECHO TRANSESOPHAG R-T 2D W/PRB IMG ACQUISJ I&R N/A 2020    Procedure: TRANSESOPHAGEAL ECHOCARDIOGRAM (THO); Surgeon: Kenisha Teague DO;  [facility];  Service: Cardiac Surgery    AR ESOPHAGOGASTRODUODENOSCOPY TRANSORAL DIAGNOSTIC N/A 2018    Procedure: ESOPHAGOGASTRODUODENOSCOPY (EGD);   Surgeon: Dev Nunez MD;  Location: [facility] GI [facility]; Service: Gastroenterology    WV LAP GASTRIC BYPASS/SOLEDAD-EN-Y N/A 7/18/2016    Procedure: BYPASS GASTRIC  SOLEDAD-EN-Y LAPAROSCOPIC;  Surgeon: Yazmin Lal MD;  [facility];  Service: Bariatrics    WV LAP, SURG COLOSTOMY N/A 9/6/2018    Procedure: PERMANENT END COLOSTOMY;  Surgeon: Dionne Burkitt, MD;  [facility];  Service: Colorectal    W[place] LAP, SURG PROCTECTOMY W COLOSTOMY N/A 9/6/2018    Procedure: PROCTECTOMY;  Surgeon: Dionne Burkitt, MD;  [facility];  Service: Colorectal    WV REPLACE AORTIC VALVE OPENFEMORAL ARTERY APPROACH N/A 9/1/2020    Procedure: REPLACEMENT AORTIC VALVE TRANSCATHETER (TAVR) TRANSFEMORAL W/ 26MM WADDELL BARBARA S3 ULTRA VALVE(ACCESS ON RIGHT);   Surgeon: Sebas Karimi DO;  [facility];  Service: Cardiac Surgery    REPLACEMENT TOTAL KNEE      TUBAL LIGATION       Social History   Social History     Substance and Sexual Activity   Alcohol Use Not Currently     Social History     Substance and Sexual Activity   Drug Use Not Currently    Types: Oxycodone    Comment: Percocet for lower back pain prn     Social History     Tobacco Use   Smoking Status Former Smoker    Packs/day: 1 00    Years: 25 00    Pack years: 25 00    Quit date: 3/30/2006    Years since quitting: 15 4   Smokeless Tobacco Never Used     Family History   Adopted: Yes   Problem Relation Age of Onset    Leukemia Mother     Heart disease Father     Coronary artery disease Father     Diabetes Father     No Known Problems Sister     No Known Problems Brother     Diabetes Son     No Known Problems Brother     No Known Problems Brother     No Known Problems Sister     No Known Problems Sister        Meds/Allergies     (Not in a hospital admission)    Current Facility-Administered Medications   Medication Dose Route Frequency    azithromycin (ZITHROMAX) 500 mg in sodium chloride 0 9% 250mL IVPB 500 mg  500 mg Intravenous Once    sodium chloride 0 9 % bolus 500 mL 500 mL Intravenous Once       Allergies   Allergen Reactions    Tramadol Other (See Comments)     Dizzy             Objective     Blood pressure 120/65, pulse 66, temperature 99 7 °F (37 6 °C), temperature source Tympanic, resp  rate 20, SpO2 99 %, not currently breastfeeding  There is no height or weight on file to calculate BMI  Intake/Output Summary (Last 24 hours) at 8/19/2021 1538  Last data filed at 8/19/2021 1500  Gross per 24 hour   Intake 200 ml   Output --   Net 200 ml         PHYSICAL EXAM:      General Appearance:   Alert, cooperative, no distress   HEENT:   Normocephalic, atraumatic, anicteric  Neck:  Supple, symmetrical, trachea midline   Lungs:   Clear to auscultation bilaterally; no rales, rhonchi or wheezing; respirations unlabored    Heart[de-identified]   Regular rate and rhythm; no murmur, rub, or gallop     Abdomen:   Soft, non-tender, non-distended; normal bowel sounds; no masses, no organomegaly; large amount of liquid melena present in ostomy bag   Genitalia:   Deferred    Rectal:   Deferred    Extremities:  No cyanosis, clubbing or edema    Pulses:  2+ and symmetric all extremities    Skin:  No jaundice, rashes, or lesions    Lymph nodes:  No palpable cervical lymphadenopathy        Lab Results:   Admission on 08/19/2021   Component Date Value    Color, UA 08/19/2021 Yellow     Clarity, UA 08/19/2021 Clear     Specific Gravity, UA 08/19/2021 1 020     pH, UA 08/19/2021 5 5     Leukocytes, UA 08/19/2021 Moderate*    Nitrite, UA 08/19/2021 Negative     Protein, UA 08/19/2021 Negative     Glucose, UA 08/19/2021 Negative     Ketones, UA 08/19/2021 Negative     Urobilinogen, UA 08/19/2021 0 2     Bilirubin, UA 08/19/2021 Negative     Blood, UA 08/19/2021 Negative     WBC 08/19/2021 14 52*    RBC 08/19/2021 4 45     Hemoglobin 08/19/2021 10 6*    Hematocrit 08/19/2021 35 0     MCV 08/19/2021 79*    MCH 08/19/2021 23 8*    MCHC 08/19/2021 30 3*    RDW 08/19/2021 16 0*    MPV 08/19/2021 10 2     Platelets 22/33/6228 162     Sodium 08/19/2021 139     Potassium 08/19/2021 4 5     Chloride 08/19/2021 104     CO2 08/19/2021 25     ANION GAP 08/19/2021 10     BUN 08/19/2021 31*    Creatinine 08/19/2021 1 02     Glucose 08/19/2021 164*    Calcium 08/19/2021 8 5     Corrected Calcium 08/19/2021 9 2     AST 08/19/2021 14     ALT 08/19/2021 20     Alkaline Phosphatase 08/19/2021 61     Total Protein 08/19/2021 6 8     Albumin 08/19/2021 3 1*    Total Bilirubin 08/19/2021 0 53     eGFR 08/19/2021 58     Lipase 08/19/2021 62*    Protime 08/19/2021 14 9*    INR 08/19/2021 1 18     PTT 08/19/2021 29     ABO Grouping 08/19/2021 B     Rh Factor 08/19/2021 Positive     Antibody Screen 08/19/2021 Negative     Specimen Expiration Date 08/19/2021 [id number]     LACTIC ACID 08/19/2021 2 3*    Troponin I 08/19/2021 <0 02     NT-proBNP 08/19/2021 464*    Segmented % 08/19/2021 77*    Bands % 08/19/2021 11*    Lymphocytes % 08/19/2021 6*    Monocytes % 08/19/2021 6     Eosinophils, % 08/19/2021 0     Basophils % 08/19/2021 0     Absolute Neutrophils 08/19/2021 12 78*    Lymphocytes Absolute 08/19/2021 0 87     Monocytes Absolute 08/19/2021 0 87     Eosinophils Absolute 08/19/2021 0 00     Basophils Absolute 08/19/2021 0 00     RBC Morphology 08/19/2021 Present     Polychromasia 08/19/2021 Present     Platelet Estimate 38/44/2959 Adequate     LACTIC ACID 08/19/2021 2 3*    RBC, UA 08/19/2021 None Seen     WBC, UA 08/19/2021 10-20*    Epithelial Cells 08/19/2021 Occasional     Bacteria, UA 08/19/2021 Occasional     WBC Clumps 08/19/2021 few        Imaging Studies: I have personally reviewed pertinent imaging studies

## 2021-08-19 NOTE — ASSESSMENT & PLAN NOTE
Patient is status post gastric bypass in 2016 with Dr Luis Angel Wharton  Reports that she has gained weight over the past year secondary to pandemic/lack of ability to get out due to quarantine Ng  Stated that she is determined to start to lose weight again  Supportive care

## 2021-08-19 NOTE — ASSESSMENT & PLAN NOTE
Sepsis present on admission as evidence by leukocytosis, WBCs 14 52, lactic acid elevated 2 3, low-grade fever, suspect may be secondary to community-acquired pneumonia as seen on imaging of CT scan right lower lobe infiltrate  Treated with normal saline boluses 500 cc x2 in ED  Lactic acid normalized after saline bolus  Urine antigens negative  Procalcitonin elevated to 2 97  Sputum g stain C&S if able  Blood cultures no growth to date  Patient started on ceftriaxone and azithromycin on admission    Will discontinue Zithromax, continue Rocephin, will treat for 5 days total     Results from last 7 days   Lab Units 08/20/21  0543 08/19/21  1747 08/19/21  1334 08/19/21  1150   LACTIC ACID mmol/L  --  2 0 2 3* 2 3*   PROCALCITONIN ng/ml 2 97*  --   --   --      Results from last 7 days   Lab Units 08/20/21  0543 08/19/21  1150 08/18/21  0838   WBC Thousand/uL 8 10 14 52* 5 55   TOTAL NEUT ABS Thousand/uL  --  12 78*  --    BANDS PCT %  --  11*  --

## 2021-08-19 NOTE — ASSESSMENT & PLAN NOTE
Patient has a history of atrial fibrillation with rapid ventricular response  Continue home medication amiodarone 100 mg p o  Daily    Continue metoprolol  Holding Eliquis due to GI bleeding  Currently sinus rhythm  Optimize electrolytes

## 2021-08-19 NOTE — ASSESSMENT & PLAN NOTE
Bandemia present on admission most likely secondary to community-acquired pneumonia right lower lobe  WBCs 14 52 with bands of 11  Treatment as noted above with ceftriaxone and azithromycin  Follow-up on urine cultures  Monitor fever  Follow-up on CBC  Rosella Goldmann

## 2021-08-19 NOTE — ASSESSMENT & PLAN NOTE
Patient is status post gastric bypass in 2016 with Dr Ramesh Martino  Reports that she has gained weight over the past year secondary to pandemic/lack of ability to get out due to quarantine Ng  Stated that she is determined to start to lose weight again  Supportive care

## 2021-08-19 NOTE — ASSESSMENT & PLAN NOTE
Lactic acidosis present on admission as evidence by lactic acid 2 3, continues on 2 3  Patient did receive 2 boluses of normal saline 500 cc each  Repeat lactic acid ordered

## 2021-08-19 NOTE — ASSESSMENT & PLAN NOTE
Bandemia present on admission most likely secondary to community-acquired pneumonia right lower lobe  WBCs 14 52 with bands of 11  Treatment as noted above with ceftriaxone and azithromycin  Follow-up on urine cultures  Monitor fever  Follow-up on CBC  Larayne Chroman

## 2021-08-19 NOTE — ASSESSMENT & PLAN NOTE
Patient has a history of atrial fibrillation with rapid ventricular response  Continue home medication amiodarone 100 mg p o  Daily    Currently sinus rhythm

## 2021-08-19 NOTE — ASSESSMENT & PLAN NOTE
Lactic acidosis present on admission as evidence by lactic acid 2 3, continues on 2 3  Patient did receive 2 boluses of normal saline 500 cc each    Repeat lactic acid normalized

## 2021-08-19 NOTE — H&P
[place] 45  H&P- Angel Cost 1955, 72 y o  female MRN: [id number]  Unit/Bed#: ED 06 Encounter: [id number]  Primary Care Provider: GEORGETTE Broderick   Date and time admitted to hospital: 8/19/2021 11:16 AM    * Sepsis [facility])  Assessment & Plan  Sepsis present on admission as evidence by leukocytosis, WBCs 14 52, lactic acid elevated 2 3, low-grade fever, suspect may be secondary to community-acquired pneumonia as seen on imaging of CT scan right lower lobe infiltrate  Treated with normal saline boluses 500 cc x2 in ED  Patient started on ceftriaxone and azithromycin  Urine antigens ordered  Procalcitonin for a m  Ordered  Repeat lactic acid ordered  Sputum g stain C&S if able  Monitor  Lactic acidosis  Assessment & Plan  Lactic acidosis present on admission as evidence by lactic acid 2 3, continues on 2 3  Patient did receive 2 boluses of normal saline 500 cc each  Repeat lactic acid ordered  Bandemia  Assessment & Plan  Bandemia present on admission most likely secondary to community-acquired pneumonia right lower lobe  WBCs 14 52 with bands of 11  Treatment as noted above with ceftriaxone and azithromycin  Follow-up on urine cultures  Monitor fever  Follow-up on CBC         Community acquired pneumonia of right lower lobe of lung  Assessment & Plan  Community-acquired pneumonia right lower lobe suspected as patient had CT of abdomen pelvis which indicated right lower lobe infiltrate in keeping with pneumonia as per radiology report  Patient does admit to chills  Temp currently 99 7°  Blood cultures x2 drawn  Patient started on ceftriaxone and azithromycin  Urine antigens ordered, follow-up on results and consider deescalation of antibiotics if negative  Procalcitonin in a m     WBC noted to be elevated 14 52 with positive bands of 11, previous days WBC 5 55  Nebulizers p r n     Lungs clear on auscultation  Reports occasional cough, no expectoration    If able to obtain sputum please sent for Gram stain C&S  Repeat CBC in a m  And monitor fever trend  GI bleed  Assessment & Plan  Suspected GI bleed present on admission as evidence by high output of black liquid stool through colostomy  Patient reports that Monday previous to admission she noticed to have increased output from the colostomy, reports that normally she has bowel movement overnight and then for the rest the day minimal output  She also noted that the stool darkened in color, attributed to possibly taking calcium  She had blood work drawn the day prior to admission as she was to see her oncologist Dr Kellen Garrett in the office  On morning of admission patient was at Dr Darwin Diaz office, complained of chills and required a blanket  Patient was advised to present to the emergency department for further evaluation treatment  Previous days hemoglobin was noted to be 11 7, 10 6 on admission  Continues with high output from colostomy, liquid black stool  GI consulted, started on Protonix drip  Clear liquid diet for tonight then NPO after midnight in anticipation of scope  Patient denies any recent NSAID use, does report a history 80 marginal ulcer secondary to NSAID use in 2018  Patient indicated that approximately 2 weeks ago she was out of her Prilosec for about 4 days, but then has been taking it routinely  Serial H&Hs q 6 hours  Hemodynamically stable at this time  Monitor closely      Chronic diastolic heart failure [facility])  Assessment & Plan  Wt Readings from Last 3 Encounters:   08/19/21 127 kg (280 lb)   07/21/21 125 kg (276 lb)   06/24/21 128 kg (282 lb 12 8 oz)     Intake and output, daily weights ordered  Continue patient's home medication torsemide 10 mg daily  When diet is resumed heart healthy diet    Reinforced need for taking diuretics as prescribed by physician  Monitor        Aortic stenosis, severe  Assessment & Plan  Patient has a history of severe aortic stenosis, status post TAVR   Monitor fluid status closely  Patient will be NPO after midnight for EGD with GI  Will give gentle IV hydration normal saline 50 cc an hour while NPO  Patient reports that she is not consistent in taking her torsemide, will continue torsemide 10 mg daily  S/P TAVR (transcatheter aortic valve replacement)  Assessment & Plan  As noted above patient is status post TAVR secondary to aortic stenosis  Plan as noted above  Atrial fibrillation with rapid ventricular response [facility])  Assessment & Plan  Patient has a history of atrial fibrillation with rapid ventricular response  Continue home medication amiodarone 100 mg p o  Daily  Currently sinus rhythm    Hyperlipidemia  Assessment & Plan  Patient has a history of hyperlipidemia, will continue Lipitor 20 mg p o  Daily  Morbid obesity due to excess calories [facility])  Assessment & Plan  Patient is status post gastric bypass in 2016 with Dr Chet Guzmán  Reports that she has gained weight over the past year secondary to pandemic/lack of ability to get out due to quarantine Ng  Stated that she is determined to start to lose weight again  Supportive care  VTE Prophylaxis: Pharmacologic VTE Prophylaxis contraindicated due to GI bleed  / sequential compression device   Code Status:  Full code discussed with patient at bedside  POLST: POLST is not applicable to this patient  Discussion with family:  I spoke with the patient at the bedside, I have answered all questions to the best of my abilities  Anticipated Length of Stay:  Patient will be admitted on an Inpatient basis with an anticipated length of stay of  greater than  2 midnights  Justification for Hospital Stay:  Suspected GI bleed, serial hemoglobins q 6 hours, NPO after midnight, EGD in a m , treatment of CAPD with IV antibiotics, procalcitonin in a m , follow-up urine antigens  Total Time for Visit, including Counseling / Coordination of Care: 1 hour    Greater than 50% of this total time spent on direct patient counseling and coordination of care  Chief Complaint:   Chills, high output of black liquid stool from colostomy starting this past Monday    History of Present Illness:    Martha Sharpe is a 72 y o  female past medical history of rectal cancer status post neoadjuvant therapy with resection, colostomy, ovarian CA, AFib on Eliquis, aortic stenosis status post TAVR, gastric bypass 2016, marginal ulcer secondary to NSAID use in 2018, anemia, chronic lower back pain, borderline diabetes, GERD, and morbid obesity who presents with complaints of 2 days of upper abdominal discomfort, nausea, liquid black stool in colostomy bag, complaints of chills and lightheadedness in doctor's office on morning of admission  Patient reported that the day prior to admission she went and got her blood work drawn as she was going to be seeing her oncologist in the office  On morning of admission she was in the oncologist's office when she experienced severe chills and required a blanket  Her oncologist instructed her to present to the emergency department for further evaluation and treatment  In the ED patient was seen,, chest x-ray was performed which showed no cardiopulmonary disease  CT of abdomen pelvis did indicate that patient has a right lower lobe infiltrate in keeping with pneumonia, left lower quadrant ostomy unchanged large left parastomal bowel containing hernia not causing obstruction or other complications as per radiology report  Blood cultures x2 were drawn  Patient was started on azithromycin and ceftriaxone  Urine antigens will be sent and procalcitonin will be drawn in a m  Consider deescalation of antibiotics pending results of urine antigens  It was noted that the patient's hemoglobin had dropped from 11 7-10 6 from the previous day's lab work  Also noted to have a jump in her white blood cell count from 5 55-14 52  She was also noted to have positive bands    BUN noted to be elevated at 31     GI was consulted, patient is placed on Protonix drip at this time  Clear liquid diet for this evening, then NPO after midnight  Patient will receive gentle IV hydration with normal saline at 50 cc an hour as she has a history of aortic stenosis status post TAVR, and has not been compliant with taking her Demadex 10 mg on a daily basis  Plan is for EGD in a   Marybeth Early Serial  Hemoglobin/ hematocrits q 6 hours ordered  Patient also was noted to have elevated lactic acid 2 3 on admission, did receive bolus of normal saline 500 cc, repeat lactic acid remained at 2 3, receive 2nd bolus 500 cc normal saline     Will repeat additional lactic acid  Patient is nontoxic appearing and hemodynamically stable , and will be admitted to the [facility] floor for further evaluation and treatment  This was discussed with the patient at the bedside, she verbalized understanding and is agreeable to the plan  Code status was also discussed with the patient, she indicated that she is to be a full code  Review of Systems:    Review of Systems   Constitutional: Positive for appetite change and chills  Negative for fever  HENT: Negative  Eyes: Negative  Respiratory:        Occasional cough   Cardiovascular: Negative for chest pain  Gastrointestinal: Positive for abdominal distention, abdominal pain and diarrhea  High output from colostomy, liquid black stool   Endocrine: Negative  Genitourinary: Negative  Musculoskeletal: Positive for back pain  Skin: Negative  Allergic/Immunologic: Negative  Neurological: Positive for light-headedness  Hematological: Negative  Psychiatric/Behavioral: Negative          Past Medical and Surgical History:     Past Medical History:   Diagnosis Date    Acute pain of right knee     Ambulatory dysfunction     Anemia     Arthritis     Cancer (HCC)     rectal    Chronic lower back pain     Chronic pain disorder     back pain    Colon cancer ([facility] [address] ) 2018  Diabetes mellitus ([facility] )     Endometrial cancer ([place] [address] )     GERD (gastroesophageal reflux disease)     History of chemotherapy     History of MRSA infection     Morbid obesity ([place] [address] )     Morbid obesity with BMI of 45 0-49 9, adult (HCC)     Ovarian cancer ([place] [address] )     Paroxysmal atrial fibrillation (HCC)     Rectal cancer (HCC)     S/P TAVR (transcatheter aortic valve replacement)     SOB (shortness of breath)     Spinal stenosis     Systolic murmur     Unsteady gait     uses walker    Wears dentures     Wears glasses        Past Surgical History:   Procedure Laterality Date    ABDOMINAL PERINEAL BOWEL RESECTION W/ ILEOANAL POUCH N/A 2018    Procedure: LAPAROSCOPIC HAND ASSIST ABDOMINOPERINEAL RESECTION,  POSTERIOR VAGINECTOMY, OMENTECTOMY;  Surgeon: Tonya Thurman MD;  [facility];  Service: Colorectal    ABDOMINAL SURGERY      abscess removed from abdomen and right thigh, a hole in thigh closed by plastic surgeon    ABSCESS DRAINAGE      abd, (R) leg, (L) leg     SECTION       SECTION      CYSTOSCOPY N/A 2018    Procedure: CYSTOSCOPY;  Surgeon: Jayda Solomon MD;  [facility];  Service: Gynecology Oncology    ESOPHAGOGASTRODUODENOSCOPY N/A 2016    Procedure: ESOPHAGOGASTRODUODENOSCOPY (EGD); Surgeon: Orlin Hendricks MD;  [facility];  Service:     ESOPHAGOGASTRODUODENOSCOPY N/A 3/30/2016    Procedure: ESOPHAGOGASTRODUODENOSCOPY (EGD); Surgeon: Orlin Hendricks MD;  [facility]; Service:     EYE SURGERY      laser eye surgery    HYSTERECTOMY N/A 2018    Procedure: RADICAL HYSTERECTOMY TOTAL ABDOMINAL (ALEXANDRA)   BSO;  Surgeon: Jayda Solomon MD;  [facility];  Service: Gynecology Oncology    JOINT REPLACEMENT Left 2017    hip    JOINT REPLACEMENT Right 2017    hip    OOPHORECTOMY Bilateral     NH COLONOSCOPY FLX DX W/COLLJ SPEC WHEN PFRMD N/A 3/9/2018    Procedure: COLONOSCOPY;  Surgeon: Yamini Simeon MD;  Location: [place] [address] GI LAB; Service: Gastroenterology    MO COLONOSCOPY FLX DX W/COLLJ [facility] WHEN PFRMD N/A 9/5/2018    Procedure: COLONOSCOPY;  Surgeon: Tonya Thurman MD;  [facility]; Service: Colorectal    MO ECHO TRANSESOPHAG R-T 2D W/PRB IMG ACQUISJ I&R N/A 9/1/2020    Procedure: TRANSESOPHAGEAL ECHOCARDIOGRAM (THO); Surgeon: Michael Paulino DO;  [facility];  Service: Cardiac Surgery    MO ESOPHAGOGASTRODUODENOSCOPY TRANSORAL DIAGNOSTIC N/A 2/2/2018    Procedure: ESOPHAGOGASTRODUODENOSCOPY (EGD); Surgeon: Yamini Simeon MD;  Location: [facility] GI LAB; Service: Gastroenterology    MO LAP GASTRIC BYPASS/SOLEDAD-EN-Y N/A 7/18/2016    Procedure: BYPASS GASTRIC  SOLEDAD-EN-Y LAPAROSCOPIC;  Surgeon: Orlin Hendricks MD;  [facility];  Service: Bariatrics    MO LAP, SURG COLOSTOMY N/A 9/6/2018    Procedure: PERMANENT END COLOSTOMY;  Surgeon: Tonya Thurman MD;  [facility];  Service: Colorectal    MO LAP, SURG PROCTECTOMY W COLOSTOMY N/A 9/6/2018    Procedure: PROCTECTOMY;  Surgeon: Tonya Thurman MD;  [facility];  Service: Colorectal    MO REPLACE AORTIC VALVE OPENFEMORAL ARTERY APPROACH N/A 9/1/2020    Procedure: REPLACEMENT AORTIC VALVE TRANSCATHETER (TAVR) TRANSFEMORAL W/ 26MM WADDELL BARBARA S3 ULTRA VALVE(ACCESS ON RIGHT); Surgeon: Michael Paulino DO;  [facility];  Service: Cardiac Surgery    REPLACEMENT TOTAL KNEE      TUBAL LIGATION         Meds/Allergies:    Prior to Admission medications    Medication Sig Start Date End Date Taking?  Authorizing Provider   albuterol (2 5 mg/3 mL) 0 083 % nebulizer solution Take 1 vial (2 5 mg total) by nebulization every 4 (four) hours as needed for wheezing or shortness of breath 8/13/20  Yes Otilia Bryant MD   amiodarone 100 mg tablet Take 1 tablet (100 mg total) by mouth daily 6/21/21  Yes GEORGETTE Mathur   anastrozole (ARIMIDEX) 1 mg tablet Take 1 tablet (1 mg total) by mouth daily 12/12/19  Yes Jimmy Benito, DO   apixaban (ELIQUIS) 5 mg Take 1 tablet (5 mg total) by mouth 2 (two) times a day 3/11/20  Yes Graeme Narvaez, DO   aspirin 81 mg chewable tablet Chew 1 tablet (81 mg total) daily 9/3/20  Yes Jen Stout PA-C   atorvastatin (LIPITOR) 20 mg tablet Take 1 tablet (20 mg total) by mouth daily 7/21/21  Yes GEORGETTE Mendoza   Calcium-Vitamin D 500-125 MG-UNIT TABS Take 1 tablet by mouth 2 (two) times a day    Yes Historical Provider, MD   Cyanocobalamin (B-12 PO) Take by mouth    Yes Historical Provider, MD   ergocalciferol (VITAMIN D2) 50,000 units Take 1 capsule (50,000 Units total) by mouth 2 (two) times a week with meals 10/5/20  Yes Liana Price PA-C   ferrous sulfate 324 (65 Fe) mg Take 1 tablet (324 mg total) by mouth daily before breakfast 11/5/18  Yes Lauryn Benoit, DO   meclizine (ANTIVERT) 25 mg tablet Take 1 tablet (25 mg total) by mouth every 8 (eight) hours as needed for dizziness 11/7/20  Yes Mundo Fletcher, DO   metoprolol succinate (TOPROL-XL) 25 mg 24 hr tablet Take 1 tablet (25 mg total) by mouth daily 4/30/21  Yes Caro Dallas, DO   Multiple Vitamins-Minerals (BARIATRIC FUSION) CHEW Chew 1 tablet daily     Yes Historical Provider, MD   omeprazole (PriLOSEC) 20 mg delayed release capsule Take 1 capsule (20 mg total) by mouth daily 7/30/21  Yes GEORGETTE Mendoza   oxyCODONE (ROXICODONE) 10 MG TABS Take 10 mg by mouth every 6 (six) hours as needed   9/15/18  Yes Historical Provider, MD   potassium chloride (K-DUR,KLOR-CON) 20 mEq tablet Take 1 tablet (20 mEq total) by mouth daily 6/21/21  Yes GEORGETTE Almonte   torsemide (DEMADEX) 20 mg tablet Take 0 5 tablets (10 mg total) by mouth daily 6/21/21  Yes GEORGETTE Almonte   nystatin (MYCOSTATIN) cream Apply topically 2 (two) times a day  Patient not taking: Reported on 8/19/2021 1/11/21   Caro Dalals DO     I have reviewed home medications with patient personally      Allergies: Allergies   Allergen Reactions    Tramadol Other (See Comments)     Dizzy         Social History:     Marital Status: Single   Occupation: retired   Patient Pre-hospital Living Situation: home alone  Patient Pre-hospital Level of Mobility:  Uses a rolling walker  Patient Pre-hospital Diet Restrictions:  None  Substance Use History:   Social History     Substance and Sexual Activity   Alcohol Use Not Currently     Social History     Tobacco Use   Smoking Status Former Smoker    Packs/day: 1 00    Years: 25 00    Pack years: 25 00    Quit date: 3/30/2006    Years since quitting: 15 4   Smokeless Tobacco Never Used     Social History     Substance and Sexual Activity   Drug Use Not Currently    Types: Oxycodone    Comment: Percocet for lower back pain prn       Family History:    Family History   Adopted: Yes   Problem Relation Age of Onset    Leukemia Mother     Heart disease Father     Coronary artery disease Father     Diabetes Father     No Known Problems Sister     No Known Problems Brother     Diabetes Son     No Known Problems Brother     No Known Problems Brother     No Known Problems Sister     No Known Problems Sister        Physical Exam:     Vitals:   Blood Pressure: 120/65 (08/19/21 1507)  Pulse: 66 (08/19/21 1507)  Temperature: 99 7 °F (37 6 °C) (08/19/21 1107)  Temp Source: Tympanic (08/19/21 1107)  Respirations: 20 (08/19/21 1507)  SpO2: 99 % (08/19/21 1507)    Physical Exam  Vitals and nursing note reviewed  Constitutional:       General: She is not in acute distress  Appearance: She is obese  HENT:      Head: Normocephalic  Nose: Nose normal       Mouth/Throat:      Mouth: Mucous membranes are dry  Eyes:      Extraocular Movements: Extraocular movements intact  Pupils: Pupils are equal, round, and reactive to light  Cardiovascular:      Rate and Rhythm: Normal rate and regular rhythm  Heart sounds: Murmur heard       Pulmonary:      Effort: Pulmonary effort is normal       Breath sounds: Normal breath sounds  Abdominal:      General: Bowel sounds are normal       Palpations: Abdomen is soft  Tenderness: There is no abdominal tenderness  Comments: Abdomen obese, colostomy present left lower quadrant, high output of black liquid stool  Hernia present in parastomal region  Genitourinary:     Comments: Voiding spontaneously  Musculoskeletal:      Cervical back: Normal range of motion  Comments: Plus one bilateral lower extremity edema noted  Skin:     General: Skin is warm and dry  Capillary Refill: Capillary refill takes less than 2 seconds  Neurological:      General: No focal deficit present  Mental Status: She is alert and oriented to person, place, and time  Psychiatric:         Mood and Affect: Mood normal          Behavior: Behavior normal          Thought Content: Thought content normal          Judgment: Judgment normal                Additional Data:     Lab Results: I have personally reviewed pertinent reports  Results from last 7 days   Lab Units 08/19/21  1647 08/19/21  1150 08/18/21  0838   WBC Thousand/uL  --  14 52* 5 55   HEMOGLOBIN g/dL 9 5* 10 6* 11 7   HEMATOCRIT % 31 2* 35 0 39 7   PLATELETS Thousands/uL  --  162 158   NEUTROS PCT %  --   --  72   LYMPHS PCT %  --   --  16   LYMPHO PCT %  --  6*  --    MONOS PCT %  --   --  8   MONO PCT %  --  6  --    EOS PCT %  --  0 2     Results from last 7 days   Lab Units 08/19/21  1155   POTASSIUM mmol/L 4 5   CHLORIDE mmol/L 104   CO2 mmol/L 25   BUN mg/dL 31*   CREATININE mg/dL 1 02   CALCIUM mg/dL 8 5   ALK PHOS U/L 61   ALT U/L 20   AST U/L 14     Results from last 7 days   Lab Units 08/19/21  1150   INR  1 18               Imaging: I have personally reviewed pertinent reports  CT abdomen pelvis wo contrast   Final Result by Amalia Ramesh MD (08/19 1340)      Right lower lobe infiltrate in keeping with pneumonia        Left lower quadrant ostomy with an unchanged large left parastomal bowel containing hernia not causing obstruction or other complication  Status post gastric bypass without apparent complication  The study was marked in [facility] for immediate notification  Workstation performed: [id number]         XR chest 1 view portable   Final Result by Isa Live MD (08/19 1422)      No acute cardiopulmonary disease  Workstation performed: [id number]             EKG, Pathology, and Other Studies Reviewed on Admission:   · EKG:  None    Allscripts / Epic Records Reviewed: Yes     ** Please Note: This note has been constructed using a voice recognition system   **

## 2021-08-19 NOTE — ASSESSMENT & PLAN NOTE
Patient has a history of severe aortic stenosis, status post TAVR  Monitor fluid status closely  Patient will be NPO after midnight for EGD with GI  Will give gentle IV hydration normal saline 50 cc an hour while NPO  Patient reports that she is not consistent in taking her torsemide, will continue torsemide 10 mg daily

## 2021-08-19 NOTE — ASSESSMENT & PLAN NOTE
Community-acquired pneumonia right lower lobe suspected as patient had CT of abdomen pelvis which indicated right lower lobe infiltrate in keeping with pneumonia as per radiology report  Patient does admit to chills  Temp currently 99 7°  Blood cultures x2 drawn  Patient started on ceftriaxone and azithromycin  Urine antigens ordered, follow-up on results and consider deescalation of antibiotics if negative  Procalcitonin in a m     WBC noted to be elevated 14 52 with positive bands of 11, previous days WBC 5 55  Nebulizers p r n     Lungs clear on auscultation  Reports occasional cough, no expectoration  If able to obtain sputum please sent for Gram stain C&S  Repeat CBC in a m  And monitor fever trend

## 2021-08-19 NOTE — ASSESSMENT & PLAN NOTE
Wt Readings from Last 3 Encounters:   08/19/21 127 kg (280 lb)   07/21/21 125 kg (276 lb)   06/24/21 128 kg (282 lb 12 8 oz)     Intake and output, daily weights ordered  Patient is on torsemide 10 mg daily  Will hold for now due to anemia  When diet is resumed heart healthy diet

## 2021-08-19 NOTE — ASSESSMENT & PLAN NOTE
Community-acquired pneumonia right lower lobe suspected as patient had CT of abdomen pelvis which indicated right lower lobe infiltrate in keeping with pneumonia as per radiology report  Reports occasional cough, no expectoration    Management as above

## 2021-08-19 NOTE — ED PROVIDER NOTES
History  Chief Complaint   Patient presents with    Diarrhea     pt has colostomy, reports runny stools since monday night  sent by Dr Tito Sahni hx AF on eliquis, AS s/p TAVR, rectal ca s/p chemo/radiation, gastric bypass 2016 Dr Jojo Hernandez, marginal ulcer diagnosed 2018 d/t NSAID use, presents with 2 days of upper abd discomfort, nausea and liquid black stool in colostomy bag  Also had some chills this AM   Sent to the ER from  [facility] office for evaluation  Mild lightheadedness this AM   Currently denies pain or dizziness  Last dose eliquis 5mg this AM           Prior to Admission Medications   Prescriptions Last Dose Informant Patient Reported? Taking?    Calcium-Vitamin D 500-125 MG-UNIT TABS 8/18/2021 at Unknown time Self Yes Yes   Sig: Take 1 tablet by mouth 2 (two) times a day    Cyanocobalamin (B-12 PO) 8/18/2021 at Unknown time Self Yes Yes   Sig: Take by mouth    Multiple Vitamins-Minerals (BARIATRIC FUSION) CHEW 8/18/2021 at Unknown time Self Yes Yes   Sig: Chew 1 tablet daily     albuterol (2 5 mg/3 mL) 0 083 % nebulizer solution 8/18/2021 at Unknown time Self No Yes   Sig: Take 1 vial (2 5 mg total) by nebulization every 4 (four) hours as needed for wheezing or shortness of breath   amiodarone 100 mg tablet 8/19/2021 at Unknown time  No Yes   Sig: Take 1 tablet (100 mg total) by mouth daily   anastrozole (ARIMIDEX) 1 mg tablet Not Taking at Unknown time Self No No   Sig: Take 1 tablet (1 mg total) by mouth daily   Patient not taking: Reported on 8/19/2021   apixaban (ELIQUIS) 5 mg 8/19/2021 at Unknown time Self No Yes   Sig: Take 1 tablet (5 mg total) by mouth 2 (two) times a day   aspirin 81 mg chewable tablet 8/19/2021 at Unknown time Self No Yes   Sig: Chew 1 tablet (81 mg total) daily   atorvastatin (LIPITOR) 20 mg tablet 8/19/2021 at Unknown time  No Yes   Sig: Take 1 tablet (20 mg total) by mouth daily   ergocalciferol (VITAMIN D2) 50,000 units 8/18/2021 at Unknown time Self No Yes   Sig: Take 1 capsule (50,000 Units total) by mouth 2 (two) times a week with meals   ferrous sulfate 324 (65 Fe) mg 8/18/2021 at Unknown time Self No Yes   Sig: Take 1 tablet (324 mg total) by mouth daily before breakfast   meclizine (ANTIVERT) 25 mg tablet Past Month at Unknown time Self No Yes   Sig: Take 1 tablet (25 mg total) by mouth every 8 (eight) hours as needed for dizziness   metoprolol succinate (TOPROL-XL) 25 mg 24 hr tablet 8/19/2021 at Unknown time Self No Yes   Sig: Take 1 tablet (25 mg total) by mouth daily   nystatin (MYCOSTATIN) cream Not Taking at Unknown time Self No No   Sig: Apply topically 2 (two) times a day   Patient not taking: Reported on 8/19/2021   omeprazole (PriLOSEC) 20 mg delayed release capsule 8/19/2021 at Unknown time  No Yes   Sig: Take 1 capsule (20 mg total) by mouth daily   oxyCODONE (ROXICODONE) 10 MG TABS Past Week at Unknown time Self Yes Yes   Sig: Take 10 mg by mouth every 6 (six) hours as needed     potassium chloride (K-DUR,KLOR-CON) 20 mEq tablet Past Week at Unknown time  No Yes   Sig: Take 1 tablet (20 mEq total) by mouth daily   torsemide (DEMADEX) 20 mg tablet 8/18/2021 at Unknown time  No Yes   Sig: Take 0 5 tablets (10 mg total) by mouth daily      Facility-Administered Medications: None       Past Medical History:   Diagnosis Date    Acute pain of right knee     Ambulatory dysfunction     Anemia     Arthritis     Cancer (HCC)     rectal    Chronic lower back pain     Chronic pain disorder     back pain    [place] cancer [place] [address] ) 2018    Diabetes mellitus ([place] [address] )     Endometrial cancer ([place] [address] ) 2018    GERD (gastroesophageal reflux disease)     History of chemotherapy     History of MRSA infection 0719/2016    Morbid obesity ([facility] )     Morbid obesity with BMI of 45 0-49 9, adult ([place] [address] )     Ovarian cancer ([place] [address] ) 2018    Paroxysmal atrial fibrillation (HCC)     Rectal cancer (HCC)     S/P TAVR (transcatheter aortic valve replacement)     SOB (shortness of breath)  Spinal stenosis     Systolic murmur     Unsteady gait     uses walker    Wears dentures     Wears glasses        Past Surgical History:   Procedure Laterality Date    ABDOMINAL PERINEAL BOWEL RESECTION W/ ILEOANAL POUCH N/A 2018    Procedure: LAPAROSCOPIC HAND ASSIST ABDOMINOPERINEAL RESECTION,  POSTERIOR VAGINECTOMY, OMENTECTOMY;  Surgeon: Staci Desir MD;  [facility];  Service: Colorectal    ABDOMINAL SURGERY      abscess removed from abdomen and right thigh, a hole in thigh closed by plastic surgeon    ABSCESS DRAINAGE      abd, (R) leg, (L) leg     SECTION       SECTION      CYSTOSCOPY N/A 2018    Procedure: CYSTOSCOPY;  Surgeon: Rosendo Kramer MD;  [facility];  Service: Gynecology Oncology    ESOPHAGOGASTRODUODENOSCOPY N/A 2016    Procedure: ESOPHAGOGASTRODUODENOSCOPY (EGD); Surgeon: Mac Alonso MD;  [facility];  Service:     ESOPHAGOGASTRODUODENOSCOPY N/A 3/30/2016    Procedure: ESOPHAGOGASTRODUODENOSCOPY (EGD); Surgeon: Mac Alonso MD;  [facility]; Service:     EYE SURGERY      laser eye surgery    HYSTERECTOMY N/A 2018    Procedure: RADICAL HYSTERECTOMY TOTAL ABDOMINAL (ALEXANDRA)  BSO;  Surgeon: Rosendo Kramer MD;  [facility];  Service: Gynecology Oncology    JOINT REPLACEMENT Left 2017    hip    JOINT REPLACEMENT Right 2017    hip    OOPHORECTOMY Bilateral     AZ COLONOSCOPY FLX DX W/COLLJ SPEC WHEN PFRMD N/A 3/9/2018    Procedure: COLONOSCOPY;  Surgeon: Loli Manrique MD;  Location: [facility] GI LAB; Service: Gastroenterology    AZ COLONOSCOPY FLX DX W/COLLJ [facility] WHEN PFRMD N/A 2018    Procedure: COLONOSCOPY;  Surgeon: Staci Desir MD;  [facility]; Service: Colorectal    AZ ECHO TRANSESOPHAG R-T 2D W/PRB IMG ACQUISJ I&R N/A 2020    Procedure: TRANSESOPHAGEAL ECHOCARDIOGRAM (THO);   Surgeon: Nan Paul DO;  [facility];  Service: Cardiac Surgery  MD ESOPHAGOGASTRODUODENOSCOPY TRANSORAL DIAGNOSTIC N/A 2/2/2018    Procedure: ESOPHAGOGASTRODUODENOSCOPY (EGD); Surgeon: Marylene Alstrom, MD;  Location: [facility] GI LAB; Service: Gastroenterology    MD LAP GASTRIC BYPASS/SOLEDAD-EN-Y N/A 7/18/2016    Procedure: BYPASS GASTRIC  SOLEDAD-EN-Y LAPAROSCOPIC;  Surgeon: Yazmin Lal MD;  [facility];  Service: Bariatrics    MD LAP, SURG COLOSTOMY N/A 9/6/2018    Procedure: PERMANENT END COLOSTOMY;  Surgeon: Dionne Burkitt, MD;  [facility];  Service: Colorectal    MD LAP, SURG PROCTECTOMY W COLOSTOMY N/A 9/6/2018    Procedure: PROCTECTOMY;  Surgeon: Dionne Burkitt, MD;  [facility];  Service: Colorectal    MD REPLACE AORTIC VALVE OPENFEMORAL ARTERY APPROACH N/A 9/1/2020    Procedure: REPLACEMENT AORTIC VALVE TRANSCATHETER (TAVR) TRANSFEMORAL W/ 26MM WADDELL BARBARA S3 ULTRA VALVE(ACCESS ON RIGHT); Surgeon: Sebas Karimi DO;  [facility];  Service: Cardiac Surgery    REPLACEMENT TOTAL KNEE      TUBAL LIGATION         Family History   Adopted: Yes   Problem Relation Age of Onset    Leukemia Mother     Heart disease Father     Coronary artery disease Father     Diabetes Father     No Known Problems Sister     No Known Problems Brother     Diabetes Son     No Known Problems Brother     No Known Problems Brother     No Known Problems Sister     No Known Problems Sister      I have reviewed and agree with the history as documented      E-Cigarette/Vaping    E-Cigarette Use Never User      E-Cigarette/Vaping Substances    Nicotine No     THC No     CBD No     Flavoring No     Other No     Unknown No      Social History     Tobacco Use    Smoking status: Former Smoker     Packs/day: 1 00     Years: 25 00     Pack years: 25 00     Quit date: 3/30/2006     Years since quitting: 15 4    Smokeless tobacco: Never Used   Vaping Use    Vaping Use: Never used   Substance Use Topics    Alcohol use: Not Currently    Drug use: Not Currently     Types: Oxycodone     Comment: Percocet for lower back pain prn       Review of Systems   Constitutional: Negative for fever  Respiratory: Negative for cough  Gastrointestinal: Negative for abdominal pain  Musculoskeletal: Negative for back pain  Neurological: Negative for headaches  All other systems reviewed and are negative  Physical Exam  Physical Exam  Vitals reviewed  Constitutional:       Appearance: She is well-developed  HENT:      Head: Normocephalic and atraumatic  Right Ear: External ear normal       Left Ear: External ear normal       Nose: Nose normal  No rhinorrhea  Mouth/Throat:      Mouth: Mucous membranes are moist    Eyes:      Conjunctiva/sclera: Conjunctivae normal    Cardiovascular:      Rate and Rhythm: Normal rate and regular rhythm  Pulmonary:      Effort: Pulmonary effort is normal       Breath sounds: Normal breath sounds  No wheezing or rales  Abdominal:      Palpations: Abdomen is soft  Tenderness: There is no abdominal tenderness  Comments: Colostomy in place with liquid black stool   Musculoskeletal:      Cervical back: Neck supple  Right lower leg: No edema  Left lower leg: No edema  Skin:     General: Skin is warm and dry  Neurological:      Mental Status: She is alert and oriented to person, place, and time     Psychiatric:         Mood and Affect: Mood normal          Vital Signs  ED Triage Vitals   Temperature Pulse Respirations Blood Pressure SpO2   08/19/21 1107 08/19/21 1109 08/19/21 1109 08/19/21 1109 08/19/21 1109   99 7 °F (37 6 °C) 98 18 135/72 97 %      Temp Source Heart Rate Source Patient Position - Orthostatic VS BP Location FiO2 (%)   08/19/21 1107 08/19/21 1109 08/19/21 1109 08/19/21 1109 --   Tympanic Monitor Sitting Right arm       Pain Score       08/19/21 1224       8           Vitals:    08/20/21 1513 08/20/21 1523 08/20/21 1533 08/20/21 1549   BP: 94/55 112/76 112/69 94/62 Pulse: 63 62 68 57   Patient Position - Orthostatic VS:             Visual Acuity      ED Medications  Medications   albuterol inhalation solution 2 5 mg (has no administration in time range)   amiodarone tablet 100 mg (100 mg Oral Given 8/20/21 0830)   atorvastatin (LIPITOR) tablet 20 mg (20 mg Oral Given 8/20/21 0829)   calcium carbonate (OYSTER SHELL,OSCAL) 500 mg tablet 1 tablet (1 tablet Oral Given 8/20/21 1538)   cyanocobalamin (VITAMIN B-12) tablet 50 mcg (50 mcg Oral Given 8/20/21 0842)   ferrous sulfate tablet 325 mg (325 mg Oral Given 8/20/21 0829)   meclizine (ANTIVERT) tablet 25 mg (has no administration in time range)   metoprolol succinate (TOPROL-XL) 24 hr tablet 25 mg (25 mg Oral Given 8/20/21 0830)   multivitamin-minerals (CENTRUM) tablet 1 tablet (1 tablet Oral Given 8/20/21 0829)   oxyCODONE (ROXICODONE) immediate release tablet 10 mg (has no administration in time range)   sodium chloride 0 9 % infusion (50 mL/hr Intravenous New Bag 8/20/21 0026)   acetaminophen (TYLENOL) tablet 650 mg (has no administration in time range)   ondansetron (ZOFRAN) injection 4 mg (has no administration in time range)   cefTRIAXone (ROCEPHIN) IVPB (premix in dextrose) 1,000 mg 50 mL (1,000 mg Intravenous New Bag 8/20/21 1538)   pantoprazole (PROTONIX) EC tablet 40 mg (40 mg Oral Given 8/20/21 1654)   sucralfate (CARAFATE) tablet 1 g (1 g Oral Given 8/20/21 1655)   saccharomyces boulardii (FLORASTOR) capsule 250 mg (has no administration in time range)   pantoprazole (PROTONIX) 80 mg in sodium chloride 0 9 % 100 mL IVPB (0 mg Intravenous Stopped 8/19/21 1237)   iohexol (OMNIPAQUE) 240 MG/ML solution 50 mL (20 mL Oral Given 8/19/21 1208)   fentanyl citrate (PF) 100 MCG/2ML 50 mcg (50 mcg Intravenous Given 8/19/21 1231)   sodium chloride 0 9 % bolus 500 mL (0 mL Intravenous Stopped 8/19/21 1407)   cefTRIAXone (ROCEPHIN) IVPB (premix in dextrose) 1,000 mg 50 mL (0 mg Intravenous Stopped 8/19/21 1500)   azithromycin (ZITHROMAX) 500 mg in sodium chloride 0 9% 250mL IVPB 500 mg (0 mg Intravenous Stopped 8/19/21 1551)   sodium chloride 0 9 % bolus 500 mL (0 mL Intravenous Stopped 8/19/21 1643)       Diagnostic Studies  Results Reviewed     Procedure Component Value Units Date/Time    Blood culture #1 [[id number]] Collected: 08/19/21 1150    Lab Status: Preliminary result Specimen: Blood from Arm, Left Updated: 08/20/21 1501     Blood Culture No Growth at 24 hrs  Blood culture #2 [[id number]] Collected: 08/19/21 1150    Lab Status: Preliminary result Specimen: Blood from Arm, Right Updated: 08/20/21 1501     Blood Culture No Growth at 24 hrs  Urine culture [[id number]] Collected: 08/19/21 1321    Lab Status: Final result Specimen: Urine, Clean Catch Updated: 08/20/21 1428     Urine Culture 10,000-19,000 cfu/ml     Legionella antigen, urine [[id number]]  (Normal) Collected: 08/19/21 1321    Lab Status: Final result Specimen: Urine Updated: 08/20/21 0735     Legionella Urinary Antigen Negative    Strep Pneumoniae, Urine [[id number]]  (Normal) Collected: 08/19/21 1321    Lab Status: Final result Specimen: Urine Updated: 08/20/21 0733     Strep pneumoniae antigen, urine Negative    Hemoglobin and hematocrit, blood [[id number]]  (Abnormal) Collected: 08/20/21 0112    Lab Status: Final result Specimen: Blood from Arm, Left Updated: 08/20/21 0117     Hemoglobin 9 2 g/dL      Hematocrit 31 6 %     Lactic acid, plasma [[id number]]  (Normal) Collected: 08/19/21 1747    Lab Status: Final result Specimen: Blood from Arm, Right Updated: 08/19/21 1816     LACTIC ACID 2 0 mmol/L     Narrative:      Result may be elevated if tourniquet was used during collection      Hemoglobin and hematocrit, blood [[id number]]  (Abnormal) Collected: 08/19/21 1647    Lab Status: Final result Specimen: Blood from Arm, Right Updated: 08/19/21 1651     Hemoglobin 9 5 g/dL      Hematocrit 31 2 %     Lactic acid 2 Hours [[id number]]  (Abnormal) Collected: 08/19/21 1334 Lab Status: Final result Specimen: Blood from Arm, Right Updated: 08/19/21 1408     LACTIC ACID 2 3 mmol/L     Narrative:      Result may be elevated if tourniquet was used during collection  Urine Microscopic [[id number]]  (Abnormal) Collected: 08/19/21 1321    Lab Status: Final result Specimen: Urine, Clean Catch Updated: 08/19/21 1334     RBC, UA None Seen /hpf      WBC, UA 10-20 /hpf      Epithelial Cells Occasional /hpf      Bacteria, UA Occasional /hpf      WBC Clumps few    UA (URINE) with reflex to Scope [[id number]]  (Abnormal) Collected: 08/19/21 1321    Lab Status: Final result Specimen: Urine, Clean Catch Updated: 08/19/21 1327     Color, UA Yellow     Clarity, UA Clear     Specific [place], UA 1 020     pH, UA 5 5     Leukocytes, UA Moderate     Nitrite, UA Negative     Protein, UA Negative mg/dl      Glucose, UA Negative mg/dl      Ketones, UA Negative mg/dl      Urobilinogen, UA 0 2 E U /dl      Bilirubin, UA Negative     Blood, UA Negative    Lactic acid [[id number]]  (Abnormal) Collected: 08/19/21 1150    Lab Status: Final result Specimen: Blood from Arm, Left Updated: 08/19/21 1241     LACTIC ACID 2 3 mmol/L     Narrative:      Result may be elevated if tourniquet was used during collection      Manual Differential(PHLEBS Do Not Order) [[id number]]  (Abnormal) Collected: 08/19/21 1150    Lab Status: Final result Specimen: Blood from Arm, Left Updated: 08/19/21 1235     Segmented % 77 %      Bands % 11 %      Lymphocytes % 6 %      Monocytes % 6 %      Eosinophils, % 0 %      Basophils % 0 %      Absolute Neutrophils 12 78 Thousand/uL      Lymphocytes Absolute 0 87 Thousand/uL      Monocytes Absolute 0 87 Thousand/uL      Eosinophils Absolute 0 00 Thousand/uL      Basophils Absolute 0 00 Thousand/uL      Total Counted --     RBC Morphology Present     Polychromasia Present     Platelet Estimate Adequate    CBC and differential [[id number]]  (Abnormal) Collected: 08/19/21 1150    Lab Status: Final result Specimen: Blood from Arm, Left Updated: 08/19/21 1235     WBC 14 52 Thousand/uL      RBC 4 45 Million/uL      Hemoglobin 10 6 g/dL      Hematocrit 35 0 %      MCV 79 fL      MCH 23 8 pg      MCHC 30 3 g/dL      RDW 16 0 %      MPV 10 2 fL      Platelets 146 Thousands/uL     Narrative: This is an appended report  These results have been appended to a previously verified report      Lipase [[id number]]  (Abnormal) Collected: 08/19/21 1155    Lab Status: Final result Specimen: Blood from Arm, Left Updated: 08/19/21 1228     Lipase 62 u/L     NT-BNP PRO [[id number]]  (Abnormal) Collected: 08/19/21 1155    Lab Status: Final result Specimen: Blood from Arm, Left Updated: 08/19/21 1228     NT-proBNP 464 pg/mL     Troponin I [[id number]]  (Normal) Collected: 08/19/21 1150    Lab Status: Final result Specimen: Blood from Arm, Left Updated: 08/19/21 1227     Troponin I <0 02 ng/mL     Comprehensive metabolic panel [[id number]]  (Abnormal) Collected: 08/19/21 1155    Lab Status: Final result Specimen: Blood from Arm, Left Updated: 08/19/21 1222     Sodium 139 mmol/L      Potassium 4 5 mmol/L      Chloride 104 mmol/L      CO2 25 mmol/L      ANION GAP 10 mmol/L      BUN 31 mg/dL      Creatinine 1 02 mg/dL      Glucose 164 mg/dL      Calcium 8 5 mg/dL      Corrected Calcium 9 2 mg/dL      AST 14 U/L      ALT 20 U/L      Alkaline Phosphatase 61 U/L      Total Protein 6 8 g/dL      Albumin 3 1 g/dL      Total Bilirubin 0 53 mg/dL      eGFR 58 ml/min/1 73sq m     Narrative:      Howie guidelines for Chronic Kidney Disease (CKD):     Stage 1 with normal or high GFR (GFR > 90 mL/min/1 73 square meters)    Stage 2 Mild CKD (GFR = 60-89 mL/min/1 73 square meters)    Stage 3A Moderate CKD (GFR = 45-59 mL/min/1 73 square meters)    Stage 3B Moderate CKD (GFR = 30-44 mL/min/1 73 square meters)    Stage 4 Severe CKD (GFR = 15-29 mL/min/1 73 square meters)    Stage 5 End Stage CKD (GFR <15 mL/min/1 73 square meters)  Note: GFR calculation is accurate only with a steady state creatinine    Protime-INR [[id number]]  (Abnormal) Collected: 08/19/21 1150    Lab Status: Final result Specimen: Blood from Arm, Left Updated: 08/19/21 1219     Protime 14 9 seconds      INR 1 18    APTT [[id number]]  (Normal) Collected: 08/19/21 1150    Lab Status: Final result Specimen: Blood from Arm, Left Updated: 08/19/21 1219     PTT 29 seconds                  CT abdomen pelvis wo contrast   Final Result by Karly Washburn MD (08/19 1340)      Right lower lobe infiltrate in keeping with pneumonia  Left lower quadrant ostomy with an unchanged large left parastomal bowel containing hernia not causing obstruction or other complication  Status post gastric bypass without apparent complication  The study was marked in [facility] for immediate notification  Workstation performed: [id number]         XR chest 1 view portable   Final Result by Fermin Hudson MD (08/19 1422)      No acute cardiopulmonary disease  Workstation performed: [id number]                    Procedures  Procedures         ED Course                             SBIRT 22yo+      Most Recent Value   SBIRT (25 yo +)   In order to provide better care to our patients, we are screening all of our patients for alcohol and drug use  Would it be okay to ask you these screening questions? Yes Filed at: 08/19/2021 1207   Initial Alcohol Screen: US AUDIT-C    1  How often do you have a drink containing alcohol?  0 Filed at: 08/19/2021 1207   2  How many drinks containing alcohol do you have on a typical day you are drinking? 0 Filed at: 08/19/2021 1207   3a  Male UNDER 65: How often do you have five or more drinks on one occasion? 0 Filed at: 08/19/2021 1207   3b  FEMALE Any Age, or MALE 65+: How often do you have 4 or more drinks on one occassion?   0 Filed at: 08/19/2021 1207   Audit-C Score  0 Filed at: 08/19/2021 1207   ARABELLA: How many times in the past year have you    Used an illegal drug or used a prescription medication for non-medical reasons? Never Filed at: 08/19/2021 1207                    MDM  Number of Diagnoses or Management Options  Pneumonia  Upper GI bleed  Diagnosis management comments: Pt with UGIB, stable vitals  Declined by ICU  Admitted to medicine  Held off on anticoagulant reversal given stable presentation  Disposition  Final diagnoses:   Upper GI bleed   Pneumonia     Time reflects when diagnosis was documented in both MDM as applicable and the Disposition within this note     Time User Action Codes Description Comment    8/19/2021  2:49 PM Katharine Francine Add [K92 2] Upper GI bleed     8/19/2021  2:50 PM Katharine Francine Add [J18 9] Pneumonia     8/20/2021  3:11 PM Judyth Alamin Modify [K92 2] Upper GI bleed     8/20/2021  3:11 PM Judyth Alamin Modify [J18 9] Pneumonia       ED Disposition     ED Disposition Condition Date/Time Comment    Admit Stable Thu Aug 19, 2021  2:49 PM Case was discussed with Dr Murali Carlos and the patient's admission status was agreed to be Admission Status: inpatient status to the service of Dr Murali Carlos          Follow-up Information     Follow up With Specialties Details Why Contact Info    Arash Heaton MD Gastroenterology Schedule an appointment as soon as possible for a visit in 2 week(s)  [facility]  [phone]            Current Discharge Medication List      CONTINUE these medications which have NOT CHANGED    Details   albuterol (2 5 mg/3 mL) 0 083 % nebulizer solution Take 1 vial (2 5 mg total) by nebulization every 4 (four) hours as needed for wheezing or shortness of breath  Qty: 75 vial, Refills: 1    Associated Diagnoses: Cough      amiodarone 100 mg tablet Take 1 tablet (100 mg total) by mouth daily  Qty: 90 tablet, Refills: 3    Associated Diagnoses: Paroxysmal atrial fibrillation (HCC)      apixaban (ELIQUIS) 5 mg Take 1 tablet (5 mg total) by mouth 2 (two) times a day  Qty: 180 tablet, Refills: 2    Associated Diagnoses: Atrial fibrillation with rapid ventricular response (HCC)      aspirin 81 mg chewable tablet Chew 1 tablet (81 mg total) daily  Qty: 30 tablet, Refills: 2    Associated Diagnoses: S/P TAVR (transcatheter aortic valve replacement)      atorvastatin (LIPITOR) 20 mg tablet Take 1 tablet (20 mg total) by mouth daily  Qty: 90 tablet, Refills: 1    Associated Diagnoses: Hyperlipidemia, unspecified hyperlipidemia type      Calcium-Vitamin D 500-125 MG-UNIT TABS Take 1 tablet by mouth 2 (two) times a day       Cyanocobalamin (B-12 PO) Take by mouth       ergocalciferol (VITAMIN D2) 50,000 units Take 1 capsule (50,000 Units total) by mouth 2 (two) times a week with meals  Qty: 20 capsule, Refills: 0    Associated Diagnoses: Postgastrectomy malabsorption; Vitamin D insufficiency      ferrous sulfate 324 (65 Fe) mg Take 1 tablet (324 mg total) by mouth daily before breakfast  Qty: 90 tablet, Refills: 0    Associated Diagnoses: Iron deficiency anemia, unspecified iron deficiency anemia type;  Low serum iron      meclizine (ANTIVERT) 25 mg tablet Take 1 tablet (25 mg total) by mouth every 8 (eight) hours as needed for dizziness  Qty: 30 tablet, Refills: 0    Associated Diagnoses: Vertigo      metoprolol succinate (TOPROL-XL) 25 mg 24 hr tablet Take 1 tablet (25 mg total) by mouth daily  Qty: 90 tablet, Refills: 1    Associated Diagnoses: Atrial fibrillation with rapid ventricular response (HCC)      Multiple Vitamins-Minerals (BARIATRIC FUSION) CHEW Chew 1 tablet daily        omeprazole (PriLOSEC) 20 mg delayed release capsule Take 1 capsule (20 mg total) by mouth daily  Qty: 90 capsule, Refills: 0    Associated Diagnoses: Marginal ulcer      oxyCODONE (ROXICODONE) 10 MG TABS Take 10 mg by mouth every 6 (six) hours as needed        potassium chloride (K-DUR,KLOR-CON) 20 mEq tablet Take 1 tablet (20 mEq total) by mouth daily  Qty: 30 tablet, Refills: 1    Associated Diagnoses: Hypervolemia, unspecified hypervolemia type      torsemide (DEMADEX) 20 mg tablet Take 0 5 tablets (10 mg total) by mouth daily  Qty: 30 tablet, Refills: 3    Associated Diagnoses: Hypervolemia, unspecified hypervolemia type      anastrozole (ARIMIDEX) 1 mg tablet Take 1 tablet (1 mg total) by mouth daily  Qty: 90 tablet, Refills: 2    Associated Diagnoses: Malignant neoplasm of ovary, unspecified laterality (HCC)      nystatin (MYCOSTATIN) cream Apply topically 2 (two) times a day  Qty: 30 g, Refills: 0    Associated Diagnoses: Candidal intertrigo           No discharge procedures on file      PDMP Review     None          ED Provider  Electronically Signed by           Teresa Gutiérrez DO  08/20/21 1520

## 2021-08-19 NOTE — ASSESSMENT & PLAN NOTE
Patient has a history of severe aortic stenosis, status post TAVR  Monitor fluid status closely  Patient will be NPO after midnight for EGD with GI  Will give gentle IV hydration normal saline 50 cc an hour while NPO  Patient reports that she is not consistent in taking her torsemide, will hold off on  torsemide 10 mg daily

## 2021-08-20 ENCOUNTER — ANESTHESIA EVENT (INPATIENT)
Dept: PERIOP | Facility: HOSPITAL | Age: 66
DRG: 871 | End: 2021-08-20
Payer: MEDICARE

## 2021-08-20 ENCOUNTER — ANESTHESIA (INPATIENT)
Dept: PERIOP | Facility: HOSPITAL | Age: 66
DRG: 871 | End: 2021-08-20
Payer: MEDICARE

## 2021-08-20 ENCOUNTER — APPOINTMENT (INPATIENT)
Dept: PERIOP | Facility: HOSPITAL | Age: 66
DRG: 871 | End: 2021-08-20
Payer: MEDICARE

## 2021-08-20 PROBLEM — E87.2 LACTIC ACIDOSIS: Status: RESOLVED | Noted: 2021-08-19 | Resolved: 2021-08-20

## 2021-08-20 PROBLEM — E87.20 LACTIC ACIDOSIS: Status: RESOLVED | Noted: 2021-08-19 | Resolved: 2021-08-20

## 2021-08-20 LAB
ANION GAP SERPL CALCULATED.3IONS-SCNC: 6 MMOL/L (ref 4–13)
ATRIAL RATE: 78 BPM
ATRIAL RATE: 80 BPM
BACTERIA UR CULT: NORMAL
BUN SERPL-MCNC: 24 MG/DL (ref 5–25)
CALCIUM SERPL-MCNC: 8.2 MG/DL (ref 8.3–10.1)
CHLORIDE SERPL-SCNC: 108 MMOL/L (ref 100–108)
CO2 SERPL-SCNC: 27 MMOL/L (ref 21–32)
CREAT SERPL-MCNC: 0.94 MG/DL (ref 0.6–1.3)
ERYTHROCYTE [DISTWIDTH] IN BLOOD BY AUTOMATED COUNT: 16.3 % (ref 11.6–15.1)
GFR SERPL CREATININE-BSD FRML MDRD: 64 ML/MIN/1.73SQ M
GLUCOSE SERPL-MCNC: 103 MG/DL (ref 65–140)
HCT VFR BLD AUTO: 28 % (ref 34.8–46.1)
HCT VFR BLD AUTO: 31.6 % (ref 34.8–46.1)
HCT VFR BLD AUTO: 34.1 % (ref 34.8–46.1)
HGB BLD-MCNC: 10 G/DL (ref 11.5–15.4)
HGB BLD-MCNC: 8.3 G/DL (ref 11.5–15.4)
HGB BLD-MCNC: 9.2 G/DL (ref 11.5–15.4)
L PNEUMO1 AG UR QL IA.RAPID: NEGATIVE
MAGNESIUM SERPL-MCNC: 2.1 MG/DL (ref 1.6–2.6)
MCH RBC QN AUTO: 23.8 PG (ref 26.8–34.3)
MCHC RBC AUTO-ENTMCNC: 29.6 G/DL (ref 31.4–37.4)
MCV RBC AUTO: 80 FL (ref 82–98)
P AXIS: 47 DEGREES
P AXIS: 50 DEGREES
PLATELET # BLD AUTO: 139 THOUSANDS/UL (ref 149–390)
PMV BLD AUTO: 10.6 FL (ref 8.9–12.7)
POTASSIUM SERPL-SCNC: 4.2 MMOL/L (ref 3.5–5.3)
PR INTERVAL: 164 MS
PR INTERVAL: 166 MS
PROCALCITONIN SERPL-MCNC: 2.97 NG/ML
QRS AXIS: -12 DEGREES
QRS AXIS: -14 DEGREES
QRSD INTERVAL: 86 MS
QRSD INTERVAL: 92 MS
QT INTERVAL: 378 MS
QT INTERVAL: 380 MS
QTC INTERVAL: 433 MS
QTC INTERVAL: 435 MS
RBC # BLD AUTO: 3.49 MILLION/UL (ref 3.81–5.12)
S PNEUM AG UR QL: NEGATIVE
SODIUM SERPL-SCNC: 141 MMOL/L (ref 136–145)
T WAVE AXIS: 42 DEGREES
T WAVE AXIS: 61 DEGREES
VENTRICULAR RATE: 78 BPM
VENTRICULAR RATE: 80 BPM
WBC # BLD AUTO: 8.1 THOUSAND/UL (ref 4.31–10.16)

## 2021-08-20 PROCEDURE — 83735 ASSAY OF MAGNESIUM: CPT | Performed by: NURSE PRACTITIONER

## 2021-08-20 PROCEDURE — 93010 ELECTROCARDIOGRAM REPORT: CPT | Performed by: INTERNAL MEDICINE

## 2021-08-20 PROCEDURE — 85018 HEMOGLOBIN: CPT | Performed by: NURSE PRACTITIONER

## 2021-08-20 PROCEDURE — 88342 IMHCHEM/IMCYTCHM 1ST ANTB: CPT | Performed by: PATHOLOGY

## 2021-08-20 PROCEDURE — 85027 COMPLETE CBC AUTOMATED: CPT | Performed by: NURSE PRACTITIONER

## 2021-08-20 PROCEDURE — C9113 INJ PANTOPRAZOLE SODIUM, VIA: HCPCS | Performed by: NURSE PRACTITIONER

## 2021-08-20 PROCEDURE — 88305 TISSUE EXAM BY PATHOLOGIST: CPT | Performed by: PATHOLOGY

## 2021-08-20 PROCEDURE — 99232 SBSQ HOSP IP/OBS MODERATE 35: CPT | Performed by: NURSE PRACTITIONER

## 2021-08-20 PROCEDURE — 0DB68ZX EXCISION OF STOMACH, VIA NATURAL OR ARTIFICIAL OPENING ENDOSCOPIC, DIAGNOSTIC: ICD-10-PCS | Performed by: INTERNAL MEDICINE

## 2021-08-20 PROCEDURE — 94760 N-INVAS EAR/PLS OXIMETRY 1: CPT

## 2021-08-20 PROCEDURE — 84145 PROCALCITONIN (PCT): CPT | Performed by: NURSE PRACTITIONER

## 2021-08-20 PROCEDURE — 80048 BASIC METABOLIC PNL TOTAL CA: CPT | Performed by: NURSE PRACTITIONER

## 2021-08-20 PROCEDURE — 82948 REAGENT STRIP/BLOOD GLUCOSE: CPT

## 2021-08-20 PROCEDURE — 85014 HEMATOCRIT: CPT | Performed by: NURSE PRACTITIONER

## 2021-08-20 PROCEDURE — 43239 EGD BIOPSY SINGLE/MULTIPLE: CPT | Performed by: INTERNAL MEDICINE

## 2021-08-20 RX ORDER — SUCRALFATE 1 G/1
1 TABLET ORAL
Status: DISCONTINUED | OUTPATIENT
Start: 2021-08-20 | End: 2021-08-21 | Stop reason: HOSPADM

## 2021-08-20 RX ORDER — SACCHAROMYCES BOULARDII 250 MG
250 CAPSULE ORAL 2 TIMES DAILY
Status: DISCONTINUED | OUTPATIENT
Start: 2021-08-20 | End: 2021-08-21 | Stop reason: HOSPADM

## 2021-08-20 RX ORDER — PANTOPRAZOLE SODIUM 40 MG/1
40 TABLET, DELAYED RELEASE ORAL
Status: DISCONTINUED | OUTPATIENT
Start: 2021-08-20 | End: 2021-08-21 | Stop reason: HOSPADM

## 2021-08-20 RX ORDER — PROPOFOL 10 MG/ML
INJECTION, EMULSION INTRAVENOUS AS NEEDED
Status: DISCONTINUED | OUTPATIENT
Start: 2021-08-20 | End: 2021-08-20

## 2021-08-20 RX ORDER — SODIUM CHLORIDE, SODIUM LACTATE, POTASSIUM CHLORIDE, CALCIUM CHLORIDE 600; 310; 30; 20 MG/100ML; MG/100ML; MG/100ML; MG/100ML
INJECTION, SOLUTION INTRAVENOUS CONTINUOUS PRN
Status: DISCONTINUED | OUTPATIENT
Start: 2021-08-20 | End: 2021-08-20

## 2021-08-20 RX ADMIN — SUCRALFATE 1 G: 1 TABLET ORAL at 16:55

## 2021-08-20 RX ADMIN — TORSEMIDE 10 MG: 10 TABLET ORAL at 08:42

## 2021-08-20 RX ADMIN — ATORVASTATIN CALCIUM 20 MG: 20 TABLET, FILM COATED ORAL at 08:29

## 2021-08-20 RX ADMIN — SODIUM CHLORIDE 50 ML/HR: 0.9 INJECTION, SOLUTION INTRAVENOUS at 00:26

## 2021-08-20 RX ADMIN — SODIUM CHLORIDE, SODIUM LACTATE, POTASSIUM CHLORIDE, AND CALCIUM CHLORIDE: .6; .31; .03; .02 INJECTION, SOLUTION INTRAVENOUS at 14:59

## 2021-08-20 RX ADMIN — SUCRALFATE 1 G: 1 TABLET ORAL at 21:35

## 2021-08-20 RX ADMIN — SODIUM CHLORIDE 8 MG/HR: 9 INJECTION, SOLUTION INTRAVENOUS at 04:36

## 2021-08-20 RX ADMIN — Medication 1 TABLET: at 08:29

## 2021-08-20 RX ADMIN — PANTOPRAZOLE SODIUM 40 MG: 40 TABLET, DELAYED RELEASE ORAL at 16:54

## 2021-08-20 RX ADMIN — METOPROLOL SUCCINATE 25 MG: 25 TABLET, EXTENDED RELEASE ORAL at 08:30

## 2021-08-20 RX ADMIN — FERROUS SULFATE TAB 325 MG (65 MG ELEMENTAL FE) 325 MG: 325 (65 FE) TAB at 08:29

## 2021-08-20 RX ADMIN — CALCIUM 1 TABLET: 500 TABLET ORAL at 08:29

## 2021-08-20 RX ADMIN — VITAM B12 50 MCG: 100 TAB at 08:42

## 2021-08-20 RX ADMIN — Medication 250 MG: at 17:36

## 2021-08-20 RX ADMIN — CEFTRIAXONE 1000 MG: 1 INJECTION, SOLUTION INTRAVENOUS at 15:38

## 2021-08-20 RX ADMIN — POTASSIUM CHLORIDE 20 MEQ: 1500 TABLET, EXTENDED RELEASE ORAL at 08:29

## 2021-08-20 RX ADMIN — CALCIUM 1 TABLET: 500 TABLET ORAL at 15:38

## 2021-08-20 RX ADMIN — PROPOFOL 100 MG: 10 INJECTION, EMULSION INTRAVENOUS at 15:03

## 2021-08-20 RX ADMIN — AMIODARONE HYDROCHLORIDE 100 MG: 200 TABLET ORAL at 08:30

## 2021-08-20 RX ADMIN — PROPOFOL 50 MG: 10 INJECTION, EMULSION INTRAVENOUS at 15:08

## 2021-08-20 NOTE — PROGRESS NOTES
[facility] 45  Progress Note - Alberto Castelan 1955, 72 y o  female MRN: [id number]  Unit/Bed#: [address] [place] [address] Encounter: [id number]  Primary Care Provider: GEORGETTE Roberson   Date and time admitted to hospital: 8/19/2021 11:16 AM    * GI bleed  Assessment & Plan  Suspected GI bleed present on admission as evidence by high output of black liquid stool through colostomy  Patient denies any recent NSAID use, does report a history of marginal ulcer secondary to NSAID use in 2018  On Prilosec and Eliquis at home  Hemoglobin 11 7 day prior to admission, 10 6 on admission  Continues with high output from colostomy, liquid black stool  Appreciate GI consult  · Patient was started on Protonix drip on admission  · Patient underwent EGD today - 2 medium-size clean based ulcers at gastrojejunal anastomotic site, no signs of active GI bleed  Status post gastric bypass surgery with large gastric pouch  Normal esophagus  · GI recommend avoiding NSAIDs  · Protonix 40 b i d  And liquid Carafate 1 g 4 times a day  · Regular diet    Hemoglobin slowly trending, 8 3 this morning  Patient denies black stool this morning  Repeat H&H this evening and in the morning  Transfuse for hemoglobin less than 7 or sooner if hemodynamically unstable  Continue to hold Eliquis today  Possible resume Eliquis in a m  If okay with GI  Sepsis [facility])  Assessment & Plan  Sepsis present on admission as evidence by leukocytosis, WBCs 14 52, lactic acid elevated 2 3, low-grade fever, suspect may be secondary to community-acquired pneumonia as seen on imaging of CT scan right lower lobe infiltrate  Treated with normal saline boluses 500 cc x2 in ED  Lactic acid normalized after saline bolus  Urine antigens negative  Procalcitonin elevated to 2 97  Sputum g stain C&S if able  Blood cultures no growth to date  Patient started on ceftriaxone and azithromycin on admission    Will discontinue Zithromax, continue Rocephin, will treat for 5 days total     Results from last 7 days   Lab Units 08/20/21  0543 08/19/21  1747 08/19/21  1334 08/19/21  1150   LACTIC ACID mmol/L  --  2 0 2 3* 2 3*   PROCALCITONIN ng/ml 2 97*  --   --   --      Results from last 7 days   Lab Units 08/20/21  0543 08/19/21  1150 08/18/21  0838   WBC Thousand/uL 8 10 14 52* 5 55   TOTAL NEUT ABS Thousand/uL  --  12 78*  --    BANDS PCT %  --  11*  --              Community acquired pneumonia of right lower lobe of lung  Assessment & Plan  Community-acquired pneumonia right lower lobe suspected as patient had CT of abdomen pelvis which indicated right lower lobe infiltrate in keeping with pneumonia as per radiology report  Reports occasional cough, no expectoration  Management as above    Chronic diastolic heart failure [facility])  Assessment & Plan  Wt Readings from Last 3 Encounters:   08/19/21 127 kg (280 lb)   07/21/21 125 kg (276 lb)   06/24/21 128 kg (282 lb 12 8 oz)     Intake and output, daily weights ordered  Patient is on torsemide 10 mg daily  Will hold for now due to anemia  When diet is resumed heart healthy diet  S/P TAVR (transcatheter aortic valve replacement)  Assessment & Plan  As noted above patient is status post TAVR secondary to aortic stenosis  Plan as noted above  Hyperlipidemia  Assessment & Plan  Patient has a history of hyperlipidemia, will continue Lipitor 20 mg p o  Daily  Atrial fibrillation with rapid ventricular response [facility])  Assessment & Plan  Patient has a history of atrial fibrillation with rapid ventricular response  Continue home medication amiodarone 100 mg p o  Daily  Continue metoprolol  Holding Eliquis due to GI bleeding  Currently sinus rhythm  Optimize electrolytes    Aortic stenosis, severe  Assessment & Plan  Patient has a history of severe aortic stenosis, status post TAVR  Monitor fluid status closely  Patient will be NPO after midnight for EGD with GI    Will give gentle IV hydration normal saline 50 cc an hour while NPO  Patient reports that she is not consistent in taking her torsemide, will hold off on  torsemide 10 mg daily  Morbid obesity due to excess calories [facility])  Assessment & Plan  Patient is status post gastric bypass in 2016 with Dr Rutledge Smoke  Reports that she has gained weight over the past year secondary to pandemic/lack of ability to get out due to quarantine Ng  Stated that she is determined to start to lose weight again  Supportive care  Lactic acidosis-resolved as of 2021  Assessment & Plan  Lactic acidosis present on admission as evidence by lactic acid 2 3, continues on 2 3  Patient did receive 2 boluses of normal saline 500 cc each  Repeat lactic acid normalized          VTE Pharmacologic Prophylaxis:   Pharmacologic: Pharmacologic VTE Prophylaxis contraindicated due to GI bleed  Mechanical VTE Prophylaxis in Place: Yes    Patient Centered Rounds: I have performed bedside rounds with nursing staff today  Discussions with Specialists or Other Care Team Provider:  Yes    Education and Discussions with Family / Patient:  Yes    Time Spent for Care: 20 minutes  More than 50% of total time spent on counseling and coordination of care as described above  Current Length of Stay: 1 day(s)    Current Patient Status: Inpatient   Certification Statement: The patient will continue to require additional inpatient hospital stay due to GI bleed, sepsis    Discharge Plan:  Home once medically cleared    Code Status: Level 1 - Full Code      Subjective:   Patient denies black stool from ostomy overnight  Denies chest pain, headache, dizziness, SOB, nausea vomiting abdominal pain, fever, chills      Objective:     Vitals:   Temp (24hrs), Av 8 °F (36 6 °C), Min:97 6 °F (36 4 °C), Max:97 9 °F (36 6 °C)    Temp:  [97 6 °F (36 4 °C)-97 9 °F (36 6 °C)] 97 6 °F (36 4 °C)  HR:  [57-82] 57  Resp:  [16-19] 16  BP: ()/(51-76) 94/62  SpO2:  [95 %-100 %] 95 %  Body mass index is 45 19 kg/m²  Input and Output Summary (last 24 hours): Intake/Output Summary (Last 24 hours) at 8/20/2021 1713  Last data filed at 8/20/2021 1508  Gross per 24 hour   Intake 350 83 ml   Output --   Net 350 83 ml       Physical Exam:     Physical Exam  Vitals and nursing note reviewed  Constitutional:       Appearance: She is well-developed  She is obese  HENT:      Head: Normocephalic and atraumatic  Neck:      Thyroid: No thyromegaly  Vascular: No JVD  Trachea: No tracheal deviation  Cardiovascular:      Rate and Rhythm: Normal rate and regular rhythm  Heart sounds: Normal heart sounds  Pulmonary:      Effort: Pulmonary effort is normal  No respiratory distress  Breath sounds: Normal breath sounds  No wheezing or rales  Abdominal:      General: Bowel sounds are normal  There is no distension  Palpations: Abdomen is soft  Tenderness: There is no abdominal tenderness  There is no guarding  Comments: Colostomy in-situ   Musculoskeletal:         General: No swelling or deformity  Cervical back: Neck supple  Right lower leg: No edema  Left lower leg: No edema  Skin:     General: Skin is warm and dry  Neurological:      General: No focal deficit present  Mental Status: She is alert and oriented to person, place, and time     Psychiatric:         Mood and Affect: Mood normal          Judgment: Judgment normal          Additional Data:     Labs:    Results from last 7 days   Lab Units 08/20/21  0543 08/19/21  1150 08/18/21  0838   WBC Thousand/uL 8 10 14 52* 5 55   HEMOGLOBIN g/dL 8 3* 10 6* 11 7   HEMATOCRIT % 28 0* 35 0 39 7   PLATELETS Thousands/uL 139* 162 158   NEUTROS PCT %  --   --  72   LYMPHS PCT %  --   --  16   LYMPHO PCT %  --  6*  --    MONOS PCT %  --   --  8   MONO PCT %  --  6  --    EOS PCT %  --  0 2     Results from last 7 days   Lab Units 08/20/21  0543 08/19/21  1155   POTASSIUM mmol/L 4 2 4 5   CHLORIDE mmol/L 108 104   CO2 mmol/L 27 25   BUN mg/dL 24 31*   CREATININE mg/dL 0 94 1 02   CALCIUM mg/dL 8 2* 8 5   ALK PHOS U/L  --  61   ALT U/L  --  20   AST U/L  --  14     Results from last 7 days   Lab Units 08/19/21  1150   INR  1 18       * I Have Reviewed All Lab Data Listed Above  * Additional Pertinent Lab Tests Reviewed: Brian 66 Admission Reviewed    Imaging:    Imaging Reports Reviewed Today Include:  Chest x-ray, CT abdomen pelvis  Imaging Personally Reviewed by Myself Includes:  None    Recent Cultures (last 7 days):     Results from last 7 days   Lab Units 08/19/21  1321 08/19/21  1150   BLOOD CULTURE   --  No Growth at 24 hrs  No Growth at 24 hrs     URINE CULTURE  10,000-19,000 cfu/ml   --    LEGIONELLA URINARY ANTIGEN  Negative  --        Last 24 Hours Medication List:   Current Facility-Administered Medications   Medication Dose Route Frequency Provider Last Rate    acetaminophen  650 mg Oral Q6H PRN Kellie Anderson MD      albuterol  2 5 mg Nebulization Q4H PRN Klelie Anderson MD      amiodarone  100 mg Oral Daily Kellie Anderson MD      atorvastatin  20 mg Oral Daily Kellei Anderson MD      calcium carbonate  1 tablet Oral BID With Meals Kellie Andreson MD      cefTRIAXone  1,000 mg Intravenous Q24H Kellie Anderson MD 1,000 mg (08/20/21 1538)    vitamin B-12  50 mcg Oral Daily Kellie Anderson MD      ferrous sulfate  325 mg Oral Daily With Jian Ruff MD      meclizine  25 mg Oral Q8H PRN Kellie Anderson MD      metoprolol succinate  25 mg Oral Daily Kellie Anderson MD      multivitamin-minerals  1 tablet Oral Daily Kellie Anderson MD      ondansetron  4 mg Intravenous Q6H PRN Kellie Anderson MD      oxyCODONE  10 mg Oral Q6H PRN Kellie Anderson MD      pantoprazole  40 mg Oral BID AC GEORGETTE Guido      potassium chloride  20 mEq Oral Daily Kellie Anderson MD      sodium chloride  50 mL/hr Intravenous Continuous Kellie Anderson MD 50 mL/hr (08/20/21 0026)    sucralfate  1 g Oral 4x Daily (AC & HS) GEORGETTE Lerma          Today, Patient Was Seen By: GEORGETTE Ortez    ** Please Note: Dragon 360 Dictation voice to text software may have been used in the creation of this document   **

## 2021-08-20 NOTE — PLAN OF CARE
Problem: Potential for Falls  Goal: Patient will remain free of falls  Description: INTERVENTIONS:  - Educate patient/family on patient safety including physical limitations  - Instruct patient to call for assistance with activity   - Consult OT/PT to assist with strengthening/mobility   - Keep Call bell within reach  - Keep bed low and locked with side rails adjusted as appropriate  - Keep care items and personal belongings within reach  - Initiate and maintain comfort rounds  - Make Fall Risk Sign visible to staff  - Offer Toileting every 2 Hours, in advance of need  - Initiate/Maintain bed alarm  - Obtain necessary fall risk management equipment: walker  - Apply yellow socks and bracelet for high fall risk patients  - Consider moving patient to room near nurses station  Outcome: Progressing     Problem: Nutrition/Hydration-ADULT  Goal: Nutrient/Hydration intake appropriate for improving, restoring or maintaining nutritional needs  Description: Monitor and assess patient's nutrition/hydration status for malnutrition  Collaborate with interdisciplinary team and initiate plan and interventions as ordered  Monitor patient's weight and dietary intake as ordered or per policy  Utilize nutrition screening tool and intervene as necessary  Determine patient's food preferences and provide high-protein, high-caloric foods as appropriate       INTERVENTIONS:  - Monitor oral intake, urinary output, labs, and treatment plans  - Assess nutrition and hydration status and recommend course of action  - Evaluate amount of meals eaten  - Assist patient with eating if necessary   - Allow adequate time for meals  - Recommend/ encourage appropriate diets, oral nutritional supplements, and vitamin/mineral supplements  - Order, calculate, and assess calorie counts as needed  - Recommend, monitor, and adjust tube feedings and TPN/PPN based on assessed needs  - Assess need for intravenous fluids  - Provide specific nutrition/hydration education as appropriate  - Include patient/family/caregiver in decisions related to nutrition  Outcome: Progressing     Problem: RESPIRATORY - ADULT  Goal: Achieves optimal ventilation and oxygenation  Description: INTERVENTIONS:  - Assess for changes in respiratory status  - Assess for changes in mentation and behavior  - Position to facilitate oxygenation and minimize respiratory effort  - Oxygen administered by appropriate delivery if ordered  - Initiate smoking cessation education as indicated  - Encourage broncho-pulmonary hygiene including cough, deep breathe, Incentive Spirometry  - Assess the need for suctioning and aspirate as needed  - Assess and instruct to report SOB or any respiratory difficulty  - Respiratory Therapy support as indicated  Outcome: Progressing     Problem: GASTROINTESTINAL - ADULT  Goal: Minimal or absence of nausea and/or vomiting  Description: INTERVENTIONS:  - Administer IV fluids if ordered to ensure adequate hydration  - Maintain NPO status until nausea and vomiting are resolved  - Nasogastric tube if ordered  - Administer ordered antiemetic medications as needed  - Provide nonpharmacologic comfort measures as appropriate  - Advance diet as tolerated, if ordered  - Consider nutrition services referral to assist patient with adequate nutrition and appropriate food choices  Outcome: Progressing  Goal: Maintains or returns to baseline bowel function  Description: INTERVENTIONS:  - Assess bowel function  - Encourage oral fluids to ensure adequate hydration  - Administer IV fluids if ordered to ensure adequate hydration  - Administer ordered medications as needed  - Encourage mobilization and activity  - Consider nutritional services referral to assist patient with adequate nutrition and appropriate food choices  Outcome: Progressing  Goal: Maintains adequate nutritional intake  Description: INTERVENTIONS:  - Monitor percentage of each meal consumed  - Identify factors contributing to decreased intake, treat as appropriate  - Assist with meals as needed  - Monitor I&O, weight, and lab values if indicated  - Obtain nutrition services referral as needed  Outcome: Progressing  Goal: Establish and maintain optimal ostomy function  Description: INTERVENTIONS:  - Assess bowel function  - Encourage oral fluids to ensure adequate hydration  - Administer IV fluids if ordered to ensure adequate hydration   - Administer ordered medications as needed  - Encourage mobilization and activity  - Nutrition services referral to assist patient with appropriate food choices  - Assess stoma site  - Consider wound care consult   Outcome: Progressing     Problem: HEMATOLOGIC - ADULT  Goal: Maintains hematologic stability  Description: INTERVENTIONS  - Assess for signs and symptoms of bleeding or hemorrhage  - Monitor labs  - Administer supportive blood products/factors as ordered and appropriate  Outcome: Progressing

## 2021-08-20 NOTE — ANESTHESIA POSTPROCEDURE EVALUATION
Post-Op Assessment Note    CV Status:  Stable  Pain Score: 0    Pain management: adequate     Mental Status:  Sleepy   Hydration Status:  Stable and euvolemic   PONV Controlled:  Controlled   Airway Patency:  Patent and adequate      Post Op Vitals Reviewed: Yes      Staff: CRNA         No complications documented      BP   103/58   Temp      Pulse  59   Resp   20   SpO2   98

## 2021-08-20 NOTE — CONSULTS
Consult Note - Wound   Theoplis Duff Roula Kennedy 72 y o  female MRN: [id number]  Unit/Bed#: 85158 [address] Encounter: [id number]      Assessment:   Wound care nurse consult for full thickness anal area wound  Hx of rectal cancer  Received six weeks of radiation from 5-10-18 through 6-20-18  Also received chemotherapy at this time  This is a nonhealing chronic surgical wound that has been present for three years  Never healed after surgery, radiation and chemotherapy  Patient has a permanent colostomy  Patient has been seen at the wound care center for this wound  Last visit at wound center was on 5-10-21  Patient was worked up for [address]  At that time, wound measured 5 x 2 5 x 11cm  Image of wound taken on 8/19/21  viewed and then compared with image of wound taken on 5/10/21  Wound appears much larger at this time  Lengthy discussion with patient  She declines HBO  "There is no way I could be in there [meaning the HBO chamber] like that "   Also declines wound care center follow-up  Patient says that the wound care center packs the rectal wound and that causes a large amount of pain  Patient states that she wears really large briefs and puts a long maxipad in the back of the brief  That catches the drainage  Patient is very satisfied with using a maxi-pad which actually is more absorbent than an ABD pad  Patient declined to have me examine the wound  She feels comfortable with the care she is providing at this point  Patient is very logical, [id number], grasps the situation, is independent  Patient has some sort of pressure redistribution cushion that she uses at home  Patient said someone gave it to her and it cost $50     Patient is ambulatory  Hx of DM type 2, morbidly obese  Wound/Care Plan:   1  Provided the patient with a bariatric pressure redistribution cushion in chair  Patient very appreciative  2  Offered the patient a P-500 low air loss air mattress  Patient declined   Said she sleeps on her side  3  Patient to continue to treat rectal wound per her home routine  4  Patient has her own colostomy supplies  Declined needing wound care nurse assistance with this       Image taken 8/19/21

## 2021-08-20 NOTE — ANESTHESIA PREPROCEDURE EVALUATION
Procedure:  EGD    Relevant Problems   CARDIO   (+) Aortic stenosis, severe   (+) Atrial fibrillation with rapid ventricular response (HCC)   (+) CAD (coronary artery disease)   (+) Hyperlipidemia   (+) S/P TAVR (transcatheter aortic valve replacement)      GI/HEPATIC   (+) GI bleed   (+) S/P bariatric surgery      MUSCULOSKELETAL   (+) Osteoarthritis of right knee      NEURO/PSYCH   (+) H/O diabetes mellitus   (+) History of ovarian cancer   (+) History of rectal cancer      PULMONARY   (+) Community acquired pneumonia of right lower lobe of lung        Physical Exam    Airway    Mallampati score: II  TM Distance: >3 FB  Neck ROM: full     Dental   No notable dental hx     Cardiovascular  Cardiovascular exam normal    Pulmonary  Pulmonary exam normal     Other Findings        Anesthesia Plan  ASA Score- 3     Anesthesia Type- IV sedation with anesthesia with ASA Monitors  Additional Monitors:   Airway Plan:           Plan Factors-Exercise tolerance (METS): >4 METS  Chart reviewed  Imaging results reviewed  Existing labs reviewed  Patient summary reviewed  Patient is not a current smoker  Induction- intravenous  Postoperative Plan- Plan for postoperative opioid use  Informed Consent- Anesthetic plan and risks discussed with patient  I personally reviewed this patient with the CRNA  Discussed and agreed on the Anesthesia Plan with the CRNA  Marybeth Early

## 2021-08-20 NOTE — PLAN OF CARE
Problem: Potential for Falls  Goal: Patient will remain free of falls  Description: INTERVENTIONS:  - Educate patient/family on patient safety including physical limitations  - Instruct patient to call for assistance with activity   - Consult OT/PT to assist with strengthening/mobility   - Keep Call bell within reach  - Keep bed low and locked with side rails adjusted as appropriate  - Keep care items and personal belongings within reach  - Initiate and maintain comfort rounds  - Make Fall Risk Sign visible to staff  - Offer Toileting every  Hours, in advance of need  - Initiate/Maintain alarm  - Obtain necessary fall risk management equipment:   - Apply yellow socks and bracelet for high fall risk patients  - Consider moving patient to room near nurses station  Outcome: Progressing     Problem: Nutrition/Hydration-ADULT  Goal: Nutrient/Hydration intake appropriate for improving, restoring or maintaining nutritional needs  Description: Monitor and assess patient's nutrition/hydration status for malnutrition  Collaborate with interdisciplinary team and initiate plan and interventions as ordered  Monitor patient's weight and dietary intake as ordered or per policy  Utilize nutrition screening tool and intervene as necessary  Determine patient's food preferences and provide high-protein, high-caloric foods as appropriate       INTERVENTIONS:  - Monitor oral intake, urinary output, labs, and treatment plans  - Assess nutrition and hydration status and recommend course of action  - Evaluate amount of meals eaten  - Assist patient with eating if necessary   - Allow adequate time for meals  - Recommend/ encourage appropriate diets, oral nutritional supplements, and vitamin/mineral supplements  - Order, calculate, and assess calorie counts as needed  - Recommend, monitor, and adjust tube feedings and TPN/PPN based on assessed needs  - Assess need for intravenous fluids  - Provide specific nutrition/hydration education as appropriate  - Include patient/family/caregiver in decisions related to nutrition  Outcome: Progressing     Problem: RESPIRATORY - ADULT  Goal: Achieves optimal ventilation and oxygenation  Description: INTERVENTIONS:  - Assess for changes in respiratory status  - Assess for changes in mentation and behavior  - Position to facilitate oxygenation and minimize respiratory effort  - Oxygen administered by appropriate delivery if ordered  - Initiate smoking cessation education as indicated  - Encourage broncho-pulmonary hygiene including cough, deep breathe, Incentive Spirometry  - Assess the need for suctioning and aspirate as needed  - Assess and instruct to report SOB or any respiratory difficulty  - Respiratory Therapy support as indicated  Outcome: Progressing     Problem: GASTROINTESTINAL - ADULT  Goal: Minimal or absence of nausea and/or vomiting  Description: INTERVENTIONS:  - Administer IV fluids if ordered to ensure adequate hydration  - Maintain NPO status until nausea and vomiting are resolved  - Nasogastric tube if ordered  - Administer ordered antiemetic medications as needed  - Provide nonpharmacologic comfort measures as appropriate  - Advance diet as tolerated, if ordered  - Consider nutrition services referral to assist patient with adequate nutrition and appropriate food choices  Outcome: Progressing  Goal: Maintains or returns to baseline bowel function  Description: INTERVENTIONS:  - Assess bowel function  - Encourage oral fluids to ensure adequate hydration  - Administer IV fluids if ordered to ensure adequate hydration  - Administer ordered medications as needed  - Encourage mobilization and activity  - Consider nutritional services referral to assist patient with adequate nutrition and appropriate food choices  Outcome: Progressing  Goal: Maintains adequate nutritional intake  Description: INTERVENTIONS:  - Monitor percentage of each meal consumed  - Identify factors contributing to decreased intake, treat as appropriate  - Assist with meals as needed  - Monitor I&O, weight, and lab values if indicated  - Obtain nutrition services referral as needed  Outcome: Progressing  Goal: Establish and maintain optimal ostomy function  Description: INTERVENTIONS:  - Assess bowel function  - Encourage oral fluids to ensure adequate hydration  - Administer IV fluids if ordered to ensure adequate hydration   - Administer ordered medications as needed  - Encourage mobilization and activity  - Nutrition services referral to assist patient with appropriate food choices  - Assess stoma site  - Consider wound care consult   Outcome: Progressing     Problem: HEMATOLOGIC - ADULT  Goal: Maintains hematologic stability  Description: INTERVENTIONS  - Assess for signs and symptoms of bleeding or hemorrhage  - Monitor labs  - Administer supportive blood products/factors as ordered and appropriate  Outcome: Progressing

## 2021-08-20 NOTE — CASE MANAGEMENT
Per chart review and rounds with nursing and Bebe De La Garza pt is reported to be independent with no apparent discharge needs at this time

## 2021-08-21 VITALS
OXYGEN SATURATION: 99 % | SYSTOLIC BLOOD PRESSURE: 103 MMHG | WEIGHT: 279.98 LBS | RESPIRATION RATE: 16 BRPM | TEMPERATURE: 98.1 F | HEIGHT: 66 IN | HEART RATE: 80 BPM | BODY MASS INDEX: 45 KG/M2 | DIASTOLIC BLOOD PRESSURE: 61 MMHG

## 2021-08-21 LAB
ANION GAP SERPL CALCULATED.3IONS-SCNC: 8 MMOL/L (ref 4–13)
BASOPHILS # BLD AUTO: 0.04 THOUSANDS/ΜL (ref 0–0.1)
BASOPHILS NFR BLD AUTO: 1 % (ref 0–1)
BUN SERPL-MCNC: 21 MG/DL (ref 5–25)
CALCIUM SERPL-MCNC: 8.3 MG/DL (ref 8.3–10.1)
CHLORIDE SERPL-SCNC: 105 MMOL/L (ref 100–108)
CO2 SERPL-SCNC: 28 MMOL/L (ref 21–32)
CREAT SERPL-MCNC: 1 MG/DL (ref 0.6–1.3)
EOSINOPHIL # BLD AUTO: 0.13 THOUSAND/ΜL (ref 0–0.61)
EOSINOPHIL NFR BLD AUTO: 2 % (ref 0–6)
ERYTHROCYTE [DISTWIDTH] IN BLOOD BY AUTOMATED COUNT: 16.4 % (ref 11.6–15.1)
GFR SERPL CREATININE-BSD FRML MDRD: 59 ML/MIN/1.73SQ M
GLUCOSE SERPL-MCNC: 97 MG/DL (ref 65–140)
HCT VFR BLD AUTO: 31.4 % (ref 34.8–46.1)
HCT VFR BLD AUTO: 32.5 % (ref 34.8–46.1)
HGB BLD-MCNC: 9 G/DL (ref 11.5–15.4)
HGB BLD-MCNC: 9.8 G/DL (ref 11.5–15.4)
IMM GRANULOCYTES # BLD AUTO: 0.09 THOUSAND/UL (ref 0–0.2)
IMM GRANULOCYTES NFR BLD AUTO: 1 % (ref 0–2)
LYMPHOCYTES # BLD AUTO: 1.19 THOUSANDS/ΜL (ref 0.6–4.47)
LYMPHOCYTES NFR BLD AUTO: 18 % (ref 14–44)
MAGNESIUM SERPL-MCNC: 2.1 MG/DL (ref 1.6–2.6)
MCH RBC QN AUTO: 23.3 PG (ref 26.8–34.3)
MCHC RBC AUTO-ENTMCNC: 28.7 G/DL (ref 31.4–37.4)
MCV RBC AUTO: 81 FL (ref 82–98)
MONOCYTES # BLD AUTO: 0.58 THOUSAND/ΜL (ref 0.17–1.22)
MONOCYTES NFR BLD AUTO: 9 % (ref 4–12)
NEUTROPHILS # BLD AUTO: 4.61 THOUSANDS/ΜL (ref 1.85–7.62)
NEUTS SEG NFR BLD AUTO: 69 % (ref 43–75)
NRBC BLD AUTO-RTO: 0 /100 WBCS
PLATELET # BLD AUTO: 157 THOUSANDS/UL (ref 149–390)
PMV BLD AUTO: 10.4 FL (ref 8.9–12.7)
POTASSIUM SERPL-SCNC: 4.3 MMOL/L (ref 3.5–5.3)
PROCALCITONIN SERPL-MCNC: 1.97 NG/ML
RBC # BLD AUTO: 3.87 MILLION/UL (ref 3.81–5.12)
SODIUM SERPL-SCNC: 141 MMOL/L (ref 136–145)
WBC # BLD AUTO: 6.64 THOUSAND/UL (ref 4.31–10.16)

## 2021-08-21 PROCEDURE — 83735 ASSAY OF MAGNESIUM: CPT | Performed by: NURSE PRACTITIONER

## 2021-08-21 PROCEDURE — 85014 HEMATOCRIT: CPT | Performed by: NURSE PRACTITIONER

## 2021-08-21 PROCEDURE — 99239 HOSP IP/OBS DSCHRG MGMT >30: CPT | Performed by: NURSE PRACTITIONER

## 2021-08-21 PROCEDURE — 85018 HEMOGLOBIN: CPT | Performed by: NURSE PRACTITIONER

## 2021-08-21 PROCEDURE — 84145 PROCALCITONIN (PCT): CPT | Performed by: INTERNAL MEDICINE

## 2021-08-21 PROCEDURE — 80048 BASIC METABOLIC PNL TOTAL CA: CPT | Performed by: NURSE PRACTITIONER

## 2021-08-21 PROCEDURE — 85025 COMPLETE CBC W/AUTO DIFF WBC: CPT | Performed by: NURSE PRACTITIONER

## 2021-08-21 RX ORDER — CEFPODOXIME PROXETIL 200 MG/1
200 TABLET, FILM COATED ORAL 2 TIMES DAILY
Qty: 4 TABLET | Refills: 0 | Status: SHIPPED | OUTPATIENT
Start: 2021-08-22 | End: 2021-08-21 | Stop reason: SDUPTHER

## 2021-08-21 RX ORDER — ASPIRIN 81 MG/1
81 TABLET ORAL DAILY
Refills: 0
Start: 2021-08-21

## 2021-08-21 RX ORDER — CEFPODOXIME PROXETIL 200 MG/1
200 TABLET, FILM COATED ORAL 2 TIMES DAILY
Qty: 6 TABLET | Refills: 0 | Status: SHIPPED | OUTPATIENT
Start: 2021-08-21 | End: 2021-08-24

## 2021-08-21 RX ORDER — POTASSIUM CHLORIDE 20 MEQ/1
20 TABLET, EXTENDED RELEASE ORAL EVERY OTHER DAY
Qty: 30 TABLET | Refills: 0
Start: 2021-08-21

## 2021-08-21 RX ORDER — SUCRALFATE 1 G/1
1 TABLET ORAL
Qty: 120 TABLET | Refills: 0 | Status: SHIPPED | OUTPATIENT
Start: 2021-08-21 | End: 2021-10-08 | Stop reason: SDUPTHER

## 2021-08-21 RX ORDER — PANTOPRAZOLE SODIUM 40 MG/1
40 TABLET, DELAYED RELEASE ORAL
Qty: 60 TABLET | Refills: 0 | Status: SHIPPED | OUTPATIENT
Start: 2021-08-21 | End: 2021-12-10 | Stop reason: ALTCHOICE

## 2021-08-21 RX ORDER — TORSEMIDE 20 MG/1
10 TABLET ORAL EVERY OTHER DAY
Qty: 30 TABLET | Refills: 0
Start: 2021-08-21

## 2021-08-21 RX ORDER — FERROUS SULFATE TAB EC 324 MG (65 MG FE EQUIVALENT) 324 (65 FE) MG
324 TABLET DELAYED RESPONSE ORAL
Qty: 30 TABLET | Refills: 0 | Status: SHIPPED | OUTPATIENT
Start: 2021-08-21 | End: 2022-07-28

## 2021-08-21 RX ADMIN — FERROUS SULFATE TAB 325 MG (65 MG ELEMENTAL FE) 325 MG: 325 (65 FE) TAB at 08:05

## 2021-08-21 RX ADMIN — CALCIUM 1 TABLET: 500 TABLET ORAL at 08:05

## 2021-08-21 RX ADMIN — SUCRALFATE 1 G: 1 TABLET ORAL at 06:12

## 2021-08-21 RX ADMIN — Medication 250 MG: at 08:04

## 2021-08-21 RX ADMIN — ATORVASTATIN CALCIUM 20 MG: 20 TABLET, FILM COATED ORAL at 08:05

## 2021-08-21 RX ADMIN — AMIODARONE HYDROCHLORIDE 100 MG: 200 TABLET ORAL at 08:04

## 2021-08-21 RX ADMIN — VITAM B12 50 MCG: 100 TAB at 08:05

## 2021-08-21 RX ADMIN — SUCRALFATE 1 G: 1 TABLET ORAL at 11:21

## 2021-08-21 RX ADMIN — METOPROLOL SUCCINATE 25 MG: 25 TABLET, EXTENDED RELEASE ORAL at 08:05

## 2021-08-21 RX ADMIN — Medication 1 TABLET: at 08:04

## 2021-08-21 RX ADMIN — PANTOPRAZOLE SODIUM 40 MG: 40 TABLET, DELAYED RELEASE ORAL at 06:12

## 2021-08-21 NOTE — PLAN OF CARE
Problem: Potential for Falls  Goal: Patient will remain free of falls  Description: INTERVENTIONS:  - Educate patient/family on patient safety including physical limitations  - Instruct patient to call for assistance with activity   - Consult OT/PT to assist with strengthening/mobility   - Keep Call bell within reach  - Keep bed low and locked with side rails adjusted as appropriate  - Keep care items and personal belongings within reach  - Initiate and maintain comfort rounds  - Make Fall Risk Sign visible to staff  - Offer Toileting every  Hours, in advance of need  - Initiate/Maintain alarm  - Obtain necessary fall risk management equipment:   - Apply yellow socks and bracelet for high fall risk patients  - Consider moving patient to room near nurses station  Outcome: Completed     Problem: Nutrition/Hydration-ADULT  Goal: Nutrient/Hydration intake appropriate for improving, restoring or maintaining nutritional needs  Description: Monitor and assess patient's nutrition/hydration status for malnutrition  Collaborate with interdisciplinary team and initiate plan and interventions as ordered  Monitor patient's weight and dietary intake as ordered or per policy  Utilize nutrition screening tool and intervene as necessary  Determine patient's food preferences and provide high-protein, high-caloric foods as appropriate       INTERVENTIONS:  - Monitor oral intake, urinary output, labs, and treatment plans  - Assess nutrition and hydration status and recommend course of action  - Evaluate amount of meals eaten  - Assist patient with eating if necessary   - Allow adequate time for meals  - Recommend/ encourage appropriate diets, oral nutritional supplements, and vitamin/mineral supplements  - Order, calculate, and assess calorie counts as needed  - Recommend, monitor, and adjust tube feedings and TPN/PPN based on assessed needs  - Assess need for intravenous fluids  - Provide specific nutrition/hydration education as appropriate  - Include patient/family/caregiver in decisions related to nutrition  Outcome: Completed     Problem: RESPIRATORY - ADULT  Goal: Achieves optimal ventilation and oxygenation  Description: INTERVENTIONS:  - Assess for changes in respiratory status  - Assess for changes in mentation and behavior  - Position to facilitate oxygenation and minimize respiratory effort  - Oxygen administered by appropriate delivery if ordered  - Initiate smoking cessation education as indicated  - Encourage broncho-pulmonary hygiene including cough, deep breathe, Incentive Spirometry  - Assess the need for suctioning and aspirate as needed  - Assess and instruct to report SOB or any respiratory difficulty  - Respiratory Therapy support as indicated  Outcome: Completed     Problem: GASTROINTESTINAL - ADULT  Goal: Minimal or absence of nausea and/or vomiting  Description: INTERVENTIONS:  - Administer IV fluids if ordered to ensure adequate hydration  - Maintain NPO status until nausea and vomiting are resolved  - Nasogastric tube if ordered  - Administer ordered antiemetic medications as needed  - Provide nonpharmacologic comfort measures as appropriate  - Advance diet as tolerated, if ordered  - Consider nutrition services referral to assist patient with adequate nutrition and appropriate food choices  Outcome: Completed  Goal: Maintains or returns to baseline bowel function  Description: INTERVENTIONS:  - Assess bowel function  - Encourage oral fluids to ensure adequate hydration  - Administer IV fluids if ordered to ensure adequate hydration  - Administer ordered medications as needed  - Encourage mobilization and activity  - Consider nutritional services referral to assist patient with adequate nutrition and appropriate food choices  Outcome: Completed  Goal: Maintains adequate nutritional intake  Description: INTERVENTIONS:  - Monitor percentage of each meal consumed  - Identify factors contributing to decreased intake, treat as appropriate  - Assist with meals as needed  - Monitor I&O, weight, and lab values if indicated  - Obtain nutrition services referral as needed  Outcome: Completed  Goal: Establish and maintain optimal ostomy function  Description: INTERVENTIONS:  - Assess bowel function  - Encourage oral fluids to ensure adequate hydration  - Administer IV fluids if ordered to ensure adequate hydration   - Administer ordered medications as needed  - Encourage mobilization and activity  - Nutrition services referral to assist patient with appropriate food choices  - Assess stoma site  - Consider wound care consult   Outcome: Completed     Problem: HEMATOLOGIC - ADULT  Goal: Maintains hematologic stability  Description: INTERVENTIONS  - Assess for signs and symptoms of bleeding or hemorrhage  - Monitor labs  - Administer supportive blood products/factors as ordered and appropriate  Outcome: Completed

## 2021-08-21 NOTE — ASSESSMENT & PLAN NOTE
Patient has a history of atrial fibrillation with rapid ventricular response  Continue home medication amiodarone 100 mg p o  Daily  Continue metoprolol  Holding Eliquis due to GI bleeding  May resume on Tuesday evening    Currently sinus rhythm  Optimize electrolytes

## 2021-08-21 NOTE — ASSESSMENT & PLAN NOTE
Sepsis present on admission as evidence by leukocytosis, WBCs 14 52, lactic acid elevated 2 3, low-grade fever, suspect may be secondary to community-acquired pneumonia as seen on imaging of CT scan right lower lobe infiltrate  Patient was treated for acute bronchitis with 10 days of doxycycline about a month ago  Treated with normal saline boluses 500 cc x2 in ED  Lactic acid normalized after saline bolus  Urine antigens negative  Procalcitonin elevated to 2 97, repeat pending today  Sputum g stain C&S if able  Patient only has occasional dry cough  Blood cultures no growth to date  Patient afebrile, WBC normalized  Patient received Rocephin for 3 days, will discharge patient on Vantin for 2 more days     Follow-up PCP in 1 week      Results from last 7 days   Lab Units 08/20/21  0543 08/19/21  1747 08/19/21  1334 08/19/21  1150   LACTIC ACID mmol/L  --  2 0 2 3* 2 3*   PROCALCITONIN ng/ml 2 97*  --   --   --      Results from last 7 days   Lab Units 08/21/21  0540 08/20/21  0543 08/19/21  1150 08/18/21  0838   WBC Thousand/uL 6 64 8 10 14 52* 5 55   TOTAL NEUT ABS Thousand/uL  --   --  12 78*  --    BANDS PCT %  --   --  11*  --

## 2021-08-21 NOTE — DISCHARGE INSTRUCTIONS
Community Acquired Pneumonia   AMBULATORY CARE:   Community-acquired pneumonia (CAP)  is a lung infection that you get from being around other people in the community  Your lungs become inflamed and do not work well  CAP may be caused by bacteria, viruses, and fungi  The germs are easily spread from an infected person to others by coughing, sneezing, or close contact  Common symptoms include the following:   · Cough that may bring up green, yellow, or bloody mucus    · Fever, chills, or severe shaking    · Shortness of breath    · Breathing and heartbeat that are faster than usual    · Pain in your chest or back when you breathe in or cough    · Fatigue and loss of appetite    · Trouble thinking clearly (especially in elderly people)    Seek care immediately if:   · You are confused and cannot think clearly  · You have increased trouble breathing  · Your lips or fingernails turn gray or blue  Call your doctor if:   · Your symptoms do not get better, or they get worse  · You are urinating less, or not at all  · You have questions or concerns about your condition or care  Treatment for CAP  depends on what type of germ is causing your CAP, and how bad your symptoms are  You may need to be admitted to the hospital if your pneumonia is severe  You may need antibiotics if your pneumonia is caused by bacteria  Antiviral medicines may be given if you have viral pneumonia  You may need medicines that dilate your bronchial tubes  You may need oxygen if your blood oxygen level is lower than it should be  Deep breathing and coughing:  Deep breathing helps open the air passages in your lungs  Coughing helps bring up mucus from your lungs  Take a deep breath and hold the breath as long as you can  Then push the air out of your lungs with a deep, strong cough  Spit out any mucus you have coughed up  Take 10 deep breaths in a row every hour that you are awake   Remember to follow each deep breath with a cough  Do not smoke or allow others to smoke around you:  Nicotine and other chemicals in cigarettes and cigars can cause lung damage  Ask your healthcare provider for information if you currently smoke and need help to quit  E-cigarettes or smokeless tobacco still contain nicotine  Talk to your healthcare provider before you use these products  Manage CAP:   · Breathe warm, moist air  This helps loosen mucus  Loosely place a warm, wet washcloth over your nose and mouth  A room humidifier may also help make the air moist     · Drink liquids as directed  Ask your healthcare provider how much liquid to drink each day and which liquids to drink  Liquids help make mucus thin and easier to get out of your body  · Gently tap your chest   This helps loosen mucus so it is easier to cough  Lie with your head lower than your chest several times a day and tap your chest     · Get plenty of rest   Rest helps your body heal     Prevent CAP:       · Wash your hands often  Wash your hands several times each day  Wash after you use the bathroom, change a child's diaper, and before you prepare or eat food  Use soap and water every time  Rub your soapy hands together, lacing your fingers  Wash the front and back of your hands, and in between your fingers  Use the fingers of one hand to scrub under the fingernails of the other hand  Wash for at least 20 seconds  Rinse with warm, running water for several seconds  Then dry your hands with a clean towel or paper towel  Use hand  that contains alcohol if soap and water are not available  Do not touch your eyes, nose, or mouth without washing your hands first          · Cover a sneeze or cough  Use a tissue that covers your mouth and nose  Throw the tissue away in a trash can right away  Use the bend of your arm if a tissue is not available  Wash your hands well with soap and water or use a hand    Do not stand close to anyone who is sneezing or coughing  · Clean surfaces often  Clean doorknobs, countertops, cell phones, and other surfaces that are touched often  Use a disinfecting wipe, a single-use sponge, or a cloth you can wash and reuse  Use disinfecting  if you do not have wipes  You can create a disinfecting  by mixing 1 part bleach with 10 parts water  · Try to avoid people who have a cold or the flu  If you are sick, stay away from others as much as possible  · Ask about vaccines you may need  You may need a vaccine to help prevent pneumonia  Get an influenza (flu) vaccine every year as soon as recommended, usually in September or October  Your healthcare provider can tell you if you should get any other vaccines, and when to get them  Follow up with your doctor within 3 days or as directed: You may need another x-ray  Write down your questions so you remember to ask them during your visits  © Copyright [employer] 2021 Information is for End User's use only and may not be sold, redistributed or otherwise used for commercial purposes  All illustrations and images included in CareNotes® are the copyrighted property of A D A M , Inc  or [facility]  The above information is an  only  It is not intended as medical advice for individual conditions or treatments  Talk to your doctor, nurse or pharmacist before following any medical regimen to see if it is safe and effective for you  Cefpodoxime Proxetil (Vantin) - (By mouth)   Why this medicine is used:   Treats bacterial infections  Contact a nurse or doctor right away if you have:  · Blistering or peeling skin  · Severe diarrhea     Common side effects:  · Vaginal itching or discharge  · Nausea  © [employer] 2021 Information is for End User's use only and may not be sold, redistributed or otherwise used for commercial purposes

## 2021-08-21 NOTE — DISCHARGE INSTR - AVS FIRST PAGE
Hold Eliquis and aspirin for 5 days from symptom onset  Resume on Tuesday evening  Please check with cardiologist if you can be off baby aspirin  Please take enteric coated aspirin at home in the future  Continue antibiotic for 3 more days starting tomorrow for pneumonia  Monitor BP at home, hold Demadex for SBP < 100  Monitor weight at home  Notify Cardiology for weight gain of 2 lb in 1 day or 5 lb in 3 days  Continue Protonix twice a day and Carafate 4 times on discharge  Follow-up primary GI in 2-3 weeks  Follow-up PCP in 1 week    Please return to ED for recurrent black stool from the ostomy  Repeat CBC, BMP, Mag in 3 days

## 2021-08-21 NOTE — NURSING NOTE
Patient discharged home  IV removed and AVS reviewed with patient, focusing on Vantin and pneumonia education  Patient verbalized understanding and readiness for discharge  Patient sent with belongings

## 2021-08-21 NOTE — ASSESSMENT & PLAN NOTE
Wt Readings from Last 3 Encounters:   08/20/21 127 kg (279 lb 15 8 oz)   08/19/21 127 kg (280 lb)   07/21/21 125 kg (276 lb)     Patient is on torsemide 10 mg p o  Every other day at home  Will resume on discharge  Hold for systolic BP less than 275  Monitor weight at home

## 2021-08-21 NOTE — ASSESSMENT & PLAN NOTE
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

## 2021-08-21 NOTE — PLAN OF CARE
Problem: Potential for Falls  Goal: Patient will remain free of falls  Description: INTERVENTIONS:  - Educate patient/family on patient safety including physical limitations  - Instruct patient to call for assistance with activity   - Consult OT/PT to assist with strengthening/mobility   - Keep Call bell within reach  - Keep bed low and locked with side rails adjusted as appropriate  - Keep care items and personal belongings within reach  - Initiate and maintain comfort rounds  - Make Fall Risk Sign visible to staff  - Offer Toileting every 2 Hours, in advance of need  - Obtain necessary fall risk management equipment: walker  - Apply yellow socks and bracelet for high fall risk patients  - Consider moving patient to room near nurses station  Outcome: Progressing     Problem: Nutrition/Hydration-ADULT  Goal: Nutrient/Hydration intake appropriate for improving, restoring or maintaining nutritional needs  Description: Monitor and assess patient's nutrition/hydration status for malnutrition  Collaborate with interdisciplinary team and initiate plan and interventions as ordered  Monitor patient's weight and dietary intake as ordered or per policy  Utilize nutrition screening tool and intervene as necessary  Determine patient's food preferences and provide high-protein, high-caloric foods as appropriate       INTERVENTIONS:  - Monitor oral intake, urinary output, labs, and treatment plans  - Assess nutrition and hydration status and recommend course of action  - Evaluate amount of meals eaten  - Assist patient with eating if necessary   - Allow adequate time for meals  - Recommend/ encourage appropriate diets, oral nutritional supplements, and vitamin/mineral supplements  - Order, calculate, and assess calorie counts as needed  - Recommend, monitor, and adjust tube feedings and TPN/PPN based on assessed needs  - Assess need for intravenous fluids  - Provide specific nutrition/hydration education as appropriate  - Include patient/family/caregiver in decisions related to nutrition  Outcome: Progressing     Problem: RESPIRATORY - ADULT  Goal: Achieves optimal ventilation and oxygenation  Description: INTERVENTIONS:  - Assess for changes in respiratory status  - Assess for changes in mentation and behavior  - Position to facilitate oxygenation and minimize respiratory effort  - Oxygen administered by appropriate delivery if ordered  - Initiate smoking cessation education as indicated  - Encourage broncho-pulmonary hygiene including cough, deep breathe, Incentive Spirometry  - Assess the need for suctioning and aspirate as needed  - Assess and instruct to report SOB or any respiratory difficulty  - Respiratory Therapy support as indicated  Outcome: Progressing     Problem: GASTROINTESTINAL - ADULT  Goal: Minimal or absence of nausea and/or vomiting  Description: INTERVENTIONS:  - Administer IV fluids if ordered to ensure adequate hydration  - Maintain NPO status until nausea and vomiting are resolved  - Nasogastric tube if ordered  - Administer ordered antiemetic medications as needed  - Provide nonpharmacologic comfort measures as appropriate  - Advance diet as tolerated, if ordered  - Consider nutrition services referral to assist patient with adequate nutrition and appropriate food choices  Outcome: Progressing  Goal: Maintains or returns to baseline bowel function  Description: INTERVENTIONS:  - Assess bowel function  - Encourage oral fluids to ensure adequate hydration  - Administer IV fluids if ordered to ensure adequate hydration  - Administer ordered medications as needed  - Encourage mobilization and activity  - Consider nutritional services referral to assist patient with adequate nutrition and appropriate food choices  Outcome: Progressing  Goal: Maintains adequate nutritional intake  Description: INTERVENTIONS:  - Monitor percentage of each meal consumed  - Identify factors contributing to decreased intake, treat as appropriate  - Assist with meals as needed  - Monitor I&O, weight, and lab values if indicated  - Obtain nutrition services referral as needed  Outcome: Progressing  Goal: Establish and maintain optimal ostomy function  Description: INTERVENTIONS:  - Assess bowel function  - Encourage oral fluids to ensure adequate hydration  - Administer IV fluids if ordered to ensure adequate hydration   - Administer ordered medications as needed  - Encourage mobilization and activity  - Nutrition services referral to assist patient with appropriate food choices  - Assess stoma site  - Consider wound care consult   Outcome: Progressing     Problem: HEMATOLOGIC - ADULT  Goal: Maintains hematologic stability  Description: INTERVENTIONS  - Assess for signs and symptoms of bleeding or hemorrhage  - Monitor labs  - Administer supportive blood products/factors as ordered and appropriate  Outcome: Progressing

## 2021-08-21 NOTE — ASSESSMENT & PLAN NOTE
Patient is status post gastric bypass in 2016 with Dr Azam Ayala  Reports that she has gained weight over the past year secondary to pandemic/lack of ability to get out due to quarantine Ng  Stated that she is determined to start to lose weight again  Supportive care

## 2021-08-21 NOTE — DISCHARGE SUMMARY
[address]  Discharge- Flory Perez 1955, 72 y o  female MRN: [id number]  Unit/Bed#: [address] [place] [address] Encounter: [id number]  Primary Care Provider: GEORGETTE Nix   Date and time admitted to hospital: 8/19/2021 11:16 AM    * GI bleed  Assessment & Plan  Suspected GI bleed present on admission as evidence by high output of black liquid stool through colostomy  Patient denies any recent NSAID use, does report a history of marginal ulcer secondary to NSAID use in 2018  On Prilosec and Eliquis/aspirin at home  Hemoglobin 11 7 day prior to admission, 10 6 on admission  Continues with high output from colostomy, liquid black stool on admission  Appreciate GI consult  · Received Protonix drip on admission  · Patient underwent EGD yesterday - 2 medium-size clean based ulcers at gastrojejunal anastomotic site, no signs of active GI bleed  Status post gastric bypass surgery with large gastric pouch  Normal esophagus  · GI recommend avoiding NSAIDs  · Protonix 40 b i d  And liquid Carafate 1 g 4 times a day  · Regular diet  · Patient is cleared for discharge by GI  Recommend hold aspirin/Eliquis for at least 5 days  Continue Protonix and Carafate on discharge  Follow-up primary GI in 2-3 weeks  Hemoglobin slowly trending, stable in past 2 days  Hemoglobin 9's today  Stool is dark brown in colostomy currently  DC patient home today  Repeat CBC in 3 days  Results will go to PCP        Sepsis [facility])  Assessment & Plan  Sepsis present on admission as evidence by leukocytosis, WBCs 14 52, lactic acid elevated 2 3, low-grade fever, suspect may be secondary to community-acquired pneumonia as seen on imaging of CT scan right lower lobe infiltrate  Patient was treated for acute bronchitis with 10 days of doxycycline about a month ago  Treated with normal saline boluses 500 cc x2 in ED  Lactic acid normalized after saline bolus    Urine antigens negative  Procalcitonin elevated to 2 97, repeat pending today  Sputum g stain C&S if able  Patient only has occasional dry cough  Blood cultures no growth to date  Patient afebrile, WBC normalized  Patient received Rocephin for 3 days, will discharge patient on Vantin for 2 more days  Follow-up PCP in 1 week      Results from last 7 days   Lab Units 08/20/21  0543 08/19/21  1747 08/19/21  1334 08/19/21  1150   LACTIC ACID mmol/L  --  2 0 2 3* 2 3*   PROCALCITONIN ng/ml 2 97*  --   --   --      Results from last 7 days   Lab Units 08/21/21  0540 08/20/21  0543 08/19/21  1150 08/18/21  0838   WBC Thousand/uL 6 64 8 10 14 52* 5 55   TOTAL NEUT ABS Thousand/uL  --   --  12 78*  --    BANDS PCT %  --   --  11*  --              Community acquired pneumonia of right lower lobe of lung  Assessment & Plan  Community-acquired pneumonia right lower lobe suspected as patient had CT of abdomen pelvis which indicated right lower lobe infiltrate in keeping with pneumonia as per radiology report  Reports occasional cough, no expectoration  Management as above    Chronic diastolic heart failure [facility])  Assessment & Plan  Wt Readings from Last 3 Encounters:   08/20/21 127 kg (279 lb 15 8 oz)   08/19/21 127 kg (280 lb)   07/21/21 125 kg (276 lb)     Patient is on torsemide 10 mg p o  Every other day at home  Will resume on discharge  Hold for systolic BP less than 216  Monitor weight at home  S/P TAVR (transcatheter aortic valve replacement)  Assessment & Plan  As noted above patient is status post TAVR secondary to aortic stenosis  Plan as noted above  Hyperlipidemia  Assessment & Plan  Patient has a history of hyperlipidemia, will continue Lipitor 20 mg p o  Daily  Atrial fibrillation with rapid ventricular response [facility])  Assessment & Plan  Patient has a history of atrial fibrillation with rapid ventricular response  Continue home medication amiodarone 100 mg p o  Daily  Continue metoprolol  Holding Eliquis due to GI bleeding    May resume on Tuesday evening  Currently sinus rhythm  Optimize electrolytes    Aortic stenosis, severe  Assessment & Plan  Patient has a history of severe aortic stenosis, status post TAVR  Morbid obesity due to excess calories [facility])  Assessment & Plan  Patient is status post gastric bypass in 2016 with Dr Babs Treviño  Reports that she has gained weight over the past year secondary to pandemic/lack of ability to get out due to quarantine Ng  Stated that she is determined to start to lose weight again  Supportive care  Discharging Physician / Practitioner: Nathalia Shabazz  PCP: Nathalia Lynn  Admission Date: 8/19/2021  Discharge Date: 08/21/21    Reason for Admission: Diarrhea (pt has colostomy, reports runny stools since monday night   sent by Dr Samina Falcon)        Medical Problems     Resolved Problems  Date Reviewed: 8/21/2021        Resolved    Lactic acidosis 8/20/2021     Resolved by  Saman Monroe, [address] [place] [address] Stay:  IP CONSULT TO GASTROENTEROLOGY    Procedures Performed:     · EGD    Significant Findings / Test Results:     · As below  Results from last 7 days   Lab Units 08/21/21  1218 08/21/21  0540 08/20/21  1754 08/20/21  0543 08/19/21  1150   WBC Thousand/uL  --  6 64  --  8 10 14 52*   HEMOGLOBIN g/dL 9 8* 9 0* 10 0* 8 3* 10 6*   PLATELETS Thousands/uL  --  157  --  139* 162     Results from last 7 days   Lab Units 08/21/21  0540 08/20/21  0543 08/19/21  1155 08/18/21  0838   SODIUM mmol/L 141 141 139 138   POTASSIUM mmol/L 4 3 4 2 4 5 4 6   CHLORIDE mmol/L 105 108 104 109*   CO2 mmol/L 28 27 25 26   BUN mg/dL 21 24 31* 18   CREATININE mg/dL 1 00 0 94 1 02 0 83   CALCIUM mg/dL 8 3 8 2* 8 5 9 0   TOTAL BILIRUBIN mg/dL  --   --  0 53 0 48   ALK PHOS U/L  --   --  61 70   ALT U/L  --   --  20 14   AST U/L  --   --  14 13     Results from last 7 days   Lab Units 08/19/21  1150   INR  1 18     Results from last 7 days   Lab Units 08/19/21  1150   TROPONIN I ng/mL <0 02 Lab Results   Component Value Date/Time    HGBA1C 6 2 (H) 07/19/2021 07:09 AM    HGBA1C 6 2 (H) 04/09/2021 01:22 AM         Results from last 7 days   Lab Units 08/21/21  0540 08/20/21  0543 08/19/21  1747 08/19/21  1334 08/19/21  1150   LACTIC ACID mmol/L  --   --  2 0 2 3* 2 3*   PROCALCITONIN ng/ml 1 97* 2 97*  --   --   --      Blood Culture:   Lab Results   Component Value Date    BLOODCX No Growth at 24 hrs  08/19/2021    BLOODCX No Growth at 24 hrs  08/19/2021    BLOODCX No Growth After 5 Days  04/08/2021    BLOODCX No Growth After 5 Days  04/08/2021     Urine Culture:   Lab Results   Component Value Date    URINECX 10,000-19,000 cfu/ml  08/19/2021    URINECX No Growth <1000 cfu/mL 11/08/2019    URINECX 40,000-49,000 cfu/ml Enterococcus faecalis (A) 09/03/2019    URINECX 10,000-19,000 cfu/ml  09/03/2019     Sputum Culture: No components found for: SPUTUMCX  Wound Culture: No results found for: WOUNDCULT     CT abdomen pelvis wo contrast   Final Result by Bart Washburn MD (08/19 1340)      Right lower lobe infiltrate in keeping with pneumonia  Left lower quadrant ostomy with an unchanged large left parastomal bowel containing hernia not causing obstruction or other complication  Status post gastric bypass without apparent complication  The study was marked in [facility] for immediate notification  Workstation performed: [id number]         XR chest 1 view portable   Final Result by Jdoi Trejo MD (08/19 0418)      No acute cardiopulmonary disease  Workstation performed: [id number]                Incidental Findings:   · None     Test Results Pending at Discharge (will require follow up): · Final Blood cultures     Outpatient Tests Requested:  · CBC, BMP, Mag in 3 days    Complications:  None    Reason for Admission:   Chief Complaint   Patient presents with    Diarrhea     pt has colostomy, reports runny stools since monday night   sent by Dr Ace Keen Hospital Course:     Per HPI: Christoph Hendricks is a 72 y o  female patient with a PMH of marginal ulcer, AFib, CHF, severe aortic stenosis, hyperlipidemia, obesity who originally presented to the hospital on 8/19/2021 due to GI bleed from colostomy  Patient underwent EGD yesterday, findings as above  GI recommend PPI b i d , Carafate q i d  Recommend holding aspirin Eliquis for least 5 days  Follow-up primary GI in 2-3 weeks  Hemoglobin slowly down trended during her stay, stable since yesterday  Hemoglobin today 9 8  Patient did not require blood transfusion  Patient was started on IV antibiotic on admission due to right lower lobe pneumonia  Continue Vantin for 3 more days to complete 5 day course  Patient tolerating regular diet  DC patient home today  Repeat CBC, BMP, Mag in 3 days  Follow-up PCP in 1 week  Hospital Course:    Please see above list of diagnoses and related plan for additional information  Condition at Discharge: good       Discharge Day Visit / Exam:     Subjective:  Patient reports dark brown stool from colostomy  Denies chest pain, headache, dizziness, SOB, fever, chills, nausea vomiting abdominal pain  Ready to go home  Patient reports occasional dry cough about once a day  Vitals: Blood Pressure: 103/61 (08/21/21 0742)  Pulse: 80 (08/21/21 0742)  Temperature: 98 1 °F (36 7 °C) (08/21/21 0742)  Temp Source: Oral (08/21/21 0742)  Respirations: 16 (08/21/21 0742)  Height: 5' 6" (167 6 cm) (08/20/21 0810)  Weight - Scale: 127 kg (279 lb 15 8 oz) (08/20/21 0810)  SpO2: 99 % (08/21/21 0742)  Exam:   Physical Exam  Vitals and nursing note reviewed  Constitutional:       Appearance: She is well-developed  She is obese  HENT:      Head: Normocephalic and atraumatic  Neck:      Thyroid: No thyromegaly  Vascular: No JVD  Trachea: No tracheal deviation  Cardiovascular:      Rate and Rhythm: Normal rate and regular rhythm        Heart sounds: Normal heart sounds  Pulmonary:      Effort: Pulmonary effort is normal  No respiratory distress  Breath sounds: Normal breath sounds  No wheezing or rales  Abdominal:      General: Bowel sounds are normal  There is no distension  Palpations: Abdomen is soft  Tenderness: There is no abdominal tenderness  There is no guarding  Comments: Colostomy in-situ, draining soft dark brown stool  Musculoskeletal:         General: Swelling present  No deformity  Cervical back: Neck supple  Right lower leg: No edema  Comments: Trace edema to lower extremity  Skin:     General: Skin is warm and dry  Neurological:      General: No focal deficit present  Mental Status: She is alert and oriented to person, place, and time  Psychiatric:         Mood and Affect: Mood normal          Judgment: Judgment normal            Discharge instructions/Information to patient and family:   See after visit summary for information provided to patient and family  Provisions for Follow-Up Care:  See after visit summary for information related to follow-up care and any pertinent home health orders  Disposition:     Home    Planned Readmission: no     Discharge Statement:  I spent 40 minutes discharging the patient  This time was spent on the day of discharge  I had direct contact with the patient on the day of discharge  Greater than 50% of the total time was spent examining patient, answering all patient questions, arranging and discussing plan of care with patient as well as directly providing post-discharge instructions  Additional time then spent on discharge activities  Discharge Medications:  See after visit summary for reconciled discharge medications provided to patient and family        ** Please Note: This note has been constructed using a voice recognition system **

## 2021-08-23 ENCOUNTER — TRANSITIONAL CARE MANAGEMENT (OUTPATIENT)
Dept: FAMILY MEDICINE CLINIC | Facility: CLINIC | Age: 66
End: 2021-08-23

## 2021-08-23 LAB — GLUCOSE SERPL-MCNC: 111 MG/DL (ref 65–140)

## 2021-08-24 ENCOUNTER — HOSPITAL ENCOUNTER (OUTPATIENT)
Dept: NON INVASIVE DIAGNOSTICS | Facility: HOSPITAL | Age: 66
Discharge: HOME | End: 2021-08-24
Payer: MEDICARE

## 2021-08-24 ENCOUNTER — APPOINTMENT (OUTPATIENT)
Dept: LAB | Facility: CLINIC | Age: 66
End: 2021-08-24
Payer: MEDICARE

## 2021-08-24 DIAGNOSIS — K92.2 GI BLEED: ICD-10-CM

## 2021-08-24 DIAGNOSIS — I35.0 AORTIC STENOSIS, SEVERE: ICD-10-CM

## 2021-08-24 DIAGNOSIS — I48.91 ATRIAL FIBRILLATION WITH RAPID VENTRICULAR RESPONSE (HCC): ICD-10-CM

## 2021-08-24 DIAGNOSIS — Z95.2 S/P TAVR (TRANSCATHETER AORTIC VALVE REPLACEMENT): ICD-10-CM

## 2021-08-24 LAB
ANION GAP SERPL CALCULATED.3IONS-SCNC: 4 MMOL/L (ref 4–13)
ANISOCYTOSIS BLD QL SMEAR: PRESENT
BACTERIA BLD CULT: NORMAL
BACTERIA BLD CULT: NORMAL
BASOPHILS # BLD MANUAL: 0.05 THOUSAND/UL (ref 0–0.1)
BASOPHILS NFR MAR MANUAL: 1 % (ref 0–1)
BUN SERPL-MCNC: 14 MG/DL (ref 5–25)
CALCIUM SERPL-MCNC: 8.7 MG/DL (ref 8.3–10.1)
CHLORIDE SERPL-SCNC: 109 MMOL/L (ref 100–108)
CO2 SERPL-SCNC: 25 MMOL/L (ref 21–32)
CREAT SERPL-MCNC: 0.82 MG/DL (ref 0.6–1.3)
EOSINOPHIL # BLD MANUAL: 0 THOUSAND/UL (ref 0–0.4)
EOSINOPHIL NFR BLD MANUAL: 0 % (ref 0–6)
ERYTHROCYTE [DISTWIDTH] IN BLOOD BY AUTOMATED COUNT: 16.4 % (ref 11.6–15.1)
GFR SERPL CREATININE-BSD FRML MDRD: 75 ML/MIN/1.73SQ M
GLUCOSE P FAST SERPL-MCNC: 107 MG/DL (ref 65–99)
HCT VFR BLD AUTO: 31.6 % (ref 34.8–46.1)
HGB BLD-MCNC: 9.4 G/DL (ref 11.5–15.4)
LYMPHOCYTES # BLD AUTO: 0.87 THOUSAND/UL (ref 0.6–4.47)
LYMPHOCYTES # BLD AUTO: 16 % (ref 14–44)
MAGNESIUM SERPL-MCNC: 2.4 MG/DL (ref 1.6–2.6)
MCH RBC QN AUTO: 23.9 PG (ref 26.8–34.3)
MCHC RBC AUTO-ENTMCNC: 29.7 G/DL (ref 31.4–37.4)
MCV RBC AUTO: 80 FL (ref 82–98)
METAMYELOCYTES NFR BLD MANUAL: 3 % (ref 0–1)
MICROCYTES BLD QL AUTO: PRESENT
MONOCYTES # BLD AUTO: 0.16 THOUSAND/UL (ref 0–1.22)
MONOCYTES NFR BLD: 3 % (ref 4–12)
NEUTROPHILS # BLD MANUAL: 4.19 THOUSAND/UL (ref 1.85–7.62)
NEUTS BAND NFR BLD MANUAL: 9 % (ref 0–8)
NEUTS SEG NFR BLD AUTO: 68 % (ref 43–75)
PLATELET # BLD AUTO: 192 THOUSANDS/UL (ref 149–390)
PLATELET BLD QL SMEAR: ADEQUATE
PMV BLD AUTO: 10.2 FL (ref 8.9–12.7)
POLYCHROMASIA BLD QL SMEAR: PRESENT
POTASSIUM SERPL-SCNC: 4.1 MMOL/L (ref 3.5–5.3)
RBC # BLD AUTO: 3.94 MILLION/UL (ref 3.81–5.12)
RBC MORPH BLD: PRESENT
SODIUM SERPL-SCNC: 138 MMOL/L (ref 136–145)
WBC # BLD AUTO: 5.44 THOUSAND/UL (ref 4.31–10.16)

## 2021-08-24 PROCEDURE — 93306 TTE W/DOPPLER COMPLETE: CPT | Performed by: INTERNAL MEDICINE

## 2021-08-24 PROCEDURE — 36415 COLL VENOUS BLD VENIPUNCTURE: CPT

## 2021-08-24 PROCEDURE — 85027 COMPLETE CBC AUTOMATED: CPT

## 2021-08-24 PROCEDURE — 93306 TTE W/DOPPLER COMPLETE: CPT

## 2021-08-24 PROCEDURE — 85007 BL SMEAR W/DIFF WBC COUNT: CPT

## 2021-08-24 PROCEDURE — 80048 BASIC METABOLIC PNL TOTAL CA: CPT

## 2021-08-24 PROCEDURE — 83735 ASSAY OF MAGNESIUM: CPT

## 2021-08-26 ENCOUNTER — OFFICE VISIT (OUTPATIENT)
Dept: FAMILY MEDICINE CLINIC | Facility: CLINIC | Age: 66
End: 2021-08-26
Payer: MEDICARE

## 2021-08-26 ENCOUNTER — NURSE TRIAGE (OUTPATIENT)
Dept: OTHER | Facility: OTHER | Age: 66
End: 2021-08-26

## 2021-08-26 VITALS
TEMPERATURE: 97.5 F | RESPIRATION RATE: 16 BRPM | OXYGEN SATURATION: 98 % | HEIGHT: 66 IN | HEART RATE: 94 BPM | DIASTOLIC BLOOD PRESSURE: 70 MMHG | WEIGHT: 281 LBS | SYSTOLIC BLOOD PRESSURE: 116 MMHG | BODY MASS INDEX: 45.16 KG/M2

## 2021-08-26 DIAGNOSIS — K28.4 GASTROINTESTINAL HEMORRHAGE ASSOCIATED WITH GASTROJEJUNAL ULCER: Primary | ICD-10-CM

## 2021-08-26 DIAGNOSIS — N18.9 CHRONIC KIDNEY DISEASE, UNSPECIFIED CKD STAGE: ICD-10-CM

## 2021-08-26 DIAGNOSIS — E78.5 HYPERLIPIDEMIA, UNSPECIFIED HYPERLIPIDEMIA TYPE: ICD-10-CM

## 2021-08-26 DIAGNOSIS — I48.91 ATRIAL FIBRILLATION WITH RAPID VENTRICULAR RESPONSE (HCC): ICD-10-CM

## 2021-08-26 DIAGNOSIS — R91.8 RIGHT LOWER LOBE PULMONARY INFILTRATE: ICD-10-CM

## 2021-08-26 PROCEDURE — 99496 TRANSJ CARE MGMT HIGH F2F 7D: CPT | Performed by: NURSE PRACTITIONER

## 2021-08-26 NOTE — RESULT ENCOUNTER NOTE
Patient already has an appointment scheduled 10/7 with Dr Erica Márquez at [facility]

## 2021-08-26 NOTE — PATIENT INSTRUCTIONS
Follow up with gastro  Supportive care discussed and advised  Advised to RTO for any worsening and no improvement  Follow up for no improvement and worsening of conditions  Patient advised and educated when to see immediate medical care

## 2021-08-26 NOTE — PROGRESS NOTES
Assessment/Plan:    1  Gastrointestinal hemorrhage associated with gastrojejunal ulcer  Comments:  resolved, Hgb is stable and will continue with protonix and carafate and will follow with gastro, will call for any worsening of diarrhea, will check for c diff    2  Right lower lobe pulmonary infiltrate  Comments:  finished antibiotics, will repeat chest xray in 3 weeks  Orders:  -     XR chest pa & lateral; Future; Expected date: 08/26/2021  -     CBC and differential; Future    3  Chronic kidney disease, unspecified CKD stage  Comments:  stable, creatinine is normal    4  Atrial fibrillation with rapid ventricular response (HCC)  Comments:  complaint with treatment and resumed eliquis and managed by cardiologist    5  Hyperlipidemia, unspecified hyperlipidemia type  Comments:  complaint with statin and tolerating it well          Patient Instructions: Follow up with gastro  Supportive care discussed and advised  Advised to RTO for any worsening and no improvement  Follow up for no improvement and worsening of conditions  Patient advised and educated when to see immediate medical care  Return if symptoms worsen or fail to improve  Future Appointments   Date Time Provider Aniceto Lowery   8/31/2021  9:15 AM GEORGETTE Brambila CV SURG [facility]   9/10/2021  9:00 AM DO SARAH [facility]   9/23/2021  9:00 AM Rocky Lopez MD HEM ONC [facility]   10/7/2021  3:00 PM Juanita Cohen MD GASTRO [facility]   10/18/2021  8:30 AM AN RADONC RESOURCE AN Rad Onc [facility]   10/18/2021  9:00 AM Marlee Longo MD AN Rad Onc [facility]   10/21/2021  9:00 AM GEORGETTE You [facility]           Subjective:      Patient ID: Sunshine Villela is a 72 y o  female      Chief Complaint   Patient presents with    Transition of Care Management     wmcma         Vitals:  /70   Pulse 94   Temp 97 5 °F (36 4 °C)   Resp 16   Ht 5' 6" (1 676 m)   Wt 127 kg (281 lb) SpO2 98%   BMI 45 35 kg/m²     HPI  Patient is here to follow up after recent hospitalization  Patient was admitted with upper GI bleed and had EGD done which showed 2 medium sized clean based ulcers at gastrojejunal anastomotic site  Taking protonix 40 mg BID and carafate  Stated that had diarrhea day before which is better now  Will schedule follow up with gastro  in next 2 weeks  CBC was done and hgb is about 9 4 now and at time of discharge was 9 8 and will recheck in 2 to 3 weeks and ordered  Aware not to take NSAIDs  Finished antibiotics for pneumonia and will repeat chest xray in 3 weeks  Denies fever, chills, sob and cough  Complaint with her chronic medications and tolerating it well  TCM Call (since 7/26/2021)     Date and time call was made  8/23/2021  9:31 AM    Hospital care reviewed  Records reviewed    Patient was hospitialized at  [address] [place] [address]        Date of Admission  08/19/21    Date of discharge  08/21/21    Diagnosis  GI Bleed    Disposition  Home    Were the patients medications reviewed and updated  Yes    Current Symptoms  None      TCM Call (since 7/26/2021)     Post hospital issues  None    Should patient be enrolled in anticoag monitoring? No    Scheduled for follow up?   Yes    Patients specialists  Other (comment)    Other specialists names   Brooklynn Perez MD (Gastroenterology) in 2 weeks (9/4/2021)    Did you obtain your prescribed medications  Yes    Do you need help managing your prescriptions or medications  Yes    Is transportation to your appointment needed  No    I have advised the patient to call PCP with any new or worsening symptoms   Avinash Edmonds  Family    The type of support provided  Physical; Emotional    Do you have social support  Yes, as much as I need    Are you recieving any outpatient services  No    Are you recieving home care services  No    Have you fallen in the last 12 months  No Interperter language line needed  No    Counseling  Patient    Counseling topics  Activities of daily living; Importance of RX compliance; patient and family education; instructions for management; Risk factor reduction    Comments  I spoke with Antionette Andrade who states that she is feeling better and no c/o at this time  She knows to go to the ER if any chest pain, dyspnea, visible blood with emesis or bowel movement, etc Magda Has                        The following portions of the patient's history were reviewed and updated as appropriate: allergies, current medications, past family history, past medical history, past social history, past surgical history and problem list       Review of Systems   Constitutional: Negative  Negative for appetite change, chills, fever and unexpected weight change  HENT: Negative  Respiratory: Negative  Cardiovascular: Negative  Gastrointestinal: Negative for abdominal pain, anal bleeding, blood in stool, constipation, nausea, rectal pain and vomiting  As noted in HPI     Genitourinary: Negative for difficulty urinating  Musculoskeletal:        Using walker   Neurological: Negative for dizziness and headaches  Objective:    Social History     Tobacco Use   Smoking Status Former Smoker    Packs/day: 1 00    Years: 25 00    Pack years: 25 00    Quit date: 3/30/2006    Years since quitting: 15 4   Smokeless Tobacco Never Used       Allergies:    Allergies   Allergen Reactions    Tramadol Other (See Comments)     Dizzy           Current Outpatient Medications   Medication Sig Dispense Refill    albuterol (2 5 mg/3 mL) 0 083 % nebulizer solution Take 1 vial (2 5 mg total) by nebulization every 4 (four) hours as needed for wheezing or shortness of breath 75 vial 1    amiodarone 100 mg tablet Take 1 tablet (100 mg total) by mouth daily 90 tablet 3    apixaban (ELIQUIS) 5 mg Take 1 tablet (5 mg total) by mouth 2 (two) times a day 180 tablet 2    aspirin (ECOTRIN LOW STRENGTH) 81 mg EC tablet Take 1 tablet (81 mg total) by mouth daily  0    atorvastatin (LIPITOR) 20 mg tablet Take 1 tablet (20 mg total) by mouth daily 90 tablet 1    Calcium-Vitamin D 500-125 MG-UNIT TABS Take 1 tablet by mouth 2 (two) times a day       Cyanocobalamin (B-12 PO) Take by mouth daily       ergocalciferol (VITAMIN D2) 50,000 units Take 1 capsule (50,000 Units total) by mouth 2 (two) times a week with meals 20 capsule 0    ferrous sulfate 324 (65 Fe) mg Take 1 tablet (324 mg total) by mouth daily before breakfast 30 tablet 0    meclizine (ANTIVERT) 25 mg tablet Take 1 tablet (25 mg total) by mouth every 8 (eight) hours as needed for dizziness 30 tablet 0    metoprolol succinate (TOPROL-XL) 25 mg 24 hr tablet Take 1 tablet (25 mg total) by mouth daily 90 tablet 1    Multiple Vitamins-Minerals (BARIATRIC FUSION) CHEW Chew 1 tablet daily        oxyCODONE (ROXICODONE) 10 MG TABS Take 10 mg by mouth every 6 (six) hours as needed        pantoprazole (PROTONIX) 40 mg tablet Take 1 tablet (40 mg total) by mouth 2 (two) times a day before meals 60 tablet 0    potassium chloride (K-DUR,KLOR-CON) 20 mEq tablet Take 1 tablet (20 mEq total) by mouth every other day 30 tablet 0    sucralfate (CARAFATE) 1 g tablet Take 1 tablet (1 g total) by mouth 4 (four) times a day (before meals and at bedtime) 120 tablet 0    torsemide (DEMADEX) 20 mg tablet Take 0 5 tablets (10 mg total) by mouth every other day 30 tablet 0     No current facility-administered medications for this visit  Physical Exam  Vitals reviewed  Constitutional:       Appearance: Normal appearance  She is well-developed  HENT:      Head: Normocephalic and atraumatic  Nose: Nose normal    Eyes:      Conjunctiva/sclera: Conjunctivae normal    Cardiovascular:      Rate and Rhythm: Normal rate and regular rhythm  Pulses: Normal pulses  Heart sounds: Normal heart sounds     Pulmonary:      Effort: Pulmonary effort is normal       Breath sounds: Normal breath sounds  Abdominal:      General: Bowel sounds are normal       Palpations: Abdomen is soft  Tenderness: There is no abdominal tenderness  Skin:     General: Skin is warm and dry  Findings: No rash  Neurological:      Mental Status: She is alert and oriented to person, place, and time  Psychiatric:         Mood and Affect: Mood normal          Behavior: Behavior normal          Thought Content:  Thought content normal          Judgment: Judgment normal                      GEORGETTE Del Castillo

## 2021-08-27 NOTE — TELEPHONE ENCOUNTER
[facility] requesting a call back     From the triage note patient was supposed to go to ER and as of yet has not gone     She was previously in ER for GI bleed  Rosangela Peace MA

## 2021-08-27 NOTE — TELEPHONE ENCOUNTER
Jessica Beaver called back and she stated that She emptied the colostomy bag and kept a close eye to it and she stated that it started clearing up    She stated that if the bleeding begins again that she will go strait to the hospital   Yoni Turcios MA

## 2021-08-27 NOTE — TELEPHONE ENCOUNTER
Reason for Disposition   Black or tarry bowel movements (Exception: chronic-unchanged black-grey bowel movements AND is taking iron pills or Pepto-bismol)    Additional Information   [1] Stool color is black (not dark green) AND [2] patient hasn't swallowed substance that causes black stools (e g , Pepto-Bismol, iron pills)    Answer Assessment - Initial Assessment Questions  1  APPEARANCE of BLOOD: "What color is it?" "Is it passed separately, on the surface of the stool, or mixed in with the stool?"       Black colored stool   2  AMOUNT: "How much blood was passed?"       Normal amount per pt   3  FREQUENCY: "How many times has blood been passed with the stools?"       Just started again  4  ONSET: "When was the blood first seen in the stools?" (Days or weeks)       Just noticed  5  DIARRHEA: "Is there also some diarrhea?" If Yes, ask: "How many diarrhea stools were passed in past 24 hours?"       No  6  CONSTIPATION: "Do you have constipation?" If Yes, ask: "How bad is it?"      Denies   7  RECURRENT SYMPTOMS: "Have you had blood in your stools before?" If Yes, ask: "When was the last time?" and "What happened that time?"       Yes, Black stools last week, seen in the hospital, Given Protonix   8  BLOOD THINNERS: "Do you take any blood thinners?" (e g , Coumadin/warfarin, Pradaxa/dabigatran, aspirin)      Denies   9   OTHER SYMPTOMS: "Do you have any other symptoms?"  (e g , abdominal pain, vomiting, dizziness, fever)      Denies    Protocols used: RECTAL BLEEDING-ADULT-AH, STOOLS - UNUSUAL COLOR-ADULT-AH

## 2021-08-31 ENCOUNTER — OFFICE VISIT (OUTPATIENT)
Dept: CARDIAC SURGERY | Facility: CLINIC | Age: 66
End: 2021-08-31
Payer: MEDICARE

## 2021-08-31 VITALS
RESPIRATION RATE: 18 BRPM | DIASTOLIC BLOOD PRESSURE: 70 MMHG | WEIGHT: 276.7 LBS | TEMPERATURE: 97.8 F | BODY MASS INDEX: 44.47 KG/M2 | HEART RATE: 69 BPM | HEIGHT: 66 IN | SYSTOLIC BLOOD PRESSURE: 92 MMHG

## 2021-08-31 DIAGNOSIS — Z95.2 S/P TAVR (TRANSCATHETER AORTIC VALVE REPLACEMENT): Primary | ICD-10-CM

## 2021-08-31 PROCEDURE — 99215 OFFICE O/P EST HI 40 MIN: CPT | Performed by: NURSE PRACTITIONER

## 2021-08-31 NOTE — PROGRESS NOTES
1 YEAR FOLLOW UP VISIT S/P TAVR    Procedure: S/P transcatheter aortic valve replacement #26 mm Huerta BARBARA 3 Ultra bioprosthesis, performed on 9/1/20  History of Present Illness: Lien Santiago is a 72y o  year old female who presents to our office today for one year follow up care from transcatheter aortic valve replacement  Patient reports doing well after her TAVR procedure  She underwent R TKR in April and has an ambulatory dysfunction related to her right leg is now 1 inch shorter than her left  She recent was treated for bronchitis  She was recently hospitalized with GIB, sepsis and pneumonia  EGD demonstrated 2 ulcers  Her Aspirin was discontinued  Today patient denies chest pain, lightheadedness, weight gain, edema or SOB  She has noticed mild BERG just recently since her GIB  CBC reveals anemia  She admits to not being as active due to her ambulatory dysfunction dn total joint replacement surgery       Review of Systems:  Review of Systems - History obtained from chart review and the patient  General ROS: negative  Psychological ROS: negative  Ophthalmic ROS: negative  ENT ROS: negative  Allergy and Immunology ROS: negative  Hematological and Lymphatic ROS: negative  Endocrine ROS: negative  Respiratory ROS: no cough, shortness of breath, or wheezing  Cardiovascular ROS: positive for - mild occasional dyspnea on exertion  negative for - chest pain, edema, irregular heartbeat, loss of consciousness, murmur, orthopnea, palpitations, paroxysmal nocturnal dyspnea or rapid heart rate  Gastrointestinal ROS: no abdominal pain, change in bowel habits, or black or bloody stools  Genito-Urinary ROS: no dysuria, trouble voiding, or hematuria  Musculoskeletal ROS: positive for - gait disturbance and back pain  Neurological ROS: no TIA or stroke symptoms  Dermatological ROS: negative     Past Medical History:    Past Medical History:   Diagnosis Date    Acute pain of right knee     Ambulatory dysfunction  Anemia     Arthritis     Cancer (HCC)     rectal    Chronic lower back pain     Chronic pain disorder     back pain    [place] [address] [place] 2018    Diabetes mellitus ([place] [address] )     Endometrial cancer ([place] [address] ) 2018    GERD (gastroesophageal reflux disease)     History of chemotherapy     History of MRSA infection     Morbid obesity ([place] [address] )     Morbid obesity with BMI of 45 0-49 9, adult ([place] [address] )     Ovarian cancer ([place] [address] ) 2018    Paroxysmal atrial fibrillation (HCC)     Rectal cancer (HCC)     S/P TAVR (transcatheter aortic valve replacement)     SOB (shortness of breath)     Spinal stenosis     Systolic murmur     Unsteady gait     uses walker    Wears dentures     Wears glasses        Past Surgical History:    Past Surgical History:   Procedure Laterality Date    ABDOMINAL PERINEAL BOWEL RESECTION W/ ILEOANAL POUCH N/A 2018    Procedure: LAPAROSCOPIC HAND ASSIST ABDOMINOPERINEAL RESECTION,  POSTERIOR VAGINECTOMY, OMENTECTOMY;  Surgeon: Arabella Betancourt MD;  [facility];  Service: Colorectal    ABDOMINAL SURGERY      abscess removed from abdomen and right thigh, a hole in thigh closed by plastic surgeon    ABSCESS DRAINAGE      abd, (R) leg, (L) leg     SECTION       SECTION      CYSTOSCOPY N/A 2018    Procedure: CYSTOSCOPY;  Surgeon: Emerita Hall MD;  [facility];  Service: Gynecology Oncology    ESOPHAGOGASTRODUODENOSCOPY N/A 2016    Procedure: ESOPHAGOGASTRODUODENOSCOPY (EGD); Surgeon: Jesica Baxter MD;  [facility];  Service:     ESOPHAGOGASTRODUODENOSCOPY N/A 3/30/2016    Procedure: ESOPHAGOGASTRODUODENOSCOPY (EGD); Surgeon: Jesica Baxter MD;  [facility]; Service:     EYE SURGERY      laser eye surgery    HYSTERECTOMY N/A 2018    Procedure: RADICAL HYSTERECTOMY TOTAL ABDOMINAL (ALEXANDRA)   BSO;  Surgeon: Emerita Hall MD;  [facility];  Service: Gynecology Oncology    JOINT REPLACEMENT Left 2017    hip    JOINT REPLACEMENT Right 2017    hip    OOPHORECTOMY Bilateral     [place] COLONOSCOPY FLX DX W/COLLJ SPEC WHEN PFRMD N/A 3/9/2018    Procedure: COLONOSCOPY;  Surgeon: Emperatriz Sullivan MD;  Location: [facility]; Service: Gastroenterology    [place] COLONOSCOPY FLX DX W/COLLJ [address] WHEN PFRMD N/A 9/5/2018    Procedure: COLONOSCOPY;  Surgeon: Jefe Jiménez MD;  [facility]; Service: Colorectal    [place] ECHO TRANSESOPHAG R-T 2D W/PRB IMG ACQUISJ I&R N/A 9/1/2020    Procedure: TRANSESOPHAGEAL ECHOCARDIOGRAM (THO); Surgeon: Brad Gamboa DO;  [facility];  Service: Cardiac Surgery    [place] ESOPHAGOGASTRODUODENOSCOPY TRANSORAL DIAGNOSTIC N/A 2/2/2018    Procedure: ESOPHAGOGASTRODUODENOSCOPY (EGD); Surgeon: Emperatriz Sullivan MD;  Location: [facility]; Service: Gastroenterology    [place] LAP GASTRIC BYPASS/SOLEDAD-EN-Y N/A 7/18/2016    Procedure: BYPASS GASTRIC  SOLEDAD-EN-Y LAPAROSCOPIC;  Surgeon: Margaux Iniguez MD;  [facility];  Service: Bariatrics    [place] LAP, SURG COLOSTOMY N/A 9/6/2018    Procedure: PERMANENT END COLOSTOMY;  Surgeon: Jefe Jiménez MD;  [facility];  Service: Colorectal    [place] LAP, SURG PROCTECTOMY W COLOSTOMY N/A 9/6/2018    Procedure: PROCTECTOMY;  Surgeon: Jefe Jiménez MD;  [facility];  Service: Colorectal    [place] REPLACE AORTIC VALVE OPENFEMORAL ARTERY APPROACH N/A 9/1/2020    Procedure: REPLACEMENT AORTIC VALVE TRANSCATHETER (TAVR) TRANSFEMORAL W/ 26MM WADDELL BARBARA S3 ULTRA VALVE(ACCESS ON RIGHT);   Surgeon: Brad Gamboa DO;  [facility];  Service: Cardiac Surgery    REPLACEMENT TOTAL KNEE      TUBAL LIGATION         Vital Signs:     Vitals:    08/31/21 0923 08/31/21 0927   BP: 96/66 92/70   BP Location: Left arm Right arm   Patient Position: Sitting    Cuff Size: Standard    Pulse: 69    Resp: 18    Temp: 97 8 °F (36 6 °C)    TempSrc: Tympanic    Weight: 126 kg (276 lb 11 2 oz)    Height: 5' 6" (1 676 m) Home Medications:     Prior to Admission medications    Medication Sig Start Date End Date Taking?  Authorizing Provider   albuterol (2 5 mg/3 mL) 0 083 % nebulizer solution Take 1 vial (2 5 mg total) by nebulization every 4 (four) hours as needed for wheezing or shortness of breath 8/13/20  Yes Nette Christiansen MD   amiodarone 100 mg tablet Take 1 tablet (100 mg total) by mouth daily 6/21/21  Yes GEORGETTE Orozco   atorvastatin (LIPITOR) 20 mg tablet Take 1 tablet (20 mg total) by mouth daily 7/21/21  Yes GEORGETTE Del Castillo   Calcium-Vitamin D 500-125 MG-UNIT TABS Take 1 tablet by mouth 2 (two) times a day    Yes Historical Provider, MD   Cyanocobalamin (B-12 PO) Take by mouth daily    Yes Historical Provider, MD   ergocalciferol (VITAMIN D2) 50,000 units Take 1 capsule (50,000 Units total) by mouth 2 (two) times a week with meals 10/5/20  Yes Wesley Salguero PA-C   meclizine (ANTIVERT) 25 mg tablet Take 1 tablet (25 mg total) by mouth every 8 (eight) hours as needed for dizziness 11/7/20  Yes Mundo Fletcher,    metoprolol succinate (TOPROL-XL) 25 mg 24 hr tablet Take 1 tablet (25 mg total) by mouth daily 4/30/21  Yes Xavier Anand,    Multiple Vitamins-Minerals (BARIATRIC FUSION) CHEW Chew 1 tablet daily     Yes Historical Provider, MD   oxyCODONE (ROXICODONE) 10 MG TABS Take 10 mg by mouth every 6 (six) hours as needed   9/15/18  Yes Historical Provider, MD   pantoprazole (PROTONIX) 40 mg tablet Take 1 tablet (40 mg total) by mouth 2 (two) times a day before meals 8/21/21 9/20/21 Yes GEORGETTE Pacheco   potassium chloride (K-DUR,KLOR-CON) 20 mEq tablet Take 1 tablet (20 mEq total) by mouth every other day 8/21/21  Yes GEORGETTE Pacheco   sucralfate (CARAFATE) 1 g tablet Take 1 tablet (1 g total) by mouth 4 (four) times a day (before meals and at bedtime) 8/21/21 9/20/21 Yes GEORGETTE Pacheco   torsemide (DEMADEX) 20 mg tablet Take 0 5 tablets (10 mg total) by mouth every other day 8/21/21 Yes GEORGETTE Pacheco   apixaban (ELIQUIS) 5 mg Take 1 tablet (5 mg total) by mouth 2 (two) times a day  Patient not taking: Reported on 8/31/2021 3/11/20   Graeme Narvaez DO   aspirin (ECOTRIN LOW STRENGTH) 81 mg EC tablet Take 1 tablet (81 mg total) by mouth daily  Patient not taking: Reported on 8/31/2021 8/21/21   GEORGETTE Pacheco   ferrous sulfate 324 (65 Fe) mg Take 1 tablet (324 mg total) by mouth daily before breakfast  Patient not taking: Reported on 8/31/2021 8/21/21 9/20/21  GEORGETTE Berumen      PATIENT REPORTS CURRENTLY NOT TAKING ASPIRIN, ELIQUIS OR FERROUS SULFATE  Allergies:    Tramadol    Physical Exam:    General: Alert, oriented, obese, no acute distress  HEENT/NECK:  PERRLA  No jugular venous distention  Cardiac:Regular rate and rhythm, no murmurs rubs or gallops  Pulmonary:Breath sounds clear bilaterally  Abdomen:  Non-tender, Non-distended  Positive bowel sounds  Upper extremities: 2+ radial pulses; brisk capillary refill  Lower extremities: Extremities warm/dry  PT/DP pulses 2+ bilaterally  No edema B/L  Musculoskeletal: MAEE, ambulates with limp and use of walker  Neuro: Alert and oriented X 3  Sensation is grossly intact  No focal deficits  Skin: Warm/Dry, without rashes or lesions      Lab Results:   Lab Results   Component Value Date    WBC 5 44 08/24/2021    HGB 9 4 (L) 08/24/2021    HCT 31 6 (L) 08/24/2021    MCV 80 (L) 08/24/2021     08/24/2021     Lab Results   Component Value Date    GLUCOSE 99 09/01/2020    CALCIUM 8 7 08/24/2021     10/16/2015    K 4 1 08/24/2021    CO2 25 08/24/2021     (H) 08/24/2021    BUN 14 08/24/2021    CREATININE 0 82 08/24/2021       Imaging Studies:     Echocardiogram: 8/24/21  LEFT VENTRICLE:  Normal left ventricular chamber size  There is mild concentric left ventricular hypertrophy  Ejection fraction is estimated at 60%  No definite regional wall motion abnormality seen  Indeterminate diastolic function     RIGHT VENTRICLE:  Normal right ventricular size and systolic function  TAPSE is 2 3 cm     LEFT ATRIUM:  The atrium was mildly to moderately dilated      MITRAL VALVE:  There is severe mitral annular calcification  Valve leaflets are thickened with slightly decreased separation  There is mild mitral stenosis with mean gradient of 4 mm Hg  There was no significant mitral regurgitation     AORTIC VALVE:  There is a 26 mm ES 3 ultra TAVR bioprosthesis  This exhibited normal function with no paravalvular leak or valvular regurgitation  There was no significant gradient across the aortic valve     TRICUSPID VALVE:  There was mild regurgitation  Peak PA pressure is estimated at 37 mm Hg     EK21  NSR    I have personally reviewed pertinent films in PACS     Cardiology, GI, PCP, Hem/Onc and hospital notes reviewed     TAVR evaluation Assessment:     Isabel Fairbanks 122: I    KCCQ-12 completed     Assessment: Aortic stenosis, Non-Rheumatic  S/P transcatheter aortic valve replacement;    Plan:     Sunshine Villela returns to our office today for 1 year routine follow up s/p  transcatheter aortic valve replacement   Their NYHA functional status is class I  They are mostly asymptomatic with activities, just recent mild BERG that correlates with recent GIB and anemia    Weight and VS are stable  Recent echocardiogram demonstrates stable appearance of TAVR prosthesis with normal function and no PVL    ECG, CBC and BMP are stable  I reviewed medications and reviewed the reason for Aspirin after TAVR, however, in light of her recent GIB from gastric ulcers, I suggested she remain off Aspirin until f/u with Cardiology and GI  If GI clears her to resume Aspirin, she should take an enteric coated 81 mg tablet with meals  Kaitlyn Lund does not need to return to our office for follow up in the future but will continue to be managed by their primary care physician and cardiologist for ongoing medical care   We recommend yearly echocardiograms which can be ordered and monitored by their cardiologist   Jung Raya was comfortable with our recommendations and their questions were answered to their satisfaction  The patient recently had a screening colonoscopy in 9/5/18  Therefore GI referral is not indicated at this time       GEORGETTE Carbajal  8/1/21  9:53 AM

## 2021-09-10 ENCOUNTER — OFFICE VISIT (OUTPATIENT)
Dept: CARDIOLOGY CLINIC | Facility: CLINIC | Age: 66
End: 2021-09-10
Payer: MEDICARE

## 2021-09-10 VITALS
BODY MASS INDEX: 45.89 KG/M2 | DIASTOLIC BLOOD PRESSURE: 62 MMHG | OXYGEN SATURATION: 100 % | SYSTOLIC BLOOD PRESSURE: 120 MMHG | HEART RATE: 77 BPM | HEIGHT: 66 IN | RESPIRATION RATE: 20 BRPM | WEIGHT: 285.56 LBS

## 2021-09-10 DIAGNOSIS — I25.10 CORONARY ARTERY DISEASE INVOLVING NATIVE HEART WITHOUT ANGINA PECTORIS, UNSPECIFIED VESSEL OR LESION TYPE: ICD-10-CM

## 2021-09-10 DIAGNOSIS — I34.2 NONRHEUMATIC MITRAL VALVE STENOSIS: ICD-10-CM

## 2021-09-10 DIAGNOSIS — I35.0 AORTIC STENOSIS, SEVERE: ICD-10-CM

## 2021-09-10 DIAGNOSIS — I48.91 ATRIAL FIBRILLATION WITH RAPID VENTRICULAR RESPONSE (HCC): Primary | ICD-10-CM

## 2021-09-10 DIAGNOSIS — I50.32 CHRONIC DIASTOLIC HEART FAILURE (HCC): ICD-10-CM

## 2021-09-10 DIAGNOSIS — Z79.01 CHRONIC ANTICOAGULATION: ICD-10-CM

## 2021-09-10 DIAGNOSIS — E78.5 HYPERLIPIDEMIA, UNSPECIFIED HYPERLIPIDEMIA TYPE: ICD-10-CM

## 2021-09-10 DIAGNOSIS — I70.209 ATHEROSCLEROTIC PERIPHERAL VASCULAR DISEASE (HCC): ICD-10-CM

## 2021-09-10 PROCEDURE — 99214 OFFICE O/P EST MOD 30 MIN: CPT | Performed by: INTERNAL MEDICINE

## 2021-09-10 PROCEDURE — 93000 ELECTROCARDIOGRAM COMPLETE: CPT | Performed by: INTERNAL MEDICINE

## 2021-09-10 NOTE — PROGRESS NOTES
Christoph Hendricks  1955  [address]  [address] [facility] [address] [phone]    1  Atrial fibrillation with rapid ventricular response (HCC)  POCT ECG   2  Aortic stenosis, severe     3  Atherosclerotic peripheral vascular disease ([facility] [address] )     4  Coronary artery disease involving native heart without angina pectoris, unspecified vessel or lesion type     5  Chronic diastolic heart failure ([facility] [address] )     6  Nonrheumatic mitral valve stenosis     7  Chronic anticoagulation     8  Hyperlipidemia, unspecified hyperlipidemia type         Discussion/Summary:  Overall she has been doing well from a cardiac standpoint  Recent echocardiogram showed normal TAVR function  LV function is preserved  Blood pressure is well controlled  Lipids have gone up slightly she did stop taking the atorvastatin for a while but has now resumed  She has had lung function testing as well as thyroid function in the past year she also has had an eye exam I reminded her of the need for this with amiodarone therapy  Unfortunately she had a recent GI bleed she has been stable on Coumadin follow-up with GI in October  Needs to continue with diet exercise and weight loss  Interval History:  24-year-old female with newly found rectal cancer needs to have surgery next week presents for preoperative cardiovascular risk assessment  Ambulation mainly limited by lower extremity arthritis  She denies any exertional chest pain, shortness of breath, palpitations, lightheadedness, dizziness, or syncope  She can do moderate housework she is able to walk a block to with the assistance of a cane functional capacity about 4 Mets  She has a history of 2 cardiac catheterizations that did not show obstructive coronary disease she has moderate aortic stenosis prior valve area 1 0 centimeter squared she also has moderate mitral stenosis seen on an echocardiogram this morning        Overall she has been doing well  She continues to remain physically active she had her right knee replaced and has been a little more mobile  In terms of her aortic valve replacement with TAVR this has been going quite well symptoms have significantly improved  She denies any chest pain, shortness of breath, palpitations, lightheadedness, dizziness, or syncope  She has been trying to do more physical activity  Osteoarthritis has limited this      Problem List     Morbid obesity due to excess calories (HCC)    Screening for colon cancer    Screen for colon cancer    Overview Signed 1/30/2018  4:19 PM by Debbie Mcclure     Added automatically from request for surgery [id number]         Postgastrectomy malabsorption    S/P gastric bypass    Marginal ulcer    Status post bariatric surgery    Overview Signed 2/9/2018  1:26 PM by Arsenio Mcarthur     Added automatically from request for surgery [id number]         Atherosclerotic peripheral vascular disease ([facility] [address] )    Diet-controlled diabetes mellitus ([facility] [address] )    Onychomycosis    Pain in foot    Rectal cancer ([facility] [address] )    Preop cardiovascular exam        Past Medical History:   Diagnosis Date    Acute pain of right knee     Ambulatory dysfunction     Anemia     Arthritis     Cancer ([facility] [address] )     rectal    Chronic lower back pain     Chronic pain disorder     back pain    Colon cancer ([facility] [address] ) 2018    Diabetes mellitus ([facility] [address] [facility]     Endometrial cancer ([facility] [address] ) 2018    GERD (gastroesophageal reflux disease)     History of chemotherapy     History of MRSA infection 0719/2016    Morbid obesity ([facility] [address] )     Morbid obesity with BMI of 45 0-49 9, adult ([facility] [address] )     Ovarian cancer ([facility] [address] ) 2018    Paroxysmal atrial fibrillation ([facility] [address] )     Rectal cancer ([facility] [address] )     S/P TAVR (transcatheter aortic valve replacement)     SOB (shortness of breath)     Spinal stenosis     Systolic murmur     Unsteady gait     uses walker    Wears dentures     Wears glasses      Social History     Socioeconomic History    Marital status: Single     Spouse name: Not on file    Number of children: 2    Years of education: Not on file    Highest education level: Not on file   Occupational History    Not on file   Tobacco Use    Smoking status: Former Smoker     Packs/day: 1 00     Years: 25 00     Pack years: 25 00     Quit date: 3/30/2006     Years since quitting: 15 4    Smokeless tobacco: Never Used   Vaping Use    Vaping Use: Never used   Substance and Sexual Activity    Alcohol use: Not Currently    Drug use: Not Currently     Types: Oxycodone     Comment: Percocet for lower back pain prn    Sexual activity: Not Currently   Other Topics Concern    Not on file   Social History Narrative    Not on file     Social Determinants of Health     Financial Resource Strain:     Difficulty of Paying Living Expenses:    Food Insecurity:     Worried About Running Out of Food in the Last Year:     Ran Out of Food in the Last Year:    Transportation Needs:     Lack of Transportation (Medical):      Lack of Transportation (Non-Medical):    Physical Activity:     Days of Exercise per Week:     Minutes of Exercise per Session:    Stress:     Feeling of Stress :    Social Connections:     Frequency of Communication with Friends and Family:     Frequency of Social Gatherings with Friends and Family:     Attends Spiritism Services:     Active Member of Clubs or Organizations:     Attends Club or Organization Meetings:     Marital Status:    Intimate Partner Violence:     Fear of Current or Ex-Partner:     Emotionally Abused:     Physically Abused:     Sexually Abused:       Family History   Adopted: Yes   Problem Relation Age of Onset    Leukemia Mother     Heart disease Father     Coronary artery disease Father     Diabetes Father     No Known Problems Sister     No Known Problems Brother     Diabetes Son     No Known Problems Brother     No Known Problems Brother     No Known Problems Sister     No Known Problems Sister Past Surgical History:   Procedure Laterality Date    ABDOMINAL PERINEAL BOWEL RESECTION W/ ILEOANAL POUCH N/A 2018    Procedure: LAPAROSCOPIC HAND ASSIST ABDOMINOPERINEAL RESECTION,  POSTERIOR VAGINECTOMY, OMENTECTOMY;  Surgeon: Ben Hall MD;  [facility];  Service: Colorectal    ABDOMINAL SURGERY      abscess removed from abdomen and right thigh, a hole in thigh closed by plastic surgeon    ABSCESS DRAINAGE      abd, (R) leg, (L) leg     SECTION       SECTION      CYSTOSCOPY N/A 2018    Procedure: Laura Frey;  Surgeon: Ty Gomes MD;  [facility];  Service: Gynecology Oncology    ESOPHAGOGASTRODUODENOSCOPY N/A 2016    Procedure: ESOPHAGOGASTRODUODENOSCOPY (EGD); Surgeon: Gerhardt Ely, MD;  [facility];  Service:     ESOPHAGOGASTRODUODENOSCOPY N/A 3/30/2016    Procedure: ESOPHAGOGASTRODUODENOSCOPY (EGD); Surgeon: Gerhardt Ely, MD;  [facility]; Service:     EYE SURGERY      laser eye surgery    HYSTERECTOMY N/A 2018    Procedure: RADICAL HYSTERECTOMY TOTAL ABDOMINAL (ALEXANDRA)  BSO;  Surgeon: Ty Gomes MD;  [facility];  Service: Gynecology Oncology    JOINT REPLACEMENT Left 2017    hip    JOINT REPLACEMENT Right 2017    hip    OOPHORECTOMY Bilateral     WY COLONOSCOPY FLX DX W/COLLJ SPEC WHEN PFRMD N/A 3/9/2018    Procedure: COLONOSCOPY;  Surgeon: Michele Bhatt MD;  Location: [place] [address] GI LAB; Service: Gastroenterology    WY COLONOSCOPY FLX DX W/COLLJ [facility] WHEN PFRMD N/A 2018    Procedure: COLONOSCOPY;  Surgeon: Ben Hall MD;  [facility]; Service: Colorectal    WY ECHO TRANSESOPHAG R-T 2D W/PRB IMG ACQUISJ I&R N/A 2020    Procedure: TRANSESOPHAGEAL ECHOCARDIOGRAM (THO);   Surgeon: Josue Muniz DO;  [facility];  Service: Cardiac Surgery    WY ESOPHAGOGASTRODUODENOSCOPY TRANSORAL DIAGNOSTIC N/A 2018    Procedure: ESOPHAGOGASTRODUODENOSCOPY (EGD); Surgeon: Cristy Leo MD;  Location: [facility]; Service: Gastroenterology    CA LAP GASTRIC BYPASS/SOLEDAD-EN-Y N/A 7/18/2016    Procedure: BYPASS GASTRIC  SOLEDAD-EN-Y LAPAROSCOPIC;  Surgeon: Jose Peterson MD;  [facility];  Service: Bariatrics    CA LAP, SURG COLOSTOMY N/A 9/6/2018    Procedure: PERMANENT END COLOSTOMY;  Surgeon: Jesusita Martinez MD;  [facility];  Service: Colorectal    CA LAP, SURG PROCTECTOMY W COLOSTOMY N/A 9/6/2018    Procedure: PROCTECTOMY;  Surgeon: Jesusita Martinez MD;  [facility];  Service: Colorectal    CA REPLACE AORTIC VALVE OPENFEMORAL ARTERY APPROACH N/A 9/1/2020    Procedure: REPLACEMENT AORTIC VALVE TRANSCATHETER (TAVR) TRANSFEMORAL W/ 26MM WADDELL BARBARA S3 ULTRA VALVE(ACCESS ON RIGHT);   Surgeon: Scooby Werner DO;  [facility];  Service: Cardiac Surgery    REPLACEMENT TOTAL KNEE      TUBAL LIGATION         Current Outpatient Medications:     albuterol (2 5 mg/3 mL) 0 083 % nebulizer solution, Take 1 vial (2 5 mg total) by nebulization every 4 (four) hours as needed for wheezing or shortness of breath, Disp: 75 vial, Rfl: 1    amiodarone 100 mg tablet, Take 1 tablet (100 mg total) by mouth daily, Disp: 90 tablet, Rfl: 3    apixaban (ELIQUIS) 5 mg, Take 1 tablet (5 mg total) by mouth 2 (two) times a day, Disp: 180 tablet, Rfl: 2    atorvastatin (LIPITOR) 20 mg tablet, Take 1 tablet (20 mg total) by mouth daily, Disp: 90 tablet, Rfl: 1    Calcium-Vitamin D 500-125 MG-UNIT TABS, Take 1 tablet by mouth 2 (two) times a day , Disp: , Rfl:     Cyanocobalamin (B-12 PO), Take by mouth daily , Disp: , Rfl:     ergocalciferol (VITAMIN D2) 50,000 units, Take 1 capsule (50,000 Units total) by mouth 2 (two) times a week with meals, Disp: 20 capsule, Rfl: 0    ferrous sulfate 324 (65 Fe) mg, Take 1 tablet (324 mg total) by mouth daily before breakfast, Disp: 30 tablet, Rfl: 0    meclizine (ANTIVERT) 25 mg tablet, Take 1 tablet (25 mg total) by mouth every 8 (eight) hours as needed for dizziness, Disp: 30 tablet, Rfl: 0    metoprolol succinate (TOPROL-XL) 25 mg 24 hr tablet, Take 1 tablet (25 mg total) by mouth daily, Disp: 90 tablet, Rfl: 1    Multiple Vitamins-Minerals (BARIATRIC FUSION) CHEW, Chew 1 tablet daily  , Disp: , Rfl:     oxyCODONE (ROXICODONE) 10 MG TABS, Take 10 mg by mouth every 6 (six) hours as needed  , Disp: , Rfl:     pantoprazole (PROTONIX) 40 mg tablet, Take 1 tablet (40 mg total) by mouth 2 (two) times a day before meals, Disp: 60 tablet, Rfl: 0    potassium chloride (K-DUR,KLOR-CON) 20 mEq tablet, Take 1 tablet (20 mEq total) by mouth every other day, Disp: 30 tablet, Rfl: 0    sucralfate (CARAFATE) 1 g tablet, Take 1 tablet (1 g total) by mouth 4 (four) times a day (before meals and at bedtime), Disp: 120 tablet, Rfl: 0    torsemide (DEMADEX) 20 mg tablet, Take 0 5 tablets (10 mg total) by mouth every other day, Disp: 30 tablet, Rfl: 0    aspirin (ECOTRIN LOW STRENGTH) 81 mg EC tablet, Take 1 tablet (81 mg total) by mouth daily (Patient not taking: Reported on 8/31/2021), Disp: , Rfl: 0  Allergies   Allergen Reactions    Tramadol Other (See Comments)     Dizzy         Labs:     Chemistry        Component Value Date/Time     10/16/2015 0910    K 4 1 08/24/2021 0952    K 4 7 10/16/2015 0910     (H) 08/24/2021 0952     10/16/2015 0910    CO2 25 08/24/2021 0952    CO2 29 09/01/2020 1221    BUN 14 08/24/2021 0952    BUN 29 (H) 10/16/2015 0910    CREATININE 0 82 08/24/2021 0952    CREATININE 1 1 10/16/2015 0910        Component Value Date/Time    CALCIUM 8 7 08/24/2021 0952    CALCIUM 9 9 10/16/2015 0910    ALKPHOS 61 08/19/2021 1155    AST 14 08/19/2021 1155    ALT 20 08/19/2021 1155            Lab Results   Component Value Date    CHOL 159 03/26/2014     Lab Results   Component Value Date    HDL 51 07/19/2021    HDL 54 10/02/2020    HDL 54 02/08/2019     Lab Results   Component Value Date    LDLCALC 130 (H) 07/19/2021    LDLCALC 74 10/02/2020    LDLCALC 117 (H) 02/08/2019     Lab Results   Component Value Date    TRIG 177 (H) 07/19/2021    TRIG 134 10/02/2020    TRIG 134 02/08/2019     No results found for: CHOLHDL    Imaging: Mri Pelvis Rectal Cancer Staging Wo Contrast    Result Date: 8/20/2018  Narrative: MRI PELVIS - WITHOUT CONTRAST (RECTAL CANCER STAGING) INDICATION:  Rectal cancer status post radiation treatment  Based on Dr Mark Hernandez note from 8/3/2018, patient is approximately 1 month status post completion of neoadjuvant chemotherapy for stage III rectal cancer  COMPARISON: MRI of the pelvis from 4/6/2018  TECHNIQUE: The following pulse sequences were obtained on a 1 5 T scanner:  Sagittal 2D FIESTA fat sat,  High resolution Coronal, Sagittal, and Axial FSE T2 weighted images of the rectum, and large field of view axial T1 weighted images of the pelvis  An additional small field of view, axial oblique T2-weighted sequence was performed through the tumor, perpendicular to the long axis of the rectum at that level  IV contrast was not given  FINDINGS: TUMOR LOCATION: There has been definite tumor response to treatment of the previously seen low rectal cancer, which had been 5 cm in length and circumferential around the rectal wall  The intraluminal portions of tumor is much less bulky  However there is is still residual viable tumor extension anteriorly, with a 1 cm nodular lesion anterior to the rectum, still contacting the mesorectal fascia and possibly the vagina  (Series 6 image 28 and 29 )  T-STAGING: Based on the above, this is still at least T3c, CRM positive lesion, and if the vaginal is involved, a T4 lesion  CIRCUMFERENTIAL RESECTION MARGIN (CRM): As above, there is a region of the rectal tumor abutment of the anterior mesorectal fascia on series 6 images 28 and 29    In addition, a left anterior lateral high risk node described above also abuts the anterior mesorectal fascia (series 6 image 11 )  This continues to be a CRM positive lesion  PERIRECTAL NODES: All lymph nodes are described on series 6: High risk nodes: Image 9, right of rectum, this no has decreased in size, currently 8 mm, previously 10 mm, also has become hypointense on T2-weighted sequences, consistent with treated tumor  Image 11, left anterolateral of rectum, 14 mm currently, which had appeared smaller on axial images of previous study, but this is likely due to differences in slice selection  This is unchanged in size and it does contact the anterior mesorectal fascia  Low risk nodes: None  LEVATOR ANI, PUBORECTALIS, ANAL SPHINCTERS: Stage I (Tumor confined to bowel wall and intact outer muscle coat )            REPRODUCTIVE STRUCTURES: Age-appropriate  BLADDER:  Normal  PELVIC CAVITY:  There is trace ascites in the pelvis  OTHER BOWEL LOOPS: Unremarkable MRI appearance  OSSEOUS STRUCTURES: There are bilateral hip replacements  VASCULAR STRUCTURES:  Visualized vasculature is patent  PELVIC WALL:  Unremarkable  Impression: Although patient's low rectal cancer has demonstrated definite treatment response in terms of much decreased bulkiness of the intraluminal tumor, the extraluminal components have persisted  In particular there is still viable tumor extension anteriorly, with a 1 cm nodular lesion anterior to the rectum, still contacting the mesorectal fascia and possibly the vagina  (Series 6 image 28 and 29 )  A high risk left anterolateral perirectal lymph node also persists, contacting the mesorectal fascia (series 6 image 11 )  Therefore this is still a T3c, CRM positive lesion, and possibly T4 lesion if vaginal is still involved  Workstation performed: [id number]       ECG:   Normal sinus rhythm      Review of Systems   Constitutional: Negative  HENT: Negative  Eyes: Negative  Cardiovascular: Negative  Respiratory: Negative  Endocrine: Negative  Hematologic/Lymphatic: Negative  Skin: Negative  Musculoskeletal: Negative  Gastrointestinal: Negative  Genitourinary: Negative  Neurological: Negative  Psychiatric/Behavioral: Negative  Vitals:    09/10/21 0845   BP: 120/62   Pulse: 77   Resp: 20   SpO2: 100%     Vitals:    09/10/21 0845   Weight: 130 kg (285 lb 9 oz)     Height: 5' 6" (167 6 cm)   Body mass index is 46 09 kg/m²  Physical Exam:  Vital signs reviewed  General:  Alert and cooperative, appears stated age, no acute distress  HEENT:  PERRLA, EOMI, no scleral icterus, no conjunctival pallor  Neck:  No lymphadenopathy, no thyromegaly, no carotid bruits, no elevated JVP  Heart:  Regular rate and rhythm, normal S1/S2, no S3/S4, no murmur, rubs or gallops  PMI nondisplaced  Lungs:  Clear to auscultation bilaterally, no wheezes rales or rhonchi  Abdomen:  Soft, non-tender, positive bowel sounds, no rebound or guarding,   no organomegaly   Extremities:  Normal range of motion    No clubbing, cyanosis or edema   Vascular:  2+ pedal pulses  Skin:  No rashes or lesions on exposed skin  Neurologic:  Cranial nerves II-XII grossly intact without focal deficits  Psych:  Normal mood and affect

## 2021-09-20 LAB — MISCELLANEOUS LAB TEST RESULT: NORMAL

## 2021-10-01 ENCOUNTER — TELEPHONE (OUTPATIENT)
Dept: HEMATOLOGY ONCOLOGY | Facility: CLINIC | Age: 66
End: 2021-10-01

## 2021-10-01 ENCOUNTER — OFFICE VISIT (OUTPATIENT)
Dept: HEMATOLOGY ONCOLOGY | Facility: MEDICAL CENTER | Age: 66
End: 2021-10-01
Payer: MEDICARE

## 2021-10-01 VITALS
SYSTOLIC BLOOD PRESSURE: 122 MMHG | RESPIRATION RATE: 20 BRPM | OXYGEN SATURATION: 99 % | TEMPERATURE: 97.7 F | BODY MASS INDEX: 46.12 KG/M2 | HEIGHT: 66 IN | HEART RATE: 77 BPM | DIASTOLIC BLOOD PRESSURE: 72 MMHG | WEIGHT: 287 LBS

## 2021-10-01 DIAGNOSIS — J18.9 COMMUNITY ACQUIRED PNEUMONIA OF RIGHT LOWER LOBE OF LUNG: ICD-10-CM

## 2021-10-01 DIAGNOSIS — Z85.048 HISTORY OF RECTAL CANCER: ICD-10-CM

## 2021-10-01 DIAGNOSIS — K29.71 GASTROINTESTINAL HEMORRHAGE ASSOCIATED WITH GASTRITIS, UNSPECIFIED GASTRITIS TYPE: Primary | ICD-10-CM

## 2021-10-01 PROCEDURE — 99215 OFFICE O/P EST HI 40 MIN: CPT | Performed by: INTERNAL MEDICINE

## 2021-10-08 DIAGNOSIS — K92.2 GI BLEED: ICD-10-CM

## 2021-10-09 RX ORDER — SUCRALFATE 1 G/1
1 TABLET ORAL
Qty: 60 TABLET | Refills: 0 | Status: SHIPPED | OUTPATIENT
Start: 2021-10-09 | End: 2022-07-28

## 2021-10-14 ENCOUNTER — RA CDI HCC (OUTPATIENT)
Dept: OTHER | Facility: HOSPITAL | Age: 66
End: 2021-10-14

## 2021-10-20 DIAGNOSIS — E78.5 HYPERLIPIDEMIA, UNSPECIFIED HYPERLIPIDEMIA TYPE: ICD-10-CM

## 2021-10-20 RX ORDER — ATORVASTATIN CALCIUM 20 MG/1
TABLET, FILM COATED ORAL
Qty: 90 TABLET | Refills: 1 | Status: SHIPPED | OUTPATIENT
Start: 2021-10-20 | End: 2022-04-20

## 2021-10-21 ENCOUNTER — RA CDI HCC (OUTPATIENT)
Dept: OTHER | Facility: HOSPITAL | Age: 66
End: 2021-10-21

## 2021-11-01 RX ORDER — ALBUTEROL SULFATE 2.5 MG/3ML
SOLUTION RESPIRATORY (INHALATION)
Qty: 225 ML | Refills: 1 | Status: SHIPPED | OUTPATIENT
Start: 2021-11-01

## 2021-12-10 ENCOUNTER — OFFICE VISIT (OUTPATIENT)
Dept: GASTROENTEROLOGY | Facility: CLINIC | Age: 66
End: 2021-12-10
Payer: MEDICARE

## 2021-12-10 ENCOUNTER — TELEPHONE (OUTPATIENT)
Dept: GASTROENTEROLOGY | Facility: CLINIC | Age: 66
End: 2021-12-10

## 2021-12-10 VITALS
DIASTOLIC BLOOD PRESSURE: 79 MMHG | WEIGHT: 291 LBS | SYSTOLIC BLOOD PRESSURE: 144 MMHG | HEIGHT: 66 IN | BODY MASS INDEX: 46.77 KG/M2 | HEART RATE: 86 BPM | TEMPERATURE: 97.3 F

## 2021-12-10 DIAGNOSIS — K28.9 MARGINAL ULCER: ICD-10-CM

## 2021-12-10 DIAGNOSIS — Z85.048 HISTORY OF RECTAL CANCER: ICD-10-CM

## 2021-12-10 DIAGNOSIS — Z98.84 S/P BARIATRIC SURGERY: ICD-10-CM

## 2021-12-10 DIAGNOSIS — K21.9 GASTROESOPHAGEAL REFLUX DISEASE, UNSPECIFIED WHETHER ESOPHAGITIS PRESENT: Primary | ICD-10-CM

## 2021-12-10 PROCEDURE — 99214 OFFICE O/P EST MOD 30 MIN: CPT | Performed by: PHYSICIAN ASSISTANT

## 2021-12-10 RX ORDER — OMEPRAZOLE 20 MG/1
20 CAPSULE, DELAYED RELEASE ORAL
Qty: 90 CAPSULE | Refills: 1 | Status: SHIPPED | OUTPATIENT
Start: 2021-12-10 | End: 2022-03-25

## 2021-12-10 RX ORDER — OMEPRAZOLE 20 MG/1
20 CAPSULE, DELAYED RELEASE ORAL DAILY
COMMUNITY
End: 2021-12-10 | Stop reason: SDUPTHER

## 2021-12-23 ENCOUNTER — TELEPHONE (OUTPATIENT)
Dept: CARDIOLOGY CLINIC | Facility: CLINIC | Age: 66
End: 2021-12-23

## 2021-12-27 ENCOUNTER — TELEPHONE (OUTPATIENT)
Dept: GASTROENTEROLOGY | Facility: AMBULARY SURGERY CENTER | Age: 66
End: 2021-12-27

## 2022-01-12 ENCOUNTER — TELEPHONE (OUTPATIENT)
Dept: GASTROENTEROLOGY | Facility: CLINIC | Age: 67
End: 2022-01-12

## 2022-01-12 NOTE — TELEPHONE ENCOUNTER
Called and spoke to patient  Discussed that we will perform colonoscopy at same time as EGD as this will be performed at [facility] and unable to coordinate with Colorectal   I also urged the importance of her following up with Colorectal due to history of rectal cancer  She reports she will call the office for follow-up  I will send to  to get colonoscopy set up  She reports she will be unable to tolerate prep due to history of bypass  We will try to get her sample of Sutab instead  ----- Message from Junior Rina MD sent at 1/11/2022  4:26 PM EST -----  Jess Oyster, we cannot get a hold of her, if her GI provider wants to do a double at the time for location reasons since we do not go to [facility] that is fine, if you get a hold of her please let her know that she should see us at least in the office for cancer followup

## 2022-01-14 NOTE — TELEPHONE ENCOUNTER
LVM for pt re: adding colonoscopy to already scheduled egd /provided direct phone # on pt's phone message

## 2022-02-09 NOTE — TELEPHONE ENCOUNTER
Left another voice message  Advised pt to please call the gi office-so colonoscopy can be added to her already scheduled EGD w/ dr Tyler Ch @ [facility], [place]   Provide direct ph# in scheduling again for pt to return GI call

## 2022-02-11 ENCOUNTER — TELEPHONE (OUTPATIENT)
Dept: PREADMISSION TESTING | Facility: HOSPITAL | Age: 67
End: 2022-02-11

## 2022-02-14 NOTE — TELEPHONE ENCOUNTER
Pt called the GI office to cancel her EGD (and COLONOSCOPY-pt has agreed to it) pt states she's been in contact w/ people with COVID and she doesn't feel comfortable rescheduling just yet  Pt states she will call back when she is ready-and it might not be until next week  Pt DID NOT want to reschedule at this time

## 2022-03-25 DIAGNOSIS — K28.9 MARGINAL ULCER: ICD-10-CM

## 2022-03-25 DIAGNOSIS — K21.9 GASTROESOPHAGEAL REFLUX DISEASE, UNSPECIFIED WHETHER ESOPHAGITIS PRESENT: ICD-10-CM

## 2022-03-25 DIAGNOSIS — Z98.84 S/P BARIATRIC SURGERY: ICD-10-CM

## 2022-03-25 RX ORDER — OMEPRAZOLE 20 MG/1
CAPSULE, DELAYED RELEASE ORAL
Qty: 90 CAPSULE | Refills: 1 | Status: SHIPPED | OUTPATIENT
Start: 2022-03-25

## 2022-03-28 ENCOUNTER — TELEPHONE (OUTPATIENT)
Dept: HEMATOLOGY ONCOLOGY | Facility: CLINIC | Age: 67
End: 2022-03-28

## 2022-03-29 ENCOUNTER — APPOINTMENT (OUTPATIENT)
Dept: LAB | Facility: CLINIC | Age: 67
End: 2022-03-29
Payer: MEDICARE

## 2022-03-29 DIAGNOSIS — K29.71 GASTROINTESTINAL HEMORRHAGE ASSOCIATED WITH GASTRITIS, UNSPECIFIED GASTRITIS TYPE: ICD-10-CM

## 2022-03-29 DIAGNOSIS — Z85.048 HISTORY OF RECTAL CANCER: ICD-10-CM

## 2022-03-29 LAB
ALBUMIN SERPL BCP-MCNC: 3.2 G/DL (ref 3.5–5)
ALP SERPL-CCNC: 81 U/L (ref 46–116)
ALT SERPL W P-5'-P-CCNC: 13 U/L (ref 12–78)
ANION GAP SERPL CALCULATED.3IONS-SCNC: 4 MMOL/L (ref 4–13)
AST SERPL W P-5'-P-CCNC: 10 U/L (ref 5–45)
BASOPHILS # BLD AUTO: 0.03 THOUSANDS/ΜL (ref 0–0.1)
BASOPHILS NFR BLD AUTO: 1 % (ref 0–1)
BILIRUB SERPL-MCNC: 0.44 MG/DL (ref 0.2–1)
BUN SERPL-MCNC: 12 MG/DL (ref 5–25)
CALCIUM ALBUM COR SERPL-MCNC: 9.5 MG/DL (ref 8.3–10.1)
CALCIUM SERPL-MCNC: 8.9 MG/DL (ref 8.3–10.1)
CEA SERPL-MCNC: 1.2 NG/ML (ref 0–3)
CHLORIDE SERPL-SCNC: 106 MMOL/L (ref 100–108)
CO2 SERPL-SCNC: 28 MMOL/L (ref 21–32)
CREAT SERPL-MCNC: 0.83 MG/DL (ref 0.6–1.3)
EOSINOPHIL # BLD AUTO: 0.1 THOUSAND/ΜL (ref 0–0.61)
EOSINOPHIL NFR BLD AUTO: 2 % (ref 0–6)
ERYTHROCYTE [DISTWIDTH] IN BLOOD BY AUTOMATED COUNT: 20 % (ref 11.6–15.1)
FERRITIN SERPL-MCNC: 8 NG/ML (ref 8–388)
GFR SERPL CREATININE-BSD FRML MDRD: 73 ML/MIN/1.73SQ M
GLUCOSE P FAST SERPL-MCNC: 137 MG/DL (ref 65–99)
HCT VFR BLD AUTO: 31.6 % (ref 34.8–46.1)
HGB BLD-MCNC: 8.5 G/DL (ref 11.5–15.4)
IMM GRANULOCYTES # BLD AUTO: 0.06 THOUSAND/UL (ref 0–0.2)
IMM GRANULOCYTES NFR BLD AUTO: 1 % (ref 0–2)
IRON SATN MFR SERPL: 4 % (ref 15–50)
IRON SERPL-MCNC: 17 UG/DL (ref 50–170)
LYMPHOCYTES # BLD AUTO: 0.9 THOUSANDS/ΜL (ref 0.6–4.47)
LYMPHOCYTES NFR BLD AUTO: 16 % (ref 14–44)
MCH RBC QN AUTO: 17.5 PG (ref 26.8–34.3)
MCHC RBC AUTO-ENTMCNC: 26.9 G/DL (ref 31.4–37.4)
MCV RBC AUTO: 65 FL (ref 82–98)
MONOCYTES # BLD AUTO: 0.63 THOUSAND/ΜL (ref 0.17–1.22)
MONOCYTES NFR BLD AUTO: 11 % (ref 4–12)
NEUTROPHILS # BLD AUTO: 3.84 THOUSANDS/ΜL (ref 1.85–7.62)
NEUTS SEG NFR BLD AUTO: 69 % (ref 43–75)
NRBC BLD AUTO-RTO: 0 /100 WBCS
PLATELET # BLD AUTO: 192 THOUSANDS/UL (ref 149–390)
PMV BLD AUTO: 10.4 FL (ref 8.9–12.7)
POTASSIUM SERPL-SCNC: 4.5 MMOL/L (ref 3.5–5.3)
PROT SERPL-MCNC: 6.8 G/DL (ref 6.4–8.2)
RBC # BLD AUTO: 4.85 MILLION/UL (ref 3.81–5.12)
SODIUM SERPL-SCNC: 138 MMOL/L (ref 136–145)
TIBC SERPL-MCNC: 482 UG/DL (ref 250–450)
WBC # BLD AUTO: 5.56 THOUSAND/UL (ref 4.31–10.16)

## 2022-03-29 PROCEDURE — 83540 ASSAY OF IRON: CPT

## 2022-03-29 PROCEDURE — 82728 ASSAY OF FERRITIN: CPT

## 2022-03-29 PROCEDURE — 80053 COMPREHEN METABOLIC PANEL: CPT

## 2022-03-29 PROCEDURE — 85025 COMPLETE CBC W/AUTO DIFF WBC: CPT

## 2022-03-29 PROCEDURE — 83550 IRON BINDING TEST: CPT

## 2022-03-29 PROCEDURE — 36415 COLL VENOUS BLD VENIPUNCTURE: CPT

## 2022-03-29 PROCEDURE — 82378 CARCINOEMBRYONIC ANTIGEN: CPT

## 2022-03-31 NOTE — PROGRESS NOTES
Fer Haney  1955  [facility] [address] [place] [id number]    DISCUSSION/SUMMARY:    49-year-old female previously found to have high-grade dysplasia/intramucosal lesion arising in a tubulovillous adenoma  Issues:    Anemia  Hemoglobin level and MCV are decreased  Iron studies are consistent with iron deficiency anemia  Patient is symptomatic  Patient with a history of gastric bypass years ago, admits not taking her bariatric vitamins and minerals  We discussed options  Patient is to be treated with IV iron, Feraheme  Patient will also receive 2 B12 injections  Mrs Farzana Solis promises she will go back to being faithful with her bariatric vitamins and minerals  Patient will monitor for any signs of GI bleeding - guaiacs are on hold for the time being  We rediscussed what to monitor for as far as progressive fatigue, pallor, chewing ice, restless legs, respiratory issues  Patient is to return in 6 months but we will repeat blood work in 3 months  Rectal cancer  Final stage was IIA (ypT3 pN0 G2)  Patient received neoadjuvant concurrent treatment and then underwent resection  Mrs Farzana Solis had postoperative complications with wound healing and fistula formation - eventually resolved  From a medical oncology standpoint, Mrs Farzana Solis continues to do well; clinically there are no concerning signs  Plan is to continue with surveillance  As above, patient will undergo repeat blood work including CEA in 6 months  We will discuss repeating scans the next office visit  Ovarian cancer, IIIC  As discussed previously, at the time of surgery, patient was found to have an incidental low-grade serous ovarian carcinoma with omental nodules greater than 2 cm grossly visible    Patient is followed by GYN Oncology and is on anastrozole (NCCN 2 [id number] low-grade serous, stage II-IV tumors, treatment options include primary hormonal therapy (category 2B)  Patient will follow up with gyn Oncology as directed  Mrs Reardon knows to call if she has any other oncology questions or concerns  Carefully review your medication list and verify that the list is accurate and up-to-date  Please call the hematology/oncology office if there are medications missing from the list, medications on the list that you are not currently taking or if there is a dosage or instruction that is different from how you're taking that medication  Patient goals and areas of care:  Rectal cancer surveillance  Barriers to care:  None  Patient is able to self-care   ___________________________________________________________________________________________________    Chief Complaint   Patient presents with    Follow-up     History of rectal cancer     History of Present Illness:    69-year-old female previously referred for the above  Previously Mrs Yolande Whelan had gastric bypass  Patient was seen by GI (late summer 2018) because of blood in the stools  Additional workup included a colonoscopy which demonstrated a rectal lesion  Patient recently completed concurrent chemotherapy (Xeloda) with radiation  Mrs Sky Palacios subsequently went onto an APR  Final pathology results are listed below; patient was also found to have IIIC low-grade serous ovarian carcinoma  Postoperatively, patient had issues with slow wound closure  Since completion of treatment, patient has been on surveillance  Mrs Sky Palacios states feeling okay, baseline  Patient is concerned that she has put on weight  No colostomy problems, no obvious GI bleeding  Patient states that she is more fatigued than before  No shortness of breath or dyspnea on exertion  No chest pain or pressure  No nausea, vomiting, diarrhea or constipation, no abdominal pain  No  or gyn issues        During the APR, patient was found to have incidental stage IIIC low-grade serous ovarian cancer (gyn oncology was called into the OR)  Patient is followed by Dr Mg Sykes and is on Arimidex  No problems with the Arimidex  Review of Systems   Constitutional: Positive for fatigue  HENT: Negative  Eyes: Negative  Respiratory: Negative  Cardiovascular: Negative  Gastrointestinal: Negative for blood in stool, constipation and diarrhea  Endocrine: Negative  Genitourinary: Negative  Musculoskeletal: Negative for arthralgias  Skin: Negative  Allergic/Immunologic: Negative  Neurological: Negative  Hematological: Negative  Psychiatric/Behavioral: Negative  All other systems reviewed and are negative       Patient Active Problem List   Diagnosis    Morbid obesity due to excess calories (HCC)    Postgastrectomy malabsorption    S/P gastric bypass    Marginal ulcer    S/P bariatric surgery    Atherosclerotic peripheral vascular disease ([facility] [address] [facility]    Onychomycosis    History of rectal cancer    Colostomy care ([facility] [address] [facility]    History of ovarian cancer    Encounter for surgical aftercare following surgery of digestive system    Pyelonephritis    History of ovarian cancer    Candidal intertrigo    Vaginal bleeding    Encounter for other screening for malignant neoplasm of breast    Aortic stenosis, severe    Nonrheumatic mitral valve stenosis    Atrial fibrillation with rapid ventricular response (HCC)    Hyperlipidemia    Hypochloremia    Weight gain following gastric bypass surgery    S/P TAVR (transcatheter aortic valve replacement)    Ambulatory dysfunction    CAD (coronary artery disease)    Chronic anticoagulation    Chronic diastolic heart failure (HCC)    Osteoarthritis of right knee    H/O diabetes mellitus    GI bleed    Community acquired pneumonia of right lower lobe of lung    Sepsis ([facility] [address] )    Chronic kidney disease     Past Medical History:   Diagnosis Date    Acute pain of right knee     Ambulatory dysfunction     Anemia     Arthritis     Bleeding ulcer     Cancer [facility])     rectal    Chronic lower back pain     Chronic pain disorder     back pain    Colon cancer ([place] [address] ) 2018    Diabetes mellitus ([place] [address] )     Endometrial cancer ([place] [address] ) 2018    GERD (gastroesophageal reflux disease)     History of chemotherapy     History of MRSA infection     Morbid obesity ([place] [address] )     Morbid obesity with BMI of 45 0-49 9, adult ([place] [address] )     Ovarian cancer ([place] [address] ) 2018    Paroxysmal atrial fibrillation (HCC)     Rectal cancer (HCC)     S/P TAVR (transcatheter aortic valve replacement)     SOB (shortness of breath)     Spinal stenosis     Systolic murmur     Unsteady gait     uses walker    Wears dentures     Wears glasses      Ob/gyn:  No recent mammogram -patient's choice, no post menopausal bleeding    Past Surgical History:   Procedure Laterality Date    ABDOMINAL PERINEAL BOWEL RESECTION W/ ILEOANAL POUCH N/A 2018    Procedure: LAPAROSCOPIC HAND ASSIST ABDOMINOPERINEAL RESECTION,  POSTERIOR VAGINECTOMY, OMENTECTOMY;  Surgeon: Sam Nicolas MD;  [facility];  Service: Colorectal    ABDOMINAL SURGERY      abscess removed from abdomen and right thigh, a hole in thigh closed by plastic surgeon    ABSCESS DRAINAGE      abd, (R) leg, (L) leg     SECTION       SECTION      CYSTOSCOPY N/A 2018    Procedure: CYSTOSCOPY;  Surgeon: Glenys Siddiqui MD;  [facility];  Service: Gynecology Oncology    ESOPHAGOGASTRODUODENOSCOPY N/A 2016    Procedure: ESOPHAGOGASTRODUODENOSCOPY (EGD); Surgeon: Suleman Govea MD;  [facility];  Service:     ESOPHAGOGASTRODUODENOSCOPY N/A 3/30/2016    Procedure: ESOPHAGOGASTRODUODENOSCOPY (EGD); Surgeon: Suleman Govea MD;  [facility]; Service:     EYE SURGERY      laser eye surgery    HYSTERECTOMY N/A 2018    Procedure: RADICAL HYSTERECTOMY TOTAL ABDOMINAL (ALEXANDRA)   BSO;  Surgeon: Glenys Siddiqui MD;  [facility];  Service: Gynecology Oncology    JOINT REPLACEMENT Left 2017    hip    JOINT REPLACEMENT Right 2017    hip    OOPHORECTOMY Bilateral     PA COLONOSCOPY FLX DX W/COLLJ SPEC WHEN PFRMD N/A 3/9/2018    Procedure: COLONOSCOPY;  Surgeon: Rohit Kiser MD;  Location: [facility]; Service: Gastroenterology    PA COLONOSCOPY FLX DX W/COLLJ [facility] WHEN PFRMD N/A 9/5/2018    Procedure: COLONOSCOPY;  Surgeon: Benjamin Avalos MD;  [facility]; Service: Colorectal    PA ECHO TRANSESOPHAG R-T 2D W/PRB IMG ACQUISJ I&R N/A 9/1/2020    Procedure: TRANSESOPHAGEAL ECHOCARDIOGRAM (THO); Surgeon: Casey Gonzalez DO;  [facility];  Service: Cardiac Surgery    PA ESOPHAGOGASTRODUODENOSCOPY TRANSORAL DIAGNOSTIC N/A 2/2/2018    Procedure: ESOPHAGOGASTRODUODENOSCOPY (EGD); Surgeon: Rohit Kiser MD;  Location: [facility]; Service: Gastroenterology    PA LAP GASTRIC BYPASS/SOLEDAD-EN-Y N/A 7/18/2016    Procedure: BYPASS GASTRIC  SOLEDAD-EN-Y LAPAROSCOPIC;  Surgeon: Gypsy Colbert MD;  [facility];  Service: Bariatrics    PA LAP, SURG COLOSTOMY N/A 9/6/2018    Procedure: PERMANENT END COLOSTOMY;  Surgeon: Benjamin Avalos MD;  [facility];  Service: Colorectal    PA LAP, SURG PROCTECTOMY W COLOSTOMY N/A 9/6/2018    Procedure: PROCTECTOMY;  Surgeon: Benjamin Avalos MD;  [facility];  Service: Colorectal    PA REPLACE AORTIC VALVE OPENFEMORAL ARTERY APPROACH N/A 9/1/2020    Procedure: REPLACEMENT AORTIC VALVE TRANSCATHETER (TAVR) TRANSFEMORAL W/ 26MM WADDELL BARBARA S3 ULTRA VALVE(ACCESS ON RIGHT);   Surgeon: Casey Gonzalez DO;  [facility];  Service: Cardiac Surgery    REPLACEMENT TOTAL KNEE      TUBAL LIGATION       Family History   Adopted: Yes   Problem Relation Age of Onset    Leukemia Mother     Heart disease Father     Coronary artery disease Father     Diabetes Father     No Known Problems Sister     No Known Problems Brother     Diabetes Son     No Known Problems Brother     No Known Problems Brother     No Known Problems Sister     No Known Problems Sister     Family history:   Mother  of leukemia (NOS), father  from heart disease (NOS), 2 children 1 with diabetes, no known familial or genetic diseases, no family history of GI malignancies    Social History     Socioeconomic History    Marital status: Single     Spouse name: Not on file    Number of children: 2    Years of education: Not on file    Highest education level: Not on file   Occupational History    Not on file   Tobacco Use    Smoking status: Former Smoker     Packs/day: 1 00     Years: 25 00     Pack years: 25 00     Quit date: 3/30/2006     Years since quittin 0    Smokeless tobacco: Never Used   Vaping Use    Vaping Use: Never used   Substance and Sexual Activity    Alcohol use: Not Currently    Drug use: Not Currently     Types: Oxycodone     Comment: Percocet for lower back pain prn    Sexual activity: Not Currently   Other Topics Concern    Not on file   Social History Narrative    Not on file     Social Determinants of Health     Financial Resource Strain: Not on file   Food Insecurity: Not on file   Transportation Needs: Not on file   Physical Activity: Not on file   Stress: Not on file   Social Connections: Not on file   Intimate Partner Violence: Not on file   Housing Stability: Not on file       Current Outpatient Medications:     albuterol (2 5 mg/3 mL) 0 083 % nebulizer solution, TAKE 1 VIAL BY NEBULIZATION EVERY 4 HOURS AS NEEDED FOR WHEEZING OR SHORTNESS OF BREATH, Disp: 225 mL, Rfl: 1    amiodarone 100 mg tablet, Take 1 tablet (100 mg total) by mouth daily, Disp: 90 tablet, Rfl: 3    apixaban (ELIQUIS) 5 mg, Take 1 tablet (5 mg total) by mouth 2 (two) times a day, Disp: 180 tablet, Rfl: 2    atorvastatin (LIPITOR) 20 mg tablet, TAKE 1 TABLET DAILY, Disp: 90 tablet, Rfl: 1    Calcium-Vitamin D 500-125 MG-UNIT TABS, Take 1 tablet by mouth 2 (two) times a day , Disp: , Rfl:    Cyanocobalamin (B-12 PO), Take by mouth daily , Disp: , Rfl:     meclizine (ANTIVERT) 25 mg tablet, Take 1 tablet (25 mg total) by mouth every 8 (eight) hours as needed for dizziness, Disp: 30 tablet, Rfl: 0    metoprolol succinate (TOPROL-XL) 25 mg 24 hr tablet, Take 1 tablet (25 mg total) by mouth daily, Disp: 90 tablet, Rfl: 1    Multiple Vitamins-Minerals (BARIATRIC FUSION) CHEW, Chew 1 tablet daily  , Disp: , Rfl:     omeprazole (PriLOSEC) 20 mg delayed release capsule, TAKE 1 CAPSULE DAILY BEFORE BREAKFAST, Disp: 90 capsule, Rfl: 1    oxyCODONE (ROXICODONE) 10 MG TABS, Take 10 mg by mouth every 6 (six) hours as needed  , Disp: , Rfl:     potassium chloride (K-DUR,KLOR-CON) 20 mEq tablet, Take 1 tablet (20 mEq total) by mouth every other day (Patient taking differently: Take 20 mEq by mouth every other day As needed ), Disp: 30 tablet, Rfl: 0    aspirin (ECOTRIN LOW STRENGTH) 81 mg EC tablet, Take 1 tablet (81 mg total) by mouth daily (Patient not taking: Reported on 8/31/2021), Disp: , Rfl: 0    ergocalciferol (VITAMIN D2) 50,000 units, Take 1 capsule (50,000 Units total) by mouth 2 (two) times a week with meals (Patient not taking: Reported on 12/10/2021 ), Disp: 20 capsule, Rfl: 0    ferrous sulfate 324 (65 Fe) mg, Take 1 tablet (324 mg total) by mouth daily before breakfast, Disp: 30 tablet, Rfl: 0    sucralfate (CARAFATE) 1 g tablet, Take 1 tablet (1 g total) by mouth 4 (four) times a day (before meals and at bedtime) for 15 days (Patient not taking: Reported on 12/10/2021 ), Disp: 60 tablet, Rfl: 0    torsemide (DEMADEX) 20 mg tablet, Take 0 5 tablets (10 mg total) by mouth every other day (Patient not taking: Reported on 12/10/2021 ), Disp: 30 tablet, Rfl: 0    Allergies   Allergen Reactions    Tramadol Other (See Comments)     Dizzy         Vitals:    04/01/22 0800   BP: 124/60   Pulse: 81   Resp: 22   Temp: 97 6 °F (36 4 °C)   SpO2: 99%     Physical Exam  Constitutional:       Appearance: She is well-developed  Comments: Well-nourished female, no respiratory distress   HENT:      Head: Normocephalic and atraumatic  Right Ear: External ear normal       Left Ear: External ear normal       Nose: Nose normal    Eyes:      Conjunctiva/sclera: Conjunctivae normal       Pupils: Pupils are equal, round, and reactive to light  Cardiovascular:      Rate and Rhythm: Normal rate and regular rhythm  Heart sounds: Normal heart sounds  Pulmonary:      Effort: Pulmonary effort is normal       Breath sounds: Normal breath sounds  Abdominal:      General: Bowel sounds are normal       Palpations: Abdomen is soft  Comments: Left lower quadrant colostomy in place  Stool in back, nontender, +bowel sounds, well-healed suture lines, obese, cannot palpate liver or spleen   Musculoskeletal:      Cervical back: Normal range of motion and neck supple  Comments: No pain or tenderness with palpation of joints, muscles or bones, good range of motion in upper extremities, decreased range of motion in lower extremities but equal   Skin:     General: Skin is warm  Comments: Warm, moist, slightly pale, no petechiae or ecchymoses   Neurological:      Mental Status: She is alert and oriented to person, place, and time  Deep Tendon Reflexes: Reflexes are normal and symmetric  Psychiatric:         Behavior: Behavior normal          Thought Content:  Thought content normal          Judgment: Judgment normal      Extremities: no lower extremity edema bilaterally, no cords, pulses are 1+  Lymphatics:  No adenopathy in the neck, supraclavicular region, axilla and groin bilaterally  Rectal wound deferred    Labs        03/29/2022 WBC = 5 56 hemoglobin = 8 5 hematocrit = 32 MCV = 65 RDW = 20 platelet = 114 neutrophil = 69% BUN = 12 creatinine = 0 83 calcium = 8 9 LFTs WNL iron = 17 iron saturation = 4% TIBC = 482 ferritin = 8    08/24/2021 WBC = 5 44 hemoglobin = 9 4 hematocrit = 31 6 MCV = 80 platelet = 801 BUN = 14 creatinine = 0 82  08/18/2021 WBC = 5 55 hemoglobin = 11 7 hematocrit = 39 7 MCV = 80 RDW = 16 1 platelet = 731 neutrophil = 72% BUN = 18 creatinine = 0 83 LFTs WNL calcium = 9 0 CEA = 1 0    Imaging    02/21/2021 CT scan chest abdomen pelvis    No evidence of recurrent or metastatic disease throughout the chest, abdomen or pelvis  Large parastomal hernia containing loops of small bowel and cecum  02/06/2020 CT scan of the chest and abdomen pelvis  Impression stated no evidence of metastatic disease, large left lower quadrant parastomal hernia containing multiple nonobstructed bowel loops  04/01/2019 CT scan of the chest and abdomen pelvis    No CT evidence of metastatic disease within the chest abdomen or pelvis  Stable postoperative changes  4/6/18 MRI pelvis rectal cancer staging    Low rectal cancer, 5 cm in length, circumferential around the rectal wall  (Series 8 images 19 through 33 )  Anteriorly, there are multiple areas where there is transmural tumor extension into the mesorectal fat making this at least a T3c lesion  On Series 8 image 31, tumor also abuts the mesorectal fascia making this CRM positive  At this point it is also   immediately adjacent to the vagina, therefore this may be a T4 lesion  There are 2 high risk perirectal nodes, and 1 low risk perirectal nodes  01/31/2018 ultrasound right upper quadrant    1  Layering gallbladder sludge  No acute cholecystitis or biliary ductal obstruction  2   Hepatomegaly  3   Fluid-filled stomach  1/27/18 CT scan of the abdomen/pelvis    1  Prior Juan-en-Y gastric bypass with distention of the excluded stomach    2   No evidence of acute abnormality in the abdomen or pelvis      Pathology    Case Report   Surgical Pathology Report                         Case: [id number]                                    Authorizing Provider: GEORGETTE Coy       Collected:           05/29/2019 0922               Ordering Location:     [facility] Received:            05/29/2019 0922                                      [facility]                                                            Pathologist:           Yevgeniy Hathaway MD                                                                Specimen:    Vaginal, vaginal polyp                                                                     Final Diagnosis   A  Vagina, polyp (biopsy):  - Acutely and chronically inflamed granulation tissue    Electronically signed by Yevgeniy Hathaway MD on 5/30/2019 at  1:18 PM       Case Report   Surgical Pathology Report                         Case: [id number]                                    Authorizing Provider: Yunier Aguilar MD      Collected:           09/06/2018 1010               Ordering Location:     [address]      Received:            09/06/2018 1448                                      [facility] Room                                                       Pathologist:           Rock Garcia MD                                                         Specimens:   A) - Soft Tissue, Other, EPIPLOIC NODULE                                                             B) - Rectum, enbloc rectum, sigmoid, anus, uterus, bso, cervix, posterior vaginal                    wall                                                                                                 C) - Omentum                                                                               Addendum   Additional immunohistochemical stains performed with appropriate controls on a selected portion of serous carcinoma involving omental tissue (block C1) show the following results:  Estrogen receptor: Positive  Progesterone receptor: Negative   Addendum electronically signed by Rock Garcia MD on 9/27/2018 at  1:40 PM   Final Diagnosis   A    Soft tissue, epiploic nodule, biopsy:  -  Portion of nodular fibroadipose tissue with fat necrosis and dystrophic calcifications  -  Negative for metastatic carcinoma  -  Immunohistochemical stain performed with appropriate control for keratin AE1/3 is negative for epithelial elements, supporting the diagnosis      B  Rectum, sigmoid colon, anus, uterus,cervix, bilateral ovaries and fallopian tubes and posterior vaginal wall; en bloc resection:  -  Invasive moderately differentiated adenocarcinoma of rectum (see first synoptic report)  -  Low grade serous carcinoma involving left ovary, left fallopian tube, uterine serosa and colonic serosa (see second synoptic report)  -  Separate portion of colon (consistent with sigmoid) with diverticulosis coli showing focal serosal implant of low-grade serous carcinoma  -  Uterus showing benign inactive/atrophic endometrium with metaplastic change, benign endometrial polyp and rare cytologic atypia consistent with radiation effect  -  Cervix with mild glandular atypia, favor reactive (see note)  -  Benign uterine myometrium with one intramural benign leiomyoma, negative for atypia or malignancy  -  Benign anal skin and dentate line with mild reactive change, negative for involvement by carcinoma  -  Benign vaginal wall mucosa with thinning, chronic inflammation, and surface erosion most suggestive of prior therapy effect, negative for involvement by carcinoma  -  27 benign pericolorectal lymph nodes identified, negative for metastatic carcinoma      C  Omentum, omentectomy:  -   Low grade serous carcinoma         COLORECTAL CARCINOMA TUMOR STAGING SUMMARY (includes specimen A-C of this case):  1  Specimen identification:     - Specimen:  Rectum, sigmoid colon, anus, uterus,cervix, bilateral ovaries and fallopian tubes and posterior vaginal wall   2   Tumor:     - Tumor site:  Rectum     - Tumor size:  Up to 3 3 cm     - Macroscopic tumor perforation:  Not identified     - Tumor location:  Below the peritoneal reflection        - Macroscopic intactness of mesorectum:  Intact     - Histologic Type:   Adenocarcinoma      - Histologic Grade: Moderately differentiated (G2)     - Microscopic Tumor Extension (ypT3): Tumor invades through muscularis propria into pericolorectal soft tissues     - Tumor budding (only needed in polyps, Stage I or II cancers):  Not identified   3  Margins (specify distance for nearest radial margin on RECTAL tumors only):     - Proximal Margin:  Negative for carcinoma     - Distal Margin:  Negative for carcinoma     - Circumferential (Radial) or Mesenteric Margin:  Negative for carcinoma (0 6cm)     - Other Margins:  Vaginal margin negative for carcinoma   4  Treatment effect:  Present; Residual cancer with evident tumor regression, but more than single cells or rare small groups of cancer cells (partial response, score 2)   5  Lymph-vascular invasion:  Not identified   6  Perineural invasion:  Not identified   7  Tumor deposits: Not identified   8  Type of polyp in which invasive carcinoma arose:  Tubular adenoma   9  Regional lymph nodes (pN0):  27 benign pericolorectal lymph node sample, negative for metastatic carcinoma  10  Additional pathologic findings:  Diverticulosis coli  11  Ancillary Studies:  *  Immunohistochemical stains performed with appropriate controls show the primary rectal tumor to be positive for CK20 and CDX-2 and negative for CK 7, PAX-8, p53 (wild type expression), ER and WT-1, supporting a diagnosis of primary colorectal origin  12  8th Ed AJCC Stage:  at least Stage  IIA - ypT3, pN0, G2         Primary Tumor of the Ovary/Fallopian Tube/Peritoneum, Staging Summary (includes Specimen A to C of this case):  1  Procedure:  Hysterectomy, bilateral salpingo-oophorectomy, omentectomy  2  Hysterectomy type:  Radical hysterectomy  3  Specimen integrity:      - Right ovary:  Intact      - Left ovary:  Intact       - Right fallopian tube: Intact      - Left fallopian tube: Intact  4   Tumor site:  Ovarian and fallopian tube surfaces and uterine and colonic serosa  5  Ovarian surface involvement:  Present  6  Fallopian tube surface involvement:  Present  7  Tumor size:  Largest focus measures 1 8 cm (uterine serosa) (see note)  8  Histologic type:  Low-grade serous carcinoma   9  Histologic grade:  Low grade  10  Implant:  Present  11  Other tissue/organ involvement:  Bilateral ovaries and fallopian tubes, uterine serosa, sigmoid colon serosa, and omentum  12  Largest extrapelvic peritoneal focus: 2 5 cm (macroscopically identidfied)    - Specify site:  Omentum  13  Peritoneal/ascetic fluid:  Not performed   14  Pleural fluid:  Not performed  15  Treatment effect:  No known prior therapy  16  Regional lymph nodes:    - No regional uterine lymph nodes submitted or found    - 27 benign pericolorectal lymph nodes sampled, negative for metastatic carcinoma  17  Pathologic stage classification (pTNM, AJCC 8th edition): pT3c, pNX, Low Grade  18  FIGO stage (2015 FIGO cancer report): IIIC  19  Additional pathologic findings:  N/A  20  Ancillary studies:  Immunohistochemical stains performed with appropriate controls show the tumor cells to be positive for CK7, PAX-8, ER, and WT-1 with wild-type expression of p53 and negative for CK20 and CDX-2 supporting the diagnosis  Electronically signed by Xavier Guzmán MD on 9/25/2018 at 12:54 PM        Case Report   Surgical Pathology Report                         Case: [id number]                                    Authorizing Provider: Rohit Lopez MD      Collected:           03/28/2018 1130               Pathologist:           Yves Green MD             Received:            03/29/2018 0942               Specimen:    Rectum, Rectal cancer/mass biopsies                                                        Final Diagnosis   A   Rectal mass (biopsy):  - 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      Comment:   - In the context of a rectal mass, repeat biopsy and/or excision may be indicated to exclude a higher grade lesion  Electronically signed by Kathleen Colunga MD on 3/30/2018 at  2:36 PM         Case Report   Surgical Pathology Report                         Case: [id number]                                    Authorizing Provider: Dayana Sotomayor MD          Collected:           03/09/2018 0902               Ordering Location:     [facility]   Received:            03/12/2018 0904                                      Center                                                                        Pathologist:           Becky Mulligan DO                                                             Specimens:   A) - Colon, polyp splenic flexure/cold snare                                                         B) - Colon, bx distal rectal mass                                                          Final Diagnosis   A  Colon, splenic flexure polyp, biopsy:  - Tubulovillous adenoma, negative for high-grade dysplasia      B  Rectum, mass, biopsy:  - 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

## 2022-04-01 ENCOUNTER — OFFICE VISIT (OUTPATIENT)
Dept: HEMATOLOGY ONCOLOGY | Facility: MEDICAL CENTER | Age: 67
End: 2022-04-01
Payer: MEDICARE

## 2022-04-01 VITALS
WEIGHT: 293 LBS | HEART RATE: 81 BPM | HEIGHT: 66 IN | OXYGEN SATURATION: 99 % | RESPIRATION RATE: 22 BRPM | TEMPERATURE: 97.6 F | DIASTOLIC BLOOD PRESSURE: 60 MMHG | SYSTOLIC BLOOD PRESSURE: 124 MMHG | BODY MASS INDEX: 47.09 KG/M2

## 2022-04-01 DIAGNOSIS — Z85.43 HISTORY OF OVARIAN CANCER: ICD-10-CM

## 2022-04-01 DIAGNOSIS — D50.9 IRON DEFICIENCY ANEMIA, UNSPECIFIED IRON DEFICIENCY ANEMIA TYPE: ICD-10-CM

## 2022-04-01 DIAGNOSIS — Z90.3 POSTGASTRECTOMY MALABSORPTION: Primary | ICD-10-CM

## 2022-04-01 DIAGNOSIS — E66.01 MORBID OBESITY DUE TO EXCESS CALORIES (HCC): ICD-10-CM

## 2022-04-01 DIAGNOSIS — K28.9 MARGINAL ULCER: ICD-10-CM

## 2022-04-01 DIAGNOSIS — Z85.048 HISTORY OF RECTAL CANCER: ICD-10-CM

## 2022-04-01 DIAGNOSIS — K91.2 POSTGASTRECTOMY MALABSORPTION: Primary | ICD-10-CM

## 2022-04-01 PROCEDURE — 99215 OFFICE O/P EST HI 40 MIN: CPT | Performed by: INTERNAL MEDICINE

## 2022-04-01 RX ORDER — CYANOCOBALAMIN 1000 UG/ML
1000 INJECTION INTRAMUSCULAR; SUBCUTANEOUS ONCE
Status: CANCELLED | OUTPATIENT
Start: 2022-04-08

## 2022-04-01 RX ORDER — SODIUM CHLORIDE 9 MG/ML
20 INJECTION, SOLUTION INTRAVENOUS ONCE
Status: CANCELLED | OUTPATIENT
Start: 2022-04-08

## 2022-04-08 ENCOUNTER — HOSPITAL ENCOUNTER (OUTPATIENT)
Dept: INFUSION CENTER | Facility: HOSPITAL | Age: 67
Discharge: HOME | End: 2022-04-08
Attending: INTERNAL MEDICINE

## 2022-04-11 ENCOUNTER — HOSPITAL ENCOUNTER (OUTPATIENT)
Dept: INFUSION CENTER | Facility: HOSPITAL | Age: 67
Discharge: HOME | End: 2022-04-11
Attending: INTERNAL MEDICINE
Payer: MEDICARE

## 2022-04-11 VITALS
SYSTOLIC BLOOD PRESSURE: 136 MMHG | HEART RATE: 72 BPM | RESPIRATION RATE: 20 BRPM | DIASTOLIC BLOOD PRESSURE: 65 MMHG | OXYGEN SATURATION: 97 % | TEMPERATURE: 97.1 F

## 2022-04-11 DIAGNOSIS — Z90.3 POSTGASTRECTOMY MALABSORPTION: Primary | ICD-10-CM

## 2022-04-11 DIAGNOSIS — K91.2 POSTGASTRECTOMY MALABSORPTION: Primary | ICD-10-CM

## 2022-04-11 DIAGNOSIS — D50.9 IRON DEFICIENCY ANEMIA, UNSPECIFIED IRON DEFICIENCY ANEMIA TYPE: ICD-10-CM

## 2022-04-11 PROCEDURE — 96365 THER/PROPH/DIAG IV INF INIT: CPT

## 2022-04-11 PROCEDURE — 96372 THER/PROPH/DIAG INJ SC/IM: CPT

## 2022-04-11 RX ORDER — SODIUM CHLORIDE 9 MG/ML
20 INJECTION, SOLUTION INTRAVENOUS ONCE
Status: COMPLETED | OUTPATIENT
Start: 2022-04-11 | End: 2022-04-11

## 2022-04-11 RX ORDER — CYANOCOBALAMIN 1000 UG/ML
1000 INJECTION INTRAMUSCULAR; SUBCUTANEOUS ONCE
Status: CANCELLED | OUTPATIENT
Start: 2022-04-18

## 2022-04-11 RX ORDER — CYANOCOBALAMIN 1000 UG/ML
1000 INJECTION INTRAMUSCULAR; SUBCUTANEOUS ONCE
Status: COMPLETED | OUTPATIENT
Start: 2022-04-11 | End: 2022-04-11

## 2022-04-11 RX ORDER — SODIUM CHLORIDE 9 MG/ML
20 INJECTION, SOLUTION INTRAVENOUS ONCE
Status: CANCELLED | OUTPATIENT
Start: 2022-04-18

## 2022-04-11 RX ADMIN — SODIUM CHLORIDE 20 ML/HR: 9 INJECTION, SOLUTION INTRAVENOUS at 08:49

## 2022-04-11 RX ADMIN — FERUMOXYTOL 510 MG: 510 INJECTION INTRAVENOUS at 08:50

## 2022-04-11 RX ADMIN — CYANOCOBALAMIN 1000 MCG: 1000 INJECTION INTRAMUSCULAR; SUBCUTANEOUS at 09:13

## 2022-04-18 ENCOUNTER — HOSPITAL ENCOUNTER (OUTPATIENT)
Dept: INFUSION CENTER | Facility: HOSPITAL | Age: 67
Discharge: HOME | End: 2022-04-18
Attending: INTERNAL MEDICINE
Payer: MEDICARE

## 2022-04-18 VITALS
HEART RATE: 83 BPM | RESPIRATION RATE: 20 BRPM | SYSTOLIC BLOOD PRESSURE: 113 MMHG | OXYGEN SATURATION: 100 % | DIASTOLIC BLOOD PRESSURE: 55 MMHG | TEMPERATURE: 96.8 F

## 2022-04-18 DIAGNOSIS — K91.2 POSTGASTRECTOMY MALABSORPTION: Primary | ICD-10-CM

## 2022-04-18 DIAGNOSIS — Z90.3 POSTGASTRECTOMY MALABSORPTION: Primary | ICD-10-CM

## 2022-04-18 DIAGNOSIS — D50.9 IRON DEFICIENCY ANEMIA, UNSPECIFIED IRON DEFICIENCY ANEMIA TYPE: ICD-10-CM

## 2022-04-18 PROCEDURE — 96372 THER/PROPH/DIAG INJ SC/IM: CPT

## 2022-04-18 PROCEDURE — 96365 THER/PROPH/DIAG IV INF INIT: CPT

## 2022-04-18 RX ORDER — SODIUM CHLORIDE 9 MG/ML
20 INJECTION, SOLUTION INTRAVENOUS ONCE
Status: CANCELLED | OUTPATIENT
Start: 2022-04-18

## 2022-04-18 RX ORDER — CYANOCOBALAMIN 1000 UG/ML
1000 INJECTION INTRAMUSCULAR; SUBCUTANEOUS ONCE
Status: COMPLETED | OUTPATIENT
Start: 2022-04-18 | End: 2022-04-18

## 2022-04-18 RX ORDER — CYANOCOBALAMIN 1000 UG/ML
1000 INJECTION INTRAMUSCULAR; SUBCUTANEOUS ONCE
Status: CANCELLED | OUTPATIENT
Start: 2022-04-18

## 2022-04-18 RX ORDER — SODIUM CHLORIDE 9 MG/ML
20 INJECTION, SOLUTION INTRAVENOUS ONCE
Status: COMPLETED | OUTPATIENT
Start: 2022-04-18 | End: 2022-04-18

## 2022-04-18 RX ADMIN — SODIUM CHLORIDE 20 ML/HR: 9 INJECTION, SOLUTION INTRAVENOUS at 09:05

## 2022-04-18 RX ADMIN — FERUMOXYTOL 510 MG: 510 INJECTION INTRAVENOUS at 09:05

## 2022-04-18 RX ADMIN — CYANOCOBALAMIN 1000 MCG: 1000 INJECTION INTRAMUSCULAR; SUBCUTANEOUS at 08:23

## 2022-04-20 DIAGNOSIS — E78.5 HYPERLIPIDEMIA, UNSPECIFIED HYPERLIPIDEMIA TYPE: ICD-10-CM

## 2022-04-20 RX ORDER — ATORVASTATIN CALCIUM 20 MG/1
TABLET, FILM COATED ORAL
Qty: 90 TABLET | Refills: 0 | Status: SHIPPED | OUTPATIENT
Start: 2022-04-20

## 2022-05-26 DIAGNOSIS — I48.91 ATRIAL FIBRILLATION WITH RAPID VENTRICULAR RESPONSE (HCC): ICD-10-CM

## 2022-05-31 ENCOUNTER — OFFICE VISIT (OUTPATIENT)
Dept: FAMILY MEDICINE CLINIC | Facility: CLINIC | Age: 67
End: 2022-05-31
Payer: MEDICARE

## 2022-05-31 VITALS
WEIGHT: 293 LBS | TEMPERATURE: 97.5 F | DIASTOLIC BLOOD PRESSURE: 90 MMHG | OXYGEN SATURATION: 90 % | BODY MASS INDEX: 47.09 KG/M2 | RESPIRATION RATE: 16 BRPM | HEART RATE: 99 BPM | HEIGHT: 66 IN | SYSTOLIC BLOOD PRESSURE: 160 MMHG

## 2022-05-31 DIAGNOSIS — Z93.3 COLOSTOMY STATUS (HCC): ICD-10-CM

## 2022-05-31 DIAGNOSIS — I50.32 CHRONIC DIASTOLIC HEART FAILURE (HCC): ICD-10-CM

## 2022-05-31 DIAGNOSIS — E11.9 DIABETES MELLITUS TYPE 2, DIET-CONTROLLED (HCC): ICD-10-CM

## 2022-05-31 DIAGNOSIS — I70.209 ATHEROSCLEROTIC PERIPHERAL VASCULAR DISEASE (HCC): ICD-10-CM

## 2022-05-31 DIAGNOSIS — J06.9 ACUTE URI: Primary | ICD-10-CM

## 2022-05-31 DIAGNOSIS — I48.91 ATRIAL FIBRILLATION WITH RAPID VENTRICULAR RESPONSE (HCC): ICD-10-CM

## 2022-05-31 DIAGNOSIS — F11.20 CONTINUOUS OPIOID DEPENDENCE (HCC): ICD-10-CM

## 2022-05-31 PROBLEM — J43.9 PULMONARY EMPHYSEMA, UNSPECIFIED EMPHYSEMA TYPE (HCC): Status: ACTIVE | Noted: 2022-05-31

## 2022-05-31 PROCEDURE — 87636 SARSCOV2 & INF A&B AMP PRB: CPT | Performed by: FAMILY MEDICINE

## 2022-05-31 PROCEDURE — 99213 OFFICE O/P EST LOW 20 MIN: CPT | Performed by: FAMILY MEDICINE

## 2022-05-31 RX ORDER — NYSTATIN 100000 U/G
CREAM TOPICAL
COMMUNITY
Start: 2022-03-30

## 2022-05-31 RX ORDER — BENZONATATE 200 MG/1
200 CAPSULE ORAL 3 TIMES DAILY PRN
Qty: 20 CAPSULE | Refills: 0 | Status: SHIPPED | OUTPATIENT
Start: 2022-05-31 | End: 2022-07-28

## 2022-05-31 NOTE — PROGRESS NOTES
COVID-19 Outpatient Progress Note    Assessment/Plan:    Problem List Items Addressed This Visit        Endocrine    Diabetes mellitus type 2, diet-controlled ([facility] )       Cardiovascular and Mediastinum    Atherosclerotic peripheral vascular disease ([facility] )    Atrial fibrillation with rapid ventricular response (HCC)    Chronic diastolic heart failure ([facility] )       Other    Continuous opioid dependence ([place] [address] )      Other Visit Diagnoses     Acute URI    -  Primary    Relevant Medications    benzonatate (TESSALON) 200 MG capsule    Other Relevant Orders    Covid/Flu- Office Collect    Nebulizer Supplies    Colostomy status ([place] [address] )             Disposition:     PCR testing will be performed to test for COVID-19/Influenza  I have spent 15 minutes directly with the patient  Encounter provider David Gordon MD    Provider located at [address] [id number]    Recent Visits  No visits were found meeting these conditions  Showing recent visits within past 7 days and meeting all other requirements  Today's Visits  Date Type Provider Dept   05/31/22 Office Visit David Gordon MD [facility] today's visits and meeting all other requirements  Future Appointments  No visits were found meeting these conditions  Showing future appointments within next 150 days and meeting all other requirements     Subjective:   Annette Hawkins is a 77 y o  female who is concerned about COVID-19  Patient's symptoms include fever, chills, fatigue, nasal congestion, rhinorrhea, cough and shortness of breath  Patient denies malaise, sore throat, anosmia, loss of taste, chest tightness, abdominal pain, nausea, vomiting, diarrhea, myalgias and headaches       - Date of symptom onset: 5/29/2022      COVID-19 vaccination status: Fully vaccinated (primary series)    Exposure:   Contact with a person who is under investigation (PUI) for or who is positive for COVID-19 within the last 14 days?: Yes    Hospitalized recently for fever and/or lower respiratory symptoms?: No      Currently a healthcare worker that is involved in direct patient care?: No      Works in a special setting where the risk of COVID-19 transmission may be high? (this may include long-term care, correctional and [facility] facilities; homeless shelters; assisted-living facilities and group homes ): No      Resident in a special setting where the risk of COVID-19 transmission may be high? (this may include long-term care, correctional and [facility] facilities; homeless shelters; assisted-living facilities and group homes ): No      Lab Results   Component Value Date    SARSCOV2 Negative 04/08/2021    Arin Alvarado Not Detected 08/26/2020    [facility] Not Detected 04/07/2021     Past Medical History:   Diagnosis Date    Acute pain of right knee     Ambulatory dysfunction     Anemia     Arthritis     Bleeding ulcer     Cancer ([facility] )     rectal    Chronic lower back pain     Chronic pain disorder     back pain    Colon cancer ([place] [address] ) 2018    Diabetes mellitus ([place] [address] )     Endometrial cancer ([facility] ) 2018    GERD (gastroesophageal reflux disease)     History of chemotherapy     History of MRSA infection 0719/2016    Morbid obesity ([facility] )     Morbid obesity with BMI of 45 0-49 9, adult ([facility] )     Ovarian cancer ([facility] ) 2018    Paroxysmal atrial fibrillation ([facility] )     Rectal cancer ([facility] )     S/P TAVR (transcatheter aortic valve replacement)     SOB (shortness of breath)     Spinal stenosis     Systolic murmur     Unsteady gait     uses walker    Wears dentures     Wears glasses      Past Surgical History:   Procedure Laterality Date    ABDOMINAL PERINEAL BOWEL RESECTION W/ ILEOANAL POUCH N/A 9/6/2018    Procedure: LAPAROSCOPIC HAND ASSIST ABDOMINOPERINEAL RESECTION,  POSTERIOR VAGINECTOMY, OMENTECTOMY;  Surgeon: Abigail Ruiz MD;  [facility];  Service: Colorectal    ABDOMINAL SURGERY abscess removed from abdomen and right thigh, a hole in thigh closed by plastic surgeon    ABSCESS DRAINAGE      abd, (R) leg, (L) leg     SECTION       SECTION      CYSTOSCOPY N/A 2018    Procedure: Erica Burris;  Surgeon: Blanca Leo MD;  [facility];  Service: Gynecology Oncology    ESOPHAGOGASTRODUODENOSCOPY N/A 2016    Procedure: ESOPHAGOGASTRODUODENOSCOPY (EGD); Surgeon: Darren Ramirez MD;  [facility];  Service:     ESOPHAGOGASTRODUODENOSCOPY N/A 3/30/2016    Procedure: ESOPHAGOGASTRODUODENOSCOPY (EGD); Surgeon: Darren Ramirez MD;  [facility]; Service:     EYE SURGERY      laser eye surgery    HYSTERECTOMY N/A 2018    Procedure: RADICAL HYSTERECTOMY TOTAL ABDOMINAL (ALEXANDRA)  BSO;  Surgeon: Blanca Leo MD;  [facility];  Service: Gynecology Oncology    JOINT REPLACEMENT Left 2017    hip    JOINT REPLACEMENT Right 2017    hip    OOPHORECTOMY Bilateral     TX COLONOSCOPY FLX DX W/COLLJ SPEC WHEN PFRMD N/A 3/9/2018    Procedure: COLONOSCOPY;  Surgeon: Pippa Bruner MD;  Location: [place] [address] GI LAB; Service: Gastroenterology    TX COLONOSCOPY FLX DX W/COLLJ [facility] WHEN PFRMD N/A 2018    Procedure: COLONOSCOPY;  Surgeon: Abigail Ruiz MD;  [facility]; Service: Colorectal    TX ECHO TRANSESOPHAG R-T 2D W/PRB IMG ACQUISJ I&R N/A 2020    Procedure: TRANSESOPHAGEAL ECHOCARDIOGRAM (THO); Surgeon: Juancho Sanchez DO;  [facility];  Service: Cardiac Surgery    TX ESOPHAGOGASTRODUODENOSCOPY TRANSORAL DIAGNOSTIC N/A 2018    Procedure: ESOPHAGOGASTRODUODENOSCOPY (EGD); Surgeon: Pippa Bruner MD;  Location: [facility] GI LAB;   Service: Gastroenterology    TX LAP GASTRIC BYPASS/SOLEDAD-EN-Y N/A 2016    Procedure: BYPASS GASTRIC  SOLEDAD-EN-Y LAPAROSCOPIC;  Surgeon: Darren Ramirez MD;  [facility];  Service: Bariatrics    TX LAP, SURG COLOSTOMY N/A 2018    Procedure: PERMANENT END COLOSTOMY;  Surgeon: Usama Toledo MD;  [facility];  Service: Colorectal    NE LAP, SURG PROCTECTOMY W COLOSTOMY N/A 9/6/2018    Procedure: PROCTECTOMY;  Surgeon: Usama Toledo MD;  [facility];  Service: Colorectal    NE REPLACE AORTIC VALVE OPENFEMORAL ARTERY APPROACH N/A 9/1/2020    Procedure: REPLACEMENT AORTIC VALVE TRANSCATHETER (TAVR) TRANSFEMORAL W/ 26MM WADDELL BARBARA S3 ULTRA VALVE(ACCESS ON RIGHT);   Surgeon: Yudith Barker DO;  [facility];  Service: Cardiac Surgery    REPLACEMENT TOTAL KNEE      TUBAL LIGATION       Current Outpatient Medications   Medication Sig Dispense Refill    albuterol (2 5 mg/3 mL) 0 083 % nebulizer solution TAKE 1 VIAL BY NEBULIZATION EVERY 4 HOURS AS NEEDED FOR WHEEZING OR SHORTNESS OF BREATH 225 mL 1    amiodarone 100 mg tablet Take 1 tablet (100 mg total) by mouth daily 90 tablet 3    apixaban (ELIQUIS) 5 mg Take 1 tablet (5 mg total) by mouth 2 (two) times a day 180 tablet 2    atorvastatin (LIPITOR) 20 mg tablet TAKE ONE TABLET DAILY 90 tablet 0    benzonatate (TESSALON) 200 MG capsule Take 1 capsule (200 mg total) by mouth 3 (three) times a day as needed for cough 20 capsule 0    Calcium-Vitamin D 500-125 MG-UNIT TABS Take 1 tablet by mouth 2 (two) times a day       Cyanocobalamin (B-12 PO) Take by mouth daily       ferrous sulfate 324 (65 Fe) mg Take 1 tablet (324 mg total) by mouth daily before breakfast 30 tablet 0    meclizine (ANTIVERT) 25 mg tablet Take 1 tablet (25 mg total) by mouth every 8 (eight) hours as needed for dizziness 30 tablet 0    metoprolol succinate (TOPROL-XL) 25 mg 24 hr tablet Take 1 tablet (25 mg total) by mouth daily 90 tablet 1    Multiple Vitamins-Minerals (BARIATRIC FUSION) CHEW Chew 1 tablet daily        nystatin (MYCOSTATIN) cream       omeprazole (PriLOSEC) 20 mg delayed release capsule TAKE 1 CAPSULE DAILY BEFORE BREAKFAST 90 capsule 1    oxyCODONE (ROXICODONE) 10 MG TABS Take 10 mg by mouth every 6 (six) hours as needed        potassium chloride (K-DUR,KLOR-CON) 20 mEq tablet Take 1 tablet (20 mEq total) by mouth every other day (Patient taking differently: Take 20 mEq by mouth every other day As needed) 30 tablet 0    aspirin (ECOTRIN LOW STRENGTH) 81 mg EC tablet Take 1 tablet (81 mg total) by mouth daily (Patient not taking: No sig reported)  0    ergocalciferol (VITAMIN D2) 50,000 units Take 1 capsule (50,000 Units total) by mouth 2 (two) times a week with meals (Patient not taking: No sig reported) 20 capsule 0    sucralfate (CARAFATE) 1 g tablet Take 1 tablet (1 g total) by mouth 4 (four) times a day (before meals and at bedtime) for 15 days (Patient not taking: No sig reported) 60 tablet 0    torsemide (DEMADEX) 20 mg tablet Take 0 5 tablets (10 mg total) by mouth every other day (Patient not taking: No sig reported) 30 tablet 0     No current facility-administered medications for this visit  Allergies   Allergen Reactions    Tramadol Other (See Comments)     Dizzy         Review of Systems   Constitutional: Positive for chills, fatigue and fever  HENT: Positive for congestion and rhinorrhea  Negative for sore throat  Eyes: Negative  Respiratory: Positive for cough and shortness of breath  Negative for chest tightness  Cardiovascular: Negative  Gastrointestinal: Negative  Negative for abdominal pain, diarrhea, nausea and vomiting  Endocrine: Negative  Genitourinary: Negative  Musculoskeletal: Negative  Negative for myalgias  Skin: Negative  Allergic/Immunologic: Negative  Neurological: Negative  Negative for headaches  Hematological: Negative  Psychiatric/Behavioral: Negative  Objective:    Vitals:    05/31/22 0843   BP: 160/90   Pulse: 99   Resp: 16   Temp: 97 5 °F (36 4 °C)   SpO2: 90%   Weight: 134 kg (295 lb)   Height: 5' 6" (1 676 m)       Physical Exam  Constitutional:       General: She is not in acute distress  Appearance: Normal appearance  She is normal weight  She is not ill-appearing, toxic-appearing or diaphoretic  HENT:      Head: Normocephalic and atraumatic  Right Ear: Tympanic membrane, ear canal and external ear normal  There is no impacted cerumen  Left Ear: Tympanic membrane, ear canal and external ear normal  There is no impacted cerumen  Nose: Nose normal       Mouth/Throat:      Mouth: Mucous membranes are moist       Pharynx: Oropharynx is clear  No oropharyngeal exudate or posterior oropharyngeal erythema  Eyes:      General: No scleral icterus  Right eye: No discharge  Left eye: No discharge  Extraocular Movements: Extraocular movements intact  Conjunctiva/sclera: Conjunctivae normal       Pupils: Pupils are equal, round, and reactive to light  Cardiovascular:      Rate and Rhythm: Normal rate and regular rhythm  Pulses: Normal pulses  Heart sounds: Normal heart sounds  No murmur heard  No friction rub  No gallop  Pulmonary:      Effort: Pulmonary effort is normal  No respiratory distress  Breath sounds: Normal breath sounds  No stridor  No wheezing, rhonchi or rales  Chest:      Chest wall: No tenderness  Musculoskeletal:         General: Normal range of motion  Skin:     General: Skin is warm and dry  Neurological:      General: No focal deficit present  Mental Status: She is alert and oriented to person, place, and time  VIRTUAL VISIT [address] Vladimir Ibrahim verbally agrees to participate in [employer]  Pt is aware that [employer] could be limited without vital signs or the ability to perform a full hands-on physical Pepper Irwinjulee understands she or the provider may request at any time to terminate the video visit and request the patient to seek care or treatment in person

## 2022-06-01 ENCOUNTER — TELEPHONE (OUTPATIENT)
Dept: FAMILY MEDICINE CLINIC | Facility: CLINIC | Age: 67
End: 2022-06-01

## 2022-06-01 DIAGNOSIS — U07.1 COVID-19: Primary | ICD-10-CM

## 2022-06-01 LAB
FLUAV RNA RESP QL NAA+PROBE: NEGATIVE
FLUBV RNA RESP QL NAA+PROBE: NEGATIVE
SARS-COV-2 RNA RESP QL NAA+PROBE: POSITIVE

## 2022-06-01 RX ORDER — ONDANSETRON 2 MG/ML
4 INJECTION INTRAMUSCULAR; INTRAVENOUS ONCE AS NEEDED
Status: CANCELLED | OUTPATIENT
Start: 2022-06-04

## 2022-06-01 RX ORDER — SODIUM CHLORIDE 9 MG/ML
20 INJECTION, SOLUTION INTRAVENOUS ONCE
Status: CANCELLED | OUTPATIENT
Start: 2022-06-04

## 2022-06-01 RX ORDER — ALBUTEROL SULFATE 90 UG/1
3 AEROSOL, METERED RESPIRATORY (INHALATION) ONCE AS NEEDED
Status: CANCELLED | OUTPATIENT
Start: 2022-06-04

## 2022-06-01 RX ORDER — ACETAMINOPHEN 325 MG/1
650 TABLET ORAL ONCE AS NEEDED
Status: CANCELLED | OUTPATIENT
Start: 2022-06-04

## 2022-06-01 RX ORDER — BEBTELOVIMAB 87.5 MG/ML
175 INJECTION, SOLUTION INTRAVENOUS ONCE
Status: CANCELLED | OUTPATIENT
Start: 2022-06-04

## 2022-06-01 NOTE — TELEPHONE ENCOUNTER
Patient called results hotline looking for results of Covid test given yesterday by Dr Allen Rosenbaum  Please call patient with results    Naida Contreras

## 2022-06-01 NOTE — PROGRESS NOTES
Called pt to discuss COVID-19 medications. She is on Eliquis so is not a candidate for Paxlovid. Discussed monoclonal antibodies which she is agreeable to. Symptoms began 5/29/22. Patient meets criteria for Bebtelovimab infusion. They were counseled in regards to risks, benefits, and side effects of this infusion. Gatha Daniel is an investigational medicine used to treat mild-to-moderate symptoms of COVID-19 in adults and children (15years of age and older weighing at least 80 pounds (40 kg)) with positive results of direct SARS-CoV-2 viral testing, and who are at high risk of progression to severe COVID-19, including hospitalization or death, and for whom other COVID-19 treatment options approved or authorized by FDA are not available or clinically appropriate. Bebtelovimab is investigational because it is still being studied. There is limited information about the safety and effectiveness of using bebtelovimab to treat people with mild-to-moderate COVID-19. The FDA has authorized the emergency use of bebtelovimab for the treatment of COVID-19 under an Emergency Use Authorization (EUA). Gatha Daniel is not authorized for use in people who:  - are likely to be infected with a SARS-CoV-2 variant that is not able to be treated by bebtelovimab based on the circulating variants in your area (ask your health care provider about FDA and CDC’s latest information on circulating variants by geographic area), or  - are hospitalized due to COVID-19, or  - require oxygen therapy and/or respiratory support due to COVID-19, or  - require an increase in baseline oxygen flow rate and/or respiratory support due to [address] [place] [address] and are on chronic oxygen therapy and/or respiratory support due to underlying nonCOVID-19 related comorbidity. How will I receive Bebtelovimab? Gatha [place] will be given as an injection through a vein (intravenously or IV) over at least 30 seconds.  You will be observed by your healthcare provider for at least 1 hour after you receive bebtelovimab. Possible side effects of Bebtelovimab: Allergic reactions can happen during and after infusion with bebtelovimab. Possible reactions include: fever, chills, nausea, headache, shortness of breath, low or high blood pressure, rapid or slow heart rate, chest discomfort or pain, weakness, confusion, feeling tired, wheezing, swelling of your lips, face, or throat, rash including hives, itching, muscle aches, dizziness, and sweating. These reactions may be severe or life threatening. Worsening symptoms after treatment: You may experience new or worsening symptoms after infusion, including fever, difficulty breathing, rapid or slow heart rate, tiredness, weakness or confusion. If these occur, contact your healthcare provider or seek immediate medical attention as some of these events have required hospitalization. It is unknown if these events are related to treatment or are due to the progression of COVID19. The side effects of getting any medicine by vein may include brief pain, bleeding, bruising of the skin, soreness, swelling, and possible infection at the infusion site. These are not all the possible side effects of bebtelovimab. Not a lot of people have been given bebtelovimab. Serious and unexpected side effects may happen. Bebtelovimab are still being studied so it is possible that all of the risks are not known at this time. It is possible that bebtelovimab could interfere with your body's own ability to fight off a future infection of SARS-CoV-2. Similarly, bebtelovimab may reduce your body’s immune response to a vaccine for SARS-CoV-2. Specific studies have not been conducted to address these possible risks. Talk to your healthcare provider if you have any questions. Emergency Use Authorization:    The [place] FDA has made bebtelovimab available under an emergency access mechanism called an EUA.  The EUA is supported by a  of Health and Human Service (HHS) declaration that circumstances exist to justify the emergency use of drugs and biological products during the COVID-19 pandemic. Bebtelovimab have not undergone the same type of review as an FDA-approved or cleared product. The FDA may issue an EUA when certain criteria are met, which includes that there are no adequate, approved, and available alternatives. In addition, the FDA decision is based on the totality of scientific evidence available showing that it is reasonable to believe that the product meets certain criteria for safety, performance, and labeling and may be effective in treatment of patients during the COVID-19 pandemic. All of these criteria must be met to allow for the product to be used in the treatment of patients during the COVID-19 pandemic. The EUA for bebtelovimab together is in effect for the duration of the COVID-19 declaration justifying emergency use of these products, unless terminated or revoked (after which the product may no longer be used). What if I am pregnant or breastfeeding? There is no experience treating pregnant women or breastfeeding mothers with bebtelovimab. For a mother and unborn baby, the benefit of receiving bebtelovimab may be greater than the risk from the treatment. If you are pregnant or breastfeeding, discuss your options and specific situation with your healthcare provider. How do I report side effects with Bebtelovimab? Contact your healthcare provider if you have any side effects that bother you or do not go away. Report side effects to FDA MedWatch at www.fda.gov/medwatch, or call 7-729-CCD-2870 or to [address] as shown below. Email: [email or web address]. Hang [email or web address]/   Fax number: [phone]   Telephone number: 5-009-CHRZAU37 ([phone])    Full fact sheet document for patients can be found at: [email or web address]

## 2022-06-01 NOTE — TELEPHONE ENCOUNTER
Can we please call and schedule pt for monoclonal antibody infusion  She cannot take paxlovid due to being on eliquis  Orders in chart  Please call her to let her know where to go and when  Thank you

## 2022-06-01 NOTE — TELEPHONE ENCOUNTER
Pt has changed her mind and would like the Paxlovid that she discussed at her visit yesterday with Dr Parveen Mcdowell  Would like it sent to [address] [place] [address] Street

## 2022-06-01 NOTE — TELEPHONE ENCOUNTER
Called pt to discuss positive COVID-19 result  I did discuss Paxlovid, but she declines at this time  States she feels well and does not want any medications

## 2022-06-04 ENCOUNTER — HOSPITAL ENCOUNTER (OUTPATIENT)
Dept: INFUSION CENTER | Facility: HOSPITAL | Age: 67
Discharge: HOME | End: 2022-06-04
Payer: MEDICARE

## 2022-06-04 VITALS
HEART RATE: 68 BPM | OXYGEN SATURATION: 99 % | TEMPERATURE: 97.2 F | SYSTOLIC BLOOD PRESSURE: 132 MMHG | RESPIRATION RATE: 20 BRPM | DIASTOLIC BLOOD PRESSURE: 71 MMHG

## 2022-06-04 DIAGNOSIS — U07.1 COVID-19: Primary | ICD-10-CM

## 2022-06-04 PROCEDURE — M0222 HB BEBTELOVIMAB INJECTION: HCPCS | Performed by: FAMILY MEDICINE

## 2022-06-04 RX ORDER — SODIUM CHLORIDE 9 MG/ML
20 INJECTION, SOLUTION INTRAVENOUS ONCE
Status: DISCONTINUED | OUTPATIENT
Start: 2022-06-04 | End: 2022-06-07 | Stop reason: HOSPADM

## 2022-06-04 RX ORDER — BEBTELOVIMAB 87.5 MG/ML
175 INJECTION, SOLUTION INTRAVENOUS ONCE
Status: CANCELLED | OUTPATIENT
Start: 2022-06-04

## 2022-06-04 RX ORDER — ONDANSETRON 2 MG/ML
4 INJECTION INTRAMUSCULAR; INTRAVENOUS ONCE AS NEEDED
Status: DISCONTINUED | OUTPATIENT
Start: 2022-06-04 | End: 2022-06-07 | Stop reason: HOSPADM

## 2022-06-04 RX ORDER — ACETAMINOPHEN 325 MG/1
650 TABLET ORAL ONCE AS NEEDED
Status: DISCONTINUED | OUTPATIENT
Start: 2022-06-04 | End: 2022-06-07 | Stop reason: HOSPADM

## 2022-06-04 RX ORDER — ALBUTEROL SULFATE 90 UG/1
3 AEROSOL, METERED RESPIRATORY (INHALATION) ONCE AS NEEDED
Status: DISCONTINUED | OUTPATIENT
Start: 2022-06-04 | End: 2022-06-07 | Stop reason: HOSPADM

## 2022-06-04 RX ORDER — ONDANSETRON 2 MG/ML
4 INJECTION INTRAMUSCULAR; INTRAVENOUS ONCE AS NEEDED
Status: CANCELLED | OUTPATIENT
Start: 2022-06-04

## 2022-06-04 RX ORDER — SODIUM CHLORIDE 9 MG/ML
20 INJECTION, SOLUTION INTRAVENOUS ONCE
Status: CANCELLED | OUTPATIENT
Start: 2022-06-04

## 2022-06-04 RX ORDER — ACETAMINOPHEN 325 MG/1
650 TABLET ORAL ONCE AS NEEDED
Status: CANCELLED | OUTPATIENT
Start: 2022-06-04

## 2022-06-04 RX ORDER — ALBUTEROL SULFATE 90 UG/1
3 AEROSOL, METERED RESPIRATORY (INHALATION) ONCE AS NEEDED
Status: CANCELLED | OUTPATIENT
Start: 2022-06-04

## 2022-06-04 RX ORDER — BEBTELOVIMAB 87.5 MG/ML
175 INJECTION, SOLUTION INTRAVENOUS ONCE
Status: COMPLETED | OUTPATIENT
Start: 2022-06-04 | End: 2022-06-04

## 2022-06-04 RX ADMIN — BEBTELOVIMAB 175 MG: 87.5 INJECTION, SOLUTION INTRAVENOUS at 09:34

## 2022-06-04 NOTE — PROGRESS NOTES
Pt stable at time of discharge  Vital signs within normal limits  Aware that they should have a follow up with their physician within 24 hours  Copy of discharge instructions given to pt

## 2022-06-27 DIAGNOSIS — I48.0 PAROXYSMAL ATRIAL FIBRILLATION (HCC): ICD-10-CM

## 2022-06-27 RX ORDER — AMIODARONE HYDROCHLORIDE 100 MG/1
100 TABLET ORAL DAILY
Qty: 90 TABLET | Refills: 3 | Status: SHIPPED | OUTPATIENT
Start: 2022-06-27

## 2022-07-28 ENCOUNTER — OFFICE VISIT (OUTPATIENT)
Dept: FAMILY MEDICINE CLINIC | Facility: CLINIC | Age: 67
End: 2022-07-28
Payer: MEDICARE

## 2022-07-28 VITALS
SYSTOLIC BLOOD PRESSURE: 100 MMHG | BODY MASS INDEX: 47.09 KG/M2 | DIASTOLIC BLOOD PRESSURE: 60 MMHG | TEMPERATURE: 97.3 F | RESPIRATION RATE: 20 BRPM | HEART RATE: 71 BPM | HEIGHT: 66 IN | OXYGEN SATURATION: 98 % | WEIGHT: 293 LBS

## 2022-07-28 DIAGNOSIS — D50.9 IRON DEFICIENCY ANEMIA, UNSPECIFIED IRON DEFICIENCY ANEMIA TYPE: ICD-10-CM

## 2022-07-28 DIAGNOSIS — E11.22 TYPE 2 DIABETES MELLITUS WITH CHRONIC KIDNEY DISEASE, WITHOUT LONG-TERM CURRENT USE OF INSULIN, UNSPECIFIED CKD STAGE (HCC): Primary | ICD-10-CM

## 2022-07-28 DIAGNOSIS — Z85.43 HISTORY OF OVARIAN CANCER: ICD-10-CM

## 2022-07-28 DIAGNOSIS — J43.9 PULMONARY EMPHYSEMA, UNSPECIFIED EMPHYSEMA TYPE (HCC): ICD-10-CM

## 2022-07-28 DIAGNOSIS — K91.2 POSTGASTRECTOMY MALABSORPTION: ICD-10-CM

## 2022-07-28 DIAGNOSIS — Z23 NEED FOR VACCINATION: ICD-10-CM

## 2022-07-28 DIAGNOSIS — I50.32 CHRONIC DIASTOLIC HEART FAILURE (HCC): ICD-10-CM

## 2022-07-28 DIAGNOSIS — Z11.59 NEED FOR HEPATITIS C SCREENING TEST: ICD-10-CM

## 2022-07-28 DIAGNOSIS — Z85.048 HISTORY OF RECTAL CANCER: ICD-10-CM

## 2022-07-28 DIAGNOSIS — I25.10 CORONARY ARTERY DISEASE INVOLVING NATIVE HEART WITHOUT ANGINA PECTORIS, UNSPECIFIED VESSEL OR LESION TYPE: ICD-10-CM

## 2022-07-28 DIAGNOSIS — Z78.0 POST-MENOPAUSAL: ICD-10-CM

## 2022-07-28 DIAGNOSIS — E78.5 HYPERLIPIDEMIA, UNSPECIFIED HYPERLIPIDEMIA TYPE: ICD-10-CM

## 2022-07-28 DIAGNOSIS — Z12.31 ENCOUNTER FOR SCREENING MAMMOGRAM FOR MALIGNANT NEOPLASM OF BREAST: ICD-10-CM

## 2022-07-28 DIAGNOSIS — I48.91 ATRIAL FIBRILLATION WITH RAPID VENTRICULAR RESPONSE (HCC): ICD-10-CM

## 2022-07-28 DIAGNOSIS — Z00.00 MEDICARE ANNUAL WELLNESS VISIT, SUBSEQUENT: ICD-10-CM

## 2022-07-28 DIAGNOSIS — Z95.2 S/P TAVR (TRANSCATHETER AORTIC VALVE REPLACEMENT): ICD-10-CM

## 2022-07-28 DIAGNOSIS — Z90.3 POSTGASTRECTOMY MALABSORPTION: ICD-10-CM

## 2022-07-28 PROBLEM — J18.9 COMMUNITY ACQUIRED PNEUMONIA OF RIGHT LOWER LOBE OF LUNG: Status: RESOLVED | Noted: 2021-08-19 | Resolved: 2022-07-28

## 2022-07-28 PROBLEM — Z86.39 H/O DIABETES MELLITUS: Status: RESOLVED | Noted: 2021-07-21 | Resolved: 2022-07-28

## 2022-07-28 LAB — SL AMB POCT HEMOGLOBIN AIC: 7.2 (ref ?–6.5)

## 2022-07-28 PROCEDURE — 83036 HEMOGLOBIN GLYCOSYLATED A1C: CPT | Performed by: NURSE PRACTITIONER

## 2022-07-28 PROCEDURE — 99214 OFFICE O/P EST MOD 30 MIN: CPT | Performed by: NURSE PRACTITIONER

## 2022-07-28 PROCEDURE — 90677 PCV20 VACCINE IM: CPT

## 2022-07-28 PROCEDURE — G0439 PPPS, SUBSEQ VISIT: HCPCS

## 2022-07-28 PROCEDURE — G0009 ADMIN PNEUMOCOCCAL VACCINE: HCPCS

## 2022-07-28 NOTE — PATIENT INSTRUCTIONS
Schedule follow up with cardiologist, colorectal surgeon and gastroenterologist   Will follow with oncologist    Medicare Preventive Visit Patient Instructions  Thank you for completing your Welcome to Medicare Visit or Medicare Annual Wellness Visit today  Your next wellness visit will be due in one year (7/29/2023)  The screening/preventive services that you may require over the next 5-10 years are detailed below  Some tests may not apply to you based off risk factors and/or age  Screening tests ordered at today's visit but not completed yet may show as past due  Also, please note that scanned in results may not display below  Preventive Screenings:  Service Recommendations Previous Testing/Comments   Colorectal Cancer Screening  * Colonoscopy    * Fecal Occult Blood Test (FOBT)/Fecal Immunochemical Test (FIT)  * Fecal DNA/Cologuard Test  * Flexible Sigmoidoscopy Age: 54-65 years old   Colonoscopy: every 10 years (may be performed more frequently if at higher risk)  OR  FOBT/FIT: every 1 year  OR  Cologuard: every 3 years  OR  Sigmoidoscopy: every 5 years  Screening may be recommended earlier than age 48 if at higher risk for colorectal cancer  Also, an individualized decision between you and your healthcare provider will decide whether screening between the ages of 74-80 would be appropriate  Colonoscopy: 09/05/2018  FOBT/FIT: Not on file  Cologuard: Not on file  Sigmoidoscopy: Not on file    History Colorectal Cancer     Breast Cancer Screening Age: 36 years old  Frequency: every 1-2 years  Not required if history of left and right mastectomy Mammogram: 06/05/2019        Cervical Cancer Screening Between the ages of 21-29, pap smear recommended once every 3 years  Between the ages of 33-67, can perform pap smear with HPV co-testing every 5 years     Recommendations may differ for women with a history of total hysterectomy, cervical cancer, or abnormal pap smears in past  Pap Smear: Not on file    Screening Not Indicated   Hepatitis C Screening Once for adults born between 1945 and 1965  More frequently in patients at high risk for Hepatitis C Hep C Antibody: Not on file        Diabetes Screening 1-2 times per year if you're at risk for diabetes or have pre-diabetes Fasting glucose: 137 mg/dL   A1C: 6 2 %    Screening Not Indicated  History Diabetes   Cholesterol Screening Once every 5 years if you don't have a lipid disorder  May order more often based on risk factors  Lipid panel: 07/19/2021    Screening Not Indicated  History Lipid Disorder     Other Preventive Screenings Covered by Medicare:  Abdominal Aortic Aneurysm (AAA) Screening: covered once if your at risk  You're considered to be at risk if you have a family history of AAA  Lung Cancer Screening: covers low dose CT scan once per year if you meet all of the following conditions: (1) Age 50-69; (2) No signs or symptoms of lung cancer; (3) Current smoker or have quit smoking within the last 15 years; (4) You have a tobacco smoking history of at least 30 pack years (packs per day multiplied by number of years you smoked); (5) You get a written order from a healthcare provider  Glaucoma Screening: covered annually if you're considered high risk: (1) You have diabetes OR (2) Family history of glaucoma OR (3)  aged 48 and older OR (3)  American aged 72 and older  Osteoporosis Screening: covered every 2 years if you meet one of the following conditions: (1) You're estrogen deficient and at risk for osteoporosis based off medical history and other findings; (2) Have a vertebral abnormality; (3) On glucocorticoid therapy for more than 3 months; (4) Have primary hyperparathyroidism; (5) On osteoporosis medications and need to assess response to drug therapy  Last bone density test (DXA Scan): Not on file  HIV Screening: covered annually if you're between the age of 12-76   Also covered annually if you are younger than 13 and older than 72 with risk factors for HIV infection  For pregnant patients, it is covered up to 3 times per pregnancy  Immunizations:  Immunization Recommendations   Influenza Vaccine Annual influenza vaccination during flu season is recommended for all persons aged >= 6 months who do not have contraindications   Pneumococcal Vaccine (Prevnar and Pneumovax)  * Prevnar = PCV13  * Pneumovax = PPSV23   Adults 25-60 years old: 1-3 doses may be recommended based on certain risk factors  Adults 72 years old: Prevnar (PCV13) vaccine recommended followed by Pneumovax (PPSV23) vaccine  If already received PPSV23 since turning 65, then PCV13 recommended at least one year after PPSV23 dose  Hepatitis B Vaccine 3 dose series if at intermediate or high risk (ex: diabetes, end stage renal disease, liver disease)   Tetanus (Td) Vaccine - COST NOT COVERED BY MEDICARE PART B Following completion of primary series, a booster dose should be given every 10 years to maintain immunity against tetanus  Td may also be given as tetanus wound prophylaxis  Tdap Vaccine - COST NOT COVERED BY MEDICARE PART B Recommended at least once for all adults  For pregnant patients, recommended with each pregnancy  Shingles Vaccine (Shingrix) - COST NOT COVERED BY MEDICARE PART B  2 shot series recommended in those aged 48 and above     Health Maintenance Due:      Topic Date Due    Hepatitis C Screening  Never done    Breast Cancer Screening: Mammogram  06/05/2020     Immunizations Due:      Topic Date Due    Pneumococcal Vaccine: 65+ Years (2 - PCV) 08/12/2014    COVID-19 Vaccine (3 - Booster for Pfizer series) 07/21/2021    Influenza Vaccine (1) 09/01/2022     Advance Directives   What are advance directives? Advance directives are legal documents that state your wishes and plans for medical care  These plans are made ahead of time in case you lose your ability to make decisions for yourself   Advance directives can apply to any medical decision, such as the treatments you want, and if you want to donate organs  What are the types of advance directives? There are many types of advance directives, and each state has rules about how to use them  You may choose a combination of any of the following:  Living will: This is a written record of the treatment you want  You can also choose which treatments you do not want, which to limit, and which to stop at a certain time  This includes surgery, medicine, IV fluid, and tube feedings  Durable power of  for healthcare [facility] [place]): This is a written record that states who you want to make healthcare choices for you when you are unable to make them for yourself  This person, called a proxy, is usually a family member or a friend  You may choose more than 1 proxy  Do not resuscitate (DNR) order:  A DNR order is used in case your heart stops beating or you stop breathing  It is a request not to have certain forms of treatment, such as CPR  A DNR order may be included in other types of advance directives  Medical directive: This covers the care that you want if you are in a coma, near death, or unable to make decisions for yourself  You can list the treatments you want for each condition  Treatment may include pain medicine, surgery, blood transfusions, dialysis, IV or tube feedings, and a ventilator (breathing machine)  Values history: This document has questions about your views, beliefs, and how you feel and think about life  This information can help others choose the care that you would choose  Why are advance directives important? An advance directive helps you control your care  Although spoken wishes may be used, it is better to have your wishes written down  Spoken wishes can be misunderstood, or not followed  Treatments may be given even if you do not want them  An advance directive may make it easier for your family to make difficult choices about your care     Fall Prevention    Fall prevention  includes ways to make your home and other areas safer  It also includes ways you can move more carefully to prevent a fall  Health conditions that cause changes in your blood pressure, vision, or muscle strength and coordination may increase your risk for falls  Medicines may also increase your risk for falls if they make you dizzy, weak, or sleepy  Fall prevention tips:   Stand or sit up slowly  Use assistive devices as directed  Wear shoes that fit well and have soles that   Wear a personal alarm  Stay active  Manage your medical conditions  Home Safety Tips:  Add items to prevent falls in the bathroom  Keep paths clear  Install bright lights in your home  Keep items you use often on shelves within reach  [place] or place reflective tape on the edges of your stairs  Weight Management   Why it is important to manage your weight:  Being overweight increases your risk of health conditions such as heart disease, high blood pressure, type 2 diabetes, and certain types of cancer  It can also increase your risk for osteoarthritis, sleep apnea, and other respiratory problems  Aim for a slow, steady weight loss  Even a small amount of weight loss can lower your risk of health problems  How to lose weight safely:  A safe and healthy way to lose weight is to eat fewer calories and get regular exercise  You can lose up about 1 pound a week by decreasing the number of calories you eat by 500 calories each day  Healthy meal plan for weight management:  A healthy meal plan includes a variety of foods, contains fewer calories, and helps you stay healthy  A healthy meal plan includes the following:  Eat whole-grain foods more often  A healthy meal plan should contain fiber  Fiber is the part of grains, fruits, and vegetables that is not broken down by your body  Whole-grain foods are healthy and provide extra fiber in your diet   Some examples of whole-grain foods are whole-wheat breads and pastas, oatmeal, brown rice, and bulgur  Eat a variety of vegetables every day  Include dark, leafy greens such as spinach, kale, daniela greens, and mustard greens  Eat yellow and orange vegetables such as carrots, sweet potatoes, and winter squash  Eat a variety of fruits every day  Choose fresh or canned fruit (canned in its own juice or light syrup) instead of juice  Fruit juice has very little or no fiber  Eat low-fat dairy foods  Drink fat-free (skim) milk or 1% milk  Eat fat-free yogurt and low-fat cottage cheese  Try low-fat cheeses such as mozzarella and other reduced-fat cheeses  Choose meat and other protein foods that are low in fat  Choose beans or other legumes such as split peas or lentils  Choose fish, skinless poultry (chicken or turkey), or lean cuts of red meat (beef or pork)  Before you cook meat or poultry, cut off any visible fat  Use less fat and oil  Try baking foods instead of frying them  Add less fat, such as margarine, sour cream, regular salad dressing and mayonnaise to foods  Eat fewer high-fat foods  Some examples of high-fat foods include french fries, doughnuts, ice cream, and cakes  Eat fewer sweets  Limit foods and drinks that are high in sugar  This includes candy, cookies, regular soda, and sweetened drinks  Exercise:  Exercise at least 30 minutes per day on most days of the week  Some examples of exercise include walking, biking, dancing, and swimming  You can also fit in more physical activity by taking the stairs instead of the elevator or parking farther away from stores  Ask your healthcare provider about the best exercise plan for you     Narcotic (Opioid) Safety    Use narcotics safely:  Take prescribed narcotics exactly as directed  Do not give narcotics to others or take narcotics that belong to someone else  Do not mix narcotics without medicines or alcohol  Do not drive or operate heavy machinery after you take the narcotic  Monitor for side effects and notify your healthcare provider if you experienced side effects such as nausea, sleepiness, itching, or trouble thinking clearly  Manage constipation:    Constipation is the most common side effect of narcotic medicine  Constipation is when you have hard, dry bowel movements, or you go longer than usual between bowel movements  Tell your healthcare provider about all changes in your bowel movements while you are taking narcotics  He or she may recommend laxative medicine to help you have a bowel movement  He or she may also change the kind of narcotic you are taking, or change when you take it  The following are more ways you can prevent or relieve constipation:    Drink liquids as directed  You may need to drink extra liquids to help soften and move your bowels  Ask how much liquid to drink each day and which liquids are best for you  Eat high-fiber foods  This may help decrease constipation by adding bulk to your bowel movements  High-fiber foods include fruits, vegetables, whole-grain breads and cereals, and beans  Your healthcare provider or dietitian can help you create a high-fiber meal plan  Your provider may also recommend a fiber supplement if you cannot get enough fiber from food  Exercise regularly  Regular physical activity can help stimulate your intestines  Walking is a good exercise to prevent or relieve constipation  Ask which exercises are best for you  Schedule a time each day to have a bowel movement  This may help train your body to have regular bowel movements  Bend forward while you are on the toilet to help move the bowel movement out  Sit on the toilet for at least 10 minutes, even if you do not have a bowel movement  Store narcotics safely:   Store narcotics where others cannot easily get them  Keep them in a locked cabinet or secure area  Do not  keep them in a purse or other bag you carry with you  A person may be looking for something else and find the narcotics    Make sure narcotics are stored out of the reach of children  A child can easily overdose on narcotics  Narcotics may look like candy to a small child  The best way to dispose of narcotics: The laws vary by country and area  In the [place], the best way is to return the narcotics through a take-back program  This program is offered by the [employer] ([employer])  The following are options for using the program:  Take the narcotics to a [employer] collection site  The site is often a law enforcement center  Call your local law enforcement center for scheduled take-back days in your area  You will be given information on where to go if the collection site is in a different location  Take the narcotics to an approved pharmacy or hospital   A pharmacy or hospital may be set up as a collection site  You will need to ask if it is a MICA[employer]A collection site if you were not directed there  A pharmacy or doctor's office may not be able to take back narcotics unless it is a MICAELA site  Use a mail-back system  This means you are given containers to put the narcotics into  You will then mail them in the containers  Use a take-back drop box  This is a place to leave the narcotics at any time  People and animals will not be able to get into the box  Your local law enforcement agency can tell you where to find a drop box in your area  Other ways to manage pain:   Ask your healthcare provider about non-narcotic medicines to control pain  Nonprescription medicines include NSAIDs (such as ibuprofen) and acetaminophen  Prescription medicines include muscle relaxers, antidepressants, and steroids  Pain may be managed without any medicines  Some ways to relieve pain include massage, aromatherapy, or meditation  Physical or occupational therapy may also help      For more information:   Drug Enforcement Administration  [address] [place] [address] [address]  Phone: 9- 494 - [phone]  Web Address: [employer] it  usdoj gov/drug_disposal/    [address] [facility] , [address] [place] [address]  Phone: 3- 437 - 316-4601  Web Address: http://[employer]/     © Copyright [address] 2018 Information is for End User's use only and may not be sold, redistributed or otherwise used for commercial purposes   All illustrations and images included in CareNotes® are the copyrighted property of A D A M , Inc  or [address] [place] [address]

## 2022-07-28 NOTE — PROGRESS NOTES
Assessment and Plan:     Problem List Items Addressed This Visit        Digestive    Postgastrectomy malabsorption       Endocrine    Diabetes mellitus type 2, diet-controlled ([facility] ) - Primary    Relevant Medications    metFORMIN (GLUCOPHAGE) 500 mg tablet       Respiratory    Pulmonary emphysema, unspecified emphysema type (HCC)    Relevant Orders    XR chest pa & lateral       Cardiovascular and Mediastinum    Atrial fibrillation with rapid ventricular response ([facility] )    Relevant Orders    Ambulatory Referral to Cardiology    CAD (coronary artery disease)    Relevant Orders    Ambulatory Referral to Cardiology    Chronic diastolic heart failure ([facility] )    Relevant Orders    Ambulatory Referral to Cardiology       Other    History of rectal cancer    History of ovarian cancer    Hyperlipidemia    Relevant Orders    Lipid Panel with Direct LDL reflex    S/P TAVR (transcatheter aortic valve replacement)    Relevant Orders    Ambulatory Referral to Cardiology    Iron deficiency anemia      Other Visit Diagnoses     Need for hepatitis C screening test        Relevant Orders    Hepatitis C antibody    Encounter for screening mammogram for malignant neoplasm of breast        Relevant Orders    Mammo screening bilateral w 3d & cad    Post-menopausal        Relevant Orders    DXA bone density spine hip and pelvis    Need for vaccination        Relevant Orders    Pneumococcal Conjugate Vaccine 20-valent (Pcv20) (Completed)    Medicare annual wellness visit, subsequent          1  Type 2 diabetes mellitus with chronic kidney disease, without long-term current use of insulin, unspecified CKD stage (HCC)  Comments:  will start metformin and will call for any diarrhea concerns  Orders:  -     POCT hemoglobin A1c  -     Microalbumin / creatinine urine ratio  -     Comprehensive metabolic panel; Future  -     metFORMIN (GLUCOPHAGE) 500 mg tablet;  Take 1 tablet (500 mg total) by mouth daily with breakfast  -     IRIS Diabetic eye exam  -     Ambulatory referral to Diabetic Education; Future; Expected date: 07/28/2022    2  Hyperlipidemia, unspecified hyperlipidemia type  Comments:  complaint with lipitor current dose and will check lipid profile  Orders:  -     Lipid Panel with Direct LDL reflex; Future    3  Pulmonary emphysema, unspecified emphysema type ([facility] )  Comments:  denies any sob and does not follow with pulmonologist and will follow with chest xray  Orders:  -     XR chest pa & lateral; Future; Expected date: 07/28/2022    4  Atrial fibrillation with rapid ventricular response (HCC)  Comments:  will continue with amiodarone and eliquis and will follow with cardiologist  Orders:  -     Ambulatory Referral to Cardiology; Future    5  Chronic diastolic heart failure ([facility] )  Comments:  stopped torsemide and denies any sob and edema and will follow with cardiologist  Orders:  -     Ambulatory Referral to Cardiology; Future    6  Coronary artery disease involving native heart without angina pectoris, unspecified vessel or lesion type  Comments:  complaint with statin and stopped beta blocker and wants to discuss with cardiologist befoer resuming it and at this time BP is stable  Orders:  -     Ambulatory Referral to Cardiology; Future    7  S/P TAVR (transcatheter aortic valve replacement)  -     Ambulatory Referral to Cardiology; Future    8  Need for hepatitis C screening test  -     Hepatitis C antibody; Future    9  Encounter for screening mammogram for malignant neoplasm of breast  -     Mammo screening bilateral w 3d & cad; Future; Expected date: 07/28/2022    10  Post-menopausal  -     DXA bone density spine hip and pelvis; Future; Expected date: 07/28/2022    11  Need for vaccination  -     Pneumococcal Conjugate Vaccine 20-valent (Pcv20)    12  Postgastrectomy malabsorption  Comments:  taking briratic vitamins and follows with gastro as needed    13   Iron deficiency anemia, unspecified iron deficiency anemia type  Comments:  due for blood work and will do that and managed by oncologist    14  History of rectal cancer  Comments:  will follow with colorectal surgeon and also follows with oncologist and last CT abdomen chest was in august 2021     15  History of ovarian cancer  Comments:  will follow with GYN oncologist    16  Medicare annual wellness visit, subsequent      BMI Counseling: Body mass index is 47 61 kg/m²  The BMI is above normal  Nutrition recommendations include decreasing portion sizes, encouraging healthy choices of fruits and vegetables, decreasing fast food intake, consuming healthier snacks, limiting drinks that contain sugar, moderation in carbohydrate intake, increasing intake of lean protein, reducing intake of saturated and trans fat and reducing intake of cholesterol  Exercise recommendations include exercising 3-5 times per week  Rationale for BMI follow-up plan is due to patient being overweight or obese  Depression Screening and Follow-up Plan: Patient was screened for depression during today's encounter  They screened negative with a PHQ-2 score of 0  Preventive health issues were discussed with patient, and age appropriate screening tests were ordered as noted in patient's After Visit Summary  Personalized health advice and appropriate referrals for health education or preventive services given if needed, as noted in patient's After Visit Summary  History of Present Illness:     Patient presents for a Medicare Wellness Visit    HPI   Patient is here to follow up on chronic conditions  Patient has not taken metoprolol more than 6 months ago and not sure why stopped it but denies any chest pain, palpitations and stated that her BP is been stable  Also have not taken torsemide and KCL since august 2021 and stated that does not think she needs it and denies any sob and edema  Taking amiodarone and eliquis but have not taken aspirin since august 2021 after bleeding ulcer   Patient cardiologist is in [place] and followed up with them last time in sept 2021 and would like to see cardiologist in this area Dr Cassius Hansen and referral provided and until then will continue current regimen she is taking and then will discuss with him further  HbA1c is 7 2% in office today and went up from last year and patient has gained weight too and discuss about metformin and has h/o diarrhea with it and will start on 500 mg daily and if having any issues then will call back  Due for urine micro albumin which is ordered  Due for eye exam and iris exam done in office but advised that has to go to ophthalmologist regularly for complete eye exam   as this test does not replace complete eye exam      Follows up regularly with hematologist/oncologist routinely  Did receive iron infusions couple of months ago and due for repeat blood work and will do that soon  Currently taking briaritic vitamins and feeling good  Has h/o rectal and ovarian cancer and last CT abdomen chest was done in august 2021 and managed by oncologist and next follow up scheduled in October 2022    Patient is due for mammogram and bone scan and will schedule that  Patient is due for EGD and colonoscopy and will schedule that with gastro  Patient Care Team:  Lavern Bloch as PCP - General (Family Medicine)  Sara Wang as PCP - Wound (Wound Care)  KUSHAL Novak MD (General Surgery)  Makayla Gonzalez MD (Hematology and Oncology)  Anastasiia Ayala MD (Gastroenterology)  Tess Pena MD (Colon and Rectal Surgery)  Eric Velarde MD (Radiation Oncology)  Gabino Villalobos RN as Registered Nurse (Oncology)  Jeanine Edge DO (Cardiology)  Tess Pena MD as Endoscopist     Review of Systems:     Review of Systems   HENT: Negative  Respiratory: Negative  Cardiovascular: Negative  Gastrointestinal: Negative  Genitourinary: Negative for difficulty urinating     Musculoskeletal: Positive for arthralgias  Uses cane for ambulation   Skin: Negative for rash  Neurological: Negative for dizziness, light-headedness and headaches  Psychiatric/Behavioral: Negative           Problem List:     Patient Active Problem List   Diagnosis    Morbid obesity due to excess calories (HCC)    Postgastrectomy malabsorption    S/P gastric bypass    Marginal ulcer    S/P bariatric surgery    Atherosclerotic peripheral vascular disease ([facility] )    Diabetes mellitus type 2, diet-controlled ([place] [address] )    Onychomycosis    History of rectal cancer    Colostomy care ([place] [address] )    History of ovarian cancer    Encounter for surgical aftercare following surgery of digestive system    Pyelonephritis    History of ovarian cancer    Candidal intertrigo    Vaginal bleeding    Encounter for other screening for malignant neoplasm of breast    Aortic stenosis, severe    Nonrheumatic mitral valve stenosis    Atrial fibrillation with rapid ventricular response (HCC)    Hyperlipidemia    Hypochloremia    Weight gain following gastric bypass surgery    S/P TAVR (transcatheter aortic valve replacement)    Ambulatory dysfunction    CAD (coronary artery disease)    Chronic anticoagulation    Chronic diastolic heart failure (HCC)    Osteoarthritis of right knee    GI bleed    Sepsis (HCC)    Chronic kidney disease    Iron deficiency anemia, unspecified    Iron deficiency anemia    Pulmonary emphysema, unspecified emphysema type (HCC)    Continuous opioid dependence ([place] [address] )    COVID-19      Past Medical and Surgical History:     Past Medical History:   Diagnosis Date    Acute pain of right knee     Ambulatory dysfunction     Anemia     Arthritis     Bleeding ulcer     Cancer (HCC)     rectal    Chronic lower back pain     Chronic pain disorder     back pain    Colon cancer ([facility] ) 2018    Diabetes mellitus ([facility] )     Endometrial cancer ([place] [address] ) 2018    GERD (gastroesophageal reflux disease)     History of chemotherapy     History of MRSA infection     Morbid obesity ([facility] )     Morbid obesity with BMI of 45 0-49 9, adult ([facility] )     Ovarian cancer ([facility] )     Paroxysmal atrial fibrillation (HCC)     Rectal cancer (HCC)     S/P TAVR (transcatheter aortic valve replacement)     SOB (shortness of breath)     Spinal stenosis     Systolic murmur     Unsteady gait     uses walker    Wears dentures     Wears glasses      Past Surgical History:   Procedure Laterality Date    ABDOMINAL PERINEAL BOWEL RESECTION W/ ILEOANAL POUCH N/A 2018    Procedure: LAPAROSCOPIC HAND ASSIST ABDOMINOPERINEAL RESECTION,  POSTERIOR VAGINECTOMY, OMENTECTOMY;  Surgeon: Samantha Edmondson MD;  [facility];  Service: Colorectal    ABDOMINAL SURGERY      abscess removed from abdomen and right thigh, a hole in thigh closed by plastic surgeon    ABSCESS DRAINAGE      abd, (R) leg, (L) leg     SECTION       SECTION      CYSTOSCOPY N/A 2018    Procedure: CYSTOSCOPY;  Surgeon: Ela Baumann MD;  [facility];  Service: Gynecology Oncology    ESOPHAGOGASTRODUODENOSCOPY N/A 2016    Procedure: ESOPHAGOGASTRODUODENOSCOPY (EGD); Surgeon: Binu Rowan MD;  [facility];  Service:     ESOPHAGOGASTRODUODENOSCOPY N/A 3/30/2016    Procedure: ESOPHAGOGASTRODUODENOSCOPY (EGD); Surgeon: Binu Rowan MD;  [facility]; Service:     EYE SURGERY      laser eye surgery    HYSTERECTOMY N/A 2018    Procedure: RADICAL HYSTERECTOMY TOTAL ABDOMINAL (ALEXANDRA)  BSO;  Surgeon: Ela Baumann MD;  [facility];  Service: Gynecology Oncology    JOINT REPLACEMENT Left 2017    hip    JOINT REPLACEMENT Right 2017    hip    OOPHORECTOMY Bilateral     MS COLONOSCOPY FLX DX W/COLLJ SPEC WHEN PFRMD N/A 3/9/2018    Procedure: COLONOSCOPY;  Surgeon: Brooklynn Bland MD;  Location: [place] [address] GI LAB;   Service: Gastroenterology    MS COLONOSCOPY FLX DX W/COLLJ SPEC WHEN PFRMD N/A 9/5/2018    Procedure: COLONOSCOPY;  Surgeon: Fatmata Barbosa MD;  [facility]; Service: Colorectal    AK ECHO TRANSESOPHAG R-T 2D W/PRB IMG ACQUISJ I&R N/A 9/1/2020    Procedure: TRANSESOPHAGEAL ECHOCARDIOGRAM (THO); Surgeon: Katja Gomez DO;  [facility];  Service: Cardiac Surgery    AK ESOPHAGOGASTRODUODENOSCOPY TRANSORAL DIAGNOSTIC N/A 2/2/2018    Procedure: ESOPHAGOGASTRODUODENOSCOPY (EGD); Surgeon: Quintin Rios MD;  [facility]; Service: Gastroenterology    AK LAP GASTRIC BYPASS/SOLEDAD-EN-Y N/A 7/18/2016    Procedure: BYPASS GASTRIC  SOLEDAD-EN-Y LAPAROSCOPIC;  Surgeon: Liborio Calderon MD;  [facility];  Service: Bariatrics    AK LAP, SURG COLOSTOMY N/A 9/6/2018    Procedure: PERMANENT END COLOSTOMY;  Surgeon: Fatmata Barbosa MD;  [facility];  Service: Colorectal    AK LAP, SURG PROCTECTOMY W COLOSTOMY N/A 9/6/2018    Procedure: PROCTECTOMY;  Surgeon: Ftamata Barbosa MD;  [facility];  Service: Colorectal    AK REPLACE AORTIC VALVE OPENFEMORAL ARTERY APPROACH N/A 9/1/2020    Procedure: REPLACEMENT AORTIC VALVE TRANSCATHETER (TAVR) TRANSFEMORAL W/ 26MM WADDELL BARBARA S3 ULTRA VALVE(ACCESS ON RIGHT);   Surgeon: Katja Gomez DO;  [facility];  Service: Cardiac Surgery    REPLACEMENT TOTAL KNEE      TUBAL LIGATION        Family History:     Family History   Adopted: Yes   Problem Relation Age of Onset    Leukemia Mother     Heart disease Father     Coronary artery disease Father     Diabetes Father     No Known Problems Sister     No Known Problems Brother     Diabetes Son     No Known Problems Brother     No Known Problems Brother     No Known Problems Sister     No Known Problems Sister       Social History:     Social History     Socioeconomic History    Marital status: Single     Spouse name: None    Number of children: 2    Years of education: None    Highest education level: None   Occupational History    None   Tobacco Use    Smoking status: Former Smoker     Packs/day: 1 00     Years: 25 00     Pack years: 25 00     Quit date: 3/30/2006     Years since quittin 3    Smokeless tobacco: Never Used   Vaping Use    Vaping Use: Never used   Substance and Sexual Activity    Alcohol use: Not Currently    Drug use: Not Currently     Types: Oxycodone     Comment: Percocet for lower back pain prn    Sexual activity: Not Currently   Other Topics Concern    None   Social History Narrative    None     Social Determinants of Health     Financial Resource Strain: Not on file   Food Insecurity: Not on file   Transportation Needs: Not on file   Physical Activity: Not on file   Stress: Not on file   Social Connections: Not on file   Intimate Partner Violence: Not on file   Housing Stability: Not on file      Medications and Allergies:     Current Outpatient Medications   Medication Sig Dispense Refill    albuterol (2 5 mg/3 mL) 0 083 % nebulizer solution TAKE 1 VIAL BY NEBULIZATION EVERY 4 HOURS AS NEEDED FOR WHEEZING OR SHORTNESS OF BREATH 225 mL 1    amiodarone 100 mg tablet Take 1 tablet (100 mg total) by mouth daily 90 tablet 3    apixaban (ELIQUIS) 5 mg Take 1 tablet (5 mg total) by mouth 2 (two) times a day 180 tablet 2    atorvastatin (LIPITOR) 20 mg tablet TAKE ONE TABLET DAILY 90 tablet 0    Calcium-Vitamin D 500-125 MG-UNIT TABS Take 1 tablet by mouth 2 (two) times a day       Cyanocobalamin (B-12 PO) Take by mouth daily       ergocalciferol (VITAMIN D2) 50,000 units Take 1 capsule (50,000 Units total) by mouth 2 (two) times a week with meals 20 capsule 0    ferrous sulfate 324 (65 Fe) mg Take 1 tablet (324 mg total) by mouth daily before breakfast 30 tablet 0    metFORMIN (GLUCOPHAGE) 500 mg tablet Take 1 tablet (500 mg total) by mouth daily with breakfast 90 tablet 1    Multiple Vitamins-Minerals (BARIATRIC FUSION) CHEW Chew 1 tablet daily        nystatin (MYCOSTATIN) cream       omeprazole (PriLOSEC) 20 mg delayed release capsule TAKE 1 CAPSULE DAILY BEFORE BREAKFAST 90 capsule 1    oxyCODONE (ROXICODONE) 10 MG TABS Take 10 mg by mouth every 6 (six) hours as needed        aspirin (ECOTRIN LOW STRENGTH) 81 mg EC tablet Take 1 tablet (81 mg total) by mouth daily (Patient not taking: Reported on 7/28/2022)  0    metoprolol succinate (TOPROL-XL) 25 mg 24 hr tablet Take 1 tablet (25 mg total) by mouth daily (Patient not taking: Reported on 7/28/2022) 90 tablet 1    potassium chloride (K-DUR,KLOR-CON) 20 mEq tablet Take 1 tablet (20 mEq total) by mouth every other day (Patient not taking: Reported on 7/28/2022) 30 tablet 0    torsemide (DEMADEX) 20 mg tablet Take 0 5 tablets (10 mg total) by mouth every other day (Patient not taking: Reported on 7/28/2022) 30 tablet 0     No current facility-administered medications for this visit  Allergies   Allergen Reactions    Tramadol Other (See Comments)     Dizzy        Immunizations:     Immunization History   Administered Date(s) Administered    COVID-19 PFIZER VACCINE 0 3 ML IM 01/31/2021, 02/21/2021    INFLUENZA 04/25/2018    Influenza Quadrivalent Preservative Free 3 years and older IM 04/25/2018    Influenza, seasonal, injectable 10/12/2006, 12/26/2012    Pneumococcal Conjugate Vaccine 20-valent (Pcv20), Polysace 07/28/2022    Pneumococcal Polysaccharide PPV23 08/12/2013      Health Maintenance:         Topic Date Due    Hepatitis C Screening  Never done    Breast Cancer Screening: Mammogram  06/05/2020         Topic Date Due    COVID-19 Vaccine (3 - Booster for Ervin Peter series) 07/21/2021    Influenza Vaccine (1) 09/01/2022      Medicare Screening Tests and Risk Assessments:     Francisco Dodson is here for her Subsequent Wellness visit  Health Risk Assessment:   Patient rates overall health as good  Patient feels that their physical health rating is same  Patient is very satisfied with their life  Eyesight was rated as same   Hearing was rated as same  Patient feels that their emotional and mental health rating is same  Patients states they are sometimes angry  Patient states they are sometimes unusually tired/fatigued  Pain experienced in the last 7 days has been some  Patient's pain rating has been 8/10  Patient states that she has experienced no weight loss or gain in last 6 months  Depression Screening:   PHQ-2 Score: 0      Fall Risk Screening: In the past year, patient has experienced: history of falling in past year    Number of falls: 1  Injured during fall?: No    Feels unsteady when standing or walking?: Yes    Worried about falling?: Yes      Urinary Incontinence Screening:   Patient has not leaked urine accidently in the last six months  Home Safety:  Patient does not have trouble with stairs inside or outside of their home  Patient has working smoke alarms and has working carbon monoxide detector  Home safety hazards include: none  Nutrition:   Current diet is Regular  Medications:   Patient is currently taking over-the-counter supplements  OTC medications include: see medication list  Patient is able to manage medications  Activities of Daily Living (ADLs)/Instrumental Activities of Daily Living (IADLs):   Walk and transfer into and out of bed and chair?: Yes  Dress and groom yourself?: Yes    Bathe or shower yourself?: Yes    Feed yourself?  Yes  Do your laundry/housekeeping?: Yes  Manage your money, pay your bills and track your expenses?: Yes  Make your own meals?: Yes    Do your own shopping?: Yes    Previous Hospitalizations:   Any hospitalizations or ED visits within the last 12 months?: Yes    How many hospitalizations have you had in the last year?: 1-2    Advance Care Planning:   Living will: No    Durable POA for healthcare: No    Advanced directive: No    Advanced directive counseling given: Yes    Five wishes given: Yes    Patient declined ACP directive: No      Cognitive Screening:   Provider or family/friend/caregiver concerned regarding cognition?: No    PREVENTIVE SCREENINGS      Cardiovascular Screening:    General: History Lipid Disorder and Risks and Benefits Discussed    Due for: Lipid Panel      Diabetes Screening:     General: History Diabetes, Risks and Benefits Discussed and Screening Current      Colorectal Cancer Screening:     General: History Colorectal Cancer and Risks and Benefits Discussed    Due for: Colonoscopy - High Risk      Breast Cancer Screening:     General: Risks and Benefits Discussed    Due for: Mammogram        Cervical Cancer Screening:    General: Screening Not Indicated      Osteoporosis Screening:    General: Risks and Benefits Discussed    Due for: DXA Axial      Abdominal Aortic Aneurysm (AAA) Screening:        General: Screening Not Indicated      Lung Cancer Screening:     General: Screening Not Indicated      Hepatitis C Screening:    General: Risks and Benefits Discussed    Hep C Screening Accepted: Yes      Screening, Brief Intervention, and Referral to Treatment (SBIRT)    Screening      AUDIT-C Screenin) How often did you have a drink containing alcohol in the past year? never  2) How many drinks did you have on a typical day when you were drinking in the past year? 0  3) How often did you have 6 or more drinks on one occasion in the past year? never    AUDIT-C Score: 0  Interpretation: Score 0-2 (female): Negative screen for alcohol misuse    Single Item Drug Screening:  How often have you used an illegal drug (including marijuana) or a prescription medication for non-medical reasons in the past year? never    Single Item Drug Screen Score: 0  Interpretation: Negative screen for possible drug use disorder    Brief Intervention  Alcohol & drug use screenings were reviewed  No concerns regarding substance use disorder identified       Review of Current Opioid Use    Opioid Risk Tool (ORT) Interpretation: Score 0-3: Low risk for opioid misuse    Other Counseling Topics:   Car/seat belt/driving safety, skin self-exam, sunscreen and calcium and vitamin D intake and regular weightbearing exercise  No exam data present     Physical Exam:     /60   Pulse 71   Temp (!) 97 3 °F (36 3 °C)   Resp 20   Ht 5' 6" (1 676 m)   Wt 134 kg (295 lb)   SpO2 98%   BMI 47 61 kg/m²     Physical Exam  Constitutional:       Appearance: She is well-developed  She is obese  HENT:      Head: Normocephalic  Right Ear: External ear normal       Left Ear: External ear normal       Nose: Nose normal    Eyes:      General:         Right eye: No discharge  Left eye: No discharge  Conjunctiva/sclera: Conjunctivae normal    Cardiovascular:      Rate and Rhythm: Normal rate and regular rhythm  Pulses: no weak pulses          Dorsalis pedis pulses are 1+ on the right side and 1+ on the left side  Posterior tibial pulses are 1+ on the right side and 1+ on the left side  Heart sounds: Normal heart sounds  No murmur heard  Pulmonary:      Effort: Pulmonary effort is normal       Breath sounds: Normal breath sounds  Chest:   Breasts:      Right: No supraclavicular adenopathy  Left: No supraclavicular adenopathy  Abdominal:      General: The ostomy site is clean  Bowel sounds are normal  There is no distension  Palpations: Abdomen is soft  There is no mass  Tenderness: There is no abdominal tenderness  There is no guarding or rebound  Musculoskeletal:         General: No tenderness  Normal range of motion  Cervical back: Normal range of motion and neck supple  Feet:    Feet:      Right foot:      Skin integrity: No ulcer, skin breakdown, erythema, warmth, callus or dry skin  Left foot:      Skin integrity: No ulcer, skin breakdown, erythema, warmth, callus or dry skin  Lymphadenopathy:      Cervical: No cervical adenopathy  Right cervical: No superficial or posterior cervical adenopathy       Left cervical: No superficial or posterior cervical adenopathy  Upper Body:      Right upper body: No supraclavicular adenopathy  Left upper body: No supraclavicular adenopathy  Skin:     General: Skin is warm and dry  Findings: No rash  Neurological:      Mental Status: She is alert and oriented to person, place, and time  GCS: GCS eye subscore is 4  GCS verbal subscore is 5  GCS motor subscore is 6  Sensory: No sensory deficit  Coordination: Coordination normal       Gait: Gait normal    Psychiatric:         Behavior: Behavior normal          Thought Content: Thought content normal          Judgment: Judgment normal             Patient's shoes and socks removed  Right Foot/Ankle   Right Foot Inspection  Skin Exam: skin normal and skin intact  No dry skin, no warmth, no callus, no erythema, no maceration, no abnormal color, no pre-ulcer, no ulcer and no callus  Toe Exam: ROM and strength within normal limits  No swelling, no tenderness, erythema and  no right toe deformity    Sensory   Vibration: intact  Monofilament testing: diminished    Vascular  Capillary refills: < 3 seconds  The right DP pulse is 1+  The right PT pulse is 1+  Left Foot/Ankle  Left Foot Inspection  Skin Exam: skin normal and skin intact  No dry skin, no warmth, no erythema, no maceration, normal color, no pre-ulcer, no ulcer and no callus  Toe Exam: ROM and strength within normal limits  No swelling, no tenderness, no erythema and no left toe deformity  Sensory   Vibration: intact  Monofilament testing: intact    Vascular  Capillary refills: < 3 seconds  The left DP pulse is 1+  The left PT pulse is 1+       Assign Risk Category  No deformity present  No loss of protective sensation  No weak pulses  Risk: 0        Arash Borer, CRNP

## 2022-07-28 NOTE — LETTER
July 28, 2022     Patient: Meagan Lee  YOB: 1955  Date of Visit: 7/28/2022      To Whom it May Concern:    Purnima Gore is under my professional care  Gianluca Aguero was seen in my office on 7/28/2022  It is medically necessary for patient to take briaritic vitamins secondary to post gastrectomy malabsorption  If you have any questions or concerns, please don't hesitate to call           Sincerely,          GEORGETTE Zhou        CC: No Recipients

## 2022-07-29 ENCOUNTER — APPOINTMENT (OUTPATIENT)
Dept: LAB | Facility: CLINIC | Age: 67
End: 2022-07-29
Payer: MEDICARE

## 2022-07-29 DIAGNOSIS — R91.8 RIGHT LOWER LOBE PULMONARY INFILTRATE: ICD-10-CM

## 2022-07-29 DIAGNOSIS — E78.5 HYPERLIPIDEMIA, UNSPECIFIED HYPERLIPIDEMIA TYPE: ICD-10-CM

## 2022-07-29 DIAGNOSIS — D50.9 IRON DEFICIENCY ANEMIA, UNSPECIFIED IRON DEFICIENCY ANEMIA TYPE: ICD-10-CM

## 2022-07-29 DIAGNOSIS — Z11.59 NEED FOR HEPATITIS C SCREENING TEST: ICD-10-CM

## 2022-07-29 DIAGNOSIS — E11.22 TYPE 2 DIABETES MELLITUS WITH CHRONIC KIDNEY DISEASE, WITHOUT LONG-TERM CURRENT USE OF INSULIN, UNSPECIFIED CKD STAGE (HCC): ICD-10-CM

## 2022-07-29 LAB
ALBUMIN SERPL BCP-MCNC: 3.2 G/DL (ref 3.5–5)
ALP SERPL-CCNC: 73 U/L (ref 46–116)
ALT SERPL W P-5'-P-CCNC: 17 U/L (ref 12–78)
ANION GAP SERPL CALCULATED.3IONS-SCNC: 1 MMOL/L (ref 4–13)
AST SERPL W P-5'-P-CCNC: 16 U/L (ref 5–45)
BASOPHILS # BLD AUTO: 0.03 THOUSANDS/ΜL (ref 0–0.1)
BASOPHILS NFR BLD AUTO: 1 % (ref 0–1)
BILIRUB SERPL-MCNC: 0.73 MG/DL (ref 0.2–1)
BUN SERPL-MCNC: 12 MG/DL (ref 5–25)
CALCIUM ALBUM COR SERPL-MCNC: 9.7 MG/DL (ref 8.3–10.1)
CALCIUM SERPL-MCNC: 9.1 MG/DL (ref 8.3–10.1)
CHLORIDE SERPL-SCNC: 108 MMOL/L (ref 96–108)
CHOLEST SERPL-MCNC: 135 MG/DL
CO2 SERPL-SCNC: 31 MMOL/L (ref 21–32)
CREAT SERPL-MCNC: 0.88 MG/DL (ref 0.6–1.3)
EOSINOPHIL # BLD AUTO: 0.13 THOUSAND/ΜL (ref 0–0.61)
EOSINOPHIL NFR BLD AUTO: 2 % (ref 0–6)
ERYTHROCYTE [DISTWIDTH] IN BLOOD BY AUTOMATED COUNT: 16.6 % (ref 11.6–15.1)
FERRITIN SERPL-MCNC: 20 NG/ML (ref 8–388)
GFR SERPL CREATININE-BSD FRML MDRD: 68 ML/MIN/1.73SQ M
GLUCOSE P FAST SERPL-MCNC: 128 MG/DL (ref 65–99)
HCT VFR BLD AUTO: 42.1 % (ref 34.8–46.1)
HCV AB SER QL: NORMAL
HDLC SERPL-MCNC: 45 MG/DL
HGB BLD-MCNC: 12.6 G/DL (ref 11.5–15.4)
IMM GRANULOCYTES # BLD AUTO: 0.05 THOUSAND/UL (ref 0–0.2)
IMM GRANULOCYTES NFR BLD AUTO: 1 % (ref 0–2)
IRON SATN MFR SERPL: 12 % (ref 15–50)
IRON SERPL-MCNC: 43 UG/DL (ref 50–170)
LDLC SERPL CALC-MCNC: 61 MG/DL (ref 0–100)
LYMPHOCYTES # BLD AUTO: 1.06 THOUSANDS/ΜL (ref 0.6–4.47)
LYMPHOCYTES NFR BLD AUTO: 18 % (ref 14–44)
MCH RBC QN AUTO: 23.9 PG (ref 26.8–34.3)
MCHC RBC AUTO-ENTMCNC: 29.9 G/DL (ref 31.4–37.4)
MCV RBC AUTO: 80 FL (ref 82–98)
MONOCYTES # BLD AUTO: 0.54 THOUSAND/ΜL (ref 0.17–1.22)
MONOCYTES NFR BLD AUTO: 9 % (ref 4–12)
NEUTROPHILS # BLD AUTO: 3.99 THOUSANDS/ΜL (ref 1.85–7.62)
NEUTS SEG NFR BLD AUTO: 69 % (ref 43–75)
NRBC BLD AUTO-RTO: 0 /100 WBCS
PLATELET # BLD AUTO: 153 THOUSANDS/UL (ref 149–390)
PMV BLD AUTO: 9.9 FL (ref 8.9–12.7)
POTASSIUM SERPL-SCNC: 4.2 MMOL/L (ref 3.5–5.3)
PROT SERPL-MCNC: 6.8 G/DL (ref 6.4–8.4)
RBC # BLD AUTO: 5.28 MILLION/UL (ref 3.81–5.12)
SODIUM SERPL-SCNC: 140 MMOL/L (ref 135–147)
TIBC SERPL-MCNC: 361 UG/DL (ref 250–450)
TRIGL SERPL-MCNC: 144 MG/DL
WBC # BLD AUTO: 5.8 THOUSAND/UL (ref 4.31–10.16)

## 2022-07-29 PROCEDURE — 80061 LIPID PANEL: CPT

## 2022-07-29 PROCEDURE — 85025 COMPLETE CBC W/AUTO DIFF WBC: CPT

## 2022-07-29 PROCEDURE — 86803 HEPATITIS C AB TEST: CPT

## 2022-07-29 PROCEDURE — 36415 COLL VENOUS BLD VENIPUNCTURE: CPT

## 2022-07-29 PROCEDURE — 82728 ASSAY OF FERRITIN: CPT

## 2022-07-29 PROCEDURE — 83550 IRON BINDING TEST: CPT

## 2022-07-29 PROCEDURE — 80053 COMPREHEN METABOLIC PANEL: CPT

## 2022-07-29 PROCEDURE — 83540 ASSAY OF IRON: CPT

## 2022-08-01 LAB
LEFT EYE DIABETIC RETINOPATHY: ABNORMAL
LEFT EYE IMAGE QUALITY: ABNORMAL
LEFT EYE MACULAR EDEMA: ABNORMAL
LEFT EYE OTHER RETINOPATHY: ABNORMAL
RIGHT EYE DIABETIC RETINOPATHY: ABNORMAL
RIGHT EYE IMAGE QUALITY: ABNORMAL
RIGHT EYE MACULAR EDEMA: ABNORMAL
RIGHT EYE OTHER RETINOPATHY: ABNORMAL
SEVERITY (EYE EXAM): ABNORMAL

## 2022-10-06 ENCOUNTER — TELEPHONE (OUTPATIENT)
Dept: HEMATOLOGY ONCOLOGY | Facility: CLINIC | Age: 67
End: 2022-10-06

## 2022-10-06 NOTE — TELEPHONE ENCOUNTER
Appointment Cancellation Or Reschedule     Person calling in Patient    Provider Dr Dayana Payne   Office Visit Date and Time 10/07 at [facility] Visit Location [place]   Did patient want to reschedule their office appointment? If so, when was it scheduled to? Yes, 11/23 at 9:00am    Did you have STAR scheduled for this appointment? no   Do you need STAR set up for your new appointment? If yes, please send to "PATIENT RIDESHARE" pool for STAR rescheduling no   If you are cancelling appointment, can we notify STAR to cancel ride? If yes, please send to "PATIENT RIDESHARE" pool for STAR to cancel service no   Is this patient calling to reschedule an infusion appointment? no   When is their next infusion appointment? n/a   Is this patient a Chemo patient? no   Reason for Cancellation or Reschedule Death in the family      If the patient is a treatment patient, please route this to the office nurse  If the patient is not on treatment, please route to the office MA  If the patient is a surgical oncology patient, please route to surg/onc clinical pool

## 2022-10-11 ENCOUNTER — APPOINTMENT (OUTPATIENT)
Dept: LAB | Facility: CLINIC | Age: 67
End: 2022-10-11
Payer: MEDICARE

## 2022-10-11 DIAGNOSIS — Z85.048 HISTORY OF RECTAL CANCER: ICD-10-CM

## 2022-10-11 DIAGNOSIS — D50.9 IRON DEFICIENCY ANEMIA, UNSPECIFIED IRON DEFICIENCY ANEMIA TYPE: ICD-10-CM

## 2022-10-11 LAB
ALBUMIN SERPL BCP-MCNC: 3 G/DL (ref 3.5–5)
ALP SERPL-CCNC: 71 U/L (ref 46–116)
ALT SERPL W P-5'-P-CCNC: 19 U/L (ref 12–78)
ANION GAP SERPL CALCULATED.3IONS-SCNC: 4 MMOL/L (ref 4–13)
AST SERPL W P-5'-P-CCNC: 13 U/L (ref 5–45)
BASOPHILS # BLD AUTO: 0.03 THOUSANDS/ΜL (ref 0–0.1)
BASOPHILS NFR BLD AUTO: 1 % (ref 0–1)
BILIRUB SERPL-MCNC: 0.67 MG/DL (ref 0.2–1)
BUN SERPL-MCNC: 14 MG/DL (ref 5–25)
CALCIUM ALBUM COR SERPL-MCNC: 10 MG/DL (ref 8.3–10.1)
CALCIUM SERPL-MCNC: 9.2 MG/DL (ref 8.3–10.1)
CEA SERPL-MCNC: 2 NG/ML (ref 0–3)
CHLORIDE SERPL-SCNC: 108 MMOL/L (ref 96–108)
CO2 SERPL-SCNC: 28 MMOL/L (ref 21–32)
CREAT SERPL-MCNC: 0.84 MG/DL (ref 0.6–1.3)
EOSINOPHIL # BLD AUTO: 0.13 THOUSAND/ΜL (ref 0–0.61)
EOSINOPHIL NFR BLD AUTO: 2 % (ref 0–6)
ERYTHROCYTE [DISTWIDTH] IN BLOOD BY AUTOMATED COUNT: 15.9 % (ref 11.6–15.1)
FERRITIN SERPL-MCNC: 22 NG/ML (ref 8–388)
GFR SERPL CREATININE-BSD FRML MDRD: 72 ML/MIN/1.73SQ M
GLUCOSE P FAST SERPL-MCNC: 166 MG/DL (ref 65–99)
HCT VFR BLD AUTO: 41.6 % (ref 34.8–46.1)
HGB BLD-MCNC: 12.7 G/DL (ref 11.5–15.4)
IMM GRANULOCYTES # BLD AUTO: 0.06 THOUSAND/UL (ref 0–0.2)
IMM GRANULOCYTES NFR BLD AUTO: 1 % (ref 0–2)
IRON SATN MFR SERPL: 13 % (ref 15–50)
IRON SERPL-MCNC: 49 UG/DL (ref 50–170)
LYMPHOCYTES # BLD AUTO: 1.14 THOUSANDS/ΜL (ref 0.6–4.47)
LYMPHOCYTES NFR BLD AUTO: 19 % (ref 14–44)
MCH RBC QN AUTO: 25 PG (ref 26.8–34.3)
MCHC RBC AUTO-ENTMCNC: 30.5 G/DL (ref 31.4–37.4)
MCV RBC AUTO: 82 FL (ref 82–98)
MONOCYTES # BLD AUTO: 0.59 THOUSAND/ΜL (ref 0.17–1.22)
MONOCYTES NFR BLD AUTO: 10 % (ref 4–12)
NEUTROPHILS # BLD AUTO: 4.17 THOUSANDS/ΜL (ref 1.85–7.62)
NEUTS SEG NFR BLD AUTO: 67 % (ref 43–75)
NRBC BLD AUTO-RTO: 0 /100 WBCS
PLATELET # BLD AUTO: 157 THOUSANDS/UL (ref 149–390)
PMV BLD AUTO: 10.4 FL (ref 8.9–12.7)
POTASSIUM SERPL-SCNC: 4.4 MMOL/L (ref 3.5–5.3)
PROT SERPL-MCNC: 6.8 G/DL (ref 6.4–8.4)
RBC # BLD AUTO: 5.07 MILLION/UL (ref 3.81–5.12)
SODIUM SERPL-SCNC: 140 MMOL/L (ref 135–147)
TIBC SERPL-MCNC: 381 UG/DL (ref 250–450)
WBC # BLD AUTO: 6.12 THOUSAND/UL (ref 4.31–10.16)

## 2022-10-11 PROCEDURE — 82378 CARCINOEMBRYONIC ANTIGEN: CPT

## 2022-10-11 PROCEDURE — 83540 ASSAY OF IRON: CPT

## 2022-10-11 PROCEDURE — 82728 ASSAY OF FERRITIN: CPT

## 2022-10-11 PROCEDURE — 36415 COLL VENOUS BLD VENIPUNCTURE: CPT

## 2022-10-11 PROCEDURE — 85025 COMPLETE CBC W/AUTO DIFF WBC: CPT

## 2022-10-11 PROCEDURE — 83550 IRON BINDING TEST: CPT

## 2022-10-11 PROCEDURE — 80053 COMPREHEN METABOLIC PANEL: CPT

## 2022-10-12 PROBLEM — A41.9 SEPSIS (HCC): Status: RESOLVED | Noted: 2021-08-19 | Resolved: 2022-10-12

## 2022-10-13 ENCOUNTER — CONSULT (OUTPATIENT)
Dept: CARDIOLOGY CLINIC | Facility: CLINIC | Age: 67
End: 2022-10-13
Payer: MEDICARE

## 2022-10-13 VITALS
HEIGHT: 66 IN | SYSTOLIC BLOOD PRESSURE: 128 MMHG | TEMPERATURE: 97.3 F | HEART RATE: 105 BPM | OXYGEN SATURATION: 98 % | WEIGHT: 293 LBS | DIASTOLIC BLOOD PRESSURE: 80 MMHG | BODY MASS INDEX: 47.09 KG/M2

## 2022-10-13 DIAGNOSIS — R60.0 BILATERAL LEG EDEMA: ICD-10-CM

## 2022-10-13 DIAGNOSIS — I48.91 ATRIAL FIBRILLATION WITH RAPID VENTRICULAR RESPONSE (HCC): Primary | ICD-10-CM

## 2022-10-13 DIAGNOSIS — I25.10 CORONARY ARTERY DISEASE INVOLVING NATIVE HEART WITHOUT ANGINA PECTORIS, UNSPECIFIED VESSEL OR LESION TYPE: ICD-10-CM

## 2022-10-13 DIAGNOSIS — E87.70 HYPERVOLEMIA, UNSPECIFIED HYPERVOLEMIA TYPE: ICD-10-CM

## 2022-10-13 DIAGNOSIS — Z95.2 S/P TAVR (TRANSCATHETER AORTIC VALVE REPLACEMENT): ICD-10-CM

## 2022-10-13 DIAGNOSIS — I50.32 CHRONIC DIASTOLIC HEART FAILURE (HCC): ICD-10-CM

## 2022-10-13 DIAGNOSIS — R06.02 EXERTIONAL SHORTNESS OF BREATH: ICD-10-CM

## 2022-10-13 PROCEDURE — 99215 OFFICE O/P EST HI 40 MIN: CPT | Performed by: INTERNAL MEDICINE

## 2022-10-13 PROCEDURE — 93000 ELECTROCARDIOGRAM COMPLETE: CPT | Performed by: INTERNAL MEDICINE

## 2022-10-13 PROCEDURE — 93242 EXT ECG>48HR<7D RECORDING: CPT | Performed by: INTERNAL MEDICINE

## 2022-10-13 RX ORDER — TORSEMIDE 20 MG/1
20 TABLET ORAL AS NEEDED
Qty: 30 TABLET | Refills: 1 | Status: SHIPPED | OUTPATIENT
Start: 2022-10-13

## 2022-10-13 RX ORDER — POTASSIUM CHLORIDE 20 MEQ/1
20 TABLET, EXTENDED RELEASE ORAL AS NEEDED
Qty: 30 TABLET | Refills: 0 | Status: SHIPPED | COMMUNITY
Start: 2022-10-13 | End: 2022-10-13 | Stop reason: SDUPTHER

## 2022-10-13 RX ORDER — POTASSIUM CHLORIDE 20 MEQ/1
20 TABLET, EXTENDED RELEASE ORAL AS NEEDED
Qty: 30 TABLET | Refills: 1 | Status: SHIPPED | OUTPATIENT
Start: 2022-10-13

## 2022-10-13 NOTE — PROGRESS NOTES
[facility]  [address] [facility] [address] [facility]  [address]   [place], [address]  Cardiology Consultation    Nazanin Camejo  [id number]  1955      Consult for:  AFib/establish care closer to home  Appreciate consult by: GEORGETTE Vicente    1  Atrial fibrillation with rapid ventricular response [facility])  Ambulatory Referral to Cardiology    POCT ECG    will continue with amiodarone and eliquis and will follow with cardiologist   2  Chronic diastolic heart failure [facility])  Ambulatory Referral to Cardiology    POCT ECG    stopped torsemide and denies any sob and edema and will follow with cardiologist   3  Coronary artery disease involving native heart without angina pectoris, unspecified vessel or lesion type  Ambulatory Referral to Cardiology    POCT ECG    complaint with statin and stopped beta blocker and wants to discuss with cardiologist brenda resuming it and at this time BP is stable   4  S/P TAVR (transcatheter aortic valve replacement)  Ambulatory Referral to Cardiology    POCT ECG      Discussion/Summary:   Paroxysmal Atrial fibrillation- metoprolol in morning 25mg + amiodarone 100mg in evening currently  Given the patient's age , amiodarone with long-term possible side effects discussed including hepatic, thyroid, lung, and retinal toxicity  However given the patient's prior comorbidities, there are limited options  Will have her wear a Holter monitor- if without major pauses/arrhythmias- possible weaning off of amiodarone and returning to a rate control strategy  She is currently tolerating anticoagulation  Acute on chronic diastolic heart failure/lower extremity edema- she has chronic lower extremity swelling and shortness of breath  We discussed with her the current guideline changes  She will try Jardiance 10 mg in the morning  She will continue with a low-sodium diet  She will take torsemide as needed  She understands to weigh herself daily    Inform of his she has gained 3 lb a day or 5 lb in a week  Severe aortic stenosis status post TAVR- follow-up echo 2D for valve assessment  Also with coexisting mitral stenosis    Dyslipidemia/moderate coronary artery disease- reviewed with her her prior cardiac catheterization  Her last LDL was below 70  We will continue her atorvastatin 20 mg      History of rectal cancer/ovarian cancer- stable  She has a permanent end-colostomy  Laparoscopic and assist abnormal peritoneal resection radical total abdominal hysterectomy in 2018    Pulmonary emphysema    Diabetes mellitus diet controlled    Morbid obesity status post gastric bypass    Former smoker      HPI:   80-year-old who presents to establish care near to her home  She denies having any major palpitations  She has chronic lower extremity swelling  She has had trouble with taking torsemide  She states her breathing has been stable  She has been tolerating anticoagulation  She was previously hospitalized in 2021 with an upper GI bleed  Since then she has stopped aspirin  She is out in the sun  She understands to wear sunscreen  She has had and up the oncology I visit she has a relatively sedentary lifestyle  She has chronic lower extremity swelling  She denies having shortness of breath lying down flat      Past Medical History:   Diagnosis Date   • Acute pain of right knee    • Ambulatory dysfunction    • Anemia    • Arthritis    • Bleeding ulcer    • Cancer [facility])     rectal   • Chronic lower back pain    • Chronic pain disorder     back pain   • Colon cancer ([place] [address] ) 2018   • Diabetes mellitus ([place] [address] )    • Endometrial cancer ([place] [address] ) 2018   • GERD (gastroesophageal reflux disease)    • History of chemotherapy    • History of MRSA infection 0719/2016   • Morbid obesity ([place] [address] )    • Morbid obesity with BMI of 45 0-49 9, adult ([place] [address] )    • Ovarian cancer ([place] [address] ) 2018   • Paroxysmal atrial fibrillation (HCC)    • Rectal cancer (HCC)    • S/P TAVR (transcatheter aortic valve replacement) • SOB (shortness of breath)    • Spinal stenosis    • Systolic murmur    • Unsteady gait     uses walker   • Wears dentures    • Wears glasses      Social History     Socioeconomic History   • Marital status: Single     Spouse name: Not on file   • Number of children: 2   • Years of education: Not on file   • Highest education level: Not on file   Occupational History   • Not on file   Tobacco Use   • Smoking status: Former Smoker     Packs/day: 1 00     Years: 25 00     Pack years: 25 00     Quit date: 3/30/2006     Years since quittin 5   • Smokeless tobacco: Never Used   Vaping Use   • Vaping Use: Never used   Substance and Sexual Activity   • Alcohol use: Not Currently   • Drug use: Not Currently     Types: Oxycodone     Comment: Percocet for lower back pain prn   • Sexual activity: Not Currently   Other Topics Concern   • Not on file   Social History Narrative   • Not on file     Social Determinants of Health     Financial Resource Strain: Not on file   Food Insecurity: Not on file   Transportation Needs: Not on file   Physical Activity: Not on file   Stress: Not on file   Social Connections: Not on file   Intimate Partner Violence: Not on file   Housing Stability: Not on file      Family History   Adopted: Yes   Problem Relation Age of Onset   • Leukemia Mother    • Heart disease Father    • Coronary artery disease Father    • Diabetes Father    • No Known Problems Sister    • No Known Problems Brother    • Diabetes Son    • No Known Problems Brother    • No Known Problems Brother    • No Known Problems Sister    • No Known Problems Sister      Past Surgical History:   Procedure Laterality Date   • ABDOMINAL PERINEAL BOWEL RESECTION W/ ILEOANAL POUCH N/A 2018    Procedure: LAPAROSCOPIC HAND ASSIST ABDOMINOPERINEAL RESECTION,  POSTERIOR VAGINECTOMY, OMENTECTOMY;  Surgeon: Li Magana MD;  [facility];  Service: Colorectal   • ABDOMINAL SURGERY      abscess removed from abdomen and right thigh, a hole in thigh closed by plastic surgeon   • ABSCESS DRAINAGE      abd, (R) leg, (L) leg   •  SECTION     •  SECTION     • CYSTOSCOPY N/A 2018    Procedure: Tyson Flash;  Surgeon: Maud Carrel, MD;  [facility];  Service: Gynecology Oncology   • ESOPHAGOGASTRODUODENOSCOPY N/A 2016    Procedure: ESOPHAGOGASTRODUODENOSCOPY (EGD); Surgeon: aRyne Vaca MD;  Location[facility];  Service:    • ESOPHAGOGASTRODUODENOSCOPY N/A 3/30/2016    Procedure: ESOPHAGOGASTRODUODENOSCOPY (EGD); Surgeon: Rayne Vaca MD;  Location[facility]; Service:    • EYE SURGERY      laser eye surgery   • HYSTERECTOMY N/A 2018    Procedure: RADICAL HYSTERECTOMY TOTAL ABDOMINAL (ALEXANDRA)  BSO;  Surgeon: Maud Carrel, MD;  [facility];  Service: Gynecology Oncology   • JOINT REPLACEMENT Left 2017    hip   • JOINT REPLACEMENT Right     hip   • OOPHORECTOMY Bilateral    • LA COLONOSCOPY FLX DX W/COLLJ SPEC WHEN PFRMD N/A 3/9/2018    Procedure: COLONOSCOPY;  Surgeon: Anabel Brown MD;  Location: [place] [address] GI LAB; Service: Gastroenterology   • LA COLONOSCOPY FLX DX W/COLLJ [facility] WHEN PFRMD N/A 2018    Procedure: COLONOSCOPY;  Surgeon: Rafael Buckner MD;  [facility]: [facility]; Service: Colorectal   • LA ECHO TRANSESOPHAG R-T 2D W/PRB IMG ACQUISJ I&R N/A 2020    Procedure: TRANSESOPHAGEAL ECHOCARDIOGRAM (THO); Surgeon: Foreign Vera DO;  [facility];  Service: Cardiac Surgery   • LA ESOPHAGOGASTRODUODENOSCOPY TRANSORAL DIAGNOSTIC N/A 2018    Procedure: ESOPHAGOGASTRODUODENOSCOPY (EGD); Surgeon: Anabel Brown MD;  Location: [facility] GI LAB;   Service: Gastroenterology   • LA LAP GASTRIC BYPASS/SOLEDAD-EN-Y N/A 2016    Procedure: BYPASS GASTRIC  SOLEDAD-EN-Y LAPAROSCOPIC;  Surgeon: Rayne Vaca MD;  [facility];  Service: Bariatrics   • LA LAP, SURG COLOSTOMY N/A 2018    Procedure: PERMANENT END COLOSTOMY;  Surgeon: Rafael Buckner, MD;  [facility];  Service: Colorectal   • SC LAP, SURG PROCTECTOMY W COLOSTOMY N/A 9/6/2018    Procedure: PROCTECTOMY;  Surgeon: Musa Dubose MD;  [facility];  Service: Colorectal   • SC REPLACE AORTIC VALVE OPENFEMORAL ARTERY APPROACH N/A 9/1/2020    Procedure: REPLACEMENT AORTIC VALVE TRANSCATHETER (TAVR) TRANSFEMORAL W/ 26MM WADDELL BARBARA S3 ULTRA VALVE(ACCESS ON RIGHT);   Surgeon: Chi Cabrera DO;  [facility];  Service: Cardiac Surgery   • REPLACEMENT TOTAL KNEE     • TUBAL LIGATION         Current Outpatient Medications:   •  albuterol (2 5 mg/3 mL) 0 083 % nebulizer solution, TAKE 1 VIAL BY NEBULIZATION EVERY 4 HOURS AS NEEDED FOR WHEEZING OR SHORTNESS OF BREATH, Disp: 225 mL, Rfl: 1  •  amiodarone 100 mg tablet, Take 1 tablet (100 mg total) by mouth daily, Disp: 90 tablet, Rfl: 3  •  apixaban (ELIQUIS) 5 mg, Take 1 tablet (5 mg total) by mouth 2 (two) times a day, Disp: 180 tablet, Rfl: 2  •  atorvastatin (LIPITOR) 20 mg tablet, TAKE ONE TABLET DAILY, Disp: 90 tablet, Rfl: 0  •  Calcium-Vitamin D 500-125 MG-UNIT TABS, Take 1 tablet by mouth 2 (two) times a day , Disp: , Rfl:   •  Cyanocobalamin (B-12 PO), Take by mouth daily , Disp: , Rfl:   •  metoprolol succinate (TOPROL-XL) 25 mg 24 hr tablet, Take 1 tablet (25 mg total) by mouth daily, Disp: 90 tablet, Rfl: 1  •  Multiple Vitamins-Minerals (BARIATRIC FUSION) CHEW, Chew 1 tablet daily  , Disp: , Rfl:   •  nystatin (MYCOSTATIN) cream, , Disp: , Rfl:   •  omeprazole (PriLOSEC) 20 mg delayed release capsule, TAKE 1 CAPSULE DAILY BEFORE BREAKFAST, Disp: 90 capsule, Rfl: 1  •  oxyCODONE (ROXICODONE) 10 MG TABS, Take 10 mg by mouth every 6 (six) hours as needed  , Disp: , Rfl:   •  aspirin (ECOTRIN LOW STRENGTH) 81 mg EC tablet, Take 1 tablet (81 mg total) by mouth daily (Patient not taking: No sig reported), Disp: , Rfl: 0  •  ergocalciferol (VITAMIN D2) 50,000 units, Take 1 capsule (50,000 Units total) by mouth 2 (two) times a week with meals (Patient not taking: No sig reported), Disp: 20 capsule, Rfl: 0  •  ferrous sulfate 324 (65 Fe) mg, Take 1 tablet (324 mg total) by mouth daily before breakfast, Disp: 30 tablet, Rfl: 0  •  metFORMIN (GLUCOPHAGE) 500 mg tablet, Take 1 tablet (500 mg total) by mouth daily with breakfast (Patient not taking: No sig reported), Disp: 90 tablet, Rfl: 1  •  potassium chloride (K-DUR,KLOR-CON) 20 mEq tablet, Take 1 tablet (20 mEq total) by mouth every other day (Patient not taking: No sig reported), Disp: 30 tablet, Rfl: 0  •  torsemide (DEMADEX) 20 mg tablet, Take 0 5 tablets (10 mg total) by mouth every other day (Patient not taking: No sig reported), Disp: 30 tablet, Rfl: 0  Allergies   Allergen Reactions   • Tramadol Other (See Comments)     Dizzy       Vitals:    10/13/22 0917   BP: 128/80   BP Location: Right arm   Patient Position: Sitting   Cuff Size: Large   Pulse: 105   Temp: (!) 97 3 °F (36 3 °C)   SpO2: 98%   Weight: (!) 137 kg (303 lb)   Height: 5' 6" (1 676 m)       Review of Systems:   Review of Systems   Constitutional: Positive for fatigue  Negative for activity change, appetite change, chills, diaphoresis, fever and unexpected weight change  HENT: Negative  Negative for congestion, dental problem, drooling, ear discharge, ear pain, facial swelling, hearing loss, mouth sores, nosebleeds, postnasal drip, rhinorrhea, sinus pressure, sinus pain, sneezing, sore throat, tinnitus, trouble swallowing and voice change  Eyes: Negative  Negative for photophobia, pain, redness, itching and visual disturbance  Respiratory: Negative  Negative for apnea, cough, choking, chest tightness, shortness of breath, wheezing and stridor  Cardiovascular: Positive for leg swelling  Negative for chest pain and palpitations  Gastrointestinal: Negative  Negative for abdominal distention, abdominal pain, anal bleeding, blood in stool, constipation, diarrhea, nausea, rectal pain and vomiting  Endocrine: Negative  Negative for cold intolerance, heat intolerance, polydipsia, polyphagia and polyuria  Genitourinary: Negative  Negative for decreased urine volume, difficulty urinating, dyspareunia, dysuria, enuresis, flank pain, frequency, genital sores, hematuria, menstrual problem, pelvic pain, urgency, vaginal bleeding, vaginal discharge and vaginal pain  Musculoskeletal: Negative  Negative for arthralgias, back pain, gait problem, joint swelling, myalgias, neck pain and neck stiffness  Skin: Negative  Negative for color change, pallor, rash and wound  Allergic/Immunologic: Negative  Negative for environmental allergies, food allergies and immunocompromised state  Neurological: Negative  Negative for dizziness, tremors, seizures, syncope, facial asymmetry, speech difficulty, weakness, light-headedness, numbness and headaches  Hematological: Negative  Negative for adenopathy  Does not bruise/bleed easily  Psychiatric/Behavioral: Negative  Negative for agitation, behavioral problems, confusion, decreased concentration, dysphoric mood, hallucinations, self-injury, sleep disturbance and suicidal ideas  The patient is not nervous/anxious and is not hyperactive  All other systems reviewed and are negative  Vitals:    10/13/22 0917   BP: 128/80   BP Location: Right arm   Patient Position: Sitting   Cuff Size: Large   Pulse: 105   Temp: (!) 97 3 °F (36 3 °C)   SpO2: 98%   Weight: (!) 137 kg (303 lb)   Height: 5' 6" (1 676 m)     Physical Examination:   Physical Exam  Constitutional:       General: She is not in acute distress  Appearance: She is well-developed  She is not diaphoretic  HENT:      Head: Normocephalic and atraumatic  Right Ear: External ear normal       Left Ear: External ear normal    Eyes:      General: No scleral icterus  Right eye: No discharge  Left eye: No discharge        Conjunctiva/sclera: Conjunctivae normal       Pupils: Pupils are equal, round, and reactive to light  Neck:      Thyroid: No thyromegaly  Vascular: No JVD  Trachea: No tracheal deviation  Cardiovascular:      Rate and Rhythm: Normal rate and regular rhythm  Heart sounds: Murmur heard  No friction rub  Gallop present  Pulmonary:      Effort: Pulmonary effort is normal  No respiratory distress  Breath sounds: Normal breath sounds  No stridor  No wheezing or rales  Chest:      Chest wall: No tenderness  Abdominal:      General: Bowel sounds are normal  There is distension  Palpations: Abdomen is soft  There is no mass  Tenderness: There is no abdominal tenderness  There is no guarding or rebound  Comments: Positive colostomy   Musculoskeletal:         General: Swelling present  No tenderness or deformity  Normal range of motion  Cervical back: Normal range of motion and neck supple  Skin:     General: Skin is warm and dry  Coloration: Skin is not pale  Findings: No erythema or rash  Neurological:      Mental Status: She is alert and oriented to person, place, and time  Cranial Nerves: No cranial nerve deficit  Motor: No abnormal muscle tone  Coordination: Coordination normal       Deep Tendon Reflexes: Reflexes are normal and symmetric  Reflexes normal    Psychiatric:         Behavior: Behavior normal          Thought Content:  Thought content normal          Judgment: Judgment normal          Labs:     Lab Results   Component Value Date    WBC 6 12 10/11/2022    HGB 12 7 10/11/2022    HCT 41 6 10/11/2022    MCV 82 10/11/2022    RDW 15 9 (H) 10/11/2022     10/11/2022     BMP:  Lab Results   Component Value Date    SODIUM 140 10/11/2022    K 4 4 10/11/2022     10/11/2022    CO2 28 10/11/2022    ANIONGAP 17 1 10/16/2015    BUN 14 10/11/2022    CREATININE 0 84 10/11/2022    GLUC 97 08/21/2021    GLUF 166 (H) 10/11/2022    CALCIUM 9 2 10/11/2022    CORRECTEDCA 10 0 10/11/2022    EGFR 72 10/11/2022    MG 2 4 08/24/2021     LFT:  Lab Results   Component Value Date    AST 13 10/11/2022    ALT 19 10/11/2022    ALKPHOS 71 10/11/2022    TP 6 8 10/11/2022    ALB 3 0 (L) 10/11/2022      Lab Results   Component Value Date    HKZ0LEECYLJN 1 820 08/18/2021     Lab Results   Component Value Date    HGBA1C 7 2 (A) 07/28/2022     Lipid Profile:   Lab Results   Component Value Date    CHOLESTEROL 135 07/29/2022    HDL 45 (L) 07/29/2022    LDLCALC 61 07/29/2022    TRIG 144 07/29/2022     Lab Results   Component Value Date    CHOLESTEROL 135 07/29/2022    CHOLESTEROL 216 (H) 07/19/2021     Lab Results   Component Value Date    TROPONINI <0 02 08/19/2021     Lab Results   Component Value Date    NTBNP 464 (H) 08/19/2021      Recent Results (from the past 672 hour(s))   CBC and differential    Collection Time: 10/11/22  7:58 AM   Result Value Ref Range    WBC 6 12 4 31 - 10 16 Thousand/uL    RBC 5 07 3 81 - 5 12 Million/uL    Hemoglobin 12 7 11 5 - 15 4 g/dL    Hematocrit 41 6 34 8 - 46 1 %    MCV 82 82 - 98 fL    MCH 25 0 (L) 26 8 - 34 3 pg    MCHC 30 5 (L) 31 4 - 37 4 g/dL    RDW 15 9 (H) 11 6 - 15 1 %    MPV 10 4 8 9 - 12 7 fL    Platelets 031 891 - 505 Thousands/uL    nRBC 0 /100 WBCs    Neutrophils Relative 67 43 - 75 %    Immat GRANS % 1 0 - 2 %    Lymphocytes Relative 19 14 - 44 %    Monocytes Relative 10 4 - 12 %    Eosinophils Relative 2 0 - 6 %    Basophils Relative 1 0 - 1 %    Neutrophils Absolute 4 17 1 85 - 7 62 Thousands/µL    Immature Grans Absolute 0 06 0 00 - 0 20 Thousand/uL    Lymphocytes Absolute 1 14 0 60 - 4 47 Thousands/µL    Monocytes Absolute 0 59 0 17 - 1 22 Thousand/µL    Eosinophils Absolute 0 13 0 00 - 0 61 Thousand/µL    Basophils Absolute 0 03 0 00 - 0 10 Thousands/µL   Comprehensive metabolic panel    Collection Time: 10/11/22  7:58 AM   Result Value Ref Range    Sodium 140 135 - 147 mmol/L    Potassium 4 4 3 5 - 5 3 mmol/L    Chloride 108 96 - 108 mmol/L    CO2 28 21 - 32 mmol/L ANION GAP 4 4 - 13 mmol/L    BUN 14 5 - 25 mg/dL    Creatinine 0 84 0 60 - 1 30 mg/dL    Glucose, Fasting 166 (H) 65 - 99 mg/dL    Calcium 9 2 8 3 - 10 1 mg/dL    Corrected Calcium 10 0 8 3 - 10 1 mg/dL    AST 13 5 - 45 U/L    ALT 19 12 - 78 U/L    Alkaline Phosphatase 71 46 - 116 U/L    Total Protein 6 8 6 4 - 8 4 g/dL    Albumin 3 0 (L) 3 5 - 5 0 g/dL    Total Bilirubin 0 67 0 20 - 1 00 mg/dL    eGFR 72 ml/min/1 73sq m   CEA    Collection Time: 10/11/22  7:58 AM   Result Value Ref Range    CEA 2 0 0 0 - 3 0 ng/mL   Iron Saturation %    Collection Time: 10/11/22  7:58 AM   Result Value Ref Range    Iron Saturation 13 (L) 15 - 50 %    TIBC 381 250 - 450 ug/dL    Iron 49 (L) 50 - 170 ug/dL   Ferritin    Collection Time: 10/11/22  7:58 AM   Result Value Ref Range    Ferritin 22 8 - 388 ng/mL       Imaging & Testing   I have personally reviewed pertinent reports        Cardiac Testing   Results for orders placed during the hospital encounter of 21    Echo complete with contrast if indicated    Narrative  [address]  [address] [facility] 6 [phone]    Transthoracic Echocardiogram  2D, M-mode, Doppler, and Color Doppler    Study date:  24-Aug-2021    Patient: Kenya Mcelroy  MR number: [id number]  Account number: [de-identified]  : 1955  Age: 72 years  Gender: Female  Status: Outpatient  Location: Echo lab  Height: 66 in  Weight: 275 4 lb  BP: 122/ 76 mmHg    Indications: S/P TAVR 1 Year Follow-Up    Diagnoses: Z95 2 - Presence of prosthetic heart valve    Sonographer:  Nivia Barlow RDCS  Primary Physician:  Arash Simmons MD  Referring Physician:  GEORGETTE Lozano  Group:  [facility]  Interpreting Physician:  Maria Esther Koo MD    SUMMARY    LEFT VENTRICLE:  Normal left ventricular chamber size  There is mild concentric left ventricular hypertrophy  Ejection fraction is estimated at 60%  No definite regional wall motion abnormality seen  Indeterminate diastolic function    RIGHT VENTRICLE:  Normal right ventricular size and systolic function  TAPSE is 2 3 cm    LEFT ATRIUM:  The atrium was mildly to moderately dilated  MITRAL VALVE:  There is severe mitral annular calcification  Valve leaflets are thickened with slightly decreased separation  There is mild mitral stenosis with mean gradient of 4 mm Hg  There was no significant mitral regurgitation    AORTIC VALVE:  There is a 26 mm ES 3 ultra TAVR bioprosthesis  This exhibited normal function with no paravalvular leak or valvular regurgitation  There was no significant gradient across the aortic valve    TRICUSPID VALVE:  There was mild regurgitation  Peak PA pressure is estimated at 37 mm Hg    COMPARISONS:  There has been no significant interval change  HISTORY: PRIOR HISTORY: S/P TAVR #26mm Huerta Cristian S3 Ultra Valve; Coronary Artery Disease; Nhung Valve Stenosis; A- Fib; Hyperlipidemia; Chronic Diastolic Heart Failure; Cancer; Diabetes Mellitus    PROCEDURE: The procedure was performed in the echo lab  This was a routine study  The transthoracic approach was used  The study included complete 2D imaging, M-mode, complete spectral Doppler, and color Doppler  The heart rate was 63 bpm,  at the start of the study  Echocardiographic views were limited due to decreased penetration and lung interference  This was a technically difficult study  LEFT VENTRICLE: Normal left ventricular chamber size  There is mild concentric left ventricular hypertrophy  Ejection fraction is estimated at 60%  No definite regional wall motion abnormality seen  Indeterminate diastolic function    RIGHT VENTRICLE: Normal right ventricular size and systolic function  TAPSE is 2 3 cm    LEFT ATRIUM: The atrium was mildly to moderately dilated  RIGHT ATRIUM: Size was normal     MITRAL VALVE: There is severe mitral annular calcification  Valve leaflets are thickened with slightly decreased separation   There is mild mitral stenosis with mean gradient of 4 mm Hg  There was no significant mitral regurgitation    AORTIC VALVE: There is a 26 mm ES 3 ultra TAVR bioprosthesis  This exhibited normal function with no paravalvular leak or valvular regurgitation  There was no significant gradient across the aortic valve    TRICUSPID VALVE: The valve structure was normal  There was normal leaflet separation  DOPPLER: The transtricuspid velocity was within the normal range  There was no evidence for stenosis  There was mild regurgitation  Peak PA pressure is estimated at 37 mm Hg    PULMONIC VALVE: Leaflets exhibited normal thickness, no calcification, and normal cuspal separation  DOPPLER: The transpulmonic velocity was within the normal range  There was no regurgitation  PERICARDIUM: There was no pericardial effusion  AORTA: The root exhibited normal size  SYSTEM MEASUREMENT TABLES    2D  EF (Teich): 61 62 %  %FS: 33 4 %  Ao Diam: 2 56 cm  Ao asc: 2 7 cm  EDV(Teich): 136 94 ml  ESV(Teich): 52 55 ml  IVSd: 1 08 cm  LA Area: 31 78 cm2  LA Diam: 4 2 cm  LVEDV MOD A4C: 67 17 ml  LVEF MOD A4C: 69 63 %  LVESV MOD A4C: 20 4 ml  LVIDd: 5 33 cm  LVIDs: 3 55 cm  LVLd A4C: 6 37 cm  LVLs A4C: 5 19 cm  LVOT Diam: 1 92 cm  LVPWd: 1 11 cm  RA Area: 21 64 cm2  RVIDd: 3 8 cm  SV (Teich): 84 38 ml  SV MOD A4C: 46 77 ml    CW  AV Env  Ti: 325 4 ms  AV VTI: 62 15 cm  AV Vmax: 2 61 m/s  AV Vmean: 1 91 m/s  AV maxP 25 mmHg  AV meanP 14 mmHg  MV VTI: 60 72 cm  MV Vmax: 1 48 m/s  MV Vmean: 0 9 m/s  MV maxP 82 mmHg  MV meanPG: 3 86 mmHg  TR Vmax: 2 83 m/s  TR maxP 01 mmHg    MM  TAPSE: 2 34 cm    PW  MV E/A Ratio: 0 92  CHARBEL (VTI): 1 26 cm2  CHARBEL Vmax: 1 14 cm2  AVAI (VTI): 0 cm2/m2  AVAI Vmax: 0 cm2/m2  DVI: 0 44  E' Sept: 0 07 m/s  E/E' Sept: 18  LVOT Env  Ti: 361 56 ms  LVOT VTI: 27 16 cm  LVOT Vmax: 1 04 m/s  LVOT Vmean: 0 75 m/s  LVOT maxP 28 mmHg  LVOT meanP 52 mmHg  LVSI Dopp: 34 21 ml/m2  LVSV Dopp: 78 34 ml  MV A Noman: 1 46 m/s  MV Dec [place]: 3 03 m/s2  MV DecT: 444 14 ms  MV E Noman: 1 34 m/s  MV PHT: 128 8 ms  MVA (VTI): 1 29 cm2  MVA By PHT: 1 71 cm2    [address] Accredited Echocardiography Laboratory    Prepared and electronically signed by    Sarita Gallegos MD  Signed 24-Aug-2021 15:17:17    --TTE 2020  EF 60%  No RWMA  Mild mitral stenosis  KHADIJAH, L > R  There was a 26 mm TAVR bioprosthesis present exhibited normal function  There was no significant paravalvular leak  Mean gradient 10, peak gradient 18  Aortic valve area 1 8 centimeter squared by the continuity equation  Mild TR     --cardiac catheterization 20     --  The coronary circulation is right dominant  --  Left main: The vessel was large sized  Angiography showed mild atherosclerosis  --  LAD: The vessel was medium to large sized and moderately tortuous  Angiography showed moderate atherosclerosis  --  Circumflex: The vessel was medium to large sized  --  Ostial circumflex: There was a 50 % stenosis  --  Distal circumflex: There was a tubular 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  --  RCA: The vessel was large sized (dominant) and moderately calcified  --  Mid RCA: There was a 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  -TTE 20  EF 60  Grade 1 diastolic dysfunction  Mod LAE  Mild mitral stenosis  A 26mm ES 3 ULTRA TAVR bioprosthesis was present  It exhibited normal function  No valvular or paravalvular leak  peak gradient was 22 mmHg; mean 10 mmHg  Aortic valve area was 1 5 cmï¾² by the continuity equation  Left atrium moderately dilated         EKG: Personally reviewed  Normal sinus rhythm cannot rule out prior inferior infarct    Lisa Kimble MD [facility]  [phone]  Please call with any questions or suggestions    Counseling :  A description of the counselin minutes spent reviewing her prior records with the patient including prior cardiac catheterization      Goals and Barriers:  Patient's ability to self care:  Medication side effect reviewed with patient in detail and all their questions answered  "Portions of the record may have been created with voice recognition software  Occasional wrong word or "sound a like" substitutions may have occurred due to the inherent limitations of voice recognition software  Read the chart carefully and recognize, using context, where substitutions have occurred   Please call if you have any questions  "

## 2022-10-13 NOTE — PROGRESS NOTES
Applied the [employer] Heart Monitor to patient to be worn for 3 days  Instructions reviewed with patient  Pt verbalized  understanding

## 2022-10-21 DIAGNOSIS — E78.5 HYPERLIPIDEMIA, UNSPECIFIED HYPERLIPIDEMIA TYPE: ICD-10-CM

## 2022-10-21 RX ORDER — ATORVASTATIN CALCIUM 20 MG/1
TABLET, FILM COATED ORAL
Qty: 90 TABLET | Refills: 0 | Status: SHIPPED | OUTPATIENT
Start: 2022-10-21

## 2022-10-24 ENCOUNTER — CLINICAL SUPPORT (OUTPATIENT)
Dept: CARDIOLOGY CLINIC | Facility: CLINIC | Age: 67
End: 2022-10-24
Payer: MEDICARE

## 2022-10-24 DIAGNOSIS — R60.0 BILATERAL LEG EDEMA: ICD-10-CM

## 2022-10-24 DIAGNOSIS — Z95.2 S/P TAVR (TRANSCATHETER AORTIC VALVE REPLACEMENT): ICD-10-CM

## 2022-10-24 DIAGNOSIS — I25.10 CORONARY ARTERY DISEASE INVOLVING NATIVE HEART WITHOUT ANGINA PECTORIS, UNSPECIFIED VESSEL OR LESION TYPE: ICD-10-CM

## 2022-10-24 DIAGNOSIS — I48.91 ATRIAL FIBRILLATION WITH RAPID VENTRICULAR RESPONSE (HCC): ICD-10-CM

## 2022-10-24 DIAGNOSIS — R06.02 EXERTIONAL SHORTNESS OF BREATH: ICD-10-CM

## 2022-10-24 PROCEDURE — 93244 EXT ECG>48HR<7D REV&INTERPJ: CPT | Performed by: INTERNAL MEDICINE

## 2022-11-07 DIAGNOSIS — Z98.84 S/P BARIATRIC SURGERY: ICD-10-CM

## 2022-11-07 DIAGNOSIS — K21.9 GASTROESOPHAGEAL REFLUX DISEASE, UNSPECIFIED WHETHER ESOPHAGITIS PRESENT: ICD-10-CM

## 2022-11-07 DIAGNOSIS — K28.9 MARGINAL ULCER: ICD-10-CM

## 2022-11-07 RX ORDER — OMEPRAZOLE 20 MG/1
CAPSULE, DELAYED RELEASE ORAL
Qty: 90 CAPSULE | Refills: 1 | Status: SHIPPED | OUTPATIENT
Start: 2022-11-07

## 2022-11-23 ENCOUNTER — OFFICE VISIT (OUTPATIENT)
Dept: HEMATOLOGY ONCOLOGY | Facility: MEDICAL CENTER | Age: 67
End: 2022-11-23

## 2022-11-23 VITALS
OXYGEN SATURATION: 94 % | WEIGHT: 293 LBS | SYSTOLIC BLOOD PRESSURE: 130 MMHG | BODY MASS INDEX: 47.09 KG/M2 | DIASTOLIC BLOOD PRESSURE: 75 MMHG | HEART RATE: 80 BPM | TEMPERATURE: 97.6 F | HEIGHT: 66 IN | RESPIRATION RATE: 20 BRPM

## 2022-11-23 DIAGNOSIS — Z85.048 HISTORY OF RECTAL CANCER: ICD-10-CM

## 2022-11-23 DIAGNOSIS — Z90.3 POSTGASTRECTOMY MALABSORPTION: Primary | ICD-10-CM

## 2022-11-23 DIAGNOSIS — E66.01 MORBID OBESITY DUE TO EXCESS CALORIES (HCC): ICD-10-CM

## 2022-11-23 DIAGNOSIS — K91.2 POSTGASTRECTOMY MALABSORPTION: Primary | ICD-10-CM

## 2022-11-23 NOTE — PROGRESS NOTES
Wimauma Plate  1955  [facility] CHRISTOPHER [facility] [id number]    DISCUSSION/SUMMARY:    71-year-old female previously found to have high-grade dysplasia/intramucosal lesion arising in a tubulovillous adenoma  Issues:    Anemia - resolved  Prior workup was consistent with iron deficiency and patient received IV iron  Mrs Dari Russ feels better; iron studies and CBC parameters are better  Patient will continue with her bariatric vitamins and minerals including iron  We rediscussed what to monitor for as far as progressive fatigue, pallor, GI bleeding, decreased activities etc  Iron studies will be rechecked on the next blood draw  Rectal cancer  Final stage was IIA (ypT3 pN0 G2)  Patient received neoadjuvant concurrent treatment and then underwent resection  Mrs Dari Russ had postoperative complications with wound healing and fistula formation - eventually resolved  From a medical oncology standpoint, Mrs Dari Russ continues to do well; clinically there are no concerning signs  Plan is to continue with surveillance  As above, patient will undergo repeat blood work including CEA and CT scans in 6 months  Ovarian cancer, IIIC  As discussed previously, at the time of surgery, patient was found to have an incidental low-grade serous ovarian carcinoma with omental nodules greater than 2 cm grossly visible  Patient is followed by GYN Oncology and is on anastrozole (NCCN 2 [id number] low-grade serous, stage II-IV tumors, treatment options include primary hormonal therapy (category 2B)  Patient will follow up with gyn Oncology as directed  Mrs Reardon knows to call if she has any other oncology questions or concerns  Carefully review your medication list and verify that the list is accurate and up-to-date   Please call the hematology/oncology office if there are medications missing from the list, medications on the list that you are not currently taking or if there is a dosage or instruction that is different from how you're taking that medication  Patient goals and areas of care:  Continue rectal cancer surveillance  Barriers to care:  None  Patient is able to self-care   ___________________________________________________________________________________________________    Chief Complaint   Patient presents with   • Follow-up   • History of rectal cancer on surveillance     History of Present Illness:    59-year-old female previously referred for the above  Previously Mrs Susana Gongora had gastric bypass  Patient was seen by GI (late summer 2018) because of blood in the stools  Additional workup included a colonoscopy which demonstrated a rectal lesion  Patient recently completed concurrent chemotherapy (Xeloda) with radiation  Mrs Kamilah Moody subsequently went onto an APR  Final pathology results are listed below; patient was also found to have IIIC low-grade serous ovarian carcinoma  Postoperatively, patient had issues with slow wound closure  Since completion of treatment, patient has been on surveillance  Mrs Kamilah Moody was found to b anemic early 2022  Workup was consistent with iron deficiency  Patient was treated with IV iron - good response  Patient has been more diligent with her bariatric vitamins and minerals  Mrs Kamilah Moody states feeling well, baseline  Patient continues to be active, went to [facility] few weeks ago  No shortness of breath or dyspnea on exertion  No obvious GI bleeding, no problems with the colostomy, no other GI problems  No pain control issues  Patient walks with a cane, same as before  During the APR, patient was found to have incidental stage IIIC low-grade serous ovarian cancer (gyn oncology was called into the OR)  Patient is followed by Dr Alivia Byrd and is on Arimidex  No problems with the Arimidex  Review of Systems   Constitutional: Positive for fatigue  HENT: Negative      Eyes: Negative  Respiratory: Negative  Cardiovascular: Negative  Gastrointestinal: Negative for blood in stool, constipation and diarrhea  Endocrine: Negative  Genitourinary: Negative  Musculoskeletal: Negative for arthralgias  Skin: Negative  Allergic/Immunologic: Negative  Neurological: Negative  Hematological: Negative  Psychiatric/Behavioral: Negative  All other systems reviewed and are negative       Patient Active Problem List   Diagnosis   • Morbid obesity due to excess calories (HCC)   • Postgastrectomy malabsorption   • S/P gastric bypass   • Marginal ulcer   • S/P bariatric surgery   • Atherosclerotic peripheral vascular disease ([place] [address] )   • Diabetes mellitus type 2, diet-controlled ([place] [address] )   • Onychomycosis   • History of rectal cancer   • Colostomy care [facility])   • History of ovarian cancer   • Encounter for surgical aftercare following surgery of digestive system   • Pyelonephritis   • History of ovarian cancer   • Candidal intertrigo   • Vaginal bleeding   • Encounter for other screening for malignant neoplasm of breast   • Aortic stenosis, severe   • Nonrheumatic mitral valve stenosis   • Atrial fibrillation with rapid ventricular response ([facility])   • Hyperlipidemia   • Hypochloremia   • Weight gain following gastric bypass surgery   • S/P TAVR (transcatheter aortic valve replacement)   • Ambulatory dysfunction   • CAD (coronary artery disease)   • Chronic anticoagulation   • Chronic diastolic heart failure ([facility])   • Osteoarthritis of right knee   • GI bleed   • Chronic kidney disease   • Iron deficiency anemia, unspecified   • Iron deficiency anemia   • Pulmonary emphysema, unspecified emphysema type ([place] [address] )   • Continuous opioid dependence ([place] [address] )   • COVID-19     Past Medical History:   Diagnosis Date   • Acute pain of right knee    • Ambulatory dysfunction    • Anemia    • Arthritis    • Bleeding ulcer    • Cancer (HCC)     rectal   • Chronic lower back pain    • Chronic pain disorder     back pain   • Colon cancer ([place] [address] ) 2018   • Diabetes mellitus ([place] [address] )    • Endometrial cancer ([place] [address] ) 2018   • GERD (gastroesophageal reflux disease)    • History of chemotherapy    • History of MRSA infection    • Morbid obesity ([place] [address] )    • Morbid obesity with BMI of 45 0-49 9, adult ([place] [address] )    • Ovarian cancer ([place] [address] ) 2018   • Paroxysmal atrial fibrillation (HCC)    • Rectal cancer (HCC)    • S/P TAVR (transcatheter aortic valve replacement)    • SOB (shortness of breath)    • Spinal stenosis    • Systolic murmur    • Unsteady gait     uses walker   • Wears dentures    • Wears glasses      Ob/gyn:  No recent mammogram -patient's choice, no post menopausal bleeding    Past Surgical History:   Procedure Laterality Date   • ABDOMINAL PERINEAL BOWEL RESECTION W/ ILEOANAL POUCH N/A 2018    Procedure: LAPAROSCOPIC HAND ASSIST ABDOMINOPERINEAL RESECTION,  POSTERIOR VAGINECTOMY, OMENTECTOMY;  Surgeon: Eran Cooper MD;  [facility];  Service: Colorectal   • ABDOMINAL SURGERY      abscess removed from abdomen and right thigh, a hole in thigh closed by plastic surgeon   • ABSCESS DRAINAGE      abd, (R) leg, (L) leg   •  SECTION     •  SECTION     • CYSTOSCOPY N/A 2018    Procedure: CYSTOSCOPY;  Surgeon: Alex Ramesh MD;  [facility];  Service: Gynecology Oncology   • ESOPHAGOGASTRODUODENOSCOPY N/A 2016    Procedure: ESOPHAGOGASTRODUODENOSCOPY (EGD); Surgeon: Hayes Doss MD;  [facility];  Service:    • ESOPHAGOGASTRODUODENOSCOPY N/A 3/30/2016    Procedure: ESOPHAGOGASTRODUODENOSCOPY (EGD); Surgeon: Hayes Doss MD;  [facility]; Service:    • EYE SURGERY      laser eye surgery   • HYSTERECTOMY N/A 2018    Procedure: RADICAL HYSTERECTOMY TOTAL ABDOMINAL (ALEXANDRA)   BSO;  Surgeon: Alex Ramesh MD;  [facility];  Service: Gynecology Oncology   • JOINT REPLACEMENT Left 2017    hip   • JOINT REPLACEMENT Right 2017    hip • OOPHORECTOMY Bilateral    • FL COLONOSCOPY FLX DX W/COLLJ SPEC WHEN PFRMD N/A 3/9/2018    Procedure: COLONOSCOPY;  Surgeon: Cynthia Wynn MD;  Location: [place] [address] GI LAB; Service: Gastroenterology   • FL COLONOSCOPY FLX DX W/COLLJ [facility] WHEN PFRMD N/A 9/5/2018    Procedure: COLONOSCOPY;  Surgeon: Barbara Starkey MD;  [facility]; Service: Colorectal   • FL ECHO TRANSESOPHAG R-T 2D W/PRB IMG ACQUISJ I&R N/A 9/1/2020    Procedure: TRANSESOPHAGEAL ECHOCARDIOGRAM (THO); Surgeon: Liz Marino DO;  [facility];  Service: Cardiac Surgery   • FL ESOPHAGOGASTRODUODENOSCOPY TRANSORAL DIAGNOSTIC N/A 2/2/2018    Procedure: ESOPHAGOGASTRODUODENOSCOPY (EGD); Surgeon: Cynthia Wynn MD;  Location: [facility] GI LAB; Service: Gastroenterology   • FL LAP GASTRIC BYPASS/SOLEDAD-EN-Y N/A 7/18/2016    Procedure: BYPASS GASTRIC  SOLEDAD-EN-Y LAPAROSCOPIC;  Surgeon: Dee Crowe MD;  [facility];  Service: Bariatrics   • FL LAP, SURG COLOSTOMY N/A 9/6/2018    Procedure: PERMANENT END COLOSTOMY;  Surgeon: Barbara Starkey MD;  [facility];  Service: Colorectal   • FL LAP, SURG PROCTECTOMY W COLOSTOMY N/A 9/6/2018    Procedure: PROCTECTOMY;  Surgeon: Barbara Starkey MD;  [facility];  Service: Colorectal   • FL REPLACE AORTIC VALVE OPENFEMORAL ARTERY APPROACH N/A 9/1/2020    Procedure: REPLACEMENT AORTIC VALVE TRANSCATHETER (TAVR) TRANSFEMORAL W/ 26MM WADDELL BARBARA S3 ULTRA VALVE(ACCESS ON RIGHT);   Surgeon: Liz Marino DO;  [facility];  Service: Cardiac Surgery   • REPLACEMENT TOTAL KNEE     • TUBAL LIGATION       Family History   Adopted: Yes   Problem Relation Age of Onset   • Leukemia Mother    • Heart disease Father    • Coronary artery disease Father    • Diabetes Father    • No Known Problems Sister    • No Known Problems Brother    • Diabetes Son    • No Known Problems Brother    • No Known Problems Brother    • No Known Problems Sister    • No Known Problems Sister     Family history:   Mother  of leukemia (NOS), father  from heart disease (NOS), 2 children 1 with diabetes, no known familial or genetic diseases, no family history of GI malignancies    Social History     Socioeconomic History   • Marital status: Single     Spouse name: Not on file   • Number of children: 2   • Years of education: Not on file   • Highest education level: Not on file   Occupational History   • Not on file   Tobacco Use   • Smoking status: Former     Packs/day: 1 00     Years: 25 00     Pack years: 25 00     Types: Cigarettes     Quit date: 3/30/2006     Years since quittin 6   • Smokeless tobacco: Never   Vaping Use   • Vaping Use: Never used   Substance and Sexual Activity   • Alcohol use: Not Currently   • Drug use: Not Currently     Types: Oxycodone     Comment: Percocet for lower back pain prn   • Sexual activity: Not Currently   Other Topics Concern   • Not on file   Social History Narrative   • Not on file     Social Determinants of Health     Financial Resource Strain: Not on file   Food Insecurity: Not on file   Transportation Needs: Not on file   Physical Activity: Not on file   Stress: Not on file   Social Connections: Not on file   Intimate Partner Violence: Not on file   Housing Stability: Not on file       Current Outpatient Medications:   •  albuterol (2 5 mg/3 mL) 0 083 % nebulizer solution, TAKE 1 VIAL BY NEBULIZATION EVERY 4 HOURS AS NEEDED FOR WHEEZING OR SHORTNESS OF BREATH, Disp: 225 mL, Rfl: 1  •  amiodarone 100 mg tablet, Take 1 tablet (100 mg total) by mouth daily, Disp: 90 tablet, Rfl: 3  •  apixaban (ELIQUIS) 5 mg, Take 1 tablet (5 mg total) by mouth 2 (two) times a day, Disp: 180 tablet, Rfl: 2  •  atorvastatin (LIPITOR) 20 mg tablet, TAKE ONE TABLET DAILY, Disp: 90 tablet, Rfl: 0  •  Calcium-Vitamin D 500-125 MG-UNIT TABS, Take 1 tablet by mouth 2 (two) times a day , Disp: , Rfl:   •  Cyanocobalamin (B-12 PO), Take by mouth daily , Disp: , Rfl:   • Empagliflozin (Jardiance) 10 MG TABS tablet, Take 1 tablet (10 mg total) by mouth every morning, Disp: 90 tablet, Rfl: 1  •  metoprolol succinate (TOPROL-XL) 25 mg 24 hr tablet, Take 1 tablet (25 mg total) by mouth daily, Disp: 90 tablet, Rfl: 1  •  Multiple Vitamins-Minerals (BARIATRIC FUSION) CHEW, Chew 1 tablet daily  , Disp: , Rfl:   •  nystatin (MYCOSTATIN) cream, , Disp: , Rfl:   •  omeprazole (PriLOSEC) 20 mg delayed release capsule, TAKE 1 CAPSULE DAILY BEFORE BREAKFAST, Disp: 90 capsule, Rfl: 1  •  oxyCODONE (ROXICODONE) 10 MG TABS, Take 10 mg by mouth every 6 (six) hours as needed  , Disp: , Rfl:   •  potassium chloride (K-DUR,KLOR-CON) 20 mEq tablet, Take 1 tablet (20 mEq total) by mouth if needed (if taking torsemide), Disp: 30 tablet, Rfl: 1  •  torsemide (DEMADEX) 20 mg tablet, Take 1 tablet (20 mg total) by mouth if needed (for leg swelling/weight gain above 3 pounds), Disp: 30 tablet, Rfl: 1  •  ergocalciferol (VITAMIN D2) 50,000 units, Take 1 capsule (50,000 Units total) by mouth 2 (two) times a week with meals (Patient not taking: Reported on 10/13/2022), Disp: 20 capsule, Rfl: 0  •  ferrous sulfate 324 (65 Fe) mg, Take 1 tablet (324 mg total) by mouth daily before breakfast, Disp: 30 tablet, Rfl: 0  •  metFORMIN (GLUCOPHAGE) 500 mg tablet, Take 1 tablet (500 mg total) by mouth daily with breakfast (Patient not taking: Reported on 10/13/2022), Disp: 90 tablet, Rfl: 1    Allergies   Allergen Reactions   • Tramadol Other (See Comments)     Dizzy         Vitals:    11/23/22 0854   BP: 130/75   Pulse: 80   Resp: 20   Temp: 97 6 °F (36 4 °C)   SpO2: 94%     Physical Exam  Constitutional:       Appearance: She is well-developed  Comments: Well-nourished female, no respiratory distress   HENT:      Head: Normocephalic and atraumatic        Right Ear: External ear normal       Left Ear: External ear normal       Nose: Nose normal    Eyes:      Conjunctiva/sclera: Conjunctivae normal       Pupils: Pupils are equal, round, and reactive to light  Cardiovascular:      Rate and Rhythm: Normal rate and regular rhythm  Heart sounds: Normal heart sounds  Pulmonary:      Effort: Pulmonary effort is normal       Breath sounds: Normal breath sounds  Abdominal:      General: Bowel sounds are normal       Palpations: Abdomen is soft  Comments: Left lower quadrant colostomy in place  Stool in back, nontender, +bowel sounds, well-healed suture lines, obese, cannot palpate liver or spleen   Musculoskeletal:      Cervical back: Normal range of motion and neck supple  Comments: No pain or tenderness with palpation of joints, muscles or bones, good range of motion in upper extremities, decreased range of motion in lower extremities but equal   Skin:     General: Skin is warm  Comments: Warm, moist, good color, no petechiae or ecchymoses   Neurological:      Mental Status: She is alert and oriented to person, place, and time  Deep Tendon Reflexes: Reflexes are normal and symmetric  Psychiatric:         Behavior: Behavior normal          Thought Content: Thought content normal          Judgment: Judgment normal      Extremities: no lower extremity edema bilaterally, no cords, pulses are 1+  Lymphatics:  No adenopathy in the neck, supraclavicular region, axilla and groin bilaterally  Rectal wound deferred    Labs    10/11/2022 WBC = 6 12 hemoglobin = 12 7 hematocrit = 41 6 MCV = 82 platelet = 932 neutrophil = 67% iron saturation = 13% iron = 49 TIBC = 381 ferritin = 22 BUN = 14 creatinine = 0 84 LFTs WNL         03/29/2022 WBC = 5 56 hemoglobin = 8 5 hematocrit = 32 MCV = 65 RDW = 20 platelet = 686 neutrophil = 69% BUN = 12 creatinine = 0 83 calcium = 8 9 LFTs WNL iron = 17 iron saturation = 4% TIBC = 482 ferritin = 8    Imaging    02/21/2021 CT scan chest abdomen pelvis    No evidence of recurrent or metastatic disease throughout the chest, abdomen or pelvis    Large parastomal hernia containing loops of small bowel and cecum  02/06/2020 CT scan of the chest and abdomen pelvis  Impression stated no evidence of metastatic disease, large left lower quadrant parastomal hernia containing multiple nonobstructed bowel loops  04/01/2019 CT scan of the chest and abdomen pelvis    No CT evidence of metastatic disease within the chest abdomen or pelvis  Stable postoperative changes  4/6/18 MRI pelvis rectal cancer staging    Low rectal cancer, 5 cm in length, circumferential around the rectal wall  (Series 8 images 19 through 33 )  Anteriorly, there are multiple areas where there is transmural tumor extension into the mesorectal fat making this at least a T3c lesion  On Series 8 image 31, tumor also abuts the mesorectal fascia making this CRM positive  At this point it is also   immediately adjacent to the vagina, therefore this may be a T4 lesion  There are 2 high risk perirectal nodes, and 1 low risk perirectal nodes  01/31/2018 ultrasound right upper quadrant    1  Layering gallbladder sludge  No acute cholecystitis or biliary ductal obstruction  2   Hepatomegaly  3   Fluid-filled stomach  1/27/18 CT scan of the abdomen/pelvis    1  Prior Juan-en-Y gastric bypass with distention of the excluded stomach    2   No evidence of acute abnormality in the abdomen or pelvis      Pathology    Case Report   Surgical Pathology Report                         Case: [id number]                                    Authorizing Provider: GEORGETTE Tse       Collected:           05/29/2019 0922               Ordering Location:     [facility] Received:            05/29/2019 0922                                      [facility] [place]                                                            Pathologist:           Janette Walton MD                                                                Specimen:    Vaginal, vaginal polyp                                                               Final Diagnosis   A  Vagina, polyp (biopsy):  - Acutely and chronically inflamed granulation tissue    Electronically signed by David Howell MD on 5/30/2019 at  1:18 PM       Case Report   Surgical Pathology Report                         Case: [id number]                                    Authorizing Provider: Parth Larson MD      Collected:           09/06/2018 1010               Ordering Location:     [address]      Received:            09/06/2018 [facility]                                      Hospital Operating Room                                                       Pathologist:           Shireen Estrella MD                                                         Specimens:   A) - Soft Tissue, Other, EPIPLOIC NODULE                                                             B) - Rectum, enbloc rectum, sigmoid, anus, uterus, bso, cervix, posterior vaginal                    wall                                                                                                 C) - Omentum                                                                               Addendum   Additional immunohistochemical stains performed with appropriate controls on a selected portion of serous carcinoma involving omental tissue (block C1) show the following results:  Estrogen receptor: Positive  Progesterone receptor: Negative   Addendum electronically signed by Shireen Estrella MD on 9/27/2018 at  1:40 PM   Final Diagnosis   A  Soft tissue, epiploic nodule, biopsy:  -  Portion of nodular fibroadipose tissue with fat necrosis and dystrophic calcifications  -  Negative for metastatic carcinoma  -  Immunohistochemical stain performed with appropriate control for keratin AE1/3 is negative for epithelial elements, supporting the diagnosis      B    Rectum, sigmoid colon, anus, uterus,cervix, bilateral ovaries and fallopian tubes and posterior vaginal wall; en bloc resection:  -  Invasive moderately differentiated adenocarcinoma of rectum (see first synoptic report)  -  Low grade serous carcinoma involving left ovary, left fallopian tube, uterine serosa and colonic serosa (see second synoptic report)  -  Separate portion of colon (consistent with sigmoid) with diverticulosis coli showing focal serosal implant of low-grade serous carcinoma  -  Uterus showing benign inactive/atrophic endometrium with metaplastic change, benign endometrial polyp and rare cytologic atypia consistent with radiation effect  -  Cervix with mild glandular atypia, favor reactive (see note)  -  Benign uterine myometrium with one intramural benign leiomyoma, negative for atypia or malignancy  -  Benign anal skin and dentate line with mild reactive change, negative for involvement by carcinoma  -  Benign vaginal wall mucosa with thinning, chronic inflammation, and surface erosion most suggestive of prior therapy effect, negative for involvement by carcinoma  -  27 benign pericolorectal lymph nodes identified, negative for metastatic carcinoma      C  Omentum, omentectomy:  -   Low grade serous carcinoma         COLORECTAL CARCINOMA TUMOR STAGING SUMMARY (includes specimen A-C of this case):  1  Specimen identification:     - Specimen:  Rectum, sigmoid colon, anus, uterus,cervix, bilateral ovaries and fallopian tubes and posterior vaginal wall   2  Tumor:     - Tumor site:  Rectum     - Tumor size:  Up to 3 3 cm     - Macroscopic tumor perforation:  Not identified     - Tumor location:  Below the peritoneal reflection        - Macroscopic intactness of mesorectum:  Intact     - Histologic Type:   Adenocarcinoma      - Histologic Grade: Moderately differentiated (G2)     - Microscopic Tumor Extension (ypT3): Tumor invades through muscularis propria into pericolorectal soft tissues     - Tumor budding (only needed in polyps, Stage I or II cancers):  Not identified   3   Margins (specify distance for nearest radial margin on RECTAL tumors only):     - Proximal Margin:  Negative for carcinoma     - Distal Margin:  Negative for carcinoma     - Circumferential (Radial) or Mesenteric Margin:  Negative for carcinoma (0 6cm)     - Other Margins:  Vaginal margin negative for carcinoma   4  Treatment effect:  Present; Residual cancer with evident tumor regression, but more than single cells or rare small groups of cancer cells (partial response, score 2)   5  Lymph-vascular invasion:  Not identified   6  Perineural invasion:  Not identified   7  Tumor deposits: Not identified   8  Type of polyp in which invasive carcinoma arose:  Tubular adenoma   9  Regional lymph nodes (pN0):  27 benign pericolorectal lymph node sample, negative for metastatic carcinoma  10  Additional pathologic findings:  Diverticulosis coli  11  Ancillary Studies:  *  Immunohistochemical stains performed with appropriate controls show the primary rectal tumor to be positive for CK20 and CDX-2 and negative for CK 7, PAX-8, p53 (wild type expression), ER and WT-1, supporting a diagnosis of primary colorectal origin  12  8th Ed AJCC Stage:  at least Stage  IIA - ypT3, pN0, G2         Primary Tumor of the Ovary/Fallopian Tube/Peritoneum, Staging Summary (includes Specimen A to C of this case):  1  Procedure:  Hysterectomy, bilateral salpingo-oophorectomy, omentectomy  2  Hysterectomy type:  Radical hysterectomy  3  Specimen integrity:      - Right ovary:  Intact      - Left ovary:  Intact       - Right fallopian tube: Intact      - Left fallopian tube: Intact  4  Tumor site:  Ovarian and fallopian tube surfaces and uterine and colonic serosa  5  Ovarian surface involvement:  Present  6  Fallopian tube surface involvement:  Present  7  Tumor size:  Largest focus measures 1 8 cm (uterine serosa) (see note)  8  Histologic type:  Low-grade serous carcinoma   9  Histologic grade:  Low grade  10  Implant:  Present  11   Other tissue/organ involvement:  Bilateral ovaries and fallopian tubes, uterine serosa, sigmoid colon serosa, and omentum  12  Largest extrapelvic peritoneal focus: 2 5 cm (macroscopically identidfied)    - Specify site:  Omentum  13  Peritoneal/ascetic fluid:  Not performed   14  Pleural fluid:  Not performed  15  Treatment effect:  No known prior therapy  16  Regional lymph nodes:    - No regional uterine lymph nodes submitted or found    - 27 benign pericolorectal lymph nodes sampled, negative for metastatic carcinoma  17  Pathologic stage classification (pTNM, AJCC 8th edition): pT3c, pNX, Low Grade  18  FIGO stage (2015 FIGO cancer report): IIIC  19  Additional pathologic findings:  N/A  20  Ancillary studies:  Immunohistochemical stains performed with appropriate controls show the tumor cells to be positive for CK7, PAX-8, ER, and WT-1 with wild-type expression of p53 and negative for CK20 and CDX-2 supporting the diagnosis  Electronically signed by Tammi Contreras MD on 9/25/2018 at 12:54 PM        Case Report   Surgical Pathology Report                         Case: [id number]                                    Authorizing Provider: Ivett Escalante MD      Collected:           03/28/2018 1130               Pathologist:           Noah Walsh MD             Received:            03/29/2018 0942               Specimen:    Rectum, Rectal cancer/mass biopsies                                                        Final Diagnosis   A  Rectal mass (biopsy):  - 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      Comment:   - In the context of a rectal mass, repeat biopsy and/or excision may be indicated to exclude a higher grade lesion      Electronically signed by Noah Walsh MD on 3/30/2018 at  2:36 PM         Case Report   Surgical Pathology Report                         Case: [id number]                                    Authorizing Provider: Tania Jeter MD          Collected:           03/09/2018 0902               Ordering Location:     [facility]   Received:            03/12/2018 0904                                      Center                                                                        Pathologist:           Becky Mulligan DO                                                             Specimens:   A) - Colon, polyp splenic flexure/cold snare                                                         B) - Colon, bx distal rectal mass                                                          Final Diagnosis   A  Colon, splenic flexure polyp, biopsy:  - Tubulovillous adenoma, negative for high-grade dysplasia      B  Rectum, mass, biopsy:  - 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

## 2023-01-22 DIAGNOSIS — R60.0 BILATERAL LEG EDEMA: ICD-10-CM

## 2023-01-22 DIAGNOSIS — I48.91 ATRIAL FIBRILLATION WITH RAPID VENTRICULAR RESPONSE (HCC): ICD-10-CM

## 2023-01-22 DIAGNOSIS — I50.32 CHRONIC DIASTOLIC HEART FAILURE (HCC): ICD-10-CM

## 2023-01-22 DIAGNOSIS — E78.5 HYPERLIPIDEMIA, UNSPECIFIED HYPERLIPIDEMIA TYPE: ICD-10-CM

## 2023-01-22 DIAGNOSIS — Z95.2 S/P TAVR (TRANSCATHETER AORTIC VALVE REPLACEMENT): ICD-10-CM

## 2023-01-22 DIAGNOSIS — R06.02 EXERTIONAL SHORTNESS OF BREATH: ICD-10-CM

## 2023-01-23 RX ORDER — EMPAGLIFLOZIN 10 MG/1
TABLET, FILM COATED ORAL
Qty: 90 TABLET | Refills: 1 | Status: SHIPPED | OUTPATIENT
Start: 2023-01-23

## 2023-01-23 RX ORDER — ATORVASTATIN CALCIUM 20 MG/1
TABLET, FILM COATED ORAL
Qty: 90 TABLET | Refills: 0 | Status: SHIPPED | OUTPATIENT
Start: 2023-01-23

## 2023-02-26 ENCOUNTER — OFFICE VISIT (OUTPATIENT)
Dept: URGENT CARE | Facility: CLINIC | Age: 68
End: 2023-02-26

## 2023-02-26 VITALS
BODY MASS INDEX: 48.82 KG/M2 | RESPIRATION RATE: 22 BRPM | TEMPERATURE: 98.3 F | OXYGEN SATURATION: 100 % | WEIGHT: 293 LBS | HEART RATE: 104 BPM | HEIGHT: 65 IN

## 2023-02-26 DIAGNOSIS — L29.9 ITCHING: Primary | ICD-10-CM

## 2023-02-26 RX ORDER — TRIAMCINOLONE ACETONIDE 5 MG/G
OINTMENT TOPICAL 2 TIMES DAILY
Qty: 30 G | Refills: 0 | Status: SHIPPED | OUTPATIENT
Start: 2023-02-26

## 2023-02-26 NOTE — PROGRESS NOTES
[facility] [facility]        NAME: Miky Hammer is a 79 y o  female  : 1955    MRN: [id number]  DATE: 2023  TIME: 3:03 PM    Assessment and Plan   Itching [L29 9]  1  Itching  triamcinolone (KENALOG) 0 5 % ointment        No rash present in web spaces, or at all for that matter  I suspect more of an allergic dermatitis or atopic dermatitis that keeps getting worse because she keeps itching it  Recommend non sedating antihistamine and kenalog  Discussed strict return to care precautions as well as red flag symptoms which should prompt immediate ED referral  Pt verbalized understanding and is in agreement with plan  Please follow up with your primary care provider within the next week  Please remember that your visit today was with an urgent care provider and should not replace follow up with your primary care provider for chronic medical issues or annual physicals  Patient Instructions       Follow up with PCP in 3-5 days  Proceed to  ER if symptoms worsen  Chief Complaint     Chief Complaint   Patient presents with   • Rash     Pt here for rash x2 days pt states  she is worried about scabies  pt states it is very itchy  No meds used  History of Present Illness       Pt is a(n) 79 y o  yo female pw rash x 2 days  Location: b/l forearms  Quality: Itchy  Appearance: Flat and Erythematous  Associated symptoms: none  Any otc meds tried: No  New soaps/medications/detergents: No  Working/playing outdoors: No  Known bug/tick bites: No  History of same: No  Patient states that she had scabies several years ago and is concerned that this could be scabies because of how itchy it is  No otc meds tried  Not spreading  No rash actually visible, just very itchy  Patient is concerned because this started after she reached into the purse of her neighbor, twice, to show her no one was taking her money  She states her neighbor is very unhygienic          Review of Systems   Review of Systems Constitutional: Negative for chills, diaphoresis and fever  HENT: Negative for congestion, rhinorrhea and sore throat  Eyes: Negative for discharge and itching  Respiratory: Negative for cough, chest tightness, shortness of breath and wheezing  Cardiovascular: Negative for chest pain  Gastrointestinal: Negative for diarrhea, nausea and vomiting  Musculoskeletal: Negative for myalgias  Neurological: Negative for weakness and numbness           Current Medications       Current Outpatient Medications:   •  albuterol (2 5 mg/3 mL) 0 083 % nebulizer solution, TAKE 1 VIAL BY NEBULIZATION EVERY 4 HOURS AS NEEDED FOR WHEEZING OR SHORTNESS OF BREATH, Disp: 225 mL, Rfl: 1  •  amiodarone 100 mg tablet, Take 1 tablet (100 mg total) by mouth daily, Disp: 90 tablet, Rfl: 3  •  apixaban (ELIQUIS) 5 mg, Take 1 tablet (5 mg total) by mouth 2 (two) times a day, Disp: 180 tablet, Rfl: 2  •  atorvastatin (LIPITOR) 20 mg tablet, TAKE ONE TABLET DAILY, Disp: 90 tablet, Rfl: 0  •  Calcium-Vitamin D 500-125 MG-UNIT TABS, Take 1 tablet by mouth 2 (two) times a day , Disp: , Rfl:   •  Jardiance 10 MG TABS tablet, TAKE 1 TABLET EVERY MORNING, Disp: 90 tablet, Rfl: 1  •  metoprolol succinate (TOPROL-XL) 25 mg 24 hr tablet, Take 1 tablet (25 mg total) by mouth daily, Disp: 90 tablet, Rfl: 1  •  Multiple Vitamins-Minerals (BARIATRIC FUSION) CHEW, Chew 1 tablet daily  , Disp: , Rfl:   •  nystatin (MYCOSTATIN) cream, , Disp: , Rfl:   •  omeprazole (PriLOSEC) 20 mg delayed release capsule, TAKE 1 CAPSULE DAILY BEFORE BREAKFAST, Disp: 90 capsule, Rfl: 1  •  oxyCODONE (ROXICODONE) 10 MG TABS, Take 10 mg by mouth every 6 (six) hours as needed  , Disp: , Rfl:   •  potassium chloride (K-DUR,KLOR-CON) 20 mEq tablet, Take 1 tablet (20 mEq total) by mouth if needed (if taking torsemide), Disp: 30 tablet, Rfl: 1  •  torsemide (DEMADEX) 20 mg tablet, Take 1 tablet (20 mg total) by mouth if needed (for leg swelling/weight gain above 3 pounds), Disp: 30 tablet, Rfl: 1  •  triamcinolone (KENALOG) 0 5 % ointment, Apply topically 2 (two) times a day Do not use for more than 2 weeks, Disp: 30 g, Rfl: 0  •  ferrous sulfate 324 (65 Fe) mg, Take 1 tablet (324 mg total) by mouth daily before breakfast, Disp: 30 tablet, Rfl: 0    Current Allergies     Allergies as of 02/26/2023 - Reviewed 02/26/2023   Allergen Reaction Noted   • Tramadol Other (See Comments) 03/30/2016            The following portions of the patient's history were reviewed and updated as appropriate: allergies, current medications, past family history, past medical history, past social history, past surgical history and problem list      Past Medical History:   Diagnosis Date   • Acute pain of right knee    • Ambulatory dysfunction    • Anemia    • Arthritis    • Bleeding ulcer    • Cancer (HCC)     rectal   • Chronic lower back pain    • Chronic pain disorder     back pain   • Colon cancer ([facility] ) 2018   • Diabetes mellitus ([place] [address] )    • Endometrial cancer ([place] [address] ) 2018   • GERD (gastroesophageal reflux disease)    • History of chemotherapy    • History of MRSA infection 0719/2016   • Morbid obesity ([facility] )    • Morbid obesity with BMI of 45 0-49 9, adult ([place] [address] )    • Ovarian cancer ([facility] ) 2018   • Paroxysmal atrial fibrillation (HCC)    • Rectal cancer ([facility] )    • S/P TAVR (transcatheter aortic valve replacement)    • SOB (shortness of breath)    • Spinal stenosis    • Systolic murmur    • Unsteady gait     uses walker   • Wears dentures    • Wears glasses        Past Surgical History:   Procedure Laterality Date   • ABDOMINAL PERINEAL BOWEL RESECTION W/ ILEOANAL POUCH N/A 09/06/2018    Procedure: LAPAROSCOPIC HAND ASSIST ABDOMINOPERINEAL RESECTION,  POSTERIOR VAGINECTOMY, OMENTECTOMY;  Surgeon: Rio Tavares MD;  [facility];  Service: Colorectal   • ABDOMINAL SURGERY      abscess removed from abdomen and right thigh, a hole in thigh closed by plastic surgeon   • ABSCESS DRAINAGE abd, (R) leg, (L) leg   •  SECTION     •  SECTION     • CYSTOSCOPY N/A 2018    Procedure: Christian Harris;  Surgeon: Lawanda Marin MD;  [facility];  Service: Gynecology Oncology   • ESOPHAGOGASTRODUODENOSCOPY N/A 2016    Procedure: ESOPHAGOGASTRODUODENOSCOPY (EGD); Surgeon: Tammie Mendoza MD;  [facility];  Service:    • ESOPHAGOGASTRODUODENOSCOPY N/A 2016    Procedure: ESOPHAGOGASTRODUODENOSCOPY (EGD); Surgeon: Tammie Mendoza MD;  [facility]; Service:    • EYE SURGERY      laser eye surgery   • HYSTERECTOMY N/A 2018    Procedure: RADICAL HYSTERECTOMY TOTAL ABDOMINAL (ALEXANDRA)  BSO;  Surgeon: Lawanda Marin MD;  [facility];  Service: Gynecology Oncology   • JOINT REPLACEMENT Left 2017    hip   • JOINT REPLACEMENT Right 2017    hip   • OOPHORECTOMY Bilateral    • PA COLONOSCOPY FLX DX W/COLLJ SPEC WHEN PFRMD N/A 2018    Procedure: COLONOSCOPY;  Surgeon: Cliff Aburto MD;  Location: [facility]; Service: Gastroenterology   • PA COLONOSCOPY FLX DX W/COLLJ [address] WHEN PFRMD N/A 2018    Procedure: COLONOSCOPY;  Surgeon: Isabel Rizo MD;  [facility]; Service: Colorectal   • PA ECHO TRANSESOPHAG R-T 2D W/PRB IMG ACQUISJ I&R N/A 2020    Procedure: TRANSESOPHAGEAL ECHOCARDIOGRAM (THO); Surgeon: Nancy Langford DO;  [facility];  Service: Cardiac Surgery   • PA ESOPHAGOGASTRODUODENOSCOPY TRANSORAL DIAGNOSTIC N/A 2018    Procedure: ESOPHAGOGASTRODUODENOSCOPY (EGD); Surgeon: Cliff Aburto MD;  Location: [facility] GI LAB;   Service: Gastroenterology   • PA LAPAROSCOPY SURG COLOSTOMY/SKN LVL CECOSTOMY N/A 2018    Procedure: PERMANENT END COLOSTOMY;  Surgeon: Isabel Rizo MD;  [facility];  Service: Colorectal   • PA LAPS GSTR RSTCV [address] W/BYP SOLEDAD-EN-Y LIMB <150 CM N/A 2016    Procedure: BYPASS GASTRIC  SOLEDAD-EN-Y LAPAROSCOPIC;  Surgeon: Tammie Mendoza MD;  [facility];  Service: Bariatrics   • HI LAPS PROCTECTOMY ABDOMINOPERINEAL W/COLOSTOMY N/A 09/06/2018    Procedure: PROCTECTOMY;  Surgeon: Zak Del Valle MD;  [facility];  Service: Colorectal   • HI REPLACE AORTIC VALVE OPENFEMORAL ARTERY APPROACH N/A 09/01/2020    Procedure: REPLACEMENT AORTIC VALVE TRANSCATHETER (TAVR) TRANSFEMORAL W/ 26MM WADDELL BARBARA S3 ULTRA VALVE(ACCESS ON RIGHT); Surgeon: Issac Brantley DO;  [facility];  Service: Cardiac Surgery   • REPLACEMENT TOTAL KNEE     • TOOTH EXTRACTION     • TUBAL LIGATION         Family History   Adopted: Yes   Problem Relation Age of Onset   • Leukemia Mother    • Heart disease Father    • Coronary artery disease Father    • Diabetes Father    • No Known Problems Sister    • No Known Problems Brother    • Diabetes Son    • No Known Problems Brother    • No Known Problems Brother    • No Known Problems Sister    • No Known Problems Sister          Medications have been verified  Objective   Pulse 104   Temp 98 3 °F (36 8 °C)   Resp 22   Ht 5' 5" (1 651 m)   Wt 136 kg (299 lb)   SpO2 100%   BMI 49 76 kg/m²        Physical Exam     Physical Exam  Vitals and nursing note reviewed  Constitutional:       General: She is not in acute distress  Appearance: Normal appearance  She is not ill-appearing  HENT:      Head: Normocephalic and atraumatic  Cardiovascular:      Rate and Rhythm: Normal rate  Pulmonary:      Effort: Pulmonary effort is normal  No respiratory distress  Skin:     General: Skin is warm and dry  Capillary Refill: Capillary refill takes less than 2 seconds  Findings: Erythema (b/l mid forearms from scratching; small hematomas present near where she has been scratching) present  No rash  Neurological:      Mental Status: She is alert and oriented to person, place, and time     Psychiatric:         Behavior: Behavior normal

## 2023-04-24 DIAGNOSIS — E78.5 HYPERLIPIDEMIA, UNSPECIFIED HYPERLIPIDEMIA TYPE: ICD-10-CM

## 2023-04-24 RX ORDER — ATORVASTATIN CALCIUM 20 MG/1
TABLET, FILM COATED ORAL
Qty: 90 TABLET | Refills: 0 | Status: SHIPPED | OUTPATIENT
Start: 2023-04-24

## 2023-04-27 ENCOUNTER — TELEPHONE (OUTPATIENT)
Dept: FAMILY MEDICINE CLINIC | Facility: CLINIC | Age: 68
End: 2023-04-27

## 2023-04-27 NOTE — TELEPHONE ENCOUNTER
"Patient called and stated that there is a new manager in her building that is making her get a letter from [place] to leave her rollator walker in her parking lot  She is a fall risk and leaves her rollator walker in the parking lot when she gets in her car to go out because she cant fold it up in her car  The new manager does not like this and is requesting a letter from patients doctor to allow her to do this    Patient states she has been doing this \"forever\"  "

## 2023-04-27 NOTE — LETTER
April 27, 2023     Patient: Meghna Mir  YOB: 1955      To Whom it May Concern:    Jennyfer Truong is under my professional care  Please allow patient to leave rollator walker in her parking lot as she is falls risk  If you have any questions or concerns, please don't hesitate to call  Sincerely,        Esteban Hamilton            CC:  No Recipients

## 2023-05-16 ENCOUNTER — TELEPHONE (OUTPATIENT)
Dept: HEMATOLOGY ONCOLOGY | Facility: CLINIC | Age: 68
End: 2023-05-16

## 2023-05-16 NOTE — TELEPHONE ENCOUNTER
Appointment Confirmation   Who are you speaking with? Patient   If it is not the patient, are they listed on an active communication consent form? N/A   Which provider is the appointment scheduled with? Dr Laura Iraheta   When is the appointment scheduled? Please list date and time 06/26/23 9:20AM   At which location is the appointment scheduled to take place? [facility]   Did caller verbalize understanding of appointment details?  Yes

## 2023-06-01 DIAGNOSIS — Z98.84 S/P BARIATRIC SURGERY: ICD-10-CM

## 2023-06-01 DIAGNOSIS — K28.9 MARGINAL ULCER: ICD-10-CM

## 2023-06-01 DIAGNOSIS — K21.9 GASTROESOPHAGEAL REFLUX DISEASE, UNSPECIFIED WHETHER ESOPHAGITIS PRESENT: ICD-10-CM

## 2023-06-01 RX ORDER — OMEPRAZOLE 20 MG/1
CAPSULE, DELAYED RELEASE ORAL
Qty: 90 CAPSULE | Refills: 1 | Status: SHIPPED | OUTPATIENT
Start: 2023-06-01

## 2023-06-08 ENCOUNTER — APPOINTMENT (OUTPATIENT)
Dept: LAB | Facility: CLINIC | Age: 68
End: 2023-06-08
Payer: MEDICARE

## 2023-06-08 DIAGNOSIS — K91.2 POSTGASTRECTOMY MALABSORPTION: ICD-10-CM

## 2023-06-08 DIAGNOSIS — Z90.3 POSTGASTRECTOMY MALABSORPTION: ICD-10-CM

## 2023-06-08 DIAGNOSIS — Z85.048 HISTORY OF RECTAL CANCER: ICD-10-CM

## 2023-06-08 LAB
ALBUMIN SERPL BCP-MCNC: 3.4 G/DL (ref 3.5–5)
ALP SERPL-CCNC: 85 U/L (ref 46–116)
ALT SERPL W P-5'-P-CCNC: 19 U/L (ref 12–78)
ANION GAP SERPL CALCULATED.3IONS-SCNC: 2 MMOL/L (ref 4–13)
AST SERPL W P-5'-P-CCNC: 14 U/L (ref 5–45)
BASOPHILS # BLD AUTO: 0.04 THOUSANDS/ÂΜL (ref 0–0.1)
BASOPHILS NFR BLD AUTO: 1 % (ref 0–1)
BILIRUB SERPL-MCNC: 0.81 MG/DL (ref 0.2–1)
BUN SERPL-MCNC: 14 MG/DL (ref 5–25)
CALCIUM ALBUM COR SERPL-MCNC: 10.1 MG/DL (ref 8.3–10.1)
CALCIUM SERPL-MCNC: 9.6 MG/DL (ref 8.3–10.1)
CEA SERPL-MCNC: 2 NG/ML (ref 0–3)
CHLORIDE SERPL-SCNC: 109 MMOL/L (ref 96–108)
CO2 SERPL-SCNC: 29 MMOL/L (ref 21–32)
CREAT SERPL-MCNC: 0.97 MG/DL (ref 0.6–1.3)
EOSINOPHIL # BLD AUTO: 0.1 THOUSAND/ÂΜL (ref 0–0.61)
EOSINOPHIL NFR BLD AUTO: 2 % (ref 0–6)
ERYTHROCYTE [DISTWIDTH] IN BLOOD BY AUTOMATED COUNT: 21.8 % (ref 11.6–15.1)
FERRITIN SERPL-MCNC: 24 NG/ML (ref 11–307)
GFR SERPL CREATININE-BSD FRML MDRD: 60 ML/MIN/1.73SQ M
GLUCOSE P FAST SERPL-MCNC: 112 MG/DL (ref 65–99)
HCT VFR BLD AUTO: 50 % (ref 34.8–46.1)
HGB BLD-MCNC: 14.1 G/DL (ref 11.5–15.4)
IMM GRANULOCYTES # BLD AUTO: 0.04 THOUSAND/UL (ref 0–0.2)
IMM GRANULOCYTES NFR BLD AUTO: 1 % (ref 0–2)
IRON SATN MFR SERPL: 8 % (ref 15–50)
IRON SERPL-MCNC: 34 UG/DL (ref 50–170)
LYMPHOCYTES # BLD AUTO: 1.55 THOUSANDS/ÂΜL (ref 0.6–4.47)
LYMPHOCYTES NFR BLD AUTO: 23 % (ref 14–44)
MCH RBC QN AUTO: 21.6 PG (ref 26.8–34.3)
MCHC RBC AUTO-ENTMCNC: 28.2 G/DL (ref 31.4–37.4)
MCV RBC AUTO: 77 FL (ref 82–98)
MONOCYTES # BLD AUTO: 0.55 THOUSAND/ÂΜL (ref 0.17–1.22)
MONOCYTES NFR BLD AUTO: 8 % (ref 4–12)
NEUTROPHILS # BLD AUTO: 4.42 THOUSANDS/ÂΜL (ref 1.85–7.62)
NEUTS SEG NFR BLD AUTO: 65 % (ref 43–75)
NRBC BLD AUTO-RTO: 0 /100 WBCS
PLATELET # BLD AUTO: 212 THOUSANDS/UL (ref 149–390)
PMV BLD AUTO: 9.6 FL (ref 8.9–12.7)
POTASSIUM SERPL-SCNC: 4.9 MMOL/L (ref 3.5–5.3)
PROT SERPL-MCNC: 7.2 G/DL (ref 6.4–8.4)
RBC # BLD AUTO: 6.53 MILLION/UL (ref 3.81–5.12)
SODIUM SERPL-SCNC: 140 MMOL/L (ref 135–147)
TIBC SERPL-MCNC: 417 UG/DL (ref 250–450)
WBC # BLD AUTO: 6.7 THOUSAND/UL (ref 4.31–10.16)

## 2023-06-08 PROCEDURE — 85025 COMPLETE CBC W/AUTO DIFF WBC: CPT

## 2023-06-08 PROCEDURE — 82378 CARCINOEMBRYONIC ANTIGEN: CPT

## 2023-06-08 PROCEDURE — 80053 COMPREHEN METABOLIC PANEL: CPT

## 2023-06-08 PROCEDURE — 83550 IRON BINDING TEST: CPT

## 2023-06-08 PROCEDURE — 83540 ASSAY OF IRON: CPT

## 2023-06-08 PROCEDURE — 36415 COLL VENOUS BLD VENIPUNCTURE: CPT

## 2023-06-08 PROCEDURE — 82728 ASSAY OF FERRITIN: CPT

## 2023-06-10 ENCOUNTER — HOSPITAL ENCOUNTER (OUTPATIENT)
Dept: RADIOLOGY | Facility: HOSPITAL | Age: 68
Discharge: HOME | End: 2023-06-10
Attending: INTERNAL MEDICINE
Payer: MEDICARE

## 2023-06-10 DIAGNOSIS — K91.2 POSTGASTRECTOMY MALABSORPTION: ICD-10-CM

## 2023-06-10 DIAGNOSIS — Z85.048 HISTORY OF RECTAL CANCER: ICD-10-CM

## 2023-06-10 DIAGNOSIS — Z90.3 POSTGASTRECTOMY MALABSORPTION: ICD-10-CM

## 2023-06-10 PROCEDURE — G1004 CDSM NDSC: HCPCS

## 2023-06-10 PROCEDURE — 74177 CT ABD & PELVIS W/CONTRAST: CPT

## 2023-06-10 PROCEDURE — 71260 CT THORAX DX C+: CPT

## 2023-06-10 RX ADMIN — IOHEXOL 100 ML: 350 INJECTION, SOLUTION INTRAVENOUS at 11:20

## 2023-06-14 ENCOUNTER — TELEPHONE (OUTPATIENT)
Dept: HEMATOLOGY ONCOLOGY | Facility: CLINIC | Age: 68
End: 2023-06-14

## 2023-06-26 ENCOUNTER — TELEPHONE (OUTPATIENT)
Dept: HEMATOLOGY ONCOLOGY | Facility: CLINIC | Age: 68
End: 2023-06-26

## 2023-06-26 DIAGNOSIS — I48.0 PAROXYSMAL ATRIAL FIBRILLATION (HCC): ICD-10-CM

## 2023-06-26 RX ORDER — AMIODARONE HYDROCHLORIDE 100 MG/1
100 TABLET ORAL DAILY
Qty: 90 TABLET | Refills: 3 | Status: SHIPPED | OUTPATIENT
Start: 2023-06-26

## 2023-06-26 NOTE — TELEPHONE ENCOUNTER
Appointment Change  Cancel, Reschedule, Change to Virtual      Who are you speaking with? Patient   If it is not the patient, are they listed on an active communication consent form? N/A   Which provider is the appointment scheduled with? Dr Caroline Burris   When is the appointment scheduled? Please list date and time 6/26/23 9:20AM   At which location is the appointment scheduled to take place? [facility]   Was the appointment rescheduled or changed from an in person visit to a virtual visit? If so, please list the details of the change  11/6/23 3:20PM   What is the reason for the appointment change? Provider   Was [facility] transport scheduled for this visit? No   Does [facility] transport need to be scheduled for the new visit (if applicable) No   Does the patient need an infusion appointment rescheduled? No   Does the patient have an infusion appointment scheduled? If so, when? No   Is the patient undergoing chemotherapy? No   Was the no-show policy reviewed for appointments being changed with less then 24 hours of notice?  No

## 2023-06-26 NOTE — TELEPHONE ENCOUNTER
Tila Judge left a message, requesting a call back about a medication she needs refilled ASAP, but she did not leave the [facility]

## 2023-06-27 ENCOUNTER — TELEPHONE (OUTPATIENT)
Dept: FAMILY MEDICINE CLINIC | Facility: CLINIC | Age: 68
End: 2023-06-27

## 2023-06-27 NOTE — TELEPHONE ENCOUNTER
Jolene Perales,  the  from  [facility] called to inquire whether or not we received their fax   I don't see it in [address]

## 2023-06-28 NOTE — TELEPHONE ENCOUNTER
We did receive this form, it has been completed by Patrick Graves and faxed back to [facility] on 06/27/2023    CHAO Landry/MORENO

## 2023-07-03 ENCOUNTER — TELEPHONE (OUTPATIENT)
Dept: HEMATOLOGY ONCOLOGY | Facility: MEDICAL CENTER | Age: 68
End: 2023-07-03

## 2023-07-03 NOTE — TELEPHONE ENCOUNTER
----- Message from Jose Enrique Padilla sent at 7/3/2023  2:25 PM EDT -----  I added Lisa to 7/18/23 at 3pm at [facility].      Patient aware of time and location of appt

## 2023-07-18 ENCOUNTER — OFFICE VISIT (OUTPATIENT)
Dept: HEMATOLOGY ONCOLOGY | Facility: CLINIC | Age: 68
End: 2023-07-18
Payer: MEDICARE

## 2023-07-18 ENCOUNTER — NURSE TRIAGE (OUTPATIENT)
Age: 68
End: 2023-07-18

## 2023-07-18 VITALS
HEART RATE: 98 BPM | RESPIRATION RATE: 20 BRPM | DIASTOLIC BLOOD PRESSURE: 78 MMHG | OXYGEN SATURATION: 99 % | SYSTOLIC BLOOD PRESSURE: 130 MMHG | WEIGHT: 280 LBS | BODY MASS INDEX: 46.65 KG/M2 | HEIGHT: 65 IN | TEMPERATURE: 97.7 F

## 2023-07-18 DIAGNOSIS — E66.01 MORBID OBESITY DUE TO EXCESS CALORIES (HCC): Primary | ICD-10-CM

## 2023-07-18 DIAGNOSIS — Z85.048 HISTORY OF RECTAL CANCER: ICD-10-CM

## 2023-07-18 DIAGNOSIS — E11.22 TYPE 2 DIABETES MELLITUS WITH CHRONIC KIDNEY DISEASE, WITHOUT LONG-TERM CURRENT USE OF INSULIN, UNSPECIFIED CKD STAGE (HCC): ICD-10-CM

## 2023-07-18 DIAGNOSIS — J43.9 PULMONARY EMPHYSEMA, UNSPECIFIED EMPHYSEMA TYPE (HCC): ICD-10-CM

## 2023-07-18 DIAGNOSIS — Z43.3 COLOSTOMY CARE (HCC): ICD-10-CM

## 2023-07-18 DIAGNOSIS — R91.1 LUNG NODULE: ICD-10-CM

## 2023-07-18 PROCEDURE — 99215 OFFICE O/P EST HI 40 MIN: CPT | Performed by: INTERNAL MEDICINE

## 2023-07-18 NOTE — PROGRESS NOTES
Natalie Benjamin  [birth year]  [address] [facility] [place]  [address] [facility] [place]  [place] [phone]    DISCUSSION/SUMMARY:    51-year-old female previously found to have high-grade dysplasia/intramucosal lesion arising in a tubulovillous adenoma. Issues:    Anemia - resolved. Prior workup was consistent with iron deficiency and patient received IV iron. Mrs. Kamryn Molina feels okay from a hematology standpoint. Recent H/H levels were good/acceptable. Patient will continue to monitor for GI bleeding. Patient can continue with multivitamin with iron. Rectal cancer. Final stage was IIA (ypT3 pN0 G2). Patient received neoadjuvant concurrent treatment and then underwent resection. Mrs. Kamryn Molina had postoperative complications with wound healing and fistula formation - eventually resolved. From a medical oncology standpoint, Mrs. Kamryn Molina seems to be doing well as far as the rectal cancer. Unfortunately the recent CAT scans demonstrated an enlarging pulmonary nodule as well as gastric antral wall thickening. As above, patient previously underwent gastric bypass. The antral wall thickening may be secondary to the prior surgery. To be sure, patient has been referred to GI for evaluation of upper endoscopy (possibly also lower endoscopy, patient has not had any type of endoscopy since surgery). Pulmonary nodule. The pulmonary nodule has increased from 4 mm to 12 mm over the past 2 years. Patient has a long tobacco history, discontinued 25 years ago. 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 Mrs. Ibarra denies any respiratory issues. Patient understands the implication of the enlarging pulmonary nodule. Patient has been referred to IR for biopsy. Pathology results will dictate further treatment options. Ovarian cancer, IIIC.    As discussed previously, at the time of surgery, patient was found to have an incidental low-grade serous ovarian carcinoma with omental nodules greater than 2 cm grossly visible. Patient is followed by GYN Oncology and is on anastrozole (NCCN 2.54656 low-grade serous, stage II-IV tumors, treatment options include primary hormonal therapy (category 2B). Patient will follow up with gyn Oncology as directed.  given an office visit follow-up for 4 to 6 weeks but this will change depending upon the above. Patient knows to call if she has any other oncology questions or concerns. Carefully review your medication list and verify that the list is accurate and up-to-date. Please call the hematology/oncology office if there are medications missing from the list, medications on the list that you are not currently taking or if there is a dosage or instruction that is different from how you're taking that medication. Patient goals and areas of care: GI evaluation for endoscopy, IR evaluation for lung nodule  Barriers to care:  None  Patient is able to self-care.  ___________________________________________________________________________________________________    Chief Complaint   Patient presents with   • Follow-up     Postgastrectomy malabsorption   • History of rectal cancer on observation     History of Present Illness:    79-year-old female previously referred for the above. Previously Mrs. Perley Goldmann had gastric bypass. Patient was seen by GI (late summer 2018) because of blood in the stools. Additional workup included a colonoscopy which demonstrated a rectal lesion. Patient recently completed concurrent chemotherapy (Xeloda) with radiation. Mrs. Carroll Humphrey subsequently went onto an APR. Final pathology results are listed below; patient was also found to have IIIC low-grade serous ovarian carcinoma. Postoperatively, patient had issues with slow wound closure. Since completion of treatment, patient has been on surveillance.     Mrs. Carroll Humphrey was found to b anemic early 2022. Workup was consistent with iron deficiency. Patient was treated with IV iron - good response. Patient has been more diligent with her bariatric vitamins and minerals. Mrs. Manette Curling states feeling physically okay. No problems with the colostomy, no abdominal pain, no GI bleeding. Bowel movements are normal.  Patient has lost approximately 10 pounds, recent diet change. Activities are baseline. No respiratory issues. No pain control issues. Patient walks with a cane, same as before, activities are otherwise good. During the APR, patient was found to have incidental stage IIIC low-grade serous ovarian cancer (gyn oncology was called into the OR). Patient is followed by Dr. Anu Berger and is on Arimidex. No problems with the Arimidex. Review of Systems   Constitutional: Positive for fatigue. HENT: Negative. Eyes: Negative. Respiratory: Negative. Cardiovascular: Negative. Gastrointestinal: Negative for blood in stool, constipation and diarrhea. Endocrine: Negative. Genitourinary: Negative. Musculoskeletal: Negative for arthralgias. Skin: Negative. Allergic/Immunologic: Negative. Neurological: Negative. Hematological: Negative. Psychiatric/Behavioral: Negative. All other systems reviewed and are negative.      Patient Active Problem List   Diagnosis   • Morbid obesity due to excess calories (HCC)   • Postgastrectomy malabsorption   • S/P gastric bypass   • Marginal ulcer   • S/P bariatric surgery   • Atherosclerotic peripheral vascular disease ([address])   • Diabetes mellitus type 2, diet-controlled ([address])   • Onychomycosis   • History of rectal cancer   • Colostomy care [facility])   • History of ovarian cancer   • Encounter for surgical aftercare following surgery of digestive system   • Pyelonephritis   • History of ovarian cancer   • Candidal intertrigo   • Vaginal bleeding   • Encounter for other screening for malignant neoplasm of breast   • Aortic stenosis, severe   • Nonrheumatic mitral valve stenosis   • Atrial fibrillation with rapid ventricular response (HCC)   • Hyperlipidemia   • Hypochloremia   • Weight gain following gastric bypass surgery   • S/P TAVR (transcatheter aortic valve replacement)   • Ambulatory dysfunction   • CAD (coronary artery disease)   • Chronic anticoagulation   • Chronic diastolic heart failure (HCC)   • Osteoarthritis of right knee   • GI bleed   • Chronic kidney disease   • Iron deficiency anemia, unspecified   • Iron deficiency anemia   • Pulmonary emphysema, unspecified emphysema type ([facility])   • Continuous opioid dependence ([address])   • COVID-19   • Type 2 diabetes mellitus with chronic kidney disease, without long-term current use of insulin, unspecified CKD stage ([facility])     Past Medical History:   Diagnosis Date   • Acute pain of right knee    • Ambulatory dysfunction    • Anemia    • Arthritis    • Bleeding ulcer    • Cancer (HCC)     rectal   • Chronic lower back pain    • Chronic pain disorder     back pain   • Colon cancer ([address]) 2018   • Diabetes mellitus ([address])    • Endometrial cancer ([address]) 2018   • GERD (gastroesophageal reflux disease)    • History of chemotherapy    • History of MRSA infection 0719/2016   • Morbid obesity ([address])    • Morbid obesity with BMI of 45.0-49.9, adult ([address])    • Ovarian cancer ([address]) 2018   • Paroxysmal atrial fibrillation (HCC)    • Rectal cancer (HCC)    • S/P TAVR (transcatheter aortic valve replacement)    • SOB (shortness of breath)    • Spinal stenosis    • Systolic murmur    • Unsteady gait     uses walker   • Wears dentures    • Wears glasses      Ob/gyn:  No recent mammogram -patient's choice, no post menopausal bleeding    Past Surgical History:   Procedure Laterality Date   • ABDOMINAL PERINEAL BOWEL RESECTION W/ ILEOANAL POUCH N/A 09/06/2018    Procedure: LAPAROSCOPIC HAND ASSIST ABDOMINOPERINEAL RESECTION,  POSTERIOR VAGINECTOMY, OMENTECTOMY;  Surgeon: Sincere Canales MD;  [facility];  Service: Colorectal   • ABDOMINAL SURGERY      abscess removed from abdomen and right thigh, a hole in thigh closed by plastic surgeon   • ABSCESS DRAINAGE      abd, (R) leg, (L) leg   •  SECTION     •  SECTION     • CYSTOSCOPY N/A 2018    Procedure: Hellen Blanco;  Surgeon: Rose Lund MD;  [facility];  Service: Gynecology Oncology   • ESOPHAGOGASTRODUODENOSCOPY N/A 2016    Procedure: ESOPHAGOGASTRODUODENOSCOPY (EGD); Surgeon: Indio Mardid MD;  Location: [facility];  Service:    • ESOPHAGOGASTRODUODENOSCOPY N/A 2016    Procedure: ESOPHAGOGASTRODUODENOSCOPY (EGD); Surgeon: Indio Madrid MD;  Location: [facility]; Service:    • EYE SURGERY      laser eye surgery   • HYSTERECTOMY N/A 2018    Procedure: RADICAL HYSTERECTOMY TOTAL ABDOMINAL (ALEXANDRA). BSO;  Surgeon: Rose Lund MD;  [facility];  Service: Gynecology Oncology   • JOINT REPLACEMENT Left 2017    hip   • JOINT REPLACEMENT Right 2017    hip   • OOPHORECTOMY Bilateral    • MO COLONOSCOPY FLX DX W/COLLJ SPEC WHEN PFRMD N/A 2018    Procedure: COLONOSCOPY;  Surgeon: Corrinne Peacock, MD;  Location: [address] [place] [address] GI LAB; Service: Gastroenterology   • MO COLONOSCOPY FLX DX W/COLLJ [facility] WHEN PFRMD N/A 2018    Procedure: COLONOSCOPY;  Surgeon: Russell Caro MD;  [facility]: [facility]; Service: Colorectal   • MO ECHO TRANSESOPHAG R-T 2D W/PRB IMG ACQUISJ I&R N/A 2020    Procedure: TRANSESOPHAGEAL ECHOCARDIOGRAM (THO); Surgeon: Estefany Vincent DO;  [facility];  Service: Cardiac Surgery   • MO ESOPHAGOGASTRODUODENOSCOPY TRANSORAL DIAGNOSTIC N/A 2018    Procedure: ESOPHAGOGASTRODUODENOSCOPY (EGD); Surgeon: Corrinne Peacock, MD;  Location: [facility] GI LAB;   Service: Gastroenterology   • MO LAPAROSCOPY SURG COLOSTOMY/SKN LVL CECOSTOMY N/A 2018    Procedure: PERMANENT END COLOSTOMY;  Surgeon: Russell Caro MD;  [facility];  Service: Colorectal   • GA LAPS GSTR RSTCV PX W/BYP SOLEDAD-EN-Y LIMB <150 CM N/A 2016    Procedure: BYPASS GASTRIC  SOLEDAD-EN-Y LAPAROSCOPIC;  Surgeon: Yoni Hernandez MD;  [facility];  Service: Bariatrics   • GA LAPS PROCTECTOMY ABDOMINOPERINEAL W/COLOSTOMY N/A 2018    Procedure: PROCTECTOMY;  Surgeon: Jamey Sanchez MD;  [facility];  Service: Colorectal   • GA REPLACE AORTIC VALVE OPENFEMORAL ARTERY APPROACH N/A 2020    Procedure: REPLACEMENT AORTIC VALVE TRANSCATHETER (TAVR) TRANSFEMORAL W/ 26MM WADDELL BARBARA S3 ULTRA VALVE(ACCESS ON RIGHT); Surgeon: Matt Decker DO;  [facility];  Service: Cardiac Surgery   • REPLACEMENT TOTAL KNEE     • TOOTH EXTRACTION     • TUBAL LIGATION       Family History   Adopted: Yes   Problem Relation Age of Onset   • Leukemia Mother    • Heart disease Father    • Coronary artery disease Father    • Diabetes Father    • No Known Problems Sister    • No Known Problems Brother    • Diabetes Son    • No Known Problems Brother    • No Known Problems Brother    • No Known Problems Sister    • No Known Problems Sister     Family history:   Mother  of leukemia (NOS), father  from heart disease (NOS), 2 children 1 with diabetes, no known familial or genetic diseases, no family history of GI malignancies    Social History     Socioeconomic History   • Marital status: Single     Spouse name: Not on file   • Number of children: 2   • Years of education: Not on file   • Highest education level: Not on file   Occupational History   • Not on file   Tobacco Use   • Smoking status: Former     Packs/day: 1.00     Years: 25.00     Total pack years: 25.00     Types: Cigarettes     Quit date: 3/30/2006     Years since quittin.3   • Smokeless tobacco: Never   Vaping Use   • Vaping Use: Never used   Substance and Sexual Activity   • Alcohol use: Not Currently   • Drug use: Not Currently     Types: Oxycodone     Comment: Percocet for lower back pain prn   • Sexual activity: Not Currently   Other Topics Concern   • Not on file   Social History Narrative   • Not on file     Social Determinants of Health     Financial Resource Strain: Not on file   Food Insecurity: Not on file   Transportation Needs: Not on file   Physical Activity: Not on file   Stress: Not on file   Social Connections: Not on file   Intimate Partner Violence: Not on file   Housing Stability: Not on file       Current Outpatient Medications:   •  albuterol (2.5 mg/3 mL) 0.083 % nebulizer solution, TAKE 1 VIAL BY NEBULIZATION EVERY 4 HOURS AS NEEDED FOR WHEEZING OR SHORTNESS OF BREATH, Disp: 225 mL, Rfl: 1  •  amiodarone 100 mg tablet, Take 1 tablet (100 mg total) by mouth daily, Disp: 90 tablet, Rfl: 3  •  apixaban (ELIQUIS) 5 mg, Take 1 tablet (5 mg total) by mouth 2 (two) times a day, Disp: 180 tablet, Rfl: 2  •  atorvastatin (LIPITOR) 20 mg tablet, TAKE ONE TABLET DAILY, Disp: 90 tablet, Rfl: 0  •  Calcium-Vitamin D 500-125 MG-UNIT TABS, Take 1 tablet by mouth 2 (two) times a day , Disp: , Rfl:   •  ferrous sulfate 324 (65 Fe) mg, Take 1 tablet (324 mg total) by mouth daily before breakfast, Disp: 30 tablet, Rfl: 0  •  Jardiance 10 MG TABS tablet, TAKE 1 TABLET EVERY MORNING, Disp: 90 tablet, Rfl: 1  •  metoprolol succinate (TOPROL-XL) 25 mg 24 hr tablet, Take 1 tablet (25 mg total) by mouth daily, Disp: 90 tablet, Rfl: 1  •  Multiple Vitamins-Minerals (BARIATRIC FUSION) CHEW, Chew 1 tablet daily  , Disp: , Rfl:   •  nystatin (MYCOSTATIN) cream, , Disp: , Rfl:   •  omeprazole (PriLOSEC) 20 mg delayed release capsule, TAKE 1 CAPSULE DAILY BEFORE BREAKFAST, Disp: 90 capsule, Rfl: 1  •  oxyCODONE (ROXICODONE) 10 MG TABS, Take 10 mg by mouth every 6 (six) hours as needed  , Disp: , Rfl:   •  potassium chloride (K-DUR,KLOR-CON) 20 mEq tablet, Take 1 tablet (20 mEq total) by mouth if needed (if taking torsemide), Disp: 30 tablet, Rfl: 1  •  torsemide (DEMADEX) 20 mg tablet, Take 1 tablet (20 mg total) by mouth if needed (for leg swelling/weight gain above 3 pounds), Disp: 30 tablet, Rfl: 1  •  triamcinolone (KENALOG) 0.5 % ointment, Apply topically 2 (two) times a day Do not use for more than 2 weeks (Patient not taking: Reported on 7/18/2023), Disp: 30 g, Rfl: 0    Allergies   Allergen Reactions   • Tramadol Other (See Comments)     Dizzy       Vitals:    07/18/23 1450   BP: 130/78   Pulse: 98   Resp: 20   Temp: 97.7 °F (36.5 °C)   SpO2: 99%     Physical Exam  Constitutional:       Appearance: She is well-developed. Comments: Well-nourished female, no respiratory distress   HENT:      Head: Normocephalic and atraumatic. Right Ear: External ear normal.      Left Ear: External ear normal.      Nose: Nose normal.   Eyes:      Conjunctiva/sclera: Conjunctivae normal.      Pupils: Pupils are equal, round, and reactive to light. Cardiovascular:      Rate and Rhythm: Normal rate and regular rhythm. Heart sounds: Normal heart sounds. Pulmonary:      Effort: Pulmonary effort is normal.      Breath sounds: Normal breath sounds. Abdominal:      General: Bowel sounds are normal.      Palpations: Abdomen is soft. Comments: Left lower quadrant colostomy in place. Stool in back, nontender, +bowel sounds, well-healed suture lines, obese, cannot palpate liver or spleen   Musculoskeletal:      Cervical back: Normal range of motion and neck supple. Comments: No pain or tenderness with palpation of joints, muscles or bones, good range of motion in upper extremities, decreased range of motion in lower extremities but equal   Skin:     General: Skin is warm. Comments: Warm, moist, good color, no petechiae or ecchymoses   Neurological:      Mental Status: She is alert and oriented to person, place, and time. Deep Tendon Reflexes: Reflexes are normal and symmetric. Psychiatric:         Behavior: Behavior normal.         Thought Content:  Thought content normal.         Judgment: Judgment normal.     Extremities: no lower extremity edema bilaterally, no cords, pulses are 1+  Lymphatics:  No adenopathy in the neck, supraclavicular region, axilla and groin bilaterally  Rectal wound deferred    Labs    6/8/2023 WBC = 6.7 hemoglobin = 14.1 hematocrit = 50 MCV = 77 RDW = 22 platelet = 081 neutrophil = 65% ferritin = 24 iron = 34 iron saturation = 8% TIBC = 417 BUN = 14 creatinine = 0.97 calcium = 9.6 AST = 14 ALT = 19 alkaline phosphatase = 85 total bilirubin = 0.81        10/11/2022 WBC = 6.12 hemoglobin = 12.7 hematocrit = 41.6 MCV = 82 platelet = 429 neutrophil = 67% iron saturation = 13% iron = 49 TIBC = 381 ferritin = 22 BUN = 14 creatinine = 0.84 LFTs WNL     Imaging    6/10/2023 CAT scan chest abdomen pelvis with contrast    IMPRESSION:     1. Significantly increased size of a solid pulmonary nodule in the left upper lobe now measuring up to 1.2 cm (previously 0.4 cm in 2/2021), suspicious for indolent primary lung malignancy versus metastasis. 2.  Hyperdense masslike mural thickening in the antrum of the excluded stomach concerning for primary gastric lesion (such as neuroendocrine tumor), progressively increasing in size. There are several hyperdense lymph nodes in the upper abdomen which are new from prior studies. Recommend endoscopic evaluation if feasible, noting altered surgical anatomy. 3.  Otherwise no new or suspicious findings in the chest, abdomen, or pelvis. 02/21/2021 CT scan chest abdomen pelvis    No evidence of recurrent or metastatic disease throughout the chest, abdomen or pelvis. Large parastomal hernia containing loops of small bowel and cecum. 02/06/2020 CT scan of the chest and abdomen pelvis. Impression stated no evidence of metastatic disease, large left lower quadrant parastomal hernia containing multiple nonobstructed bowel loops.     04/01/2019 CT scan of the chest and abdomen pelvis    No CT evidence of metastatic disease within the chest abdomen or pelvis. Stable postoperative changes. 4/6/18 MRI pelvis rectal cancer staging    Low rectal cancer, 5 cm in length, circumferential around the rectal wall. (Series 8 images 19 through 33.)  Anteriorly, there are multiple areas where there is transmural tumor extension into the mesorectal fat making this at least a T3c lesion. On Series 8 image 31, tumor also abuts the mesorectal fascia making this CRM positive. At this point it is also   immediately adjacent to the vagina, therefore this may be a T4 lesion. There are 2 high risk perirectal nodes, and 1 low risk perirectal nodes. 01/31/2018 ultrasound right upper quadrant    1. Layering gallbladder sludge. No acute cholecystitis or biliary ductal obstruction. 2.  Hepatomegaly. 3.  Fluid-filled stomach. 1/27/18 CT scan of the abdomen/pelvis    1. Prior Juan-en-Y gastric bypass with distention of the excluded stomach. 2.  No evidence of acute abnormality in the abdomen or pelvis.     Pathology    Case Report   Surgical Pathology Report                         Case: [id number]                                    Authorizing Provider: GEORGETTE Marte       Collected:           05/29/2019 0922               Ordering Location:     [facility] Received:            05/29/2019 0922                                      On[facility]                                                            Pathologist:           Kari Mitchell MD                                                                Specimen:    Vaginal, vaginal polyp                                                                     Final Diagnosis   A.  Vagina, polyp (biopsy):  - Acutely and chronically inflamed granulation tissue    Electronically signed by Kari Mitchell MD on 5/30/2019 at  1:18 PM       Case Report   Surgical Pathology Report                         Case: [id number]                                    Authorizing Provider: Yusef Reyes MD      Collected:           09/06/2018 1010               Ordering Location:     [address]      Received:            09/06/2018 [facility]                                      Hospital Operating Room                                                       Pathologist:           Kaylee Bolton MD                                                         Specimens:   A) - Soft Tissue, Other, EPIPLOIC NODULE                                                             B) - Rectum, enbloc rectum, sigmoid, anus, uterus, bso, cervix, posterior vaginal                    wall                                                                                                 C) - Omentum                                                                               Addendum   Additional immunohistochemical stains performed with appropriate controls on a selected portion of serous carcinoma involving omental tissue (block C1) show the following results:  Estrogen receptor: Positive  Progesterone receptor: Negative   Addendum electronically signed by Kaylee Bolton MD on 9/27/2018 at  1:40 PM   Final Diagnosis   A. Soft tissue, epiploic nodule, biopsy:  -  Portion of nodular fibroadipose tissue with fat necrosis and dystrophic calcifications. -  Negative for metastatic carcinoma. -  Immunohistochemical stain performed with appropriate control for keratin AE1/3 is negative for epithelial elements, supporting the diagnosis.     B. Rectum, sigmoid colon, anus, uterus,cervix, bilateral ovaries and fallopian tubes and posterior vaginal wall; en bloc resection:  -  Invasive moderately differentiated adenocarcinoma of rectum (see first synoptic report). -  Low grade serous carcinoma involving left ovary, left fallopian tube, uterine serosa and colonic serosa (see second synoptic report). -  Separate portion of colon (consistent with sigmoid) with diverticulosis coli showing focal serosal implant of low-grade serous carcinoma.   -  Uterus showing benign inactive/atrophic endometrium with metaplastic change, benign endometrial polyp and rare cytologic atypia consistent with radiation effect. -  Cervix with mild glandular atypia, favor reactive (see note). -  Benign uterine myometrium with one intramural benign leiomyoma, negative for atypia or malignancy. -  Benign anal skin and dentate line with mild reactive change, negative for involvement by carcinoma. -  Benign vaginal wall mucosa with thinning, chronic inflammation, and surface erosion most suggestive of prior therapy effect, negative for involvement by carcinoma. -  27 benign pericolorectal lymph nodes identified, negative for metastatic carcinoma.     C. Omentum, omentectomy:  -   Low grade serous carcinoma.        COLORECTAL CARCINOMA TUMOR STAGING SUMMARY (includes specimen A-C of this case):  1. Specimen identification:     - Specimen:  Rectum, sigmoid colon, anus, uterus,cervix, bilateral ovaries and fallopian tubes and posterior vaginal wall   2. Tumor:     - Tumor site:  Rectum     - Tumor size:  Up to 3.3 cm     - Macroscopic tumor perforation:  Not identified     - Tumor location:  Below the peritoneal reflection        - Macroscopic intactness of mesorectum:  Intact     - Histologic Type:   Adenocarcinoma      - Histologic Grade: Moderately differentiated (G2)     - Microscopic Tumor Extension (ypT3): Tumor invades through muscularis propria into pericolorectal soft tissues     - Tumor budding (only needed in polyps, Stage I or II cancers):  Not identified   3. Margins (specify distance for nearest radial margin on RECTAL tumors only):     - Proximal Margin:  Negative for carcinoma     - Distal Margin:  Negative for carcinoma     - Circumferential (Radial) or Mesenteric Margin:  Negative for carcinoma (0.6cm)     - Other Margins:  Vaginal margin negative for carcinoma   4. Treatment effect:  Present;   Residual cancer with evident tumor regression, but more than single cells or rare small groups of cancer cells (partial response, score 2)   5. Lymph-vascular invasion:  Not identified   6. Perineural invasion:  Not identified   7. Tumor deposits: Not identified   8. Type of polyp in which invasive carcinoma arose:  Tubular adenoma   9. Regional lymph nodes (pN0):  27 benign pericolorectal lymph node sample, negative for metastatic carcinoma  10. Additional pathologic findings:  Diverticulosis coli  11. Ancillary Studies:  *  Immunohistochemical stains performed with appropriate controls show the primary rectal tumor to be positive for CK20 and CDX-2 and negative for CK 7, PAX-8, p53 (wild type expression), ER and WT-1, supporting a diagnosis of primary colorectal origin. 12. 8th Ed AJCC Stage:  at least Stage  IIA - ypT3, pN0, G2.        Primary Tumor of the Ovary/Fallopian Tube/Peritoneum, Staging Summary (includes Specimen A to C of this case):  1. Procedure:  Hysterectomy, bilateral salpingo-oophorectomy, omentectomy  2. Hysterectomy type:  Radical hysterectomy  3. Specimen integrity:      - Right ovary:  Intact      - Left ovary:  Intact       - Right fallopian tube: Intact      - Left fallopian tube: Intact  4. Tumor site:  Ovarian and fallopian tube surfaces and uterine and colonic serosa  5. Ovarian surface involvement:  Present  6. Fallopian tube surface involvement:  Present  7. Tumor size:  Largest focus measures 1.8 cm (uterine serosa) (see note)  8. Histologic type:  Low-grade serous carcinoma   9. Histologic grade:  Low grade  10. Implant:  Present  11. Other tissue/organ involvement:  Bilateral ovaries and fallopian tubes, uterine serosa, sigmoid colon serosa, and omentum  12. Largest extrapelvic peritoneal focus: 2.5 cm (macroscopically identidfied)    - Specify site:  Omentum  13. Peritoneal/ascetic fluid:  Not performed   14. Pleural fluid:  Not performed  15. Treatment effect:  No known prior therapy  16.  Regional lymph nodes:    - No regional uterine lymph nodes submitted or found    - 27 benign pericolorectal lymph nodes sampled, negative for metastatic carcinoma  17. Pathologic stage classification (pTNM, AJCC 8th edition): pT3c, pNX, Low Grade  18. FIGO stage (2015 FIGO cancer report): IIIC  19. Additional pathologic findings:  N/A  20. Ancillary studies:  Immunohistochemical stains performed with appropriate controls show the tumor cells to be positive for CK7, PAX-8, ER, and WT-1 with wild-type expression of p53 and negative for CK20 and CDX-2 supporting the diagnosis. Electronically signed by Ilan Reeves MD on 9/25/2018 at 12:54 PM        Case Report   Surgical Pathology Report                         Case: [id number]                                    Authorizing Provider: Yusef Reyes MD      Collected:           03/28/2018 1130               Pathologist:           Kari Mitchell MD             Received:            03/29/2018 0942               Specimen:    Rectum, Rectal cancer/mass biopsies                                                        Final Diagnosis   A. Rectal mass (biopsy):  - 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      Comment:   - In the context of a rectal mass, repeat biopsy and/or excision may be indicated to exclude a higher grade lesion.     Electronically signed by Kari Mitchell MD on 3/30/2018 at  2:36 PM         Case Report   Surgical Pathology Report                         Case: [id number]                                    Authorizing Provider: Pedro Vang MD          Collected:           03/09/2018 0902               Ordering Location:     [facility]   Received:            03/12/2018 0904                                      Center                                                                        Pathologist:           Becky Mulligan DO                                                             Specimens:   A) - Colon, polyp splenic flexure/cold snare                                                         B) - Colon, bx distal rectal mass                                                          Final Diagnosis   A. Colon, splenic flexure polyp, biopsy:  - Tubulovillous adenoma, negative for high-grade dysplasia.     B. 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      Intradepartmental consultation is in agreement with the above diagnosis (AT).    Interpretation performed at [facility], [address]  [id number]  Report faxed to Dr. Petey Read on 3/13/18 @ 10:55 AM   Electronically signed by Jong Liriano DO on 3/13/2018 at 10:54 AM

## 2023-07-18 NOTE — TELEPHONE ENCOUNTER
Regarding: SOONER APPT  ----- Message from Noemi Gary sent at 7/18/2023  3:46 PM EDT -----  [facility] calling in regards to scheduling EGD for pt. Pt will need office visit prior to procedure. She is looking to be scheduled before 9/05/2023 as she has f/u scheduled for that day. Please reach out to pt regarding sooner appt. [facility] center requesting pt be seen as soon as possible due to rectal cancer. Pt prefers to be scheduled at [facility].

## 2023-07-18 NOTE — TELEPHONE ENCOUNTER
I left message that we could possibly schedule earlier with Tennille Pena 8/16/23, asked for call back to discuss.

## 2023-07-19 DIAGNOSIS — I48.91 ATRIAL FIBRILLATION WITH RAPID VENTRICULAR RESPONSE (HCC): ICD-10-CM

## 2023-07-19 DIAGNOSIS — E78.5 HYPERLIPIDEMIA, UNSPECIFIED HYPERLIPIDEMIA TYPE: ICD-10-CM

## 2023-07-19 DIAGNOSIS — R06.02 EXERTIONAL SHORTNESS OF BREATH: ICD-10-CM

## 2023-07-19 DIAGNOSIS — I50.32 CHRONIC DIASTOLIC HEART FAILURE (HCC): ICD-10-CM

## 2023-07-19 DIAGNOSIS — R60.0 BILATERAL LEG EDEMA: ICD-10-CM

## 2023-07-19 DIAGNOSIS — Z95.2 S/P TAVR (TRANSCATHETER AORTIC VALVE REPLACEMENT): ICD-10-CM

## 2023-07-19 RX ORDER — ATORVASTATIN CALCIUM 20 MG/1
TABLET, FILM COATED ORAL
Qty: 90 TABLET | Refills: 0 | Status: SHIPPED | OUTPATIENT
Start: 2023-07-19

## 2023-07-19 RX ORDER — APIXABAN 5 MG/1
TABLET, FILM COATED ORAL
Qty: 60 TABLET | Refills: 2 | Status: SHIPPED | OUTPATIENT
Start: 2023-07-19

## 2023-07-19 RX ORDER — EMPAGLIFLOZIN 10 MG/1
TABLET, FILM COATED ORAL
Qty: 90 TABLET | Refills: 1 | OUTPATIENT
Start: 2023-07-19

## 2023-07-20 ENCOUNTER — TELEPHONE (OUTPATIENT)
Dept: HEMATOLOGY ONCOLOGY | Facility: CLINIC | Age: 68
End: 2023-07-20

## 2023-07-20 NOTE — TELEPHONE ENCOUNTER
Returned call to patient. She states she has not received a call from IR to schedule her lung biopsy yet. Referral placed on 7/18. Patient aware referral was sent to IR physicians for review. I advised if patient does not get a call to schedule by early next week, she should call our office back. Provided patient with my direct teams number.

## 2023-07-20 NOTE — TELEPHONE ENCOUNTER
Patient Call    Who are you speaking with? self   If it is not the patient, are they listed on an active communication consent form? self   What is the reason for this call? Tumor in lung, needs test   Does this require a call back? yes   If a call back is required, please list best call back number [phone]   If a call back is required, advise that a message will be forwarded to their care team and someone will return their call as soon as possible. Did you relay this information to the patient?  yes

## 2023-07-26 ENCOUNTER — OFFICE VISIT (OUTPATIENT)
Dept: GASTROENTEROLOGY | Facility: CLINIC | Age: 68
End: 2023-07-26
Payer: MEDICARE

## 2023-07-26 ENCOUNTER — TELEPHONE (OUTPATIENT)
Dept: HEMATOLOGY ONCOLOGY | Facility: CLINIC | Age: 68
End: 2023-07-26

## 2023-07-26 VITALS
WEIGHT: 282 LBS | HEIGHT: 65 IN | DIASTOLIC BLOOD PRESSURE: 89 MMHG | BODY MASS INDEX: 46.98 KG/M2 | HEART RATE: 116 BPM | SYSTOLIC BLOOD PRESSURE: 125 MMHG

## 2023-07-26 DIAGNOSIS — K31.89 GASTRIC MASS: ICD-10-CM

## 2023-07-26 DIAGNOSIS — Z98.84 HISTORY OF GASTRIC BYPASS: Primary | ICD-10-CM

## 2023-07-26 DIAGNOSIS — Z85.048 HISTORY OF RECTAL CANCER: ICD-10-CM

## 2023-07-26 PROCEDURE — 99214 OFFICE O/P EST MOD 30 MIN: CPT | Performed by: INTERNAL MEDICINE

## 2023-07-26 NOTE — TELEPHONE ENCOUNTER
Per Dr Wenceslao Garcia, IR cannot do biopsy  Left voicemail for patient to call my TEAMs number to discuss

## 2023-07-26 NOTE — PATIENT INSTRUCTIONS
Scheduled date of colonoscopy (as of today): 8/3/23  Physician performing colonoscopy: Kody  Location of colonoscopy: [facility]  Bowel prep reviewed with patient: Sutab sample  Instructions reviewed with patient by: Verneta Spatz  Clearances: cardiac clearance eliquis

## 2023-07-26 NOTE — PROGRESS NOTES
[facility]  Progress Note - Kathy Dey 79 y.o. female MRN: [id number]    Unit/Bed#:  Encounter: [id number]    Assessment/Plan:    1. Gastric antral wall thickening within the excluded stomach-  -would recommend EGD by advance team at [facility], discussed with bariatric surgery and they recommended advanced endoscopy approach first.  -No reports of melena, epigastric abdominal pain or hematochezia, currently on omeprazole 40 mg since having had marginal ulcer.  -Continue to avoid NSAIDs. 2.  History of rectal cancer status post neoadjuvant chemotherapy/radiation treatment and proctectomy with permanent end colostomy complicated by wound healing and fistula formation which has now resolved. Currently has colostomy in place-no colonoscopy since the diagnosis in 2018. She has not followed-up with colorectal surgery. She follows with hematology/oncology for surveillance. -Given patient's history of rectal cancer, would recommend colonoscopy at this time to assess for colon polyps and lesions based on the guidelines.  -Patient wants small-volume prep, will give sample of Sutab bowel prep to the patient. Discussed with patient regarding the importance of completing the bowel prep. Subjective:     25-year-old female with history of gastric bypass surgery, marginal ulcers, A-fib, on Eliquis, stage IIa rectal cancer status post chemo therapy/resection with colostomy has been referred by hematology/oncology for abnormal CAT scan findings. CAT scan was notable for enlarging pulmonary nodules along with gastric antral wall thickening which appears to be enlarging. This appears to be in the excluded stomach. There are also some nearby ill-defined hyperenhancing gastrohepatic lymph nodes. Patient denies any change in bowel habits, melena or hematochezia. She denies any epigastric abdominal pain, reports that she has been taking PPI ever since her diagnosis of marginal ulcer.   She denies nausea or vomiting, no dysphagia. She tells me that she has never had a colonoscopy since a diagnosis of rectal cancer. She has not seen colorectal surgery. Ostomy contains brown stool. Objective:     Vitals: not currently breastfeeding. ,There is no height or weight on file to calculate BMI. [unfilled]    Physical Exam:    GEN: wn/wd, NAD  HEENT: MMM, no cervical or supraclavicular LAD, anciteric  CV: RRR, no m/r/g  CHEST: CTA b/l, no w/r/r  ABD: +BS, soft, NT/ND, no hepatosplenomegaly, multiple abdominal scars, colostomy with brown stool, ventral wall hernias. EXT: no c/c/e  SKIN: no rashes  NEURO: aaox3      Invasive Devices     Drain  Duration           Colostomy Descending/sigmoid LLQ 1784 days                        Lab, Imaging and other studies:     No visits with results within 1 Day(s) from this visit.    Latest known visit with results is:   Appointment on 06/08/2023   Component Date Value   • WBC 06/08/2023 6.70    • RBC 06/08/2023 6.53 (H)    • Hemoglobin 06/08/2023 14.1    • Hematocrit 06/08/2023 50.0 (H)    • MCV 06/08/2023 77 (L)    • MCH 06/08/2023 21.6 (L)    • MCHC 06/08/2023 28.2 (L)    • RDW 06/08/2023 21.8 (H)    • MPV 06/08/2023 9.6    • Platelets 13/73/4291 212    • nRBC 06/08/2023 0    • Neutrophils Relative 06/08/2023 65    • Immat GRANS % 06/08/2023 1    • Lymphocytes Relative 06/08/2023 23    • Monocytes Relative 06/08/2023 8    • Eosinophils Relative 06/08/2023 2    • Basophils Relative 06/08/2023 1    • Neutrophils Absolute 06/08/2023 4.42    • Immature Grans Absolute 06/08/2023 0.04    • Lymphocytes Absolute 06/08/2023 1.55    • Monocytes Absolute 06/08/2023 0.55    • Eosinophils Absolute 06/08/2023 0.10    • Basophils Absolute 06/08/2023 0.04    • Sodium 06/08/2023 140    • Potassium 06/08/2023 4.9    • Chloride 06/08/2023 109 (H)    • CO2 06/08/2023 29    • ANION GAP 06/08/2023 2 (L)    • BUN 06/08/2023 14    • Creatinine 06/08/2023 0.97    • Glucose, Fasting 06/08/2023 112 (H)    • Calcium 06/08/2023 9.6    • Corrected Calcium 06/08/2023 10.1    • AST 06/08/2023 14    • ALT 06/08/2023 19    • Alkaline Phosphatase 06/08/2023 85    • Total Protein 06/08/2023 7.2    • Albumin 06/08/2023 3.4 (L)    • Total Bilirubin 06/08/2023 0.81    • eGFR 06/08/2023 60    • CEA 06/08/2023 2.0    • Iron Saturation 06/08/2023 8 (L)    • TIBC 06/08/2023 417    • Iron 06/08/2023 34 (L)    • Ferritin 06/08/2023 24          I have personally reviewed pertinent films in PACS    No current facility-administered medications for this visit.

## 2023-07-27 ENCOUNTER — TELEPHONE (OUTPATIENT)
Dept: GASTROENTEROLOGY | Facility: CLINIC | Age: 68
End: 2023-07-27

## 2023-07-27 ENCOUNTER — PREP FOR PROCEDURE (OUTPATIENT)
Dept: GASTROENTEROLOGY | Facility: CLINIC | Age: 68
End: 2023-07-27

## 2023-07-27 DIAGNOSIS — Z98.84 HISTORY OF GASTRIC BYPASS: ICD-10-CM

## 2023-07-27 DIAGNOSIS — K31.89 GASTRIC MASS: Primary | ICD-10-CM

## 2023-07-27 NOTE — TELEPHONE ENCOUNTER
Our mutual patient is scheduled for procedure: colonoscopy    On: August 3 , 2023     With: Dr. Uriel Garcia.  Poncho Mauro MD    He/She is taking the following blood thinner: Eliquis (Apixaban)    Can this be stopped  2 days prior to the procedure    Physician Approving clearance:

## 2023-07-27 NOTE — TELEPHONE ENCOUNTER
Patient discussed with Dr. Alexandra Muñoz who ordered 90 minute EUS (linear with Axios stent placement).

## 2023-07-27 NOTE — TELEPHONE ENCOUNTER
Received message from Dr. Monalisa Chavez on 7/26/2023 at 5:11 PM regarding her patient who had a history of gastric bypass many years ago. Maggie Pozo now has CAT scan findings with antral wall thickening. Dr. Monalisa Chavez spoke with bariatric surgery Dr. Bertha Brown, who recommended that advanced endoscopy try to access the bypassed stomach. I discussed patient with Dr. Poornima Shannon who ordered 90 minute EUS (linear with Axios stent placement).

## 2023-07-28 ENCOUNTER — TELEPHONE (OUTPATIENT)
Dept: GASTROENTEROLOGY | Facility: CLINIC | Age: 68
End: 2023-07-28

## 2023-07-28 ENCOUNTER — TELEPHONE (OUTPATIENT)
Dept: HEMATOLOGY ONCOLOGY | Facility: CLINIC | Age: 68
End: 2023-07-28

## 2023-07-28 NOTE — TELEPHONE ENCOUNTER
Our mutual patient is scheduled for procedure: EUS    On: 08/03/2023     With: Dr. Ash Schneider is taking the following blood thinner: Eliquis    Can this be stopped  5  days prior to the procedure    Physician Approving clearance:     Please approve ASAP    Procedure was changed from Colonoscopy to EUS   Please send clearance ASAP   Thank you

## 2023-07-28 NOTE — TELEPHONE ENCOUNTER
Spoke with patient  IR unable to do lung biopsy  Will discuss with Dr Larissa Hewitt and get back to patient with plan  Patient verbalizes understanding

## 2023-07-28 NOTE — TELEPHONE ENCOUNTER
Scheduled date of EUS(as of today):08/03/2023  Physician performing EUS: Sandra Fuller  Location of EGD:[place]  Instructions reviewed with patient by: Stephanie Gore and sent in [facility]  Clearances: Eliquis /Cleared for previous procedure that was cancelled for the same day. Patient is aware    Procedures were changed from Colonoscopy to EUS  Linear with  Axios stent placement. Procedure approved by Maxene Angelucci Dr. Teressa Sees to call patient and explain the procedure to her .

## 2023-07-28 NOTE — TELEPHONE ENCOUNTER
Patient Call    Who are you speaking with? Patient    If it is not the patient, are they listed on an active communication consent form? N/A   What is the reason for this call? Pt was returning call regarding the biopsy   Does this require a call back? Yes   If a call back is required, please list best call back number [phone]   If a call back is required, advise that a message will be forwarded to their care team and someone will return their call as soon as possible. Did you relay this information to the patient?  Yes

## 2023-07-31 ENCOUNTER — OFFICE VISIT (OUTPATIENT)
Dept: BARIATRICS | Facility: CLINIC | Age: 68
End: 2023-07-31

## 2023-07-31 ENCOUNTER — TELEPHONE (OUTPATIENT)
Dept: GASTROENTEROLOGY | Facility: CLINIC | Age: 68
End: 2023-07-31

## 2023-07-31 VITALS
WEIGHT: 279 LBS | HEIGHT: 64 IN | BODY MASS INDEX: 47.63 KG/M2 | TEMPERATURE: 97.5 F | DIASTOLIC BLOOD PRESSURE: 80 MMHG | SYSTOLIC BLOOD PRESSURE: 126 MMHG | HEART RATE: 69 BPM

## 2023-07-31 DIAGNOSIS — Z85.048 HISTORY OF RECTAL CANCER: ICD-10-CM

## 2023-07-31 DIAGNOSIS — Z98.84 BARIATRIC SURGERY STATUS: ICD-10-CM

## 2023-07-31 DIAGNOSIS — E66.01 OBESITY, CLASS III, BMI 40-49.9 (MORBID OBESITY) (HCC): ICD-10-CM

## 2023-07-31 DIAGNOSIS — Z48.815 ENCOUNTER FOR SURGICAL AFTERCARE FOLLOWING SURGERY OF DIGESTIVE SYSTEM: Primary | ICD-10-CM

## 2023-07-31 DIAGNOSIS — K91.2 POSTSURGICAL MALABSORPTION: ICD-10-CM

## 2023-07-31 NOTE — PROGRESS NOTES
Assessment/Plan:     Patient ID: Hadley Kruger is a 79 y.o. female. Bariatric Surgery Status/Hx of rectal Cancer    -s/p Juan-En-Y Gastric Bypass with Dr. Lucas Shepard on 07/18/2016. Presents to the office today for OD annual with concerns of "stomach cancer." Patient with hx of rectal cancer 5 years ago in which she underwent chemo and radiation and proctectomy with colostomy. She has been doing well - gets routine annual CT scans due to this. Unfortunately recent CT scan was found to have "Hyperdense masslike mural thickening in the antrum of the excluded stomach concerning for primary gastric lesion (such as neuroendocrine tumor), progressively increasing in size. There are several hyperdense lymph nodes in the upper abdomen which are   new from prior studies. Recommend endoscopic evaluation if feasible, noting altered surgical anatomy."     She currently denies having any abdominal pain, N/V/D/C, regurgitation, reflux or dysphagia. She has gained weight over the past few years and is trying to get back on track by changing eating habits. PLAN:     - will discuss case with Dr. Lucas Shepard - patient will need further evaluation of the gastric remnant and communicate with GI attending for further recommendations. - Routine follow up in 1 year for annual visit. - Continue with healthy lifestyle, adequate protein intake of 60 gm, fluid intake of at least 64 oz. - Continue with MVI daily. - Activity as tolerated. - Labs ordered and will adjust accordingly if any deficiency. - Follow up with RD and SW as needed. · Continued/Maintain healthy weight loss with good nutrition intakes. · Adequate hydration with at least 64oz. fluid intake. · Follow diet as discussed. · Follow vitamin and mineral recommendations as reviewed with you. · Exercise as tolerated. · Colonoscopy referral made: UTD  · Mammogram - ORDERED - will have done. · Follow-up in 1 year for routine annual visit.  We kindly ask that your arrive 15 minutes before your scheduled appointment time with your provider to allow our staff to room you, get your vital signs and update your chart. · Get lab work done prior to annual visit. Please call the office if you need a script. It is recommended to check with your insurance BEFORE getting labs done to make sure they are covered by your policy. · Call our office if you have any problems with abdominal pain especially associated with fever, chills, nausea, vomiting or any other concerns. · All  Post-bariatric surgery patients should be aware that very small quantities of any alcohol can cause impairment and it is very possible not to feel the effect. The effect can be in the system for several hours. It is also a stomach irritant. · It is advised to AVOID alcohol, Nonsteroidal antiinflammatory drugs (NSAIDS) and nicotine of all forms . Any of these can cause stomach irritation/pain. · Discussed the effects of alcohol on a bariatric patient and the increased impairment risk. · Keep up the good work! Postsurgical Malabsorption   -At risk for malabsorption of vitamins/minerals secondary to malabsorption and restriction of intake from bariatric surgery  -Currently taking adequate postop bariatric surgery vitamin supplementation  -Next set of bariatric labs ordered for approximately 2 weeks  -Patient received education about the importance of adhering to a lifelong supplementation regimen to avoid vitamin/mineral deficiencies      Diagnoses and all orders for this visit:    Encounter for surgical aftercare following surgery of digestive system  -     Zinc; Future  -     Vitamin D 25 hydroxy; Future  -     Vitamin B12; Future  -     Vitamin B1, whole blood; Future  -     Vitamin A; Future  -     PTH, intact; Future  -     Folate; Future    Bariatric surgery status  -     Zinc; Future  -     Vitamin D 25 hydroxy;  Future  -     Vitamin B12; Future  -     Vitamin B1, whole blood; Future  -     Vitamin A; Future  -     PTH, intact; Future  -     Folate; Future    Postsurgical malabsorption  -     Zinc; Future  -     Vitamin D 25 hydroxy; Future  -     Vitamin B12; Future  -     Vitamin B1, whole blood; Future  -     Vitamin A; Future  -     PTH, intact; Future  -     Folate; Future    Obesity, Class III, BMI 40-49.9 (morbid obesity) (HCC)  -     Zinc; Future  -     Vitamin D 25 hydroxy; Future  -     Vitamin B12; Future  -     Vitamin B1, whole blood; Future  -     Vitamin A; Future  -     PTH, intact; Future  -     Folate; Future    History of rectal cancer         Subjective:      Patient ID: Blake Hansen is a 79 y.o. female. -s/p Juan-En-Y Gastric Bypass with Dr. Dirk Meyer on 07/18/2016. Presents to the office today for OD annual with concerns of "stomach cancer." Patient with hx of rectal cancer 5 years ago in which she underwent chemo and radiation and proctectomy with colostomy. She has been doing well - gets routine annual CT scans due to this. Unfortunately recent CT scan was found to have "Hyperdense masslike mural thickening in the antrum of the excluded stomach concerning for primary gastric lesion (such as neuroendocrine tumor), progressively increasing in size. There are several hyperdense lymph nodes in the upper abdomen which are   new from prior studies. Recommend endoscopic evaluation if feasible, noting altered surgical anatomy."     She currently denies having any abdominal pain, N/V/D/C, regurgitation, reflux or dysphagia. She has gained weight over the past few years and is trying to get back on track by changing eating habits. Initial: 353 lbs  Current: 279 lbs  EWL: (Weight loss is ahead of schedule at this post surgical period.)  John: 230s  Current BMI is Body mass index is 47.89 kg/m².     · Tolerating a regular diet-yes  · Eating at least 60 grams of protein per day-yes  · Following 30/60 minute rule with liquids-yes  · Drinking at least 64 ounces of fluid per day-yes  · Drinking carbonated beverages-yes - drinks once a day   · Sufficient exercise-limited due to hip and joint pain - was walking. · Using NSAIDs regularly-no  · Using nicotine-no  · Using alcohol-no  · Supplements: Multivitamins - fusions and calcium + iron     · EWL is 36%, which places the patient ahead of schedule for expected post surgical weight loss at this time. The following portions of the patient's history were reviewed and updated as appropriate: allergies, current medications, past family history, past medical history, past social history, past surgical history and problem list.    Review of Systems   Constitutional: Negative. Respiratory: Negative. Cardiovascular: Negative. Gastrointestinal: Negative for abdominal pain, nausea and vomiting. Musculoskeletal: Positive for back pain. Neurological: Negative. Psychiatric/Behavioral: Negative. Objective:    /80   Pulse 69   Temp 97.5 °F (36.4 °C) (Tympanic)   Ht 5' 4" (1.626 m)   Wt 127 kg (279 lb) Comment: with / shoes. BMI 47.89 kg/m²      Physical Exam  Vitals and nursing note reviewed. Constitutional:       Appearance: Normal appearance. She is obese. Cardiovascular:      Rate and Rhythm: Normal rate and regular rhythm. Pulses: Normal pulses. Heart sounds: Normal heart sounds. Pulmonary:      Effort: Pulmonary effort is normal.      Breath sounds: Normal breath sounds. Abdominal:      General: Bowel sounds are normal. There is no distension. Palpations: Abdomen is soft. Tenderness: There is no abdominal tenderness. Comments: Colostomy present     Musculoskeletal:      Comments: Limited ROM with rolling walker   Skin:     General: Skin is warm and dry. Neurological:      General: No focal deficit present. Mental Status: She is alert and oriented to person, place, and time.    Psychiatric:         Mood and Affect: Mood normal.         Behavior: Behavior normal.         Thought Content: Thought content normal.         Judgment: Judgment normal.           I have spent a total time of 40 minutes on 07/31/23 in caring for this patient including Diagnostic results, Prognosis, Risks and benefits of tx options, Instructions for management, Patient and family education, Importance of tx compliance, Risk factor reductions, Impressions, Counseling / Coordination of care, Documenting in the medical record, Reviewing / ordering tests, medicine, procedures  , Obtaining or reviewing history   and Communicating with other healthcare professionals .

## 2023-08-01 ENCOUNTER — TELEPHONE (OUTPATIENT)
Dept: HEMATOLOGY ONCOLOGY | Facility: MEDICAL CENTER | Age: 68
End: 2023-08-01

## 2023-08-01 NOTE — TELEPHONE ENCOUNTER
D/W to Dr Luis Elliott  Pulmonary nodule is not accessible by biopsy  Will continue to monitor area per Dr Luis Elliott  Patient has f/u in September to discuss plan  Patient verbalizes understanding of plan

## 2023-08-02 ENCOUNTER — TELEPHONE (OUTPATIENT)
Dept: GASTROENTEROLOGY | Facility: CLINIC | Age: 68
End: 2023-08-02

## 2023-08-02 ENCOUNTER — TELEPHONE (OUTPATIENT)
Age: 68
End: 2023-08-02

## 2023-08-02 NOTE — TELEPHONE ENCOUNTER
Called and spoke to Lisha Edgar regarding EDGE procedure where we would create a gastro-gastric fistula with lumen apposing metal stent to evaluate her remnant stomach and evaluate imagine concerning for mass in the remnant stomach. Discussed procedure in detail and told her we will likely evaluate the remnant stomach during the same session and will keep access until we do not need access to her remnant stomach. She was initially on my schedule for tomorrow morning but something has come up and she is unable to make that appointment. I will have advanced pool reach out to reschedule with urgent priority.

## 2023-08-02 NOTE — TELEPHONE ENCOUNTER
Patients GI provider:  Dr. Aby Colmenares / Rosalinda Beck     Number to return call: [phone]    Reason for call: Pt calling to reschedule her EUS.     Scheduled procedure/appointment date if applicable: Apt/procedure - 8/3/23

## 2023-08-02 NOTE — TELEPHONE ENCOUNTER
Scheduled date of EUS /Colonoscopy(as of today): 08/29/2023  Physician performing EUS /Colonoscopy: Isaura Baugh  [facility]  Instructions reviewed with patient by:Johnna patient stated  Has all instructions and prep  Clearances:   Eliquis cleared and patient notified

## 2023-08-03 ENCOUNTER — PREP FOR PROCEDURE (OUTPATIENT)
Dept: BARIATRICS | Facility: CLINIC | Age: 68
End: 2023-08-03

## 2023-08-03 ENCOUNTER — TELEPHONE (OUTPATIENT)
Dept: GASTROENTEROLOGY | Facility: CLINIC | Age: 68
End: 2023-08-03

## 2023-08-03 DIAGNOSIS — Z98.84 BARIATRIC SURGERY STATUS: Primary | ICD-10-CM

## 2023-08-03 NOTE — TELEPHONE ENCOUNTER
Trying to schedule patient earlier with [facility]. Called patient and is keeping procedure for Lopez on the 29th .

## 2023-08-08 ENCOUNTER — TELEPHONE (OUTPATIENT)
Dept: BARIATRICS | Facility: CLINIC | Age: 68
End: 2023-08-08

## 2023-08-08 NOTE — TELEPHONE ENCOUNTER
Called patient to let her know GI will be doing  her procedure to evaluate her remnant stomach. Will cancel her EGD on bariatric's schedule.

## 2023-08-18 ENCOUNTER — TELEPHONE (OUTPATIENT)
Dept: GASTROENTEROLOGY | Facility: MEDICAL CENTER | Age: 68
End: 2023-08-18

## 2023-08-18 ENCOUNTER — RA CDI HCC (OUTPATIENT)
Dept: OTHER | Facility: HOSPITAL | Age: 68
End: 2023-08-18

## 2023-08-18 NOTE — PROGRESS NOTES
[address] [facility] coding opportunities          Chart Reviewed number of suggestions sent to Provider: 3     Patients Insurance     Medicare Insurance: Medicare        I13.0  E11.65  [id number]

## 2023-08-18 NOTE — TELEPHONE ENCOUNTER
Spoke to patient she is aware to hold Jardiance 4 days prior and Eliquis 2 days prior and has all her prep instructions for her upcoming colonoscopy and EUS scheduled for 08/29/2023. Pt did not have any further questions.

## 2023-08-29 ENCOUNTER — PREP FOR PROCEDURE (OUTPATIENT)
Dept: GASTROENTEROLOGY | Facility: MEDICAL CENTER | Age: 68
End: 2023-08-29

## 2023-08-29 ENCOUNTER — ANESTHESIA (OUTPATIENT)
Dept: GASTROENTEROLOGY | Facility: HOSPITAL | Age: 68
End: 2023-08-29

## 2023-08-29 ENCOUNTER — HOSPITAL ENCOUNTER (OUTPATIENT)
Dept: RADIOLOGY | Facility: HOSPITAL | Age: 68
Setting detail: OUTPATIENT SURGERY
Discharge: HOME | End: 2023-08-29
Payer: MEDICARE

## 2023-08-29 ENCOUNTER — ANESTHESIA EVENT (OUTPATIENT)
Dept: GASTROENTEROLOGY | Facility: HOSPITAL | Age: 68
End: 2023-08-29

## 2023-08-29 ENCOUNTER — HOSPITAL ENCOUNTER (OUTPATIENT)
Dept: GASTROENTEROLOGY | Facility: HOSPITAL | Age: 68
Setting detail: OUTPATIENT SURGERY
Discharge: HOME | End: 2023-08-29
Attending: INTERNAL MEDICINE
Payer: MEDICARE

## 2023-08-29 VITALS
OXYGEN SATURATION: 93 % | TEMPERATURE: 96.3 F | SYSTOLIC BLOOD PRESSURE: 159 MMHG | HEART RATE: 110 BPM | HEIGHT: 65 IN | RESPIRATION RATE: 20 BRPM | DIASTOLIC BLOOD PRESSURE: 115 MMHG | BODY MASS INDEX: 46.65 KG/M2 | WEIGHT: 280 LBS

## 2023-08-29 DIAGNOSIS — K31.89 GASTRIC MASS: Primary | ICD-10-CM

## 2023-08-29 DIAGNOSIS — K31.89 GASTRIC MASS: ICD-10-CM

## 2023-08-29 DIAGNOSIS — Z85.048 HISTORY OF RECTAL CANCER: ICD-10-CM

## 2023-08-29 DIAGNOSIS — Z98.84 HISTORY OF GASTRIC BYPASS: ICD-10-CM

## 2023-08-29 PROCEDURE — 74018 RADEX ABDOMEN 1 VIEW: CPT

## 2023-08-29 PROCEDURE — C1726 CATH, BAL DIL, NON-VASCULAR: HCPCS

## 2023-08-29 PROCEDURE — C1889 IMPLANT/INSERT DEVICE, NOC: HCPCS

## 2023-08-29 PROCEDURE — 88313 SPECIAL STAINS GROUP 2: CPT | Performed by: PATHOLOGY

## 2023-08-29 PROCEDURE — 88305 TISSUE EXAM BY PATHOLOGIST: CPT | Performed by: PATHOLOGY

## 2023-08-29 PROCEDURE — C1769 GUIDE WIRE: HCPCS

## 2023-08-29 PROCEDURE — C1874 STENT, COATED/COV W/DEL SYS: HCPCS

## 2023-08-29 PROCEDURE — 88342 IMHCHEM/IMCYTCHM 1ST ANTB: CPT | Performed by: PATHOLOGY

## 2023-08-29 RX ORDER — SIMETHICONE 20 MG/.3ML
EMULSION ORAL AS NEEDED
Status: COMPLETED | OUTPATIENT
Start: 2023-08-29 | End: 2023-08-29

## 2023-08-29 RX ORDER — ONDANSETRON 2 MG/ML
INJECTION INTRAMUSCULAR; INTRAVENOUS AS NEEDED
Status: DISCONTINUED | OUTPATIENT
Start: 2023-08-29 | End: 2023-08-29

## 2023-08-29 RX ORDER — SODIUM CHLORIDE, SODIUM LACTATE, POTASSIUM CHLORIDE, CALCIUM CHLORIDE 600; 310; 30; 20 MG/100ML; MG/100ML; MG/100ML; MG/100ML
INJECTION, SOLUTION INTRAVENOUS CONTINUOUS PRN
Status: DISCONTINUED | OUTPATIENT
Start: 2023-08-29 | End: 2023-08-29

## 2023-08-29 RX ORDER — SUCCINYLCHOLINE/SOD CL,ISO/PF 100 MG/5ML
SYRINGE (ML) INTRAVENOUS AS NEEDED
Status: DISCONTINUED | OUTPATIENT
Start: 2023-08-29 | End: 2023-08-29

## 2023-08-29 RX ORDER — LIDOCAINE HYDROCHLORIDE 10 MG/ML
INJECTION, SOLUTION EPIDURAL; INFILTRATION; INTRACAUDAL; PERINEURAL AS NEEDED
Status: DISCONTINUED | OUTPATIENT
Start: 2023-08-29 | End: 2023-08-29

## 2023-08-29 RX ORDER — FENTANYL CITRATE 50 UG/ML
INJECTION, SOLUTION INTRAMUSCULAR; INTRAVENOUS AS NEEDED
Status: DISCONTINUED | OUTPATIENT
Start: 2023-08-29 | End: 2023-08-29

## 2023-08-29 RX ORDER — PROPOFOL 10 MG/ML
INJECTION, EMULSION INTRAVENOUS AS NEEDED
Status: DISCONTINUED | OUTPATIENT
Start: 2023-08-29 | End: 2023-08-29

## 2023-08-29 RX ADMIN — PHENYLEPHRINE HYDROCHLORIDE 20 MCG/MIN: 10 INJECTION INTRAVENOUS at 13:15

## 2023-08-29 RX ADMIN — PROPOFOL 40 MG: 10 INJECTION, EMULSION INTRAVENOUS at 13:12

## 2023-08-29 RX ADMIN — Medication 100 MG: at 13:07

## 2023-08-29 RX ADMIN — FENTANYL CITRATE 50 MCG: 50 INJECTION, SOLUTION INTRAMUSCULAR; INTRAVENOUS at 13:15

## 2023-08-29 RX ADMIN — ONDANSETRON 4 MG: 2 INJECTION INTRAMUSCULAR; INTRAVENOUS at 13:15

## 2023-08-29 RX ADMIN — PROPOFOL 160 MG: 10 INJECTION, EMULSION INTRAVENOUS at 13:07

## 2023-08-29 RX ADMIN — FENTANYL CITRATE 50 MCG: 50 INJECTION, SOLUTION INTRAMUSCULAR; INTRAVENOUS at 13:06

## 2023-08-29 RX ADMIN — Medication 40 MG: at 13:22

## 2023-08-29 RX ADMIN — IOHEXOL 50 ML: 240 INJECTION, SOLUTION INTRATHECAL; INTRAVASCULAR; INTRAVENOUS; ORAL at 14:00

## 2023-08-29 RX ADMIN — SODIUM CHLORIDE, SODIUM LACTATE, POTASSIUM CHLORIDE, AND CALCIUM CHLORIDE: .6; .31; .03; .02 INJECTION, SOLUTION INTRAVENOUS at 13:39

## 2023-08-29 RX ADMIN — LIDOCAINE HYDROCHLORIDE 100 MG: 10 INJECTION, SOLUTION EPIDURAL; INFILTRATION; INTRACAUDAL; PERINEURAL at 13:07

## 2023-08-29 RX ADMIN — SODIUM CHLORIDE, SODIUM LACTATE, POTASSIUM CHLORIDE, AND CALCIUM CHLORIDE: .6; .31; .03; .02 INJECTION, SOLUTION INTRAVENOUS at 12:56

## 2023-08-29 RX ADMIN — GLUCAGON 1 MG: KIT at 13:37

## 2023-08-29 NOTE — ANESTHESIA PREPROCEDURE EVALUATION
Procedure:  COLONOSCOPY  ENDOSCOPIC ULTRASOUND (UPPER)    Relevant Problems   CARDIO   (+) Aortic stenosis, severe   (+) Atrial fibrillation with rapid ventricular response (HCC)   (+) CAD (coronary artery disease)   (+) Hyperlipidemia      ENDO   (+) Type 2 diabetes mellitus with chronic kidney disease, without long-term current use of insulin, unspecified CKD stage (HCC)      GI/HEPATIC   (+) GI bleed      /RENAL   (+) Chronic kidney disease      HEMATOLOGY   (+) Iron deficiency anemia   (+) Iron deficiency anemia, unspecified      MUSCULOSKELETAL   (+) Osteoarthritis of right knee      NEURO/PSYCH   (+) Continuous opioid dependence (HCC)      PULMONARY   (+) Pulmonary emphysema, unspecified emphysema type (HCC)        Physical Exam    Airway    Mallampati score: I  TM Distance: >3 FB  Neck ROM: full     Dental   upper dentures and lower dentures,     Cardiovascular  Cardiovascular exam normal    Pulmonary  Pulmonary exam normal     Other Findings        Anesthesia Plan  ASA Score- 3     Anesthesia Type- general with ASA Monitors. Additional Monitors:   Airway Plan: ETT. Plan Factors-Exercise tolerance (METS): >4 METS. Chart reviewed. EKG reviewed. Imaging results reviewed. Existing labs reviewed. Patient summary reviewed. Patient is not a current smoker. Patient did not smoke on day of surgery. Obstructive sleep apnea risk education given perioperatively. Induction- intravenous. Postoperative Plan- Plan for postoperative opioid use. Informed Consent- Anesthetic plan and risks discussed with patient. I personally reviewed this patient with the CRNA. Discussed and agreed on the Anesthesia Plan with the CRNA. Ekta Chiu

## 2023-08-29 NOTE — H&P
History and Physical - [facility]  Devi Pacheco 79 y.o. female MRN: [id number]    HPI: Devi Pacheco is a 79y.o. year old female who presents with abnormal gastric mucosa in remant stomach (post bypass). Plan for EDGE to evaluate the gastric remnant. Discussed the procedure at length with the patient. Discussed the possible risks of complications including bleeding, perforation and stent migration. She may also have some abdominal pain/nausea/weight gain. She is agreeable to proceed. Also discussed that this will be a two-step procedure.       Review of Systems    Historical Information   Past Medical History:   Diagnosis Date   • Acute pain of right knee    • Ambulatory dysfunction    • Anemia    • Arthritis    • Bleeding ulcer    • Cancer (HCC)     rectal   • Chronic lower back pain    • Chronic pain disorder     back pain   • Colon cancer ([address])    • Diabetes mellitus ([address])    • Endometrial cancer ([address])    • GERD (gastroesophageal reflux disease)    • History of chemotherapy    • History of MRSA infection    • Morbid obesity ([address])    • Morbid obesity with BMI of 45.0-49.9, adult ([address])    • Ovarian cancer ([address])    • Paroxysmal atrial fibrillation (HCC)    • Rectal cancer (HCC)    • S/P TAVR (transcatheter aortic valve replacement)    • SOB (shortness of breath)    • Spinal stenosis    • Systolic murmur    • Unsteady gait     uses walker   • Wears dentures    • Wears glasses      Past Surgical History:   Procedure Laterality Date   • ABDOMINAL PERINEAL BOWEL RESECTION W/ ILEOANAL POUCH N/A 2018    Procedure: LAPAROSCOPIC HAND ASSIST ABDOMINOPERINEAL RESECTION,  POSTERIOR VAGINECTOMY, OMENTECTOMY;  Surgeon: Jose Pan MD;  [facility]: [facility];  Service: Colorectal   • ABDOMINAL SURGERY      abscess removed from abdomen and right thigh, a hole in thigh closed by plastic surgeon   • ABSCESS DRAINAGE      abd, (R) leg, (L) leg   •  SECTION     •  SECTION     • CYSTOSCOPY N/A 2018    Procedure: Tyrese Adie;  Surgeon: Jackie Stanton MD;  [facility];  Service: Gynecology Oncology   • ESOPHAGOGASTRODUODENOSCOPY N/A 2016    Procedure: ESOPHAGOGASTRODUODENOSCOPY (EGD); Surgeon: Ramos Hopkins MD;  [facility];  Service:    • ESOPHAGOGASTRODUODENOSCOPY N/A 2016    Procedure: ESOPHAGOGASTRODUODENOSCOPY (EGD); Surgeon: Ramos Hopkins MD;  [facility]; Service:    • EYE SURGERY      laser eye surgery   • HYSTERECTOMY N/A 2018    Procedure: RADICAL HYSTERECTOMY TOTAL ABDOMINAL (ALEXANDRA). BSO;  Surgeon: Jackie Stanton MD;  [facility];  Service: Gynecology Oncology   • JOINT REPLACEMENT Left 2017    hip   • JOINT REPLACEMENT Right 2017    hip   • OOPHORECTOMY Bilateral    • OR COLONOSCOPY FLX DX W/COLLJ SPEC WHEN PFRMD N/A 2018    Procedure: COLONOSCOPY;  Surgeon: Akua Roa MD;  Location: [address] [place] [address] GI LAB; Service: Gastroenterology   • OR COLONOSCOPY FLX DX W/COLLJ [facility] WHEN PFRMD N/A 2018    Procedure: COLONOSCOPY;  Surgeon: Vladimir Samuel MD;  [facility]; Service: Colorectal   • OR ECHO TRANSESOPHAG R-T 2D W/PRB IMG ACQUISJ I&R N/A 2020    Procedure: TRANSESOPHAGEAL ECHOCARDIOGRAM (THO); Surgeon: Armani Santo DO;  [facility];  Service: Cardiac Surgery   • OR ESOPHAGOGASTRODUODENOSCOPY TRANSORAL DIAGNOSTIC N/A 2018    Procedure: ESOPHAGOGASTRODUODENOSCOPY (EGD); Surgeon: Akua Roa MD;  Location: [facility] GI LAB;   Service: Gastroenterology   • OR LAPAROSCOPY SURG COLOSTOMY/SKN LVL CECOSTOMY N/A 2018    Procedure: PERMANENT END COLOSTOMY;  Surgeon: Vladimir Samuel MD;  [facility];  Service: Colorectal   • OR LAPS GSTR RSTCV [address] [place] [address] W/BYP SOLEDAD-EN-Y LIMB <150 CM N/A 2016    Procedure: BYPASS GASTRIC  SOLEDAD-EN-Y LAPAROSCOPIC;  Surgeon: Ramos Hopkins MD;  [facility];  Service: Bariatrics   • OR LAPS PROCTECTOMY ABDOMINOPERINEAL W/COLOSTOMY N/A 2018    Procedure: PROCTECTOMY;  Surgeon: Sarah Damico MD;  [facility];  Service: Colorectal   • DE REPLACE AORTIC VALVE OPENFEMORAL ARTERY APPROACH N/A 2020    Procedure: REPLACEMENT AORTIC VALVE TRANSCATHETER (TAVR) TRANSFEMORAL W/ 26MM WADDELL BARBARA S3 ULTRA VALVE(ACCESS ON RIGHT); Surgeon: Ny Red DO;  [facility];  Service: Cardiac Surgery   • REPLACEMENT TOTAL KNEE     • TOOTH EXTRACTION     • TUBAL LIGATION       Social History   Social History     Substance and Sexual Activity   Alcohol Use Not Currently     Social History     Substance and Sexual Activity   Drug Use Not Currently   • Types: Oxycodone    Comment: Percocet for lower back pain prn     Social History     Tobacco Use   Smoking Status Former   • Packs/day: 1.00   • Years: 25.00   • Total pack years: 25.00   • Types: Cigarettes   • Quit date: 3/30/2006   • Years since quittin.4   • Passive exposure: Never   Smokeless Tobacco Never     Family History   Adopted: Yes   Problem Relation Age of Onset   • Leukemia Mother    • Heart disease Father    • Coronary artery disease Father    • Diabetes Father    • No Known Problems Sister    • No Known Problems Brother    • Diabetes Son    • No Known Problems Brother    • No Known Problems Brother    • No Known Problems Sister    • No Known Problems Sister        Meds/Allergies     (Not in a hospital admission)      Allergies   Allergen Reactions   • Tramadol Other (See Comments)     Dizzy         Objective     /85   Pulse 102   Temp 98.6 °F (37 °C) (Tympanic)   Resp 17   Ht 5' 5" (1.651 m)   Wt 127 kg (280 lb)   SpO2 96%   BMI 46.59 kg/m²       PHYSICAL EXAM    Gen: NAD  CV: RRR  CHEST: Clear  ABD: soft, NT/ND  EXT: no edema  Neuro: AAO      ASSESSMENT/PLAN:  This is a 79y.o. year old female here for EUS for EDGE (EUS Directed transGastric ERCP) procedure.   We will try to do at least the first part today. We may try to go through the stent with a gastroscope to further evaluate the gastric antrum. May need biopsies. She is also scheduled for colonoscopy however I discussed with the patient that we may not be able to perform this at the same time. PLAN:   Procedure: EUS/EDGE (EUS Directed transGastric ERCP) possible colonoscopy.

## 2023-08-29 NOTE — ANESTHESIA POSTPROCEDURE EVALUATION
Post-Op Assessment Note    CV Status:  Stable    Pain management: adequate     Mental Status:  Alert and awake   Hydration Status:  Euvolemic   PONV Controlled:  Controlled   Airway Patency:  Patent      Post Op Vitals Reviewed: Yes      Staff: Anesthesiologist, CRNA         No notable events documented.     /92 (08/29/23 1440)    Temp (!) 96.3 °F (35.7 °C) (08/29/23 1440)    Pulse (!) 111 (08/29/23 1440)   Resp 16 (08/29/23 1440)    SpO2 93 % (08/29/23 1440)

## 2023-08-31 ENCOUNTER — TELEPHONE (OUTPATIENT)
Dept: GASTROENTEROLOGY | Facility: CLINIC | Age: 68
End: 2023-08-31

## 2023-08-31 NOTE — TELEPHONE ENCOUNTER
Procedure: EUS   Scheduled date of procedure (as of today):  10/10/23  Physician performing procedure:  Dr. Quinn Robles  Location of procedure: [facility] [place]   Instructions reviewed with patient by:  Tino Narvaez - mailed   Clearances:  Deja Blair

## 2023-08-31 NOTE — TELEPHONE ENCOUNTER
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       Physician Approving clearance: ________________________

## 2023-08-31 NOTE — TELEPHONE ENCOUNTER
----- Message from Carmen Fritz MD sent at 8/29/2023  3:01 PM EDT -----  Please schedule egd/eus in 3 -4 weeks. Thank you.

## 2023-09-01 PROCEDURE — 88342 IMHCHEM/IMCYTCHM 1ST ANTB: CPT | Performed by: PATHOLOGY

## 2023-09-01 PROCEDURE — 88313 SPECIAL STAINS GROUP 2: CPT | Performed by: PATHOLOGY

## 2023-09-01 PROCEDURE — 88305 TISSUE EXAM BY PATHOLOGIST: CPT | Performed by: PATHOLOGY

## 2023-09-01 NOTE — RESULT ENCOUNTER NOTE
Inform patient via [employer]. Please review the pathology/lab result of further discussion. Copied from [facility] message :       Malou Reynolds,     Your stomach biopsies were unremarkable. The colon polyp was benign but precancerous. We will recommend repeat in 1 years to follow up. We will also see you at your next visit in 3 - 4 weeks for the endoscopy and endoscopic ultrasound.      Best regards,     Payton Magaña MD

## 2023-09-05 ENCOUNTER — TELEPHONE (OUTPATIENT)
Dept: HEMATOLOGY ONCOLOGY | Facility: CLINIC | Age: 68
End: 2023-09-05

## 2023-09-05 NOTE — TELEPHONE ENCOUNTER
Appointment Change  Cancel, Reschedule, Change to Virtual      Who are you speaking with? Patient   If it is not the patient, is the caller listed on the communication consent form? N/A   Which provider is the appointment scheduled with? Dr. Nancy Hunt   When was the original appointment scheduled? Please list date and time 9/5/23 4pm   At which location is the appointment scheduled to take place? [facility]   Was the appointment rescheduled? Was the appointment changed from an in person visit to a virtual visit? If so, please list the details of the change. 9/13/23 1140   What is the reason for the appointment change? Pt sick; exposed to covid       Was [facility] transport scheduled? N/A   Does [facility] transport need to be scheduled for the new visit (if applicable) N/A   Does the patient need an infusion appointment rescheduled? N/A   Does the patient have an upcoming infusion appointment scheduled? If so, when? No   Is the patient undergoing chemotherapy? N/A   For appointments cancelled with less than 24 hours:  Was the no-show policy reviewed?  Yes

## 2023-09-09 ENCOUNTER — HOSPITAL ENCOUNTER (EMERGENCY)
Facility: HOSPITAL | Age: 68
Discharge: HOME | End: 2023-09-09
Attending: EMERGENCY MEDICINE
Payer: MEDICARE

## 2023-09-09 VITALS
OXYGEN SATURATION: 96 % | RESPIRATION RATE: 18 BRPM | SYSTOLIC BLOOD PRESSURE: 173 MMHG | HEART RATE: 99 BPM | WEIGHT: 275 LBS | DIASTOLIC BLOOD PRESSURE: 102 MMHG | TEMPERATURE: 98.1 F | BODY MASS INDEX: 45.82 KG/M2 | HEIGHT: 65 IN

## 2023-09-09 DIAGNOSIS — R04.0 EPISTAXIS: Primary | ICD-10-CM

## 2023-09-09 PROCEDURE — 99282 EMERGENCY DEPT VISIT SF MDM: CPT

## 2023-09-09 PROCEDURE — 99284 EMERGENCY DEPT VISIT MOD MDM: CPT | Performed by: EMERGENCY MEDICINE

## 2023-09-09 RX ORDER — TRANEXAMIC ACID 100 MG/ML
1000 INJECTION, SOLUTION INTRAVENOUS ONCE
Status: DISCONTINUED | OUTPATIENT
Start: 2023-09-09 | End: 2023-09-09 | Stop reason: HOSPADM

## 2023-09-09 NOTE — ED NOTES
Pt. Presents with blood clots coming from nasal cavity, pressure applied     Jayashree Li RN  09/09/23 [id number]

## 2023-09-09 NOTE — ED PROVIDER NOTES
History  Chief Complaint   Patient presents with   • Nose Bleed     0930 - pt. Scratched the inside right nostril with finger - bleeding without stopping since then     Patient is on Eliquis for paroxysmal atrial fibrillation. Patient states she inadvertently scratched the inside of her right nostril this morning and noted bright red blood afterwards. Patient states it would not stop bleeding even with direct pressure. She arrives at the ER awake and alert with a small amount of bleeding from the right nostril. It has improved but not stopped. No headache or lightheadedness          Prior to Admission Medications   Prescriptions Last Dose Informant Patient Reported? Taking?    Calcium-Vitamin D 500-125 MG-UNIT TABS Unknown Self Yes No   Sig: Take 1 tablet by mouth 2 (two) times a day    Eliquis 5 MG 9/9/2023 Self No Yes   Sig: ONE TAB TWICE A DAY   Jardiance 10 MG TABS tablet 9/9/2023 Self No Yes   Sig: TAKE 1 TABLET EVERY MORNING   Multiple Vitamins-Minerals (BARIATRIC FUSION) CHEW 9/9/2023 Self Yes Yes   Sig: Chew 1 tablet daily     albuterol (2.5 mg/3 mL) 0.083 % nebulizer solution Unknown Self No No   Sig: TAKE 1 VIAL BY NEBULIZATION EVERY 4 HOURS AS NEEDED FOR WHEEZING OR SHORTNESS OF BREATH   amiodarone 100 mg tablet 9/9/2023 Self No Yes   Sig: Take 1 tablet (100 mg total) by mouth daily   atorvastatin (LIPITOR) 20 mg tablet 9/9/2023 Self No Yes   Sig: TAKE ONE TABLET DAILY   metoprolol succinate (TOPROL-XL) 25 mg 24 hr tablet Unknown Self No No   Sig: Take 1 tablet (25 mg total) by mouth daily   nystatin (MYCOSTATIN) cream 9/9/2023 Self Yes Yes   omeprazole (PriLOSEC) 20 mg delayed release capsule 9/9/2023 Self No Yes   Sig: TAKE 1 CAPSULE DAILY BEFORE BREAKFAST   oxyCODONE (ROXICODONE) 10 MG TABS Unknown Self Yes No   Sig: Take 10 mg by mouth every 6 (six) hours as needed     potassium chloride (K-DUR,KLOR-CON) 20 mEq tablet Unknown Self No No   Sig: Take 1 tablet (20 mEq total) by mouth if needed (if taking torsemide)   torsemide (DEMADEX) 20 mg tablet Unknown Self No No   Sig: Take 1 tablet (20 mg total) by mouth if needed (for leg swelling/weight gain above 3 pounds)   triamcinolone (KENALOG) 0.5 % ointment Unknown Self No No   Sig: Apply topically 2 (two) times a day Do not use for more than 2 weeks      Facility-Administered Medications: None       Past Medical History:   Diagnosis Date   • Acute pain of right knee    • Ambulatory dysfunction    • Anemia    • Arthritis    • Bleeding ulcer    • Cancer (HCC)     rectal   • Chronic lower back pain    • Chronic pain disorder     back pain   • Colon cancer ([address]) 2018   • Diabetes mellitus ([address])    • Endometrial cancer ([address]) 2018   • GERD (gastroesophageal reflux disease)    • History of chemotherapy    • History of MRSA infection    • Morbid obesity ([address] [facility])    • Morbid obesity with BMI of 45.0-49.9, adult ([address] [facility])    • Ovarian cancer ([address])    • Paroxysmal atrial fibrillation (HCC)    • Rectal cancer (HCC)    • S/P TAVR (transcatheter aortic valve replacement)    • SOB (shortness of breath)    • Spinal stenosis    • Systolic murmur    • Unsteady gait     uses walker   • Wears dentures    • Wears glasses        Past Surgical History:   Procedure Laterality Date   • ABDOMINAL PERINEAL BOWEL RESECTION W/ ILEOANAL POUCH N/A 2018    Procedure: LAPAROSCOPIC HAND ASSIST ABDOMINOPERINEAL RESECTION,  POSTERIOR VAGINECTOMY, OMENTECTOMY;  Surgeon: Marcella Celis MD;  [facility]: [facility];  Service: Colorectal   • ABDOMINAL SURGERY      abscess removed from abdomen and right thigh, a hole in thigh closed by plastic surgeon   • ABSCESS DRAINAGE      abd, (R) leg, (L) leg   •  SECTION     •  SECTION     • CYSTOSCOPY N/A 2018    Procedure: CYSTOSCOPY;  Surgeon: Umm Santos MD;  [facility];  Service: Gynecology Oncology   • ESOPHAGOGASTRODUODENOSCOPY N/A 2016    Procedure: ESOPHAGOGASTRODUODENOSCOPY (EGD);   Surgeon: Deondre White MD;  [facility];  Service:    • ESOPHAGOGASTRODUODENOSCOPY N/A 03/30/2016    Procedure: ESOPHAGOGASTRODUODENOSCOPY (EGD); Surgeon: Deondre White MD;  [facility]: [facility]; Service:    • EYE SURGERY      laser eye surgery   • HYSTERECTOMY N/A 09/06/2018    Procedure: RADICAL HYSTERECTOMY TOTAL ABDOMINAL (ALEXANDRA). BSO;  Surgeon: Marcel Gonzales MD;  [facility];  Service: Gynecology Oncology   • JOINT REPLACEMENT Left 2017    hip   • JOINT REPLACEMENT Right 2017    hip   • OOPHORECTOMY Bilateral    • RI COLONOSCOPY FLX DX W/COLLJ SPEC WHEN PFRMD N/A 03/09/2018    Procedure: COLONOSCOPY;  Surgeon: Brandi Burton MD;  Location: [address] [place] [address] GI LAB; Service: Gastroenterology   • RI COLONOSCOPY FLX DX W/COLLJ [facility] WHEN PFRMD N/A 09/05/2018    Procedure: COLONOSCOPY;  Surgeon: Raffaele Blair MD;  [facility]: [facility]; Service: Colorectal   • RI ECHO TRANSESOPHAG R-T 2D W/PRB IMG ACQUISJ I&R N/A 09/01/2020    Procedure: TRANSESOPHAGEAL ECHOCARDIOGRAM (THO); Surgeon: Yovani Moss DO;  [facility];  Service: Cardiac Surgery   • RI ESOPHAGOGASTRODUODENOSCOPY TRANSORAL DIAGNOSTIC N/A 02/02/2018    Procedure: ESOPHAGOGASTRODUODENOSCOPY (EGD); Surgeon: Brandi Burton MD;  Location: [facility];   Service: Gastroenterology   • RI LAPAROSCOPY SURG COLOSTOMY/SKN LVL CECOSTOMY N/A 09/06/2018    Procedure: PERMANENT END COLOSTOMY;  Surgeon: Raffaele Blair MD;  [facility];  Service: Colorectal   • RI LAPS GSTR [address] W/BYP SOLEDAD-EN-Y LIMB <150 CM N/A 07/18/2016    Procedure: BYPASS GASTRIC  SOLEDAD-EN-Y LAPAROSCOPIC;  Surgeon: Deondre White MD;  [facility];  Service: Bariatrics   • RI LAPS PROCTECTOMY ABDOMINOPERINEAL W/COLOSTOMY N/A 09/06/2018    Procedure: PROCTECTOMY;  Surgeon: Raffaele Blair MD;  [facility];  Service: Colorectal   • RI REPLACE AORTIC VALVE OPENFEMORAL ARTERY APPROACH N/A 09/01/2020    Procedure: REPLACEMENT AORTIC VALVE TRANSCATHETER (TAVR) TRANSFEMORAL W/ 26MM WADDELL BARBARA S3 ULTRA VALVE(ACCESS ON RIGHT); Surgeon: Miguel Du DO;  [facility];  Service: Cardiac Surgery   • REPLACEMENT TOTAL KNEE     • TOOTH EXTRACTION     • TUBAL LIGATION         Family History   Adopted: Yes   Problem Relation Age of Onset   • Leukemia Mother    • Heart disease Father    • Coronary artery disease Father    • Diabetes Father    • No Known Problems Sister    • No Known Problems Brother    • Diabetes Son    • No Known Problems Brother    • No Known Problems Brother    • No Known Problems Sister    • No Known Problems Sister      I have reviewed and agree with the history as documented. E-Cigarette/Vaping   • E-Cigarette Use Never User      E-Cigarette/Vaping Substances   • Nicotine No    • THC No    • CBD No    • Flavoring No    • Other No    • Unknown No      Social History     Tobacco Use   • Smoking status: Former     Packs/day: 1.00     Years: 25.00     Total pack years: 25.00     Types: Cigarettes     Quit date: 3/30/2006     Years since quittin.4     Passive exposure: Never   • Smokeless tobacco: Never   Vaping Use   • Vaping Use: Never used   Substance Use Topics   • Alcohol use: Not Currently   • Drug use: Not Currently     Types: Oxycodone     Comment: Percocet for lower back pain prn       Review of Systems   Constitutional: Negative for chills and fever. HENT: Negative for congestion and sore throat. Eyes: Negative for visual disturbance. Respiratory: Negative for cough and shortness of breath. Cardiovascular: Negative for chest pain. Gastrointestinal: Negative for abdominal pain and vomiting. Genitourinary: Negative for dysuria. Musculoskeletal: Negative for arthralgias and myalgias. Skin: Negative for color change. Neurological: Negative for dizziness, light-headedness and headaches. Hematological: Bruises/bleeds easily. Psychiatric/Behavioral: Negative for confusion.    All other systems reviewed and are negative. Physical Exam  Physical Exam  Vitals and nursing note reviewed. Constitutional:       Appearance: Normal appearance. HENT:      Head: Normocephalic. Right Ear: External ear normal.      Left Ear: External ear normal.      Nose:      Comments: Bleeding from right nostril. Left nares clear     Mouth/Throat:      Mouth: Mucous membranes are moist.      Comments: Blood clot in the oropharynx  Eyes:      Extraocular Movements: Extraocular movements intact. Pupils: Pupils are equal, round, and reactive to light. Cardiovascular:      Rate and Rhythm: Normal rate. Pulses: Normal pulses. Pulmonary:      Effort: Pulmonary effort is normal.   Abdominal:      Palpations: Abdomen is soft. Tenderness: There is no abdominal tenderness. Musculoskeletal:         General: Normal range of motion. Cervical back: Normal range of motion. Skin:     General: Skin is warm and dry. Capillary Refill: Capillary refill takes less than 2 seconds. Neurological:      General: No focal deficit present. Mental Status: She is alert. Mental status is at baseline.    Psychiatric:         Mood and Affect: Mood normal.         Behavior: Behavior normal.         Vital Signs  ED Triage Vitals   Temperature Pulse Respirations Blood Pressure SpO2   09/09/23 1102 09/09/23 1056 09/09/23 1056 09/09/23 1056 09/09/23 1056   98.1 °F (36.7 °C) (!) 122 18 (!) 157/104 97 %      Temp Source Heart Rate Source Patient Position - Orthostatic VS BP Location FiO2 (%)   09/09/23 1102 09/09/23 1056 09/09/23 1056 09/09/23 1056 --   Oral Monitor Sitting Right arm       Pain Score       09/09/23 1056       7           Vitals:    09/09/23 1056 09/09/23 1100 09/09/23 1145   BP: (!) 157/104 (!) 169/102 (!) 173/102   Pulse: (!) 122 103 99   Patient Position - Orthostatic VS: Sitting           Visual Acuity      ED Medications  Medications   tranexamic acid 100mg/mL (for epistaxis) 1,000 mg (1,000 mg Nasal Not Given 9/9/23 1210)       Diagnostic Studies  Results Reviewed     None                 No orders to display              Procedures  Procedures         ED Course                               SBIRT 20yo+    Flowsheet Row Most Recent Value   Initial Alcohol Screen: US AUDIT-C     1. How often do you have a drink containing alcohol? 0 Filed at: 09/09/2023 1152   2. How many drinks containing alcohol do you have on a typical day you are drinking? 0 Filed at: 09/09/2023 1152   3a. Male UNDER 65: How often do you have five or more drinks on one occasion? 0 Filed at: 09/09/2023 1152   3b. FEMALE Any Age, or MALE 65+: How often do you have 4 or more drinks on one occassion? 0 Filed at: 09/09/2023 1152   Audit-C Score 0 Filed at: 09/09/2023 1152   ARABELLA: How many times in the past year have you. .. Used an illegal drug or used a prescription medication for non-medical reasons? Never Filed at: 09/09/2023 1152                    Medical Decision Making  Plan was to pack the nostril however patient was able to achieve complete hemostasis with evacuation of the clot prior to my procedure. Reinspection shows no bleeding, no visible blood clot. Risk  Prescription drug management. Disposition  Final diagnoses:   Epistaxis     Time reflects when diagnosis was documented in both MDM as applicable and the Disposition within this note     Time User Action Codes Description Comment    9/9/2023 11:58 AM Chantal Pena Add [R04.0] Epistaxis       ED Disposition     ED Disposition   Discharge    Condition   Stable    Date/Time   Sat Sep 9, 2023 11:58 AM    Comment   Jaspreet Reardon discharge to home/self care.                Follow-up Information     Follow up With Specialties Details Why Contact Tricia Madrid, Aron Acuña, Nurse Practitioner Schedule an appointment as soon as possible for a visit   Manju Acuña  [phone]            Discharge Medication List as of 9/9/2023 12:00 PM      CONTINUE these medications which have NOT CHANGED    Details   amiodarone 100 mg tablet Take 1 tablet (100 mg total) by mouth daily, Starting Mon 6/26/2023, Normal      atorvastatin (LIPITOR) 20 mg tablet TAKE ONE TABLET DAILY, Normal      Eliquis 5 MG ONE TAB TWICE A DAY, Normal      Jardiance 10 MG TABS tablet TAKE 1 TABLET EVERY MORNING, Normal      Multiple Vitamins-Minerals (BARIATRIC FUSION) CHEW Chew 1 tablet daily  , Historical Med      nystatin (MYCOSTATIN) cream Starting Wed 3/30/2022, Historical Med      omeprazole (PriLOSEC) 20 mg delayed release capsule TAKE 1 CAPSULE DAILY BEFORE BREAKFAST, Normal      albuterol (2.5 mg/3 mL) 0.083 % nebulizer solution TAKE 1 VIAL BY NEBULIZATION EVERY 4 HOURS AS NEEDED FOR WHEEZING OR SHORTNESS OF BREATH, Normal      Calcium-Vitamin D 500-125 MG-UNIT TABS Take 1 tablet by mouth 2 (two) times a day , Historical Med      metoprolol succinate (TOPROL-XL) 25 mg 24 hr tablet Take 1 tablet (25 mg total) by mouth daily, Starting Fri 4/30/2021, Normal      oxyCODONE (ROXICODONE) 10 MG TABS Take 10 mg by mouth every 6 (six) hours as needed  , Starting Sat 9/15/2018, Historical Med      potassium chloride (K-DUR,KLOR-CON) 20 mEq tablet Take 1 tablet (20 mEq total) by mouth if needed (if taking torsemide), Starting Thu 10/13/2022, Normal      torsemide (DEMADEX) 20 mg tablet Take 1 tablet (20 mg total) by mouth if needed (for leg swelling/weight gain above 3 pounds), Starting Thu 10/13/2022, Normal      triamcinolone (KENALOG) 0.5 % ointment Apply topically 2 (two) times a day Do not use for more than 2 weeks, Starting Sun 2/26/2023, Normal             No discharge procedures on file.     PDMP Review     None          ED Provider  Electronically Signed by           Nilton Chavez MD  09/09/23 8131

## 2023-09-11 ENCOUNTER — TELEPHONE (OUTPATIENT)
Dept: CARDIOLOGY CLINIC | Facility: CLINIC | Age: 68
End: 2023-09-11

## 2023-09-11 ENCOUNTER — TELEPHONE (OUTPATIENT)
Dept: FAMILY MEDICINE CLINIC | Facility: CLINIC | Age: 68
End: 2023-09-11

## 2023-09-11 ENCOUNTER — TELEPHONE (OUTPATIENT)
Dept: HEMATOLOGY ONCOLOGY | Facility: MEDICAL CENTER | Age: 68
End: 2023-09-11

## 2023-09-11 ENCOUNTER — HOSPITAL ENCOUNTER (EMERGENCY)
Facility: HOSPITAL | Age: 68
Discharge: HOME | End: 2023-09-11
Attending: EMERGENCY MEDICINE | Admitting: EMERGENCY MEDICINE
Payer: MEDICARE

## 2023-09-11 VITALS
DIASTOLIC BLOOD PRESSURE: 92 MMHG | TEMPERATURE: 98.3 F | SYSTOLIC BLOOD PRESSURE: 130 MMHG | RESPIRATION RATE: 24 BRPM | HEART RATE: 90 BPM | OXYGEN SATURATION: 100 %

## 2023-09-11 DIAGNOSIS — R04.0 EPISTAXIS: Primary | ICD-10-CM

## 2023-09-11 DIAGNOSIS — D50.9 IRON DEFICIENCY ANEMIA, UNSPECIFIED IRON DEFICIENCY ANEMIA TYPE: Primary | ICD-10-CM

## 2023-09-11 DIAGNOSIS — Z90.3 POSTGASTRECTOMY MALABSORPTION: ICD-10-CM

## 2023-09-11 DIAGNOSIS — Z85.048 HISTORY OF RECTAL CANCER: ICD-10-CM

## 2023-09-11 DIAGNOSIS — K91.2 POSTGASTRECTOMY MALABSORPTION: ICD-10-CM

## 2023-09-11 DIAGNOSIS — I48.91 ATRIAL FIBRILLATION WITH RAPID VENTRICULAR RESPONSE (HCC): ICD-10-CM

## 2023-09-11 PROCEDURE — 99283 EMERGENCY DEPT VISIT LOW MDM: CPT

## 2023-09-11 PROCEDURE — 30901 CONTROL OF NOSEBLEED: CPT | Performed by: EMERGENCY MEDICINE

## 2023-09-11 PROCEDURE — 99284 EMERGENCY DEPT VISIT MOD MDM: CPT | Performed by: EMERGENCY MEDICINE

## 2023-09-11 RX ORDER — METOPROLOL SUCCINATE 25 MG/1
25 TABLET, EXTENDED RELEASE ORAL ONCE
Status: COMPLETED | OUTPATIENT
Start: 2023-09-11 | End: 2023-09-11

## 2023-09-11 RX ORDER — METOPROLOL SUCCINATE 25 MG/1
25 TABLET, EXTENDED RELEASE ORAL DAILY
Qty: 30 TABLET | Refills: 0 | Status: SHIPPED | OUTPATIENT
Start: 2023-09-11

## 2023-09-11 RX ADMIN — METOPROLOL SUCCINATE 25 MG: 25 TABLET, EXTENDED RELEASE ORAL at 00:49

## 2023-09-11 NOTE — TELEPHONE ENCOUNTER
Patient left a voicemail stating that she is needs a refill on her blood pressure medication.  She stated that she would like a call back once this is sent [address] [facility], [place]

## 2023-09-11 NOTE — TELEPHONE ENCOUNTER
I sent her 30 day supply and make sure she comes for appt as she is scheduled to see me this Saturday on 9/16/2023.  GEORGETTE Arias

## 2023-09-11 NOTE — TELEPHONE ENCOUNTER
Pt.made aware not to exceed a 48 hr hold on the Eliquis. Pt.'s last dose was last night. Pt.verbalized understanding.

## 2023-09-11 NOTE — ED PROVIDER NOTES
History  Chief Complaint   Patient presents with   • Nose Bleed     Pt reported was resting and felt something, noted nose bleed. Does take thinners. Was here yesterday as well for nose bleed     Patient presents for evaluation of right-sided nosebleed. Pain started tonight no reported trauma. Patient was here yesterday for nosebleed that resolved on its own. States started up tonight. Patient also reports that she has been out of her Toprol. We will give patient a dose tonight and she will call her primary care physician's office tomorrow morning. History provided by:  Patient   used: No    Nose Bleed      Prior to Admission Medications   Prescriptions Last Dose Informant Patient Reported? Taking?    Calcium-Vitamin D 500-125 MG-UNIT TABS  Self Yes No   Sig: Take 1 tablet by mouth 2 (two) times a day    Eliquis 5 MG  Self No No   Sig: ONE TAB TWICE A DAY   Jardiance 10 MG TABS tablet  Self No No   Sig: TAKE 1 TABLET EVERY MORNING   Multiple Vitamins-Minerals (BARIATRIC FUSION) CHEW  Self Yes No   Sig: Chew 1 tablet daily     albuterol (2.5 mg/3 mL) 0.083 % nebulizer solution  Self No No   Sig: TAKE 1 VIAL BY NEBULIZATION EVERY 4 HOURS AS NEEDED FOR WHEEZING OR SHORTNESS OF BREATH   amiodarone 100 mg tablet  Self No No   Sig: Take 1 tablet (100 mg total) by mouth daily   atorvastatin (LIPITOR) 20 mg tablet  Self No No   Sig: TAKE ONE TABLET DAILY   metoprolol succinate (TOPROL-XL) 25 mg 24 hr tablet  Self No No   Sig: Take 1 tablet (25 mg total) by mouth daily   nystatin (MYCOSTATIN) cream  Self Yes No   omeprazole (PriLOSEC) 20 mg delayed release capsule  Self No No   Sig: TAKE 1 CAPSULE DAILY BEFORE BREAKFAST   oxyCODONE (ROXICODONE) 10 MG TABS  Self Yes No   Sig: Take 10 mg by mouth every 6 (six) hours as needed     potassium chloride (K-DUR,KLOR-CON) 20 mEq tablet  Self No No   Sig: Take 1 tablet (20 mEq total) by mouth if needed (if taking torsemide)   torsemide (DEMADEX) 20 mg tablet  Self No No   Sig: Take 1 tablet (20 mg total) by mouth if needed (for leg swelling/weight gain above 3 pounds)   triamcinolone (KENALOG) 0.5 % ointment  Self No No   Sig: Apply topically 2 (two) times a day Do not use for more than 2 weeks      Facility-Administered Medications: None       Past Medical History:   Diagnosis Date   • Acute pain of right knee    • Ambulatory dysfunction    • Anemia    • Arthritis    • Bleeding ulcer    • Cancer (HCC)     rectal   • Chronic lower back pain    • Chronic pain disorder     back pain   • Colon cancer ([address] [facility])    • Diabetes mellitus ([facility])    • Endometrial cancer ([facility])    • GERD (gastroesophageal reflux disease)    • History of chemotherapy    • History of MRSA infection    • Morbid obesity ([facility])    • Morbid obesity with BMI of 45.0-49.9, adult ([facility])    • Ovarian cancer ([facility])    • Paroxysmal atrial fibrillation (HCC)    • Rectal cancer (HCC)    • S/P TAVR (transcatheter aortic valve replacement)    • SOB (shortness of breath)    • Spinal stenosis    • Systolic murmur    • Unsteady gait     uses walker   • Wears dentures    • Wears glasses        Past Surgical History:   Procedure Laterality Date   • ABDOMINAL PERINEAL BOWEL RESECTION W/ ILEOANAL POUCH N/A 2018    Procedure: LAPAROSCOPIC HAND ASSIST ABDOMINOPERINEAL RESECTION,  POSTERIOR VAGINECTOMY, OMENTECTOMY;  Surgeon: Sallye Angelucci, MD;  Location:  MAIN OR;  Service: Colorectal   • ABDOMINAL SURGERY      abscess removed from abdomen and right thigh, a hole in thigh closed by plastic surgeon   • ABSCESS DRAINAGE      abd, (R) leg, (L) leg   •  SECTION     •  SECTION     • CYSTOSCOPY N/A 2018    Procedure: CYSTOSCOPY;  Surgeon: Dixon Ferraro MD;  [facility]: [facility];  Service: Gynecology Oncology   • ESOPHAGOGASTRODUODENOSCOPY N/A 2016    Procedure: ESOPHAGOGASTRODUODENOSCOPY (EGD);   Surgeon: Phill Birmingham MD;  Location: [facility];  Service:    • ESOPHAGOGASTRODUODENOSCOPY N/A 03/30/2016    Procedure: ESOPHAGOGASTRODUODENOSCOPY (EGD); Surgeon: Barbra Fragoso MD;  Location: [facility]; Service:    • EYE SURGERY      laser eye surgery   • HYSTERECTOMY N/A 09/06/2018    Procedure: RADICAL HYSTERECTOMY TOTAL ABDOMINAL (ALEXANDRA). BSO;  Surgeon: Noman Bae MD;  Location: [facility];  Service: Gynecology Oncology   • JOINT REPLACEMENT Left 2017    hip   • JOINT REPLACEMENT Right 2017    hip   • OOPHORECTOMY Bilateral    • MT COLONOSCOPY FLX DX W/COLLJ SPEC WHEN PFRMD N/A 03/09/2018    Procedure: COLONOSCOPY;  Surgeon: Jana Canales MD;  Location: [address] [place] [address] GI LAB; Service: Gastroenterology   • MT COLONOSCOPY FLX DX W/COLLJ [facility] WHEN PFRMD N/A 09/05/2018    Procedure: COLONOSCOPY;  Surgeon: Carlos Jones MD;  Location: [facility] LAB; Service: Colorectal   • MT ECHO TRANSESOPHAG R-T 2D W/PRB IMG ACQUISJ I&R N/A 09/01/2020    Procedure: TRANSESOPHAGEAL ECHOCARDIOGRAM (THO); Surgeon: Erick Vital DO;  Location: [facility];  Service: Cardiac Surgery   • MT ESOPHAGOGASTRODUODENOSCOPY TRANSORAL DIAGNOSTIC N/A 02/02/2018    Procedure: ESOPHAGOGASTRODUODENOSCOPY (EGD); Surgeon: Jana Canales MD;  Location: [facility] GI LAB;   Service: Gastroenterology   • MT LAPAROSCOPY SURG COLOSTOMY/SKN LVL CECOSTOMY N/A 09/06/2018    Procedure: PERMANENT END COLOSTOMY;  Surgeon: Carlos Jones MD;  [facility]: [facility];  Service: Colorectal   • MT LAPS GSTR RSTCV [address] [place] [address] W/BYP SOLEDAD-EN-Y LIMB <150 CM N/A 07/18/2016    Procedure: BYPASS GASTRIC  SOLEDAD-EN-Y LAPAROSCOPIC;  Surgeon: Barbra Fragoso MD;  [facility];  Service: Bariatrics   • MT LAPS PROCTECTOMY ABDOMINOPERINEAL W/COLOSTOMY N/A 09/06/2018    Procedure: PROCTECTOMY;  Surgeon: Carlos Jones MD;  [facility]: [facility];  Service: Colorectal   • MT REPLACE AORTIC VALVE OPENFEMORAL ARTERY APPROACH N/A 09/01/2020    Procedure: REPLACEMENT AORTIC VALVE TRANSCATHETER (TAVR) TRANSFEMORAL W/ 26MM WADDELL BARBARA S3 ULTRA VALVE(ACCESS ON RIGHT); Surgeon: Chilango Alcazar DO;  Location: [facility];  Service: Cardiac Surgery   • REPLACEMENT TOTAL KNEE     • TOOTH EXTRACTION     • TUBAL LIGATION         Family History   Adopted: Yes   Problem Relation Age of Onset   • Leukemia Mother    • Heart disease Father    • Coronary artery disease Father    • Diabetes Father    • No Known Problems Sister    • No Known Problems Brother    • Diabetes Son    • No Known Problems Brother    • No Known Problems Brother    • No Known Problems Sister    • No Known Problems Sister      I have reviewed and agree with the history as documented. E-Cigarette/Vaping   • E-Cigarette Use Never User      E-Cigarette/Vaping Substances   • Nicotine No    • THC No    • CBD No    • Flavoring No    • Other No    • Unknown No      Social History     Tobacco Use   • Smoking status: Former     Packs/day: 1.00     Years: 25.00     Total pack years: 25.00     Types: Cigarettes     Quit date: 3/30/2006     Years since quittin.4     Passive exposure: Never   • Smokeless tobacco: Never   Vaping Use   • Vaping Use: Never used   Substance Use Topics   • Alcohol use: Not Currently   • Drug use: Not Currently     Types: Oxycodone     Comment: Percocet for lower back pain prn       Review of Systems   HENT: Positive for nosebleeds. All other systems reviewed and are negative. Physical Exam  Physical Exam  Vitals and nursing note reviewed. Constitutional:       General: She is not in acute distress. HENT:      Head:     Neurological:      General: No focal deficit present. Mental Status: She is alert and oriented to person, place, and time.          Vital Signs  ED Triage Vitals   Temperature Pulse Respirations Blood Pressure SpO2   23 0028 23 0028 23 0028 23 0041 23 0028   98.3 °F (36.8 °C) (!) 108 18 146/98 98 %      Temp Source Heart Rate Source Patient Position - Orthostatic VS BP Location FiO2 (%)   23 0028 09/11/23 0028 09/11/23 0028 09/11/23 0028 --   Tympanic Monitor Sitting Right arm       Pain Score       09/11/23 0028       No Pain           Vitals:    09/11/23 0028 09/11/23 0041 09/11/23 0050 09/11/23 0115   BP:  146/98 135/99 130/92   Pulse: (!) 108   90   Patient Position - Orthostatic VS: Sitting            Visual Acuity      ED Medications  Medications   metoprolol succinate (TOPROL-XL) 24 hr tablet 25 mg (25 mg Oral Given 9/11/23 0049)       Diagnostic Studies  Results Reviewed     None                 No orders to display              Procedures  Epistaxis management    Date/Time: 9/11/2023 3:28 AM    Performed by: Dima Chu DO  Authorized by: Dima Chu DO  Universal Protocol:  Consent given by: patient  Patient identity confirmed: verbally with patient and arm band      Patient location:  ED  Procedure details:     Treatment site:  R anterior    Hemostasis method:  Merocel sponge    Treatment complexity:  Limited    Treatment episode: recurring    Post-procedure details:     Assessment:  No improvement    Patient tolerance of procedure: Tolerated well, no immediate complications             ED Course                               SBIRT 22yo+    Flowsheet Row Most Recent Value   Initial Alcohol Screen: US AUDIT-C     1. How often do you have a drink containing alcohol? 0 Filed at: 09/11/2023 0101   2. How many drinks containing alcohol do you have on a typical day you are drinking? 0 Filed at: 09/11/2023 0101   3a. Male UNDER 65: How often do you have five or more drinks on one occasion? 0 Filed at: 09/11/2023 0101   3b. FEMALE Any Age, or MALE 65+: How often do you have 4 or more drinks on one occassion? 0 Filed at: 09/11/2023 0101   Audit-C Score 0 Filed at: 09/11/2023 0101   ARABELLA: How many times in the past year have you. .. Used an illegal drug or used a prescription medication for non-medical reasons?  Never Filed at: 09/11/2023 0101                    Medical Decision Making  Pulse ox 100% on room air indicating adequate oxygenation. Referral given to ENT if patient unable to follow-up within 3 days return to ER for packing removal.    Epistaxis: acute illness or injury  Risk  Prescription drug management. Disposition  Final diagnoses:   Epistaxis     Time reflects when diagnosis was documented in both MDM as applicable and the Disposition within this note     Time User Action Codes Description Comment    9/11/2023  1:15 AM Ronnie Parikh [R04.0] Epistaxis       ED Disposition     ED Disposition   Discharge    Condition   Stable    Date/Time   Mon Sep 11, 2023  1:15 AM    Comment   Osnabrockfran Gaytan Caron discharge to home/self care.                Follow-up Information     Follow up With Specialties Details Why Contact Info Additional Information    [address] Emergency Department Emergency Medicine In 3 days if unable to follow up with ENT [address] [address]  [address] [facility] Emergency Department, [address] [facility] [address], [facility], [address]          Discharge Medication List as of 9/11/2023  1:30 AM      CONTINUE these medications which have NOT CHANGED    Details   albuterol (2.5 mg/3 mL) 0.083 % nebulizer solution TAKE 1 VIAL BY NEBULIZATION EVERY 4 HOURS AS NEEDED FOR WHEEZING OR SHORTNESS OF BREATH, Normal      amiodarone 100 mg tablet Take 1 tablet (100 mg total) by mouth daily, Starting Mon 6/26/2023, Normal      atorvastatin (LIPITOR) 20 mg tablet TAKE ONE TABLET DAILY, Normal      Calcium-Vitamin D 500-125 MG-UNIT TABS Take 1 tablet by mouth 2 (two) times a day , Historical Med      Eliquis 5 MG ONE TAB TWICE A DAY, Normal      Jardiance 10 MG TABS tablet TAKE 1 TABLET EVERY MORNING, Normal      metoprolol succinate (TOPROL-XL) 25 mg 24 hr tablet Take 1 tablet (25 mg total) by mouth daily, Starting Fri 4/30/2021, Normal      Multiple Vitamins-Minerals (BARIATRIC FUSION) CHEW Chew 1 tablet daily  , Historical Med      nystatin (MYCOSTATIN) cream Starting Wed 3/30/2022, Historical Med      omeprazole (PriLOSEC) 20 mg delayed release capsule TAKE 1 CAPSULE DAILY BEFORE BREAKFAST, Normal      oxyCODONE (ROXICODONE) 10 MG TABS Take 10 mg by mouth every 6 (six) hours as needed  , Starting Sat 9/15/2018, Historical Med      potassium chloride (K-DUR,KLOR-CON) 20 mEq tablet Take 1 tablet (20 mEq total) by mouth if needed (if taking torsemide), Starting Thu 10/13/2022, Normal      torsemide (DEMADEX) 20 mg tablet Take 1 tablet (20 mg total) by mouth if needed (for leg swelling/weight gain above 3 pounds), Starting Thu 10/13/2022, Normal      triamcinolone (KENALOG) 0.5 % ointment Apply topically 2 (two) times a day Do not use for more than 2 weeks, Starting Sun 2/26/2023, Normal                 PDMP Review     None          ED Provider  Electronically Signed by           Shad Lees DO  09/11/23 8240

## 2023-09-12 ENCOUNTER — NURSE TRIAGE (OUTPATIENT)
Age: 68
End: 2023-09-12

## 2023-09-12 ENCOUNTER — OFFICE VISIT (OUTPATIENT)
Dept: OTOLARYNGOLOGY | Facility: CLINIC | Age: 68
End: 2023-09-12
Payer: MEDICARE

## 2023-09-12 ENCOUNTER — APPOINTMENT (OUTPATIENT)
Dept: LAB | Facility: CLINIC | Age: 68
End: 2023-09-12
Payer: MEDICARE

## 2023-09-12 VITALS — WEIGHT: 280 LBS | TEMPERATURE: 98.1 F | HEIGHT: 65 IN | BODY MASS INDEX: 46.65 KG/M2

## 2023-09-12 DIAGNOSIS — E66.01 OBESITY, CLASS III, BMI 40-49.9 (MORBID OBESITY) (HCC): ICD-10-CM

## 2023-09-12 DIAGNOSIS — K91.2 POSTGASTRECTOMY MALABSORPTION: ICD-10-CM

## 2023-09-12 DIAGNOSIS — R04.0 EPISTAXIS: ICD-10-CM

## 2023-09-12 DIAGNOSIS — K91.2 POSTSURGICAL MALABSORPTION: ICD-10-CM

## 2023-09-12 DIAGNOSIS — D50.9 IRON DEFICIENCY ANEMIA, UNSPECIFIED IRON DEFICIENCY ANEMIA TYPE: ICD-10-CM

## 2023-09-12 DIAGNOSIS — Z85.048 HISTORY OF RECTAL CANCER: ICD-10-CM

## 2023-09-12 DIAGNOSIS — R04.0 RECURRENT EPISTAXIS: Primary | ICD-10-CM

## 2023-09-12 DIAGNOSIS — Z90.3 POSTGASTRECTOMY MALABSORPTION: ICD-10-CM

## 2023-09-12 DIAGNOSIS — Z98.84 BARIATRIC SURGERY STATUS: ICD-10-CM

## 2023-09-12 DIAGNOSIS — Z48.815 ENCOUNTER FOR SURGICAL AFTERCARE FOLLOWING SURGERY OF DIGESTIVE SYSTEM: ICD-10-CM

## 2023-09-12 LAB
25(OH)D3 SERPL-MCNC: 33.2 NG/ML (ref 30–100)
ALBUMIN SERPL BCP-MCNC: 3.7 G/DL (ref 3.5–5)
ALP SERPL-CCNC: 71 U/L (ref 34–104)
ALT SERPL W P-5'-P-CCNC: 13 U/L (ref 7–52)
ANION GAP SERPL CALCULATED.3IONS-SCNC: 11 MMOL/L
AST SERPL W P-5'-P-CCNC: 15 U/L (ref 13–39)
BASOPHILS # BLD AUTO: 0.04 THOUSANDS/ÂΜL (ref 0–0.1)
BASOPHILS NFR BLD AUTO: 1 % (ref 0–1)
BILIRUB SERPL-MCNC: 0.8 MG/DL (ref 0.2–1)
BUN SERPL-MCNC: 12 MG/DL (ref 5–25)
CALCIUM SERPL-MCNC: 9.2 MG/DL (ref 8.4–10.2)
CEA SERPL-MCNC: 2.8 NG/ML (ref 0–3)
CHLORIDE SERPL-SCNC: 102 MMOL/L (ref 96–108)
CO2 SERPL-SCNC: 28 MMOL/L (ref 21–32)
CREAT SERPL-MCNC: 0.9 MG/DL (ref 0.6–1.3)
EOSINOPHIL # BLD AUTO: 0.1 THOUSAND/ÂΜL (ref 0–0.61)
EOSINOPHIL NFR BLD AUTO: 1 % (ref 0–6)
ERYTHROCYTE [DISTWIDTH] IN BLOOD BY AUTOMATED COUNT: 19.6 % (ref 11.6–15.1)
FERRITIN SERPL-MCNC: 58 NG/ML (ref 11–307)
FOLATE SERPL-MCNC: >22.3 NG/ML
GFR SERPL CREATININE-BSD FRML MDRD: 66 ML/MIN/1.73SQ M
GLUCOSE P FAST SERPL-MCNC: 165 MG/DL (ref 65–99)
HCT VFR BLD AUTO: 51 % (ref 34.8–46.1)
HGB BLD-MCNC: 15.1 G/DL (ref 11.5–15.4)
IMM GRANULOCYTES # BLD AUTO: 0.14 THOUSAND/UL (ref 0–0.2)
IMM GRANULOCYTES NFR BLD AUTO: 2 % (ref 0–2)
IRON SATN MFR SERPL: 16 % (ref 15–50)
IRON SERPL-MCNC: 58 UG/DL (ref 50–212)
LYMPHOCYTES # BLD AUTO: 1.03 THOUSANDS/ÂΜL (ref 0.6–4.47)
LYMPHOCYTES NFR BLD AUTO: 12 % (ref 14–44)
MCH RBC QN AUTO: 23.8 PG (ref 26.8–34.3)
MCHC RBC AUTO-ENTMCNC: 29.6 G/DL (ref 31.4–37.4)
MCV RBC AUTO: 80 FL (ref 82–98)
MONOCYTES # BLD AUTO: 0.67 THOUSAND/ÂΜL (ref 0.17–1.22)
MONOCYTES NFR BLD AUTO: 8 % (ref 4–12)
NEUTROPHILS # BLD AUTO: 6.3 THOUSANDS/ÂΜL (ref 1.85–7.62)
NEUTS SEG NFR BLD AUTO: 76 % (ref 43–75)
NRBC BLD AUTO-RTO: 0 /100 WBCS
PLATELET # BLD AUTO: 203 THOUSANDS/UL (ref 149–390)
PMV BLD AUTO: 9.8 FL (ref 8.9–12.7)
POTASSIUM SERPL-SCNC: 4.4 MMOL/L (ref 3.5–5.3)
PROT SERPL-MCNC: 6.6 G/DL (ref 6.4–8.4)
PTH-INTACT SERPL-MCNC: 99.4 PG/ML (ref 12–88)
RBC # BLD AUTO: 6.35 MILLION/UL (ref 3.81–5.12)
SODIUM SERPL-SCNC: 141 MMOL/L (ref 135–147)
TIBC SERPL-MCNC: 368 UG/DL (ref 250–450)
UIBC SERPL-MCNC: 310 UG/DL (ref 155–355)
VIT B12 SERPL-MCNC: 1349 PG/ML (ref 180–914)
WBC # BLD AUTO: 8.28 THOUSAND/UL (ref 4.31–10.16)

## 2023-09-12 PROCEDURE — 82728 ASSAY OF FERRITIN: CPT

## 2023-09-12 PROCEDURE — 31231 NASAL ENDOSCOPY DX: CPT | Performed by: NURSE PRACTITIONER

## 2023-09-12 PROCEDURE — 82306 VITAMIN D 25 HYDROXY: CPT

## 2023-09-12 PROCEDURE — 82746 ASSAY OF FOLIC ACID SERUM: CPT

## 2023-09-12 PROCEDURE — 82378 CARCINOEMBRYONIC ANTIGEN: CPT

## 2023-09-12 PROCEDURE — 84630 ASSAY OF ZINC: CPT

## 2023-09-12 PROCEDURE — 85025 COMPLETE CBC W/AUTO DIFF WBC: CPT

## 2023-09-12 PROCEDURE — 82607 VITAMIN B-12: CPT

## 2023-09-12 PROCEDURE — 83550 IRON BINDING TEST: CPT

## 2023-09-12 PROCEDURE — 99204 OFFICE O/P NEW MOD 45 MIN: CPT | Performed by: NURSE PRACTITIONER

## 2023-09-12 PROCEDURE — 80053 COMPREHEN METABOLIC PANEL: CPT

## 2023-09-12 PROCEDURE — 36415 COLL VENOUS BLD VENIPUNCTURE: CPT

## 2023-09-12 PROCEDURE — 84425 ASSAY OF VITAMIN B-1: CPT

## 2023-09-12 PROCEDURE — 84590 ASSAY OF VITAMIN A: CPT

## 2023-09-12 PROCEDURE — 83540 ASSAY OF IRON: CPT

## 2023-09-12 PROCEDURE — 83970 ASSAY OF PARATHORMONE: CPT

## 2023-09-12 RX ORDER — CEFDINIR 300 MG/1
300 CAPSULE ORAL EVERY 12 HOURS SCHEDULED
Qty: 20 CAPSULE | Refills: 0 | Status: SHIPPED | OUTPATIENT
Start: 2023-09-12 | End: 2023-09-22

## 2023-09-12 NOTE — PROGRESS NOTES
Assessment/Plan:    Recurrent epistaxis   Discussed causes of epistaxis including Eliquis, hypertension, viral illness with nasal congestion, and nasal dryness. Right nasal packing, placed by ER Sunday/Monday at approx 0100 am is partially dislodged. Removed packing, no notable bleeding. Bilateral nasal mucosa irritation and abrasion with crusting. Nasal endoscopy of right side revealed no prominent vessels anteriorly or along the septum or along nasal floor. Noted small area of oozing/secretions along inferior nasal turbinate. Concerned Prominent vessels noted along inferior nasal turbinates are the site of bleed. May also need to consider posterior bleeding site. Discussed options including ointments to nose, saline rinses, and nasal cautery. At this point if begin to apply silver nitrate to the turbinate, further irritation will ensue and cautery might be ineffective. Recommend placing nasal cease with afrin nasal spray to the site. Discussed after care instructions including ointment to nostrils 4 times daily, saline sprays daily, Afrin for reoccurrence of bleeding, gentle nose blowing, sneeze with mouth open, careful with bending forward. Pt tolerated procedure well  Follow up tomorrow to evaluate status of nasal cease and further epistaxis             Diagnoses and all orders for this visit:    Recurrent epistaxis  -     cefdinir (OMNICEF) 300 mg capsule; Take 1 capsule (300 mg total) by mouth every 12 (twelve) hours for 10 days    Other orders  -     Nasal / sinus endoscopy          Subjective:      Patient ID: Nicol Hartman is a 79 y.o. female. Presents today as a new patient due to nasal concerns. Recurrent nose bleeds. Began Saturday morning, then had another episode late Sunday night. Packing placed in ER. Pt noted blood pressure elevated during episodes. Notes she had not take Metoprolol for about 2 months. Blood thinners, Eliquis daily.  Last dose this morning but took half dose. No current Nasal sprays  No history sinus or nasal surgery  Following cardiology for management of Eliquis. History iron deficiency anemia. No prior episodes of nose bleeds          The following portions of the patient's history were reviewed and updated as appropriate: allergies, current medications, past family history, past medical history, past social history, past surgical history and problem list.    Review of Systems   Constitutional: Negative. HENT: Positive for nosebleeds. Negative for congestion, ear discharge, ear pain, hearing loss, postnasal drip, rhinorrhea, sinus pressure, sinus pain, sore throat, tinnitus and voice change. Respiratory: Negative for chest tightness and shortness of breath. Skin: Negative for color change. Neurological: Negative for dizziness, numbness and headaches. Psychiatric/Behavioral: Negative. Objective:      Temp 98.1 °F (36.7 °C) (Temporal)   Ht 5' 5" (1.651 m)   Wt 127 kg (280 lb)   BMI 46.59 kg/m²          Physical Exam  Constitutional:       Appearance: She is well-developed. HENT:      Head: Normocephalic. Right Ear: Hearing, tympanic membrane, ear canal and external ear normal. No decreased hearing noted. No drainage or tenderness. Tympanic membrane is not perforated or erythematous. Left Ear: Hearing, tympanic membrane, ear canal and external ear normal. No decreased hearing noted. No drainage or tenderness. Tympanic membrane is not perforated or erythematous. Nose: No nasal deformity or septal deviation. Right Nostril: Epistaxis present. Right Turbinates: Enlarged. Left Turbinates: Enlarged. Mouth/Throat:      Mouth: Mucous membranes are not pale and not dry. No oral lesions. Dentition: Normal dentition. Pharynx: Uvula midline. No oropharyngeal exudate. Neck:      Trachea: No tracheal deviation.    Pulmonary:      Effort: Pulmonary effort is normal. No accessory muscle usage or respiratory distress. Musculoskeletal:      Cervical back: Full passive range of motion without pain and neck supple. Lymphadenopathy:      Cervical: No cervical adenopathy. Skin:     General: Skin is warm and dry. Neurological:      Mental Status: She is alert and oriented to person, place, and time. Cranial Nerves: No cranial nerve deficit. Sensory: No sensory deficit. Psychiatric:         Behavior: Behavior is cooperative. Nasal / sinus endoscopy     Date/Time 9/12/2023 1:00 PM     Performed by  GEORGETTE Dupree   Authorized by GEORGETTE Dupree     Universal Protocol   Consent: Verbal consent obtained. Patient understanding: patient states understanding of the procedure being performed  Patient consent: the patient's understanding of the procedure matches consent given        Local anesthesia used: yes     Anesthesia   Local anesthesia used: yes  Local Anesthetic: topical anesthetic and lidocaine spray     Sedation   Patient sedated: no       Procedure Details   Procedure Notes: Nasal endoscopy on the right, no prominent vessels noted, however noted inferior turbinates with oozing of blood, may be old blood vs the site of the bleed. Suctioned in attempt to remove but unable to remove all of the secretions.     Patient tolerance: patient tolerated the procedure well with no immediate complications

## 2023-09-12 NOTE — TELEPHONE ENCOUNTER
[facility] called they need the ostomy order for patient signed and faxed back. They state that they have re-faxed the order this morning to [phone]. Patient is out of ostomy supplies and they can not ship until they have the signed orders.

## 2023-09-12 NOTE — PATIENT INSTRUCTIONS
Recommend use Afrin nasal spray on cotton ball for bleeding.      Once bleeding slows down Saline rinses 2 to 3 times per day and antibiotic ointment to nares 3 to 4 times daily

## 2023-09-12 NOTE — ASSESSMENT & PLAN NOTE
Discussed causes of epistaxis including Eliquis, hypertension, viral illness with nasal congestion, and nasal dryness. Right nasal packing, placed by ER Sunday/Monday at approx 0100 am is partially dislodged. Removed packing, no notable bleeding. Bilateral nasal mucosa irritation and abrasion with crusting. Nasal endoscopy of right side revealed no prominent vessels anteriorly or along the septum or along nasal floor. Noted small area of oozing/secretions along inferior nasal turbinate. Concerned Prominent vessels noted along inferior nasal turbinates are the site of bleed. May also need to consider posterior bleeding site. Discussed options including ointments to nose, saline rinses, and nasal cautery. At this point if begin to apply silver nitrate to the turbinate, further irritation will ensue and cautery might be ineffective. Recommend placing nasal cease with afrin nasal spray to the site. Discussed after care instructions including ointment to nostrils 4 times daily, saline sprays daily, Afrin for reoccurrence of bleeding, gentle nose blowing, sneeze with mouth open, careful with bending forward.    Pt tolerated procedure well  Follow up tomorrow to evaluate status of nasal cease and further epistaxis

## 2023-09-13 ENCOUNTER — OFFICE VISIT (OUTPATIENT)
Dept: HEMATOLOGY ONCOLOGY | Facility: MEDICAL CENTER | Age: 68
End: 2023-09-13
Payer: MEDICARE

## 2023-09-13 ENCOUNTER — OFFICE VISIT (OUTPATIENT)
Dept: OTOLARYNGOLOGY | Facility: CLINIC | Age: 68
End: 2023-09-13
Payer: MEDICARE

## 2023-09-13 VITALS
SYSTOLIC BLOOD PRESSURE: 136 MMHG | HEIGHT: 65 IN | WEIGHT: 275 LBS | RESPIRATION RATE: 17 BRPM | BODY MASS INDEX: 45.82 KG/M2 | TEMPERATURE: 97.1 F | OXYGEN SATURATION: 96 % | DIASTOLIC BLOOD PRESSURE: 80 MMHG | HEART RATE: 115 BPM

## 2023-09-13 DIAGNOSIS — R04.0 RECURRENT EPISTAXIS: Primary | ICD-10-CM

## 2023-09-13 DIAGNOSIS — J43.9 PULMONARY EMPHYSEMA, UNSPECIFIED EMPHYSEMA TYPE (HCC): ICD-10-CM

## 2023-09-13 DIAGNOSIS — I50.32 CHRONIC DIASTOLIC HEART FAILURE (HCC): ICD-10-CM

## 2023-09-13 DIAGNOSIS — R91.1 SOLITARY PULMONARY NODULE: ICD-10-CM

## 2023-09-13 DIAGNOSIS — K91.2 POSTGASTRECTOMY MALABSORPTION: Primary | ICD-10-CM

## 2023-09-13 DIAGNOSIS — Z85.048 HISTORY OF RECTAL CANCER: ICD-10-CM

## 2023-09-13 DIAGNOSIS — Z90.3 POSTGASTRECTOMY MALABSORPTION: Primary | ICD-10-CM

## 2023-09-13 DIAGNOSIS — Z85.43 HISTORY OF OVARIAN CANCER: ICD-10-CM

## 2023-09-13 PROCEDURE — 99215 OFFICE O/P EST HI 40 MIN: CPT | Performed by: INTERNAL MEDICINE

## 2023-09-13 PROCEDURE — 99213 OFFICE O/P EST LOW 20 MIN: CPT | Performed by: NURSE PRACTITIONER

## 2023-09-13 NOTE — ASSESSMENT & PLAN NOTE
Discussed causes of epistaxis including Eliquis, hypertension, viral illness with nasal congestion, and nasal dryness.      Right nasal packing, placed by ER Sunday/Monday at approx 0100 am Removed 09/12/2023, no notable bleeding. Nasal cease that was inserted yesterday, intact, removed without difficulty. Nasal endoscopy (no procedure) of right side revealed no prominent vessels anteriorly or along the septum or along nasal floor. Noted small area of oozing/secretions along inferior nasal turbinate. This turbinate ir red and inflamed compared to turbinates on the left. Concerned Prominent vessels noted along right inferior nasal turbinate are the site of bleed. Posterior to the inflamed right inferior turbinate there is no evidence of bleeding. May also need to consider posterior bleeding site.      Discussed options including ointments to nose, saline rinses, and nasal cautery. At this point if begin to apply silver nitrate to the turbinate, further irritation will ensue and cautery might be ineffective.       Recommend ointment to nostrils 3 to 4 times daily, saline sprays daily, Afrin for reoccurrence of bleeding, gentle nose blowing, sneeze with mouth open, careful with bending forward. Continue oral antibiotics. May need to consider holding Eliquis and per cardiology notes, may only hold for 48 hours. Note pt has been only taking half of her dose for past couple of days. Follow up in 3 to 4 weeks.   Sooner if bleeding reoccurs

## 2023-09-13 NOTE — PATIENT INSTRUCTIONS
Thursday morning - start saline spray 3 times per day then apply antibiotic ointment after also 3 times per day    Call office if bleeding uncontrolled, or proceed to ER    Afrin on cotton ball for active bleeding

## 2023-09-14 ENCOUNTER — TELEPHONE (OUTPATIENT)
Dept: HEMATOLOGY ONCOLOGY | Facility: CLINIC | Age: 68
End: 2023-09-14

## 2023-09-14 NOTE — TELEPHONE ENCOUNTER
I called Luis Fernando Ugalde in response to a referral that was received for patient to establish care with Thoracic Surgery. Outreach was made to complete patient's intake questionnaire . Patient's intake questionnaire was reviewed and complete. Patient's intake has been sent to the team for clinical review.

## 2023-09-15 LAB — VIT B1 BLD-SCNC: 264.7 NMOL/L (ref 66.5–200)

## 2023-09-16 ENCOUNTER — OFFICE VISIT (OUTPATIENT)
Dept: FAMILY MEDICINE CLINIC | Facility: CLINIC | Age: 68
End: 2023-09-16
Payer: MEDICARE

## 2023-09-16 VITALS
HEART RATE: 86 BPM | HEIGHT: 65 IN | OXYGEN SATURATION: 98 % | TEMPERATURE: 96.9 F | DIASTOLIC BLOOD PRESSURE: 60 MMHG | RESPIRATION RATE: 16 BRPM | BODY MASS INDEX: 45.98 KG/M2 | WEIGHT: 276 LBS | SYSTOLIC BLOOD PRESSURE: 90 MMHG

## 2023-09-16 DIAGNOSIS — R91.1 PULMONARY NODULE: ICD-10-CM

## 2023-09-16 DIAGNOSIS — Z78.0 POST-MENOPAUSAL: ICD-10-CM

## 2023-09-16 DIAGNOSIS — Z85.43 HISTORY OF OVARIAN CANCER: ICD-10-CM

## 2023-09-16 DIAGNOSIS — Z00.00 MEDICARE ANNUAL WELLNESS VISIT, SUBSEQUENT: ICD-10-CM

## 2023-09-16 DIAGNOSIS — I70.209 ATHEROSCLEROTIC PERIPHERAL VASCULAR DISEASE (HCC): ICD-10-CM

## 2023-09-16 DIAGNOSIS — I48.91 ATRIAL FIBRILLATION WITH RAPID VENTRICULAR RESPONSE (HCC): ICD-10-CM

## 2023-09-16 DIAGNOSIS — F11.20 CONTINUOUS OPIOID DEPENDENCE (HCC): ICD-10-CM

## 2023-09-16 DIAGNOSIS — Z13.29 SCREENING FOR THYROID DISORDER: ICD-10-CM

## 2023-09-16 DIAGNOSIS — E78.5 HYPERLIPIDEMIA, UNSPECIFIED HYPERLIPIDEMIA TYPE: ICD-10-CM

## 2023-09-16 DIAGNOSIS — Z12.31 ENCOUNTER FOR SCREENING MAMMOGRAM FOR MALIGNANT NEOPLASM OF BREAST: ICD-10-CM

## 2023-09-16 DIAGNOSIS — E11.22 TYPE 2 DIABETES MELLITUS WITH CHRONIC KIDNEY DISEASE, WITHOUT LONG-TERM CURRENT USE OF INSULIN, UNSPECIFIED CKD STAGE (HCC): Primary | ICD-10-CM

## 2023-09-16 LAB
SL AMB POCT HEMOGLOBIN AIC: 6.8 (ref ?–6.5)
VIT A SERPL-MCNC: 56.4 UG/DL (ref 22–69.5)
ZINC SERPL-MCNC: 53 UG/DL (ref 44–115)

## 2023-09-16 PROCEDURE — G0439 PPPS, SUBSEQ VISIT: HCPCS | Performed by: NURSE PRACTITIONER

## 2023-09-16 PROCEDURE — 99214 OFFICE O/P EST MOD 30 MIN: CPT | Performed by: NURSE PRACTITIONER

## 2023-09-16 PROCEDURE — 83036 HEMOGLOBIN GLYCOSYLATED A1C: CPT | Performed by: NURSE PRACTITIONER

## 2023-09-16 NOTE — ASSESSMENT & PLAN NOTE
At goal with current regimen and advised on routine eye exams and foot care  Lab Results   Component Value Date    HGBA1C 6.8 (A) 09/16/2023

## 2023-09-16 NOTE — PROGRESS NOTES
Assessment and Plan:     Problem List Items Addressed This Visit        Endocrine    Type 2 diabetes mellitus with chronic kidney disease, without long-term current use of insulin, unspecified CKD stage ([address]) - Primary     At goal with current regimen and advised on routine eye exams and foot care  Lab Results   Component Value Date    HGBA1C 6.8 (A) 09/16/2023            Relevant Orders    IRIS Diabetic eye exam    POCT hemoglobin A1c (Completed)    Albumin / creatinine urine ratio       Cardiovascular and Mediastinum    Atherosclerotic peripheral vascular disease (HCC)    Atrial fibrillation with rapid ventricular response ([address])     Managed by cardiologist            Other    History of ovarian cancer     Managed by oncologist         Hyperlipidemia     Complaint with statin and tolerating it well         Relevant Orders    Lipid Panel with Direct LDL reflex    Continuous opioid dependence ([address])   Other Visit Diagnoses     Medicare annual wellness visit, subsequent        Encounter for screening mammogram for malignant neoplasm of breast        Relevant Orders    Mammo screening bilateral w 3d & cad    Post-menopausal        Relevant Orders    DXA bone density spine hip and pelvis    Screening for thyroid disorder        Pulmonary nodule        increased in size and will be following with throacic surgery           Preventive health issues were discussed with patient, and age appropriate screening tests were ordered as noted in patient's After Visit Summary. Personalized health advice and appropriate referrals for health education or preventive services given if needed, as noted in patient's After Visit Summary. History of Present Illness:     Patient presents for a Medicare Wellness Visit    HPI   Patient is here to follow up on chronic conditions. Has nose bleed couple of days and went to ENT but no issues found.   Patient recently had EGD done with stenting an will have repeat EGD next month as CT abdomen showed Hyperdense masslike mural thickening in the antrum of the excluded stomach concerning for primary gastric lesion (such as neuroendocrine tumor), progressively increasing in size. There are several hyperdense lymph nodes in the upper abdomen  CT scan also showed Significantly increased size of a solid pulmonary nodule in the left upper lobe now measuring up to 1.2 cm (previously 0.4 cm in 2/2021),suspicious for indolent primary lung malignancy versus metastasis and patient was referred to thoracic surgery and will be scheduling with them. BP on lower side but denies any chest pain, sob, dizziness and headache and parameters for metoprolol discussed and advised. Due for blood work and urine test and will do that. Patient Care Team:  Rubén Gentile as PCP - General (Family Medicine)  Gage Brown as PCP - Wound (Wound Care)  Heloise Bonier, PA-C Judson Closs, MD (General Surgery)  Do Devine MD (Hematology and Oncology)  Curlene Bence, MD (Gastroenterology)  Marcella Celis MD (Colon and Rectal Surgery)  Salty Hassan MD (Radiation Oncology)  Gaviota Aguilar RN as Registered Nurse (Oncology)  Richard Villegas DO (Cardiology)  Marcella Celis MD as Endoscopist     Review of Systems:     Review of Systems   HENT: Negative. As noted in HPI     Respiratory: Negative. Cardiovascular: Negative. Gastrointestinal:        As noted in HPI     Genitourinary: Negative. Neurological: Negative for dizziness and light-headedness.         Problem List:     Patient Active Problem List   Diagnosis   • Morbid obesity due to excess calories (HCC)   • Postgastrectomy malabsorption   • S/P gastric bypass   • Marginal ulcer   • History of gastric bypass   • Atherosclerotic peripheral vascular disease ([address])   • Diabetes mellitus type 2, diet-controlled ([address])   • Onychomycosis   • History of rectal cancer   • Colostomy care [facility])   • History of ovarian cancer   • Encounter for surgical aftercare following surgery of digestive system   • Pyelonephritis   • History of ovarian cancer   • Candidal intertrigo   • Vaginal bleeding   • Encounter for other screening for malignant neoplasm of breast   • Aortic stenosis, severe   • Nonrheumatic mitral valve stenosis   • Atrial fibrillation with rapid ventricular response (HCC)   • Hyperlipidemia   • Hypochloremia   • Weight gain following gastric bypass surgery   • S/P TAVR (transcatheter aortic valve replacement)   • Ambulatory dysfunction   • CAD (coronary artery disease)   • Chronic anticoagulation   • Chronic diastolic heart failure (HCC)   • Osteoarthritis of right knee   • GI bleed   • Chronic kidney disease   • Iron deficiency anemia, unspecified   • Iron deficiency anemia   • Pulmonary emphysema, unspecified emphysema type ([facility])   • Continuous opioid dependence ([address])   • COVID-19   • Type 2 diabetes mellitus with chronic kidney disease, without long-term current use of insulin, unspecified CKD stage ([address])   • Recurrent epistaxis      Past Medical and Surgical History:     Past Medical History:   Diagnosis Date   • Acute pain of right knee    • Ambulatory dysfunction    • Anemia    • Arthritis    • Bleeding ulcer    • Cancer (HCC)     rectal   • Chronic lower back pain    • Chronic pain disorder     back pain   • Colon cancer ([address]) 2018   • Diabetes mellitus ([address])    • Endometrial cancer ([address]) 2018   • GERD (gastroesophageal reflux disease)    • History of chemotherapy    • History of MRSA infection 0719/2016   • Morbid obesity ([address])    • Morbid obesity with BMI of 45.0-49.9, adult ([address])    • Ovarian cancer ([address]) 2018   • Paroxysmal atrial fibrillation (HCC)    • Rectal cancer (HCC)    • S/P TAVR (transcatheter aortic valve replacement)    • SOB (shortness of breath)    • Spinal stenosis    • Systolic murmur    • Unsteady gait     uses walker   • Wears dentures    • Wears glasses      Past Surgical History:   Procedure Laterality Date   • ABDOMINAL PERINEAL BOWEL RESECTION W/ ILEOANAL POUCH N/A 2018    Procedure: LAPAROSCOPIC HAND ASSIST ABDOMINOPERINEAL RESECTION,  POSTERIOR VAGINECTOMY, OMENTECTOMY;  Surgeon: Emir Gutierrez MD;  [facility];  Service: Colorectal   • ABDOMINAL SURGERY      abscess removed from abdomen and right thigh, a hole in thigh closed by plastic surgeon   • ABSCESS DRAINAGE      abd, (R) leg, (L) leg   •  SECTION     •  SECTION     • CYSTOSCOPY N/A 2018    Procedure: Sandhya Rueda;  Surgeon: Batool Ford MD;  [facility];  Service: Gynecology Oncology   • ESOPHAGOGASTRODUODENOSCOPY N/A 2016    Procedure: ESOPHAGOGASTRODUODENOSCOPY (EGD); Surgeon: Josie Crawford MD;  [facility];  Service:    • ESOPHAGOGASTRODUODENOSCOPY N/A 2016    Procedure: ESOPHAGOGASTRODUODENOSCOPY (EGD); Surgeon: Josie Crawford MD;  [facility]; Service:    • EYE SURGERY      laser eye surgery   • HYSTERECTOMY N/A 2018    Procedure: RADICAL HYSTERECTOMY TOTAL ABDOMINAL (ALEXANDRA). BSO;  Surgeon: Batool Ford MD;  [facility];  Service: Gynecology Oncology   • JOINT REPLACEMENT Left 2017    hip   • JOINT REPLACEMENT Right 2017    hip   • OOPHORECTOMY Bilateral    • DC COLONOSCOPY FLX DX W/COLLJ SPEC WHEN PFRMD N/A 2018    Procedure: COLONOSCOPY;  Surgeon: Johny Preciado MD;  Location: [address] [place] [address] GI LAB; Service: Gastroenterology   • DC COLONOSCOPY FLX DX W/COLLJ [facility] WHEN PFRMD N/A 2018    Procedure: COLONOSCOPY;  Surgeon: Emir Gutierrez MD;  [facility]; Service: Colorectal   • DC ECHO TRANSESOPHAG R-T 2D W/PRB IMG ACQUISJ I&R N/A 2020    Procedure: TRANSESOPHAGEAL ECHOCARDIOGRAM (THO); Surgeon: Maycol Rosa DO;  [facility];  Service: Cardiac Surgery   • DC ESOPHAGOGASTRODUODENOSCOPY TRANSORAL DIAGNOSTIC N/A 2018    Procedure: ESOPHAGOGASTRODUODENOSCOPY (EGD);   Surgeon: Johny Preciado MD;  Location: [address] [place] [address] [facility]; Service: Gastroenterology   • AK LAPAROSCOPY SURG COLOSTOMY/SKN LVL CECOSTOMY N/A 2018    Procedure: PERMANENT END COLOSTOMY;  Surgeon: Simone Conway MD;  [facility];  Service: Colorectal   • AK LAPS GSTR RSTCV [address] [place] [address] W/BYP SOLEDAD-EN-Y LIMB <150 CM N/A 2016    Procedure: BYPASS GASTRIC  SOLEDAD-EN-Y LAPAROSCOPIC;  Surgeon: Paulie Bellamy MD;  [facility];  Service: Bariatrics   • AK LAPS PROCTECTOMY ABDOMINOPERINEAL W/COLOSTOMY N/A 2018    Procedure: PROCTECTOMY;  Surgeon: Simone Conway MD;  [facility];  Service: Colorectal   • AK REPLACE AORTIC VALVE OPENFEMORAL ARTERY APPROACH N/A 2020    Procedure: REPLACEMENT AORTIC VALVE TRANSCATHETER (TAVR) TRANSFEMORAL W/ 26MM WADDELL BARBARA S3 ULTRA VALVE(ACCESS ON RIGHT);   Surgeon: Jeaneth Marion DO;  [facility];  Service: Cardiac Surgery   • REPLACEMENT TOTAL KNEE     • TOOTH EXTRACTION     • TUBAL LIGATION        Family History:     Family History   Adopted: Yes   Problem Relation Age of Onset   • Leukemia Mother    • Heart disease Father    • Coronary artery disease Father    • Diabetes Father    • No Known Problems Sister    • No Known Problems Brother    • Diabetes Son    • No Known Problems Brother    • No Known Problems Brother    • No Known Problems Sister    • No Known Problems Sister       Social History:     Social History     Socioeconomic History   • Marital status: Single     Spouse name: None   • Number of children: 2   • Years of education: None   • Highest education level: None   Occupational History   • None   Tobacco Use   • Smoking status: Former     Packs/day: 1.00     Years: 25.00     Total pack years: 25.00     Types: Cigarettes     Quit date: 3/30/2006     Years since quittin.4     Passive exposure: Never   • Smokeless tobacco: Never   Vaping Use   • Vaping Use: Never used   Substance and Sexual Activity   • Alcohol use: Not Currently   • Drug use: Not Currently Types: Oxycodone     Comment: Percocet for lower back pain prn   • Sexual activity: Not Currently   Other Topics Concern   • None   Social History Narrative   • None     Social Determinants of Health     Financial Resource Strain: Low Risk  (9/16/2023)    Overall Financial Resource Strain (CARDIA)    • Difficulty of Paying Living Expenses: Not very hard   Food Insecurity: Not on file   Transportation Needs: No Transportation Needs (9/16/2023)    PRAPARE - Transportation    • Lack of Transportation (Medical): No    • Lack of Transportation (Non-Medical):  No   Physical Activity: Not on file   Stress: Not on file   Social Connections: Not on file   Intimate Partner Violence: Not on file   Housing Stability: Not on file      Medications and Allergies:     Current Outpatient Medications   Medication Sig Dispense Refill   • albuterol (2.5 mg/3 mL) 0.083 % nebulizer solution TAKE 1 VIAL BY NEBULIZATION EVERY 4 HOURS AS NEEDED FOR WHEEZING OR SHORTNESS OF BREATH 225 mL 1   • amiodarone 100 mg tablet Take 1 tablet (100 mg total) by mouth daily 90 tablet 3   • atorvastatin (LIPITOR) 20 mg tablet TAKE ONE TABLET DAILY 90 tablet 0   • cefdinir (OMNICEF) 300 mg capsule Take 1 capsule (300 mg total) by mouth every 12 (twelve) hours for 10 days 20 capsule 0   • Eliquis 5 MG ONE TAB TWICE A DAY 60 tablet 2   • Jardiance 10 MG TABS tablet TAKE 1 TABLET EVERY MORNING 90 tablet 1   • metoprolol succinate (TOPROL-XL) 25 mg 24 hr tablet Take 1 tablet (25 mg total) by mouth daily 30 tablet 0   • Multiple Vitamins-Minerals (BARIATRIC FUSION) CHEW Chew 1 tablet daily       • nystatin (MYCOSTATIN) cream      • omeprazole (PriLOSEC) 20 mg delayed release capsule TAKE 1 CAPSULE DAILY BEFORE BREAKFAST 90 capsule 1   • oxyCODONE (ROXICODONE) 10 MG TABS Take 10 mg by mouth every 6 (six) hours as needed       • potassium chloride (K-DUR,KLOR-CON) 20 mEq tablet Take 1 tablet (20 mEq total) by mouth if needed (if taking torsemide) 30 tablet 1   • torsemide (DEMADEX) 20 mg tablet Take 1 tablet (20 mg total) by mouth if needed (for leg swelling/weight gain above 3 pounds) 30 tablet 1   • Calcium-Vitamin D 500-125 MG-UNIT TABS Take 1 tablet by mouth 2 (two) times a day  (Patient not taking: Reported on 9/16/2023)       No current facility-administered medications for this visit. No Known Allergies   Immunizations:     Immunization History   Administered Date(s) Administered   • COVID-19 PFIZER VACCINE 0.3 ML IM 01/31/2021, 02/21/2021   • INFLUENZA 04/25/2018   • Influenza Quadrivalent Preservative Free 3 years and older IM 04/25/2018   • Influenza, seasonal, injectable 10/12/2006, 12/26/2012   • Pneumococcal Conjugate Vaccine 20-valent (Pcv20), Polysace 07/28/2022   • Pneumococcal Polysaccharide PPV23 08/12/2013      Health Maintenance:         Topic Date Due   • Breast Cancer Screening: Mammogram  06/05/2020   • Hepatitis C Screening  Completed   • Lung Cancer Screening  Discontinued         Topic Date Due   • COVID-19 Vaccine (3 - Pfizer series) 04/18/2021   • Influenza Vaccine (1) 09/01/2023      Medicare Screening Tests and Risk Assessments:     Wale York is here for her Subsequent Wellness visit. Last Medicare Wellness visit information reviewed, patient interviewed and updates made to the record today. Health Risk Assessment:   Patient rates overall health as fair. Patient feels that their physical health rating is same. Patient is satisfied with their life. Eyesight was rated as same. Hearing was rated as same. Patient feels that their emotional and mental health rating is same. Patients states they are sometimes angry. Patient states they are never, rarely unusually tired/fatigued. Pain experienced in the last 7 days has been some. Patient's pain rating has been 7/10. Patient states that she has experienced weight loss or gain in last 6 months. Depression Screening:   PHQ-2 Score: 0      Fall Risk Screening:    In the past year, patient has experienced: history of falling in past year    Number of falls: 2 or more  Injured during fall?: No    Feels unsteady when standing or walking?: Yes    Worried about falling?: Yes      Urinary Incontinence Screening:   Patient has not leaked urine accidently in the last six months. Home Safety:  Patient has trouble with stairs inside or outside of their home. Patient has working smoke alarms and has working carbon monoxide detector. Home safety hazards include: none. Nutrition:   Current diet is Regular. Medications:   Patient is currently taking over-the-counter supplements. OTC medications include: see medication list. Patient is able to manage medications. Activities of Daily Living (ADLs)/Instrumental Activities of Daily Living (IADLs):   Walk and transfer into and out of bed and chair?: Yes  Dress and groom yourself?: Yes    Bathe or shower yourself?: Yes    Feed yourself?  Yes  Do your laundry/housekeeping?: Yes  Manage your money, pay your bills and track your expenses?: Yes  Make your own meals?: Yes    Do your own shopping?: Yes    Previous Hospitalizations:   Any hospitalizations or ED visits within the last 12 months?: Yes    How many hospitalizations have you had in the last year?: 1-2    Advance Care Planning:   Living will: No    Durable POA for healthcare: No    Advanced directive: No    Advanced directive counseling given: Yes    Patient declined ACP directive: No      Cognitive Screening:   Provider or family/friend/caregiver concerned regarding cognition?: No    PREVENTIVE SCREENINGS      Cardiovascular Screening:    General: History Lipid Disorder and Risks and Benefits Discussed    Due for: Lipid Panel      Diabetes Screening:     General: History Diabetes, Risks and Benefits Discussed and Screening Current      Colorectal Cancer Screening:     General: History Colorectal Cancer, Risks and Benefits Discussed and Screening Current      Breast Cancer Screening:     General: Risks and Benefits Discussed    Due for: Mammogram        Cervical Cancer Screening:    General: Screening Not Indicated      Osteoporosis Screening:    General: Risks and Benefits Discussed    Due for: DXA Axial      Abdominal Aortic Aneurysm (AAA) Screening:        General: Screening Not Indicated      Lung Cancer Screening:     General: Screening Not Indicated and History Lung Cancer      Hepatitis C Screening:    General: Screening Current    Screening, Brief Intervention, and Referral to Treatment (SBIRT)    Screening  Typical number of drinks in a day: 0  Typical number of drinks in a week: 0  Interpretation: Low risk drinking behavior. Single Item Drug Screening:  How often have you used an illegal drug (including marijuana) or a prescription medication for non-medical reasons in the past year? never    Single Item Drug Screen Score: 0  Interpretation: Negative screen for possible drug use disorder    Brief Intervention  Alcohol & drug use screenings were reviewed. No concerns regarding substance use disorder identified. Review of Current Opioid Use    Opioid Risk Tool (ORT) Interpretation: Complete Opioid Risk Tool (ORT)    Other Counseling Topics:   Car/seat belt/driving safety, skin self-exam, sunscreen and calcium and vitamin D intake and regular weightbearing exercise. No results found. Physical Exam:     BP 90/60   Pulse 86   Temp (!) 96.9 °F (36.1 °C)   Resp 16   Ht 5' 5" (1.651 m)   Wt 125 kg (276 lb)   SpO2 98%   BMI 45.93 kg/m²     Physical Exam  Constitutional:       Appearance: She is well-developed. She is obese. HENT:      Head: Normocephalic. Right Ear: External ear normal.      Left Ear: External ear normal.      Nose: Nose normal.   Eyes:      General:         Right eye: No discharge. Left eye: No discharge. Conjunctiva/sclera: Conjunctivae normal.   Cardiovascular:      Rate and Rhythm: Normal rate and regular rhythm.       Pulses: no weak pulses Dorsalis pedis pulses are 1+ on the right side and 1+ on the left side. Posterior tibial pulses are 1+ on the right side and 1+ on the left side. Heart sounds: Murmur heard. Pulmonary:      Effort: Pulmonary effort is normal.      Breath sounds: Normal breath sounds. Abdominal:      General: There is no distension. Palpations: Abdomen is soft. Tenderness: There is no abdominal tenderness. Comments: Colostomy bag is intact and stoma is pink and moist   Musculoskeletal:      Cervical back: Normal range of motion and neck supple. Comments: Uses cane for ambulation   Feet:      Right foot:      Skin integrity: No ulcer, skin breakdown, erythema, warmth, callus or dry skin. Left foot:      Skin integrity: No ulcer, skin breakdown, erythema, warmth, callus or dry skin. Lymphadenopathy:      Cervical:      Right cervical: No superficial or posterior cervical adenopathy. Left cervical: No superficial or posterior cervical adenopathy. Upper Body:      Right upper body: No supraclavicular adenopathy. Left upper body: No supraclavicular adenopathy. Skin:     General: Skin is warm and dry. Findings: No rash. Neurological:      Mental Status: She is alert and oriented to person, place, and time. GCS: GCS eye subscore is 4. GCS verbal subscore is 5. GCS motor subscore is 6. Sensory: No sensory deficit. Coordination: Coordination normal.      Gait: Gait normal.   Psychiatric:         Behavior: Behavior normal.         Thought Content: Thought content normal.         Judgment: Judgment normal.          Patient's shoes and socks removed. Right Foot/Ankle   Right Foot Inspection  Skin Exam: skin normal and skin intact. No dry skin, no warmth, no callus, no erythema, no maceration, no abnormal color, no pre-ulcer, no ulcer and no callus. Toe Exam: ROM and strength within normal limits.  No swelling, no tenderness, erythema and  no right toe deformity    Sensory   Vibration: intact  Monofilament testing: diminished    Vascular  Capillary refills: < 3 seconds  The right DP pulse is 1+. The right PT pulse is 1+. Left Foot/Ankle  Left Foot Inspection  Skin Exam: skin normal and skin intact. No dry skin, no warmth, no erythema, no maceration, normal color, no pre-ulcer, no ulcer and no callus. Toe Exam: ROM and strength within normal limits. No swelling, no tenderness, no erythema and no left toe deformity. Sensory   Vibration: intact  Monofilament testing: diminished    Vascular  Capillary refills: < 3 seconds  The left DP pulse is 1+. The left PT pulse is 1+.      Assign Risk Category  No deformity present  Loss of protective sensation  No weak pulses  Risk: GEORGETTE Lua

## 2023-09-16 NOTE — PATIENT INSTRUCTIONS
Check your BP daily and if below 100/60 then will skip dose of metoprolol that day  Medicare Preventive Visit Patient Instructions  Thank you for completing your Welcome to Medicare Visit or Medicare Annual Wellness Visit today. Your next wellness visit will be due in one year (9/16/2024). The screening/preventive services that you may require over the next 5-10 years are detailed below. Some tests may not apply to you based off risk factors and/or age. Screening tests ordered at today's visit but not completed yet may show as past due. Also, please note that scanned in results may not display below. Preventive Screenings:  Service Recommendations Previous Testing/Comments   Colorectal Cancer Screening  * Colonoscopy    * Fecal Occult Blood Test (FOBT)/Fecal Immunochemical Test (FIT)  * Fecal DNA/Cologuard Test  * Flexible Sigmoidoscopy Age: 43-73 years old   Colonoscopy: every 10 years (may be performed more frequently if at higher risk)  OR  FOBT/FIT: every 1 year  OR  Cologuard: every 3 years  OR  Sigmoidoscopy: every 5 years  Screening may be recommended earlier than age 39 if at higher risk for colorectal cancer. Also, an individualized decision between you and your healthcare provider will decide whether screening between the ages of 77-80 would be appropriate. Colonoscopy: 08/29/2023  FOBT/FIT: Not on file  Cologuard: Not on file  Sigmoidoscopy: Not on file    History Colorectal Cancer     Breast Cancer Screening Age: 36 years old  Frequency: every 1-2 years  Not required if history of left and right mastectomy Mammogram: 06/05/2019        Cervical Cancer Screening Between the ages of 21-29, pap smear recommended once every 3 years. Between the ages of 32-69, can perform pap smear with HPV co-testing every 5 years.    Recommendations may differ for women with a history of total hysterectomy, cervical cancer, or abnormal pap smears in past. Pap Smear: Not on file    Screening Not Indicated   Hepatitis C Screening Once for adults born between 1945 and 1965  More frequently in patients at high risk for Hepatitis C Hep C Antibody: 07/29/2022    Screening Current   Diabetes Screening 1-2 times per year if you're at risk for diabetes or have pre-diabetes Fasting glucose: 165 mg/dL (9/12/2023)  A1C: 7.2 (7/28/2022)  Screening Not Indicated  History Diabetes   Cholesterol Screening Once every 5 years if you don't have a lipid disorder. May order more often based on risk factors. Lipid panel: 07/29/2022    Screening Not Indicated  History Lipid Disorder     Other Preventive Screenings Covered by Medicare:  Abdominal Aortic Aneurysm (AAA) Screening: covered once if your at risk. You're considered to be at risk if you have a family history of AAA. Lung Cancer Screening: covers low dose CT scan once per year if you meet all of the following conditions: (1) Age 48-67; (2) No signs or symptoms of lung cancer; (3) Current smoker or have quit smoking within the last 15 years; (4) You have a tobacco smoking history of at least 20 pack years (packs per day multiplied by number of years you smoked); (5) You get a written order from a healthcare provider. Glaucoma Screening: covered annually if you're considered high risk: (1) You have diabetes OR (2) Family history of glaucoma OR (3)  aged 48 and older OR (3)  American aged 72 and older  Osteoporosis Screening: covered every 2 years if you meet one of the following conditions: (1) You're estrogen deficient and at risk for osteoporosis based off medical history and other findings; (2) Have a vertebral abnormality; (3) On glucocorticoid therapy for more than 3 months; (4) Have primary hyperparathyroidism; (5) On osteoporosis medications and need to assess response to drug therapy. Last bone density test (DXA Scan): Not on file. HIV Screening: covered annually if you're between the age of 14-79.  Also covered annually if you are younger than 13 and older than 65 with risk factors for HIV infection. For pregnant patients, it is covered up to 3 times per pregnancy. Immunizations:  Immunization Recommendations   Influenza Vaccine Annual influenza vaccination during flu season is recommended for all persons aged >= 6 months who do not have contraindications   Pneumococcal Vaccine   * Pneumococcal conjugate vaccine = PCV13 (Prevnar 13), PCV15 (Vaxneuvance), PCV20 (Prevnar 20)  * Pneumococcal polysaccharide vaccine = PPSV23 (Pneumovax) Adults 20-63 years old: 1-3 doses may be recommended based on certain risk factors  Adults 72 years old: 1-2 doses may be recommended based off what pneumonia vaccine you previously received   Hepatitis B Vaccine 3 dose series if at intermediate or high risk (ex: diabetes, end stage renal disease, liver disease)   Tetanus (Td) Vaccine - COST NOT COVERED BY MEDICARE PART B Following completion of primary series, a booster dose should be given every 10 years to maintain immunity against tetanus. Td may also be given as tetanus wound prophylaxis. Tdap Vaccine - COST NOT COVERED BY MEDICARE PART B Recommended at least once for all adults. For pregnant patients, recommended with each pregnancy. Shingles Vaccine (Shingrix) - COST NOT COVERED BY MEDICARE PART B  2 shot series recommended in those aged 48 and above     Health Maintenance Due:      Topic Date Due    Breast Cancer Screening: Mammogram  06/05/2020    Hepatitis C Screening  Completed    Lung Cancer Screening  Discontinued     Immunizations Due:      Topic Date Due    COVID-19 Vaccine (3 - Pfizer series) 04/18/2021    Influenza Vaccine (1) 09/01/2023     Advance Directives   What are advance directives? Advance directives are legal documents that state your wishes and plans for medical care. These plans are made ahead of time in case you lose your ability to make decisions for yourself.  Advance directives can apply to any medical decision, such as the treatments you want, and if you want to donate organs. What are the types of advance directives? There are many types of advance directives, and each state has rules about how to use them. You may choose a combination of any of the following:  Living will: This is a written record of the treatment you want. You can also choose which treatments you do not want, which to limit, and which to stop at a certain time. This includes surgery, medicine, IV fluid, and tube feedings. Durable power of  for healthcare [facility]): This is a written record that states who you want to make healthcare choices for you when you are unable to make them for yourself. This person, called a proxy, is usually a family member or a friend. You may choose more than 1 proxy. Do not resuscitate (DNR) order:  A DNR order is used in case your heart stops beating or you stop breathing. It is a request not to have certain forms of treatment, such as CPR. A DNR order may be included in other types of advance directives. Medical directive: This covers the care that you want if you are in a coma, near death, or unable to make decisions for yourself. You can list the treatments you want for each condition. Treatment may include pain medicine, surgery, blood transfusions, dialysis, IV or tube feedings, and a ventilator (breathing machine). Values history: This document has questions about your views, beliefs, and how you feel and think about life. This information can help others choose the care that you would choose. Why are advance directives important? An advance directive helps you control your care. Although spoken wishes may be used, it is better to have your wishes written down. Spoken wishes can be misunderstood, or not followed. Treatments may be given even if you do not want them. An advance directive may make it easier for your family to make difficult choices about your care.    Fall Prevention    Fall prevention  includes ways to make your home and other areas safer. It also includes ways you can move more carefully to prevent a fall. Health conditions that cause changes in your blood pressure, vision, or muscle strength and coordination may increase your risk for falls. Medicines may also increase your risk for falls if they make you dizzy, weak, or sleepy. Fall prevention tips:   Stand or sit up slowly. Use assistive devices as directed. Wear shoes that fit well and have soles that . Wear a personal alarm. Stay active. Manage your medical conditions. Home Safety Tips:  Add items to prevent falls in the bathroom. Keep paths clear. Install bright lights in your home. Keep items you use often on shelves within reach. Paint or place reflective tape on the edges of your stairs. Weight Management   Why it is important to manage your weight:  Being overweight increases your risk of health conditions such as heart disease, high blood pressure, type 2 diabetes, and certain types of cancer. It can also increase your risk for osteoarthritis, sleep apnea, and other respiratory problems. Aim for a slow, steady weight loss. Even a small amount of weight loss can lower your risk of health problems. How to lose weight safely:  A safe and healthy way to lose weight is to eat fewer calories and get regular exercise. You can lose up about 1 pound a week by decreasing the number of calories you eat by 500 calories each day. Healthy meal plan for weight management:  A healthy meal plan includes a variety of foods, contains fewer calories, and helps you stay healthy. A healthy meal plan includes the following:  Eat whole-grain foods more often. A healthy meal plan should contain fiber. Fiber is the part of grains, fruits, and vegetables that is not broken down by your body. Whole-grain foods are healthy and provide extra fiber in your diet.  Some examples of whole-grain foods are whole-wheat breads and pastas, oatmeal, brown rice, and bulgur. Eat a variety of vegetables every day. Include dark, leafy greens such as spinach, kale, daniela greens, and mustard greens. Eat yellow and orange vegetables such as carrots, sweet potatoes, and winter squash. Eat a variety of fruits every day. Choose fresh or canned fruit (canned in its own juice or light syrup) instead of juice. Fruit juice has very little or no fiber. Eat low-fat dairy foods. Drink fat-free (skim) milk or 1% milk. Eat fat-free yogurt and low-fat cottage cheese. Try low-fat cheeses such as mozzarella and other reduced-fat cheeses. Choose meat and other protein foods that are low in fat. Choose beans or other legumes such as split peas or lentils. Choose fish, skinless poultry (chicken or turkey), or lean cuts of red meat (beef or pork). Before you cook meat or poultry, cut off any visible fat. Use less fat and oil. Try baking foods instead of frying them. Add less fat, such as margarine, sour cream, regular salad dressing and mayonnaise to foods. Eat fewer high-fat foods. Some examples of high-fat foods include french fries, doughnuts, ice cream, and cakes. Eat fewer sweets. Limit foods and drinks that are high in sugar. This includes candy, cookies, regular soda, and sweetened drinks. Exercise:  Exercise at least 30 minutes per day on most days of the week. Some examples of exercise include walking, biking, dancing, and swimming. You can also fit in more physical activity by taking the stairs instead of the elevator or parking farther away from stores. Ask your healthcare provider about the best exercise plan for you.    Narcotic (Opioid) Safety    Use narcotics safely:  Take prescribed narcotics exactly as directed  Do not give narcotics to others or take narcotics that belong to someone else  Do not mix narcotics without medicines or alcohol  Do not drive or operate heavy machinery after you take the narcotic  Monitor for side effects and notify your healthcare provider if you experienced side effects such as nausea, sleepiness, itching, or trouble thinking clearly. Manage constipation:    Constipation is the most common side effect of narcotic medicine. Constipation is when you have hard, dry bowel movements, or you go longer than usual between bowel movements. Tell your healthcare provider about all changes in your bowel movements while you are taking narcotics. He or she may recommend laxative medicine to help you have a bowel movement. He or she may also change the kind of narcotic you are taking, or change when you take it. The following are more ways you can prevent or relieve constipation:    Drink liquids as directed. You may need to drink extra liquids to help soften and move your bowels. Ask how much liquid to drink each day and which liquids are best for you. Eat high-fiber foods. This may help decrease constipation by adding bulk to your bowel movements. High-fiber foods include fruits, vegetables, whole-grain breads and cereals, and beans. Your healthcare provider or dietitian can help you create a high-fiber meal plan. Your provider may also recommend a fiber supplement if you cannot get enough fiber from food. Exercise regularly. Regular physical activity can help stimulate your intestines. Walking is a good exercise to prevent or relieve constipation. Ask which exercises are best for you. Schedule a time each day to have a bowel movement. This may help train your body to have regular bowel movements. Bend forward while you are on the toilet to help move the bowel movement out. Sit on the toilet for at least 10 minutes, even if you do not have a bowel movement. Store narcotics safely:   Store narcotics where others cannot easily get them. Keep them in a locked cabinet or secure area. Do not  keep them in a purse or other bag you carry with you. A person may be looking for something else and find the narcotics.   Make sure narcotics are stored out of the reach of children. A child can easily overdose on narcotics. Narcotics may look like candy to a small child. The best way to dispose of narcotics: The laws vary by country and area. In the [place], the best way is to return the narcotics through a take-back program. This program is offered by the [facility]). The following are options for using the program:  Take the narcotics to a [employer] collection site. The site is often a law enforcement center. Call your local law enforcement center for scheduled take-back days in your area. You will be given information on where to go if the collection site is in a different location. Take the narcotics to an approved pharmacy or hospital.  A pharmacy or hospital may be set up as a collection site. You will need to ask if it is a [employer] collection site if you were not directed there. A pharmacy or doctor's office may not be able to take back narcotics unless it is a [employer] site. Use a mail-back system. This means you are given containers to put the narcotics into. You will then mail them in the containers. Use a take-back drop box. This is a place to leave the narcotics at any time. People and animals will not be able to get into the box. Your local law enforcement agency can tell you where to find a drop box in your area. Other ways to manage pain:   Ask your healthcare provider about non-narcotic medicines to control pain. Nonprescription medicines include NSAIDs (such as ibuprofen) and acetaminophen. Prescription medicines include muscle relaxers, antidepressants, and steroids. Pain may be managed without any medicines. Some ways to relieve pain include massage, aromatherapy, or meditation. Physical or occupational therapy may also help. For more information:   Drug Enforcement Administration  [address] [facility] , [address]  Phone: 1- 951 - 779-6523  Web Address: [facility].gov/drug_disposal/    [place] Food and Drug Administration  [address]  [place] , [address]  Phone: 4- 110 - 422-1619  Web Address: http://[employer]/     © Copyright [employer] 2018 Information is for End User's use only and may not be sold, redistributed or otherwise used for commercial purposes.  All illustrations and images included in CareNotes® are the copyrighted property of [employer] Inc. or [address] [place] [address]

## 2023-09-18 ENCOUNTER — TELEPHONE (OUTPATIENT)
Dept: BARIATRICS | Facility: CLINIC | Age: 68
End: 2023-09-18

## 2023-09-18 DIAGNOSIS — R79.89 HIGH SERUM PARATHYROID HORMONE (PTH): ICD-10-CM

## 2023-09-18 DIAGNOSIS — K91.2 POSTSURGICAL MALABSORPTION: ICD-10-CM

## 2023-09-18 DIAGNOSIS — Z98.84 BARIATRIC SURGERY STATUS: Primary | ICD-10-CM

## 2023-09-18 NOTE — TELEPHONE ENCOUNTER
----- Message from fruux sent at 9/18/2023  8:11 AM EDT -----  Please call patient to let her know we received her levels. All are within limits EXCEPT FOR THE FOLLOWING:     - her parathyroid hormone is elevated but still improves. This hormone regulates your vitamin d and calcium levels. Please make sure you are taking calcium citrate 500 mg three times per day. - vitamin B1, B12, and folate are elevated but this is NOT harmful. Shows you are taking your vitamins adequately. Please continue with  her other supplements as is.     - I would like to repeat her pth levels in 3 months.      GEORGETTE Simmons

## 2023-09-28 ENCOUNTER — TELEPHONE (OUTPATIENT)
Dept: GASTROENTEROLOGY | Facility: MEDICAL CENTER | Age: 68
End: 2023-09-28

## 2023-09-28 NOTE — TELEPHONE ENCOUNTER
Left detailed message for patient confirming upcoming EUS with Dr. Ray Holloway for 10/10/2023. She was made aware to please hold her Eliquis for 2 days prior to her EUS.

## 2023-10-04 ENCOUNTER — TELEPHONE (OUTPATIENT)
Age: 68
End: 2023-10-04

## 2023-10-04 NOTE — TELEPHONE ENCOUNTER
Patient would like the script for walker faxed to [address] instead of her picking it up.    [phone]

## 2023-10-05 DIAGNOSIS — I48.91 ATRIAL FIBRILLATION WITH RAPID VENTRICULAR RESPONSE (HCC): ICD-10-CM

## 2023-10-05 RX ORDER — METOPROLOL SUCCINATE 25 MG/1
25 TABLET, EXTENDED RELEASE ORAL DAILY
Qty: 30 TABLET | Refills: 0 | Status: SHIPPED | OUTPATIENT
Start: 2023-10-05

## 2023-10-05 NOTE — TELEPHONE ENCOUNTER
Script attached to fax cover sheet and taken to  to be faxed to [address] @ [phone].   Tatiana/MORENO

## 2023-10-10 ENCOUNTER — TELEPHONE (OUTPATIENT)
Dept: HEMATOLOGY ONCOLOGY | Facility: CLINIC | Age: 68
End: 2023-10-10

## 2023-10-10 NOTE — TELEPHONE ENCOUNTER
I called Jackie De Jesus in response to a referral that was received for patient to establish care with Thoracic Surgery. Outreach was made to schedule a consultation. I left a voicemail explaining the reason for my call and advised patient to call [facility] at [phone]. Another attempt will be made to contact patient.       Any provider can see within 10-14 days

## 2023-10-11 ENCOUNTER — TELEPHONE (OUTPATIENT)
Dept: HEMATOLOGY ONCOLOGY | Facility: CLINIC | Age: 68
End: 2023-10-11

## 2023-10-11 NOTE — TELEPHONE ENCOUNTER
I called Titi Osman in response to a referral that was received for patient to establish care with Thoracic Surgery. Outreach was made to schedule a consultation. A consultation was scheduled for patient during this call.  Patient is scheduled on 10/24/23 at 10:00AM with Dr Aaliyah Ray at the [facility]

## 2023-10-18 ENCOUNTER — HOSPITAL ENCOUNTER (OUTPATIENT)
Dept: RADIOLOGY | Age: 68
Discharge: HOME | End: 2023-10-18

## 2023-10-19 ENCOUNTER — HOSPITAL ENCOUNTER (OUTPATIENT)
Dept: RADIOLOGY | Age: 68
Discharge: HOME | End: 2023-10-19
Payer: MEDICARE

## 2023-10-19 DIAGNOSIS — Z85.048 HISTORY OF RECTAL CANCER: ICD-10-CM

## 2023-10-19 DIAGNOSIS — D38.1 NEOPLASM OF UNCERTAIN BEHAVIOR OF LEFT UPPER LOBE OF LUNG: ICD-10-CM

## 2023-10-19 LAB — GLUCOSE SERPL-MCNC: 147 MG/DL (ref 65–140)

## 2023-10-19 PROCEDURE — 78815 PET IMAGE W/CT SKULL-THIGH: CPT

## 2023-10-19 PROCEDURE — 82948 REAGENT STRIP/BLOOD GLUCOSE: CPT

## 2023-10-19 PROCEDURE — A9552 F18 FDG: HCPCS

## 2023-10-19 PROCEDURE — G1004 CDSM NDSC: HCPCS

## 2023-10-23 DIAGNOSIS — E78.5 HYPERLIPIDEMIA, UNSPECIFIED HYPERLIPIDEMIA TYPE: ICD-10-CM

## 2023-10-23 RX ORDER — ATORVASTATIN CALCIUM 20 MG/1
TABLET, FILM COATED ORAL
Qty: 90 TABLET | Refills: 0 | Status: SHIPPED | OUTPATIENT
Start: 2023-10-23

## 2023-10-24 ENCOUNTER — DOCUMENTATION (OUTPATIENT)
Dept: CARDIAC SURGERY | Facility: CLINIC | Age: 68
End: 2023-10-24

## 2023-10-24 ENCOUNTER — OFFICE VISIT (OUTPATIENT)
Dept: CARDIAC SURGERY | Facility: CLINIC | Age: 68
End: 2023-10-24
Payer: MEDICARE

## 2023-10-24 VITALS
SYSTOLIC BLOOD PRESSURE: 140 MMHG | HEIGHT: 65 IN | RESPIRATION RATE: 14 BRPM | OXYGEN SATURATION: 99 % | WEIGHT: 279.54 LBS | DIASTOLIC BLOOD PRESSURE: 82 MMHG | HEART RATE: 78 BPM | BODY MASS INDEX: 46.57 KG/M2 | TEMPERATURE: 98.2 F

## 2023-10-24 DIAGNOSIS — R91.1 PULMONARY NODULE: ICD-10-CM

## 2023-10-24 DIAGNOSIS — Z85.048 HISTORY OF RECTAL CANCER: ICD-10-CM

## 2023-10-24 DIAGNOSIS — R91.1 SOLITARY PULMONARY NODULE: Primary | ICD-10-CM

## 2023-10-24 PROCEDURE — 99205 OFFICE O/P NEW HI 60 MIN: CPT | Performed by: THORACIC SURGERY (CARDIOTHORACIC VASCULAR SURGERY)

## 2023-10-24 NOTE — PROGRESS NOTES
I met with Blane Carvajal at her visit with Dr. Francis Sexton today. I introduced myself to her and explained my role. Questions answered, support provided.

## 2023-10-24 NOTE — PROGRESS NOTES
Thoracic Consult  Assessment/Plan:    Pulmonary nodule  Ms Hodan Banuelos has a pulmonary nodule with a history of Stage IIA recetal cancer and stage IIIC ovarian cancer diagnosed in 2018 s/p chemoradiation and resection. This left apical nodule has increased in size from 4mm to 12mm since since 2021 and shows only mild FDG activity with SUV 1.8. Given her smoking history, this could represent a slow growing primary lung cancer. Given it's small size, IR is unable to biopsy the nodule at this time. We discussed two options including diagnostic wedge resection vs continued monitoring with short term CT chest and re-evaluation by IR for biopsy if it increases in size. She would like to discuss these options with her family. She will obtain PFTs for baseline lung function and will see her cardiologist for cardiac risk stratification (appointment already scheduled 10/27/23). We will see her back once these are complete to discuss treatment plan. All questions were answered and she is in agreement with this plan. Diagnoses and all orders for this visit:    Solitary pulmonary nodule  -     Ambulatory Referral to Thoracic Surgery  -     Complete PFT with post bronchodilator; Future    History of rectal cancer  -     Ambulatory Referral to Thoracic Surgery    Pulmonary nodule          Thoracic History   Diagnosis: Pulmonary nodule, Stage IIA rectal cancer, Stage IIIC ovarain cancer   Procedures/Surgeries:    Pathology:    Adjuvant Therapy:       Subjective:    Patient ID: Alexi Melgar is a 76 y.o. female.    Cancer Staging   History of ovarian cancer  Staging form: Ovary, Fallopian Tube, Primary Peritoneal, AJCC 8th Edition  - Clinical stage from 9/6/2018: FIGO Stage IIIC, calculated as Stage Unknown (cT3c(m), cNX, cM0) - Signed by Missy Rogers MD on 10/3/2018  Histopathologic type: Serous cystadenocarcinoma, NOS  Histologic grade (G): G1  Histologic grading system: 4 grade system  Multiple tumors: Yes  Lymph-vascular invasion (LVI): Presence of LVI unknown/indeterminate  Diagnostic confirmation: Positive histology  Specimen type: Excision  Staged by: Managing physician  Stage used in treatment planning: Yes  National guidelines used in treatment planning: Yes  Type of national guideline used in treatment planning: Other    History of rectal cancer  Staging form: Colon and Rectum, AJCC 8th Edition  - Clinical: Stage IIIB (cT3, cN1, cM0) - Signed by Paco Yao MD on 4/11/2018  Total positive nodes: 0  Oncology History Overview Note   77year old female returning for annual follow up for Stage IIIB (cT3, cN1, cM0) rectal cancer and Stage IIIC (cT3c(m), cNX, cM0) ovarian cancer. She completed pelvic radiation on 6/20/2018. She was last seen on 10/19/20.       2/12/21 CT chest abdomen pelvis w contrast  No evidence of recurrent or metastatic disease throughout the chest, abdomen or pelvis. Large parastomal hernia containing loops of small bowel and cecum. 2/19/21 Med Onc, Dr. Domenick Nyhan well from a GI standpoint and clinically there are no concerning findings. Continue with surveillance. 8/19/21 CT abd pelvis wo contrast  Right lower lobe infiltrate in keeping with pneumonia. Left lower quadrant ostomy with an unchanged large left parastomal bowel containing hernia not causing obstruction or other complication. Status post gastric bypass without apparent complication. 8/19/21 XR chest 1 view portable   No acute cardiopulmonary disease      8/19/21 Dr. Waqar Pleitez, Med Onc  Continue with surveillance    8/20/21 EGD/Biopsies (negative)    8/20/21 Wound care consult  Permanent colostomy, non healing chronic wound. Last seen in May and worked up for Miranda. Wound appears larger since that visit. Pt declines HBO and f/u with Wound center. She is comfortable caring for wound at home. 10/1/21 Dr. Meir Lockett is doing well. Plan to continue surveillance. F/U in 6 months with repeat bloodwork.  Discuss repeat scans at that visit. Follows with GYN Onc for ovarian cancer, she is on anastrozole. Recommend f/u as directed with Gyn Onc. No appts scheduled with Gyn Onc         History of rectal cancer   3/9/2018 Initial Diagnosis    Rectal cancer ([address])     3/9/2018 Biopsy    Rectum, mass, biopsy:  - At least high grade dysplasia/intramucosal carcinoma arising in a tubulovillous adenoma, suspicious for    invasion. 3/28/2018 Biopsy    Final Diagnosis   A. Rectal mass (biopsy):  - 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           5/10/2018 - 6/20/2018 Radiation     a dose of 4500 cGy in 25 fractions to the pelvis. An additional 540 cGy in 3 fractions was delivered to the primary rectal tumor with margin. Dr Ton Cardona       5/2018 - 6/2018 Chemotherapy    Capecitabine 825 mg/m2 by mouth twice daily Monday through Friday (off the weekends) x 5-6 weeks with RT (dose for this patient is 1650 mg twice a day)     9/6/2018 Surgery    Proctectomy, permanent end colostomy, AP resection, posterior vaginectomy, omentectomy, radical hysterectomy, BSO, cyctoscopy  Tumor site:  Rectum     - Tumor size:  Up to 3.3 cm     - Histologic Type:   Adenocarcinoma      - Histologic Grade: Moderately differentiated (G2)     - Microscopic Tumor Extension (ypT3): Tumor invades through muscularis propria into pericolorectal soft tissues  Margins (specify distance for nearest radial margin on RECTAL tumors only):     - Proximal Margin:  Negative for carcinoma     - Distal Margin:  Negative for carcinoma     - Circumferential (Radial) or Mesenteric Margin:  Negative for carcinoma (0.6cm)     - Other Margins:  Vaginal margin negative for carcinoma  Treatment effect:  Present;   Residual cancer with evident tumor regression, but more than single cells or rare small groups of cancer cells (partial response, score 2)     9/6/2018 -  Cancer Staged     Stage  IIA - ypT3, pN0, G2.     12/17/2018 - 12/2018 Chemotherapy PLAN:    CapeOX Regimen x 6 months  Oxaliplatin 130 mg/m2 IV (75% on first cycle)  Xeloda 1000 mg/m2 p.o. twice a day  Cycle = 3 weeks    1/2/19 Patient completed approximately 50% of the 1st post operative dose of CapeOX - delays from chemotherapy related diarrhea, nausea, chest pains. Rectal wound has also not healed. Mrs. Kristina Solis does not wish to continue with chemotherapy. History of ovarian cancer   9/6/2018 Initial Diagnosis    Ovarian cancer [facility]): Incidental diagnosis at the time of laparotomy for abdominal perineal resection/proctectomy due to rectal cancer. Stage IIIC. No gross residual disease noted at the time of surgery. Low-grade serous histologic type. 9/6/2018 Surgery    ALEXANDRA, BSO  Tumor site:  Ovarian and fallopian tube surfaces and uterine and colonic serosa  Ovarian surface involvement:  Present  Fallopian tube surface involvement:  Present  Tumor size:  Largest focus measures 1.8 cm (uterine serosa) (see note)   Histologic type:  Low-grade serous carcinoma   Implant:  Present  Other tissue/organ involvement:  Bilateral ovaries and fallopian tubes, uterine serosa, sigmoid colon serosa, and omentum  Largest extrapelvic peritoneal focus: 2.5 cm (macroscopically identidfied)    - Specify site:  Omentum     9/6/2018 -  Cancer Staged    Pathologic stage classification (pTNM, AJCC 8th edition): pT3c, pNX, Low Grade  18. FIGO stage (2015 FIGO cancer report): IIIC      Hormone Therapy    anastrozole         HPI  ECOG 1    Ms Kristina Solis is a 76year-old female with a PMH Stage IIA rectal cancer s/p chemoradiation and resection,  Stage IIIC ovarian cancer on Anastrazole, former tobacco abuse quit 2006 25pk/yr history, DMII, emphysema, PVD, A fib on Eliquis, CAD, HFpEF (EF60% 2021), HLD, iron deficiency anemia, chronic pain on chronic opiates who was referred to our by Dr Satya Fonseca office for evaluation of a 12mm left apical lung nodule that was evaluated by IR and not amendable to biopsy.      CT chest on 6/10/2023 was personally reviewed by myself in PACS and reveals an irregular solid nodule in the left apex increased in size now 12mm from 4mm in Feburary 2021, no mediastinal/hilar adenopathy. PET-CT on 10/19/23 was personally reviewed by myself [facility] and reveals that the pleural based left apical nodule is 9mm with SUV 1.8, blood pool activity 3.0. No mediastinal nor hilar adenopathy. PFTs have not been assessed. PSH: TAVR 2020, proctectomy, colostomy, laparoscopic hand assist abdominal resection, posterior vaginectomy, omentectomy, radical hysterectomy, BSO, cystoscopy (2018), lap Juan en Y (2016),     On discussion she is feeling overall well and in her normal state of health. She does not do stairs often, but feels she can move around with her daily activities without any problems breathing. She denies cough, hemoptysis, unintentional weightloss, fevers/chills or recent illness.        The following portions of the patient's history were reviewed and updated as appropriate: allergies, current medications, past family history, past medical history, past social history, past surgical history, and problem list.    Past Medical History:   Diagnosis Date    Acute pain of right knee     Ambulatory dysfunction     Anemia     Arthritis     Bleeding ulcer     Cancer (HCC)     rectal    Chronic lower back pain     Chronic pain disorder     back pain    Colon cancer ([facility]) 2018    Diabetes mellitus ([facility])     Endometrial cancer ([facility]) 2018    GERD (gastroesophageal reflux disease)     History of chemotherapy     History of MRSA infection 0719/2016    Morbid obesity ([facility])     Morbid obesity with BMI of 45.0-49.9, adult ([facility])     Ovarian cancer ([facility]) 2018    Paroxysmal atrial fibrillation (HCC)     Rectal cancer (HCC)     S/P TAVR (transcatheter aortic valve replacement)     SOB (shortness of breath)     Spinal stenosis     Systolic murmur     Unsteady gait     uses walker    Wears dentures     Wears glasses       Past Surgical History:   Procedure Laterality Date    ABDOMINAL PERINEAL BOWEL RESECTION W/ ILEOANAL POUCH N/A 2018    Procedure: LAPAROSCOPIC HAND ASSIST ABDOMINOPERINEAL RESECTION,  POSTERIOR VAGINECTOMY, OMENTECTOMY;  Surgeon: Fatoumata Durand MD;  Location: [facility];  Service: Colorectal    ABDOMINAL SURGERY      abscess removed from abdomen and right thigh, a hole in thigh closed by plastic surgeon    ABSCESS DRAINAGE      abd, (R) leg, (L) leg     SECTION       SECTION      CYSTOSCOPY N/A 2018    Procedure: Lavon Gould;  Surgeon: Kapil Luu MD;  [facility];  Service: Gynecology Oncology    ESOPHAGOGASTRODUODENOSCOPY N/A 2016    Procedure: ESOPHAGOGASTRODUODENOSCOPY (EGD); Surgeon: Munir Colbert MD;  Location: [facility];  Service:     ESOPHAGOGASTRODUODENOSCOPY N/A 2016    Procedure: ESOPHAGOGASTRODUODENOSCOPY (EGD); Surgeon: Munir Colbert MD;  [facility]: [facility]; Service:     EYE SURGERY      laser eye surgery    HYSTERECTOMY N/A 2018    Procedure: RADICAL HYSTERECTOMY TOTAL ABDOMINAL (ALEXANDRA). BSO;  Surgeon: Kapil Luu MD;  [facility];  Service: Gynecology Oncology    JOINT REPLACEMENT Left 2017    hip    JOINT REPLACEMENT Right 2017    hip    OOPHORECTOMY Bilateral     AK COLONOSCOPY FLX DX W/COLLJ SPEC WHEN PFRMD N/A 2018    Procedure: COLONOSCOPY;  Surgeon: Adrian Blanchard MD;  Location: [address] [place] [address] GI LAB; Service: Gastroenterology    AK COLONOSCOPY FLX DX W/COLLJ [facility] WHEN PFRMD N/A 2018    Procedure: COLONOSCOPY;  Surgeon: Fatoumata Durand MD;  [facility]: [facility]; Service: Colorectal    AK ECHO TRANSESOPHAG R-T 2D W/PRB IMG ACQUISJ I&R N/A 2020    Procedure: TRANSESOPHAGEAL ECHOCARDIOGRAM (THO);   Surgeon: Mac Lobo DO;  [facility];  Service: Cardiac Surgery    AK ESOPHAGOGASTRODUODENOSCOPY TRANSORAL DIAGNOSTIC N/A 2018    Procedure: ESOPHAGOGASTRODUODENOSCOPY (EGD); Surgeon: Jose Walls MD;  Location: [facility] GI LAB; Service: Gastroenterology    NV LAPAROSCOPY SURG COLOSTOMY/SKN LVL CECOSTOMY N/A 2018    Procedure: PERMANENT END COLOSTOMY;  Surgeon: Simone Conway MD;  [facility];  Service: Colorectal    NV LAPS GSTR RSTCV [address] [facility] W/BYP SOLEDAD-EN-Y LIMB <150 CM N/A 2016    Procedure: BYPASS GASTRIC  SOLEDAD-EN-Y LAPAROSCOPIC;  Surgeon: Paulie Bellamy MD;  [facility];  Service: Bariatrics    NV LAPS PROCTECTOMY ABDOMINOPERINEAL W/COLOSTOMY N/A 2018    Procedure: PROCTECTOMY;  Surgeon: Simone Conway MD;  [facility];  Service: Colorectal    NV REPLACE AORTIC VALVE OPENFEMORAL ARTERY APPROACH N/A 2020    Procedure: REPLACEMENT AORTIC VALVE TRANSCATHETER (TAVR) TRANSFEMORAL W/ 26MM WADDELL BARBARA S3 ULTRA VALVE(ACCESS ON RIGHT);   Surgeon: Jeaneth Marion DO;  [facility];  Service: Cardiac Surgery    REPLACEMENT TOTAL KNEE      TOOTH EXTRACTION      TUBAL LIGATION        Family History   Adopted: Yes   Problem Relation Age of Onset    Leukemia Mother     Heart disease Father     Coronary artery disease Father     Diabetes Father     No Known Problems Sister     No Known Problems Brother     Diabetes Son     No Known Problems Brother     No Known Problems Brother     No Known Problems Sister     No Known Problems Sister       Social History     Socioeconomic History    Marital status: Single     Spouse name: Not on file    Number of children: 2    Years of education: Not on file    Highest education level: Not on file   Occupational History    Not on file   Tobacco Use    Smoking status: Former     Packs/day: 1.00     Years: 25.00     Total pack years: 25.00     Types: Cigarettes     Quit date: 3/30/2006     Years since quittin.5     Passive exposure: Never    Smokeless tobacco: Never   Vaping Use    Vaping Use: Never used   Substance and Sexual Activity    Alcohol use: Not Currently    Drug use: Not Currently     Types: Oxycodone     Comment: Percocet for lower back pain prn    Sexual activity: Not Currently   Other Topics Concern    Not on file   Social History Narrative    Not on file     Social Determinants of Health     Financial Resource Strain: Low Risk  (9/16/2023)    Overall Financial Resource Strain (CARDIA)     Difficulty of Paying Living Expenses: Not very hard   Food Insecurity: Not on file   Transportation Needs: No Transportation Needs (9/16/2023)    PRAPARE - Transportation     Lack of Transportation (Medical): No     Lack of Transportation (Non-Medical): No   Physical Activity: Not on file   Stress: Not on file   Social Connections: Not on file   Intimate Partner Violence: Not on file   Housing Stability: Not on file      Review of Systems   Constitutional:  Negative for chills, diaphoresis and unexpected weight change. HENT: Negative. Eyes: Negative. Respiratory:  Negative for cough, shortness of breath and wheezing. Cardiovascular:  Negative for chest pain and leg swelling. Gastrointestinal:  Negative for abdominal pain, diarrhea and nausea. Endocrine: Negative. Genitourinary: Negative. Musculoskeletal: Negative. Skin: Negative. Allergic/Immunologic: Negative. Neurological: Negative. Hematological:  Negative for adenopathy. Does not bruise/bleed easily. Psychiatric/Behavioral: Negative. All other systems reviewed and are negative. Objective:   Physical Exam  Vitals reviewed. Constitutional:       General: She is not in acute distress. Appearance: Normal appearance. She is obese. She is not ill-appearing. HENT:      Head: Normocephalic. Nose: Nose normal.      Mouth/Throat:      Mouth: Mucous membranes are moist.   Eyes:      Pupils: Pupils are equal, round, and reactive to light. Cardiovascular:      Rate and Rhythm: Normal rate and regular rhythm. Heart sounds: Normal heart sounds.    Pulmonary:      Effort: Pulmonary effort is normal.      Breath sounds: Normal breath sounds. No wheezing, rhonchi or rales. Abdominal:      Palpations: Abdomen is soft. Musculoskeletal:         General: No swelling. Normal range of motion. Comments: Ambulate with cane   Lymphadenopathy:      Cervical: No cervical adenopathy. Skin:     General: Skin is warm. Neurological:      General: No focal deficit present. Mental Status: She is alert and oriented to person, place, and time. Psychiatric:         Mood and Affect: Mood normal.     /82 (BP Location: Left arm, Patient Position: Sitting, Cuff Size: Large)   Pulse 78   Temp 98.2 °F (36.8 °C) (Temporal)   Resp 14   Ht 5' 5" (1.651 m)   Wt 127 kg (279 lb 8.7 oz)   SpO2 99%   BMI 46.52 kg/m²     No Chest XR results available for this patient. No CT Chest results available for this patient. CT chest abdomen pelvis w contrast    Result Date: 6/13/2023  Narrative CT CHEST, ABDOMEN, AND PELVIS WITH IV CONTRAST INDICATION:   K91.2: Postsurgical malabsorption, not elsewhere classified Z90.3: Acquired absence of stomach (part of) Z85.048: Personal history of other malignant neoplasm of rectum, rectosigmoid junction, and anus. 80-year-old female with history of stage IIa rectal cancer post neoadjuvant chemotherapy and resection. Incidental low-grade serous ovarian carcinoma with omental nodularity. COMPARISON: CT abdomen/pelvis 8/19/2021. CT chest/abdomen/pelvis 2/12/2021. TECHNIQUE: CT examination of the chest, abdomen, and pelvis was performed. Axial, sagittal, and coronal 2D reformatted images were created from the source data and submitted for interpretation. Radiation dose length product (DLP) for this visit:  1658.65 mGy-cm . This examination, like all CT scans performed in the [facility], was performed utilizing techniques to minimize radiation dose exposure, including the use of iterative reconstruction and automated exposure control.  IV Contrast:  100 mL of iohexol (OMNIPAQUE) Enteric Contrast:  Enteric contrast was administered. FINDINGS: CHEST: LARGE AIRWAYS: Large airways are clear with no tracheal or endobronchial lesion. LUNGS: No consolidation. No edema. Scattered tiny calcified granulomas. An irregular solid nodule in the anteromedial left lung apex has increased from prior now measuring 0.8 x 0.8 x 1.2 cm (previously 0.4 x 0.3 x 0.3 cm 2/2021) on series 3/52, 602/93. PLEURA: No pleural effusion or pneumothorax. HEART: Cardiomegaly. Severe coronary artery calcification. Dense mitral annular calcification. Aortic valve prosthesis. VESSELS: Normal caliber thoracic aorta with mild atherosclerotic plaque. MEDIASTINUM AND ILIANA: Small hiatal hernia noted. No lymphadenopathy. CHEST WALL AND LOWER NECK:   Within normal limits. ABDOMEN: LIVER: Hepatomegaly measuring 20 cm in maximum craniocaudal dimension. No suspicious lesions. A punctate calcification along the medial left lobe is unchanged since at least 2020 (2/101). BILIARY: No intrahepatic biliary ductal dilatation. Normal caliber common bile duct. GALLBLADDER: Tiny layering stones versus sludge. Normal wall thickness. No pericholecystic inflammation. SPLEEN: Within normal limits. No suspicious lesion. Normal spleen size. PANCREAS: Pancreatic parenchyma is within normal limits. No main pancreatic ductal dilatation. No pancreatic/peripancreatic inflammation. ADRENAL GLANDS: Within normal limits. KIDNEYS/URETERS: Normal kidney size and position with symmetric enhancement. No suspicious lesions. Right renal vascular calcifications. No calculi. No hydronephrosis. STOMACH AND BOWEL: Postsurgical changes of prior Juan-en-Y gastric bypass; normal appearance of the anastomoses without evidence of obstruction. Normal caliber small bowel. Normal caliber large bowel.  There is lobulated hyperdense masslike mural thickening along the medial wall of the antrum of the excluded stomach (series 2/132, 601/77-71, 602/172) which in retrospect has increased from prior studies, now measuring 2.5 x 1.1 x 2.5 cm (1.0 x 0.7 x 1.2  cm 8/2021, from 0.9 x 0.6 x 1.0 cm 2/2021, from 0.9 x 0.5 x 0.6 cm 2/2020). Nearby there is a somewhat ill-defined, hyperenhancing gastrohepatic lymph node which measures 1.4 x 1.1 cm (2/106, 601/67), new from prior study. There are other smaller hyperdense lymph nodes which are also new, including a gastrohepatic punctate node (2/115, 601/70), a node adjacent to the gastric pouch (2/93, 601/102), and a perisplenic node (2/109, 601/103). APPENDIX: No findings to suggest acute appendicitis. ABDOMINOPELVIC CAVITY: No ascites. No intraperitoneal free air. Hyperdense upper abdominal lymph nodes as described above. No retroperitoneal hematoma. VESSELS: Normal caliber abdominal aorta with mild atherosclerotic plaque. The celiac, SMA, and AUDIE are patent. The portal veins are patent. PELVIS REPRODUCTIVE ORGANS: Hysterectomy. No evidence of adnexal mass noting extensive streak artifact from hip arthroplasties. URINARY BLADDER: Mostly obscured due to streak artifact. ABDOMINAL WALL/INGUINAL REGIONS: Similar appearance of a large ventral parastomal hernia containing loops of nonobstructed small and large bowel BONES: Severe multilevel degenerative changes in the spine. Vertebral body height is maintained. No acute fracture or destructive osseous lesion. Bilateral total hip arthroplasty. Severe degenerative changes at the left glenohumeral joint. Impression 1. Significantly increased size of a solid pulmonary nodule in the left upper lobe now measuring up to 1.2 cm (previously 0.4 cm in 2/2021), suspicious for indolent primary lung malignancy versus metastasis. 2.  Hyperdense masslike mural thickening in the antrum of the excluded stomach concerning for primary gastric lesion (such as neuroendocrine tumor), progressively increasing in size.  There are several hyperdense lymph nodes in the upper abdomen which are  new from prior studies. Recommend endoscopic evaluation if feasible, noting altered surgical anatomy. 3.  Otherwise no new or suspicious findings in the chest, abdomen, or pelvis. The study was marked in [facility] for immediate notification. Workstation performed: [id number]      NM PET CT skull base to mid thigh    Result Date: 10/19/2023  Narrative PET/CT SCAN INDICATION: D38.1: Neoplasm of uncertain behavior of trachea, bronchus and lung Z85.048: Personal history of other malignant neoplasm of rectum, rectosigmoid junction, and anus History of colon cancer. Recent CT demonstrating increasing size of the pulmonary nodule. There is a prior history of ovarian cancer. MODIFIER: PS COMPARISON: No prior PET scans. 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 Multiplanar attenuation corrected and non attenuation corrected PET images are available for interpretation, and contiguous, low dose, axial CT sections were obtained from the skull base through the femurs. Intravenous contrast material was not utilized. This examination, like all CT scans performed in the [facility], was performed utilizing techniques to minimize radiation dose exposure, including the use of iterative reconstruction and automated exposure control. Fasting serum glucose: 147 mg/dl FINDINGS: VISUALIZED BRAIN: No specific abnormalities on this limited study HEAD/NECK: There is a physiologic distribution of FDG. No FDG avid cervical adenopathy is seen. CT images: Unremarkable. CHEST: -The pleural-based anterior left apical pulmonary nodule is seen on image 3/103, and measures 0.9 cm. Nodule demonstrates minimal/trace activity. SUV max in this area is approximately 1.8. Blood pool activity 3.0. - No FDG avid mediastinal or hilar adenopathy CT images: No pleural effusion, no pericardial effusion.  Small hiatal hernia. Status post TAVR. Coronary artery calcification. ABDOMEN: No FDG avid soft tissue lesions are seen. Note is made of the hyperdense area along the medial wall of the gastric antrum. Or significant activity on the non-attenuated correction images. CT images: CT images are quite limited. A ventral hernia containing multiple loops of bowel are noted. Overall there is no obvious interval change from earlier. PELVIS: No FDG avid soft tissue lesions are seen. CT images: Limited assessment of the inferior pelvis secondary to bilateral hip hardware. OSSEOUS STRUCTURES: No FDG avid lesions are seen. CT images: Bilateral shoulder activity is likely related to arthritis     Impression 1. The pleural-based left apical pulmonary nodule measuring 0.9 cm demonstrates at most minimal/trace activity with SUV max of 1.8 which is below blood pool activity. Nevertheless, it is a new/enlarging finding and is borderline in size for accurate assessment with PET/CT. Therefore, recommend noncontrast chest CT reassessment in approximately 3 months time. 2. With respect to the hyperdense areas associated with the medial wall of the abdomen as well as several upper abdominal nodules, etiology remains unclear as no significant FDG of activity is seen on the nonattenuation corrected images. 3. There is otherwise no evidence of FDG avid metastatic disease Workstation performed: [id number]      No Barium Swallow results available for this patient.

## 2023-10-24 NOTE — ASSESSMENT & PLAN NOTE
Ms Alex Barker has a pulmonary nodule with a history of Stage IIA recetal cancer and stage IIIC ovarian cancer diagnosed in 2018 s/p chemoradiation and resection. This left apical nodule has increased in size from 4mm to 12mm since since 2021 and shows only mild FDG activity with SUV 1.8. Given her smoking history, this could represent a slow growing primary lung cancer. Given it's small size, IR is unable to biopsy the nodule at this time. We discussed two options including diagnostic wedge resection vs continued monitoring with short term CT chest and re-evaluation by IR for biopsy if it increases in size. She would like to discuss these options with her family. She will obtain PFTs for baseline lung function and will see her cardiologist for cardiac risk stratification (appointment already scheduled 10/27/23). We will see her back once these are complete to discuss treatment plan. All questions were answered and she is in agreement with this plan.

## 2023-10-27 ENCOUNTER — TELEPHONE (OUTPATIENT)
Dept: CARDIOLOGY CLINIC | Facility: CLINIC | Age: 68
End: 2023-10-27

## 2023-10-27 ENCOUNTER — OFFICE VISIT (OUTPATIENT)
Dept: CARDIOLOGY CLINIC | Facility: CLINIC | Age: 68
End: 2023-10-27
Payer: MEDICARE

## 2023-10-27 VITALS
HEART RATE: 93 BPM | WEIGHT: 278 LBS | OXYGEN SATURATION: 97 % | BODY MASS INDEX: 46.32 KG/M2 | HEIGHT: 65 IN | SYSTOLIC BLOOD PRESSURE: 116 MMHG | DIASTOLIC BLOOD PRESSURE: 72 MMHG

## 2023-10-27 DIAGNOSIS — I48.91 ATRIAL FIBRILLATION WITH RAPID VENTRICULAR RESPONSE (HCC): ICD-10-CM

## 2023-10-27 DIAGNOSIS — I25.10 CORONARY ARTERY DISEASE INVOLVING NATIVE HEART WITHOUT ANGINA PECTORIS, UNSPECIFIED VESSEL OR LESION TYPE: ICD-10-CM

## 2023-10-27 DIAGNOSIS — E78.5 HYPERLIPIDEMIA, UNSPECIFIED HYPERLIPIDEMIA TYPE: ICD-10-CM

## 2023-10-27 DIAGNOSIS — I48.0 PAROXYSMAL ATRIAL FIBRILLATION (HCC): ICD-10-CM

## 2023-10-27 DIAGNOSIS — Z95.2 S/P TAVR (TRANSCATHETER AORTIC VALVE REPLACEMENT): ICD-10-CM

## 2023-10-27 DIAGNOSIS — Z01.810 PRE-OPERATIVE CARDIOVASCULAR EXAMINATION: Primary | ICD-10-CM

## 2023-10-27 DIAGNOSIS — I50.32 CHRONIC DIASTOLIC HEART FAILURE (HCC): ICD-10-CM

## 2023-10-27 PROCEDURE — 93246 EXT ECG>7D<15D RECORDING: CPT | Performed by: INTERNAL MEDICINE

## 2023-10-27 PROCEDURE — 93000 ELECTROCARDIOGRAM COMPLETE: CPT | Performed by: INTERNAL MEDICINE

## 2023-10-27 PROCEDURE — 99214 OFFICE O/P EST MOD 30 MIN: CPT | Performed by: INTERNAL MEDICINE

## 2023-10-27 RX ORDER — METOPROLOL SUCCINATE 25 MG/1
TABLET, EXTENDED RELEASE ORAL
Qty: 135 TABLET | Refills: 1 | Status: SHIPPED | OUTPATIENT
Start: 2023-10-27

## 2023-10-27 NOTE — TELEPHONE ENCOUNTER
1st documentation of A-fib with average of 107 bmp for entire 90 second strip noted today at 11:23 am

## 2023-10-27 NOTE — PROGRESS NOTES
[facility]  [address] [place] [address]. [id number]   [place], [address] [facility]  Cardiology Consultation    George Zepeda  [id number]  1955      Consult for:  AFib/establish care closer to home  Appreciate consult by: GEORGETTE Lyman    1. Pre-operative cardiovascular examination  POCT ECG      2. Chronic diastolic heart failure (HCC)  POCT ECG      3. S/P TAVR (transcatheter aortic valve replacement)  POCT ECG      4. Paroxysmal atrial fibrillation (HCC)  POCT ECG      5. Coronary artery disease involving native heart without angina pectoris, unspecified vessel or lesion type  POCT ECG      6. Hyperlipidemia, unspecified hyperlipidemia type  POCT ECG         Discussion/Summary:   Paroxysmal Atrial fibrillation- back in afib. Will stop amiodarone given risk of toxicity. Metoprolol 25mg in morning and 12.5mg in evening. Plan for 2-week extended monitor to rule out sick sinus syndrome or major pauses. No current indication for pacemaker. We will target a rate control strategy. Acute on chronic diastolic heart failure/lower extremity edema- she has chronic lower extremity swelling and shortness of breath. We discussed with her the current guideline changes. ShJardiance 10 mg in the morning. She will continue with a low-sodium diet. She will take torsemide as needed. She understands to weigh herself daily. Inform of his she has gained 3 lb a day or 5 lb in a week. Severe aortic stenosis status post TAVR-stable    Dyslipidemia/moderate coronary artery disease- reviewed with her her prior cardiac catheterization. Her last LDL was below 70. We will continue her atorvastatin 20 mg      History of rectal cancer/ovarian cancer- stable. She has a permanent end-colostomy.   Laparoscopic and assist abnormal peritoneal resection radical total abdominal hysterectomy in 2018    Pulmonary emphysema    Diabetes mellitus diet controlled    Morbid obesity status post gastric bypass    Former smoker      HPI:   77-year-old who presents to [facility] care near to her home. She denies having any major palpitations. She has chronic lower extremity swelling. She has had trouble with taking torsemide. She states her breathing has been stable. She has been tolerating anticoagulation. She was previously hospitalized in 2021 with an upper GI bleed. Since then she has stopped aspirin. She is out in the sun. She understands to wear sunscreen. She has had and up the oncology I visit she has a relatively sedentary lifestyle. She has chronic lower extremity swelling. She denies having shortness of breath lying down flat. 10/27/2023: She is being currently worked up for a pulmonary nodule. She was noted to have intermittent palpitations. She is found to be in atrial fibrillation. Denies having syncope.     Past Medical History:   Diagnosis Date    Acute pain of right knee     Ambulatory dysfunction     Anemia     Arthritis     Bleeding ulcer     Cancer (HCC)     rectal    Chronic lower back pain     Chronic pain disorder     back pain    Colon cancer ([facility]) 2018    Diabetes mellitus ([facility])     Endometrial cancer ([facility]) 2018    GERD (gastroesophageal reflux disease)     History of chemotherapy     History of MRSA infection 0719/2016    Morbid obesity ([address])     Morbid obesity with BMI of 45.0-49.9, adult ([address] )     Ovarian cancer ([address] ) 2018    Paroxysmal atrial fibrillation (HCC)     Rectal cancer (HCC)     S/P TAVR (transcatheter aortic valve replacement)     SOB (shortness of breath)     Spinal stenosis     Systolic murmur     Unsteady gait     uses walker    Wears dentures     Wears glasses      Social History     Socioeconomic History    Marital status: Single     Spouse name: Not on file    Number of children: 2    Years of education: Not on file    Highest education level: Not on file   Occupational History    Not on file   Tobacco Use    Smoking status: Former     Packs/day: 1.00     Years: 25.00 Total pack years: 25.00     Types: Cigarettes     Quit date: 3/30/2006     Years since quittin.5     Passive exposure: Never    Smokeless tobacco: Never   Vaping Use    Vaping Use: Never used   Substance and Sexual Activity    Alcohol use: Not Currently    Drug use: Not Currently     Types: Oxycodone     Comment: Percocet for lower back pain prn    Sexual activity: Not Currently   Other Topics Concern    Not on file   Social History Narrative    Not on file     Social Determinants of Health     Financial Resource Strain: Low Risk  (2023)    Overall Financial Resource Strain (CARDIA)     Difficulty of Paying Living Expenses: Not very hard   Food Insecurity: Not on file   Transportation Needs: No Transportation Needs (2023)    PRAPARE - Transportation     Lack of Transportation (Medical): No     Lack of Transportation (Non-Medical):  No   Physical Activity: Not on file   Stress: Not on file   Social Connections: Not on file   Intimate Partner Violence: Not on file   Housing Stability: Not on file      Family History   Adopted: Yes   Problem Relation Age of Onset    Leukemia Mother     Heart disease Father     Coronary artery disease Father     Diabetes Father     No Known Problems Sister     No Known Problems Brother     Diabetes Son     No Known Problems Brother     No Known Problems Brother     No Known Problems Sister     No Known Problems Sister      Past Surgical History:   Procedure Laterality Date    ABDOMINAL PERINEAL BOWEL RESECTION W/ ILEOANAL POUCH N/A 2018    Procedure: LAPAROSCOPIC HAND ASSIST ABDOMINOPERINEAL RESECTION,  POSTERIOR VAGINECTOMY, OMENTECTOMY;  Surgeon: Simone Conway MD;  [facility]: [facility];  Service: Colorectal    ABDOMINAL SURGERY      abscess removed from abdomen and right thigh, a hole in thigh closed by plastic surgeon    ABSCESS DRAINAGE      abd, (R) leg, (L) leg     SECTION       SECTION      CYSTOSCOPY N/A 2018    Procedure: CYSTOSCOPY;  Surgeon: Umm Santos MD;  [facility];  Service: Gynecology Oncology    ESOPHAGOGASTRODUODENOSCOPY N/A 07/18/2016    Procedure: ESOPHAGOGASTRODUODENOSCOPY (EGD); Surgeon: Judson Closs, MD;  Location[facility];  Service:     ESOPHAGOGASTRODUODENOSCOPY N/A 03/30/2016    Procedure: ESOPHAGOGASTRODUODENOSCOPY (EGD); Surgeon: Judson Closs, MD;  [facility]: [facility]; Service:     EYE SURGERY      laser eye surgery    HYSTERECTOMY N/A 09/06/2018    Procedure: RADICAL HYSTERECTOMY TOTAL ABDOMINAL (ALEXANDRA). BSO;  Surgeon: Umm Santos MD;  [facility];  Service: Gynecology Oncology    JOINT REPLACEMENT Left 2017    hip    JOINT REPLACEMENT Right 2017    hip    OOPHORECTOMY Bilateral     NJ COLONOSCOPY FLX DX W/COLLJ SPEC WHEN PFRMD N/A 03/09/2018    Procedure: COLONOSCOPY;  Surgeon: Curlene Bence, MD;  Location: [address] [place] [address] GI LAB; Service: Gastroenterology    NJ COLONOSCOPY FLX DX W/COLLJ [facility] WHEN PFRMD N/A 09/05/2018    Procedure: COLONOSCOPY;  Surgeon: Marcella Celis MD;  Location: [facility]; Service: Colorectal    NJ ECHO TRANSESOPHAG R-T 2D W/PRB IMG ACQUISJ I&R N/A 09/01/2020    Procedure: TRANSESOPHAGEAL ECHOCARDIOGRAM (THO); Surgeon: Gabo Pan DO;  [facility];  Service: Cardiac Surgery    NJ ESOPHAGOGASTRODUODENOSCOPY TRANSORAL DIAGNOSTIC N/A 02/02/2018    Procedure: ESOPHAGOGASTRODUODENOSCOPY (EGD); Surgeon: Curlene Bence, MD;  Location: [facility] GI LAB;   Service: Gastroenterology    NJ LAPAROSCOPY SURG COLOSTOMY/SKN LVL CECOSTOMY N/A 09/06/2018    Procedure: PERMANENT END COLOSTOMY;  Surgeon: Marcella Celis MD;  [facility];  Service: Colorectal    NJ LAPS GSTR [address] W/BYP SOLEDAD-EN-Y LIMB <150 CM N/A 07/18/2016    Procedure: BYPASS GASTRIC  SOLEDAD-EN-Y LAPAROSCOPIC;  Surgeon: Judson Closs, MD;  [facility];  Service: Bariatrics    NJ LAPS PROCTECTOMY ABDOMINOPERINEAL W/COLOSTOMY N/A 09/06/2018    Procedure: PROCTECTOMY; Surgeon: Marylin Kurtz MD;  [facility];  Service: Colorectal    OH REPLACE AORTIC VALVE OPENFEMORAL ARTERY APPROACH N/A 09/01/2020    Procedure: REPLACEMENT AORTIC VALVE TRANSCATHETER (TAVR) TRANSFEMORAL W/ 26MM WADDELL BARBARA S3 ULTRA VALVE(ACCESS ON RIGHT);   Surgeon: Jocelynn Carmona DO;  [facility];  Service: Cardiac Surgery    REPLACEMENT TOTAL KNEE      TOOTH EXTRACTION      TUBAL LIGATION         Current Outpatient Medications:     albuterol (2.5 mg/3 mL) 0.083 % nebulizer solution, TAKE 1 VIAL BY NEBULIZATION EVERY 4 HOURS AS NEEDED FOR WHEEZING OR SHORTNESS OF BREATH, Disp: 225 mL, Rfl: 1    amiodarone 100 mg tablet, Take 1 tablet (100 mg total) by mouth daily, Disp: 90 tablet, Rfl: 3    atorvastatin (LIPITOR) 20 mg tablet, TAKE ONE TABLET DAILY, Disp: 90 tablet, Rfl: 0    Eliquis 5 MG, ONE TAB TWICE A DAY, Disp: 60 tablet, Rfl: 2    Jardiance 10 MG TABS tablet, TAKE 1 TABLET EVERY MORNING, Disp: 90 tablet, Rfl: 1    metoprolol succinate (TOPROL-XL) 25 mg 24 hr tablet, Take 1 tablet (25 mg total) by mouth daily, Disp: 30 tablet, Rfl: 0    Multiple Vitamins-Minerals (BARIATRIC FUSION) CHEW, Chew 1 tablet daily  , Disp: , Rfl:     nystatin (MYCOSTATIN) cream, , Disp: , Rfl:     omeprazole (PriLOSEC) 20 mg delayed release capsule, TAKE 1 CAPSULE DAILY BEFORE BREAKFAST, Disp: 90 capsule, Rfl: 1    oxyCODONE (ROXICODONE) 10 MG TABS, Take 10 mg by mouth every 6 (six) hours as needed As needed for pain, Disp: , Rfl:     potassium chloride (K-DUR,KLOR-CON) 20 mEq tablet, Take 1 tablet (20 mEq total) by mouth if needed (if taking torsemide), Disp: 30 tablet, Rfl: 1    torsemide (DEMADEX) 20 mg tablet, Take 1 tablet (20 mg total) by mouth if needed (for leg swelling/weight gain above 3 pounds), Disp: 30 tablet, Rfl: 1    Calcium-Vitamin D 500-125 MG-UNIT TABS, Take 1 tablet by mouth 2 (two) times a day  (Patient not taking: Reported on 9/16/2023), Disp: , Rfl:   No Known Allergies    Vitals: 10/27/23 1025   BP: 116/72   BP Location: Right arm   Patient Position: Sitting   Cuff Size: Large   Pulse: 93   SpO2: 97%   Weight: 126 kg (278 lb)   Height: 5' 5" (1.651 m)       Review of Systems:   Review of Systems   Constitutional:  Positive for fatigue. Negative for activity change, appetite change, chills, diaphoresis, fever and unexpected weight change. HENT: Negative. Negative for congestion, dental problem, drooling, ear discharge, ear pain, facial swelling, hearing loss, mouth sores, nosebleeds, postnasal drip, rhinorrhea, sinus pressure, sinus pain, sneezing, sore throat, tinnitus, trouble swallowing and voice change. Eyes: Negative. Negative for photophobia, pain, redness, itching and visual disturbance. Respiratory:  Positive for shortness of breath. Negative for apnea, cough, choking, chest tightness, wheezing and stridor. Cardiovascular:  Positive for palpitations. Negative for chest pain. Gastrointestinal: Negative. Negative for abdominal distention, abdominal pain, anal bleeding, blood in stool, constipation, diarrhea, nausea, rectal pain and vomiting. Endocrine: Negative. Negative for cold intolerance, heat intolerance, polydipsia, polyphagia and polyuria. Genitourinary: Negative. Negative for decreased urine volume, difficulty urinating, dyspareunia, dysuria, enuresis, flank pain, frequency, genital sores, hematuria, menstrual problem, pelvic pain, urgency, vaginal bleeding, vaginal discharge and vaginal pain. Musculoskeletal: Negative. Negative for arthralgias, back pain, gait problem, joint swelling, myalgias, neck pain and neck stiffness. Skin: Negative. Negative for color change, pallor, rash and wound. Allergic/Immunologic: Negative. Negative for environmental allergies, food allergies and immunocompromised state. Neurological: Negative.   Negative for dizziness, tremors, seizures, syncope, facial asymmetry, speech difficulty, weakness, light-headedness, numbness and headaches. Hematological: Negative. Negative for adenopathy. Does not bruise/bleed easily. Psychiatric/Behavioral: Negative. Negative for agitation, behavioral problems, confusion, decreased concentration, dysphoric mood, hallucinations, self-injury, sleep disturbance and suicidal ideas. The patient is not nervous/anxious and is not hyperactive. All other systems reviewed and are negative. Vitals:    10/27/23 1025   BP: 116/72   BP Location: Right arm   Patient Position: Sitting   Cuff Size: Large   Pulse: 93   SpO2: 97%   Weight: 126 kg (278 lb)   Height: 5' 5" (1.651 m)     Physical Examination:   Physical Exam  Constitutional:       General: She is not in acute distress. Appearance: She is well-developed. She is not diaphoretic. HENT:      Head: Normocephalic and atraumatic. Right Ear: External ear normal.      Left Ear: External ear normal.   Eyes:      General: No scleral icterus. Right eye: No discharge. Left eye: No discharge. Conjunctiva/sclera: Conjunctivae normal.      Pupils: Pupils are equal, round, and reactive to light. Neck:      Thyroid: No thyromegaly. Vascular: No JVD. Trachea: No tracheal deviation. Cardiovascular:      Rate and Rhythm: Normal rate. Rhythm irregular. Heart sounds: Murmur heard. No friction rub. Gallop present. Pulmonary:      Effort: Pulmonary effort is normal. No respiratory distress. Breath sounds: Normal breath sounds. No stridor. No wheezing or rales. Chest:      Chest wall: No tenderness. Abdominal:      General: Bowel sounds are normal. There is distension. Palpations: Abdomen is soft. There is no mass. Tenderness: There is no abdominal tenderness. There is no guarding or rebound. Comments: Positive colostomy   Musculoskeletal:         General: Swelling present. No tenderness or deformity. Normal range of motion.       Cervical back: Normal range of motion and neck supple. Skin:     General: Skin is warm and dry. Coloration: Skin is not pale. Findings: No erythema or rash. Neurological:      Mental Status: She is alert and oriented to person, place, and time. Cranial Nerves: No cranial nerve deficit. Motor: No abnormal muscle tone. Coordination: Coordination normal.      Deep Tendon Reflexes: Reflexes are normal and symmetric. Reflexes normal.   Psychiatric:         Behavior: Behavior normal.         Thought Content:  Thought content normal.         Judgment: Judgment normal.         Labs:     Lab Results   Component Value Date    WBC 8.28 09/12/2023    HGB 15.1 09/12/2023    HCT 51.0 (H) 09/12/2023    MCV 80 (L) 09/12/2023    RDW 19.6 (H) 09/12/2023     09/12/2023     BMP:  Lab Results   Component Value Date    SODIUM 141 09/12/2023    K 4.4 09/12/2023     09/12/2023    CO2 28 09/12/2023    ANIONGAP 17.1 10/16/2015    BUN 12 09/12/2023    CREATININE 0.90 09/12/2023    GLUC 97 08/21/2021    GLUF 165 (H) 09/12/2023    CALCIUM 9.2 09/12/2023    CORRECTEDCA 10.1 06/08/2023    EGFR 66 09/12/2023    MG 2.4 08/24/2021     LFT:  Lab Results   Component Value Date    AST 15 09/12/2023    ALT 13 09/12/2023    ALKPHOS 71 09/12/2023    TP 6.6 09/12/2023    ALB 3.7 09/12/2023      Lab Results   Component Value Date    EGH7AGXUKMGX 1.820 08/18/2021     Lab Results   Component Value Date    HGBA1C 6.8 (A) 09/16/2023     Lipid Profile:   Lab Results   Component Value Date    CHOLESTEROL 135 07/29/2022    HDL 45 (L) 07/29/2022    LDLCALC 61 07/29/2022    TRIG 144 07/29/2022     Lab Results   Component Value Date    CHOLESTEROL 135 07/29/2022    CHOLESTEROL 216 (H) 07/19/2021     Lab Results   Component Value Date    TROPONINI <0.02 08/19/2021     Lab Results   Component Value Date    NTBNP 464 (H) 08/19/2021      Recent Results (from the past 672 hour(s))   Fingerstick Glucose (POCT)    Collection Time: 10/19/23  7:46 AM   Result Value Ref Range    POC Glucose 147 (H) 65 - 140 mg/dl       Imaging & Testing   I have personally reviewed pertinent reports. Cardiac Testing   Results for orders placed during the hospital encounter of 21    Echo complete with contrast if indicated    Narrative  [address] [address] [facility].  [place], [address] [place] [address]  [phone]    Transthoracic Echocardiogram  2D, M-mode, Doppler, and Color Doppler    Study date:  24-Aug-2021    Patient: Imani Fox  MR number: [id number]  Account number: [de-identified]  : 1955  Age: 72 years  Gender: Female  Status: Outpatient  Location: Echo lab  Height: 66 in  Weight: 275.4 lb  BP: 122/ 76 mmHg    Indications: S/P TAVR 1 Year Follow-Up    Diagnoses: Z95.2 - Presence of prosthetic heart valve    Sonographer:  Son Ni RDCS  Primary Physician:  Loreto Medina MD  Referring Physician:  GEORGETTE Peña  Group:  [facility]  Interpreting Physician:  Aaliyah Constantino MD    SUMMARY    LEFT VENTRICLE:  Normal left ventricular chamber size  There is mild concentric left ventricular hypertrophy  Ejection fraction is estimated at 60%  No definite regional wall motion abnormality seen  Indeterminate diastolic function    RIGHT VENTRICLE:  Normal right ventricular size and systolic function  TAPSE is 2.3 cm    LEFT ATRIUM:  The atrium was mildly to moderately dilated. MITRAL VALVE:  There is severe mitral annular calcification. Valve leaflets are thickened with slightly decreased separation. There is mild mitral stenosis with mean gradient of 4 mm Hg  There was no significant mitral regurgitation    AORTIC VALVE:  There is a 26 mm ES 3 ultra TAVR bioprosthesis. This exhibited normal function with no paravalvular leak or valvular regurgitation. There was no significant gradient across the aortic valve    TRICUSPID VALVE:  There was mild regurgitation.   Peak PA pressure is estimated at 37 mm Hg    COMPARISONS:  There has been no significant interval change. HISTORY: PRIOR HISTORY: S/P TAVR #26mm Huerta Cristian S3 Ultra Valve; Coronary Artery Disease; Nhung Valve Stenosis; A- Fib; Hyperlipidemia; Chronic Diastolic Heart Failure; Cancer; Diabetes Mellitus    PROCEDURE: The procedure was performed in the echo lab. This was a routine study. The transthoracic approach was used. The study included complete 2D imaging, M-mode, complete spectral Doppler, and color Doppler. The heart rate was 63 bpm,  at the start of the study. Echocardiographic views were limited due to decreased penetration and lung interference. This was a technically difficult study. LEFT VENTRICLE: Normal left ventricular chamber size  There is mild concentric left ventricular hypertrophy  Ejection fraction is estimated at 60%  No definite regional wall motion abnormality seen  Indeterminate diastolic function    RIGHT VENTRICLE: Normal right ventricular size and systolic function  TAPSE is 2.3 cm    LEFT ATRIUM: The atrium was mildly to moderately dilated. RIGHT ATRIUM: Size was normal.    MITRAL VALVE: There is severe mitral annular calcification. Valve leaflets are thickened with slightly decreased separation. There is mild mitral stenosis with mean gradient of 4 mm Hg  There was no significant mitral regurgitation    AORTIC VALVE: There is a 26 mm ES 3 ultra TAVR bioprosthesis. This exhibited normal function with no paravalvular leak or valvular regurgitation. There was no significant gradient across the aortic valve    TRICUSPID VALVE: The valve structure was normal. There was normal leaflet separation. DOPPLER: The transtricuspid velocity was within the normal range. There was no evidence for stenosis. There was mild regurgitation. Peak PA pressure is estimated at 37 mm Hg    PULMONIC VALVE: Leaflets exhibited normal thickness, no calcification, and normal cuspal separation. DOPPLER: The transpulmonic velocity was within the normal range.  There was no regurgitation. PERICARDIUM: There was no pericardial effusion. AORTA: The root exhibited normal size. SYSTEM MEASUREMENT TABLES    2D  EF (Teich): 61.62 %  %FS: 33.4 %  Ao Diam: 2.56 cm  Ao asc: 2.7 cm  EDV(Teich): 136.94 ml  ESV(Teich): 52.55 ml  IVSd: 1.08 cm  LA Area: 31.78 cm2  LA Diam: 4.2 cm  LVEDV MOD A4C: 67.17 ml  LVEF MOD A4C: 69.63 %  LVESV MOD A4C: 20.4 ml  LVIDd: 5.33 cm  LVIDs: 3.55 cm  LVLd A4C: 6.37 cm  LVLs A4C: 5.19 cm  LVOT Diam: 1.92 cm  LVPWd: 1.11 cm  RA Area: 21.64 cm2  RVIDd: 3.8 cm  SV (Teich): 84.38 ml  SV MOD A4C: 46.77 ml    CW  AV Env. Ti: 325.4 ms  AV VTI: 62.15 cm  AV Vmax: 2.61 m/s  AV Vmean: 1.91 m/s  AV maxP.25 mmHg  AV meanP.14 mmHg  MV VTI: 60.72 cm  MV Vmax: 1.48 m/s  MV Vmean: 0.9 m/s  MV maxP.82 mmHg  MV meanPG: 3.86 mmHg  TR Vmax: 2.83 m/s  TR maxP.01 mmHg    MM  TAPSE: 2.34 cm    PW  MV E/A Ratio: 0.92  CHARBEL (VTI): 1.26 cm2  CHARBEL Vmax: 1.14 cm2  AVAI (VTI): 0 cm2/m2  AVAI Vmax: 0 cm2/m2  DVI: 0.44  E' Sept: 0.07 m/s  E/E' Sept: 18  LVOT Env. Ti: 361.56 ms  LVOT VTI: 27.16 cm  LVOT Vmax: 1.04 m/s  LVOT Vmean: 0.75 m/s  LVOT maxP.28 mmHg  LVOT meanP.52 mmHg  LVSI Dopp: 34.21 ml/m2  LVSV Dopp: 78.34 ml  MV A Noman: 1.46 m/s  MV Dec Desha: 3.03 m/s2  MV DecT: 444.14 ms  MV E Noman: 1.34 m/s  MV PHT: 128.8 ms  MVA (VTI): 1.29 cm2  MVA By PHT: 1.71 cm2    [facility] Accredited Echocardiography Laboratory    Prepared and electronically signed by    Aaliyah Constantino MD  Signed 24-Aug-2021 15:17:17    --TTE 2020. EF 60%. No RWMA. Mild mitral stenosis. KHADIJAH, L > R. There was a 26 mm TAVR bioprosthesis present exhibited normal function. There was no significant paravalvular leak. Mean gradient 10, peak gradient 18. Aortic valve area 1.8 centimeter squared by the continuity equation. Mild TR.    --cardiac catheterization 20     --  The coronary circulation is right dominant. --  Left main: The vessel was large sized.  Angiography showed mild atherosclerosis. --  LAD: The vessel was medium to large sized and moderately tortuous. Angiography showed moderate atherosclerosis. --  Circumflex: The vessel was medium to large sized. --  Ostial circumflex: There was a 50 % stenosis. --  Distal circumflex: There was a tubular 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. --  RCA: The vessel was large sized (dominant) and moderately calcified. --  Mid RCA: There was a 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. -TTE 20. EF 60. Grade 1 diastolic dysfunction. Mod LAE. Mild mitral stenosis. A 26mm ES 3 ULTRA TAVR bioprosthesis was present. It exhibited normal function. No valvular or paravalvular leak. peak gradient was 22 mmHg; mean 10 mmHg. Aortic valve area was 1.5 cmï¾² by the continuity equation. Left atrium moderately dilated       Cristal Cano  [facility]  [phone]  Please call with any questions or suggestions    Counseling :  A description of the counselin minutes spent reviewing her prior records with the patient including prior cardiac catheterization. Goals and Barriers:  Patient's ability to self care:  Medication side effect reviewed with patient in detail and all their questions answered. "Portions of the record may have been created with voice recognition software. Occasional wrong word or "sound a like" substitutions may have occurred due to the inherent limitations of voice recognition software. Read the chart carefully and recognize, using context, where substitutions have occurred.  Please call if you have any questions. "

## 2023-10-31 ENCOUNTER — TELEPHONE (OUTPATIENT)
Dept: CARDIOLOGY CLINIC | Facility: CLINIC | Age: 68
End: 2023-10-31

## 2023-10-31 NOTE — TELEPHONE ENCOUNTER
Rachana called to report pt device stopped transmitting last night.  They attempted to reach pt with no success

## 2023-11-03 ENCOUNTER — TELEPHONE (OUTPATIENT)
Dept: HEMATOLOGY ONCOLOGY | Facility: CLINIC | Age: 68
End: 2023-11-03

## 2023-11-03 NOTE — TELEPHONE ENCOUNTER
GAVINM for pt requesting a call back in regards to follow-up appointment with Dr. Sathya Barnard on 11/07/23. Pt was supposed to get PFT done but she canceled and we will like to know the reason why as this follow-up was supposed to cover the results of the PFT.

## 2023-11-13 ENCOUNTER — TELEPHONE (OUTPATIENT)
Dept: FAMILY MEDICINE CLINIC | Facility: CLINIC | Age: 68
End: 2023-11-13

## 2023-11-13 NOTE — TELEPHONE ENCOUNTER
Pt needs handicap placard form filled out by Mino Fernandez; placed in nurse pending 2; call [phone] when completed

## 2023-11-14 DIAGNOSIS — R91.1 PULMONARY NODULE: Primary | ICD-10-CM

## 2023-11-15 ENCOUNTER — OFFICE VISIT (OUTPATIENT)
Dept: WOUND CARE | Facility: HOSPITAL | Age: 68
End: 2023-11-15
Payer: MEDICARE

## 2023-11-15 VITALS
TEMPERATURE: 96.9 F | SYSTOLIC BLOOD PRESSURE: 141 MMHG | HEART RATE: 114 BPM | DIASTOLIC BLOOD PRESSURE: 110 MMHG | BODY MASS INDEX: 46.65 KG/M2 | HEIGHT: 65 IN | RESPIRATION RATE: 20 BRPM | WEIGHT: 280 LBS

## 2023-11-15 DIAGNOSIS — L72.0 INCLUSION CYST: Primary | ICD-10-CM

## 2023-11-15 DIAGNOSIS — S21.202A BACK WOUND, LEFT, INITIAL ENCOUNTER: ICD-10-CM

## 2023-11-15 PROCEDURE — 99214 OFFICE O/P EST MOD 30 MIN: CPT | Performed by: STUDENT IN AN ORGANIZED HEALTH CARE EDUCATION/TRAINING PROGRAM

## 2023-11-15 PROCEDURE — 99213 OFFICE O/P EST LOW 20 MIN: CPT | Performed by: STUDENT IN AN ORGANIZED HEALTH CARE EDUCATION/TRAINING PROGRAM

## 2023-11-15 RX ORDER — LIDOCAINE HYDROCHLORIDE 40 MG/ML
5 SOLUTION TOPICAL ONCE
Status: COMPLETED | OUTPATIENT
Start: 2023-11-15 | End: 2023-11-15

## 2023-11-15 RX ADMIN — LIDOCAINE HYDROCHLORIDE 5 ML: 40 SOLUTION TOPICAL at 08:23

## 2023-11-15 NOTE — PATIENT INSTRUCTIONS
Orders Placed This Encounter   Procedures    Wound miscellaneous orders     Protein: Eat protein with each meal to promote healing. Examples of protein are fish, meat, chicken, nuts, peanut butter, eggs, lentils, edamame or a protein shake. Standing Status:   Future     Standing Expiration Date:   11/15/2024    Wound miscellaneous orders     Wound infection:  If you have signs of infection  please go to the Emergency department. Some signs of infection:  fever, chills, increased redness, red streaks, increase in pain, increased drainage. Drainage with an odor, Change in drainage color: white/milky/green/tan/yellow,  an increase in swelling, chest pain and/or shortness of breath. Wound infection:  If you have signs of infection please call the wound center. If the wound center is closed- please go to the Emergency department. Some signs of infection:  fever, chills, increased redness, red streaks, increase in pain, increased drainage. Drainage with an odor, Change in drainage color: white/milky/green/tan/yellow,  an increase in swelling, chest pain and/or shortness of breath.      Standing Status:   Future     Standing Expiration Date:   11/15/2024    Wound miscellaneous orders     Referral to general surgery for removal of inclusion cyst.     Standing Status:   Future     Standing Expiration Date:   11/15/2024

## 2023-11-15 NOTE — PROGRESS NOTES
Patient ID: Devi Pacheco is a 76 y.o. female Date of Birth 1955     Chief Complaint  No chief complaint on file. Allergies  Patient has no known allergies. Assessment:     Diagnoses and all orders for this visit:    Inclusion cyst  -     Wound miscellaneous orders; Future  -     Wound miscellaneous orders; Future  -     Wound miscellaneous orders; Future    Back wound, left, initial encounter            Plan: It was a pleasure to see Devi Pacheco for wound care follow up today   A1C results reviewed with the patient today. Patient has an inclusion cyst which does have some leaking from it as patient was using a hard back scratcher to scratch the area. No signs of infection at this time. Ambulatory referral placed to general surgery to have removal of this cyst.  will assist patient in scheduling this appointment expediently   Follow-up with wound management as needed in the future. All plans of care discussed with patient at bedside who verbalized understanding with treatment plan. Wound 11/15/23 Back Left (Active)   Date First Assessed/Time First Assessed: 11/15/23 0819   Location: Back  Wound Location Orientation: Left       [REMOVED] Wound 05/03/21 Other (comment) Sacrum (Removed)   Resolved Date: 11/15/23  Date First Assessed/Time First Assessed: 05/03/21 2259   Primary Wound Type: Other (comment)  Location: Sacrum  Wound Outcome: (c) Other (Comment)       [REMOVED] Wound 08/19/21 Pressure Injury Sacrum Mid (Removed)   Resolved Date: 11/15/23  Date First Assessed/Time First Assessed: 08/19/21 2015   Primary Wound Type: Pressure Injury  Location: Sacrum  Wound Location Orientation: Mid  Wound Description (Comments): open, red, some slough  Dressing Status: Open to ai. .. Subjective:      . Khari Maurice is a pleasant 70-year-old old female with past medical history of type 2 diabetes mellitus and atrial fibrillation on Eliquis here today for initial wound care consult. She notes that for several weeks she has noticed some drainage from the top of her back as well as itchiness. Denies any fever chills diaphoresis. Notes that she has had cyst in the abscesses of her back in the past.  Been covering it with a large Band-Aid and keeping it clean. Since she is familiar with this problem she called her PCP and asked for referral directly to wound care. The following portions of the patient's history were reviewed and updated as appropriate: allergies, current medications, past family history, past medical history, past social history, past surgical history, and problem list.    Review of Systems   Constitutional:  Negative for chills, diaphoresis, fatigue and fever. Skin:  Positive for wound. All other systems reviewed and are negative. Objective: There were no vitals taken for this visit. Physical Exam  Vitals reviewed. Constitutional:       Appearance: Normal appearance. HENT:      Head: Normocephalic and atraumatic. Mouth/Throat:      Mouth: Mucous membranes are moist.   Eyes:      Extraocular Movements: Extraocular movements intact. Pulmonary:      Effort: Pulmonary effort is normal.   Skin:     Comments: Circumscribed john of erythema on left upper back with pocket of fluctuance underneath it. There are 3 punctate areas that have yellow sebum like material leaking from it. No signs of infection. Area of erythema is confined only to the area of fluctuance that does not expand out further. Approximately 8 cm lateral to this there is another very small area of discrete fluctuance with a punctate area on top. No leakage of sebum from this area. Neurological:      Mental Status: She is alert.    Psychiatric:         Mood and Affect: Mood normal.         Behavior: Behavior normal.           Wound Instructions:  Orders Placed This Encounter   Procedures    Wound miscellaneous orders     Protein: Eat protein with each meal to promote healing. Examples of protein are fish, meat, chicken, nuts, peanut butter, eggs, lentils, edamame or a protein shake. Standing Status:   Future     Standing Expiration Date:   11/15/2024    Wound miscellaneous orders     Wound infection:  If you have signs of infection  please go to the Emergency department. Some signs of infection:  fever, chills, increased redness, red streaks, increase in pain, increased drainage. Drainage with an odor, Change in drainage color: white/milky/green/tan/yellow,  an increase in swelling, chest pain and/or shortness of breath. Wound infection:  If you have signs of infection please call the wound center. If the wound center is closed- please go to the Emergency department. Some signs of infection:  fever, chills, increased redness, red streaks, increase in pain, increased drainage. Drainage with an odor, Change in drainage color: white/milky/green/tan/yellow,  an increase in swelling, chest pain and/or shortness of breath. Standing Status:   Future     Standing Expiration Date:   11/15/2024    Wound miscellaneous orders     Referral to general surgery for removal of inclusion cyst.     Standing Status:   Future     Standing Expiration Date:   11/15/2024        Diagnosis ICD-10-CM Associated Orders   1. Inclusion cyst  L72.0 Wound miscellaneous orders     Wound miscellaneous orders     Wound miscellaneous orders      2. Back wound, left, initial encounter  S21.202A           --  Humaira Reilly MD    "This note has been constructed using a voice recognition system. Therefore there may be syntax, spelling, and/or grammatical errors. Occasional wrong word or "sound alike" substitutions may have occurred due to the inherent limitations of voice recognition software. Read the chart carefully and recognize, using context, where substitutions have occurred.  Please call if you have any questions."

## 2023-11-17 ENCOUNTER — CONSULT (OUTPATIENT)
Dept: SURGERY | Facility: CLINIC | Age: 68
End: 2023-11-17
Payer: MEDICARE

## 2023-11-17 VITALS
TEMPERATURE: 97.5 F | OXYGEN SATURATION: 93 % | HEART RATE: 102 BPM | DIASTOLIC BLOOD PRESSURE: 82 MMHG | SYSTOLIC BLOOD PRESSURE: 117 MMHG | WEIGHT: 277 LBS | HEIGHT: 65 IN | BODY MASS INDEX: 46.15 KG/M2

## 2023-11-17 DIAGNOSIS — L08.9 INFECTED CYST OF SKIN: Primary | ICD-10-CM

## 2023-11-17 DIAGNOSIS — L02.212 ABSCESS OF BACK: ICD-10-CM

## 2023-11-17 DIAGNOSIS — L72.0 INCLUSION CYST: ICD-10-CM

## 2023-11-17 DIAGNOSIS — L72.9 INFECTED CYST OF SKIN: Primary | ICD-10-CM

## 2023-11-17 DIAGNOSIS — E11.9 DIABETES MELLITUS TYPE 2, DIET-CONTROLLED (HCC): ICD-10-CM

## 2023-11-17 PROCEDURE — 99204 OFFICE O/P NEW MOD 45 MIN: CPT | Performed by: SPECIALIST

## 2023-11-17 PROCEDURE — 10061 I&D ABSCESS COMP/MULTIPLE: CPT | Performed by: SPECIALIST

## 2023-11-17 PROCEDURE — 87070 CULTURE OTHR SPECIMN AEROBIC: CPT | Performed by: SPECIALIST

## 2023-11-17 PROCEDURE — 87205 SMEAR GRAM STAIN: CPT | Performed by: SPECIALIST

## 2023-11-17 RX ORDER — CEFADROXIL 500 MG/1
500 CAPSULE ORAL EVERY 12 HOURS SCHEDULED
Qty: 20 CAPSULE | Refills: 0 | Status: SHIPPED | OUTPATIENT
Start: 2023-11-17 | End: 2023-11-27

## 2023-11-17 NOTE — PROGRESS NOTES
Surgical consult and history and Physical Examination - General Surgery   [facility]  Ruth Wilkinson 76 y.o. female MRN: [id number]  : [id number] PCP: GEORGETTE Rivera    History of Present Illness   Chief Complaint: I have a infected cyst over my back I was seeing wound care center and they have asked me to see you    HPI:  Ruth Wilkinson is a 76 y.o. female who presents to office with 3 of infected sebaceous cyst over the back is increasing in size became painful and partly ruptured was seen by wound care center and was referred to us for incision and drainage and possible excision of the cyst when inflammation or infection is under control    She has extensive Stueve diabetes mellitus and multiple infection patient is morbidly obese with BMI 46.10.     Historical Information   The following portions of the patient's history were reviewed and updated as appropriate  Past Medical History:   Diagnosis Date    Acute pain of right knee     Ambulatory dysfunction     Anemia     Arthritis     Bleeding ulcer     Cancer (HCC)     rectal    Chronic lower back pain     Chronic pain disorder     back pain    Colon cancer ([facility]) 2018    Diabetes mellitus ([address] [facility])     Endometrial cancer ([address] [facility]) 2018    GERD (gastroesophageal reflux disease)     History of chemotherapy     History of MRSA infection 0719/2016    Morbid obesity ([address] )     Morbid obesity with BMI of 45.0-49.9, adult ([address] [facility])     Ovarian cancer ([address] [facility]) 2018    Paroxysmal atrial fibrillation ([address] [facility])     Rectal cancer ([address] [facility])     S/P TAVR (transcatheter aortic valve replacement)     SOB (shortness of breath)     Spinal stenosis     Systolic murmur     Unsteady gait     uses walker    Wears dentures     Wears glasses      Past Surgical History:   Procedure Laterality Date    ABDOMINAL PERINEAL BOWEL RESECTION W/ ILEOANAL POUCH N/A 09/06/2018    Procedure: LAPAROSCOPIC HAND ASSIST ABDOMINOPERINEAL RESECTION,  POSTERIOR VAGINECTOMY, OMENTECTOMY;  Surgeon: Anayeli Talavera MD;  Location: [facility];  Service: Colorectal    ABDOMINAL SURGERY      abscess removed from abdomen and right thigh, a hole in thigh closed by plastic surgeon    ABSCESS DRAINAGE      abd, (R) leg, (L) leg     SECTION       SECTION      CYSTOSCOPY N/A 2018    Procedure: Huel Drilling;  Surgeon: Aracelis Khan MD;  [facility];  Service: Gynecology Oncology    ESOPHAGOGASTRODUODENOSCOPY N/A 2016    Procedure: ESOPHAGOGASTRODUODENOSCOPY (EGD); Surgeon: Erick Faye MD;  Location: [facility];  Service:     ESOPHAGOGASTRODUODENOSCOPY N/A 2016    Procedure: ESOPHAGOGASTRODUODENOSCOPY (EGD); Surgeon: Erick Faye MD;  [facility]: [facility]; Service:     EYE SURGERY      laser eye surgery    HYSTERECTOMY N/A 2018    Procedure: RADICAL HYSTERECTOMY TOTAL ABDOMINAL (ALEXANDRA). BSO;  Surgeon: Aracelis Khan MD;  [facility];  Service: Gynecology Oncology    JOINT REPLACEMENT Left 2017    hip    JOINT REPLACEMENT Right 2017    hip    OOPHORECTOMY Bilateral     MO COLONOSCOPY FLX DX W/COLLJ SPEC WHEN PFRMD N/A 2018    Procedure: COLONOSCOPY;  Surgeon: Rosemarie Hammer MD;  Location: [address] [place] [address] GI LAB; Service: Gastroenterology    MO COLONOSCOPY FLX DX W/COLLJ [facility] WHEN PFRMD N/A 2018    Procedure: COLONOSCOPY;  Surgeon: Anayeli Talavera MD;  [facility]; Service: Colorectal    MO ECHO TRANSESOPHAG R-T 2D W/PRB IMG ACQUISJ I&R N/A 2020    Procedure: TRANSESOPHAGEAL ECHOCARDIOGRAM (THO); Surgeon: Abdias Bañuelos DO;  [facility];  Service: Cardiac Surgery    MO ESOPHAGOGASTRODUODENOSCOPY TRANSORAL DIAGNOSTIC N/A 2018    Procedure: ESOPHAGOGASTRODUODENOSCOPY (EGD); Surgeon: Rosemarie Hammer MD;  Location: [facility] GI LAB;   Service: Gastroenterology    MO LAPAROSCOPY SURG COLOSTOMY/SKN LVL CECOSTOMY N/A 2018    Procedure: PERMANENT END COLOSTOMY;  Surgeon: Anayeli Talavera MD;  [facility]; Service: Colorectal    FL LAPS GSTR RSTCV PX W/BYP SOLEDAD-EN-Y LIMB <150 CM N/A 2016    Procedure: BYPASS GASTRIC  SOLEDAD-EN-Y LAPAROSCOPIC;  Surgeon: Constantine Roa MD;  [facility];  Service: Bariatrics    FL LAPS PROCTECTOMY ABDOMINOPERINEAL W/COLOSTOMY N/A 2018    Procedure: PROCTECTOMY;  Surgeon: Florian Olson MD;  [facility];  Service: Colorectal    FL REPLACE AORTIC VALVE OPENFEMORAL ARTERY APPROACH N/A 2020    Procedure: REPLACEMENT AORTIC VALVE TRANSCATHETER (TAVR) TRANSFEMORAL W/ 26MM WADDELL BARBARA S3 ULTRA VALVE(ACCESS ON RIGHT);   Surgeon: Lillie Lofton DO;  [facility];  Service: Cardiac Surgery    REPLACEMENT TOTAL KNEE      TOOTH EXTRACTION      TUBAL LIGATION       Social History   Social History     Substance and Sexual Activity   Alcohol Use Not Currently     Social History     Substance and Sexual Activity   Drug Use Not Currently    Types: Oxycodone    Comment: Percocet for lower back pain prn     Social History     Tobacco Use   Smoking Status Former    Packs/day: 1.00    Years: 25.00    Total pack years: 25.00    Types: Cigarettes    Quit date: 3/30/2006    Years since quittin.6    Passive exposure: Never   Smokeless Tobacco Never     Family History:   Family History   Adopted: Yes   Problem Relation Age of Onset    Leukemia Mother     Heart disease Father     Coronary artery disease Father     Diabetes Father     No Known Problems Sister     No Known Problems Brother     Diabetes Son     No Known Problems Brother     No Known Problems Brother     No Known Problems Sister     No Known Problems Sister        Meds/Allergies   No Known Allergies    Current Outpatient Medications:     atorvastatin (LIPITOR) 20 mg tablet, TAKE ONE TABLET DAILY, Disp: 90 tablet, Rfl: 0    Eliquis 5 MG, ONE TAB TWICE A DAY, Disp: 60 tablet, Rfl: 2    Jardiance 10 MG TABS tablet, TAKE 1 TABLET EVERY MORNING, Disp: 90 tablet, Rfl: 1    metoprolol succinate (TOPROL-XL) 25 mg 24 hr tablet, Take 1 tablet in morning and half tablet in evening, Disp: 135 tablet, Rfl: 1    Multiple Vitamins-Minerals (BARIATRIC FUSION) CHEW, Chew 1 tablet daily  , Disp: , Rfl:     nystatin (MYCOSTATIN) cream, , Disp: , Rfl:     omeprazole (PriLOSEC) 20 mg delayed release capsule, TAKE 1 CAPSULE DAILY BEFORE BREAKFAST, Disp: 90 capsule, Rfl: 1    oxyCODONE (ROXICODONE) 10 MG TABS, Take 10 mg by mouth every 6 (six) hours as needed As needed for pain, Disp: , Rfl:     potassium chloride (K-DUR,KLOR-CON) 20 mEq tablet, Take 1 tablet (20 mEq total) by mouth if needed (if taking torsemide), Disp: 30 tablet, Rfl: 1    torsemide (DEMADEX) 20 mg tablet, Take 1 tablet (20 mg total) by mouth if needed (for leg swelling/weight gain above 3 pounds), Disp: 30 tablet, Rfl: 1    albuterol (2.5 mg/3 mL) 0.083 % nebulizer solution, TAKE 1 VIAL BY NEBULIZATION EVERY 4 HOURS AS NEEDED FOR WHEEZING OR SHORTNESS OF BREATH (Patient not taking: Reported on 11/15/2023), Disp: 225 mL, Rfl: 1    Calcium-Vitamin D 500-125 MG-UNIT TABS, Take 1 tablet by mouth 2 (two) times a day  (Patient not taking: Reported on 9/16/2023), Disp: , Rfl:      REVIEW OF SYSTEMS  Constitutional:  Denies fever or chills   Eyes:  Denies change in visual acuity   HENT:  Denies nasal congestion or sore throat   Respiratory:  Denies cough or shortness of breath   Cardiovascular:  Denies chest pain or edema   GI:  Denies abdominal pain, nausea, vomiting, bloody stools or diarrhea   Extensive surgery for abdominal perineal resection of the rectum with ileo to pouch rectal anastomosis  :  Denies dysuria, frequency, difficulty in micturition and nocturia  Musculoskeletal:  Denies back pain or joint pain   Neurologic:  Denies headache, focal weakness or sensory changes   Endocrine:  Denies polyuria or polydipsia on medication for diabetes  Lymphatic:  Denies swollen glands   Psychiatric:  Denies depression or anxiety     Objective   Current Vitals:   /82 (BP Location: Left arm, Patient Position: Sitting, Cuff Size: Large)   Pulse 102   Temp 97.5 °F (36.4 °C) (Core)   Ht 5' 5" (1.651 m)   Wt 126 kg (277 lb)   SpO2 93%   BMI 46.10 kg/m²   Body mass index is 46.1 kg/m². PHYSICAL EXAMS  General:  Patient is not in acute distress, laying in the bed comfortably, awake, alert responding to commands,   HEENT:  Both pupils normal-size atraumatic, normocephalic, nonicteric  Neck:  JVP not raised. Trachea central  Respiratory:  normal Breath sounds clear to auscultation,  Cardiovascular:  S1-S2 with murmur   GI:  Abdomen soft nontender. Musculoskeletal: Planing of back pain  Integument:  No skin rashes or ulceration  Lymphatic:  No cervical lymphadenopathy  Neurologic:  Patient is awake alert, responding to command, well-oriented to time and place and person moving all extremities ambulating well with walker    Upper back midline  3.5 cm x 3 cm circular infected markedly inflamed sebaceous cyst with punctum partly leaking pus      Visit Diagnosis: . Diagnoses and all orders for this visit:    Inclusion cyst  -     Ambulatory Referral to General Surgery       Plan of care was discussed with patient in detail    Pertinent labs reviewed  Pertinent images and available reads personally reviewed  Procedure: NM PET CT skull base to mid thigh    Result Date: 10/19/2023  Narrative: PET/CT SCAN INDICATION: D38.1: Neoplasm of uncertain behavior of trachea, bronchus and lung Z85.048: Personal history of other malignant neoplasm of rectum, rectosigmoid junction, and anus History of colon cancer. Recent CT demonstrating increasing size of the pulmonary nodule. There is a prior history of ovarian cancer. MODIFIER: PS COMPARISON: No prior PET scans. 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  Multiplanar attenuation corrected and non attenuation corrected PET images are available for interpretation, and contiguous, low dose, axial CT sections were obtained from the skull base through the femurs. Intravenous contrast material was not utilized. This examination, like all CT scans performed in the [facility], was performed utilizing techniques to minimize radiation dose exposure, including the use of iterative reconstruction and automated exposure control. Fasting serum glucose: 147 mg/dl FINDINGS: VISUALIZED BRAIN: No specific abnormalities on this limited study HEAD/NECK: There is a physiologic distribution of FDG. No FDG avid cervical adenopathy is seen. CT images: Unremarkable. CHEST: -The pleural-based anterior left apical pulmonary nodule is seen on image 3/103, and measures 0.9 cm. Nodule demonstrates minimal/trace activity. SUV max in this area is approximately 1.8. Blood pool activity 3.0. - No FDG avid mediastinal or hilar adenopathy CT images: No pleural effusion, no pericardial effusion. Small hiatal hernia. Status post TAVR. Coronary artery calcification. ABDOMEN: No FDG avid soft tissue lesions are seen. Note is made of the hyperdense area along the medial wall of the gastric antrum. Or significant activity on the non-attenuated correction images. CT images: CT images are quite limited. A ventral hernia containing multiple loops of bowel are noted. Overall there is no obvious interval change from earlier. PELVIS: No FDG avid soft tissue lesions are seen. CT images: Limited assessment of the inferior pelvis secondary to bilateral hip hardware. OSSEOUS STRUCTURES: No FDG avid lesions are seen. CT images: Bilateral shoulder activity is likely related to arthritis     Impression: 1. The pleural-based left apical pulmonary nodule measuring 0.9 cm demonstrates at most minimal/trace activity with SUV max of 1.8 which is below blood pool activity.  Nevertheless, it is a new/enlarging finding and is borderline in size for accurate assessment with PET/CT. Therefore, recommend noncontrast chest CT reassessment in approximately 3 months time. 2. With respect to the hyperdense areas associated with the medial wall of the abdomen as well as several upper abdominal nodules, etiology remains unclear as no significant FDG of activity is seen on the nonattenuation corrected images. 3. There is otherwise no evidence of FDG avid metastatic disease Workstation performed: [id number]    Pertinent notes reviewed    Assessment/Plan   Assessment:  Acute sebaceous cyst with abscess  Symptomatic   Encounter Diagnosis   Name Primary? Inclusion cyst     .    Plan:  Proper consent was obtained. The risks, benefits, alternatives,and probabilities of success were discussed in detail with no guarantee made as to outcome. All questions were answered to the patient's satisfaction. Patient understands that some of patient's symptoms or all symptoms may persist even after surgery and wished to proceed with surgery    Lesion and drainage and packing with iodoform under local anesthesia in the office  Duricef 500 mg 1 capsule twice a day  Removal of packing on Monday at wound care center patient has an appointment  Repacking with iodoform 3 dressings and then flat dressing    Follow-up in 3 weeks for possible excision of residual cyst    Counseling / Coordination of Care  Expained patient in detailed about coordination of care. A description of the counseling / coordination of care:  I performed an interim history, pertinent images and labs, performed a physical examination to arrive at the plan delineated above with associated thought processes. Dr Leander Delgado MD [address] [place] [address]    Office   Tel. [phone]  Fax.  [phone]

## 2023-11-17 NOTE — PROGRESS NOTES
Incision and Drainage    Date/Time: 11/17/2023 10:00 AM    Performed by: Arnaud Robles MD  Authorized by: Arnaud Robles MD  Universal Protocol:  Consent: Verbal consent obtained. Risks and benefits: risks, benefits and alternatives were discussed  Consent given by: patient  Time out: Immediately prior to procedure a "time out" was called to verify the correct patient, procedure, equipment, support staff and site/side marked as required. Timeout called at: 11/17/2023 10:29 AM.  Patient understanding: patient states understanding of the procedure being performed  Patient consent: the patient's understanding of the procedure matches consent given  Procedure consent: procedure consent matches procedure scheduled  Relevant documents: relevant documents present and verified  Test results: test results available and properly labeled  Site marked: the operative site was marked  Patient identity confirmed: verbally with patient    Patient location:  Clinic  Location:     Type:  Abscess    Location:  Trunk    Trunk location:  Back  Pre-procedure details:     Skin preparation:  Chloraprep    Skin preparation:  ChloraPrep  Anesthesia (see MAR for exact dosages): Anesthesia method:  Local infiltration    Local anesthetic:  Lidocaine 1% WITH epi  Procedure details:     Complexity:  Complex    Needle aspiration: no      Incision types:  Stab incision and single with marsupialization    Scalpel blade:  11    Approach:  Open    Incision depth:  Subcutaneous and deep    Wound management:  Probed and deloculated, irrigated with saline and extensive cleaning    Irrigation with saline: With saline    Hemostat:  Hemostat used to remove the cyst wall    Drainage:  Purulent    Drainage amount:  Copious    Wound treatment:  Packing placed    Packing materials:  1/2 in iodoform gauze    Amount 1/2" iodoform:  10 inches  Post-procedure details:     Patient tolerance of procedure:   Tolerated well, no immediate complications  Comments: Pus was sent for culture

## 2023-11-19 LAB
BACTERIA WND AEROBE CULT: NO GROWTH
GRAM STN SPEC: NORMAL
GRAM STN SPEC: NORMAL

## 2023-11-20 ENCOUNTER — OFFICE VISIT (OUTPATIENT)
Dept: WOUND CARE | Facility: HOSPITAL | Age: 68
End: 2023-11-20
Payer: MEDICARE

## 2023-11-20 VITALS
BODY MASS INDEX: 45.82 KG/M2 | WEIGHT: 275 LBS | SYSTOLIC BLOOD PRESSURE: 165 MMHG | HEIGHT: 65 IN | RESPIRATION RATE: 15 BRPM | TEMPERATURE: 97.6 F | DIASTOLIC BLOOD PRESSURE: 98 MMHG | HEART RATE: 99 BPM

## 2023-11-20 DIAGNOSIS — T81.89XA NON-HEALING SURGICAL WOUND, INITIAL ENCOUNTER: Primary | ICD-10-CM

## 2023-11-20 DIAGNOSIS — Z98.890 HISTORY OF INCISION AND DRAINAGE: ICD-10-CM

## 2023-11-20 LAB
BACTERIA WND AEROBE CULT: NO GROWTH
GRAM STN SPEC: NORMAL
GRAM STN SPEC: NORMAL

## 2023-11-20 PROCEDURE — 97597 DBRDMT OPN WND 1ST 20 CM/<: CPT | Performed by: STUDENT IN AN ORGANIZED HEALTH CARE EDUCATION/TRAINING PROGRAM

## 2023-11-20 RX ORDER — LIDOCAINE HYDROCHLORIDE 40 MG/ML
5 SOLUTION TOPICAL ONCE
Status: COMPLETED | OUTPATIENT
Start: 2023-11-20 | End: 2023-11-20

## 2023-11-20 RX ADMIN — LIDOCAINE HYDROCHLORIDE 5 ML: 40 SOLUTION TOPICAL at 08:31

## 2023-11-20 NOTE — PATIENT INSTRUCTIONS
Orders Placed This Encounter   Procedures    Wound cleansing and dressings     Back Wound:    Wash your hands with soap and water. Remove old dressing, discard into plastic bag and place in trash. Cleanse the wound with soap and water prior to applying a clean dressing. Do not use tissue or cotton balls. Do not scrub the wound. Pat dry using gauze. Shower : NO  Gently pack Iodosorb loosely  into the back wound. Cover with gauze. Secure with tape. Change dressing every other day    This was done today.      Standing Status:   Future     Standing Expiration Date:   11/20/2024

## 2023-11-20 NOTE — PROGRESS NOTES
Patient ID: Valeriano Amaya is a 76 y.o. female Date of Birth 1955     Chief Complaint  Chief Complaint   Patient presents with    Follow Up Wound Care Visit     back       Allergies  Patient has no known allergies. Assessment:     Diagnoses and all orders for this visit:    Non-healing surgical wound, initial encounter  -     lidocaine (XYLOCAINE) 4 % topical solution 5 mL  -     Wound cleansing and dressings; Future  -     Debridement    History of incision and drainage              Debridement   Wound 11/15/23 Surgical Generalized (Full Body) Left    Universal Protocol:  Consent: Verbal consent obtained. Risks and benefits: risks, benefits and alternatives were discussed  Consent given by: patient  Time out: Immediately prior to procedure a "time out" was called to verify the correct patient, procedure, equipment, support staff and site/side marked as required. Patient identity confirmed: verbally with patient    Performed by: physician  Debridement type: selective  Pain control: lidocaine 4%  Post-debridement measurements  Length (cm): 0.5  Width (cm): 1.4  Depth (cm): 0.6  Percent debrided: 90%  Surface Area (cm^2): 0.7  Area debrided (cm^2): 0.63  Volume (cm^3): 0.42  Devitalized tissue debrided: biofilm and exudate  Instrument(s) utilized: curette  Bleeding: small  Hemostasis obtained with: pressure  Procedural pain (0-10): 1  Post-procedural pain: 0   Response to treatment: procedure was tolerated well        Plan: It was a pleasure to see Valeriano Amaya for wound care follow up today  Selective debridement performed today as above  Continue plan of care as noted below with 1/4 in iodoform  No signs or symptoms of infection today. Patient understands that if any signs of infection start (such as increased redness, drainage, pain, fever, chills, diaphoresis), they should call our office or proceed to the ER or Urgent Care.   Patient should continue a high protein diet to facilitate wound healing  Patient is advised to not submerge wound or leave wound open to air. Follow up in 1 weeks  Given the multi-factorial nature of wound care, additional time was taken to review patient's treatment plan with other specialties and most recent pertinent lab work and imaging. All plans of care discussed with patient at bedside who verbalized understanding with treatment plan. Wound 11/15/23 Surgical Generalized (Full Body) Left (Active)   Wound Image Images linked 11/20/23 0826   Wound Description Pink; White 11/20/23 0824   Kely-wound Assessment Pink;Purple 11/20/23 0824   Wound Length (cm) 0.5 cm 11/20/23 0824   Wound Width (cm) 1.4 cm 11/20/23 0824   Wound Depth (cm) 0.6 cm 11/20/23 0824   Wound Surface Area (cm^2) 0.7 cm^2 11/20/23 0824   Wound Volume (cm^3) 0.42 cm^3 11/20/23 0824   Calculated Wound Volume (cm^3) 0.42 cm^3 11/20/23 0824   Change in Wound Size % 12.5 11/20/23 0824   Tunneling in depth located at 12-12 o'clock 11/20/23 0824   Undermining 2.5 11/20/23 0824   Undermining is depth extending from deepest around 1 o'clock 11/20/23 0824   Drainage Amount Moderate 11/20/23 0824   Drainage Description Serosanguineous 11/20/23 0824   Non-staged Wound Description Full thickness 11/20/23 0824   Dressing Status Intact (upon arrival) 11/20/23 0824       Wound 11/15/23 Surgical Generalized (Full Body) Left (Active)   Date First Assessed/Time First Assessed: 11/15/23 0819   Primary Wound Type: (c) Surgical  Location: Generalized (Full Body)  Wound Location Orientation: Left       [REMOVED] Wound 05/03/21 Other (comment) Sacrum (Removed)   Resolved Date: 11/15/23  Date First Assessed/Time First Assessed: 05/03/21 0738   Primary Wound Type:  Other (comment)  Location: Sacrum  Wound Outcome: (c) Other (Comment)       [REMOVED] Wound 08/19/21 Pressure Injury Sacrum Mid (Removed)   Resolved Date: 11/15/23  Date First Assessed/Time First Assessed: 08/19/21 2015   Primary Wound Type: Pressure Injury Location: Sacrum  Wound Location Orientation: Mid  Wound Description (Comments): open, red, some slough  Dressing Status: Open to ai. .. Subjective:      .    11/20/23: Patient was given referral to see general surgery after last visit for I&D which she underwent. She will continue to follow general surgery in the next 2 weeks. Dr. Azeb Kapoor personally called the office and asked for patient to continue to follow with wound management center. Patient denies any symptoms of infection including fever chills diaphoresis. States that she does have help at home with packing and dressing the wound of her back. She is taking antibiotics as prescribed by general surgery. 11/15/23: Aby Pacheco is a pleasant 22-year-old female with a past medical history of atrial fibrillation on Eliquis and type II by diabetes mellitus here today for initial wound care consult. Patient notes that she noticed itchiness and wetness of her back for several weeks now. Has been a patient with wound management for lower extremity wounds in the past, so she called her PCP and asked for referral to our office. This is the first time the skin/wound area has been seen by a physician. She notes that the area is very itchy and that she has been using a hard scrub brush to scrub her back aggressively to address that problem. Denies any symptoms of fever chills diaphoresis today. She notes that she has had small areas like this on her back in the past.        The following portions of the patient's history were reviewed and updated as appropriate: allergies, current medications, past family history, past medical history, past social history, past surgical history, and problem list.    Review of Systems   Constitutional:  Negative for chills, diaphoresis, fatigue and fever. Skin:  Positive for wound.          Objective:       Wound 11/15/23 Surgical Generalized (Full Body) Left (Active)   Wound Image Images linked 11/20/23 7245 Wound Description Pink; White 11/20/23 0824   Kely-wound Assessment Pink;Purple 11/20/23 0824   Wound Length (cm) 0.5 cm 11/20/23 0824   Wound Width (cm) 1.4 cm 11/20/23 0824   Wound Depth (cm) 0.6 cm 11/20/23 0824   Wound Surface Area (cm^2) 0.7 cm^2 11/20/23 0824   Wound Volume (cm^3) 0.42 cm^3 11/20/23 0824   Calculated Wound Volume (cm^3) 0.42 cm^3 11/20/23 0824   Change in Wound Size % 12.5 11/20/23 0824   Tunneling in depth located at 12-12 o'clock 11/20/23 0824   Undermining 2.5 11/20/23 0824   Undermining is depth extending from deepest around 1 o'clock 11/20/23 0824   Drainage Amount Moderate 11/20/23 0824   Drainage Description Serosanguineous 11/20/23 0824   Non-staged Wound Description Full thickness 11/20/23 0824   Dressing Status Intact (upon arrival) 11/20/23 0824       /98   Pulse 99   Temp 97.6 °F (36.4 °C)   Resp 15   Ht 5' 5" (1.651 m)   Wt 125 kg (275 lb)   BMI 45.76 kg/m²     Physical Exam  Vitals reviewed. Constitutional:       Appearance: Normal appearance. She is obese. HENT:      Head: Normocephalic and atraumatic. Mouth/Throat:      Mouth: Mucous membranes are moist.   Eyes:      Extraocular Movements: Extraocular movements intact. Pulmonary:      Effort: Pulmonary effort is normal.   Skin:     Comments: wound of upper left back with undermining from 12-12. Sanguinous exudate. No signs of infection. Very minimal surrounding erythema   Neurological:      Mental Status: She is alert. Psychiatric:         Mood and Affect: Mood normal.         Behavior: Behavior normal.           Wound Instructions:  Orders Placed This Encounter   Procedures    Wound cleansing and dressings     Back Wound:    Wash your hands with soap and water. Remove old dressing, discard into plastic bag and place in trash. Cleanse the wound with soap and water prior to applying a clean dressing. Do not use tissue or cotton balls. Do not scrub the wound. Pat dry using gauze.   Shower : NO  Gently pack Iodosorb loosely  into the back wound. Cover with gauze. Secure with tape. Change dressing every other day    This was done today. Standing Status:   Future     Standing Expiration Date:   11/20/2024    Debridement     This order was created via procedure documentation        Diagnosis ICD-10-CM Associated Orders   1. Non-healing surgical wound, initial encounter  T81.89XA lidocaine (XYLOCAINE) 4 % topical solution 5 mL     Wound cleansing and dressings     Debridement      2. History of incision and drainage  Z98.890           --  Jewel Broussard MD    "This note has been constructed using a voice recognition system. Therefore there may be syntax, spelling, and/or grammatical errors. Occasional wrong word or "sound alike" substitutions may have occurred due to the inherent limitations of voice recognition software. Read the chart carefully and recognize, using context, where substitutions have occurred.  Please call if you have any questions."

## 2023-11-21 ENCOUNTER — CLINICAL SUPPORT (OUTPATIENT)
Dept: CARDIOLOGY CLINIC | Facility: CLINIC | Age: 68
End: 2023-11-21
Payer: MEDICARE

## 2023-11-21 DIAGNOSIS — K28.9 MARGINAL ULCER: ICD-10-CM

## 2023-11-21 DIAGNOSIS — Z98.84 S/P BARIATRIC SURGERY: ICD-10-CM

## 2023-11-21 DIAGNOSIS — Z95.2 S/P TAVR (TRANSCATHETER AORTIC VALVE REPLACEMENT): ICD-10-CM

## 2023-11-21 DIAGNOSIS — R06.02 EXERTIONAL SHORTNESS OF BREATH: ICD-10-CM

## 2023-11-21 DIAGNOSIS — I50.32 CHRONIC DIASTOLIC HEART FAILURE (HCC): ICD-10-CM

## 2023-11-21 DIAGNOSIS — I48.0 PAROXYSMAL ATRIAL FIBRILLATION (HCC): ICD-10-CM

## 2023-11-21 DIAGNOSIS — I48.91 ATRIAL FIBRILLATION WITH RAPID VENTRICULAR RESPONSE (HCC): ICD-10-CM

## 2023-11-21 DIAGNOSIS — R60.0 BILATERAL LEG EDEMA: ICD-10-CM

## 2023-11-21 DIAGNOSIS — K21.9 GASTROESOPHAGEAL REFLUX DISEASE, UNSPECIFIED WHETHER ESOPHAGITIS PRESENT: ICD-10-CM

## 2023-11-21 PROCEDURE — 93248 EXT ECG>7D<15D REV&INTERPJ: CPT | Performed by: INTERNAL MEDICINE

## 2023-11-21 RX ORDER — OMEPRAZOLE 20 MG/1
CAPSULE, DELAYED RELEASE ORAL
Qty: 90 CAPSULE | Refills: 1 | Status: SHIPPED | OUTPATIENT
Start: 2023-11-21

## 2023-11-21 RX ORDER — EMPAGLIFLOZIN 10 MG/1
TABLET, FILM COATED ORAL
Qty: 90 TABLET | Refills: 0 | Status: SHIPPED | OUTPATIENT
Start: 2023-11-21

## 2023-11-27 ENCOUNTER — TELEPHONE (OUTPATIENT)
Dept: GASTROENTEROLOGY | Facility: MEDICAL CENTER | Age: 68
End: 2023-11-27

## 2023-11-27 ENCOUNTER — OFFICE VISIT (OUTPATIENT)
Dept: WOUND CARE | Facility: HOSPITAL | Age: 68
End: 2023-11-27
Payer: MEDICARE

## 2023-11-27 VITALS — TEMPERATURE: 97.6 F | RESPIRATION RATE: 15 BRPM

## 2023-11-27 DIAGNOSIS — Z98.890 HISTORY OF INCISION AND DRAINAGE: ICD-10-CM

## 2023-11-27 DIAGNOSIS — T81.89XA NON-HEALING SURGICAL WOUND, INITIAL ENCOUNTER: Primary | ICD-10-CM

## 2023-11-27 DIAGNOSIS — L72.0 INCLUSION CYST: ICD-10-CM

## 2023-11-27 PROCEDURE — 97597 DBRDMT OPN WND 1ST 20 CM/<: CPT | Performed by: STUDENT IN AN ORGANIZED HEALTH CARE EDUCATION/TRAINING PROGRAM

## 2023-11-27 RX ORDER — LIDOCAINE HYDROCHLORIDE 40 MG/ML
5 SOLUTION TOPICAL ONCE
Status: COMPLETED | OUTPATIENT
Start: 2023-11-27 | End: 2023-11-27

## 2023-11-27 RX ADMIN — LIDOCAINE HYDROCHLORIDE 5 ML: 40 SOLUTION TOPICAL at 08:41

## 2023-11-27 NOTE — TELEPHONE ENCOUNTER
Pt was reminded of 4 day Jardiance and 2 day Eliquis hold prior to her EUS on 12/6/2023. Pt did not have any further questions.

## 2023-11-27 NOTE — PATIENT INSTRUCTIONS
Orders Placed This Encounter   Procedures    Wound cleansing and dressings     Back Wound:     Wash your hands with soap and water. Remove old dressing, discard into plastic bag and place in trash. Cleanse the wound with soap and water prior to applying a clean dressing. Do not use tissue or cotton balls. Do not scrub the wound. Pat dry using gauze. Shower : NO  Gently pack Iodoform loosely  into the back wound. Cover with gauze. Secure with tape. Change dressing every other day     This was done today.      Standing Status:   Future     Standing Expiration Date:   11/27/2024

## 2023-11-27 NOTE — PROGRESS NOTES
Patient ID: Santi Finn is a 76 y.o. female Date of Birth 1955     Chief Complaint  Chief Complaint   Patient presents with    Follow Up Wound Care Visit     back       Allergies  Patient has no known allergies. Assessment:     Diagnoses and all orders for this visit:    Non-healing surgical wound, initial encounter  -     lidocaine (XYLOCAINE) 4 % topical solution 5 mL  -     Wound cleansing and dressings; Future  -     Debridement    History of incision and drainage  -     lidocaine (XYLOCAINE) 4 % topical solution 5 mL  -     Wound cleansing and dressings; Future    Inclusion cyst  -     lidocaine (XYLOCAINE) 4 % topical solution 5 mL  -     Wound cleansing and dressings; Future              Debridement   Wound 11/15/23 Surgical Back Left    Universal Protocol:  Consent: Verbal consent obtained. Risks and benefits: risks, benefits and alternatives were discussed  Consent given by: patient  Time out: Immediately prior to procedure a "time out" was called to verify the correct patient, procedure, equipment, support staff and site/side marked as required. Patient identity confirmed: verbally with patient    Performed by: physician  Debridement type: selective  Pain control: lidocaine 4%  Post-debridement measurements  Length (cm): 0.5  Width (cm): 1  Depth (cm): 0.5  Percent debrided: 90%  Surface Area (cm^2): 0.5  Area debrided (cm^2): 0.45  Volume (cm^3): 0.25  Devitalized tissue debrided: biofilm and exudate  Instrument(s) utilized: curette  Bleeding: small  Hemostasis obtained with: pressure  Procedural pain (0-10): 1  Post-procedural pain: 0   Response to treatment: procedure was tolerated well        Plan: It was a pleasure to see Santi Finn for wound care follow up today  Selective debridement performed today as above. Still with moderate amount of sebum coming from the wound   Continue plan of care as noted below with 1/4 iodoform   A1C results reviewed with the patient today.    No signs or symptoms of infection today. Patient understands that if any signs of infection start (such as increased redness, drainage, pain, fever, chills, diaphoresis), they should call our office or proceed to the ER or Urgent Care. Patient should continue a high protein diet to facilitate wound healing  Patient is advised to not submerge wound or leave wound open to air. Follow up in 1 weeks after appointment with gen surgery  Given the multi-factorial nature of wound care, additional time was taken to review patient's treatment plan with other specialties and most recent pertinent lab work and imaging. All plans of care discussed with patient at bedside who verbalized understanding with treatment plan. Wound 11/15/23 Surgical Back Left (Active)   Wound Image Images linked 11/27/23 0835   Wound Description Pink; White;Beefy red 11/27/23 0835   Kely-wound Assessment Pink 11/27/23 0835   Wound Length (cm) 0.5 cm 11/27/23 0835   Wound Width (cm) 1 cm 11/27/23 0835   Wound Depth (cm) 0.5 cm 11/27/23 0835   Wound Surface Area (cm^2) 0.5 cm^2 11/27/23 0835   Wound Volume (cm^3) 0.25 cm^3 11/27/23 0835   Calculated Wound Volume (cm^3) 0.25 cm^3 11/27/23 0835   Change in Wound Size % 47.92 11/27/23 0835   Tunneling in depth located at 12-12 o'clock 11/27/23 0835   Undermining 1 11/27/23 0835   Drainage Amount Moderate 11/27/23 0835   Drainage Description Serosanguineous 11/27/23 0835   Non-staged Wound Description Full thickness 11/27/23 0835   Dressing Status Intact (upon arrival) 11/27/23 0835       Wound 11/15/23 Surgical Back Left (Active)   Date First Assessed/Time First Assessed: 11/15/23 0819   Primary Wound Type: (c) Surgical  Location: Back  Wound Location Orientation: Left       [REMOVED] Wound 05/03/21 Other (comment) Sacrum (Removed)   Resolved Date: 11/15/23  Date First Assessed/Time First Assessed: 05/03/21 2503   Primary Wound Type:  Other (comment)  Location: Sacrum  Wound Outcome: (c) Other (Comment) [REMOVED] Wound 08/19/21 Pressure Injury Sacrum Mid (Removed)   Resolved Date: 11/15/23  Date First Assessed/Time First Assessed: 08/19/21 2015   Primary Wound Type: Pressure Injury  Location: Sacrum  Wound Location Orientation: Mid  Wound Description (Comments): open, red, some slough  Dressing Status: Open to ai. .. Subjective:      .    11/27/23: Friend changing dressing as directed. No symptoms of infection. 11/20/23: Patient was given referral to see general surgery after last visit for I&D which she underwent. She will continue to follow general surgery in the next 2 weeks. Dr. Azeb Kapoor personally called the office and asked for patient to continue to follow with wound management center. Patient denies any symptoms of infection including fever chills diaphoresis. States that she does have help at home with packing and dressing the wound of her back. She is taking antibiotics as prescribed by general surgery. 11/15/23: Aby Pacheco is a pleasant 60-year-old female with a past medical history of atrial fibrillation on Eliquis and type II by diabetes mellitus here today for initial wound care consult. Patient notes that she noticed itchiness and wetness of her back for several weeks now. Has been a patient with wound management for lower extremity wounds in the past, so she called her PCP and asked for referral to our office. This is the first time the skin/wound area has been seen by a physician. She notes that the area is very itchy and that she has been using a hard scrub brush to scrub her back aggressively to address that problem. Denies any symptoms of fever chills diaphoresis today.   She notes that she has had small areas like this on her back in the past.          The following portions of the patient's history were reviewed and updated as appropriate: allergies, current medications, past family history, past medical history, past social history, past surgical history, and problem list.    Review of Systems   Constitutional:  Negative for chills, diaphoresis, fatigue and fever. Skin:  Positive for wound. All other systems reviewed and are negative. Objective:       Wound 11/15/23 Surgical Back Left (Active)   Wound Image Images linked 11/27/23 0835   Wound Description Pink; White;Beefy red 11/27/23 0835   Kely-wound Assessment Pink 11/27/23 0835   Wound Length (cm) 0.5 cm 11/27/23 0835   Wound Width (cm) 1 cm 11/27/23 0835   Wound Depth (cm) 0.5 cm 11/27/23 0835   Wound Surface Area (cm^2) 0.5 cm^2 11/27/23 0835   Wound Volume (cm^3) 0.25 cm^3 11/27/23 0835   Calculated Wound Volume (cm^3) 0.25 cm^3 11/27/23 0835   Change in Wound Size % 47.92 11/27/23 0835   Tunneling in depth located at 12-12 o'clock 11/27/23 0835   Undermining 1 11/27/23 0835   Drainage Amount Moderate 11/27/23 0835   Drainage Description Serosanguineous 11/27/23 0835   Non-staged Wound Description Full thickness 11/27/23 0835   Dressing Status Intact (upon arrival) 11/27/23 0835       Temp 97.6 °F (36.4 °C)   Resp 15     Physical Exam  Vitals reviewed. Constitutional:       Appearance: Normal appearance. HENT:      Head: Normocephalic and atraumatic. Mouth/Throat:      Mouth: Mucous membranes are moist.   Eyes:      Extraocular Movements: Extraocular movements intact. Pulmonary:      Effort: Pulmonary effort is normal.   Skin:     Comments: Wound of left upper back without signs of infection. Undermining from 12 to 12. Sebum still coming from the wound. Neurological:      Mental Status: She is alert. Psychiatric:         Mood and Affect: Mood normal.         Behavior: Behavior normal.           Wound Instructions:  Orders Placed This Encounter   Procedures    Wound cleansing and dressings     Back Wound:     Wash your hands with soap and water. Remove old dressing, discard into plastic bag and place in trash. Cleanse the wound with soap and water prior to applying a clean dressing.  Do not use tissue or cotton balls. Do not scrub the wound. Pat dry using gauze. Shower : NO  Gently pack Iodoform loosely  into the back wound. Cover with gauze. Secure with tape. Change dressing every other day     This was done today. Standing Status:   Future     Standing Expiration Date:   11/27/2024    Debridement     This order was created via procedure documentation        Diagnosis ICD-10-CM Associated Orders   1. Non-healing surgical wound, initial encounter  T81.89XA lidocaine (XYLOCAINE) 4 % topical solution 5 mL     Wound cleansing and dressings     Debridement      2. History of incision and drainage  Z98.890 lidocaine (XYLOCAINE) 4 % topical solution 5 mL     Wound cleansing and dressings      3. Inclusion cyst  L72.0 lidocaine (XYLOCAINE) 4 % topical solution 5 mL     Wound cleansing and dressings          --  Celina Arias MD    "This note has been constructed using a voice recognition system. Therefore there may be syntax, spelling, and/or grammatical errors. Occasional wrong word or "sound alike" substitutions may have occurred due to the inherent limitations of voice recognition software. Read the chart carefully and recognize, using context, where substitutions have occurred.  Please call if you have any questions."

## 2023-12-05 ENCOUNTER — OFFICE VISIT (OUTPATIENT)
Dept: SURGERY | Facility: CLINIC | Age: 68
End: 2023-12-05

## 2023-12-05 VITALS — HEIGHT: 65 IN | BODY MASS INDEX: 47.02 KG/M2 | WEIGHT: 282.2 LBS | TEMPERATURE: 96.4 F

## 2023-12-05 DIAGNOSIS — L72.9 INFECTED CYST OF SKIN: Primary | ICD-10-CM

## 2023-12-05 DIAGNOSIS — L08.9 INFECTED CYST OF SKIN: Primary | ICD-10-CM

## 2023-12-05 PROCEDURE — 99024 POSTOP FOLLOW-UP VISIT: CPT | Performed by: SURGERY

## 2023-12-05 NOTE — PROGRESS NOTES
Patient is status post incision and drainage of an infected epidermal inclusion cyst from her back 17 November 2023 by Dr. Magi Babin. She is also being followed up in wound care. Patient has had good granulation tissue. Quarter inch iodoform wick replaced and covered with gauze. Plan:  1. Follow-up with wound care. 2.  I instructed patient to remove packing and clean in the shower daily with soap and water and then replace packing. 3. She should follow-up with Dr. Magi Babin in general surgery clinic after it is healed up. If there is a recurrent epidermal inclusion cyst at that time, we will schedule her for excision.

## 2023-12-06 ENCOUNTER — HOSPITAL ENCOUNTER (EMERGENCY)
Facility: HOSPITAL | Age: 68
Discharge: HOME | End: 2023-12-06
Attending: EMERGENCY MEDICINE
Payer: MEDICARE

## 2023-12-06 ENCOUNTER — TELEPHONE (OUTPATIENT)
Age: 68
End: 2023-12-06

## 2023-12-06 VITALS
BODY MASS INDEX: 46.98 KG/M2 | DIASTOLIC BLOOD PRESSURE: 94 MMHG | OXYGEN SATURATION: 98 % | HEART RATE: 86 BPM | TEMPERATURE: 97.2 F | HEIGHT: 65 IN | SYSTOLIC BLOOD PRESSURE: 170 MMHG | WEIGHT: 282 LBS | RESPIRATION RATE: 18 BRPM

## 2023-12-06 DIAGNOSIS — L02.91 ABSCESS: Primary | ICD-10-CM

## 2023-12-06 LAB
ALBUMIN SERPL BCP-MCNC: 3.7 G/DL (ref 3.5–5)
ALP SERPL-CCNC: 64 U/L (ref 34–104)
ALT SERPL W P-5'-P-CCNC: 13 U/L (ref 7–52)
ANION GAP SERPL CALCULATED.3IONS-SCNC: 6 MMOL/L
APTT PPP: 28 SECONDS (ref 23–37)
AST SERPL W P-5'-P-CCNC: 16 U/L (ref 13–39)
BASOPHILS # BLD AUTO: 0.02 THOUSANDS/ÂΜL (ref 0–0.1)
BASOPHILS NFR BLD AUTO: 0 % (ref 0–1)
BILIRUB SERPL-MCNC: 0.77 MG/DL (ref 0.2–1)
BUN SERPL-MCNC: 11 MG/DL (ref 5–25)
CALCIUM SERPL-MCNC: 9.1 MG/DL (ref 8.4–10.2)
CHLORIDE SERPL-SCNC: 103 MMOL/L (ref 96–108)
CO2 SERPL-SCNC: 29 MMOL/L (ref 21–32)
CREAT SERPL-MCNC: 0.83 MG/DL (ref 0.6–1.3)
EOSINOPHIL # BLD AUTO: 0.13 THOUSAND/ÂΜL (ref 0–0.61)
EOSINOPHIL NFR BLD AUTO: 2 % (ref 0–6)
ERYTHROCYTE [DISTWIDTH] IN BLOOD BY AUTOMATED COUNT: 18.2 % (ref 11.6–15.1)
FLUAV RNA RESP QL NAA+PROBE: NEGATIVE
FLUBV RNA RESP QL NAA+PROBE: NEGATIVE
GFR SERPL CREATININE-BSD FRML MDRD: 72 ML/MIN/1.73SQ M
GLUCOSE SERPL-MCNC: 154 MG/DL (ref 65–140)
HCT VFR BLD AUTO: 50.1 % (ref 34.8–46.1)
HGB BLD-MCNC: 15.5 G/DL (ref 11.5–15.4)
IMM GRANULOCYTES # BLD AUTO: 0.06 THOUSAND/UL (ref 0–0.2)
IMM GRANULOCYTES NFR BLD AUTO: 1 % (ref 0–2)
INR PPP: 1 (ref 0.84–1.19)
LACTATE SERPL-SCNC: 1.5 MMOL/L (ref 0.5–2)
LYMPHOCYTES # BLD AUTO: 1.06 THOUSANDS/ÂΜL (ref 0.6–4.47)
LYMPHOCYTES NFR BLD AUTO: 14 % (ref 14–44)
MCH RBC QN AUTO: 26.4 PG (ref 26.8–34.3)
MCHC RBC AUTO-ENTMCNC: 30.9 G/DL (ref 31.4–37.4)
MCV RBC AUTO: 85 FL (ref 82–98)
MONOCYTES # BLD AUTO: 0.63 THOUSAND/ÂΜL (ref 0.17–1.22)
MONOCYTES NFR BLD AUTO: 8 % (ref 4–12)
NEUTROPHILS # BLD AUTO: 5.65 THOUSANDS/ÂΜL (ref 1.85–7.62)
NEUTS SEG NFR BLD AUTO: 75 % (ref 43–75)
NRBC BLD AUTO-RTO: 0 /100 WBCS
PLATELET # BLD AUTO: 146 THOUSANDS/UL (ref 149–390)
PMV BLD AUTO: 9.4 FL (ref 8.9–12.7)
POTASSIUM SERPL-SCNC: 5.1 MMOL/L (ref 3.5–5.3)
PROCALCITONIN SERPL-MCNC: 0.05 NG/ML
PROT SERPL-MCNC: 6.7 G/DL (ref 6.4–8.4)
PROTHROMBIN TIME: 13.4 SECONDS (ref 11.6–14.5)
RBC # BLD AUTO: 5.87 MILLION/UL (ref 3.81–5.12)
RSV RNA RESP QL NAA+PROBE: NEGATIVE
SARS-COV-2 RNA RESP QL NAA+PROBE: NEGATIVE
SODIUM SERPL-SCNC: 138 MMOL/L (ref 135–147)
WBC # BLD AUTO: 7.55 THOUSAND/UL (ref 4.31–10.16)

## 2023-12-06 PROCEDURE — 10061 I&D ABSCESS COMP/MULTIPLE: CPT | Performed by: EMERGENCY MEDICINE

## 2023-12-06 PROCEDURE — 84145 PROCALCITONIN (PCT): CPT | Performed by: EMERGENCY MEDICINE

## 2023-12-06 PROCEDURE — 83605 ASSAY OF LACTIC ACID: CPT | Performed by: EMERGENCY MEDICINE

## 2023-12-06 PROCEDURE — 80053 COMPREHEN METABOLIC PANEL: CPT | Performed by: EMERGENCY MEDICINE

## 2023-12-06 PROCEDURE — 87040 BLOOD CULTURE FOR BACTERIA: CPT | Performed by: EMERGENCY MEDICINE

## 2023-12-06 PROCEDURE — 99284 EMERGENCY DEPT VISIT MOD MDM: CPT | Performed by: EMERGENCY MEDICINE

## 2023-12-06 PROCEDURE — 99283 EMERGENCY DEPT VISIT LOW MDM: CPT

## 2023-12-06 PROCEDURE — 87070 CULTURE OTHR SPECIMN AEROBIC: CPT | Performed by: EMERGENCY MEDICINE

## 2023-12-06 PROCEDURE — 36415 COLL VENOUS BLD VENIPUNCTURE: CPT | Performed by: EMERGENCY MEDICINE

## 2023-12-06 PROCEDURE — 85610 PROTHROMBIN TIME: CPT | Performed by: EMERGENCY MEDICINE

## 2023-12-06 PROCEDURE — 96374 THER/PROPH/DIAG INJ IV PUSH: CPT

## 2023-12-06 PROCEDURE — 93005 ELECTROCARDIOGRAM TRACING: CPT

## 2023-12-06 PROCEDURE — 85730 THROMBOPLASTIN TIME PARTIAL: CPT | Performed by: EMERGENCY MEDICINE

## 2023-12-06 PROCEDURE — 0241U HB NFCT DS VIR RESP RNA 4 TRGT: CPT | Performed by: EMERGENCY MEDICINE

## 2023-12-06 PROCEDURE — 85025 COMPLETE CBC W/AUTO DIFF WBC: CPT | Performed by: EMERGENCY MEDICINE

## 2023-12-06 PROCEDURE — 96361 HYDRATE IV INFUSION ADD-ON: CPT

## 2023-12-06 PROCEDURE — 87205 SMEAR GRAM STAIN: CPT | Performed by: EMERGENCY MEDICINE

## 2023-12-06 RX ORDER — KETOROLAC TROMETHAMINE 30 MG/ML
15 INJECTION, SOLUTION INTRAMUSCULAR; INTRAVENOUS ONCE
Status: COMPLETED | OUTPATIENT
Start: 2023-12-06 | End: 2023-12-06

## 2023-12-06 RX ORDER — CEPHALEXIN 500 MG/1
500 CAPSULE ORAL EVERY 6 HOURS SCHEDULED
Qty: 28 CAPSULE | Refills: 0 | Status: SHIPPED | OUTPATIENT
Start: 2023-12-06 | End: 2023-12-13

## 2023-12-06 RX ORDER — LIDOCAINE HYDROCHLORIDE AND EPINEPHRINE 10; 10 MG/ML; UG/ML
10 INJECTION, SOLUTION INFILTRATION; PERINEURAL ONCE
Status: COMPLETED | OUTPATIENT
Start: 2023-12-06 | End: 2023-12-06

## 2023-12-06 RX ORDER — SULFAMETHOXAZOLE AND TRIMETHOPRIM 800; 160 MG/1; MG/1
1 TABLET ORAL 2 TIMES DAILY
Qty: 14 TABLET | Refills: 0 | Status: SHIPPED | OUTPATIENT
Start: 2023-12-06 | End: 2023-12-13

## 2023-12-06 RX ADMIN — KETOROLAC TROMETHAMINE 15 MG: 30 INJECTION, SOLUTION INTRAMUSCULAR; INTRAVENOUS at 11:47

## 2023-12-06 RX ADMIN — SODIUM CHLORIDE 1000 ML: 0.9 INJECTION, SOLUTION INTRAVENOUS at 11:42

## 2023-12-06 RX ADMIN — LIDOCAINE HYDROCHLORIDE,EPINEPHRINE BITARTRATE 10 ML: 10; .01 INJECTION, SOLUTION INFILTRATION; PERINEURAL at 13:21

## 2023-12-06 NOTE — DISCHARGE INSTRUCTIONS
Follow-up with primary care for further care. Contact info provided below if needed. Keep upcoming appointment with wound care. Use over the counter medications as stated on the bottle as needed for pain control. Take your new medications as prescribed and to completion. Return to the ED with new or worsening symptoms.

## 2023-12-06 NOTE — ED PROVIDER NOTES
History  Chief Complaint   Patient presents with    Sore     Patient comes today with a sore left side that is red and raised     Pt is a 67yo F who presents for wound. Patient reports she has history of abscesses and recently an infected cyst for which she is following with wound care. Patient reports that wound is healing up nicely, however yesterday she noted a sore spot on her left side. Patient reports that today her friend looked at it and stated she should come in and be evaluated. Patient reports she has a bandage over the wound because it was draining. Patient denies any trauma or injury to the area. Patient also reports earlier today she had chills and fatigue which made her concerned. Patient reports history of sepsis secondary to abscesses which is why she was concerned. She reports she has otherwise been feeling well. Patient sees wound care again in 2 days. Prior to Admission Medications   Prescriptions Last Dose Informant Patient Reported? Taking?    Calcium-Vitamin D 500-125 MG-UNIT TABS  Self Yes No   Sig: Take 1 tablet by mouth 2 (two) times a day    Patient not taking: Reported on 9/16/2023   Eliquis 5 MG  Self No No   Sig: ONE TAB TWICE A DAY   Empagliflozin (Jardiance) 10 MG TABS tablet   No No   Sig: TAKE 1 TABLET EVERY MORNING   Multiple Vitamins-Minerals (BARIATRIC FUSION) CHEW  Self Yes No   Sig: Chew 1 tablet daily     albuterol (2.5 mg/3 mL) 0.083 % nebulizer solution  Self No No   Sig: TAKE 1 VIAL BY NEBULIZATION EVERY 4 HOURS AS NEEDED FOR WHEEZING OR SHORTNESS OF BREATH   Patient not taking: Reported on 11/15/2023   atorvastatin (LIPITOR) 20 mg tablet  Self No No   Sig: TAKE ONE TABLET DAILY   metoprolol succinate (TOPROL-XL) 25 mg 24 hr tablet   No No   Sig: Take 1 tablet in morning and half tablet in evening   nystatin (MYCOSTATIN) cream  Self Yes No   omeprazole (PriLOSEC) 20 mg delayed release capsule   No No   Sig: TAKE 1 CAPSULE DAILY BEFORE BREAKFAST   oxyCODONE (ROXICODONE) 10 MG TABS  Self Yes No   Sig: Take 10 mg by mouth every 6 (six) hours as needed As needed for pain   potassium chloride (K-DUR,KLOR-CON) 20 mEq tablet  Self No No   Sig: Take 1 tablet (20 mEq total) by mouth if needed (if taking torsemide)   torsemide (DEMADEX) 20 mg tablet  Self No No   Sig: Take 1 tablet (20 mg total) by mouth if needed (for leg swelling/weight gain above 3 pounds)      Facility-Administered Medications: None       Past Medical History:   Diagnosis Date    Acute pain of right knee     Ambulatory dysfunction     Anemia     Arthritis     Bleeding ulcer     Cancer (HCC)     rectal    Chronic lower back pain     Chronic pain disorder     back pain    Colon cancer ([address]) 2018    Diabetes mellitus ([address])     Endometrial cancer ([address]) 2018    GERD (gastroesophageal reflux disease)     History of chemotherapy     History of MRSA infection     Morbid obesity ([address])     Morbid obesity with BMI of 45.0-49.9, adult ([address])     Ovarian cancer ([address]) 2018    Paroxysmal atrial fibrillation (HCC)     Rectal cancer (HCC)     S/P TAVR (transcatheter aortic valve replacement)     SOB (shortness of breath)     Spinal stenosis     Systolic murmur     Unsteady gait     uses walker    Wears dentures     Wears glasses        Past Surgical History:   Procedure Laterality Date    ABDOMINAL PERINEAL BOWEL RESECTION W/ ILEOANAL POUCH N/A 2018    Procedure: LAPAROSCOPIC HAND ASSIST ABDOMINOPERINEAL RESECTION,  POSTERIOR VAGINECTOMY, OMENTECTOMY;  Surgeon: Hugo Hager MD;  [facility];  Service: Colorectal    ABDOMINAL SURGERY      abscess removed from abdomen and right thigh, a hole in thigh closed by plastic surgeon    ABSCESS DRAINAGE      abd, (R) leg, (L) leg     SECTION       SECTION      CYSTOSCOPY N/A 2018    Procedure: CYSTOSCOPY;  Surgeon: Gricelda Byrd MD;  [facility];  Service: Gynecology Oncology    ESOPHAGOGASTRODUODENOSCOPY N/A 2016 Procedure: ESOPHAGOGASTRODUODENOSCOPY (EGD); Surgeon: Amelia Llamas MD;  [facility];  Service:     ESOPHAGOGASTRODUODENOSCOPY N/A 03/30/2016    Procedure: ESOPHAGOGASTRODUODENOSCOPY (EGD); Surgeon: Amelia Llamas MD;  [facility]; Service:     EYE SURGERY      laser eye surgery    HYSTERECTOMY N/A 09/06/2018    Procedure: RADICAL HYSTERECTOMY TOTAL ABDOMINAL (ALEXANDRA). BSO;  Surgeon: Moises Hudson MD;  [facility];  Service: Gynecology Oncology    JOINT REPLACEMENT Left 2017    hip    JOINT REPLACEMENT Right 2017    hip    OOPHORECTOMY Bilateral     NE COLONOSCOPY FLX DX W/COLLJ SPEC WHEN PFRMD N/A 03/09/2018    Procedure: COLONOSCOPY;  Surgeon: Gena Mcadams MD;  Location: [address] [place] [address] GI LAB; Service: Gastroenterology    NE COLONOSCOPY FLX DX W/COLLJ [facility] WHEN PFRMD N/A 09/05/2018    Procedure: COLONOSCOPY;  Surgeon: Daisy Melendez MD;  [facility]; Service: Colorectal    NE ECHO TRANSESOPHAG R-T 2D W/PRB IMG ACQUISJ I&R N/A 09/01/2020    Procedure: TRANSESOPHAGEAL ECHOCARDIOGRAM (THO); Surgeon: Marie Menjivar DO;  [facility];  Service: Cardiac Surgery    NE ESOPHAGOGASTRODUODENOSCOPY TRANSORAL DIAGNOSTIC N/A 02/02/2018    Procedure: ESOPHAGOGASTRODUODENOSCOPY (EGD); Surgeon: Gena Mcadams MD;  Location: [facility] GI LAB;   Service: Gastroenterology    NE LAPAROSCOPY SURG COLOSTOMY/SKN LVL CECOSTOMY N/A 09/06/2018    Procedure: PERMANENT END COLOSTOMY;  Surgeon: Daisy Melendez MD;  [facility];  Service: Colorectal    NE LAPS GSTR RSTCV [address] [place] [address] W/BYP SOLEDAD-EN-Y LIMB <150 CM N/A 07/18/2016    Procedure: BYPASS GASTRIC  SOLEDAD-EN-Y LAPAROSCOPIC;  Surgeon: Amelia Llamas MD;  [facility];  Service: Bariatrics    NE LAPS PROCTECTOMY ABDOMINOPERINEAL W/COLOSTOMY N/A 09/06/2018    Procedure: PROCTECTOMY;  Surgeon: Daisy Melendez MD;  [facility];  Service: Colorectal    NE REPLACE AORTIC VALVE OPENFEMORAL ARTERY APPROACH N/A 2020    Procedure: REPLACEMENT AORTIC VALVE TRANSCATHETER (TAVR) TRANSFEMORAL W/ 26MM WADDELL BARBARA S3 ULTRA VALVE(ACCESS ON RIGHT); Surgeon: Beulah Posey DO;  [facility];  Service: Cardiac Surgery    REPLACEMENT TOTAL KNEE      TOOTH EXTRACTION      TUBAL LIGATION         Family History   Adopted: Yes   Problem Relation Age of Onset    Leukemia Mother     Heart disease Father     Coronary artery disease Father     Diabetes Father     No Known Problems Sister     No Known Problems Brother     Diabetes Son     No Known Problems Brother     No Known Problems Brother     No Known Problems Sister     No Known Problems Sister      I have reviewed and agree with the history as documented. E-Cigarette/Vaping    E-Cigarette Use Never User      E-Cigarette/Vaping Substances    Nicotine No     THC No     CBD No     Flavoring No     Other No     Unknown No      Social History     Tobacco Use    Smoking status: Former     Packs/day: 1.00     Years: 25.00     Total pack years: 25.00     Types: Cigarettes     Quit date: 3/30/2006     Years since quittin.6     Passive exposure: Never    Smokeless tobacco: Never   Vaping Use    Vaping Use: Never used   Substance Use Topics    Alcohol use: Not Currently    Drug use: Not Currently     Types: Oxycodone     Comment: Percocet for lower back pain prn       Review of Systems   Constitutional:  Positive for chills and fatigue. Skin:  Positive for wound. All other systems reviewed and are negative. Physical Exam  Physical Exam  Vitals reviewed. Constitutional:       General: She is not in acute distress. Appearance: She is well-developed. She is not toxic-appearing or diaphoretic. HENT:      Head: Normocephalic and atraumatic. Right Ear: External ear normal.      Left Ear: External ear normal.      Nose: Nose normal.      Mouth/Throat:      Pharynx: Oropharynx is clear. Eyes:      Extraocular Movements: Extraocular movements intact. Pupils: Pupils are equal, round, and reactive to light. Cardiovascular:      Rate and Rhythm: Tachycardia present. Rhythm irregular. Heart sounds: Normal heart sounds. Pulmonary:      Effort: Pulmonary effort is normal. No respiratory distress. Breath sounds: Normal breath sounds. No wheezing. Abdominal:      General: There is no distension. Palpations: Abdomen is soft. Tenderness: There is no abdominal tenderness. Musculoskeletal:         General: Normal range of motion. Cervical back: Normal range of motion and neck supple. Skin:     General: Skin is warm and dry. Capillary Refill: Capillary refill takes less than 2 seconds. Neurological:      Mental Status: She is alert and oriented to person, place, and time. Psychiatric:         Speech: Speech normal.         Behavior: Behavior is cooperative.          Vital Signs  ED Triage Vitals   Temperature Pulse Respirations Blood Pressure SpO2   12/06/23 1054 12/06/23 1054 12/06/23 1054 12/06/23 1054 12/06/23 1054   (!) 97.2 °F (36.2 °C) (!) 123 20 (!) 182/132 98 %      Temp Source Heart Rate Source Patient Position - Orthostatic VS BP Location FiO2 (%)   12/06/23 1054 12/06/23 1054 12/06/23 1054 12/06/23 1054 --   Tympanic Monitor Sitting Left arm       Pain Score       12/06/23 1147       8           Vitals:    12/06/23 1054 12/06/23 1215 12/06/23 1353   BP: (!) 182/132 (!) 199/86 170/94   Pulse: (!) 123 88 86   Patient Position - Orthostatic VS: Sitting Lying Sitting         Visual Acuity      ED Medications  Medications   ketorolac (TORADOL) injection 15 mg (15 mg Intravenous Given 12/6/23 1147)   sodium chloride 0.9 % bolus 1,000 mL (0 mL Intravenous Stopped 12/6/23 1336)   lidocaine-epinephrine (XYLOCAINE/EPINEPHRINE) 1 %-1:100,000 injection 10 mL (10 mL Infiltration Given 12/6/23 1321)       Diagnostic Studies  Results Reviewed       Procedure Component Value Units Date/Time    FLU/RSV/COVID - if FLU/RSV clinically relevant [[id number]]  (Normal) Collected: 12/06/23 1208    Lab Status: Final result Specimen: Nares from Nose Updated: 12/06/23 1255     SARS-CoV-2 Negative     INFLUENZA A PCR Negative     INFLUENZA B PCR Negative     RSV PCR Negative    Narrative:      FOR PEDIATRIC PATIENTS - copy/paste COVID Guidelines URL to browser: [email or web address]    SARS-CoV-2 assay is a Nucleic Acid Amplification assay intended for the  qualitative detection of nucleic acid from SARS-CoV-2 in nasopharyngeal  swabs. Results are for the presumptive identification of SARS-CoV-2 RNA. Positive results are indicative of infection with SARS-CoV-2, the virus  causing COVID-19, but do not rule out bacterial infection or co-infection  with other viruses. Laboratories within the [facility] and its  [facility] are required to report all positive results to the appropriate  public health authorities. Negative results do not preclude SARS-CoV-2  infection and should not be used as the sole basis for treatment or other  patient management decisions. Negative results must be combined with  clinical observations, patient history, and epidemiological information. This test has not been FDA cleared or approved. This test has been authorized by FDA under an Emergency Use Authorization  (EUA). This test is only authorized for the duration of time the  declaration that circumstances exist justifying the authorization of the  emergency use of an in vitro diagnostic tests for detection of SARS-CoV-2  virus and/or diagnosis of COVID-19 infection under section 564(b)(1) of  the Act, 21 U. S.C. 900BYV-5(H)(7), unless the authorization is terminated  or revoked sooner. The test has been validated but independent review by FDA  and CLIA is pending. Test performed using [employer] Gene[employer]pert: This RT-PCR assay targets N2,  a region unique to SARS-CoV-2.  A conserved region in the E-gene was chosen  for pan-Sarbecovirus detection which includes SARS-CoV-2. According to CMS-2020-01-R, this platform meets the definition of high-throughput technology. Procalcitonin [[id number]]  (Normal) Collected: 12/06/23 1132    Lab Status: Final result Specimen: Blood from Arm, Right Updated: 12/06/23 1216     Procalcitonin 0.05 ng/ml     Lactic acid [[id number]]  (Normal) Collected: 12/06/23 1132    Lab Status: Final result Specimen: Blood from Arm, Right Updated: 12/06/23 1213     LACTIC ACID 1.5 mmol/L     Narrative:      Result may be elevated if tourniquet was used during collection. Blood culture #1 [[id number]] Collected: 12/06/23 1132    Lab Status:  In process Specimen: Blood from Arm, Right Updated: 12/06/23 1212    Comprehensive metabolic panel [[id number]]  (Abnormal) Collected: 12/06/23 1132    Lab Status: Final result Specimen: Blood from Arm, Right Updated: 12/06/23 1204     Sodium 138 mmol/L      Potassium 5.1 mmol/L      Chloride 103 mmol/L      CO2 29 mmol/L      ANION GAP 6 mmol/L      BUN 11 mg/dL      Creatinine 0.83 mg/dL      Glucose 154 mg/dL      Calcium 9.1 mg/dL      AST 16 U/L      ALT 13 U/L      Alkaline Phosphatase 64 U/L      Total Protein 6.7 g/dL      Albumin 3.7 g/dL      Total Bilirubin 0.77 mg/dL      eGFR 72 ml/min/1.73sq m     Narrative:      [place] guidelines for Chronic Kidney Disease (CKD):     Stage 1 with normal or high GFR (GFR > 90 mL/min/1.73 square meters)    Stage 2 Mild CKD (GFR = 60-89 mL/min/1.73 square meters)    Stage 3A Moderate CKD (GFR = 45-59 mL/min/1.73 square meters)    Stage 3B Moderate CKD (GFR = 30-44 mL/min/1.73 square meters)    Stage 4 Severe CKD (GFR = 15-29 mL/min/1.73 square meters)    Stage 5 End Stage CKD (GFR <15 mL/min/1.73 square meters)  Note: GFR calculation is accurate only with a steady state creatinine    Protime-INR [[id number]]  (Normal) Collected: 12/06/23 1132    Lab Status: Final result Specimen: Blood from Arm, Right Updated: 12/06/23 1201     Protime 13.4 seconds      INR 1.00    APTT [[id number]]  (Normal) Collected: 12/06/23 1132    Lab Status: Final result Specimen: Blood from Arm, Right Updated: 12/06/23 1201     PTT 28 seconds     Blood culture #2 [[id number]] Collected: 12/06/23 1132    Lab Status: In process Specimen: Blood Updated: 12/06/23 1159    Wound culture and Gram stain [[id number]] Collected: 12/06/23 1146    Lab Status: In process Specimen: Wound from Hip(Ischial) Updated: 12/06/23 1152    CBC and differential [[id number]]  (Abnormal) Collected: 12/06/23 1132    Lab Status: Final result Specimen: Blood from Arm, Right Updated: 12/06/23 1142     WBC 7.55 Thousand/uL      RBC 5.87 Million/uL      Hemoglobin 15.5 g/dL      Hematocrit 50.1 %      MCV 85 fL      MCH 26.4 pg      MCHC 30.9 g/dL      RDW 18.2 %      MPV 9.4 fL      Platelets 695 Thousands/uL      nRBC 0 /100 WBCs      Neutrophils Relative 75 %      Immat GRANS % 1 %      Lymphocytes Relative 14 %      Monocytes Relative 8 %      Eosinophils Relative 2 %      Basophils Relative 0 %      Neutrophils Absolute 5.65 Thousands/µL      Immature Grans Absolute 0.06 Thousand/uL      Lymphocytes Absolute 1.06 Thousands/µL      Monocytes Absolute 0.63 Thousand/µL      Eosinophils Absolute 0.13 Thousand/µL      Basophils Absolute 0.02 Thousands/µL                    No orders to display              Procedures  Incision and drain    Date/Time: 12/6/2023 1:38 PM    Performed by: Elvia Cordova MD  Authorized by: Elvia Cordova MD  Universal Protocol:  Consent: Verbal consent obtained. Risks and benefits: risks, benefits and alternatives were discussed  Consent given by: patient    Patient location:  ED  Location:     Type:  Abscess    Size:  2cm    Location:  Trunk    Trunk location: L side. Pre-procedure details:     Skin preparation:  Chloraprep  Anesthesia (see MAR for exact dosages):      Anesthesia method:  Local infiltration    Local anesthetic:  Lidocaine 1% WITH epi  Procedure details:     Complexity:  Intermediate    Incision types:  Stab incision    Scalpel blade:  15    Approach:  Open    Wound management:  Probed and deloculated    Drainage:  Bloody and purulent    Drainage amount: Moderate    Wound treatment:  Wound left open and packing placed    Packing materials:  1/4 in iodoform gauze  Post-procedure details:     Patient tolerance of procedure: Tolerated well, no immediate complications           ED Course  ED Course as of 12/06/23 1627   Wed Dec 06, 2023   1130 Pulse(!): 123  Fluids ordered. 1144 WBC: 7.55  WNL   1144 Platelet Count(!): 943  Thrombocytopenia new from prior. 1156 Procedure Note: EKG  Date/Time: 12/06/23 12:04 PM   Interpreted by: Eden Corona MD  Indications / Diagnosis: Tachycardia  ECG reviewed by me, the ED Physician: yes   The EKG demonstrates:  Rhythm: Afib  Intervals: normal intervals  Axis: LAD  QRS/Blocks: normal QRS  ST Changes: No acute ST Changes, no STD/GALDINO.   1203 PTT: 28  WNL   1203 POCT INR: 1.00  WNL   1204 Comprehensive metabolic panel(!)  Reviewed and without actionable derangement. 1222 Procalcitonin: 0.05  Negative. 1222 LACTIC ACID: 1.5  WNL   1250 Interval improvement in HR. Now 86.    1302 FLU/RSV/COVID - if FLU/RSV clinically relevant  Negative. 1309 Reassessed pt who is resting comfortably in the bed. Pt made aware of all results and plan for DC after I&D. Will plan for field block and I&D and then outpt abx.    1311 Pt sees wound care on Friday. 1338 I&D performed. SBIRT 20yo+      Flowsheet Row Most Recent Value   Initial Alcohol Screen: US AUDIT-C     1. How often do you have a drink containing alcohol? 0 Filed at: 12/06/2023 1058   2. How many drinks containing alcohol do you have on a typical day you are drinking? 0 Filed at: 12/06/2023 1058   3a. Male UNDER 65: How often do you have five or more drinks on one occasion?  0 Filed at: 12/06/2023 1058   3b. FEMALE Any Age, or MALE 65+: How often do you have 4 or more drinks on one occassion? 0 Filed at: 12/06/2023 1058   Audit-C Score 0 Filed at: 12/06/2023 1058   ARABELLA: How many times in the past year have you. .. Used an illegal drug or used a prescription medication for non-medical reasons? Never Filed at: 12/06/2023 1058                      Medical Decision Making  Pt is a 65yo F who presents with wound. Exam pertinent for tachycardia. Differential diagnosis to include but not limited to abscess, cellulitis, systemic infection. Due to tachycardia in association with chills and fatigue as well as suspected infection and patient's history, will obtain septic labs. Will also plan for I&D for source control and antibiotics. See ED course and procedure note for results and details. Plan to discharge pt with f/u to PCP and wound care. Discussed returning the ED with new or worsening of symptoms. Discussed use of over the counter medications as stated on the bottle as needed for pain. Discussed taking new medication as prescribed and to completion. Pt expressed understanding of discharge instructions, return precautions, and medication instructions and is stable for discharge at this time. All questions were answered and pt was discharged without incident. Amount and/or Complexity of Data Reviewed  Labs: ordered. Decision-making details documented in ED Course. Risk  Prescription drug management. Disposition  Final diagnoses:   Abscess     Time reflects when diagnosis was documented in both MDM as applicable and the Disposition within this note       Time User Action Codes Description Comment    12/6/2023  1:10 PM Fitz Bishop Add [L02.91] Abscess           ED Disposition       ED Disposition   Discharge    Condition   Stable    Date/Time   Wed Dec 6, 2023 [address] [place] [address] discharge to home/self care.                    Follow-up Information Follow up With Specialties Details Why Contact Tricia Carbone, [address] [place] [facility], Nurse Practitioner Call  As needed [facility]  [phone]              Discharge Medication List as of 12/6/2023  1:39 PM        START taking these medications    Details   cephalexin (KEFLEX) 500 mg capsule Take 1 capsule (500 mg total) by mouth every 6 (six) hours for 7 days, Starting Wed 12/6/2023, Until Wed 12/13/2023, Normal      sulfamethoxazole-trimethoprim (BACTRIM DS) 800-160 mg per tablet Take 1 tablet by mouth 2 (two) times a day for 7 days smx-tmp DS (BACTRIM) 800-160 mg tabs (1tab q12 D10), Starting Wed 12/6/2023, Until Wed 12/13/2023, Normal           CONTINUE these medications which have NOT CHANGED    Details   albuterol (2.5 mg/3 mL) 0.083 % nebulizer solution TAKE 1 VIAL BY NEBULIZATION EVERY 4 HOURS AS NEEDED FOR WHEEZING OR SHORTNESS OF BREATH, Normal      atorvastatin (LIPITOR) 20 mg tablet TAKE ONE TABLET DAILY, Normal      Calcium-Vitamin D 500-125 MG-UNIT TABS Take 1 tablet by mouth 2 (two) times a day , Historical Med      Eliquis 5 MG ONE TAB TWICE A DAY, Normal      Empagliflozin (Jardiance) 10 MG TABS tablet TAKE 1 TABLET EVERY MORNING, Normal      metoprolol succinate (TOPROL-XL) 25 mg 24 hr tablet Take 1 tablet in morning and half tablet in evening, Normal      Multiple Vitamins-Minerals (BARIATRIC FUSION) CHEW Chew 1 tablet daily  , Historical Med      nystatin (MYCOSTATIN) cream Starting Wed 3/30/2022, Historical Med      omeprazole (PriLOSEC) 20 mg delayed release capsule TAKE 1 CAPSULE DAILY BEFORE BREAKFAST, Normal      oxyCODONE (ROXICODONE) 10 MG TABS Take 10 mg by mouth every 6 (six) hours as needed As needed for pain, Starting Sat 9/15/2018, Historical Med      potassium chloride (K-DUR,KLOR-CON) 20 mEq tablet Take 1 tablet (20 mEq total) by mouth if needed (if taking torsemide), Starting Thu 10/13/2022, Normal      torsemide (DEMADEX) 20 mg tablet Take 1 tablet (20 mg total) by mouth if needed (for leg swelling/weight gain above 3 pounds), Starting Thu 10/13/2022, Normal             No discharge procedures on file.     PDMP Review       None            ED Provider  Electronically Signed by             Aryan Hollingsworth MD  12/06/23 5302

## 2023-12-07 DIAGNOSIS — I48.91 ATRIAL FIBRILLATION WITH RAPID VENTRICULAR RESPONSE (HCC): ICD-10-CM

## 2023-12-07 DIAGNOSIS — Z71.89 COORDINATION OF COMPLEX CARE: Primary | ICD-10-CM

## 2023-12-07 RX ORDER — APIXABAN 5 MG/1
TABLET, FILM COATED ORAL
Qty: 60 TABLET | Refills: 2 | Status: SHIPPED | OUTPATIENT
Start: 2023-12-07

## 2023-12-08 ENCOUNTER — OFFICE VISIT (OUTPATIENT)
Dept: WOUND CARE | Facility: HOSPITAL | Age: 68
End: 2023-12-08
Payer: MEDICARE

## 2023-12-08 ENCOUNTER — PATIENT OUTREACH (OUTPATIENT)
Dept: FAMILY MEDICINE CLINIC | Facility: CLINIC | Age: 68
End: 2023-12-08

## 2023-12-08 VITALS
DIASTOLIC BLOOD PRESSURE: 98 MMHG | SYSTOLIC BLOOD PRESSURE: 158 MMHG | RESPIRATION RATE: 15 BRPM | TEMPERATURE: 97.6 F | HEART RATE: 88 BPM

## 2023-12-08 DIAGNOSIS — Z98.890 HISTORY OF INCISION AND DRAINAGE: ICD-10-CM

## 2023-12-08 DIAGNOSIS — E11.9 DIABETES MELLITUS TYPE 2, DIET-CONTROLLED (HCC): ICD-10-CM

## 2023-12-08 DIAGNOSIS — T81.89XA NON-HEALING SURGICAL WOUND, INITIAL ENCOUNTER: Primary | ICD-10-CM

## 2023-12-08 LAB
ATRIAL RATE: 90 BPM
BACTERIA WND AEROBE CULT: ABNORMAL
GRAM STN SPEC: ABNORMAL
QRS AXIS: -35 DEGREES
QRSD INTERVAL: 96 MS
QT INTERVAL: 344 MS
QTC INTERVAL: 446 MS
T WAVE AXIS: 39 DEGREES
VENTRICULAR RATE: 101 BPM

## 2023-12-08 PROCEDURE — 97597 DBRDMT OPN WND 1ST 20 CM/<: CPT | Performed by: STUDENT IN AN ORGANIZED HEALTH CARE EDUCATION/TRAINING PROGRAM

## 2023-12-08 RX ORDER — LIDOCAINE HYDROCHLORIDE 40 MG/ML
5 SOLUTION TOPICAL ONCE
Status: COMPLETED | OUTPATIENT
Start: 2023-12-08 | End: 2023-12-08

## 2023-12-08 RX ADMIN — LIDOCAINE HYDROCHLORIDE 5 ML: 40 SOLUTION TOPICAL at 09:59

## 2023-12-08 NOTE — PROGRESS NOTES
Referral from Value Base Team. ED Risk score 90%. Received via IB. Chart reviewed and this RNCM called patient and introduced self and explained the role of the [facility] and [facility] services. Patient says she is doing fine. She denies any fever or chills. She says the wound/abscess on her side is fine. She was at the [facility] today for another issue and they looked at it and unpacked it and showed her how to care for it. She says they will be monitoring it there. Patient did  both abx that were ordered and is taking them as directed-Keflex and Bactrim. Patient declines the need for follow up. Patient declines CCM.     This RNCM removed self from care team.

## 2023-12-08 NOTE — PROGRESS NOTES
Patient ID: Dannie Solomon is a 76 y.o. female Date of Birth 1955     Chief Complaint  Chief Complaint   Patient presents with    Follow Up Wound Care Visit     back       Allergies  Patient has no known allergies. Assessment:     Diagnoses and all orders for this visit:    Non-healing surgical wound, initial encounter  -     lidocaine (XYLOCAINE) 4 % topical solution 5 mL  -     Wound cleansing and dressings; Future  -     Wound miscellaneous orders; Future  -     Wound miscellaneous orders; Future  -     Ambulatory Referral to Dermatology; Future  -     Debridement  -     Debridement    History of incision and drainage  -     lidocaine (XYLOCAINE) 4 % topical solution 5 mL  -     Wound cleansing and dressings; Future  -     Wound miscellaneous orders; Future  -     Wound miscellaneous orders; Future  -     Ambulatory Referral to Dermatology; Future    Diabetes mellitus type 2, diet-controlled (HCC)  -     lidocaine (XYLOCAINE) 4 % topical solution 5 mL  -     Wound cleansing and dressings; Future  -     Wound miscellaneous orders; Future  -     Wound miscellaneous orders; Future              Debridement   Wound 11/15/23 Surgical Back Left    Universal Protocol:  Consent: Verbal consent obtained. Risks and benefits: risks, benefits and alternatives were discussed  Consent given by: patient  Time out: Immediately prior to procedure a "time out" was called to verify the correct patient, procedure, equipment, support staff and site/side marked as required.   Patient identity confirmed: verbally with patient    Performed by: physician  Debridement type: selective  Pain control: lidocaine 4%  Post-debridement measurements  Length (cm): 0.5  Width (cm): 1.2  Depth (cm): 0.7  Percent debrided: 90%  Surface Area (cm^2): 0.6  Area debrided (cm^2): 0.54  Volume (cm^3): 0.42  Devitalized tissue debrided: biofilm and exudate  Instrument(s) utilized: curette  Bleeding: small  Hemostasis obtained with: pressure  Procedural pain (0-10): 1  Post-procedural pain: 0   Response to treatment: procedure was tolerated well    Debridement   Wound 12/08/23 Surgical Hip Left;Posterior    Universal Protocol:  Consent: Verbal consent obtained. Risks and benefits: risks, benefits and alternatives were discussed  Consent given by: patient  Time out: Immediately prior to procedure a "time out" was called to verify the correct patient, procedure, equipment, support staff and site/side marked as required. Patient identity confirmed: verbally with patient    Performed by: physician  Debridement type: selective  Pain control: lidocaine 4%  Post-debridement measurements  Length (cm): 1.5  Width (cm): 2.2  Depth (cm): 1.6  Percent debrided: 50%  Surface Area (cm^2): 3.3  Area debrided (cm^2): 1.65  Volume (cm^3): 5.28  Devitalized tissue debrided: biofilm, exudate and slough  Instrument(s) utilized: curette  Bleeding: small  Hemostasis obtained with: pressure  Procedural pain (0-10): 1  Post-procedural pain: 0   Response to treatment: procedure was tolerated well        Plan: It was a pleasure to see Bladimir Gee for wound care follow up today  Selective debridement performed today as above  Wound is the same with recurrence of inclusion cyst in the original wound. Patient also has a new abscess of the posterior left hip. Advised patient to make follow-up appointment with general surgery as well as referral for dermatology to investigate etiology of recurrent abscesses. Continue plan of care as noted below with aquacel rope   Continue both antibiotics. A1C results reviewed with the patient today. No signs or systemic infection today. Patient understands that if any signs of infection start (such as increased redness, drainage, pain, fever, chills, diaphoresis), they should call our office or proceed to the ER or Urgent Care.   Patient should continue a high protein diet to facilitate wound healing  Patient is advised to not submerge wound or leave wound open to air. Follow up in 1 weeks  Given the multi-factorial nature of wound care, additional time was taken to review patient's treatment plan with other specialties and most recent pertinent lab work and imaging. All plans of care discussed with patient at bedside who verbalized understanding with treatment plan. Wound 11/15/23 Surgical Back Left (Active)   Wound Image Images linked 12/08/23 0948   Wound Description Yellow; Beefy red 12/08/23 0946   Kely-wound Assessment Pink 12/08/23 0946   Wound Length (cm) 0.5 cm 12/08/23 0946   Wound Width (cm) 1.2 cm 12/08/23 0946   Wound Depth (cm) 0.7 cm 12/08/23 0946   Wound Surface Area (cm^2) 0.6 cm^2 12/08/23 0946   Wound Volume (cm^3) 0.42 cm^3 12/08/23 0946   Calculated Wound Volume (cm^3) 0.42 cm^3 12/08/23 0946   Change in Wound Size % 12.5 12/08/23 0946   Undermining 2 12/08/23 0946   Undermining is depth extending from 12-12 o'clock; deepest at 112 12/08/23 0946   Drainage Amount Moderate 12/08/23 0946   Drainage Description Serosanguineous; Tan 12/08/23 0946   Non-staged Wound Description Full thickness 12/08/23 0946   Wound packed? Yes 12/08/23 0946   Packing- # removed 1 12/08/23 0946   Dressing Status Intact (upon arrival) 12/08/23 0946       Wound 12/08/23 Surgical Hip Left;Posterior (Active)   Wound Image Images linked 12/08/23 0955   Wound Description White;Pink;Black 12/08/23 0953   Kely-wound Assessment Erythema 12/08/23 0953   Wound Length (cm) 1.5 cm 12/08/23 0953   Wound Width (cm) 2.2 cm 12/08/23 0953   Wound Depth (cm) 1.6 cm 12/08/23 0953   Wound Surface Area (cm^2) 3.3 cm^2 12/08/23 0953   Wound Volume (cm^3) 5.28 cm^3 12/08/23 0953   Calculated Wound Volume (cm^3) 5.28 cm^3 12/08/23 0953   Drainage Amount Moderate 12/08/23 0953   Drainage Description Milky; Serosanguineous 12/08/23 0953   Non-staged Wound Description Full thickness 12/08/23 0953   Wound packed?  Yes 12/08/23 0953   Packing- # removed 1 12/08/23 4044 Dressing Status Intact (upon arrival) 12/08/23 0953       Wound 11/15/23 Surgical Back Left (Active)   Date First Assessed/Time First Assessed: 11/15/23 0819   Primary Wound Type: (c) Surgical  Location: Back  Wound Location Orientation: Left       Wound 12/08/23 Surgical Hip Left;Posterior (Active)   Date First Assessed: 12/08/23   Primary Wound Type: Surgical  Location: Hip  Wound Location Orientation: Left;Posterior       [REMOVED] Wound 05/03/21 Other (comment) Sacrum (Removed)   Resolved Date: 11/15/23  Date First Assessed/Time First Assessed: 05/03/21 8422   Primary Wound Type: Other (comment)  Location: Sacrum  Wound Outcome: (c) Other (Comment)       [REMOVED] Wound 08/19/21 Pressure Injury Sacrum Mid (Removed)   Resolved Date: 11/15/23  Date First Assessed/Time First Assessed: 08/19/21 2015   Primary Wound Type: Pressure Injury  Location: Sacrum  Wound Location Orientation: Mid  Wound Description (Comments): open, red, some slough  Dressing Status: Open to ai. .. Subjective:      .    12/8/23: Was  seen by general surgery soon after being seen at last wound care visit. Continue with iodoform. Was also recently seen in ED for a new abscess of the posterior left hip which underwent incision and drainage. He notes that in the past she has had large quantities of recurrent abscesses and cyst.  Has not seen dermatology before for workup. On Keflex and Bactrim per ED physician. 11/27/23: Friend changing dressing as directed. No symptoms of infection. 11/20/23: Patient was given referral to see general surgery after last visit for I&D which she underwent. She will continue to follow general surgery in the next 2 weeks. Dr. Jackie Ennis personally called the office and asked for patient to continue to follow with wound management center. Patient denies any symptoms of infection including fever chills diaphoresis.   States that she does have help at home with packing and dressing the wound of her back.  She is taking antibiotics as prescribed by general surgery. 11/15/23: Chhaya Diego is a pleasant 70-year-old female with a past medical history of atrial fibrillation on Eliquis and type II by diabetes mellitus here today for initial wound care consult. Patient notes that she noticed itchiness and wetness of her back for several weeks now. Has been a patient with wound management for lower extremity wounds in the past, so she called her PCP and asked for referral to our office. This is the first time the skin/wound area has been seen by a physician. She notes that the area is very itchy and that she has been using a hard scrub brush to scrub her back aggressively to address that problem. Denies any symptoms of fever chills diaphoresis today. She notes that she has had small areas like this on her back in the past.                The following portions of the patient's history were reviewed and updated as appropriate: allergies, current medications, past family history, past medical history, past social history, past surgical history, and problem list.    Review of Systems   Constitutional:  Negative for chills, diaphoresis, fatigue and fever. Skin:  Positive for wound. All other systems reviewed and are negative. Objective:       Wound 11/15/23 Surgical Back Left (Active)   Wound Image Images linked 12/08/23 0948   Wound Description Yellow; Beefy red 12/08/23 0946   Kely-wound Assessment Pink 12/08/23 0946   Wound Length (cm) 0.5 cm 12/08/23 0946   Wound Width (cm) 1.2 cm 12/08/23 0946   Wound Depth (cm) 0.7 cm 12/08/23 0946   Wound Surface Area (cm^2) 0.6 cm^2 12/08/23 0946   Wound Volume (cm^3) 0.42 cm^3 12/08/23 0946   Calculated Wound Volume (cm^3) 0.42 cm^3 12/08/23 0946   Change in Wound Size % 12.5 12/08/23 0946   Undermining 2 12/08/23 0946   Undermining is depth extending from 12-12 o'clock; deepest at 112 12/08/23 0946   Drainage Amount Moderate 12/08/23 0946   Drainage Description Serosanguineous; Tan 12/08/23 0946   Non-staged Wound Description Full thickness 12/08/23 0946   Wound packed? Yes 12/08/23 0946   Packing- # removed 1 12/08/23 0946   Dressing Status Intact (upon arrival) 12/08/23 0946       Wound 12/08/23 Surgical Hip Left;Posterior (Active)   Wound Image Images linked 12/08/23 0955   Wound Description White;Pink;Black 12/08/23 0953   Kely-wound Assessment Erythema 12/08/23 0953   Wound Length (cm) 1.5 cm 12/08/23 0953   Wound Width (cm) 2.2 cm 12/08/23 0953   Wound Depth (cm) 1.6 cm 12/08/23 0953   Wound Surface Area (cm^2) 3.3 cm^2 12/08/23 0953   Wound Volume (cm^3) 5.28 cm^3 12/08/23 0953   Calculated Wound Volume (cm^3) 5.28 cm^3 12/08/23 0953   Drainage Amount Moderate 12/08/23 0953   Drainage Description Milky; Serosanguineous 12/08/23 0953   Non-staged Wound Description Full thickness 12/08/23 0953   Wound packed? Yes 12/08/23 0953   Packing- # removed 1 12/08/23 0953   Dressing Status Intact (upon arrival) 12/08/23 0953       /98   Pulse 88   Temp 97.6 °F (36.4 °C)   Resp 15     Physical Exam  Vitals reviewed. Constitutional:       Appearance: Normal appearance. HENT:      Head: Normocephalic and atraumatic. Mouth/Throat:      Mouth: Mucous membranes are moist.   Eyes:      Extraocular Movements: Extraocular movements intact. Pulmonary:      Effort: Pulmonary effort is normal.   Skin:     Comments: Wound of upper back and right hip both with devitalized tissue, serous exudate, sebum production. No signs of infection in the back. There is some erythema and tenderness to the new hip wound. Neurological:      Mental Status: She is alert. Psychiatric:         Mood and Affect: Mood normal.         Behavior: Behavior normal.                 Wound Instructions:  Orders Placed This Encounter   Procedures    Wound cleansing and dressings     Back Wounds:     Wash your hands with soap and water.   Remove old dressing, discard into plastic bag and place in trash. Cleanse the wound with soap and water prior to applying a clean dressing. Do not use tissue or cotton balls. Do not scrub the wound. Pat dry using gauze. Shower : NO  Gently place Aquacel loosely  into the back wounds. Cover with gauze. Secure with tape. Change dressing every other day     This was done today. Standing Status:   Future     Standing Expiration Date:   12/8/2024    Wound miscellaneous orders     Suggest you make an appointment with Dermatology as discussed at appointment. Standing Status:   Future     Standing Expiration Date:   12/8/2024    Wound miscellaneous orders     Schedule appointment with Dr. Garett Frances regarding returning cyst as discussed at your appointment. Standing Status:   Future     Standing Expiration Date:   12/8/2024    Debridement     This order was created via procedure documentation    Debridement     This order was created via procedure documentation    Ambulatory Referral to Dermatology     Standing Status:   Future     Standing Expiration Date:   12/8/2024     Referral Priority:   Routine     Referral Type:   Consult - AMB     Referral Reason:   Specialty Services Required     Referred to Provider:   John Hutton MD     Requested Specialty:   Dermatology     Number of Visits Requested:   1     Expiration Date:   12/8/2024        Diagnosis ICD-10-CM Associated Orders   1. Non-healing surgical wound, initial encounter  T81.89XA lidocaine (XYLOCAINE) 4 % topical solution 5 mL     Wound cleansing and dressings     Wound miscellaneous orders     Wound miscellaneous orders     Ambulatory Referral to Dermatology     Debridement     Debridement      2. History of incision and drainage  Z98.890 lidocaine (XYLOCAINE) 4 % topical solution 5 mL     Wound cleansing and dressings     Wound miscellaneous orders     Wound miscellaneous orders     Ambulatory Referral to Dermatology      3.  Diabetes mellitus type 2, diet-controlled ([address])  E11.9 lidocaine (XYLOCAINE) 4 % topical solution 5 mL     Wound cleansing and dressings     Wound miscellaneous orders     Wound miscellaneous orders          --  Martin Connell MD    "This note has been constructed using a voice recognition system. Therefore there may be syntax, spelling, and/or grammatical errors. Occasional wrong word or "sound alike" substitutions may have occurred due to the inherent limitations of voice recognition software. Read the chart carefully and recognize, using context, where substitutions have occurred.  Please call if you have any questions."

## 2023-12-08 NOTE — PATIENT INSTRUCTIONS
Orders Placed This Encounter   Procedures    Wound cleansing and dressings     Back Wounds:     Wash your hands with soap and water. Remove old dressing, discard into plastic bag and place in trash. Cleanse the wound with soap and water prior to applying a clean dressing. Do not use tissue or cotton balls. Do not scrub the wound. Pat dry using gauze. Shower : NO  Gently place Aquacel loosely  into the back wounds. Cover with gauze. Secure with tape. Change dressing every other day     This was done today. Standing Status:   Future     Standing Expiration Date:   12/8/2024    Wound miscellaneous orders     Suggest you make an appointment with Dermatology as discussed at appointment. Standing Status:   Future     Standing Expiration Date:   12/8/2024    Wound miscellaneous orders     Schedule appointment with Dr. Taylor Pickering regarding returning cyst as discussed at your appointment.      Standing Status:   Future     Standing Expiration Date:   12/8/2024

## 2023-12-10 LAB
BACTERIA BLD CULT: NORMAL
BACTERIA BLD CULT: NORMAL

## 2023-12-11 LAB
BACTERIA BLD CULT: NORMAL
BACTERIA BLD CULT: NORMAL

## 2023-12-11 NOTE — TELEPHONE ENCOUNTER
Procedure rescheduled to 2/21/24 with Dr. Sabrina Golden at [facility]. New paperwork mailed out to the patient.

## 2023-12-15 ENCOUNTER — OFFICE VISIT (OUTPATIENT)
Dept: WOUND CARE | Facility: HOSPITAL | Age: 68
End: 2023-12-15
Payer: MEDICARE

## 2023-12-15 VITALS
RESPIRATION RATE: 16 BRPM | HEART RATE: 99 BPM | TEMPERATURE: 97.6 F | DIASTOLIC BLOOD PRESSURE: 99 MMHG | SYSTOLIC BLOOD PRESSURE: 162 MMHG

## 2023-12-15 DIAGNOSIS — Z98.890 HISTORY OF INCISION AND DRAINAGE: ICD-10-CM

## 2023-12-15 DIAGNOSIS — S71.002A OPEN WOUND OF LEFT HIP, INITIAL ENCOUNTER: ICD-10-CM

## 2023-12-15 DIAGNOSIS — T81.89XA NON-HEALING SURGICAL WOUND, INITIAL ENCOUNTER: Primary | ICD-10-CM

## 2023-12-15 DIAGNOSIS — E11.9 DIABETES MELLITUS TYPE 2, DIET-CONTROLLED (HCC): ICD-10-CM

## 2023-12-15 PROCEDURE — 97597 DBRDMT OPN WND 1ST 20 CM/<: CPT | Performed by: STUDENT IN AN ORGANIZED HEALTH CARE EDUCATION/TRAINING PROGRAM

## 2023-12-15 RX ORDER — LIDOCAINE HYDROCHLORIDE 40 MG/ML
5 SOLUTION TOPICAL ONCE
Status: COMPLETED | OUTPATIENT
Start: 2023-12-15 | End: 2023-12-15

## 2023-12-15 RX ADMIN — LIDOCAINE HYDROCHLORIDE 5 ML: 40 SOLUTION TOPICAL at 09:05

## 2023-12-15 NOTE — PATIENT INSTRUCTIONS
Orders Placed This Encounter   Procedures    Wound cleansing and dressings     Back Wound and Left Hip Wound     Wash your hands with soap and water. Remove old dressing, discard into plastic bag and place in trash. Cleanse the wound with soap and water prior to applying a clean dressing. Do not use tissue or cotton balls. Do not scrub the wound. Pat dry using gauze. Shower : yes  Gently place Aquacel loosely  into the back wounds. Cover with gauze. Secure with tape. Change dressing every other day     This was done today. Standing Status:   Future     Standing Expiration Date:   12/15/2024    Wound miscellaneous orders Surgical (changed to surgical 11/20/23) Left Back     Schedule appointment with Dr. Tomasa Blevins regarding returning cyst as discussed at your appointment. Standing Status:   Future     Standing Expiration Date:   12/15/2024    Wound miscellaneous orders     Protein: Eat protein with each meal to promote healing. Examples of protein are fish, meat, chicken, nuts, peanut butter, eggs, lentils, edamame or a protein shake. Wound infection:  If you have signs of infection please call the wound center. If the wound center is closed- please go to the Emergency department. Some signs of infection:  fever, chills, increased redness, red streaks, increase in pain, increased drainage. Drainage with an odor, Change in drainage color: white/milky/green/tan/yellow,  an increase in swelling, chest pain and/or shortness of breath.      Standing Status:   Future     Standing Expiration Date:   12/15/2024

## 2023-12-15 NOTE — PROGRESS NOTES
Patient ID: Yahir Tijerina is a 76 y.o. female Date of Birth 1955     Chief Complaint  Chief Complaint   Patient presents with    Follow Up Wound Care Visit     Back and hip       Allergies  Patient has no known allergies. Assessment:     Diagnoses and all orders for this visit:    Non-healing surgical wound, initial encounter  -     lidocaine (XYLOCAINE) 4 % topical solution 5 mL  -     Wound cleansing and dressings; Future  -     Wound miscellaneous orders Surgical (changed to surgical 11/20/23) Left Back; Future  -     Wound miscellaneous orders; Future  -     Debridement    History of incision and drainage  -     lidocaine (XYLOCAINE) 4 % topical solution 5 mL  -     Wound cleansing and dressings; Future  -     Wound miscellaneous orders Surgical (changed to surgical 11/20/23) Left Back; Future  -     Wound miscellaneous orders; Future    Diabetes mellitus type 2, diet-controlled (HCC)  -     lidocaine (XYLOCAINE) 4 % topical solution 5 mL  -     Wound cleansing and dressings; Future  -     Wound miscellaneous orders Surgical (changed to surgical 11/20/23) Left Back; Future  -     Wound miscellaneous orders; Future    Open wound of left hip, initial encounter  -     Debridement              Debridement   Wound 11/15/23 Surgical Back Left    Universal Protocol:  Consent: Verbal consent obtained. Risks and benefits: risks, benefits and alternatives were discussed  Consent given by: patient  Time out: Immediately prior to procedure a "time out" was called to verify the correct patient, procedure, equipment, support staff and site/side marked as required.   Patient identity confirmed: verbally with patient    Debridement Details  Performed by: physician  Debridement type: selective  Pain control: lidocaine 4%      Post-debridement measurements  Length (cm): 0.5  Width (cm): 1.1  Depth (cm): 0.7  Percent debrided: 90%  Surface Area (cm^2): 0.55  Area Debrided (cm^2): 0.5  Volume (cm^3): 0.39    Devitalized tissue debrided: biofilm and exudate  Instrument(s) utilized: curette  Bleeding: small  Hemostasis obtained with: pressure  Procedural pain (0-10): 1  Post-procedural pain: 0   Response to treatment: procedure was tolerated well    Debridement   Wound 12/08/23 Surgical Hip Left;Posterior    Universal Protocol:  Consent: Verbal consent obtained. Risks and benefits: risks, benefits and alternatives were discussed  Consent given by: patient  Time out: Immediately prior to procedure a "time out" was called to verify the correct patient, procedure, equipment, support staff and site/side marked as required. Patient identity confirmed: verbally with patient    Debridement Details  Performed by: physician  Debridement type: selective  Pain control: lidocaine 4%      Post-debridement measurements  Length (cm): 1.3  Width (cm): 2.1  Depth (cm): 0.8  Percent debrided: 90%  Surface Area (cm^2): 2.73  Area Debrided (cm^2): 2.46  Volume (cm^3): 2.18    Devitalized tissue debrided: biofilm and exudate  Instrument(s) utilized: curette  Bleeding: small  Hemostasis obtained with: pressure  Procedural pain (0-10): 1  Post-procedural pain: 0   Response to treatment: procedure was tolerated well        Plan: It was a pleasure to see Andrew Ignacio for wound care follow up today  Selective debridement performed today as above  Wound is improving   Continue plan of care as noted below with aquacel ag rope  No signs or symptoms of infection today. Patient understands that if any signs of infection start (such as increased redness, drainage, pain, fever, chills, diaphoresis), they should call our office or proceed to the ER or Urgent Care. Patient should continue a high protein diet to facilitate wound healing  Patient is advised to not submerge wound or leave wound open to air.   Follow up in 1 weeks  Given the multi-factorial nature of wound care, additional time was taken to review patient's treatment plan with other specialties and most recent pertinent lab work and imaging. All plans of care discussed with patient at bedside who verbalized understanding with treatment plan. Wound 11/15/23 Surgical Back Left (Active)   Wound Image Images linked (Simultaneous filing. User may not have seen previous data.) 12/15/23 0900   Wound Description Pink;Yellow; White 12/15/23 0900   Kely-wound Assessment Pink 12/15/23 0900   Wound Length (cm) 0.5 cm 12/15/23 0900   Wound Width (cm) 1.1 cm 12/15/23 0900   Wound Depth (cm) 0.7 cm 12/15/23 0900   Wound Surface Area (cm^2) 0.55 cm^2 12/15/23 0900   Wound Volume (cm^3) 0.385 cm^3 12/15/23 0900   Calculated Wound Volume (cm^3) 0.39 cm^3 12/15/23 0900   Change in Wound Size % 18.75 12/15/23 0900   Undermining 0.7 12/15/23 0900   Undermining is depth extending from 8-10 o'clock 0.7;  2-3 o'clock 0.7 12/15/23 0900   Drainage Amount Moderate 12/15/23 0900   Drainage Description Serosanguineous;Tan;Foul smelling;Bloody 12/15/23 0900   Non-staged Wound Description Full thickness 12/15/23 0900   Dressing Status Intact (upon arrival) 12/15/23 0900       Wound 12/08/23 Surgical Hip Left;Posterior (Active)   Wound Image Images linked 12/15/23 0858   Wound Description Pink;Yellow;Black 12/15/23 0857   Kely-wound Assessment Erythema 12/15/23 0857   Wound Length (cm) 1.3 cm 12/15/23 0857   Wound Width (cm) 2.1 cm 12/15/23 0857   Wound Depth (cm) 0.8 cm 12/15/23 0857   Wound Surface Area (cm^2) 2.73 cm^2 12/15/23 0857   Wound Volume (cm^3) 2.184 cm^3 12/15/23 0857   Calculated Wound Volume (cm^3) 2.18 cm^3 12/15/23 0857   Change in Wound Size % 58.71 12/15/23 0857   Drainage Amount Moderate 12/15/23 0857   Drainage Description Milky; Serosanguineous; Yellow; Foul smelling 12/15/23 0857   Non-staged Wound Description Full thickness 12/15/23 0857   Wound packed?  No (laying on top of wound; unable to pack wound) 12/15/23 0857   Dressing Status Intact (upon arrival) 12/15/23 0857       Wound 11/15/23 Surgical Back Left (Active)   Date First Assessed/Time First Assessed: 11/15/23 0819   Primary Wound Type: (c) Surgical  Location: Back  Wound Location Orientation: Left       Wound 12/08/23 Surgical Hip Left;Posterior (Active)   Date First Assessed: 12/08/23   Primary Wound Type: Surgical  Location: Hip  Wound Location Orientation: Left;Posterior       [REMOVED] Wound 05/03/21 Other (comment) Sacrum (Removed)   Resolved Date: 11/15/23  Date First Assessed/Time First Assessed: 05/03/21 1437   Primary Wound Type: Other (comment)  Location: Sacrum  Wound Outcome: (c) Other (Comment)       [REMOVED] Wound 08/19/21 Pressure Injury Sacrum Mid (Removed)   Resolved Date: 11/15/23  Date First Assessed/Time First Assessed: 08/19/21 2015   Primary Wound Type: Pressure Injury  Location: Sacrum  Wound Location Orientation: Mid  Wound Description (Comments): open, red, some slough  Dressing Status: Open to ai. .. Subjective:      .    12/15/23: Patient dressing wound with Aquacel rope as directed. No symptoms of infection. 12/8/23: Was  seen by general surgery soon after being seen at last wound care visit. Continue with iodoform. Was also recently seen in ED for a new abscess of the posterior left hip which underwent incision and drainage. He notes that in the past she has had large quantities of recurrent abscesses and cyst.  Has not seen dermatology before for workup. On Keflex and Bactrim per ED physician. 11/27/23: Friend changing dressing as directed. No symptoms of infection. 11/20/23: Patient was given referral to see general surgery after last visit for I&D which she underwent. She will continue to follow general surgery in the next 2 weeks. Dr. Melissa Abraham personally called the office and asked for patient to continue to follow with wound management center. Patient denies any symptoms of infection including fever chills diaphoresis.   States that she does have help at home with packing and dressing the wound of her back.  She is taking antibiotics as prescribed by general surgery. 11/15/23: Kajal Akers is a pleasant 80-year-old female with a past medical history of atrial fibrillation on Eliquis and type II by diabetes mellitus here today for initial wound care consult. Patient notes that she noticed itchiness and wetness of her back for several weeks now. Has been a patient with wound management for lower extremity wounds in the past, so she called her PCP and asked for referral to our office. This is the first time the skin/wound area has been seen by a physician. She notes that the area is very itchy and that she has been using a hard scrub brush to scrub her back aggressively to address that problem. Denies any symptoms of fever chills diaphoresis today. She notes that she has had small areas like this on her back in the past.             The following portions of the patient's history were reviewed and updated as appropriate: allergies, current medications, past family history, past medical history, past social history, past surgical history, and problem list.    Review of Systems   Constitutional:  Negative for chills, diaphoresis, fatigue and fever. Skin:  Positive for wound. All other systems reviewed and are negative. Objective:       Wound 11/15/23 Surgical Back Left (Active)   Wound Image Images linked (Simultaneous filing. User may not have seen previous data.) 12/15/23 0900   Wound Description Pink;Yellow; White 12/15/23 0900   Kely-wound Assessment Pink 12/15/23 0900   Wound Length (cm) 0.5 cm 12/15/23 0900   Wound Width (cm) 1.1 cm 12/15/23 0900   Wound Depth (cm) 0.7 cm 12/15/23 0900   Wound Surface Area (cm^2) 0.55 cm^2 12/15/23 0900   Wound Volume (cm^3) 0.385 cm^3 12/15/23 0900   Calculated Wound Volume (cm^3) 0.39 cm^3 12/15/23 0900   Change in Wound Size % 18.75 12/15/23 0900   Undermining 0.7 12/15/23 0900   Undermining is depth extending from 8-10 o'clock 0.7;  2-3 o'clock 0.7 12/15/23 0900   Drainage Amount Moderate 12/15/23 0900   Drainage Description Serosanguineous;Tan;Foul smelling;Bloody 12/15/23 0900   Non-staged Wound Description Full thickness 12/15/23 0900   Dressing Status Intact (upon arrival) 12/15/23 0900       Wound 12/08/23 Surgical Hip Left;Posterior (Active)   Wound Image Images linked 12/15/23 0858   Wound Description Pink;Yellow;Black 12/15/23 0857   Kely-wound Assessment Erythema 12/15/23 0857   Wound Length (cm) 1.3 cm 12/15/23 0857   Wound Width (cm) 2.1 cm 12/15/23 0857   Wound Depth (cm) 0.8 cm 12/15/23 0857   Wound Surface Area (cm^2) 2.73 cm^2 12/15/23 0857   Wound Volume (cm^3) 2.184 cm^3 12/15/23 0857   Calculated Wound Volume (cm^3) 2.18 cm^3 12/15/23 0857   Change in Wound Size % 58.71 12/15/23 0857   Drainage Amount Moderate 12/15/23 0857   Drainage Description Milky; Serosanguineous; Yellow; Foul smelling 12/15/23 0857   Non-staged Wound Description Full thickness 12/15/23 0857   Wound packed? No (laying on top of wound; unable to pack wound) 12/15/23 0857   Dressing Status Intact (upon arrival) 12/15/23 0857       /99   Pulse 99   Temp 97.6 °F (36.4 °C)   Resp 16     Physical Exam  Vitals reviewed. Constitutional:       Appearance: Normal appearance. HENT:      Head: Normocephalic and atraumatic. Mouth/Throat:      Mouth: Mucous membranes are moist.   Eyes:      Extraocular Movements: Extraocular movements intact. Pulmonary:      Effort: Pulmonary effort is normal.   Skin:     Comments: Wound of back smaller with less depth than last exam.  No signs of infection. Left hip with some fibrin deposition and devitalized tissue. Minimal surrounding erythema this seems more in line with skin irritation and cellulitis. Copious sebum production from the wound. Neurological:      Mental Status: She is alert.    Psychiatric:         Mood and Affect: Mood normal.         Behavior: Behavior normal.                 Wound Instructions:  Orders Placed This Encounter   Procedures    Wound cleansing and dressings     Back Wound and Left Hip Wound     Wash your hands with soap and water. Remove old dressing, discard into plastic bag and place in trash. Cleanse the wound with soap and water prior to applying a clean dressing. Do not use tissue or cotton balls. Do not scrub the wound. Pat dry using gauze. Shower : yes  Gently place Aquacel loosely  into the back wounds. Cover with gauze. Secure with tape. Change dressing every other day     This was done today. Standing Status:   Future     Standing Expiration Date:   12/15/2024    Wound miscellaneous orders Surgical (changed to surgical 11/20/23) Left Back     Schedule appointment with Dr. Marybeth Betancur regarding returning cyst as discussed at your appointment. Standing Status:   Future     Standing Expiration Date:   12/15/2024    Wound miscellaneous orders     Protein: Eat protein with each meal to promote healing. Examples of protein are fish, meat, chicken, nuts, peanut butter, eggs, lentils, edamame or a protein shake. Wound infection:  If you have signs of infection please call the wound center. If the wound center is closed- please go to the Emergency department. Some signs of infection:  fever, chills, increased redness, red streaks, increase in pain, increased drainage. Drainage with an odor, Change in drainage color: white/milky/green/tan/yellow,  an increase in swelling, chest pain and/or shortness of breath. Standing Status:   Future     Standing Expiration Date:   12/15/2024    Debridement     This order was created via procedure documentation    Debridement     This order was created via procedure documentation        Diagnosis ICD-10-CM Associated Orders   1.  Non-healing surgical wound, initial encounter  T81.89XA lidocaine (XYLOCAINE) 4 % topical solution 5 mL     Wound cleansing and dressings     Wound miscellaneous orders Surgical (changed to surgical 11/20/23) Left Back Wound miscellaneous orders     Debridement      2. History of incision and drainage  Z98.890 lidocaine (XYLOCAINE) 4 % topical solution 5 mL     Wound cleansing and dressings     Wound miscellaneous orders Surgical (changed to surgical 11/20/23) Left Back     Wound miscellaneous orders      3. Diabetes mellitus type 2, diet-controlled (HCC)  E11.9 lidocaine (XYLOCAINE) 4 % topical solution 5 mL     Wound cleansing and dressings     Wound miscellaneous orders Surgical (changed to surgical 11/20/23) Left Back     Wound miscellaneous orders      4. Open wound of left hip, initial encounter  S71.002A Debridement          --  Yadi Fajardo MD    "This note has been constructed using a voice recognition system. Therefore there may be syntax, spelling, and/or grammatical errors. Occasional wrong word or "sound alike" substitutions may have occurred due to the inherent limitations of voice recognition software. Read the chart carefully and recognize, using context, where substitutions have occurred.  Please call if you have any questions."

## 2023-12-22 ENCOUNTER — OFFICE VISIT (OUTPATIENT)
Dept: WOUND CARE | Facility: HOSPITAL | Age: 68
End: 2023-12-22
Payer: MEDICARE

## 2023-12-22 VITALS
RESPIRATION RATE: 15 BRPM | HEART RATE: 98 BPM | DIASTOLIC BLOOD PRESSURE: 82 MMHG | SYSTOLIC BLOOD PRESSURE: 137 MMHG | TEMPERATURE: 97.1 F

## 2023-12-22 DIAGNOSIS — E11.9 DIABETES MELLITUS TYPE 2, DIET-CONTROLLED (HCC): ICD-10-CM

## 2023-12-22 DIAGNOSIS — T81.89XA NON-HEALING SURGICAL WOUND, INITIAL ENCOUNTER: Primary | ICD-10-CM

## 2023-12-22 DIAGNOSIS — S71.002A OPEN WOUND OF LEFT HIP, INITIAL ENCOUNTER: ICD-10-CM

## 2023-12-22 DIAGNOSIS — Z98.890 HISTORY OF INCISION AND DRAINAGE: ICD-10-CM

## 2023-12-22 PROCEDURE — 99214 OFFICE O/P EST MOD 30 MIN: CPT | Performed by: STUDENT IN AN ORGANIZED HEALTH CARE EDUCATION/TRAINING PROGRAM

## 2023-12-22 PROCEDURE — 97597 DBRDMT OPN WND 1ST 20 CM/<: CPT | Performed by: STUDENT IN AN ORGANIZED HEALTH CARE EDUCATION/TRAINING PROGRAM

## 2023-12-22 RX ORDER — CEPHALEXIN 500 MG/1
500 CAPSULE ORAL EVERY 6 HOURS SCHEDULED
Qty: 28 CAPSULE | Refills: 0 | Status: SHIPPED | OUTPATIENT
Start: 2023-12-22 | End: 2023-12-29

## 2023-12-22 RX ORDER — SULFAMETHOXAZOLE AND TRIMETHOPRIM 800; 160 MG/1; MG/1
1 TABLET ORAL EVERY 12 HOURS SCHEDULED
Qty: 14 TABLET | Refills: 0 | Status: SHIPPED | OUTPATIENT
Start: 2023-12-22 | End: 2023-12-29

## 2023-12-22 NOTE — PROGRESS NOTES
"Patient ID: Lisa Reardon is a 68 y.o. female Date of Birth 1955     Chief Complaint  Chief Complaint   Patient presents with    Follow Up Wound Care Visit     Back, Hip       Allergies  Patient has no known allergies.    Assessment:     Diagnoses and all orders for this visit:    Non-healing surgical wound, initial encounter  -     Wound cleansing and dressings; Future  -     Wound miscellaneous orders; Future  -     Wound miscellaneous orders; Future  -     Debridement    Open wound of left hip, initial encounter  -     cephalexin (KEFLEX) 500 mg capsule; Take 1 capsule (500 mg total) by mouth every 6 (six) hours for 7 days  -     sulfamethoxazole-trimethoprim (BACTRIM DS) 800-160 mg per tablet; Take 1 tablet by mouth every 12 (twelve) hours for 7 days  -     Debridement    History of incision and drainage  -     Wound cleansing and dressings; Future  -     Wound miscellaneous orders; Future  -     Wound miscellaneous orders; Future    Diabetes mellitus type 2, diet-controlled (HCC)  -     Wound cleansing and dressings; Future  -     Wound miscellaneous orders; Future  -     Wound miscellaneous orders; Future              Debridement   Wound 11/15/23 Surgical Back Left    Universal Protocol:  Consent: Verbal consent obtained.  Risks and benefits: risks, benefits and alternatives were discussed  Consent given by: patient  Time out: Immediately prior to procedure a \"time out\" was called to verify the correct patient, procedure, equipment, support staff and site/side marked as required.  Patient identity confirmed: verbally with patient    Debridement Details  Performed by: physician  Debridement type: selective  Pain control: lidocaine 4%      Post-debridement measurements  Length (cm): 0.5  Width (cm): 1.1  Depth (cm): 0.4  Percent debrided: 50%  Surface Area (cm^2): 0.55  Area Debrided (cm^2): 0.28  Volume (cm^3): 0.22    Devitalized tissue debrided: biofilm and exudate  Instrument(s) utilized: " "curette  Bleeding: small  Hemostasis obtained with: pressure  Procedural pain (0-10): 1  Post-procedural pain: 0   Response to treatment: procedure was tolerated well    Debridement   Wound 12/08/23 Surgical Hip Left;Posterior    Universal Protocol:  Consent: Verbal consent obtained.  Risks and benefits: risks, benefits and alternatives were discussed  Consent given by: patient  Time out: Immediately prior to procedure a \"time out\" was called to verify the correct patient, procedure, equipment, support staff and site/side marked as required.  Patient identity confirmed: verbally with patient    Debridement Details  Performed by: physician  Debridement type: selective  Pain control: lidocaine 4%      Post-debridement measurements  Length (cm): 1  Width (cm): 2  Depth (cm): 1.1  Percent debrided: 80%  Surface Area (cm^2): 2  Area Debrided (cm^2): 1.6  Volume (cm^3): 2.2    Devitalized tissue debrided: biofilm, exudate and necrotic debris  Instrument(s) utilized: curette  Bleeding: small  Hemostasis obtained with: pressure  Procedural pain (0-10): 2  Post-procedural pain: 1   Response to treatment: procedure was tolerated well        Plan:   It was a pleasure to see Lisa Reardon for wound care follow up today  Selective debridement performed today as above  Wound is improving, however I can palpate the re formation of inclusion cyst underneath the original wound.  These need to be much addressed by general surgery.  Patient has follow-up appointment with them next week.  Continue plan of care as noted below with aquacel ag rope  From Keflex refilled for patient today.  Hip wound does seem to be more erythematous today  No signs or symptoms of infection today. Patient understands that if any signs of infection start (such as increased redness, drainage, pain, fever, chills, diaphoresis), they should call our office or proceed to the ER or Urgent Care.  Patient should continue a high protein diet to facilitate wound " healing  Patient is advised to not submerge wound or leave wound open to air.  Follow up in 2 weeks  Given the multi-factorial nature of wound care, additional time was taken to review patient's treatment plan with other specialties and most recent pertinent lab work and imaging.   All plans of care discussed with patient at bedside who verbalized understanding with treatment plan.    Wound 11/15/23 Surgical Back Left (Active)   Wound Image Images linked 12/22/23 0859   Wound Length (cm) 0.5 cm 12/22/23 0858   Wound Width (cm) 1.1 cm 12/22/23 0858   Wound Depth (cm) 0.4 cm 12/22/23 0858   Wound Surface Area (cm^2) 0.55 cm^2 12/22/23 0858   Wound Volume (cm^3) 0.22 cm^3 12/22/23 0858   Calculated Wound Volume (cm^3) 0.22 cm^3 12/22/23 0858   Change in Wound Size % 54.17 12/22/23 0858   Undermining 0.2 12/22/23 0858   Undermining is depth extending from 10-2 o'clock 12/22/23 0858   Drainage Amount Moderate 12/22/23 0858   Drainage Description Serosanguineous 12/22/23 0858   Non-staged Wound Description Full thickness 12/22/23 0858   Dressing Status Intact (upon arrival) 12/22/23 0858       Wound 12/08/23 Surgical Hip Left;Posterior (Active)   Wound Image Images linked 12/22/23 0904   Wound Description Pink;Yellow;Black;Eschar 12/22/23 0903   Kely-wound Assessment Purple;Pink 12/22/23 0903   Wound Length (cm) 1 cm 12/22/23 0903   Wound Width (cm) 2 cm 12/22/23 0903   Wound Depth (cm) 1 cm 12/22/23 0903   Wound Surface Area (cm^2) 2 cm^2 12/22/23 0903   Wound Volume (cm^3) 2 cm^3 12/22/23 0903   Calculated Wound Volume (cm^3) 2 cm^3 12/22/23 0903   Change in Wound Size % 62.12 12/22/23 0903   Undermining 2.1 12/22/23 0903   Undermining is depth extending from 12-12 o'clock; deepest around 10 12/22/23 0903   Drainage Amount Large 12/22/23 0903   Drainage Description Milky;Serosanguineous;Yellow;Foul smelling 12/22/23 0903   Non-staged Wound Description Full thickness 12/22/23 0903   Wound packed? Yes 12/22/23 0903    Packing- # removed 1 12/22/23 0903   Dressing Status Intact (upon arrival) 12/22/23 0903       Wound 11/15/23 Surgical Back Left (Active)   Date First Assessed/Time First Assessed: 11/15/23 0819   Primary Wound Type: (c) Surgical  Location: Back  Wound Location Orientation: Left       Wound 12/08/23 Surgical Hip Left;Posterior (Active)   Date First Assessed: 12/08/23   Primary Wound Type: Surgical  Location: Hip  Wound Location Orientation: Left;Posterior       [REMOVED] Wound 05/03/21 Other (comment) Sacrum (Removed)   Resolved Date: 11/15/23  Date First Assessed/Time First Assessed: 05/03/21 0822   Primary Wound Type: Other (comment)  Location: Sacrum  Wound Outcome: (c) Other (Comment)       [REMOVED] Wound 08/19/21 Pressure Injury Sacrum Mid (Removed)   Resolved Date: 11/15/23  Date First Assessed/Time First Assessed: 08/19/21 2015   Primary Wound Type: Pressure Injury  Location: Sacrum  Wound Location Orientation: Mid  Wound Description (Comments): open, red, some slough  Dressing Status: Open to ai...       Subjective:      .    12/22/23, 12/15/23: Patient dressing wound with Aquacel rope as directed.  No symptoms of infection.     12/8/23: Was  seen by general surgery soon after being seen at last wound care visit.  Continue with iodoform.  Was also recently seen in ED for a new abscess of the posterior left hip which underwent incision and drainage.  He notes that in the past she has had large quantities of recurrent abscesses and cyst.  Has not seen dermatology before for workup.  On Keflex and Bactrim per ED physician.     11/27/23: Friend changing dressing as directed. No symptoms of infection.      11/20/23: Patient was given referral to see general surgery after last visit for I&D which she underwent.  She will continue to follow general surgery in the next 2 weeks.  Dr. Gonzalez personally called the office and asked for patient to continue to follow with wound management center.  Patient denies any  symptoms of infection including fever chills diaphoresis.  States that she does have help at home with packing and dressing the wound of her back.  She is taking antibiotics as prescribed by general surgery.     11/15/23: Consult - Lisa is a pleasant 68-year-old female with a past medical history of atrial fibrillation on Eliquis and type II by diabetes mellitus here today for initial wound care consult.  Patient notes that she noticed itchiness and wetness of her back for several weeks now.  Has been a patient with wound management for lower extremity wounds in the past, so she called her PCP and asked for referral to our office.  This is the first time the skin/wound area has been seen by a physician.  She notes that the area is very itchy and that she has been using a hard scrub brush to scrub her back aggressively to address that problem.  Denies any symptoms of fever chills diaphoresis today.  She notes that she has had small areas like this on her back in the past.                The following portions of the patient's history were reviewed and updated as appropriate: allergies, current medications, past family history, past medical history, past social history, past surgical history, and problem list.    Review of Systems   Constitutional:  Negative for chills, diaphoresis, fatigue and fever.   Skin:  Positive for wound.   All other systems reviewed and are negative.        Objective:       Wound 11/15/23 Surgical Back Left (Active)   Wound Image Images linked 12/22/23 0859   Wound Length (cm) 0.5 cm 12/22/23 0858   Wound Width (cm) 1.1 cm 12/22/23 0858   Wound Depth (cm) 0.4 cm 12/22/23 0858   Wound Surface Area (cm^2) 0.55 cm^2 12/22/23 0858   Wound Volume (cm^3) 0.22 cm^3 12/22/23 0858   Calculated Wound Volume (cm^3) 0.22 cm^3 12/22/23 0858   Change in Wound Size % 54.17 12/22/23 0858   Undermining 0.2 12/22/23 0858   Undermining is depth extending from 10-2 o'clock 12/22/23 0858   Drainage Amount  Moderate 12/22/23 0858   Drainage Description Serosanguineous 12/22/23 0858   Non-staged Wound Description Full thickness 12/22/23 0858   Dressing Status Intact (upon arrival) 12/22/23 0858       Wound 12/08/23 Surgical Hip Left;Posterior (Active)   Wound Image Images linked 12/22/23 0904   Wound Description Pink;Yellow;Black;Eschar 12/22/23 0903   Kely-wound Assessment Purple;Pink 12/22/23 0903   Wound Length (cm) 1 cm 12/22/23 0903   Wound Width (cm) 2 cm 12/22/23 0903   Wound Depth (cm) 1 cm 12/22/23 0903   Wound Surface Area (cm^2) 2 cm^2 12/22/23 0903   Wound Volume (cm^3) 2 cm^3 12/22/23 0903   Calculated Wound Volume (cm^3) 2 cm^3 12/22/23 0903   Change in Wound Size % 62.12 12/22/23 0903   Undermining 2.1 12/22/23 0903   Undermining is depth extending from 12-12 o'clock; deepest around 10 12/22/23 0903   Drainage Amount Large 12/22/23 0903   Drainage Description Milky;Serosanguineous;Yellow;Foul smelling 12/22/23 0903   Non-staged Wound Description Full thickness 12/22/23 0903   Wound packed? Yes 12/22/23 0903   Packing- # removed 1 12/22/23 0903   Dressing Status Intact (upon arrival) 12/22/23 0903       /82   Pulse 98   Temp (!) 97.1 °F (36.2 °C)   Resp 15     Physical Exam  Vitals reviewed.   Constitutional:       Appearance: Normal appearance.   HENT:      Head: Normocephalic and atraumatic.      Mouth/Throat:      Mouth: Mucous membranes are moist.   Eyes:      Extraocular Movements: Extraocular movements intact.   Pulmonary:      Effort: Pulmonary effort is normal.   Skin:     Comments: Of left hip with more erythema around the base.  Easily the palpable wounds inclusion cyst beneath the wound.  Wound itself has sebum production and sanguinous exudate.    Her back wound with less depth today.  Directly above it I can palpate again the reformation of inclusion cyst.  No overt signs of infection.   Neurological:      Mental Status: She is alert.   Psychiatric:         Mood and Affect: Mood  normal.         Behavior: Behavior normal.           Wound Instructions:  Orders Placed This Encounter   Procedures    Wound cleansing and dressings     Back Wound and Left Hip Wound     Wash your hands with soap and water.  Remove old dressing, discard into plastic bag and place in trash.  Cleanse the wound with soap and water prior to applying a clean dressing. Do not use tissue or cotton balls. Do not scrub the wound. Pat dry using gauze.  Shower : yes  Gently place Aquacel loosely  into the back and hip wound.  Cover with gauze.  Secure with tape.  Change dressing every other day     This was done today.        ·     Standing Status:   Future     Standing Expiration Date:   12/22/2024    Wound miscellaneous orders     Schedule appointment with Dr. Gonzalez regarding returning cyst as discussed at your appointment.          Protein: Eat protein with each meal to promote healing.  Examples of protein are fish, meat, chicken, nuts, peanut butter, eggs, lentils, edamame or a protein shake.     Wound infection:  If you have signs of infection please call the wound center.  If the wound center is closed- please go to the Emergency department.  Some signs of infection:  fever, chills, increased redness, red streaks, increase in pain, increased drainage.  Drainage with an odor, Change in drainage color: white/milky/green/tan/yellow,  an increase in swelling, chest pain and/or shortness of breath.     Standing Status:   Future     Standing Expiration Date:   12/22/2024    Wound miscellaneous orders     Please  antibiotics at pharmacy and take as prescribed.     Standing Status:   Future     Standing Expiration Date:   12/22/2024    Debridement     This order was created via procedure documentation    Debridement     This order was created via procedure documentation        Diagnosis ICD-10-CM Associated Orders   1. Non-healing surgical wound, initial encounter  T81.89XA Wound cleansing and dressings     Wound  "miscellaneous orders     Wound miscellaneous orders     Debridement      2. Open wound of left hip, initial encounter  S71.002A cephalexin (KEFLEX) 500 mg capsule     sulfamethoxazole-trimethoprim (BACTRIM DS) 800-160 mg per tablet     Debridement      3. History of incision and drainage  Z98.890 Wound cleansing and dressings     Wound miscellaneous orders     Wound miscellaneous orders      4. Diabetes mellitus type 2, diet-controlled (HCC)  E11.9 Wound cleansing and dressings     Wound miscellaneous orders     Wound miscellaneous orders          --  Farhana Escalante MD    \"This note has been constructed using a voice recognition system. Therefore there may be syntax, spelling, and/or grammatical errors. Occasional wrong word or \"sound alike\" substitutions may have occurred due to the inherent limitations of voice recognition software. Read the chart carefully and recognize, using context, where substitutions have occurred. Please call if you have any questions.\"     "

## 2023-12-22 NOTE — PATIENT INSTRUCTIONS
Orders Placed This Encounter   Procedures    Wound cleansing and dressings     Back Wound and Left Hip Wound     Wash your hands with soap and water.  Remove old dressing, discard into plastic bag and place in trash.  Cleanse the wound with soap and water prior to applying a clean dressing. Do not use tissue or cotton balls. Do not scrub the wound. Pat dry using gauze.  Shower : yes  Gently place Aquacel loosely  into the back and hip wound.  Cover with gauze.  Secure with tape.  Change dressing every other day     This was done today.        ·     Standing Status:   Future     Standing Expiration Date:   12/22/2024    Wound miscellaneous orders     Schedule appointment with Dr. Gonzalez regarding returning cyst as discussed at your appointment.          Protein: Eat protein with each meal to promote healing.  Examples of protein are fish, meat, chicken, nuts, peanut butter, eggs, lentils, edamame or a protein shake.     Wound infection:  If you have signs of infection please call the wound center.  If the wound center is closed- please go to the Emergency department.  Some signs of infection:  fever, chills, increased redness, red streaks, increase in pain, increased drainage.  Drainage with an odor, Change in drainage color: white/milky/green/tan/yellow,  an increase in swelling, chest pain and/or shortness of breath.     Standing Status:   Future     Standing Expiration Date:   12/22/2024    Wound miscellaneous orders     Please  antibiotics at pharmacy and take as prescribed.     Standing Status:   Future     Standing Expiration Date:   12/22/2024

## 2023-12-27 ENCOUNTER — OFFICE VISIT (OUTPATIENT)
Dept: SURGERY | Facility: CLINIC | Age: 68
End: 2023-12-27
Payer: MEDICARE

## 2023-12-27 VITALS — BODY MASS INDEX: 46.32 KG/M2 | HEIGHT: 65 IN | WEIGHT: 278 LBS | TEMPERATURE: 96.7 F

## 2023-12-27 DIAGNOSIS — I48.91 ATRIAL FIBRILLATION WITH RAPID VENTRICULAR RESPONSE (HCC): ICD-10-CM

## 2023-12-27 DIAGNOSIS — K91.2 POSTGASTRECTOMY MALABSORPTION: ICD-10-CM

## 2023-12-27 DIAGNOSIS — Z90.3 POSTGASTRECTOMY MALABSORPTION: ICD-10-CM

## 2023-12-27 DIAGNOSIS — E11.22 TYPE 2 DIABETES MELLITUS WITH CHRONIC KIDNEY DISEASE, WITHOUT LONG-TERM CURRENT USE OF INSULIN, UNSPECIFIED CKD STAGE (HCC): ICD-10-CM

## 2023-12-27 DIAGNOSIS — L72.3 SEBACEOUS CYST: ICD-10-CM

## 2023-12-27 DIAGNOSIS — L02.211 ABDOMINAL WALL ABSCESS: Primary | ICD-10-CM

## 2023-12-27 DIAGNOSIS — Z79.01 ANTICOAGULATION ADEQUATE WITH ANTICOAGULANT THERAPY: ICD-10-CM

## 2023-12-27 DIAGNOSIS — E66.01 MORBID OBESITY DUE TO EXCESS CALORIES (HCC): ICD-10-CM

## 2023-12-27 PROCEDURE — 99214 OFFICE O/P EST MOD 30 MIN: CPT | Performed by: SPECIALIST

## 2023-12-27 NOTE — PROGRESS NOTES
Surgical consult and history and Physical Examination - General Surgery   [facility]  Lisa Reardon 68 y.o. female MRN: [id number]  : [id number] PCP: GEORGETTE Bhatia    History of Present Illness   Chief Complaint: Abscess left flank    HPI:  Lisa Reardon is a 68 y.o. female who presents to office with history of multiple abscess.  Past developed an abscess on the left flank which was not drained  In the ER and patient was put on antibiotics and patient was advised to follow with the wound care center    Patient abscess is not resolving and wound care center has just started another course of antibiotics  Patient is getting local packing    Denies any nausea vomiting diarrhea constipation or fever or chills  Patient is morbidly obese with BMI 46.26 and on insulin-dependent diabetes mellitus    Historical Information   The following portions of the patient's history were reviewed and updated as appropriate  Past Medical History:   Diagnosis Date    Acute pain of right knee     Ambulatory dysfunction     Anemia     Arthritis     Bleeding ulcer     Cancer (HCC)     rectal    Chronic lower back pain     Chronic pain disorder     back pain    Colon cancer (HCC) 2018    Diabetes mellitus (HCC)     Endometrial cancer (HCC) 2018    GERD (gastroesophageal reflux disease)     History of chemotherapy     History of MRSA infection 0719/2016    Morbid obesity (HCC)     Morbid obesity with BMI of 45.0-49.9, adult (HCC)     Ovarian cancer (HCC) 2018    Paroxysmal atrial fibrillation (HCC)     Rectal cancer (HCC)     S/P TAVR (transcatheter aortic valve replacement)     SOB (shortness of breath)     Spinal stenosis     Systolic murmur     Unsteady gait     uses walker    Wears dentures     Wears glasses      Past Surgical History:   Procedure Laterality Date    ABDOMINAL PERINEAL BOWEL RESECTION W/ ILEOANAL POUCH N/A 09/06/2018    Procedure: LAPAROSCOPIC HAND ASSIST ABDOMINOPERINEAL RESECTION,   POSTERIOR VAGINECTOMY, OMENTECTOMY;  Surgeon: José Miguel Cedillo MD;  [facility];  Service: Colorectal    ABDOMINAL SURGERY      abscess removed from abdomen and right thigh, a hole in thigh closed by plastic surgeon    ABSCESS DRAINAGE      abd, (R) leg, (L) leg     SECTION       SECTION      CYSTOSCOPY N/A 2018    Procedure: CYSTOSCOPY;  Surgeon: Seth Pinto MD;  [facility];  Service: Gynecology Oncology    ESOPHAGOGASTRODUODENOSCOPY N/A 2016    Procedure: ESOPHAGOGASTRODUODENOSCOPY (EGD);  Surgeon: Jama Frazier MD;  [facility];  Service:     ESOPHAGOGASTRODUODENOSCOPY N/A 2016    Procedure: ESOPHAGOGASTRODUODENOSCOPY (EGD);  Surgeon: Jama Frazier MD;  [facility];  Service:     EYE SURGERY      laser eye surgery    HYSTERECTOMY N/A 2018    Procedure: RADICAL HYSTERECTOMY TOTAL ABDOMINAL (ALEXANDRA). BSO;  Surgeon: Seth Pinto MD;  [facility];  Service: Gynecology Oncology    JOINT REPLACEMENT Left 2017    hip    JOINT REPLACEMENT Right 2017    hip    OOPHORECTOMY Bilateral     NE COLONOSCOPY FLX DX W/COLLJ SPEC WHEN PFRMD N/A 2018    Procedure: COLONOSCOPY;  Surgeon: Juanjo Sanders MD;  Location: [facility];  Service: Gastroenterology    NE COLONOSCOPY FLX DX W/COLLJ SPEC WHEN PFRMD N/A 2018    Procedure: COLONOSCOPY;  Surgeon: José Miguel Cedillo MD;  [facility];  Service: Colorectal    NE ECHO TRANSESOPHAG R-T 2D W/PRB IMG ACQUISJ I&R N/A 2020    Procedure: TRANSESOPHAGEAL ECHOCARDIOGRAM (THO);  Surgeon: Diallo Vu DO;  [facility];  Service: Cardiac Surgery    NE ESOPHAGOGASTRODUODENOSCOPY TRANSORAL DIAGNOSTIC N/A 2018    Procedure: ESOPHAGOGASTRODUODENOSCOPY (EGD);  Surgeon: Juanjo Sanders MD;  Location: [facility];  Service: Gastroenterology    NE LAPAROSCOPY SURG COLOSTOMY/SKN LVL CECOSTOMY N/A 2018    Procedure: PERMANENT END COLOSTOMY;  Surgeon:  José Miguel Cedillo MD;  [facility];  Service: Colorectal    OH LAPS GSTR RSTCV PX W/BYP SOLEDAD-EN-Y LIMB <150 CM N/A 2016    Procedure: BYPASS GASTRIC  SOLEDAD-EN-Y LAPAROSCOPIC;  Surgeon: Jama Frazier MD;  [facility];  Service: Bariatrics    OH LAPS PROCTECTOMY ABDOMINOPERINEAL W/COLOSTOMY N/A 2018    Procedure: PROCTECTOMY;  Surgeon: José Miguel Cedillo MD;  [facility];  Service: Colorectal    OH REPLACE AORTIC VALVE OPENFEMORAL ARTERY APPROACH N/A 2020    Procedure: REPLACEMENT AORTIC VALVE TRANSCATHETER (TAVR) TRANSFEMORAL W/ 26MM WADDELL BARBARA S3 ULTRA VALVE(ACCESS ON RIGHT);  Surgeon: Diallo Vu DO;  [facility];  Service: Cardiac Surgery    REPLACEMENT TOTAL KNEE      TOOTH EXTRACTION      TUBAL LIGATION       Social History   Social History     Substance and Sexual Activity   Alcohol Use Not Currently     Social History     Substance and Sexual Activity   Drug Use Not Currently    Types: Oxycodone    Comment: Percocet for lower back pain prn     Social History     Tobacco Use   Smoking Status Former    Current packs/day: 0.00    Average packs/day: 1 pack/day for 25.0 years (25.0 ttl pk-yrs)    Types: Cigarettes    Start date: 3/30/1981    Quit date: 3/30/2006    Years since quittin.7    Passive exposure: Never   Smokeless Tobacco Never     Family History:   Family History   Adopted: Yes   Problem Relation Age of Onset    Leukemia Mother     Heart disease Father     Coronary artery disease Father     Diabetes Father     No Known Problems Sister     No Known Problems Brother     Diabetes Son     No Known Problems Brother     No Known Problems Brother     No Known Problems Sister     No Known Problems Sister        Meds/Allergies   No Known Allergies    Current Outpatient Medications:     atorvastatin (LIPITOR) 20 mg tablet, TAKE ONE TABLET DAILY, Disp: 90 tablet, Rfl: 0    cephalexin (KEFLEX) 500 mg capsule, Take 1 capsule (500 mg total) by  mouth every 6 (six) hours for 7 days, Disp: 28 capsule, Rfl: 0    Eliquis 5 MG, ONE TAB TWICE A DAY, Disp: 60 tablet, Rfl: 2    Empagliflozin (Jardiance) 10 MG TABS tablet, TAKE 1 TABLET EVERY MORNING, Disp: 90 tablet, Rfl: 0    metoprolol succinate (TOPROL-XL) 25 mg 24 hr tablet, Take 1 tablet in morning and half tablet in evening, Disp: 135 tablet, Rfl: 1    Multiple Vitamins-Minerals (BARIATRIC FUSION) CHEW, Chew 1 tablet daily  , Disp: , Rfl:     nystatin (MYCOSTATIN) cream, , Disp: , Rfl:     omeprazole (PriLOSEC) 20 mg delayed release capsule, TAKE 1 CAPSULE DAILY BEFORE BREAKFAST, Disp: 90 capsule, Rfl: 1    oxyCODONE (ROXICODONE) 10 MG TABS, Take 10 mg by mouth every 6 (six) hours as needed As needed for pain, Disp: , Rfl:     potassium chloride (K-DUR,KLOR-CON) 20 mEq tablet, Take 1 tablet (20 mEq total) by mouth if needed (if taking torsemide), Disp: 30 tablet, Rfl: 1    sulfamethoxazole-trimethoprim (BACTRIM DS) 800-160 mg per tablet, Take 1 tablet by mouth every 12 (twelve) hours for 7 days, Disp: 14 tablet, Rfl: 0    torsemide (DEMADEX) 20 mg tablet, Take 1 tablet (20 mg total) by mouth if needed (for leg swelling/weight gain above 3 pounds), Disp: 30 tablet, Rfl: 1    albuterol (2.5 mg/3 mL) 0.083 % nebulizer solution, TAKE 1 VIAL BY NEBULIZATION EVERY 4 HOURS AS NEEDED FOR WHEEZING OR SHORTNESS OF BREATH (Patient not taking: Reported on 11/15/2023), Disp: 225 mL, Rfl: 1    Calcium-Vitamin D 500-125 MG-UNIT TABS, Take 1 tablet by mouth 2 (two) times a day  (Patient not taking: Reported on 9/16/2023), Disp: , Rfl:      REVIEW OF SYSTEMS  Constitutional:  Denies fever or chills   Eyes:  Denies change in visual acuity   HENT:  Denies nasal congestion or sore throat   Respiratory:  Denies cough or shortness of breath   Cardiovascular:  Denies chest pain or edema   GI:  Denies abdominal pain, nausea, vomiting, bloody stools or diarrhea   Patient has permanent end colostomy and ovarian cancer  "abdominoperineal resection of the colon  :  Denies dysuria, frequency, difficulty in micturition and nocturia  Musculoskeletal:  Denies back pain or joint pain   Neurologic:  Denies headache, focal weakness or sensory changes   Endocrine:  Denies polyuria or polydipsia insulin-dependent diabetes mellitus  Lymphatic:  Denies swollen glands   Psychiatric:  Denies depression or anxiety     Objective   Current Vitals:   Temp (!) 96.7 °F (35.9 °C) (Core)   Ht 5' 5\" (1.651 m)   Wt 126 kg (278 lb)   BMI 46.26 kg/m²   Body mass index is 46.26 kg/m².     PHYSICAL EXAMS  General:  Patient is not in acute distress, laying in the bed comfortably, awake, alert responding to commands,   HEENT:  Both pupils normal-size atraumatic, normocephalic, nonicteric  Neck:  JVP not raised. Trachea central  Respiratory:  normal Breath sounds clear to auscultation,  Cardiovascular:  S1-S2 normal without any murmur   GI:  Abdomen soft nontender.  Colostomy in place   musculoskeletal: Moves cyst over the back status post incision and drainage  It is approximately 2 cm x 1 cm size with a nonhealing wound approximately 1 cm circular    Flank left  Abscess draining pus being packed with iodoform necrotic skin  Approximately 6 cm x 3 cm    Integument:  No skin rashes or ulceration  Lymphatic:  No cervical or inguinal lymphadenopathy  Neurologic:  Patient is awake alert, responding to command, well-oriented to time and place and person moving all extremities ambulating well    Visit Diagnosis: . Diagnoses and all orders for this visit:    Abdominal wall abscess    Sebaceous cyst    Type 2 diabetes mellitus with chronic kidney disease, without long-term current use of insulin, unspecified CKD stage (HCC)    Postgastrectomy malabsorption    Morbid obesity due to excess calories (HCC)    Atrial fibrillation with rapid ventricular response (HCC)    Anticoagulation adequate with anticoagulant therapy       Plan of care was discussed with patient in " detail    Pertinent labs reviewed  Pertinent images and available reads personally reviewed  No results found.  Pertinent notes reviewed    Assessment/Plan   Assessment:  Lateral abdominal wall abscess  Residual sebaceous cyst upper back status post incision and drainage for infected cyst    Symptomatic   Encounter Diagnoses   Name Primary?    Abdominal wall abscess Yes    Sebaceous cyst     Type 2 diabetes mellitus with chronic kidney disease, without long-term current use of insulin, unspecified CKD stage (HCC)     Postgastrectomy malabsorption     Morbid obesity due to excess calories (HCC)     Atrial fibrillation with rapid ventricular response (HCC)     Anticoagulation adequate with anticoagulant therapy     .    Plan:  Proper consent was obtained.The risks, benefits, alternatives,and probabilities of success were discussed in detail with no guarantee made as to outcome. All questions were answered to the patient's satisfaction.   Patient understands that some of patient's symptoms or all symptoms may persist even after surgery and wished to proceed with surgery  Antibiotics and local wound care  Follow-up in 1 week if no resolution may require CAT scan to see the extent of the abscess and may require  Cyst excision of the back and excision of the infected cyst of the lateral wall of abdominal/flank    Made aware if no improvement with antibiotics to report to ER for further workup and treatment    Counseling / Coordination of Care  Expained patient  in detailed about coordination of care. A description of the counseling / coordination of care:  I performed an interim history, pertinent images and labs, performed a physical examination to arrive at the plan delineated above with associated thought processes.       Dr Haresh Gonzalez MD MS FRCS FACS  [facility]    Office   Tel.  [phone]  Fax. [phone]

## 2024-01-04 ENCOUNTER — OFFICE VISIT (OUTPATIENT)
Dept: SURGERY | Facility: CLINIC | Age: 69
End: 2024-01-04
Payer: MEDICARE

## 2024-01-04 VITALS — TEMPERATURE: 96.4 F | WEIGHT: 282.4 LBS | HEIGHT: 65 IN | BODY MASS INDEX: 47.05 KG/M2

## 2024-01-04 DIAGNOSIS — L02.211 ABDOMINAL WALL ABSCESS: ICD-10-CM

## 2024-01-04 DIAGNOSIS — Z95.2 S/P TAVR (TRANSCATHETER AORTIC VALVE REPLACEMENT): ICD-10-CM

## 2024-01-04 DIAGNOSIS — L72.3 SEBACEOUS CYST: Primary | ICD-10-CM

## 2024-01-04 DIAGNOSIS — Z98.84 S/P GASTRIC BYPASS: ICD-10-CM

## 2024-01-04 DIAGNOSIS — I48.91 ATRIAL FIBRILLATION WITH RAPID VENTRICULAR RESPONSE (HCC): ICD-10-CM

## 2024-01-04 DIAGNOSIS — Z85.048 HISTORY OF RECTAL CANCER: ICD-10-CM

## 2024-01-04 DIAGNOSIS — E11.22 TYPE 2 DIABETES MELLITUS WITH CHRONIC KIDNEY DISEASE, WITHOUT LONG-TERM CURRENT USE OF INSULIN, UNSPECIFIED CKD STAGE (HCC): ICD-10-CM

## 2024-01-04 DIAGNOSIS — E66.01 MORBID OBESITY DUE TO EXCESS CALORIES (HCC): ICD-10-CM

## 2024-01-04 DIAGNOSIS — Z85.43 HISTORY OF OVARIAN CANCER: ICD-10-CM

## 2024-01-04 PROCEDURE — 99213 OFFICE O/P EST LOW 20 MIN: CPT | Performed by: SPECIALIST

## 2024-01-04 RX ORDER — CEFAZOLIN SODIUM 2 G/50ML
2000 SOLUTION INTRAVENOUS ONCE
OUTPATIENT
Start: 2024-01-04 | End: 2024-01-04

## 2024-01-04 NOTE — PROGRESS NOTES
" General Surgery Office Visit Follow up   [facility]  Patient: Lisa Reardon   : 1955 Sex: female MRN: [id number]   CSN: [id number] PCP: GEORGETTE Bhatia    Assessment/ Plan:  Lisa Reardon is a 68 y.o. female  day(s)  S/p antibiotic treatment for infected cyst  Sebaceous cyst [L72.3]    Plan  Stable postop   Schedule surgery for excision of residual cyst over the back after incision and drainage 4 weeks ago  And excision of infected cyst on the left flank    Patient aware to come fasting for surgery  Patient is advised to stop Eliquis 2 days prior to surgery and 1 day after the surgery  Advised to bring family members to drive home    Infection bleeding recurrence reoperation anesthesia related complications DVT stroke heart attack were discussed with patient  Consent was obtained        SUBJECTIVE:   Doing much better getting some itchiness around the wound otherwise infection is markedly improved  Completed my course of antibiotic    OBJECTIVE:  Minimal incisional pain  No fever no chills no rigors  Tolerating p.o. Diet well  Normal bowel movement no constipation or diarrhea  Ambulating well   Has colostomy patient is morbidly morbid obese  Patient is taking Eliquis for anticoagulation  Is diabetic on insulin and Jardiance    Vitals:   Temp (!) 96.4 °F (35.8 °C) (Core)   Ht 5' 5\" (1.651 m)   Wt 128 kg (282 lb 6.4 oz)   BMI 46.99 kg/m²     Active medications:    Current Outpatient Medications:     atorvastatin (LIPITOR) 20 mg tablet, TAKE ONE TABLET DAILY, Disp: 90 tablet, Rfl: 0    Eliquis 5 MG, ONE TAB TWICE A DAY, Disp: 60 tablet, Rfl: 2    Empagliflozin (Jardiance) 10 MG TABS tablet, TAKE 1 TABLET EVERY MORNING, Disp: 90 tablet, Rfl: 0    metoprolol succinate (TOPROL-XL) 25 mg 24 hr tablet, Take 1 tablet in morning and half tablet in evening, Disp: 135 tablet, Rfl: 1    Multiple Vitamins-Minerals (BARIATRIC FUSION) CHEW, Chew 1 tablet daily  , Disp: , Rfl:     nystatin " (MYCOSTATIN) cream, , Disp: , Rfl:     omeprazole (PriLOSEC) 20 mg delayed release capsule, TAKE 1 CAPSULE DAILY BEFORE BREAKFAST, Disp: 90 capsule, Rfl: 1    oxyCODONE (ROXICODONE) 10 MG TABS, Take 10 mg by mouth every 6 (six) hours as needed As needed for pain, Disp: , Rfl:     potassium chloride (K-DUR,KLOR-CON) 20 mEq tablet, Take 1 tablet (20 mEq total) by mouth if needed (if taking torsemide), Disp: 30 tablet, Rfl: 1    torsemide (DEMADEX) 20 mg tablet, Take 1 tablet (20 mg total) by mouth if needed (for leg swelling/weight gain above 3 pounds), Disp: 30 tablet, Rfl: 1    albuterol (2.5 mg/3 mL) 0.083 % nebulizer solution, TAKE 1 VIAL BY NEBULIZATION EVERY 4 HOURS AS NEEDED FOR WHEEZING OR SHORTNESS OF BREATH (Patient not taking: Reported on 11/15/2023), Disp: 225 mL, Rfl: 1    Calcium-Vitamin D 500-125 MG-UNIT TABS, Take 1 tablet by mouth 2 (two) times a day  (Patient not taking: Reported on 9/16/2023), Disp: , Rfl:     Physical Exam:   General Alert awake   Normocephalic atraumatic PERRLA   Lungs clear bilaterally  Cardiac normal S1 normal S2  Abdomen soft,non tender Bowel sounds present  Skin: surgical dressing is C/D/I  Large cyst residue over the back is 4 x 3 cm in size    Left flank  4 cm x 3 cm with necrotic skin with minimal drainage    Ext: No clubbing, cyanosis, edema    Visit Diagnosis: . Diagnoses and all orders for this visit:    Sebaceous cyst    Atrial fibrillation with rapid ventricular response (HCC)    Type 2 diabetes mellitus with chronic kidney disease, without long-term current use of insulin, unspecified CKD stage (HCC)    Abdominal wall abscess    Morbid obesity due to excess calories (HCC)    S/P gastric bypass    S/P TAVR (transcatheter aortic valve replacement)    History of rectal cancer    History of ovarian cancer       Plan of care was discussed with patient in detail    Pertinent labs reviewed  Most Recent Labs:   No visits with results within 2 Week(s) from this visit.   Latest  known visit with results is:   Admission on 12/06/2023, Discharged on 12/06/2023   Component Date Value Ref Range Status    WBC 12/06/2023 7.55  4.31 - 10.16 Thousand/uL Final    RBC 12/06/2023 5.87 (H)  3.81 - 5.12 Million/uL Final    Hemoglobin 12/06/2023 15.5 (H)  11.5 - 15.4 g/dL Final    Hematocrit 12/06/2023 50.1 (H)  34.8 - 46.1 % Final    MCV 12/06/2023 85  82 - 98 fL Final    MCH 12/06/2023 26.4 (L)  26.8 - 34.3 pg Final    MCHC 12/06/2023 30.9 (L)  31.4 - 37.4 g/dL Final    RDW 12/06/2023 18.2 (H)  11.6 - 15.1 % Final    MPV 12/06/2023 9.4  8.9 - 12.7 fL Final    Platelets 12/06/2023 146 (L)  149 - 390 Thousands/uL Final    nRBC 12/06/2023 0  /100 WBCs Final    Neutrophils Relative 12/06/2023 75  43 - 75 % Final    Immat GRANS % 12/06/2023 1  0 - 2 % Final    Lymphocytes Relative 12/06/2023 14  14 - 44 % Final    Monocytes Relative 12/06/2023 8  4 - 12 % Final    Eosinophils Relative 12/06/2023 2  0 - 6 % Final    Basophils Relative 12/06/2023 0  0 - 1 % Final    Neutrophils Absolute 12/06/2023 5.65  1.85 - 7.62 Thousands/µL Final    Immature Grans Absolute 12/06/2023 0.06  0.00 - 0.20 Thousand/uL Final    Lymphocytes Absolute 12/06/2023 1.06  0.60 - 4.47 Thousands/µL Final    Monocytes Absolute 12/06/2023 0.63  0.17 - 1.22 Thousand/µL Final    Eosinophils Absolute 12/06/2023 0.13  0.00 - 0.61 Thousand/µL Final    Basophils Absolute 12/06/2023 0.02  0.00 - 0.10 Thousands/µL Final    Sodium 12/06/2023 138  135 - 147 mmol/L Final    Potassium 12/06/2023 5.1  3.5 - 5.3 mmol/L Final    Chloride 12/06/2023 103  96 - 108 mmol/L Final    CO2 12/06/2023 29  21 - 32 mmol/L Final    ANION GAP 12/06/2023 6  mmol/L Final    BUN 12/06/2023 11  5 - 25 mg/dL Final    Creatinine 12/06/2023 0.83  0.60 - 1.30 mg/dL Final    Standardized to IDMS reference method    Glucose 12/06/2023 154 (H)  65 - 140 mg/dL Final    If the patient is fasting, the ADA then defines impaired fasting glucose as > 100 mg/dL and diabetes as > or  equal to 123 mg/dL.    Calcium 12/06/2023 9.1  8.4 - 10.2 mg/dL Final    AST 12/06/2023 16  13 - 39 U/L Final    ALT 12/06/2023 13  7 - 52 U/L Final    Specimen collection should occur prior to Sulfasalazine administration due to the potential for falsely depressed results.     Alkaline Phosphatase 12/06/2023 64  34 - 104 U/L Final    Total Protein 12/06/2023 6.7  6.4 - 8.4 g/dL Final    Albumin 12/06/2023 3.7  3.5 - 5.0 g/dL Final    Total Bilirubin 12/06/2023 0.77  0.20 - 1.00 mg/dL Final    Use of this assay is not recommended for patients undergoing treatment with eltrombopag due to the potential for falsely elevated results.  N-acetyl-p-benzoquinone imine (metabolite of Acetaminophen) will generate erroneously low results in samples for patients that have taken an overdose of Acetaminophen.    eGFR 12/06/2023 72  ml/min/1.73sq m Final    LACTIC ACID 12/06/2023 1.5  0.5 - 2.0 mmol/L Final    Procalcitonin 12/06/2023 0.05  <=0.25 ng/ml Final    Comment: Suspected Lower Respiratory Tract Infection (LRTI):  - LESS than or EQUAL to 0.25 ng/mL:   low likelihood for bacterial LRTI; antibiotics DISCOURAGED.  - GREATER than 0.25 ng/mL:   increased likelihood for bacterial LRTI; antibiotics ENCOURAGED.    Suspected Sepsis:  - Strongly consider initiating antibiotics in ALL UNSTABLE patients.  - LESS than or EQUAL to 0.5 ng/mL:   low likelihood for bacterial sepsis; antibiotics DISCOURAGED.  - GREATER than 0.5 ng/mL:   increased likelihood for bacterial sepsis; antibiotics ENCOURAGED.  - GREATER than 2 ng/mL:   high risk for severe sepsis / septic shock; antibiotics strongly ENCOURAGED.    Decisions on antibiotic use should not be based solely on Procalcitonin (PCT) levels. If PCT is low but uncertainty exists with stopping antibiotics, repeat PCT in 6-24 hours to confirm the low level. If antibiotics are administered (regardless if initial PCT was high or low), repeat PCT every 1-2 days to consider early antibiotic  cessation (when GREATER                            than 80% decrease from the peak OR when PCT drops below designated cutoffs, whichever comes first), so long as the infection is NOT one that typically requires prolonged treatment durations (e.g., bone/joint infections, endocarditis, Staph. aureus bacteremia).    Situations of FALSE-POSITIVE Procalcitonin values:  1) Newborns < 72 hours old  2) Massive stress from severe trauma / burns, major surgery, acute pancreatitis, cardiogenic / hemorrhagic shock, sickle cell crisis, or other organ perfusion abnormalities  3) Malaria and some Candidal infections  4) Treatment with agents that stimulate cytokines (e.g., OKT3, anti-lymphocyte globulins, alemtuzumab, IL-2, granulocyte transfusion [NOT GCSFs])  5) Chronic renal disease causes elevated baseline levels (consider GREATER than 0.75 ng/mL as an abnormal cut-off); initiating HD/CRRT may cause transient decreases  6) Paraneoplastic syndromes from medullary thyroid or SCLC, some forms of vasculitis, and acute [id number]                            disease    Situations of FALSE-NEGATIVE Procalcitonin values:  1) Too early in clinical course for PCT to have reached its peak (may repeat in 6-24 hours to confirm low level)  2) Localized infection WITHOUT systemic (SIRS / sepsis) response (e.g., an abscess, osteomyelitis, cystitis)  3) Mycobacteria (e.g., Tuberculosis, MAC)  4) Cystic fibrosis exacerbations      Protime 12/06/2023 13.4  11.6 - 14.5 seconds Final    INR 12/06/2023 1.00  0.84 - 1.19 Final    PTT 12/06/2023 28  23 - 37 seconds Final    Therapeutic Heparin Range =  60-90 seconds    Blood Culture 12/06/2023 No Growth After 5 Days.   Final    Blood Culture 12/06/2023 No Growth After 5 Days.   Final    SARS-CoV-2 12/06/2023 Negative  Negative Final    INFLUENZA A PCR 12/06/2023 Negative  Negative Final    INFLUENZA B PCR 12/06/2023 Negative  Negative Final    RSV PCR 12/06/2023 Negative  Negative Final    Wound  "Culture 12/06/2023 1+ Growth of   Final    Mixed Skin Camila    Gram Stain Result 12/06/2023 Rare Polys (A)   Final    Gram Stain Result 12/06/2023 Rare Gram positive cocci in pairs (A)   Final    Gram Stain Result 12/06/2023 Rare Gram negative rods (A)   Final    Ventricular Rate 12/06/2023 101  BPM Final    Atrial Rate 12/06/2023 90  BPM Final    QRSD Interval 12/06/2023 96  ms Final    QT Interval 12/06/2023 344  ms Final    QTC Interval 12/06/2023 446  ms Final    QRS Axis 12/06/2023 -35  degrees Final    T Wave Axis 12/06/2023 39  degrees Final       Pertinent images and available reads personally reviewed  No results found.      Pertinent notes reviewed    Counseling / Coordination of Care  Total Office time /unit time spent today 15minutes. Greater than 50% of total time was spent with the patient and / or family counseling and / or coordination of care. A description of the counseling / coordination of care:  I performed an interim history, pertinent images and labs, performed a physical examination to arrive at the plan delineated above with associated thought processes.       Haresh Gonzalez MD MS FRCS FACS  [facility]  01/04/24 3:54 PM        Portions of the record may have been created with voice recognition software. Occasional wrong word or \"sound a like\" substitutions may have occurred due to the inherent limitations of voice recognition software. Read the chart carefully and recognize, using context, where substitutions have occurred.        "

## 2024-01-11 ENCOUNTER — HOSPITAL ENCOUNTER (OUTPATIENT)
Facility: HOSPITAL | Age: 69
Setting detail: OUTPATIENT SURGERY
End: 2024-01-11
Attending: SPECIALIST | Admitting: SPECIALIST
Payer: MEDICARE

## 2024-01-16 VITALS — BODY MASS INDEX: 46.98 KG/M2 | WEIGHT: 282 LBS | HEIGHT: 65 IN

## 2024-01-16 NOTE — PRE-PROCEDURE INSTRUCTIONS
Pre-Surgery Instructions:   Medication Instructions    atorvastatin (LIPITOR) 20 mg tablet Take day of surgery.    Eliquis 5 MG Instructions provided by MD    Empagliflozin (Jardiance) 10 MG TABS tablet Stop taking 4 days prior to surgery.    metoprolol succinate (TOPROL-XL) 25 mg 24 hr tablet Take day of surgery.    Multiple Vitamins-Minerals (BARIATRIC FUSION) CHEW Hold day of surgery.    omeprazole (PriLOSEC) 20 mg delayed release capsule Take day of surgery.    oxyCODONE (ROXICODONE) 10 MG TABS Uses PRN- OK to take day of surgery    potassium chloride (K-DUR,KLOR-CON) 20 mEq tablet Hold day of surgery.    torsemide (DEMADEX) 20 mg tablet Hold day of surgery.      Medication instructions for day surgery reviewed. Please use only a sip of water to take your instructed medications. Avoid all over the counter vitamins, supplements and NSAIDS for one week prior to surgery per anesthesia guidelines. Tylenol is ok to take as needed.     You will receive a call one business day prior to surgery with an arrival time and hospital directions. If your surgery is scheduled on a Monday, the hospital will be calling you on the Friday prior to your surgery. If you have not heard from anyone by 8pm, please call the hospital supervisor through the hospital  at [phone]. (Bryan [phone]).    Do not eat or drink anything after midnight the night before your surgery, including candy, mints, lifesavers, or chewing gum. Do not drink alcohol 24hrs before your surgery. Try not to smoke at least 24hrs before your surgery.       Follow the pre surgery showering instructions as listed in the “My Surgical Experience Booklet” or otherwise provided by your surgeon's office. Do not use a blade to shave the surgical area 1 week before surgery. It is okay to use a clean electric clippers up to 24 hours before surgery. Do not apply any lotions, creams, including makeup, cologne, deodorant, or perfumes after showering on the day  of your surgery. Do not use dry shampoo, hair spray, hair gel, or any type of hair products.     No contact lenses, eye make-up, or artificial eyelashes. Remove nail polish, including gel polish, and any artificial, gel, or acrylic nails if possible. Remove all jewelry including rings and body piercing jewelry.     Wear causal clothing that is easy to take on and off. Consider your type of surgery.    Keep any valuables, jewelry, piercings at home. Please bring any specially ordered equipment (sling, braces) if indicated.    Arrange for a responsible person to drive you to and from the hospital on the day of your surgery. Visitor Guidelines discussed.     Call the surgeon's office with any new illnesses, exposures, or additional questions prior to surgery.    Please reference your “My Surgical Experience Booklet” for additional information to prepare for your upcoming surgery.

## 2024-01-23 ENCOUNTER — APPOINTMENT (OUTPATIENT)
Dept: LAB | Facility: CLINIC | Age: 69
End: 2024-01-23
Payer: MEDICARE

## 2024-01-23 DIAGNOSIS — L72.3 SEBACEOUS CYST: ICD-10-CM

## 2024-01-23 DIAGNOSIS — Z95.2 S/P TAVR (TRANSCATHETER AORTIC VALVE REPLACEMENT): ICD-10-CM

## 2024-01-23 DIAGNOSIS — E78.5 HYPERLIPIDEMIA, UNSPECIFIED HYPERLIPIDEMIA TYPE: ICD-10-CM

## 2024-01-23 DIAGNOSIS — R79.89 HIGH SERUM PARATHYROID HORMONE (PTH): ICD-10-CM

## 2024-01-23 DIAGNOSIS — I48.91 ATRIAL FIBRILLATION WITH RAPID VENTRICULAR RESPONSE (HCC): ICD-10-CM

## 2024-01-23 DIAGNOSIS — Z85.048 HISTORY OF RECTAL CANCER: ICD-10-CM

## 2024-01-23 DIAGNOSIS — E66.01 MORBID OBESITY DUE TO EXCESS CALORIES (HCC): ICD-10-CM

## 2024-01-23 DIAGNOSIS — K91.2 POSTSURGICAL MALABSORPTION: ICD-10-CM

## 2024-01-23 DIAGNOSIS — E11.22 TYPE 2 DIABETES MELLITUS WITH CHRONIC KIDNEY DISEASE, WITHOUT LONG-TERM CURRENT USE OF INSULIN, UNSPECIFIED CKD STAGE (HCC): ICD-10-CM

## 2024-01-23 DIAGNOSIS — Z98.84 BARIATRIC SURGERY STATUS: ICD-10-CM

## 2024-01-23 DIAGNOSIS — L02.211 ABDOMINAL WALL ABSCESS: ICD-10-CM

## 2024-01-23 DIAGNOSIS — Z98.84 S/P GASTRIC BYPASS: ICD-10-CM

## 2024-01-23 DIAGNOSIS — Z85.43 HISTORY OF OVARIAN CANCER: ICD-10-CM

## 2024-01-23 LAB
25(OH)D3 SERPL-MCNC: 24 NG/ML (ref 30–100)
ALBUMIN SERPL BCP-MCNC: 3.7 G/DL (ref 3.5–5)
ALP SERPL-CCNC: 65 U/L (ref 34–104)
ALT SERPL W P-5'-P-CCNC: 11 U/L (ref 7–52)
ANION GAP SERPL CALCULATED.3IONS-SCNC: 11 MMOL/L
AST SERPL W P-5'-P-CCNC: 15 U/L (ref 13–39)
BILIRUB SERPL-MCNC: 0.66 MG/DL (ref 0.2–1)
BUN SERPL-MCNC: 14 MG/DL (ref 5–25)
CALCIUM SERPL-MCNC: 9.5 MG/DL (ref 8.4–10.2)
CHLORIDE SERPL-SCNC: 103 MMOL/L (ref 96–108)
CHOLEST SERPL-MCNC: 146 MG/DL
CO2 SERPL-SCNC: 28 MMOL/L (ref 21–32)
CREAT SERPL-MCNC: 0.85 MG/DL (ref 0.6–1.3)
ERYTHROCYTE [DISTWIDTH] IN BLOOD BY AUTOMATED COUNT: 15.9 % (ref 11.6–15.1)
EST. AVERAGE GLUCOSE BLD GHB EST-MCNC: 171 MG/DL
GFR SERPL CREATININE-BSD FRML MDRD: 70 ML/MIN/1.73SQ M
GLUCOSE P FAST SERPL-MCNC: 132 MG/DL (ref 65–99)
HBA1C MFR BLD: 7.6 %
HCT VFR BLD AUTO: 49.2 % (ref 34.8–46.1)
HDLC SERPL-MCNC: 48 MG/DL
HGB BLD-MCNC: 15.2 G/DL (ref 11.5–15.4)
LDLC SERPL CALC-MCNC: 72 MG/DL (ref 0–100)
MCH RBC QN AUTO: 25.9 PG (ref 26.8–34.3)
MCHC RBC AUTO-ENTMCNC: 30.9 G/DL (ref 31.4–37.4)
MCV RBC AUTO: 84 FL (ref 82–98)
PLATELET # BLD AUTO: 195 THOUSANDS/UL (ref 149–390)
PMV BLD AUTO: 9.9 FL (ref 8.9–12.7)
POTASSIUM SERPL-SCNC: 4.7 MMOL/L (ref 3.5–5.3)
PROT SERPL-MCNC: 6.8 G/DL (ref 6.4–8.4)
PTH-INTACT SERPL-MCNC: 129 PG/ML (ref 12–88)
RBC # BLD AUTO: 5.86 MILLION/UL (ref 3.81–5.12)
SODIUM SERPL-SCNC: 142 MMOL/L (ref 135–147)
TRIGL SERPL-MCNC: 131 MG/DL
WBC # BLD AUTO: 6.63 THOUSAND/UL (ref 4.31–10.16)

## 2024-01-23 PROCEDURE — 83970 ASSAY OF PARATHORMONE: CPT

## 2024-01-23 PROCEDURE — 82306 VITAMIN D 25 HYDROXY: CPT

## 2024-01-23 PROCEDURE — 80061 LIPID PANEL: CPT

## 2024-01-23 PROCEDURE — 85027 COMPLETE CBC AUTOMATED: CPT

## 2024-01-23 PROCEDURE — 80053 COMPREHEN METABOLIC PANEL: CPT

## 2024-01-23 PROCEDURE — 36415 COLL VENOUS BLD VENIPUNCTURE: CPT

## 2024-01-23 PROCEDURE — 83036 HEMOGLOBIN GLYCOSYLATED A1C: CPT

## 2024-01-24 ENCOUNTER — TELEPHONE (OUTPATIENT)
Dept: BARIATRICS | Facility: CLINIC | Age: 69
End: 2024-01-24

## 2024-01-24 DIAGNOSIS — R79.89 HIGH SERUM PARATHYROID HORMONE (PTH): ICD-10-CM

## 2024-01-24 DIAGNOSIS — Z98.84 BARIATRIC SURGERY STATUS: Primary | ICD-10-CM

## 2024-01-24 DIAGNOSIS — R79.89 LOW VITAMIN D LEVEL: ICD-10-CM

## 2024-01-24 NOTE — TELEPHONE ENCOUNTER
----- Message from GEORGETTE Bennett sent at 1/24/2024 10:04 AM EST -----  Please call pt to let her know her PTH level worsened. Please ask if she is currently taking any vitamin D and calcium supplements. I would recommend her taking calcium three times per day - typically the dose is 500-600 mg each. Also, her vitamin D level is low. Please add an extra Vitamin D3 5000 IU daily to help with her levels. These are over the counter to get.     Repeat labs in 3 months. If levels are persistent, I will recommend her to see endocrinology.

## 2024-01-27 DIAGNOSIS — E78.5 HYPERLIPIDEMIA, UNSPECIFIED HYPERLIPIDEMIA TYPE: ICD-10-CM

## 2024-01-28 RX ORDER — ATORVASTATIN CALCIUM 20 MG/1
TABLET, FILM COATED ORAL
Qty: 90 TABLET | Refills: 0 | Status: SHIPPED | OUTPATIENT
Start: 2024-01-28

## 2024-01-29 ENCOUNTER — TELEPHONE (OUTPATIENT)
Dept: SURGERY | Facility: CLINIC | Age: 69
End: 2024-01-29

## 2024-01-29 ENCOUNTER — TELEPHONE (OUTPATIENT)
Dept: OTHER | Facility: OTHER | Age: 69
End: 2024-01-29

## 2024-01-29 DIAGNOSIS — R21 RASH: Primary | ICD-10-CM

## 2024-01-29 RX ORDER — NYSTATIN 100000 U/G
CREAM TOPICAL 2 TIMES DAILY
Qty: 30 G | Refills: 2 | Status: SHIPPED | OUTPATIENT
Start: 2024-01-29

## 2024-01-29 NOTE — TELEPHONE ENCOUNTER
Received in basket message that patient is cancelling scheduled surgery for 1/30/2024.  Left patient a message to call office back to verify cancelling and does not want to reschedule.

## 2024-01-29 NOTE — TELEPHONE ENCOUNTER
Patient is calling regarding cancelling an appointment.    Date/Time:1/30/2024    Patient was rescheduled: YES [] NO [x]    Patient requesting call back to reschedule: YES [] NO [x]    Cancelling Surgery

## 2024-02-14 ENCOUNTER — TELEPHONE (OUTPATIENT)
Dept: GASTROENTEROLOGY | Facility: MEDICAL CENTER | Age: 69
End: 2024-02-14

## 2024-02-14 NOTE — TELEPHONE ENCOUNTER
Left message for patient confirming upcoming procedure. Patient was instructed to call [phone] if they have any questions or concerns about the prep instructions or if they need to change or cancel the procedure.      Pt reminded of 2 day Eliquis hold and 4 day Jardiance hold.

## 2024-02-18 ENCOUNTER — OFFICE VISIT (OUTPATIENT)
Dept: URGENT CARE | Facility: CLINIC | Age: 69
End: 2024-02-18
Payer: MEDICARE

## 2024-02-18 ENCOUNTER — APPOINTMENT (OUTPATIENT)
Dept: RADIOLOGY | Facility: CLINIC | Age: 69
End: 2024-02-18
Payer: MEDICARE

## 2024-02-18 VITALS
TEMPERATURE: 96.6 F | BODY MASS INDEX: 47.66 KG/M2 | WEIGHT: 286.4 LBS | HEART RATE: 100 BPM | SYSTOLIC BLOOD PRESSURE: 146 MMHG | RESPIRATION RATE: 18 BRPM | DIASTOLIC BLOOD PRESSURE: 97 MMHG | OXYGEN SATURATION: 96 %

## 2024-02-18 DIAGNOSIS — S50.02XA CONTUSION OF LEFT ELBOW, INITIAL ENCOUNTER: Primary | ICD-10-CM
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
## 2024-02-18 PROCEDURE — 99213 OFFICE O/P EST LOW 20 MIN: CPT

## 2024-02-18 PROCEDURE — 73080 X-RAY EXAM OF ELBOW: CPT

## 2024-02-18 NOTE — PATIENT INSTRUCTIONS
Start with Tylenol, ice/heat, lidocaine of pain.   Follow up if symptoms worsening - warmth, fever/chills, increasing bruising/pain

## 2024-02-18 NOTE — PROGRESS NOTES
[facility]      NAME: Lisa Reardon is a 68 y.o. female  : 1955    MRN: [id number]  DATE: 2024  TIME: 10:39 AM    Assessment and Plan   Contusion of left elbow, initial encounter [S50.02XA]  1. Contusion of left elbow, initial encounter  XR elbow 3+ vw left    CANCELED: XR elbow 3+ vw left        XR of elbow showing no acute fracture -- noted degenerative disease present and potential bone spurs but all appear rounding and chronic. Calcifications noted. Will call if any additional findings on final radiology reading.   Go to ER if symptoms get worse.     Patient Instructions     Start with Tylenol, ice/heat, lidocaine of pain.   Follow up if symptoms worsening - warmth, fever/chills, increasing bruising/pain      Chief Complaint     Chief Complaint   Patient presents with    Arm Pain     Fell on Thursday, now has bruising and arm pain above left elbow area with muscle weakness.           History of Present Illness       Presents with arm pain after fall forwards 3 days ago near her dressor. She notes bruising and arm pain above the elbow area. She has muscle weakness present. She is on eloquis. Pain did not start until 1.5 days after the fall. She had worsenign pain with range of motion. Using ice and volatren gel for symptoms.         Review of Systems   Review of Systems   Constitutional:  Negative for fever.   Respiratory:  Negative for shortness of breath.    Cardiovascular:  Negative for chest pain.   Musculoskeletal:  Positive for myalgias.   Skin:  Negative for wound.   Neurological:  Positive for weakness.   Hematological:  Bruises/bleeds easily.         Current Medications       Current Outpatient Medications:     albuterol (2.5 mg/3 mL) 0.083 % nebulizer solution, TAKE 1 VIAL BY NEBULIZATION EVERY 4 HOURS AS NEEDED FOR WHEEZING OR SHORTNESS OF BREATH, Disp: 225 mL, Rfl: 1    atorvastatin (LIPITOR) 20 mg tablet, TAKE ONE TABLET DAILY, Disp: 90 tablet, Rfl: 0    Eliquis 5 MG,  ONE TAB TWICE A DAY, Disp: 60 tablet, Rfl: 2    Empagliflozin (Jardiance) 10 MG TABS tablet, TAKE 1 TABLET EVERY MORNING, Disp: 90 tablet, Rfl: 0    metoprolol succinate (TOPROL-XL) 25 mg 24 hr tablet, Take 1 tablet in morning and half tablet in evening, Disp: 135 tablet, Rfl: 1    Multiple Vitamins-Minerals (BARIATRIC FUSION) CHEW, Chew 1 tablet daily  , Disp: , Rfl:     nystatin (MYCOSTATIN) cream, Apply topically 2 (two) times a day, Disp: 30 g, Rfl: 2    omeprazole (PriLOSEC) 20 mg delayed release capsule, TAKE 1 CAPSULE DAILY BEFORE BREAKFAST, Disp: 90 capsule, Rfl: 1    oxyCODONE (ROXICODONE) 10 MG TABS, Take 10 mg by mouth every 6 (six) hours as needed As needed for pain, Disp: , Rfl:     torsemide (DEMADEX) 20 mg tablet, Take 1 tablet (20 mg total) by mouth if needed (for leg swelling/weight gain above 3 pounds), Disp: 30 tablet, Rfl: 1    Calcium-Vitamin D 500-125 MG-UNIT TABS, Take 1 tablet by mouth 2 (two) times a day  (Patient not taking: Reported on 9/16/2023), Disp: , Rfl:     potassium chloride (K-DUR,KLOR-CON) 20 mEq tablet, Take 1 tablet (20 mEq total) by mouth if needed (if taking torsemide) (Patient not taking: Reported on 2/18/2024), Disp: 30 tablet, Rfl: 1    Current Allergies     Allergies as of 02/18/2024    (No Known Allergies)            The following portions of the patient's history were reviewed and updated as appropriate: allergies, current medications, past family history, past medical history, past social history, past surgical history and problem list.     Past Medical History:   Diagnosis Date    Acute pain of right knee     Ambulatory dysfunction     Anemia     Arthritis     Bleeding ulcer     Cancer (HCC)     rectal    Chronic lower back pain     Chronic pain disorder     back pain    Colon cancer (HCC) 2018    COVID-19 05/2023    mild s/s    Diabetes mellitus (HCC)     Endometrial cancer (HCC) 2018    GERD (gastroesophageal reflux disease)     History of chemotherapy     History  of MRSA infection     Hyperlipidemia     Hypertension     Morbid obesity (HCC)     Morbid obesity with BMI of 45.0-49.9, adult (HCC)     Ovarian cancer (HCC) 2018    Paroxysmal atrial fibrillation (HCC)     Pneumonia     Rectal cancer (HCC)     S/P TAVR (transcatheter aortic valve replacement)     Sleep apnea     doesn't use CPAP any longer    SOB (shortness of breath)     Spinal stenosis     Systolic murmur     Unsteady gait     uses walker    Wears dentures     Wears glasses        Past Surgical History:   Procedure Laterality Date    ABDOMINAL PERINEAL BOWEL RESECTION W/ ILEOANAL POUCH N/A 2018    Procedure: LAPAROSCOPIC HAND ASSIST ABDOMINOPERINEAL RESECTION,  POSTERIOR VAGINECTOMY, OMENTECTOMY;  Surgeon: José Miguel Cedillo MD;  [facility]: [facility];  Service: Colorectal    ABDOMINAL SURGERY      abscess removed from abdomen and right thigh, a hole in thigh closed by plastic surgeon    ABSCESS DRAINAGE      abd, (R) leg, (L) leg     SECTION       SECTION      CYSTOSCOPY N/A 2018    Procedure: CYSTOSCOPY;  Surgeon: Seth Pinto MD;  [facility]: [facility];  Service: Gynecology Oncology    ESOPHAGOGASTRODUODENOSCOPY N/A 2016    Procedure: ESOPHAGOGASTRODUODENOSCOPY (EGD);  Surgeon: Jama Frazier MD;  [facility];  Service:     ESOPHAGOGASTRODUODENOSCOPY N/A 2016    Procedure: ESOPHAGOGASTRODUODENOSCOPY (EGD);  Surgeon: Jama Frazier MD;  [facility]: [facility];  Service:     EYE SURGERY      laser eye surgery    HYSTERECTOMY N/A 2018    Procedure: RADICAL HYSTERECTOMY TOTAL ABDOMINAL (ALEXANDRA). BSO;  Surgeon: Seth Pinto MD;  [facility];  Service: Gynecology Oncology    JOINT REPLACEMENT Left 2017    hip    JOINT REPLACEMENT Right 2017    hip    OOPHORECTOMY Bilateral     ID COLONOSCOPY FLX DX W/COLLJ SPEC WHEN PFRMD N/A 2018    Procedure: COLONOSCOPY;  Surgeon: Juanjo Sanders MD;  Location: [facility];  Service:  Gastroenterology    AL COLONOSCOPY FLX DX W/COLLJ SPEC WHEN PFRMD N/A 09/05/2018    Procedure: COLONOSCOPY;  Surgeon: José Miguel Cedillo MD;  Location:  GI LAB;  Service: Colorectal    AL ECHO TRANSESOPHAG R-T 2D W/PRB IMG ACQUISJ I&R N/A 09/01/2020    Procedure: TRANSESOPHAGEAL ECHOCARDIOGRAM (THO);  Surgeon: Diallo Vu DO;  [facility];  Service: Cardiac Surgery    AL ESOPHAGOGASTRODUODENOSCOPY TRANSORAL DIAGNOSTIC N/A 02/02/2018    Procedure: ESOPHAGOGASTRODUODENOSCOPY (EGD);  Surgeon: Juanjo Sanders MD;  Location: [facility];  Service: Gastroenterology    AL LAPAROSCOPY SURG COLOSTOMY/SKN LVL CECOSTOMY N/A 09/06/2018    Procedure: PERMANENT END COLOSTOMY;  Surgeon: José Miguel Cedillo MD;  [facility];  Service: Colorectal    AL LAPS GSTR RSTCV PX W/BYP SOLEDAD-EN-Y LIMB <150 CM N/A 07/18/2016    Procedure: BYPASS GASTRIC  SOLEDAD-EN-Y LAPAROSCOPIC;  Surgeon: Jama Frazier MD;  [facility];  Service: Bariatrics    AL LAPS PROCTECTOMY ABDOMINOPERINEAL W/COLOSTOMY N/A 09/06/2018    Procedure: PROCTECTOMY;  Surgeon: José Miguel Cedillo MD;  [facility];  Service: Colorectal    AL REPLACE AORTIC VALVE OPENFEMORAL ARTERY APPROACH N/A 09/01/2020    Procedure: REPLACEMENT AORTIC VALVE TRANSCATHETER (TAVR) TRANSFEMORAL W/ 26MM WADDELL BARBARA S3 ULTRA VALVE(ACCESS ON RIGHT);  Surgeon: Diallo Vu DO;  [facility];  Service: Cardiac Surgery    REPLACEMENT TOTAL KNEE      TOOTH EXTRACTION      TUBAL LIGATION         Family History   Adopted: Yes   Problem Relation Age of Onset    Leukemia Mother     Heart disease Father     Coronary artery disease Father     Diabetes Father     No Known Problems Sister     No Known Problems Brother     Diabetes Son     No Known Problems Brother     No Known Problems Brother     No Known Problems Sister     No Known Problems Sister          Medications have been verified.        Objective   /97   Pulse 100   Temp (!) 96.6 °F  (35.9 °C)   Resp 18   Wt 130 kg (286 lb 6.4 oz)   SpO2 96%   BMI 47.66 kg/m²        Physical Exam     Physical Exam  Vitals reviewed.   Constitutional:       Appearance: Normal appearance.   Cardiovascular:      Rate and Rhythm: Normal rate and regular rhythm.      Pulses: Normal pulses.   Pulmonary:      Effort: Pulmonary effort is normal. No respiratory distress.   Musculoskeletal:      Left shoulder: Normal.      Left upper arm: Swelling present. No deformity, tenderness or bony tenderness.      Left elbow: Normal. No swelling or lacerations. Normal range of motion.      Comments: Bruising noted to the distal half of the upper arm with line stopping bruising at the elbow bend,    Skin:     General: Skin is warm and dry.      Capillary Refill: Capillary refill takes less than 2 seconds.   Neurological:      General: No focal deficit present.      Mental Status: She is alert and oriented to person, place, and time.   Psychiatric:         Mood and Affect: Mood normal.         Behavior: Behavior normal.

## 2024-02-19 ENCOUNTER — TRANSCRIBE ORDERS (OUTPATIENT)
Dept: LAB | Facility: CLINIC | Age: 69
End: 2024-02-19

## 2024-02-19 ENCOUNTER — APPOINTMENT (OUTPATIENT)
Dept: LAB | Facility: CLINIC | Age: 69
End: 2024-02-19
Payer: MEDICARE

## 2024-02-19 DIAGNOSIS — Z79.891 ENCOUNTER FOR LONG-TERM METHADONE USE: Primary | ICD-10-CM

## 2024-02-19 LAB
CREAT UR-MCNC: 100.2 MG/DL
MICROALBUMIN UR-MCNC: 288.5 MG/L
MICROALBUMIN/CREAT 24H UR: 288 MG/G CREATININE (ref 0–30)

## 2024-02-19 PROCEDURE — 82043 UR ALBUMIN QUANTITATIVE: CPT

## 2024-02-19 PROCEDURE — 82570 ASSAY OF URINE CREATININE: CPT

## 2024-02-19 PROCEDURE — 80307 DRUG TEST PRSMV CHEM ANLYZR: CPT

## 2024-02-19 PROCEDURE — 80365 DRUG SCREENING OXYCODONE: CPT

## 2024-02-19 PROCEDURE — 80361 OPIATES 1 OR MORE: CPT

## 2024-02-21 ENCOUNTER — ANESTHESIA (OUTPATIENT)
Dept: GASTROENTEROLOGY | Facility: HOSPITAL | Age: 69
End: 2024-02-21

## 2024-02-21 ENCOUNTER — HOSPITAL ENCOUNTER (OUTPATIENT)
Dept: GASTROENTEROLOGY | Facility: HOSPITAL | Age: 69
Setting detail: OUTPATIENT SURGERY
Discharge: HOME | End: 2024-02-21
Attending: INTERNAL MEDICINE | Admitting: INTERNAL MEDICINE
Payer: MEDICARE

## 2024-02-21 ENCOUNTER — ANESTHESIA EVENT (OUTPATIENT)
Dept: GASTROENTEROLOGY | Facility: HOSPITAL | Age: 69
End: 2024-02-21

## 2024-02-21 VITALS
RESPIRATION RATE: 18 BRPM | HEIGHT: 65 IN | TEMPERATURE: 97.7 F | WEIGHT: 286 LBS | DIASTOLIC BLOOD PRESSURE: 76 MMHG | HEART RATE: 103 BPM | BODY MASS INDEX: 47.65 KG/M2 | SYSTOLIC BLOOD PRESSURE: 120 MMHG | OXYGEN SATURATION: 94 %

## 2024-02-21 DIAGNOSIS — K31.89 GASTRIC MASS: ICD-10-CM

## 2024-02-21 PROBLEM — N12 PYELONEPHRITIS: Status: RESOLVED | Noted: 2019-03-11 | Resolved: 2024-02-21

## 2024-02-21 PROBLEM — Z12.39 ENCOUNTER FOR OTHER SCREENING FOR MALIGNANT NEOPLASM OF BREAST: Status: RESOLVED | Noted: 2019-05-29 | Resolved: 2024-02-21

## 2024-02-21 PROCEDURE — 43239 EGD BIOPSY SINGLE/MULTIPLE: CPT | Performed by: INTERNAL MEDICINE

## 2024-02-21 PROCEDURE — 88342 IMHCHEM/IMCYTCHM 1ST ANTB: CPT | Performed by: PATHOLOGY

## 2024-02-21 PROCEDURE — 43237 ENDOSCOPIC US EXAM ESOPH: CPT | Performed by: INTERNAL MEDICINE

## 2024-02-21 PROCEDURE — 88341 IMHCHEM/IMCYTCHM EA ADD ANTB: CPT | Performed by: PATHOLOGY

## 2024-02-21 PROCEDURE — 88305 TISSUE EXAM BY PATHOLOGIST: CPT | Performed by: PATHOLOGY

## 2024-02-21 PROCEDURE — 88360 TUMOR IMMUNOHISTOCHEM/MANUAL: CPT | Performed by: PATHOLOGY

## 2024-02-21 PROCEDURE — 43251 EGD REMOVE LESION SNARE: CPT | Performed by: INTERNAL MEDICINE

## 2024-02-21 RX ORDER — LIDOCAINE HYDROCHLORIDE 20 MG/ML
INJECTION, SOLUTION EPIDURAL; INFILTRATION; INTRACAUDAL; PERINEURAL AS NEEDED
Status: DISCONTINUED | OUTPATIENT
Start: 2024-02-21 | End: 2024-02-21

## 2024-02-21 RX ORDER — PROPOFOL 10 MG/ML
INJECTION, EMULSION INTRAVENOUS CONTINUOUS PRN
Status: DISCONTINUED | OUTPATIENT
Start: 2024-02-21 | End: 2024-02-21

## 2024-02-21 RX ORDER — ESMOLOL HYDROCHLORIDE 10 MG/ML
INJECTION INTRAVENOUS AS NEEDED
Status: DISCONTINUED | OUTPATIENT
Start: 2024-02-21 | End: 2024-02-21

## 2024-02-21 RX ORDER — METOPROLOL TARTRATE 1 MG/ML
INJECTION, SOLUTION INTRAVENOUS AS NEEDED
Status: DISCONTINUED | OUTPATIENT
Start: 2024-02-21 | End: 2024-02-21

## 2024-02-21 RX ORDER — SODIUM CHLORIDE 9 MG/ML
INJECTION, SOLUTION INTRAVENOUS CONTINUOUS PRN
Status: DISCONTINUED | OUTPATIENT
Start: 2024-02-21 | End: 2024-02-21

## 2024-02-21 RX ORDER — PHENYLEPHRINE HCL IN 0.9% NACL 1 MG/10 ML
SYRINGE (ML) INTRAVENOUS AS NEEDED
Status: DISCONTINUED | OUTPATIENT
Start: 2024-02-21 | End: 2024-02-21

## 2024-02-21 RX ORDER — PROPOFOL 10 MG/ML
INJECTION, EMULSION INTRAVENOUS AS NEEDED
Status: DISCONTINUED | OUTPATIENT
Start: 2024-02-21 | End: 2024-02-21

## 2024-02-21 RX ORDER — FENTANYL CITRATE 50 UG/ML
INJECTION, SOLUTION INTRAMUSCULAR; INTRAVENOUS AS NEEDED
Status: DISCONTINUED | OUTPATIENT
Start: 2024-02-21 | End: 2024-02-21

## 2024-02-21 RX ADMIN — FENTANYL CITRATE 25 MCG: 50 INJECTION INTRAMUSCULAR; INTRAVENOUS at 08:31

## 2024-02-21 RX ADMIN — PROPOFOL 50 MG: 10 INJECTION, EMULSION INTRAVENOUS at 08:12

## 2024-02-21 RX ADMIN — ESMOLOL HYDROCHLORIDE 50 MG: 100 INJECTION, SOLUTION INTRAVENOUS at 08:18

## 2024-02-21 RX ADMIN — FENTANYL CITRATE 50 MCG: 50 INJECTION INTRAMUSCULAR; INTRAVENOUS at 08:08

## 2024-02-21 RX ADMIN — FENTANYL CITRATE 25 MCG: 50 INJECTION INTRAMUSCULAR; INTRAVENOUS at 08:10

## 2024-02-21 RX ADMIN — Medication 200 MCG: at 08:38

## 2024-02-21 RX ADMIN — METOROPROLOL TARTRATE 3 MG: 5 INJECTION, SOLUTION INTRAVENOUS at 08:23

## 2024-02-21 RX ADMIN — Medication 200 MCG: at 08:44

## 2024-02-21 RX ADMIN — PROPOFOL 70 MCG/KG/MIN: 10 INJECTION, EMULSION INTRAVENOUS at 08:10

## 2024-02-21 RX ADMIN — Medication 200 MCG: at 08:53

## 2024-02-21 RX ADMIN — ESMOLOL HYDROCHLORIDE 50 MG: 100 INJECTION, SOLUTION INTRAVENOUS at 08:41

## 2024-02-21 RX ADMIN — LIDOCAINE HYDROCHLORIDE 100 MG: 20 INJECTION, SOLUTION EPIDURAL; INFILTRATION; INTRACAUDAL at 08:10

## 2024-02-21 RX ADMIN — PROPOFOL 50 MG: 10 INJECTION, EMULSION INTRAVENOUS at 08:11

## 2024-02-21 RX ADMIN — METOROPROLOL TARTRATE 2 MG: 5 INJECTION, SOLUTION INTRAVENOUS at 08:29

## 2024-02-21 RX ADMIN — SODIUM CHLORIDE: 0.9 INJECTION, SOLUTION INTRAVENOUS at 08:04

## 2024-02-21 NOTE — ANESTHESIA PREPROCEDURE EVALUATION
Procedure:  ENDOSCOPIC ULTRASOUND (UPPER)    Relevant Problems   CARDIO   (+) Aortic stenosis, severe (S/p TAVR)   (+) Atrial fibrillation with rapid ventricular response (HCC)   (+) CAD (coronary artery disease)   (+) Hyperlipidemia      ENDO   (+) Type 2 diabetes mellitus with chronic kidney disease, without long-term current use of insulin, unspecified CKD stage (HCC)      GI/HEPATIC   (+) GI bleed      /RENAL   (+) Chronic kidney disease      HEMATOLOGY   (+) Iron deficiency anemia   (+) Iron deficiency anemia, unspecified      MUSCULOSKELETAL   (+) Osteoarthritis of right knee      NEURO/PSYCH   (+) Continuous opioid dependence (HCC)      PULMONARY   (+) Pulmonary emphysema, unspecified emphysema type (HCC)      LEFT VENTRICLE:  Normal left ventricular chamber size  There is mild concentric left ventricular hypertrophy  Ejection fraction is estimated at 60%  No definite regional wall motion abnormality seen  Indeterminate diastolic function     RIGHT VENTRICLE:  Normal right ventricular size and systolic function  TAPSE is 2.3 cm     LEFT ATRIUM:  The atrium was mildly to moderately dilated.     MITRAL VALVE:  There is severe mitral annular calcification. Valve leaflets are thickened with slightly decreased separation. There is mild mitral stenosis with mean gradient of 4 mm Hg  There was no significant mitral regurgitation     AORTIC VALVE:  There is a 26 mm ES 3 ultra TAVR bioprosthesis. This exhibited normal function with no paravalvular leak or valvular regurgitation.  There was no significant gradient across the aortic valve     TRICUSPID VALVE:  There was mild regurgitation.  Peak PA pressure is estimated at 37 mm Hg       Anesthesia Plan  ASA Score- 3     Anesthesia Type- IV sedation with anesthesia with ASA Monitors.         Additional Monitors:     Airway Plan:            Plan Factors-    Chart reviewed. EKG reviewed.  Existing labs reviewed. Patient summary reviewed.                  Induction-  intravenous.    Postoperative Plan-     Informed Consent- Anesthetic plan and risks discussed with patient.  I personally reviewed this patient with the CRNA. Discussed and agreed on the Anesthesia Plan with the CRNA..

## 2024-02-21 NOTE — ANESTHESIA POSTPROCEDURE EVALUATION
Post-Op Assessment Note    CV Status:  Stable    Pain management: adequate       Mental Status:  Alert and awake   Hydration Status:  Euvolemic   PONV Controlled:  Controlled   Airway Patency:  Patent     Post Op Vitals Reviewed: Yes    No anethesia notable event occurred.    Staff: Anesthesiologist, CRNA               /83 (02/21/24 0905)    Temp 97.7 °F (36.5 °C) (02/21/24 0905)    Pulse (!) 108 (02/21/24 0905)   Resp 18 (02/21/24 0905)    SpO2 92 % (02/21/24 0905)

## 2024-02-22 ENCOUNTER — TELEPHONE (OUTPATIENT)
Dept: FAMILY MEDICINE CLINIC | Facility: CLINIC | Age: 69
End: 2024-02-22

## 2024-02-22 DIAGNOSIS — R21 RASH: ICD-10-CM

## 2024-02-22 DIAGNOSIS — R80.9 MICROALBUMINURIA: Primary | ICD-10-CM

## 2024-02-22 DIAGNOSIS — I48.91 ATRIAL FIBRILLATION WITH RAPID VENTRICULAR RESPONSE (HCC): Primary | ICD-10-CM

## 2024-02-22 DIAGNOSIS — I50.32 CHRONIC DIASTOLIC HEART FAILURE (HCC): ICD-10-CM

## 2024-02-22 DIAGNOSIS — E78.5 HYPERLIPIDEMIA, UNSPECIFIED HYPERLIPIDEMIA TYPE: ICD-10-CM

## 2024-02-22 DIAGNOSIS — E11.22 TYPE 2 DIABETES MELLITUS WITH CHRONIC KIDNEY DISEASE, WITHOUT LONG-TERM CURRENT USE OF INSULIN, UNSPECIFIED CKD STAGE (HCC): ICD-10-CM

## 2024-02-22 RX ORDER — LISINOPRIL 2.5 MG/1
2.5 TABLET ORAL DAILY
Qty: 90 TABLET | Refills: 1 | Status: SHIPPED | OUTPATIENT
Start: 2024-02-22

## 2024-02-22 NOTE — TELEPHONE ENCOUNTER
Lisa Arroyo HbAic went up to 7.6 from 6.8.  as you started her on jardiance 10 mg daily for heart failure, is that ok with you if I can increase that to 25 mg daily. Please let me know. Thanks  GEORGETTE Bhatia

## 2024-02-22 NOTE — TELEPHONE ENCOUNTER
Please call [facility] for verbal on lisinopril 2.5 mg one table daily for 90 days with one refill. GEORGETTE Bhatia

## 2024-02-22 NOTE — TELEPHONE ENCOUNTER
Patient called and results discussed. Microalbuminuria noted and will start on lisinopril 2.5 mg daily. GEORGETTE Bhatia

## 2024-02-23 PROCEDURE — 88342 IMHCHEM/IMCYTCHM 1ST ANTB: CPT | Performed by: PATHOLOGY

## 2024-02-23 PROCEDURE — 88360 TUMOR IMMUNOHISTOCHEM/MANUAL: CPT | Performed by: PATHOLOGY

## 2024-02-23 PROCEDURE — 88305 TISSUE EXAM BY PATHOLOGIST: CPT | Performed by: PATHOLOGY

## 2024-02-23 PROCEDURE — 88341 IMHCHEM/IMCYTCHM EA ADD ANTB: CPT | Performed by: PATHOLOGY

## 2024-02-23 NOTE — TELEPHONE ENCOUNTER
Patient called and informed that will increase jardiance to 25 mg once a day and discussed about possible adverse effects of UTIs and female gential mycotic infections and advised on genital hygiene and advised to drink plenty of fluids and will repeat blood work after 3 months. GEORGETTE Bhatia

## 2024-02-24 LAB
6MAM UR QL SCN: NEGATIVE NG/ML
ACCEPTABLE CREAT UR QL: 102 MG/DL
AMPHET UR QL SCN: NEGATIVE NG/ML
BARBITURATES UR QL SCN: NEGATIVE NG/ML
BENZODIAZ UR QL SCN: NEGATIVE NG/ML
BUPRENORPHINE UR QL CFM: NEGATIVE NG/ML
CANNABINOIDS UR QL SCN: NEGATIVE NG/ML
CARISOPRODOL UR QL: NEGATIVE NG/ML
COCAINE+BZE UR QL SCN: NEGATIVE NG/ML
CODEINE UR QL CFM: NOT DETECTED NG/MG CREAT
DHC UR CFM-MCNC: NOT DETECTED NG/MG CREAT
ETHYL GLUCURONIDE UR QL SCN: NEGATIVE NG/ML
FENTANYL UR QL SCN: NEGATIVE NG/ML
GABAPENTIN SERPLBLD QL SCN: NEGATIVE UG/ML
HYDROCODONE UR QL CFM: NOT DETECTED NG/MG CREAT
HYDROMORPHONE UR QL CFM: NOT DETECTED NG/MG CREAT
METHADONE UR QL SCN: NEGATIVE NG/ML
MORPHINE UR QL CFM: NOT DETECTED NG/MG CREAT
NITRITE UR QL STRIP: NEGATIVE UG/ML
NORCODEINE/CREAT UR CFM: NOT DETECTED NG/MG CREAT
NORHYDROCODONE UR CFM-MCNC: NOT DETECTED NG/MG CREAT
NORMORPHINE: NOT DETECTED NG/MG CREAT
NOROXYCODONE UR CFM-MCNC: 438 NG/MG CREAT
NOROXYMORPHONE/CREAT UR CFM: 475 NG/MG CREAT
OPIATES UR QL SCN: NEGATIVE NG/ML
OXYCODONE UR QL CFM: 510 NG/MG CREAT
OXYCODONE+OXYMORPHONE UR QL SCN: ABNORMAL NG/ML
OXYMORPHONE UR QL CFM: 1431 NG/MG CREAT
PCP UR QL SCN: NEGATIVE NG/ML
PROPOXYPH UR QL SCN: NEGATIVE NG/ML
SPECIMEN PH ACCEPTABLE UR: 6.3 (ref 4.5–8.9)
TAPENTADOL UR QL SCN: NEGATIVE NG/ML
TRAMADOL UR QL SCN: NEGATIVE NG/ML

## 2024-02-26 DIAGNOSIS — R42 DIZZINESS: Primary | ICD-10-CM

## 2024-02-26 DIAGNOSIS — K21.9 GASTROESOPHAGEAL REFLUX DISEASE, UNSPECIFIED WHETHER ESOPHAGITIS PRESENT: ICD-10-CM

## 2024-02-26 DIAGNOSIS — K28.9 MARGINAL ULCER: ICD-10-CM

## 2024-02-26 DIAGNOSIS — Z98.84 S/P BARIATRIC SURGERY: ICD-10-CM

## 2024-02-26 RX ORDER — MECLIZINE HCL 12.5 MG/1
12.5 TABLET ORAL EVERY 12 HOURS PRN
Qty: 20 TABLET | Refills: 0 | Status: SHIPPED | OUTPATIENT
Start: 2024-02-26

## 2024-02-26 RX ORDER — OMEPRAZOLE 20 MG/1
20 CAPSULE, DELAYED RELEASE ORAL
Qty: 90 CAPSULE | Refills: 2 | Status: SHIPPED | OUTPATIENT
Start: 2024-02-26 | End: 2024-11-22

## 2024-02-26 NOTE — TELEPHONE ENCOUNTER
Patient called and  is dizzy and had meclizine in past, ran out and would like to know if she can have sent to [facility].  is afraid to drive, has her grand kids with her. Also requesting a refill on Omeprazole. Can be reached at [phone]

## 2024-03-01 ENCOUNTER — PREP FOR PROCEDURE (OUTPATIENT)
Dept: GASTROENTEROLOGY | Facility: CLINIC | Age: 69
End: 2024-03-01

## 2024-03-01 ENCOUNTER — TELEPHONE (OUTPATIENT)
Dept: GASTROENTEROLOGY | Facility: CLINIC | Age: 69
End: 2024-03-01

## 2024-03-01 DIAGNOSIS — K31.89 GASTRIC MASS: Primary | ICD-10-CM

## 2024-03-01 NOTE — TELEPHONE ENCOUNTER
Zenon Paula MD  [facility]  Please schedule for EGD with fluoro in 4- 6 weeks. 30 mins.  Thank you.     Orders placed. Thank you!

## 2024-03-01 NOTE — RESULT ENCOUNTER NOTE
Please schedule for EGD with fluoro in 4- 6 weeks. 30 mins.   Thank you.     Inform patient via [employer].  Please review the pathology/lab result of further discussion.    Copied from [employer] message :       Ross Gonzalez,     Your stomach polyp was a benign but precancerous polyp. No concerning findings seen a this time. I would consider removing the stent but leaving plastic stents ( I would like to remove the metal stent) to keep the access open so we can go back in a year and re evaluate the polyp. We can schedule the next endoscopy in the next month or so to remove and replace the stent.      Best regards,     Zenon Palua MD

## 2024-03-07 NOTE — TELEPHONE ENCOUNTER
Can you please call the patient and discuss results from EUS procedure and why Dr. Paula is recommending an EGD? Results were sent thru [facility], however, patients states she does not use it. Please let me know once you have spoken to her and I can get her scheduled. Thanks

## 2024-03-07 NOTE — TELEPHONE ENCOUNTER
Left message on patient's machine letting her know that the reason for the EGD with fluoroscopy was to remove the stent she has in place. Thank you.

## 2024-03-12 ENCOUNTER — TELEPHONE (OUTPATIENT)
Age: 69
End: 2024-03-12

## 2024-03-12 DIAGNOSIS — I48.91 ATRIAL FIBRILLATION WITH RAPID VENTRICULAR RESPONSE (HCC): ICD-10-CM

## 2024-03-12 RX ORDER — METOPROLOL SUCCINATE 25 MG/1
TABLET, EXTENDED RELEASE ORAL
Qty: 90 TABLET | Refills: 0 | Status: SHIPPED | OUTPATIENT
Start: 2024-03-12

## 2024-03-12 NOTE — TELEPHONE ENCOUNTER
Pt called to schedule an appt with Jaclyn GUIDRY. Pt wanted something sooner than the next available on the system.

## 2024-03-13 ENCOUNTER — TELEPHONE (OUTPATIENT)
Age: 69
End: 2024-03-13

## 2024-03-13 NOTE — TELEPHONE ENCOUNTER
Pt calling in stating her nose bleeds are getting worse, about 2-3x a day from the right nostril. Pt states she was told to apply afrin and hold for 10 minutes and that seems to only help for an hour or two. Pt states she talked to someone and the soonest appointment was not until April 27th and she feels she needs to be sooner. Transferred call to office staff for scheduling.

## 2024-03-18 ENCOUNTER — OFFICE VISIT (OUTPATIENT)
Dept: OTOLARYNGOLOGY | Facility: CLINIC | Age: 69
End: 2024-03-18
Payer: MEDICARE

## 2024-03-18 VITALS — WEIGHT: 286 LBS | BODY MASS INDEX: 47.65 KG/M2 | TEMPERATURE: 98.1 F | HEIGHT: 65 IN

## 2024-03-18 DIAGNOSIS — R04.0 RECURRENT EPISTAXIS: Primary | ICD-10-CM

## 2024-03-18 DIAGNOSIS — R04.0 EPISTAXIS: ICD-10-CM

## 2024-03-18 PROCEDURE — 31238 NSL/SINS NDSC SRG NSL HEMRRG: CPT | Performed by: STUDENT IN AN ORGANIZED HEALTH CARE EDUCATION/TRAINING PROGRAM

## 2024-03-18 PROCEDURE — 99213 OFFICE O/P EST LOW 20 MIN: CPT | Performed by: STUDENT IN AN ORGANIZED HEALTH CARE EDUCATION/TRAINING PROGRAM

## 2024-03-18 NOTE — PROGRESS NOTES
Otolaryngology-- Head and Neck Surgery Follow up visit      Follow up:    Epistaxis:  Onset: days ago  Site:  right  Relevant family history:  No family history of bleeding disorders   Relevant medications intake history:  history of blood thinner intake   No history of nasal spray use  Relevant nose and sinuses history:  No history of nasal trauma or surgery recently  No history of chronic sinuitis or rhinitis               Review of any relevant imaging:      Interval Review of systems: Pertinent review of systems documented in the HPI.    Interval Social History:  Social History     Socioeconomic History    Marital status: Single     Spouse name: Not on file    Number of children: 2    Years of education: Not on file    Highest education level: High school graduate   Occupational History    Not on file   Tobacco Use    Smoking status: Former     Current packs/day: 0.00     Average packs/day: 1 pack/day for 25.0 years (25.0 ttl pk-yrs)     Types: Cigarettes     Start date: 3/30/1981     Quit date: 3/30/2006     Years since quittin.9     Passive exposure: Never    Smokeless tobacco: Never   Vaping Use    Vaping status: Never Used   Substance and Sexual Activity    Alcohol use: Not Currently    Drug use: Not Currently     Types: Oxycodone     Comment: Percocet for lower back pain prn    Sexual activity: Not Currently   Other Topics Concern    Not on file   Social History Narrative    Not on file     Social Determinants of Health     Financial Resource Strain: Low Risk  (2023)    Overall Financial Resource Strain (CARDIA)     Difficulty of Paying Living Expenses: Not very hard   Food Insecurity: Not on file   Transportation Needs: No Transportation Needs (2023)    PRAPARE - Transportation     Lack of Transportation (Medical): No     Lack of Transportation (Non-Medical): No   Physical Activity: Not on file   Stress: Not on file   Social Connections: Not on file   Intimate Partner Violence: Not on file  "  Housing Stability: Not on file       Interval Physical Examination:  Temp 98.1 °F (36.7 °C) (Temporal)   Ht 5' 5\" (1.651 m)   Wt 130 kg (286 lb)   BMI 47.59 kg/m²   Head: Atraumatic, no visible scalp lesions, parotid and submandibular salivary glands non-tender to palpation and without masses bilaterally.   Neck:  No visible or palpable cervical lesions or lymphadenopathy, thyroid gland is normal in size and symmetry and without masses, normal laryngeal elevation with swallowing.  Ears:    Right ear :  Auricles normal in appearance, mastoid prominence non-tender, external auditory canal clear. Tympanic membrane intact.   Left ear :  Auricles normal in appearance, mastoid prominence non-tender, external auditory canal clear. Tympanic membrane intact.   Nose/Sinuses:  External appearance unremarkable, no maxillary or frontal sinus tenderness to palpation bilaterally. The bleeding site was noted to be on the right side of the nasal septum (look to the procedure notes).    Oral Cavity:  Moist mucus membranes, gums and dentition unremarkable, no oral mucosal masses or lesions, floor of mouth soft, tongue mobile without masses or lesions.   Oropharynx:  Base of tongue soft and without masses, tonsils bilaterally unremarkable, soft palate mucosa unremarkable.       Abdomen: Soft and lax  Extremities: No bruises   Eyes:  Extra-ocular movements intact, pupils equally round and reactive to light and accommodation, the lids and conjunctivae are normal in appearance.  Constitutional:  Well developed, well nourished and groomed, in no acute distress.   Cardiovascular:  Normal rate and rhythm, no palpable thrills, no jugulovenous distension observed.  Respiratory:  Normal respiratory effort without evidence of retractions or use of accessory muscles.  Neurologic:  Cranial nerves II-XII intact bilaterally.  Psychiatric:  Alert and oriented to time, place and person.      Procedure:  Rigid nasal endoscopic examination and " bleeding control  The nasal cavities were decongested with lidocaine and oxymetazoline spray.  Bilateral nasal endoscopy was performed as follows:  Endoscopy type: 0 degree rigid scope  Results: look to examination  These vessels were cauterized using silver nitrate, and care was taken to limit extent of cautery to minimize risk of septal perforation.  Subsequent agitation of the cauterized site with cotton tipped swabs did not produce any active bleeding, and bacitracin ointment was applied.  The patient tolerated the procedure well.  Nasal pack (bleed cease) inserted          Assessment:  1. Recurrent epistaxis        2. Epistaxis            Plan:    Recurrent epistaxis  Discussed options for treatment including acceptance, Afrin nasal spray as needed during episodes, and in office nasal cautery.  Agreed to in office nasal cautery.  Anesthetic spray applied bilateral nasal cavity.   Nasal cautery using silver nitrate today, pt tolerated well.  Reviewed post cautery instructions including saline rinses, afrin nasal spray as needed, gently blowing nose, further in office cautery, or surgical interventions.  If develops further epistaxis, to contact us immediately.  Follow up 1 to 2 weeks

## 2024-04-02 ENCOUNTER — NURSE TRIAGE (OUTPATIENT)
Age: 69
End: 2024-04-02

## 2024-04-02 NOTE — TELEPHONE ENCOUNTER
Please advise. Patient has a history if vertigo , has meclizine on hand. Reports this morning she had an episode of vertigo that was worse then she has experienced in the past. States in the past it would resolve fairly quickly but this morning it lasted about 5 hours. Was unable to take care of her 13 month granddaughter , Admits to not hydrating well. Did take Meclizine as she usually does. Now she is feeling better. Taking it it easy. Asking if she should take the meclizine tonight at the 12 hour alisia preventively , and is there any other suggestions since this time it was worse then normal .

## 2024-04-02 NOTE — TELEPHONE ENCOUNTER
"Reason for Disposition   Dizziness caused by not drinking enough fluids    Answer Assessment - Initial Assessment Questions  1. DESCRIPTION: \"Describe your dizziness.\"      vertigo  2. LIGHTHEADED: \"Do you feel lightheaded?\" (e.g., somewhat faint, woozy, weak upon standing)      Room spinning   3. VERTIGO: \"Do you feel like either you or the room is spinning or tilting?\" (i.e. vertigo)      Yes vertigo   4. SEVERITY: \"How bad is it?\"  \"Do you feel like you are going to faint?\" \"Can you stand and walk?\"    - MILD: Feels slightly dizzy, but walking normally.    - MODERATE: Feels very unsteady when walking, but not falling; interferes with normal activities (e.g., school, work) .    - SEVERE: Unable to walk without falling, or requires assistance to walk without falling; feels like passing out now.       Severe this morning , mild right now   5. ONSET:  \"When did the dizziness begin?\"      Started this morning   6. AGGRAVATING FACTORS: \"Does anything make it worse?\" (e.g., standing, change in head position)      Symptoms this morning -Looking down aggravating it   7. HEART RATE: \"Can you tell me your heart rate?\" \"How many beats in 15 seconds?\"  (Note: not all patients can do this)        Took her blood pressure - 130/75   8. CAUSE: \"What do you think is causing the dizziness?\"      vertigo  9. RECURRENT SYMPTOM: \"Have you had dizziness before?\" If Yes, ask: \"When was the last time?\" \"What happened that time?\"      2 weeks ago but went away within the hour   10. OTHER SYMPTOMS: \"Do you have any other symptoms?\" (e.g., fever, chest pain, vomiting, diarrhea, bleeding)        Nausea this morning, this resolved when she ate a banana   11. PREGNANCY: \"Is there any chance you are pregnant?\" \"When was your last menstrual period?\"        N/a    Protocols used: Dizziness-ADULT-[place]    "

## 2024-04-02 NOTE — TELEPHONE ENCOUNTER
She can repeat meclizine after 12 hours but if no improvement then should be seen in office for further evaluation. GEORGETTE Bhatia

## 2024-04-15 DIAGNOSIS — R21 RASH: ICD-10-CM

## 2024-04-16 RX ORDER — NYSTATIN 100000 U/G
1 CREAM TOPICAL 2 TIMES DAILY
Qty: 30 G | Refills: 2 | Status: SHIPPED | OUTPATIENT
Start: 2024-04-16

## 2024-04-26 ENCOUNTER — TELEPHONE (OUTPATIENT)
Age: 69
End: 2024-04-26

## 2024-04-26 NOTE — TELEPHONE ENCOUNTER
Patients GI provider:  Dr. Cedillo    Number to return call: [phone]    Reason for call: [employer] calling on behalf of insurance to inquire about fax sent over about billing. Transferred to billing dept for further assistance.     Scheduled procedure/appointment date if applicable: N/A

## 2024-04-29 DIAGNOSIS — E78.5 HYPERLIPIDEMIA, UNSPECIFIED HYPERLIPIDEMIA TYPE: ICD-10-CM

## 2024-04-30 ENCOUNTER — TELEPHONE (OUTPATIENT)
Age: 69
End: 2024-04-30

## 2024-04-30 RX ORDER — ATORVASTATIN CALCIUM 20 MG/1
TABLET, FILM COATED ORAL
Qty: 90 TABLET | Refills: 0 | Status: SHIPPED | OUTPATIENT
Start: 2024-04-30 | End: 2024-05-09 | Stop reason: SDUPTHER

## 2024-04-30 NOTE — TELEPHONE ENCOUNTER
[employer] point calling for medical records from a Dr. Keyes , advised pt. Was never seen by a Dr. Keyes in GI, gave medical records number [phone] and advised to call them for further assistance

## 2024-04-30 NOTE — TELEPHONE ENCOUNTER
[employer] was calling to see if we have gotten the fax for billing records release. I gave him the fax number

## 2024-05-03 NOTE — TELEPHONE ENCOUNTER
[employer] point called again for billing records. Pt was seen by Dr Cedillo. Provided [facility] number again

## 2024-05-09 ENCOUNTER — OFFICE VISIT (OUTPATIENT)
Dept: CARDIOLOGY CLINIC | Facility: CLINIC | Age: 69
End: 2024-05-09
Payer: MEDICARE

## 2024-05-09 VITALS
DIASTOLIC BLOOD PRESSURE: 60 MMHG | BODY MASS INDEX: 46.57 KG/M2 | HEART RATE: 98 BPM | SYSTOLIC BLOOD PRESSURE: 118 MMHG | OXYGEN SATURATION: 95 % | WEIGHT: 279.5 LBS | HEIGHT: 65 IN

## 2024-05-09 DIAGNOSIS — I50.32 CHRONIC DIASTOLIC HEART FAILURE (HCC): ICD-10-CM

## 2024-05-09 DIAGNOSIS — Z95.2 S/P TAVR (TRANSCATHETER AORTIC VALVE REPLACEMENT): ICD-10-CM

## 2024-05-09 DIAGNOSIS — I48.91 ATRIAL FIBRILLATION WITH RAPID VENTRICULAR RESPONSE (HCC): ICD-10-CM

## 2024-05-09 DIAGNOSIS — I48.0 PAROXYSMAL ATRIAL FIBRILLATION (HCC): Primary | ICD-10-CM

## 2024-05-09 DIAGNOSIS — I70.209 ATHEROSCLEROTIC PERIPHERAL VASCULAR DISEASE (HCC): ICD-10-CM

## 2024-05-09 DIAGNOSIS — E78.5 HYPERLIPIDEMIA, UNSPECIFIED HYPERLIPIDEMIA TYPE: ICD-10-CM

## 2024-05-09 DIAGNOSIS — E11.22 TYPE 2 DIABETES MELLITUS WITH CHRONIC KIDNEY DISEASE, WITHOUT LONG-TERM CURRENT USE OF INSULIN, UNSPECIFIED CKD STAGE (HCC): ICD-10-CM

## 2024-05-09 DIAGNOSIS — I25.10 CORONARY ARTERY DISEASE INVOLVING NATIVE HEART WITHOUT ANGINA PECTORIS, UNSPECIFIED VESSEL OR LESION TYPE: ICD-10-CM

## 2024-05-09 PROCEDURE — 99214 OFFICE O/P EST MOD 30 MIN: CPT | Performed by: INTERNAL MEDICINE

## 2024-05-09 PROCEDURE — 93000 ELECTROCARDIOGRAM COMPLETE: CPT | Performed by: INTERNAL MEDICINE

## 2024-05-09 RX ORDER — ATORVASTATIN CALCIUM 20 MG/1
20 TABLET, FILM COATED ORAL DAILY
Qty: 90 TABLET | Refills: 3 | Status: SHIPPED | OUTPATIENT
Start: 2024-05-09

## 2024-05-09 RX ORDER — METOPROLOL SUCCINATE 25 MG/1
25 TABLET, EXTENDED RELEASE ORAL 2 TIMES DAILY
Qty: 180 TABLET | Refills: 2 | Status: SHIPPED | OUTPATIENT
Start: 2024-05-09

## 2024-05-14 ENCOUNTER — HOSPITAL ENCOUNTER (OUTPATIENT)
Dept: RADIOLOGY | Facility: HOSPITAL | Age: 69
Discharge: HOME | End: 2024-05-14
Attending: THORACIC SURGERY (CARDIOTHORACIC VASCULAR SURGERY)
Payer: MEDICARE

## 2024-05-14 DIAGNOSIS — R91.1 PULMONARY NODULE: ICD-10-CM

## 2024-05-14 PROCEDURE — 71250 CT THORAX DX C-: CPT

## 2024-05-14 PROCEDURE — G1004 CDSM NDSC: HCPCS

## 2024-05-21 ENCOUNTER — HOSPITAL ENCOUNTER (OUTPATIENT)
Dept: NON INVASIVE DIAGNOSTICS | Facility: HOSPITAL | Age: 69
Discharge: HOME | End: 2024-05-21
Attending: INTERNAL MEDICINE
Payer: MEDICARE

## 2024-05-21 VITALS
BODY MASS INDEX: 46.48 KG/M2 | HEART RATE: 98 BPM | WEIGHT: 279 LBS | DIASTOLIC BLOOD PRESSURE: 60 MMHG | SYSTOLIC BLOOD PRESSURE: 118 MMHG | HEIGHT: 65 IN

## 2024-05-21 DIAGNOSIS — I50.32 CHRONIC DIASTOLIC HEART FAILURE (HCC): ICD-10-CM

## 2024-05-21 DIAGNOSIS — I25.10 CORONARY ARTERY DISEASE INVOLVING NATIVE HEART WITHOUT ANGINA PECTORIS, UNSPECIFIED VESSEL OR LESION TYPE: ICD-10-CM

## 2024-05-21 DIAGNOSIS — Z95.2 S/P TAVR (TRANSCATHETER AORTIC VALVE REPLACEMENT): ICD-10-CM

## 2024-05-21 PROCEDURE — 93306 TTE W/DOPPLER COMPLETE: CPT

## 2024-05-21 PROCEDURE — 93306 TTE W/DOPPLER COMPLETE: CPT | Performed by: INTERNAL MEDICINE

## 2024-05-22 LAB
AORTIC ROOT: 3 CM
AORTIC VALVE MEAN VELOCITY: 13 M/S
APICAL FOUR CHAMBER EJECTION FRACTION: 35 %
AV AREA BY CONTINUOUS VTI: 1 CM2
AV AREA PEAK VELOCITY: 0.9 CM2
AV LVOT MEAN GRADIENT: 1 MMHG
AV LVOT PEAK GRADIENT: 2 MMHG
AV MEAN GRADIENT: 8 MMHG
AV PEAK GRADIENT: 14 MMHG
AV VALVE AREA: 1.02 CM2
AV VELOCITY RATIO: 0.33
BSA FOR ECHO PROCEDURE: 2.28 M2
DOP CALC AO PEAK VEL: 1.9 M/S
DOP CALC AO VTI: 35.79 CM
DOP CALC LVOT AREA: 2.83 CM2
DOP CALC LVOT CARDIAC INDEX: 1.45 L/MIN/M2
DOP CALC LVOT CARDIAC OUTPUT: 3.3 L/MIN
DOP CALC LVOT DIAMETER: 1.9 CM
DOP CALC LVOT PEAK VEL VTI: 12.92 CM
DOP CALC LVOT PEAK VEL: 0.63 M/S
DOP CALC LVOT STROKE INDEX: 15.8 ML/M2
DOP CALC LVOT STROKE VOLUME: 36.61
DOP CALC MV VTI: 38.9 CM
E WAVE DECELERATION TIME: 306 MS
E/A RATIO: 156
FRACTIONAL SHORTENING: 26 (ref 28–44)
INTERVENTRICULAR SEPTUM IN DIASTOLE (PARASTERNAL SHORT AXIS VIEW): 1.3 CM
INTERVENTRICULAR SEPTUM: 1.3 CM (ref 0.6–1.1)
LAAS-AP2: 37.2 CM2
LAAS-AP4: 40.2 CM2
LEFT ATRIUM SIZE: 5.5 CM
LEFT ATRIUM VOLUME (MOD BIPLANE): 156 ML
LEFT ATRIUM VOLUME INDEX (MOD BIPLANE): 68.4 ML/M2
LEFT INTERNAL DIMENSION IN SYSTOLE: 3.4 CM (ref 2.1–4)
LEFT VENTRICULAR INTERNAL DIMENSION IN DIASTOLE: 4.6 CM (ref 3.5–6)
LEFT VENTRICULAR POSTERIOR WALL IN END DIASTOLE: 1.3 CM
LEFT VENTRICULAR STROKE VOLUME: 49 ML
LVSV (TEICH): 49 ML
MV E'TISSUE VEL-LAT: 10 CM/S
MV E'TISSUE VEL-SEP: 6 CM/S
MV MEAN GRADIENT: 3 MMHG
MV PEAK A VEL: 0.01 M/S
MV PEAK E VEL: 156 CM/S
MV PEAK GRADIENT: 9 MMHG
MV STENOSIS PRESSURE HALF TIME: 89 MS
MV VALVE AREA BY CONTINUITY EQUATION: 0.94 CM2
MV VALVE AREA P 1/2 METHOD: 2.47
RA PRESSURE ESTIMATED: 8 MMHG
RIGHT ATRIUM AREA SYSTOLE A4C: 32.5 CM2
RIGHT VENTRICLE ID DIMENSION: 4.8 CM
RV PSP: 32 MMHG
SL CV LEFT ATRIUM LENGTH A2C: 7.5 CM
SL CV LV EF: 45
SL CV PED ECHO LEFT VENTRICLE DIASTOLIC VOLUME (MOD BIPLANE) 2D: 96 ML
SL CV PED ECHO LEFT VENTRICLE SYSTOLIC VOLUME (MOD BIPLANE) 2D: 47 ML
TR MAX PG: 24 MMHG
TR PEAK VELOCITY: 2.5 M/S
TRICUSPID ANNULAR PLANE SYSTOLIC EXCURSION: 2 CM
TRICUSPID VALVE PEAK REGURGITATION VELOCITY: 2.47 M/S

## 2024-05-25 ENCOUNTER — OFFICE VISIT (OUTPATIENT)
Dept: URGENT CARE | Facility: CLINIC | Age: 69
End: 2024-05-25
Payer: MEDICARE

## 2024-05-25 VITALS
DIASTOLIC BLOOD PRESSURE: 84 MMHG | OXYGEN SATURATION: 96 % | HEART RATE: 98 BPM | WEIGHT: 276.8 LBS | TEMPERATURE: 97.9 F | SYSTOLIC BLOOD PRESSURE: 114 MMHG | RESPIRATION RATE: 20 BRPM | BODY MASS INDEX: 46.06 KG/M2

## 2024-05-25 DIAGNOSIS — R30.0 DIFFICULT OR PAINFUL URINATION: Primary | ICD-10-CM

## 2024-05-25 LAB
SL AMB  POCT GLUCOSE, UA: 2000
SL AMB LEUKOCYTE ESTERASE,UA: ABNORMAL
SL AMB POCT BILIRUBIN,UA: ABNORMAL
SL AMB POCT BLOOD,UA: ABNORMAL
SL AMB POCT CLARITY,UA: CLEAR
SL AMB POCT COLOR,UA: YELLOW
SL AMB POCT KETONES,UA: ABNORMAL
SL AMB POCT NITRITE,UA: ABNORMAL
SL AMB POCT PH,UA: 6
SL AMB POCT SPECIFIC GRAVITY,UA: 1.02
SL AMB POCT URINE PROTEIN: ABNORMAL
SL AMB POCT UROBILINOGEN: 0.2

## 2024-05-25 PROCEDURE — 81002 URINALYSIS NONAUTO W/O SCOPE: CPT | Performed by: PHYSICIAN ASSISTANT

## 2024-05-25 PROCEDURE — 87086 URINE CULTURE/COLONY COUNT: CPT | Performed by: PHYSICIAN ASSISTANT

## 2024-05-25 PROCEDURE — 99213 OFFICE O/P EST LOW 20 MIN: CPT | Performed by: PHYSICIAN ASSISTANT

## 2024-05-25 NOTE — PROGRESS NOTES
[facility]        NAME: Lisa Reardon is a 68 y.o. female  : 1955    MRN: [id number]  DATE: May 25, 2024  TIME: 9:22 AM    Assessment and Plan   Difficult or painful urination [R30.0]  1. Difficult or painful urination  POCT urine dip        Patient Instructions   Difficulty urinating  Urine dip only positive for leuks will send out culture and call if abnormal  Recommend follow-up with PCP or urologist  Recommend limiting salt and adequate fluid intake    Follow up with PCP in 3-5 days.  Proceed to  ER if symptoms worsen.    If tests have been performed at [facility], our office will contact you with results if changes need to be made to the care plan discussed with you at the visit.  You can review your full results on [facility] MyChart.    Chief Complaint     Chief Complaint   Patient presents with    Difficulty Urinating     Patient reports difficulty urinating x 1 week. Denies urinary pain. Feel she is drinking more then she is urinating throughout the day. Denies recent weight gain.          History of Present Illness       Lisa is a 68-year-old female who presents to the clinic complaining of difficulty urinating x 1 week.  She denies any fever, chills, dysuria, hematuria, increased urinary frequency, urinary urgency, abdominal pain, back pain, flank pain, or vaginal symptoms.  She states she did have this similar issue in the past due to questionable prolapse but never followed up for that issue.        Review of Systems   Review of Systems   Constitutional:  Negative for chills and fever.   Gastrointestinal:  Negative for abdominal pain.   Genitourinary:  Positive for difficulty urinating. Negative for dysuria, flank pain, frequency, hematuria, urgency, vaginal bleeding, vaginal discharge and vaginal pain.   Musculoskeletal:  Negative for back pain.         Current Medications       Current Outpatient Medications:     albuterol (2.5 mg/3 mL) 0.083 % nebulizer solution, TAKE 1 VIAL BY  NEBULIZATION EVERY 4 HOURS AS NEEDED FOR WHEEZING OR SHORTNESS OF BREATH, Disp: 225 mL, Rfl: 1    apixaban (Eliquis) 5 mg, Take 1 tablet (5 mg total) by mouth 2 (two) times a day, Disp: 180 tablet, Rfl: 3    atorvastatin (LIPITOR) 20 mg tablet, Take 1 tablet (20 mg total) by mouth daily, Disp: 90 tablet, Rfl: 3    Empagliflozin 25 MG TABS, Take 1 tablet (25 mg total) by mouth daily, Disp: 90 tablet, Rfl: 1    lisinopril (ZESTRIL) 2.5 mg tablet, Take 1 tablet (2.5 mg total) by mouth daily, Disp: 90 tablet, Rfl: 1    meclizine (ANTIVERT) 12.5 MG tablet, Take 1 tablet (12.5 mg total) by mouth every 12 (twelve) hours as needed for dizziness, Disp: 20 tablet, Rfl: 0    metoprolol succinate (TOPROL-XL) 25 mg 24 hr tablet, Take 1 tablet (25 mg total) by mouth 2 (two) times a day TAKE 1 TABLET IN MORNING AND 1/2 TABLET IN EVENING, Disp: 180 tablet, Rfl: 2    Multiple Vitamins-Minerals (BARIATRIC FUSION) CHEW, Chew 1 tablet daily  , Disp: , Rfl:     nystatin (MYCOSTATIN) cream, APPLY TOPICALLY 2 TIMES A DAY, Disp: 30 g, Rfl: 2    omeprazole (PriLOSEC) 20 mg delayed release capsule, Take 1 capsule (20 mg total) by mouth daily before breakfast, Disp: 90 capsule, Rfl: 2    oxyCODONE (ROXICODONE) 10 MG TABS, Take 10 mg by mouth every 6 (six) hours as needed As needed for pain, Disp: , Rfl:     potassium chloride (K-DUR,KLOR-CON) 20 mEq tablet, Take 1 tablet (20 mEq total) by mouth if needed (if taking torsemide), Disp: 30 tablet, Rfl: 1    torsemide (DEMADEX) 20 mg tablet, Take 1 tablet (20 mg total) by mouth if needed (for leg swelling/weight gain above 3 pounds), Disp: 30 tablet, Rfl: 1    Calcium-Vitamin D 500-125 MG-UNIT TABS, Take 1 tablet by mouth 2 (two) times a day  (Patient not taking: Reported on 9/16/2023), Disp: , Rfl:     Current Allergies     Allergies as of 05/25/2024    (No Known Allergies)            The following portions of the patient's history were reviewed and updated as appropriate: allergies, current  medications, past family history, past medical history, past social history, past surgical history and problem list.     Past Medical History:   Diagnosis Date    Acute pain of right knee     Ambulatory dysfunction     Anemia     Arthritis     Bleeding ulcer     Cancer (HCC)     rectal    Chronic lower back pain     Chronic pain disorder     back pain    Colon cancer (HCC) 2018    COVID-19 2023    mild s/s    Diabetes mellitus (HCC)     Endometrial cancer (HCC) 2018    GERD (gastroesophageal reflux disease)     History of chemotherapy     History of MRSA infection     Hyperlipidemia     Hypertension     Morbid obesity (HCC)     Morbid obesity with BMI of 45.0-49.9, adult (HCC)     Ovarian cancer (HCC) 2018    Paroxysmal atrial fibrillation (HCC)     Pneumonia     Rectal cancer (HCC)     S/P TAVR (transcatheter aortic valve replacement)     Sleep apnea     doesn't use CPAP any longer    SOB (shortness of breath)     Spinal stenosis     Systolic murmur     Unsteady gait     uses walker    Wears dentures     Wears glasses        Past Surgical History:   Procedure Laterality Date    ABDOMINAL PERINEAL BOWEL RESECTION W/ ILEOANAL POUCH N/A 2018    Procedure: LAPAROSCOPIC HAND ASSIST ABDOMINOPERINEAL RESECTION,  POSTERIOR VAGINECTOMY, OMENTECTOMY;  Surgeon: José Miguel Cedillo MD;  Location: [facility];  Service: Colorectal    ABDOMINAL SURGERY      abscess removed from abdomen and right thigh, a hole in thigh closed by plastic surgeon    ABSCESS DRAINAGE      abd, (R) leg, (L) leg     SECTION       SECTION      CYSTOSCOPY N/A 2018    Procedure: CYSTOSCOPY;  Surgeon: Seth Pinto MD;  Location: [facility];  Service: Gynecology Oncology    ESOPHAGOGASTRODUODENOSCOPY N/A 2016    Procedure: ESOPHAGOGASTRODUODENOSCOPY (EGD);  Surgeon: Jama Frazier MD;  Location: [facility];  Service:     ESOPHAGOGASTRODUODENOSCOPY N/A 2016    Procedure: ESOPHAGOGASTRODUODENOSCOPY  (EGD);  Surgeon: Jama Frazier MD;  Location: [facility];  Service:     EYE SURGERY      laser eye surgery    HYSTERECTOMY N/A 09/06/2018    Procedure: RADICAL HYSTERECTOMY TOTAL ABDOMINAL (ALEXANDRA). BSO;  Surgeon: Seth Pinto MD;  [facility]: [facility];  Service: Gynecology Oncology    JOINT REPLACEMENT Left 2017    hip    JOINT REPLACEMENT Right 2017    hip    OOPHORECTOMY Bilateral     PA COLONOSCOPY FLX DX W/COLLJ SPEC WHEN PFRMD N/A 03/09/2018    Procedure: COLONOSCOPY;  Surgeon: Juanjo Sanders MD;  Location: [facility];  Service: Gastroenterology    PA COLONOSCOPY FLX DX W/COLLJ SPEC WHEN PFRMD N/A 09/05/2018    Procedure: COLONOSCOPY;  Surgeon: José Miguel Cedillo MD;  Location:  GI LAB;  Service: Colorectal    PA ECHO TRANSESOPHAG R-T 2D W/PRB IMG ACQUISJ I&R N/A 09/01/2020    Procedure: TRANSESOPHAGEAL ECHOCARDIOGRAM (THO);  Surgeon: Diallo Vu DO;  [facility];  Service: Cardiac Surgery    PA ESOPHAGOGASTRODUODENOSCOPY TRANSORAL DIAGNOSTIC N/A 02/02/2018    Procedure: ESOPHAGOGASTRODUODENOSCOPY (EGD);  Surgeon: Juanjo Sanders MD;  Location: [facility];  Service: Gastroenterology    PA LAPAROSCOPY SURG COLOSTOMY/SKN LVL CECOSTOMY N/A 09/06/2018    Procedure: PERMANENT END COLOSTOMY;  Surgeon: José Miguel Cedillo MD;  [facility]: [facility];  Service: Colorectal    PA LAPS GSTR RSTCV PX W/BYP SOLEDAD-EN-Y LIMB <150 CM N/A 07/18/2016    Procedure: BYPASS GASTRIC  SOLEDAD-EN-Y LAPAROSCOPIC;  Surgeon: Jama Frazier MD;  [facility]: [facility];  Service: Bariatrics    PA LAPS PROCTECTOMY ABDOMINOPERINEAL W/COLOSTOMY N/A 09/06/2018    Procedure: PROCTECTOMY;  Surgeon: José Miguel Cedillo MD;  [facility];  Service: Colorectal    PA REPLACE AORTIC VALVE OPENFEMORAL ARTERY APPROACH N/A 09/01/2020    Procedure: REPLACEMENT AORTIC VALVE TRANSCATHETER (TAVR) TRANSFEMORAL W/ 26MM WADDELL BARBARA S3 ULTRA VALVE(ACCESS ON RIGHT);  Surgeon: Diallo Vu DO;  [facility]: [facility];  Service:  Cardiac Surgery    REPLACEMENT TOTAL KNEE      TOOTH EXTRACTION      TUBAL LIGATION         Family History   Adopted: Yes   Problem Relation Age of Onset    Leukemia Mother     Heart disease Father     Coronary artery disease Father     Diabetes Father     No Known Problems Sister     No Known Problems Brother     Diabetes Son     No Known Problems Brother     No Known Problems Brother     No Known Problems Sister     No Known Problems Sister          Medications have been verified.        Objective   /84   Pulse 98   Temp 97.9 °F (36.6 °C) (Temporal)   Resp 20   Wt 126 kg (276 lb 12.8 oz)   SpO2 96%   BMI 46.06 kg/m²   No LMP recorded. Patient has had a hysterectomy.       Physical Exam     Physical Exam  Vitals and nursing note reviewed.   Constitutional:       General: She is not in acute distress.     Appearance: Normal appearance. She is not ill-appearing.   Cardiovascular:      Rate and Rhythm: Normal rate and regular rhythm.      Heart sounds: Normal heart sounds.   Pulmonary:      Effort: Pulmonary effort is normal.      Breath sounds: Normal breath sounds.   Abdominal:      General: Bowel sounds are normal. There is no distension.      Palpations: Abdomen is soft.      Tenderness: There is no abdominal tenderness. There is no right CVA tenderness, left CVA tenderness, guarding or rebound.   Neurological:      Mental Status: She is alert and oriented to person, place, and time.   Psychiatric:         Mood and Affect: Mood normal.         Behavior: Behavior normal.

## 2024-05-27 LAB — BACTERIA UR CULT: NORMAL

## 2024-05-29 DIAGNOSIS — R80.9 MICROALBUMINURIA: ICD-10-CM

## 2024-05-30 ENCOUNTER — TELEPHONE (OUTPATIENT)
Dept: CARDIOLOGY CLINIC | Facility: CLINIC | Age: 69
End: 2024-05-30

## 2024-05-30 RX ORDER — LISINOPRIL 2.5 MG/1
2.5 TABLET ORAL DAILY
Qty: 90 TABLET | Refills: 1 | Status: SHIPPED | OUTPATIENT
Start: 2024-05-30

## 2024-05-30 NOTE — TELEPHONE ENCOUNTER
----- Message from Rashi Carrasquillo MD sent at 5/29/2024  3:38 PM EDT -----  Her prosthetic aortic valve is working normally.  Continue with low-sodium diet.  No major change in overall heart function.

## 2024-06-11 ENCOUNTER — TELEPHONE (OUTPATIENT)
Dept: SURGICAL ONCOLOGY | Facility: CLINIC | Age: 69
End: 2024-06-11

## 2024-06-11 NOTE — TELEPHONE ENCOUNTER
LVM for pt to please call the office back to reschedule follow-up appointment with Dr. Cox after she gets her PFT's done.

## 2024-06-18 ENCOUNTER — HOSPITAL ENCOUNTER (EMERGENCY)
Facility: HOSPITAL | Age: 69
Discharge: HOME | End: 2024-06-19
Attending: EMERGENCY MEDICINE
Payer: MEDICARE

## 2024-06-18 DIAGNOSIS — I48.91 ATRIAL FIBRILLATION WITH RAPID VENTRICULAR RESPONSE (HCC): ICD-10-CM

## 2024-06-18 DIAGNOSIS — L08.9 WOUND INFECTION: Primary | ICD-10-CM

## 2024-06-18 DIAGNOSIS — T14.8XXA WOUND INFECTION: Primary | ICD-10-CM

## 2024-06-18 LAB
ALBUMIN SERPL BCP-MCNC: 3.9 G/DL (ref 3.5–5)
ALP SERPL-CCNC: 65 U/L (ref 34–104)
ALT SERPL W P-5'-P-CCNC: 14 U/L (ref 7–52)
ANION GAP SERPL CALCULATED.3IONS-SCNC: 8 MMOL/L (ref 4–13)
AST SERPL W P-5'-P-CCNC: 17 U/L (ref 13–39)
BASOPHILS # BLD AUTO: 0.07 THOUSANDS/ÂΜL (ref 0–0.1)
BASOPHILS NFR BLD AUTO: 1 % (ref 0–1)
BILIRUB SERPL-MCNC: 0.62 MG/DL (ref 0.2–1)
BUN SERPL-MCNC: 14 MG/DL (ref 5–25)
CALCIUM SERPL-MCNC: 9.2 MG/DL (ref 8.4–10.2)
CARDIAC TROPONIN I PNL SERPL HS: 8 NG/L
CHLORIDE SERPL-SCNC: 105 MMOL/L (ref 96–108)
CO2 SERPL-SCNC: 25 MMOL/L (ref 21–32)
CREAT SERPL-MCNC: 0.88 MG/DL (ref 0.6–1.3)
EOSINOPHIL # BLD AUTO: 0.13 THOUSAND/ÂΜL (ref 0–0.61)
EOSINOPHIL NFR BLD AUTO: 2 % (ref 0–6)
ERYTHROCYTE [DISTWIDTH] IN BLOOD BY AUTOMATED COUNT: 17.8 % (ref 11.6–15.1)
GFR SERPL CREATININE-BSD FRML MDRD: 67 ML/MIN/1.73SQ M
GLUCOSE SERPL-MCNC: 155 MG/DL (ref 65–140)
HCT VFR BLD AUTO: 51.4 % (ref 34.8–46.1)
HGB BLD-MCNC: 16.2 G/DL (ref 11.5–15.4)
IMM GRANULOCYTES # BLD AUTO: 0.18 THOUSAND/UL (ref 0–0.2)
IMM GRANULOCYTES NFR BLD AUTO: 2 % (ref 0–2)
LYMPHOCYTES # BLD AUTO: 1.88 THOUSANDS/ÂΜL (ref 0.6–4.47)
LYMPHOCYTES NFR BLD AUTO: 22 % (ref 14–44)
MAGNESIUM SERPL-MCNC: 2.2 MG/DL (ref 1.9–2.7)
MCH RBC QN AUTO: 26.6 PG (ref 26.8–34.3)
MCHC RBC AUTO-ENTMCNC: 31.5 G/DL (ref 31.4–37.4)
MCV RBC AUTO: 84 FL (ref 82–98)
MONOCYTES # BLD AUTO: 0.84 THOUSAND/ÂΜL (ref 0.17–1.22)
MONOCYTES NFR BLD AUTO: 10 % (ref 4–12)
NEUTROPHILS # BLD AUTO: 5.4 THOUSANDS/ÂΜL (ref 1.85–7.62)
NEUTS SEG NFR BLD AUTO: 63 % (ref 43–75)
NRBC BLD AUTO-RTO: 0 /100 WBCS
PLATELET # BLD AUTO: 204 THOUSANDS/UL (ref 149–390)
PMV BLD AUTO: 9.3 FL (ref 8.9–12.7)
POTASSIUM SERPL-SCNC: 4.1 MMOL/L (ref 3.5–5.3)
PROT SERPL-MCNC: 7.4 G/DL (ref 6.4–8.4)
RBC # BLD AUTO: 6.1 MILLION/UL (ref 3.81–5.12)
SODIUM SERPL-SCNC: 138 MMOL/L (ref 135–147)
WBC # BLD AUTO: 8.5 THOUSAND/UL (ref 4.31–10.16)

## 2024-06-18 PROCEDURE — 93005 ELECTROCARDIOGRAM TRACING: CPT

## 2024-06-18 PROCEDURE — 36415 COLL VENOUS BLD VENIPUNCTURE: CPT | Performed by: EMERGENCY MEDICINE

## 2024-06-18 PROCEDURE — 80053 COMPREHEN METABOLIC PANEL: CPT | Performed by: EMERGENCY MEDICINE

## 2024-06-18 PROCEDURE — 87205 SMEAR GRAM STAIN: CPT | Performed by: EMERGENCY MEDICINE

## 2024-06-18 PROCEDURE — 96374 THER/PROPH/DIAG INJ IV PUSH: CPT

## 2024-06-18 PROCEDURE — 87070 CULTURE OTHR SPECIMN AEROBIC: CPT | Performed by: EMERGENCY MEDICINE

## 2024-06-18 PROCEDURE — 99283 EMERGENCY DEPT VISIT LOW MDM: CPT

## 2024-06-18 PROCEDURE — 85025 COMPLETE CBC W/AUTO DIFF WBC: CPT | Performed by: EMERGENCY MEDICINE

## 2024-06-18 PROCEDURE — 99285 EMERGENCY DEPT VISIT HI MDM: CPT | Performed by: EMERGENCY MEDICINE

## 2024-06-18 PROCEDURE — 96361 HYDRATE IV INFUSION ADD-ON: CPT

## 2024-06-18 PROCEDURE — 84484 ASSAY OF TROPONIN QUANT: CPT | Performed by: EMERGENCY MEDICINE

## 2024-06-18 PROCEDURE — 83735 ASSAY OF MAGNESIUM: CPT | Performed by: EMERGENCY MEDICINE

## 2024-06-18 RX ORDER — METOPROLOL TARTRATE 1 MG/ML
5 INJECTION, SOLUTION INTRAVENOUS ONCE
Status: COMPLETED | OUTPATIENT
Start: 2024-06-18 | End: 2024-06-18

## 2024-06-18 RX ORDER — CEPHALEXIN 500 MG/1
500 CAPSULE ORAL ONCE
Status: COMPLETED | OUTPATIENT
Start: 2024-06-19 | End: 2024-06-19

## 2024-06-18 RX ORDER — CEPHALEXIN 500 MG/1
500 CAPSULE ORAL 3 TIMES DAILY
Qty: 15 CAPSULE | Refills: 0 | Status: SHIPPED | OUTPATIENT
Start: 2024-06-18 | End: 2024-06-23

## 2024-06-18 RX ADMIN — SODIUM CHLORIDE 1000 ML: 0.9 INJECTION, SOLUTION INTRAVENOUS at 22:30

## 2024-06-18 RX ADMIN — METOPROLOL TARTRATE 5 MG: 5 INJECTION INTRAVENOUS at 22:30

## 2024-06-19 VITALS
BODY MASS INDEX: 46.26 KG/M2 | WEIGHT: 278 LBS | HEART RATE: 99 BPM | TEMPERATURE: 97.5 F | OXYGEN SATURATION: 98 % | SYSTOLIC BLOOD PRESSURE: 118 MMHG | DIASTOLIC BLOOD PRESSURE: 71 MMHG | RESPIRATION RATE: 25 BRPM

## 2024-06-19 LAB
QRS AXIS: -34 DEGREES
QRSD INTERVAL: 90 MS
QT INTERVAL: 306 MS
QTC INTERVAL: 444 MS
T WAVE AXIS: 45 DEGREES
VENTRICULAR RATE: 127 BPM

## 2024-06-19 PROCEDURE — 93010 ELECTROCARDIOGRAM REPORT: CPT | Performed by: INTERNAL MEDICINE

## 2024-06-19 RX ADMIN — CEPHALEXIN 500 MG: 500 CAPSULE ORAL at 00:05

## 2024-06-19 NOTE — ED PROVIDER NOTES
History  Chief Complaint   Patient presents with    Wound Infection     States she was sweating and thought she had a fever. Oral temp at home 97.5. States she has a cyst on her back that started draining on 6/14. Patient with history of Afib. -130     Patient presents for evaluation of sweating hot she may have a fever but temp measured at home was 97 5.  Temp measured in triage was normal as well.  She has a cyst on her back that is currently draining.  Patient has had this before and had it drained and packed.  She was post to follow-up to have the cyst removed with general surgery but did not want to undergo the procedure at that time.  Patient reports she did take her metoprolol and Eliquis today including tonight's dose.      History provided by:  Patient   used: No        Prior to Admission Medications   Prescriptions Last Dose Informant Patient Reported? Taking?   Calcium-Vitamin D 500-125 MG-UNIT TABS  Self Yes No   Sig: Take 1 tablet by mouth 2 (two) times a day    Patient not taking: Reported on 9/16/2023   Empagliflozin 25 MG TABS   No No   Sig: Take 1 tablet (25 mg total) by mouth daily   Multiple Vitamins-Minerals (BARIATRIC FUSION) CHEW  Self Yes No   Sig: Chew 1 tablet daily     albuterol (2.5 mg/3 mL) 0.083 % nebulizer solution  Self No No   Sig: TAKE 1 VIAL BY NEBULIZATION EVERY 4 HOURS AS NEEDED FOR WHEEZING OR SHORTNESS OF BREATH   apixaban (Eliquis) 5 mg   No No   Sig: Take 1 tablet (5 mg total) by mouth 2 (two) times a day   atorvastatin (LIPITOR) 20 mg tablet   No No   Sig: Take 1 tablet (20 mg total) by mouth daily   lisinopril (ZESTRIL) 2.5 mg tablet   No No   Sig: ONE TAB DAILY   meclizine (ANTIVERT) 12.5 MG tablet   No No   Sig: Take 1 tablet (12.5 mg total) by mouth every 12 (twelve) hours as needed for dizziness   metoprolol succinate (TOPROL-XL) 25 mg 24 hr tablet   No No   Sig: Take 1 tablet (25 mg total) by mouth 2 (two) times a day TAKE 1 TABLET IN MORNING  AND 1/2 TABLET IN EVENING   nystatin (MYCOSTATIN) cream   No No   Sig: APPLY TOPICALLY 2 TIMES A DAY   omeprazole (PriLOSEC) 20 mg delayed release capsule   No No   Sig: Take 1 capsule (20 mg total) by mouth daily before breakfast   oxyCODONE (ROXICODONE) 10 MG TABS  Self Yes No   Sig: Take 10 mg by mouth every 6 (six) hours as needed As needed for pain   potassium chloride (K-DUR,KLOR-CON) 20 mEq tablet  Self No No   Sig: Take 1 tablet (20 mEq total) by mouth if needed (if taking torsemide)   torsemide (DEMADEX) 20 mg tablet  Self No No   Sig: Take 1 tablet (20 mg total) by mouth if needed (for leg swelling/weight gain above 3 pounds)      Facility-Administered Medications: None       Past Medical History:   Diagnosis Date    Acute pain of right knee     Ambulatory dysfunction     Anemia     Arthritis     Bleeding ulcer     Cancer (HCC)     rectal    Chronic lower back pain     Chronic pain disorder     back pain    Colon cancer (HCC) 2018    COVID-19 05/2023    mild s/s    Diabetes mellitus (HCC)     Endometrial cancer (HCC) 2018    GERD (gastroesophageal reflux disease)     History of chemotherapy     History of MRSA infection 0719/2016    Hyperlipidemia     Hypertension     Morbid obesity (HCC)     Morbid obesity with BMI of 45.0-49.9, adult (HCC)     Ovarian cancer (HCC) 2018    Paroxysmal atrial fibrillation (HCC)     Pneumonia     Rectal cancer (HCC)     S/P TAVR (transcatheter aortic valve replacement)     Sleep apnea     doesn't use CPAP any longer    SOB (shortness of breath)     Spinal stenosis     Systolic murmur     Unsteady gait     uses walker    Wears dentures     Wears glasses        Past Surgical History:   Procedure Laterality Date    ABDOMINAL PERINEAL BOWEL RESECTION W/ ILEOANAL POUCH N/A 09/06/2018    Procedure: LAPAROSCOPIC HAND ASSIST ABDOMINOPERINEAL RESECTION,  POSTERIOR VAGINECTOMY, OMENTECTOMY;  Surgeon: José Miguel Cedillo MD;  Location: [facility];  Service: Colorectal    ABDOMINAL  SURGERY      abscess removed from abdomen and right thigh, a hole in thigh closed by plastic surgeon    ABSCESS DRAINAGE      abd, (R) leg, (L) leg     SECTION       SECTION      CYSTOSCOPY N/A 2018    Procedure: CYSTOSCOPY;  Surgeon: Seth Pinto MD;  Location: [facility];  Service: Gynecology Oncology    ESOPHAGOGASTRODUODENOSCOPY N/A 2016    Procedure: ESOPHAGOGASTRODUODENOSCOPY (EGD);  Surgeon: Jama Frazier MD;  Location: [facility];  Service:     ESOPHAGOGASTRODUODENOSCOPY N/A 2016    Procedure: ESOPHAGOGASTRODUODENOSCOPY (EGD);  Surgeon: Jama Frazier MD;  Location: [facility];  Service:     EYE SURGERY      laser eye surgery    HYSTERECTOMY N/A 2018    Procedure: RADICAL HYSTERECTOMY TOTAL ABDOMINAL (ALEXANDRA). BSO;  Surgeon: Seth Pinto MD;  [facility]: [facility];  Service: Gynecology Oncology    JOINT REPLACEMENT Left 2017    hip    JOINT REPLACEMENT Right 2017    hip    OOPHORECTOMY Bilateral     WV COLONOSCOPY FLX DX W/COLLJ SPEC WHEN PFRMD N/A 2018    Procedure: COLONOSCOPY;  Surgeon: Juanjo Sanders MD;  Location: [facility];  Service: Gastroenterology    WV COLONOSCOPY FLX DX W/COLLJ SPEC WHEN PFRMD N/A 2018    Procedure: COLONOSCOPY;  Surgeon: José Miguel Cedillo MD;  [facility]: [facility];  Service: Colorectal    WV ECHO TRANSESOPHAG R-T 2D W/PRB IMG ACQUISJ I&R N/A 2020    Procedure: TRANSESOPHAGEAL ECHOCARDIOGRAM (THO);  Surgeon: Diallo Vu DO;  [facility]: [facility];  Service: Cardiac Surgery    WV ESOPHAGOGASTRODUODENOSCOPY TRANSORAL DIAGNOSTIC N/A 2018    Procedure: ESOPHAGOGASTRODUODENOSCOPY (EGD);  Surgeon: Juanjo Sanders MD;  Location: [facility];  Service: Gastroenterology    WV LAPAROSCOPY SURG COLOSTOMY/SKN LVL CECOSTOMY N/A 2018    Procedure: PERMANENT END COLOSTOMY;  Surgeon: José Miguel Cedillo MD;  [facility];  Service: Colorectal    WV LAPS GSTR RSTCV PX W/BYP SOLEDAD-EN-Y LIMB <150 CM N/A  2016    Procedure: BYPASS GASTRIC  SOLEDAD-EN-Y LAPAROSCOPIC;  Surgeon: Jama Frazier MD;  [facility];  Service: Bariatrics    FL LAPS PROCTECTOMY ABDOMINOPERINEAL W/COLOSTOMY N/A 2018    Procedure: PROCTECTOMY;  Surgeon: José Miguel Cedillo MD;  [facility];  Service: Colorectal    FL REPLACE AORTIC VALVE OPENFEMORAL ARTERY APPROACH N/A 2020    Procedure: REPLACEMENT AORTIC VALVE TRANSCATHETER (TAVR) TRANSFEMORAL W/ 26MM WADDELL BARBARA S3 ULTRA VALVE(ACCESS ON RIGHT);  Surgeon: Diallo Vu DO;  [facility];  Service: Cardiac Surgery    REPLACEMENT TOTAL KNEE      TOOTH EXTRACTION      TUBAL LIGATION         Family History   Adopted: Yes   Problem Relation Age of Onset    Leukemia Mother     Heart disease Father     Coronary artery disease Father     Diabetes Father     No Known Problems Sister     No Known Problems Brother     Diabetes Son     No Known Problems Brother     No Known Problems Brother     No Known Problems Sister     No Known Problems Sister      I have reviewed and agree with the history as documented.    E-Cigarette/Vaping    E-Cigarette Use Never User      E-Cigarette/Vaping Substances    Nicotine No     THC No     CBD No     Flavoring No     Other No     Unknown No      Social History     Tobacco Use    Smoking status: Former     Current packs/day: 0.00     Average packs/day: 1 pack/day for 25.0 years (25.0 ttl pk-yrs)     Types: Cigarettes     Start date: 3/30/1981     Quit date: 3/30/2006     Years since quittin.2     Passive exposure: Never    Smokeless tobacco: Never   Vaping Use    Vaping status: Never Used   Substance Use Topics    Alcohol use: Not Currently    Drug use: Not Currently     Types: Oxycodone     Comment: Percocet for lower back pain prn       Review of Systems   Constitutional:  Positive for diaphoresis.   Respiratory:  Negative for shortness of breath.    Cardiovascular:  Negative for chest pain.   Skin:  Positive for  wound.   All other systems reviewed and are negative.      Physical Exam  Physical Exam  Vitals and nursing note reviewed.   Constitutional:       General: She is not in acute distress.     Appearance: She is diaphoretic.   Cardiovascular:      Rate and Rhythm: Tachycardia present. Rhythm irregular.   Pulmonary:      Effort: Pulmonary effort is normal. No respiratory distress.      Breath sounds: Normal breath sounds.   Skin:         Neurological:      General: No focal deficit present.      Mental Status: She is alert and oriented to person, place, and time.         Vital Signs  ED Triage Vitals [06/18/24 2202]   Temperature Pulse Respirations Blood Pressure SpO2   97.5 °F (36.4 °C) (!) 130 20 120/86 93 %      Temp Source Heart Rate Source Patient Position - Orthostatic VS BP Location FiO2 (%)   Oral Monitor Sitting Left arm --      Pain Score       No Pain           Vitals:    06/18/24 2230 06/18/24 2245 06/18/24 2315 06/19/24 0000   BP: 138/89 113/64 109/77 118/71   Pulse: (!) 115 103 100 99   Patient Position - Orthostatic VS: Sitting Sitting Sitting Sitting         Visual Acuity      ED Medications  Medications   sodium chloride 0.9 % bolus 1,000 mL (0 mL Intravenous Stopped 6/18/24 2330)   metoprolol (LOPRESSOR) injection 5 mg (5 mg Intravenous Given 6/18/24 2230)   cephalexin (KEFLEX) capsule 500 mg (500 mg Oral Given 6/19/24 0005)       Diagnostic Studies  Results Reviewed       Procedure Component Value Units Date/Time    HS Troponin 0hr (reflex protocol) [[id number]]  (Normal) Collected: 06/18/24 2229    Lab Status: Final result Specimen: Blood from Arm, Left Updated: 06/18/24 2302     hs TnI 0hr 8 ng/L     Comprehensive metabolic panel [[id number]]  (Abnormal) Collected: 06/18/24 2229    Lab Status: Final result Specimen: Blood from Arm, Left Updated: 06/18/24 2254     Sodium 138 mmol/L      Potassium 4.1 mmol/L      Chloride 105 mmol/L      CO2 25 mmol/L      ANION GAP 8 mmol/L      BUN 14 mg/dL       Creatinine 0.88 mg/dL      Glucose 155 mg/dL      Calcium 9.2 mg/dL      AST 17 U/L      ALT 14 U/L      Alkaline Phosphatase 65 U/L      Total Protein 7.4 g/dL      Albumin 3.9 g/dL      Total Bilirubin 0.62 mg/dL      eGFR 67 ml/min/1.73sq m     Narrative:      National Kid[employer] Foundation guidelines for Chronic Kidney Disease (CKD):     Stage 1 with normal or high GFR (GFR > 90 mL/min/1.73 square meters)    Stage 2 Mild CKD (GFR = 60-89 mL/min/1.73 square meters)    Stage 3A Moderate CKD (GFR = 45-59 mL/min/1.73 square meters)    Stage 3B Moderate CKD (GFR = 30-44 mL/min/1.73 square meters)    Stage 4 Severe CKD (GFR = 15-29 mL/min/1.73 square meters)    Stage 5 End Stage CKD (GFR <15 mL/min/1.73 square meters)  Note: GFR calculation is accurate only with a steady state creatinine    Magnesium [[id number]]  (Normal) Collected: 06/18/24 2229    Lab Status: Final result Specimen: Blood from Arm, Left Updated: 06/18/24 2254     Magnesium 2.2 mg/dL     CBC and differential [[id number]]  (Abnormal) Collected: 06/18/24 2229    Lab Status: Final result Specimen: Blood from Arm, Left Updated: 06/18/24 2238     WBC 8.50 Thousand/uL      RBC 6.10 Million/uL      Hemoglobin 16.2 g/dL      Hematocrit 51.4 %      MCV 84 fL      MCH 26.6 pg      MCHC 31.5 g/dL      RDW 17.8 %      MPV 9.3 fL      Platelets 204 Thousands/uL      nRBC 0 /100 WBCs      Segmented % 63 %      Immature Grans % 2 %      Lymphocytes % 22 %      Monocytes % 10 %      Eosinophils Relative 2 %      Basophils Relative 1 %      Absolute Neutrophils 5.40 Thousands/µL      Absolute Immature Grans 0.18 Thousand/uL      Absolute Lymphocytes 1.88 Thousands/µL      Absolute Monocytes 0.84 Thousand/µL      Eosinophils Absolute 0.13 Thousand/µL      Basophils Absolute 0.07 Thousands/µL     Wound culture and Gram stain [[id number]] Collected: 06/18/24 2229    Lab Status: In process Specimen: Wound from Back Updated: 06/18/24 2236                   No orders to  display              Procedures  ECG 12 Lead Documentation Only    Date/Time: 6/18/2024 10:18 PM    Performed by: Balaji Walker DO  Authorized by: Balaji Walker DO    ECG reviewed by me, the ED Provider: yes    Patient location:  ED  Interpretation:     Interpretation: abnormal    Rate:     ECG rate:  127    ECG rate assessment: tachycardic    Rhythm:     Rhythm: atrial fibrillation    Ectopy:     Ectopy: none    QRS:     QRS axis:  Left  ST segments:     ST segments:  Normal  T waves:     T waves: normal             ED Course             HEART Risk Score      Flowsheet Row Most Recent Value   Heart Score Risk Calculator    History 0 Filed at: 06/19/2024 0016   ECG 0 Filed at: 06/19/2024 0016   Age 2 Filed at: 06/19/2024 0016   Risk Factors 2 Filed at: 06/19/2024 0016   Troponin 0 Filed at: 06/19/2024 0016   HEART Score 4 Filed at: 06/19/2024 0016                          SBIRT 20yo+      Flowsheet Row Most Recent Value   Initial Alcohol Screen: US AUDIT-C     1. How often do you have a drink containing alcohol? 0 Filed at: 06/18/2024 2252   2. How many drinks containing alcohol do you have on a typical day you are drinking?  0 Filed at: 06/18/2024 2252   3b. FEMALE Any Age, or MALE 65+: How often do you have 4 or more drinks on one occassion? 0 Filed at: 06/18/2024 2252   Audit-C Score 0 Filed at: 06/18/2024 2252   ARABELLA: How many times in the past year have you...    Used an illegal drug or used a prescription medication for non-medical reasons? Never Filed at: 06/18/2024 2252                      Medical Decision Making  Pulse ox 98% on room air indicating adequate oxygenation.      Discussed initial blood work with patient.  Heart rate improved after dose of metoprolol in the ER.  Patient states she is very uncomfortable sitting on the stretcher.  Discussed waiting for second troponin to completely rule out ACS.  Patient declined this under third and verbalized the risk of not getting the second troponin.  Like  to go home.    Amount and/or Complexity of Data Reviewed  Labs: ordered.  ECG/medicine tests: ordered and independent interpretation performed.    Risk  Prescription drug management.             Disposition  Final diagnoses:   Wound infection   Atrial fibrillation with rapid ventricular response (HCC)     Time reflects when diagnosis was documented in both MDM as applicable and the Disposition within this note       Time User Action Codes Description Comment    6/18/2024 11:54 PM Balaji Walker Add [T14.8XXA,  L08.9] Wound infection     6/18/2024 11:55 PM Balaji Walker Add [I48.91] Atrial fibrillation with rapid ventricular response (HCC)           ED Disposition       ED Disposition   Discharge    Condition   Stable    Date/Time   Tue Jun 18, 2024 1725    Comment   Lisa Cuikameron discharge to home/self care.                   Follow-up Information       Follow up With Specialties Details Why Contact Info    [facility], Nurse Practitioner In 1 week For wound re-check [address] [address]  [phone]              Discharge Medication List as of 6/18/2024 11:56 PM        START taking these medications    Details   cephalexin (KEFLEX) 500 mg capsule Take 1 capsule (500 mg total) by mouth 3 (three) times a day for 5 days, Starting Tue 6/18/2024, Until Sun 6/23/2024, Normal           CONTINUE these medications which have NOT CHANGED    Details   albuterol (2.5 mg/3 mL) 0.083 % nebulizer solution TAKE 1 VIAL BY NEBULIZATION EVERY 4 HOURS AS NEEDED FOR WHEEZING OR SHORTNESS OF BREATH, Normal      apixaban (Eliquis) 5 mg Take 1 tablet (5 mg total) by mouth 2 (two) times a day, Starting Thu 5/9/2024, Normal      atorvastatin (LIPITOR) 20 mg tablet Take 1 tablet (20 mg total) by mouth daily, Starting Thu 5/9/2024, Normal      Calcium-Vitamin D 500-125 MG-UNIT TABS Take 1 tablet by mouth 2 (two) times a day , Historical Med      Empagliflozin 25 MG TABS Take 1 tablet (25  mg total) by mouth daily, Starting Thu 5/9/2024, Until Tue 11/5/2024, Normal      lisinopril (ZESTRIL) 2.5 mg tablet ONE TAB DAILY, Starting Thu 5/30/2024, Normal      meclizine (ANTIVERT) 12.5 MG tablet Take 1 tablet (12.5 mg total) by mouth every 12 (twelve) hours as needed for dizziness, Starting Mon 2/26/2024, Normal      metoprolol succinate (TOPROL-XL) 25 mg 24 hr tablet Take 1 tablet (25 mg total) by mouth 2 (two) times a day TAKE 1 TABLET IN MORNING AND 1/2 TABLET IN EVENING, Starting Thu 5/9/2024, Normal      Multiple Vitamins-Minerals (BARIATRIC FUSION) CHEW Chew 1 tablet daily  , Historical Med      nystatin (MYCOSTATIN) cream APPLY TOPICALLY 2 TIMES A DAY, Starting Tue 4/16/2024, Normal      omeprazole (PriLOSEC) 20 mg delayed release capsule Take 1 capsule (20 mg total) by mouth daily before breakfast, Starting Mon 2/26/2024, Until Fri 11/22/2024, Normal      oxyCODONE (ROXICODONE) 10 MG TABS Take 10 mg by mouth every 6 (six) hours as needed As needed for pain, Starting Sat 9/15/2018, Historical Med      potassium chloride (K-DUR,KLOR-CON) 20 mEq tablet Take 1 tablet (20 mEq total) by mouth if needed (if taking torsemide), Starting Thu 10/13/2022, Normal      torsemide (DEMADEX) 20 mg tablet Take 1 tablet (20 mg total) by mouth if needed (for leg swelling/weight gain above 3 pounds), Starting Thu 10/13/2022, Normal             No discharge procedures on file.    PDMP Review       None            ED Provider  Electronically Signed by             Balaji Walker DO  06/19/24 0016

## 2024-06-20 ENCOUNTER — APPOINTMENT (EMERGENCY)
Dept: RADIOLOGY | Facility: HOSPITAL | Age: 69
End: 2024-06-20
Payer: MEDICARE

## 2024-06-20 ENCOUNTER — VBI (OUTPATIENT)
Dept: FAMILY MEDICINE CLINIC | Facility: CLINIC | Age: 69
End: 2024-06-20

## 2024-06-20 ENCOUNTER — HOSPITAL ENCOUNTER (EMERGENCY)
Facility: HOSPITAL | Age: 69
Discharge: HOME | End: 2024-06-20
Attending: EMERGENCY MEDICINE
Payer: MEDICARE

## 2024-06-20 VITALS
DIASTOLIC BLOOD PRESSURE: 69 MMHG | RESPIRATION RATE: 20 BRPM | SYSTOLIC BLOOD PRESSURE: 128 MMHG | WEIGHT: 282 LBS | TEMPERATURE: 97.7 F | HEART RATE: 98 BPM | OXYGEN SATURATION: 96 % | BODY MASS INDEX: 46.93 KG/M2

## 2024-06-20 DIAGNOSIS — I48.91 ATRIAL FIBRILLATION WITH RVR (HCC): Primary | ICD-10-CM

## 2024-06-20 LAB
2HR DELTA HS TROPONIN: 0 NG/L
ALBUMIN SERPL BCG-MCNC: 3.8 G/DL (ref 3.5–5)
ALP SERPL-CCNC: 67 U/L (ref 34–104)
ALT SERPL W P-5'-P-CCNC: 13 U/L (ref 7–52)
ANION GAP SERPL CALCULATED.3IONS-SCNC: 6 MMOL/L (ref 4–13)
ANISOCYTOSIS BLD QL SMEAR: PRESENT
AST SERPL W P-5'-P-CCNC: 16 U/L (ref 13–39)
BASOPHILS # BLD MANUAL: 0 THOUSAND/UL (ref 0–0.1)
BASOPHILS NFR MAR MANUAL: 0 % (ref 0–1)
BILIRUB SERPL-MCNC: 0.51 MG/DL (ref 0.2–1)
BUN SERPL-MCNC: 14 MG/DL (ref 5–25)
CALCIUM SERPL-MCNC: 9.3 MG/DL (ref 8.4–10.2)
CARDIAC TROPONIN I PNL SERPL HS: 9 NG/L
CARDIAC TROPONIN I PNL SERPL HS: 9 NG/L
CHLORIDE SERPL-SCNC: 104 MMOL/L (ref 96–108)
CO2 SERPL-SCNC: 26 MMOL/L (ref 21–32)
CREAT SERPL-MCNC: 0.92 MG/DL (ref 0.6–1.3)
EOSINOPHIL # BLD MANUAL: 0.09 THOUSAND/UL (ref 0–0.4)
EOSINOPHIL NFR BLD MANUAL: 1 % (ref 0–6)
ERYTHROCYTE [DISTWIDTH] IN BLOOD BY AUTOMATED COUNT: 17.3 % (ref 11.6–15.1)
GFR SERPL CREATININE-BSD FRML MDRD: 64 ML/MIN/1.73SQ M
GLUCOSE SERPL-MCNC: 153 MG/DL (ref 65–140)
HCT VFR BLD AUTO: 51.3 % (ref 34.8–46.1)
HGB BLD-MCNC: 15.6 G/DL (ref 11.5–15.4)
LYMPHOCYTES # BLD AUTO: 1.36 THOUSAND/UL (ref 0.6–4.47)
LYMPHOCYTES # BLD AUTO: 15 % (ref 14–44)
MACROCYTES BLD QL AUTO: PRESENT
MAGNESIUM SERPL-MCNC: 2.1 MG/DL (ref 1.9–2.7)
MCH RBC QN AUTO: 26.1 PG (ref 26.8–34.3)
MCHC RBC AUTO-ENTMCNC: 30.4 G/DL (ref 31.4–37.4)
MCV RBC AUTO: 86 FL (ref 82–98)
MONOCYTES # BLD AUTO: 0.45 THOUSAND/UL (ref 0–1.22)
MONOCYTES NFR BLD: 5 % (ref 4–12)
MYELOCYTE ABSOLUTE CT: 0.18 THOUSAND/UL (ref 0–0.1)
MYELOCYTES NFR BLD MANUAL: 2 % (ref 0–1)
NEUTROPHILS # BLD MANUAL: 6.99 THOUSAND/UL (ref 1.85–7.62)
NEUTS BAND NFR BLD MANUAL: 1 % (ref 0–8)
NEUTS SEG NFR BLD AUTO: 76 % (ref 43–75)
PLATELET # BLD AUTO: 184 THOUSANDS/UL (ref 149–390)
PLATELET BLD QL SMEAR: ADEQUATE
PMV BLD AUTO: 9.1 FL (ref 8.9–12.7)
POTASSIUM SERPL-SCNC: 4.7 MMOL/L (ref 3.5–5.3)
PROT SERPL-MCNC: 7.2 G/DL (ref 6.4–8.4)
RBC # BLD AUTO: 5.98 MILLION/UL (ref 3.81–5.12)
RBC MORPH BLD: PRESENT
SODIUM SERPL-SCNC: 136 MMOL/L (ref 135–147)
TOXIC GRANULES BLD QL SMEAR: PRESENT
WBC # BLD AUTO: 9.08 THOUSAND/UL (ref 4.31–10.16)

## 2024-06-20 PROCEDURE — 83735 ASSAY OF MAGNESIUM: CPT | Performed by: EMERGENCY MEDICINE

## 2024-06-20 PROCEDURE — 96361 HYDRATE IV INFUSION ADD-ON: CPT

## 2024-06-20 PROCEDURE — 99285 EMERGENCY DEPT VISIT HI MDM: CPT | Performed by: EMERGENCY MEDICINE

## 2024-06-20 PROCEDURE — 80053 COMPREHEN METABOLIC PANEL: CPT | Performed by: EMERGENCY MEDICINE

## 2024-06-20 PROCEDURE — 96374 THER/PROPH/DIAG INJ IV PUSH: CPT

## 2024-06-20 PROCEDURE — 93005 ELECTROCARDIOGRAM TRACING: CPT

## 2024-06-20 PROCEDURE — 36415 COLL VENOUS BLD VENIPUNCTURE: CPT | Performed by: EMERGENCY MEDICINE

## 2024-06-20 PROCEDURE — 85007 BL SMEAR W/DIFF WBC COUNT: CPT | Performed by: EMERGENCY MEDICINE

## 2024-06-20 PROCEDURE — 84484 ASSAY OF TROPONIN QUANT: CPT | Performed by: EMERGENCY MEDICINE

## 2024-06-20 PROCEDURE — 71045 X-RAY EXAM CHEST 1 VIEW: CPT

## 2024-06-20 PROCEDURE — 85027 COMPLETE CBC AUTOMATED: CPT | Performed by: EMERGENCY MEDICINE

## 2024-06-20 PROCEDURE — 99285 EMERGENCY DEPT VISIT HI MDM: CPT

## 2024-06-20 RX ORDER — METOPROLOL SUCCINATE 25 MG/1
50 TABLET, EXTENDED RELEASE ORAL EVERY MORNING
Qty: 20 TABLET | Refills: 0 | Status: SHIPPED | OUTPATIENT
Start: 2024-06-20 | End: 2024-06-26

## 2024-06-20 RX ORDER — METOPROLOL TARTRATE 1 MG/ML
5 INJECTION, SOLUTION INTRAVENOUS ONCE
Status: COMPLETED | OUTPATIENT
Start: 2024-06-20 | End: 2024-06-20

## 2024-06-20 RX ORDER — METOPROLOL TARTRATE 1 MG/ML
5 INJECTION, SOLUTION INTRAVENOUS ONCE
Status: DISCONTINUED | OUTPATIENT
Start: 2024-06-20 | End: 2024-06-20

## 2024-06-20 RX ADMIN — SODIUM CHLORIDE 500 ML: 0.9 INJECTION, SOLUTION INTRAVENOUS at 19:22

## 2024-06-20 RX ADMIN — METOPROLOL TARTRATE 5 MG: 5 INJECTION INTRAVENOUS at 19:27

## 2024-06-20 NOTE — ED PROVIDER NOTES
"History  Chief Complaint   Patient presents with    Chest Pain     States she started at 1630 with sweating and pain left shoulder and upper chest \" twinges \". Patient took her Metoprolol. Used someones Fitbit and -118. EKG in progress .     Atrial Fibrillation     Pt is a 67yo F who presents for chest discomfort.  Patient reports that earlier today she had a \"flushing feeling\".  Patient reports that this often happens when she has A-fib with RVR.  Patient states she does not get associated palpitations or shortness of breath.  Patient states that after this began she began with left-sided discomfort.  Patient states it is not true pain but it concerned her and she therefore came in for further evaluation.  Patient reports she was seen here recently for similar.  Patient reports she got a dose of IV metoprolol and was discharged home.  Patient reports she has continued to take her home medications as prescribed and is feeling well since that time until today.        Prior to Admission Medications   Prescriptions Last Dose Informant Patient Reported? Taking?   Calcium-Vitamin D 500-125 MG-UNIT TABS  Self Yes No   Sig: Take 1 tablet by mouth 2 (two) times a day    Patient not taking: Reported on 9/16/2023   Empagliflozin 25 MG TABS   No No   Sig: Take 1 tablet (25 mg total) by mouth daily   Multiple Vitamins-Minerals (BARIATRIC FUSION) CHEW  Self Yes No   Sig: Chew 1 tablet daily     albuterol (2.5 mg/3 mL) 0.083 % nebulizer solution  Self No No   Sig: TAKE 1 VIAL BY NEBULIZATION EVERY 4 HOURS AS NEEDED FOR WHEEZING OR SHORTNESS OF BREATH   apixaban (Eliquis) 5 mg   No No   Sig: Take 1 tablet (5 mg total) by mouth 2 (two) times a day   atorvastatin (LIPITOR) 20 mg tablet   No No   Sig: Take 1 tablet (20 mg total) by mouth daily   cephalexin (KEFLEX) 500 mg capsule   No No   Sig: Take 1 capsule (500 mg total) by mouth 3 (three) times a day for 5 days   lisinopril (ZESTRIL) 2.5 mg tablet   No No   Sig: ONE TAB " DAILY   meclizine (ANTIVERT) 12.5 MG tablet   No No   Sig: Take 1 tablet (12.5 mg total) by mouth every 12 (twelve) hours as needed for dizziness   metoprolol succinate (TOPROL-XL) 25 mg 24 hr tablet   No No   Sig: Take 1 tablet (25 mg total) by mouth 2 (two) times a day TAKE 1 TABLET IN MORNING AND 1/2 TABLET IN EVENING   nystatin (MYCOSTATIN) cream   No No   Sig: APPLY TOPICALLY 2 TIMES A DAY   omeprazole (PriLOSEC) 20 mg delayed release capsule   No No   Sig: Take 1 capsule (20 mg total) by mouth daily before breakfast   oxyCODONE (ROXICODONE) 10 MG TABS  Self Yes No   Sig: Take 10 mg by mouth every 6 (six) hours as needed As needed for pain   potassium chloride (K-DUR,KLOR-CON) 20 mEq tablet  Self No No   Sig: Take 1 tablet (20 mEq total) by mouth if needed (if taking torsemide)   torsemide (DEMADEX) 20 mg tablet  Self No No   Sig: Take 1 tablet (20 mg total) by mouth if needed (for leg swelling/weight gain above 3 pounds)      Facility-Administered Medications: None       Past Medical History:   Diagnosis Date    Acute pain of right knee     Ambulatory dysfunction     Anemia     Arthritis     Bleeding ulcer     Cancer (HCC)     rectal    Chronic lower back pain     Chronic pain disorder     back pain    Colon cancer (HCC) 2018    COVID-19 05/2023    mild s/s    Diabetes mellitus (HCC)     Endometrial cancer (HCC) 2018    GERD (gastroesophageal reflux disease)     History of chemotherapy     History of MRSA infection 0719/2016    Hyperlipidemia     Hypertension     Morbid obesity (HCC)     Morbid obesity with BMI of 45.0-49.9, adult (HCC)     Ovarian cancer (HCC) 2018    Paroxysmal atrial fibrillation (HCC)     Pneumonia     Rectal cancer (HCC)     S/P TAVR (transcatheter aortic valve replacement)     Sleep apnea     doesn't use CPAP any longer    SOB (shortness of breath)     Spinal stenosis     Systolic murmur     Unsteady gait     uses walker    Wears dentures     Wears glasses        Past Surgical History:    Procedure Laterality Date    ABDOMINAL PERINEAL BOWEL RESECTION W/ ILEOANAL POUCH N/A 2018    Procedure: LAPAROSCOPIC HAND ASSIST ABDOMINOPERINEAL RESECTION,  POSTERIOR VAGINECTOMY, OMENTECTOMY;  Surgeon: José Miguel Cedillo MD;  [facility]: [facility];  Service: Colorectal    ABDOMINAL SURGERY      abscess removed from abdomen and right thigh, a hole in thigh closed by plastic surgeon    ABSCESS DRAINAGE      abd, (R) leg, (L) leg     SECTION       SECTION      CYSTOSCOPY N/A 2018    Procedure: CYSTOSCOPY;  Surgeon: Seth Pinto MD;  [facility]: [facility];  Service: Gynecology Oncology    ESOPHAGOGASTRODUODENOSCOPY N/A 2016    Procedure: ESOPHAGOGASTRODUODENOSCOPY (EGD);  Surgeon: Jama Frazier MD;  [facility];  Service:     ESOPHAGOGASTRODUODENOSCOPY N/A 2016    Procedure: ESOPHAGOGASTRODUODENOSCOPY (EGD);  Surgeon: Jama Frazier MD;  [facility];  Service:     EYE SURGERY      laser eye surgery    HYSTERECTOMY N/A 2018    Procedure: RADICAL HYSTERECTOMY TOTAL ABDOMINAL (ALEXANDRA). BSO;  Surgeon: Seth Pinto MD;  [facility];  Service: Gynecology Oncology    JOINT REPLACEMENT Left 2017    hip    JOINT REPLACEMENT Right 2017    hip    OOPHORECTOMY Bilateral     TN COLONOSCOPY FLX DX W/COLLJ SPEC WHEN PFRMD N/A 2018    Procedure: COLONOSCOPY;  Surgeon: Juanjo Sanders MD;  Location: [facility];  Service: Gastroenterology    TN COLONOSCOPY FLX DX W/COLLJ SPEC WHEN PFRMD N/A 2018    Procedure: COLONOSCOPY;  Surgeon: José Miguel Cedillo MD;  [facility];  Service: Colorectal    TN ECHO TRANSESOPHAG R-T 2D W/PRB IMG ACQUISJ I&R N/A 2020    Procedure: TRANSESOPHAGEAL ECHOCARDIOGRAM (THO);  Surgeon: Diallo Vu DO;  [facility];  Service: Cardiac Surgery    TN ESOPHAGOGASTRODUODENOSCOPY TRANSORAL DIAGNOSTIC N/A 2018    Procedure: ESOPHAGOGASTRODUODENOSCOPY (EGD);  Surgeon: Juanjo Sanders MD;   Location: [facility];  Service: Gastroenterology    AL LAPAROSCOPY SURG COLOSTOMY/SKN LVL CECOSTOMY N/A 2018    Procedure: PERMANENT END COLOSTOMY;  Surgeon: José Miguel Cedillo MD;  [facility];  Service: Colorectal    AL LAPS GSTR RSTCV PX W/BYP SOLEDAD-EN-Y LIMB <150 CM N/A 2016    Procedure: BYPASS GASTRIC  SOLEDAD-EN-Y LAPAROSCOPIC;  Surgeon: Jama Frazier MD;  [facility];  Service: Bariatrics    AL LAPS PROCTECTOMY ABDOMINOPERINEAL W/COLOSTOMY N/A 2018    Procedure: PROCTECTOMY;  Surgeon: José Miguel Cedillo MD;  [facility];  Service: Colorectal    AL REPLACE AORTIC VALVE OPENFEMORAL ARTERY APPROACH N/A 2020    Procedure: REPLACEMENT AORTIC VALVE TRANSCATHETER (TAVR) TRANSFEMORAL W/ 26MM WADDELL BARBARA S3 ULTRA VALVE(ACCESS ON RIGHT);  Surgeon: Diallo Vu DO;  [facility];  Service: Cardiac Surgery    REPLACEMENT TOTAL KNEE      TOOTH EXTRACTION      TUBAL LIGATION         Family History   Adopted: Yes   Problem Relation Age of Onset    Leukemia Mother     Heart disease Father     Coronary artery disease Father     Diabetes Father     No Known Problems Sister     No Known Problems Brother     Diabetes Son     No Known Problems Brother     No Known Problems Brother     No Known Problems Sister     No Known Problems Sister      I have reviewed and agree with the history as documented.    E-Cigarette/Vaping    E-Cigarette Use Never User      E-Cigarette/Vaping Substances    Nicotine No     THC No     CBD No     Flavoring No     Other No     Unknown No      Social History     Tobacco Use    Smoking status: Former     Current packs/day: 0.00     Average packs/day: 1 pack/day for 25.0 years (25.0 ttl pk-yrs)     Types: Cigarettes     Start date: 3/30/1981     Quit date: 3/30/2006     Years since quittin.2     Passive exposure: Never    Smokeless tobacco: Never   Vaping Use    Vaping status: Never Used   Substance Use Topics    Alcohol use: Not  Currently    Drug use: Not Currently     Types: Oxycodone     Comment: Percocet for lower back pain prn       Physical Exam  Physical Exam  Vitals reviewed.   Constitutional:       General: She is not in acute distress.     Appearance: She is well-developed. She is obese. She is not toxic-appearing or diaphoretic.   HENT:      Head: Normocephalic and atraumatic.      Right Ear: External ear normal.      Left Ear: External ear normal.      Nose: Nose normal.      Mouth/Throat:      Pharynx: Oropharynx is clear.   Eyes:      Extraocular Movements: Extraocular movements intact.      Pupils: Pupils are equal, round, and reactive to light.   Cardiovascular:      Rate and Rhythm: Tachycardia present. Rhythm irregular.      Heart sounds: Normal heart sounds.   Pulmonary:      Effort: Pulmonary effort is normal. No respiratory distress.      Breath sounds: Normal breath sounds.   Abdominal:      General: There is no distension.      Palpations: Abdomen is soft.      Tenderness: There is no abdominal tenderness.   Musculoskeletal:         General: Normal range of motion.      Cervical back: Normal range of motion and neck supple.      Right lower leg: No edema.      Left lower leg: No edema.   Skin:     General: Skin is warm and dry.      Capillary Refill: Capillary refill takes less than 2 seconds.      Coloration: Skin is not pale.      Findings: No erythema or rash.   Neurological:      General: No focal deficit present.      Mental Status: She is alert and oriented to person, place, and time.   Psychiatric:         Speech: Speech normal.         Behavior: Behavior is cooperative.         Vital Signs  ED Triage Vitals   Temperature Pulse Respirations Blood Pressure SpO2   06/20/24 1823 06/20/24 1823 06/20/24 1823 06/20/24 1828 06/20/24 1823   99.5 °F (37.5 °C) (!) 118 20 109/67 100 %      Temp Source Heart Rate Source Patient Position - Orthostatic VS BP Location FiO2 (%)   06/20/24 1823 06/20/24 1823 06/20/24 1828  06/20/24 1828 --   Tympanic Monitor Sitting Left arm       Pain Score       06/20/24 1823       4           Vitals:    06/20/24 1823 06/20/24 1828 06/20/24 2007 06/20/24 2245   BP:  109/67 121/79 128/69   Pulse: (!) 118  101 98   Patient Position - Orthostatic VS:  Sitting Sitting Sitting         Visual Acuity      ED Medications  Medications   sodium chloride 0.9 % bolus 500 mL (0 mL Intravenous Stopped 6/20/24 2022)   metoprolol (LOPRESSOR) injection 5 mg (5 mg Intravenous Given 6/20/24 1927)       Diagnostic Studies  Results Reviewed       Procedure Component Value Units Date/Time    HS Troponin I 2hr [[id number]]  (Normal) Collected: 06/20/24 2145    Lab Status: Final result Specimen: Blood from Arm, Left Updated: 06/20/24 2228     hs TnI 2hr 9 ng/L      Delta 2hr hsTnI 0 ng/L     RBC Morphology Reflex Test [[id number]] Collected: 06/20/24 1921    Lab Status: Final result Specimen: Blood from Arm, Right Updated: 06/20/24 2101    CBC and differential [[id number]]  (Abnormal) Collected: 06/20/24 1921    Lab Status: Final result Specimen: Blood from Arm, Right Updated: 06/20/24 2047     WBC 9.08 Thousand/uL      RBC 5.98 Million/uL      Hemoglobin 15.6 g/dL      Hematocrit 51.3 %      MCV 86 fL      MCH 26.1 pg      MCHC 30.4 g/dL      RDW 17.3 %      MPV 9.1 fL      Platelets 184 Thousands/uL     Narrative:      This is an appended report.  These results have been appended to a previously verified report.    Manual Differential(PHLEBS Do Not Order) [[id number]]  (Abnormal) Collected: 06/20/24 1921    Lab Status: Final result Specimen: Blood from Arm, Right Updated: 06/20/24 2047     Segmented % 76 %      Bands % 1 %      Lymphocytes % 15 %      Monocytes % 5 %      Eosinophils % 1 %      Basophils % 0 %      Myelocytes % 2 %      Absolute Neutrophils 6.99 Thousand/uL      Absolute Lymphocytes 1.36 Thousand/uL      Absolute Monocytes 0.45 Thousand/uL      Absolute Eosinophils 0.09 Thousand/uL      Absolute Basophils  0.00 Thousand/uL      Absolute Myelocytes 0.18 Thousand/uL      Total Counted --     Toxic Granulation Present     RBC Morphology Present     Platelet Estimate Adequate     Anisocytosis Present     Macrocytes Present    HS Troponin 0hr (reflex protocol) [[id number]]  (Normal) Collected: 06/20/24 1921    Lab Status: Final result Specimen: Blood from Arm, Right Updated: 06/1955     hs TnI 0hr 9 ng/L     Comprehensive metabolic panel [[id number]]  (Abnormal) Collected: 06/20/24 1921    Lab Status: Final result Specimen: Blood from Arm, Right Updated: 06/20/24 1947     Sodium 136 mmol/L      Potassium 4.7 mmol/L      Chloride 104 mmol/L      CO2 26 mmol/L      ANION GAP 6 mmol/L      BUN 14 mg/dL      Creatinine 0.92 mg/dL      Glucose 153 mg/dL      Calcium 9.3 mg/dL      AST 16 U/L      ALT 13 U/L      Alkaline Phosphatase 67 U/L      Total Protein 7.2 g/dL      Albumin 3.8 g/dL      Total Bilirubin 0.51 mg/dL      eGFR 64 ml/min/1.73sq m     Narrative:      National Kidney Disease Foundation guidelines for Chronic Kidney Disease (CKD):     Stage 1 with normal or high GFR (GFR > 90 mL/min/1.73 square meters)    Stage 2 Mild CKD (GFR = 60-89 mL/min/1.73 square meters)    Stage 3A Moderate CKD (GFR = 45-59 mL/min/1.73 square meters)    Stage 3B Moderate CKD (GFR = 30-44 mL/min/1.73 square meters)    Stage 4 Severe CKD (GFR = 15-29 mL/min/1.73 square meters)    Stage 5 End Stage CKD (GFR <15 mL/min/1.73 square meters)  Note: GFR calculation is accurate only with a steady state creatinine    Magnesium [[id number]]  (Normal) Collected: 06/20/24 1921    Lab Status: Final result Specimen: Blood from Arm, Right Updated: 06/20/24 1947     Magnesium 2.1 mg/dL                    XR chest portable   Final Result by Lior Brambila MD (06/21 0831)      No acute disease in the chest.            Workstation performed: [id number]                    Procedures  Procedures         ED Course  ED Course as of 06/22/24 4596    Thu Jun 20, 2024 1913 Procedure Note: EKG  Date/Time: 06/20/24 7:13 PM   Interpreted by: Kelsie Martinez MD  Indications / Diagnosis: CP  ECG reviewed by me, the ED Physician: yes   The EKG demonstrates:  Rhythm: irregular tachycardia  Intervals: normal intervals  Axis: normal axis  QRS/Blocks: normal QRS  ST Changes: No acute ST Changes, no STD/GALDINO   1929 CBC and differential(!)  Reviewed and without actionable derangement.    1947 Comprehensive metabolic panel(!)  Reviewed and without actionable derangement.    1947 MAGNESIUM: 2.1  WNL   1956 hs TnI 0hr: 9  Will require delta.    1958 HR still <110 after initial dose of lopressor. Will continue to monitor.     2021 Cards contacted via epic chat for recs on pt's home meds.    2035 Cards recommending toprol 50mg in AM and 25mg in PM. Will DC following delta trop (if WNL) which such instructions.    2058 Bands %: 1  WNL   2115 XR chest portable  Enlarged heart. No acute findings on wet read.    2214 Delta trop in process.    2228 Delta 2hr hsTnI: 0  WNL.                                SBIRT 22yo+      Flowsheet Row Most Recent Value   Initial Alcohol Screen: US AUDIT-C     1. How often do you have a drink containing alcohol? 0 Filed at: 06/20/2024 1825   Audit-C Score 0 Filed at: 06/20/2024 1825   ARABELLA: How many times in the past year have you...    Used an illegal drug or used a prescription medication for non-medical reasons? Never Filed at: 06/20/2024 1825                      Medical Decision Making  Pt is a 67yo F who presents with chest discomfort. Exam pertinent for irregular tachycardia.    Differential diagnosis to include but not limited to A-fib with RVR, electrolyte abnormality, dehydration, ACS.  Will plan for labs and EKG.  Will give dose of metoprolol and discussed with cardiology.  See ED course for results and details.    Plan to discharge pt with f/u to PCP and cardiology. Discussed returning the ED with new or worsening of symptoms. Discussed  increase of home metoprolol and continuing all other medications as previously prescribed. Pt expressed understanding of discharge instructions, return precautions, and medication instructions and is stable for discharge at this time. All questions were answered and pt was discharged without incident.       Amount and/or Complexity of Data Reviewed  Labs: ordered. Decision-making details documented in ED Course.  Radiology: ordered. Decision-making details documented in ED Course.    Risk  Prescription drug management.             Disposition  Final diagnoses:   Atrial fibrillation with RVR (HCC)     Time reflects when diagnosis was documented in both MDM as applicable and the Disposition within this note       Time User Action Codes Description Comment    6/20/2024 10:29 PM Kelsie Martinez Add [I48.91] Atrial fibrillation with RVR (HCC)           ED Disposition       ED Disposition   Discharge    Condition   Stable    Date/Time   Th Jun 20, 2024 2229    Comment   Lisa Reardon discharge to home/self care.                   Follow-up Information       Follow up With Specialties Details Why Contact Info Additional Information    [facility], Nurse Practitioner Call  As needed [address]  [address]  [facility] [id number] [phone]       [facility] Call on 6/21/2024  [address]  [facility] [phone] [facility], [address] [place] [address] [address], [place], [place], [id number] [phone]            Discharge Medication List as of 6/20/2024 10:30 PM        START taking these medications    Details   !! metoprolol succinate (TOPROL-XL) 25 mg 24 hr tablet Take 2 tablets (50 mg total) by mouth every morning, Starting Thu 6/20/2024, Normal       !! - Potential duplicate medications found. Please discuss with provider.        CONTINUE these medications  which have NOT CHANGED    Details   albuterol (2.5 mg/3 mL) 0.083 % nebulizer solution TAKE 1 VIAL BY NEBULIZATION EVERY 4 HOURS AS NEEDED FOR WHEEZING OR SHORTNESS OF BREATH, Normal      apixaban (Eliquis) 5 mg Take 1 tablet (5 mg total) by mouth 2 (two) times a day, Starting Thu 5/9/2024, Normal      atorvastatin (LIPITOR) 20 mg tablet Take 1 tablet (20 mg total) by mouth daily, Starting Thu 5/9/2024, Normal      Calcium-Vitamin D 500-125 MG-UNIT TABS Take 1 tablet by mouth 2 (two) times a day , Historical Med      cephalexin (KEFLEX) 500 mg capsule Take 1 capsule (500 mg total) by mouth 3 (three) times a day for 5 days, Starting Tue 6/18/2024, Until Sun 6/23/2024, Normal      Empagliflozin 25 MG TABS Take 1 tablet (25 mg total) by mouth daily, Starting Thu 5/9/2024, Until Tue 11/5/2024, Normal      lisinopril (ZESTRIL) 2.5 mg tablet ONE TAB DAILY, Starting Thu 5/30/2024, Normal      meclizine (ANTIVERT) 12.5 MG tablet Take 1 tablet (12.5 mg total) by mouth every 12 (twelve) hours as needed for dizziness, Starting Mon 2/26/2024, Normal      !! metoprolol succinate (TOPROL-XL) 25 mg 24 hr tablet Take 1 tablet (25 mg total) by mouth 2 (two) times a day TAKE 1 TABLET IN MORNING AND 1/2 TABLET IN EVENING, Starting Thu 5/9/2024, Normal      Multiple Vitamins-Minerals (BARIATRIC FUSION) CHEW Chew 1 tablet daily  , Historical Med      nystatin (MYCOSTATIN) cream APPLY TOPICALLY 2 TIMES A DAY, Starting Tue 4/16/2024, Normal      omeprazole (PriLOSEC) 20 mg delayed release capsule Take 1 capsule (20 mg total) by mouth daily before breakfast, Starting Mon 2/26/2024, Until Fri 11/22/2024, Normal      oxyCODONE (ROXICODONE) 10 MG TABS Take 10 mg by mouth every 6 (six) hours as needed As needed for pain, Starting Sat 9/15/2018, Historical Med      potassium chloride (K-DUR,KLOR-CON) 20 mEq tablet Take 1 tablet (20 mEq total) by mouth if needed (if taking torsemide), Starting u 10/13/2022, Normal      torsemide (DEMADEX) 20  mg tablet Take 1 tablet (20 mg total) by mouth if needed (for leg swelling/weight gain above 3 pounds), Starting Thu 10/13/2022, Normal       !! - Potential duplicate medications found. Please discuss with provider.          No discharge procedures on file.    PDMP Review       None            ED Provider  Electronically Signed by             Kelsie Martinez MD  06/22/24 9553

## 2024-06-20 NOTE — TELEPHONE ENCOUNTER
06/20/24 10:09 AM    Patient contacted post ED visit, first outreach attempt made. Message was left for patient to return a call to the [facility] Department at [facility]: Phone [phone].    Thank you.  Ne Guerra MA  [facility]

## 2024-06-21 ENCOUNTER — TELEPHONE (OUTPATIENT)
Age: 69
End: 2024-06-21

## 2024-06-21 LAB
ATRIAL RATE: 105 BPM
BACTERIA WND AEROBE CULT: NORMAL
GRAM STN SPEC: NORMAL
QRS AXIS: -24 DEGREES
QRSD INTERVAL: 96 MS
QT INTERVAL: 284 MS
QTC INTERVAL: 386 MS
T WAVE AXIS: 36 DEGREES
VENTRICULAR RATE: 111 BPM

## 2024-06-21 PROCEDURE — 93010 ELECTROCARDIOGRAM REPORT: CPT | Performed by: INTERNAL MEDICINE

## 2024-06-21 NOTE — TELEPHONE ENCOUNTER
Pt called stating she was in the ED yesterday with Afib.  They increased the Metoprolol from 25 mg BID to 50 mg in the am and 25 mg in the pm. Pt was reluctant to take without making the office aware.  She took BP with an automatic wrist cuff while on the phone and it was 159/113, 80.   She will borrow a friends cuff later and re check the BP and call with any additional concerns. I asked her to continue taking the increased dose of Metoprolol until seen in the office. She verbalized understanding.     She had a f/u scheduled with Johnna on Wednesday

## 2024-06-21 NOTE — TELEPHONE ENCOUNTER
06/21/24 11:28 AM    Patient contacted post ED visit, VBI department spoke with patient/caregiver and outreach was successful.    Thank you.  Ne Guerra MA  [facility]

## 2024-06-21 NOTE — DISCHARGE INSTRUCTIONS
Follow-up with cardiology for further care. Contact info provided below if needed.  Start taking your metoprolol 50mg in the morning and 25mg at night until seen by cardiology.   Return to the ED with new or worsening symptoms.

## 2024-06-26 ENCOUNTER — OFFICE VISIT (OUTPATIENT)
Dept: CARDIOLOGY CLINIC | Facility: CLINIC | Age: 69
End: 2024-06-26
Payer: MEDICARE

## 2024-06-26 VITALS
OXYGEN SATURATION: 100 % | BODY MASS INDEX: 46.65 KG/M2 | HEIGHT: 65 IN | SYSTOLIC BLOOD PRESSURE: 130 MMHG | WEIGHT: 280 LBS | DIASTOLIC BLOOD PRESSURE: 72 MMHG | HEART RATE: 88 BPM

## 2024-06-26 DIAGNOSIS — I25.10 CORONARY ARTERY DISEASE INVOLVING NATIVE HEART WITHOUT ANGINA PECTORIS, UNSPECIFIED VESSEL OR LESION TYPE: ICD-10-CM

## 2024-06-26 DIAGNOSIS — I48.91 ATRIAL FIBRILLATION WITH RVR (HCC): ICD-10-CM

## 2024-06-26 DIAGNOSIS — Z95.2 S/P TAVR (TRANSCATHETER AORTIC VALVE REPLACEMENT): ICD-10-CM

## 2024-06-26 DIAGNOSIS — I50.32 CHRONIC DIASTOLIC HEART FAILURE (HCC): ICD-10-CM

## 2024-06-26 DIAGNOSIS — I48.91 ATRIAL FIBRILLATION WITH RAPID VENTRICULAR RESPONSE (HCC): ICD-10-CM

## 2024-06-26 DIAGNOSIS — E66.01 MORBID OBESITY DUE TO EXCESS CALORIES (HCC): ICD-10-CM

## 2024-06-26 DIAGNOSIS — E78.2 MIXED HYPERLIPIDEMIA: ICD-10-CM

## 2024-06-26 DIAGNOSIS — I48.0 PAROXYSMAL ATRIAL FIBRILLATION (HCC): Primary | ICD-10-CM

## 2024-06-26 DIAGNOSIS — E11.22 TYPE 2 DIABETES MELLITUS WITH CHRONIC KIDNEY DISEASE, WITHOUT LONG-TERM CURRENT USE OF INSULIN, UNSPECIFIED CKD STAGE (HCC): ICD-10-CM

## 2024-06-26 PROCEDURE — 93000 ELECTROCARDIOGRAM COMPLETE: CPT | Performed by: NURSE PRACTITIONER

## 2024-06-26 PROCEDURE — 99214 OFFICE O/P EST MOD 30 MIN: CPT | Performed by: NURSE PRACTITIONER

## 2024-06-26 RX ORDER — METOPROLOL SUCCINATE 25 MG/1
50 TABLET, EXTENDED RELEASE ORAL EVERY 12 HOURS
Qty: 180 TABLET | Refills: 1 | Status: SHIPPED | OUTPATIENT
Start: 2024-06-26

## 2024-06-26 NOTE — PROGRESS NOTES
Progress Note - Cardiology Office  [facility]    Lisa Reardon 68 y.o. female MRN: [id number]  : 1955  Encounter: [id number]      Assessment:     1. Paroxysmal atrial fibrillation (HCC)    2. Chronic diastolic heart failure (HCC)    3. Coronary artery disease involving native heart without angina pectoris, unspecified vessel or lesion type    4. S/P TAVR (transcatheter aortic valve replacement)    5. Atrial fibrillation with rapid ventricular response (HCC)    6. Type 2 diabetes mellitus with chronic kidney disease, without long-term current use of insulin, unspecified CKD stage (HCC)    7. Morbid obesity due to excess calories (HCC)    8. Mixed hyperlipidemia        Discussion Summary and Plan:  1. Paroxysmal atrial fibrillation: patient has been in atrial fibrillation for over one month, concerning that she may now be persistent a fib    -   24 TTE: both atrium are severely dilated so there is concern that patient would not successfully cardioversion    -   will follow rate control strategy    -   will increase Toprol-XL to 50 mg BID    -   discussed wearing extended Monitor, patient states monitor never states adherent to her skin. She will monitor her heart rate and blood pressure twice a day and call our office if she notes heart rates are elevated or bradycardia.    -   [id number] score = 5, continue Eliquis 5 mg BID    2. Chronic diastolic heart failure: 24 TTE: EF visibly estimated 45% with mildly reduced systolic function and both atrium severely dilated    -   patient appears to be euvolemic    -   continue Toprol-XL 50 mg BID    -   continues lisinopril 2.5 mg daily    -   continue torsemide 20 mg daily as needed    -   encourage low sodium diet and daily weights    3. Aortic stenosis status post TAVR: procedure performed in 2020      -   24 TTE: Travis BARBARA 3 ultra-26 mm bio prostatic valve in the aortic position with gradients across prostatic  valve within expected range    4. Diabetes: managed per primary team    5. Dyslipidemia:   The 10-year ASCVD risk score (Kristi GRAHAM, et al., 2019) is: 17.9%    Values used to calculate the score:      Age: 68 years      Sex: Female      Is Non- : No      Diabetic: Yes      Tobacco smoker: No      Systolic Blood Pressure: 130 mmHg      Is BP treated: Yes      HDL Cholesterol: 48 mg/dL      Total Cholesterol: 146 mg/dL    -   continue Lipitor 20 mg daily    -   repeat lipid panel in four months    6. 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  Office number was provided #[phone].    Please call [phone] if any questions.    Counseling :  A description of the counseling.  Goals and Barriers.  Patient's ability to self care: Yes  Medication side effect reviewed with patient in detail and all their questions answered to their satisfaction.    HPI :     Lisa Reardon is a 68 y.o. year old female who came for emergency room follow up. Patient was seen in the emergency room on 6/18/2024 and also on 6/20/2024 with complaints of flushing and anxiety. On both occasions she was noted to be in atrial fibrillation with a heart rate in the 120s. Patient denies sensation of palpitations or skip beats of her heart, she usually states she just feels the flushing and anxiety when her heart rate is elevated. Her metoprolol was adjusted by the emergency room staff and her heart rates today are controlled at 80, but unfortunately she remains in atrial fibrillation. She follows routinely with Dr. Carrasquillo. 2D echocardiogram performed on 5/21/2024 noted EF decreased to 45% with mild global hypo kinesis and regional variation. She was  also noted of severe dilatation to both atrium. Of note patient was in controlled atrial fibrillation during this study. Previous echocardiogram from 2021 her EF was 60% and sinus rhythm at that time.    Patient has past history for chronic diastolic heart failure, severe aortic stenosis for which she is had TAVR in September 2020, rectal cancer, chronic low back pain, diabetes, endometrial cancer, Gerd, obesity status post gastric bypass and osteoarthritis    Review of Systems   Constitutional: Negative.  Negative for activity change and fatigue.   HENT: Negative.  Negative for congestion, nosebleeds, sinus pressure and tinnitus.    Eyes: Negative.  Negative for photophobia and visual disturbance.   Respiratory: Negative.  Negative for chest tightness and shortness of breath.    Cardiovascular:  Negative for chest pain, palpitations and leg swelling.   Gastrointestinal: Negative.  Negative for abdominal distention, constipation, diarrhea, nausea and vomiting.   Endocrine: Negative.  Negative for polydipsia, polyphagia and polyuria.   Genitourinary: Negative.  Negative for difficulty urinating.   Musculoskeletal:  Positive for gait problem.        Ambulates with Walker   Skin: Negative.    Neurological:  Negative for dizziness, syncope, weakness and light-headedness.   Hematological: Negative.    Psychiatric/Behavioral: Negative.         Historical Information   Past Medical History:   Diagnosis Date    Acute pain of right knee     Ambulatory dysfunction     Anemia     Arthritis     Bleeding ulcer     Cancer (HCC)     rectal    Chronic lower back pain     Chronic pain disorder     back pain    Colon cancer (HCC) 2018    COVID-19 05/2023    mild s/s    Diabetes mellitus (HCC)     Endometrial cancer (HCC) 2018    GERD (gastroesophageal reflux disease)     History of chemotherapy     History of MRSA infection 0719/2016    Hyperlipidemia     Hypertension     Morbid obesity (HCC)     Morbid obesity with BMI of 45.0-49.9,  adult (HCC)     Ovarian cancer (HCC) 2018    Paroxysmal atrial fibrillation (HCC)     Pneumonia     Rectal cancer (HCC)     S/P TAVR (transcatheter aortic valve replacement)     Sleep apnea     doesn't use CPAP any longer    SOB (shortness of breath)     Spinal stenosis     Systolic murmur     Unsteady gait     uses walker    Wears dentures     Wears glasses      Past Surgical History:   Procedure Laterality Date    ABDOMINAL PERINEAL BOWEL RESECTION W/ ILEOANAL POUCH N/A 2018    Procedure: LAPAROSCOPIC HAND ASSIST ABDOMINOPERINEAL RESECTION,  POSTERIOR VAGINECTOMY, OMENTECTOMY;  Surgeon: José Miguel Cedillo MD;  Location: [facility];  Service: Colorectal    ABDOMINAL SURGERY      abscess removed from abdomen and right thigh, a hole in thigh closed by plastic surgeon    ABSCESS DRAINAGE      abd, (R) leg, (L) leg     SECTION       SECTION      CYSTOSCOPY N/A 2018    Procedure: CYSTOSCOPY;  Surgeon: Seth Pinto MD;  Location: [facility];  Service: Gynecology Oncology    ESOPHAGOGASTRODUODENOSCOPY N/A 2016    Procedure: ESOPHAGOGASTRODUODENOSCOPY (EGD);  Surgeon: Jama Frazier MD;  Location: [facility];  Service:     ESOPHAGOGASTRODUODENOSCOPY N/A 2016    Procedure: ESOPHAGOGASTRODUODENOSCOPY (EGD);  Surgeon: Jama Frazier MD;  Location: [facility];  Service:     EYE SURGERY      laser eye surgery    HYSTERECTOMY N/A 2018    Procedure: RADICAL HYSTERECTOMY TOTAL ABDOMINAL (ALEXANDRA). BSO;  Surgeon: Seth Pinto MD;  Location: [facility];  Service: Gynecology Oncology    JOINT REPLACEMENT Left 2017    hip    JOINT REPLACEMENT Right 2017    hip    OOPHORECTOMY Bilateral     MO COLONOSCOPY FLX DX W/COLLJ SPEC WHEN PFRMD N/A 2018    Procedure: COLONOSCOPY;  Surgeon: Juanjo Sanders MD;  Location: [facility];  Service: Gastroenterology    MO COLONOSCOPY FLX DX W/COLLJ SPEC WHEN PFRMD N/A 2018    Procedure: COLONOSCOPY;  Surgeon: José Miguel Cedillo MD;   [facility]: [facility];  Service: Colorectal    OR ECHO TRANSESOPHAG R-T 2D W/PRB IMG ACQUISJ I&R N/A 2020    Procedure: TRANSESOPHAGEAL ECHOCARDIOGRAM (THO);  Surgeon: Diallo Vu DO;  Location: [facility];  Service: Cardiac Surgery    OR ESOPHAGOGASTRODUODENOSCOPY TRANSORAL DIAGNOSTIC N/A 2018    Procedure: ESOPHAGOGASTRODUODENOSCOPY (EGD);  Surgeon: Juanjo Sanders MD;  Location: [facility];  Service: Gastroenterology    OR LAPAROSCOPY SURG COLOSTOMY/SKN LVL CECOSTOMY N/A 2018    Procedure: PERMANENT END COLOSTOMY;  Surgeon: José Miguel Cedillo MD;  [facility]: [facility];  Service: Colorectal    OR LAPS GSTR RSTCV PX W/BYP SOLEDAD-EN-Y LIMB <150 CM N/A 2016    Procedure: BYPASS GASTRIC  SOLEDAD-EN-Y LAPAROSCOPIC;  Surgeon: Jama Frazier MD;  Location: [facility];  Service: Bariatrics    OR LAPS PROCTECTOMY ABDOMINOPERINEAL W/COLOSTOMY N/A 2018    Procedure: PROCTECTOMY;  Surgeon: José Miguel Cedillo MD;  [facility];  Service: Colorectal    OR REPLACE AORTIC VALVE OPENFEMORAL ARTERY APPROACH N/A 2020    Procedure: REPLACEMENT AORTIC VALVE TRANSCATHETER (TAVR) TRANSFEMORAL W/ 26MM WADDELL BARBARA S3 ULTRA VALVE(ACCESS ON RIGHT);  Surgeon: Diallo Vu DO;  [facility]: [facility];  Service: Cardiac Surgery    REPLACEMENT TOTAL KNEE      TOOTH EXTRACTION      TUBAL LIGATION       Social History     Substance and Sexual Activity   Alcohol Use Not Currently     Social History     Substance and Sexual Activity   Drug Use Not Currently    Types: Oxycodone    Comment: Percocet for lower back pain prn     Social History     Tobacco Use   Smoking Status Former    Current packs/day: 0.00    Average packs/day: 1 pack/day for 25.0 years (25.0 ttl pk-yrs)    Types: Cigarettes    Start date: 3/30/1981    Quit date: 3/30/2006    Years since quittin.2    Passive exposure: Never   Smokeless Tobacco Never     Family History:   Family History   Adopted: Yes   Problem Relation Age  of Onset    Leukemia Mother     Heart disease Father     Coronary artery disease Father     Diabetes Father     No Known Problems Sister     No Known Problems Brother     Diabetes Son     No Known Problems Brother     No Known Problems Brother     No Known Problems Sister     No Known Problems Sister        Meds/Allergies     No Known Allergies    Current Outpatient Medications:     albuterol (2.5 mg/3 mL) 0.083 % nebulizer solution, TAKE 1 VIAL BY NEBULIZATION EVERY 4 HOURS AS NEEDED FOR WHEEZING OR SHORTNESS OF BREATH, Disp: 225 mL, Rfl: 1    apixaban (Eliquis) 5 mg, Take 1 tablet (5 mg total) by mouth 2 (two) times a day, Disp: 180 tablet, Rfl: 3    atorvastatin (LIPITOR) 20 mg tablet, Take 1 tablet (20 mg total) by mouth daily, Disp: 90 tablet, Rfl: 3    Empagliflozin 25 MG TABS, Take 1 tablet (25 mg total) by mouth daily, Disp: 90 tablet, Rfl: 1    lisinopril (ZESTRIL) 2.5 mg tablet, ONE TAB DAILY, Disp: 90 tablet, Rfl: 1    meclizine (ANTIVERT) 12.5 MG tablet, Take 1 tablet (12.5 mg total) by mouth every 12 (twelve) hours as needed for dizziness, Disp: 20 tablet, Rfl: 0    metoprolol succinate (TOPROL-XL) 25 mg 24 hr tablet, Take 1 tablet (25 mg total) by mouth 2 (two) times a day TAKE 1 TABLET IN MORNING AND 1/2 TABLET IN EVENING (Patient taking differently: Take 25 mg by mouth daily at bedtime TAKE 1 TABLET IN MORNING AND 1/2 TABLET IN EVENING), Disp: 180 tablet, Rfl: 2    metoprolol succinate (TOPROL-XL) 25 mg 24 hr tablet, Take 2 tablets (50 mg total) by mouth every morning, Disp: 20 tablet, Rfl: 0    Multiple Vitamins-Minerals (BARIATRIC FUSION) CHEW, Chew 1 tablet daily  , Disp: , Rfl:     nystatin (MYCOSTATIN) cream, APPLY TOPICALLY 2 TIMES A DAY, Disp: 30 g, Rfl: 2    omeprazole (PriLOSEC) 20 mg delayed release capsule, Take 1 capsule (20 mg total) by mouth daily before breakfast, Disp: 90 capsule, Rfl: 2    oxyCODONE (ROXICODONE) 10 MG TABS, Take 10 mg by mouth every 6 (six) hours as needed As needed  "for pain, Disp: , Rfl:     potassium chloride (K-DUR,KLOR-CON) 20 mEq tablet, Take 1 tablet (20 mEq total) by mouth if needed (if taking torsemide), Disp: 30 tablet, Rfl: 1    torsemide (DEMADEX) 20 mg tablet, Take 1 tablet (20 mg total) by mouth if needed (for leg swelling/weight gain above 3 pounds), Disp: 30 tablet, Rfl: 1    Calcium-Vitamin D 500-125 MG-UNIT TABS, Take 1 tablet by mouth 2 (two) times a day  (Patient not taking: Reported on 9/16/2023), Disp: , Rfl:     Vitals: Blood pressure 130/72, pulse 88, height 5' 5\" (1.651 m), weight 127 kg (280 lb), SpO2 100%, not currently breastfeeding.    Body mass index is 46.59 kg/m².  Wt Readings from Last 3 Encounters:   06/26/24 127 kg (280 lb)   06/20/24 128 kg (282 lb)   06/18/24 126 kg (278 lb)     Vitals:    06/26/24 1051   Weight: 127 kg (280 lb)     BP Readings from Last 3 Encounters:   06/26/24 130/72   06/20/24 128/69   06/19/24 118/71       Physical Exam:  Physical Exam  Vitals and nursing note reviewed.   Constitutional:       Appearance: Normal appearance.   HENT:      Right Ear: External ear normal.      Left Ear: External ear normal.   Eyes:      General: No scleral icterus.        Right eye: No discharge.         Left eye: No discharge.   Cardiovascular:      Rate and Rhythm: Normal rate. Rhythm irregular.      Pulses: Normal pulses.      Heart sounds: Normal heart sounds.   Pulmonary:      Effort: Pulmonary effort is normal.      Breath sounds: Normal breath sounds.   Abdominal:      General: Bowel sounds are normal. There is no distension.      Palpations: Abdomen is soft.   Musculoskeletal:      Right lower leg: No edema.      Left lower leg: No edema.   Skin:     General: Skin is warm and dry.      Capillary Refill: Capillary refill takes less than 2 seconds.   Neurological:      General: No focal deficit present.      Mental Status: She is alert and oriented to person, place, and time. Mental status is at baseline.   Psychiatric:         Mood and " "Affect: Mood normal.         Behavior: Behavior is cooperative.           Diagnostic Studies Review Cardio:  EKG:  atrial fibrillation with control ventricular response of 88          Johnna Lorenza PALOMINO  Cardiology        \"This note was completed in part utilizing [employer] direct voice recognition software.   Grammatical errors, random word insertion, spelling mistakes, and incomplete sentences may be an occasional consequence of the system secondary to software limitations, ambient noise and hardware issues.    Please read the chart carefully and recognize, using context, where substitutions have occurred.  If you have any questions or concerns about the context, text or information contained within the body of this dictation, please contact myself, the provider, for further clarification.\"  "

## 2024-07-05 ENCOUNTER — TELEPHONE (OUTPATIENT)
Age: 69
End: 2024-07-05

## 2024-07-05 NOTE — TELEPHONE ENCOUNTER
Enrique from [employer] called in regard to Medical Records. Call was connected to Medical Records.

## 2024-07-10 NOTE — TELEPHONE ENCOUNTER
Janice from [employer] calling to verify if request for billing records was received.

## 2024-07-16 ENCOUNTER — TELEPHONE (OUTPATIENT)
Age: 69
End: 2024-07-16

## 2024-08-19 DIAGNOSIS — K21.9 GASTROESOPHAGEAL REFLUX DISEASE, UNSPECIFIED WHETHER ESOPHAGITIS PRESENT: ICD-10-CM

## 2024-08-19 DIAGNOSIS — K28.9 MARGINAL ULCER: ICD-10-CM

## 2024-08-19 DIAGNOSIS — Z98.84 S/P BARIATRIC SURGERY: ICD-10-CM

## 2024-08-29 ENCOUNTER — TELEPHONE (OUTPATIENT)
Dept: FAMILY MEDICINE CLINIC | Facility: CLINIC | Age: 69
End: 2024-08-29

## 2024-08-29 NOTE — TELEPHONE ENCOUNTER
LMOM for patient to schedule AWV in September and stated to schedule soon so it won't affect her medication refills

## 2024-08-29 NOTE — TELEPHONE ENCOUNTER
Please call patient and schedule for AWV any day of September as it is almost year and won't be able to send any refills from next month until seen in September. GEORGETTE Bhatia

## 2024-09-03 DIAGNOSIS — I48.91 ATRIAL FIBRILLATION WITH RVR (HCC): ICD-10-CM

## 2024-09-03 RX ORDER — METOPROLOL SUCCINATE 25 MG/1
50 TABLET, EXTENDED RELEASE ORAL EVERY 12 HOURS
Qty: 360 TABLET | Refills: 1 | Status: SHIPPED | OUTPATIENT
Start: 2024-09-03

## 2024-09-09 ENCOUNTER — RA CDI HCC (OUTPATIENT)
Dept: OTHER | Facility: HOSPITAL | Age: 69
End: 2024-09-09

## 2024-09-09 NOTE — PROGRESS NOTES
HCC coding opportunities          Chart Reviewed number of suggestions sent to Provider: 3  I13.0  E11.65  E11.3291     Patients Insurance     Medicare Insurance: Medicare

## 2024-09-13 ENCOUNTER — TELEPHONE (OUTPATIENT)
Dept: GASTROENTEROLOGY | Facility: MEDICAL CENTER | Age: 69
End: 2024-09-13

## 2024-09-20 ENCOUNTER — OFFICE VISIT (OUTPATIENT)
Dept: FAMILY MEDICINE CLINIC | Facility: CLINIC | Age: 69
End: 2024-09-20
Payer: MEDICARE

## 2024-09-20 VITALS
HEIGHT: 65 IN | BODY MASS INDEX: 47.72 KG/M2 | DIASTOLIC BLOOD PRESSURE: 70 MMHG | TEMPERATURE: 98.1 F | WEIGHT: 286.4 LBS | SYSTOLIC BLOOD PRESSURE: 138 MMHG | HEART RATE: 88 BPM | RESPIRATION RATE: 22 BRPM

## 2024-09-20 DIAGNOSIS — Z00.00 MEDICARE ANNUAL WELLNESS VISIT, SUBSEQUENT: ICD-10-CM

## 2024-09-20 DIAGNOSIS — I50.32 CHRONIC DIASTOLIC HEART FAILURE (HCC): ICD-10-CM

## 2024-09-20 DIAGNOSIS — I48.91 ATRIAL FIBRILLATION WITH RAPID VENTRICULAR RESPONSE (HCC): ICD-10-CM

## 2024-09-20 DIAGNOSIS — R80.9 MICROALBUMINURIA: ICD-10-CM

## 2024-09-20 DIAGNOSIS — Z43.3 COLOSTOMY CARE (HCC): ICD-10-CM

## 2024-09-20 DIAGNOSIS — F11.20 CONTINUOUS OPIOID DEPENDENCE (HCC): ICD-10-CM

## 2024-09-20 DIAGNOSIS — J43.9 PULMONARY EMPHYSEMA, UNSPECIFIED EMPHYSEMA TYPE (HCC): ICD-10-CM

## 2024-09-20 DIAGNOSIS — Z12.31 ENCOUNTER FOR SCREENING MAMMOGRAM FOR BREAST CANCER: ICD-10-CM

## 2024-09-20 DIAGNOSIS — R91.1 PULMONARY NODULE: ICD-10-CM

## 2024-09-20 DIAGNOSIS — E11.22 TYPE 2 DIABETES MELLITUS WITH CHRONIC KIDNEY DISEASE, WITHOUT LONG-TERM CURRENT USE OF INSULIN, UNSPECIFIED CKD STAGE (HCC): Primary | ICD-10-CM

## 2024-09-20 DIAGNOSIS — Z78.0 POST-MENOPAUSAL: ICD-10-CM

## 2024-09-20 LAB
LEFT EYE DIABETIC RETINOPATHY: NORMAL
LEFT EYE IMAGE QUALITY: NORMAL
LEFT EYE MACULAR EDEMA: NORMAL
LEFT EYE OTHER RETINOPATHY: NORMAL
RIGHT EYE DIABETIC RETINOPATHY: NORMAL
RIGHT EYE IMAGE QUALITY: NORMAL
RIGHT EYE MACULAR EDEMA: NORMAL
RIGHT EYE OTHER RETINOPATHY: NORMAL
SEVERITY (EYE EXAM): NORMAL

## 2024-09-20 PROCEDURE — G0439 PPPS, SUBSEQ VISIT: HCPCS | Performed by: NURSE PRACTITIONER

## 2024-09-20 PROCEDURE — 99214 OFFICE O/P EST MOD 30 MIN: CPT | Performed by: NURSE PRACTITIONER

## 2024-09-20 PROCEDURE — 92250 FUNDUS PHOTOGRAPHY W/I&R: CPT | Performed by: NURSE PRACTITIONER

## 2024-09-20 RX ORDER — LISINOPRIL 2.5 MG/1
2.5 TABLET ORAL DAILY
Qty: 90 TABLET | Refills: 1 | Status: SHIPPED | OUTPATIENT
Start: 2024-09-20

## 2024-09-20 NOTE — PROGRESS NOTES
Ambulatory Visit  Name: Lisa Reardon      : 1955      MRN: [id number]  Encounter Provider: GEORGETTE Bhatia  Encounter Date: 2024   Encounter department: [facility]    Assessment & Plan  Type 2 diabetes mellitus with chronic kidney disease, without long-term current use of insulin, unspecified CKD stage (HCC)    Lab Results   Component Value Date    HGBA1C 7.6 (H) 2024       Orders:    Hemoglobin A1C; Future    Albumin / creatinine urine ratio    Comprehensive metabolic panel; Future    IRIS Diabetic eye exam    Medicare annual wellness visit, subsequent         Colostomy care (HCC)  Doing well and denies any issues         Continuous opioid dependence (HCC)  Managed by pain medicine         Pulmonary emphysema, unspecified emphysema type (HCC)  Denies any issues at this time and will be following with cardiothoracic surgeon on Continued growth of a 9 mm left upper lobe lung nodule suspicious for low-grade neoplasm.          Encounter for screening mammogram for breast cancer    Orders:    Mammo screening bilateral w 3d and cad; Future    Post-menopausal    Orders:    DXA bone density spine hip and pelvis; Future    Microalbuminuria    Orders:    lisinopril (ZESTRIL) 2.5 mg tablet; Take 1 tablet (2.5 mg total) by mouth daily    Atrial fibrillation with rapid ventricular response (HCC)  Managed by cardiologist         Chronic diastolic heart failure (HCC)  On jardiance by cardiologist  Wt Readings from Last 3 Encounters:   24 130 kg (286 lb 6.4 oz)   24 127 kg (280 lb)   24 128 kg (282 lb)                    Pulmonary nodule  Last CT chest was done in may 2024 and due for follow up with cardiothoracic surgeon and will schedule that           Depression Screening and Follow-up Plan: Patient was screened for depression during today's encounter. They screened negative with a PHQ-2 score of 0.      Preventive health issues were discussed with patient, and age  appropriate screening tests were ordered as noted in patient's After Visit Summary. Personalized health advice and appropriate referrals for health education or preventive services given if needed, as noted in patient's After Visit Summary.    History of Present Illness     Patient is here for follow up on diabetes and AWV.  Due for follow up with oncologist and cardiothoracic surgeon and will schedule that. Also due for colonoscopy and will schedule that too.  Having knee pain and will follow with ortho.  Due for blood work and ordered.  Advised to follow with podiatrist and ophthalmologist  Declined flu vaccine      Fatigue  Associated symptoms include arthralgias and fatigue. Pertinent negatives include no headaches.      Patient Care Team:  GEORGETTE Bhatia as PCP - General (Family Medicine)  GEORGETTE Casey as PCP - Wound (Wound Care)  KUSHAL Ford MD (General Surgery)  Allen Abarca MD (Hematology and Oncology)  Juanjo Sanders MD (Gastroenterology)  José Miguel Cedillo MD (Colon and Rectal Surgery)  Sheldon Love MD (Radiation Oncology)  Linda Nam RN as Registered Nurse (Oncology)  Graeme Narvaez DO (Cardiology)  José Miguel Cedillo MD as Endoscopist  Andrew Cox MD (Thoracic Surgery)    Review of Systems   Constitutional:  Positive for fatigue.   HENT: Negative.     Respiratory: Negative.     Cardiovascular: Negative.    Gastrointestinal: Negative.    Genitourinary:  Negative for difficulty urinating.   Musculoskeletal:  Positive for arthralgias.   Neurological:  Negative for dizziness and headaches.     Medical History Reviewed by provider this encounter:  Tobacco  Allergies  Meds  Problems  Med Hx  Surg Hx  Fam Hx       Annual Wellness Visit Questionnaire   Lisa is here for her Subsequent Wellness visit. Last Medicare Wellness visit information reviewed, patient interviewed and updates made to the record today.      Health Risk Assessment:    Patient rates overall health as fair. Patient feels that their physical health rating is same. Patient is very satisfied with their life. Eyesight was rated as same. Hearing was rated as same. Patient feels that their emotional and mental health rating is same. Patients states they are sometimes angry. Patient states they are always unusually tired/fatigued. Patient states that she has experienced no weight loss or gain in last 6 months.     Depression Screening:   PHQ-2 Score: 0      Fall Risk Screening:   In the past year, patient has experienced: no history of falling in past year      Urinary Incontinence Screening:   Patient has not leaked urine accidently in the last six months.     Home Safety:  Patient has trouble with stairs inside or outside of their home. Patient has working smoke alarms and has working carbon monoxide detector. Home safety hazards include: none.     Nutrition:   Current diet is Regular.     Medications:   Patient is currently taking over-the-counter supplements. OTC medications include: see medication list. Patient is able to manage medications.     Activities of Daily Living (ADLs)/Instrumental Activities of Daily Living (IADLs):   Walk and transfer into and out of bed and chair?: Yes  Dress and groom yourself?: Yes    Bathe or shower yourself?: Yes    Feed yourself? Yes  Do your laundry/housekeeping?: Yes  Manage your money, pay your bills and track your expenses?: Yes  Make your own meals?: Yes    Do your own shopping?: Yes    Previous Hospitalizations:   Any hospitalizations or ED visits within the last 12 months?: Yes    How many hospitalizations have you had in the last year?: 1-2    Advance Care Planning:   Living will: No    Durable POA for healthcare: No    Advanced directive: No    Advanced directive counseling given: Yes    ACP document given: Yes    Patient declined ACP directive: No      Cognitive Screening:   Provider or family/friend/caregiver concerned regarding  cognition?: No    PREVENTIVE SCREENINGS      Cardiovascular Screening:    General: History Lipid Disorder and Screening Current      Diabetes Screening:     General: Screening Not Indicated and History Diabetes    Due for: Blood Glucose      Colorectal Cancer Screening:     General: History Colorectal Cancer and Risks and Benefits Discussed    Due for: Colonoscopy - High Risk      Breast Cancer Screening:     General: Risks and Benefits Discussed    Due for: Mammogram        Cervical Cancer Screening:    General: Screening Not Indicated      Osteoporosis Screening:    General: Risks and Benefits Discussed    Due for: DXA Axial      Abdominal Aortic Aneurysm (AAA) Screening:        General: Screening Not Indicated      Lung Cancer Screening:     General: Screening Not Indicated, History Lung Cancer and Screening Current      Hepatitis C Screening:    General: Screening Current    Screening, Brief Intervention, and Referral to Treatment (SBIRT)    Screening      AUDIT-C Screenin) How often did you have a drink containing alcohol in the past year? never  2) How many drinks did you have on a typical day when you were drinking in the past year? 0  3) How often did you have 6 or more drinks on one occasion in the past year? never    AUDIT-C Score: 0  Interpretation: Score 0-2 (female): Negative screen for alcohol misuse    Single Item Drug Screening:  How often have you used an illegal drug (including marijuana) or a prescription medication for non-medical reasons in the past year? never    Single Item Drug Screen Score: 0  Interpretation: Negative screen for possible drug use disorder    Brief Intervention  Alcohol & drug use screenings were reviewed. No concerns regarding substance use disorder identified.     Review of Current Opioid Use    Opioid Risk Tool (ORT) Interpretation: Complete Opioid Risk Tool (ORT)    Other Counseling Topics:   Car/seat belt/driving safety, skin self-exam, sunscreen and calcium and  "vitamin D intake and regular weightbearing exercise.    By cardi  Social Determinants of Health     Financial Resource Strain: Low Risk  (9/16/2023)    Overall Financial Resource Strain (CARDIA)     Difficulty of Paying Living Expenses: Not very hard   Food Insecurity: No Food Insecurity (9/20/2024)    Hunger Vital Sign     Worried About Running Out of Food in the Last Year: Never true     Ran Out of Food in the Last Year: Never true   Transportation Needs: No Transportation Needs (9/20/2024)    PRAPARE - Transportation     Lack of Transportation (Medical): No     Lack of Transportation (Non-Medical): No   Housing Stability: Low Risk  (9/20/2024)    Housing Stability Vital Sign     Unable to Pay for Housing in the Last Year: No     Number of Times Moved in the Last Year: 0     Homeless in the Last Year: No   Utilities: Not At Risk (9/20/2024)    [facility] Utilities     Threatened with loss of utilities: No     No results found.    Objective     /70   Pulse 88   Temp 98.1 °F (36.7 °C)   Resp 22   Ht 5' 5\" (1.651 m)   Wt 130 kg (286 lb 6.4 oz)   BMI 47.66 kg/m²     Physical Exam  Constitutional:       Appearance: She is well-developed. She is morbidly obese.   HENT:      Head: Normocephalic.      Right Ear: External ear normal.      Left Ear: External ear normal.      Nose: Nose normal.   Eyes:      General:         Right eye: No discharge.         Left eye: No discharge.      Conjunctiva/sclera: Conjunctivae normal.   Cardiovascular:      Rate and Rhythm: Normal rate. Rhythm irregular.      Pulses: no weak pulses.           Dorsalis pedis pulses are 1+ on the right side and 1+ on the left side.        Posterior tibial pulses are 1+ on the right side and 1+ on the left side.      Heart sounds: Murmur heard.   Pulmonary:      Effort: Pulmonary effort is normal.      Breath sounds: Normal breath sounds.   Abdominal:      Palpations: Abdomen is soft.      Tenderness: There is no abdominal tenderness.      Comments: " Colostomy is intact and stoma is pink    Musculoskeletal:      Cervical back: Normal range of motion and neck supple.      Comments: Uses walker for ambulation   Feet:      Right foot:      Skin integrity: Dry skin present. No ulcer, skin breakdown, erythema, warmth or callus.      Left foot:      Skin integrity: Dry skin present. No ulcer, skin breakdown, erythema, warmth or callus.   Lymphadenopathy:      Cervical: No cervical adenopathy.      Right cervical: No superficial or posterior cervical adenopathy.     Left cervical: No superficial or posterior cervical adenopathy.      Upper Body:      Right upper body: No supraclavicular adenopathy.      Left upper body: No supraclavicular adenopathy.   Skin:     General: Skin is warm and dry.      Findings: No rash.   Neurological:      Mental Status: She is alert and oriented to person, place, and time.      GCS: GCS eye subscore is 4. GCS verbal subscore is 5. GCS motor subscore is 6.      Sensory: No sensory deficit.      Coordination: Coordination normal.      Gait: Gait normal.     Patient's shoes and socks removed.    Right Foot/Ankle   Right Foot Inspection  Skin Exam: skin normal, skin intact and dry skin. No warmth, no callus, no erythema, no maceration, no abnormal color, no pre-ulcer, no ulcer and no callus.     Toe Exam: ROM and strength within normal limits. No swelling, no tenderness, erythema and  no right toe deformity    Sensory   Vibration: diminished  Proprioception: intact  Monofilament testing: diminished    Vascular  Capillary refills: < 3 seconds  The right DP pulse is 1+. The right PT pulse is 1+.     Left Foot/Ankle  Left Foot Inspection  Skin Exam: skin normal, skin intact and dry skin. No warmth, no erythema, no maceration, normal color, no pre-ulcer, no ulcer and no callus.     Toe Exam: ROM and strength within normal limits. No swelling, no tenderness, no erythema and no left toe deformity.     Sensory   Vibration:  diminished  Proprioception: intact  Monofilament testing: diminished    Vascular  Capillary refills: < 3 seconds  The left DP pulse is 1+. The left PT pulse is 1+.     Assign Risk Category  No deformity present  No loss of protective sensation  No weak pulses  Risk: 2   BMI Counseling: Body mass index is 47.66 kg/m². The BMI is above normal. Nutrition recommendations include reducing portion sizes, decreasing overall calorie intake, 3-5 servings of fruits/vegetables daily, reducing fast food intake, consuming healthier snacks, decreasing soda and/or juice intake, moderation in carbohydrate intake, increasing intake of lean protein, reducing intake of saturated fat and trans fat, and reducing intake of cholesterol. Exercise recommendations include exercising 3-5 times per week and strength training exercises.

## 2024-09-20 NOTE — ASSESSMENT & PLAN NOTE
On jardiance by cardiologist  Wt Readings from Last 3 Encounters:   09/20/24 130 kg (286 lb 6.4 oz)   06/26/24 127 kg (280 lb)   06/20/24 128 kg (282 lb)

## 2024-09-20 NOTE — ASSESSMENT & PLAN NOTE
Lab Results   Component Value Date    HGBA1C 7.6 (H) 01/23/2024       Orders:    Hemoglobin A1C; Future    Albumin / creatinine urine ratio    Comprehensive metabolic panel; Future    IRIS Diabetic eye exam

## 2024-09-20 NOTE — ASSESSMENT & PLAN NOTE
Last CT chest was done in may 2024 and due for follow up with cardiothoracic surgeon and will schedule that

## 2024-09-20 NOTE — PATIENT INSTRUCTIONS
Please do blood work.  Scheduled follow up with Dr. Abarca, cardiothoracic.  Medicare Preventive Visit Patient Instructions  Thank you for completing your Welcome to Medicare Visit or Medicare Annual Wellness Visit today. Your next wellness visit will be due in one year (9/21/2025).  The screening/preventive services that you may require over the next 5-10 years are detailed below. Some tests may not apply to you based off risk factors and/or age. Screening tests ordered at today's visit but not completed yet may show as past due. Also, please note that scanned in results may not display below.  Preventive Screenings:  Service Recommendations Previous Testing/Comments   Colorectal Cancer Screening  * Colonoscopy    * Fecal Occult Blood Test (FOBT)/Fecal Immunochemical Test (FIT)  * Fecal DNA/Cologuard Test  * Flexible Sigmoidoscopy Age: 45-75 years old   Colonoscopy: every 10 years (may be performed more frequently if at higher risk)  OR  FOBT/FIT: every 1 year  OR  Cologuard: every 3 years  OR  Sigmoidoscopy: every 5 years  Screening may be recommended earlier than age 45 if at higher risk for colorectal cancer. Also, an individualized decision between you and your healthcare provider will decide whether screening between the ages of 76-85 would be appropriate. Colonoscopy: 08/29/2023  FOBT/FIT: Not on file  Cologuard: Not on file  Sigmoidoscopy: Not on file    History Colorectal Cancer     Breast Cancer Screening Age: 40+ years old  Frequency: every 1-2 years  Not required if history of left and right mastectomy Mammogram: 06/05/2019        Cervical Cancer Screening Between the ages of 21-29, pap smear recommended once every 3 years.   Between the ages of 30-65, can perform pap smear with HPV co-testing every 5 years.   Recommendations may differ for women with a history of total hysterectomy, cervical cancer, or abnormal pap smears in past. Pap Smear: Not on file    Screening Not Indicated   Hepatitis C  Screening Once for adults born between 1945 and 1965  More frequently in patients at high risk for Hepatitis C Hep C Antibody: 07/29/2022    Screening Current   Diabetes Screening 1-2 times per year if you're at risk for diabetes or have pre-diabetes Fasting glucose: 132 mg/dL (1/23/2024)  A1C: 7.6 % (1/23/2024)  Screening Not Indicated  History Diabetes   Cholesterol Screening Once every 5 years if you don't have a lipid disorder. May order more often based on risk factors. Lipid panel: 01/23/2024    Screening Not Indicated  History Lipid Disorder     Other Preventive Screenings Covered by Medicare:  Abdominal Aortic Aneurysm (AAA) Screening: covered once if your at risk. You're considered to be at risk if you have a family history of AAA.  Lung Cancer Screening: covers low dose CT scan once per year if you meet all of the following conditions: (1) Age 55-77; (2) No signs or symptoms of lung cancer; (3) Current smoker or have quit smoking within the last 15 years; (4) You have a tobacco smoking history of at least 20 pack years (packs per day multiplied by number of years you smoked); (5) You get a written order from a healthcare provider.  Glaucoma Screening: covered annually if you're considered high risk: (1) You have diabetes OR (2) Family history of glaucoma OR (3)  aged 50 and older OR (4)  American aged 65 and older  Osteoporosis Screening: covered every 2 years if you meet one of the following conditions: (1) You're estrogen deficient and at risk for osteoporosis based off medical history and other findings; (2) Have a vertebral abnormality; (3) On glucocorticoid therapy for more than 3 months; (4) Have primary hyperparathyroidism; (5) On osteoporosis medications and need to assess response to drug therapy.   Last bone density test (DXA Scan): Not on file.  HIV Screening: covered annually if you're between the age of 15-65. Also covered annually if you are younger than 15 and older  than 65 with risk factors for HIV infection. For pregnant patients, it is covered up to 3 times per pregnancy.    Immunizations:  Immunization Recommendations   Influenza Vaccine Annual influenza vaccination during flu season is recommended for all persons aged >= 6 months who do not have contraindications   Pneumococcal Vaccine   * Pneumococcal conjugate vaccine = PCV13 (Prevnar 13), PCV15 (Vaxneuvance), PCV20 (Prevnar 20)  * Pneumococcal polysaccharide vaccine = PPSV23 (Pneumovax) Adults 19-63 yo with certain risk factors or if 65+ yo  If never received any pneumonia vaccine: recommend Prevnar 20 (PCV20)  Give PCV20 if previously received 1 dose of PCV13 or PPSV23   Hepatitis B Vaccine 3 dose series if at intermediate or high risk (ex: diabetes, end stage renal disease, liver disease)   Respiratory syncytial virus (RSV) Vaccine - COVERED BY MEDICARE PART D  * RSVPreF3 (Arexvy) CDC recommends that adults 60 years of age and older may receive a single dose of RSV vaccine using shared clinical decision-making (SCDM)   Tetanus (Td) Vaccine - COST NOT COVERED BY MEDICARE PART B Following completion of primary series, a booster dose should be given every 10 years to maintain immunity against tetanus. Td may also be given as tetanus wound prophylaxis.   Tdap Vaccine - COST NOT COVERED BY MEDICARE PART B Recommended at least once for all adults. For pregnant patients, recommended with each pregnancy.   Shingles Vaccine (Shingrix) - COST NOT COVERED BY MEDICARE PART B  2 shot series recommended in those 19 years and older who have or will have weakened immune systems or those 50 years and older     Health Maintenance Due:      Topic Date Due    Breast Cancer Screening: Mammogram  06/05/2020    Hepatitis C Screening  Completed    Lung Cancer Screening  Discontinued     Immunizations Due:      Topic Date Due    COVID-19 Vaccine (3 - 2023-24 season) 09/01/2024    Influenza Vaccine (1) 09/01/2024     Advance Directives   What  are advance directives?  Advance directives are legal documents that state your wishes and plans for medical care. These plans are made ahead of time in case you lose your ability to make decisions for yourself. Advance directives can apply to any medical decision, such as the treatments you want, and if you want to donate organs.   What are the types of advance directives?  There are many types of advance directives, and each state has rules about how to use them. You may choose a combination of any of the following:  Living will:  This is a written record of the treatment you want. You can also choose which treatments you do not want, which to limit, and which to stop at a certain time. This includes surgery, medicine, IV fluid, and tube feedings.   Durable power of  for healthcare (DPAHC):  This is a written record that states who you want to make healthcare choices for you when you are unable to make them for yourself. This person, called a proxy, is usually a family member or a friend. You may choose more than 1 proxy.  Do not resuscitate (DNR) order:  A DNR order is used in case your heart stops beating or you stop breathing. It is a request not to have certain forms of treatment, such as CPR. A DNR order may be included in other types of advance directives.  Medical directive:  This covers the care that you want if you are in a coma, near death, or unable to make decisions for yourself. You can list the treatments you want for each condition. Treatment may include pain medicine, surgery, blood transfusions, dialysis, IV or tube feedings, and a ventilator (breathing machine).  Values history:  This document has questions about your views, beliefs, and how you feel and think about life. This information can help others choose the care that you would choose.  Why are advance directives important?  An advance directive helps you control your care. Although spoken wishes may be used, it is better to have  your wishes written down. Spoken wishes can be misunderstood, or not followed. Treatments may be given even if you do not want them. An advance directive may make it easier for your family to make difficult choices about your care.   Weight Management   Why it is important to manage your weight:  Being overweight increases your risk of health conditions such as heart disease, high blood pressure, type 2 diabetes, and certain types of cancer. It can also increase your risk for osteoarthritis, sleep apnea, and other respiratory problems. Aim for a slow, steady weight loss. Even a small amount of weight loss can lower your risk of health problems.  How to lose weight safely:  A safe and healthy way to lose weight is to eat fewer calories and get regular exercise. You can lose up about 1 pound a week by decreasing the number of calories you eat by 500 calories each day.   Healthy meal plan for weight management:  A healthy meal plan includes a variety of foods, contains fewer calories, and helps you stay healthy. A healthy meal plan includes the following:  Eat whole-grain foods more often.  A healthy meal plan should contain fiber. Fiber is the part of grains, fruits, and vegetables that is not broken down by your body. Whole-grain foods are healthy and provide extra fiber in your diet. Some examples of whole-grain foods are whole-wheat breads and pastas, oatmeal, brown rice, and bulgur.  Eat a variety of vegetables every day.  Include dark, leafy greens such as spinach, kale, daniela greens, and mustard greens. Eat yellow and orange vegetables such as carrots, sweet potatoes, and winter squash.   Eat a variety of fruits every day.  Choose fresh or canned fruit (canned in its own juice or light syrup) instead of juice. Fruit juice has very little or no fiber.  Eat low-fat dairy foods.  Drink fat-free (skim) milk or 1% milk. Eat fat-free yogurt and low-fat cottage cheese. Try low-fat cheeses such as mozzarella and other  reduced-fat cheeses.  Choose meat and other protein foods that are low in fat.  Choose beans or other legumes such as split peas or lentils. Choose fish, skinless poultry (chicken or turkey), or lean cuts of red meat (beef or pork). Before you cook meat or poultry, cut off any visible fat.   Use less fat and oil.  Try baking foods instead of frying them. Add less fat, such as margarine, sour cream, regular salad dressing and mayonnaise to foods. Eat fewer high-fat foods. Some examples of high-fat foods include french fries, doughnuts, ice cream, and cakes.  Eat fewer sweets.  Limit foods and drinks that are high in sugar. This includes candy, cookies, regular soda, and sweetened drinks.  Exercise:  Exercise at least 30 minutes per day on most days of the week. Some examples of exercise include walking, biking, dancing, and swimming. You can also fit in more physical activity by taking the stairs instead of the elevator or parking farther away from stores. Ask your healthcare provider about the best exercise plan for you.   Narcotic (Opioid) Safety    Use narcotics safely:  Take prescribed narcotics exactly as directed  Do not give narcotics to others or take narcotics that belong to someone else  Do not mix narcotics without medicines or alcohol  Do not drive or operate heavy machinery after you take the narcotic  Monitor for side effects and notify your healthcare provider if you experienced side effects such as nausea, sleepiness, itching, or trouble thinking clearly.    Manage constipation:    Constipation is the most common side effect of narcotic medicine. Constipation is when you have hard, dry bowel movements, or you go longer than usual between bowel movements. Tell your healthcare provider about all changes in your bowel movements while you are taking narcotics. He or she may recommend laxative medicine to help you have a bowel movement. He or she may also change the kind of narcotic you are taking, or  change when you take it. The following are more ways you can prevent or relieve constipation:    Drink liquids as directed.  You may need to drink extra liquids to help soften and move your bowels. Ask how much liquid to drink each day and which liquids are best for you.  Eat high-fiber foods.  This may help decrease constipation by adding bulk to your bowel movements. High-fiber foods include fruits, vegetables, whole-grain breads and cereals, and beans. Your healthcare provider or dietitian can help you create a high-fiber meal plan. Your provider may also recommend a fiber supplement if you cannot get enough fiber from food.  Exercise regularly.  Regular physical activity can help stimulate your intestines. Walking is a good exercise to prevent or relieve constipation. Ask which exercises are best for you.  Schedule a time each day to have a bowel movement.  This may help train your body to have regular bowel movements. Bend forward while you are on the toilet to help move the bowel movement out. Sit on the toilet for at least 10 minutes, even if you do not have a bowel movement.    Store narcotics safely:   Store narcotics where others cannot easily get them.  Keep them in a locked cabinet or secure area. Do not  keep them in a purse or other bag you carry with you. A person may be looking for something else and find the narcotics.  Make sure narcotics are stored out of the reach of children.  A child can easily overdose on narcotics. Narcotics may look like candy to a small child.    The best way to dispose of narcotics:      The laws vary by country and area. In the [place], the best way is to return the narcotics through a take-back program. This program is offered by the [place] Drug Enforcement Agency (MICAELA). The following are options for using the program:  Take the narcotics to a MICAELA collection site.  The site is often a law enforcement center. Call your local law enforcement center for scheduled  take-back days in your area. You will be given information on where to go if the collection site is in a different location.  Take the narcotics to an approved pharmacy or hospital.  A pharmacy or hospital may be set up as a collection site. You will need to ask if it is a MICA[employer] collection site if you were not directed there. A pharmacy or doctor's office may not be able to take back narcotics unless it is a MICA[employer] site.  Use a mail-back system.  This means you are given containers to put the narcotics into. You will then mail them in the containers.  Use a take-back drop box.  This is a place to leave the narcotics at any time. People and animals will not be able to get into the box. Your local law enforcement agency can tell you where to find a drop box in your area.    Other ways to manage pain:   Ask your healthcare provider about non-narcotic medicines to control pain.  Nonprescription medicines include NSAIDs (such as ibuprofen) and acetaminophen. Prescription medicines include muscle relaxers, antidepressants, and steroids.  Pain may be managed without any medicines.  Some ways to relieve pain include massage, aromatherapy, or meditation. Physical or occupational therapy may also help.    For more information:   Drug Enforcement Administration  [address] [place] [address]  Phone: 3- 717 - 462-0674  Web Address: https://www.deadiversion.[facility].gov/drug_disposal/    [place] Food and Drug Administration  [address] [place] [address]  Phone: 3- 032 - 338-0215  Web Address: http://www.fda.gov     © Copyright [employer] 2018 Information is for End User's use only and may not be sold, redistributed or otherwise used for commercial purposes. All illustrations and images included in CareNotes® are the copyrighted property of A.D.A[employer]M., Inc. or [employer]

## 2024-09-20 NOTE — ASSESSMENT & PLAN NOTE
Denies any issues at this time and will be following with cardiothoracic surgeon on Continued growth of a 9 mm left upper lobe lung nodule suspicious for low-grade neoplasm.

## 2024-09-25 ENCOUNTER — TELEPHONE (OUTPATIENT)
Age: 69
End: 2024-09-25

## 2024-11-05 ENCOUNTER — APPOINTMENT (OUTPATIENT)
Dept: LAB | Facility: HOSPITAL | Age: 69
End: 2024-11-05
Payer: MEDICARE

## 2024-11-05 ENCOUNTER — HOSPITAL ENCOUNTER (OUTPATIENT)
Dept: RADIOLOGY | Facility: HOSPITAL | Age: 69
Discharge: HOME | End: 2024-11-05
Payer: MEDICARE

## 2024-11-05 DIAGNOSIS — E11.22 TYPE 2 DIABETES MELLITUS WITH CHRONIC KIDNEY DISEASE, WITHOUT LONG-TERM CURRENT USE OF INSULIN, UNSPECIFIED CKD STAGE (HCC): ICD-10-CM

## 2024-11-05 DIAGNOSIS — I25.10 CORONARY ARTERY DISEASE INVOLVING NATIVE HEART WITHOUT ANGINA PECTORIS, UNSPECIFIED VESSEL OR LESION TYPE: ICD-10-CM

## 2024-11-05 DIAGNOSIS — Z95.2 S/P TAVR (TRANSCATHETER AORTIC VALVE REPLACEMENT): ICD-10-CM

## 2024-11-05 DIAGNOSIS — I50.32 CHRONIC DIASTOLIC HEART FAILURE (HCC): ICD-10-CM

## 2024-11-05 DIAGNOSIS — E78.5 HYPERLIPIDEMIA, UNSPECIFIED HYPERLIPIDEMIA TYPE: ICD-10-CM

## 2024-11-05 DIAGNOSIS — M54.40 ACUTE RIGHT-SIDED LOW BACK PAIN WITH SCIATICA, SCIATICA LATERALITY UNSPECIFIED: ICD-10-CM

## 2024-11-05 LAB
ALBUMIN SERPL BCG-MCNC: 3.8 G/DL (ref 3.5–5)
ALP SERPL-CCNC: 80 U/L (ref 34–104)
ALT SERPL W P-5'-P-CCNC: 12 U/L (ref 7–52)
ANION GAP SERPL CALCULATED.3IONS-SCNC: 7 MMOL/L (ref 4–13)
AST SERPL W P-5'-P-CCNC: 13 U/L (ref 13–39)
BILIRUB SERPL-MCNC: 0.86 MG/DL (ref 0.2–1)
BUN SERPL-MCNC: 13 MG/DL (ref 5–25)
CALCIUM SERPL-MCNC: 9.2 MG/DL (ref 8.4–10.2)
CHLORIDE SERPL-SCNC: 103 MMOL/L (ref 96–108)
CO2 SERPL-SCNC: 27 MMOL/L (ref 21–32)
CREAT SERPL-MCNC: 0.79 MG/DL (ref 0.6–1.3)
EST. AVERAGE GLUCOSE BLD GHB EST-MCNC: 189 MG/DL
GFR SERPL CREATININE-BSD FRML MDRD: 76 ML/MIN/1.73SQ M
GLUCOSE P FAST SERPL-MCNC: 171 MG/DL (ref 65–99)
HBA1C MFR BLD: 8.2 %
POTASSIUM SERPL-SCNC: 4.5 MMOL/L (ref 3.5–5.3)
PROT SERPL-MCNC: 7 G/DL (ref 6.4–8.4)
SODIUM SERPL-SCNC: 137 MMOL/L (ref 135–147)

## 2024-11-05 PROCEDURE — 36415 COLL VENOUS BLD VENIPUNCTURE: CPT

## 2024-11-05 PROCEDURE — 83704 LIPOPROTEIN BLD QUAN PART: CPT

## 2024-11-05 PROCEDURE — 83036 HEMOGLOBIN GLYCOSYLATED A1C: CPT

## 2024-11-05 PROCEDURE — 80053 COMPREHEN METABOLIC PANEL: CPT

## 2024-11-05 PROCEDURE — 80061 LIPID PANEL: CPT

## 2024-11-05 PROCEDURE — 72114 X-RAY EXAM L-S SPINE BENDING: CPT

## 2024-11-05 RX ORDER — EMPAGLIFLOZIN 25 MG/1
25 TABLET, FILM COATED ORAL DAILY
Qty: 90 TABLET | Refills: 1 | Status: SHIPPED | OUTPATIENT
Start: 2024-11-05

## 2024-11-06 DIAGNOSIS — E11.22 TYPE 2 DIABETES MELLITUS WITH CHRONIC KIDNEY DISEASE, WITHOUT LONG-TERM CURRENT USE OF INSULIN, UNSPECIFIED CKD STAGE (HCC): Primary | ICD-10-CM

## 2024-11-07 LAB
CHOLEST SERPL-MCNC: 179 MG/DL (ref 100–199)
HDL SERPL-SCNC: 31 UMOL/L
HDLC SERPL-MCNC: 54 MG/DL
LDL SERPL QN: 20.3 NM
LDL SERPL QN: 789 NMOL/L
LDL SERPL-SCNC: 1325 NMOL/L
LDLC SERPL CALC-MCNC: 99 MG/DL (ref 0–99)
LP-IR SCORE SERPL: 54
TRIGL SERPL-MCNC: 151 MG/DL (ref 0–149)

## 2024-12-10 NOTE — PROGRESS NOTES
GI Oncology Nurse Navigator: Rectal/GI  Multidisciplinary Conference 9/27/18      The Treatment Evaluation and Recommendation Summary for the patient  Chelsi Oneill 1955         from the Rectal/GI Multidisciplinary Conference is as follows:    ~Tumor location in the rectum:     -Low rectum    ~Indication of sphincter involvement:     -No sphincter involvement     ~Pretreatment (clinical) American Joint Committee on Cancer Stage:     -T3c lesion    ~Pretreatment circumferential resection margin status:   -CRM + by MRI    ~Carcinoembryonic antigen level (CEA):   -3/12/18- 22 6   -9/6/18 (pre surgical) - 1    ~Neoadjuvant therapy recommendation:   -yes    ~Type and duration of neoadjuvant therapy recommended:   -xeloda - 5-6 weeks with radiation therapy    ~Anticipated date and type of surgical procedure:   - 9/6/18 - laparascopic hand assisted abdominal perineal resection, total abdominal hysterectomy, bilateral salpingo oopherectomy, posterior vaginectomy, end colostomy    ~Clinical research study eligibility and/enrollement:   - N/A    PLAN:   -Finish treatment for rectal cancer   -set up appointment with Dr Lesly Oviedo (gyn/onc) asap   -see Dr Boyd Else   - during discussion per Dr John Patel - Avastin can be used in addition to 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 Would like to have the doctoe refill anxiety medications       Fasting yes    Vitals:    12/10/24 1017   BP: 102/60   Site: Left Upper Arm   Position: Sitting   Cuff Size: Large Adult   Pulse: 61   Resp: 16   Temp: 98 °F (36.7 °C)   TempSrc: Oral   SpO2: 100%   Weight: 84.4 kg (186 lb)   Height: 1.854 m (6' 1\")             No data to display                \"Have you been to the ER, urgent care clinic since your last visit?  Hospitalized since your last visit?\"    NO    “Have you seen or consulted any other health care providers outside of [facility] since your last visit?”      Yes podiatry, remove toenail due to basketball injury        Click Here for Release of Records Request   AVS  education, follow up, and recommendations provided and addressed with patient.  services used to advise patient no

## 2024-12-24 DIAGNOSIS — Z98.84 S/P BARIATRIC SURGERY: ICD-10-CM

## 2024-12-24 DIAGNOSIS — K28.9 MARGINAL ULCER: ICD-10-CM

## 2024-12-24 DIAGNOSIS — K21.9 GASTROESOPHAGEAL REFLUX DISEASE, UNSPECIFIED WHETHER ESOPHAGITIS PRESENT: ICD-10-CM

## 2024-12-31 ENCOUNTER — OFFICE VISIT (OUTPATIENT)
Dept: CARDIOLOGY CLINIC | Facility: CLINIC | Age: 69
End: 2024-12-31
Payer: MEDICARE

## 2024-12-31 VITALS
WEIGHT: 289.4 LBS | SYSTOLIC BLOOD PRESSURE: 118 MMHG | HEART RATE: 99 BPM | HEIGHT: 65 IN | BODY MASS INDEX: 48.22 KG/M2 | TEMPERATURE: 97.9 F | OXYGEN SATURATION: 94 % | DIASTOLIC BLOOD PRESSURE: 76 MMHG

## 2024-12-31 DIAGNOSIS — I48.19 PERSISTENT ATRIAL FIBRILLATION (HCC): ICD-10-CM

## 2024-12-31 DIAGNOSIS — I25.10 CORONARY ARTERY DISEASE INVOLVING NATIVE CORONARY ARTERY OF NATIVE HEART WITHOUT ANGINA PECTORIS: ICD-10-CM

## 2024-12-31 DIAGNOSIS — I35.0 AORTIC STENOSIS, SEVERE: ICD-10-CM

## 2024-12-31 DIAGNOSIS — Z95.2 S/P TAVR (TRANSCATHETER AORTIC VALVE REPLACEMENT): ICD-10-CM

## 2024-12-31 DIAGNOSIS — I50.32 CHRONIC DIASTOLIC HEART FAILURE (HCC): ICD-10-CM

## 2024-12-31 DIAGNOSIS — I48.0 PAF (PAROXYSMAL ATRIAL FIBRILLATION) (HCC): ICD-10-CM

## 2024-12-31 DIAGNOSIS — E78.5 HYPERLIPIDEMIA, UNSPECIFIED HYPERLIPIDEMIA TYPE: ICD-10-CM

## 2024-12-31 PROCEDURE — 93000 ELECTROCARDIOGRAM COMPLETE: CPT

## 2024-12-31 PROCEDURE — 99214 OFFICE O/P EST MOD 30 MIN: CPT

## 2024-12-31 RX ORDER — METOPROLOL SUCCINATE 25 MG/1
75 TABLET, EXTENDED RELEASE ORAL EVERY 12 HOURS
Start: 2024-12-31

## 2024-12-31 NOTE — PROGRESS NOTES
"Lisa Reardon  1955  [id number]  [facility]  [address] [facility] [address] [phone]    1. Persistent atrial fibrillation (HCC)  POCT ECG      2. PAF (paroxysmal atrial fibrillation) (HCC)  metoprolol succinate (TOPROL-XL) 25 mg 24 hr tablet      3. Aortic stenosis, severe        4. S/P TAVR (transcatheter aortic valve replacement)        5. Chronic diastolic heart failure (HCC)        6. Coronary artery disease involving native coronary artery of native heart without angina pectoris        7. Hyperlipidemia, unspecified hyperlipidemia type            Summary/Discussion:  Paroxysmal atrial fibrillation  - now in persistent atrial fibrillation  - EKG today demonstrating atrial fibrillation, heart rate 99 bpm   - echo (5/2024): LVEF visibly estimated 45% with mildly reduced systolic function and both atrium severely dilated   - anticoagulation on eliquis 5 mg twice daily   - plan to follow rate control strategy per primary cardiologist   - will increase her metoprolol to 75 mg twice daily to help with rate control   reports prior issues with extended cardiac monitor adhesive in which it does not sticking to her skin   will have her come back in 1 week for an EKG and blood pressure check   close monitoring of blood pressures while on increased dose of metoprolol   - asymptomatic for the most part but does report infrequent sensation of \"anxiety\" in which she had an episode prior to our visit today. ? if her symptoms are related to her higher heart rate noted on office EKG    Severe aortic stenosis s/p TAVR in 9/2020:  - echo (5/2024): Edward BARBARA 3 ultra-26 mm bio prostatic valve in the aortic position with gradients across prostatic valve within expected range   - bio AVR sound noted on exam    - antibiotics before dental procedures  - historically not on aspirin while being anticoagulated with Eliquis    Chronic HFmrEF  - echo as noted above  - creatinine " "(11/5/2024): 0.79  - potassium (11/5/2024): 4.5  - appears compensated on exam and has not needed to take her PRN torsemide   continue torsemide 20 mg daily PRN and jardiance + oral potassium   - continue lisinopril 2.5 mg daily and increase metoprolol as noted above   - heart failure education provided with the importance of limiting sodium intake to <2 grams (2000 mg) per day, monitoring fluid intake <2 liters (2000 ml) per day, and daily weights     Coronary artery disease:  - nonobstructive diease based on cardiac catheterization in 7/2020  - without symptoms of angina   - not on aspirin while being anticoagulated with eliquis  - continue statin and increased dose of metoprolol     Dyslipidemia:  - Lipid Profile:    Latest Reference Range & Units 11/05/24 08:55   Cholesterol 100 - 199 mg/dL 179   HDL-P(TOTAL) >=30.5 umol/L 31.0   HDL CHOLESTEROL LIPOPROTEIN >39 mg/dL 54   LDL Calculated 0 - 99 mg/dL 99   Small LDL-P <=527 nmol/L 789 (H)   LDL SIZE >20.5 nm 20.3 (L)   LP-IR SCORE <=45  54 (H)   Triglycerides 0 - 149 mg/dL 151 (H)   - continue atorvastatin 20 mg daily with goal LDL <70  - consider intensifying her statin therapy if LDL remains sub-optimal on annual lipid follow up  - encouraged low cholesterol, mediterranean diet, and annual lipid follow up    Interval History: Lisa Reardon is a 69 y.o. year old female with history mentioned in problem list who presents to the office today for a follow up.     Since her last office visit she has been overall feeling well from a cardiac standpoint. She denies any chest pain/pressure/discomfort or shortness of breath. She denies worsening lower extremity edema, orthopnea, and PND. She denies lightheadedness, worsening dizziness (hx of vertigo) and syncope. She reports that she has had improvement in her symptoms from atrial fibrillation since increasing her metoprolol.  She does report 1 brief episode this morning in which she describes as a sensation of \"anxiety\" " which resolved spontaneously.  She believes that it was just nerves with having to come to the office.  Her functional capacity is limited due to ambulatory dysfunction with use of a walker at baseline however, she does watch her grandkids daily in which she denies any significant cardiac limitations in doing so.      No further cardiac testing any at this time.    She will RTO in 1 week for a nurse visit for an EKG/blood pressure check or sooner if necessary. She will call with any concerns.         Medical Problems       Problem List       Morbid obesity due to excess calories (HCC)    Overview Signed 7/21/2021  8:20 AM by GEORGETTE Bhatia   Formatting of this note might be different from the original.  Last Assessment & Plan:   BMI 43.29 S/p Juan-En-Y Gastric Bypass with Dr. Hipolito Frazier on 07/18/16.  - Cardiac diet  - Nutrition consult         Postgastrectomy malabsorption    S/P gastric bypass    Marginal ulcer    History of gastric bypass    Overview Addendum 7/21/2021  8:20 AM by GEORGETTE Bhatia   Added automatically from request for surgery [id number]         Atherosclerotic peripheral vascular disease (HCC)    Diabetes mellitus type 2, diet-controlled (HCC)      Lab Results   Component Value Date    HGBA1C 8.2 (H) 11/05/2024         Onychomycosis    History of rectal cancer    Colostomy care (HCC)    History of ovarian cancer    Encounter for surgical aftercare following surgery of digestive system    Overview Signed 3/4/2019 11:21 AM by Micki Carrera   Added automatically from request for surgery [id number]         History of ovarian cancer    Candidal intertrigo    Vaginal bleeding    Aortic stenosis, severe    Nonrheumatic mitral valve stenosis    Atrial fibrillation with rapid ventricular response (HCC)    Hyperlipidemia    Hypochloremia    Weight gain following gastric bypass surgery    S/P TAVR (transcatheter aortic valve replacement)    Ambulatory dysfunction    CAD (coronary artery disease)     Anticoagulation adequate with anticoagulant therapy    Chronic diastolic heart failure (HCC)    Wt Readings from Last 3 Encounters:   12/31/24 131 kg (289 lb 6.4 oz)   09/20/24 130 kg (286 lb 6.4 oz)   06/26/24 127 kg (280 lb)                 Osteoarthritis of right knee    GI bleed    Chronic kidney disease    Lab Results   Component Value Date    EGFR 76 11/05/2024    EGFR 64 06/20/2024    EGFR 67 06/18/2024    CREATININE 0.79 11/05/2024    CREATININE 0.92 06/20/2024    CREATININE 0.88 06/18/2024         Iron deficiency anemia, unspecified    Iron deficiency anemia    Pulmonary emphysema, unspecified emphysema type (HCC)    Continuous opioid dependence (HCC)    COVID-19    Type 2 diabetes mellitus with chronic kidney disease, without long-term current use of insulin, unspecified CKD stage (HCC)      Lab Results   Component Value Date    HGBA1C 8.2 (H) 11/05/2024         Recurrent epistaxis    Pulmonary nodule    Infected cyst of skin    Sebaceous cyst    Abdominal wall abscess        Past Medical History:   Diagnosis Date    Acute pain of right knee     Ambulatory dysfunction     Anemia     Arthritis     Bleeding ulcer     Cancer (HCC)     rectal    Chronic lower back pain     Chronic pain disorder     back pain    Colon cancer (HCC) 2018    COVID-19 05/2023    mild s/s    Diabetes mellitus (HCC)     Endometrial cancer (HCC) 2018    GERD (gastroesophageal reflux disease)     History of chemotherapy     History of MRSA infection 0719/2016    Hyperlipidemia     Hypertension     Morbid obesity (HCC)     Morbid obesity with BMI of 45.0-49.9, adult (HCC)     Ovarian cancer (HCC) 2018    Paroxysmal atrial fibrillation (HCC)     Pneumonia     Rectal cancer (HCC)     S/P TAVR (transcatheter aortic valve replacement)     Sleep apnea     doesn't use CPAP any longer    SOB (shortness of breath)     Spinal stenosis     Systolic murmur     Unsteady gait     uses walker    Wears dentures     Wears glasses      Social History      Socioeconomic History    Marital status: Single     Spouse name: Not on file    Number of children: 2    Years of education: Not on file    Highest education level: High school graduate   Occupational History    Not on file   Tobacco Use    Smoking status: Former     Current packs/day: 0.00     Average packs/day: 1 pack/day for 25.0 years (25.0 ttl pk-yrs)     Types: Cigarettes     Start date: 3/30/1981     Quit date: 3/30/2006     Years since quittin.7     Passive exposure: Never    Smokeless tobacco: Never   Vaping Use    Vaping status: Never Used   Substance and Sexual Activity    Alcohol use: Not Currently    Drug use: Not Currently     Types: Oxycodone     Comment: Percocet for lower back pain prn    Sexual activity: Not Currently   Other Topics Concern    Not on file   Social History Narrative    Not on file     Social Drivers of Health     Financial Resource Strain: Low Risk  (2023)    Overall Financial Resource Strain (CARDIA)     Difficulty of Paying Living Expenses: Not very hard   Food Insecurity: No Food Insecurity (2024)    Nursing - Inadequate Food Risk Classification     Worried About Running Out of Food in the Last Year: Never true     Ran Out of Food in the Last Year: Never true     Ran Out of Food in the Last Year: Not on file   Transportation Needs: No Transportation Needs (2024)    PRAPARE - Transportation     Lack of Transportation (Medical): No     Lack of Transportation (Non-Medical): No   Physical Activity: Not on file   Stress: Not on file   Social Connections: Not on file   Intimate Partner Violence: Not on file   Housing Stability: Low Risk  (2024)    Housing Stability Vital Sign     Unable to Pay for Housing in the Last Year: No     Number of Times Moved in the Last Year: 0     Homeless in the Last Year: No      Family History   Adopted: Yes   Problem Relation Age of Onset    Leukemia Mother     Heart disease Father     Coronary artery disease Father      Diabetes Father     No Known Problems Sister     No Known Problems Brother     Diabetes Son     No Known Problems Brother     No Known Problems Brother     No Known Problems Sister     No Known Problems Sister      Past Surgical History:   Procedure Laterality Date    ABDOMINAL PERINEAL BOWEL RESECTION W/ ILEOANAL POUCH N/A 2018    Procedure: LAPAROSCOPIC HAND ASSIST ABDOMINOPERINEAL RESECTION,  POSTERIOR VAGINECTOMY, OMENTECTOMY;  Surgeon: José Miguel Cedillo MD;  Location: [facility];  Service: Colorectal    ABDOMINAL SURGERY      abscess removed from abdomen and right thigh, a hole in thigh closed by plastic surgeon    ABSCESS DRAINAGE      abd, (R) leg, (L) leg     SECTION       SECTION      CYSTOSCOPY N/A 2018    Procedure: CYSTOSCOPY;  Surgeon: Seth Pinto MD;  [facility]: [facility];  Service: Gynecology Oncology    ESOPHAGOGASTRODUODENOSCOPY N/A 2016    Procedure: ESOPHAGOGASTRODUODENOSCOPY (EGD);  Surgeon: Jama Frazier MD;  Location: [facility];  Service:     ESOPHAGOGASTRODUODENOSCOPY N/A 2016    Procedure: ESOPHAGOGASTRODUODENOSCOPY (EGD);  Surgeon: Jama Frazier MD;  [facility]: [facility];  Service:     EYE SURGERY      laser eye surgery    HYSTERECTOMY N/A 2018    Procedure: RADICAL HYSTERECTOMY TOTAL ABDOMINAL (ALEXANDRA). BSO;  Surgeon: Seth Pinto MD;  [facility];  Service: Gynecology Oncology    JOINT REPLACEMENT Left 2017    hip    JOINT REPLACEMENT Right 2017    hip    OOPHORECTOMY Bilateral     LA COLONOSCOPY FLX DX W/COLLJ SPEC WHEN PFRMD N/A 2018    Procedure: COLONOSCOPY;  Surgeon: Juanjo Sanders MD;  Location: [facility];  Service: Gastroenterology    LA COLONOSCOPY FLX DX W/COLLJ SPEC WHEN PFRMD N/A 2018    Procedure: COLONOSCOPY;  Surgeon: José Miguel Cedillo MD;  Location:  GI LAB;  Service: Colorectal    LA ECHO TRANSESOPHAG R-T 2D W/PRB IMG ACQUISJ I&R N/A 2020    Procedure: TRANSESOPHAGEAL  ECHOCARDIOGRAM (THO);  Surgeon: iDallo Vu DO;  [facility];  Service: Cardiac Surgery    VT ESOPHAGOGASTRODUODENOSCOPY TRANSORAL DIAGNOSTIC N/A 02/02/2018    Procedure: ESOPHAGOGASTRODUODENOSCOPY (EGD);  Surgeon: Juanjo Sanders MD;  Location: [facility] GI LAB;  Service: Gastroenterology    VT LAPAROSCOPY SURG COLOSTOMY/SKN LVL CECOSTOMY N/A 09/06/2018    Procedure: PERMANENT END COLOSTOMY;  Surgeon: José Miguel Ceidllo MD;  [facility];  Service: Colorectal    VT LAPS GSTR RSTCV PX W/BYP SOLEDAD-EN-Y LIMB <150 CM N/A 07/18/2016    Procedure: BYPASS GASTRIC  SOLEDAD-EN-Y LAPAROSCOPIC;  Surgeon: Jama Frazier MD;  [facility];  Service: Bariatrics    VT LAPS PROCTECTOMY ABDOMINOPERINEAL W/COLOSTOMY N/A 09/06/2018    Procedure: PROCTECTOMY;  Surgeon: José Miguel Cedillo MD;  [facility];  Service: Colorectal    VT REPLACE AORTIC VALVE OPENFEMORAL ARTERY APPROACH N/A 09/01/2020    Procedure: REPLACEMENT AORTIC VALVE TRANSCATHETER (TAVR) TRANSFEMORAL W/ 26MM WADDELL BARBARA S3 ULTRA VALVE(ACCESS ON RIGHT);  Surgeon: Diallo Vu DO;  [facility]: [facility];  Service: Cardiac Surgery    REPLACEMENT TOTAL KNEE      TOOTH EXTRACTION      TUBAL LIGATION         Current Outpatient Medications:     albuterol (2.5 mg/3 mL) 0.083 % nebulizer solution, TAKE 1 VIAL BY NEBULIZATION EVERY 4 HOURS AS NEEDED FOR WHEEZING OR SHORTNESS OF BREATH, Disp: 225 mL, Rfl: 1    apixaban (Eliquis) 5 mg, Take 1 tablet (5 mg total) by mouth 2 (two) times a day, Disp: 180 tablet, Rfl: 3    atorvastatin (LIPITOR) 20 mg tablet, Take 1 tablet (20 mg total) by mouth daily, Disp: 90 tablet, Rfl: 3    Empagliflozin (Jardiance) 25 MG TABS, TAKE 1 TABLET BY MOUTH DAILY, Disp: 90 tablet, Rfl: 1    lisinopril (ZESTRIL) 2.5 mg tablet, Take 1 tablet (2.5 mg total) by mouth daily, Disp: 90 tablet, Rfl: 1    meclizine (ANTIVERT) 12.5 MG tablet, Take 1 tablet (12.5 mg total) by mouth every 12 (twelve) hours as needed for  dizziness, Disp: 20 tablet, Rfl: 0    metoprolol succinate (TOPROL-XL) 25 mg 24 hr tablet, Take 3 tablets (75 mg total) by mouth every 12 (twelve) hours, Disp: , Rfl:     Multiple Vitamins-Minerals (BARIATRIC FUSION) CHEW, Chew 1 tablet daily  , Disp: , Rfl:     nystatin (MYCOSTATIN) cream, APPLY TOPICALLY 2 TIMES A DAY, Disp: 30 g, Rfl: 2    omeprazole (PriLOSEC) 20 mg delayed release capsule, TAKE 1 CAPSULE BY MOUTH DAILY BEFORE BREAKFAST, Disp: 30 capsule, Rfl: 2    oxyCODONE (ROXICODONE) 10 MG TABS, Take 10 mg by mouth every 6 (six) hours as needed As needed for pain, Disp: , Rfl:     potassium chloride (K-DUR,KLOR-CON) 20 mEq tablet, Take 1 tablet (20 mEq total) by mouth if needed (if taking torsemide), Disp: 30 tablet, Rfl: 1    torsemide (DEMADEX) 20 mg tablet, Take 1 tablet (20 mg total) by mouth if needed (for leg swelling/weight gain above 3 pounds), Disp: 30 tablet, Rfl: 1    metFORMIN (GLUCOPHAGE) 500 mg tablet, Take 1 tablet (500 mg total) by mouth daily with breakfast (Patient not taking: Reported on 12/31/2024), Disp: 90 tablet, Rfl: 1  No Known Allergies    Labs:     Chemistry        Component Value Date/Time     10/16/2015 0910    K 4.5 11/05/2024 0855    K 3.9 04/10/2021 0340     11/05/2024 0855     04/10/2021 0340    CO2 27 11/05/2024 0855    CO2 25 04/10/2021 0340    BUN 13 11/05/2024 0855    BUN 14 04/10/2021 0340    CREATININE 0.79 11/05/2024 0855    CREATININE 0.66 04/10/2021 0340        Component Value Date/Time    CALCIUM 9.2 11/05/2024 0855    CALCIUM 8.4 (L) 04/10/2021 0340    ALKPHOS 80 11/05/2024 0855    ALKPHOS 59 04/10/2021 0340    AST 13 11/05/2024 0855    AST 15 04/10/2021 0340    ALT 12 11/05/2024 0855    ALT 13 04/10/2021 0340            Lab Results   Component Value Date    CHOL 159 03/26/2014     Lab Results   Component Value Date    HDL 54 11/05/2024    HDL 48 (L) 01/23/2024    HDL 45 (L) 07/29/2022     Lab Results   Component Value Date    LDLCALC 99  "11/05/2024    LDLCALC 72 01/23/2024    LDLCALC 61 07/29/2022     Lab Results   Component Value Date    TRIG 131 01/23/2024    TRIG 144 07/29/2022    TRIG 177 (H) 07/19/2021     No results found for: \"CHOLHDL\"    Imaging: No results found.    ECG: Atrial fibrillation, left axis deviation, cannot rule out anterior infarct, age undetermined (noted on prior EKGs)    Review of Systems   Cardiovascular:  Positive for leg swelling (stable).   Musculoskeletal:  Positive for muscle weakness.   Neurological:  Positive for dizziness (unchanged/hx of vertigo) and weakness.   All other systems reviewed and are negative.      Vitals:    12/31/24 0802   BP: 118/76   Pulse: 99   Temp: 97.9 °F (36.6 °C)   SpO2: 94%     Vitals:    12/31/24 0802   Weight: 131 kg (289 lb 6.4 oz)     Height: 5' 5\" (165.1 cm)   Body mass index is 48.16 kg/m².    Physical Exam  Vitals and nursing note reviewed.   Constitutional:       General: She is not in acute distress.     Appearance: Normal appearance. She is not ill-appearing.   HENT:      Head: Normocephalic.      Nose: Nose normal.      Mouth/Throat:      Mouth: Mucous membranes are moist.      Pharynx: Oropharynx is clear.   Cardiovascular:      Rate and Rhythm: Normal rate. Rhythm irregular.      Heart sounds: No murmur heard.  Pulmonary:      Breath sounds: Decreased breath sounds present.   Musculoskeletal:      Cervical back: Normal range of motion.      Right lower leg: No edema.      Left lower leg: No edema.   Skin:     General: Skin is warm and dry.   Neurological:      Mental Status: She is alert and oriented to person, place, and time.      Motor: Weakness present.      Gait: Gait abnormal.   Psychiatric:         Mood and Affect: Mood normal.        "

## 2025-01-30 NOTE — TELEPHONE ENCOUNTER
Noted  Rec'd mess from [facility] offering Monday 2/3/25, pt stated that would be to much on him since his wife is still in the hospital in [place]. I  told pt that I would leave him on 6/30/25 for the time being and contact him when something else comes available. Pt acknowledge he understood. Critical access hospital      Rec'd new message from [facility] to offer 3/21/25 -Friday. I called pt back and offered that day. Pt stated that would work. Updated Epic and re-faxed order to JOANA carbajal

## 2025-02-05 DIAGNOSIS — E78.5 HYPERLIPIDEMIA, UNSPECIFIED HYPERLIPIDEMIA TYPE: ICD-10-CM

## 2025-02-06 RX ORDER — ATORVASTATIN CALCIUM 20 MG/1
20 TABLET, FILM COATED ORAL DAILY
Qty: 90 TABLET | Refills: 1 | Status: SHIPPED | OUTPATIENT
Start: 2025-02-06 | End: 2025-02-07 | Stop reason: SDUPTHER

## 2025-02-07 DIAGNOSIS — I50.32 CHRONIC DIASTOLIC HEART FAILURE (HCC): ICD-10-CM

## 2025-02-07 DIAGNOSIS — E78.5 HYPERLIPIDEMIA, UNSPECIFIED HYPERLIPIDEMIA TYPE: ICD-10-CM

## 2025-02-07 DIAGNOSIS — I48.91 ATRIAL FIBRILLATION WITH RAPID VENTRICULAR RESPONSE (HCC): ICD-10-CM

## 2025-02-07 DIAGNOSIS — E11.22 TYPE 2 DIABETES MELLITUS WITH CHRONIC KIDNEY DISEASE, WITHOUT LONG-TERM CURRENT USE OF INSULIN, UNSPECIFIED CKD STAGE (HCC): ICD-10-CM

## 2025-02-07 RX ORDER — ATORVASTATIN CALCIUM 20 MG/1
20 TABLET, FILM COATED ORAL DAILY
Qty: 90 TABLET | Refills: 1 | Status: SHIPPED | OUTPATIENT
Start: 2025-02-07

## 2025-02-10 DIAGNOSIS — K21.9 GASTROESOPHAGEAL REFLUX DISEASE, UNSPECIFIED WHETHER ESOPHAGITIS PRESENT: ICD-10-CM

## 2025-02-10 DIAGNOSIS — K28.9 MARGINAL ULCER: ICD-10-CM

## 2025-02-10 DIAGNOSIS — R80.9 MICROALBUMINURIA: ICD-10-CM

## 2025-02-10 DIAGNOSIS — I48.0 PAF (PAROXYSMAL ATRIAL FIBRILLATION) (HCC): ICD-10-CM

## 2025-02-10 DIAGNOSIS — Z98.84 S/P BARIATRIC SURGERY: ICD-10-CM

## 2025-02-11 DIAGNOSIS — Z98.84 S/P BARIATRIC SURGERY: ICD-10-CM

## 2025-02-11 DIAGNOSIS — K21.9 GASTROESOPHAGEAL REFLUX DISEASE, UNSPECIFIED WHETHER ESOPHAGITIS PRESENT: ICD-10-CM

## 2025-02-11 DIAGNOSIS — K28.9 MARGINAL ULCER: ICD-10-CM

## 2025-02-11 RX ORDER — LISINOPRIL 2.5 MG/1
2.5 TABLET ORAL DAILY
Qty: 90 TABLET | Refills: 1 | Status: SHIPPED | OUTPATIENT
Start: 2025-02-11 | End: 2025-02-17 | Stop reason: SDUPTHER

## 2025-02-11 RX ORDER — METOPROLOL SUCCINATE 25 MG/1
75 TABLET, EXTENDED RELEASE ORAL EVERY 12 HOURS
Qty: 180 TABLET | Refills: 1 | Status: SHIPPED | OUTPATIENT
Start: 2025-02-11 | End: 2025-02-17 | Stop reason: SDUPTHER

## 2025-02-14 DIAGNOSIS — K28.9 MARGINAL ULCER: ICD-10-CM

## 2025-02-14 DIAGNOSIS — K21.9 GASTROESOPHAGEAL REFLUX DISEASE, UNSPECIFIED WHETHER ESOPHAGITIS PRESENT: ICD-10-CM

## 2025-02-14 DIAGNOSIS — I48.0 PAF (PAROXYSMAL ATRIAL FIBRILLATION) (HCC): ICD-10-CM

## 2025-02-14 DIAGNOSIS — Z98.84 S/P BARIATRIC SURGERY: ICD-10-CM

## 2025-02-14 RX ORDER — METOPROLOL SUCCINATE 25 MG/1
75 TABLET, EXTENDED RELEASE ORAL EVERY 12 HOURS
Qty: 180 TABLET | Refills: 1 | OUTPATIENT
Start: 2025-02-14

## 2025-02-17 DIAGNOSIS — K28.9 MARGINAL ULCER: ICD-10-CM

## 2025-02-17 DIAGNOSIS — Z98.84 S/P BARIATRIC SURGERY: ICD-10-CM

## 2025-02-17 DIAGNOSIS — R80.9 MICROALBUMINURIA: ICD-10-CM

## 2025-02-17 DIAGNOSIS — I48.0 PAF (PAROXYSMAL ATRIAL FIBRILLATION) (HCC): ICD-10-CM

## 2025-02-17 DIAGNOSIS — K21.9 GASTROESOPHAGEAL REFLUX DISEASE, UNSPECIFIED WHETHER ESOPHAGITIS PRESENT: ICD-10-CM

## 2025-02-17 NOTE — TELEPHONE ENCOUNTER
[facility] called for a refill of 90 days with 3 refills for omeprazole 20mg, Metoprolol succinate (toprol xl) and lisinopril (zestril) 2.5mg

## 2025-02-18 ENCOUNTER — TELEPHONE (OUTPATIENT)
Dept: FAMILY MEDICINE CLINIC | Facility: CLINIC | Age: 70
End: 2025-02-18

## 2025-02-18 RX ORDER — LISINOPRIL 2.5 MG/1
2.5 TABLET ORAL DAILY
Qty: 90 TABLET | Refills: 1 | Status: SHIPPED | OUTPATIENT
Start: 2025-02-18

## 2025-02-18 RX ORDER — METOPROLOL SUCCINATE 25 MG/1
75 TABLET, EXTENDED RELEASE ORAL EVERY 12 HOURS
Qty: 180 TABLET | Refills: 1 | Status: SHIPPED | OUTPATIENT
Start: 2025-02-18

## 2025-02-18 NOTE — TELEPHONE ENCOUNTER
Her metoprolol is prescribed by her cardiologist and her omperazole is prescribed her gastro and she needs to contact them to change medications. GEORGETTE Bhatia

## 2025-02-18 NOTE — TELEPHONE ENCOUNTER
Pharmacy called the RX Refill Line. Message is being forwarded to the office.     Kathy from [facility] is asking if provider is aware that  Metoprolol and Omeprazole are extended release medications that are not recommended for patients who had bariatric surgery because they are not absorbed properly.       Kathy is also asking if the Metoprolol can be changed to 50 mg 1.5 tablets daily every 12 hours to make it easier for the patient.     Please Kathy from [facility] at [phone]  Ref #[id number]

## 2025-02-26 DIAGNOSIS — I48.0 PAF (PAROXYSMAL ATRIAL FIBRILLATION) (HCC): ICD-10-CM

## 2025-02-26 RX ORDER — METOPROLOL SUCCINATE 25 MG/1
75 TABLET, EXTENDED RELEASE ORAL EVERY 12 HOURS
Qty: 180 TABLET | Refills: 1 | Status: SHIPPED | OUTPATIENT
Start: 2025-02-26

## 2025-02-26 NOTE — TELEPHONE ENCOUNTER
Pt contacted Call Center requested refill of their medication.        Doctor Name: GEORGETTE Triplett       Medication Name: metoprolol succinate (TOPROL-XL)      Dosage of Med: 25 mg 24 hr tablet       Frequency of Med: Take 3 tablets (75 mg total) by mouth every 12 (twelve) hours No      Remaining Medication: None Left       Pharmacy and Location: [address]         Pt. Preferred Callback Phone Number: [phone]      Thank you.       PLEASE ADVISE PATIENTS:    REFILL REQUESTS WILL BE PROCESSED WITHIN 24-48 HOURS.

## 2025-03-19 DIAGNOSIS — I48.0 PAF (PAROXYSMAL ATRIAL FIBRILLATION) (HCC): Primary | ICD-10-CM

## 2025-03-20 NOTE — TELEPHONE ENCOUNTER
Spoke with patient-she is in agreement with the medication Metoprolol 25 mg and 50 mg (75 mg total) to take twice a day.

## 2025-03-21 RX ORDER — METOPROLOL SUCCINATE 50 MG/1
50 TABLET, EXTENDED RELEASE ORAL 2 TIMES DAILY
Qty: 180 TABLET | Refills: 1 | Status: SHIPPED | OUTPATIENT
Start: 2025-03-21

## 2025-03-21 RX ORDER — METOPROLOL SUCCINATE 25 MG/1
25 TABLET, EXTENDED RELEASE ORAL EVERY 12 HOURS
Qty: 180 TABLET | Refills: 1 | Status: SHIPPED | OUTPATIENT
Start: 2025-03-21

## 2025-03-26 DIAGNOSIS — Z98.84 S/P BARIATRIC SURGERY: ICD-10-CM

## 2025-03-26 DIAGNOSIS — K21.9 GASTROESOPHAGEAL REFLUX DISEASE, UNSPECIFIED WHETHER ESOPHAGITIS PRESENT: ICD-10-CM

## 2025-03-26 DIAGNOSIS — K28.9 MARGINAL ULCER: ICD-10-CM

## 2025-03-26 RX ORDER — OMEPRAZOLE 20 MG/1
20 CAPSULE, DELAYED RELEASE ORAL
Qty: 90 CAPSULE | Refills: 1 | Status: SHIPPED | OUTPATIENT
Start: 2025-03-26

## 2025-03-26 NOTE — TELEPHONE ENCOUNTER
Reason for call:   [x] Refill   [] Prior Auth  [] Other:     Office:   [x] PCP/Provider -   [] Specialty/Provider -     omeprazole (PriLOSEC) 20 mg delayed release capsule 20 mg, Oral, Daily before breakfast       Quantity: 90    [facility]    Local [facility]acy   Does the patient have enough for 3 days?   [] Yes   [] No - Send as HP to POD    Mail Away Pharmacy   Does the patient have enough for 10 days?   [] Yes   [x] No - Send as HP to POD

## 2025-03-27 DIAGNOSIS — K28.9 MARGINAL ULCER: ICD-10-CM

## 2025-03-27 DIAGNOSIS — K21.9 GASTROESOPHAGEAL REFLUX DISEASE, UNSPECIFIED WHETHER ESOPHAGITIS PRESENT: ICD-10-CM

## 2025-03-27 DIAGNOSIS — Z98.84 S/P BARIATRIC SURGERY: ICD-10-CM

## 2025-03-28 RX ORDER — OMEPRAZOLE 20 MG/1
20 CAPSULE, DELAYED RELEASE ORAL
Qty: 90 CAPSULE | Refills: 1 | OUTPATIENT
Start: 2025-03-28

## 2025-04-01 ENCOUNTER — TELEPHONE (OUTPATIENT)
Age: 70
End: 2025-04-01

## 2025-04-01 NOTE — TELEPHONE ENCOUNTER
It was ordered and started by her gastro and she should follow with them about omperazole as if she needs to switch. GEORGETTE Bhatia

## 2025-04-01 NOTE — TELEPHONE ENCOUNTER
Pharmacist called, omeprazole is not recommended for patient with hx bariatric surgery.  Patient recently told them she had bariatric surgery, want to know if Rx should still be filled.  Direct number to return call [phone] ref # [id number]

## 2025-04-03 ENCOUNTER — TELEPHONE (OUTPATIENT)
Age: 70
End: 2025-04-03

## 2025-04-03 NOTE — TELEPHONE ENCOUNTER
Patient called office for update on this. I relayed Christine's message but patient is very upset.  She states she requested this last week and has an ulcer and is in pain.  She is upset she had to wait for this update and now contact her gastro to wait longer.  Expressed sincere apologies to patient.  Patient will call gastro

## 2025-04-03 NOTE — TELEPHONE ENCOUNTER
Pt called in following up on decision regarding her omeprazole-  (see previous notes ) last refill submitted 3/26/25 [facility],but there is some question as to whether she should be taking it... currently has no medication and the ulcer is causing her problems/ pain -- she would like someone from the clinical staff to please reach out to her.

## 2025-04-07 NOTE — TELEPHONE ENCOUNTER
Please call and inform patient patient needs to be seen in office in order to have an EGD scheduled due to recent symptoms and she has not been seen since 2023.  It looks like patient has an upcoming appointment on 4/25. Spoke to pharmacist at [facility] concerning her medication Omeprazole it was filled and is being shipped to patient.

## 2025-04-07 NOTE — TELEPHONE ENCOUNTER
Per PCP Telephone Encounter:                  Spoke with pt. States she picked up OTC Omeprazole 20 mg in the interim with good relief however does not feel it has same effect as Rx.     Last OV 7/26/23. EUS 2/21/24 note: EGD stent removal 4-6 wks. Pt never scheduled. Willing to schedule procedure; will CC [facility] team.    GI appt scheduled 4/25/25. Pt questioning if GI can send Rx for omeprazole to [facility] in the interim. Please advise.

## 2025-04-25 ENCOUNTER — PREP FOR PROCEDURE (OUTPATIENT)
Dept: GASTROENTEROLOGY | Facility: CLINIC | Age: 70
End: 2025-04-25

## 2025-04-25 ENCOUNTER — OFFICE VISIT (OUTPATIENT)
Age: 70
End: 2025-04-25
Payer: MEDICARE

## 2025-04-25 ENCOUNTER — TELEPHONE (OUTPATIENT)
Age: 70
End: 2025-04-25

## 2025-04-25 VITALS
HEIGHT: 65 IN | HEART RATE: 83 BPM | SYSTOLIC BLOOD PRESSURE: 100 MMHG | BODY MASS INDEX: 48.32 KG/M2 | DIASTOLIC BLOOD PRESSURE: 77 MMHG | WEIGHT: 290 LBS

## 2025-04-25 DIAGNOSIS — Z98.84 S/P BARIATRIC SURGERY: ICD-10-CM

## 2025-04-25 DIAGNOSIS — K21.9 GASTROESOPHAGEAL REFLUX DISEASE, UNSPECIFIED WHETHER ESOPHAGITIS PRESENT: Primary | ICD-10-CM

## 2025-04-25 DIAGNOSIS — K31.89 GASTRIC MASS: ICD-10-CM

## 2025-04-25 DIAGNOSIS — K28.9 MARGINAL ULCER: ICD-10-CM

## 2025-04-25 DIAGNOSIS — Z85.048 HISTORY OF RECTAL CANCER: ICD-10-CM

## 2025-04-25 DIAGNOSIS — K31.89 GASTRIC MASS: Primary | ICD-10-CM

## 2025-04-25 DIAGNOSIS — Z98.84 HISTORY OF GASTRIC BYPASS: ICD-10-CM

## 2025-04-25 PROCEDURE — 99214 OFFICE O/P EST MOD 30 MIN: CPT | Performed by: NURSE PRACTITIONER

## 2025-04-25 RX ORDER — SODIUM CHLORIDE, SODIUM LACTATE, POTASSIUM CHLORIDE, CALCIUM CHLORIDE 600; 310; 30; 20 MG/100ML; MG/100ML; MG/100ML; MG/100ML
125 INJECTION, SOLUTION INTRAVENOUS CONTINUOUS
OUTPATIENT
Start: 2025-04-25

## 2025-04-25 NOTE — ASSESSMENT & PLAN NOTE
Patient with history of rectal cancer status postchemotherapy/radiation and proctectomy with permanent end colostomy complicated by wound healing and fistula formation which is now resolved.  Colostomy in place with no issues.  Last colonoscopy was done in August 2023 with a subcentimeter tubular adenoma polyp removed from the descending colon, remainder of terminal ileum, cecum, ascending colon, hepatic flexure and transverse colon appeared normal.  She was recommended to repeat colonoscopy in 1 year, so we will schedule that now with EGD with Axios stent removal if possible, otherwise we will schedule separately.  Orders:    Colonoscopy; Future

## 2025-04-25 NOTE — ASSESSMENT & PLAN NOTE
Patient with prior history of marginal ulcer in the setting of NSAID use  -Avoid all NSAIDs  -Continue PPI as above

## 2025-04-25 NOTE — TELEPHONE ENCOUNTER
Please call patient and schedule colon/EGD for Axios Stent removal with Dr. Paula at [place]. Order is in chart from GEORGETTE Guido.  Patient is on Eliquis from Dr. Carrasquillo.  Thank you!    Prep instructions given for Miralax, Dulcolax.

## 2025-04-25 NOTE — PROGRESS NOTES
Name: Lisa Reardon      : 1955      MRN: [id number]  Encounter Provider: GEORGETTE Lee  Encounter Date: 2025   Encounter department: [facility]  :  Assessment & Plan  Gastric mass  Patient with history of gastric bypass with gastric antral wall thickening in the excluded stomach seen on imaging status post Axios stents placement and removal of a 10 mm nodule in the antrum via EMR with 2 clips placed successfully.  Biopsy came back benign-hyperplastic polyp.  -Will schedule with advanced endoscopy for stent removal and obtain clearance to hold Eliquis from Dr. Carrasquillo.  Prep and procedure explained.       S/P bariatric surgery  See above       Gastroesophageal reflux disease, unspecified whether esophagitis present  Controlled on omeprazole 20 mg daily. Reports she recently ran out and developed upper abdominal pain which is now resolved.  - Continue omeprazole 20 mg daily       Marginal ulcer  Patient with prior history of marginal ulcer in the setting of NSAID use  -Avoid all NSAIDs  -Continue PPI as above       History of rectal cancer  Patient with history of rectal cancer status postchemotherapy/radiation and proctectomy with permanent end colostomy complicated by wound healing and fistula formation which is now resolved.  Colostomy in place with no issues.  Last colonoscopy was done in 2023 with a subcentimeter tubular adenoma polyp removed from the descending colon, remainder of terminal ileum, cecum, ascending colon, hepatic flexure and transverse colon appeared normal.  She was recommended to repeat colonoscopy in 1 year, so we will schedule that now with EGD with Axios stent removal if possible, otherwise we will schedule separately.  Orders:    Colonoscopy; Future        History of Present Illness   Lisa Reardon is a 69 y.o. female with history of A-fib on Eliquis, TAVR, CAD, CHF, emphysema, T2DM, GERD, rectal cancer status post  proctectomy with colostomy, ovarian cancer, and gastric bypass who presents for follow-up for med refills.    She reports she recently ran out of omeprazole and developed upper abdominal pain which resolved after purchasing it over-the-counter.  She denies any heartburn, nausea/vomiting, upper abdominal pain, black or bloody stool.  Denies any issues with constipation or diarrhea.     In February she had removal of a 10 mm nodule from her bypass antrum removed with EMR which came back benign as hyperplastic polyp, cholelithiasis was also noted.  She is overdue for repeat colonoscopy.  Reports she is unable to drink GoLytely something.  She wanted to try    She is on Eliquis prescribed by Dr. Carrasquillo.    Review of Systems A complete review of systems is negative other than that noted above in the HPI.      Current Outpatient Medications   Medication Sig Dispense Refill    apixaban (Eliquis) 5 mg Take 1 tablet (5 mg total) by mouth 2 (two) times a day 180 tablet 1    atorvastatin (LIPITOR) 20 mg tablet Take 1 tablet (20 mg total) by mouth daily 90 tablet 1    Empagliflozin (Jardiance) 25 MG TABS Take 1 tablet (25 mg total) by mouth daily 90 tablet 1    lisinopril (ZESTRIL) 2.5 mg tablet Take 1 tablet (2.5 mg total) by mouth daily 90 tablet 1    meclizine (ANTIVERT) 12.5 MG tablet Take 1 tablet (12.5 mg total) by mouth every 12 (twelve) hours as needed for dizziness 20 tablet 0    metoprolol succinate (TOPROL-XL) 25 mg 24 hr tablet Take 1 tablet (25 mg total) by mouth every 12 (twelve) hours 180 tablet 1    metoprolol succinate (TOPROL-XL) 50 mg 24 hr tablet Take 1 tablet (50 mg total) by mouth 2 (two) times a day 180 tablet 1    Multiple Vitamins-Minerals (BARIATRIC FUSION) CHEW Chew 1 tablet daily        nystatin (MYCOSTATIN) cream APPLY TOPICALLY 2 TIMES A DAY 30 g 2    omeprazole (PriLOSEC) 20 mg delayed release capsule Take 1 capsule (20 mg total) by mouth daily before breakfast 90 capsule 1    oxyCODONE  "(ROXICODONE) 10 MG TABS Take 10 mg by mouth every 6 (six) hours as needed As needed for pain      potassium chloride (K-DUR,KLOR-CON) 20 mEq tablet Take 1 tablet (20 mEq total) by mouth if needed (if taking torsemide) 30 tablet 1    torsemide (DEMADEX) 20 mg tablet Take 1 tablet (20 mg total) by mouth if needed (for leg swelling/weight gain above 3 pounds) 30 tablet 1    albuterol (2.5 mg/3 mL) 0.083 % nebulizer solution TAKE 1 VIAL BY NEBULIZATION EVERY 4 HOURS AS NEEDED FOR WHEEZING OR SHORTNESS OF BREATH (Patient not taking: Reported on 4/25/2025) 225 mL 1    metFORMIN (GLUCOPHAGE) 500 mg tablet Take 1 tablet (500 mg total) by mouth daily with breakfast (Patient not taking: Reported on 12/31/2024) 90 tablet 1     No current facility-administered medications for this visit.     Objective   /77 (BP Location: Right arm, Patient Position: Sitting, Cuff Size: Standard)   Pulse 83   Ht 5' 5\" (1.651 m)   Wt 132 kg (290 lb)   BMI 48.26 kg/m²     Physical Exam  Vitals and nursing note reviewed.   Constitutional:       General: She is not in acute distress.     Appearance: She is well-developed.   HENT:      Head: Normocephalic and atraumatic.   Eyes:      Conjunctiva/sclera: Conjunctivae normal.   Cardiovascular:      Rate and Rhythm: Normal rate and regular rhythm.      Heart sounds: No murmur heard.  Pulmonary:      Effort: Pulmonary effort is normal. No respiratory distress.      Breath sounds: Normal breath sounds. Decreased air movement present.   Abdominal:      Palpations: Abdomen is soft.      Tenderness: There is no abdominal tenderness.   Musculoskeletal:         General: No swelling.      Cervical back: Neck supple.   Skin:     General: Skin is warm and dry.      Capillary Refill: Capillary refill takes less than 2 seconds.   Neurological:      Mental Status: She is alert and oriented to person, place, and time.   Psychiatric:         Mood and Affect: Mood normal.            Lab Results: I personally " reviewed relevant lab results.       Results for orders placed during the hospital encounter of 02/21/24    Endoscopic ultrasonography, GI (Upper) Radial    Narrative  Table formatting from the original result was not included.  [facility]  [address] [facility] [address]  [phone]      DATE OF SERVICE:  2/21/24    PHYSICIAN(S):  Attending:  Zenon Paula MD    Fellow:  No Staff Documented      INDICATION:  Gastric mass    POST-OP DIAGNOSIS:  See the impression below.    PREPROCEDURE:  Informed consent was obtained for the procedure, including sedation.  Risks of perforation, hemorrhage, adverse drug reaction and aspiration were discussed. The patient was placed in the left lateral decubitus position.    Patient was explained about the risks and benefits of the procedure. Risks including but not limited to bleeding, infection, and perforation were explained in detail. Also explained about less than 100% sensitivity with the exam and other alternatives.    PROCEDURE: EUS UPPER    DETAILS OF PROCEDURE:  Patient was taken to the procedure room where a time out was performed to confirm correct patient and correct procedure. The patient underwent monitored anesthesia care, which was administered by an anesthesia professional. The patient's blood pressure, heart rate, oxygen, respirations, level of consciousness and ECG were monitored throughout the procedure. The endoscope and radial scope were advanced to the duodenum. The patient experienced no blood loss. The procedure was not difficult. The patient tolerated the procedure well. There were no apparent adverse events.    ANESTHESIA INFORMATION:  ASA: III  Anesthesia Type: IV Sedation with Anesthesia    MEDICATIONS:  No administrations occurring from 0806 to 0859 on 02/21/24      FINDINGS:  The esophagus appeared normal.  Irregular Z-line  Previous Juan-en-Y gastric bypass in the stomach  A lumen apposing stent (LAMS) stent was seen in  the gastric pouch and led to the gastric remnant.  The gastric remnant appeared normal. Nodular mucosa in the gastric antrum. Biopsies done.  10 mm nodule with normal surface appearance was visualized in the antrum; completely removed target lesion en bloc by EMR and retrieved specimen. Clear-cut demarcation of the lesion was performed prior to procedure. EMR was performed with a hot snare. Post-procedure bleeding was not visualized; placed 2 clips successfully  The duodenum appeared normal.  The jejunum appeared normal. Evaluated from the gastrojejunostomy.  The pancreatic parenchyma was visualized and appeared to have a normal salt and pepper appearance. The pancreatic duct appeared normal.  The parenchyma of the liver appeared normal. The hepatic ducts appeared normal.  The bile duct appeared normal.  The gallbladder was visualized and appeared normal. The gallbladder contained stones.      SPECIMENS:  ID Type Source Tests Collected by Time Destination  1 : gastric antrum nodule - emr Tissue Stomach TISSUE EXAM Zenon Paula MD 2/21/2024  8:49 AM  2 : antrum bx Tissue Stomach TISSUE EXAM Zenon Paula MD 2/21/2024  8:54 AM        Impression  Irregular Z-line  Previous Juan-en-Y gastric bypass in the stomach. Stent seen in the pouch leading to the gastric remnant.  10 mm nodule was visualized in the antrum; removed by EMR; placed 2 clips successfully  Nodular gastric antral mucosa. Biopsies done.  Cholelithiasis      RECOMMENDATION:  Await pathology results    If pathology unremarkable then plan for stent removal.  Resume Eliquis on 2/23/24.    Zenon Paula MD

## 2025-04-30 NOTE — TELEPHONE ENCOUNTER
Scheduled date of colonoscopy/egd fluoro  (as of today): 7/2/25  Physician performing colonoscopy: Dr. Palua   Location of colonoscopy: [place]   Bowel prep reviewed with patient: miralax/dulcolax   Instructions reviewed with patient by: Adalgisa - mailed   Clearances:  Jardiance - 4 day hold, Eliquis - Dr. Carrasquillo

## 2025-05-01 ENCOUNTER — TELEPHONE (OUTPATIENT)
Age: 70
End: 2025-05-01

## 2025-05-01 NOTE — TELEPHONE ENCOUNTER
Pt called in stating that unfortunately she has to watch her grandchild today unexpectedly so she won't be able to make her appointment. Rescheduled pt with David PALOMINO for 5/7

## 2025-05-06 ENCOUNTER — TELEPHONE (OUTPATIENT)
Age: 70
End: 2025-05-06

## 2025-05-06 NOTE — TELEPHONE ENCOUNTER
Caller: Lisa     Doctor: JANETTE Richards     Reason for call: patient needs to reschedule urgent appt for tomorrow, 5/7. Please call patient and reschedule.    Call back#: [phone]

## 2025-05-15 ENCOUNTER — OFFICE VISIT (OUTPATIENT)
Dept: CARDIOLOGY CLINIC | Facility: CLINIC | Age: 70
End: 2025-05-15
Payer: MEDICARE

## 2025-05-15 VITALS
HEIGHT: 65 IN | BODY MASS INDEX: 48.65 KG/M2 | OXYGEN SATURATION: 94 % | HEART RATE: 103 BPM | WEIGHT: 292 LBS | TEMPERATURE: 97.2 F | SYSTOLIC BLOOD PRESSURE: 134 MMHG | DIASTOLIC BLOOD PRESSURE: 80 MMHG

## 2025-05-15 DIAGNOSIS — I25.10 CORONARY ARTERY DISEASE INVOLVING NATIVE CORONARY ARTERY OF NATIVE HEART WITHOUT ANGINA PECTORIS: ICD-10-CM

## 2025-05-15 DIAGNOSIS — I48.19 PERSISTENT ATRIAL FIBRILLATION (HCC): ICD-10-CM

## 2025-05-15 DIAGNOSIS — I50.32 CHRONIC DIASTOLIC HEART FAILURE (HCC): ICD-10-CM

## 2025-05-15 DIAGNOSIS — E78.2 MIXED HYPERLIPIDEMIA: ICD-10-CM

## 2025-05-15 DIAGNOSIS — I35.0 AORTIC STENOSIS, SEVERE: ICD-10-CM

## 2025-05-15 DIAGNOSIS — Z95.2 S/P TAVR (TRANSCATHETER AORTIC VALVE REPLACEMENT): ICD-10-CM

## 2025-05-15 PROCEDURE — 99214 OFFICE O/P EST MOD 30 MIN: CPT

## 2025-05-15 NOTE — PROGRESS NOTES
Lisa Reardon  1955  [id number]  [facility] [address] [facility] [address] [phone]    1. Persistent atrial fibrillation (HCC)  POCT ECG    Echo complete w/ contrast if indicated    Holter monitor      2. Chronic diastolic heart failure (HCC)  B-Type Natriuretic Peptide(BNP)      3. Coronary artery disease involving native coronary artery of native heart without angina pectoris        4. Aortic stenosis, severe        5. S/P TAVR (transcatheter aortic valve replacement)        6. Mixed hyperlipidemia            Summary/Discussion:  Persistent atrial fibrillation   - EKG today demonstrating atrial fibrillation, heart rate 103 bpm  - echo (5/2024): LVEF visibly estimated 45% with mildly reduced systolic function and both atrium severely dilated   - anticoagulation on eliquis 5 mg twice daily   - plan to follow rate control strategy per primary cardiologist   - continue metoprolol to 75 mg twice daily   obtain 48 hour Holter to assess average heart rate. If rates are not well controlled will likely up titrate her metoprolol   she reports prior issues with extended cardiac monitor adhesive in which it does not sticking to her skin   - obtain echo to reassess LVEF     Severe aortic stenosis s/p TAVR in 9/2020:  - echo (5/2024): Edmaurice BARBARA 3 ultra-26 mm bio prostatic valve in the aortic position with gradients across prostatic valve within expected range   - bio AVR sound noted on exam    - antibiotics before dental procedures  - historically not on aspirin while being anticoagulated with Eliquis    Chronic HFmrEF  - echo as noted above  - creatinine (11/5/2024): 0.79  - potassium (11/5/2024): 4.5  - evidence of gradual weight gain per chart review. Volume status difficult to assess due to body habitus. She denies any acute HF s/s and has not needed to take her PRN torsemide   will continue torsemide 20 mg daily PRN and jardiance + oral potassium at this  time  obtain BNP  repeat echo as noted above  - continue present GDMT  - heart failure education provided     Coronary artery disease:  - nonobstructive diease based on cardiac catheterization in 7/2020  - currently without symptoms of angina   - not on aspirin while being anticoagulated with eliquis  - continue statin and beta blocker     Dyslipidemia:  - Lipid Profile:    Latest Reference Range & Units 11/05/24 08:55   Cholesterol 100 - 199 mg/dL 179   HDL-P(TOTAL) >=30.5 umol/L 31.0   HDL CHOLESTEROL LIPOPROTEIN >39 mg/dL 54   LDL Calculated 0 - 99 mg/dL 99   Small LDL-P <=527 nmol/L 789 (H)   LDL SIZE >20.5 nm 20.3 (L)   LP-IR SCORE <=45  54 (H)   Triglycerides 0 - 149 mg/dL 151 (H)   - continue atorvastatin 20 mg daily with goal LDL <70  - consider intensifying her statin therapy if LDL remains sub-optimal on annual lipid follow up  - PCP follows  - encouraged low cholesterol, mediterranean diet, and annual lipid follow up    Interval History: Lisa Reardon is a 69 y.o. year old female with history mentioned in problem list who presents to the office today for a follow up.     Since her last office visit she has been overall feeling well from a cardiac standpoint. She denies any chest pain/pressure/discomfort or shortness of breath. She denies worsening lower extremity edema, orthopnea, and PND. She denies lightheadedness, worsening dizziness (hx of vertigo) and syncope.  Her functional capacity is limited due to ambulatory dysfunction with use of a walker at baseline however, she does watch her grandkids daily in which she denies any significant cardiac limitations in doing so.      She will RTO in 3 months or sooner if necessary. She will call with any concerns.         Medical Problems       Problem List       Morbid obesity due to excess calories (HCC)    Overview Signed 7/21/2021  8:20 AM by GEORGETTE Bhatia   Formatting of this note might be different from the original.  Last Assessment & Plan:   BMI  43.29 S/p Juan-En-Y Gastric Bypass with Dr. Hipolito Frazier on 07/18/16.  - Cardiac diet  - Nutrition consult         Postgastrectomy malabsorption    S/P gastric bypass    Marginal ulcer    History of gastric bypass    Overview Addendum 7/21/2021  8:20 AM by GEORGETTE Bhatia   Added automatically from request for surgery [id number]         Atherosclerotic peripheral vascular disease (HCC)    Diabetes mellitus type 2, diet-controlled (HCC)      Lab Results   Component Value Date    HGBA1C 8.2 (H) 11/05/2024         Onychomycosis    History of rectal cancer    Colostomy care (HCC)    History of ovarian cancer    Encounter for surgical aftercare following surgery of digestive system    Overview Signed 3/4/2019 11:21 AM by Micki Carrera   Added automatically from request for surgery [id number]         History of ovarian cancer    Candidal intertrigo    Vaginal bleeding    Aortic stenosis, severe    Nonrheumatic mitral valve stenosis    Atrial fibrillation with rapid ventricular response (HCC)    Hyperlipidemia    Hypochloremia    Weight gain following gastric bypass surgery    S/P TAVR (transcatheter aortic valve replacement)    Ambulatory dysfunction    CAD (coronary artery disease)    Anticoagulation adequate with anticoagulant therapy    Chronic diastolic heart failure (HCC)    Wt Readings from Last 3 Encounters:   05/15/25 132 kg (292 lb)   04/25/25 132 kg (290 lb)   12/31/24 131 kg (289 lb 6.4 oz)                 Osteoarthritis of right knee    GI bleed    Chronic kidney disease    Lab Results   Component Value Date    EGFR 76 11/05/2024    EGFR 64 06/20/2024    EGFR 67 06/18/2024    CREATININE 0.79 11/05/2024    CREATININE 0.92 06/20/2024    CREATININE 0.88 06/18/2024         Iron deficiency anemia, unspecified    Iron deficiency anemia    Pulmonary emphysema, unspecified emphysema type (HCC)    Continuous opioid dependence (HCC)    COVID-19    Type 2 diabetes mellitus with chronic kidney disease, without long-term current  use of insulin, unspecified CKD stage (HCC)      Lab Results   Component Value Date    HGBA1C 8.2 (H) 2024         Recurrent epistaxis    Pulmonary nodule    Infected cyst of skin    Sebaceous cyst    Abdominal wall abscess        Past Medical History:   Diagnosis Date    Acute pain of right knee     Ambulatory dysfunction     Anemia     Arthritis     Bleeding ulcer     Cancer (HCC)     rectal    Chronic lower back pain     Chronic pain disorder     back pain    Colon cancer (HCC) 2018    COVID-19 2023    mild s/s    Diabetes mellitus (HCC)     Endometrial cancer (HCC) 2018    GERD (gastroesophageal reflux disease)     History of chemotherapy     History of MRSA infection     Hyperlipidemia     Hypertension     Morbid obesity (HCC)     Morbid obesity with BMI of 45.0-49.9, adult (HCC)     Ovarian cancer (HCC) 2018    Paroxysmal atrial fibrillation (HCC)     Pneumonia     Rectal cancer (HCC)     S/P TAVR (transcatheter aortic valve replacement)     Sleep apnea     doesn't use CPAP any longer    SOB (shortness of breath)     Spinal stenosis     Systolic murmur     Unsteady gait     uses walker    Wears dentures     Wears glasses      Social History     Socioeconomic History    Marital status: Single     Spouse name: Not on file    Number of children: 2    Years of education: Not on file    Highest education level: High school graduate   Occupational History    Not on file   Tobacco Use    Smoking status: Former     Current packs/day: 0.00     Average packs/day: 1 pack/day for 25.0 years (25.0 ttl pk-yrs)     Types: Cigarettes     Start date: 3/30/1981     Quit date: 3/30/2006     Years since quittin.1     Passive exposure: Never    Smokeless tobacco: Never   Vaping Use    Vaping status: Never Used   Substance and Sexual Activity    Alcohol use: Not Currently    Drug use: Not Currently     Types: Oxycodone     Comment: Percocet for lower back pain prn    Sexual activity: Not Currently   Other  Topics Concern    Not on file   Social History Narrative    Not on file     Social Drivers of Health     Financial Resource Strain: Low Risk  (2023)    Overall Financial Resource Strain (CARDIA)     Difficulty of Paying Living Expenses: Not very hard   Food Insecurity: No Food Insecurity (2024)    Nursing - Inadequate Food Risk Classification     Worried About Running Out of Food in the Last Year: Never true     Ran Out of Food in the Last Year: Never true     Ran Out of Food in the Last Year: Not on file   Transportation Needs: No Transportation Needs (2024)    PRAPARE - Transportation     Lack of Transportation (Medical): No     Lack of Transportation (Non-Medical): No   Physical Activity: Not on file   Stress: Not on file   Social Connections: Not on file   Intimate Partner Violence: Not on file   Housing Stability: Low Risk  (2024)    Housing Stability Vital Sign     Unable to Pay for Housing in the Last Year: No     Number of Times Moved in the Last Year: 0     Homeless in the Last Year: No      Family History   Adopted: Yes   Problem Relation Age of Onset    Leukemia Mother     Heart disease Father     Coronary artery disease Father     Diabetes Father     No Known Problems Sister     No Known Problems Brother     Diabetes Son     No Known Problems Brother     No Known Problems Brother     No Known Problems Sister     No Known Problems Sister      Past Surgical History:   Procedure Laterality Date    ABDOMINAL PERINEAL BOWEL RESECTION W/ ILEOANAL POUCH N/A 2018    Procedure: LAPAROSCOPIC HAND ASSIST ABDOMINOPERINEAL RESECTION,  POSTERIOR VAGINECTOMY, OMENTECTOMY;  Surgeon: José Miguel Cedillo MD;  Location: [facility];  Service: Colorectal    ABDOMINAL SURGERY      abscess removed from abdomen and right thigh, a hole in thigh closed by plastic surgeon    ABSCESS DRAINAGE      abd, (R) leg, (L) leg     SECTION       SECTION      CYSTOSCOPY N/A 2018    Procedure:  CYSTOSCOPY;  Surgeon: Seth Pinto MD;  Location: [facility];  Service: Gynecology Oncology    ESOPHAGOGASTRODUODENOSCOPY N/A 07/18/2016    Procedure: ESOPHAGOGASTRODUODENOSCOPY (EGD);  Surgeon: Jama Frazier MD;  Location: [facility];  Service:     ESOPHAGOGASTRODUODENOSCOPY N/A 03/30/2016    Procedure: ESOPHAGOGASTRODUODENOSCOPY (EGD);  Surgeon: Jama Frazier MD;  Location: [facility];  Service:     EYE SURGERY      laser eye surgery    HYSTERECTOMY N/A 09/06/2018    Procedure: RADICAL HYSTERECTOMY TOTAL ABDOMINAL (ALEXANDRA). BSO;  Surgeon: Seth Pinto MD;  [facility];  Service: Gynecology Oncology    JOINT REPLACEMENT Left 2017    hip    JOINT REPLACEMENT Right 2017    hip    OOPHORECTOMY Bilateral     NE COLONOSCOPY FLX DX W/COLLJ SPEC WHEN PFRMD N/A 03/09/2018    Procedure: COLONOSCOPY;  Surgeon: Juanjo Sanders MD;  Location: [facility];  Service: Gastroenterology    NE COLONOSCOPY FLX DX W/COLLJ SPEC WHEN PFRMD N/A 09/05/2018    Procedure: COLONOSCOPY;  Surgeon: José Miguel Cedillo MD;  [facility]: [facility];  Service: Colorectal    NE ECHO TRANSESOPHAG R-T 2D W/PRB IMG ACQUISJ I&R N/A 09/01/2020    Procedure: TRANSESOPHAGEAL ECHOCARDIOGRAM (THO);  Surgeon: Diallo Vu DO;  [facility];  Service: Cardiac Surgery    NE ESOPHAGOGASTRODUODENOSCOPY TRANSORAL DIAGNOSTIC N/A 02/02/2018    Procedure: ESOPHAGOGASTRODUODENOSCOPY (EGD);  Surgeon: Juanjo Sanders MD;  Location: [facility];  Service: Gastroenterology    NE LAPAROSCOPY SURG COLOSTOMY/SKN LVL CECOSTOMY N/A 09/06/2018    Procedure: PERMANENT END COLOSTOMY;  Surgeon: José Miguel Cedillo MD;  [facility];  Service: Colorectal    NE LAPS GSTR RSTCV PX W/BYP SOLEDAD-EN-Y LIMB <150 CM N/A 07/18/2016    Procedure: BYPASS GASTRIC  SOLEDAD-EN-Y LAPAROSCOPIC;  Surgeon: Jama Frazier MD;  [facility];  Service: Bariatrics    NE LAPS PROCTECTOMY ABDOMINOPERINEAL W/COLOSTOMY N/A 09/06/2018    Procedure: PROCTECTOMY;   Surgeon: José Miguel Cedillo MD;  [facility];  Service: Colorectal    IL REPLACE AORTIC VALVE OPENFEMORAL ARTERY APPROACH N/A 09/01/2020    Procedure: REPLACEMENT AORTIC VALVE TRANSCATHETER (TAVR) TRANSFEMORAL W/ 26MM WADDELL BARBARA S3 ULTRA VALVE(ACCESS ON RIGHT);  Surgeon: Diallo Vu DO;  [facility];  Service: Cardiac Surgery    REPLACEMENT TOTAL KNEE      TOOTH EXTRACTION      TUBAL LIGATION         Current Outpatient Medications:     apixaban (Eliquis) 5 mg, Take 1 tablet (5 mg total) by mouth 2 (two) times a day, Disp: 180 tablet, Rfl: 1    atorvastatin (LIPITOR) 20 mg tablet, Take 1 tablet (20 mg total) by mouth daily, Disp: 90 tablet, Rfl: 1    Empagliflozin (Jardiance) 25 MG TABS, Take 1 tablet (25 mg total) by mouth daily, Disp: 90 tablet, Rfl: 1    lisinopril (ZESTRIL) 2.5 mg tablet, Take 1 tablet (2.5 mg total) by mouth daily, Disp: 90 tablet, Rfl: 1    meclizine (ANTIVERT) 12.5 MG tablet, Take 1 tablet (12.5 mg total) by mouth every 12 (twelve) hours as needed for dizziness, Disp: 20 tablet, Rfl: 0    metFORMIN (GLUCOPHAGE) 500 mg tablet, Take 1 tablet (500 mg total) by mouth daily with breakfast, Disp: 90 tablet, Rfl: 1    metoprolol succinate (TOPROL-XL) 25 mg 24 hr tablet, Take 1 tablet (25 mg total) by mouth every 12 (twelve) hours, Disp: 180 tablet, Rfl: 1    metoprolol succinate (TOPROL-XL) 50 mg 24 hr tablet, Take 1 tablet (50 mg total) by mouth 2 (two) times a day, Disp: 180 tablet, Rfl: 1    Multiple Vitamins-Minerals (BARIATRIC FUSION) CHEW, Chew 1 tablet in the morning., Disp: , Rfl:     nystatin (MYCOSTATIN) cream, APPLY TOPICALLY 2 TIMES A DAY, Disp: 30 g, Rfl: 2    omeprazole (PriLOSEC) 20 mg delayed release capsule, Take 1 capsule (20 mg total) by mouth daily before breakfast, Disp: 90 capsule, Rfl: 1    oxyCODONE (ROXICODONE) 10 MG TABS, Take 10 mg by mouth every 6 (six) hours as needed As needed for pain, Disp: , Rfl:     potassium chloride (K-DUR,KLOR-CON) 20  "mEq tablet, Take 1 tablet (20 mEq total) by mouth if needed (if taking torsemide), Disp: 30 tablet, Rfl: 1    torsemide (DEMADEX) 20 mg tablet, Take 1 tablet (20 mg total) by mouth if needed (for leg swelling/weight gain above 3 pounds), Disp: 30 tablet, Rfl: 1    albuterol (2.5 mg/3 mL) 0.083 % nebulizer solution, TAKE 1 VIAL BY NEBULIZATION EVERY 4 HOURS AS NEEDED FOR WHEEZING OR SHORTNESS OF BREATH (Patient not taking: No sig reported), Disp: 225 mL, Rfl: 1  No Known Allergies    Labs:     Chemistry        Component Value Date/Time     10/16/2015 0910    K 4.5 11/05/2024 0855    K 3.9 04/10/2021 0340     11/05/2024 0855     04/10/2021 0340    CO2 27 11/05/2024 0855    CO2 25 04/10/2021 0340    BUN 13 11/05/2024 0855    BUN 14 04/10/2021 0340    CREATININE 0.79 11/05/2024 0855    CREATININE 0.66 04/10/2021 0340        Component Value Date/Time    CALCIUM 9.2 11/05/2024 0855    CALCIUM 8.4 (L) 04/10/2021 0340    ALKPHOS 80 11/05/2024 0855    ALKPHOS 59 04/10/2021 0340    AST 13 11/05/2024 0855    AST 15 04/10/2021 0340    ALT 12 11/05/2024 0855    ALT 13 04/10/2021 0340            Lab Results   Component Value Date    CHOL 159 03/26/2014     Lab Results   Component Value Date    HDL 54 11/05/2024    HDL 48 (L) 01/23/2024    HDL 45 (L) 07/29/2022     Lab Results   Component Value Date    LDLCALC 99 11/05/2024    LDLCALC 72 01/23/2024    LDLCALC 61 07/29/2022     Lab Results   Component Value Date    TRIG 131 01/23/2024    TRIG 144 07/29/2022    TRIG 177 (H) 07/19/2021     No results found for: \"CHOLHDL\"    Imaging: No results found.    ECG: Atrial fibrillation    Review of Systems   Musculoskeletal:  Positive for muscle weakness.   Neurological:  Positive for weakness.   All other systems reviewed and are negative.      Vitals:    05/15/25 0955   BP: 134/80   Pulse: 103   Temp: (!) 97.2 °F (36.2 °C)   SpO2: 94%     Vitals:    05/15/25 0955   Weight: 132 kg (292 lb)     Height: 5' 5\" (165.1 cm) "   Body mass index is 48.59 kg/m².    Physical Exam  Vitals and nursing note reviewed.   Constitutional:       General: She is not in acute distress.     Appearance: Normal appearance. She is not ill-appearing.   HENT:      Head: Normocephalic.      Nose: Nose normal.      Mouth/Throat:      Mouth: Mucous membranes are moist.      Pharynx: Oropharynx is clear.     Cardiovascular:      Rate and Rhythm: Normal rate. Rhythm irregular.      Heart sounds: No murmur heard.  Pulmonary:      Breath sounds: Decreased breath sounds present.     Musculoskeletal:      Cervical back: Normal range of motion.      Right lower leg: No edema.      Left lower leg: No edema.     Skin:     General: Skin is warm and dry.     Neurological:      Mental Status: She is alert and oriented to person, place, and time.      Motor: Weakness present.      Gait: Gait abnormal.     Psychiatric:         Mood and Affect: Mood normal.

## 2025-05-19 PROCEDURE — 93000 ELECTROCARDIOGRAM COMPLETE: CPT

## 2025-05-29 ENCOUNTER — TELEPHONE (OUTPATIENT)
Dept: GASTROENTEROLOGY | Facility: CLINIC | Age: 70
End: 2025-05-29

## 2025-05-29 NOTE — TELEPHONE ENCOUNTER
Our mutual patient is scheduled for procedure:  Colon/EGD    On: 7/2/25     With: Dr. Paula    He/She is taking the following blood thinner: Eliquis    Can this be stopped 2 days prior to the procedure?      Physician Approving clearance: ________________________

## 2025-06-04 DIAGNOSIS — E78.5 HYPERLIPIDEMIA, UNSPECIFIED HYPERLIPIDEMIA TYPE: ICD-10-CM

## 2025-06-04 DIAGNOSIS — E11.22 TYPE 2 DIABETES MELLITUS WITH CHRONIC KIDNEY DISEASE, WITHOUT LONG-TERM CURRENT USE OF INSULIN, UNSPECIFIED CKD STAGE (HCC): ICD-10-CM

## 2025-06-04 DIAGNOSIS — I50.32 CHRONIC DIASTOLIC HEART FAILURE (HCC): ICD-10-CM

## 2025-06-04 NOTE — TELEPHONE ENCOUNTER
Patient informed there are refills remaining on current prescription. Patient states there are no refills at the pharmacy and is requesting prescription to be refilled.      Reason for call:   [x] Refill   [] Prior Auth  [] Other:     Office:   [] PCP/Provider -   [x] Specialty/Provider - card/ GEORGETTE Geronimo     Medication: Empagliflozin (Jardiance) 25 MG TABS     Dose/Frequency: 25 mg, Oral, Daily     Quantity: 90    Pharmacy:  [facility]     Local Pharmacy   Does the patient have enough for 3 days?   [] Yes   [] No - Send as HP to POD    Mail Away Pharmacy   Does the patient have enough for 10 days?   [] Yes   [x] No - Send as HP to POD

## 2025-06-17 ENCOUNTER — TELEPHONE (OUTPATIENT)
Dept: CARDIOLOGY CLINIC | Facility: CLINIC | Age: 70
End: 2025-06-17

## 2025-07-11 DIAGNOSIS — R21 RASH: ICD-10-CM

## 2025-07-11 NOTE — TELEPHONE ENCOUNTER
Reason for call:   [x] Refill   [] Prior Auth  [] Other:     Office:   [x] PCP/Provider - Christine Miller,   [] Specialty/Provider -     Medication:     nystatin (MYCOSTATIN) cream       Dose/Frequency: APPLY TOPICALLY 2 TIMES A DAY     Quantity: 30g    Pharmacy: [facility]   Does the patient have enough for 3 days?   [] Yes   [x] No - Send as HP to POD    Mail Away Pharmacy   Does the patient have enough for 10 days?   [] Yes   [] No - Send as HP to POD

## 2025-07-14 RX ORDER — NYSTATIN 100000 U/G
1 CREAM TOPICAL 2 TIMES DAILY
Qty: 30 G | Refills: 0 | Status: SHIPPED | OUTPATIENT
Start: 2025-07-14

## 2025-07-17 DIAGNOSIS — I48.91 ATRIAL FIBRILLATION WITH RAPID VENTRICULAR RESPONSE (HCC): ICD-10-CM

## 2025-07-17 RX ORDER — APIXABAN 5 MG/1
5 TABLET, FILM COATED ORAL 2 TIMES DAILY
Qty: 180 TABLET | Refills: 0 | Status: SHIPPED | OUTPATIENT
Start: 2025-07-17

## 2025-07-21 ENCOUNTER — TELEPHONE (OUTPATIENT)
Dept: CARDIAC SURGERY | Facility: CLINIC | Age: 70
End: 2025-07-21

## 2025-07-21 DIAGNOSIS — R91.1 SOLITARY PULMONARY NODULE: Primary | ICD-10-CM

## 2025-07-21 NOTE — TELEPHONE ENCOUNTER
I called and spoke with patient today. I informed her of her CT chest scan appt on 8/21 @ 10am @ [facility]. And a follow up with Dr. Cox on 8/26 @ 10:20am @ [facility] office. All appts details reviewed. I answered all question and concern appropriately. I provided her the [employer] phone number to contact to assist in set up of her account.

## 2025-08-22 ENCOUNTER — TELEPHONE (OUTPATIENT)
Dept: HEMATOLOGY ONCOLOGY | Facility: CLINIC | Age: 70
End: 2025-08-22

## (undated) DEVICE — "MB-142 MOUTHPIECE": Brand: MOUTHPIECE

## (undated) DEVICE — ADHESIVE SKN CLSR HISTOACRYL FLEX 0.5ML LF

## (undated) DEVICE — 3000CC GUARDIAN II: Brand: GUARDIAN

## (undated) DEVICE — INSUFLATION TUBING INSUFLOW (LEXION)

## (undated) DEVICE — BRUSH ENDO CLEANING DBL-HEADER

## (undated) DEVICE — Device

## (undated) DEVICE — HEAVY DUTY TABLE COVER: Brand: CONVERTORS

## (undated) DEVICE — SOLIDIFIER FLUID WASTE CONTROL 1500ML

## (undated) DEVICE — SINGLE-USE BIOPSY FORCEPS: Brand: RADIAL JAW 4

## (undated) DEVICE — GLOVE SRG BIOGEL ECLIPSE 7

## (undated) DEVICE — BETHLEHEM MAJOR GENERAL PACK: Brand: CARDINAL HEALTH

## (undated) DEVICE — GLOVE SRG BIOGEL 8

## (undated) DEVICE — INTENDED FOR TISSUE SEPARATION, AND OTHER PROCEDURES THAT REQUIRE A SHARP SURGICAL BLADE TO PUNCTURE OR CUT.: Brand: BARD-PARKER ® CARBON RIB-BACK BLADES

## (undated) DEVICE — GAUZE SPONGES,16 PLY: Brand: CURITY

## (undated) DEVICE — PAD GROUNDING ADULT

## (undated) DEVICE — DISPOSABLE BIOPSY VALVE MAJ-1555: Brand: SINGLE USE BIOPSY VALVE (STERILE)

## (undated) DEVICE — TUBING AUX CHANNEL

## (undated) DEVICE — SUT VICRYL 0 CT-1 CR/8 27 IN JJ41G

## (undated) DEVICE — ABDOMINAL PAD: Brand: DERMACEA

## (undated) DEVICE — POOLE SUCTION HANDLE: Brand: CARDINAL HEALTH

## (undated) DEVICE — TRAY FOLEY 16FR SURESTEP TEMP SENS URIMETER STAT LOK

## (undated) DEVICE — BAG SPECIMEN BIOHAZARD 10 X 10 ADHESIVE

## (undated) DEVICE — PROXIMATE RELOADABLE LINEAR CUTTER WITH SAFETY LOCK-OUT, 75MM: Brand: PROXIMATE

## (undated) DEVICE — ENDOPATH XCEL UNIVERSAL TROCAR STABLILITY SLEEVES: Brand: ENDOPATH XCEL

## (undated) DEVICE — SPONGE STICK WITH PVP-I: Brand: KENDALL

## (undated) DEVICE — AIRLIFE™  ADULT CUSHION NASAL CANNULA WITH 7 FOOT (2.1 M) CRUSH-RESISTANT OXYGEN TUBING, AND U/CONNECT-IT ADAPTER: Brand: AIRLIFE™

## (undated) DEVICE — DRAPE SHEET THREE QUARTER

## (undated) DEVICE — CATH FOLEY 18FR 5ML 2 WAY UNCOATED SILICONE

## (undated) DEVICE — GUIDEWIRE AMPLATZ .035 180CM 6CM ST SS

## (undated) DEVICE — LUBRICANT SURGILUBE TUBE 4 OZ  FLIP TOP

## (undated) DEVICE — MEDI-VAC YANKAUER SUCTION HANDLE: Brand: CARDINAL HEALTH

## (undated) DEVICE — TUBING BUBBLE CLEAR 5MM X 100 FT NS

## (undated) DEVICE — SUT STRATAFIX SPIRAL 3-0 PGA/PCL 30 X 30 CM SXMD2B408

## (undated) DEVICE — ENSEAL LAPAROSCOPIC TISSUE SEALER G2 CURVED JAW FOR USE WITH G2 GENERATOR 5MM DIAMETER 35CM SHAFT LENGTH: Brand: ENSEAL

## (undated) DEVICE — "MH-438 A/W VLVE F/140 EVIS-140": Brand: AIR/WATER VALVE

## (undated) DEVICE — SUT PDS II 1 XLH 96 IN LOOPED Z881G

## (undated) DEVICE — DEFIB ADULT ELECTRODE CARDINAL

## (undated) DEVICE — TRAVELKIT CONTAINS FIRST STEP KIT (200ML EP-4 KIT) AND SOILED SCOPE BAG - 1 KIT: Brand: TRAVELKIT CONTAINS FIRST STEP KIT AND SOILED SCOPE BAG

## (undated) DEVICE — BRUSH CYTOLOGY 3 MM 240 CM

## (undated) DEVICE — 60 ML SYRINGE,REGULAR TIP: Brand: MONOJECT

## (undated) DEVICE — SUT VICRYL 0 54 IN J207G

## (undated) DEVICE — Device: Brand: OLYMPUS

## (undated) DEVICE — BITE BLOCK MAXI 60FR LF STRAP

## (undated) DEVICE — TRAP POLY

## (undated) DEVICE — GAUZE SPONGES,USP TYPE VII GAUZE, 12 PLY: Brand: CURITY

## (undated) DEVICE — CATH URETERAL 5FR X 70 CM FLEX TIP POLYUR BARD

## (undated) DEVICE — CHLORHEXIDINE 4PCT 4 OZ

## (undated) DEVICE — ACCESS PLATFORM FOR MINIMALLY INVASIVE SURGERY.: Brand: GELPORT® LAPAROSCOPIC  SYSTEM

## (undated) DEVICE — ENSEAL 20 CM SHAFT, LARGE JAW: Brand: ENSEAL X1

## (undated) DEVICE — SUT VICRYL 2-0 SH 27 IN UNDYED J417H

## (undated) DEVICE — STRL COTTON TIP APPLCTR 6IN PK: Brand: CARDINAL HEALTH

## (undated) DEVICE — PREMIUM DRY TRAY LF: Brand: MEDLINE INDUSTRIES, INC.

## (undated) DEVICE — Device: Brand: MEDEX

## (undated) DEVICE — SUT PROLENE 1 CT-1 30 IN 8425H

## (undated) DEVICE — TOWEL SET X-RAY

## (undated) DEVICE — CARDIO PERI-GROIN: Brand: CONVERTORS

## (undated) DEVICE — TELFA NON-ADHERENT ABSORBENT DRESSING: Brand: TELFA

## (undated) DEVICE — FORCEP ELECSURG RADIAL JAW4 2.2 X 240CM  HOT BX

## (undated) DEVICE — 1200CC GUARDIAN II: Brand: GUARDIAN

## (undated) DEVICE — THERMOFLECT BLANKET, L, 25EA                               TS THERMOFLECT BLANKET, 48" X 84", SILVER, 5/BG, 5 BG/CS NW: Brand: THERMOFLECT

## (undated) DEVICE — VIOLET BRAIDED (POLYGLACTIN 910), SYNTHETIC ABSORBABLE SUTURE: Brand: COATED VICRYL

## (undated) DEVICE — ENDOPATH XCEL BLADELESS TROCARS WITH STABILITY SLEEVES: Brand: ENDOPATH XCEL

## (undated) DEVICE — MEDI-VAC YANK SUCT HNDL W/TPRD BULBOUS TIP: Brand: CARDINAL HEALTH

## (undated) DEVICE — GLOVE INDICATOR PI UNDERGLOVE SZ 7 BLUE

## (undated) DEVICE — ANTI-FOG SOLUTION WITH FOAM PAD: Brand: DEVON

## (undated) DEVICE — INSTRUMENT POUCH: Brand: CONVERTORS

## (undated) DEVICE — INTENDED FOR TISSUE SEPARATION, AND OTHER PROCEDURES THAT REQUIRE A SHARP SURGICAL BLADE TO PUNCTURE OR CUT.: Brand: BARD-PARKER SAFETY BLADES SIZE 15, STERILE

## (undated) DEVICE — CATH URETHERAL CONNECTOR

## (undated) DEVICE — GLOVE INDICATOR PI UNDERGLOVE SZ 8 BLUE

## (undated) DEVICE — GLOVE EXAM NON-STRL NTRL PLUS LRG PF

## (undated) DEVICE — SNARE BARBED 230CM

## (undated) DEVICE — INSULATED BLADE ELECTRODE;CAUTION: FOR MANUFACTURING, PROCESSING, OR REPACKING.: Brand: EDGE

## (undated) DEVICE — PACK PBDS STERILE LAP LITHOTOMY RF

## (undated) DEVICE — SUT VICRYL 0 REEL 54 IN J287G

## (undated) DEVICE — SYRINGE 50ML LL

## (undated) DEVICE — CYSTO TUBING SINGLE IRRIGATION

## (undated) DEVICE — REM POLYHESIVE ADULT PATIENT RETURN ELECTRODE: Brand: VALLEYLAB

## (undated) DEVICE — MARKER SPOT EX  BOWEL TATTOO SYRINGE

## (undated) DEVICE — SUT SILK 0 CT-1 30 IN 424H

## (undated) DEVICE — ADHESIVE SKIN HIGH VISCOSITY EXOFIN 1ML

## (undated) DEVICE — X-RAY DETECTABLE SPONGES,16 PLY: Brand: VISTEC

## (undated) DEVICE — SINGLE-USE POLYPECTOMY SNARE: Brand: SENSATION SHORT THROW

## (undated) DEVICE — CARDIOVASCULAR SPLIT DRAPE: Brand: CONVERTORS

## (undated) DEVICE — WOUND RETRACTOR AND PROTECTOR: Brand: ALEXIS O WOUND PROTECTOR-RETRACTOR

## (undated) DEVICE — CHLORAPREP HI-LITE 26ML ORANGE

## (undated) DEVICE — "MAJ-901 WATER CONTAINER SET CV-160/140": Brand: WATER CONTAINER

## (undated) DEVICE — BETHLEHEM UNIVERSAL MINOR GEN: Brand: CARDINAL HEALTH

## (undated) DEVICE — BUTTON SWITCH PENCIL HOLSTER: Brand: VALLEYLAB

## (undated) DEVICE — TUBING SUCTION 5MM X 12 FT

## (undated) DEVICE — DRESSING ALLEVYN LIFE SACRAL 6.75 X 6.5 IN

## (undated) DEVICE — LIGAMAX 5 MM ENDOSCOPIC MULTIPLE CLIP APPLIER: Brand: LIGAMAX

## (undated) DEVICE — SUT PDS II 1 CTX 36 IN Z371T

## (undated) DEVICE — TRAY FOLEY 16FR URIMETER SILICONE SURESTEP

## (undated) DEVICE — "MH-443 SUCTION VALVE F/EVIS140 EVIS160": Brand: SUCTION VALVE

## (undated) DEVICE — GLOVE INDICATOR PI UNDERGLOVE SZ 8.5 BLUE

## (undated) DEVICE — 3M™ IOBAN™ 2 ANTIMICROBIAL INCISE DRAPE 6650EZ: Brand: IOBAN™ 2

## (undated) DEVICE — SUT PLAIN 2-0 CTX 27 IN 872H

## (undated) DEVICE — 3M™ TEGADERM™ TRANSPARENT FILM DRESSING FRAME STYLE, 1626W, 4 IN X 4-3/4 IN (10 CM X 12 CM), 50/CT 4CT/CASE: Brand: 3M™ TEGADERM™

## (undated) DEVICE — SCD SEQUENTIAL COMPRESSION COMFORT SLEEVE MEDIUM KNEE LENGTH: Brand: KENDALL SCD

## (undated) DEVICE — PLUMEPEN PRO 10FT

## (undated) DEVICE — IRRIG ENDO FLO TUBING

## (undated) DEVICE — GLOVE SRG LF STRL BGL SKNSNS 8.5 PF

## (undated) DEVICE — SUT VICRYL 3-0 SH 27 IN J416H

## (undated) DEVICE — GROUNDING PAD UNIVERSAL SLW

## (undated) DEVICE — STERILE CYSTO PACK: Brand: CARDINAL HEALTH

## (undated) DEVICE — URIMETER 2500ML

## (undated) DEVICE — ASTOUND STANDARD SURGICAL GOWN, XL: Brand: CONVERTORS

## (undated) DEVICE — SUT MONOCRYL 4-0 PS-2 18 IN Y496G